# Patient Record
Sex: MALE | Race: BLACK OR AFRICAN AMERICAN | Employment: OTHER
[De-identification: names, ages, dates, MRNs, and addresses within clinical notes are randomized per-mention and may not be internally consistent; named-entity substitution may affect disease eponyms.]

---

## 2017-01-06 ENCOUNTER — TELEPHONE (OUTPATIENT)
Dept: INTERNAL MEDICINE | Facility: CLINIC | Age: 62
End: 2017-01-06

## 2017-01-19 ENCOUNTER — CARE COORDINATION (OUTPATIENT)
Dept: CARE COORDINATION | Facility: CLINIC | Age: 62
End: 2017-01-19

## 2017-01-20 ENCOUNTER — CARE COORDINATION (OUTPATIENT)
Dept: CARE COORDINATION | Facility: CLINIC | Age: 62
End: 2017-01-20

## 2017-02-02 ENCOUNTER — TELEPHONE (OUTPATIENT)
Dept: INTERNAL MEDICINE | Facility: CLINIC | Age: 62
End: 2017-02-02

## 2017-02-07 ENCOUNTER — CARE COORDINATION (OUTPATIENT)
Dept: CARE COORDINATION | Facility: CLINIC | Age: 62
End: 2017-02-07

## 2017-02-15 ENCOUNTER — CARE COORDINATION (OUTPATIENT)
Dept: CARE COORDINATION | Facility: CLINIC | Age: 62
End: 2017-02-15

## 2017-02-23 ENCOUNTER — TELEPHONE (OUTPATIENT)
Dept: INTERNAL MEDICINE | Facility: CLINIC | Age: 62
End: 2017-02-23

## 2017-02-24 ENCOUNTER — CARE COORDINATION (OUTPATIENT)
Dept: CARE COORDINATION | Facility: CLINIC | Age: 62
End: 2017-02-24

## 2017-02-27 DIAGNOSIS — E11.9 TYPE 2 DIABETES MELLITUS WITHOUT COMPLICATION, WITH LONG-TERM CURRENT USE OF INSULIN (HCC): ICD-10-CM

## 2017-02-27 DIAGNOSIS — Z79.4 TYPE 2 DIABETES MELLITUS WITHOUT COMPLICATION, WITH LONG-TERM CURRENT USE OF INSULIN (HCC): ICD-10-CM

## 2017-02-27 RX ORDER — INSULIN GLARGINE 100 [IU]/ML
55 INJECTION, SOLUTION SUBCUTANEOUS NIGHTLY
Qty: 1 VIAL | Refills: 0 | Status: SHIPPED | OUTPATIENT
Start: 2017-02-27 | End: 2017-03-17 | Stop reason: DRUGHIGH

## 2017-03-07 ENCOUNTER — APPOINTMENT (OUTPATIENT)
Dept: GENERAL RADIOLOGY | Age: 62
End: 2017-03-07
Payer: COMMERCIAL

## 2017-03-07 ENCOUNTER — HOSPITAL ENCOUNTER (EMERGENCY)
Age: 62
Discharge: LEFT BEFORE DISCHARGE | End: 2017-03-07
Attending: EMERGENCY MEDICINE
Payer: COMMERCIAL

## 2017-03-07 VITALS
DIASTOLIC BLOOD PRESSURE: 69 MMHG | RESPIRATION RATE: 18 BRPM | BODY MASS INDEX: 25.71 KG/M2 | HEART RATE: 62 BPM | SYSTOLIC BLOOD PRESSURE: 143 MMHG | TEMPERATURE: 98.1 F | WEIGHT: 160 LBS | HEIGHT: 66 IN | OXYGEN SATURATION: 100 %

## 2017-03-07 DIAGNOSIS — J44.1 COPD EXACERBATION (HCC): Primary | ICD-10-CM

## 2017-03-07 DIAGNOSIS — R06.02 SOB (SHORTNESS OF BREATH): ICD-10-CM

## 2017-03-07 LAB
ABSOLUTE EOS #: 0.2 K/UL (ref 0–0.4)
ABSOLUTE LYMPH #: 2.1 K/UL (ref 1–4.8)
ABSOLUTE MONO #: 0.6 K/UL (ref 0.1–1.2)
ANION GAP SERPL CALCULATED.3IONS-SCNC: 9 MMOL/L (ref 9–17)
BASOPHILS # BLD: 1 % (ref 0–2)
BASOPHILS ABSOLUTE: 0.1 K/UL (ref 0–0.2)
BUN BLDV-MCNC: 30 MG/DL (ref 8–23)
BUN/CREAT BLD: ABNORMAL (ref 9–20)
CALCIUM SERPL-MCNC: 8.8 MG/DL (ref 8.6–10.4)
CHLORIDE BLD-SCNC: 102 MMOL/L (ref 98–107)
CO2: 23 MMOL/L (ref 20–31)
CREAT SERPL-MCNC: 1.76 MG/DL (ref 0.7–1.2)
DIFFERENTIAL TYPE: NORMAL
EOSINOPHILS RELATIVE PERCENT: 2 % (ref 1–4)
GFR AFRICAN AMERICAN: 48 ML/MIN
GFR NON-AFRICAN AMERICAN: 40 ML/MIN
GFR SERPL CREATININE-BSD FRML MDRD: ABNORMAL ML/MIN/{1.73_M2}
GFR SERPL CREATININE-BSD FRML MDRD: ABNORMAL ML/MIN/{1.73_M2}
GLUCOSE BLD-MCNC: 179 MG/DL (ref 70–99)
HCT VFR BLD CALC: 44.4 % (ref 41–53)
HEMOGLOBIN: 15.3 G/DL (ref 13.5–17.5)
LYMPHOCYTES # BLD: 24 % (ref 24–44)
MAGNESIUM: 1.8 MG/DL (ref 1.6–2.6)
MCH RBC QN AUTO: 30.8 PG (ref 26–34)
MCHC RBC AUTO-ENTMCNC: 34.4 G/DL (ref 31–37)
MCV RBC AUTO: 89.6 FL (ref 80–100)
MONOCYTES # BLD: 7 % (ref 2–11)
PDW BLD-RTO: 13.1 % (ref 12.5–15.4)
PLATELET # BLD: 378 K/UL (ref 140–450)
PLATELET ESTIMATE: NORMAL
PMV BLD AUTO: 7.8 FL (ref 6–12)
POC TROPONIN I: 0.03 NG/ML (ref 0–0.1)
POC TROPONIN I: 0.03 NG/ML (ref 0–0.1)
POC TROPONIN I: 0.04 NG/ML (ref 0–0.1)
POC TROPONIN INTERP: NORMAL
POTASSIUM SERPL-SCNC: 4.1 MMOL/L (ref 3.7–5.3)
RBC # BLD: 4.96 M/UL (ref 4.5–5.9)
RBC # BLD: NORMAL 10*6/UL
SEG NEUTROPHILS: 66 % (ref 36–66)
SEGMENTED NEUTROPHILS ABSOLUTE COUNT: 6 K/UL (ref 1.8–7.7)
SODIUM BLD-SCNC: 134 MMOL/L (ref 135–144)
WBC # BLD: 9 K/UL (ref 3.5–11)
WBC # BLD: NORMAL 10*3/UL

## 2017-03-07 PROCEDURE — 6360000002 HC RX W HCPCS: Performed by: EMERGENCY MEDICINE

## 2017-03-07 PROCEDURE — 84484 ASSAY OF TROPONIN QUANT: CPT

## 2017-03-07 PROCEDURE — 96361 HYDRATE IV INFUSION ADD-ON: CPT

## 2017-03-07 PROCEDURE — 71010 XR CHEST PORTABLE: CPT

## 2017-03-07 PROCEDURE — 94645 CONT INHLJ TX EACH ADDL HOUR: CPT

## 2017-03-07 PROCEDURE — 93005 ELECTROCARDIOGRAM TRACING: CPT

## 2017-03-07 PROCEDURE — 96374 THER/PROPH/DIAG INJ IV PUSH: CPT

## 2017-03-07 PROCEDURE — 83735 ASSAY OF MAGNESIUM: CPT

## 2017-03-07 PROCEDURE — 6370000000 HC RX 637 (ALT 250 FOR IP): Performed by: EMERGENCY MEDICINE

## 2017-03-07 PROCEDURE — 99285 EMERGENCY DEPT VISIT HI MDM: CPT

## 2017-03-07 PROCEDURE — 85025 COMPLETE CBC W/AUTO DIFF WBC: CPT

## 2017-03-07 PROCEDURE — 94644 CONT INHLJ TX 1ST HOUR: CPT

## 2017-03-07 PROCEDURE — 80048 BASIC METABOLIC PNL TOTAL CA: CPT

## 2017-03-07 PROCEDURE — 2580000003 HC RX 258: Performed by: EMERGENCY MEDICINE

## 2017-03-07 PROCEDURE — 94640 AIRWAY INHALATION TREATMENT: CPT

## 2017-03-07 RX ORDER — ALBUTEROL SULFATE 90 UG/1
2 AEROSOL, METERED RESPIRATORY (INHALATION)
Status: DISCONTINUED | OUTPATIENT
Start: 2017-03-07 | End: 2017-03-07

## 2017-03-07 RX ORDER — ALBUTEROL SULFATE 2.5 MG/3ML
5 SOLUTION RESPIRATORY (INHALATION)
Status: DISCONTINUED | OUTPATIENT
Start: 2017-03-07 | End: 2017-03-07

## 2017-03-07 RX ORDER — ALBUTEROL SULFATE 90 UG/1
2 AEROSOL, METERED RESPIRATORY (INHALATION)
Status: DISCONTINUED | OUTPATIENT
Start: 2017-03-07 | End: 2017-03-07 | Stop reason: HOSPADM

## 2017-03-07 RX ORDER — AZITHROMYCIN 250 MG/1
TABLET, FILM COATED ORAL
Qty: 1 PACKET | Refills: 0 | Status: SHIPPED | OUTPATIENT
Start: 2017-03-07 | End: 2017-03-17 | Stop reason: ALTCHOICE

## 2017-03-07 RX ORDER — METHYLPREDNISOLONE SODIUM SUCCINATE 125 MG/2ML
125 INJECTION, POWDER, LYOPHILIZED, FOR SOLUTION INTRAMUSCULAR; INTRAVENOUS ONCE
Status: COMPLETED | OUTPATIENT
Start: 2017-03-07 | End: 2017-03-07

## 2017-03-07 RX ORDER — ASPIRIN 81 MG/1
324 TABLET, CHEWABLE ORAL ONCE
Status: COMPLETED | OUTPATIENT
Start: 2017-03-07 | End: 2017-03-07

## 2017-03-07 RX ORDER — 0.9 % SODIUM CHLORIDE 0.9 %
1000 INTRAVENOUS SOLUTION INTRAVENOUS ONCE
Status: COMPLETED | OUTPATIENT
Start: 2017-03-07 | End: 2017-03-07

## 2017-03-07 RX ORDER — ALBUTEROL SULFATE 2.5 MG/3ML
10 SOLUTION RESPIRATORY (INHALATION)
Status: DISCONTINUED | OUTPATIENT
Start: 2017-03-07 | End: 2017-03-07 | Stop reason: HOSPADM

## 2017-03-07 RX ORDER — IPRATROPIUM BROMIDE AND ALBUTEROL SULFATE 2.5; .5 MG/3ML; MG/3ML
1 SOLUTION RESPIRATORY (INHALATION)
Status: DISCONTINUED | OUTPATIENT
Start: 2017-03-07 | End: 2017-03-07

## 2017-03-07 RX ORDER — PREDNISONE 10 MG/1
TABLET ORAL
Qty: 25 TABLET | Refills: 0 | Status: SHIPPED | OUTPATIENT
Start: 2017-03-07 | End: 2017-03-17 | Stop reason: ALTCHOICE

## 2017-03-07 RX ADMIN — ALBUTEROL SULFATE 15 MG/HR: 2.5 SOLUTION RESPIRATORY (INHALATION) at 02:06

## 2017-03-07 RX ADMIN — METHYLPREDNISOLONE SODIUM SUCCINATE 125 MG: 125 INJECTION, POWDER, FOR SOLUTION INTRAMUSCULAR; INTRAVENOUS at 01:40

## 2017-03-07 RX ADMIN — SODIUM CHLORIDE 1000 ML: 9 INJECTION, SOLUTION INTRAVENOUS at 02:06

## 2017-03-07 RX ADMIN — ASPIRIN 81 MG 324 MG: 81 TABLET ORAL at 01:40

## 2017-03-07 RX ADMIN — ALBUTEROL SULFATE 10 MG: 5 SOLUTION RESPIRATORY (INHALATION) at 01:33

## 2017-03-07 RX ADMIN — IPRATROPIUM BROMIDE 0.5 MG: 0.5 SOLUTION RESPIRATORY (INHALATION) at 01:33

## 2017-03-07 ASSESSMENT — ENCOUNTER SYMPTOMS
SHORTNESS OF BREATH: 1
TROUBLE SWALLOWING: 0
SORE THROAT: 0
COLOR CHANGE: 0
ABDOMINAL PAIN: 0
DIARRHEA: 0
VOMITING: 0
COUGH: 1
NAUSEA: 0
CHEST TIGHTNESS: 0

## 2017-03-08 ENCOUNTER — CARE COORDINATION (OUTPATIENT)
Dept: CARE COORDINATION | Facility: CLINIC | Age: 62
End: 2017-03-08

## 2017-03-08 LAB
EKG ATRIAL RATE: 69 BPM
EKG P AXIS: 65 DEGREES
EKG P-R INTERVAL: 130 MS
EKG Q-T INTERVAL: 368 MS
EKG QRS DURATION: 96 MS
EKG QTC CALCULATION (BAZETT): 394 MS
EKG R AXIS: -128 DEGREES
EKG T AXIS: -150 DEGREES
EKG VENTRICULAR RATE: 69 BPM

## 2017-03-09 DIAGNOSIS — J41.1 MUCOPURULENT CHRONIC BRONCHITIS (HCC): ICD-10-CM

## 2017-03-09 RX ORDER — IPRATROPIUM BROMIDE AND ALBUTEROL SULFATE 2.5; .5 MG/3ML; MG/3ML
SOLUTION RESPIRATORY (INHALATION)
Qty: 360 ML | Refills: 0 | Status: SHIPPED | OUTPATIENT
Start: 2017-03-09 | End: 2017-03-17

## 2017-03-17 ENCOUNTER — CARE COORDINATOR VISIT (OUTPATIENT)
Dept: CARE COORDINATION | Age: 62
End: 2017-03-17

## 2017-03-17 ENCOUNTER — OFFICE VISIT (OUTPATIENT)
Dept: INTERNAL MEDICINE | Age: 62
End: 2017-03-17
Payer: COMMERCIAL

## 2017-03-17 ENCOUNTER — CARE COORDINATOR VISIT (OUTPATIENT)
Dept: INTERNAL MEDICINE | Age: 62
End: 2017-03-17

## 2017-03-17 VITALS
BODY MASS INDEX: 26.16 KG/M2 | HEART RATE: 57 BPM | DIASTOLIC BLOOD PRESSURE: 87 MMHG | SYSTOLIC BLOOD PRESSURE: 140 MMHG | HEIGHT: 66 IN | WEIGHT: 162.8 LBS

## 2017-03-17 DIAGNOSIS — E34.9 INCREASED PTH LEVEL: ICD-10-CM

## 2017-03-17 DIAGNOSIS — J41.1 MUCOPURULENT CHRONIC BRONCHITIS (HCC): ICD-10-CM

## 2017-03-17 DIAGNOSIS — I10 ESSENTIAL HYPERTENSION: ICD-10-CM

## 2017-03-17 DIAGNOSIS — E78.01 FAMILIAL HYPERCHOLESTEROLEMIA: ICD-10-CM

## 2017-03-17 DIAGNOSIS — E11.21 DIABETIC NEPHROPATHY ASSOCIATED WITH TYPE 2 DIABETES MELLITUS (HCC): ICD-10-CM

## 2017-03-17 DIAGNOSIS — F51.01 PRIMARY INSOMNIA: ICD-10-CM

## 2017-03-17 DIAGNOSIS — I65.22 CAROTID STENOSIS, LEFT: ICD-10-CM

## 2017-03-17 DIAGNOSIS — J41.8 MIXED SIMPLE AND MUCOPURULENT CHRONIC BRONCHITIS (HCC): ICD-10-CM

## 2017-03-17 DIAGNOSIS — E11.42 DIABETIC POLYNEUROPATHY ASSOCIATED WITH TYPE 2 DIABETES MELLITUS (HCC): ICD-10-CM

## 2017-03-17 DIAGNOSIS — C18.9 MALIGNANT NEOPLASM OF COLON, UNSPECIFIED PART OF COLON (HCC): ICD-10-CM

## 2017-03-17 DIAGNOSIS — B07.8 COMMON WART: ICD-10-CM

## 2017-03-17 DIAGNOSIS — E78.00 PURE HYPERCHOLESTEROLEMIA: ICD-10-CM

## 2017-03-17 LAB — HBA1C MFR BLD: 9.5 %

## 2017-03-17 PROCEDURE — 99214 OFFICE O/P EST MOD 30 MIN: CPT | Performed by: INTERNAL MEDICINE

## 2017-03-17 PROCEDURE — 83036 HEMOGLOBIN GLYCOSYLATED A1C: CPT | Performed by: INTERNAL MEDICINE

## 2017-03-17 RX ORDER — GABAPENTIN 100 MG/1
200 CAPSULE ORAL DAILY
Qty: 180 CAPSULE | Refills: 2 | Status: SHIPPED | OUTPATIENT
Start: 2017-03-17 | End: 2017-05-04 | Stop reason: SDUPTHER

## 2017-03-17 RX ORDER — IPRATROPIUM BROMIDE AND ALBUTEROL SULFATE 2.5; .5 MG/3ML; MG/3ML
1 SOLUTION RESPIRATORY (INHALATION) EVERY 4 HOURS
Qty: 360 ML | Refills: 2 | Status: SHIPPED | OUTPATIENT
Start: 2017-03-17 | End: 2017-03-21 | Stop reason: SDUPTHER

## 2017-03-17 RX ORDER — QUETIAPINE FUMARATE 50 MG/1
50 TABLET, FILM COATED ORAL NIGHTLY
Qty: 50 TABLET | Refills: 2 | Status: SHIPPED | OUTPATIENT
Start: 2017-03-17 | End: 2017-06-21 | Stop reason: SDUPTHER

## 2017-03-17 RX ORDER — CARVEDILOL 25 MG/1
25 TABLET ORAL 2 TIMES DAILY
Qty: 60 TABLET | Refills: 2 | Status: SHIPPED | OUTPATIENT
Start: 2017-03-17 | End: 2017-06-21 | Stop reason: SDUPTHER

## 2017-03-17 RX ORDER — INSULIN GLARGINE 100 [IU]/ML
40 INJECTION, SOLUTION SUBCUTANEOUS NIGHTLY
COMMUNITY
End: 2017-03-17

## 2017-03-17 RX ORDER — ATORVASTATIN CALCIUM 80 MG/1
80 TABLET, FILM COATED ORAL DAILY
Qty: 30 TABLET | Refills: 2 | Status: SHIPPED | OUTPATIENT
Start: 2017-03-17 | End: 2017-06-21 | Stop reason: SDUPTHER

## 2017-03-17 RX ORDER — INSULIN GLARGINE 100 [IU]/ML
40 INJECTION, SOLUTION SUBCUTANEOUS NIGHTLY
Qty: 5 VIAL | Refills: 2 | Status: SHIPPED | OUTPATIENT
Start: 2017-03-17 | End: 2017-06-21 | Stop reason: SDUPTHER

## 2017-03-17 RX ORDER — AMLODIPINE BESYLATE 10 MG/1
10 TABLET ORAL DAILY
Qty: 30 TABLET | Refills: 2 | Status: SHIPPED | OUTPATIENT
Start: 2017-03-17 | End: 2017-06-21 | Stop reason: SDUPTHER

## 2017-03-17 RX ORDER — LOSARTAN POTASSIUM 100 MG/1
100 TABLET ORAL DAILY
Qty: 30 TABLET | Refills: 2 | Status: SHIPPED | OUTPATIENT
Start: 2017-03-17 | End: 2017-06-21 | Stop reason: SDUPTHER

## 2017-03-17 RX ORDER — ASPIRIN 81 MG/1
81 TABLET ORAL DAILY
Qty: 30 TABLET | Refills: 2 | Status: SHIPPED | OUTPATIENT
Start: 2017-03-17 | End: 2017-06-21 | Stop reason: SDUPTHER

## 2017-03-17 ASSESSMENT — PATIENT HEALTH QUESTIONNAIRE - PHQ9
6. FEELING BAD ABOUT YOURSELF - OR THAT YOU ARE A FAILURE OR HAVE LET YOURSELF OR YOUR FAMILY DOWN: 0
4. FEELING TIRED OR HAVING LITTLE ENERGY: 1
1. LITTLE INTEREST OR PLEASURE IN DOING THINGS: 0
5. POOR APPETITE OR OVEREATING: 0
2. FEELING DOWN, DEPRESSED OR HOPELESS: 0
SUM OF ALL RESPONSES TO PHQ QUESTIONS 1-9: 4
10. IF YOU CHECKED OFF ANY PROBLEMS, HOW DIFFICULT HAVE THESE PROBLEMS MADE IT FOR YOU TO DO YOUR WORK, TAKE CARE OF THINGS AT HOME, OR GET ALONG WITH OTHER PEOPLE: 0
9. THOUGHTS THAT YOU WOULD BE BETTER OFF DEAD, OR OF HURTING YOURSELF: 0
SUM OF ALL RESPONSES TO PHQ9 QUESTIONS 1 & 2: 0
8. MOVING OR SPEAKING SO SLOWLY THAT OTHER PEOPLE COULD HAVE NOTICED. OR THE OPPOSITE, BEING SO FIGETY OR RESTLESS THAT YOU HAVE BEEN MOVING AROUND A LOT MORE THAN USUAL: 0
7. TROUBLE CONCENTRATING ON THINGS, SUCH AS READING THE NEWSPAPER OR WATCHING TELEVISION: 1
3. TROUBLE FALLING OR STAYING ASLEEP: 2

## 2017-03-21 DIAGNOSIS — J41.1 MUCOPURULENT CHRONIC BRONCHITIS (HCC): ICD-10-CM

## 2017-03-21 DIAGNOSIS — I65.22 CAROTID STENOSIS, LEFT: ICD-10-CM

## 2017-03-21 RX ORDER — IPRATROPIUM BROMIDE AND ALBUTEROL SULFATE 2.5; .5 MG/3ML; MG/3ML
1 SOLUTION RESPIRATORY (INHALATION) EVERY 4 HOURS
Qty: 360 ML | Refills: 2 | Status: SHIPPED | OUTPATIENT
Start: 2017-03-21 | End: 2017-06-21 | Stop reason: SDUPTHER

## 2017-03-21 RX ORDER — CLOPIDOGREL BISULFATE 75 MG/1
75 TABLET ORAL DAILY
Qty: 90 TABLET | Refills: 3 | Status: SHIPPED | OUTPATIENT
Start: 2017-03-21 | End: 2017-06-21 | Stop reason: SDUPTHER

## 2017-04-04 ENCOUNTER — CARE COORDINATION (OUTPATIENT)
Dept: CARE COORDINATION | Age: 62
End: 2017-04-04

## 2017-04-07 ENCOUNTER — TELEPHONE (OUTPATIENT)
Dept: INTERNAL MEDICINE | Age: 62
End: 2017-04-07

## 2017-04-07 RX ORDER — SYRINGE-NEEDLE,INSULIN,0.5 ML 27GX1/2"
1 SYRINGE, EMPTY DISPOSABLE MISCELLANEOUS 4 TIMES DAILY
Qty: 100 EACH | Refills: 11 | Status: SHIPPED | OUTPATIENT
Start: 2017-04-07 | End: 2019-07-10

## 2017-05-04 RX ORDER — GABAPENTIN 100 MG/1
200 CAPSULE ORAL 3 TIMES DAILY
Qty: 180 CAPSULE | Refills: 2 | Status: SHIPPED | OUTPATIENT
Start: 2017-05-04 | End: 2017-09-06

## 2017-05-04 RX ORDER — GABAPENTIN 100 MG/1
200 CAPSULE ORAL 3 TIMES DAILY
Qty: 90 CAPSULE | Refills: 3 | Status: CANCELLED | OUTPATIENT
Start: 2017-05-04

## 2017-05-22 ENCOUNTER — TELEPHONE (OUTPATIENT)
Dept: INTERNAL MEDICINE | Age: 62
End: 2017-05-22

## 2017-05-31 ENCOUNTER — HOSPITAL ENCOUNTER (OUTPATIENT)
Age: 62
Setting detail: OBSERVATION
Discharge: HOME | End: 2017-06-01
Attending: EMERGENCY MEDICINE | Admitting: EMERGENCY MEDICINE
Payer: COMMERCIAL

## 2017-05-31 ENCOUNTER — APPOINTMENT (OUTPATIENT)
Dept: MRI IMAGING | Age: 62
End: 2017-05-31
Payer: COMMERCIAL

## 2017-05-31 ENCOUNTER — APPOINTMENT (OUTPATIENT)
Dept: GENERAL RADIOLOGY | Age: 62
End: 2017-05-31
Payer: COMMERCIAL

## 2017-05-31 ENCOUNTER — APPOINTMENT (OUTPATIENT)
Dept: CT IMAGING | Age: 62
End: 2017-05-31
Payer: COMMERCIAL

## 2017-05-31 DIAGNOSIS — H81.10 BENIGN PAROXYSMAL POSITIONAL VERTIGO, UNSPECIFIED LATERALITY: Primary | ICD-10-CM

## 2017-05-31 DIAGNOSIS — R20.0 LEG NUMBNESS: ICD-10-CM

## 2017-05-31 DIAGNOSIS — R20.0 ARM NUMBNESS: ICD-10-CM

## 2017-05-31 LAB
ABSOLUTE EOS #: 0.2 K/UL (ref 0–0.4)
ABSOLUTE LYMPH #: 1.4 K/UL (ref 1–4.8)
ABSOLUTE MONO #: 0.5 K/UL (ref 0.1–1.2)
ANION GAP SERPL CALCULATED.3IONS-SCNC: 9 MMOL/L (ref 9–17)
BASOPHILS # BLD: 0 %
BASOPHILS ABSOLUTE: 0 K/UL (ref 0–0.2)
BNP INTERPRETATION: ABNORMAL
BUN BLDV-MCNC: 23 MG/DL (ref 8–23)
BUN/CREAT BLD: ABNORMAL (ref 9–20)
CALCIUM SERPL-MCNC: 8.8 MG/DL (ref 8.6–10.4)
CHLORIDE BLD-SCNC: 109 MMOL/L (ref 98–107)
CO2: 23 MMOL/L (ref 20–31)
CREAT SERPL-MCNC: 1.48 MG/DL (ref 0.7–1.2)
DIFFERENTIAL TYPE: NORMAL
EOSINOPHILS RELATIVE PERCENT: 2 %
GFR AFRICAN AMERICAN: 59 ML/MIN
GFR NON-AFRICAN AMERICAN: 48 ML/MIN
GFR SERPL CREATININE-BSD FRML MDRD: ABNORMAL ML/MIN/{1.73_M2}
GFR SERPL CREATININE-BSD FRML MDRD: ABNORMAL ML/MIN/{1.73_M2}
GLUCOSE BLD-MCNC: 366 MG/DL (ref 75–110)
GLUCOSE BLD-MCNC: 394 MG/DL (ref 75–110)
GLUCOSE BLD-MCNC: 92 MG/DL (ref 70–99)
HCT VFR BLD CALC: 43.9 % (ref 41–53)
HEMOGLOBIN: 14.9 G/DL (ref 13.5–17.5)
LYMPHOCYTES # BLD: 19 %
MCH RBC QN AUTO: 30.6 PG (ref 26–34)
MCHC RBC AUTO-ENTMCNC: 33.9 G/DL (ref 31–37)
MCV RBC AUTO: 90.1 FL (ref 80–100)
MONOCYTES # BLD: 7 %
PDW BLD-RTO: 13.9 % (ref 12.5–15.4)
PLATELET # BLD: 349 K/UL (ref 140–450)
PLATELET ESTIMATE: NORMAL
PMV BLD AUTO: 7.7 FL (ref 6–12)
POC CREATININE: 1.4 MG/DL (ref 0.6–1.4)
POC TROPONIN I: 0.02 NG/ML (ref 0–0.1)
POC TROPONIN I: 0.03 NG/ML (ref 0–0.1)
POC TROPONIN INTERP: NORMAL
POC TROPONIN INTERP: NORMAL
POTASSIUM SERPL-SCNC: 4 MMOL/L (ref 3.7–5.3)
PRO-BNP: 1618 PG/ML
RBC # BLD: 4.87 M/UL (ref 4.5–5.9)
RBC # BLD: NORMAL 10*6/UL
SEG NEUTROPHILS: 72 %
SEGMENTED NEUTROPHILS ABSOLUTE COUNT: 4.9 K/UL (ref 1.8–7.7)
SODIUM BLD-SCNC: 141 MMOL/L (ref 135–144)
WBC # BLD: 7 K/UL (ref 3.5–11)
WBC # BLD: NORMAL 10*3/UL

## 2017-05-31 PROCEDURE — 6370000000 HC RX 637 (ALT 250 FOR IP): Performed by: EMERGENCY MEDICINE

## 2017-05-31 PROCEDURE — G0378 HOSPITAL OBSERVATION PER HR: HCPCS

## 2017-05-31 PROCEDURE — 84484 ASSAY OF TROPONIN QUANT: CPT

## 2017-05-31 PROCEDURE — 80048 BASIC METABOLIC PNL TOTAL CA: CPT

## 2017-05-31 PROCEDURE — 82947 ASSAY GLUCOSE BLOOD QUANT: CPT

## 2017-05-31 PROCEDURE — 93005 ELECTROCARDIOGRAM TRACING: CPT

## 2017-05-31 PROCEDURE — 71020 XR CHEST STANDARD TWO VW: CPT

## 2017-05-31 PROCEDURE — 70450 CT HEAD/BRAIN W/O DYE: CPT

## 2017-05-31 PROCEDURE — 2580000003 HC RX 258: Performed by: EMERGENCY MEDICINE

## 2017-05-31 PROCEDURE — 85025 COMPLETE CBC W/AUTO DIFF WBC: CPT

## 2017-05-31 PROCEDURE — 82565 ASSAY OF CREATININE: CPT

## 2017-05-31 PROCEDURE — 96372 THER/PROPH/DIAG INJ SC/IM: CPT

## 2017-05-31 PROCEDURE — 83880 ASSAY OF NATRIURETIC PEPTIDE: CPT

## 2017-05-31 PROCEDURE — 6360000002 HC RX W HCPCS: Performed by: EMERGENCY MEDICINE

## 2017-05-31 PROCEDURE — 70551 MRI BRAIN STEM W/O DYE: CPT

## 2017-05-31 PROCEDURE — 99285 EMERGENCY DEPT VISIT HI MDM: CPT

## 2017-05-31 PROCEDURE — 94640 AIRWAY INHALATION TREATMENT: CPT

## 2017-05-31 RX ORDER — NICOTINE POLACRILEX 4 MG
15 LOZENGE BUCCAL PRN
Status: DISCONTINUED | OUTPATIENT
Start: 2017-05-31 | End: 2017-06-02 | Stop reason: HOSPADM

## 2017-05-31 RX ORDER — ATORVASTATIN CALCIUM 80 MG/1
80 TABLET, FILM COATED ORAL DAILY
Status: DISCONTINUED | OUTPATIENT
Start: 2017-05-31 | End: 2017-06-02 | Stop reason: HOSPADM

## 2017-05-31 RX ORDER — SODIUM CHLORIDE 0.9 % (FLUSH) 0.9 %
10 SYRINGE (ML) INJECTION PRN
Status: DISCONTINUED | OUTPATIENT
Start: 2017-05-31 | End: 2017-06-02 | Stop reason: HOSPADM

## 2017-05-31 RX ORDER — CARVEDILOL 25 MG/1
25 TABLET ORAL 2 TIMES DAILY
Status: DISCONTINUED | OUTPATIENT
Start: 2017-05-31 | End: 2017-06-02 | Stop reason: HOSPADM

## 2017-05-31 RX ORDER — MECLIZINE HCL 12.5 MG/1
12.5 TABLET ORAL ONCE
Status: COMPLETED | OUTPATIENT
Start: 2017-05-31 | End: 2017-05-31

## 2017-05-31 RX ORDER — IPRATROPIUM BROMIDE AND ALBUTEROL SULFATE 2.5; .5 MG/3ML; MG/3ML
1 SOLUTION RESPIRATORY (INHALATION) EVERY 4 HOURS
Status: DISCONTINUED | OUTPATIENT
Start: 2017-05-31 | End: 2017-06-02 | Stop reason: HOSPADM

## 2017-05-31 RX ORDER — DEXTROSE MONOHYDRATE 25 G/50ML
12.5 INJECTION, SOLUTION INTRAVENOUS PRN
Status: DISCONTINUED | OUTPATIENT
Start: 2017-05-31 | End: 2017-06-02 | Stop reason: HOSPADM

## 2017-05-31 RX ORDER — AMLODIPINE BESYLATE 10 MG/1
10 TABLET ORAL DAILY
Status: DISCONTINUED | OUTPATIENT
Start: 2017-05-31 | End: 2017-06-02 | Stop reason: HOSPADM

## 2017-05-31 RX ORDER — ACETAMINOPHEN 325 MG/1
650 TABLET ORAL EVERY 4 HOURS PRN
Status: DISCONTINUED | OUTPATIENT
Start: 2017-05-31 | End: 2017-06-02 | Stop reason: HOSPADM

## 2017-05-31 RX ORDER — ASPIRIN 81 MG/1
81 TABLET ORAL DAILY
Status: DISCONTINUED | OUTPATIENT
Start: 2017-05-31 | End: 2017-06-02 | Stop reason: HOSPADM

## 2017-05-31 RX ORDER — DEXTROSE MONOHYDRATE 50 MG/ML
100 INJECTION, SOLUTION INTRAVENOUS PRN
Status: DISCONTINUED | OUTPATIENT
Start: 2017-05-31 | End: 2017-06-02 | Stop reason: HOSPADM

## 2017-05-31 RX ORDER — GABAPENTIN 100 MG/1
200 CAPSULE ORAL 3 TIMES DAILY
Status: DISCONTINUED | OUTPATIENT
Start: 2017-05-31 | End: 2017-06-02 | Stop reason: HOSPADM

## 2017-05-31 RX ORDER — CLOPIDOGREL BISULFATE 75 MG/1
75 TABLET ORAL DAILY
Status: DISCONTINUED | OUTPATIENT
Start: 2017-05-31 | End: 2017-06-02 | Stop reason: HOSPADM

## 2017-05-31 RX ORDER — QUETIAPINE FUMARATE 25 MG/1
50 TABLET, FILM COATED ORAL NIGHTLY
Status: DISCONTINUED | OUTPATIENT
Start: 2017-05-31 | End: 2017-06-02 | Stop reason: HOSPADM

## 2017-05-31 RX ORDER — ONDANSETRON 2 MG/ML
4 INJECTION INTRAMUSCULAR; INTRAVENOUS EVERY 6 HOURS PRN
Status: DISCONTINUED | OUTPATIENT
Start: 2017-05-31 | End: 2017-06-02 | Stop reason: HOSPADM

## 2017-05-31 RX ORDER — INSULIN GLARGINE 100 [IU]/ML
40 INJECTION, SOLUTION SUBCUTANEOUS NIGHTLY
Status: DISCONTINUED | OUTPATIENT
Start: 2017-05-31 | End: 2017-06-02 | Stop reason: HOSPADM

## 2017-05-31 RX ORDER — ALBUTEROL SULFATE 90 UG/1
1 AEROSOL, METERED RESPIRATORY (INHALATION) EVERY 6 HOURS PRN
Status: DISCONTINUED | OUTPATIENT
Start: 2017-05-31 | End: 2017-06-02 | Stop reason: HOSPADM

## 2017-05-31 RX ORDER — LOSARTAN POTASSIUM 50 MG/1
100 TABLET ORAL DAILY
Status: DISCONTINUED | OUTPATIENT
Start: 2017-05-31 | End: 2017-06-02 | Stop reason: HOSPADM

## 2017-05-31 RX ORDER — SODIUM CHLORIDE 0.9 % (FLUSH) 0.9 %
10 SYRINGE (ML) INJECTION EVERY 12 HOURS SCHEDULED
Status: DISCONTINUED | OUTPATIENT
Start: 2017-05-31 | End: 2017-06-02 | Stop reason: HOSPADM

## 2017-05-31 RX ADMIN — Medication 10 ML: at 22:08

## 2017-05-31 RX ADMIN — IPRATROPIUM BROMIDE AND ALBUTEROL SULFATE 3 ML: .5; 3 SOLUTION RESPIRATORY (INHALATION) at 19:53

## 2017-05-31 RX ADMIN — ATORVASTATIN CALCIUM 80 MG: 80 TABLET, FILM COATED ORAL at 21:58

## 2017-05-31 RX ADMIN — INSULIN GLARGINE 40 UNITS: 100 INJECTION, SOLUTION SUBCUTANEOUS at 21:59

## 2017-05-31 RX ADMIN — INSULIN LISPRO 9 UNITS: 100 INJECTION, SOLUTION INTRAVENOUS; SUBCUTANEOUS at 17:33

## 2017-05-31 RX ADMIN — ENOXAPARIN SODIUM 40 MG: 40 INJECTION SUBCUTANEOUS at 16:43

## 2017-05-31 RX ADMIN — IPRATROPIUM BROMIDE AND ALBUTEROL SULFATE 3 ML: .5; 3 SOLUTION RESPIRATORY (INHALATION) at 16:52

## 2017-05-31 RX ADMIN — MECLIZINE HCL 12.5 MG: 12.5 TABLET ORAL at 15:24

## 2017-05-31 RX ADMIN — GABAPENTIN 200 MG: 100 CAPSULE ORAL at 16:43

## 2017-05-31 RX ADMIN — GABAPENTIN 200 MG: 100 CAPSULE ORAL at 21:58

## 2017-05-31 RX ADMIN — CARVEDILOL 25 MG: 25 TABLET, FILM COATED ORAL at 21:58

## 2017-05-31 RX ADMIN — INSULIN LISPRO 6 UNITS: 100 INJECTION, SOLUTION INTRAVENOUS; SUBCUTANEOUS at 21:59

## 2017-05-31 ASSESSMENT — ENCOUNTER SYMPTOMS
ALLERGIC/IMMUNOLOGIC NEGATIVE: 1
SHORTNESS OF BREATH: 0
COUGH: 0
BACK PAIN: 0
DIARRHEA: 0
VOMITING: 0
NAUSEA: 0
CONSTIPATION: 0
ABDOMINAL PAIN: 0

## 2017-05-31 ASSESSMENT — PAIN SCALES - GENERAL: PAINLEVEL_OUTOF10: 0

## 2017-06-01 ENCOUNTER — APPOINTMENT (OUTPATIENT)
Dept: CT IMAGING | Age: 62
End: 2017-06-01
Payer: COMMERCIAL

## 2017-06-01 VITALS
HEART RATE: 62 BPM | HEIGHT: 68 IN | OXYGEN SATURATION: 97 % | DIASTOLIC BLOOD PRESSURE: 82 MMHG | WEIGHT: 161 LBS | SYSTOLIC BLOOD PRESSURE: 156 MMHG | RESPIRATION RATE: 16 BRPM | BODY MASS INDEX: 24.4 KG/M2 | TEMPERATURE: 98.4 F

## 2017-06-01 LAB
EKG ATRIAL RATE: 55 BPM
EKG P AXIS: 76 DEGREES
EKG P-R INTERVAL: 142 MS
EKG Q-T INTERVAL: 406 MS
EKG QRS DURATION: 104 MS
EKG QTC CALCULATION (BAZETT): 388 MS
EKG R AXIS: 46 DEGREES
EKG T AXIS: -177 DEGREES
EKG VENTRICULAR RATE: 55 BPM
GLUCOSE BLD-MCNC: 132 MG/DL (ref 75–110)
GLUCOSE BLD-MCNC: 210 MG/DL (ref 75–110)
GLUCOSE BLD-MCNC: 251 MG/DL (ref 75–110)
GLUCOSE BLD-MCNC: 276 MG/DL (ref 75–110)

## 2017-06-01 PROCEDURE — 6360000004 HC RX CONTRAST MEDICATION: Performed by: EMERGENCY MEDICINE

## 2017-06-01 PROCEDURE — G0378 HOSPITAL OBSERVATION PER HR: HCPCS

## 2017-06-01 PROCEDURE — 94640 AIRWAY INHALATION TREATMENT: CPT

## 2017-06-01 PROCEDURE — 96372 THER/PROPH/DIAG INJ SC/IM: CPT

## 2017-06-01 PROCEDURE — 99219 PR INITIAL OBSERVATION CARE/DAY 50 MINUTES: CPT | Performed by: PSYCHIATRY & NEUROLOGY

## 2017-06-01 PROCEDURE — 6370000000 HC RX 637 (ALT 250 FOR IP): Performed by: EMERGENCY MEDICINE

## 2017-06-01 PROCEDURE — 6370000000 HC RX 637 (ALT 250 FOR IP): Performed by: PSYCHIATRY & NEUROLOGY

## 2017-06-01 PROCEDURE — 82947 ASSAY GLUCOSE BLOOD QUANT: CPT

## 2017-06-01 PROCEDURE — 70498 CT ANGIOGRAPHY NECK: CPT

## 2017-06-01 PROCEDURE — 2580000003 HC RX 258: Performed by: EMERGENCY MEDICINE

## 2017-06-01 PROCEDURE — 6360000002 HC RX W HCPCS: Performed by: EMERGENCY MEDICINE

## 2017-06-01 RX ORDER — 0.9 % SODIUM CHLORIDE 0.9 %
1000 INTRAVENOUS SOLUTION INTRAVENOUS ONCE
Status: COMPLETED | OUTPATIENT
Start: 2017-06-01 | End: 2017-06-01

## 2017-06-01 RX ORDER — MECLIZINE HCL 12.5 MG/1
12.5 TABLET ORAL 3 TIMES DAILY PRN
Status: DISCONTINUED | OUTPATIENT
Start: 2017-06-01 | End: 2017-06-01

## 2017-06-01 RX ORDER — MECLIZINE HCL 12.5 MG/1
25 TABLET ORAL 3 TIMES DAILY PRN
Status: DISCONTINUED | OUTPATIENT
Start: 2017-06-01 | End: 2017-06-02 | Stop reason: HOSPADM

## 2017-06-01 RX ORDER — 0.9 % SODIUM CHLORIDE 0.9 %
500 INTRAVENOUS SOLUTION INTRAVENOUS ONCE
Status: DISCONTINUED | OUTPATIENT
Start: 2017-06-01 | End: 2017-06-01

## 2017-06-01 RX ADMIN — GABAPENTIN 200 MG: 100 CAPSULE ORAL at 14:30

## 2017-06-01 RX ADMIN — GABAPENTIN 200 MG: 100 CAPSULE ORAL at 08:46

## 2017-06-01 RX ADMIN — CARVEDILOL 25 MG: 25 TABLET, FILM COATED ORAL at 08:46

## 2017-06-01 RX ADMIN — SODIUM CHLORIDE 1000 ML: 9 INJECTION, SOLUTION INTRAVENOUS at 18:23

## 2017-06-01 RX ADMIN — INSULIN LISPRO 5 UNITS: 100 INJECTION, SOLUTION INTRAVENOUS; SUBCUTANEOUS at 08:36

## 2017-06-01 RX ADMIN — INSULIN LISPRO 4 UNITS: 100 INJECTION, SOLUTION INTRAVENOUS; SUBCUTANEOUS at 18:16

## 2017-06-01 RX ADMIN — IPRATROPIUM BROMIDE AND ALBUTEROL SULFATE 3 ML: .5; 3 SOLUTION RESPIRATORY (INHALATION) at 15:44

## 2017-06-01 RX ADMIN — Medication 10 ML: at 08:47

## 2017-06-01 RX ADMIN — IOVERSOL 90 ML: 741 INJECTION INTRA-ARTERIAL; INTRAVENOUS at 18:02

## 2017-06-01 RX ADMIN — GABAPENTIN 200 MG: 100 CAPSULE ORAL at 22:02

## 2017-06-01 RX ADMIN — MECLIZINE HCL 25 MG: 12.5 TABLET ORAL at 22:02

## 2017-06-01 RX ADMIN — IPRATROPIUM BROMIDE AND ALBUTEROL SULFATE 3 ML: .5; 3 SOLUTION RESPIRATORY (INHALATION) at 11:45

## 2017-06-01 RX ADMIN — CARVEDILOL 25 MG: 25 TABLET, FILM COATED ORAL at 22:02

## 2017-06-01 RX ADMIN — ENOXAPARIN SODIUM 40 MG: 40 INJECTION SUBCUTANEOUS at 08:46

## 2017-06-01 RX ADMIN — IPRATROPIUM BROMIDE AND ALBUTEROL SULFATE 3 ML: .5; 3 SOLUTION RESPIRATORY (INHALATION) at 21:07

## 2017-06-01 RX ADMIN — IPRATROPIUM BROMIDE AND ALBUTEROL SULFATE 3 ML: .5; 3 SOLUTION RESPIRATORY (INHALATION) at 08:58

## 2017-06-01 RX ADMIN — CLOPIDOGREL 75 MG: 75 TABLET, FILM COATED ORAL at 08:46

## 2017-06-01 RX ADMIN — ASPIRIN 81 MG: 81 TABLET, COATED ORAL at 08:46

## 2017-06-01 RX ADMIN — INSULIN GLARGINE 40 UNITS: 100 INJECTION, SOLUTION SUBCUTANEOUS at 22:02

## 2017-06-01 RX ADMIN — LOSARTAN POTASSIUM 100 MG: 50 TABLET, FILM COATED ORAL at 08:46

## 2017-06-01 RX ADMIN — AMLODIPINE BESYLATE 10 MG: 10 TABLET ORAL at 08:46

## 2017-06-01 RX ADMIN — SODIUM CHLORIDE 1000 ML: 9 INJECTION, SOLUTION INTRAVENOUS at 14:23

## 2017-06-01 RX ADMIN — MECLIZINE HCL 12.5 MG: 12.5 TABLET ORAL at 11:31

## 2017-06-02 ENCOUNTER — CARE COORDINATION (OUTPATIENT)
Dept: CASE MANAGEMENT | Age: 62
End: 2017-06-02

## 2017-06-02 ENCOUNTER — TELEPHONE (OUTPATIENT)
Dept: PHARMACY | Age: 62
End: 2017-06-02

## 2017-06-03 ENCOUNTER — CARE COORDINATION (OUTPATIENT)
Dept: CASE MANAGEMENT | Age: 62
End: 2017-06-03

## 2017-06-07 ENCOUNTER — TELEPHONE (OUTPATIENT)
Dept: GASTROENTEROLOGY | Age: 62
End: 2017-06-07

## 2017-06-08 ENCOUNTER — TELEPHONE (OUTPATIENT)
Dept: GASTROENTEROLOGY | Age: 62
End: 2017-06-08

## 2017-06-12 DIAGNOSIS — Z12.11 SCREENING FOR COLON CANCER: Primary | ICD-10-CM

## 2017-06-21 DIAGNOSIS — F51.01 PRIMARY INSOMNIA: ICD-10-CM

## 2017-06-21 DIAGNOSIS — I65.22 CAROTID STENOSIS, LEFT: ICD-10-CM

## 2017-06-21 DIAGNOSIS — I10 ESSENTIAL HYPERTENSION: ICD-10-CM

## 2017-06-21 DIAGNOSIS — E78.01 FAMILIAL HYPERCHOLESTEROLEMIA: ICD-10-CM

## 2017-06-21 DIAGNOSIS — J41.1 MUCOPURULENT CHRONIC BRONCHITIS (HCC): ICD-10-CM

## 2017-06-21 RX ORDER — BUDESONIDE AND FORMOTEROL FUMARATE DIHYDRATE 160; 4.5 UG/1; UG/1
2 AEROSOL RESPIRATORY (INHALATION) 2 TIMES DAILY
Qty: 1 INHALER | Refills: 2 | Status: SHIPPED | OUTPATIENT
Start: 2017-06-21 | End: 2017-06-23

## 2017-06-21 RX ORDER — QUETIAPINE FUMARATE 50 MG/1
50 TABLET, FILM COATED ORAL NIGHTLY
Qty: 50 TABLET | Refills: 2 | Status: SHIPPED | OUTPATIENT
Start: 2017-06-21 | End: 2017-09-06

## 2017-06-21 RX ORDER — IPRATROPIUM BROMIDE AND ALBUTEROL SULFATE 2.5; .5 MG/3ML; MG/3ML
1 SOLUTION RESPIRATORY (INHALATION) EVERY 4 HOURS
Qty: 360 ML | Refills: 2 | Status: SHIPPED | OUTPATIENT
Start: 2017-06-21 | End: 2017-09-06

## 2017-06-21 RX ORDER — INSULIN GLARGINE 100 [IU]/ML
40 INJECTION, SOLUTION SUBCUTANEOUS NIGHTLY
Qty: 5 VIAL | Refills: 2 | Status: SHIPPED | OUTPATIENT
Start: 2017-06-21 | End: 2017-09-06

## 2017-06-21 RX ORDER — CARVEDILOL 25 MG/1
25 TABLET ORAL 2 TIMES DAILY
Qty: 60 TABLET | Refills: 2 | Status: SHIPPED | OUTPATIENT
Start: 2017-06-21 | End: 2017-09-06

## 2017-06-21 RX ORDER — ASPIRIN 81 MG/1
81 TABLET ORAL DAILY
Qty: 30 TABLET | Refills: 2 | Status: SHIPPED | OUTPATIENT
Start: 2017-06-21 | End: 2017-09-06

## 2017-06-21 RX ORDER — CLOPIDOGREL BISULFATE 75 MG/1
75 TABLET ORAL DAILY
Qty: 90 TABLET | Refills: 3 | Status: SHIPPED | OUTPATIENT
Start: 2017-06-21 | End: 2017-09-06

## 2017-06-21 RX ORDER — ATORVASTATIN CALCIUM 80 MG/1
80 TABLET, FILM COATED ORAL DAILY
Qty: 30 TABLET | Refills: 2 | Status: SHIPPED | OUTPATIENT
Start: 2017-06-21 | End: 2017-09-06

## 2017-06-21 RX ORDER — AMLODIPINE BESYLATE 10 MG/1
10 TABLET ORAL DAILY
Qty: 30 TABLET | Refills: 2 | Status: SHIPPED | OUTPATIENT
Start: 2017-06-21 | End: 2017-09-06

## 2017-06-21 RX ORDER — LOSARTAN POTASSIUM 100 MG/1
100 TABLET ORAL DAILY
Qty: 30 TABLET | Refills: 2 | Status: SHIPPED | OUTPATIENT
Start: 2017-06-21 | End: 2017-09-06

## 2017-06-23 ENCOUNTER — OFFICE VISIT (OUTPATIENT)
Dept: INTERNAL MEDICINE | Age: 62
End: 2017-06-23
Payer: COMMERCIAL

## 2017-06-23 ENCOUNTER — HOSPITAL ENCOUNTER (OUTPATIENT)
Age: 62
Setting detail: SPECIMEN
Discharge: HOME | End: 2017-06-23
Payer: COMMERCIAL

## 2017-06-23 VITALS
DIASTOLIC BLOOD PRESSURE: 86 MMHG | SYSTOLIC BLOOD PRESSURE: 125 MMHG | HEIGHT: 66 IN | HEART RATE: 65 BPM | BODY MASS INDEX: 25.49 KG/M2 | WEIGHT: 158.6 LBS

## 2017-06-23 DIAGNOSIS — I69.398 VERTIGO DUE TO PREVIOUS CEREBELLAR INFARCTION: Primary | ICD-10-CM

## 2017-06-23 DIAGNOSIS — R80.9 MICROALBUMINURIA: ICD-10-CM

## 2017-06-23 DIAGNOSIS — E78.2 MIXED HYPERLIPIDEMIA: ICD-10-CM

## 2017-06-23 DIAGNOSIS — N18.30 CKD (CHRONIC KIDNEY DISEASE) STAGE 3, GFR 30-59 ML/MIN (HCC): ICD-10-CM

## 2017-06-23 DIAGNOSIS — I77.9 BILATERAL CAROTID ARTERY DISEASE (HCC): ICD-10-CM

## 2017-06-23 DIAGNOSIS — R42 VERTIGO DUE TO PREVIOUS CEREBELLAR INFARCTION: Primary | ICD-10-CM

## 2017-06-23 DIAGNOSIS — J41.8 MIXED SIMPLE AND MUCOPURULENT CHRONIC BRONCHITIS (HCC): ICD-10-CM

## 2017-06-23 LAB
CREATININE URINE: 119.5 MG/DL (ref 39–259)
HBA1C MFR BLD: 10.6 %
MICROALBUMIN/CREAT 24H UR: 2945 MG/L
MICROALBUMIN/CREAT UR-RTO: 2464 MCG/MG CREAT

## 2017-06-23 PROCEDURE — 99214 OFFICE O/P EST MOD 30 MIN: CPT | Performed by: INTERNAL MEDICINE

## 2017-06-23 PROCEDURE — 83036 HEMOGLOBIN GLYCOSYLATED A1C: CPT | Performed by: INTERNAL MEDICINE

## 2017-06-23 RX ORDER — FLUTICASONE FUROATE AND VILANTEROL 100; 25 UG/1; UG/1
1 POWDER RESPIRATORY (INHALATION) DAILY
Qty: 1 EACH | Refills: 3 | Status: SHIPPED | OUTPATIENT
Start: 2017-06-23 | End: 2017-09-06

## 2017-06-23 ASSESSMENT — ENCOUNTER SYMPTOMS
ABDOMINAL PAIN: 0
CONSTIPATION: 0
BLURRED VISION: 0
SPUTUM PRODUCTION: 1
NAUSEA: 0
COUGH: 1
EYE REDNESS: 0

## 2017-06-25 ENCOUNTER — HOSPITAL ENCOUNTER (EMERGENCY)
Age: 62
Discharge: HOME | End: 2017-06-25
Attending: EMERGENCY MEDICINE
Payer: COMMERCIAL

## 2017-06-25 ENCOUNTER — APPOINTMENT (OUTPATIENT)
Dept: GENERAL RADIOLOGY | Age: 62
End: 2017-06-25
Payer: COMMERCIAL

## 2017-06-25 VITALS
SYSTOLIC BLOOD PRESSURE: 151 MMHG | BODY MASS INDEX: 25.82 KG/M2 | WEIGHT: 160 LBS | TEMPERATURE: 98.9 F | DIASTOLIC BLOOD PRESSURE: 67 MMHG | OXYGEN SATURATION: 95 % | HEART RATE: 67 BPM | RESPIRATION RATE: 19 BRPM

## 2017-06-25 DIAGNOSIS — J44.1 COPD EXACERBATION (HCC): Primary | ICD-10-CM

## 2017-06-25 LAB
ABSOLUTE EOS #: 0.1 K/UL (ref 0–0.4)
ABSOLUTE LYMPH #: 1.2 K/UL (ref 1–4.8)
ABSOLUTE MONO #: 1 K/UL (ref 0.1–1.2)
ANION GAP SERPL CALCULATED.3IONS-SCNC: 11 MMOL/L (ref 9–17)
BASOPHILS # BLD: 1 %
BASOPHILS ABSOLUTE: 0.1 K/UL (ref 0–0.2)
BUN BLDV-MCNC: 20 MG/DL (ref 8–23)
BUN/CREAT BLD: ABNORMAL (ref 9–20)
CALCIUM SERPL-MCNC: 8.9 MG/DL (ref 8.6–10.4)
CHLORIDE BLD-SCNC: 102 MMOL/L (ref 98–107)
CO2: 23 MMOL/L (ref 20–31)
CREAT SERPL-MCNC: 1.43 MG/DL (ref 0.7–1.2)
DIFFERENTIAL TYPE: ABNORMAL
EOSINOPHILS RELATIVE PERCENT: 1 %
GFR AFRICAN AMERICAN: >60 ML/MIN
GFR NON-AFRICAN AMERICAN: 50 ML/MIN
GFR SERPL CREATININE-BSD FRML MDRD: ABNORMAL ML/MIN/{1.73_M2}
GFR SERPL CREATININE-BSD FRML MDRD: ABNORMAL ML/MIN/{1.73_M2}
GLUCOSE BLD-MCNC: 201 MG/DL (ref 70–99)
HCT VFR BLD CALC: 41.8 % (ref 41–53)
HEMOGLOBIN: 14.3 G/DL (ref 13.5–17.5)
LYMPHOCYTES # BLD: 9 %
MCH RBC QN AUTO: 31.1 PG (ref 26–34)
MCHC RBC AUTO-ENTMCNC: 34.3 G/DL (ref 31–37)
MCV RBC AUTO: 90.8 FL (ref 80–100)
MONOCYTES # BLD: 7 %
PDW BLD-RTO: 13.7 % (ref 12.5–15.4)
PLATELET # BLD: 322 K/UL (ref 140–450)
PLATELET ESTIMATE: ABNORMAL
PMV BLD AUTO: 8.2 FL (ref 6–12)
POC TROPONIN I: 0.01 NG/ML (ref 0–0.1)
POC TROPONIN INTERP: NORMAL
POTASSIUM SERPL-SCNC: 4.9 MMOL/L (ref 3.7–5.3)
RBC # BLD: 4.6 M/UL (ref 4.5–5.9)
RBC # BLD: ABNORMAL 10*6/UL
SEG NEUTROPHILS: 82 %
SEGMENTED NEUTROPHILS ABSOLUTE COUNT: 11.8 K/UL (ref 1.8–7.7)
SODIUM BLD-SCNC: 136 MMOL/L (ref 135–144)
WBC # BLD: 14.1 K/UL (ref 3.5–11)
WBC # BLD: ABNORMAL 10*3/UL

## 2017-06-25 PROCEDURE — 93005 ELECTROCARDIOGRAM TRACING: CPT

## 2017-06-25 PROCEDURE — 6360000002 HC RX W HCPCS: Performed by: EMERGENCY MEDICINE

## 2017-06-25 PROCEDURE — 99285 EMERGENCY DEPT VISIT HI MDM: CPT

## 2017-06-25 PROCEDURE — 80048 BASIC METABOLIC PNL TOTAL CA: CPT

## 2017-06-25 PROCEDURE — 71020 XR CHEST STANDARD TWO VW: CPT

## 2017-06-25 PROCEDURE — 96374 THER/PROPH/DIAG INJ IV PUSH: CPT

## 2017-06-25 PROCEDURE — 84484 ASSAY OF TROPONIN QUANT: CPT

## 2017-06-25 PROCEDURE — 94664 DEMO&/EVAL PT USE INHALER: CPT

## 2017-06-25 PROCEDURE — 94640 AIRWAY INHALATION TREATMENT: CPT

## 2017-06-25 PROCEDURE — 6370000000 HC RX 637 (ALT 250 FOR IP): Performed by: EMERGENCY MEDICINE

## 2017-06-25 PROCEDURE — 85025 COMPLETE CBC W/AUTO DIFF WBC: CPT

## 2017-06-25 RX ORDER — IPRATROPIUM BROMIDE AND ALBUTEROL SULFATE 2.5; .5 MG/3ML; MG/3ML
1 SOLUTION RESPIRATORY (INHALATION)
Status: DISCONTINUED | OUTPATIENT
Start: 2017-06-25 | End: 2017-06-25 | Stop reason: HOSPADM

## 2017-06-25 RX ORDER — METHYLPREDNISOLONE SODIUM SUCCINATE 125 MG/2ML
125 INJECTION, POWDER, LYOPHILIZED, FOR SOLUTION INTRAMUSCULAR; INTRAVENOUS ONCE
Status: COMPLETED | OUTPATIENT
Start: 2017-06-25 | End: 2017-06-25

## 2017-06-25 RX ORDER — ASPIRIN 81 MG/1
162 TABLET, CHEWABLE ORAL ONCE
Status: COMPLETED | OUTPATIENT
Start: 2017-06-25 | End: 2017-06-25

## 2017-06-25 RX ORDER — ALBUTEROL SULFATE 90 UG/1
2 AEROSOL, METERED RESPIRATORY (INHALATION)
Status: DISCONTINUED | OUTPATIENT
Start: 2017-06-25 | End: 2017-06-25 | Stop reason: HOSPADM

## 2017-06-25 RX ORDER — PREDNISONE 10 MG/1
TABLET ORAL
Qty: 20 TABLET | Refills: 0 | Status: SHIPPED | OUTPATIENT
Start: 2017-06-25 | End: 2017-07-03

## 2017-06-25 RX ADMIN — METHYLPREDNISOLONE SODIUM SUCCINATE 125 MG: 125 INJECTION, POWDER, FOR SOLUTION INTRAMUSCULAR; INTRAVENOUS at 07:42

## 2017-06-25 RX ADMIN — ALBUTEROL SULFATE 5 MG: 5 SOLUTION RESPIRATORY (INHALATION) at 07:58

## 2017-06-25 RX ADMIN — ALBUTEROL SULFATE 5 MG: 5 SOLUTION RESPIRATORY (INHALATION) at 07:48

## 2017-06-25 RX ADMIN — ASPIRIN 81 MG 162 MG: 81 TABLET ORAL at 07:42

## 2017-06-25 RX ADMIN — IPRATROPIUM BROMIDE 0.5 MG: 0.5 SOLUTION RESPIRATORY (INHALATION) at 07:57

## 2017-06-25 ASSESSMENT — ENCOUNTER SYMPTOMS
RHINORRHEA: 0
ABDOMINAL PAIN: 0
NAUSEA: 0
SORE THROAT: 0
SINUS PRESSURE: 0
SHORTNESS OF BREATH: 1
PHOTOPHOBIA: 0
DIARRHEA: 0
BACK PAIN: 0
VOMITING: 0
WHEEZING: 0
TROUBLE SWALLOWING: 0
BLOOD IN STOOL: 0
COUGH: 1
CONSTIPATION: 0

## 2017-06-27 ENCOUNTER — HOSPITAL ENCOUNTER (OUTPATIENT)
Age: 62
Discharge: HOME | End: 2017-06-27
Payer: COMMERCIAL

## 2017-06-27 ENCOUNTER — HOSPITAL ENCOUNTER (OUTPATIENT)
Age: 62
Setting detail: OUTPATIENT SURGERY
Discharge: HOME | End: 2017-06-27
Attending: INTERNAL MEDICINE | Admitting: INTERNAL MEDICINE
Payer: COMMERCIAL

## 2017-06-27 ENCOUNTER — ANESTHESIA (OUTPATIENT)
Dept: ENDOSCOPY | Age: 62
End: 2017-06-27
Payer: COMMERCIAL

## 2017-06-27 ENCOUNTER — ANESTHESIA EVENT (OUTPATIENT)
Dept: ENDOSCOPY | Age: 62
End: 2017-06-27
Payer: COMMERCIAL

## 2017-06-27 VITALS
RESPIRATION RATE: 27 BRPM | WEIGHT: 160 LBS | DIASTOLIC BLOOD PRESSURE: 69 MMHG | HEART RATE: 63 BPM | SYSTOLIC BLOOD PRESSURE: 152 MMHG | HEIGHT: 66 IN | OXYGEN SATURATION: 95 % | TEMPERATURE: 96.8 F | BODY MASS INDEX: 25.71 KG/M2

## 2017-06-27 DIAGNOSIS — N18.30 CKD (CHRONIC KIDNEY DISEASE) STAGE 3, GFR 30-59 ML/MIN (HCC): ICD-10-CM

## 2017-06-27 DIAGNOSIS — I77.9 BILATERAL CAROTID ARTERY DISEASE (HCC): ICD-10-CM

## 2017-06-27 DIAGNOSIS — E78.2 MIXED HYPERLIPIDEMIA: ICD-10-CM

## 2017-06-27 LAB
ANION GAP SERPL CALCULATED.3IONS-SCNC: 15 MMOL/L (ref 9–17)
BUN BLDV-MCNC: 40 MG/DL (ref 8–23)
BUN/CREAT BLD: ABNORMAL (ref 9–20)
CALCIUM SERPL-MCNC: 8.7 MG/DL (ref 8.6–10.4)
CHLORIDE BLD-SCNC: 104 MMOL/L (ref 98–107)
CHOLESTEROL/HDL RATIO: 3.3
CHOLESTEROL: 203 MG/DL
CO2: 22 MMOL/L (ref 20–31)
CREAT SERPL-MCNC: 1.47 MG/DL (ref 0.7–1.2)
GFR AFRICAN AMERICAN: 59 ML/MIN
GFR NON-AFRICAN AMERICAN: 49 ML/MIN
GFR SERPL CREATININE-BSD FRML MDRD: ABNORMAL ML/MIN/{1.73_M2}
GFR SERPL CREATININE-BSD FRML MDRD: ABNORMAL ML/MIN/{1.73_M2}
GLUCOSE BLD-MCNC: 353 MG/DL (ref 75–110)
GLUCOSE BLD-MCNC: 391 MG/DL (ref 70–99)
HDLC SERPL-MCNC: 62 MG/DL
LDL CHOLESTEROL: 119 MG/DL (ref 0–130)
POTASSIUM SERPL-SCNC: 4.4 MMOL/L (ref 3.7–5.3)
SODIUM BLD-SCNC: 141 MMOL/L (ref 135–144)
TRIGL SERPL-MCNC: 110 MG/DL
VLDLC SERPL CALC-MCNC: ABNORMAL MG/DL (ref 1–30)

## 2017-06-27 PROCEDURE — 80061 LIPID PANEL: CPT

## 2017-06-27 PROCEDURE — 2580000003 HC RX 258: Performed by: INTERNAL MEDICINE

## 2017-06-27 PROCEDURE — 2500000003 HC RX 250 WO HCPCS: Performed by: NURSE ANESTHETIST, CERTIFIED REGISTERED

## 2017-06-27 PROCEDURE — 3700000000 HC ANESTHESIA ATTENDED CARE: Performed by: INTERNAL MEDICINE

## 2017-06-27 PROCEDURE — 7100000010 HC PHASE II RECOVERY - FIRST 15 MIN: Performed by: INTERNAL MEDICINE

## 2017-06-27 PROCEDURE — 82947 ASSAY GLUCOSE BLOOD QUANT: CPT

## 2017-06-27 PROCEDURE — 80048 BASIC METABOLIC PNL TOTAL CA: CPT

## 2017-06-27 PROCEDURE — 6360000002 HC RX W HCPCS: Performed by: NURSE ANESTHETIST, CERTIFIED REGISTERED

## 2017-06-27 PROCEDURE — 7100000011 HC PHASE II RECOVERY - ADDTL 15 MIN: Performed by: INTERNAL MEDICINE

## 2017-06-27 PROCEDURE — 3609010600 HC COLONOSCOPY POLYPECTOMY SNARE/COLD BIOPSY: Performed by: INTERNAL MEDICINE

## 2017-06-27 PROCEDURE — 36415 COLL VENOUS BLD VENIPUNCTURE: CPT

## 2017-06-27 PROCEDURE — 3700000001 HC ADD 15 MINUTES (ANESTHESIA): Performed by: INTERNAL MEDICINE

## 2017-06-27 PROCEDURE — 6370000000 HC RX 637 (ALT 250 FOR IP): Performed by: ANESTHESIOLOGY

## 2017-06-27 PROCEDURE — 88305 TISSUE EXAM BY PATHOLOGIST: CPT

## 2017-06-27 RX ORDER — LIDOCAINE HYDROCHLORIDE 10 MG/ML
INJECTION, SOLUTION INFILTRATION; PERINEURAL PRN
Status: DISCONTINUED | OUTPATIENT
Start: 2017-06-27 | End: 2017-06-27 | Stop reason: SDUPTHER

## 2017-06-27 RX ORDER — MIDAZOLAM HYDROCHLORIDE 1 MG/ML
INJECTION INTRAMUSCULAR; INTRAVENOUS PRN
Status: DISCONTINUED | OUTPATIENT
Start: 2017-06-27 | End: 2017-06-27 | Stop reason: SDUPTHER

## 2017-06-27 RX ORDER — SODIUM CHLORIDE 9 MG/ML
INJECTION, SOLUTION INTRAVENOUS CONTINUOUS
Status: DISCONTINUED | OUTPATIENT
Start: 2017-06-27 | End: 2017-06-27 | Stop reason: HOSPADM

## 2017-06-27 RX ORDER — PROPOFOL 10 MG/ML
INJECTION, EMULSION INTRAVENOUS PRN
Status: DISCONTINUED | OUTPATIENT
Start: 2017-06-27 | End: 2017-06-27 | Stop reason: SDUPTHER

## 2017-06-27 RX ADMIN — SODIUM CHLORIDE: 9 INJECTION, SOLUTION INTRAVENOUS at 10:35

## 2017-06-27 RX ADMIN — MIDAZOLAM HYDROCHLORIDE 1 MG: 1 INJECTION, SOLUTION INTRAMUSCULAR; INTRAVENOUS at 11:05

## 2017-06-27 RX ADMIN — PROPOFOL 60 MG: 10 INJECTION, EMULSION INTRAVENOUS at 10:30

## 2017-06-27 RX ADMIN — PROPOFOL 40 MG: 10 INJECTION, EMULSION INTRAVENOUS at 10:31

## 2017-06-27 RX ADMIN — INSULIN LISPRO 6 UNITS: 100 INJECTION, SOLUTION INTRAVENOUS; SUBCUTANEOUS at 10:06

## 2017-06-27 RX ADMIN — PROPOFOL 50 MG: 10 INJECTION, EMULSION INTRAVENOUS at 10:45

## 2017-06-27 RX ADMIN — LIDOCAINE HYDROCHLORIDE 30 MG: 10 INJECTION, SOLUTION INFILTRATION; PERINEURAL at 10:30

## 2017-06-27 RX ADMIN — PROPOFOL 50 MG: 10 INJECTION, EMULSION INTRAVENOUS at 10:35

## 2017-06-27 RX ADMIN — MIDAZOLAM HYDROCHLORIDE 1 MG: 1 INJECTION, SOLUTION INTRAMUSCULAR; INTRAVENOUS at 11:00

## 2017-06-27 RX ADMIN — PROPOFOL 50 MG: 10 INJECTION, EMULSION INTRAVENOUS at 10:40

## 2017-06-27 RX ADMIN — PROPOFOL 50 MG: 10 INJECTION, EMULSION INTRAVENOUS at 10:50

## 2017-06-27 RX ADMIN — SODIUM CHLORIDE: 9 INJECTION, SOLUTION INTRAVENOUS at 10:08

## 2017-06-27 ASSESSMENT — PAIN SCALES - GENERAL
PAINLEVEL_OUTOF10: 0
PAINLEVEL_OUTOF10: 0

## 2017-06-27 ASSESSMENT — ENCOUNTER SYMPTOMS: SHORTNESS OF BREATH: 1

## 2017-06-27 ASSESSMENT — PAIN - FUNCTIONAL ASSESSMENT: PAIN_FUNCTIONAL_ASSESSMENT: 0-10

## 2017-06-27 ASSESSMENT — COPD QUESTIONNAIRES: CAT_SEVERITY: SEVERE

## 2017-06-28 LAB — SURGICAL PATHOLOGY REPORT: NORMAL

## 2017-06-29 LAB
EKG ATRIAL RATE: 71 BPM
EKG P AXIS: 73 DEGREES
EKG P-R INTERVAL: 130 MS
EKG Q-T INTERVAL: 354 MS
EKG QRS DURATION: 94 MS
EKG QTC CALCULATION (BAZETT): 384 MS
EKG R AXIS: 88 DEGREES
EKG T AXIS: -154 DEGREES
EKG VENTRICULAR RATE: 71 BPM

## 2017-07-03 ENCOUNTER — HOSPITAL ENCOUNTER (EMERGENCY)
Age: 62
Discharge: HOME | End: 2017-07-03
Attending: EMERGENCY MEDICINE
Payer: COMMERCIAL

## 2017-07-03 ENCOUNTER — APPOINTMENT (OUTPATIENT)
Dept: GENERAL RADIOLOGY | Age: 62
End: 2017-07-03
Payer: COMMERCIAL

## 2017-07-03 VITALS
OXYGEN SATURATION: 100 % | SYSTOLIC BLOOD PRESSURE: 159 MMHG | HEART RATE: 62 BPM | DIASTOLIC BLOOD PRESSURE: 64 MMHG | BODY MASS INDEX: 25.71 KG/M2 | HEIGHT: 66 IN | RESPIRATION RATE: 17 BRPM | WEIGHT: 160 LBS

## 2017-07-03 DIAGNOSIS — J44.1 COPD EXACERBATION (HCC): Primary | ICD-10-CM

## 2017-07-03 LAB
ABSOLUTE EOS #: 0.3 K/UL (ref 0–0.4)
ABSOLUTE LYMPH #: 1.7 K/UL (ref 1–4.8)
ABSOLUTE MONO #: 0.9 K/UL (ref 0.1–1.2)
ANION GAP SERPL CALCULATED.3IONS-SCNC: 13 MMOL/L (ref 9–17)
BASOPHILS # BLD: 2 %
BASOPHILS ABSOLUTE: 0.2 K/UL (ref 0–0.2)
BNP INTERPRETATION: ABNORMAL
BUN BLDV-MCNC: 27 MG/DL (ref 8–23)
BUN/CREAT BLD: ABNORMAL (ref 9–20)
CALCIUM SERPL-MCNC: 8.6 MG/DL (ref 8.6–10.4)
CHLORIDE BLD-SCNC: 104 MMOL/L (ref 98–107)
CO2: 24 MMOL/L (ref 20–31)
CREAT SERPL-MCNC: 1.53 MG/DL (ref 0.7–1.2)
DIFFERENTIAL TYPE: ABNORMAL
EOSINOPHILS RELATIVE PERCENT: 2 %
GFR AFRICAN AMERICAN: 56 ML/MIN
GFR NON-AFRICAN AMERICAN: 47 ML/MIN
GFR SERPL CREATININE-BSD FRML MDRD: ABNORMAL ML/MIN/{1.73_M2}
GFR SERPL CREATININE-BSD FRML MDRD: ABNORMAL ML/MIN/{1.73_M2}
GLUCOSE BLD-MCNC: 208 MG/DL (ref 70–99)
HCT VFR BLD CALC: 39.8 % (ref 41–53)
HEMOGLOBIN: 13.5 G/DL (ref 13.5–17.5)
LYMPHOCYTES # BLD: 13 %
MCH RBC QN AUTO: 30.5 PG (ref 26–34)
MCHC RBC AUTO-ENTMCNC: 33.9 G/DL (ref 31–37)
MCV RBC AUTO: 90.1 FL (ref 80–100)
MONOCYTES # BLD: 7 %
PDW BLD-RTO: 13.8 % (ref 12.5–15.4)
PLATELET # BLD: 393 K/UL (ref 140–450)
PLATELET ESTIMATE: ABNORMAL
PMV BLD AUTO: 7.7 FL (ref 6–12)
POC TROPONIN I: 0 NG/ML (ref 0–0.1)
POC TROPONIN INTERP: NORMAL
POTASSIUM SERPL-SCNC: 4.9 MMOL/L (ref 3.7–5.3)
PRO-BNP: 1727 PG/ML
RBC # BLD: 4.42 M/UL (ref 4.5–5.9)
RBC # BLD: ABNORMAL 10*6/UL
SEG NEUTROPHILS: 76 %
SEGMENTED NEUTROPHILS ABSOLUTE COUNT: 9.8 K/UL (ref 1.8–7.7)
SODIUM BLD-SCNC: 141 MMOL/L (ref 135–144)
WBC # BLD: 12.9 K/UL (ref 3.5–11)
WBC # BLD: ABNORMAL 10*3/UL

## 2017-07-03 PROCEDURE — 6360000002 HC RX W HCPCS: Performed by: EMERGENCY MEDICINE

## 2017-07-03 PROCEDURE — 94664 DEMO&/EVAL PT USE INHALER: CPT

## 2017-07-03 PROCEDURE — 96374 THER/PROPH/DIAG INJ IV PUSH: CPT

## 2017-07-03 PROCEDURE — 71020 XR CHEST STANDARD TWO VW: CPT

## 2017-07-03 PROCEDURE — 94640 AIRWAY INHALATION TREATMENT: CPT

## 2017-07-03 PROCEDURE — 80048 BASIC METABOLIC PNL TOTAL CA: CPT

## 2017-07-03 PROCEDURE — 84484 ASSAY OF TROPONIN QUANT: CPT

## 2017-07-03 PROCEDURE — 85025 COMPLETE CBC W/AUTO DIFF WBC: CPT

## 2017-07-03 PROCEDURE — 83880 ASSAY OF NATRIURETIC PEPTIDE: CPT

## 2017-07-03 PROCEDURE — 93005 ELECTROCARDIOGRAM TRACING: CPT

## 2017-07-03 PROCEDURE — 99285 EMERGENCY DEPT VISIT HI MDM: CPT

## 2017-07-03 RX ORDER — LORATADINE 10 MG/1
10 TABLET ORAL DAILY
Qty: 30 TABLET | Refills: 0 | Status: SHIPPED | OUTPATIENT
Start: 2017-07-03 | End: 2017-09-06

## 2017-07-03 RX ORDER — PREDNISONE 10 MG/1
TABLET ORAL
Qty: 45 TABLET | Refills: 0 | Status: SHIPPED | OUTPATIENT
Start: 2017-07-03 | End: 2017-09-06

## 2017-07-03 RX ORDER — ALBUTEROL SULFATE 2.5 MG/3ML
5 SOLUTION RESPIRATORY (INHALATION)
Status: DISCONTINUED | OUTPATIENT
Start: 2017-07-03 | End: 2017-07-03 | Stop reason: HOSPADM

## 2017-07-03 RX ORDER — DEXAMETHASONE SODIUM PHOSPHATE 10 MG/ML
10 INJECTION INTRAMUSCULAR; INTRAVENOUS ONCE
Status: COMPLETED | OUTPATIENT
Start: 2017-07-03 | End: 2017-07-03

## 2017-07-03 RX ORDER — ALBUTEROL SULFATE 90 UG/1
2 AEROSOL, METERED RESPIRATORY (INHALATION)
Status: DISCONTINUED | OUTPATIENT
Start: 2017-07-03 | End: 2017-07-03 | Stop reason: HOSPADM

## 2017-07-03 RX ORDER — IPRATROPIUM BROMIDE AND ALBUTEROL SULFATE 2.5; .5 MG/3ML; MG/3ML
1 SOLUTION RESPIRATORY (INHALATION)
Status: DISCONTINUED | OUTPATIENT
Start: 2017-07-03 | End: 2017-07-03 | Stop reason: HOSPADM

## 2017-07-03 RX ORDER — ALBUTEROL SULFATE 90 UG/1
2 AEROSOL, METERED RESPIRATORY (INHALATION)
Status: DISCONTINUED | OUTPATIENT
Start: 2017-07-03 | End: 2017-07-03

## 2017-07-03 RX ADMIN — DEXAMETHASONE SODIUM PHOSPHATE 10 MG: 10 INJECTION INTRAMUSCULAR; INTRAVENOUS at 11:18

## 2017-07-03 RX ADMIN — ALBUTEROL SULFATE 5 MG: 5 SOLUTION RESPIRATORY (INHALATION) at 11:53

## 2017-07-03 RX ADMIN — IPRATROPIUM BROMIDE 0.5 MG: 0.5 SOLUTION RESPIRATORY (INHALATION) at 11:53

## 2017-07-03 ASSESSMENT — ENCOUNTER SYMPTOMS
SHORTNESS OF BREATH: 1
COUGH: 1
RHINORRHEA: 1
NAUSEA: 0
VOMITING: 0
ABDOMINAL PAIN: 0
WHEEZING: 1
CONSTIPATION: 0
DIARRHEA: 0

## 2017-07-06 ENCOUNTER — TELEPHONE (OUTPATIENT)
Dept: GASTROENTEROLOGY | Age: 62
End: 2017-07-06

## 2017-07-10 LAB
EKG ATRIAL RATE: 60 BPM
EKG P AXIS: 74 DEGREES
EKG P-R INTERVAL: 126 MS
EKG Q-T INTERVAL: 396 MS
EKG QRS DURATION: 112 MS
EKG QTC CALCULATION (BAZETT): 396 MS
EKG R AXIS: 60 DEGREES
EKG T AXIS: -135 DEGREES
EKG VENTRICULAR RATE: 60 BPM

## 2017-07-12 ENCOUNTER — TELEPHONE (OUTPATIENT)
Dept: GASTROENTEROLOGY | Age: 62
End: 2017-07-12

## 2017-08-30 DIAGNOSIS — J41.1 MUCOPURULENT CHRONIC BRONCHITIS (HCC): ICD-10-CM

## 2017-08-31 RX ORDER — IPRATROPIUM/ALBUTEROL SULFATE 20-100 MCG
MIST INHALER (GRAM) INHALATION
Qty: 4 INHALER | Refills: 0 | Status: SHIPPED | OUTPATIENT
Start: 2017-08-31 | End: 2017-09-06

## 2017-09-06 ENCOUNTER — OFFICE VISIT (OUTPATIENT)
Dept: INTERNAL MEDICINE | Age: 62
End: 2017-09-06
Payer: COMMERCIAL

## 2017-09-06 VITALS
BODY MASS INDEX: 25.23 KG/M2 | HEIGHT: 66 IN | SYSTOLIC BLOOD PRESSURE: 145 MMHG | DIASTOLIC BLOOD PRESSURE: 80 MMHG | WEIGHT: 157 LBS | HEART RATE: 67 BPM

## 2017-09-06 DIAGNOSIS — J41.8 MIXED SIMPLE AND MUCOPURULENT CHRONIC BRONCHITIS (HCC): ICD-10-CM

## 2017-09-06 DIAGNOSIS — I65.22 CAROTID STENOSIS, LEFT: ICD-10-CM

## 2017-09-06 DIAGNOSIS — I10 ESSENTIAL HYPERTENSION: ICD-10-CM

## 2017-09-06 DIAGNOSIS — F51.01 PRIMARY INSOMNIA: ICD-10-CM

## 2017-09-06 DIAGNOSIS — E78.01 FAMILIAL HYPERCHOLESTEROLEMIA: ICD-10-CM

## 2017-09-06 DIAGNOSIS — J41.1 MUCOPURULENT CHRONIC BRONCHITIS (HCC): ICD-10-CM

## 2017-09-06 LAB — HBA1C MFR BLD: 11.1 %

## 2017-09-06 PROCEDURE — 99214 OFFICE O/P EST MOD 30 MIN: CPT | Performed by: INTERNAL MEDICINE

## 2017-09-06 PROCEDURE — 83036 HEMOGLOBIN GLYCOSYLATED A1C: CPT | Performed by: INTERNAL MEDICINE

## 2017-09-06 RX ORDER — CLOPIDOGREL BISULFATE 75 MG/1
75 TABLET ORAL DAILY
Qty: 90 TABLET | Refills: 2 | Status: SHIPPED | OUTPATIENT
Start: 2017-09-06 | End: 2017-12-11 | Stop reason: SDUPTHER

## 2017-09-06 RX ORDER — AMLODIPINE BESYLATE 10 MG/1
10 TABLET ORAL DAILY
Qty: 30 TABLET | Refills: 2 | Status: SHIPPED | OUTPATIENT
Start: 2017-09-06 | End: 2017-12-11 | Stop reason: SDUPTHER

## 2017-09-06 RX ORDER — QUETIAPINE FUMARATE 50 MG/1
50 TABLET, FILM COATED ORAL NIGHTLY
Qty: 50 TABLET | Refills: 2 | Status: SHIPPED | OUTPATIENT
Start: 2017-09-06 | End: 2017-12-11 | Stop reason: SDUPTHER

## 2017-09-06 RX ORDER — CARVEDILOL 25 MG/1
25 TABLET ORAL 2 TIMES DAILY
Qty: 60 TABLET | Refills: 2 | Status: SHIPPED | OUTPATIENT
Start: 2017-09-06 | End: 2017-12-11 | Stop reason: SDUPTHER

## 2017-09-06 RX ORDER — ATORVASTATIN CALCIUM 80 MG/1
80 TABLET, FILM COATED ORAL DAILY
Qty: 30 TABLET | Refills: 2 | Status: SHIPPED | OUTPATIENT
Start: 2017-09-06 | End: 2017-12-11 | Stop reason: SDUPTHER

## 2017-09-06 RX ORDER — LOSARTAN POTASSIUM 100 MG/1
100 TABLET ORAL DAILY
Qty: 30 TABLET | Refills: 2 | Status: SHIPPED | OUTPATIENT
Start: 2017-09-06 | End: 2017-12-11 | Stop reason: SDUPTHER

## 2017-09-06 RX ORDER — FLUTICASONE FUROATE AND VILANTEROL 100; 25 UG/1; UG/1
1 POWDER RESPIRATORY (INHALATION) DAILY
Qty: 1 EACH | Refills: 2 | Status: SHIPPED | OUTPATIENT
Start: 2017-09-06 | End: 2017-12-11 | Stop reason: SDUPTHER

## 2017-09-06 RX ORDER — IPRATROPIUM BROMIDE AND ALBUTEROL SULFATE 2.5; .5 MG/3ML; MG/3ML
1 SOLUTION RESPIRATORY (INHALATION) EVERY 4 HOURS
Qty: 360 ML | Refills: 2 | Status: SHIPPED | OUTPATIENT
Start: 2017-09-06 | End: 2017-12-11 | Stop reason: SDUPTHER

## 2017-09-06 RX ORDER — INSULIN GLARGINE 100 [IU]/ML
40 INJECTION, SOLUTION SUBCUTANEOUS NIGHTLY
Qty: 5 VIAL | Refills: 2 | Status: SHIPPED | OUTPATIENT
Start: 2017-09-06 | End: 2017-11-26 | Stop reason: SDUPTHER

## 2017-09-06 RX ORDER — GABAPENTIN 100 MG/1
200 CAPSULE ORAL 3 TIMES DAILY
Qty: 180 CAPSULE | Refills: 2 | Status: SHIPPED | OUTPATIENT
Start: 2017-09-06 | End: 2017-12-04 | Stop reason: SDUPTHER

## 2017-09-06 RX ORDER — PRAZOSIN HYDROCHLORIDE 5 MG/1
5 CAPSULE ORAL NIGHTLY
Qty: 30 CAPSULE | Refills: 2 | Status: SHIPPED | OUTPATIENT
Start: 2017-09-06 | End: 2017-12-11 | Stop reason: SDUPTHER

## 2017-09-06 RX ORDER — ASPIRIN 81 MG/1
81 TABLET ORAL DAILY
Qty: 30 TABLET | Refills: 2 | Status: SHIPPED | OUTPATIENT
Start: 2017-09-06 | End: 2017-12-11 | Stop reason: SDUPTHER

## 2017-09-25 ENCOUNTER — APPOINTMENT (OUTPATIENT)
Dept: GENERAL RADIOLOGY | Age: 62
DRG: 287 | End: 2017-09-25
Payer: COMMERCIAL

## 2017-09-25 ENCOUNTER — HOSPITAL ENCOUNTER (INPATIENT)
Age: 62
LOS: 2 days | Discharge: HOME HEALTH | DRG: 287 | End: 2017-09-28
Attending: EMERGENCY MEDICINE | Admitting: EMERGENCY MEDICINE
Payer: COMMERCIAL

## 2017-09-25 DIAGNOSIS — R07.9 CHEST PAIN, UNSPECIFIED TYPE: Primary | ICD-10-CM

## 2017-09-25 DIAGNOSIS — N17.9 AKI (ACUTE KIDNEY INJURY) (HCC): ICD-10-CM

## 2017-09-25 LAB
ABSOLUTE EOS #: 0.3 K/UL (ref 0–0.4)
ABSOLUTE LYMPH #: 1.7 K/UL (ref 1–4.8)
ABSOLUTE MONO #: 0.5 K/UL (ref 0.1–1.2)
ANION GAP SERPL CALCULATED.3IONS-SCNC: 14 MMOL/L (ref 9–17)
BASOPHILS # BLD: 0 %
BASOPHILS ABSOLUTE: 0 K/UL (ref 0–0.2)
BNP INTERPRETATION: ABNORMAL
BUN BLDV-MCNC: 37 MG/DL (ref 8–23)
BUN/CREAT BLD: ABNORMAL (ref 9–20)
CALCIUM SERPL-MCNC: 9.1 MG/DL (ref 8.6–10.4)
CHLORIDE BLD-SCNC: 107 MMOL/L (ref 98–107)
CO2: 19 MMOL/L (ref 20–31)
CREAT SERPL-MCNC: 1.94 MG/DL (ref 0.7–1.2)
D-DIMER QUANTITATIVE: 0.34 MG/L FEU
DIFFERENTIAL TYPE: NORMAL
EOSINOPHILS RELATIVE PERCENT: 3 %
GFR AFRICAN AMERICAN: 43 ML/MIN
GFR NON-AFRICAN AMERICAN: 35 ML/MIN
GFR SERPL CREATININE-BSD FRML MDRD: ABNORMAL ML/MIN/{1.73_M2}
GFR SERPL CREATININE-BSD FRML MDRD: ABNORMAL ML/MIN/{1.73_M2}
GLUCOSE BLD-MCNC: 435 MG/DL (ref 75–110)
GLUCOSE BLD-MCNC: 77 MG/DL (ref 70–99)
HCT VFR BLD CALC: 44 % (ref 41–53)
HEMOGLOBIN: 14.8 G/DL (ref 13.5–17.5)
LYMPHOCYTES # BLD: 21 %
MCH RBC QN AUTO: 30.6 PG (ref 26–34)
MCHC RBC AUTO-ENTMCNC: 33.6 G/DL (ref 31–37)
MCV RBC AUTO: 91 FL (ref 80–100)
MONOCYTES # BLD: 7 %
PDW BLD-RTO: 13.9 % (ref 12.5–15.4)
PLATELET # BLD: 388 K/UL (ref 140–450)
PLATELET ESTIMATE: NORMAL
PMV BLD AUTO: 7.7 FL (ref 6–12)
POC TROPONIN I: 0.02 NG/ML (ref 0–0.1)
POC TROPONIN I: 0.02 NG/ML (ref 0–0.1)
POC TROPONIN INTERP: NORMAL
POC TROPONIN INTERP: NORMAL
POTASSIUM SERPL-SCNC: 4.8 MMOL/L (ref 3.7–5.3)
PRO-BNP: 820 PG/ML
RBC # BLD: 4.83 M/UL (ref 4.5–5.9)
RBC # BLD: NORMAL 10*6/UL
SEG NEUTROPHILS: 69 %
SEGMENTED NEUTROPHILS ABSOLUTE COUNT: 5.4 K/UL (ref 1.8–7.7)
SODIUM BLD-SCNC: 140 MMOL/L (ref 135–144)
TROPONIN INTERP: NORMAL
TROPONIN T: <0.03 NG/ML
WBC # BLD: 7.9 K/UL (ref 3.5–11)
WBC # BLD: NORMAL 10*3/UL

## 2017-09-25 PROCEDURE — 6360000002 HC RX W HCPCS: Performed by: EMERGENCY MEDICINE

## 2017-09-25 PROCEDURE — 84484 ASSAY OF TROPONIN QUANT: CPT

## 2017-09-25 PROCEDURE — 6370000000 HC RX 637 (ALT 250 FOR IP): Performed by: EMERGENCY MEDICINE

## 2017-09-25 PROCEDURE — 83880 ASSAY OF NATRIURETIC PEPTIDE: CPT

## 2017-09-25 PROCEDURE — G0378 HOSPITAL OBSERVATION PER HR: HCPCS

## 2017-09-25 PROCEDURE — 80048 BASIC METABOLIC PNL TOTAL CA: CPT

## 2017-09-25 PROCEDURE — 94664 DEMO&/EVAL PT USE INHALER: CPT

## 2017-09-25 PROCEDURE — 99285 EMERGENCY DEPT VISIT HI MDM: CPT

## 2017-09-25 PROCEDURE — 2580000003 HC RX 258: Performed by: EMERGENCY MEDICINE

## 2017-09-25 PROCEDURE — 71020 XR CHEST STANDARD TWO VW: CPT

## 2017-09-25 PROCEDURE — 93005 ELECTROCARDIOGRAM TRACING: CPT

## 2017-09-25 PROCEDURE — 85025 COMPLETE CBC W/AUTO DIFF WBC: CPT

## 2017-09-25 PROCEDURE — 94640 AIRWAY INHALATION TREATMENT: CPT

## 2017-09-25 PROCEDURE — 36415 COLL VENOUS BLD VENIPUNCTURE: CPT

## 2017-09-25 PROCEDURE — 82947 ASSAY GLUCOSE BLOOD QUANT: CPT

## 2017-09-25 PROCEDURE — 85379 FIBRIN DEGRADATION QUANT: CPT

## 2017-09-25 PROCEDURE — 83036 HEMOGLOBIN GLYCOSYLATED A1C: CPT

## 2017-09-25 RX ORDER — SODIUM CHLORIDE 0.9 % (FLUSH) 0.9 %
10 SYRINGE (ML) INJECTION PRN
Status: DISCONTINUED | OUTPATIENT
Start: 2017-09-25 | End: 2017-09-27

## 2017-09-25 RX ORDER — ASPIRIN 81 MG/1
81 TABLET ORAL DAILY
Status: DISCONTINUED | OUTPATIENT
Start: 2017-09-26 | End: 2017-09-28 | Stop reason: HOSPADM

## 2017-09-25 RX ORDER — SODIUM CHLORIDE 9 MG/ML
INJECTION, SOLUTION INTRAVENOUS CONTINUOUS
Status: DISCONTINUED | OUTPATIENT
Start: 2017-09-26 | End: 2017-09-27

## 2017-09-25 RX ORDER — FENTANYL CITRATE 50 UG/ML
50 INJECTION, SOLUTION INTRAMUSCULAR; INTRAVENOUS ONCE
Status: DISCONTINUED | OUTPATIENT
Start: 2017-09-25 | End: 2017-09-28 | Stop reason: HOSPADM

## 2017-09-25 RX ORDER — MORPHINE SULFATE 4 MG/ML
4 INJECTION, SOLUTION INTRAMUSCULAR; INTRAVENOUS EVERY 4 HOURS PRN
Status: DISCONTINUED | OUTPATIENT
Start: 2017-09-25 | End: 2017-09-28 | Stop reason: HOSPADM

## 2017-09-25 RX ORDER — ALBUTEROL SULFATE 90 UG/1
2 AEROSOL, METERED RESPIRATORY (INHALATION) EVERY 6 HOURS
Status: DISCONTINUED | OUTPATIENT
Start: 2017-09-25 | End: 2017-09-28 | Stop reason: HOSPADM

## 2017-09-25 RX ORDER — INSULIN GLARGINE 100 [IU]/ML
40 INJECTION, SOLUTION SUBCUTANEOUS NIGHTLY
Status: DISCONTINUED | OUTPATIENT
Start: 2017-09-25 | End: 2017-09-28 | Stop reason: HOSPADM

## 2017-09-25 RX ORDER — QUETIAPINE FUMARATE 25 MG/1
50 TABLET, FILM COATED ORAL NIGHTLY
Status: DISCONTINUED | OUTPATIENT
Start: 2017-09-25 | End: 2017-09-28 | Stop reason: HOSPADM

## 2017-09-25 RX ORDER — GABAPENTIN 100 MG/1
200 CAPSULE ORAL 3 TIMES DAILY
Status: DISCONTINUED | OUTPATIENT
Start: 2017-09-25 | End: 2017-09-28 | Stop reason: HOSPADM

## 2017-09-25 RX ORDER — SODIUM CHLORIDE 0.9 % (FLUSH) 0.9 %
10 SYRINGE (ML) INJECTION EVERY 12 HOURS SCHEDULED
Status: DISCONTINUED | OUTPATIENT
Start: 2017-09-25 | End: 2017-09-27

## 2017-09-25 RX ORDER — ONDANSETRON 2 MG/ML
4 INJECTION INTRAMUSCULAR; INTRAVENOUS EVERY 6 HOURS PRN
Status: DISCONTINUED | OUTPATIENT
Start: 2017-09-25 | End: 2017-09-27

## 2017-09-25 RX ORDER — 0.9 % SODIUM CHLORIDE 0.9 %
1000 INTRAVENOUS SOLUTION INTRAVENOUS ONCE
Status: COMPLETED | OUTPATIENT
Start: 2017-09-25 | End: 2017-09-26

## 2017-09-25 RX ORDER — CARVEDILOL 25 MG/1
25 TABLET ORAL 2 TIMES DAILY
Status: DISCONTINUED | OUTPATIENT
Start: 2017-09-25 | End: 2017-09-28 | Stop reason: HOSPADM

## 2017-09-25 RX ORDER — ATORVASTATIN CALCIUM 80 MG/1
80 TABLET, FILM COATED ORAL DAILY
Status: DISCONTINUED | OUTPATIENT
Start: 2017-09-25 | End: 2017-09-28 | Stop reason: HOSPADM

## 2017-09-25 RX ORDER — AMLODIPINE BESYLATE 10 MG/1
10 TABLET ORAL DAILY
Status: DISCONTINUED | OUTPATIENT
Start: 2017-09-26 | End: 2017-09-28 | Stop reason: HOSPADM

## 2017-09-25 RX ORDER — ATORVASTATIN CALCIUM 80 MG/1
80 TABLET, FILM COATED ORAL DAILY
Status: DISCONTINUED | OUTPATIENT
Start: 2017-09-26 | End: 2017-09-25

## 2017-09-25 RX ORDER — CLOPIDOGREL BISULFATE 75 MG/1
75 TABLET ORAL DAILY
Status: DISCONTINUED | OUTPATIENT
Start: 2017-09-26 | End: 2017-09-28 | Stop reason: HOSPADM

## 2017-09-25 RX ORDER — HYDROCODONE BITARTRATE AND ACETAMINOPHEN 5; 325 MG/1; MG/1
2 TABLET ORAL EVERY 6 HOURS PRN
Status: DISCONTINUED | OUTPATIENT
Start: 2017-09-25 | End: 2017-09-28 | Stop reason: HOSPADM

## 2017-09-25 RX ORDER — NITROGLYCERIN 0.4 MG/1
0.4 TABLET SUBLINGUAL EVERY 5 MIN PRN
Status: DISCONTINUED | OUTPATIENT
Start: 2017-09-25 | End: 2017-09-28 | Stop reason: HOSPADM

## 2017-09-25 RX ORDER — PRAZOSIN HYDROCHLORIDE 5 MG/1
5 CAPSULE ORAL NIGHTLY
Status: DISCONTINUED | OUTPATIENT
Start: 2017-09-25 | End: 2017-09-28 | Stop reason: HOSPADM

## 2017-09-25 RX ORDER — ASPIRIN 81 MG/1
324 TABLET, CHEWABLE ORAL ONCE
Status: COMPLETED | OUTPATIENT
Start: 2017-09-25 | End: 2017-09-25

## 2017-09-25 RX ORDER — LOSARTAN POTASSIUM 50 MG/1
100 TABLET ORAL DAILY
Status: DISCONTINUED | OUTPATIENT
Start: 2017-09-26 | End: 2017-09-26

## 2017-09-25 RX ADMIN — ATORVASTATIN CALCIUM 80 MG: 80 TABLET, FILM COATED ORAL at 23:14

## 2017-09-25 RX ADMIN — CARVEDILOL 25 MG: 25 TABLET, FILM COATED ORAL at 23:14

## 2017-09-25 RX ADMIN — ASPIRIN 81 MG 324 MG: 81 TABLET ORAL at 18:23

## 2017-09-25 RX ADMIN — QUETIAPINE FUMARATE 50 MG: 25 TABLET ORAL at 23:13

## 2017-09-25 RX ADMIN — GABAPENTIN 200 MG: 100 CAPSULE ORAL at 23:14

## 2017-09-25 RX ADMIN — PRAZOSIN HYDROCHLORIDE 5 MG: 5 CAPSULE ORAL at 23:14

## 2017-09-25 RX ADMIN — NITROGLYCERIN 0.4 MG: 0.4 TABLET SUBLINGUAL at 18:23

## 2017-09-25 RX ADMIN — MOMETASONE FUROATE AND FORMOTEROL FUMARATE DIHYDRATE 2 PUFF: 200; 5 AEROSOL RESPIRATORY (INHALATION) at 22:04

## 2017-09-25 RX ADMIN — SODIUM CHLORIDE 1000 ML: 9 INJECTION, SOLUTION INTRAVENOUS at 22:36

## 2017-09-25 RX ADMIN — INSULIN GLARGINE 40 UNITS: 100 INJECTION, SOLUTION SUBCUTANEOUS at 23:16

## 2017-09-25 ASSESSMENT — PAIN DESCRIPTION - LOCATION: LOCATION: CHEST

## 2017-09-25 ASSESSMENT — ENCOUNTER SYMPTOMS
WHEEZING: 0
VOMITING: 0
NAUSEA: 0
SHORTNESS OF BREATH: 1
COLOR CHANGE: 0

## 2017-09-25 ASSESSMENT — PAIN SCALES - GENERAL
PAINLEVEL_OUTOF10: 7
PAINLEVEL_OUTOF10: 9

## 2017-09-25 ASSESSMENT — PAIN DESCRIPTION - ORIENTATION: ORIENTATION: LEFT

## 2017-09-25 ASSESSMENT — PAIN DESCRIPTION - PAIN TYPE: TYPE: ACUTE PAIN

## 2017-09-25 ASSESSMENT — HEART SCORE: ECG: 1

## 2017-09-26 ENCOUNTER — APPOINTMENT (OUTPATIENT)
Dept: NUCLEAR MEDICINE | Age: 62
DRG: 287 | End: 2017-09-26
Payer: COMMERCIAL

## 2017-09-26 LAB
EKG ATRIAL RATE: 55 BPM
EKG ATRIAL RATE: 61 BPM
EKG ATRIAL RATE: 61 BPM
EKG P AXIS: 71 DEGREES
EKG P AXIS: 73 DEGREES
EKG P AXIS: 74 DEGREES
EKG P-R INTERVAL: 140 MS
EKG P-R INTERVAL: 146 MS
EKG P-R INTERVAL: 156 MS
EKG Q-T INTERVAL: 384 MS
EKG Q-T INTERVAL: 400 MS
EKG Q-T INTERVAL: 420 MS
EKG QRS DURATION: 100 MS
EKG QRS DURATION: 100 MS
EKG QRS DURATION: 106 MS
EKG QTC CALCULATION (BAZETT): 386 MS
EKG QTC CALCULATION (BAZETT): 401 MS
EKG QTC CALCULATION (BAZETT): 402 MS
EKG R AXIS: 2 DEGREES
EKG R AXIS: 67 DEGREES
EKG R AXIS: 77 DEGREES
EKG T AXIS: -152 DEGREES
EKG T AXIS: 158 DEGREES
EKG T AXIS: 163 DEGREES
EKG VENTRICULAR RATE: 55 BPM
EKG VENTRICULAR RATE: 61 BPM
EKG VENTRICULAR RATE: 61 BPM
ESTIMATED AVERAGE GLUCOSE: 263 MG/DL
GLUCOSE BLD-MCNC: 181 MG/DL (ref 75–110)
GLUCOSE BLD-MCNC: 285 MG/DL (ref 75–110)
GLUCOSE BLD-MCNC: 326 MG/DL (ref 75–110)
GLUCOSE BLD-MCNC: 96 MG/DL (ref 75–110)
HBA1C MFR BLD: 10.8 % (ref 4–6)
LV EF: 45 %
LVEF MODALITY: NORMAL
TROPONIN INTERP: NORMAL
TROPONIN T: <0.03 NG/ML

## 2017-09-26 PROCEDURE — 1200000000 HC SEMI PRIVATE

## 2017-09-26 PROCEDURE — 2580000003 HC RX 258: Performed by: EMERGENCY MEDICINE

## 2017-09-26 PROCEDURE — 84156 ASSAY OF PROTEIN URINE: CPT

## 2017-09-26 PROCEDURE — 6370000000 HC RX 637 (ALT 250 FOR IP): Performed by: EMERGENCY MEDICINE

## 2017-09-26 PROCEDURE — 94640 AIRWAY INHALATION TREATMENT: CPT

## 2017-09-26 PROCEDURE — 93005 ELECTROCARDIOGRAM TRACING: CPT

## 2017-09-26 PROCEDURE — 3430000000 HC RX DIAGNOSTIC RADIOPHARMACEUTICAL: Performed by: INTERNAL MEDICINE

## 2017-09-26 PROCEDURE — 2580000003 HC RX 258: Performed by: INTERNAL MEDICINE

## 2017-09-26 PROCEDURE — 36415 COLL VENOUS BLD VENIPUNCTURE: CPT

## 2017-09-26 PROCEDURE — 81001 URINALYSIS AUTO W/SCOPE: CPT

## 2017-09-26 PROCEDURE — 78452 HT MUSCLE IMAGE SPECT MULT: CPT

## 2017-09-26 PROCEDURE — 93017 CV STRESS TEST TRACING ONLY: CPT

## 2017-09-26 PROCEDURE — 84484 ASSAY OF TROPONIN QUANT: CPT

## 2017-09-26 PROCEDURE — G0108 DIAB MANAGE TRN  PER INDIV: HCPCS

## 2017-09-26 PROCEDURE — 82947 ASSAY GLUCOSE BLOOD QUANT: CPT

## 2017-09-26 PROCEDURE — 82570 ASSAY OF URINE CREATININE: CPT

## 2017-09-26 PROCEDURE — A9500 TC99M SESTAMIBI: HCPCS | Performed by: INTERNAL MEDICINE

## 2017-09-26 PROCEDURE — 6360000002 HC RX W HCPCS: Performed by: INTERNAL MEDICINE

## 2017-09-26 RX ORDER — SODIUM CHLORIDE 9 MG/ML
INJECTION, SOLUTION INTRAVENOUS ONCE
Status: COMPLETED | OUTPATIENT
Start: 2017-09-26 | End: 2017-09-26

## 2017-09-26 RX ORDER — DEXTROSE MONOHYDRATE 50 MG/ML
100 INJECTION, SOLUTION INTRAVENOUS PRN
Status: DISCONTINUED | OUTPATIENT
Start: 2017-09-26 | End: 2017-09-28 | Stop reason: HOSPADM

## 2017-09-26 RX ORDER — DEXTROSE MONOHYDRATE 25 G/50ML
12.5 INJECTION, SOLUTION INTRAVENOUS PRN
Status: DISCONTINUED | OUTPATIENT
Start: 2017-09-26 | End: 2017-09-28 | Stop reason: HOSPADM

## 2017-09-26 RX ORDER — SODIUM CHLORIDE 0.9 % (FLUSH) 0.9 %
10 SYRINGE (ML) INJECTION PRN
Status: DISCONTINUED | OUTPATIENT
Start: 2017-09-26 | End: 2017-09-26

## 2017-09-26 RX ORDER — NICOTINE POLACRILEX 4 MG
15 LOZENGE BUCCAL PRN
Status: DISCONTINUED | OUTPATIENT
Start: 2017-09-26 | End: 2017-09-28 | Stop reason: HOSPADM

## 2017-09-26 RX ORDER — SODIUM CHLORIDE 0.9 % (FLUSH) 0.9 %
10 SYRINGE (ML) INJECTION PRN
Status: DISCONTINUED | OUTPATIENT
Start: 2017-09-26 | End: 2017-09-28 | Stop reason: HOSPADM

## 2017-09-26 RX ORDER — AMINOPHYLLINE DIHYDRATE 25 MG/ML
100 INJECTION, SOLUTION INTRAVENOUS
Status: COMPLETED | OUTPATIENT
Start: 2017-09-26 | End: 2017-09-26

## 2017-09-26 RX ORDER — METOPROLOL TARTRATE 5 MG/5ML
2.5 INJECTION INTRAVENOUS PRN
Status: DISCONTINUED | OUTPATIENT
Start: 2017-09-26 | End: 2017-09-26

## 2017-09-26 RX ORDER — NITROGLYCERIN 0.4 MG/1
0.4 TABLET SUBLINGUAL EVERY 5 MIN PRN
Status: DISCONTINUED | OUTPATIENT
Start: 2017-09-26 | End: 2017-09-26

## 2017-09-26 RX ADMIN — GABAPENTIN 200 MG: 100 CAPSULE ORAL at 13:52

## 2017-09-26 RX ADMIN — AMINOPHYLLINE 100 MG: 25 INJECTION, SOLUTION INTRAVENOUS at 11:39

## 2017-09-26 RX ADMIN — MOMETASONE FUROATE AND FORMOTEROL FUMARATE DIHYDRATE 2 PUFF: 200; 5 AEROSOL RESPIRATORY (INHALATION) at 20:43

## 2017-09-26 RX ADMIN — TETRAKIS(2-METHOXYISOBUTYLISOCYANIDE)COPPER(I) TETRAFLUOROBORATE 15.7 MILLICURIE: 1 INJECTION, POWDER, LYOPHILIZED, FOR SOLUTION INTRAVENOUS at 09:35

## 2017-09-26 RX ADMIN — SODIUM CHLORIDE, PRESERVATIVE FREE 10 ML: 5 INJECTION INTRAVENOUS at 11:36

## 2017-09-26 RX ADMIN — SODIUM CHLORIDE: 9 INJECTION, SOLUTION INTRAVENOUS at 11:19

## 2017-09-26 RX ADMIN — CLOPIDOGREL 75 MG: 75 TABLET, FILM COATED ORAL at 13:48

## 2017-09-26 RX ADMIN — MOMETASONE FUROATE AND FORMOTEROL FUMARATE DIHYDRATE 2 PUFF: 200; 5 AEROSOL RESPIRATORY (INHALATION) at 09:06

## 2017-09-26 RX ADMIN — Medication 10 ML: at 22:01

## 2017-09-26 RX ADMIN — Medication 10 ML: at 11:36

## 2017-09-26 RX ADMIN — AMLODIPINE BESYLATE 10 MG: 10 TABLET ORAL at 13:48

## 2017-09-26 RX ADMIN — CARVEDILOL 25 MG: 25 TABLET, FILM COATED ORAL at 21:53

## 2017-09-26 RX ADMIN — REGADENOSON 0.4 MG: 0.08 INJECTION, SOLUTION INTRAVENOUS at 11:35

## 2017-09-26 RX ADMIN — ATORVASTATIN CALCIUM 80 MG: 80 TABLET, FILM COATED ORAL at 23:15

## 2017-09-26 RX ADMIN — SODIUM CHLORIDE, PRESERVATIVE FREE 10 ML: 5 INJECTION INTRAVENOUS at 09:35

## 2017-09-26 RX ADMIN — SODIUM CHLORIDE: 9 INJECTION, SOLUTION INTRAVENOUS at 00:39

## 2017-09-26 RX ADMIN — Medication 10 ML: at 11:19

## 2017-09-26 RX ADMIN — ASPIRIN 81 MG: 81 TABLET, COATED ORAL at 13:48

## 2017-09-26 RX ADMIN — TETRAKIS(2-METHOXYISOBUTYLISOCYANIDE)COPPER(I) TETRAFLUOROBORATE 39.8 MILLICURIE: 1 INJECTION, POWDER, LYOPHILIZED, FOR SOLUTION INTRAVENOUS at 11:36

## 2017-09-26 RX ADMIN — PRAZOSIN HYDROCHLORIDE 5 MG: 5 CAPSULE ORAL at 21:53

## 2017-09-26 RX ADMIN — QUETIAPINE FUMARATE 50 MG: 25 TABLET ORAL at 21:53

## 2017-09-26 RX ADMIN — CARVEDILOL 25 MG: 25 TABLET, FILM COATED ORAL at 13:48

## 2017-09-26 RX ADMIN — INSULIN GLARGINE 40 UNITS: 100 INJECTION, SOLUTION SUBCUTANEOUS at 23:15

## 2017-09-26 RX ADMIN — GABAPENTIN 200 MG: 100 CAPSULE ORAL at 21:53

## 2017-09-27 ENCOUNTER — APPOINTMENT (OUTPATIENT)
Dept: CARDIAC CATH/INVASIVE PROCEDURES | Age: 62
DRG: 287 | End: 2017-09-27
Payer: COMMERCIAL

## 2017-09-27 ENCOUNTER — APPOINTMENT (OUTPATIENT)
Dept: ULTRASOUND IMAGING | Age: 62
DRG: 287 | End: 2017-09-27
Payer: COMMERCIAL

## 2017-09-27 LAB
-: ABNORMAL
AMORPHOUS: ABNORMAL
ANION GAP SERPL CALCULATED.3IONS-SCNC: 13 MMOL/L (ref 9–17)
BACTERIA: ABNORMAL
BILIRUBIN URINE: NEGATIVE
BUN BLDV-MCNC: 31 MG/DL (ref 8–23)
BUN/CREAT BLD: ABNORMAL (ref 9–20)
CALCIUM SERPL-MCNC: 8.7 MG/DL (ref 8.6–10.4)
CASTS UA: ABNORMAL /LPF (ref 0–8)
CHLORIDE BLD-SCNC: 108 MMOL/L (ref 98–107)
CO2: 20 MMOL/L (ref 20–31)
COLOR: YELLOW
CREAT SERPL-MCNC: 1.58 MG/DL (ref 0.7–1.2)
CREATININE URINE: 62.4 MG/DL (ref 39–259)
CRYSTALS, UA: ABNORMAL /HPF
EPITHELIAL CELLS UA: ABNORMAL /HPF (ref 0–5)
FREE KAPPA/LAMBDA RATIO: 1.24 (ref 0.26–1.65)
GFR AFRICAN AMERICAN: 54 ML/MIN
GFR NON-AFRICAN AMERICAN: 45 ML/MIN
GFR SERPL CREATININE-BSD FRML MDRD: ABNORMAL ML/MIN/{1.73_M2}
GFR SERPL CREATININE-BSD FRML MDRD: ABNORMAL ML/MIN/{1.73_M2}
GLUCOSE BLD-MCNC: 102 MG/DL (ref 75–110)
GLUCOSE BLD-MCNC: 226 MG/DL (ref 75–110)
GLUCOSE BLD-MCNC: 41 MG/DL (ref 75–110)
GLUCOSE BLD-MCNC: 410 MG/DL (ref 70–99)
GLUCOSE URINE: ABNORMAL
KAPPA FREE LIGHT CHAINS QNT: 4.46 MG/DL (ref 0.37–1.94)
KETONES, URINE: NEGATIVE
LAMBDA FREE LIGHT CHAINS QNT: 3.59 MG/DL (ref 0.57–2.63)
LEUKOCYTE ESTERASE, URINE: NEGATIVE
MUCUS: ABNORMAL
NITRITE, URINE: NEGATIVE
OTHER OBSERVATIONS UA: ABNORMAL
PH UA: 5 (ref 5–8)
POTASSIUM SERPL-SCNC: 5 MMOL/L (ref 3.7–5.3)
PROTEIN UA: ABNORMAL
PTH INTACT: 65.94 PG/ML (ref 15–65)
RBC UA: ABNORMAL /HPF (ref 0–4)
RENAL EPITHELIAL, UA: ABNORMAL /HPF
SODIUM BLD-SCNC: 141 MMOL/L (ref 135–144)
SPECIFIC GRAVITY UA: 1.02 (ref 1–1.03)
TOTAL PROTEIN, URINE: 156 MG/DL
TRICHOMONAS: ABNORMAL
TURBIDITY: CLEAR
URINE HGB: NEGATIVE
UROBILINOGEN, URINE: NORMAL
WBC UA: ABNORMAL /HPF (ref 0–5)
YEAST: ABNORMAL

## 2017-09-27 PROCEDURE — 82947 ASSAY GLUCOSE BLOOD QUANT: CPT

## 2017-09-27 PROCEDURE — 2580000003 HC RX 258: Performed by: EMERGENCY MEDICINE

## 2017-09-27 PROCEDURE — 6360000002 HC RX W HCPCS

## 2017-09-27 PROCEDURE — 6360000002 HC RX W HCPCS: Performed by: INTERNAL MEDICINE

## 2017-09-27 PROCEDURE — 6370000000 HC RX 637 (ALT 250 FOR IP): Performed by: EMERGENCY MEDICINE

## 2017-09-27 PROCEDURE — 4A023N7 MEASUREMENT OF CARDIAC SAMPLING AND PRESSURE, LEFT HEART, PERCUTANEOUS APPROACH: ICD-10-PCS | Performed by: INTERNAL MEDICINE

## 2017-09-27 PROCEDURE — 83883 ASSAY NEPHELOMETRY NOT SPEC: CPT

## 2017-09-27 PROCEDURE — C1725 CATH, TRANSLUMIN NON-LASER: HCPCS

## 2017-09-27 PROCEDURE — 36415 COLL VENOUS BLD VENIPUNCTURE: CPT

## 2017-09-27 PROCEDURE — 94640 AIRWAY INHALATION TREATMENT: CPT

## 2017-09-27 PROCEDURE — 93454 CORONARY ARTERY ANGIO S&I: CPT | Performed by: INTERNAL MEDICINE

## 2017-09-27 PROCEDURE — 1200000000 HC SEMI PRIVATE

## 2017-09-27 PROCEDURE — 84155 ASSAY OF PROTEIN SERUM: CPT

## 2017-09-27 PROCEDURE — 84156 ASSAY OF PROTEIN URINE: CPT

## 2017-09-27 PROCEDURE — 83970 ASSAY OF PARATHORMONE: CPT

## 2017-09-27 PROCEDURE — 86334 IMMUNOFIX E-PHORESIS SERUM: CPT

## 2017-09-27 PROCEDURE — 76937 US GUIDE VASCULAR ACCESS: CPT

## 2017-09-27 PROCEDURE — C1769 GUIDE WIRE: HCPCS

## 2017-09-27 PROCEDURE — 80048 BASIC METABOLIC PNL TOTAL CA: CPT

## 2017-09-27 PROCEDURE — C1894 INTRO/SHEATH, NON-LASER: HCPCS

## 2017-09-27 PROCEDURE — 76770 US EXAM ABDO BACK WALL COMP: CPT

## 2017-09-27 PROCEDURE — B2111ZZ FLUOROSCOPY OF MULTIPLE CORONARY ARTERIES USING LOW OSMOLAR CONTRAST: ICD-10-PCS | Performed by: INTERNAL MEDICINE

## 2017-09-27 PROCEDURE — 84165 PROTEIN E-PHORESIS SERUM: CPT

## 2017-09-27 RX ORDER — ACETYLCYSTEINE 200 MG/ML
600 SOLUTION ORAL; RESPIRATORY (INHALATION) 2 TIMES DAILY
Status: DISCONTINUED | OUTPATIENT
Start: 2017-09-27 | End: 2017-09-28 | Stop reason: HOSPADM

## 2017-09-27 RX ORDER — SODIUM CHLORIDE 9 MG/ML
INJECTION, SOLUTION INTRAVENOUS CONTINUOUS
Status: DISCONTINUED | OUTPATIENT
Start: 2017-09-27 | End: 2017-09-28

## 2017-09-27 RX ORDER — SODIUM CHLORIDE 0.9 % (FLUSH) 0.9 %
10 SYRINGE (ML) INJECTION PRN
Status: DISCONTINUED | OUTPATIENT
Start: 2017-09-27 | End: 2017-09-28 | Stop reason: HOSPADM

## 2017-09-27 RX ORDER — ACETAMINOPHEN 325 MG/1
650 TABLET ORAL EVERY 4 HOURS PRN
Status: DISCONTINUED | OUTPATIENT
Start: 2017-09-27 | End: 2017-09-28 | Stop reason: HOSPADM

## 2017-09-27 RX ORDER — IPRATROPIUM BROMIDE AND ALBUTEROL SULFATE 2.5; .5 MG/3ML; MG/3ML
1 SOLUTION RESPIRATORY (INHALATION) EVERY 4 HOURS PRN
Status: DISCONTINUED | OUTPATIENT
Start: 2017-09-27 | End: 2017-09-28 | Stop reason: HOSPADM

## 2017-09-27 RX ORDER — ONDANSETRON 2 MG/ML
4 INJECTION INTRAMUSCULAR; INTRAVENOUS EVERY 6 HOURS PRN
Status: DISCONTINUED | OUTPATIENT
Start: 2017-09-27 | End: 2017-09-28 | Stop reason: HOSPADM

## 2017-09-27 RX ORDER — SODIUM CHLORIDE 0.9 % (FLUSH) 0.9 %
10 SYRINGE (ML) INJECTION EVERY 12 HOURS SCHEDULED
Status: DISCONTINUED | OUTPATIENT
Start: 2017-09-27 | End: 2017-09-28 | Stop reason: HOSPADM

## 2017-09-27 RX ADMIN — MOMETASONE FUROATE AND FORMOTEROL FUMARATE DIHYDRATE 2 PUFF: 200; 5 AEROSOL RESPIRATORY (INHALATION) at 10:59

## 2017-09-27 RX ADMIN — QUETIAPINE FUMARATE 50 MG: 25 TABLET ORAL at 21:21

## 2017-09-27 RX ADMIN — INSULIN GLARGINE 40 UNITS: 100 INJECTION, SOLUTION SUBCUTANEOUS at 21:21

## 2017-09-27 RX ADMIN — Medication 10 ML: at 08:18

## 2017-09-27 RX ADMIN — IPRATROPIUM BROMIDE AND ALBUTEROL SULFATE 1 AMPULE: .5; 3 SOLUTION RESPIRATORY (INHALATION) at 17:18

## 2017-09-27 RX ADMIN — CARVEDILOL 25 MG: 25 TABLET, FILM COATED ORAL at 19:54

## 2017-09-27 RX ADMIN — CARVEDILOL 25 MG: 25 TABLET, FILM COATED ORAL at 08:19

## 2017-09-27 RX ADMIN — GABAPENTIN 200 MG: 100 CAPSULE ORAL at 08:19

## 2017-09-27 RX ADMIN — MOMETASONE FUROATE AND FORMOTEROL FUMARATE DIHYDRATE 2 PUFF: 200; 5 AEROSOL RESPIRATORY (INHALATION) at 20:48

## 2017-09-27 RX ADMIN — GABAPENTIN 200 MG: 100 CAPSULE ORAL at 17:06

## 2017-09-27 RX ADMIN — IPRATROPIUM BROMIDE AND ALBUTEROL SULFATE 1 AMPULE: .5; 3 SOLUTION RESPIRATORY (INHALATION) at 20:48

## 2017-09-27 RX ADMIN — PRAZOSIN HYDROCHLORIDE 5 MG: 5 CAPSULE ORAL at 21:21

## 2017-09-27 RX ADMIN — Medication 600 MG: at 13:06

## 2017-09-27 RX ADMIN — AMLODIPINE BESYLATE 10 MG: 10 TABLET ORAL at 08:19

## 2017-09-27 RX ADMIN — DEXTROSE MONOHYDRATE 25 G: 25 INJECTION, SOLUTION INTRAVENOUS at 15:39

## 2017-09-27 RX ADMIN — Medication 600 MG: at 21:21

## 2017-09-27 RX ADMIN — ATORVASTATIN CALCIUM 80 MG: 80 TABLET, FILM COATED ORAL at 21:19

## 2017-09-27 RX ADMIN — GABAPENTIN 200 MG: 100 CAPSULE ORAL at 21:20

## 2017-09-27 RX ADMIN — CLOPIDOGREL 75 MG: 75 TABLET, FILM COATED ORAL at 12:31

## 2017-09-27 RX ADMIN — ASPIRIN 81 MG: 81 TABLET, COATED ORAL at 13:07

## 2017-09-28 VITALS
RESPIRATION RATE: 15 BRPM | WEIGHT: 161.8 LBS | HEIGHT: 66 IN | BODY MASS INDEX: 26 KG/M2 | DIASTOLIC BLOOD PRESSURE: 74 MMHG | OXYGEN SATURATION: 98 % | HEART RATE: 68 BPM | SYSTOLIC BLOOD PRESSURE: 186 MMHG | TEMPERATURE: 97.9 F

## 2017-09-28 LAB
ALBUMIN (CALCULATED): 3.3 G/DL (ref 3.2–5.2)
ALBUMIN PERCENT: 63 % (ref 45–65)
ALPHA 1 PERCENT: 3 % (ref 3–6)
ALPHA 2 PERCENT: 15 % (ref 6–13)
ALPHA-1-GLOBULIN: 0.1 G/DL (ref 0.1–0.4)
ALPHA-2-GLOBULIN: 0.8 G/DL (ref 0.5–0.9)
ANION GAP SERPL CALCULATED.3IONS-SCNC: 12 MMOL/L (ref 9–17)
BETA GLOBULIN: 0.6 G/DL (ref 0.5–1.1)
BETA PERCENT: 12 % (ref 11–19)
BUN BLDV-MCNC: 24 MG/DL (ref 8–23)
BUN/CREAT BLD: ABNORMAL (ref 9–20)
CALCIUM SERPL-MCNC: 9.2 MG/DL (ref 8.6–10.4)
CHLORIDE BLD-SCNC: 105 MMOL/L (ref 98–107)
CO2: 24 MMOL/L (ref 20–31)
CREAT SERPL-MCNC: 1.35 MG/DL (ref 0.7–1.2)
GAMMA GLOBULIN %: 8 % (ref 9–20)
GAMMA GLOBULIN: 0.4 G/DL (ref 0.5–1.5)
GFR AFRICAN AMERICAN: >60 ML/MIN
GFR NON-AFRICAN AMERICAN: 54 ML/MIN
GFR SERPL CREATININE-BSD FRML MDRD: ABNORMAL ML/MIN/{1.73_M2}
GFR SERPL CREATININE-BSD FRML MDRD: ABNORMAL ML/MIN/{1.73_M2}
GLUCOSE BLD-MCNC: 160 MG/DL (ref 70–99)
PATHOLOGIST: ABNORMAL
PATHOLOGIST: NORMAL
POTASSIUM SERPL-SCNC: 4.6 MMOL/L (ref 3.7–5.3)
PROTEIN ELECTROPHORESIS, SERUM: ABNORMAL
SERUM IFX INTERP: NORMAL
SODIUM BLD-SCNC: 141 MMOL/L (ref 135–144)
TOTAL PROT. SUM,%: 101 % (ref 98–102)
TOTAL PROT. SUM: 5.2 G/DL (ref 6.3–8.2)
TOTAL PROTEIN: 5.2 G/DL (ref 6.4–8.3)

## 2017-09-28 PROCEDURE — 36415 COLL VENOUS BLD VENIPUNCTURE: CPT

## 2017-09-28 PROCEDURE — 94640 AIRWAY INHALATION TREATMENT: CPT

## 2017-09-28 PROCEDURE — 6370000000 HC RX 637 (ALT 250 FOR IP): Performed by: EMERGENCY MEDICINE

## 2017-09-28 PROCEDURE — 6360000002 HC RX W HCPCS: Performed by: INTERNAL MEDICINE

## 2017-09-28 PROCEDURE — 80048 BASIC METABOLIC PNL TOTAL CA: CPT

## 2017-09-28 RX ORDER — NITROGLYCERIN 0.4 MG/1
TABLET SUBLINGUAL
Qty: 25 TABLET | Refills: 3 | Status: SHIPPED | OUTPATIENT
Start: 2017-09-28 | End: 2017-12-11 | Stop reason: SDUPTHER

## 2017-09-28 RX ADMIN — CLOPIDOGREL 75 MG: 75 TABLET, FILM COATED ORAL at 09:13

## 2017-09-28 RX ADMIN — ASPIRIN 81 MG: 81 TABLET, COATED ORAL at 09:13

## 2017-09-28 RX ADMIN — CARVEDILOL 25 MG: 25 TABLET, FILM COATED ORAL at 09:13

## 2017-09-28 RX ADMIN — GABAPENTIN 200 MG: 100 CAPSULE ORAL at 09:13

## 2017-09-28 RX ADMIN — AMLODIPINE BESYLATE 10 MG: 10 TABLET ORAL at 09:13

## 2017-09-28 RX ADMIN — IPRATROPIUM BROMIDE AND ALBUTEROL SULFATE 1 AMPULE: .5; 3 SOLUTION RESPIRATORY (INHALATION) at 08:10

## 2017-09-28 RX ADMIN — MOMETASONE FUROATE AND FORMOTEROL FUMARATE DIHYDRATE 2 PUFF: 200; 5 AEROSOL RESPIRATORY (INHALATION) at 08:10

## 2017-09-28 RX ADMIN — Medication 600 MG: at 09:16

## 2017-09-29 ENCOUNTER — CARE COORDINATION (OUTPATIENT)
Dept: CASE MANAGEMENT | Age: 62
End: 2017-09-29

## 2017-10-04 ENCOUNTER — CARE COORDINATION (OUTPATIENT)
Dept: CASE MANAGEMENT | Age: 62
End: 2017-10-04

## 2017-10-04 NOTE — CARE COORDINATION
[facility] Transitions Follow Up Call    10/4/2017    Patient: Pavel Correa  Patient : 1955   MRN: [id number]  Reason for Admission:Chest pain s/p Cardiac cath  Discharge Date: 17 RARS: Risk Score: 26.75       Attempted to reach patient for subsequent transitional call. VM left to return call to [phone] or [phone]. 1st attempt. Left message to return call and reminded of transitional appointment on Friday. Care Transitions Subsequent and Final Call    Subsequent and Final Calls   Care Transitions Interventions   Other Interventions:                               Follow Up  Future Appointments  Date Time Provider Mahesh Gunter   10/6/2017 10:15 AM Bear Oneill MD [facility]   2017 9:45 AM Zara Roy MD [facility]   2017 1:00 PM Amelia Mosqueda MD [facility] Crum RN

## 2017-10-25 NOTE — TELEPHONE ENCOUNTER
Refill on Prednisone. Last seen on 9/6/17, medication pending. Next Visit Date:  Future Appointments  Date Time Provider Mahesh Josei   10/30/2017 1:45 PM Zara Mei MD [address] IM [facility]   11/8/2017 3:00 PM Tate Stone MD [facility]   12/11/2017 9:45 AM Zara Mei MD [address] IM [facility]   12/13/2017 1:00 PM Cindi Duron MD Neuro End[facility] [address] Maintenance   Topic Date Due    Diabetic retinal exam  01/07/1966    Zostavax vaccine  01/07/2016    Flu vaccine (1) 09/01/2017    Diabetic foot exam  11/02/2017    Diabetic hemoglobin A1C test  12/25/2017    Diabetic microalbuminuria test  06/23/2018    Lipid screen  06/27/2018    Colon cancer screen colonoscopy  06/27/2018    DTaP/Tdap/Td vaccine (2 - Td) 12/14/2023    Pneumococcal med risk  Completed    Hepatitis C screen  Completed    HIV screen  Completed       Hemoglobin A1C (%)   Date Value   09/25/2017 10.8 (H)   09/06/2017 11.1   06/23/2017 10.6             ( goal A1C is < 7)   Microalb/Crt.  Ratio (mcg/mg creat)   Date Value   06/23/2017 2,464 (H)     LDL Cholesterol (mg/dL)   Date Value   06/27/2017 119       (goal LDL is <100)   AST (U/L)   Date Value   03/10/2014 30     ALT (U/L)   Date Value   03/10/2014 26     BUN (mg/dL)   Date Value   09/28/2017 24 (H)     BP Readings from Last 3 Encounters:   09/28/17 (!) 186/74   09/06/17 (!) 145/80   07/03/17 (!) 159/64          (goal 120/80)    All Future Testing planned in [facility]  Lab Frequency Next Occurrence               Patient Active Problem List:     Cigarette nicotine dependence without complication     Carpal tunnel syndrome on right     Colon polyps     Carotid stenosis, left s/p Endarterectomy     Mixed simple and mucopurulent chronic bronchitis (HCC)     Pure hypercholesterolemia     CAD in native artery, nonobstructive     PTSD (post-traumatic stress disorder)     Transient cerebral ischemia     Essential hypertension     DM type 2, uncontrolled, with neuropathy ([facility]

## 2017-10-26 RX ORDER — PREDNISONE 10 MG/1
TABLET ORAL
Qty: 45 TABLET | Refills: 0 | OUTPATIENT
Start: 2017-10-26

## 2017-10-30 ENCOUNTER — OFFICE VISIT (OUTPATIENT)
Dept: INTERNAL MEDICINE | Age: 62
End: 2017-10-30
Payer: COMMERCIAL

## 2017-10-30 ENCOUNTER — CARE COORDINATION (OUTPATIENT)
Dept: CARE COORDINATION | Age: 62
End: 2017-10-30

## 2017-10-30 VITALS
WEIGHT: 158.4 LBS | SYSTOLIC BLOOD PRESSURE: 139 MMHG | HEIGHT: 66 IN | DIASTOLIC BLOOD PRESSURE: 85 MMHG | BODY MASS INDEX: 25.46 KG/M2 | HEART RATE: 51 BPM

## 2017-10-30 DIAGNOSIS — Z23 NEED FOR INFLUENZA VACCINATION: ICD-10-CM

## 2017-10-30 DIAGNOSIS — J41.8 MIXED SIMPLE AND MUCOPURULENT CHRONIC BRONCHITIS (HCC): ICD-10-CM

## 2017-10-30 DIAGNOSIS — Z23 NEED FOR SHINGLES VACCINE: ICD-10-CM

## 2017-10-30 PROCEDURE — 90688 IIV4 VACCINE SPLT 0.5 ML IM: CPT | Performed by: INTERNAL MEDICINE

## 2017-10-30 PROCEDURE — G0008 ADMIN INFLUENZA VIRUS VAC: HCPCS | Performed by: INTERNAL MEDICINE

## 2017-10-30 PROCEDURE — 99214 OFFICE O/P EST MOD 30 MIN: CPT | Performed by: INTERNAL MEDICINE

## 2017-10-30 NOTE — PROGRESS NOTES
DTaP/Tdap/Td vaccine (2 - Td) 12/14/2023    Pneumococcal med risk  Completed    Hepatitis C screen  Completed    HIV screen  Completed

## 2017-10-30 NOTE — PATIENT INSTRUCTIONS
Printed script for Zostavax vaccine signed and given to pt. Return To Clinic 12/11/2017. After Visit Summary  given and reviewed. --MA    It is very important for your care that you keep your appointment. If for some reason you are unable to keep your appointment it is equally important that you call our office at [phone] to cancel your appointment and reschedule. Failure to do so may result in your termination from our practice.

## 2017-10-30 NOTE — CARE COORDINATION
CC introduced to patient by PCP. Had been followed by CC in the past and agreeable to program now. Patient stated that he has been a diabetic for about 19 years. He stated that he is very brittle- \"I can go from 50 to 500 in a short time. \"  He had followed with an endocrinologist in the past, but not recent. He stated that he was going to contact Dr. Easton Garibay for an appointment. He is currently on lantus 40 units nightly and novolog 5 units before meals. Today the PCP is adding trulicity 4.4PD weekly. CC informed that she would call next week to complete enrollment and check on his sugars after adding the trulicity.

## 2017-11-07 ASSESSMENT — ENCOUNTER SYMPTOMS
HEARTBURN: 0
COUGH: 0
DOUBLE VISION: 0
NAUSEA: 0
ABDOMINAL PAIN: 0
HEMOPTYSIS: 0
BLURRED VISION: 0

## 2017-11-07 NOTE — PROGRESS NOTES
[facility]  [facility] [address] [facility]  [address] [facility] [address] [facility]  Dept: [phone]  Dept Fax: [phone]    Office Progress/Follow Up Note  Date of patient's visit: 11/7/2017  Patient's Name:  Alejandrina Lovett YOB: 1955            Patient Care Team:  Zara Fu MD as PCP - General  Zara Fu MD as PCP - RUST Attributed Provider  Chao Sanchez MD as Referring Physician (Cardiology)  Rita Ortiz MD as Orthopedic Surgeon (Orthopedic Surgery)  Toshia Pruett MD as Surgeon (Vascular Surgery)  Justin Peralta RN as Care Coordinator  ================================================================    REASON FOR VISIT/CHIEF COMPLAINT:  Follow-Up from Hospital (chest pain, comes now occationaly when stressed ) and Diabetes (BS been all over the place )    HISTORY OF PRESENTING ILLNESS:  History was obtained from: patient. Alejandrina Lovett is a 65 y.o. is here for a follow-up visit. Patient has history of uncontrolled diabetes. A1c has remained above 9 for more than 3 years now. Patient is on insulin. He reported he has brought her diabetes and has difficulty controlling his blood sugar. His blood sugar ranges from  depending on what he is eating. I have discussed starting him on Trulicity which is not agreement. Blood pressures controlled. He also has history of asthma which is controlled. He is not wheezing. He is not coughing.   Problem list, medications and blood work reviewed      Patient Active Problem List   Diagnosis    Cigarette nicotine dependence without complication    Carpal tunnel syndrome on right    Colon polyps    Carotid stenosis, left s/p Endarterectomy    Mixed simple and mucopurulent chronic bronchitis ([facility]    Pure hypercholesterolemia    CAD in native artery, nonobstructive    PTSD (post-traumatic stress disorder)    Transient cerebral ischemia    Essential hypertension    DM type 2, uncontrolled, with neuropathy [facility])       Health Maintenance Due   Topic Date Due    Diabetic retinal exam  01/07/1966    Zostavax vaccine  01/07/2016       Allergies   Allergen Reactions    Lisinopril      cough    Ace Inhibitors      coughing    Pcn [Penicillins]          Current Outpatient Prescriptions   Medication Sig Dispense Refill    Dulaglutide (TRULICITY) 1.5 UF/3.4BU SOPN Inject 1.5 mg into the skin once a week 4 Pen 2    tiotropium (SPIRIVA HANDIHALER) 18 MCG inhalation capsule Inhale 1 capsule into the lungs daily 30 capsule 2    nitroGLYCERIN (NITROSTAT) 0.4 MG SL tablet up to max of 3 total doses.  If no relief after 1 dose, call 911. 25 tablet 3    atorvastatin (LIPITOR) 80 MG tablet Take 1 tablet by mouth daily 30 tablet 2    fluticasone-vilanterol (BREO ELLIPTA) 100-25 MCG/INH AEPB inhaler Inhale 1 puff into the lungs daily 1 each 2    carvedilol (COREG) 25 MG tablet Take 1 tablet by mouth 2 times daily 60 tablet 2    amLODIPine (NORVASC) 10 MG tablet Take 1 tablet by mouth daily 30 tablet 2    losartan (COZAAR) 100 MG tablet Take 1 tablet by mouth daily 30 tablet 2    QUEtiapine (SEROQUEL) 50 MG tablet Take 1 tablet by mouth nightly 50 tablet 2    insulin glargine (LANTUS) 100 UNIT/ML injection vial Inject 40 Units into the skin nightly 5 vial 2    aspirin EC 81 MG EC tablet Take 1 tablet by mouth daily 30 tablet 2    ipratropium-albuterol (DUONEB) 0.5-2.5 (3) MG/3ML SOLN nebulizer solution Inhale 3 mLs into the lungs every 4 hours 360 mL 2    insulin aspart (NOVOLOG) 100 UNIT/ML injection vial Inject 5 Units into the skin 3 times daily (before meals) Sliding scale 3 vial 2    clopidogrel (PLAVIX) 75 MG tablet Take 1 tablet by mouth daily 90 tablet 2    gabapentin (NEURONTIN) 100 MG capsule Take 2 capsules by mouth 3 times daily 180 capsule 2    albuterol-ipratropium (COMBIVENT RESPIMAT)  MCG/ACT AERS inhaler Inhale 1 puff into the lungs every 6 hours 1 Inhaler 2    distress. HENT:   Mouth/Throat: No oropharyngeal exudate. Neck: Normal range of motion. Neck supple. No thyromegaly present. Cardiovascular: Normal rate, regular rhythm, normal heart sounds and intact distal pulses. Pulmonary/Chest: Effort normal and breath sounds normal. No respiratory distress. He has no wheezes. Abdominal: Soft. Bowel sounds are normal. He exhibits no distension and no mass. There is no tenderness. Musculoskeletal: Normal range of motion. He exhibits no edema or tenderness. Neurological: He is alert and oriented to person, place, and time. No cranial nerve deficit. Skin: Skin is warm. No rash noted. He is not diaphoretic. No erythema. Psychiatric: Mood, memory, affect and judgment normal.         DIAGNOSTIC FINDINGS:  CBC:  Lab Results   Component Value Date    WBC 7.9 09/25/2017    HGB 14.8 09/25/2017     09/25/2017     04/16/2012       BMP:    Lab Results   Component Value Date     09/28/2017    K 4.6 09/28/2017     09/28/2017    CO2 24 09/28/2017    BUN 24 09/28/2017    CREATININE 1.35 09/28/2017    GLUCOSE 160 09/28/2017    GLUCOSE 172 04/16/2012       HEMOGLOBIN A1C:   Lab Results   Component Value Date    LABA1C 10.8 09/25/2017       FASTING LIPID PANEL:  Lab Results   Component Value Date    CHOL 203 (H) 06/27/2017    HDL 62 06/27/2017    TRIG 110 06/27/2017       ASSESSMENT AND PLAN:  Eugenio De La Rosa was seen today for follow-up from hospital and diabetes. Diagnoses and all orders for this visit:    DM type 2, uncontrolled, with neuropathy (HCC)  -     Dulaglutide (TRULICITY) 1.5 WX/1.2LV SOPN; Inject 1.5 mg into the skin once a week  -      DIABETES FOOT EXAM    Need for influenza vaccination  -     INFLUENZA, QUADV, 6 MO AND OLDER, IM, MDV, 0.5ML (FLULAVAL QUADV)  -     MA ADMIN INFLUENZA VIRUS VAC    Need for shingles vaccine  -     zoster vaccine live, PF, (ZOSTAVAX) 53928 UNT/0.65ML injection;  Inject 0.65 mLs into the skin once for 1 dose    Mixed simple and mucopurulent chronic bronchitis (HCC)  -     tiotropium (SPIRIVA HANDIHALER) 18 MCG inhalation capsule; Inhale 1 capsule into the lungs daily      FOLLOW UP AND INSTRUCTIONS:  · Return in about 6 weeks (around 12/11/2017) for DM-2. · Evelin King received counseling on the following healthy behaviors: nutrition and exercise    · Discussed use, benefit, and side effects of prescribed medications. Barriers to medication compliance addressed. All patient questions answered. Pt voiced understanding. · Patient given educational materials - see patient instructions    Zara Fu MD, JASVIR, [address] [place] [address] Internal Medicine Associate  11/7/2017, 1:56 PM    This note is created with the assistance of a speech-recognition program. While intending to generate a document that actually reflects the content of the visit, the document can still have some mistakes which may not have been identified and corrected by editing.

## 2017-11-09 ENCOUNTER — CARE COORDINATION (OUTPATIENT)
Dept: CARE COORDINATION | Age: 62
End: 2017-11-09

## 2017-11-09 NOTE — CARE COORDINATION
Ambulatory Care Coordination Note  11/9/2017  CM Risk Score: 8  Avril Mortality Risk Score:      ACC: Joselyn Hawkins, RN    Summary Note: Called and spoke with Aby Das today. He states that he hasn't started the Trulicity yet, that was given to him at [address] 10/30/17. He stated that he needed to get food in the house first. He didn't want to start it and have a problem with hypoglycemia and no food. He assures CC that he now has food in the house and will start it on Monday. He also informed CC that it was covered by his insurance. His sugar this morning was 200, and he stated \"that was good, that's where I want it to be\". He stated the other day it was 498 in the morning and the day before, 45. CC inquired about appt with Dr. Andrew Reed, he was a \"No Show\" for yesterday's appt. He stated that he has had too much going on and will have to reschedule. He said his girlfriend had a \"grand mal seizure in the middle of everything else\" and he wasn't able to make it to the appt. He denies any need today. He stated he was busy and CC will complete enrollment with next contact. Care Coordination Interventions    Program Enrollment:  Complex Care  Referral from Primary Care Provider:  Yes  Suggested Interventions and Community Resources  Diabetes Education:  Completed  Medication Assistance Program:  Completed  Zone Management Tools:  Not Started         Goals Addressed      Conditions and Symptoms                  I will schedule office visits, as directed by my provider. I will keep my appointment or reschedule if I have to cancel. I will notify my provider of any barriers to my plan of care. I will follow my Zone Management tool to seek urgent or emergent care. I will notify my provider of any symptoms that indicate a worsening of my condition.     Barriers: financial and overwhelmed by complexity of regimen  Plan for overcoming my barriers: work with CC  Confidence: 6/10  Anticipated Goal Completion Date:

## 2017-11-22 ENCOUNTER — CARE COORDINATION (OUTPATIENT)
Dept: CARE COORDINATION | Age: 62
End: 2017-11-22

## 2017-11-22 NOTE — CARE COORDINATION
Ambulatory Care Coordination Note  11/22/2017  CM Risk Score: 11  Avril Mortality Risk Score:      ACC: Fernie Jensen RN    Summary Note: Called and spoke with Eddy Carreon. He stated that his sugars have definitely improved with the trulicity but he has no appetite, some nausea, and has lost weight- he is guessing at least 10 pounds or more in the last two weeks. \"I had to wear a pair of long johns under my pants, and a belt, just to keep them up\". He stated that he started the trulicity 2 weeks ago, and two days later he had no appetite. He is having some nausea as well. He also said he feels \"weak; I'm just not feeling myself\". His sugars are mostly in the 140-150 range, which according to him is low because he can drop very quickly. His highest sugar this last week was 400. His morning sugars have been 60-75. He cut his lantus dose down to 35 units the second day after starting trulicity. His novolog, he is taking 1 unit for every 15 gm of carbs that he eats. He states that he is going to continue taking the trulicity because it is helping his sugars; he just wants to get his appetite back. He took the second dose yesterday- finishing off the samples that he had received from the office. He stated that he did  the prescription at the pharmacy. He is on the 1.5mg dose. 11/27/17  Called and spoke with Eddy Carreon. Informed him that Dr. Dolores Anna wants him to keep taking the Trulicity and that the nausea should improve as his body adjusts to the medication. He is still concerned about the weight loss and no appetite, but has an appt with Dr. Dolores Anna 12/11 and will discuss it then.      Care Coordination Interventions    Program Enrollment:  Complex Care  Referral from Primary Care Provider:  Yes  Suggested Interventions and Community Resources  Diabetes Education:  Completed  Medication Assistance Program:  Completed  Zone Management Tools:  Not Started         Goals Addressed     None          Prior to Admission MD   prazosin (MINIPRESS) 5 MG capsule Take 1 capsule by mouth nightly 9/6/17   Zara Diana MD   QUEtiapine (SEROQUEL) 50 MG tablet Take 1 tablet by mouth nightly 9/6/17   Zara Diana MD   glucose blood VI test strips (ASCENSIA AUTODISC VI;ONE TOUCH ULTRA TEST VI) strip Dx: DM-2, use 4 -5 times daily, ok to give a substitute 4/7/17 4/2/18  Zara Diana MD   INSULIN SYRINGE .5CC/28G (INS SYRINGE/NEEDLE .5CC/28G) 28G X 1/2\" 0.5 ML MISC 1 Syringe by Does not apply route 4 times daily 4/7/17   Zara Diana MD       Future Appointments  Date Time Provider Mahesh Gunter   12/11/2017 9:45 AM Zara Diana MD [facility]   12/13/2017 1:00 PM [facility]

## 2017-11-26 RX ORDER — INSULIN GLARGINE 100 [IU]/ML
35 INJECTION, SOLUTION SUBCUTANEOUS NIGHTLY
Qty: 5 VIAL | Refills: 2
Start: 2017-11-26 | End: 2018-02-23

## 2017-11-26 NOTE — TELEPHONE ENCOUNTER
Advice him to continue. His nausea will most likely improved as his body adjust to the medication.  I will update his lantus dose of 35 units

## 2017-12-04 RX ORDER — GABAPENTIN 100 MG/1
200 CAPSULE ORAL 3 TIMES DAILY
Qty: 180 CAPSULE | Refills: 2 | Status: SHIPPED | OUTPATIENT
Start: 2017-12-04 | End: 2018-03-07 | Stop reason: SDUPTHER

## 2017-12-08 ENCOUNTER — CARE COORDINATION (OUTPATIENT)
Dept: CARE COORDINATION | Age: 62
End: 2017-12-08

## 2017-12-11 ENCOUNTER — OFFICE VISIT (OUTPATIENT)
Dept: INTERNAL MEDICINE | Age: 62
End: 2017-12-11
Payer: COMMERCIAL

## 2017-12-11 VITALS
HEART RATE: 72 BPM | SYSTOLIC BLOOD PRESSURE: 165 MMHG | DIASTOLIC BLOOD PRESSURE: 88 MMHG | WEIGHT: 155 LBS | BODY MASS INDEX: 25.02 KG/M2

## 2017-12-11 DIAGNOSIS — E78.01 FAMILIAL HYPERCHOLESTEROLEMIA: ICD-10-CM

## 2017-12-11 DIAGNOSIS — F51.01 PRIMARY INSOMNIA: ICD-10-CM

## 2017-12-11 DIAGNOSIS — J41.8 MIXED SIMPLE AND MUCOPURULENT CHRONIC BRONCHITIS (HCC): ICD-10-CM

## 2017-12-11 DIAGNOSIS — Z23 NEED FOR PROPHYLACTIC VACCINATION AND INOCULATION AGAINST VARICELLA: ICD-10-CM

## 2017-12-11 DIAGNOSIS — I10 ESSENTIAL HYPERTENSION: ICD-10-CM

## 2017-12-11 DIAGNOSIS — J41.1 MUCOPURULENT CHRONIC BRONCHITIS (HCC): ICD-10-CM

## 2017-12-11 DIAGNOSIS — I65.22 CAROTID STENOSIS, LEFT: ICD-10-CM

## 2017-12-11 LAB — HBA1C MFR BLD: 10.9 %

## 2017-12-11 PROCEDURE — 83036 HEMOGLOBIN GLYCOSYLATED A1C: CPT | Performed by: INTERNAL MEDICINE

## 2017-12-11 PROCEDURE — 99214 OFFICE O/P EST MOD 30 MIN: CPT | Performed by: INTERNAL MEDICINE

## 2017-12-11 RX ORDER — CLOPIDOGREL BISULFATE 75 MG/1
75 TABLET ORAL DAILY
Qty: 90 TABLET | Refills: 2 | Status: SHIPPED | OUTPATIENT
Start: 2017-12-11 | End: 2018-03-21 | Stop reason: SDUPTHER

## 2017-12-11 RX ORDER — QUETIAPINE FUMARATE 50 MG/1
50 TABLET, FILM COATED ORAL NIGHTLY
Qty: 50 TABLET | Refills: 2 | Status: SHIPPED | OUTPATIENT
Start: 2017-12-11 | End: 2018-03-21 | Stop reason: SDUPTHER

## 2017-12-11 RX ORDER — AMLODIPINE BESYLATE 10 MG/1
10 TABLET ORAL DAILY
Qty: 30 TABLET | Refills: 2 | Status: SHIPPED | OUTPATIENT
Start: 2017-12-11 | End: 2018-03-21 | Stop reason: SDUPTHER

## 2017-12-11 RX ORDER — LOSARTAN POTASSIUM 100 MG/1
100 TABLET ORAL DAILY
Qty: 30 TABLET | Refills: 2 | Status: SHIPPED | OUTPATIENT
Start: 2017-12-11 | End: 2018-03-21 | Stop reason: SDUPTHER

## 2017-12-11 RX ORDER — ASPIRIN 81 MG/1
81 TABLET ORAL DAILY
Qty: 30 TABLET | Refills: 2 | Status: SHIPPED | OUTPATIENT
Start: 2017-12-11 | End: 2018-03-21 | Stop reason: SDUPTHER

## 2017-12-11 RX ORDER — NITROGLYCERIN 0.4 MG/1
TABLET SUBLINGUAL
Qty: 25 TABLET | Refills: 2 | Status: SHIPPED | OUTPATIENT
Start: 2017-12-11 | End: 2018-03-21 | Stop reason: SDUPTHER

## 2017-12-11 RX ORDER — PRAZOSIN HYDROCHLORIDE 5 MG/1
5 CAPSULE ORAL 2 TIMES DAILY
Qty: 60 CAPSULE | Refills: 2 | Status: SHIPPED | OUTPATIENT
Start: 2017-12-11 | End: 2018-03-21 | Stop reason: SDUPTHER

## 2017-12-11 RX ORDER — IPRATROPIUM BROMIDE AND ALBUTEROL SULFATE 2.5; .5 MG/3ML; MG/3ML
1 SOLUTION RESPIRATORY (INHALATION) EVERY 4 HOURS
Qty: 360 ML | Refills: 2 | Status: SHIPPED | OUTPATIENT
Start: 2017-12-11 | End: 2018-03-21 | Stop reason: SDUPTHER

## 2017-12-11 RX ORDER — QUETIAPINE FUMARATE 50 MG/1
50 TABLET, FILM COATED ORAL NIGHTLY
Qty: 50 TABLET | Refills: 2 | Status: ON HOLD | OUTPATIENT
Start: 2017-12-11 | End: 2018-01-30 | Stop reason: HOSPADM

## 2017-12-11 RX ORDER — PRAZOSIN HYDROCHLORIDE 5 MG/1
5 CAPSULE ORAL NIGHTLY
Qty: 30 CAPSULE | Refills: 2 | Status: SHIPPED | OUTPATIENT
Start: 2017-12-11 | End: 2017-12-11 | Stop reason: SDUPTHER

## 2017-12-11 RX ORDER — CARVEDILOL 25 MG/1
25 TABLET ORAL 2 TIMES DAILY
Qty: 60 TABLET | Refills: 2 | Status: SHIPPED | OUTPATIENT
Start: 2017-12-11 | End: 2018-03-21 | Stop reason: SDUPTHER

## 2017-12-11 RX ORDER — ATORVASTATIN CALCIUM 80 MG/1
80 TABLET, FILM COATED ORAL DAILY
Qty: 30 TABLET | Refills: 2 | Status: SHIPPED | OUTPATIENT
Start: 2017-12-11 | End: 2018-03-21 | Stop reason: SDUPTHER

## 2017-12-11 RX ORDER — FLUTICASONE FUROATE AND VILANTEROL 100; 25 UG/1; UG/1
1 POWDER RESPIRATORY (INHALATION) DAILY
Qty: 1 EACH | Refills: 2 | Status: SHIPPED | OUTPATIENT
Start: 2017-12-11 | End: 2018-02-07 | Stop reason: SDUPTHER

## 2017-12-11 ASSESSMENT — ENCOUNTER SYMPTOMS
ABDOMINAL PAIN: 0
BLURRED VISION: 0
COUGH: 0
HEMOPTYSIS: 0
NAUSEA: 0
HEARTBURN: 0
DOUBLE VISION: 0

## 2017-12-11 NOTE — CARE COORDINATION
Ambulatory Care Coordination Note  12/11/2017  CM Risk Score: 11  Avril Mortality Risk Score:      ACC: Devi Cano RN    Summary Note: Patient had office visit this morning. He had stopped the trulicity because of the loss of appetite and weight loss. He was on the 1.5 mg, but will be switched to the .75 mg. His A1C was 10.9, down from 11.1. He will continue with the lantus 35 units nightly and the pre-meal insulin 5 units. Will follow. Care Coordination Interventions    Program Enrollment:  Complex Care  Referral from Primary Care Provider:  Yes  Suggested Interventions and Community Resources  Diabetes Education:  Completed  Medication Assistance Program:  Completed  Zone Management Tools: In Process         Goals Addressed     None          Prior to Admission medications    Medication Sig Start Date End Date Taking?  Authorizing Provider   zoster vaccine live, PF, (ZOSTAVAX) 47522 UNT/0.65ML injection Inject 0.65 mLs into the skin once for 1 dose 12/11/17 12/11/17  Zara Grey MD   Dulaglutide (TRULICITY) 7.30 HW/4.0WP SOPN Inject 0.75 mg into the skin once a week DC 1.5 mg 12/11/17   Zara Rodas MD   losartan (COZAAR) 100 MG tablet Take 1 tablet by mouth daily 12/11/17   Zara Rodas MD   QUEtiapine (SEROQUEL) 50 MG tablet Take 1 tablet by mouth nightly 12/11/17   Zara Rodas MD   aspirin EC 81 MG EC tablet Take 1 tablet by mouth daily 12/11/17   Zara Rodas MD   ipratropium-albuterol (DUONEB) 0.5-2.5 (3) MG/3ML SOLN nebulizer solution Inhale 3 mLs into the lungs every 4 hours 12/11/17   Zara Rodas MD   insulin aspart (NOVOLOG) 100 UNIT/ML injection vial Inject 5 Units into the skin 3 times daily (before meals) Sliding scale 12/11/17   Zara Rodas MD   clopidogrel (PLAVIX) 75 MG tablet Take 1 tablet by mouth daily 12/11/17   Zara Rodas MD   albuterol-ipratropium (COMBIVENT RESPIMAT)  MCG/ACT AERS inhaler Inhale 1 puff into the lungs every 6

## 2017-12-11 NOTE — PROGRESS NOTES
Take 1 tablet by mouth 2 times daily 60 tablet 2    fluticasone-vilanterol (BREO ELLIPTA) 100-25 MCG/INH AEPB inhaler Inhale 1 puff into the lungs daily 1 each 2    atorvastatin (LIPITOR) 80 MG tablet Take 1 tablet by mouth daily 30 tablet 2    nitroGLYCERIN (NITROSTAT) 0.4 MG SL tablet up to max of 3 total doses. If no relief after 1 dose, call 911. 25 tablet 2    tiotropium (SPIRIVA HANDIHALER) 18 MCG inhalation capsule Inhale 1 capsule into the lungs daily 30 capsule 2    prazosin (MINIPRESS) 5 MG capsule Take 1 capsule by mouth 2 times daily DC previous script 60 capsule 2    gabapentin (NEURONTIN) 100 MG capsule TAKE 2 CAPSULES BY MOUTH 3 TIMES DAILY 180 capsule 2    insulin glargine (LANTUS) 100 UNIT/ML injection vial Inject 35 Units into the skin nightly 5 vial 2    glucose blood VI test strips (ASCENSIA AUTODISC VI;ONE TOUCH ULTRA TEST VI) strip Dx: DM-2, use 4 -5 times daily, ok to give a substitute 100 each 11    INSULIN SYRINGE .5CC/28G (INS SYRINGE/NEEDLE .5CC/28G) 28G X 1/2\" 0.5 ML MISC 1 Syringe by Does not apply route 4 times daily 100 each 11     No current facility-administered medications for this visit. Social History   Substance Use Topics    Smoking status: Former Smoker     Packs/day: 0.50     Types: Cigarettes    Smokeless tobacco: Never Used    Alcohol use No       Family History   Problem Relation Age of Onset    Cancer Mother     Heart Disease Father         REVIEW OF SYSTEMS:  Review of Systems   Constitutional: Negative for chills and fever. HENT: Negative for congestion, ear discharge and nosebleeds. Eyes: Negative for blurred vision and double vision. Respiratory: Negative for cough and hemoptysis. Cardiovascular: Negative for chest pain and palpitations. Gastrointestinal: Negative for abdominal pain, heartburn and nausea. Genitourinary: Negative for dysuria and urgency. Musculoskeletal: Negative for myalgias and neck pain.    Neurological: Negative for dizziness and headaches. Endo/Heme/Allergies: Does not bruise/bleed easily. Psychiatric/Behavioral: Negative for depression and suicidal ideas. PHYSICAL EXAM:  Vitals:    12/11/17 1101 12/11/17 1121   BP: (!) 205/95 (!) 165/88   Site: Right Arm    Position: Sitting    Cuff Size: Medium Adult    Pulse: 72    Weight: 155 lb (70.3 kg)      BP Readings from Last 3 Encounters:   12/11/17 (!) 165/88   10/30/17 139/85   09/28/17 (!) 186/74        Physical Exam   Constitutional: He is oriented to person, place, and time and well-developed, well-nourished, and in no distress. No distress. HENT:   Mouth/Throat: No oropharyngeal exudate. Neck: Normal range of motion. Neck supple. No thyromegaly present. Cardiovascular: Normal rate, regular rhythm, normal heart sounds and intact distal pulses. Pulmonary/Chest: Effort normal and breath sounds normal. No respiratory distress. He has no wheezes. Abdominal: Soft. Bowel sounds are normal. He exhibits no distension and no mass. There is no tenderness. Musculoskeletal: Normal range of motion. He exhibits no edema or tenderness. Neurological: He is alert and oriented to person, place, and time. No cranial nerve deficit. Skin: Skin is warm. No rash noted. He is not diaphoretic. No erythema.    Psychiatric: Mood, memory, affect and judgment normal.         DIAGNOSTIC FINDINGS:  CBC:  Lab Results   Component Value Date    WBC 7.9 09/25/2017    HGB 14.8 09/25/2017     09/25/2017     04/16/2012       BMP:    Lab Results   Component Value Date     09/28/2017    K 4.6 09/28/2017     09/28/2017    CO2 24 09/28/2017    BUN 24 09/28/2017    CREATININE 1.35 09/28/2017    GLUCOSE 160 09/28/2017    GLUCOSE 172 04/16/2012       HEMOGLOBIN A1C:   Lab Results   Component Value Date    LABA1C 10.9 12/11/2017       FASTING LIPID PANEL:  Lab Results   Component Value Date    CHOL 203 (H) 06/27/2017    HDL 62 06/27/2017    TRIG 110 06/27/2017 ASSESSMENT AND PLAN:  Lee Muniz was seen today for 3 month follow-up, hypertension, diabetes and weight loss. Diagnoses and all orders for this visit:    DM type 2, uncontrolled, with neuropathy ([address]  -     POCT glycosylated hemoglobin (Hb A1C)  -     Dulaglutide (TRULICITY) 6.27 AL/8.6KR SOPN; Inject 0.75 mg into the skin once a week DC 1.5 mg  -     insulin aspart (NOVOLOG) 100 UNIT/ML injection vial; Inject 5 Units into the skin 3 times daily (before meals) Sliding scale    Need for prophylactic vaccination and inoculation against varicella  -     zoster vaccine live, PF, (ZOSTAVAX) 79792 UNT/0.65ML injection; Inject 0.65 mLs into the skin once for 1 dose    Essential hypertension  -     losartan (COZAAR) 100 MG tablet; Take 1 tablet by mouth daily  -     Discontinue: prazosin (MINIPRESS) 5 MG capsule; Take 1 capsule by mouth nightly  -     amLODIPine (NORVASC) 10 MG tablet; Take 1 tablet by mouth daily  -     carvedilol (COREG) 25 MG tablet; Take 1 tablet by mouth 2 times daily  -     nitroGLYCERIN (NITROSTAT) 0.4 MG SL tablet; up to max of 3 total doses. If no relief after 1 dose, call 911.  -     prazosin (MINIPRESS) 5 MG capsule; Take 1 capsule by mouth 2 times daily DC previous script    Primary insomnia  -     QUEtiapine (SEROQUEL) 50 MG tablet; Take 1 tablet by mouth nightly  -     QUEtiapine (SEROQUEL) 50 MG tablet; Take 1 tablet by mouth nightly    Carotid stenosis, left s/p Endarterectomy  -     aspirin EC 81 MG EC tablet; Take 1 tablet by mouth daily  -     clopidogrel (PLAVIX) 75 MG tablet; Take 1 tablet by mouth daily    Mucopurulent chronic bronchitis (HCC)  -     ipratropium-albuterol (DUONEB) 0.5-2.5 (3) MG/3ML SOLN nebulizer solution; Inhale 3 mLs into the lungs every 4 hours    Mixed simple and mucopurulent chronic bronchitis (HCC)  -     albuterol-ipratropium (COMBIVENT RESPIMAT)  MCG/ACT AERS inhaler;  Inhale 1 puff into the lungs every 6 hours  -     fluticasone-vilanterol (BREO

## 2017-12-11 NOTE — PATIENT INSTRUCTIONS
----- Message from Jackie Muniz MA sent at 8/28/2017  3:36 PM CDT -----  Contact: self  Claritza Villeda  MRN: [id number]  Home Phone      [phone]  Work Phone      Not on file.  Mobile          [phone]    Patient Care Team:  Primary Doctor No as PCP - General  OB? No  What phone number can you be reached at? [phone]  Message:   Please link mammo orders to appt 08/31/2017.  Stated was advised in last annual in February she need to start having mammo done. Thanks!     Printed script for Zostavax vaccine signed and given to pt to take to pharmacy. Pt left without discharge summary. Return To Clinic 1/19/2018. After Visit Summary  mailed to patient. --MA    It is very important for your care that you keep your appointment. If for some reason you are unable to keep your appointment it is equally important that you call our office at [phone] to cancel your appointment and reschedule. Failure to do so may result in your termination from our practice.

## 2018-01-17 ENCOUNTER — TELEPHONE (OUTPATIENT)
Dept: INTERNAL MEDICINE | Age: 63
End: 2018-01-17

## 2018-01-25 ENCOUNTER — HOSPITAL ENCOUNTER (EMERGENCY)
Age: 63
Discharge: HOME | DRG: 194 | End: 2018-01-25
Attending: EMERGENCY MEDICINE
Payer: COMMERCIAL

## 2018-01-25 ENCOUNTER — APPOINTMENT (OUTPATIENT)
Dept: GENERAL RADIOLOGY | Age: 63
DRG: 194 | End: 2018-01-25
Payer: COMMERCIAL

## 2018-01-25 VITALS
OXYGEN SATURATION: 99 % | BODY MASS INDEX: 25.82 KG/M2 | TEMPERATURE: 100.1 F | DIASTOLIC BLOOD PRESSURE: 73 MMHG | RESPIRATION RATE: 23 BRPM | HEART RATE: 94 BPM | SYSTOLIC BLOOD PRESSURE: 161 MMHG | WEIGHT: 160 LBS

## 2018-01-25 DIAGNOSIS — J44.1 COPD EXACERBATION (HCC): Primary | ICD-10-CM

## 2018-01-25 LAB
ABSOLUTE EOS #: 0 K/UL (ref 0–0.4)
ABSOLUTE IMMATURE GRANULOCYTE: 0 K/UL (ref 0–0.3)
ABSOLUTE LYMPH #: 1.01 K/UL (ref 1–4.8)
ABSOLUTE MONO #: 0.4 K/UL (ref 0.1–0.8)
ANION GAP SERPL CALCULATED.3IONS-SCNC: 13 MMOL/L (ref 9–17)
BASOPHILS # BLD: 0 % (ref 0–2)
BASOPHILS ABSOLUTE: 0 K/UL (ref 0–0.2)
BNP INTERPRETATION: ABNORMAL
BUN BLDV-MCNC: 20 MG/DL (ref 8–23)
BUN/CREAT BLD: ABNORMAL (ref 9–20)
CALCIUM SERPL-MCNC: 8.8 MG/DL (ref 8.6–10.4)
CHLORIDE BLD-SCNC: 102 MMOL/L (ref 98–107)
CO2: 23 MMOL/L (ref 20–31)
CREAT SERPL-MCNC: 1.38 MG/DL (ref 0.7–1.2)
DIFFERENTIAL TYPE: ABNORMAL
DIRECT EXAM: NORMAL
EKG ATRIAL RATE: 96 BPM
EKG P AXIS: 71 DEGREES
EKG P-R INTERVAL: 146 MS
EKG Q-T INTERVAL: 318 MS
EKG QRS DURATION: 92 MS
EKG QTC CALCULATION (BAZETT): 401 MS
EKG R AXIS: 108 DEGREES
EKG T AXIS: 92 DEGREES
EKG VENTRICULAR RATE: 96 BPM
EOSINOPHILS RELATIVE PERCENT: 0 % (ref 1–4)
GFR AFRICAN AMERICAN: >60 ML/MIN
GFR NON-AFRICAN AMERICAN: 52 ML/MIN
GFR SERPL CREATININE-BSD FRML MDRD: ABNORMAL ML/MIN/{1.73_M2}
GFR SERPL CREATININE-BSD FRML MDRD: ABNORMAL ML/MIN/{1.73_M2}
GLUCOSE BLD-MCNC: 141 MG/DL (ref 70–99)
HCT VFR BLD CALC: 40.7 % (ref 40.7–50.3)
HEMOGLOBIN: 13.7 G/DL (ref 13–17)
IMMATURE GRANULOCYTES: 0 %
LYMPHOCYTES # BLD: 10 % (ref 24–44)
Lab: NORMAL
MCH RBC QN AUTO: 30.6 PG (ref 25.2–33.5)
MCHC RBC AUTO-ENTMCNC: 33.7 G/DL (ref 28.4–34.8)
MCV RBC AUTO: 90.8 FL (ref 82.6–102.9)
MONOCYTES # BLD: 4 % (ref 1–7)
MORPHOLOGY: NORMAL
NRBC AUTOMATED: 0 PER 100 WBC
PDW BLD-RTO: 12.2 % (ref 11.8–14.4)
PLATELET # BLD: 351 K/UL (ref 138–453)
PLATELET ESTIMATE: ABNORMAL
PMV BLD AUTO: 9.8 FL (ref 8.1–13.5)
POC TROPONIN I: 0.02 NG/ML (ref 0–0.1)
POC TROPONIN I: 0.02 NG/ML (ref 0–0.1)
POC TROPONIN INTERP: NORMAL
POC TROPONIN INTERP: NORMAL
POTASSIUM SERPL-SCNC: 4.7 MMOL/L (ref 3.7–5.3)
PRO-BNP: 1100 PG/ML
RBC # BLD: 4.48 M/UL (ref 4.21–5.77)
RBC # BLD: ABNORMAL 10*6/UL
SEG NEUTROPHILS: 86 % (ref 36–66)
SEGMENTED NEUTROPHILS ABSOLUTE COUNT: 8.69 K/UL (ref 1.8–7.7)
SODIUM BLD-SCNC: 138 MMOL/L (ref 135–144)
SPECIMEN DESCRIPTION: NORMAL
STATUS: NORMAL
WBC # BLD: 10.1 K/UL (ref 3.5–11.3)
WBC # BLD: ABNORMAL 10*3/UL

## 2018-01-25 PROCEDURE — 80048 BASIC METABOLIC PNL TOTAL CA: CPT

## 2018-01-25 PROCEDURE — 94640 AIRWAY INHALATION TREATMENT: CPT

## 2018-01-25 PROCEDURE — 87804 INFLUENZA ASSAY W/OPTIC: CPT

## 2018-01-25 PROCEDURE — 94664 DEMO&/EVAL PT USE INHALER: CPT

## 2018-01-25 PROCEDURE — 83880 ASSAY OF NATRIURETIC PEPTIDE: CPT

## 2018-01-25 PROCEDURE — 96374 THER/PROPH/DIAG INJ IV PUSH: CPT

## 2018-01-25 PROCEDURE — 6360000002 HC RX W HCPCS: Performed by: EMERGENCY MEDICINE

## 2018-01-25 PROCEDURE — 85025 COMPLETE CBC W/AUTO DIFF WBC: CPT

## 2018-01-25 PROCEDURE — 84484 ASSAY OF TROPONIN QUANT: CPT

## 2018-01-25 PROCEDURE — 93005 ELECTROCARDIOGRAM TRACING: CPT

## 2018-01-25 PROCEDURE — 99285 EMERGENCY DEPT VISIT HI MDM: CPT

## 2018-01-25 PROCEDURE — 6370000000 HC RX 637 (ALT 250 FOR IP): Performed by: EMERGENCY MEDICINE

## 2018-01-25 PROCEDURE — 71046 X-RAY EXAM CHEST 2 VIEWS: CPT

## 2018-01-25 RX ORDER — PREDNISONE 10 MG/1
30 TABLET ORAL DAILY
Qty: 6 TABLET | Refills: 0 | Status: ON HOLD | OUTPATIENT
Start: 2018-01-31 | End: 2018-01-30 | Stop reason: HOSPADM

## 2018-01-25 RX ORDER — PREDNISONE 10 MG/1
40 TABLET ORAL DAILY
Qty: 8 TABLET | Refills: 0 | Status: ON HOLD | OUTPATIENT
Start: 2018-01-28 | End: 2018-01-30 | Stop reason: HOSPADM

## 2018-01-25 RX ORDER — PREDNISONE 10 MG/1
20 TABLET ORAL DAILY
Qty: 4 TABLET | Refills: 0 | Status: ON HOLD | OUTPATIENT
Start: 2018-02-02 | End: 2018-01-30 | Stop reason: HOSPADM

## 2018-01-25 RX ORDER — ALBUTEROL SULFATE 90 UG/1
2 AEROSOL, METERED RESPIRATORY (INHALATION)
Status: DISCONTINUED | OUTPATIENT
Start: 2018-01-25 | End: 2018-01-25 | Stop reason: HOSPADM

## 2018-01-25 RX ORDER — METHYLPREDNISOLONE SODIUM SUCCINATE 125 MG/2ML
125 INJECTION, POWDER, LYOPHILIZED, FOR SOLUTION INTRAMUSCULAR; INTRAVENOUS ONCE
Status: COMPLETED | OUTPATIENT
Start: 2018-01-25 | End: 2018-01-25

## 2018-01-25 RX ORDER — IPRATROPIUM BROMIDE AND ALBUTEROL SULFATE 2.5; .5 MG/3ML; MG/3ML
1 SOLUTION RESPIRATORY (INHALATION)
Status: DISCONTINUED | OUTPATIENT
Start: 2018-01-25 | End: 2018-01-25 | Stop reason: HOSPADM

## 2018-01-25 RX ORDER — PREDNISONE 10 MG/1
50 TABLET ORAL DAILY
Qty: 10 TABLET | Refills: 0 | Status: ON HOLD | OUTPATIENT
Start: 2018-01-25 | End: 2018-01-30 | Stop reason: HOSPADM

## 2018-01-25 RX ORDER — METHYLPREDNISOLONE SODIUM SUCCINATE 125 MG/2ML
125 INJECTION, POWDER, LYOPHILIZED, FOR SOLUTION INTRAMUSCULAR; INTRAVENOUS ONCE
Status: DISCONTINUED | OUTPATIENT
Start: 2018-01-25 | End: 2018-01-25 | Stop reason: HOSPADM

## 2018-01-25 RX ORDER — IPRATROPIUM BROMIDE AND ALBUTEROL SULFATE 2.5; .5 MG/3ML; MG/3ML
1 SOLUTION RESPIRATORY (INHALATION) ONCE
Status: COMPLETED | OUTPATIENT
Start: 2018-01-25 | End: 2018-01-25

## 2018-01-25 RX ORDER — ALBUTEROL SULFATE 2.5 MG/3ML
5 SOLUTION RESPIRATORY (INHALATION)
Status: DISCONTINUED | OUTPATIENT
Start: 2018-01-25 | End: 2018-01-25 | Stop reason: HOSPADM

## 2018-01-25 RX ORDER — PREDNISONE 10 MG/1
10 TABLET ORAL DAILY
Qty: 2 TABLET | Refills: 0 | Status: ON HOLD | OUTPATIENT
Start: 2018-02-06 | End: 2018-01-30 | Stop reason: HOSPADM

## 2018-01-25 RX ADMIN — ALBUTEROL SULFATE 5 MG: 5 SOLUTION RESPIRATORY (INHALATION) at 19:35

## 2018-01-25 RX ADMIN — IPRATROPIUM BROMIDE 0.5 MG: 0.5 SOLUTION RESPIRATORY (INHALATION) at 19:35

## 2018-01-25 RX ADMIN — METHYLPREDNISOLONE SODIUM SUCCINATE 125 MG: 125 INJECTION, POWDER, FOR SOLUTION INTRAMUSCULAR; INTRAVENOUS at 18:54

## 2018-01-25 RX ADMIN — IPRATROPIUM BROMIDE AND ALBUTEROL SULFATE 1 AMPULE: .5; 3 SOLUTION RESPIRATORY (INHALATION) at 18:12

## 2018-01-25 ASSESSMENT — ENCOUNTER SYMPTOMS
CONSTIPATION: 0
SHORTNESS OF BREATH: 1
PHOTOPHOBIA: 0
BLOOD IN STOOL: 0
COUGH: 1
BACK PAIN: 0
NAUSEA: 0
ABDOMINAL PAIN: 0
SORE THROAT: 0
SINUS PRESSURE: 0
RHINORRHEA: 0
VOMITING: 0
DIARRHEA: 0

## 2018-01-25 ASSESSMENT — PAIN DESCRIPTION - DESCRIPTORS: DESCRIPTORS: ACHING

## 2018-01-25 ASSESSMENT — PAIN SCALES - GENERAL: PAINLEVEL_OUTOF10: 8

## 2018-01-25 ASSESSMENT — PAIN DESCRIPTION - PAIN TYPE: TYPE: ACUTE PAIN

## 2018-01-25 ASSESSMENT — PAIN DESCRIPTION - LOCATION: LOCATION: HEAD

## 2018-01-25 NOTE — ED PROVIDER NOTES
[facility]     Emergency Department     Faculty Attestation    I performed a history and physical examination of the patient and discussed management with the resident. I have reviewed and agree with the residents findings including all diagnostic interpretations, and treatment plans as written. Any areas of disagreement are noted on the chart. I was personally present for the key portions of any procedures. I have documented in the chart those procedures where I was not present during the key portions. I have reviewed the emergency nurses triage note. I agree with the chief complaint, past medical history, past surgical history, allergies, medications, social and family history as documented unless otherwise noted below. Documentation of the HPI, Physical Exam and Medical Decision Making performed by [email or web address] is based on my personal performance of the HPI, PE and MDM. For Physician Assistant/ Nurse Practitioner cases/documentation I have personally evaluated this patient and have completed at least one if not all key elements of the E/M (history, physical exam, and MDM). Additional findings are as noted. Primary Care Physician: Zara Schwartz MD    History: This is a 59 y.o. male who presents to the Emergency Department with complaint of Shortness of breath. The patient consents emergency department with a three-day history shortness breath that is getting progressively worse. He has a history of COPD. He does complain of a cough with occasional clear sputum. The patient denies any fever, chills or sweats. Physical:   weight is 160 lb (72.6 kg). His oral temperature is 100.1 °F (37.8 °C). His blood pressure is 161/73 (abnormal) and his pulse is 94. His respiration is 23 and oxygen saturation is 99%.   Lungs show diffuse wheezing throughout, heart regular rate and rhythm, abdomen is soft nontender    Impression: COPD exacerbation    Plan: Albuterol aerosol, steroids, chest x-ray, EKG, CBC, BMP, troponin        EKG Interpretation    Interpreted by me  Normal sinus rhythm with a ventricular rate of 96, normal VT interval, normal QRS duration, right axis deviation, nonspecific T-wave abnormality, normal QT corrected  Impression: Normal sinus rhythm, right axis deviation, nonspecific T-wave abnormality  Compared to EKG of September 26, 2017 lateral ST and T-wave abnormality has improved, ventricular rate has increased by [address].  Angie Araujo MD, [address] [facility]  Attending Emergency Medicine Physician        Dayron Mak MD  01/25/18 6299

## 2018-01-25 NOTE — ED PROVIDER NOTES
repair; Tonsillectomy; Colonoscopy; Carotid endarterectomy (Left, 7-2013); vascular surgery (Left, 2015); and colsc flx w/rmvl of tumor polyp lesion snare tq (N/A, 6/27/2017). Social History     Social History    Marital status:      Spouse name: N/A    Number of children: N/A    Years of education: N/A     Occupational History     N/A     Social History Main Topics    Smoking status: Former Smoker     Packs/day: 0.50     Types: Cigarettes    Smokeless tobacco: Never Used    Alcohol use No    Drug use: No    Sexual activity: Not on file     Other Topics Concern    Not on file     Social History Narrative    No narrative on file       Family History   Problem Relation Age of Onset    Cancer Mother     Heart Disease Father        Allergies:  Lisinopril; Ace inhibitors; and Pcn [penicillins]    Home Medications:  Prior to Admission medications    Medication Sig Start Date End Date Taking?  Authorizing Provider   predniSONE (DELTASONE) 10 MG tablet Take 5 tablets by mouth daily for 2 days 1/25/18 1/27/18 Yes Markus C Steenhoff, DO   predniSONE (DELTASONE) 10 MG tablet Take 4 tablets by mouth daily for 2 days 1/28/18 1/30/18 Yes Markus C Steenhoff, DO   predniSONE (DELTASONE) 10 MG tablet Take 3 tablets by mouth daily for 2 days 1/31/18 2/2/18 Yes Markus C Steenhoff, DO   predniSONE (DELTASONE) 10 MG tablet Take 2 tablets by mouth daily for 2 days 2/2/18 2/4/18 Yes Markus C Steenhoff, DO   predniSONE (DELTASONE) 10 MG tablet Take 1 tablet by mouth daily for 2 days 2/6/18 2/8/18 Yes Markus C Steenhoff, DO   Umeclidinium Bromide 62.5 MCG/INH AEPB Inhale 1 Inhaler into the lungs 2 times daily 1/18/18  Yes Mbonu Josephine Claude, MD   Dulaglutide (TRULICITY) 7.10 UD/0.3GV SOPN Inject 0.75 mg into the skin once a week DC 1.5 mg 12/11/17  Yes Mbonu Josephine Claude, MD   losartan (COZAAR) 100 MG tablet Take 1 tablet by mouth daily 12/11/17  Yes Mbonu Josephine Claude, MD   QUEtiapine (SEROQUEL) 50 MG tablet Take 1 rebound and no guarding. Musculoskeletal: Normal range of motion. He exhibits no edema. Lymphadenopathy:     He has no cervical adenopathy. Neurological: He is alert and oriented to person, place, and time. GCS eye subscore is 4. GCS verbal subscore is 5. GCS motor subscore is 6. Skin: Skin is warm and dry. No rash noted. He is not diaphoretic. Psychiatric: He expresses no homicidal and no suicidal ideation. Nursing note and vitals reviewed.       DIFFERENTIAL  DIAGNOSIS     PLAN (LABS / IMAGING / EKG):  Orders Placed This Encounter   Procedures    RAPID INFLUENZA A/B ANTIGENS    XR CHEST STANDARD (2 VW)    CBC auto differential    Basic Metabolic Panel w/ Reflex to MG    Brain natriuretic peptide    Telemetry monitoring    HHN Treatment    Respiratory Care Evaluation and Treat    Initiate ED Aerosol Protocol    Respiratory care evaluation only    HHN Treatment    HHN Treatment    HHN Treatment    MDI Treatment    MDI Treatment    HHN Treatment    POCT troponin    POCT troponin    POCT troponin    EKG 12 lead    Insert peripheral IV       MEDICATIONS ORDERED:  Orders Placed This Encounter   Medications    ipratropium-albuterol (DUONEB) nebulizer solution 1 ampule    methylPREDNISolone sodium (SOLU-MEDROL) injection 125 mg    albuterol (PROVENTIL) nebulizer solution 5 mg    ipratropium-albuterol (DUONEB) nebulizer solution 1 ampule    albuterol (PROVENTIL) nebulizer solution 5 mg    albuterol sulfate  (90 Base) MCG/ACT inhaler 2 puff    AND Linked Order Group     albuterol sulfate  (90 Base) MCG/ACT inhaler 2 puff     ipratropium (ATROVENT HFA) 17 MCG/ACT inhaler 2 puff    ipratropium (ATROVENT) 0.02 % nebulizer solution 0.5 mg    methylPREDNISolone sodium (SOLU-MEDROL) injection 125 mg    predniSONE (DELTASONE) 10 MG tablet     Sig: Take 5 tablets by mouth daily for 2 days     Dispense:  10 tablet     Refill:  0    predniSONE (DELTASONE) 10 MG tablet Segs Absolute 8.69 (H) 1.8 - 7.7 k/uL    Absolute Lymph # 1.01 1.0 - 4.8 k/uL    Absolute Mono # 0.40 0.1 - 0.8 k/uL    Absolute Eos # 0.00 0.0 - 0.4 k/uL    Basophils # 0.00 0.0 - 0.2 k/uL    Morphology Normal    Basic Metabolic Panel w/ Reflex to MG   Result Value Ref Range    Glucose 141 (H) 70 - 99 mg/dL    BUN 20 8 - 23 mg/dL    CREATININE 1.38 (H) 0.70 - 1.20 mg/dL    Bun/Cre Ratio NOT REPORTED 9 - 20    Calcium 8.8 8.6 - 10.4 mg/dL    Sodium 138 135 - 144 mmol/L    Potassium 4.7 3.7 - 5.3 mmol/L    Chloride 102 98 - 107 mmol/L    CO2 23 20 - 31 mmol/L    Anion Gap 13 9 - 17 mmol/L    GFR Non-African American 52 (L) >60 mL/min    GFR African American >60 >60 mL/min    GFR Comment          GFR Staging NOT REPORTED    Brain natriuretic peptide   Result Value Ref Range    Pro-BNP 1,100 (H) <300 pg/mL    BNP Interpretation         POCT troponin   Result Value Ref Range    POC Troponin I 0.02 0.00 - 0.10 ng/mL    POC Troponin Interp       The Troponin-I (POC) results cannot be compared to the Troponin-T results. POCT troponin   Result Value Ref Range    POC Troponin I 0.02 0.00 - 0.10 ng/mL    POC Troponin Interp       The Troponin-I (POC) results cannot be compared to the Troponin-T results. IMPRESSION: The patient is a 30-year-old male who is complaining of shortness of breath. Patient has bilateral wheezing on exam. Patient is also complaining of dyspnea on exertion. We'll do a cardiac workup, BMP, and give the patient a nebulizer treatment. Patient also received Solu-Medrol. RADIOLOGY:  Xr Chest Standard (2 Vw)    Result Date: 1/25/2018  EXAMINATION: TWO VIEWS OF THE CHEST 1/25/2018 6:54 pm COMPARISON: September 25, 2017 HISTORY: Reason for exam:->sob FINDINGS: The lungs are without acute focal process. There is no effusion or pneumothorax. The cardiomediastinal silhouette is normal. The osseous structures are intact without acute process.      Stable negative chest.     EKG  EKG Interpretation    Interpreted by me    Rhythm: normal sinus   Rate: normal  Axis: normal  Ectopy: none  Conduction: normal  ST Segments: no acute change  T Waves: no acute change  Q Waves: none    Clinical Impression: no acute changes and normal EKG    All EKG's are interpreted by the Emergency Department Physician who either signs or Co-signs this chart in the absence of a cardiologist.    EMERGENCY DEPARTMENT COURSE:  Workup was not abnormal for the patient. Patient was reassessed after 2 nebulizer treatments and stated that his breathing had improved. Patient still had wheezing on exam, but stated that he did not want another nebulizer treatment. Patient be discharged with prescription for prednisone and instructed to follow-up with his PCP. He is also instructed return to the emergency department is any increase in symptoms or concerns. Patient stated he understood and agreed the plan. PROCEDURES:  None    CONSULTS:  None    CRITICAL CARE:  None    FINAL IMPRESSION      1. COPD exacerbation ([address]          DISPOSITION / PLAN     DISPOSITION Decision To Discharge 01/25/2018 08:30:31 PM      PATIENT REFERRED TO:  Zara Earl MD  [address] [place] [address]  [phone]    Call   As needed      DISCHARGE MEDICATIONS:  Discharge Medication List as of 1/25/2018  8:36 PM      START taking these medications    Details   ! ! predniSONE (DELTASONE) 10 MG tablet Take 5 tablets by mouth daily for 2 days, Disp-10 tablet, R-0Print      !! predniSONE (DELTASONE) 10 MG tablet Take 4 tablets by mouth daily for 2 days, Disp-8 tablet, R-0Print      !! predniSONE (DELTASONE) 10 MG tablet Take 3 tablets by mouth daily for 2 days, Disp-6 tablet, R-0Print      !! predniSONE (DELTASONE) 10 MG tablet Take 2 tablets by mouth daily for 2 days, Disp-4 tablet, R-0Print      !! predniSONE (DELTASONE) 10 MG tablet Take 1 tablet by mouth daily for 2 days, Disp-2 tablet, R-0Print       !! - Potential duplicate medications found. Please discuss with provider.           Natasha Mishra DO  Emergency Medicine Resident    (Please note that portions of this note were completed with a voice recognition program.  Efforts were made to edit the dictations but occasionally words are mis-transcribed.)     Natasha Mishra DO  Resident  01/25/18 8360

## 2018-01-26 ENCOUNTER — APPOINTMENT (OUTPATIENT)
Dept: CT IMAGING | Age: 63
DRG: 194 | End: 2018-01-26
Payer: COMMERCIAL

## 2018-01-26 ENCOUNTER — HOSPITAL ENCOUNTER (INPATIENT)
Age: 63
LOS: 4 days | Discharge: HOME | DRG: 194 | End: 2018-01-30
Attending: EMERGENCY MEDICINE | Admitting: INTERNAL MEDICINE
Payer: COMMERCIAL

## 2018-01-26 ENCOUNTER — APPOINTMENT (OUTPATIENT)
Dept: GENERAL RADIOLOGY | Age: 63
DRG: 194 | End: 2018-01-26
Payer: COMMERCIAL

## 2018-01-26 ENCOUNTER — CARE COORDINATION (OUTPATIENT)
Dept: CARE COORDINATION | Age: 63
End: 2018-01-26

## 2018-01-26 ENCOUNTER — APPOINTMENT (OUTPATIENT)
Dept: NUCLEAR MEDICINE | Age: 63
DRG: 194 | End: 2018-01-26
Payer: COMMERCIAL

## 2018-01-26 DIAGNOSIS — D72.829 LEUKOCYTOSIS, UNSPECIFIED TYPE: ICD-10-CM

## 2018-01-26 DIAGNOSIS — J44.1 COPD EXACERBATION (HCC): Primary | ICD-10-CM

## 2018-01-26 DIAGNOSIS — N17.9 AKI (ACUTE KIDNEY INJURY) (HCC): ICD-10-CM

## 2018-01-26 LAB
ABSOLUTE EOS #: 0 K/UL (ref 0–0.4)
ABSOLUTE IMMATURE GRANULOCYTE: 0 K/UL (ref 0–0.3)
ABSOLUTE LYMPH #: 0.8 K/UL (ref 1–4.8)
ABSOLUTE MONO #: 0.4 K/UL (ref 0.1–0.8)
ALBUMIN SERPL-MCNC: 3.3 G/DL (ref 3.5–5.2)
ALBUMIN/GLOBULIN RATIO: 1.1 (ref 1–2.5)
ALP BLD-CCNC: 94 U/L (ref 40–129)
ALT SERPL-CCNC: 25 U/L (ref 5–41)
ANION GAP SERPL CALCULATED.3IONS-SCNC: 14 MMOL/L (ref 9–17)
AST SERPL-CCNC: 29 U/L
BASOPHILS # BLD: 0 % (ref 0–2)
BASOPHILS ABSOLUTE: 0 K/UL (ref 0–0.2)
BILIRUB SERPL-MCNC: 0.4 MG/DL (ref 0.3–1.2)
BNP INTERPRETATION: ABNORMAL
BUN BLDV-MCNC: 36 MG/DL (ref 8–23)
BUN/CREAT BLD: ABNORMAL (ref 9–20)
CALCIUM SERPL-MCNC: 8.7 MG/DL (ref 8.6–10.4)
CHLORIDE BLD-SCNC: 98 MMOL/L (ref 98–107)
CO2: 24 MMOL/L (ref 20–31)
CREAT SERPL-MCNC: 1.85 MG/DL (ref 0.7–1.2)
DIFFERENTIAL TYPE: ABNORMAL
EKG ATRIAL RATE: 105 BPM
EKG P AXIS: 82 DEGREES
EKG P-R INTERVAL: 132 MS
EKG Q-T INTERVAL: 326 MS
EKG QRS DURATION: 90 MS
EKG QTC CALCULATION (BAZETT): 430 MS
EKG R AXIS: -126 DEGREES
EKG T AXIS: 73 DEGREES
EKG VENTRICULAR RATE: 105 BPM
EOSINOPHILS RELATIVE PERCENT: 0 % (ref 1–4)
GFR AFRICAN AMERICAN: 45 ML/MIN
GFR NON-AFRICAN AMERICAN: 37 ML/MIN
GFR SERPL CREATININE-BSD FRML MDRD: ABNORMAL ML/MIN/{1.73_M2}
GFR SERPL CREATININE-BSD FRML MDRD: ABNORMAL ML/MIN/{1.73_M2}
GLUCOSE BLD-MCNC: 141 MG/DL (ref 70–99)
HCT VFR BLD CALC: 39.7 % (ref 40.7–50.3)
HEMOGLOBIN: 13.5 G/DL (ref 13–17)
IMMATURE GRANULOCYTES: 0 %
INR BLD: 1
LACTIC ACID, WHOLE BLOOD: 1.5 MMOL/L (ref 0.7–2.1)
LYMPHOCYTES # BLD: 6 % (ref 24–44)
MAGNESIUM: 1.9 MG/DL (ref 1.6–2.6)
MCH RBC QN AUTO: 30.7 PG (ref 25.2–33.5)
MCHC RBC AUTO-ENTMCNC: 34 G/DL (ref 28.4–34.8)
MCV RBC AUTO: 90.2 FL (ref 82.6–102.9)
MONOCYTES # BLD: 3 % (ref 1–7)
MORPHOLOGY: NORMAL
NRBC AUTOMATED: 0 PER 100 WBC
PARTIAL THROMBOPLASTIN TIME: 23.8 SEC (ref 21.3–31.3)
PDW BLD-RTO: 12.3 % (ref 11.8–14.4)
PLATELET # BLD: 328 K/UL (ref 138–453)
PLATELET ESTIMATE: ABNORMAL
PMV BLD AUTO: 9.6 FL (ref 8.1–13.5)
POC TROPONIN I: 0 NG/ML (ref 0–0.1)
POC TROPONIN I: 0.02 NG/ML (ref 0–0.1)
POC TROPONIN INTERP: NORMAL
POC TROPONIN INTERP: NORMAL
POTASSIUM SERPL-SCNC: 4.3 MMOL/L (ref 3.7–5.3)
PRO-BNP: 951 PG/ML
PROTHROMBIN TIME: 10.6 SEC (ref 9.4–12.6)
RBC # BLD: 4.4 M/UL (ref 4.21–5.77)
RBC # BLD: ABNORMAL 10*6/UL
SEG NEUTROPHILS: 91 % (ref 36–66)
SEGMENTED NEUTROPHILS ABSOLUTE COUNT: 12.2 K/UL (ref 1.8–7.7)
SODIUM BLD-SCNC: 136 MMOL/L (ref 135–144)
TOTAL PROTEIN: 6.4 G/DL (ref 6.4–8.3)
WBC # BLD: 13.4 K/UL (ref 3.5–11.3)
WBC # BLD: ABNORMAL 10*3/UL

## 2018-01-26 PROCEDURE — A9540 TC99M MAA: HCPCS | Performed by: EMERGENCY MEDICINE

## 2018-01-26 PROCEDURE — 94664 DEMO&/EVAL PT USE INHALER: CPT

## 2018-01-26 PROCEDURE — 85025 COMPLETE CBC W/AUTO DIFF WBC: CPT

## 2018-01-26 PROCEDURE — 87040 BLOOD CULTURE FOR BACTERIA: CPT

## 2018-01-26 PROCEDURE — 96365 THER/PROPH/DIAG IV INF INIT: CPT

## 2018-01-26 PROCEDURE — 78582 LUNG VENTILAT&PERFUS IMAGING: CPT

## 2018-01-26 PROCEDURE — 71046 X-RAY EXAM CHEST 2 VIEWS: CPT

## 2018-01-26 PROCEDURE — 80053 COMPREHEN METABOLIC PANEL: CPT

## 2018-01-26 PROCEDURE — 85610 PROTHROMBIN TIME: CPT

## 2018-01-26 PROCEDURE — 94640 AIRWAY INHALATION TREATMENT: CPT

## 2018-01-26 PROCEDURE — 6360000002 HC RX W HCPCS: Performed by: EMERGENCY MEDICINE

## 2018-01-26 PROCEDURE — 3430000000 HC RX DIAGNOSTIC RADIOPHARMACEUTICAL: Performed by: EMERGENCY MEDICINE

## 2018-01-26 PROCEDURE — 93005 ELECTROCARDIOGRAM TRACING: CPT

## 2018-01-26 PROCEDURE — 99285 EMERGENCY DEPT VISIT HI MDM: CPT

## 2018-01-26 PROCEDURE — A9538 TC99M PYROPHOSPHATE: HCPCS | Performed by: EMERGENCY MEDICINE

## 2018-01-26 PROCEDURE — 84484 ASSAY OF TROPONIN QUANT: CPT

## 2018-01-26 PROCEDURE — 1200000000 HC SEMI PRIVATE

## 2018-01-26 PROCEDURE — 2580000003 HC RX 258: Performed by: EMERGENCY MEDICINE

## 2018-01-26 PROCEDURE — 83735 ASSAY OF MAGNESIUM: CPT

## 2018-01-26 PROCEDURE — 85730 THROMBOPLASTIN TIME PARTIAL: CPT

## 2018-01-26 PROCEDURE — 83880 ASSAY OF NATRIURETIC PEPTIDE: CPT

## 2018-01-26 PROCEDURE — 83605 ASSAY OF LACTIC ACID: CPT

## 2018-01-26 RX ORDER — SODIUM CHLORIDE 9 MG/ML
INJECTION, SOLUTION INTRAVENOUS CONTINUOUS
Status: DISCONTINUED | OUTPATIENT
Start: 2018-01-27 | End: 2018-01-30 | Stop reason: HOSPADM

## 2018-01-26 RX ORDER — SODIUM CHLORIDE 0.9 % (FLUSH) 0.9 %
10 SYRINGE (ML) INJECTION EVERY 12 HOURS SCHEDULED
Status: DISCONTINUED | OUTPATIENT
Start: 2018-01-27 | End: 2018-01-30 | Stop reason: HOSPADM

## 2018-01-26 RX ORDER — SODIUM CHLORIDE 0.9 % (FLUSH) 0.9 %
10 SYRINGE (ML) INJECTION PRN
Status: DISCONTINUED | OUTPATIENT
Start: 2018-01-26 | End: 2018-01-30 | Stop reason: HOSPADM

## 2018-01-26 RX ORDER — ALBUTEROL SULFATE 90 UG/1
2 AEROSOL, METERED RESPIRATORY (INHALATION)
Status: DISCONTINUED | OUTPATIENT
Start: 2018-01-26 | End: 2018-01-26

## 2018-01-26 RX ORDER — IPRATROPIUM BROMIDE AND ALBUTEROL SULFATE 2.5; .5 MG/3ML; MG/3ML
1 SOLUTION RESPIRATORY (INHALATION)
Status: DISCONTINUED | OUTPATIENT
Start: 2018-01-26 | End: 2018-01-26

## 2018-01-26 RX ORDER — LEVOFLOXACIN 5 MG/ML
750 INJECTION, SOLUTION INTRAVENOUS ONCE
Status: COMPLETED | OUTPATIENT
Start: 2018-01-26 | End: 2018-01-26

## 2018-01-26 RX ORDER — CLOPIDOGREL BISULFATE 75 MG/1
75 TABLET ORAL DAILY
Status: DISCONTINUED | OUTPATIENT
Start: 2018-01-27 | End: 2018-01-30 | Stop reason: HOSPADM

## 2018-01-26 RX ORDER — ACETAMINOPHEN 325 MG/1
650 TABLET ORAL EVERY 4 HOURS PRN
Status: DISCONTINUED | OUTPATIENT
Start: 2018-01-26 | End: 2018-01-30 | Stop reason: HOSPADM

## 2018-01-26 RX ORDER — ACETAMINOPHEN 325 MG/1
650 TABLET ORAL EVERY 4 HOURS PRN
Status: DISCONTINUED | OUTPATIENT
Start: 2018-01-26 | End: 2018-01-26 | Stop reason: SDUPTHER

## 2018-01-26 RX ORDER — IPRATROPIUM BROMIDE AND ALBUTEROL SULFATE 2.5; .5 MG/3ML; MG/3ML
1 SOLUTION RESPIRATORY (INHALATION)
Status: DISCONTINUED | OUTPATIENT
Start: 2018-01-27 | End: 2018-01-27

## 2018-01-26 RX ORDER — AMLODIPINE BESYLATE 10 MG/1
10 TABLET ORAL DAILY
Status: DISCONTINUED | OUTPATIENT
Start: 2018-01-27 | End: 2018-01-30 | Stop reason: HOSPADM

## 2018-01-26 RX ORDER — LEVOFLOXACIN 5 MG/ML
250 INJECTION, SOLUTION INTRAVENOUS EVERY 24 HOURS
Status: DISCONTINUED | OUTPATIENT
Start: 2018-01-27 | End: 2018-01-27

## 2018-01-26 RX ORDER — ALBUTEROL SULFATE 2.5 MG/3ML
5 SOLUTION RESPIRATORY (INHALATION)
Status: DISCONTINUED | OUTPATIENT
Start: 2018-01-26 | End: 2018-01-27

## 2018-01-26 RX ORDER — METHYLPREDNISOLONE SODIUM SUCCINATE 40 MG/ML
40 INJECTION, POWDER, LYOPHILIZED, FOR SOLUTION INTRAMUSCULAR; INTRAVENOUS EVERY 8 HOURS
Status: DISCONTINUED | OUTPATIENT
Start: 2018-01-27 | End: 2018-01-27

## 2018-01-26 RX ORDER — INSULIN GLARGINE 100 [IU]/ML
35 INJECTION, SOLUTION SUBCUTANEOUS NIGHTLY
Status: DISCONTINUED | OUTPATIENT
Start: 2018-01-27 | End: 2018-01-28

## 2018-01-26 RX ORDER — ATORVASTATIN CALCIUM 80 MG/1
80 TABLET, FILM COATED ORAL DAILY
Status: DISCONTINUED | OUTPATIENT
Start: 2018-01-27 | End: 2018-01-30 | Stop reason: HOSPADM

## 2018-01-26 RX ORDER — CARVEDILOL 25 MG/1
25 TABLET ORAL 2 TIMES DAILY
Status: DISCONTINUED | OUTPATIENT
Start: 2018-01-27 | End: 2018-01-30 | Stop reason: HOSPADM

## 2018-01-26 RX ORDER — MAGNESIUM SULFATE 1 G/100ML
1 INJECTION INTRAVENOUS
Status: DISPENSED | OUTPATIENT
Start: 2018-01-26 | End: 2018-01-26

## 2018-01-26 RX ORDER — ONDANSETRON 2 MG/ML
4 INJECTION INTRAMUSCULAR; INTRAVENOUS EVERY 6 HOURS PRN
Status: DISCONTINUED | OUTPATIENT
Start: 2018-01-26 | End: 2018-01-30 | Stop reason: HOSPADM

## 2018-01-26 RX ORDER — ACETAMINOPHEN 325 MG/1
650 TABLET ORAL EVERY 4 HOURS PRN
Status: DISCONTINUED | OUTPATIENT
Start: 2018-01-26 | End: 2018-01-27 | Stop reason: SDUPTHER

## 2018-01-26 RX ORDER — ASPIRIN 81 MG/1
81 TABLET ORAL DAILY
Status: DISCONTINUED | OUTPATIENT
Start: 2018-01-27 | End: 2018-01-30 | Stop reason: HOSPADM

## 2018-01-26 RX ADMIN — LEVOFLOXACIN 750 MG: 5 INJECTION, SOLUTION INTRAVENOUS at 21:43

## 2018-01-26 RX ADMIN — SODIUM CHLORIDE, PRESERVATIVE FREE 10 ML: 5 INJECTION INTRAVENOUS at 22:32

## 2018-01-26 RX ADMIN — ALBUTEROL SULFATE 5 MG: 5 SOLUTION RESPIRATORY (INHALATION) at 20:46

## 2018-01-26 RX ADMIN — MAGNESIUM SULFATE HEPTAHYDRATE 1 G: 1 INJECTION, SOLUTION INTRAVENOUS at 20:29

## 2018-01-26 RX ADMIN — Medication 4 MILLICURIE: at 22:32

## 2018-01-26 RX ADMIN — IPRATROPIUM BROMIDE 0.5 MG: 0.5 SOLUTION RESPIRATORY (INHALATION) at 20:46

## 2018-01-26 RX ADMIN — Medication 40 MILLICURIE: at 22:07

## 2018-01-26 ASSESSMENT — ENCOUNTER SYMPTOMS
COUGH: 1
SHORTNESS OF BREATH: 1
NAUSEA: 0
RHINORRHEA: 0
ABDOMINAL PAIN: 0
VOMITING: 0
WHEEZING: 1

## 2018-01-27 PROBLEM — J10.1 INFLUENZA A: Status: ACTIVE | Noted: 2018-01-27

## 2018-01-27 PROBLEM — Z86.79 HX OF CAROTID STENOSIS: Status: ACTIVE | Noted: 2018-01-27

## 2018-01-27 PROBLEM — R07.89 ATYPICAL CHEST PAIN: Status: ACTIVE | Noted: 2018-01-27

## 2018-01-27 LAB
ANION GAP SERPL CALCULATED.3IONS-SCNC: 14 MMOL/L (ref 9–17)
BUN BLDV-MCNC: 40 MG/DL (ref 8–23)
BUN/CREAT BLD: ABNORMAL (ref 9–20)
CALCIUM SERPL-MCNC: 8.4 MG/DL (ref 8.6–10.4)
CHLORIDE BLD-SCNC: 102 MMOL/L (ref 98–107)
CO2: 21 MMOL/L (ref 20–31)
CREAT SERPL-MCNC: 1.74 MG/DL (ref 0.7–1.2)
DIRECT EXAM: ABNORMAL
GFR AFRICAN AMERICAN: 48 ML/MIN
GFR NON-AFRICAN AMERICAN: 40 ML/MIN
GFR SERPL CREATININE-BSD FRML MDRD: ABNORMAL ML/MIN/{1.73_M2}
GFR SERPL CREATININE-BSD FRML MDRD: ABNORMAL ML/MIN/{1.73_M2}
GLUCOSE BLD-MCNC: 117 MG/DL (ref 75–110)
GLUCOSE BLD-MCNC: 164 MG/DL (ref 75–110)
GLUCOSE BLD-MCNC: 175 MG/DL (ref 75–110)
GLUCOSE BLD-MCNC: 192 MG/DL (ref 70–99)
GLUCOSE BLD-MCNC: 358 MG/DL (ref 75–110)
GLUCOSE BLD-MCNC: 364 MG/DL (ref 75–110)
HCT VFR BLD CALC: 37.8 % (ref 40.7–50.3)
HEMOGLOBIN: 12.3 G/DL (ref 13–17)
Lab: ABNORMAL
MCH RBC QN AUTO: 30.7 PG (ref 25.2–33.5)
MCHC RBC AUTO-ENTMCNC: 32.5 G/DL (ref 28.4–34.8)
MCV RBC AUTO: 94.3 FL (ref 82.6–102.9)
NRBC AUTOMATED: 0 PER 100 WBC
PDW BLD-RTO: 12.4 % (ref 11.8–14.4)
PLATELET # BLD: 333 K/UL (ref 138–453)
PMV BLD AUTO: 9.8 FL (ref 8.1–13.5)
POTASSIUM SERPL-SCNC: 4.7 MMOL/L (ref 3.7–5.3)
RBC # BLD: 4.01 M/UL (ref 4.21–5.77)
SODIUM BLD-SCNC: 137 MMOL/L (ref 135–144)
SPECIMEN DESCRIPTION: ABNORMAL
STATUS: ABNORMAL
WBC # BLD: 8.7 K/UL (ref 3.5–11.3)

## 2018-01-27 PROCEDURE — 97165 OT EVAL LOW COMPLEX 30 MIN: CPT

## 2018-01-27 PROCEDURE — 36415 COLL VENOUS BLD VENIPUNCTURE: CPT

## 2018-01-27 PROCEDURE — 6370000000 HC RX 637 (ALT 250 FOR IP): Performed by: INTERNAL MEDICINE

## 2018-01-27 PROCEDURE — G8978 MOBILITY CURRENT STATUS: HCPCS

## 2018-01-27 PROCEDURE — 6370000000 HC RX 637 (ALT 250 FOR IP): Performed by: STUDENT IN AN ORGANIZED HEALTH CARE EDUCATION/TRAINING PROGRAM

## 2018-01-27 PROCEDURE — 97530 THERAPEUTIC ACTIVITIES: CPT

## 2018-01-27 PROCEDURE — 80048 BASIC METABOLIC PNL TOTAL CA: CPT

## 2018-01-27 PROCEDURE — 1200000000 HC SEMI PRIVATE

## 2018-01-27 PROCEDURE — 2580000003 HC RX 258: Performed by: STUDENT IN AN ORGANIZED HEALTH CARE EDUCATION/TRAINING PROGRAM

## 2018-01-27 PROCEDURE — G8979 MOBILITY GOAL STATUS: HCPCS

## 2018-01-27 PROCEDURE — 82947 ASSAY GLUCOSE BLOOD QUANT: CPT

## 2018-01-27 PROCEDURE — 94640 AIRWAY INHALATION TREATMENT: CPT

## 2018-01-27 PROCEDURE — 94762 N-INVAS EAR/PLS OXIMTRY CONT: CPT

## 2018-01-27 PROCEDURE — 85027 COMPLETE CBC AUTOMATED: CPT

## 2018-01-27 PROCEDURE — 99222 1ST HOSP IP/OBS MODERATE 55: CPT | Performed by: INTERNAL MEDICINE

## 2018-01-27 PROCEDURE — 6360000002 HC RX W HCPCS: Performed by: STUDENT IN AN ORGANIZED HEALTH CARE EDUCATION/TRAINING PROGRAM

## 2018-01-27 PROCEDURE — G8987 SELF CARE CURRENT STATUS: HCPCS

## 2018-01-27 PROCEDURE — 87804 INFLUENZA ASSAY W/OPTIC: CPT

## 2018-01-27 PROCEDURE — G8988 SELF CARE GOAL STATUS: HCPCS

## 2018-01-27 PROCEDURE — 97162 PT EVAL MOD COMPLEX 30 MIN: CPT

## 2018-01-27 PROCEDURE — 97535 SELF CARE MNGMENT TRAINING: CPT

## 2018-01-27 RX ORDER — IPRATROPIUM BROMIDE AND ALBUTEROL SULFATE 2.5; .5 MG/3ML; MG/3ML
1 SOLUTION RESPIRATORY (INHALATION) 4 TIMES DAILY
Status: DISCONTINUED | OUTPATIENT
Start: 2018-01-27 | End: 2018-01-30 | Stop reason: HOSPADM

## 2018-01-27 RX ORDER — GUAIFENESIN 600 MG/1
600 TABLET, EXTENDED RELEASE ORAL 2 TIMES DAILY
Status: DISCONTINUED | OUTPATIENT
Start: 2018-01-28 | End: 2018-01-30 | Stop reason: HOSPADM

## 2018-01-27 RX ORDER — DEXTROSE MONOHYDRATE 50 MG/ML
100 INJECTION, SOLUTION INTRAVENOUS PRN
Status: CANCELLED | OUTPATIENT
Start: 2018-01-27

## 2018-01-27 RX ORDER — PREDNISONE 20 MG/1
40 TABLET ORAL DAILY
Status: DISCONTINUED | OUTPATIENT
Start: 2018-01-27 | End: 2018-01-30 | Stop reason: HOSPADM

## 2018-01-27 RX ORDER — NICOTINE POLACRILEX 4 MG
15 LOZENGE BUCCAL PRN
Status: CANCELLED | OUTPATIENT
Start: 2018-01-27

## 2018-01-27 RX ORDER — IPRATROPIUM BROMIDE AND ALBUTEROL SULFATE 2.5; .5 MG/3ML; MG/3ML
1 SOLUTION RESPIRATORY (INHALATION) EVERY 4 HOURS PRN
Status: DISCONTINUED | OUTPATIENT
Start: 2018-01-27 | End: 2018-01-30 | Stop reason: HOSPADM

## 2018-01-27 RX ORDER — LEVOFLOXACIN 250 MG/1
250 TABLET ORAL DAILY
Status: DISCONTINUED | OUTPATIENT
Start: 2018-01-27 | End: 2018-01-30 | Stop reason: HOSPADM

## 2018-01-27 RX ORDER — LEVOFLOXACIN 500 MG/1
500 TABLET, FILM COATED ORAL DAILY
Status: DISCONTINUED | OUTPATIENT
Start: 2018-01-27 | End: 2018-01-27

## 2018-01-27 RX ORDER — QUETIAPINE FUMARATE 25 MG/1
50 TABLET, FILM COATED ORAL NIGHTLY
Status: DISCONTINUED | OUTPATIENT
Start: 2018-01-27 | End: 2018-01-30 | Stop reason: HOSPADM

## 2018-01-27 RX ORDER — PRAZOSIN HYDROCHLORIDE 5 MG/1
5 CAPSULE ORAL 2 TIMES DAILY
Status: DISCONTINUED | OUTPATIENT
Start: 2018-01-27 | End: 2018-01-30 | Stop reason: HOSPADM

## 2018-01-27 RX ORDER — DEXTROSE MONOHYDRATE 25 G/50ML
12.5 INJECTION, SOLUTION INTRAVENOUS PRN
Status: CANCELLED | OUTPATIENT
Start: 2018-01-27

## 2018-01-27 RX ORDER — GABAPENTIN 100 MG/1
200 CAPSULE ORAL 3 TIMES DAILY
Status: DISCONTINUED | OUTPATIENT
Start: 2018-01-27 | End: 2018-01-30 | Stop reason: HOSPADM

## 2018-01-27 RX ORDER — LOSARTAN POTASSIUM 50 MG/1
100 TABLET ORAL DAILY
Status: DISCONTINUED | OUTPATIENT
Start: 2018-01-27 | End: 2018-01-30 | Stop reason: HOSPADM

## 2018-01-27 RX ORDER — OSELTAMIVIR PHOSPHATE 30 MG/1
30 CAPSULE ORAL 2 TIMES DAILY
Status: DISCONTINUED | OUTPATIENT
Start: 2018-01-27 | End: 2018-01-30 | Stop reason: HOSPADM

## 2018-01-27 RX ADMIN — PREDNISONE 40 MG: 20 TABLET ORAL at 16:51

## 2018-01-27 RX ADMIN — INSULIN LISPRO 7 UNITS: 100 INJECTION, SOLUTION INTRAVENOUS; SUBCUTANEOUS at 00:49

## 2018-01-27 RX ADMIN — LOSARTAN POTASSIUM 100 MG: 50 TABLET, FILM COATED ORAL at 09:14

## 2018-01-27 RX ADMIN — SODIUM CHLORIDE: 9 INJECTION, SOLUTION INTRAVENOUS at 23:45

## 2018-01-27 RX ADMIN — PRAZOSIN HYDROCHLORIDE 5 MG: 5 CAPSULE ORAL at 09:14

## 2018-01-27 RX ADMIN — CARVEDILOL 25 MG: 25 TABLET, FILM COATED ORAL at 09:15

## 2018-01-27 RX ADMIN — METHYLPREDNISOLONE SODIUM SUCCINATE 40 MG: 40 INJECTION, POWDER, FOR SOLUTION INTRAMUSCULAR; INTRAVENOUS at 09:15

## 2018-01-27 RX ADMIN — GABAPENTIN 200 MG: 100 CAPSULE ORAL at 04:49

## 2018-01-27 RX ADMIN — GABAPENTIN 200 MG: 100 CAPSULE ORAL at 22:05

## 2018-01-27 RX ADMIN — CARVEDILOL 25 MG: 25 TABLET, FILM COATED ORAL at 04:49

## 2018-01-27 RX ADMIN — OSELTAMIVIR PHOSPHATE 30 MG: 30 CAPSULE ORAL at 22:05

## 2018-01-27 RX ADMIN — OSELTAMIVIR PHOSPHATE 30 MG: 30 CAPSULE ORAL at 09:15

## 2018-01-27 RX ADMIN — INSULIN LISPRO 4 UNITS: 100 INJECTION, SOLUTION INTRAVENOUS; SUBCUTANEOUS at 09:01

## 2018-01-27 RX ADMIN — CLOPIDOGREL 75 MG: 75 TABLET, FILM COATED ORAL at 09:14

## 2018-01-27 RX ADMIN — IPRATROPIUM BROMIDE AND ALBUTEROL SULFATE 1 AMPULE: .5; 3 SOLUTION RESPIRATORY (INHALATION) at 15:16

## 2018-01-27 RX ADMIN — SODIUM CHLORIDE: 9 INJECTION, SOLUTION INTRAVENOUS at 04:50

## 2018-01-27 RX ADMIN — MOMETASONE FUROATE AND FORMOTEROL FUMARATE DIHYDRATE 2 PUFF: 100; 5 AEROSOL RESPIRATORY (INHALATION) at 19:10

## 2018-01-27 RX ADMIN — ENOXAPARIN SODIUM 40 MG: 40 INJECTION SUBCUTANEOUS at 09:20

## 2018-01-27 RX ADMIN — ATORVASTATIN CALCIUM 80 MG: 80 TABLET, FILM COATED ORAL at 09:14

## 2018-01-27 RX ADMIN — METHYLPREDNISOLONE SODIUM SUCCINATE 40 MG: 40 INJECTION, POWDER, FOR SOLUTION INTRAMUSCULAR; INTRAVENOUS at 04:49

## 2018-01-27 RX ADMIN — GABAPENTIN 200 MG: 100 CAPSULE ORAL at 09:14

## 2018-01-27 RX ADMIN — QUETIAPINE FUMARATE 25 MG: 25 TABLET ORAL at 22:06

## 2018-01-27 RX ADMIN — LEVOFLOXACIN 250 MG: 250 TABLET, FILM COATED ORAL at 16:51

## 2018-01-27 RX ADMIN — IPRATROPIUM BROMIDE AND ALBUTEROL SULFATE 1 AMPULE: .5; 3 SOLUTION RESPIRATORY (INHALATION) at 00:24

## 2018-01-27 RX ADMIN — INSULIN GLARGINE 35 UNITS: 100 INJECTION, SOLUTION SUBCUTANEOUS at 22:05

## 2018-01-27 RX ADMIN — MOMETASONE FUROATE AND FORMOTEROL FUMARATE DIHYDRATE 2 PUFF: 100; 5 AEROSOL RESPIRATORY (INHALATION) at 07:59

## 2018-01-27 RX ADMIN — ASPIRIN 81 MG: 81 TABLET, COATED ORAL at 09:14

## 2018-01-27 RX ADMIN — INSULIN LISPRO 7 UNITS: 100 INJECTION, SOLUTION INTRAVENOUS; SUBCUTANEOUS at 22:25

## 2018-01-27 RX ADMIN — GABAPENTIN 200 MG: 100 CAPSULE ORAL at 16:51

## 2018-01-27 RX ADMIN — IPRATROPIUM BROMIDE AND ALBUTEROL SULFATE 1 AMPULE: .5; 3 SOLUTION RESPIRATORY (INHALATION) at 05:22

## 2018-01-27 RX ADMIN — PRAZOSIN HYDROCHLORIDE 5 MG: 5 CAPSULE ORAL at 04:49

## 2018-01-27 RX ADMIN — INSULIN GLARGINE 35 UNITS: 100 INJECTION, SOLUTION SUBCUTANEOUS at 00:51

## 2018-01-27 RX ADMIN — INSULIN LISPRO 3 UNITS: 100 INJECTION, SOLUTION INTRAVENOUS; SUBCUTANEOUS at 13:26

## 2018-01-27 RX ADMIN — AMLODIPINE BESYLATE 10 MG: 10 TABLET ORAL at 09:15

## 2018-01-27 RX ADMIN — IPRATROPIUM BROMIDE AND ALBUTEROL SULFATE 1 AMPULE: .5; 3 SOLUTION RESPIRATORY (INHALATION) at 19:10

## 2018-01-27 RX ADMIN — PRAZOSIN HYDROCHLORIDE 5 MG: 5 CAPSULE ORAL at 22:07

## 2018-01-27 RX ADMIN — IPRATROPIUM BROMIDE AND ALBUTEROL SULFATE 1 AMPULE: .5; 3 SOLUTION RESPIRATORY (INHALATION) at 07:59

## 2018-01-27 RX ADMIN — CARVEDILOL 25 MG: 25 TABLET, FILM COATED ORAL at 22:07

## 2018-01-27 NOTE — PROGRESS NOTES
hours.   Cardiac enzymes:  Recent Labs      01/25/18 2011 01/26/18 2005 01/26/18   2155   TROPONINI  0.02  0.00  0.02           ASSESSMENT:      Principal Problem:    COPD exacerbation (HCC)  Active Problems:    Pure hypercholesterolemia    Essential hypertension    DM type 2, uncontrolled, with neuropathy ([facility]    BINU (acute kidney injury) ([facility]    Hx of carotid stenosis    Influenza A    Atypical chest pain          PLAN:    Chest pain likely musculoskeletal    COPD exacerbation  -Pulse ox  -Nasal cannula  -switch to oral Levaquin (renal dosing) and Prednisone   -Dulera, duoneb       Influenza A  -Tamiflu 5 days 30 mg BID, renal dosing    HTN  -Norvasc 10 mg  -Coreg 25 mg  -Prazosin 5 mg BID  -Losartan 100 mg     DM2  -Lantus 35 U nightly  -monitor blood glucose  -insulin sliding scale  -Neurontin for neuropathy     Carotid Stenosis s/p endarterectomy and stent  -ASA/plavix     BINU  -IV fluids  -Monitor BMP      DVT PPX: Lovenox    Reanna Tavera MD, PGY-1  Internal Medicine  [address]  1/27/18  7:45 AM

## 2018-01-27 NOTE — PROGRESS NOTES
on and taking off regular lower body clothing? 1      2      3       4   2. Bathing (including washing, rinsing, drying)? 1      2      3      4   3. Toileting, which includes using toilet, bedpan, or urinal?      1      2        3      4   4. Putting on and taking off regular upper body clothing? 1      2      3       4   5. Taking care of personal grooming such as brushing teeth? 1      2      3      4   6. Eating meals? 1      2       3       4     1. Unable = Total/Dependent Assist  2. A Lot = Maximum/Moderate Assist  3. A Little = Minimum/Contact Guard Assist/Supervision  4.  None= Modified [place]/Independent    Raw Score Scale Score Scale Score Standard Error Approximate Degree of Functional Impairment     6 17.07 3.74 100%   7 20.13 3.68 92%   8 22.86 3.43 86%   9 25.33 3.17 80%   10 27.31 2.96 75%   11 29.04 2.79 70%   12 30.60 2.68 67%   13 32.03 2.62 63%   14 33.39 2.61 60%   15 34.69 2.65 56%   16 35.96 2.71 53%   17 37.26 2.82 50%   18 38.66 2.97 47%   19 40.22 3.20 43%   20 42.03 3.55 38%   21 44.27 4.08 33%   22 47.10 4.81 26%   23 51.12 5.88 16%   24 57.54 7.36 0%       Goals  Short term goals  Time Frame for Short term goals: pt will by discharge  Short term goal 1: demo U/LB ADLs with Sukumar  Short term goal 2: demo increased activity tolerance to 7+ minutes in sitting and standing  Short term goal 3: demo increased standing balance with Sukumar  Short term goal 4: demo good understanding of energy conservation strategies with demo and verbalize of 3 strategies  Patient Goals   Patient goals : be able to play with grandkids without SOB       Therapy Time   Individual Concurrent Group Co-treatment   Time In 1343         Time Out 1356         Minutes 13               Ludy Hernandez OTR/L

## 2018-01-27 NOTE — ED PROVIDER NOTES
[facility]  Emergency Department  Faculty Sign-Out Addendum     Care of Cleopatra Smiley was assumed from previous attending and is being seen for Shortness of Breath (pt had several albuterol treatments at home and could not get any relief ) and Chest Pain (pt stated he has some chest pressure across chest. took a nitro )  . The patient's initial evaluation and plan have been discussed with the prior provider who initially evaluated the patient.       EMERGENCY DEPARTMENT COURSE / MEDICAL DECISION MAKING:       MEDICATIONS GIVEN:  Orders Placed This Encounter   Medications    albuterol (PROVENTIL) nebulizer solution 5 mg    DISCONTD: ipratropium-albuterol (DUONEB) nebulizer solution 1 ampule    albuterol (PROVENTIL) nebulizer solution 5 mg    DISCONTD: albuterol sulfate  (90 Base) MCG/ACT inhaler 2 puff    DISCONTD: albuterol sulfate  (90 Base) MCG/ACT inhaler 2 puff    DISCONTD: ipratropium (ATROVENT HFA) 17 MCG/ACT inhaler 2 puff    ipratropium (ATROVENT) 0.02 % nebulizer solution 0.5 mg    magnesium sulfate 1 g in dextrose 5% 100 mL IVPB    levofloxacin (LEVAQUIN) 750 MG/150ML infusion 750 mg       LABS / RADIOLOGY:     Labs Reviewed   CBC WITH AUTO DIFFERENTIAL - Abnormal; Notable for the following:        Result Value    WBC 13.4 (*)     Hematocrit 39.7 (*)     Seg Neutrophils 91 (*)     Lymphocytes 6 (*)     Eosinophils % 0 (*)     Segs Absolute 12.20 (*)     Absolute Lymph # 0.80 (*)     All other components within normal limits   COMPREHENSIVE METABOLIC PANEL - Abnormal; Notable for the following:     Glucose 141 (*)     BUN 36 (*)     CREATININE 1.85 (*)     Alb 3.3 (*)     GFR Non- 37 (*)     GFR  45 (*)     All other components within normal limits   BRAIN NATRIURETIC PEPTIDE - Abnormal; Notable for the following:     Pro- (*)     All other components within normal limits   CULTURE BLOOD #1   CULTURE BLOOD #1   LACTIC

## 2018-01-27 NOTE — ED NOTES
Bed: 19  Expected date:   Expected time:   Means of arrival:   Comments:  Iván Herrera 892, RN  01/26/18 1944

## 2018-01-27 NOTE — PROGRESS NOTES
stable  Patient Education: PT POC, safe mobility  Barriers to Learning: None  REQUIRES PT FOLLOW UP: Yes  Activity Tolerance  Activity Tolerance:  (SOB with sitting)  PT Equipment Recommendations  Equipment Needed: No     Discharge Recommendations:         Plan   Plan  Times per week: Rechech 2-3x/week  Specific instructions for Next Treatment: Check with patient and ability to participate with PT.  Very SOB with sitting and standing. Current Treatment Recommendations: Functional Mobility Training, Gait Training, Stair training, Transfer Training  Safety Devices  Type of devices: Call light within reach, Left in bed    G-Code  PT G-Codes  Functional Assessment Tool Used: [place] U FOM  Score: 18 = 36%  Functional Limitation: Mobility: Walking and moving around  Mobility: Walking and Moving Around Current Status (): At least 20 percent but less than 40 percent impaired, limited or restricted  Mobility: Walking and Moving Around Goal Status (): At least 1 percent but less than 20 percent impaired, limited or restricted          Goals  Short term goals  Time Frame for Short term goals: 10 visits  Short term goal 1: Bed mob independently  Short term goal 2: Transfer independently  Short term goal 3: Ambulate 150 feet independently  Short term goal 4: Tolerates therex and functional training for at least 30 min  Patient Goals   Patient goals : Once my breathing is under control, I'm fine.        Therapy Time   Individual Concurrent Group Co-treatment   Time In 8522         Time Out 1210         Minutes [address], PT

## 2018-01-27 NOTE — ED NOTES
Report given to Frankie Sandoval on 3B. Advised her that when pt gets done with VQ scan he will be coming up to the floor.       Amos Stevens RN  01/26/18 1421

## 2018-01-27 NOTE — PROGRESS NOTES
Pt states he needs the duoneb as PRN because albuterol alone doesn't help him, this is also a part of his home meds.

## 2018-01-27 NOTE — PLAN OF CARE
Problem: Musculor/Skeletal Functional Status  Goal: Highest potential functional level  Outcome: Ongoing

## 2018-01-27 NOTE — PROGRESS NOTES
Estefania Painting, MARGARETTEPPatient Assessment complete. COPD exacerbation ([facility] [address] [J44.1] . Vitals:    01/26/18 2321   BP: (!) 169/72   Pulse: 108   Resp: 20   Temp: 98.6 °F (37 °C)   SpO2: 98%   . Patients home meds are   Prior to Admission medications    Medication Sig Start Date End Date Taking?  Authorizing Provider   predniSONE (DELTASONE) 10 MG tablet Take 5 tablets by mouth daily for 2 days 1/25/18 1/27/18  Deatra Laly, DO   predniSONE (DELTASONE) 10 MG tablet Take 4 tablets by mouth daily for 2 days 1/28/18 1/30/18  Deatra Laly, DO   predniSONE (DELTASONE) 10 MG tablet Take 3 tablets by mouth daily for 2 days 1/31/18 2/2/18  Deatra Laly, DO   predniSONE (DELTASONE) 10 MG tablet Take 2 tablets by mouth daily for 2 days 2/2/18 2/4/18  Deatra Laly, DO   predniSONE (DELTASONE) 10 MG tablet Take 1 tablet by mouth daily for 2 days 2/6/18 2/8/18  Deatra Laly, DO   Umeclidinium Bromide 62.5 MCG/INH AEPB Inhale 1 Inhaler into the lungs 2 times daily 1/18/18   Zara Lo MD   Dulaglutide (TRULICITY) 1.50 EG/1.7CU SOPN Inject 0.75 mg into the skin once a week DC 1.5 mg 12/11/17   Zara Lo MD   losartan (COZAAR) 100 MG tablet Take 1 tablet by mouth daily 12/11/17   Zara Lo MD   QUEtiapine (SEROQUEL) 50 MG tablet Take 1 tablet by mouth nightly 12/11/17   Zara Lo MD   aspirin EC 81 MG EC tablet Take 1 tablet by mouth daily 12/11/17   Zara Lo MD   ipratropium-albuterol (DUONEB) 0.5-2.5 (3) MG/3ML SOLN nebulizer solution Inhale 3 mLs into the lungs every 4 hours 12/11/17   Zara Lo MD   insulin aspart (NOVOLOG) 100 UNIT/ML injection vial Inject 5 Units into the skin 3 times daily (before meals) Sliding scale 12/11/17   Zara Lo MD   clopidogrel (PLAVIX) 75 MG tablet Take 1 tablet by mouth daily 12/11/17   Zara Lo MD   albuterol-ipratropium (COMBIVENT RESPIMAT)  MCG/ACT AERS inhaler Inhale 1 puff into the lungs Values                        FEV1 Predicted Normal Values          Age                                     Height in Feet and Inches       Age                                     Height in Feet and Inches       4' 11\" 5' 1\" 5' 3\" 5' 5\" 5' 7\" 5' 9\" 5' 11\" 6' 1\"  4' 11\" 5' 1\" 5' 3\" 5' 5\" 5' 7\" 5' 9\" 5' 11\" 6' 1\"   42 - 45 2.49 2.66 2.84 3.03 3.22 3.42 3.62 3.83 42 - 45 2.82 3.03 3.26 3.49 3.72 3.96 4.22 4.47   46 - 49 2.40 2.57 2.76 2.94 3.14 3.33 3.54 3.75 46 - 49 2.70 2.92 3.14 3.37 3.61 3.85 4.10 4.36   50 - 53 2.31 2.48 2.66 2.85 3.04 3.24 3.45 3.66 50 - 53 2.58 2.80 3.02 3.25 3.49 3.73 3.98 4.24   54 - 57 2.21 2.38 2.57 2.75 2.95 3.14 3.35 3.56 54 - 57 2.46 2.67 2.89 3.12 3.36 3.60 3.85 4.11   58 - 61 2.10 2.28 2.46 2.65 2.84 3.04 3.24 3.45 58 - 61 2.32 2.54 2.76 2.99 3.23 3.47 3.72 3.98   62 - 65 1.99 2.17 2.35 2.54 2.73 2.93 3.13 3.34 62 - 65 2.19 2.40 2.62 2.85 3.09 3.33 3.58 3.84   66 - 69 1.88 2.05 2.23 2.42 2.61 2.81 3.02 3.23 66 - 69 2.04 2.26 2.48 2.71 2.95 3.19 3.44 3.70   70+ 1.82 1.99 2.17 2.36 2.55 2.75 2.95 3.16 70+ 1.97 2.19 2.41 2.64 2.87 3.12 3.37 3.62             Predicted Peak Expiratory Flow Rate                                       Height (in)  Female       Height (in) Male           Age 64 61 17 28 09 61 78 74 Age            35 183 068 549 469 304 262 258 247  29 65 82 01 20 87 52 96 76   25 337 352 366 381 396 411 426 441 25 447 476 505 533 [phone] 677   30 329 344 359 374 389 404 419 434 30 437 466 494 523 552 580 609 638 667   35 322 337 351 366 381 396 411 426 35 426 455 484 512 541 570 598 627 657   40 314 329 344 359 374 389 404 419 40 416 445 473 502 531 559 588 617 647   45 307 322 336 351 366 381 396 411 45 405 434 463 491 520 549 577 606 636   50 299 314 329 344 359 374 389 404 50 395 424 452 481 510 538 567 596 625   55 292 307 321 336 351 366 381 396 55 384 413 442 470 499 528 556 585 615   60 284 299 314 329 344 359 374 389 60 374 403 431 460 489 517 546 575 605   65

## 2018-01-27 NOTE — ED PROVIDER NOTES
and no guarding. Musculoskeletal: Normal range of motion. He exhibits no edema or tenderness. No peripheral edema, erythema, asymmetry. Neurological: He is alert and oriented to person, place, and time. Skin: Skin is warm and dry. No rash noted. He is not diaphoretic. No erythema. Psychiatric: He has a normal mood and affect. His behavior is normal.   Nursing note and vitals reviewed. DIFFERENTIAL  DIAGNOSIS     PLAN (LABS / IMAGING / EKG):  Orders Placed This Encounter   Procedures    Culture Blood #1    Culture Blood #1    XR CHEST STANDARD (2 VW)    NM LUNG VENT/PERFUSION (VQ)    CBC Auto Differential    Comprehensive Metabolic Panel    Lactic Acid, Whole Blood    Magnesium    Protime-INR    APTT    Brain Natriuretic Peptide    Inpatient consult to Internal Medicine    Initiate ED Aerosol Protocol    Respiratory care evaluation only    HHN Treatment    POCT troponin    POCT troponin    POCT troponin    EKG 12 Lead    Insert peripheral IV    PATIENT STATUS (FROM ED OR OR/PROCEDURAL) Inpatient       MEDICATIONS ORDERED:  Orders Placed This Encounter   Medications    albuterol (PROVENTIL) nebulizer solution 5 mg    ipratropium-albuterol (DUONEB) nebulizer solution 1 ampule    albuterol (PROVENTIL) nebulizer solution 5 mg    albuterol sulfate  (90 Base) MCG/ACT inhaler 2 puff    AND Linked Order Group     albuterol sulfate  (90 Base) MCG/ACT inhaler 2 puff     ipratropium (ATROVENT HFA) 17 MCG/ACT inhaler 2 puff    ipratropium (ATROVENT) 0.02 % nebulizer solution 0.5 mg    magnesium sulfate 1 g in dextrose 5% 100 mL IVPB       DDX: COPD exacerbation, pneumonia, pulmonary embolism, CHF    DIAGNOSTIC RESULTS / EMERGENCY DEPARTMENT COURSE / MDM     LABS:  Results for orders placed or performed during the hospital encounter of 01/26/18   Culture Blood #1   Result Value Ref Range    Specimen Description . BLOOD     Special Requests LT FA 8ML     Culture NO GROWTH [id number] [address] [phone]    Status Pending    Culture Blood #1   Result Value Ref Range    Specimen Description . BLOOD     Special Requests RT FA 7ML     Culture NO GROWTH 2 HOURS     Culture       [facility] [address] [address] [phone]    Status Pending    CBC Auto Differential   Result Value Ref Range    WBC 13.4 (H) 3.5 - 11.3 k/uL    RBC 4.40 4.21 - 5.77 m/uL    Hemoglobin 13.5 13.0 - 17.0 g/dL    Hematocrit 39.7 (L) 40.7 - 50.3 %    MCV 90.2 82.6 - 102.9 fL    MCH 30.7 25.2 - 33.5 pg    MCHC 34.0 28.4 - 34.8 g/dL    RDW 12.3 11.8 - 14.4 %    Platelets 362 665 - 389 k/uL    MPV 9.6 8.1 - 13.5 fL    NRBC Automated 0.0 0.0 per 100 WBC    Differential Type NOT REPORTED     WBC Morphology NOT REPORTED     RBC Morphology NOT REPORTED     Platelet Estimate NOT REPORTED     Immature Granulocytes 0 0 %    Seg Neutrophils 91 (H) 36 - 66 %    Lymphocytes 6 (L) 24 - 44 %    Monocytes 3 1 - 7 %    Eosinophils % 0 (L) 1 - 4 %    Basophils 0 0 - 2 %    Absolute Immature Granulocyte 0.00 0.00 - 0.30 k/uL    Segs Absolute 12.20 (H) 1.8 - 7.7 k/uL    Absolute Lymph # 0.80 (L) 1.0 - 4.8 k/uL    Absolute Mono # 0.40 0.1 - 0.8 k/uL    Absolute Eos # 0.00 0.0 - 0.4 k/uL    Basophils # 0.00 0.0 - 0.2 k/uL    Morphology Normal    Comprehensive Metabolic Panel   Result Value Ref Range    Glucose 141 (H) 70 - 99 mg/dL    BUN 36 (H) 8 - 23 mg/dL    CREATININE 1.85 (H) 0.70 - 1.20 mg/dL    Bun/Cre Ratio NOT REPORTED 9 - 20    Calcium 8.7 8.6 - 10.4 mg/dL    Sodium 136 135 - 144 mmol/L    Potassium 4.3 3.7 - 5.3 mmol/L    Chloride 98 98 - 107 mmol/L    CO2 24 20 - 31 mmol/L    Anion Gap 14 9 - 17 mmol/L    Alkaline Phosphatase 94 40 - 129 U/L    ALT 25 5 - 41 U/L    AST 29 <40 U/L    Total Bilirubin 0.40 0.3 - 1.2 mg/dL    Total Protein 6.4 6.4 - 8.3 g/dL    Alb 3.3 (L) 3.5 - 5.2 g/dL    Albumin/Globulin Ratio 1.1 1.0 - 2.5    GFR Non-

## 2018-01-27 NOTE — H&P
Hyperlipidemia     Lung, cysts, congenital     Neuropathy (HCC)     PTSD (post-traumatic stress disorder)     TIA (transient ischemic attack)     Type II or unspecified type diabetes mellitus without mention of complication, not stated as uncontrolled        Past Surgical History:        Procedure Laterality Date    CAROTID ENDARTERECTOMY Left 7-2013    COLONOSCOPY      HERNIA REPAIR      WI COLSC FLX W/RMVL OF TUMOR POLYP LESION SNARE TQ N/A 6/27/2017    COLONOSCOPY POLYPECTOMY SNARE/ hot performed by Shine Galicia DO at [facility] VASCULAR SURGERY Left 2015    carotid stent, dr Garza [facility]: Allergies   Allergen Reactions    Lisinopril      cough    Ace Inhibitors      coughing    Pcn [Penicillins]          Home Meds:   Prior to Admission medications    Medication Sig Start Date End Date Taking?  Authorizing Provider   predniSONE (DELTASONE) 10 MG tablet Take 5 tablets by mouth daily for 2 days 1/25/18 1/27/18  Veronica Rochemarito, DO   predniSONE (DELTASONE) 10 MG tablet Take 4 tablets by mouth daily for 2 days 1/28/18 1/30/18  Veronica Rocher, DO   predniSONE (DELTASONE) 10 MG tablet Take 3 tablets by mouth daily for 2 days 1/31/18 2/2/18  Veronica Rocher, DO   predniSONE (DELTASONE) 10 MG tablet Take 2 tablets by mouth daily for 2 days 2/2/18 2/4/18  Veronica Rocher, DO   predniSONE (DELTASONE) 10 MG tablet Take 1 tablet by mouth daily for 2 days 2/6/18 2/8/18  Veronica Rocher, DO   Umeclidinium Bromide 62.5 MCG/INH AEPB Inhale 1 Inhaler into the lungs 2 times daily 1/18/18   Zara Norris MD   Dulaglutide (TRULICITY) 2.04 KS/3.9TI SOPN Inject 0.75 mg into the skin once a week DC 1.5 mg 12/11/17   Zara Norris MD   losartan (COZAAR) 100 MG tablet Take 1 tablet by mouth daily 12/11/17   Zara Norris MD   QUEtiapine (SEROQUEL) 50 MG tablet Take 1 tablet by mouth nightly 12/11/17   Zara Norris MD   aspirin EC 81 MG EC tablet Take 1 tablet by mouth daily 12/11/17   Zara Lo MD   ipratropium-albuterol (DUONEB) 0.5-2.5 (3) MG/3ML SOLN nebulizer solution Inhale 3 mLs into the lungs every 4 hours 12/11/17   Zara Lo MD   insulin aspart (NOVOLOG) 100 UNIT/ML injection vial Inject 5 Units into the skin 3 times daily (before meals) Sliding scale 12/11/17   Zara Lo MD   clopidogrel (PLAVIX) 75 MG tablet Take 1 tablet by mouth daily 12/11/17   Zara Lo MD   albuterol-ipratropium (COMBIVENT RESPIMAT)  MCG/ACT AERS inhaler Inhale 1 puff into the lungs every 6 hours 12/11/17   Zara Lo MD   QUEtiapine (SEROQUEL) 50 MG tablet Take 1 tablet by mouth nightly 12/11/17   Zara Lo MD   amLODIPine (NORVASC) 10 MG tablet Take 1 tablet by mouth daily 12/11/17   Zara Lo MD   carvedilol (COREG) 25 MG tablet Take 1 tablet by mouth 2 times daily 12/11/17   Zara Lo MD   fluticasone-vilanterol (BREO ELLIPTA) 100-25 MCG/INH AEPB inhaler Inhale 1 puff into the lungs daily 12/11/17   Zara Lo MD   atorvastatin (LIPITOR) 80 MG tablet Take 1 tablet by mouth daily 12/11/17   Zara Lo MD   nitroGLYCERIN (NITROSTAT) 0.4 MG SL tablet up to max of 3 total doses.  If no relief after 1 dose, call 911. 12/11/17   Zara Lo MD   prazosin (MINIPRESS) 5 MG capsule Take 1 capsule by mouth 2 times daily DC previous script 12/11/17   Zara Lo MD   gabapentin (NEURONTIN) 100 MG capsule TAKE 2 CAPSULES BY MOUTH 3 TIMES DAILY 12/4/17   Zara Lo MD   insulin glargine (LANTUS) 100 UNIT/ML injection vial Inject 35 Units into the skin nightly 11/26/17   Zara Lo MD   glucose blood VI test strips (ASCENSIA AUTODISC VI;ONE TOUCH ULTRA TEST VI) strip Dx: DM-2, use 4 -5 times daily, ok to give a substitute 4/7/17 4/2/18  Zara Best MD   INSULIN SYRINGE .5CC/28G (INS SYRINGE/NEEDLE .5CC/28G) 28G X 1/2\" 0.5 ML MISC 1 Syringe by Does not apply route 4 times mass index is 25.71 kg/m². PHYSICAL EXAMINATION:  Vitals:    01/26/18 2131 01/26/18 2321 01/26/18 2356 01/27/18 0513   BP: (!) 156/108 (!) 169/72  (!) 151/63   Pulse: 120 108  87   Resp: 16 20 22   Temp:  98.6 °F (37 °C)  98.3 °F (36.8 °C)   TempSrc:  Oral  Oral   SpO2: 92% 98%  94%   Weight:  160 lb (72.6 kg)     Height:  5' 6\" (1.676 m) 5' 6.14\" (1.68 m)      Constitutional: alert, oriented, cooperative and in no apparent distress. Head:normocephalic and atraumatic. EENT:  AUBREY. No conjunctival injections. Neck: Supple without thyromegaly. No elevated JVP. Trachea was midline. Respiratory: Inspiratory and expiratory wheezes bilaterally    Cardiovascular: Regular without murmur, clicks, gallops or rubs. Abdomen: Slightly rounded and soft without organomegaly. No rebound, rigidity or guarding was appreciated. Lymphatic: No lymphadenopathy. Musculoskeletal: No gross muscle weakness. Extremities:  No lower extremity edema, ulcerations, tenderness, varicosities or erythema. Skin:  Warm and dry. Good color, turgor and pigmentation. No lesions or scars. No cyanosis or clubbing  Neurological/Psychiatric: The patient's general behavior, level of consciousness, thought content and emotional status is normal.            Laboratory findings:-    CBC:   Recent Labs      01/27/18   0647   WBC  8.7   HGB  12.3*   PLT  333     BMP:    Recent Labs      01/25/18   1840  01/26/18 2007 01/27/18   0647   NA  138  136  137   K  4.7  4.3  4.7   CL  102  98  102   CO2  23  24  21   BUN  20  36*  40*   CREATININE  1.38*  1.85*  1.74*   GLUCOSE  141*  141*  192*     S. Calcium:  Recent Labs      01/27/18   0647   CALCIUM  8.4*     S. Ionized Calcium:No results for input(s): IONCA in the last 72 hours. S. Magnesium:  Recent Labs      01/26/18 2007   MG  1.9     S. Phosphorus:No results for input(s): PHOS in the last 72 hours.   S. Glucose:  Recent Labs      01/27/18   0021  01/27/18   0438   POCGLU  364* 175*     Glycosylated hemoglobin A1C: No results for input(s): LABA1C in the last 72 hours. INR:   Recent Labs      01/26/18 2007   INR  1.0     Hepatic functions:   Recent Labs      01/26/18 2007   ALKPHOS  94   ALT  25   AST  29   PROT  6.4   BILITOT  0.40   LABALBU  3.3*     Pancreatic functions:No results for input(s): LACTA, AMYLASE in the last 72 hours. S. Lactic Acid: No results for input(s): LACTA in the last 72 hours. Cardiac enzymes:  Recent Labs      01/25/18 2011 01/26/18 2005 01/26/18   2155   TROPONINI  0.02  0.00  0.02     BNP:No results for input(s): BNP in the last 72 hours. Lipid profile: No results for input(s): CHOL, TRIG, HDL, LDLCALC in the last 72 hours.     Invalid input(s): LDL  Blood Gases: No results found for: PH, PCO2, PO2, HCO3, O2SAT  Thyroid functions:   Lab Results   Component Value Date    TSH 2.10 06/10/2013        Radiological findings:-         Assessment and Plan       Assessment:    Principal Problem:    COPD exacerbation ([facility] [address]  Active Problems:    Pure hypercholesterolemia    Essential hypertension    DM type 2, uncontrolled, with neuropathy ([facility] [address]    BINU (acute kidney injury) ([facility] [address]    Hx of carotid stenosis    Influenza A    Atypical chest pain      Plan:    Chest pain likely musculoskeletal    COPD exacerbation  -Pulse ox  -Nasal cannula  -IV prednisone 40 mg Q8H  -DUlera, duoneb  -IV levafloxacin, renal dosing     HTN  -Norvasc 10 mg  -Coreg 25 mg  -Prazosin 5 mg BID  -Losartan 100 mg    DM2  -Lantus 35 U nightly  -monitor blood glucose  -insulin sliding scale  -Neurontin for neuropathy    Carotid Stenosis s/p endarterectomy and stent  -ASA/plavix    BINU  -IV fluids  -Monitor BMP      DVT PPX: Sylviax    Bobbi Zapien MD, PGY-1  Internal Medicine  [address]  1/27/18  7:45 AM

## 2018-01-27 NOTE — PLAN OF CARE
Problem: Gas Exchange - Impaired:  Goal: Levels of oxygenation will improve  Levels of oxygenation will improve   Outcome: Ongoing

## 2018-01-28 ENCOUNTER — APPOINTMENT (OUTPATIENT)
Dept: CT IMAGING | Age: 63
DRG: 194 | End: 2018-01-28
Payer: COMMERCIAL

## 2018-01-28 LAB
ANION GAP SERPL CALCULATED.3IONS-SCNC: 7 MMOL/L (ref 9–17)
BUN BLDV-MCNC: 32 MG/DL (ref 8–23)
BUN/CREAT BLD: ABNORMAL (ref 9–20)
CALCIUM SERPL-MCNC: 8.3 MG/DL (ref 8.6–10.4)
CHLORIDE BLD-SCNC: 106 MMOL/L (ref 98–107)
CO2: 23 MMOL/L (ref 20–31)
CREAT SERPL-MCNC: 1.42 MG/DL (ref 0.7–1.2)
GFR AFRICAN AMERICAN: >60 ML/MIN
GFR NON-AFRICAN AMERICAN: 51 ML/MIN
GFR SERPL CREATININE-BSD FRML MDRD: ABNORMAL ML/MIN/{1.73_M2}
GFR SERPL CREATININE-BSD FRML MDRD: ABNORMAL ML/MIN/{1.73_M2}
GLUCOSE BLD-MCNC: 109 MG/DL (ref 75–110)
GLUCOSE BLD-MCNC: 186 MG/DL (ref 75–110)
GLUCOSE BLD-MCNC: 189 MG/DL (ref 75–110)
GLUCOSE BLD-MCNC: 260 MG/DL (ref 75–110)
GLUCOSE BLD-MCNC: 276 MG/DL (ref 70–99)
GLUCOSE BLD-MCNC: 290 MG/DL (ref 75–110)
POTASSIUM SERPL-SCNC: 4.9 MMOL/L (ref 3.7–5.3)
SODIUM BLD-SCNC: 136 MMOL/L (ref 135–144)

## 2018-01-28 PROCEDURE — 6370000000 HC RX 637 (ALT 250 FOR IP): Performed by: STUDENT IN AN ORGANIZED HEALTH CARE EDUCATION/TRAINING PROGRAM

## 2018-01-28 PROCEDURE — 1200000000 HC SEMI PRIVATE

## 2018-01-28 PROCEDURE — 71260 CT THORAX DX C+: CPT

## 2018-01-28 PROCEDURE — 82947 ASSAY GLUCOSE BLOOD QUANT: CPT

## 2018-01-28 PROCEDURE — 94640 AIRWAY INHALATION TREATMENT: CPT

## 2018-01-28 PROCEDURE — 80048 BASIC METABOLIC PNL TOTAL CA: CPT

## 2018-01-28 PROCEDURE — 36415 COLL VENOUS BLD VENIPUNCTURE: CPT

## 2018-01-28 PROCEDURE — 6360000002 HC RX W HCPCS: Performed by: STUDENT IN AN ORGANIZED HEALTH CARE EDUCATION/TRAINING PROGRAM

## 2018-01-28 PROCEDURE — 6360000004 HC RX CONTRAST MEDICATION: Performed by: HOSPITALIST

## 2018-01-28 PROCEDURE — 6370000000 HC RX 637 (ALT 250 FOR IP): Performed by: HOSPITALIST

## 2018-01-28 PROCEDURE — 6370000000 HC RX 637 (ALT 250 FOR IP): Performed by: INTERNAL MEDICINE

## 2018-01-28 PROCEDURE — 94762 N-INVAS EAR/PLS OXIMTRY CONT: CPT

## 2018-01-28 PROCEDURE — 99233 SBSQ HOSP IP/OBS HIGH 50: CPT | Performed by: INTERNAL MEDICINE

## 2018-01-28 RX ORDER — DEXTROSE MONOHYDRATE 25 G/50ML
12.5 INJECTION, SOLUTION INTRAVENOUS PRN
Status: DISCONTINUED | OUTPATIENT
Start: 2018-01-28 | End: 2018-01-30 | Stop reason: HOSPADM

## 2018-01-28 RX ORDER — INSULIN GLARGINE 100 [IU]/ML
50 INJECTION, SOLUTION SUBCUTANEOUS NIGHTLY
Status: DISCONTINUED | OUTPATIENT
Start: 2018-01-28 | End: 2018-01-29

## 2018-01-28 RX ORDER — NICOTINE POLACRILEX 4 MG
15 LOZENGE BUCCAL PRN
Status: DISCONTINUED | OUTPATIENT
Start: 2018-01-28 | End: 2018-01-30 | Stop reason: HOSPADM

## 2018-01-28 RX ORDER — DEXTROSE MONOHYDRATE 50 MG/ML
100 INJECTION, SOLUTION INTRAVENOUS PRN
Status: DISCONTINUED | OUTPATIENT
Start: 2018-01-28 | End: 2018-01-30 | Stop reason: HOSPADM

## 2018-01-28 RX ADMIN — INSULIN GLARGINE 40 UNITS: 100 INJECTION, SOLUTION SUBCUTANEOUS at 22:03

## 2018-01-28 RX ADMIN — CARVEDILOL 25 MG: 25 TABLET, FILM COATED ORAL at 08:51

## 2018-01-28 RX ADMIN — GABAPENTIN 200 MG: 100 CAPSULE ORAL at 22:01

## 2018-01-28 RX ADMIN — QUETIAPINE FUMARATE 25 MG: 25 TABLET ORAL at 22:01

## 2018-01-28 RX ADMIN — INSULIN LISPRO 8 UNITS: 100 INJECTION, SOLUTION INTRAVENOUS; SUBCUTANEOUS at 07:30

## 2018-01-28 RX ADMIN — INSULIN LISPRO 3 UNITS: 100 INJECTION, SOLUTION INTRAVENOUS; SUBCUTANEOUS at 12:07

## 2018-01-28 RX ADMIN — IPRATROPIUM BROMIDE AND ALBUTEROL SULFATE 1 AMPULE: .5; 3 SOLUTION RESPIRATORY (INHALATION) at 07:00

## 2018-01-28 RX ADMIN — LEVOFLOXACIN 250 MG: 250 TABLET, FILM COATED ORAL at 08:51

## 2018-01-28 RX ADMIN — MOMETASONE FUROATE AND FORMOTEROL FUMARATE DIHYDRATE 2 PUFF: 100; 5 AEROSOL RESPIRATORY (INHALATION) at 20:12

## 2018-01-28 RX ADMIN — PRAZOSIN HYDROCHLORIDE 5 MG: 5 CAPSULE ORAL at 22:02

## 2018-01-28 RX ADMIN — OSELTAMIVIR PHOSPHATE 30 MG: 30 CAPSULE ORAL at 22:02

## 2018-01-28 RX ADMIN — IPRATROPIUM BROMIDE AND ALBUTEROL SULFATE 1 AMPULE: .5; 3 SOLUTION RESPIRATORY (INHALATION) at 20:12

## 2018-01-28 RX ADMIN — INSULIN LISPRO 6 UNITS: 100 INJECTION, SOLUTION INTRAVENOUS; SUBCUTANEOUS at 22:03

## 2018-01-28 RX ADMIN — MOMETASONE FUROATE AND FORMOTEROL FUMARATE DIHYDRATE 2 PUFF: 100; 5 AEROSOL RESPIRATORY (INHALATION) at 07:56

## 2018-01-28 RX ADMIN — CARVEDILOL 25 MG: 25 TABLET, FILM COATED ORAL at 22:02

## 2018-01-28 RX ADMIN — GUAIFENESIN 600 MG: 600 TABLET, EXTENDED RELEASE ORAL at 08:51

## 2018-01-28 RX ADMIN — ASPIRIN 81 MG: 81 TABLET, COATED ORAL at 08:51

## 2018-01-28 RX ADMIN — IOPAMIDOL 75 ML: 755 INJECTION, SOLUTION INTRAVENOUS at 11:02

## 2018-01-28 RX ADMIN — LOSARTAN POTASSIUM 100 MG: 50 TABLET, FILM COATED ORAL at 08:51

## 2018-01-28 RX ADMIN — ATORVASTATIN CALCIUM 80 MG: 80 TABLET, FILM COATED ORAL at 08:51

## 2018-01-28 RX ADMIN — OSELTAMIVIR PHOSPHATE 30 MG: 30 CAPSULE ORAL at 08:51

## 2018-01-28 RX ADMIN — IPRATROPIUM BROMIDE AND ALBUTEROL SULFATE 1 AMPULE: .5; 3 SOLUTION RESPIRATORY (INHALATION) at 15:31

## 2018-01-28 RX ADMIN — PRAZOSIN HYDROCHLORIDE 5 MG: 5 CAPSULE ORAL at 08:51

## 2018-01-28 RX ADMIN — GABAPENTIN 200 MG: 100 CAPSULE ORAL at 14:11

## 2018-01-28 RX ADMIN — INSULIN LISPRO 4 UNITS: 100 INJECTION, SOLUTION INTRAVENOUS; SUBCUTANEOUS at 17:54

## 2018-01-28 RX ADMIN — GUAIFENESIN 600 MG: 600 TABLET, EXTENDED RELEASE ORAL at 02:40

## 2018-01-28 RX ADMIN — ENOXAPARIN SODIUM 40 MG: 40 INJECTION SUBCUTANEOUS at 08:51

## 2018-01-28 RX ADMIN — IPRATROPIUM BROMIDE AND ALBUTEROL SULFATE 1 AMPULE: .5; 3 SOLUTION RESPIRATORY (INHALATION) at 11:38

## 2018-01-28 RX ADMIN — GABAPENTIN 200 MG: 100 CAPSULE ORAL at 08:51

## 2018-01-28 RX ADMIN — PREDNISONE 40 MG: 20 TABLET ORAL at 08:51

## 2018-01-28 RX ADMIN — CLOPIDOGREL 75 MG: 75 TABLET, FILM COATED ORAL at 08:51

## 2018-01-28 RX ADMIN — AMLODIPINE BESYLATE 10 MG: 10 TABLET ORAL at 08:51

## 2018-01-28 RX ADMIN — GUAIFENESIN 600 MG: 600 TABLET, EXTENDED RELEASE ORAL at 22:01

## 2018-01-28 NOTE — PROGRESS NOTES
[facility] exacerb[facility] ([facility] [address] [J44.1] . Vitals:    01/28/18 0811   BP: (!) 172/81   Pulse: 60   Resp: 20   Temp: 98 °F (36.7 °C)   SpO2: 97%   . Patients home meds are   Prior to Admission medications    Medication Sig Start Date End Date Taking?  Authorizing Provider   predniSONE (DELTASONE) 10 MG tablet Take 4 tablets by mouth daily for 2 days 1/28/18 1/30/18  Reji Albe, DO   predniSONE (DELTASONE) 10 MG tablet Take 3 tablets by mouth daily for 2 days 1/31/18 2/2/18  Reji Albe, DO   predniSONE (DELTASONE) 10 MG tablet Take 2 tablets by mouth daily for 2 days 2/2/18 2/4/18  Reji Albe, DO   predniSONE (DELTASONE) 10 MG tablet Take 1 tablet by mouth daily for 2 days 2/6/18 2/8/18  Reji Albe, DO   Umeclidinium Bromide 62.5 MCG/INH AEPB Inhale 1 Inhaler into the lungs 2 times daily 1/18/18   Zara Thakur MD   Dulaglutide (TRULICITY) 5.73 YH/9.5OL SOPN Inject 0.75 mg into the skin once a week DC 1.5 mg 12/11/17   Zara Thakur MD   losartan (COZAAR) 100 MG tablet Take 1 tablet by mouth daily 12/11/17   Zara Thakur MD   QUEtiapine (SEROQUEL) 50 MG tablet Take 1 tablet by mouth nightly 12/11/17   Zara Thakur MD   aspirin EC 81 MG EC tablet Take 1 tablet by mouth daily 12/11/17   Zara Thakur MD   ipratropium-albuterol (DUONEB) 0.5-2.5 (3) MG/3ML SOLN nebulizer solution Inhale 3 mLs into the lungs every 4 hours 12/11/17   Zara Thakur MD   insulin aspart (NOVOLOG) 100 UNIT/ML injection vial Inject 5 Units into the skin 3 times daily (before meals) Sliding scale 12/11/17   Zara Thakur MD   clopidogrel (PLAVIX) 75 MG tablet Take 1 tablet by mouth daily 12/11/17   Zara Thakur MD   albuterol-ipratropium (COMBIVENT RESPIMAT)  MCG/ACT AERS inhaler Inhale 1 puff into the lungs every 6 hours 12/11/17   Zara Thakur MD   QUEtiapine (SEROQUEL) 50 MG tablet Take 1 tablet by mouth nightly 12/11/17 Zara Henry MD   amLODIPine (NORVASC) 10 MG tablet Take 1 tablet by mouth daily 12/11/17   Zara Henry MD   carvedilol (COREG) 25 MG tablet Take 1 tablet by mouth 2 times daily 12/11/17   Zara Henry MD   fluticasone-vilanterol (BREO ELLIPTA) 100-25 MCG/INH AEPB inhaler Inhale 1 puff into the lungs daily 12/11/17   Zara Henry MD   atorvastatin (LIPITOR) 80 MG tablet Take 1 tablet by mouth daily 12/11/17   Zara Henry MD   nitroGLYCERIN (NITROSTAT) 0.4 MG SL tablet up to max of 3 total doses.  If no relief after 1 dose, call 911. 12/11/17   Zara Henry MD   prazosin (MINIPRESS) 5 MG capsule Take 1 capsule by mouth 2 times daily DC previous script 12/11/17   Zara Henry MD   gabapentin (NEURONTIN) 100 MG capsule TAKE 2 CAPSULES BY MOUTH 3 TIMES DAILY 12/4/17   Zara Henry MD   insulin glargine (LANTUS) 100 UNIT/ML injection vial Inject 35 Units into the skin nightly 11/26/17   Zara Henry MD   glucose blood VI test strips (ASCENSIA AUTODISC VI;ONE TOUCH ULTRA TEST VI) strip Dx: DM-2, use 4 -5 times daily, ok to give a substitute 4/7/17 4/2/18  Zara Henry MD   INSULIN SYRINGE .5CC/28G (INS SYRINGE/NEEDLE .5CC/28G) 28G X 1/2\" 0.5 ML MISC 1 Syringe by Does not apply route 4 times daily 4/7/17   Zara Henry MD   .  Recent Surgical History: None = 0     Assessment     Peak Flow (asthma only)    Predicted: 0  Personal Best: 0  PEF 0  % Predicted 0  Peak Flow : not applicable = 0    BUF6/WOR    FEV1 Predicted 0      FEV1 0    FEV1 % Predicted 0  FVC 0  IS volume 0  IBW 0    RR 20  Breath Sounds: diminished      · Bronchodilator assessment at level  3  · Hyperinflation assessment at level   · Secretion Management assessment at level    ·   · [x]    Bronchodilator Assessment  BRONCHODILATOR ASSESSMENT SCORE  Score 0 1 2 3 4 5   Breath Sounds   []  Patient Baseline []  No Wheeze good aeration []  Faint, scattered wheezing, good aeration [x]  Expiratory Wheezing and or moderately diminished []  Insp/Exp wheeze and/or very diminished []  Insp/Exp and/ or marked distress   Respiratory Rate   []  Patient Baseline []  Less than 20 []  Less than 20 [x]  20-25 []  Greater than 25 []  Greater than 25   Peak flow % of Pred or PB [x]  NA   []  Greater than 90%  []  81-90% []  71-80% []  Less than or equal to 70%  or unable to perform []  Unable due to Respiratory Distress   Dyspnea re [x]  Patient Baseline []  No SOB []  No SOB []  SOB on exertion []  SOB min activity []  At rest/acute   e FEV% Predicted       []  NA []  Above 69%  []  Unable []  Above 60-69%  []  Unable []  Above 50-59%  []  Unable []  Above 35-49%  []  Unable []  Less than 35%  []  Unable                 []  Hyperinflation Assessment  Score 1 2 3   CXR and Breath Sounds   []  Clear []  No atelectasis  Basilar aeration []  Atelectasis or absent basilar breath sounds   Incentive Spirometry Volume  (Per IBW)   []  Greater than or equal to 15ml/Kg []  less than 15ml/Kg []  less than 15ml/Kg   Surgery within last 2 weeks []  None or general   []  Abdominal or thoracic surgery  []  Abdominal or thoracic   Chronic Pulmonary Historyre []  No []  Yes []  Yes     []  Secretion Management Assessment  Score 1 2 3   Bilateral Breath Sounds   []  Occasional Rhonchi []  Scattered Rhonchi []  Course Rhonchi and/or poor aeration   Sputum    []  Small amount of thin secretions []  Moderate amount of viscous secretions []  Copius, Viscious Yellow/ Secretions   CXR as reported by physician []  clear  []  Unavailable []  Infiltrates and/or consolidation  []  Unavailable []  Mucus Plugging and or lobar consolidation  []  Unavailable   Cough []  Strong, productive cough []  Weak productive cough []  No cough or weak non-productive cough   SYL VILLAVICENCIO  11:44 AM                            FEMALE                                  MALE                            FEV1 Predicted Normal Values                        FEV1 Predicted Normal

## 2018-01-28 NOTE — PROGRESS NOTES
negative  Cardiovascular ROS:  Completed and except as mentioned above were negative  Gastrointestinal ROS: Completed and except as mentioned above were negative  Genito-Urinary ROS:  Completed and except as mentioned above were negative  Musculoskeletal ROS:  Completed and except as mentioned above were negative  Neurological ROS:  Completed and except as mentioned above were negative  Dermatological ROS:  Completed and except as mentioned above were negative      OBJECTIVE:    Vitals: BP (!) 159/69   Pulse 72   Temp 97.8 °F (36.6 °C) (Oral)   Resp 20   Ht 5' 6.14\" (1.68 m)   Wt 160 lb (72.6 kg)   SpO2 98%   BMI 25.71 kg/m²     Last Body weight:   Wt Readings from Last 3 Encounters:   01/26/18 160 lb (72.6 kg)   01/25/18 160 lb (72.6 kg)   12/11/17 155 lb (70.3 kg)       Intake and Output summary: No intake or output data in the 24 hours ending 01/28/18 0753      PHYSICAL EXAMINATION:  Vitals:    01/27/18 1132 01/27/18 1543 01/27/18 2200 01/28/18 0230   BP: 133/76 (!) 157/82 (!) 164/81 (!) 159/69   Pulse: 75 80 83 72   Resp: 20 18 20 20   Temp: 98.6 °F (37 °C) 98.4 °F (36.9 °C) 98.1 °F (36.7 °C) 97.8 °F (36.6 °C)   TempSrc: Oral Oral Oral Oral   SpO2: 97% 99% 98% 98%   Weight:       Height:         Constitutional: alert, oriented, cooperative and in no apparent distress. Head:normocephalic and atraumatic. EENT:  AUBREY. No conjunctival injections. Neck: Supple without thyromegaly. No elevated JVP. Trachea was midline. Respiratory: Inspiratory and expiratory wheezes bilaterally    Cardiovascular: Regular without murmur, clicks, gallops or rubs. Abdomen: Slightly rounded and soft without organomegaly. No rebound, rigidity or guarding was appreciated. Lymphatic: No lymphadenopathy. Musculoskeletal: No gross muscle weakness. Extremities:  No lower extremity edema, ulcerations, tenderness, varicosities or erythema. Skin:  Warm and dry. Good color, turgor and pigmentation. No lesions or scars. No cyanosis or clubbing  Neurological/Psychiatric: The patient's general behavior, level of consciousness, thought content and emotional status is normal.            Medications:    Scheduled Meds:   ipratropium-albuterol  1 ampule Inhalation 4x daily    prazosin  5 mg Oral BID    QUEtiapine  50 mg Oral Nightly    gabapentin  200 mg Oral TID    losartan  100 mg Oral Daily    oseltamivir  30 mg Oral BID    predniSONE  40 mg Oral Daily    levofloxacin  250 mg Oral Daily    guaiFENesin  600 mg Oral BID    sodium chloride flush  10 mL Intravenous 2 times per day    aspirin EC  81 mg Oral Daily    atorvastatin  80 mg Oral Daily    clopidogrel  75 mg Oral Daily    sodium chloride flush  10 mL Intravenous 2 times per day    enoxaparin  40 mg Subcutaneous Daily    mometasone-formoterol  2 puff Inhalation BID    insulin lispro  0-18 Units Subcutaneous TID WC    insulin lispro  0-9 Units Subcutaneous Nightly    amLODIPine  10 mg Oral Daily    carvedilol  25 mg Oral BID    insulin glargine  35 Units Subcutaneous Nightly     Continuous Infusions:   sodium chloride 125 mL/hr at 01/27/18 2345     PRN Medications: ipratropium-albuterol, sodium chloride flush, sodium chloride flush, magnesium hydroxide, ondansetron, acetaminophen, sodium chloride flush    Diet: DIET CARB CONTROL;    Laboratory findings:    CBC:   Recent Labs      01/25/18   1840  01/26/18 2007 01/27/18   0647   WBC  10.1  13.4*  8.7   HGB  13.7  13.5  12.3*   HCT  40.7  39.7*  37.8*   PLT  351  328  333       BMP:   Recent Labs      01/25/18   1840  01/26/18 2007 01/27/18   0647   NA  138  136  137   K  4.7  4.3  4.7   CL  102  98  102   CO2  23  24  21   BUN  20  36*  40*   CREATININE  1.38*  1.85*  1.74*   GLUCOSE  141*  141*  192*     Hepatic functions:   Recent Labs      01/26/18 2007   AST  29   ALT  25   BILITOT  0.40   ALKPHOS  94     INR:   Recent Labs      01/26/18 2007   INR  1.0     S.  Lactic Acid: No results for input(s): LACTA in the last 72 hours. Cardiac enzymes:  Recent Labs      01/25/18 2011 01/26/18 2005 01/26/18   2155   TROPONINI  0.02  0.00  0.02           ASSESSMENT:      Principal Problem:    COPD exacerbation (HCC)  Active Problems:    Pure hypercholesterolemia    Essential hypertension    DM type 2, uncontrolled, with neuropathy ([facility] [address]    BINU (acute kidney injury) ([facility] [address]    Hx of carotid stenosis    Influenza A    Atypical chest pain          PLAN:    Chest pain likely musculoskeletal    COPD exacerbation  -Pulse ox  -Nasal cannula  -continue DuoNeb, dulera and oral steroid  - H/o bronchogenic cyst. Will hydrate and do CY scan in AM        Influenza A  -Tamiflu 5 days 30 mg BID, renal dosing    HTN  -Norvasc 10 mg  -Coreg 25 mg  -Losartan 100 mg     DM2  -Lantus 35 U nightly  -monitor blood glucose  -insulin sliding scale  -Neurontin for neuropathy     Carotid Stenosis s/p endarterectomy and stent  -ASA/plavix     BINU  -IV fluids  -Monitor BMP      DVT PPX: Lovenox    ANticipate discharge tomorrow     Yina Walter, PGY-2, Internal Medicine Residency Resident.   [address] [facility]

## 2018-01-28 NOTE — PROGRESS NOTES
[phone]   [phone]   75 [phone] [phone] 75 [phone] [phone]   80 [phone] 296 312 [phone]

## 2018-01-28 NOTE — PROGRESS NOTES
Pt. Checked his own BS with his own glucometer. His [id number]. He was covered accordingly. Pt. Insists on covering his BS according to what he thinks works best for him.

## 2018-01-29 LAB
ANION GAP SERPL CALCULATED.3IONS-SCNC: 10 MMOL/L (ref 9–17)
BUN BLDV-MCNC: 26 MG/DL (ref 8–23)
BUN/CREAT BLD: ABNORMAL (ref 9–20)
CALCIUM SERPL-MCNC: 8.1 MG/DL (ref 8.6–10.4)
CHLORIDE BLD-SCNC: 110 MMOL/L (ref 98–107)
CO2: 22 MMOL/L (ref 20–31)
CREAT SERPL-MCNC: 1.32 MG/DL (ref 0.7–1.2)
GFR AFRICAN AMERICAN: >60 ML/MIN
GFR NON-AFRICAN AMERICAN: 55 ML/MIN
GFR SERPL CREATININE-BSD FRML MDRD: ABNORMAL ML/MIN/{1.73_M2}
GFR SERPL CREATININE-BSD FRML MDRD: ABNORMAL ML/MIN/{1.73_M2}
GLUCOSE BLD-MCNC: 119 MG/DL (ref 75–110)
GLUCOSE BLD-MCNC: 124 MG/DL (ref 70–99)
GLUCOSE BLD-MCNC: 125 MG/DL (ref 75–110)
GLUCOSE BLD-MCNC: 160 MG/DL (ref 75–110)
GLUCOSE BLD-MCNC: 301 MG/DL (ref 75–110)
GLUCOSE BLD-MCNC: 66 MG/DL (ref 75–110)
POTASSIUM SERPL-SCNC: 4.3 MMOL/L (ref 3.7–5.3)
SODIUM BLD-SCNC: 142 MMOL/L (ref 135–144)

## 2018-01-29 PROCEDURE — 6360000002 HC RX W HCPCS: Performed by: STUDENT IN AN ORGANIZED HEALTH CARE EDUCATION/TRAINING PROGRAM

## 2018-01-29 PROCEDURE — 97116 GAIT TRAINING THERAPY: CPT

## 2018-01-29 PROCEDURE — 94762 N-INVAS EAR/PLS OXIMTRY CONT: CPT

## 2018-01-29 PROCEDURE — 82947 ASSAY GLUCOSE BLOOD QUANT: CPT

## 2018-01-29 PROCEDURE — 36415 COLL VENOUS BLD VENIPUNCTURE: CPT

## 2018-01-29 PROCEDURE — 6370000000 HC RX 637 (ALT 250 FOR IP): Performed by: STUDENT IN AN ORGANIZED HEALTH CARE EDUCATION/TRAINING PROGRAM

## 2018-01-29 PROCEDURE — 80048 BASIC METABOLIC PNL TOTAL CA: CPT

## 2018-01-29 PROCEDURE — 6370000000 HC RX 637 (ALT 250 FOR IP): Performed by: INTERNAL MEDICINE

## 2018-01-29 PROCEDURE — 97110 THERAPEUTIC EXERCISES: CPT

## 2018-01-29 PROCEDURE — 99233 SBSQ HOSP IP/OBS HIGH 50: CPT | Performed by: INTERNAL MEDICINE

## 2018-01-29 PROCEDURE — 76937 US GUIDE VASCULAR ACCESS: CPT

## 2018-01-29 PROCEDURE — 94640 AIRWAY INHALATION TREATMENT: CPT

## 2018-01-29 PROCEDURE — 1200000000 HC SEMI PRIVATE

## 2018-01-29 RX ORDER — INSULIN GLARGINE 100 [IU]/ML
40 INJECTION, SOLUTION SUBCUTANEOUS NIGHTLY
Status: DISCONTINUED | OUTPATIENT
Start: 2018-01-29 | End: 2018-01-30 | Stop reason: HOSPADM

## 2018-01-29 RX ORDER — HYDRALAZINE HYDROCHLORIDE 20 MG/ML
10 INJECTION INTRAMUSCULAR; INTRAVENOUS EVERY 6 HOURS PRN
Status: DISCONTINUED | OUTPATIENT
Start: 2018-01-29 | End: 2018-01-30 | Stop reason: HOSPADM

## 2018-01-29 RX ADMIN — ASPIRIN 81 MG: 81 TABLET, COATED ORAL at 09:05

## 2018-01-29 RX ADMIN — ENOXAPARIN SODIUM 40 MG: 40 INJECTION SUBCUTANEOUS at 09:05

## 2018-01-29 RX ADMIN — GABAPENTIN 200 MG: 100 CAPSULE ORAL at 09:05

## 2018-01-29 RX ADMIN — IPRATROPIUM BROMIDE AND ALBUTEROL SULFATE 1 AMPULE: .5; 3 SOLUTION RESPIRATORY (INHALATION) at 20:20

## 2018-01-29 RX ADMIN — INSULIN GLARGINE 40 UNITS: 100 INJECTION, SOLUTION SUBCUTANEOUS at 21:38

## 2018-01-29 RX ADMIN — GUAIFENESIN 600 MG: 600 TABLET, EXTENDED RELEASE ORAL at 19:58

## 2018-01-29 RX ADMIN — GUAIFENESIN 600 MG: 600 TABLET, EXTENDED RELEASE ORAL at 09:05

## 2018-01-29 RX ADMIN — IPRATROPIUM BROMIDE AND ALBUTEROL SULFATE 1 AMPULE: .5; 3 SOLUTION RESPIRATORY (INHALATION) at 16:32

## 2018-01-29 RX ADMIN — IPRATROPIUM BROMIDE AND ALBUTEROL SULFATE 1 AMPULE: .5; 3 SOLUTION RESPIRATORY (INHALATION) at 12:00

## 2018-01-29 RX ADMIN — MOMETASONE FUROATE AND FORMOTEROL FUMARATE DIHYDRATE 2 PUFF: 100; 5 AEROSOL RESPIRATORY (INHALATION) at 08:02

## 2018-01-29 RX ADMIN — QUETIAPINE FUMARATE 25 MG: 25 TABLET ORAL at 21:43

## 2018-01-29 RX ADMIN — PRAZOSIN HYDROCHLORIDE 5 MG: 5 CAPSULE ORAL at 19:58

## 2018-01-29 RX ADMIN — INSULIN LISPRO 6 UNITS: 100 INJECTION, SOLUTION INTRAVENOUS; SUBCUTANEOUS at 21:42

## 2018-01-29 RX ADMIN — GABAPENTIN 200 MG: 100 CAPSULE ORAL at 21:43

## 2018-01-29 RX ADMIN — PRAZOSIN HYDROCHLORIDE 5 MG: 5 CAPSULE ORAL at 09:05

## 2018-01-29 RX ADMIN — HYDRALAZINE HYDROCHLORIDE 10 MG: 20 INJECTION INTRAMUSCULAR; INTRAVENOUS at 02:08

## 2018-01-29 RX ADMIN — OSELTAMIVIR PHOSPHATE 30 MG: 30 CAPSULE ORAL at 09:05

## 2018-01-29 RX ADMIN — LEVOFLOXACIN 250 MG: 250 TABLET, FILM COATED ORAL at 09:05

## 2018-01-29 RX ADMIN — OSELTAMIVIR PHOSPHATE 30 MG: 30 CAPSULE ORAL at 19:59

## 2018-01-29 RX ADMIN — MOMETASONE FUROATE AND FORMOTEROL FUMARATE DIHYDRATE 2 PUFF: 100; 5 AEROSOL RESPIRATORY (INHALATION) at 20:20

## 2018-01-29 RX ADMIN — PREDNISONE 40 MG: 20 TABLET ORAL at 09:05

## 2018-01-29 RX ADMIN — CLOPIDOGREL 75 MG: 75 TABLET, FILM COATED ORAL at 09:05

## 2018-01-29 RX ADMIN — CARVEDILOL 25 MG: 25 TABLET, FILM COATED ORAL at 09:05

## 2018-01-29 RX ADMIN — AMLODIPINE BESYLATE 10 MG: 10 TABLET ORAL at 09:05

## 2018-01-29 RX ADMIN — CARVEDILOL 25 MG: 25 TABLET, FILM COATED ORAL at 19:59

## 2018-01-29 RX ADMIN — GABAPENTIN 200 MG: 100 CAPSULE ORAL at 15:14

## 2018-01-29 RX ADMIN — LOSARTAN POTASSIUM 100 MG: 50 TABLET, FILM COATED ORAL at 09:05

## 2018-01-29 RX ADMIN — ATORVASTATIN CALCIUM 80 MG: 80 TABLET, FILM COATED ORAL at 09:05

## 2018-01-29 RX ADMIN — IPRATROPIUM BROMIDE AND ALBUTEROL SULFATE 1 AMPULE: .5; 3 SOLUTION RESPIRATORY (INHALATION) at 06:52

## 2018-01-29 ASSESSMENT — PAIN SCALES - WONG BAKER: WONGBAKER_NUMERICALRESPONSE: 2

## 2018-01-29 ASSESSMENT — PAIN DESCRIPTION - FREQUENCY: FREQUENCY: INTERMITTENT

## 2018-01-29 ASSESSMENT — PAIN DESCRIPTION - LOCATION: LOCATION: RIB CAGE

## 2018-01-29 ASSESSMENT — PAIN DESCRIPTION - PAIN TYPE: TYPE: ACUTE PAIN

## 2018-01-29 ASSESSMENT — PAIN DESCRIPTION - ONSET: ONSET: ON-GOING

## 2018-01-29 ASSESSMENT — PAIN DESCRIPTION - PROGRESSION: CLINICAL_PROGRESSION: NOT CHANGED

## 2018-01-29 ASSESSMENT — PAIN DESCRIPTION - DESCRIPTORS: DESCRIPTORS: DISCOMFORT

## 2018-01-29 NOTE — PROGRESS NOTES
Internal Medicine - Daily Progress Note      Date and time: 1/29/2018 9:09 AM  Patient's name:  Hollie Hawkins  Medical Record Number: [id number]  Patient's account/billing number: [de-identified]  Patient's YOB: 1955  Age: 59 y.o. Date of Admission: 1/26/2018  7:44 PM      Primary Care Physician: Zara Thakur MD  Attending Physician:    Code Status: Full Code    Chief complaint: SOB, productive cough, chest tightness    Problem List:   Patient Active Problem List   Diagnosis    Cigarette nicotine dependence without complication    Carpal tunnel syndrome on right    Colon polyps    Carotid stenosis, left s/p Endarterectomy    Mixed simple and mucopurulent chronic bronchitis ([address]    Pure hypercholesterolemia    CAD in native artery, nonobstructive    PTSD (post-traumatic stress disorder)    Transient cerebral ischemia    Essential hypertension    DM type 2, uncontrolled, with neuropathy ([address]    COPD exacerbation ([address]    BINU (acute kidney injury) [facility] [facility]    Hx of carotid stenosis    Influenza A    Atypical chest pain       Allergies: Allergies   Allergen Reactions    Lisinopril      cough    Ace Inhibitors      coughing    Pcn [Penicillins]           SUBJECTIVE:     Interval History : Patient seen and examined at bedside. No acute events overnight. Patient still having cough and SOB and claims he cannot get from his bed to bathroom without getting SOB. Still has expiratory wheezing and increased respiratory effort. Blood sugars better controlled this morning, patient got 40 U Lantus last night. Patient's lungs sound better on exam, wheezing has improved since admission but patient would be unable to take care of himself at home since he cooks and cleans for himself. Will discharge arturo.        Review of Systems -   General ROS: Completed and except as mentioned above were negative   Psychological ROS:  Completed and except as mentioned above were negative  Allergy and Immunology ROS:  Completed and except as mentioned above were negative  Hematological and Lymphatic ROS:  Completed and except as mentioned above were negative  Respiratory ROS:  Completed and except as mentioned above were negative  Cardiovascular ROS:  Completed and except as mentioned above were negative  Gastrointestinal ROS: Completed and except as mentioned above were negative  Genito-Urinary ROS:  Completed and except as mentioned above were negative  Musculoskeletal ROS:  Completed and except as mentioned above were negative  Neurological ROS:  Completed and except as mentioned above were negative  Dermatological ROS:  Completed and except as mentioned above were negative      OBJECTIVE:    Vitals: BP (!) 168/75   Pulse 65   Temp 98.3 °F (36.8 °C) (Oral)   Resp 18   Ht 5' 6.14\" (1.68 m)   Wt 160 lb (72.6 kg)   SpO2 98%   BMI 25.71 kg/m²     Last Body weight:   Wt Readings from Last 3 Encounters:   01/26/18 160 lb (72.6 kg)   01/25/18 160 lb (72.6 kg)   12/11/17 155 lb (70.3 kg)       Intake and Output summary:     Intake/Output Summary (Last 24 hours) at 01/29/18 0909  Last data filed at 01/29/18 0600   Gross per 24 hour   Intake             2650 ml   Output                0 ml   Net             2650 ml         PHYSICAL EXAMINATION:  Vitals:    01/28/18 2000 01/29/18 0045 01/29/18 0047 01/29/18 0050   BP: (!) 196/87 (!) 185/80 (!) 182/75 (!) 168/75   Pulse: 77 67 68 65   Resp: 22 18     Temp: 98 °F (36.7 °C) 98.3 °F (36.8 °C)     TempSrc: Oral Oral     SpO2: 96% 98%     Weight:       Height:         Constitutional: alert, oriented, cooperative and in no apparent distress. Head:normocephalic and atraumatic. EENT:  AUBREY. No conjunctival injections. Neck: Supple without thyromegaly. No elevated JVP. Trachea was midline. Respiratory: Inspiratory and expiratory wheezes bilaterally    Cardiovascular: Regular without murmur, clicks, gallops or rubs. Abdomen: Slightly rounded and soft without organomegaly.  No 4.9  4.3   CL  102  106  110*   CO2  21  23  22   BUN  40*  32*  26*   CREATININE  1.74*  1.42*  1.32*   GLUCOSE  192*  276*  124*     Hepatic functions:   Recent Labs      01/26/18 2007   AST  29   ALT  25   BILITOT  0.40   ALKPHOS  94     INR:   Recent Labs      01/26/18 2007   INR  1.0     S. Lactic Acid: No results for input(s): LACTA in the last 72 hours. Cardiac enzymes:  Recent Labs      01/26/18 2005 01/26/18   2155   TROPONINI  0.00  0.02           ASSESSMENT:      Principal Problem:    COPD exacerbation ([facility] [address]  Active Problems:    Pure hypercholesterolemia    Essential hypertension    DM type 2, uncontrolled, with neuropathy ([facility] [address]    BINU (acute kidney injury) ([facility] [address]    Hx of carotid stenosis    Influenza A    Atypical chest pain          PLAN:    Chest pain likely musculoskeletal    COPD exacerbation  -Pulse ox  -Nasal cannula  -continue DuoNeb, dulera and oral steroid  - H/o bronchogenic cyst, CT shows no increase in size since 2011  -Levaquin day 3  -PO Prednisone 40 mg day 3       Influenza A  -Tamiflu 5 days 30 mg BID, renal dosing, day 3    HTN  -Norvasc 10 mg  -Coreg 25 mg  -Losartan 100 mg     DM2  -Lantus 40 U nightly  -monitor blood glucose  -insulin sliding scale  -Neurontin for neuropathy     Carotid Stenosis s/p endarterectomy and stent  -ASA/plavix     BINU  -IV fluids  -Cr improving      DVT PPX: Lovenox    D/C tomorrow.     Cathy Ann MD, PGY-1  Internal Medicine  [facility]  1/29/18  9:12 AM

## 2018-01-30 ENCOUNTER — CARE COORDINATION (OUTPATIENT)
Dept: CARE COORDINATION | Age: 63
End: 2018-01-30

## 2018-01-30 VITALS
WEIGHT: 160 LBS | DIASTOLIC BLOOD PRESSURE: 85 MMHG | RESPIRATION RATE: 20 BRPM | TEMPERATURE: 98.1 F | OXYGEN SATURATION: 96 % | BODY MASS INDEX: 25.71 KG/M2 | SYSTOLIC BLOOD PRESSURE: 183 MMHG | HEIGHT: 66 IN | HEART RATE: 77 BPM

## 2018-01-30 LAB
BUN BLDV-MCNC: 25 MG/DL (ref 8–23)
CREAT SERPL-MCNC: 1.27 MG/DL (ref 0.7–1.2)
GFR AFRICAN AMERICAN: >60 ML/MIN
GFR NON-AFRICAN AMERICAN: 57 ML/MIN
GFR SERPL CREATININE-BSD FRML MDRD: ABNORMAL ML/MIN/{1.73_M2}
GFR SERPL CREATININE-BSD FRML MDRD: ABNORMAL ML/MIN/{1.73_M2}
GLUCOSE BLD-MCNC: 151 MG/DL (ref 75–110)
GLUCOSE BLD-MCNC: 214 MG/DL (ref 75–110)
GLUCOSE BLD-MCNC: 58 MG/DL (ref 75–110)

## 2018-01-30 PROCEDURE — 6360000002 HC RX W HCPCS: Performed by: STUDENT IN AN ORGANIZED HEALTH CARE EDUCATION/TRAINING PROGRAM

## 2018-01-30 PROCEDURE — 36415 COLL VENOUS BLD VENIPUNCTURE: CPT

## 2018-01-30 PROCEDURE — 6370000000 HC RX 637 (ALT 250 FOR IP): Performed by: STUDENT IN AN ORGANIZED HEALTH CARE EDUCATION/TRAINING PROGRAM

## 2018-01-30 PROCEDURE — 6370000000 HC RX 637 (ALT 250 FOR IP): Performed by: INTERNAL MEDICINE

## 2018-01-30 PROCEDURE — 82947 ASSAY GLUCOSE BLOOD QUANT: CPT

## 2018-01-30 PROCEDURE — 82565 ASSAY OF CREATININE: CPT

## 2018-01-30 PROCEDURE — 84520 ASSAY OF UREA NITROGEN: CPT

## 2018-01-30 PROCEDURE — 99232 SBSQ HOSP IP/OBS MODERATE 35: CPT | Performed by: INTERNAL MEDICINE

## 2018-01-30 PROCEDURE — 94640 AIRWAY INHALATION TREATMENT: CPT

## 2018-01-30 RX ORDER — OSELTAMIVIR PHOSPHATE 30 MG/1
30 CAPSULE ORAL 2 TIMES DAILY
Qty: 2 CAPSULE | Refills: 0 | Status: SHIPPED | OUTPATIENT
Start: 2018-01-30 | End: 2018-01-31

## 2018-01-30 RX ORDER — LEVOFLOXACIN 250 MG/1
500 TABLET ORAL DAILY
Qty: 2 TABLET | Refills: 0 | Status: SHIPPED | OUTPATIENT
Start: 2018-01-31 | End: 2018-02-01

## 2018-01-30 RX ORDER — PREDNISONE 20 MG/1
40 TABLET ORAL DAILY
Qty: 2 TABLET | Refills: 0 | Status: SHIPPED | OUTPATIENT
Start: 2018-01-31 | End: 2018-02-01

## 2018-01-30 RX ADMIN — ENOXAPARIN SODIUM 40 MG: 40 INJECTION SUBCUTANEOUS at 09:16

## 2018-01-30 RX ADMIN — IPRATROPIUM BROMIDE AND ALBUTEROL SULFATE 1 AMPULE: .5; 3 SOLUTION RESPIRATORY (INHALATION) at 12:06

## 2018-01-30 RX ADMIN — PREDNISONE 40 MG: 20 TABLET ORAL at 09:14

## 2018-01-30 RX ADMIN — AMLODIPINE BESYLATE 10 MG: 10 TABLET ORAL at 09:16

## 2018-01-30 RX ADMIN — MOMETASONE FUROATE AND FORMOTEROL FUMARATE DIHYDRATE 2 PUFF: 100; 5 AEROSOL RESPIRATORY (INHALATION) at 07:41

## 2018-01-30 RX ADMIN — CARVEDILOL 25 MG: 25 TABLET, FILM COATED ORAL at 09:17

## 2018-01-30 RX ADMIN — LOSARTAN POTASSIUM 100 MG: 50 TABLET, FILM COATED ORAL at 09:14

## 2018-01-30 RX ADMIN — IPRATROPIUM BROMIDE AND ALBUTEROL SULFATE 1 AMPULE: .5; 3 SOLUTION RESPIRATORY (INHALATION) at 05:31

## 2018-01-30 RX ADMIN — PRAZOSIN HYDROCHLORIDE 5 MG: 5 CAPSULE ORAL at 09:16

## 2018-01-30 RX ADMIN — CLOPIDOGREL 75 MG: 75 TABLET, FILM COATED ORAL at 09:14

## 2018-01-30 RX ADMIN — GUAIFENESIN 600 MG: 600 TABLET, EXTENDED RELEASE ORAL at 09:14

## 2018-01-30 RX ADMIN — ATORVASTATIN CALCIUM 80 MG: 80 TABLET, FILM COATED ORAL at 09:14

## 2018-01-30 RX ADMIN — GABAPENTIN 200 MG: 100 CAPSULE ORAL at 09:14

## 2018-01-30 RX ADMIN — ASPIRIN 81 MG: 81 TABLET, COATED ORAL at 09:14

## 2018-01-30 RX ADMIN — LEVOFLOXACIN 250 MG: 250 TABLET, FILM COATED ORAL at 09:14

## 2018-01-30 RX ADMIN — OSELTAMIVIR PHOSPHATE 30 MG: 30 CAPSULE ORAL at 09:14

## 2018-01-30 NOTE — CARE COORDINATION
Discharge [id number] [facility]  Clinical Case Management Department  Written by:  Cruz RN    Patient Name: Tanner Burnett  Attending Provider: Nick Garcia DO  Admit Date: 2018  7:44 PM  MRN: [id number]  Account: [de-identified]                     : 1955  Discharge Date: 18      Disposition: home independently    Cruz RN

## 2018-01-30 NOTE — PLAN OF CARE
Problem: Falls - Risk of:  Goal: Will remain free from falls  Will remain free from falls   Outcome: Met This Shift      Problem: Pain:  Goal: Pain level will decrease  Pain level will decrease   Outcome: Met This Shift

## 2018-01-31 ENCOUNTER — CARE COORDINATION (OUTPATIENT)
Dept: CASE MANAGEMENT | Age: 63
End: 2018-01-31

## 2018-01-31 DIAGNOSIS — J44.1 COPD EXACERBATION (HCC): Primary | ICD-10-CM

## 2018-02-01 LAB
CULTURE: NORMAL
Lab: NORMAL
Lab: NORMAL
SPECIMEN DESCRIPTION: NORMAL
SPECIMEN DESCRIPTION: NORMAL
STATUS: NORMAL
STATUS: NORMAL

## 2018-02-02 ENCOUNTER — CARE COORDINATION (OUTPATIENT)
Dept: CASE MANAGEMENT | Age: 63
End: 2018-02-02

## 2018-02-07 ENCOUNTER — OFFICE VISIT (OUTPATIENT)
Dept: INTERNAL MEDICINE | Age: 63
End: 2018-02-07
Payer: COMMERCIAL

## 2018-02-07 VITALS
BODY MASS INDEX: 27.32 KG/M2 | DIASTOLIC BLOOD PRESSURE: 60 MMHG | SYSTOLIC BLOOD PRESSURE: 145 MMHG | TEMPERATURE: 98.1 F | WEIGHT: 170 LBS | HEART RATE: 74 BPM | OXYGEN SATURATION: 97 % | HEIGHT: 66 IN

## 2018-02-07 DIAGNOSIS — J41.8 MIXED SIMPLE AND MUCOPURULENT CHRONIC BRONCHITIS (HCC): ICD-10-CM

## 2018-02-07 DIAGNOSIS — E11.42 DIABETIC POLYNEUROPATHY ASSOCIATED WITH TYPE 2 DIABETES MELLITUS (HCC): ICD-10-CM

## 2018-02-07 DIAGNOSIS — M79.89 RIGHT LEG SWELLING: Primary | ICD-10-CM

## 2018-02-07 DIAGNOSIS — J44.9 COPD WITH ASTHMA (HCC): ICD-10-CM

## 2018-02-07 PROBLEM — N17.9 AKI (ACUTE KIDNEY INJURY) (HCC): Status: RESOLVED | Noted: 2018-01-26 | Resolved: 2018-02-07

## 2018-02-07 PROBLEM — R07.89 ATYPICAL CHEST PAIN: Status: RESOLVED | Noted: 2018-01-27 | Resolved: 2018-02-07

## 2018-02-07 PROBLEM — J44.1 COPD EXACERBATION (HCC): Status: RESOLVED | Noted: 2018-01-26 | Resolved: 2018-02-07

## 2018-02-07 PROBLEM — J10.1 INFLUENZA A: Status: RESOLVED | Noted: 2018-01-27 | Resolved: 2018-02-07

## 2018-02-07 PROCEDURE — 1111F DSCHRG MED/CURRENT MED MERGE: CPT | Performed by: INTERNAL MEDICINE

## 2018-02-07 PROCEDURE — 99495 TRANSJ CARE MGMT MOD F2F 14D: CPT | Performed by: INTERNAL MEDICINE

## 2018-02-07 RX ORDER — LANCETS 30 GAUGE
EACH MISCELLANEOUS
Qty: 100 EACH | Refills: 11 | Status: SHIPPED | OUTPATIENT
Start: 2018-02-07 | End: 2019-03-25 | Stop reason: SDUPTHER

## 2018-02-07 RX ORDER — SYRINGE AND NEEDLE,INSULIN,1ML
SYRINGE, EMPTY DISPOSABLE MISCELLANEOUS
COMMUNITY
End: 2018-02-07 | Stop reason: SDUPTHER

## 2018-02-07 RX ORDER — SYRINGE AND NEEDLE,INSULIN,1ML
SYRINGE, EMPTY DISPOSABLE MISCELLANEOUS
Qty: 100 EACH | Refills: 11 | Status: SHIPPED | OUTPATIENT
Start: 2018-02-07 | End: 2019-07-10

## 2018-02-07 RX ORDER — FLUTICASONE FUROATE AND VILANTEROL 100; 25 UG/1; UG/1
1 POWDER RESPIRATORY (INHALATION) DAILY
Qty: 1 EACH | Refills: 2 | Status: SHIPPED | OUTPATIENT
Start: 2018-02-07 | End: 2018-07-09 | Stop reason: SDUPTHER

## 2018-02-07 NOTE — PROGRESS NOTES
Post-Discharge Transitional Care Management Services      Lizzette Tim   YOB: 1955    Date of Office Visit:  2/7/2018  Date of Hospital Admission: 1/26/18  Date of Hospital Discharge: 1/30/18  [facility] Risk Score [risk of hospital readmission >=10  medium risk (chance of readmission ~ 12%) >14  high risk (chance of readmission ~18%)]:Risk Score: 26.75    Care management risk score Rising risk (score 2-5) and Complex Care (Scores >=6): 15       Patient Active Problem List   Diagnosis    Cigarette nicotine dependence without complication    Carpal tunnel syndrome on right    Colon polyps    Carotid stenosis, left s/p Endarterectomy    Mixed simple and mucopurulent chronic bronchitis ([facility]    Pure hypercholesterolemia    CAD in native artery, nonobstructive    PTSD (post-traumatic stress disorder)    Transient cerebral ischemia    Essential hypertension    DM type 2, uncontrolled, with neuropathy ([facility] [address]    Hx of carotid stenosis       Allergies   Allergen Reactions    Lisinopril      cough    Ace Inhibitors      coughing    Pcn [Penicillins]        Medications listed as ordered at the time of discharge from hospital   [facility]   Home Medication Instructions CVV:[id number]    Printed on:02/07/18 1400   Medication Information                      albuterol-ipratropium (COMBIVENT RESPIMAT)  MCG/ACT AERS inhaler  Inhale 1 puff into the lungs every 6 hours             amLODIPine (NORVASC) 10 MG tablet  Take 1 tablet by mouth daily             aspirin EC 81 MG EC tablet  Take 1 tablet by mouth daily             atorvastatin (LIPITOR) 80 MG tablet  Take 1 tablet by mouth daily             carvedilol (COREG) 25 MG tablet  Take 1 tablet by mouth 2 times daily             clopidogrel (PLAVIX) 75 MG tablet  Take 1 tablet by mouth daily             Dulaglutide (TRULICITY) 5.85 IZ/1.3WQ SOPN  Inject 0.75 mg into the skin once a week DC 1.5 mg             fluticasone-vilanterol (Breann Diego) 18 MCG inhalation capsule Inhale 1 capsule into the lungs daily 30 capsule 2    Dulaglutide (TRULICITY) 2.18 DJ/9.8YI SOPN Inject 0.75 mg into the skin once a week DC 1.5 mg 4 pen 2    losartan (COZAAR) 100 MG tablet Take 1 tablet by mouth daily 30 tablet 2    QUEtiapine (SEROQUEL) 50 MG tablet Take 1 tablet by mouth nightly 50 tablet 2    aspirin EC 81 MG EC tablet Take 1 tablet by mouth daily 30 tablet 2    ipratropium-albuterol (DUONEB) 0.5-2.5 (3) MG/3ML SOLN nebulizer solution Inhale 3 mLs into the lungs every 4 hours 360 mL 2    insulin aspart (NOVOLOG) 100 UNIT/ML injection vial Inject 5 Units into the skin 3 times daily (before meals) Sliding scale 3 vial 2    clopidogrel (PLAVIX) 75 MG tablet Take 1 tablet by mouth daily 90 tablet 2    albuterol-ipratropium (COMBIVENT RESPIMAT)  MCG/ACT AERS inhaler Inhale 1 puff into the lungs every 6 hours 1 Inhaler 2    amLODIPine (NORVASC) 10 MG tablet Take 1 tablet by mouth daily 30 tablet 2    carvedilol (COREG) 25 MG tablet Take 1 tablet by mouth 2 times daily 60 tablet 2    atorvastatin (LIPITOR) 80 MG tablet Take 1 tablet by mouth daily 30 tablet 2    nitroGLYCERIN (NITROSTAT) 0.4 MG SL tablet up to max of 3 total doses.  If no relief after 1 dose, call 911. 25 tablet 2    prazosin (MINIPRESS) 5 MG capsule Take 1 capsule by mouth 2 times daily DC previous script 60 capsule 2    gabapentin (NEURONTIN) 100 MG capsule TAKE 2 CAPSULES BY MOUTH 3 TIMES DAILY 180 capsule 2    insulin glargine (LANTUS) 100 UNIT/ML injection vial Inject 35 Units into the skin nightly (Patient taking differently: Inject 40 Units into the skin nightly ) 5 vial 2    glucose blood VI test strips (ASCENSIA AUTODISC VI;ONE TOUCH ULTRA TEST VI) strip Dx: DM-2, use 4 -5 times daily, ok to give a substitute 100 each 11        Medications patient taking as of now reconciled against medications ordered at time of hospital discharge: Yes    Vitals:    02/07/18

## 2018-02-08 ENCOUNTER — TELEPHONE (OUTPATIENT)
Dept: INTERNAL MEDICINE | Age: 63
End: 2018-02-08

## 2018-02-09 ENCOUNTER — HOSPITAL ENCOUNTER (OUTPATIENT)
Dept: VASCULAR LAB | Age: 63
Discharge: HOME | End: 2018-02-09
Payer: COMMERCIAL

## 2018-02-09 DIAGNOSIS — M79.89 RIGHT LEG SWELLING: ICD-10-CM

## 2018-02-09 PROCEDURE — 93971 EXTREMITY STUDY: CPT

## 2018-02-12 NOTE — CARE COORDINATION
3rd attempt to reach patient for care transitions. [facility] requesting return call. Contact information provided. Episode resolved at this time. Noted patient is active with care coordination, encounter routed to [facility] ESTHELA listed on patient's chart.

## 2018-02-15 ENCOUNTER — OFFICE VISIT (OUTPATIENT)
Dept: PULMONOLOGY | Age: 63
End: 2018-02-15
Payer: COMMERCIAL

## 2018-02-15 VITALS
DIASTOLIC BLOOD PRESSURE: 80 MMHG | HEIGHT: 66 IN | TEMPERATURE: 97.2 F | OXYGEN SATURATION: 95 % | SYSTOLIC BLOOD PRESSURE: 138 MMHG | BODY MASS INDEX: 26.74 KG/M2 | RESPIRATION RATE: 16 BRPM | HEART RATE: 79 BPM | WEIGHT: 166.4 LBS

## 2018-02-15 DIAGNOSIS — J98.4 BRONCHOGENIC CYST: ICD-10-CM

## 2018-02-15 DIAGNOSIS — R05.8 ACE-INHIBITOR COUGH: ICD-10-CM

## 2018-02-15 DIAGNOSIS — Z87.891 SMOKING HISTORY: ICD-10-CM

## 2018-02-15 DIAGNOSIS — J44.9 CHRONIC OBSTRUCTIVE PULMONARY DISEASE, UNSPECIFIED COPD TYPE (HCC): Primary | ICD-10-CM

## 2018-02-15 DIAGNOSIS — J10.1 INFLUENZA A: ICD-10-CM

## 2018-02-15 DIAGNOSIS — T46.4X5A ACE-INHIBITOR COUGH: ICD-10-CM

## 2018-02-15 PROCEDURE — 99215 OFFICE O/P EST HI 40 MIN: CPT | Performed by: INTERNAL MEDICINE

## 2018-02-15 RX ORDER — AZITHROMYCIN 500 MG/1
500 TABLET, FILM COATED ORAL DAILY
Qty: 1 PACKET | Refills: 2 | Status: SHIPPED | OUTPATIENT
Start: 2018-02-15 | End: 2018-02-18

## 2018-02-15 RX ORDER — PREDNISONE 10 MG/1
TABLET ORAL
Qty: 30 TABLET | Refills: 3 | Status: ON HOLD | OUTPATIENT
Start: 2018-02-15 | End: 2018-09-05 | Stop reason: HOSPADM

## 2018-02-16 NOTE — PROGRESS NOTES
PULMONARY OP  PROGRESS NOTE      Patient:  Jaquelin Casanova  YOB: 1955    MRN: [id number]     Acct:        Pt seen and Chart reviewed. Mr. Jaquelin Casanova is here in followup for   1. Chronic obstructive pulmonary disease, unspecified COPD type ([facility] [address]    2. ACE-inhibitor cough    3. Smoking history    4. Influenza A    5. Bronchogenic cyst        Patient has been doing well since last visit. Pt has not been hospitalized or been to er since last visit. Has been using meds as recommended. Does not smoke anymore. Has improvement in his shortness of breath and wheezing. No cough or sputum production. No hemoptysis. No orthopnea or PND or increasing pedal edema. No chest pain or pressure    Subjective:  ROS    Review of Systems -   General ROS: Completed and except as mentioned above were negative   Psychological ROS:  Completed and except as mentioned above were negative  Ophthalmic ROS:  Completed and except as mentioned above were negative  ENT ROS:  Completed and except as mentioned above were negative  Allergy and Immunology ROS:  Completed and except as mentioned above were negative  Hematological and Lymphatic ROS:  Completed and except as mentioned above were negative  Endocrine ROS: Completed and except as mentioned above were negative  Respiratory ROS:  Completed and except as mentioned above were negative  Cardiovascular ROS:  Completed and except as mentioned above were negative  Gastrointestinal ROS: Completed and except as mentioned above were negative  Genito-Urinary ROS:  Completed and except as mentioned above were negative  Musculoskeletal ROS:  Completed and except as mentioned above were negative  Neurological ROS:  Completed and except as mentioned above were negative  Dermatological ROS:  Completed and except as mentioned above were negative            Allergies:   Allergies   Allergen Reactions   

## 2018-02-23 ENCOUNTER — TELEPHONE (OUTPATIENT)
Dept: INTERNAL MEDICINE | Age: 63
End: 2018-02-23

## 2018-03-07 ENCOUNTER — CARE COORDINATION (OUTPATIENT)
Dept: CARE COORDINATION | Age: 63
End: 2018-03-07

## 2018-03-08 RX ORDER — GABAPENTIN 100 MG/1
200 CAPSULE ORAL 3 TIMES DAILY
Qty: 180 CAPSULE | Refills: 2 | Status: SHIPPED | OUTPATIENT
Start: 2018-03-08 | End: 2018-06-03 | Stop reason: SDUPTHER

## 2018-03-08 NOTE — TELEPHONE ENCOUNTER
simple and mucopurulent chronic bronchitis (HCC)     Pure hypercholesterolemia     CAD in native artery, nonobstructive     PTSD (post-traumatic stress disorder)     Transient cerebral ischemia     Essential hypertension     DM type 2, uncontrolled, with neuropathy (HCC)     Hx of carotid stenosis

## 2018-03-21 ENCOUNTER — CARE COORDINATION (OUTPATIENT)
Dept: CARE COORDINATION | Age: 63
End: 2018-03-21

## 2018-03-21 ENCOUNTER — OFFICE VISIT (OUTPATIENT)
Dept: INTERNAL MEDICINE | Age: 63
End: 2018-03-21
Payer: COMMERCIAL

## 2018-03-21 VITALS
WEIGHT: 164 LBS | DIASTOLIC BLOOD PRESSURE: 76 MMHG | SYSTOLIC BLOOD PRESSURE: 138 MMHG | BODY MASS INDEX: 26.36 KG/M2 | HEART RATE: 82 BPM | HEIGHT: 66 IN

## 2018-03-21 DIAGNOSIS — F51.01 PRIMARY INSOMNIA: ICD-10-CM

## 2018-03-21 DIAGNOSIS — I10 ESSENTIAL HYPERTENSION: ICD-10-CM

## 2018-03-21 DIAGNOSIS — J41.1 MUCOPURULENT CHRONIC BRONCHITIS (HCC): ICD-10-CM

## 2018-03-21 DIAGNOSIS — I65.22 CAROTID STENOSIS, LEFT: ICD-10-CM

## 2018-03-21 DIAGNOSIS — E78.01 FAMILIAL HYPERCHOLESTEROLEMIA: ICD-10-CM

## 2018-03-21 DIAGNOSIS — J41.8 MIXED SIMPLE AND MUCOPURULENT CHRONIC BRONCHITIS (HCC): ICD-10-CM

## 2018-03-21 LAB — HBA1C MFR BLD: 9.2 %

## 2018-03-21 PROCEDURE — 99212 OFFICE O/P EST SF 10 MIN: CPT

## 2018-03-21 PROCEDURE — 99214 OFFICE O/P EST MOD 30 MIN: CPT | Performed by: INTERNAL MEDICINE

## 2018-03-21 PROCEDURE — 83036 HEMOGLOBIN GLYCOSYLATED A1C: CPT | Performed by: INTERNAL MEDICINE

## 2018-03-21 PROCEDURE — G8510 SCR DEP NEG, NO PLAN REQD: HCPCS | Performed by: INTERNAL MEDICINE

## 2018-03-21 RX ORDER — NITROGLYCERIN 0.4 MG/1
TABLET SUBLINGUAL
Qty: 25 TABLET | Refills: 2 | Status: SHIPPED | OUTPATIENT
Start: 2018-03-21 | End: 2018-07-09 | Stop reason: SDUPTHER

## 2018-03-21 RX ORDER — ASPIRIN 81 MG/1
81 TABLET ORAL DAILY
Qty: 30 TABLET | Refills: 2 | Status: SHIPPED | OUTPATIENT
Start: 2018-03-21 | End: 2018-06-26 | Stop reason: SDUPTHER

## 2018-03-21 RX ORDER — LOSARTAN POTASSIUM 100 MG/1
100 TABLET ORAL DAILY
Qty: 30 TABLET | Refills: 2 | Status: SHIPPED | OUTPATIENT
Start: 2018-03-21 | End: 2018-06-28 | Stop reason: SDUPTHER

## 2018-03-21 RX ORDER — CARVEDILOL 25 MG/1
25 TABLET ORAL 2 TIMES DAILY
Qty: 60 TABLET | Refills: 2 | Status: SHIPPED | OUTPATIENT
Start: 2018-03-21 | End: 2018-06-25 | Stop reason: SDUPTHER

## 2018-03-21 RX ORDER — IPRATROPIUM BROMIDE AND ALBUTEROL SULFATE 2.5; .5 MG/3ML; MG/3ML
1 SOLUTION RESPIRATORY (INHALATION) EVERY 4 HOURS
Qty: 360 ML | Refills: 2 | Status: SHIPPED | OUTPATIENT
Start: 2018-03-21 | End: 2018-07-09 | Stop reason: SDUPTHER

## 2018-03-21 RX ORDER — QUETIAPINE FUMARATE 50 MG/1
50 TABLET, FILM COATED ORAL NIGHTLY
Qty: 50 TABLET | Refills: 2 | Status: SHIPPED | OUTPATIENT
Start: 2018-03-21 | End: 2018-07-09 | Stop reason: SDUPTHER

## 2018-03-21 RX ORDER — ATORVASTATIN CALCIUM 80 MG/1
80 TABLET, FILM COATED ORAL DAILY
Qty: 30 TABLET | Refills: 2 | Status: SHIPPED | OUTPATIENT
Start: 2018-03-21 | End: 2018-06-25 | Stop reason: SDUPTHER

## 2018-03-21 RX ORDER — AMLODIPINE BESYLATE 10 MG/1
10 TABLET ORAL DAILY
Qty: 30 TABLET | Refills: 2 | Status: SHIPPED | OUTPATIENT
Start: 2018-03-21 | End: 2018-06-25 | Stop reason: SDUPTHER

## 2018-03-21 RX ORDER — CLOPIDOGREL BISULFATE 75 MG/1
75 TABLET ORAL DAILY
Qty: 90 TABLET | Refills: 2 | Status: SHIPPED | OUTPATIENT
Start: 2018-03-21 | End: 2018-07-09 | Stop reason: SDUPTHER

## 2018-03-21 RX ORDER — PRAZOSIN HYDROCHLORIDE 5 MG/1
5 CAPSULE ORAL 2 TIMES DAILY
Qty: 60 CAPSULE | Refills: 2 | Status: SHIPPED | OUTPATIENT
Start: 2018-03-21 | End: 2018-06-25 | Stop reason: SDUPTHER

## 2018-03-21 ASSESSMENT — ENCOUNTER SYMPTOMS
ABDOMINAL PAIN: 0
DOUBLE VISION: 0
COUGH: 0
NAUSEA: 0
HEMOPTYSIS: 0
HEARTBURN: 0
BLURRED VISION: 0

## 2018-03-21 ASSESSMENT — PATIENT HEALTH QUESTIONNAIRE - PHQ9
2. FEELING DOWN, DEPRESSED OR HOPELESS: 0
SUM OF ALL RESPONSES TO PHQ QUESTIONS 1-9: 0
SUM OF ALL RESPONSES TO PHQ9 QUESTIONS 1 & 2: 0
1. LITTLE INTEREST OR PLEASURE IN DOING THINGS: 0

## 2018-03-21 NOTE — CARE COORDINATION
Ambulatory Care Coordination Note  3/21/2018  CM Risk Score: 15  Avril Mortality Risk Score:      ACC: Ricci Garcia RN    Summary Note: Met with Jesse Davison before his office visit today. He states that he is doing well. He had missed a couple of appts due to transportation issues. He said he walked here today.  reminded him that with his [facility], he is eligible for medical cab. He said [employer] they use, I have had nothing but problems with them\"; he is aware that the services are available and has contact information. He did not bring his blood glucose record. He says his sugars are doing better. He still has some occasional hypoglycemic readings. Yesterday morning it was 45 and he was asymptomatic. This morning it was in the low 100s. He stated that he thinks his A1C will be down, because \"my sugars have been doing better\". His A1C today is 9.2, down from 10.9 in December. He no longer follows with the endocrinologist. He likes to keep his sugars around 150-200, because they can drop rapidly. He keeps glucose/snacks available. He said he is scheduled for a MRI/MRA next week because he has been having some dizziness. They determined it wasn't his ears or vision. He follows with neurology- who ordered the studies. PLAN: will follow up to make sure MRI/MRA was able to be done; follow up on blood sugars       Care Coordination Interventions    Program Enrollment:  Complex Care  Referral from Primary Care Provider:  Yes  Suggested Interventions and Community Resources  Diabetes Education:  Completed  Medication Assistance Program:  Completed  Zone Management Tools: In Process         Goals Addressed             Most Recent     Conditions and Symptoms   On track (3/21/2018)             I will schedule office visits, as directed by my provider. I will keep my appointment or reschedule if I have to cancel. I will notify my provider of any barriers to my plan of care.   I will follow my Zone

## 2018-03-21 NOTE — PROGRESS NOTES
[facility] [address] [facility]  [address] [facility] [address] [facility]  Dept: [phone]  Dept Fax: [phone]    Office Progress/Follow Up Note  Date of patient's visit: 3/21/2018  Patient's Name:  Meghan Ruffin YOB: 1955            Patient Care Team:  Zara James MD as PCP - General  Zara James MD as PCP - S Attributed Provider  Diego Duarte MD as Referring Physician (Cardiology)  Fernando Montgomery MD as Orthopedic Surgeon (Orthopedic Surgery)  Ronan Kelsey MD as Surgeon (Vascular Surgery)  Estephania Barriga RN as Care Coordinator  Raeann Anderson MD as Consulting Physician (Internal Medicine)  Mary Palumbo MD as Resident (Internal Medicine)  ================================================================    REASON FOR VISIT/CHIEF COMPLAINT:  1 Month Follow-Up; Diabetes (a1c done ); and Hypertension    HISTORY OF PRESENTING ILLNESS:  History was obtained from: patient. Meghan Ruffin is a 59 y.o. is here for a follow-up visit. Patient came in today for evaluation of uncontrolled diabetes. A1c done today has improved and 9.2. It was 10.8 3 months ago. Patient reported that his sugar has improved. Occasionally he will notice low sugars in the 60s without any symptoms. She periodically had just had a long acting and short acting insulin on his own. He has been a chronic problem for him. He has hypertension which is controlled. He has history of COPD with acute exacerbation. Today he is doing well. He is able to speak in full sentences without any difficulty. He denies any cough or shortness of breath or wheezes. He is due for medication refills today.   Problem list, medications and blood work reviewed      Patient Active Problem List   Diagnosis    Cigarette nicotine dependence without complication    Carpal tunnel syndrome on right    Colon polyps    Carotid stenosis, left s/p Endarterectomy    Mixed simple and mucopurulent chronic bronchitis (HCC)    Pure hypercholesterolemia    CAD in native artery, nonobstructive    PTSD (post-traumatic stress disorder)    Transient cerebral ischemia    Essential hypertension    DM type 2, uncontrolled, with neuropathy ([facility] [address]    Hx of carotid stenosis       Health Maintenance Due   Topic Date Due    Shingles Vaccine (1 of 2 - 2 Dose Series) 01/07/2006    A1C test (Diabetic or Prediabetic)  03/11/2018       Allergies   Allergen Reactions    Lisinopril      cough    Ace Inhibitors      coughing    Pcn [Penicillins]          Current Outpatient Prescriptions   Medication Sig Dispense Refill    Dulaglutide (TRULICITY) 2.33 NL/0.6LA SOPN Inject 0.75 mg into the skin once a week DC 1.5 mg 4 pen 2    losartan (COZAAR) 100 MG tablet Take 1 tablet by mouth daily 30 tablet 2    QUEtiapine (SEROQUEL) 50 MG tablet Take 1 tablet by mouth nightly 50 tablet 2    aspirin EC 81 MG EC tablet Take 1 tablet by mouth daily 30 tablet 2    ipratropium-albuterol (DUONEB) 0.5-2.5 (3) MG/3ML SOLN nebulizer solution Inhale 3 mLs into the lungs every 4 hours 360 mL 2    insulin aspart (NOVOLOG) 100 UNIT/ML injection vial Inject 5 Units into the skin 3 times daily (before meals) Sliding scale 3 vial 2    clopidogrel (PLAVIX) 75 MG tablet Take 1 tablet by mouth daily 90 tablet 2    albuterol-ipratropium (COMBIVENT RESPIMAT)  MCG/ACT AERS inhaler Inhale 1 puff into the lungs every 6 hours 1 Inhaler 2    amLODIPine (NORVASC) 10 MG tablet Take 1 tablet by mouth daily 30 tablet 2    carvedilol (COREG) 25 MG tablet Take 1 tablet by mouth 2 times daily 60 tablet 2    atorvastatin (LIPITOR) 80 MG tablet Take 1 tablet by mouth daily 30 tablet 2    nitroGLYCERIN (NITROSTAT) 0.4 MG SL tablet up to max of 3 total doses.  If no relief after 1 dose, call 911. 25 tablet 2    prazosin (MINIPRESS) 5 MG capsule Take 1 capsule by mouth 2 times daily DC previous script 60 capsule 2    gabapentin with neuropathy ([facility] [address]  -     POCT glycosylated hemoglobin (Hb A1C)  -     Dulaglutide (TRULICITY) 4.45 AM/8.1ED SOPN; Inject 0.75 mg into the skin once a week DC 1.5 mg  -     insulin aspart (NOVOLOG) 100 UNIT/ML injection vial; Inject 5 Units into the skin 3 times daily (before meals) Sliding scale    Essential hypertension  -     losartan (COZAAR) 100 MG tablet; Take 1 tablet by mouth daily  -     amLODIPine (NORVASC) 10 MG tablet; Take 1 tablet by mouth daily  -     carvedilol (COREG) 25 MG tablet; Take 1 tablet by mouth 2 times daily  -     nitroGLYCERIN (NITROSTAT) 0.4 MG SL tablet; up to max of 3 total doses. If no relief after 1 dose, call 911.  -     prazosin (MINIPRESS) 5 MG capsule; Take 1 capsule by mouth 2 times daily DC previous script    Primary insomnia  -     QUEtiapine (SEROQUEL) 50 MG tablet; Take 1 tablet by mouth nightly    Carotid stenosis, left s/p Endarterectomy  -     aspirin EC 81 MG EC tablet; Take 1 tablet by mouth daily  -     clopidogrel (PLAVIX) 75 MG tablet; Take 1 tablet by mouth daily    Mucopurulent chronic bronchitis (HCC)  -     ipratropium-albuterol (DUONEB) 0.5-2.5 (3) MG/3ML SOLN nebulizer solution; Inhale 3 mLs into the lungs every 4 hours    Mixed simple and mucopurulent chronic bronchitis (HCC)  -     albuterol-ipratropium (COMBIVENT RESPIMAT)  MCG/ACT AERS inhaler; Inhale 1 puff into the lungs every 6 hours    Familial hypercholesterolemia  -     atorvastatin (LIPITOR) 80 MG tablet; Take 1 tablet by mouth daily      FOLLOW UP AND INSTRUCTIONS:  · Return in about 3 months (around 6/21/2018) for HTN, DM-2, Asthma. · Dex Dacosta received counseling on the following healthy behaviors: nutrition and exercise    · Discussed use, benefit, and side effects of prescribed medications. Barriers to medication compliance addressed. All patient questions answered. Pt voiced understanding.      · Patient given educational materials - see patient instructions    Zara Henry

## 2018-03-23 ENCOUNTER — TELEPHONE (OUTPATIENT)
Dept: INTERNAL MEDICINE | Age: 63
End: 2018-03-23

## 2018-03-27 ENCOUNTER — HOSPITAL ENCOUNTER (OUTPATIENT)
Dept: MRI IMAGING | Age: 63
Discharge: HOME | End: 2018-03-29
Payer: COMMERCIAL

## 2018-03-27 ENCOUNTER — APPOINTMENT (OUTPATIENT)
Dept: MRI IMAGING | Age: 63
End: 2018-03-27
Payer: COMMERCIAL

## 2018-03-27 DIAGNOSIS — I63.9 CEREBROVASCULAR ACCIDENT (CVA), UNSPECIFIED MECHANISM (HCC): ICD-10-CM

## 2018-03-27 DIAGNOSIS — R42 DIZZINESS: ICD-10-CM

## 2018-03-27 DIAGNOSIS — R42 VERTIGO: ICD-10-CM

## 2018-03-27 PROCEDURE — 70551 MRI BRAIN STEM W/O DYE: CPT

## 2018-03-27 PROCEDURE — 70547 MR ANGIOGRAPHY NECK W/O DYE: CPT

## 2018-04-23 ENCOUNTER — HOSPITAL ENCOUNTER (OUTPATIENT)
Dept: CT IMAGING | Age: 63
Discharge: HOME | End: 2018-04-25
Payer: COMMERCIAL

## 2018-04-23 ENCOUNTER — HOSPITAL ENCOUNTER (OUTPATIENT)
Dept: ONCOLOGY | Age: 63
Discharge: HOME | End: 2018-04-23
Attending: EMERGENCY MEDICINE | Admitting: INTERNAL MEDICINE
Payer: COMMERCIAL

## 2018-04-23 VITALS
OXYGEN SATURATION: 100 % | TEMPERATURE: 97.7 F | RESPIRATION RATE: 16 BRPM | SYSTOLIC BLOOD PRESSURE: 146 MMHG | HEART RATE: 55 BPM | DIASTOLIC BLOOD PRESSURE: 77 MMHG

## 2018-04-23 DIAGNOSIS — I65.23 BILATERAL CAROTID ARTERY STENOSIS: ICD-10-CM

## 2018-04-23 DIAGNOSIS — I67.9 CEREBRAL VASCULAR DISEASE: ICD-10-CM

## 2018-04-23 DIAGNOSIS — R42 DIZZINESS: ICD-10-CM

## 2018-04-23 LAB
GFR NON-AFRICAN AMERICAN: 34 ML/MIN
GFR SERPL CREATININE-BSD FRML MDRD: 41 ML/MIN
GFR SERPL CREATININE-BSD FRML MDRD: ABNORMAL ML/MIN/{1.73_M2}
POC CREATININE: 2.01 MG/DL (ref 0.51–1.19)

## 2018-04-23 PROCEDURE — 2580000003 HC RX 258: Performed by: INTERNAL MEDICINE

## 2018-04-23 PROCEDURE — 82565 ASSAY OF CREATININE: CPT

## 2018-04-23 PROCEDURE — 96360 HYDRATION IV INFUSION INIT: CPT

## 2018-04-23 PROCEDURE — 70498 CT ANGIOGRAPHY NECK: CPT

## 2018-04-23 PROCEDURE — 96361 HYDRATE IV INFUSION ADD-ON: CPT

## 2018-04-23 PROCEDURE — 6360000004 HC RX CONTRAST MEDICATION: Performed by: PSYCHIATRY & NEUROLOGY

## 2018-04-23 RX ORDER — 0.9 % SODIUM CHLORIDE 0.9 %
125 INTRAVENOUS SOLUTION INTRAVENOUS ONCE
Status: COMPLETED | OUTPATIENT
Start: 2018-04-23 | End: 2018-04-23

## 2018-04-23 RX ADMIN — SODIUM CHLORIDE 125 ML: 9 INJECTION, SOLUTION INTRAVENOUS at 10:12

## 2018-04-23 RX ADMIN — IOPAMIDOL 90 ML: 755 INJECTION, SOLUTION INTRAVENOUS at 13:08

## 2018-05-17 ENCOUNTER — CARE COORDINATION (OUTPATIENT)
Dept: CARE COORDINATION | Age: 63
End: 2018-05-17

## 2018-06-05 RX ORDER — GABAPENTIN 100 MG/1
200 CAPSULE ORAL 3 TIMES DAILY
Qty: 180 CAPSULE | Refills: 0 | Status: SHIPPED | OUTPATIENT
Start: 2018-06-05 | End: 2018-06-28 | Stop reason: SDUPTHER

## 2018-06-06 RX ORDER — CALCIUM CITRATE/VITAMIN D3 200MG-6.25
TABLET ORAL
Qty: 100 STRIP | Refills: 7 | Status: SHIPPED | OUTPATIENT
Start: 2018-06-06 | End: 2019-01-28 | Stop reason: SDUPTHER

## 2018-06-21 DIAGNOSIS — Z12.11 ENCOUNTER FOR SCREENING COLONOSCOPY: Primary | ICD-10-CM

## 2018-06-22 DIAGNOSIS — Z12.11 ENCOUNTER FOR SCREENING COLONOSCOPY: Primary | ICD-10-CM

## 2018-06-25 DIAGNOSIS — I10 ESSENTIAL HYPERTENSION: ICD-10-CM

## 2018-06-25 DIAGNOSIS — E78.01 FAMILIAL HYPERCHOLESTEROLEMIA: ICD-10-CM

## 2018-06-26 DIAGNOSIS — I65.22 CAROTID STENOSIS, LEFT: ICD-10-CM

## 2018-06-26 RX ORDER — MECLIZINE HCL 12.5 MG/1
TABLET ORAL
Qty: 90 TABLET | Refills: 1 | Status: SHIPPED | OUTPATIENT
Start: 2018-06-26 | End: 2019-02-19

## 2018-06-26 RX ORDER — ATORVASTATIN CALCIUM 80 MG/1
80 TABLET, FILM COATED ORAL DAILY
Qty: 30 TABLET | Refills: 2 | Status: SHIPPED | OUTPATIENT
Start: 2018-06-26 | End: 2018-09-12 | Stop reason: SDUPTHER

## 2018-06-26 RX ORDER — PRAZOSIN HYDROCHLORIDE 5 MG/1
5 CAPSULE ORAL 2 TIMES DAILY
Qty: 60 CAPSULE | Refills: 2 | Status: SHIPPED | OUTPATIENT
Start: 2018-06-26 | End: 2018-09-12 | Stop reason: SDUPTHER

## 2018-06-26 RX ORDER — ASPIRIN 81 MG/1
81 TABLET ORAL DAILY
Qty: 30 TABLET | Refills: 2 | Status: SHIPPED | OUTPATIENT
Start: 2018-06-26 | End: 2018-09-12 | Stop reason: SDUPTHER

## 2018-06-26 RX ORDER — AMLODIPINE BESYLATE 10 MG/1
10 TABLET ORAL DAILY
Qty: 30 TABLET | Refills: 2 | Status: SHIPPED | OUTPATIENT
Start: 2018-06-26 | End: 2018-09-12 | Stop reason: SDUPTHER

## 2018-06-26 RX ORDER — CARVEDILOL 25 MG/1
25 TABLET ORAL 2 TIMES DAILY
Qty: 60 TABLET | Refills: 2 | Status: ON HOLD | OUTPATIENT
Start: 2018-06-26 | End: 2018-09-05 | Stop reason: HOSPADM

## 2018-06-28 DIAGNOSIS — I10 ESSENTIAL HYPERTENSION: ICD-10-CM

## 2018-06-28 RX ORDER — GABAPENTIN 100 MG/1
200 CAPSULE ORAL 3 TIMES DAILY
Qty: 180 CAPSULE | Refills: 0 | Status: SHIPPED | OUTPATIENT
Start: 2018-06-28 | End: 2018-07-09 | Stop reason: SDUPTHER

## 2018-06-28 RX ORDER — LOSARTAN POTASSIUM 100 MG/1
100 TABLET ORAL DAILY
Qty: 30 TABLET | Refills: 2 | Status: ON HOLD | OUTPATIENT
Start: 2018-06-28 | End: 2018-09-05 | Stop reason: HOSPADM

## 2018-07-09 ENCOUNTER — OFFICE VISIT (OUTPATIENT)
Dept: INTERNAL MEDICINE | Age: 63
End: 2018-07-09
Payer: COMMERCIAL

## 2018-07-09 VITALS
BODY MASS INDEX: 25.36 KG/M2 | DIASTOLIC BLOOD PRESSURE: 58 MMHG | SYSTOLIC BLOOD PRESSURE: 106 MMHG | HEIGHT: 66 IN | WEIGHT: 157.8 LBS | HEART RATE: 67 BPM

## 2018-07-09 DIAGNOSIS — F51.01 PRIMARY INSOMNIA: ICD-10-CM

## 2018-07-09 DIAGNOSIS — J41.1 MUCOPURULENT CHRONIC BRONCHITIS (HCC): ICD-10-CM

## 2018-07-09 DIAGNOSIS — I65.22 CAROTID STENOSIS, LEFT: ICD-10-CM

## 2018-07-09 DIAGNOSIS — J41.8 MIXED SIMPLE AND MUCOPURULENT CHRONIC BRONCHITIS (HCC): ICD-10-CM

## 2018-07-09 DIAGNOSIS — I10 ESSENTIAL HYPERTENSION: ICD-10-CM

## 2018-07-09 DIAGNOSIS — H61.23 BILATERAL IMPACTED CERUMEN: ICD-10-CM

## 2018-07-09 DIAGNOSIS — R22.31 NODULE OF FINGER OF RIGHT HAND: ICD-10-CM

## 2018-07-09 LAB — HBA1C MFR BLD: 9.7 %

## 2018-07-09 PROCEDURE — 99214 OFFICE O/P EST MOD 30 MIN: CPT | Performed by: INTERNAL MEDICINE

## 2018-07-09 PROCEDURE — 83036 HEMOGLOBIN GLYCOSYLATED A1C: CPT | Performed by: INTERNAL MEDICINE

## 2018-07-09 PROCEDURE — 69209 REMOVE IMPACTED EAR WAX UNI: CPT | Performed by: INTERNAL MEDICINE

## 2018-07-09 RX ORDER — NITROGLYCERIN 0.4 MG/1
TABLET SUBLINGUAL
Qty: 25 TABLET | Refills: 2 | Status: SHIPPED | OUTPATIENT
Start: 2018-07-09 | End: 2018-09-12 | Stop reason: SDUPTHER

## 2018-07-09 RX ORDER — IPRATROPIUM BROMIDE AND ALBUTEROL SULFATE 2.5; .5 MG/3ML; MG/3ML
1 SOLUTION RESPIRATORY (INHALATION) EVERY 4 HOURS
Qty: 360 ML | Refills: 2 | Status: SHIPPED | OUTPATIENT
Start: 2018-07-09 | End: 2018-09-12 | Stop reason: SDUPTHER

## 2018-07-09 RX ORDER — QUETIAPINE FUMARATE 50 MG/1
50 TABLET, FILM COATED ORAL NIGHTLY
Qty: 50 TABLET | Refills: 2 | Status: SHIPPED | OUTPATIENT
Start: 2018-07-09 | End: 2019-02-09 | Stop reason: SDUPTHER

## 2018-07-09 RX ORDER — FLUTICASONE FUROATE AND VILANTEROL 100; 25 UG/1; UG/1
1 POWDER RESPIRATORY (INHALATION) DAILY
Qty: 1 EACH | Refills: 2 | Status: SHIPPED | OUTPATIENT
Start: 2018-07-09 | End: 2018-09-12 | Stop reason: SDUPTHER

## 2018-07-09 RX ORDER — CLOPIDOGREL BISULFATE 75 MG/1
75 TABLET ORAL DAILY
Qty: 90 TABLET | Refills: 2 | Status: SHIPPED | OUTPATIENT
Start: 2018-07-09 | End: 2018-09-12 | Stop reason: SDUPTHER

## 2018-07-09 RX ORDER — GABAPENTIN 300 MG/1
300 CAPSULE ORAL 3 TIMES DAILY
Qty: 90 CAPSULE | Refills: 2 | Status: ON HOLD | OUTPATIENT
Start: 2018-07-09 | End: 2018-09-05 | Stop reason: HOSPADM

## 2018-07-10 ASSESSMENT — ENCOUNTER SYMPTOMS
NAUSEA: 0
DOUBLE VISION: 0
BLURRED VISION: 0
HEMOPTYSIS: 0
ABDOMINAL PAIN: 0
COUGH: 0
HEARTBURN: 0

## 2018-07-10 NOTE — PROGRESS NOTES
reviewed with patient. He continued to smoke. I have discussed the risk of smoking including stroke and heart attacks with patient in the past.  He is due for medication refills today.   Problem list, medications and blood work reviewed      Patient Active Problem List   Diagnosis    Cigarette nicotine dependence without complication    Carpal tunnel syndrome on right    Colon polyps    Carotid stenosis, left s/p Endarterectomy    Mixed simple and mucopurulent chronic bronchitis ([address]    Pure hypercholesterolemia    CAD in native artery, nonobstructive    PTSD (post-traumatic stress disorder)    Transient cerebral ischemia    Essential hypertension    DM type 2, uncontrolled, with neuropathy ([address]    Hx of carotid stenosis    Cerebral vascular disease    Cerebrovascular disease       Health Maintenance Due   Topic Date Due    Shingles Vaccine (1 of 2 - 2 Dose Series) 01/07/2006    Diabetic retinal exam  06/01/2018    Diabetic microalbuminuria test  06/23/2018    Lipid screen  06/27/2018    Colon cancer screen colonoscopy  06/27/2018       Allergies   Allergen Reactions    Lisinopril      cough    Ace Inhibitors      coughing    Pcn [Penicillins]          Current Outpatient Prescriptions   Medication Sig Dispense Refill    Dulaglutide (TRULICITY) 4.33 XD/5.4FR SOPN Inject 0.75 mg into the skin once a week DC 1.5 mg 4 pen 2    QUEtiapine (SEROQUEL) 50 MG tablet Take 1 tablet by mouth nightly 50 tablet 2    ipratropium-albuterol (DUONEB) 0.5-2.5 (3) MG/3ML SOLN nebulizer solution Inhale 3 mLs into the lungs every 4 hours 360 mL 2    insulin aspart (NOVOLOG) 100 UNIT/ML injection vial Inject 5 Units into the skin 3 times daily (before meals) Sliding scale 3 vial 2    clopidogrel (PLAVIX) 75 MG tablet Take 1 tablet by mouth daily 90 tablet 2    albuterol-ipratropium (COMBIVENT RESPIMAT)  MCG/ACT AERS inhaler Inhale 1 puff into the lungs every 6 hours 1 Inhaler 2    nitroGLYCERIN (NITROSTAT) 0.4 MG SL tablet up to max of 3 total doses. If no relief after 1 dose, call 911. 25 tablet 2    insulin glargine (BASAGLAR KWIKPEN) 100 UNIT/ML injection pen Inject 35 Units into the skin nightly Dispense with pen needle 5 pen 2    carbamide peroxide (DEBROX) 6.5 % otic solution Place 5 drops into the right ear 2 times daily 1 Bottle 0    gabapentin (NEURONTIN) 300 MG capsule Take 1 capsule by mouth 3 times daily for 30 days.  mg dose. 90 capsule 2    fluticasone-vilanterol (BREO ELLIPTA) 100-25 MCG/INH AEPB inhaler Inhale 1 puff into the lungs daily 1 each 2    losartan (COZAAR) 100 MG tablet TAKE 1 TABLET BY MOUTH DAILY 30 tablet 2    amLODIPine (NORVASC) 10 MG tablet TAKE 1 TABLET BY MOUTH DAILY 30 tablet 2    carvedilol (COREG) 25 MG tablet TAKE 1 TABLET BY MOUTH 2 TIMES DAILY 60 tablet 2    atorvastatin (LIPITOR) 80 MG tablet TAKE 1 TABLET BY MOUTH DAILY 30 tablet 2    prazosin (MINIPRESS) 5 MG capsule TAKE 1 CAPSULE BY MOUTH 2 TIMES DAILY DC PREVIOUS SCRIPT 60 capsule 2    meclizine (ANTIVERT) 12.5 MG tablet TAKE 1 TABLET BY MOUTH 3 TIMES DAILY AS NEEDED 90 tablet 1    ASPIR-LOW 81 MG EC tablet TAKE 1 TABLET BY MOUTH DAILY 30 tablet 2    TRUE METRIX BLOOD GLUCOSE TEST strip TEST BLOOD SUGAR 4 TO 5 TIMES DAILY 100 strip 7    predniSONE (DELTASONE) 10 MG tablet Tapered dose 40mg x 3 days. .. 30mg x 3 days. .. 20mg x 3 days . Destiny Veronica .10mg x3days . ..then off 30 tablet 3    Lancets MISC Dx: DM-2 use 3-4 times daily 100 each 11    INS SYRINGE/NEEDLE 1CC/28G 28G X 1/2\" 1 ML MISC Dx: DM-2 use 3-4 times daily 100 each 11    INSULIN SYRINGE .5CC/28G (INS SYRINGE/NEEDLE .5CC/28G) 28G X 1/2\" 0.5 ML MISC 1 Syringe by Does not apply route 4 times daily 100 each 11    magnesium citrate solution Please dispense one 10oz bottle of Magnesium Citrate. Use as directed for bowel prep 296 mL 0     No current facility-administered medications for this visit.         Social History   Substance Use Topics    Smoking status: Former Smoker     Packs/day: 0.50     Years: 35.00     Types: Cigarettes     Quit date: 7/9/2014    Smokeless tobacco: Never Used    Alcohol use No       Family History   Problem Relation Age of Onset    Cancer Mother     Heart Disease Father         REVIEW OF SYSTEMS:  Review of Systems   Constitutional: Negative for chills and fever. HENT: Positive for tinnitus. Negative for congestion, ear discharge and nosebleeds. Eyes: Negative for blurred vision and double vision. Respiratory: Negative for cough and hemoptysis. Cardiovascular: Negative for chest pain and palpitations. Gastrointestinal: Negative for abdominal pain, heartburn and nausea. Genitourinary: Negative for dysuria and urgency. Musculoskeletal: Negative for myalgias and neck pain. Neurological: Negative for dizziness and headaches. Endo/Heme/Allergies: Does not bruise/bleed easily. Psychiatric/Behavioral: Negative for depression and suicidal ideas. PHYSICAL EXAM:  Vitals:    07/09/18 1601   BP: (!) 106/58   Site: Left Arm   Position: Sitting   Cuff Size: Medium Adult   Pulse: 67   Weight: 157 lb 12.8 oz (71.6 kg)   Height: 5' 6\" (1.676 m)     BP Readings from Last 3 Encounters:   07/09/18 (!) 106/58   04/23/18 (!) 146/77   03/21/18 138/76        Physical Exam   Constitutional: He is oriented to person, place, and time and well-developed, well-nourished, and in no distress. No distress. HENT:   Mouth/Throat: No oropharyngeal exudate. Neck: Normal range of motion. Neck supple. No thyromegaly present. Cardiovascular: Normal rate, regular rhythm, normal heart sounds and intact distal pulses. Pulmonary/Chest: Effort normal and breath sounds normal. No respiratory distress. He has no wheezes. Abdominal: Soft. Bowel sounds are normal. He exhibits no distension and no mass. There is no tenderness. Musculoskeletal: Normal range of motion. He exhibits no edema or tenderness.    Neurological: He is alert daily    Mixed simple and mucopurulent chronic bronchitis (HCC)  -     albuterol-ipratropium (COMBIVENT RESPIMAT)  MCG/ACT AERS inhaler; Inhale 1 puff into the lungs every 6 hours  -     fluticasone-vilanterol (BREO ELLIPTA) 100-25 MCG/INH AEPB inhaler; Inhale 1 puff into the lungs daily    Essential hypertension  -     nitroGLYCERIN (NITROSTAT) 0.4 MG SL tablet; up to max of 3 total doses. If no relief after 1 dose, call 911. Bilateral impacted cerumen  -     MI REMOVAL IMPACTED CERUMEN IRRIGATION/LVG UNILAT  -     carbamide peroxide (DEBROX) 6.5 % otic solution; Place 5 drops into the right ear 2 times daily    Nodule of finger of right hand  -     [facility]      FOLLOW UP AND INSTRUCTIONS:  · Return in about 3 months (around 10/9/2018) for HTN, DM-2. · Gumaro Bagley received counseling on the following healthy behaviors: nutrition and exercise    · Discussed use, benefit, and side effects of prescribed medications. Barriers to medication compliance addressed. All patient questions answered. Pt voiced understanding. · Patient given educational materials - see patient instructions    Zara Lloyd MD, JASVIR, [address] [place] [address] Internal Medicine Associate  7/10/2018, 8:15 AM    This note is created with the assistance of a speech-recognition program. While intending to generate a document that actually reflects the content of the visit, the document can still have some mistakes which may not have been identified and corrected by editing.

## 2018-07-16 ENCOUNTER — CARE COORDINATION (OUTPATIENT)
Dept: CARE COORDINATION | Age: 63
End: 2018-07-16

## 2018-07-24 NOTE — TELEPHONE ENCOUNTER
E-scribe request for GABAPENTIN 100 MG 100MG CAPS. Please review and e-scribe if applicable. Last Visit Date:  7/9/18  Next Visit Date:  10/10/2018    Hemoglobin A1C (%)   Date Value   07/09/2018 9.7   03/21/2018 9.2   12/11/2017 10.9             ( goal A1C is < 7)   Microalb/Crt.  Ratio (mcg/mg creat)   Date Value   06/23/2017 2,464 (H)     LDL Cholesterol (mg/dL)   Date Value   06/27/2017 119       (goal LDL is <100)   AST (U/L)   Date Value   01/26/2018 29     ALT (U/L)   Date Value   01/26/2018 25     BUN (mg/dL)   Date Value   01/30/2018 25 (H)     BP Readings from Last 3 Encounters:   07/09/18 (!) 106/58   04/23/18 (!) 146/77   03/21/18 138/76          (goal 120/80)        Patient Active Problem List:     Cigarette nicotine dependence without complication     Carpal tunnel syndrome on right     Colon polyps     Carotid stenosis, left s/p Endarterectomy     Mixed simple and mucopurulent chronic bronchitis (HCC)     Pure hypercholesterolemia     CAD in native artery, nonobstructive     PTSD (post-traumatic stress disorder)     Transient cerebral ischemia     Essential hypertension     DM type 2, uncontrolled, with neuropathy (HCC)     Hx of carotid stenosis     Cerebral vascular disease     Cerebrovascular disease      ----JF

## 2018-07-25 ENCOUNTER — TELEPHONE (OUTPATIENT)
Dept: INTERNAL MEDICINE | Age: 63
End: 2018-07-25

## 2018-07-25 RX ORDER — GABAPENTIN 100 MG/1
200 CAPSULE ORAL 3 TIMES DAILY
Qty: 180 CAPSULE | Refills: 0 | Status: SHIPPED | OUTPATIENT
Start: 2018-07-25 | End: 2019-02-19

## 2018-07-26 ENCOUNTER — CARE COORDINATION (OUTPATIENT)
Dept: CARE COORDINATION | Age: 63
End: 2018-07-26

## 2018-07-26 NOTE — CARE COORDINATION
Ambulatory Care Coordination Note  7/26/2018  CM Risk Score: 11  Avril Mortality Risk Score:      ACC: Brando Marie, RN    Summary Note: Called and left message on Grayson's voicemail to return call to [phone] regarding his colonoscopy. Discussed with his PCP. Patti Must had a colonoscopy done last year, with 8-10 polyps removed. The recommendation was to repeat in a year. He agrees that Patti Must needs to have colonoscopy. He would like to see what his sugars have been over the last week, before he gives any recommendations for his insulin dosages during his bowel prep. Attempted to reach Schuyler Kern second time. The female, who answered the phone, stated that he wasn't awake yet and would give him the message to call. Care Coordination Interventions    Program Enrollment:  Complex Care  Referral from Primary Care Provider:  Yes  Suggested Interventions and Community Resources  Diabetes Education:  Completed  Medication Assistance Program:  Completed  Zone Management Tools:  Completed         Goals Addressed     None          Prior to Admission medications    Medication Sig Start Date End Date Taking? Authorizing Provider   gabapentin (NEURONTIN) 100 MG capsule Take 2 capsules by mouth 3 times daily for 30 days. . 7/25/18 8/24/18  Zara Lozano MD   Dulaglutide (TRULICITY) 5.54 EM/9.3VH SOPN Inject 0.75 mg into the skin once a week DC 1.5 mg 7/9/18   Zara Lozano MD   QUEtiapine (SEROQUEL) 50 MG tablet Take 1 tablet by mouth nightly 7/9/18   Zara Lozano MD   ipratropium-albuterol (DUONEB) 0.5-2.5 (3) MG/3ML SOLN nebulizer solution Inhale 3 mLs into the lungs every 4 hours 7/9/18   Zara Lozano MD   insulin aspart (NOVOLOG) 100 UNIT/ML injection vial Inject 5 Units into the skin 3 times daily (before meals) Sliding scale 7/9/18   Zara Lozano MD   clopidogrel (PLAVIX) 75 MG tablet Take 1 tablet by mouth daily 7/9/18   Zara Lozano MD   albuterol-ipratropium (COMBIVENT RESPIMAT)  MCG/ACT AERS inhaler Inhale 1 puff into the lungs every 6 hours 7/9/18   Zara Turner MD   nitroGLYCERIN (NITROSTAT) 0.4 MG SL tablet up to max of 3 total doses. If no relief after 1 dose, call 911. 7/9/18   Zara Turner MD   insulin glargine (BASAGLAR KWIKPEN) 100 UNIT/ML injection pen Inject 35 Units into the skin nightly Dispense with pen needle 7/9/18   Zara Turner MD   carbamide peroxide (DEBROX) 6.5 % otic solution Place 5 drops into the right ear 2 times daily 7/9/18 8/8/18  Zara Turner MD   gabapentin (NEURONTIN) 300 MG capsule Take 1 capsule by mouth 3 times daily for 30 days.  mg dose. 7/9/18 8/8/18  Zara Turner MD   fluticasone-vilanterol (BREO ELLIPTA) 100-25 MCG/INH AEPB inhaler Inhale 1 puff into the lungs daily 7/9/18   Zara Turner MD   losartan (COZAAR) 100 MG tablet TAKE 1 TABLET BY MOUTH DAILY 6/28/18   Zara Turner MD   amLODIPine (NORVASC) 10 MG tablet TAKE 1 TABLET BY MOUTH DAILY 6/26/18   Zara Turner MD   carvedilol (COREG) 25 MG tablet TAKE 1 TABLET BY MOUTH 2 TIMES DAILY 6/26/18   Zara Turner MD   atorvastatin (LIPITOR) 80 MG tablet TAKE 1 TABLET BY MOUTH DAILY 6/26/18   Zara Turner MD   prazosin (MINIPRESS) 5 MG capsule TAKE 1 CAPSULE BY MOUTH 2 TIMES DAILY DC PREVIOUS SCRIPT 6/26/18   Zara Turner MD   meclizine (ANTIVERT) 12.5 MG tablet TAKE 1 TABLET BY MOUTH 3 TIMES DAILY AS NEEDED 6/26/18   Zara Turner MD   ASPIR-LOW 81 MG EC tablet TAKE 1 TABLET BY MOUTH DAILY 6/26/18   Zara Turner MD   magnesium citrate solution Please dispense one 10oz bottle of Magnesium Citrate. Use as directed for bowel prep 6/21/18   Durga Mcdonough MD   TRUE METRIX BLOOD GLUCOSE TEST strip TEST BLOOD SUGAR 4 TO 5 TIMES DAILY 6/6/18   Shree Moreira MD   predniSONE (DELTASONE) 10 MG tablet Tapered dose 40mg x 3 days. .. 30mg x 3 days. .. 20mg x 3 days . [facility] .10mg x3days . ..then off 2/15/18   Cassie oRss MD   Lancets MISC Dx: DM-2

## 2018-07-27 ENCOUNTER — CARE COORDINATION (OUTPATIENT)
Dept: CARE COORDINATION | Age: 63
End: 2018-07-27

## 2018-07-27 NOTE — CARE COORDINATION
Ambulatory Care Coordination Note  7/27/2018  CM Risk Score: 11  Avril Mortality Risk Score:      ACC: Ly Luna RN    Summary Note: attempted to reach patient and left message on voicemail to return call to [phone]. If patient calls back, inform him of conversation with his PCP regarding the colonoscopy. Because he had colonoscopy last year, with 8-10 polyps, he needs another one this year. Before Dr. Quinn Kenney can give recommendations for insulin dosing he needs to have Rubén Asif keep a log of blood sugar readings and how much insulin he took for a week. Then he will have a better idea of how much insulin he should take during his bowel prep and day of procedure. Care Coordination Interventions    Program Enrollment:  Complex Care  Referral from Primary Care Provider:  Yes  Suggested Interventions and Community Resources  Diabetes Education:  Completed  Medication Assistance Program:  Completed  Zone Management Tools:  Completed         Goals Addressed     None          Prior to Admission medications    Medication Sig Start Date End Date Taking? Authorizing Provider   gabapentin (NEURONTIN) 100 MG capsule Take 2 capsules by mouth 3 times daily for 30 days. . 7/25/18 8/24/18  Zara Aviles MD   Dulaglutide (TRULICITY) 3.99 WN/1.5XF SOPN Inject 0.75 mg into the skin once a week DC 1.5 mg 7/9/18   Zara Aviles MD   QUEtiapine (SEROQUEL) 50 MG tablet Take 1 tablet by mouth nightly 7/9/18   Zara Aviles MD   ipratropium-albuterol (DUONEB) 0.5-2.5 (3) MG/3ML SOLN nebulizer solution Inhale 3 mLs into the lungs every 4 hours 7/9/18   Zara Aviles MD   insulin aspart (NOVOLOG) 100 UNIT/ML injection vial Inject 5 Units into the skin 3 times daily (before meals) Sliding scale 7/9/18   Zara Aviles MD   clopidogrel (PLAVIX) 75 MG tablet Take 1 tablet by mouth daily 7/9/18   Zara Aviles MD   albuterol-ipratropium (COMBIVENT RESPIMAT)  MCG/ACT AERS inhaler Inhale 1 puff into the Chula Soria MD   INS SYRINGE/NEEDLE 1CC/28G 28G X 1/2\" 1 ML MISC Dx: DM-2 use 3-4 times daily 2/7/18   Zara Rocha MD   INSULIN SYRINGE .5CC/28G (INS SYRINGE/NEEDLE .5CC/28G) 28G X 1/2\" 0.5 ML MISC 1 Syringe by Does not apply route 4 times daily 4/7/17   Zara Rocha MD       Future Appointments  Date Time Provider Mahesh Gunter   8/16/2018 3:15 PM Melo Molina MD Resp [facility] [place]   8/16/2018 3:30 PM Melo Molina MD Resp [facility] [place] [facility]   10/10/2018 2:15 PM Zara Rocha MD [facility] IM [place]

## 2018-07-27 NOTE — TELEPHONE ENCOUNTER
PC from pt, pt was told that we can give him an IFIT and cancel the colonoscopy. IFIT mailed to pt with directions highlighted.

## 2018-08-22 ENCOUNTER — TELEPHONE (OUTPATIENT)
Dept: INTERNAL MEDICINE | Age: 63
End: 2018-08-22

## 2018-08-22 ENCOUNTER — HOSPITAL ENCOUNTER (OUTPATIENT)
Age: 63
Discharge: HOME | End: 2018-08-22
Payer: COMMERCIAL

## 2018-08-22 DIAGNOSIS — Z79.4 TYPE 2 DIABETES MELLITUS WITHOUT COMPLICATION, WITH LONG-TERM CURRENT USE OF INSULIN (HCC): Primary | ICD-10-CM

## 2018-08-22 DIAGNOSIS — E11.9 TYPE 2 DIABETES MELLITUS WITHOUT COMPLICATION, WITH LONG-TERM CURRENT USE OF INSULIN (HCC): Primary | ICD-10-CM

## 2018-08-22 PROCEDURE — 83036 HEMOGLOBIN GLYCOSYLATED A1C: CPT | Performed by: INTERNAL MEDICINE

## 2018-08-22 NOTE — TELEPHONE ENCOUNTER
Regina called to check on Surgery Clearance---Patient is scheduled for cataract surgery tomorrow. Advise surgery clearance is not done and Dr. Tye Romero is not here until tomorrow. She will reschedule surgery.

## 2018-08-22 NOTE — TELEPHONE ENCOUNTER
I cannot approve patient surgery.   But he needs to know that because of his uncontrolled diabetes, he has increased risk of infection and difficult wound healing

## 2018-08-24 LAB — HBA1C MFR BLD: 8.9 %

## 2018-08-24 NOTE — TELEPHONE ENCOUNTER
Patient walked in and is upset because he can not have cataract surgery. He said he is running into walls and going blind. He said his bs have been good but doesn't have log, advised to keep a log of BS readings and bring to his next appt.   His HgbA1C is 8.9% today  He would like you to call him

## 2018-09-02 ENCOUNTER — HOSPITAL ENCOUNTER (INPATIENT)
Age: 63
LOS: 3 days | Discharge: HOME | DRG: 918 | End: 2018-09-05
Attending: EMERGENCY MEDICINE | Admitting: INTERNAL MEDICINE
Payer: COMMERCIAL

## 2018-09-02 ENCOUNTER — APPOINTMENT (OUTPATIENT)
Dept: GENERAL RADIOLOGY | Age: 63
DRG: 918 | End: 2018-09-02
Payer: COMMERCIAL

## 2018-09-02 DIAGNOSIS — J44.9 CHRONIC OBSTRUCTIVE PULMONARY DISEASE, UNSPECIFIED COPD TYPE (HCC): ICD-10-CM

## 2018-09-02 DIAGNOSIS — R07.9 CHEST PAIN, UNSPECIFIED TYPE: Primary | ICD-10-CM

## 2018-09-02 DIAGNOSIS — N18.30 CKD (CHRONIC KIDNEY DISEASE) STAGE 3, GFR 30-59 ML/MIN (HCC): ICD-10-CM

## 2018-09-02 DIAGNOSIS — R94.31 ACUTE ELECTROCARDIOGRAM CHANGES: ICD-10-CM

## 2018-09-02 DIAGNOSIS — N17.9 AKI (ACUTE KIDNEY INJURY) (HCC): ICD-10-CM

## 2018-09-02 PROBLEM — I21.4 NSTEMI (NON-ST ELEVATED MYOCARDIAL INFARCTION) (HCC): Status: ACTIVE | Noted: 2018-09-02

## 2018-09-02 LAB
-: NORMAL
ABSOLUTE EOS #: 0.21 K/UL (ref 0–0.44)
ABSOLUTE IMMATURE GRANULOCYTE: 0.03 K/UL (ref 0–0.3)
ABSOLUTE LYMPH #: 1.28 K/UL (ref 1.1–3.7)
ABSOLUTE MONO #: 0.44 K/UL (ref 0.1–1.2)
AMORPHOUS: NORMAL
AMPHETAMINE SCREEN URINE: NEGATIVE
ANION GAP SERPL CALCULATED.3IONS-SCNC: 9 MMOL/L (ref 9–17)
BACTERIA: NORMAL
BARBITURATE SCREEN URINE: NEGATIVE
BASOPHILS # BLD: 0 % (ref 0–2)
BASOPHILS ABSOLUTE: <0.03 K/UL (ref 0–0.2)
BENZODIAZEPINE SCREEN, URINE: NEGATIVE
BILIRUBIN URINE: NEGATIVE
BNP INTERPRETATION: ABNORMAL
BUN BLDV-MCNC: 24 MG/DL (ref 8–23)
BUN/CREAT BLD: ABNORMAL (ref 9–20)
BUPRENORPHINE URINE: ABNORMAL
CALCIUM SERPL-MCNC: 8.7 MG/DL (ref 8.6–10.4)
CANNABINOID SCREEN URINE: NEGATIVE
CASTS UA: NORMAL /LPF (ref 0–8)
CHLORIDE BLD-SCNC: 104 MMOL/L (ref 98–107)
CO2: 23 MMOL/L (ref 20–31)
COCAINE METABOLITE, URINE: POSITIVE
COLOR: YELLOW
COMMENT UA: ABNORMAL
CREAT SERPL-MCNC: 2.05 MG/DL (ref 0.7–1.2)
CREATININE URINE: 143.8 MG/DL (ref 39–259)
CRYSTALS, UA: NORMAL /HPF
DIFFERENTIAL TYPE: ABNORMAL
EOSINOPHILS RELATIVE PERCENT: 4 % (ref 1–4)
EPITHELIAL CELLS UA: NORMAL /HPF (ref 0–5)
GFR AFRICAN AMERICAN: 40 ML/MIN
GFR NON-AFRICAN AMERICAN: 33 ML/MIN
GFR SERPL CREATININE-BSD FRML MDRD: ABNORMAL ML/MIN/{1.73_M2}
GFR SERPL CREATININE-BSD FRML MDRD: ABNORMAL ML/MIN/{1.73_M2}
GLUCOSE BLD-MCNC: 154 MG/DL (ref 75–110)
GLUCOSE BLD-MCNC: 388 MG/DL (ref 70–99)
GLUCOSE URINE: ABNORMAL
HCT VFR BLD CALC: 41.2 % (ref 40.7–50.3)
HEMOGLOBIN: 14.1 G/DL (ref 13–17)
IMMATURE GRANULOCYTES: 1 %
KETONES, URINE: NEGATIVE
LEUKOCYTE ESTERASE, URINE: NEGATIVE
LYMPHOCYTES # BLD: 24 % (ref 24–43)
MCH RBC QN AUTO: 30.6 PG (ref 25.2–33.5)
MCHC RBC AUTO-ENTMCNC: 34.2 G/DL (ref 28.4–34.8)
MCV RBC AUTO: 89.4 FL (ref 82.6–102.9)
MDMA URINE: ABNORMAL
METHADONE SCREEN, URINE: NEGATIVE
METHAMPHETAMINE, URINE: ABNORMAL
MONOCYTES # BLD: 8 % (ref 3–12)
MUCUS: NORMAL
NITRITE, URINE: NEGATIVE
NRBC AUTOMATED: 0 PER 100 WBC
OPIATES, URINE: NEGATIVE
OTHER OBSERVATIONS UA: NORMAL
OXYCODONE SCREEN URINE: NEGATIVE
PARTIAL THROMBOPLASTIN TIME: 22.9 SEC (ref 20.5–30.5)
PARTIAL THROMBOPLASTIN TIME: 42.8 SEC (ref 20.5–30.5)
PDW BLD-RTO: 11.9 % (ref 11.8–14.4)
PH UA: 5.5 (ref 5–8)
PHENCYCLIDINE, URINE: NEGATIVE
PLATELET # BLD: 353 K/UL (ref 138–453)
PLATELET ESTIMATE: ABNORMAL
PMV BLD AUTO: 9.7 FL (ref 8.1–13.5)
POC TROPONIN I: 0.03 NG/ML (ref 0–0.1)
POC TROPONIN INTERP: NORMAL
POTASSIUM SERPL-SCNC: 4.7 MMOL/L (ref 3.7–5.3)
PRO-BNP: 766 PG/ML
PROPOXYPHENE, URINE: ABNORMAL
PROTEIN UA: ABNORMAL
RBC # BLD: 4.61 M/UL (ref 4.21–5.77)
RBC # BLD: ABNORMAL 10*6/UL
RBC UA: NORMAL /HPF (ref 0–4)
RENAL EPITHELIAL, UA: NORMAL /HPF
SEG NEUTROPHILS: 63 % (ref 36–65)
SEGMENTED NEUTROPHILS ABSOLUTE COUNT: 3.42 K/UL (ref 1.5–8.1)
SODIUM BLD-SCNC: 136 MMOL/L (ref 135–144)
SODIUM,UR: 78 MMOL/L
SPECIFIC GRAVITY UA: 1.02 (ref 1–1.03)
TEST INFORMATION: ABNORMAL
TRICHOMONAS: NORMAL
TRICYCLIC ANTIDEPRESSANTS, UR: ABNORMAL
TROPONIN INTERP: NORMAL
TROPONIN T: <0.03 NG/ML
TURBIDITY: CLEAR
URINE HGB: ABNORMAL
UROBILINOGEN, URINE: NORMAL
WBC # BLD: 5.4 K/UL (ref 3.5–11.3)
WBC # BLD: ABNORMAL 10*3/UL
WBC UA: NORMAL /HPF (ref 0–5)
YEAST: NORMAL

## 2018-09-02 PROCEDURE — 6360000002 HC RX W HCPCS: Performed by: EMERGENCY MEDICINE

## 2018-09-02 PROCEDURE — 82570 ASSAY OF URINE CREATININE: CPT

## 2018-09-02 PROCEDURE — 6370000000 HC RX 637 (ALT 250 FOR IP): Performed by: INTERNAL MEDICINE

## 2018-09-02 PROCEDURE — 6370000000 HC RX 637 (ALT 250 FOR IP): Performed by: EMERGENCY MEDICINE

## 2018-09-02 PROCEDURE — 2500000003 HC RX 250 WO HCPCS: Performed by: INTERNAL MEDICINE

## 2018-09-02 PROCEDURE — G0378 HOSPITAL OBSERVATION PER HR: HCPCS

## 2018-09-02 PROCEDURE — 2060000000 HC ICU INTERMEDIATE R&B

## 2018-09-02 PROCEDURE — 96374 THER/PROPH/DIAG INJ IV PUSH: CPT

## 2018-09-02 PROCEDURE — 71046 X-RAY EXAM CHEST 2 VIEWS: CPT

## 2018-09-02 PROCEDURE — 80307 DRUG TEST PRSMV CHEM ANLYZR: CPT

## 2018-09-02 PROCEDURE — 80048 BASIC METABOLIC PNL TOTAL CA: CPT

## 2018-09-02 PROCEDURE — 84484 ASSAY OF TROPONIN QUANT: CPT

## 2018-09-02 PROCEDURE — 83880 ASSAY OF NATRIURETIC PEPTIDE: CPT

## 2018-09-02 PROCEDURE — 84300 ASSAY OF URINE SODIUM: CPT

## 2018-09-02 PROCEDURE — 96376 TX/PRO/DX INJ SAME DRUG ADON: CPT

## 2018-09-02 PROCEDURE — 94762 N-INVAS EAR/PLS OXIMTRY CONT: CPT

## 2018-09-02 PROCEDURE — 36415 COLL VENOUS BLD VENIPUNCTURE: CPT

## 2018-09-02 PROCEDURE — 99285 EMERGENCY DEPT VISIT HI MDM: CPT

## 2018-09-02 PROCEDURE — 93005 ELECTROCARDIOGRAM TRACING: CPT

## 2018-09-02 PROCEDURE — 84443 ASSAY THYROID STIM HORMONE: CPT

## 2018-09-02 PROCEDURE — 94640 AIRWAY INHALATION TREATMENT: CPT

## 2018-09-02 PROCEDURE — 82947 ASSAY GLUCOSE BLOOD QUANT: CPT

## 2018-09-02 PROCEDURE — 2580000003 HC RX 258: Performed by: INTERNAL MEDICINE

## 2018-09-02 PROCEDURE — 96375 TX/PRO/DX INJ NEW DRUG ADDON: CPT

## 2018-09-02 PROCEDURE — 81001 URINALYSIS AUTO W/SCOPE: CPT

## 2018-09-02 PROCEDURE — 2580000003 HC RX 258: Performed by: EMERGENCY MEDICINE

## 2018-09-02 PROCEDURE — 85730 THROMBOPLASTIN TIME PARTIAL: CPT

## 2018-09-02 PROCEDURE — 2500000003 HC RX 250 WO HCPCS: Performed by: EMERGENCY MEDICINE

## 2018-09-02 PROCEDURE — 87086 URINE CULTURE/COLONY COUNT: CPT

## 2018-09-02 PROCEDURE — G0480 DRUG TEST DEF 1-7 CLASSES: HCPCS

## 2018-09-02 PROCEDURE — 83605 ASSAY OF LACTIC ACID: CPT

## 2018-09-02 PROCEDURE — 96365 THER/PROPH/DIAG IV INF INIT: CPT

## 2018-09-02 PROCEDURE — 85025 COMPLETE CBC W/AUTO DIFF WBC: CPT

## 2018-09-02 RX ORDER — ONDANSETRON 4 MG/1
4 TABLET, FILM COATED ORAL ONCE
Status: COMPLETED | OUTPATIENT
Start: 2018-09-02 | End: 2018-09-02

## 2018-09-02 RX ORDER — DEXTROSE MONOHYDRATE 50 MG/ML
100 INJECTION, SOLUTION INTRAVENOUS PRN
Status: DISCONTINUED | OUTPATIENT
Start: 2018-09-02 | End: 2018-09-05 | Stop reason: HOSPADM

## 2018-09-02 RX ORDER — INSULIN GLARGINE 100 [IU]/ML
35 INJECTION, SOLUTION SUBCUTANEOUS NIGHTLY
Status: DISCONTINUED | OUTPATIENT
Start: 2018-09-02 | End: 2018-09-05 | Stop reason: HOSPADM

## 2018-09-02 RX ORDER — NITROGLYCERIN 20 MG/100ML
5 INJECTION INTRAVENOUS CONTINUOUS
Status: DISCONTINUED | OUTPATIENT
Start: 2018-09-02 | End: 2018-09-03

## 2018-09-02 RX ORDER — SODIUM CHLORIDE 0.9 % (FLUSH) 0.9 %
10 SYRINGE (ML) INJECTION EVERY 12 HOURS SCHEDULED
Status: CANCELLED | OUTPATIENT
Start: 2018-09-02

## 2018-09-02 RX ORDER — HEPARIN SODIUM 1000 [USP'U]/ML
2000 INJECTION, SOLUTION INTRAVENOUS; SUBCUTANEOUS PRN
Status: DISCONTINUED | OUTPATIENT
Start: 2018-09-02 | End: 2018-09-04

## 2018-09-02 RX ORDER — CLOPIDOGREL 300 MG/1
300 TABLET, FILM COATED ORAL ONCE
Status: COMPLETED | OUTPATIENT
Start: 2018-09-02 | End: 2018-09-02

## 2018-09-02 RX ORDER — HEPARIN SODIUM 1000 [USP'U]/ML
4000 INJECTION, SOLUTION INTRAVENOUS; SUBCUTANEOUS PRN
Status: DISCONTINUED | OUTPATIENT
Start: 2018-09-02 | End: 2018-09-04

## 2018-09-02 RX ORDER — CARVEDILOL 12.5 MG/1
25 TABLET ORAL 2 TIMES DAILY
Status: CANCELLED | OUTPATIENT
Start: 2018-09-02

## 2018-09-02 RX ORDER — ALBUTEROL SULFATE 2.5 MG/3ML
5 SOLUTION RESPIRATORY (INHALATION)
Status: DISCONTINUED | OUTPATIENT
Start: 2018-09-02 | End: 2018-09-02

## 2018-09-02 RX ORDER — ASPIRIN 81 MG/1
324 TABLET, CHEWABLE ORAL ONCE
Status: COMPLETED | OUTPATIENT
Start: 2018-09-02 | End: 2018-09-02

## 2018-09-02 RX ORDER — ALBUTEROL SULFATE 90 UG/1
2 AEROSOL, METERED RESPIRATORY (INHALATION)
Status: DISCONTINUED | OUTPATIENT
Start: 2018-09-02 | End: 2018-09-02

## 2018-09-02 RX ORDER — AMLODIPINE BESYLATE 10 MG/1
10 TABLET ORAL DAILY
Status: DISCONTINUED | OUTPATIENT
Start: 2018-09-03 | End: 2018-09-05 | Stop reason: HOSPADM

## 2018-09-02 RX ORDER — IPRATROPIUM BROMIDE AND ALBUTEROL SULFATE 2.5; .5 MG/3ML; MG/3ML
1 SOLUTION RESPIRATORY (INHALATION) EVERY 4 HOURS
Status: DISCONTINUED | OUTPATIENT
Start: 2018-09-02 | End: 2018-09-02

## 2018-09-02 RX ORDER — HEPARIN SODIUM 1000 [USP'U]/ML
4000 INJECTION, SOLUTION INTRAVENOUS; SUBCUTANEOUS ONCE
Status: COMPLETED | OUTPATIENT
Start: 2018-09-02 | End: 2018-09-02

## 2018-09-02 RX ORDER — NICOTINE POLACRILEX 4 MG
15 LOZENGE BUCCAL PRN
Status: DISCONTINUED | OUTPATIENT
Start: 2018-09-02 | End: 2018-09-05 | Stop reason: HOSPADM

## 2018-09-02 RX ORDER — NITROGLYCERIN 20 MG/100ML
10 INJECTION INTRAVENOUS CONTINUOUS
Status: DISCONTINUED | OUTPATIENT
Start: 2018-09-02 | End: 2018-09-02

## 2018-09-02 RX ORDER — IPRATROPIUM BROMIDE AND ALBUTEROL SULFATE 2.5; .5 MG/3ML; MG/3ML
1 SOLUTION RESPIRATORY (INHALATION) 4 TIMES DAILY
Status: DISCONTINUED | OUTPATIENT
Start: 2018-09-03 | End: 2018-09-05 | Stop reason: HOSPADM

## 2018-09-02 RX ORDER — SODIUM CHLORIDE 0.9 % (FLUSH) 0.9 %
10 SYRINGE (ML) INJECTION PRN
Status: CANCELLED | OUTPATIENT
Start: 2018-09-02

## 2018-09-02 RX ORDER — HEPARIN SODIUM 10000 [USP'U]/100ML
12 INJECTION, SOLUTION INTRAVENOUS CONTINUOUS
Status: DISCONTINUED | OUTPATIENT
Start: 2018-09-02 | End: 2018-09-03

## 2018-09-02 RX ORDER — SODIUM CHLORIDE 0.9 % (FLUSH) 0.9 %
10 SYRINGE (ML) INJECTION PRN
Status: DISCONTINUED | OUTPATIENT
Start: 2018-09-02 | End: 2018-09-05 | Stop reason: HOSPADM

## 2018-09-02 RX ORDER — ALBUTEROL SULFATE 2.5 MG/3ML
2.5 SOLUTION RESPIRATORY (INHALATION)
Status: DISCONTINUED | OUTPATIENT
Start: 2018-09-02 | End: 2018-09-05 | Stop reason: HOSPADM

## 2018-09-02 RX ORDER — ASPIRIN 81 MG/1
81 TABLET, CHEWABLE ORAL DAILY
Status: DISCONTINUED | OUTPATIENT
Start: 2018-09-03 | End: 2018-09-02

## 2018-09-02 RX ORDER — 0.9 % SODIUM CHLORIDE 0.9 %
1000 INTRAVENOUS SOLUTION INTRAVENOUS ONCE
Status: COMPLETED | OUTPATIENT
Start: 2018-09-02 | End: 2018-09-02

## 2018-09-02 RX ORDER — DEXTROSE MONOHYDRATE 25 G/50ML
12.5 INJECTION, SOLUTION INTRAVENOUS PRN
Status: DISCONTINUED | OUTPATIENT
Start: 2018-09-02 | End: 2018-09-05 | Stop reason: HOSPADM

## 2018-09-02 RX ORDER — ACETAMINOPHEN 325 MG/1
650 TABLET ORAL EVERY 4 HOURS PRN
Status: DISCONTINUED | OUTPATIENT
Start: 2018-09-02 | End: 2018-09-05 | Stop reason: HOSPADM

## 2018-09-02 RX ORDER — ATORVASTATIN CALCIUM 80 MG/1
80 TABLET, FILM COATED ORAL NIGHTLY
Status: DISCONTINUED | OUTPATIENT
Start: 2018-09-02 | End: 2018-09-05 | Stop reason: HOSPADM

## 2018-09-02 RX ORDER — QUETIAPINE FUMARATE 25 MG/1
50 TABLET, FILM COATED ORAL NIGHTLY
Status: DISCONTINUED | OUTPATIENT
Start: 2018-09-02 | End: 2018-09-05 | Stop reason: HOSPADM

## 2018-09-02 RX ORDER — CLOPIDOGREL BISULFATE 75 MG/1
75 TABLET ORAL DAILY
Status: DISCONTINUED | OUTPATIENT
Start: 2018-09-03 | End: 2018-09-03

## 2018-09-02 RX ORDER — HYDRALAZINE HYDROCHLORIDE 20 MG/ML
10 INJECTION INTRAMUSCULAR; INTRAVENOUS EVERY 6 HOURS PRN
Status: DISCONTINUED | OUTPATIENT
Start: 2018-09-02 | End: 2018-09-05 | Stop reason: HOSPADM

## 2018-09-02 RX ORDER — SODIUM CHLORIDE 0.9 % (FLUSH) 0.9 %
10 SYRINGE (ML) INJECTION EVERY 12 HOURS SCHEDULED
Status: DISCONTINUED | OUTPATIENT
Start: 2018-09-02 | End: 2018-09-05 | Stop reason: HOSPADM

## 2018-09-02 RX ORDER — ACETAMINOPHEN 325 MG/1
650 TABLET ORAL EVERY 4 HOURS PRN
Status: CANCELLED | OUTPATIENT
Start: 2018-09-02

## 2018-09-02 RX ORDER — IPRATROPIUM BROMIDE AND ALBUTEROL SULFATE 2.5; .5 MG/3ML; MG/3ML
1 SOLUTION RESPIRATORY (INHALATION)
Status: DISCONTINUED | OUTPATIENT
Start: 2018-09-02 | End: 2018-09-02

## 2018-09-02 RX ORDER — SODIUM CHLORIDE 9 MG/ML
INJECTION, SOLUTION INTRAVENOUS CONTINUOUS
Status: DISCONTINUED | OUTPATIENT
Start: 2018-09-02 | End: 2018-09-04

## 2018-09-02 RX ORDER — ASPIRIN 81 MG/1
81 TABLET, CHEWABLE ORAL DAILY
Status: DISCONTINUED | OUTPATIENT
Start: 2018-09-03 | End: 2018-09-05 | Stop reason: HOSPADM

## 2018-09-02 RX ORDER — ONDANSETRON 2 MG/ML
4 INJECTION INTRAMUSCULAR; INTRAVENOUS EVERY 6 HOURS PRN
Status: DISCONTINUED | OUTPATIENT
Start: 2018-09-02 | End: 2018-09-05 | Stop reason: HOSPADM

## 2018-09-02 RX ADMIN — INSULIN GLARGINE 35 UNITS: 100 INJECTION, SOLUTION SUBCUTANEOUS at 21:29

## 2018-09-02 RX ADMIN — CLOPIDOGREL 300 MG: 300 TABLET, FILM COATED ORAL at 19:06

## 2018-09-02 RX ADMIN — ONDANSETRON HYDROCHLORIDE 4 MG: 4 TABLET, FILM COATED ORAL at 16:49

## 2018-09-02 RX ADMIN — IPRATROPIUM BROMIDE 0.5 MG: 0.5 SOLUTION RESPIRATORY (INHALATION) at 16:46

## 2018-09-02 RX ADMIN — NITROGLYCERIN 10 MCG/MIN: 20 INJECTION INTRAVENOUS at 18:33

## 2018-09-02 RX ADMIN — Medication 10 ML: at 21:28

## 2018-09-02 RX ADMIN — ATORVASTATIN CALCIUM 80 MG: 80 TABLET, FILM COATED ORAL at 21:24

## 2018-09-02 RX ADMIN — Medication 12 UNITS/KG/HR: at 16:53

## 2018-09-02 RX ADMIN — NITROGLYCERIN 20 MCG/MIN: 20 INJECTION INTRAVENOUS at 20:57

## 2018-09-02 RX ADMIN — IPRATROPIUM BROMIDE AND ALBUTEROL SULFATE 3 ML: .5; 3 SOLUTION RESPIRATORY (INHALATION) at 20:47

## 2018-09-02 RX ADMIN — ALBUTEROL SULFATE 5 MG: 5 SOLUTION RESPIRATORY (INHALATION) at 16:46

## 2018-09-02 RX ADMIN — SODIUM CHLORIDE: 9 INJECTION, SOLUTION INTRAVENOUS at 22:04

## 2018-09-02 RX ADMIN — ASPIRIN 324 MG: 81 TABLET ORAL at 16:49

## 2018-09-02 RX ADMIN — HEPARIN SODIUM 4000 UNITS: 1000 INJECTION, SOLUTION INTRAVENOUS; SUBCUTANEOUS at 16:53

## 2018-09-02 RX ADMIN — MOMETASONE FUROATE AND FORMOTEROL FUMARATE DIHYDRATE 2 PUFF: 200; 5 AEROSOL RESPIRATORY (INHALATION) at 20:47

## 2018-09-02 RX ADMIN — SODIUM CHLORIDE 1000 ML: 9 INJECTION, SOLUTION INTRAVENOUS at 17:02

## 2018-09-02 ASSESSMENT — ENCOUNTER SYMPTOMS
COUGH: 0
ABDOMINAL PAIN: 0
VOMITING: 0
CONSTIPATION: 0
SHORTNESS OF BREATH: 1
NAUSEA: 0
DIARRHEA: 0
EYE PAIN: 0
RHINORRHEA: 0

## 2018-09-02 ASSESSMENT — PAIN SCALES - GENERAL: PAINLEVEL_OUTOF10: 5

## 2018-09-02 ASSESSMENT — HEART SCORE: ECG: 1

## 2018-09-02 ASSESSMENT — PAIN DESCRIPTION - LOCATION: LOCATION: CHEST

## 2018-09-02 NOTE — ED NOTES
Pt resting on stretcher, resp even and non labored. Pt updated on plan of care. Pt stated that he has no further needs at this time. Will continue to monitor pt.      Yasmine Elizabeth RN  09/02/18 6008

## 2018-09-02 NOTE — ED PROVIDER NOTES
I performed a history and physical examination of the patient and discussed management with the resident. I reviewed the residents note and agree with the documented findings and plan of care. Any areas of disagreement are noted on the chart. I was personally present for the key portions of any procedures. I have documented in the chart those procedures where I was not present during the key portions. I have reviewed the emergency nurses triage note. I agree with the chief complaint, past medical history, past surgical history, allergies, medications, social and family history as documented unless otherwise noted below. Documentation of the HPI, Physical Exam and Medical Decision Making performed by medical students or scribes is based on my personal performance of the HPI, PE and MDM. For Phys Assistant/ Nurse Practitioner cases/documentation I have personally evaluated this patient and have completed at least one if not all key elements of the E/M (history, physical exam, and MDM). I find the patient's history and physical exam are consistent with the NP/PA documentation. I agree with the care provided, treatment rendered, disposition and followup plan. Additional findings are as noted. Harman Rosales. Cuba Brewer MD  Attending Emergency  Physician      EKG Interpretation    Interpreted by me    Rhythm: normal sinus   Rate: normal  Axis: normal  Ectopy: none  Conduction: normal  ST Segments: no acute change  T Waves: T-wave inversions are noted in leads 1, 2, aVL, with biphasic T-wave in aVF and T wave inversion also noted in leads V5 and V6. Q Waves: none    Clinical Impression: When compared to prior tracing dated 25 January 2018, the T wave abnormalities appear to be new. C/O ONSET OF LEFT UPPER EXTR PAIN EARLIER WHICH RESOLVED, BUT NOW PRESENTING WITH MIDSTERNAL CHEST PRESSURE WITH ASSOC'ED NAUSEA, SOB. NO DIAPHORESIS, EMESIS, COUGH, HEMOPTYSIS. PRESSURE NONRADIATING. NO ABD PAIN, FEVER, CHILLS.  HX OF CAROTID STENOSIS, S/P ENDARTERECTOMY AND STENTING. HX OF CAD, NO PCI/CABG. SX QUIESCENT AT TIME OF MY EXAM.    AWAKE, ALERT, COOP, RESP. LUNGS CLEAR NHAN. NO RALES, RHONCHI, WHEEZES, STRIDOR, RETRACTIONS. CARDIAC-S1S2, RRR, NO MRG. ABD SOFT, NONDISTENDED, NONTENDER. NORMAL BOWEL SOUNDS. CATH 9/27/17 WITHOUT PCI, BUT 10-40%STENOSES IN VARIOUS VESSELS. IMP-CHEST PAIN, UNSTABLE ANGINA. PLAN-HEPARIN, LABWORK, CXR, ADMISSION. Yaa Gross MD  09/02/18 1322      EKG Interpretation    Interpreted by me    Rhythm: Sinus bradycardia  Rate: normal  Axis: normal  Ectopy: none  Conduction: normal  ST Segments: ST segments appear to be appeared to be slightly elevated in leads V1 and V2, when compared with the previous tracing from today. T Waves: T-wave inversions again noted in leads 1, 2, aVL, V5 and V6. Q Waves: none    Clinical Impression:Findings as noted above.   We will communicate this to the cardiology consultant and admitting service             Yaa Gross MD  09/02/18 8340

## 2018-09-02 NOTE — ED NOTES
Writer attempted to call report and was asked to call back.       Jaiden Champagne, RN  09/02/18 8982

## 2018-09-02 NOTE — CARE COORDINATION
Case Management Initial Discharge Plan  Janet Tejeda,         Readmission Risk              Risk of Unplanned Readmission:        0               Met with:patient to discuss discharge plans. Information verified: address, contacts, phone number, , insurance Yes  PCP: Zara Rocha MD  Date of last visit: month     Insurance Provider: Rosalie March     Discharge Planning    Living Arrangements:  Family Members   Support Systems:  Family Members    Home has 1 stories  2 stairs to climb to get into front door, nonestairs to climb to reach second floor  Location of bedroom/bathroom in home main     Patient able to perform ADL's:Independent    Current Services (outpatient & in home) none  DME equipment: none  DME provider: none    [facility]    Potential Assistance Purchasing Medications:  No  Does patient want to participate in local refill/ meds to beds program?  N/A    Potential Assistance Needed:  N/A    Patient agreeable to home care: No  [place] of choice provided:  no    Prior SNF/Rehab Placement and Facility: none  Agreeable to SNF/Rehab: No  [place] of choice provided: n/a   Evaluation: n/a    Expected Discharge date:     Patient expects to be discharged to:  home  Follow Up Appointment: Best Day/ Time:      Transportation provider: family or medical cab   Transportation arrangements needed for discharge: No    Discharge Plan: Plan to discharge to home independently; has established follow up care.          Electronically signed by Rosa Gross RN on 18 at 6:14 PM

## 2018-09-02 NOTE — ED PROVIDER NOTES
polyp lesion snare tq (N/A, 6/27/2017). Social History     Social History    Marital status:      Spouse name: N/A    Number of children: N/A    Years of education: N/A     Occupational History     N/A     Social History Main Topics    Smoking status: Former Smoker     Packs/day: 0.50     Years: 35.00     Types: Cigarettes     Quit date: 7/9/2014    Smokeless tobacco: Never Used    Alcohol use No    Drug use: No    Sexual activity: Not on file     Other Topics Concern    Not on file     Social History Narrative    No narrative on file       Family History   Problem Relation Age of Onset    Cancer Mother     Heart Disease Father        Allergies:  Lisinopril; Ace inhibitors; and Pcn [penicillins]    Home Medications:  Prior to Admission medications    Medication Sig Start Date End Date Taking? Authorizing Provider   gabapentin (NEURONTIN) 100 MG capsule Take 2 capsules by mouth 3 times daily for 30 days. . 7/25/18 8/24/18  Mbonu Corrinne Shiver, MD   Dulaglutide (TRULICITY) 5.97 KD/7.5JL SOPN Inject 0.75 mg into the skin once a week DC 1.5 mg 7/9/18   Mbonu Corrinne Shiver, MD   QUEtiapine (SEROQUEL) 50 MG tablet Take 1 tablet by mouth nightly 7/9/18   Mbonu Corrinne Shiver, MD   ipratropium-albuterol (DUONEB) 0.5-2.5 (3) MG/3ML SOLN nebulizer solution Inhale 3 mLs into the lungs every 4 hours 7/9/18   Mbonu Corrinne Shiver, MD   insulin aspart (NOVOLOG) 100 UNIT/ML injection vial Inject 5 Units into the skin 3 times daily (before meals) Sliding scale 7/9/18   Mbonu Corrinne Shiver, MD   clopidogrel (PLAVIX) 75 MG tablet Take 1 tablet by mouth daily 7/9/18   Mbonu Corrinne Shiver, MD   albuterol-ipratropium (COMBIVENT RESPIMAT)  MCG/ACT AERS inhaler Inhale 1 puff into the lungs every 6 hours 7/9/18   Mbonu Corrinne Shiver, MD   nitroGLYCERIN (NITROSTAT) 0.4 MG SL tablet up to max of 3 total doses.  If no relief after 1 dose, call 911. 7/9/18   Mbonu Corrinne Shiver, MD   insulin glargine (BASAGLAR KWIKPEN) 100 UNIT/ML injection pen Inject 35 Units into the skin nightly Dispense with pen needle 7/9/18   Zara Gonzalez MD   gabapentin (NEURONTIN) 300 MG capsule Take 1 capsule by mouth 3 times daily for 30 days.  mg dose. 7/9/18 8/8/18  Zara Gonzalez MD   fluticasone-vilanterol (BREO ELLIPTA) 100-25 MCG/INH AEPB inhaler Inhale 1 puff into the lungs daily 7/9/18   Zara Gonzalez MD   losartan (COZAAR) 100 MG tablet TAKE 1 TABLET BY MOUTH DAILY 6/28/18   Zara Gonzalez MD   amLODIPine (NORVASC) 10 MG tablet TAKE 1 TABLET BY MOUTH DAILY 6/26/18   Zara Gonzalez MD   carvedilol (COREG) 25 MG tablet TAKE 1 TABLET BY MOUTH 2 TIMES DAILY 6/26/18   Zara Gonzalez MD   atorvastatin (LIPITOR) 80 MG tablet TAKE 1 TABLET BY MOUTH DAILY 6/26/18   Zara Gonzalez MD   prazosin (MINIPRESS) 5 MG capsule TAKE 1 CAPSULE BY MOUTH 2 TIMES DAILY DC PREVIOUS SCRIPT 6/26/18   Zara Gonzalez MD   meclizine (ANTIVERT) 12.5 MG tablet TAKE 1 TABLET BY MOUTH 3 TIMES DAILY AS NEEDED 6/26/18   Zara Gonzalez MD   ASPIR-LOW 81 MG EC tablet TAKE 1 TABLET BY MOUTH DAILY 6/26/18   Zara Gonzalez MD   magnesium citrate solution Please dispense one 10oz bottle of Magnesium Citrate. Use as directed for bowel prep 6/21/18   Og Hagen MD   TRUE METRIX BLOOD GLUCOSE TEST strip TEST BLOOD SUGAR 4 TO 5 TIMES DAILY 6/6/18   Victoria Dougherty MD   predniSONE (DELTASONE) 10 MG tablet Tapered dose 40mg x 3 days. .. 30mg x 3 days. .. 20mg x 3 days . Myrna Duenas .10mg x3days . ..then off 2/15/18   MD Dallas Adorno MISC Dx: DM-2 use 3-4 times daily 2/7/18   Zara Gonzalez MD   INS SYRINGE/NEEDLE 1CC/28G 28G X 1/2\" 1 ML MISC Dx: DM-2 use 3-4 times daily 2/7/18   Zara Gonzalez MD   INSULIN SYRINGE .5CC/28G (INS SYRINGE/NEEDLE .5CC/28G) 28G X 1/2\" 0.5 ML MISC 1 Syringe by Does not apply route 4 times daily 4/7/17   Zara Gonzalez MD       REVIEW OF SYSTEMS    (2-9 systems for level 4, 10 or more for level 5)      Review of Systems cardiac workup, aspirin, treat patient's nausea, and anticipate admission. She does have new flipped T waves and history of CAD. We'll start low-dose heparin. [facility] consult cardiology. Cardiac Cath 9/27/2017    Cardiac Arteries and Lesion Findings     LMCA: Mild irregularities 10-20%. LAD: 30-40% proximal stenosis    LCx: Mild irregularities 10-20%. RCA: 40% mid stenosis    RADIOLOGY:  Xr Chest Standard (2 Vw)    Result Date: 9/2/2018  EXAMINATION: TWO VIEWS OF THE CHEST 9/2/2018 4:40 pm COMPARISON: Chest January 26, 2018. HISTORY: ORDERING SYSTEM PROVIDED HISTORY: chest pain TECHNOLOGIST PROVIDED HISTORY: chest pain FINDINGS: Heart appears normal in size. Lungs are hyperinflated without focal lung consolidation, pneumothorax, pleural effusion or free air. Osseous structures appear grossly intact. COPD changes without acute process.        EKG  EKG Interpretation    Interpreted by emergency department physician    Rhythm: normal sinus   Rate: normal  Axis: normal  Ectopy: none  Conduction: normal  ST Segments: normal  T Waves: inversion in  v5, v6, I, II, aVl and biphasic t wave in aCF  Q Waves: none    Clinical Impression: new T wave inversions when compared to previous EKG    Nic Palm      All EKG's are interpreted by the Emergency Department Physician who either signs or Co-signs this chart in the absence of a cardiologist.    Heart Score    Heart Score for chest pain patients  History: Highly Suspicious  ECG: Non-Specifc repolarization disturbance/LBTB/PM  Patient Age: > 39 and < 65 years  *Risk factors for Atherosclerotic disease: Diabetes Mellitus, Hypercholesterolemia, Hypertension, Coronary Artery Disease  Risk Factors: > 3 Risk factors or history of atherosclerotic disease*  Troponin: < 1X normal limit  Heart Score Total: 6    Score 0 - 3 = 2.5%  MACE over next 6 wks = Discharge home  Score 4 - 6 = 20.3%  MACE over next 6 wks = Obs admit  Score 7 - 10 = 72.7%  MACE over next 6

## 2018-09-03 PROBLEM — I20.0 UNSTABLE ANGINA (HCC): Status: ACTIVE | Noted: 2018-09-02

## 2018-09-03 PROBLEM — F14.90 COCAINE USE: Status: ACTIVE | Noted: 2018-09-03

## 2018-09-03 LAB
ACETAMINOPHEN LEVEL: <5 UG/ML (ref 10–30)
ALBUMIN SERPL-MCNC: 2.9 G/DL (ref 3.5–5.2)
ALBUMIN/GLOBULIN RATIO: 1.2 (ref 1–2.5)
ALP BLD-CCNC: 103 U/L (ref 40–129)
ALT SERPL-CCNC: 25 U/L (ref 5–41)
ANION GAP SERPL CALCULATED.3IONS-SCNC: 10 MMOL/L (ref 9–17)
AST SERPL-CCNC: 21 U/L
BILIRUB SERPL-MCNC: 0.32 MG/DL (ref 0.3–1.2)
BILIRUBIN DIRECT: <0.08 MG/DL
BILIRUBIN, INDIRECT: ABNORMAL MG/DL (ref 0–1)
BUN BLDV-MCNC: 18 MG/DL (ref 8–23)
BUN/CREAT BLD: ABNORMAL (ref 9–20)
CALCIUM SERPL-MCNC: 8.1 MG/DL (ref 8.6–10.4)
CHLORIDE BLD-SCNC: 107 MMOL/L (ref 98–107)
CHOLESTEROL/HDL RATIO: 3.2
CHOLESTEROL: 128 MG/DL
CO2: 22 MMOL/L (ref 20–31)
CREAT SERPL-MCNC: 1.72 MG/DL (ref 0.7–1.2)
CULTURE: NORMAL
ETHANOL PERCENT: <0.01 %
ETHANOL: <10 MG/DL
GFR AFRICAN AMERICAN: 49 ML/MIN
GFR NON-AFRICAN AMERICAN: 40 ML/MIN
GFR SERPL CREATININE-BSD FRML MDRD: ABNORMAL ML/MIN/{1.73_M2}
GFR SERPL CREATININE-BSD FRML MDRD: ABNORMAL ML/MIN/{1.73_M2}
GLOBULIN: ABNORMAL G/DL (ref 1.5–3.8)
GLUCOSE BLD-MCNC: 187 MG/DL (ref 70–99)
GLUCOSE BLD-MCNC: 334 MG/DL (ref 75–110)
GLUCOSE BLD-MCNC: 348 MG/DL (ref 75–110)
GLUCOSE BLD-MCNC: 399 MG/DL (ref 75–110)
HCT VFR BLD CALC: 36.9 % (ref 40.7–50.3)
HDLC SERPL-MCNC: 40 MG/DL
HEMOGLOBIN: 12.5 G/DL (ref 13–17)
LACTIC ACID, WHOLE BLOOD: 1.3 MMOL/L (ref 0.7–2.1)
LACTIC ACID: NORMAL MMOL/L
LDL CHOLESTEROL: 76 MG/DL (ref 0–130)
Lab: NORMAL
MCH RBC QN AUTO: 30.7 PG (ref 25.2–33.5)
MCHC RBC AUTO-ENTMCNC: 33.9 G/DL (ref 28.4–34.8)
MCV RBC AUTO: 90.7 FL (ref 82.6–102.9)
NRBC AUTOMATED: 0 PER 100 WBC
PARTIAL THROMBOPLASTIN TIME: 71.1 SEC (ref 20.5–30.5)
PDW BLD-RTO: 11.9 % (ref 11.8–14.4)
PLATELET # BLD: 304 K/UL (ref 138–453)
PMV BLD AUTO: 9.9 FL (ref 8.1–13.5)
POTASSIUM SERPL-SCNC: 4 MMOL/L (ref 3.7–5.3)
RBC # BLD: 4.07 M/UL (ref 4.21–5.77)
SALICYLATE LEVEL: 1 MG/DL (ref 3–10)
SODIUM BLD-SCNC: 139 MMOL/L (ref 135–144)
SPECIMEN DESCRIPTION: NORMAL
STATUS: NORMAL
TOTAL PROTEIN: 5.3 G/DL (ref 6.4–8.3)
TOXIC TRICYCLIC SC,BLOOD: NEGATIVE
TRIGL SERPL-MCNC: 62 MG/DL
TROPONIN INTERP: NORMAL
TROPONIN INTERP: NORMAL
TROPONIN T: <0.03 NG/ML
TROPONIN T: <0.03 NG/ML
TSH SERPL DL<=0.05 MIU/L-ACNC: 0.76 MIU/L (ref 0.3–5)
VLDLC SERPL CALC-MCNC: ABNORMAL MG/DL (ref 1–30)
WBC # BLD: 6.5 K/UL (ref 3.5–11.3)

## 2018-09-03 PROCEDURE — 6370000000 HC RX 637 (ALT 250 FOR IP): Performed by: INTERNAL MEDICINE

## 2018-09-03 PROCEDURE — 94640 AIRWAY INHALATION TREATMENT: CPT

## 2018-09-03 PROCEDURE — 85027 COMPLETE CBC AUTOMATED: CPT

## 2018-09-03 PROCEDURE — 6370000000 HC RX 637 (ALT 250 FOR IP): Performed by: STUDENT IN AN ORGANIZED HEALTH CARE EDUCATION/TRAINING PROGRAM

## 2018-09-03 PROCEDURE — 96376 TX/PRO/DX INJ SAME DRUG ADON: CPT

## 2018-09-03 PROCEDURE — G0378 HOSPITAL OBSERVATION PER HR: HCPCS

## 2018-09-03 PROCEDURE — 85730 THROMBOPLASTIN TIME PARTIAL: CPT

## 2018-09-03 PROCEDURE — 80061 LIPID PANEL: CPT

## 2018-09-03 PROCEDURE — 2060000000 HC ICU INTERMEDIATE R&B

## 2018-09-03 PROCEDURE — 6360000002 HC RX W HCPCS: Performed by: EMERGENCY MEDICINE

## 2018-09-03 PROCEDURE — 36415 COLL VENOUS BLD VENIPUNCTURE: CPT

## 2018-09-03 PROCEDURE — 84484 ASSAY OF TROPONIN QUANT: CPT

## 2018-09-03 PROCEDURE — 2580000003 HC RX 258: Performed by: INTERNAL MEDICINE

## 2018-09-03 PROCEDURE — 82947 ASSAY GLUCOSE BLOOD QUANT: CPT

## 2018-09-03 PROCEDURE — 94762 N-INVAS EAR/PLS OXIMTRY CONT: CPT

## 2018-09-03 PROCEDURE — 80076 HEPATIC FUNCTION PANEL: CPT

## 2018-09-03 PROCEDURE — 93005 ELECTROCARDIOGRAM TRACING: CPT

## 2018-09-03 PROCEDURE — 80048 BASIC METABOLIC PNL TOTAL CA: CPT

## 2018-09-03 PROCEDURE — 99223 1ST HOSP IP/OBS HIGH 75: CPT | Performed by: INTERNAL MEDICINE

## 2018-09-03 RX ORDER — HYDRALAZINE HYDROCHLORIDE 50 MG/1
50 TABLET, FILM COATED ORAL EVERY 8 HOURS SCHEDULED
Status: DISCONTINUED | OUTPATIENT
Start: 2018-09-03 | End: 2018-09-04

## 2018-09-03 RX ORDER — GABAPENTIN 300 MG/1
300 CAPSULE ORAL 3 TIMES DAILY
Status: DISCONTINUED | OUTPATIENT
Start: 2018-09-03 | End: 2018-09-03

## 2018-09-03 RX ORDER — PRAZOSIN HYDROCHLORIDE 5 MG/1
5 CAPSULE ORAL 2 TIMES DAILY
Status: DISCONTINUED | OUTPATIENT
Start: 2018-09-03 | End: 2018-09-05 | Stop reason: HOSPADM

## 2018-09-03 RX ORDER — ISOSORBIDE MONONITRATE 60 MG/1
60 TABLET, EXTENDED RELEASE ORAL DAILY
Status: DISCONTINUED | OUTPATIENT
Start: 2018-09-03 | End: 2018-09-05 | Stop reason: HOSPADM

## 2018-09-03 RX ORDER — GABAPENTIN 300 MG/1
300 CAPSULE ORAL 2 TIMES DAILY
Status: DISCONTINUED | OUTPATIENT
Start: 2018-09-04 | End: 2018-09-05 | Stop reason: HOSPADM

## 2018-09-03 RX ADMIN — IPRATROPIUM BROMIDE AND ALBUTEROL SULFATE 3 ML: .5; 3 SOLUTION RESPIRATORY (INHALATION) at 22:11

## 2018-09-03 RX ADMIN — IPRATROPIUM BROMIDE AND ALBUTEROL SULFATE 3 ML: .5; 3 SOLUTION RESPIRATORY (INHALATION) at 16:09

## 2018-09-03 RX ADMIN — HEPARIN SODIUM 2000 UNITS: 1000 INJECTION, SOLUTION INTRAVENOUS; SUBCUTANEOUS at 00:22

## 2018-09-03 RX ADMIN — INSULIN GLARGINE 35 UNITS: 100 INJECTION, SOLUTION SUBCUTANEOUS at 20:56

## 2018-09-03 RX ADMIN — ISOSORBIDE MONONITRATE 60 MG: 60 TABLET ORAL at 11:04

## 2018-09-03 RX ADMIN — CLOPIDOGREL 75 MG: 75 TABLET, FILM COATED ORAL at 08:32

## 2018-09-03 RX ADMIN — HYDRALAZINE HYDROCHLORIDE 50 MG: 50 TABLET, FILM COATED ORAL at 20:55

## 2018-09-03 RX ADMIN — AMLODIPINE BESYLATE 10 MG: 10 TABLET ORAL at 08:32

## 2018-09-03 RX ADMIN — ATORVASTATIN CALCIUM 80 MG: 80 TABLET, FILM COATED ORAL at 20:55

## 2018-09-03 RX ADMIN — INSULIN LISPRO 5 UNITS: 100 INJECTION, SOLUTION INTRAVENOUS; SUBCUTANEOUS at 17:21

## 2018-09-03 RX ADMIN — IPRATROPIUM BROMIDE AND ALBUTEROL SULFATE 3 ML: .5; 3 SOLUTION RESPIRATORY (INHALATION) at 08:40

## 2018-09-03 RX ADMIN — MOMETASONE FUROATE AND FORMOTEROL FUMARATE DIHYDRATE 2 PUFF: 200; 5 AEROSOL RESPIRATORY (INHALATION) at 22:11

## 2018-09-03 RX ADMIN — INSULIN LISPRO 4 UNITS: 100 INJECTION, SOLUTION INTRAVENOUS; SUBCUTANEOUS at 12:29

## 2018-09-03 RX ADMIN — MOMETASONE FUROATE AND FORMOTEROL FUMARATE DIHYDRATE 2 PUFF: 200; 5 AEROSOL RESPIRATORY (INHALATION) at 08:40

## 2018-09-03 RX ADMIN — INSULIN LISPRO 2 UNITS: 100 INJECTION, SOLUTION INTRAVENOUS; SUBCUTANEOUS at 20:56

## 2018-09-03 RX ADMIN — IPRATROPIUM BROMIDE AND ALBUTEROL SULFATE 3 ML: .5; 3 SOLUTION RESPIRATORY (INHALATION) at 12:08

## 2018-09-03 RX ADMIN — Medication 10 ML: at 20:55

## 2018-09-03 RX ADMIN — HYDRALAZINE HYDROCHLORIDE 50 MG: 50 TABLET, FILM COATED ORAL at 13:58

## 2018-09-03 RX ADMIN — PRAZOSIN HYDROCHLORIDE 5 MG: 5 CAPSULE ORAL at 17:11

## 2018-09-03 RX ADMIN — SODIUM CHLORIDE: 9 INJECTION, SOLUTION INTRAVENOUS at 23:31

## 2018-09-03 RX ADMIN — ASPIRIN 81 MG: 81 TABLET, CHEWABLE ORAL at 08:32

## 2018-09-03 RX ADMIN — Medication 14 UNITS/KG/HR: at 00:22

## 2018-09-03 RX ADMIN — GABAPENTIN 300 MG: 300 CAPSULE ORAL at 16:06

## 2018-09-03 NOTE — CONSULTS
Zara Lloyd MD   nitroGLYCERIN (NITROSTAT) 0.4 MG SL tablet up to max of 3 total doses. If no relief after 1 dose, call 911. 7/9/18   Zara Lloyd MD   insulin glargine (BASAGLAR KWIKPEN) 100 UNIT/ML injection pen Inject 35 Units into the skin nightly Dispense with pen needle 7/9/18   Zara Lloyd MD   gabapentin (NEURONTIN) 300 MG capsule Take 1 capsule by mouth 3 times daily for 30 days.  mg dose. 7/9/18 8/8/18  Zara Lloyd MD   fluticasone-vilanterol (BREO ELLIPTA) 100-25 MCG/INH AEPB inhaler Inhale 1 puff into the lungs daily 7/9/18   Zara Lloyd MD   losartan (COZAAR) 100 MG tablet TAKE 1 TABLET BY MOUTH DAILY 6/28/18   Zara Lloyd MD   amLODIPine (NORVASC) 10 MG tablet TAKE 1 TABLET BY MOUTH DAILY 6/26/18   Zara Lloyd MD   carvedilol (COREG) 25 MG tablet TAKE 1 TABLET BY MOUTH 2 TIMES DAILY 6/26/18   Zara Lloyd MD   atorvastatin (LIPITOR) 80 MG tablet TAKE 1 TABLET BY MOUTH DAILY 6/26/18   Zara Lloyd MD   prazosin (MINIPRESS) 5 MG capsule TAKE 1 CAPSULE BY MOUTH 2 TIMES DAILY DC PREVIOUS SCRIPT 6/26/18   Zara Lloyd MD   meclizine (ANTIVERT) 12.5 MG tablet TAKE 1 TABLET BY MOUTH 3 TIMES DAILY AS NEEDED 6/26/18   Zara Lloyd MD   ASPIR-LOW 81 MG EC tablet TAKE 1 TABLET BY MOUTH DAILY 6/26/18   Zara Lloyd MD   magnesium citrate solution Please dispense one 10oz bottle of Magnesium Citrate. Use as directed for bowel prep 6/21/18   Kyara Carvalho MD   TRUE METRIX BLOOD GLUCOSE TEST strip TEST BLOOD SUGAR 4 TO 5 TIMES DAILY 6/6/18   Jaja Butt MD   predniSONE (DELTASONE) 10 MG tablet Tapered dose 40mg x 3 days. .. 30mg x 3 days. .. 20mg x 3 days . Clerance Riis .10mg x3days . ..then off 2/15/18   Josefina Oneil MD   Lancets MISC Dx: DM-2 use 3-4 times daily 2/7/18   Zara Lloyd MD   INS SYRINGE/NEEDLE 1CC/28G 28G X 1/2\" 1 ML MISC Dx: DM-2 use 3-4 times daily 2/7/18   Zara Lloyd MD   INSULIN SYRINGE .5CC/28G (INS SYRINGE/NEEDLE nebulizer solution 3 mL, 1 vial, Inhalation, 4x daily    Allergies:  Lisinopril; Ace inhibitors; and Pcn [penicillins]    Social History:   reports that he quit smoking about 4 years ago. His smoking use included Cigarettes. He has a 17.50 pack-year smoking history. He has never used smokeless tobacco. He reports that he does not drink alcohol or use drugs. Family History: family history includes Cancer in his mother; Heart Disease in his father. No h/o sudden cardiac death. No for premature CAD    REVIEW OF SYSTEMS:    · Constitutional: there has been no unanticipated weight loss. There's been No change in energy level, No change in activity level. · Eyes: No visual changes or diplopia. No scleral icterus. · ENT: No Headaches  · Cardiovascular: No cardiac history  · Respiratory: No previous pulmonary problems, No cough  · Gastrointestinal: No abdominal pain. No change in bowel or bladder habits. · Genitourinary: No dysuria, trouble voiding, or hematuria. · Musculoskeletal:  No gait disturbance, No weakness or joint complaints. · Integumentary: No rash or pruritis. · Neurological: No headache, diplopia, change in muscle strength, numbness or tingling. No change in gait, balance, coordination, mood, affect, memory, mentation, behavior. · Psychiatric: No anxiety, or depression. · Endocrine: No temperature intolerance. No excessive thirst, fluid intake, or urination. No tremor. · Hematologic/Lymphatic: No abnormal bruising or bleeding, blood clots or swollen lymph nodes. · Allergic/Immunologic: No nasal congestion or hives. PHYSICAL EXAM:      BP (!) 168/73   Pulse 51   Temp 97.7 °F (36.5 °C) (Oral)   Resp 16   Ht 5' 6\" (1.676 m)   Wt 158 lb 9.6 oz (71.9 kg)   SpO2 97%   BMI 25.60 kg/m²    Constitutional and General Appearance: alert, cooperative, no distress and appears stated age  HEENT: PERRL, no cervical lymphadenopathy. No masses palpable.  Normal oral mucosa  Respiratory:  · Normal

## 2018-09-03 NOTE — PROGRESS NOTES
[address] [facility].    PROGRESS NOTE             Date:   9/3/2018  Patient name:  Misty Benites  Date of admission:  9/2/2018  4:05 PM  MRN:   [id number]  YOB: 1955    CHIEF COMPLAINT     Chief Complaint   Patient presents with    Shortness of Breath    Chest Pain     HISTORY OF PRESENT ILLNESS      9/2/18  The patient is a 59 y.o.  male who is admitted to the hospital for complaints of chest pain that started this morning, described as left-sided, raditing to his left arm and jaw, lasting about 10 minutes and improved with nitro. Initially he took a SL nitro and fell back asleep then woke back up with recurring chest pain, shortness of breath and nausea which also improved after a second nitro. In the ED, EKG showed new t wave inversions and cardiology was consulted and patient was started on a heparin gtt.     Patient had a cardiac cath in 9/2017 which showed nonobstructive CAD for which medical therapy was optimized. He also has history of carotid endarterectomy and stenting in the past and is on ASA, statin, plavix and coreg 25 BID at home.     He has diabetes for which he claims his A1C has improved from 13 to 9 alanna matter of months and is on trulicity, novolog and basaglar at home. He claims he does not follow a carb control diet.     He also takes norvasc and losartan for HTN. He has COPD for which has been compliant with inhalers and received breathing treatments in the ER. He is a former smoker and claims he quit long ago. He denies alcohol or recreational drug use. 9/3/2018  Patient seen and examined. Denies chest pain at this time or overnight. Continues to complain of some nausea but tolerated PO last night. Denies fever, chills, cough, emesis, numbness or tingling. Maintaining saturations on room air, intermittent cough, denies SOB or increased sputum production.     PAST MEDICAL HISTORY       has a past Lisinopril; Ace inhibitors; and Pcn [penicillins]    SOCIAL HISTORY       reports that he quit smoking about 4 years ago. His smoking use included Cigarettes. He has a 17.50 pack-year smoking history. He has never used smokeless tobacco. He reports that he does not drink alcohol or use drugs. FAMILY HISTORY      family history includes Cancer in his mother; Heart Disease in his father. REVIEW OF SYSTEMS      · Constitutional: Negative for Fever, chills  · Cardiovascular: Negative for lightheadedness, no chest pain or SOB at this time, no reported palpitations   · Respiratory: Denies SOB at this time, intermittent cough with no sputum production, no wheezing. · Gastrointestinal: Positive for nausea, no vomiting, change in bowel habits, abdominal pain  · Genitourinary: Negative for change in bladder habits  · Musculoskeletal: Negative for complaints of joint pain   · Neurological: Negative for headache, change in muscle strength numbness/tingling    PHYSICAL EXAM      BP (!) 137/50   Pulse 51   Temp 97.5 °F (36.4 °C) (Oral)   Resp 16   Ht 5' 6\" (1.676 m)   Wt 158 lb 9.6 oz (71.9 kg)   SpO2 97%   BMI 25.60 kg/m²      · General appearance: well nourished  · HEENT: Head: Normocephalic, no lesions, without obvious abnormality. · Lungs: clear to auscultation bilaterally  · Heart: regular rate and rhythm, S1, S2 normal, no murmur  · Abdomen: soft, non-tender; non-distended; no masses,  no organomegaly  · Extremities: extremities normal, atraumatic, no cyanosis or edema  · Neurological:  Reflexes normal and symmetric.  Sensation grossly normal  · Eye no icterus no redness  · Psych-normal affect      DIAGNOSTICS      Laboratory Testing:  CBC:   Recent Labs      09/03/18   0639   WBC  6.5   HGB  12.5*   PLT  304     BMP:    Recent Labs      09/02/18   1628  09/03/18   0639   NA  136  139   K  4.7  4.0   CL  104  107   CO2  23  22   BUN  24*  18   CREATININE  2.05*  1.72*   GLUCOSE  388*  187*     S. Calcium:  Recent Labs      09/03/18   0639   CALCIUM  8.1*     S. Ionized Calcium:No results for input(s): IONCA in the last 72 hours. S. Magnesium:No results for input(s): MG in the last 72 hours. S. Phosphorus:No results for input(s): PHOS in the last 72 hours. S. Glucose:  Recent Labs      09/02/18   2031   POCGLU  154*     Glycosylated hemoglobin A1C: No results for input(s): LABA1C in the last 72 hours. INR: No results for input(s): INR in the last 72 hours. Hepatic functions:   Recent Labs      09/03/18   0639   ALKPHOS  103   ALT  25   AST  21   PROT  5.3*   BILITOT  0.32   BILIDIR  <0.08   LABALBU  2.9*     Pancreatic functions:  Recent Labs      09/02/18   2255   LACTA  NOT REPORTED     S. Lactic Acid:   Recent Labs      09/02/18   2255   LACTA  NOT REPORTED     Cardiac enzymes:  Recent Labs      09/02/18   1616   TROPONINI  0.03     BNP:No results for input(s): BNP in the last 72 hours. Lipid profile:   Recent Labs      09/03/18   0639   CHOL  128   TRIG  62   HDL  40*     Blood Gases: No results found for: PH, PCO2, PO2, HCO3, O2SAT  Thyroid functions:   Lab Results   Component Value Date    TSH 0.76 09/02/2018        Imaging/Diagonstics:    ASSESSMENT     Principal Problem:    Unstable angina (HCC)  Active Problems:    Carotid stenosis, left s/p Endarterectomy    CAD in native artery, nonobstructive    Essential hypertension    DM type 2, uncontrolled, with neuropathy (HCC)    COPD (chronic obstructive pulmonary disease) ([facility] [address]    BINU (acute kidney injury) ([facility] [address]    Chest pain    Cocaine use  Resolved Problems:    CKD (chronic kidney disease) stage 3, GFR 30-59 ml/min        PLAN      1. Rule out ACS untable angina, however chest pain likely secondary to cocaine use--trend troponins, wean nitro gtt as chest pain free, holding BB due to bradycardia and cocaine use, continue ASA, plavix, statin. Await further cardiology recommendations.  TSH wnl.  2. DM2 with last A1C 8.9--continue lantus 35N with low

## 2018-09-03 NOTE — H&P
[address] [facility]    HISTORY AND PHYSICAL EXAMINATION            Date:   9/3/2018  Patient name:  Abby Nazario  Date of admission:  9/2/2018  4:05 PM  MRN:   [id number]  YOB: 1955    CHIEF COMPLAINT     Chief Complaint   Patient presents with    Shortness of Breath    Chest Pain       HISTORY OF PRESENT ILLNESS      The patient is a 59 y.o.  male who is admitted to the hospital for complaints of chest pain that started this morning, described as left-sided, raditing to his left arm and jaw, lasting about 10 minutes and improved with nitro. Initially he took a SL nitro and fell back asleep then woke back up with recurring chest pain, shortness of breath and nausea which also improved after a second nitro. In the ED, EKG showed new t wave inversions and cardiology was consulted and patient was started on a heparin gtt. Patient had a cardiac cath in 9/2017 which showed nonobstructive CAD for which medical therapy was optimized. He also has history of carotid endarterectomy and stenting in the past and is on ASA, statin, plavix and coreg 25 BID at home. He has diabetes for which he claims his A1C has improved from 13 to 9 alanna matter of months and is on trulicity, novolog and basaglar at home. He claims he does not follow a carb control diet. He also takes norvasc and losartan for HTN. He has COPD for which has been compliant with inhalers and received breathing treatments in the ER. He is a former smoker and claims he quit long ago. He denies alcohol or recreational drug use. PAST MEDICAL HISTORY       has a past medical history of Asthma; CAD in native artery, nonobstructive; Carotid stenosis, left; Carpal tunnel syndrome; Cerebrovascular disease; Chronic kidney disease; COPD (chronic obstructive pulmonary disease) ([facility] [address] Diabetes mellitus ([facility] [address] History of colon polyps;  History of weakness of extremity; HTN FAMILY HISTORY      family history includes Cancer in his mother; Heart Disease in his father. REVIEW OF SYSTEMS      · Constitutional: Negative for Fever, chills  · Cardiovascular: Negative for lightheadedness ,+ chest pain and dyspnea, no reported palpitations   · Respiratory: Positive for SOB associated with chest pain, Negative for cough or wheezing. · Gastrointestinal: Positive for nausea, no vomiting, change in bowel habits, abdominal pain  · Genitourinary:Negative for change in bladder habits, dysuria, hematuria. · Musculoskeletal: Negative for complaints of joint pain   · Neurological: Negative for headache, dizziness, reported change in muscle strength numbness/tingling    PHYSICAL EXAM      BP (!) 137/50   Pulse 51   Temp 97.5 °F (36.4 °C) (Oral)   Resp 16   Ht 5' 6\" (1.676 m)   Wt 158 lb 9.6 oz (71.9 kg)   SpO2 97%   BMI 25.60 kg/m²      · General appearance: awake, alert, cooperative, comfortable   · HEENT: Head: Normocephalic, no lesions, without obvious abnormality. · Lungs: clear to auscultation bilaterally, appears comfortable on room air, no respiratory distress or increased work of breathing. No wheezing or crackles noted. · Heart: regular rate and rhythm, S1, S2 normal  · Abdomen: soft, non-tender; bowel sounds normal  · Extremities: extremities normal, atraumatic, no cyanosis or edema  · Neurological:  Awake, alert. Cranial nerves II-XII are grossly intact. Sensation grossly normal  · Eye no icterus no redness noted  · Psych-normal affect         DIAGNOSTICS      Laboratory Testing:  CBC:   Recent Labs      09/02/18   1628   WBC  5.4   HGB  14.1   PLT  353     BMP:    Recent Labs      09/02/18   1628   NA  136   K  4.7   CL  104   CO2  23   BUN  24*   CREATININE  2.05*   GLUCOSE  388*     S. Calcium:  Recent Labs      09/02/18   1628   CALCIUM  8.7     S. Ionized Calcium:No results for input(s): IONCA in the last 72 hours.   S. Magnesium:No results for input(s): MG in the last 72 for HTN. 5. BINU on CKD 3, likely prerenal--gentle IVF hydration, trend Cr. FeNa. Strict intake/output. 6. COPD, stable. COntinue bronchodilators, albuterol PRN. Supplemental O2 prn.  7. Former smoker  8. Carotid stenosis s/p endarterectomy and stenting--continue ASA, plavix, statin. 9. DVT prophylaxis--on heparin  10.  DC planning--PT/OT/SW      Luis Angel Jovel MD  PGY-3, Internal medicine resident  [facility], [place], [place]

## 2018-09-03 NOTE — PLAN OF CARE
Problem: Falls - Risk of:  Goal: Will remain free from falls  Will remain free from falls   Outcome: Not Met This Shift    Goal: Absence of physical injury  Absence of physical injury   Outcome: Ongoing      Problem: Cardiac Output - Decreased:  Goal: Hemodynamic stability will improve  Hemodynamic stability will improve   Outcome: Ongoing

## 2018-09-03 NOTE — FLOWSHEET NOTE
Patient said he was having some chest pain and he took his own sl ntg  - He said it worked so I perfect served the MD and requested an order for this as well as his coreg, minipres, and neurontin per pts request

## 2018-09-04 ENCOUNTER — APPOINTMENT (OUTPATIENT)
Dept: NUCLEAR MEDICINE | Age: 63
DRG: 918 | End: 2018-09-04
Payer: COMMERCIAL

## 2018-09-04 LAB
ANION GAP SERPL CALCULATED.3IONS-SCNC: 11 MMOL/L (ref 9–17)
BUN BLDV-MCNC: 14 MG/DL (ref 8–23)
BUN/CREAT BLD: ABNORMAL (ref 9–20)
CALCIUM SERPL-MCNC: 8.4 MG/DL (ref 8.6–10.4)
CHLORIDE BLD-SCNC: 111 MMOL/L (ref 98–107)
CO2: 21 MMOL/L (ref 20–31)
CREAT SERPL-MCNC: 1.57 MG/DL (ref 0.7–1.2)
GFR AFRICAN AMERICAN: 55 ML/MIN
GFR NON-AFRICAN AMERICAN: 45 ML/MIN
GFR SERPL CREATININE-BSD FRML MDRD: ABNORMAL ML/MIN/{1.73_M2}
GFR SERPL CREATININE-BSD FRML MDRD: ABNORMAL ML/MIN/{1.73_M2}
GLUCOSE BLD-MCNC: 146 MG/DL (ref 75–110)
GLUCOSE BLD-MCNC: 174 MG/DL (ref 75–110)
GLUCOSE BLD-MCNC: 195 MG/DL (ref 75–110)
GLUCOSE BLD-MCNC: 206 MG/DL (ref 70–99)
GLUCOSE BLD-MCNC: 279 MG/DL (ref 75–110)
GLUCOSE BLD-MCNC: 283 MG/DL (ref 75–110)
LV EF: 53 %
LV EF: 55 %
LVEF MODALITY: NORMAL
LVEF MODALITY: NORMAL
POTASSIUM SERPL-SCNC: 4.1 MMOL/L (ref 3.7–5.3)
SODIUM BLD-SCNC: 143 MMOL/L (ref 135–144)

## 2018-09-04 PROCEDURE — 93306 TTE W/DOPPLER COMPLETE: CPT

## 2018-09-04 PROCEDURE — 6370000000 HC RX 637 (ALT 250 FOR IP): Performed by: HOSPITALIST

## 2018-09-04 PROCEDURE — 36415 COLL VENOUS BLD VENIPUNCTURE: CPT

## 2018-09-04 PROCEDURE — 82947 ASSAY GLUCOSE BLOOD QUANT: CPT

## 2018-09-04 PROCEDURE — 6370000000 HC RX 637 (ALT 250 FOR IP): Performed by: STUDENT IN AN ORGANIZED HEALTH CARE EDUCATION/TRAINING PROGRAM

## 2018-09-04 PROCEDURE — 3430000000 HC RX DIAGNOSTIC RADIOPHARMACEUTICAL: Performed by: INTERNAL MEDICINE

## 2018-09-04 PROCEDURE — 6370000000 HC RX 637 (ALT 250 FOR IP): Performed by: INTERNAL MEDICINE

## 2018-09-04 PROCEDURE — A9500 TC99M SESTAMIBI: HCPCS | Performed by: INTERNAL MEDICINE

## 2018-09-04 PROCEDURE — 6360000002 HC RX W HCPCS: Performed by: INTERNAL MEDICINE

## 2018-09-04 PROCEDURE — 94640 AIRWAY INHALATION TREATMENT: CPT

## 2018-09-04 PROCEDURE — 2060000000 HC ICU INTERMEDIATE R&B

## 2018-09-04 PROCEDURE — 80048 BASIC METABOLIC PNL TOTAL CA: CPT

## 2018-09-04 PROCEDURE — G0378 HOSPITAL OBSERVATION PER HR: HCPCS

## 2018-09-04 PROCEDURE — 93017 CV STRESS TEST TRACING ONLY: CPT

## 2018-09-04 PROCEDURE — 78452 HT MUSCLE IMAGE SPECT MULT: CPT

## 2018-09-04 PROCEDURE — 2580000003 HC RX 258: Performed by: INTERNAL MEDICINE

## 2018-09-04 PROCEDURE — 99233 SBSQ HOSP IP/OBS HIGH 50: CPT | Performed by: INTERNAL MEDICINE

## 2018-09-04 RX ORDER — SODIUM CHLORIDE 9 MG/ML
INJECTION, SOLUTION INTRAVENOUS CONTINUOUS
Status: DISCONTINUED | OUTPATIENT
Start: 2018-09-04 | End: 2018-09-05 | Stop reason: HOSPADM

## 2018-09-04 RX ORDER — NITROGLYCERIN 0.4 MG/1
0.4 TABLET SUBLINGUAL EVERY 5 MIN PRN
Status: DISCONTINUED | OUTPATIENT
Start: 2018-09-04 | End: 2018-09-04 | Stop reason: ALTCHOICE

## 2018-09-04 RX ORDER — SODIUM CHLORIDE 0.9 % (FLUSH) 0.9 %
10 SYRINGE (ML) INJECTION PRN
Status: DISCONTINUED | OUTPATIENT
Start: 2018-09-04 | End: 2018-09-04 | Stop reason: ALTCHOICE

## 2018-09-04 RX ORDER — METOPROLOL TARTRATE 5 MG/5ML
2.5 INJECTION INTRAVENOUS PRN
Status: DISCONTINUED | OUTPATIENT
Start: 2018-09-04 | End: 2018-09-04 | Stop reason: ALTCHOICE

## 2018-09-04 RX ORDER — SODIUM CHLORIDE 0.9 % (FLUSH) 0.9 %
10 SYRINGE (ML) INJECTION PRN
Status: DISCONTINUED | OUTPATIENT
Start: 2018-09-04 | End: 2018-09-05 | Stop reason: HOSPADM

## 2018-09-04 RX ORDER — SODIUM CHLORIDE 9 MG/ML
INJECTION, SOLUTION INTRAVENOUS ONCE
Status: DISCONTINUED | OUTPATIENT
Start: 2018-09-04 | End: 2018-09-05 | Stop reason: HOSPADM

## 2018-09-04 RX ORDER — AMINOPHYLLINE DIHYDRATE 25 MG/ML
100 INJECTION, SOLUTION INTRAVENOUS
Status: DISCONTINUED | OUTPATIENT
Start: 2018-09-04 | End: 2018-09-04 | Stop reason: RX

## 2018-09-04 RX ADMIN — IPRATROPIUM BROMIDE AND ALBUTEROL SULFATE 3 ML: .5; 3 SOLUTION RESPIRATORY (INHALATION) at 21:42

## 2018-09-04 RX ADMIN — IPRATROPIUM BROMIDE AND ALBUTEROL SULFATE 3 ML: .5; 3 SOLUTION RESPIRATORY (INHALATION) at 07:27

## 2018-09-04 RX ADMIN — MOMETASONE FUROATE AND FORMOTEROL FUMARATE DIHYDRATE 2 PUFF: 200; 5 AEROSOL RESPIRATORY (INHALATION) at 21:42

## 2018-09-04 RX ADMIN — INSULIN LISPRO 2 UNITS: 100 INJECTION, SOLUTION INTRAVENOUS; SUBCUTANEOUS at 19:08

## 2018-09-04 RX ADMIN — Medication 10 ML: at 09:42

## 2018-09-04 RX ADMIN — PRAZOSIN HYDROCHLORIDE 5 MG: 5 CAPSULE ORAL at 12:09

## 2018-09-04 RX ADMIN — ISOSORBIDE MONONITRATE 60 MG: 60 TABLET ORAL at 12:09

## 2018-09-04 RX ADMIN — MOMETASONE FUROATE AND FORMOTEROL FUMARATE DIHYDRATE 2 PUFF: 200; 5 AEROSOL RESPIRATORY (INHALATION) at 07:27

## 2018-09-04 RX ADMIN — TETRAKIS(2-METHOXYISOBUTYLISOCYANIDE)COPPER(I) TETRAFLUOROBORATE 39 MILLICURIE: 1 INJECTION, POWDER, LYOPHILIZED, FOR SOLUTION INTRAVENOUS at 09:48

## 2018-09-04 RX ADMIN — GABAPENTIN 300 MG: 300 CAPSULE ORAL at 22:06

## 2018-09-04 RX ADMIN — HYDRALAZINE HYDROCHLORIDE 75 MG: 25 TABLET ORAL at 14:10

## 2018-09-04 RX ADMIN — INSULIN GLARGINE 35 UNITS: 100 INJECTION, SOLUTION SUBCUTANEOUS at 22:07

## 2018-09-04 RX ADMIN — SODIUM CHLORIDE, PRESERVATIVE FREE 10 ML: 5 INJECTION INTRAVENOUS at 09:48

## 2018-09-04 RX ADMIN — HYDRALAZINE HYDROCHLORIDE 75 MG: 25 TABLET ORAL at 22:08

## 2018-09-04 RX ADMIN — GABAPENTIN 300 MG: 300 CAPSULE ORAL at 12:09

## 2018-09-04 RX ADMIN — AMLODIPINE BESYLATE 10 MG: 10 TABLET ORAL at 12:10

## 2018-09-04 RX ADMIN — TETRAKIS(2-METHOXYISOBUTYLISOCYANIDE)COPPER(I) TETRAFLUOROBORATE 15.1 MILLICURIE: 1 INJECTION, POWDER, LYOPHILIZED, FOR SOLUTION INTRAVENOUS at 07:40

## 2018-09-04 RX ADMIN — REGADENOSON 0.4 MG: 0.08 INJECTION, SOLUTION INTRAVENOUS at 09:48

## 2018-09-04 RX ADMIN — HYDRALAZINE HYDROCHLORIDE 50 MG: 50 TABLET, FILM COATED ORAL at 05:47

## 2018-09-04 RX ADMIN — INSULIN LISPRO 6 UNITS: 100 INJECTION, SOLUTION INTRAVENOUS; SUBCUTANEOUS at 13:27

## 2018-09-04 RX ADMIN — ASPIRIN 81 MG: 81 TABLET, CHEWABLE ORAL at 12:10

## 2018-09-04 RX ADMIN — PRAZOSIN HYDROCHLORIDE 5 MG: 5 CAPSULE ORAL at 19:27

## 2018-09-04 RX ADMIN — IPRATROPIUM BROMIDE AND ALBUTEROL SULFATE 3 ML: .5; 3 SOLUTION RESPIRATORY (INHALATION) at 16:41

## 2018-09-04 RX ADMIN — ATORVASTATIN CALCIUM 80 MG: 80 TABLET, FILM COATED ORAL at 22:06

## 2018-09-04 RX ADMIN — SODIUM CHLORIDE, PRESERVATIVE FREE 10 ML: 5 INJECTION INTRAVENOUS at 07:40

## 2018-09-04 NOTE — PROCEDURES
[address] [address]                                CARDIAC STRESS TEST    PATIENT NAME: Wesley Nevarez                    :        1955  MED REC NO:   [id number]                             ROOM:       3027  ACCOUNT NO:   [de-identified]                           ADMIT DATE: 2018  PROVIDER:     [facility] STRESS STUDY    DATE OF STUDY:  2018  ORDERING PROVIDER: CARRIE Smith  PRIMARY CARE PROVIDER:  Jemma Wayne  INDICATION: Chest pain  CONSENT:  The test was explained and consent was signed. PROTOCOL: Lexiscan, 0.4 mg infused. PREINFUSION EKG: Abnormal-sinus bradycardia, inferolateral ST and T wave  changes noted. PREINFUSION HR:  56 bpm, infusion HR, 77 bpm.  HR response to Lexiscan was  Normal.  PREINFUSION BP: 179/63 mmHg, infusion BP, 192/65 mmHg. BP response to  Lexiscan was appropriate. CHEST PAIN:  No chest discomfort with Lexiscan nor in recovery. LEXISCAN EKG:  No changes were noted. ISCHEMIC EKG CHANGES:  Uninterpretable    IMPRESSION:  Electrocardiographically Uninterpretable  Lexiscan stress study.   **Cardiolite report issued from the department of Nuclear Medicine**      Migue Cai    D: 2018 15:04:52       T: 2018 15:06:37     PAYTON/KORINA  Job#: [id number]     Doc#: Unknown

## 2018-09-04 NOTE — PROGRESS NOTES
syndrome; Cerebrovascular disease; Chronic kidney disease; COPD (chronic obstructive pulmonary disease) ([facility] [address] Diabetes mellitus ([facility] [address] History of colon polyps; History of weakness of extremity; HTN (hypertension); Hyperlipidemia; Lung, cysts, congenital; Neuropathy; PTSD (post-traumatic stress disorder); TIA (transient ischemic attack); and Type II or unspecified type diabetes mellitus without mention of complication, not stated as uncontrolled. PAST SURGICAL HISTORY       has a past surgical history that includes hernia repair; Tonsillectomy; Colonoscopy; Carotid endarterectomy (Left, 7-2013); vascular surgery (Left, 2015); and pr colsc flx w/rmvl of tumor polyp lesion snare tq (N/A, 6/27/2017). HOME MEDICATIONS        Prior to Admission medications    Medication Sig Start Date End Date Taking? Authorizing Provider   gabapentin (NEURONTIN) 100 MG capsule Take 2 capsules by mouth 3 times daily for 30 days. . 7/25/18 8/24/18  Zara Turner MD   Dulaglutide (TRULICITY) 3.50 RH/8.4NJ SOPN Inject 0.75 mg into the skin once a week DC 1.5 mg 7/9/18   Zara Turner MD   QUEtiapine (SEROQUEL) 50 MG tablet Take 1 tablet by mouth nightly 7/9/18   Zara Turner MD   ipratropium-albuterol (DUONEB) 0.5-2.5 (3) MG/3ML SOLN nebulizer solution Inhale 3 mLs into the lungs every 4 hours 7/9/18   Zara Turner MD   insulin aspart (NOVOLOG) 100 UNIT/ML injection vial Inject 5 Units into the skin 3 times daily (before meals) Sliding scale 7/9/18   Zara Turner MD   clopidogrel (PLAVIX) 75 MG tablet Take 1 tablet by mouth daily 7/9/18   Zara Turner MD   albuterol-ipratropium (COMBIVENT RESPIMAT)  MCG/ACT AERS inhaler Inhale 1 puff into the lungs every 6 hours 7/9/18   Zara Turner MD   nitroGLYCERIN (NITROSTAT) 0.4 MG SL tablet up to max of 3 total doses.  If no relief after 1 dose, call 911. 7/9/18   Zara Turner MD   insulin glargine (BASAGLAR KWIKPEN) 100 UNIT/ML injection pen Inject 35 about 4 years ago. His smoking use included Cigarettes. He has a 17.50 pack-year smoking history. He has never used smokeless tobacco. He reports that he does not drink alcohol or use drugs. FAMILY HISTORY      family history includes Cancer in his mother; Heart Disease in his father. REVIEW OF SYSTEMS      · Constitutional: Negative for Fever, chills  · Cardiovascular: Negative for lightheadedness, no chest pain or SOB at this time, no reported palpitations   · Respiratory: Denies SOB at this time, intermittent cough with no sputum production, no wheezing. · Gastrointestinal: Positive for nausea, no vomiting, change in bowel habits, abdominal pain  · Genitourinary: Negative for change in bladder habits  · Musculoskeletal: Negative for complaints of joint pain   · Neurological: Negative for headache, change in muscle strength numbness/tingling    PHYSICAL EXAM      BP (!) 182/86 Comment: RN notified  Pulse 63   Temp 97.6 °F (36.4 °C) (Axillary)   Resp 18   Ht 5' 6\" (1.676 m)   Wt 160 lb (72.6 kg)   SpO2 99%   BMI 25.82 kg/m²      · General appearance: well nourished  · HEENT: Head: Normocephalic, no lesions, without obvious abnormality. · Lungs: clear to auscultation bilaterally  · Heart: regular rate and rhythm, S1, S2 normal, no murmur  · Abdomen: soft, non-tender; non-distended; no masses,  no organomegaly  · Extremities: extremities normal, atraumatic, no cyanosis or edema  · Neurological:  Reflexes normal and symmetric. Sensation grossly normal  · Eye no icterus no redness  · Psych-normal affect      DIAGNOSTICS      Laboratory Testing:  CBC:   Recent Labs      09/03/18   0639   WBC  6.5   HGB  12.5*   PLT  304     BMP:    Recent Labs      09/02/18   1628  09/03/18   0639   NA  136  139   K  4.7  4.0   CL  104  107   CO2  23  22   BUN  24*  18   CREATININE  2.05*  1.72*   GLUCOSE  388*  187*     S. Calcium:  Recent Labs      09/03/18   0639   CALCIUM  8.1*     S.  Ionized Calcium:No results for

## 2018-09-04 NOTE — PLAN OF CARE
Problem: Falls - Risk of:  Goal: Will remain free from falls  Will remain free from falls   Outcome: Ongoing      Problem: Cardiac Output - Decreased:  Goal: Hemodynamic stability will improve  Hemodynamic stability will improve   Outcome: Ongoing

## 2018-09-05 ENCOUNTER — TELEPHONE (OUTPATIENT)
Dept: INTERNAL MEDICINE | Age: 63
End: 2018-09-05

## 2018-09-05 VITALS
SYSTOLIC BLOOD PRESSURE: 142 MMHG | TEMPERATURE: 98.4 F | BODY MASS INDEX: 27.48 KG/M2 | HEIGHT: 66 IN | RESPIRATION RATE: 16 BRPM | WEIGHT: 171 LBS | OXYGEN SATURATION: 97 % | DIASTOLIC BLOOD PRESSURE: 51 MMHG | HEART RATE: 73 BPM

## 2018-09-05 PROBLEM — I20.0 UNSTABLE ANGINA (HCC): Status: RESOLVED | Noted: 2018-09-02 | Resolved: 2018-09-05

## 2018-09-05 PROBLEM — R07.9 CHEST PAIN: Status: RESOLVED | Noted: 2018-09-02 | Resolved: 2018-09-05

## 2018-09-05 LAB
ANION GAP SERPL CALCULATED.3IONS-SCNC: 12 MMOL/L (ref 9–17)
BUN BLDV-MCNC: 15 MG/DL (ref 8–23)
BUN/CREAT BLD: ABNORMAL (ref 9–20)
CALCIUM SERPL-MCNC: 7.9 MG/DL (ref 8.6–10.4)
CHLORIDE BLD-SCNC: 110 MMOL/L (ref 98–107)
CO2: 19 MMOL/L (ref 20–31)
CREAT SERPL-MCNC: 1.63 MG/DL (ref 0.7–1.2)
EKG ATRIAL RATE: 48 BPM
EKG ATRIAL RATE: 48 BPM
EKG ATRIAL RATE: 65 BPM
EKG ATRIAL RATE: 66 BPM
EKG P AXIS: 72 DEGREES
EKG P AXIS: 74 DEGREES
EKG P AXIS: 77 DEGREES
EKG P AXIS: 77 DEGREES
EKG P-R INTERVAL: 136 MS
EKG P-R INTERVAL: 138 MS
EKG P-R INTERVAL: 140 MS
EKG P-R INTERVAL: 142 MS
EKG Q-T INTERVAL: 366 MS
EKG Q-T INTERVAL: 376 MS
EKG Q-T INTERVAL: 436 MS
EKG Q-T INTERVAL: 450 MS
EKG QRS DURATION: 108 MS
EKG QRS DURATION: 90 MS
EKG QRS DURATION: 92 MS
EKG QRS DURATION: 94 MS
EKG QTC CALCULATION (BAZETT): 383 MS
EKG QTC CALCULATION (BAZETT): 389 MS
EKG QTC CALCULATION (BAZETT): 391 MS
EKG QTC CALCULATION (BAZETT): 402 MS
EKG R AXIS: -85 DEGREES
EKG R AXIS: -87 DEGREES
EKG R AXIS: 21 DEGREES
EKG R AXIS: 51 DEGREES
EKG T AXIS: -156 DEGREES
EKG T AXIS: -159 DEGREES
EKG T AXIS: -162 DEGREES
EKG T AXIS: 179 DEGREES
EKG VENTRICULAR RATE: 48 BPM
EKG VENTRICULAR RATE: 48 BPM
EKG VENTRICULAR RATE: 65 BPM
EKG VENTRICULAR RATE: 66 BPM
GFR AFRICAN AMERICAN: 52 ML/MIN
GFR NON-AFRICAN AMERICAN: 43 ML/MIN
GFR SERPL CREATININE-BSD FRML MDRD: ABNORMAL ML/MIN/{1.73_M2}
GFR SERPL CREATININE-BSD FRML MDRD: ABNORMAL ML/MIN/{1.73_M2}
GLUCOSE BLD-MCNC: 232 MG/DL (ref 75–110)
GLUCOSE BLD-MCNC: 307 MG/DL (ref 70–99)
GLUCOSE BLD-MCNC: 80 MG/DL (ref 75–110)
POTASSIUM SERPL-SCNC: 4.3 MMOL/L (ref 3.7–5.3)
SODIUM BLD-SCNC: 141 MMOL/L (ref 135–144)

## 2018-09-05 PROCEDURE — 94640 AIRWAY INHALATION TREATMENT: CPT

## 2018-09-05 PROCEDURE — G0378 HOSPITAL OBSERVATION PER HR: HCPCS

## 2018-09-05 PROCEDURE — 76937 US GUIDE VASCULAR ACCESS: CPT

## 2018-09-05 PROCEDURE — 6370000000 HC RX 637 (ALT 250 FOR IP): Performed by: INTERNAL MEDICINE

## 2018-09-05 PROCEDURE — 82947 ASSAY GLUCOSE BLOOD QUANT: CPT

## 2018-09-05 PROCEDURE — 80048 BASIC METABOLIC PNL TOTAL CA: CPT

## 2018-09-05 PROCEDURE — 99239 HOSP IP/OBS DSCHRG MGMT >30: CPT | Performed by: INTERNAL MEDICINE

## 2018-09-05 PROCEDURE — 36415 COLL VENOUS BLD VENIPUNCTURE: CPT

## 2018-09-05 PROCEDURE — 6370000000 HC RX 637 (ALT 250 FOR IP): Performed by: STUDENT IN AN ORGANIZED HEALTH CARE EDUCATION/TRAINING PROGRAM

## 2018-09-05 PROCEDURE — 6370000000 HC RX 637 (ALT 250 FOR IP): Performed by: HOSPITALIST

## 2018-09-05 RX ORDER — ISOSORBIDE MONONITRATE 60 MG/1
60 TABLET, EXTENDED RELEASE ORAL DAILY
Qty: 30 TABLET | Refills: 0 | Status: SHIPPED | OUTPATIENT
Start: 2018-09-06 | End: 2018-09-12 | Stop reason: SDUPTHER

## 2018-09-05 RX ORDER — HYDRALAZINE HYDROCHLORIDE 50 MG/1
75 TABLET, FILM COATED ORAL EVERY 8 HOURS SCHEDULED
Qty: 135 TABLET | Refills: 0 | Status: SHIPPED | OUTPATIENT
Start: 2018-09-05 | End: 2018-09-12 | Stop reason: SDUPTHER

## 2018-09-05 RX ADMIN — GABAPENTIN 300 MG: 300 CAPSULE ORAL at 08:58

## 2018-09-05 RX ADMIN — HYDRALAZINE HYDROCHLORIDE 75 MG: 25 TABLET ORAL at 15:00

## 2018-09-05 RX ADMIN — PRAZOSIN HYDROCHLORIDE 5 MG: 5 CAPSULE ORAL at 08:59

## 2018-09-05 RX ADMIN — ASPIRIN 81 MG: 81 TABLET, CHEWABLE ORAL at 08:59

## 2018-09-05 RX ADMIN — AMLODIPINE BESYLATE 10 MG: 10 TABLET ORAL at 08:58

## 2018-09-05 RX ADMIN — HYDRALAZINE HYDROCHLORIDE 75 MG: 25 TABLET ORAL at 06:10

## 2018-09-05 RX ADMIN — ISOSORBIDE MONONITRATE 60 MG: 60 TABLET ORAL at 08:58

## 2018-09-05 RX ADMIN — MOMETASONE FUROATE AND FORMOTEROL FUMARATE DIHYDRATE 2 PUFF: 200; 5 AEROSOL RESPIRATORY (INHALATION) at 08:23

## 2018-09-05 RX ADMIN — IPRATROPIUM BROMIDE AND ALBUTEROL SULFATE 3 ML: .5; 3 SOLUTION RESPIRATORY (INHALATION) at 08:23

## 2018-09-05 RX ADMIN — INSULIN LISPRO 4 UNITS: 100 INJECTION, SOLUTION INTRAVENOUS; SUBCUTANEOUS at 09:00

## 2018-09-05 NOTE — PROGRESS NOTES
obstructive pulmonary disease) (HCC)     BINU (acute kidney injury) ([facility] [address]     Hx of carotid stenosis     Cerebral vascular disease     Cerebrovascular disease     Chest pain     Unstable angina ([facility] [address]     Cocaine use      Plan of Treatment:   1. Negative stress test.  ECHO reviewed. No signs of fluid overload. No further cardiac workup at this time. 2. Discussed not using cocaine   3. Continue CCb, ASA, Statin, imdur and hydralazine. No BB due to Cocaine use. 4. Will sign off.       Electronically signed by TERRANCE Pierson CNP on 9/5/2018 at 10:42 AM  [address]  [phone]

## 2018-09-05 NOTE — PROGRESS NOTES
RESPIMAT)  MCG/ACT AERS inhaler Inhale 1 puff into the lungs every 6 hours 7/9/18   Zara Flores MD   nitroGLYCERIN (NITROSTAT) 0.4 MG SL tablet up to max of 3 total doses. If no relief after 1 dose, call 911. 7/9/18   Zara Flores MD   insulin glargine (BASAGLAR KWIKPEN) 100 UNIT/ML injection pen Inject 35 Units into the skin nightly Dispense with pen needle 7/9/18   Zara Flores MD   fluticasone-vilanterol (BREO ELLIPTA) 100-25 MCG/INH AEPB inhaler Inhale 1 puff into the lungs daily 7/9/18   Zara Flores MD   amLODIPine (NORVASC) 10 MG tablet TAKE 1 TABLET BY MOUTH DAILY 6/26/18   Zara Flores MD   atorvastatin (LIPITOR) 80 MG tablet TAKE 1 TABLET BY MOUTH DAILY 6/26/18   Zara Flores MD   prazosin (MINIPRESS) 5 MG capsule TAKE 1 CAPSULE BY MOUTH 2 TIMES DAILY DC PREVIOUS SCRIPT 6/26/18   Zara Flores MD   meclizine (ANTIVERT) 12.5 MG tablet TAKE 1 TABLET BY MOUTH 3 TIMES DAILY AS NEEDED 6/26/18   Zara Flores MD   ASPIR-LOW 81 MG EC tablet TAKE 1 TABLET BY MOUTH DAILY 6/26/18   Zara Flores MD   magnesium citrate solution Please dispense one 10oz bottle of Magnesium Citrate. Use as directed for bowel prep 6/21/18   Esme Chan MD   TRUE METRIX BLOOD GLUCOSE TEST strip TEST BLOOD SUGAR 4 TO 5 TIMES DAILY 6/6/18   Ezekiel Cheung MD   Lancets MISC Dx: DM-2 use 3-4 times daily 2/7/18   Zara Flores MD   INS SYRINGE/NEEDLE 1CC/28G 28G X 1/2\" 1 ML MISC Dx: DM-2 use 3-4 times daily 2/7/18   Zara Flores MD   INSULIN SYRINGE .5CC/28G (INS SYRINGE/NEEDLE .5CC/28G) 28G X 1/2\" 0.5 ML MISC 1 Syringe by Does not apply route 4 times daily 4/7/17   Zara Flores MD       ALLERGIES      Lisinopril; Ace inhibitors; and Pcn [penicillins]    SOCIAL HISTORY       reports that he quit smoking about 4 years ago. His smoking use included Cigarettes. He has a 17.50 pack-year smoking history.  He has never used smokeless tobacco. He reports that he does not drink

## 2018-09-05 NOTE — DISCHARGE INSTR - DIET

## 2018-09-05 NOTE — CARE COORDINATION
Discharge [address] [facility]  Clinical Case Management Department  Written by: Sheyla Edge RN    Patient Name: Chavo Chong  Attending Provider: No att. providers found  Admit Date: 2018  4:05 PM  MRN: [id number]  Account: [de-identified]                     : 1955  Discharge Date: 2018      Disposition: home    Sheyla Edge RN

## 2018-09-05 NOTE — PROGRESS NOTES
BRONCHOSPASM/BRONCHOCONSTRICTION     [x]         IMPROVE AERATION/BREATH SOUNDS  [x]   ADMINISTER BRONCHODILATOR THERAPY AS APPROPRIATE  [x]   ASSESS BREATH SOUNDS  [x]   IMPLEMENT AEROSOL/MDI PROTOCOL  [x]   PATIENT EDUCATION AS NEEDED    PATIENT REFUSES TO WEAR BIPAP     [x] Risks and benefits explained to patient   [x] Patient refuses to wear Bipap stating im good without it  [x] Patient verbalizes understanding of information presented.

## 2018-09-05 NOTE — PLAN OF CARE
Problem: Pain:  Goal: Patient's pain/discomfort is manageable  Patient's pain/discomfort is manageable  Outcome: Ongoing      Problem: Infection:  Goal: Will remain free from infection  Will remain free from infection  Outcome: Ongoing      Problem: Safety:  Goal: Free from accidental physical injury  Free from accidental physical injury  Outcome: Ongoing      Problem: Daily Care:  Goal: Daily care needs are met  Daily care needs are met  Outcome: Ongoing

## 2018-09-06 ENCOUNTER — CARE COORDINATION (OUTPATIENT)
Dept: CARE COORDINATION | Age: 63
End: 2018-09-06

## 2018-09-06 ENCOUNTER — CARE COORDINATION (OUTPATIENT)
Dept: CASE MANAGEMENT | Age: 63
End: 2018-09-06

## 2018-09-06 NOTE — CARE COORDINATION
Ambulatory Care Coordination Note  9/6/2018  CM Risk Score: 11  Avril Mortality Risk Score:      ACC: Denia Buchanan RN    Summary Note: Reviewed chart for CC follow up. Patient was discharged from hospital yesterday and is currently being followed by Care Transitions. Has hospital follow up next Wed 9/12. Will see in the office next week. Care Coordination Interventions    Program Enrollment:  Complex Care  Referral from Primary Care Provider:  Yes  Suggested Interventions and Community Resources  Diabetes Education:  Completed  Medication Assistance Program:  Completed  Zone Management Tools:  Completed         Goals Addressed     None          Prior to Admission medications    Medication Sig Start Date End Date Taking? Authorizing Provider   hydrALAZINE (APRESOLINE) 50 MG tablet Take 1.5 tablets by mouth every 8 hours 9/5/18 10/5/18  Sridhar Newton MD   isosorbide mononitrate (IMDUR) 60 MG extended release tablet Take 1 tablet by mouth daily 9/6/18   Sridhar Newton MD   gabapentin (NEURONTIN) 100 MG capsule Take 2 capsules by mouth 3 times daily for 30 days. . 7/25/18 8/24/18  Zara Kunz MD   Dulaglutide (TRULICITY) 3.21 VH/6.7QM SOPN Inject 0.75 mg into the skin once a week DC 1.5 mg 7/9/18   Zara Kunz MD   QUEtiapine (SEROQUEL) 50 MG tablet Take 1 tablet by mouth nightly 7/9/18   Zara Kunz MD   ipratropium-albuterol (DUONEB) 0.5-2.5 (3) MG/3ML SOLN nebulizer solution Inhale 3 mLs into the lungs every 4 hours 7/9/18   Zara Kunz MD   insulin aspart (NOVOLOG) 100 UNIT/ML injection vial Inject 5 Units into the skin 3 times daily (before meals) Sliding scale 7/9/18   Zara Kunz MD   clopidogrel (PLAVIX) 75 MG tablet Take 1 tablet by mouth daily 7/9/18   Zara Kunz MD   albuterol-ipratropium (COMBIVENT RESPIMAT)  MCG/ACT AERS inhaler Inhale 1 puff into the lungs every 6 hours 7/9/18   Zara Kunz MD   nitroGLYCERIN (NITROSTAT) 0.4 MG SL

## 2018-09-06 NOTE — CARE COORDINATION
[facility] Initial Follow Up Call    Call within 2 business days of discharge: Yes    Patient: Darby Tang Patient : 1955   MRN: <[id number]>  Reason for Admission: There are no discharge diagnoses documented for the most recent discharge. Discharge Date: 18 RARS: Readmission Risk Score: 28      Facility:  [facility] Transitions 24 Hour Call    Do you have all of your prescriptions and are they filled?:  Yes  Patient Home Equipment:  Nebulizer  Do you have support at home?:  Partner/Spouse/SO  Are you an active caregiver in your home?:  No  Care Transitions Interventions         Follow Up : One time transitional call completed as per Medicaid Protocol. Home number is invalid, left a voice message on the mobile number with my contact information for return call. Will sign off for care transitions.    Future Appointments  Date Time Provider Mahesh Gunter   2018 1:30 PM Jose Cruz Rocha MD [id number]   10/10/2018 2:15 PM Zara Lawson MD [facility] Alex Leon RN

## 2018-09-07 ENCOUNTER — CARE COORDINATION (OUTPATIENT)
Dept: CARE COORDINATION | Age: 63
End: 2018-09-07

## 2018-09-07 NOTE — DISCHARGE SUMMARY
[address] [place] [address]     Department of Internal Medicine - Staff Internal Medicine Service    INPATIENT DISCHARGE SUMMARY        Patient Identification:  Srikanth Rea is a 59 y.o. male. :  1955  MRN: [id number]     Acct: [de-identified]   Admit Date:  2018  Discharge date and time: 2018  4:43 PM   Attending Provider: Dasha att. providers found                                     Admission Diagnoses:   Chest pain [R07.9]    Discharge Diagnoses:   Principal Problem (Resolved):    Unstable angina ([facility] [address]  Active Problems:    Carotid stenosis, left s/p Endarterectomy    CAD in native artery, nonobstructive    Essential hypertension    DM type 2, uncontrolled, with neuropathy ([facility] [address]    COPD (chronic obstructive pulmonary disease) ([facility] [address]    BINU (acute kidney injury) ([facility] [address]    Cocaine use  Resolved Problems:    CKD (chronic kidney disease) stage 3, GFR 30-59 ml/min    Chest pain     Consults:   cardiology    Brief Inpatient course:    Patient admitted to the hospital with complaints of chest pain earlier that morning improved with nitroglycerin. EKG in the ER showed new T-wave inversions and cardiology was consulted and started the patient on a heparin drip. Patient was found to be cocaine positive. Stress test was completed which was negative. Chest pain attributed to cocaine-induced vasospasm, Coreg was discontinued due to bradycardia admission along with cocaine positive drug screen. Patient was therefore started on Imdur and hydralazine for blood pressure control along with his Norvasc. Abstinence from cocaine use was advised. She discharged with recommendations to follow-up with PCP in 1 week and cardiology as outpatient.      Procedures:  Stress test    Any Hospital Acquired Infections: none    Discharge Functional Status:  stable    Disposition: home    Patient Instructions:   Discharge Medication List as of 2018  4:28 PM      START taking these medications    Details   hydrALAZINE (APRESOLINE) 50 MG tablet Take 1.5 tablets by mouth every 8 hours, Disp-135 tablet, R-0Normal      isosorbide mononitrate (IMDUR) 60 MG extended release tablet Take 1 tablet by mouth daily, Disp-30 tablet, R-0Normal         CONTINUE these medications which have NOT CHANGED    Details   gabapentin (NEURONTIN) 100 MG capsule Take 2 capsules by mouth 3 times daily for 30 days. ., Disp-180 capsule, R-0Normal      Dulaglutide (TRULICITY) 1.06 XE/1.6DY SOPN Inject 0.75 mg into the skin once a week DC 1.5 mg, Disp-4 pen, R-2Normal      QUEtiapine (SEROQUEL) 50 MG tablet Take 1 tablet by mouth nightly, Disp-50 tablet, R-2Normal      ipratropium-albuterol (DUONEB) 0.5-2.5 (3) MG/3ML SOLN nebulizer solution Inhale 3 mLs into the lungs every 4 hours, Disp-360 mL, R-2Normal      insulin aspart (NOVOLOG) 100 UNIT/ML injection vial Inject 5 Units into the skin 3 times daily (before meals) Sliding scale, Disp-3 vial, R-2Normal      clopidogrel (PLAVIX) 75 MG tablet Take 1 tablet by mouth daily, Disp-90 tablet, R-2Normal      albuterol-ipratropium (COMBIVENT RESPIMAT)  MCG/ACT AERS inhaler Inhale 1 puff into the lungs every 6 hours, Disp-1 Inhaler, R-2Normal      nitroGLYCERIN (NITROSTAT) 0.4 MG SL tablet up to max of 3 total doses.  If no relief after 1 dose, call 911., Disp-25 tablet, R-2Normal      insulin glargine (BASAGLAR KWIKPEN) 100 UNIT/ML injection pen Inject 35 Units into the skin nightly Dispense with pen needle, Disp-5 pen, R-2Normal      fluticasone-vilanterol (BREO ELLIPTA) 100-25 MCG/INH AEPB inhaler Inhale 1 puff into the lungs daily, Disp-1 each, R-2Normal      amLODIPine (NORVASC) 10 MG tablet TAKE 1 TABLET BY MOUTH DAILY, Disp-30 tablet, R-2Normal      atorvastatin (LIPITOR) 80 MG tablet TAKE 1 TABLET BY MOUTH DAILY, Disp-30 tablet, R-2Normal      prazosin (MINIPRESS) 5 MG capsule TAKE 1 CAPSULE BY MOUTH 2 TIMES DAILY DC PREVIOUS SCRIPT, Disp-60 capsule, R-2Normal      meclizine (ANTIVERT) 12.5 MG tablet

## 2018-09-12 ENCOUNTER — OFFICE VISIT (OUTPATIENT)
Dept: INTERNAL MEDICINE | Age: 63
End: 2018-09-12
Payer: COMMERCIAL

## 2018-09-12 ENCOUNTER — CARE COORDINATION (OUTPATIENT)
Dept: CARE COORDINATION | Age: 63
End: 2018-09-12

## 2018-09-12 VITALS
BODY MASS INDEX: 26.63 KG/M2 | HEART RATE: 88 BPM | DIASTOLIC BLOOD PRESSURE: 72 MMHG | WEIGHT: 165 LBS | SYSTOLIC BLOOD PRESSURE: 150 MMHG

## 2018-09-12 DIAGNOSIS — F51.01 PRIMARY INSOMNIA: ICD-10-CM

## 2018-09-12 DIAGNOSIS — I10 ESSENTIAL HYPERTENSION: ICD-10-CM

## 2018-09-12 DIAGNOSIS — R22.31 NODULE OF FINGER OF RIGHT HAND: ICD-10-CM

## 2018-09-12 DIAGNOSIS — R11.0 NAUSEA: ICD-10-CM

## 2018-09-12 DIAGNOSIS — J41.1 MUCOPURULENT CHRONIC BRONCHITIS (HCC): ICD-10-CM

## 2018-09-12 DIAGNOSIS — F14.90 COCAINE USE: ICD-10-CM

## 2018-09-12 DIAGNOSIS — E78.01 FAMILIAL HYPERCHOLESTEROLEMIA: ICD-10-CM

## 2018-09-12 DIAGNOSIS — Z86.79 HX OF CAROTID STENOSIS: ICD-10-CM

## 2018-09-12 DIAGNOSIS — I25.10 CAD IN NATIVE ARTERY: Primary | ICD-10-CM

## 2018-09-12 DIAGNOSIS — J41.8 MIXED SIMPLE AND MUCOPURULENT CHRONIC BRONCHITIS (HCC): ICD-10-CM

## 2018-09-12 DIAGNOSIS — N17.9 AKI (ACUTE KIDNEY INJURY) (HCC): ICD-10-CM

## 2018-09-12 DIAGNOSIS — I65.22 CAROTID STENOSIS, LEFT: ICD-10-CM

## 2018-09-12 PROCEDURE — 99211 OFF/OP EST MAY X REQ PHY/QHP: CPT | Performed by: INTERNAL MEDICINE

## 2018-09-12 PROCEDURE — 1111F DSCHRG MED/CURRENT MED MERGE: CPT | Performed by: STUDENT IN AN ORGANIZED HEALTH CARE EDUCATION/TRAINING PROGRAM

## 2018-09-12 PROCEDURE — 99496 TRANSJ CARE MGMT HIGH F2F 7D: CPT | Performed by: STUDENT IN AN ORGANIZED HEALTH CARE EDUCATION/TRAINING PROGRAM

## 2018-09-12 RX ORDER — HYDRALAZINE HYDROCHLORIDE 50 MG/1
75 TABLET, FILM COATED ORAL EVERY 8 HOURS SCHEDULED
Qty: 135 TABLET | Refills: 0 | Status: CANCELLED | OUTPATIENT
Start: 2018-09-12 | End: 2018-10-12

## 2018-09-12 RX ORDER — FLUTICASONE FUROATE AND VILANTEROL 100; 25 UG/1; UG/1
1 POWDER RESPIRATORY (INHALATION) DAILY
Qty: 1 EACH | Refills: 2 | Status: SHIPPED | OUTPATIENT
Start: 2018-09-12 | End: 2019-02-19 | Stop reason: SDUPTHER

## 2018-09-12 RX ORDER — PRAZOSIN HYDROCHLORIDE 5 MG/1
5 CAPSULE ORAL 2 TIMES DAILY
Qty: 60 CAPSULE | Refills: 2 | Status: SHIPPED | OUTPATIENT
Start: 2018-09-12 | End: 2018-12-29 | Stop reason: SDUPTHER

## 2018-09-12 RX ORDER — QUETIAPINE FUMARATE 50 MG/1
50 TABLET, FILM COATED ORAL NIGHTLY
Qty: 50 TABLET | Refills: 2 | Status: CANCELLED | OUTPATIENT
Start: 2018-09-12

## 2018-09-12 RX ORDER — ISOSORBIDE MONONITRATE 120 MG/1
120 TABLET, EXTENDED RELEASE ORAL DAILY
Qty: 30 TABLET | Refills: 2 | Status: SHIPPED | OUTPATIENT
Start: 2018-09-12 | End: 2018-12-11 | Stop reason: SDUPTHER

## 2018-09-12 RX ORDER — HYDRALAZINE HYDROCHLORIDE 100 MG/1
100 TABLET, FILM COATED ORAL 3 TIMES DAILY
Qty: 90 TABLET | Refills: 2 | Status: SHIPPED | OUTPATIENT
Start: 2018-09-12 | End: 2019-02-19

## 2018-09-12 RX ORDER — ASPIRIN 81 MG/1
81 TABLET ORAL DAILY
Qty: 30 TABLET | Refills: 2 | Status: SHIPPED | OUTPATIENT
Start: 2018-09-12 | End: 2018-12-29 | Stop reason: SDUPTHER

## 2018-09-12 RX ORDER — ONDANSETRON 4 MG/1
4 TABLET, FILM COATED ORAL DAILY PRN
Qty: 30 TABLET | Refills: 0 | Status: SHIPPED | OUTPATIENT
Start: 2018-09-12 | End: 2019-02-19

## 2018-09-12 RX ORDER — NITROGLYCERIN 0.4 MG/1
TABLET SUBLINGUAL
Qty: 25 TABLET | Refills: 2 | Status: SHIPPED | OUTPATIENT
Start: 2018-09-12 | End: 2019-02-19 | Stop reason: SDUPTHER

## 2018-09-12 RX ORDER — CLOPIDOGREL BISULFATE 75 MG/1
75 TABLET ORAL DAILY
Qty: 90 TABLET | Refills: 2 | Status: SHIPPED | OUTPATIENT
Start: 2018-09-12 | End: 2019-02-19 | Stop reason: SDUPTHER

## 2018-09-12 RX ORDER — IPRATROPIUM BROMIDE AND ALBUTEROL SULFATE 2.5; .5 MG/3ML; MG/3ML
1 SOLUTION RESPIRATORY (INHALATION) EVERY 4 HOURS
Qty: 360 ML | Refills: 2 | Status: SHIPPED | OUTPATIENT
Start: 2018-09-12 | End: 2019-02-19 | Stop reason: SDUPTHER

## 2018-09-12 RX ORDER — AMLODIPINE BESYLATE 10 MG/1
10 TABLET ORAL DAILY
Qty: 30 TABLET | Refills: 2 | Status: SHIPPED | OUTPATIENT
Start: 2018-09-12 | End: 2018-12-29 | Stop reason: SDUPTHER

## 2018-09-12 RX ORDER — ATORVASTATIN CALCIUM 80 MG/1
80 TABLET, FILM COATED ORAL DAILY
Qty: 30 TABLET | Refills: 2 | Status: SHIPPED | OUTPATIENT
Start: 2018-09-12 | End: 2018-12-29 | Stop reason: SDUPTHER

## 2018-09-12 ASSESSMENT — ENCOUNTER SYMPTOMS
CHEST TIGHTNESS: 0
COUGH: 0
SHORTNESS OF BREATH: 0
ABDOMINAL DISTENTION: 0
ABDOMINAL PAIN: 0
EYES NEGATIVE: 1
ALLERGIC/IMMUNOLOGIC NEGATIVE: 1
WHEEZING: 0

## 2018-09-12 NOTE — PROGRESS NOTES
ATTENDING PHYSICIAN STATEMENT     I have discussed the care of Bri Bazzi, including pertinent history and exam findings with the resident. I have reviewed the key elements of all parts of the encounter with the resident. I have seen and examined the patient with the resident and the key elements of all parts of the encounter have been performed by me. I agree with the assessment, and status of the problem list as documented. Additional Comments:  Age and is here for post hospital visit. He was admitted for chest pain. He had a stress test done which was negative. There is a history of cocaine use also. His creatinine was elevated therefore Ace and diuretics were stopped. His blood pressure is elevated today. We'll increase Imdur to 120 mg daily and hydralazine 200 mg daily. We will continue Norvasc 10 mg daily  We will continue to hold losartan at this time. We will repeat BMP to monitor creatinine. His A1c is 8.9. He is on Basaglar 35 units daily. He is reluctant to increase his insulin. We'll increase his Trulicity to 1.5 mg weekly. Continue other medications as before. The plan and orders should include   Diagnosis Orders   1. CAD in native artery, nonobstructive  isosorbide mononitrate (IMDUR) 60 MG extended release tablet    nitroGLYCERIN (NITROSTAT) 0.4 MG SL tablet    atorvastatin (LIPITOR) 80 MG tablet    IA DISCHARGE MEDS RECONCILED W/ CURRENT OUTPATIENT MED LIST   2. Nausea  ondansetron (ZOFRAN) 4 MG tablet   3. DM type 2, uncontrolled, with neuropathy (HCC)  Basic Metabolic Panel    Dulaglutide (TRULICITY) 5.38 XE/6.3QP SOPN    insulin aspart (NOVOLOG) 100 UNIT/ML injection vial    insulin glargine (BASAGLAR KWIKPEN) 100 UNIT/ML injection pen    Microalbumin, Ur    IA DISCHARGE MEDS RECONCILED W/ CURRENT OUTPATIENT MED LIST   4. Carotid stenosis, left s/p Endarterectomy  clopidogrel (PLAVIX) 75 MG tablet    aspirin (ASPIR-LOW) 81 MG EC tablet   5.  Essential hypertension

## 2018-09-12 NOTE — PROGRESS NOTES
cancer screen colonoscopy  06/27/2018    Flu vaccine (1) 09/01/2018    Diabetic foot exam  10/30/2018    A1C test (Diabetic or Prediabetic)  08/24/2019    Lipid screen  09/03/2019    Potassium monitoring  09/05/2019    Creatinine monitoring  09/05/2019    DTaP/Tdap/Td vaccine (2 - Td) 12/14/2023    Pneumococcal med risk  Completed    Hepatitis C screen  Completed    HIV screen  Completed

## 2018-09-12 NOTE — PROGRESS NOTES
Post-Discharge Transitional Care Management Services or Hospital Follow Up      Gwendolyn Eckert   YOB: 1955    Date of Office Visit:  9/12/2018  Date of Hospital Admission: 9/2/18  Date of Hospital Discharge: 9/5/18  Readmission Risk Score(high >=14%.  Medium >=10%):Readmission Risk Score: 28    Care management risk score Rising risk (score 2-5) and Complex Care (Scores >=6): 11     Non face to face  following discharge, date last encounter closed (first attempt may have been earlier): 9/7/2018  3:11 PM 9/7/2018  3:11 PM    Call initiated 2 business days of discharge: Yes     Patient Active Problem List   Diagnosis    Cigarette nicotine dependence without complication    Carpal tunnel syndrome on right    Colon polyps    Carotid stenosis, left s/p Endarterectomy    Mixed simple and mucopurulent chronic bronchitis ([address]    Pure hypercholesterolemia    CAD in native artery, nonobstructive    PTSD (post-traumatic stress disorder)    Transient cerebral ischemia    Essential hypertension    DM type 2, uncontrolled, with neuropathy ([address]    COPD (chronic obstructive pulmonary disease) ([address]    BINU (acute kidney injury) ([address]    Hx of carotid stenosis    Cerebral vascular disease    Cerebrovascular disease    Cocaine use       Allergies   Allergen Reactions    Lisinopril      cough    Ace Inhibitors      coughing    Pcn [Penicillins]        Medications listed as ordered at the time of discharge from hospital   Jakub, Radha [facility] Medication Instructions [id number]    Printed on:09/12/18 2196   Medication Information                      albuterol-ipratropium (COMBIVENT RESPIMAT)  MCG/ACT AERS inhaler  Inhale 1 puff into the lungs every 6 hours             amLODIPine (NORVASC) 10 MG tablet  TAKE 1 TABLET BY MOUTH DAILY             ASPIR-LOW 81 MG EC tablet  TAKE 1 TABLET BY MOUTH DAILY             atorvastatin (LIPITOR) 80 MG tablet  TAKE 1 TABLET BY MOUTH DAILY clopidogrel (PLAVIX) 75 MG tablet  Take 1 tablet by mouth daily             Dulaglutide (TRULICITY) 3.53 SV/9.0MI SOPN  Inject 0.75 mg into the skin once a week DC 1.5 mg             fluticasone-vilanterol (BREO ELLIPTA) 100-25 MCG/INH AEPB inhaler  Inhale 1 puff into the lungs daily             gabapentin (NEURONTIN) 100 MG capsule  Take 2 capsules by mouth 3 times daily for 30 days. .             hydrALAZINE (APRESOLINE) 50 MG tablet  Take 1.5 tablets by mouth every 8 hours             INS SYRINGE/NEEDLE 1CC/28G 28G X 1/2\" 1 ML MISC  Dx: DM-2 use 3-4 times daily             insulin aspart (NOVOLOG) 100 UNIT/ML injection vial  Inject 5 Units into the skin 3 times daily (before meals) Sliding scale             insulin glargine (BASAGLAR KWIKPEN) 100 UNIT/ML injection pen  Inject 35 Units into the skin nightly Dispense with pen needle             INSULIN SYRINGE .5CC/28G (INS SYRINGE/NEEDLE .5CC/28G) 28G X 1/2\" 0.5 ML MISC  1 Syringe by Does not apply route 4 times daily             ipratropium-albuterol (DUONEB) 0.5-2.5 (3) MG/3ML SOLN nebulizer solution  Inhale 3 mLs into the lungs every 4 hours             isosorbide mononitrate (IMDUR) 60 MG extended release tablet  Take 1 tablet by mouth daily             Lancets MISC  Dx: DM-2 use 3-4 times daily             magnesium citrate solution  Please dispense one 10oz bottle of Magnesium Citrate. Use as directed for bowel prep             meclizine (ANTIVERT) 12.5 MG tablet  TAKE 1 TABLET BY MOUTH 3 TIMES DAILY AS NEEDED             nitroGLYCERIN (NITROSTAT) 0.4 MG SL tablet  up to max of 3 total doses. If no relief after 1 dose, call 911.              prazosin (MINIPRESS) 5 MG capsule  TAKE 1 CAPSULE BY MOUTH 2 TIMES DAILY DC PREVIOUS SCRIPT             QUEtiapine (SEROQUEL) 50 MG tablet  Take 1 tablet by mouth nightly             TRUE METRIX BLOOD GLUCOSE TEST strip  TEST BLOOD SUGAR 4 TO 5 TIMES DAILY                   Medications marked \"taking\" at this [place] was given to him but patient states he forgot to get it today. Colonoscopy done last year showed multiple adenomatous and hyperplastic polyps. Review of Systems   Constitutional: Negative. HENT: Negative. Eyes: Negative. Respiratory: Negative for cough, chest tightness, shortness of breath and wheezing. Cardiovascular: Negative for chest pain, palpitations and leg swelling. Gastrointestinal: Negative for abdominal distention and abdominal pain. Endocrine: Negative. Genitourinary: Negative. Allergic/Immunologic: Negative. Neurological: Negative. Hematological: Negative. Psychiatric/Behavioral: Negative. Vitals:    09/12/18 1310 09/12/18 1356   BP: (!) 169/79 (!) 150/72   Site: Right Upper Arm Right Upper Arm   Position: Sitting Sitting   Cuff Size: Medium Adult Medium Adult   Pulse: 97 88   Weight: 165 lb (74.8 kg)      Body mass index is 26.63 kg/m². Wt Readings from Last 3 Encounters:   09/12/18 165 lb (74.8 kg)   09/05/18 171 lb (77.6 kg)   07/09/18 157 lb 12.8 oz (71.6 kg)     BP Readings from Last 3 Encounters:   09/12/18 (!) 150/72   09/05/18 (!) 142/51   07/09/18 (!) 106/58       Physical Exam   Constitutional: He is oriented to person, place, and time. He appears well-developed and well-nourished. HENT:   Head: Normocephalic and atraumatic. Eyes: Pupils are equal, round, and reactive to light. Conjunctivae are normal.   Neck: Normal range of motion. Neck supple. Cardiovascular: Normal rate, regular rhythm and normal heart sounds. Pulmonary/Chest: Effort normal and breath sounds normal.   Abdominal: Soft. Bowel sounds are normal.   Musculoskeletal: Normal range of motion. Neurological: He is alert and oriented to person, place, and time. He has normal reflexes. Assessment/Plan:  1. Nausea    - ondansetron (ZOFRAN) 4 MG tablet; Take 1 tablet by mouth daily as needed for Nausea or Vomiting  Dispense: 30 tablet; Refill: 0    2.  DM type 2, uncontrolled, with neuropathy ([facility]    - Basic Metabolic Panel; Future  - insulin aspart (NOVOLOG) 100 UNIT/ML injection vial; Inject 5 Units into the skin 3 times daily (before meals) Sliding scale  Dispense: 3 vial; Refill: 2  - insulin glargine (BASAGLAR KWIKPEN) 100 UNIT/ML injection pen; Inject 35 Units into the skin nightly Dispense with pen needle  Dispense: 5 pen; Refill: 2  - Microalbumin, Ur  - NM DISCHARGE MEDS RECONCILED W/ CURRENT OUTPATIENT MED LIST  - Dulaglutide (TRULICITY) 1.5 EQ/6.8HQ SOPN; Inject 1.5 mg into the skin once a week  Dispense: 4 pen; Refill: 2    3. Carotid stenosis, left s/p Endarterectomy    - clopidogrel (PLAVIX) 75 MG tablet; Take 1 tablet by mouth daily  Dispense: 90 tablet; Refill: 2  - aspirin (ASPIR-LOW) 81 MG EC tablet; Take 1 tablet by mouth daily  Dispense: 30 tablet; Refill: 2    4. Essential hypertension    - isosorbide mononitrate (IMDUR) 120 MG extended release tablet; Take 1 tablet by mouth daily  Dispense: 30 tablet; Refill: 2  - nitroGLYCERIN (NITROSTAT) 0.4 MG SL tablet; up to max of 3 total doses. If no relief after 1 dose, call 911. Dispense: 25 tablet; Refill: 2  - amLODIPine (NORVASC) 10 MG tablet; Take 1 tablet by mouth daily  Dispense: 30 tablet; Refill: 2  - prazosin (MINIPRESS) 5 MG capsule; Take 1 capsule by mouth 2 times daily DC previous script  Dispense: 60 capsule; Refill: 2  - hydrALAZINE (APRESOLINE) 100 MG tablet; Take 1 tablet by mouth 3 times daily  Dispense: 90 tablet; Refill: 2  - NM DISCHARGE MEDS RECONCILED W/ CURRENT OUTPATIENT MED LIST    5. Primary insomnia      6. Mucopurulent chronic bronchitis (HCC)    - ipratropium-albuterol (DUONEB) 0.5-2.5 (3) MG/3ML SOLN nebulizer solution; Inhale 3 mLs into the lungs every 4 hours  Dispense: 360 mL; Refill: 2    7. Mixed simple and mucopurulent chronic bronchitis (HCC)    - albuterol-ipratropium (COMBIVENT RESPIMAT)  MCG/ACT AERS inhaler;  Inhale 1 puff into the lungs every 6 hours  Dispense: 1

## 2018-09-13 NOTE — CARE COORDINATION
follow my Zone Management tool to seek urgent or emergent care. I will notify my provider of any symptoms that indicate a worsening of my condition. Barriers: financial and overwhelmed by complexity of regimen  Plan for overcoming my barriers: work with CC  Confidence: 6/10  Anticipated Goal Completion Date: 3/2018              Prior to Admission medications    Medication Sig Start Date End Date Taking? Authorizing Provider   ondansetron (ZOFRAN) 4 MG tablet Take 1 tablet by mouth daily as needed for Nausea or Vomiting 9/12/18   Nadia Cuevas MD   isosorbide mononitrate (IMDUR) 120 MG extended release tablet Take 1 tablet by mouth daily 9/12/18   Nadia Cuevas MD   ipratropium-albuterol (DUONEB) 0.5-2.5 (3) MG/3ML SOLN nebulizer solution Inhale 3 mLs into the lungs every 4 hours 9/12/18   Carrillo Cid MD   insulin aspart (NOVOLOG) 100 UNIT/ML injection vial Inject 5 Units into the skin 3 times daily (before meals) Sliding scale 9/12/18   Carrillo Cid MD   clopidogrel (PLAVIX) 75 MG tablet Take 1 tablet by mouth daily 9/12/18   Nadia Cuevas MD   albuterol-ipratropium (COMBIVENT RESPIMAT)  MCG/ACT AERS inhaler Inhale 1 puff into the lungs every 6 hours 9/12/18   Nadia Cuevas MD   nitroGLYCERIN (NITROSTAT) 0.4 MG SL tablet up to max of 3 total doses.  If no relief after 1 dose, call 911. 9/12/18   Nadia Cuevas MD   insulin glargine (BASAGLAR KWIKPEN) 100 UNIT/ML injection pen Inject 35 Units into the skin nightly Dispense with pen needle 9/12/18   Nadia Cuevas MD   fluticasone-vilanterol (BREO ELLIPTA) 100-25 MCG/INH AEPB inhaler Inhale 1 puff into the lungs daily 9/12/18   Carrillo Cid MD   amLODIPine (NORVASC) 10 MG tablet Take 1 tablet by mouth daily 9/12/18   Nadia Cuevas MD   atorvastatin (LIPITOR) 80 MG tablet Take 1 tablet by mouth daily 9/12/18   Nadia Cuevas MD   prazosin (MINIPRESS) 5 MG capsule Take 1 capsule by mouth 2 times daily DC previous script 9/12/18   Sonido Cat MD   aspirin (ASPIR-LOW) 81 MG EC tablet Take 1 tablet by mouth daily 9/12/18   Sonido Cat MD   hydrALAZINE (APRESOLINE) 100 MG tablet Take 1 tablet by mouth 3 times daily 9/12/18 10/12/18  Carrillo Abraham MD   Dulaglutide (TRULICITY) 1.5 JR/7.5ZM SOPN Inject 1.5 mg into the skin once a week 9/12/18   Sonido Cat MD   gabapentin (NEURONTIN) 100 MG capsule Take 2 capsules by mouth 3 times daily for 30 days. . 7/25/18 8/24/18  Zara oJ MD   QUEtiapine (SEROQUEL) 50 MG tablet Take 1 tablet by mouth nightly 7/9/18   aZra Jo MD   meclizine (ANTIVERT) 12.5 MG tablet TAKE 1 TABLET BY MOUTH 3 TIMES DAILY AS NEEDED 6/26/18   Zara Jo MD   magnesium citrate solution Please dispense one 10oz bottle of Magnesium Citrate.  Use as directed for bowel prep 6/21/18   Soha Lunsford MD   TRUE METRIX BLOOD GLUCOSE TEST strip TEST BLOOD SUGAR 4 TO 5 TIMES DAILY 6/6/18   Charlotte Hernandez MD   Lancets MISC Dx: DM-2 use 3-4 times daily 2/7/18   Zara Jo MD   INS SYRINGE/NEEDLE 1CC/28G 28G X 1/2\" 1 ML MISC Dx: DM-2 use 3-4 times daily 2/7/18   Zara Jo MD   INSULIN SYRINGE .5CC/28G (INS SYRINGE/NEEDLE .5CC/28G) 28G X 1/2\" 0.5 ML MISC 1 Syringe by Does not apply route 4 times daily 4/7/17   Zara Jo MD       Future Appointments  Date Time Provider Mahesh Gunter   10/10/2018 2:15 PM Zara Jo MD [facility] IM [id number]      and   General Assessment    Do you have any symptoms that are causing concern?:  Yes  Progression since Onset:  Gradually Worsening  Reported Symptoms:  Other (Comment: cataracts and getting harder to see)

## 2018-09-26 RX ORDER — GABAPENTIN 300 MG/1
300 CAPSULE ORAL 3 TIMES DAILY
Qty: 90 CAPSULE | Refills: 2 | Status: SHIPPED | OUTPATIENT
Start: 2018-09-26 | End: 2018-12-29 | Stop reason: SDUPTHER

## 2018-09-26 NOTE — TELEPHONE ENCOUNTER
and mucopurulent chronic bronchitis (HCC)     Pure hypercholesterolemia     CAD in native artery, nonobstructive     PTSD (post-traumatic stress disorder)     Transient cerebral ischemia     Essential hypertension     DM type 2, uncontrolled, with neuropathy (HCC)     COPD (chronic obstructive pulmonary disease) ([facility]     BINU (acute kidney injury) ([facility]     Hx of carotid stenosis     Cerebral vascular disease     Cerebrovascular disease     Cocaine use

## 2018-10-15 NOTE — TELEPHONE ENCOUNTER
Pt given OC-Light Hemoccult test kit 8/24/18; it has not been returned within  4 weeks. PC to pt; LM; letter sent to pt.

## 2018-10-30 ENCOUNTER — TELEPHONE (OUTPATIENT)
Dept: INTERNAL MEDICINE | Age: 63
End: 2018-10-30

## 2018-10-30 DIAGNOSIS — J44.1 COPD EXACERBATION (HCC): Primary | ICD-10-CM

## 2018-10-30 RX ORDER — PREDNISONE 10 MG/1
10 TABLET ORAL DAILY
Qty: 10 TABLET | Refills: 0 | Status: SHIPPED | OUTPATIENT
Start: 2018-10-30 | End: 2018-11-09 | Stop reason: SDUPTHER

## 2018-10-31 ENCOUNTER — OFFICE VISIT (OUTPATIENT)
Dept: INTERNAL MEDICINE | Age: 63
End: 2018-10-31
Payer: COMMERCIAL

## 2018-10-31 VITALS
SYSTOLIC BLOOD PRESSURE: 138 MMHG | HEART RATE: 70 BPM | WEIGHT: 156 LBS | TEMPERATURE: 98.2 F | BODY MASS INDEX: 25.07 KG/M2 | HEIGHT: 66 IN | DIASTOLIC BLOOD PRESSURE: 67 MMHG

## 2018-10-31 DIAGNOSIS — I10 ESSENTIAL HYPERTENSION: ICD-10-CM

## 2018-10-31 DIAGNOSIS — J44.1 COPD WITH ACUTE EXACERBATION (HCC): Primary | ICD-10-CM

## 2018-10-31 PROCEDURE — 99214 OFFICE O/P EST MOD 30 MIN: CPT | Performed by: STUDENT IN AN ORGANIZED HEALTH CARE EDUCATION/TRAINING PROGRAM

## 2018-10-31 PROCEDURE — 99211 OFF/OP EST MAY X REQ PHY/QHP: CPT | Performed by: INTERNAL MEDICINE

## 2018-10-31 RX ORDER — AZITHROMYCIN 250 MG/1
TABLET, FILM COATED ORAL
Qty: 1 PACKET | Refills: 0 | Status: SHIPPED | OUTPATIENT
Start: 2018-10-31 | End: 2018-11-04

## 2018-10-31 NOTE — PROGRESS NOTES
joint tenderness, deformity or swelling  Extremities - no pedal edema noted, intact peripheral pulses  Skin - normal coloration and turgor, no rashes, no suspicious skin lesions noted    ________________________________________________________________________  Diagnostic findings:  CBC:  Lab Results   Component Value Date    WBC 6.5 09/03/2018    HGB 12.5 09/03/2018     09/03/2018     04/16/2012       BMP:    Lab Results   Component Value Date     09/05/2018    K 4.3 09/05/2018     09/05/2018    CO2 19 09/05/2018    BUN 15 09/05/2018    CREATININE 1.63 09/05/2018    GLUCOSE 307 09/05/2018    GLUCOSE 172 04/16/2012       HEMOGLOBIN A1C:   Lab Results   Component Value Date    LABA1C 8.9 08/24/2018       FASTING LIPID PANEL:  Lab Results   Component Value Date    CHOL 128 09/03/2018    HDL 40 (L) 09/03/2018    TRIG 62 09/03/2018     ________________________________________________________________________  Assessment and Plan:  Mata Ya was seen today for cough, congestion and blood sugar problem. Diagnoses and all orders for this visit:    [facility] exacerbation [facility]  Comments:  Z pack prednisone prescription previously written  Orders:  -     azithromycin (ZITHROMAX Z-VIJYA) 250 MG tablet; Take 2 tablets (500 mg) on Day 1, and then take 1 tablet (250 mg) on days 2 through 5.      ________________________________________________________________________  Follow up and instructions:  No Follow-up on file. · Mata Ya received counseling on the following healthy behaviors: nutrition, exercise and medication adherence    · Discussed use, benefit, and side effects of prescribed medications. Barriers to medication compliance addressed. All patient questions answered. Pt voiced understanding.      · Patient given educational materials - see patient instructions    Lima Peñaloza MD      Department of Internal Medicine  [facility]         10/31/2018, 1:17 PM      This note is created with the assistance of a speech-recognition program. While intending to generate a document that actually reflects the content of the visit, the document can still have some mistakes which may not have been identified and corrected by editing.

## 2018-10-31 NOTE — PROGRESS NOTES
HYPERTENSION visit     BP Readings from Last 3 Encounters:   09/12/18 (!) 150/72   09/05/18 (!) 142/51   07/09/18 (!) 106/58       LDL Cholesterol (mg/dL)   Date Value   09/03/2018 76     HDL (mg/dL)   Date Value   09/03/2018 40 (L)     BUN (mg/dL)   Date Value   09/05/2018 15     CREATININE (mg/dL)   Date Value   09/05/2018 1.63 (H)     Glucose (mg/dL)   Date Value   09/05/2018 307 (H)   04/16/2012 172 (H)              Have you changed or started any medications since your last visit including any over-the-counter medicines, vitamins, or herbal medicines? no   Have you stopped taking any of your medications? Is so, why? -  no  Are you having any side effects from any of your medications? - no  How often do you miss doses of your medication? no      Have you seen any other physician or provider since your last visit?  no   Have you had any other diagnostic tests since your last visit?  no   Have you been seen in the emergency room and/or had an admission in a hospital since we last saw you?  no   Have you had your routine dental cleaning in the past 6 months?  no     Do you have an active MyChart account? If no, what is the barrier?   No:     Patient Care Team:  Zara Cervantes MD as PCP - General  Dariusu Constance Cervantes MD as PCP - S Attributed Provider  Margy Allen MD as Referring Physician (Cardiology)  Mary Hodgson MD as Orthopedic Surgeon (Orthopedic Surgery)  Mena Dotson MD as Surgeon (Vascular Surgery)  Riaz Holly RN as Care Coordinator  Geena Fernandez MD as Consulting Physician (Internal Medicine)  Dania Spivey MD as Resident (Internal Medicine)    Medical History Review  Past Medical, Family, and Social History reviewed and does not contribute to the patient presenting condition    Health Maintenance   Topic Date Due    Shingles Vaccine (1 of 2 - 2 Dose Series) 01/07/2006    Diabetic retinal exam  06/01/2018    Diabetic microalbuminuria test  06/23/2018    Colon

## 2018-11-09 ENCOUNTER — OFFICE VISIT (OUTPATIENT)
Dept: INTERNAL MEDICINE | Age: 63
End: 2018-11-09
Payer: COMMERCIAL

## 2018-11-09 VITALS
SYSTOLIC BLOOD PRESSURE: 166 MMHG | BODY MASS INDEX: 25.07 KG/M2 | DIASTOLIC BLOOD PRESSURE: 86 MMHG | HEART RATE: 81 BPM | OXYGEN SATURATION: 97 % | WEIGHT: 156 LBS | HEIGHT: 66 IN

## 2018-11-09 DIAGNOSIS — J44.1 COPD EXACERBATION (HCC): ICD-10-CM

## 2018-11-09 PROCEDURE — 99213 OFFICE O/P EST LOW 20 MIN: CPT | Performed by: INTERNAL MEDICINE

## 2018-11-09 PROCEDURE — 99211 OFF/OP EST MAY X REQ PHY/QHP: CPT | Performed by: INTERNAL MEDICINE

## 2018-11-09 RX ORDER — PREDNISONE 10 MG/1
TABLET ORAL
Qty: 32 TABLET | Refills: 0 | Status: SHIPPED | OUTPATIENT
Start: 2018-11-09 | End: 2019-02-19

## 2018-11-09 NOTE — PROGRESS NOTES
Visit Information    Have you changed or started any medications since your last visit including any over-the-counter medicines, vitamins, or herbal medicines? no   Are you having any side effects from any of your medications? -  no  Have you stopped taking any of your medications? Is so, why? -  no    Have you seen any other physician or provider since your last visit? No  Have you had any other diagnostic tests since your last visit? No  Have you been seen in the emergency room and/or had an admission to a hospital since we last saw you? No  Have you had your routine dental cleaning in the past 6 months? no    Have you activated your [employer] account? If not, what are your barriers?  No: declined     Patient Care Team:  Zara Lay MD as PCP - General  Zara Lay MD as PCP - S Attributed Provider  Reema Benitez MD as Referring Physician (Cardiology)  Liam Jewell MD as Orthopedic Surgeon (Orthopedic Surgery)  Anai Schwartz MD as Surgeon (Vascular Surgery)  Jeniffer Wright RN as Care Coordinator  Catarina Chappell MD as Consulting Physician (Internal Medicine)  Frida Junior MD as Resident (Internal Medicine)    Medical History Review  Past Medical, Family, and Social History reviewed and does not contribute to the patient presenting condition    Health Maintenance   Topic Date Due    Shingles Vaccine (1 of 2 - 2 Dose Series) 01/07/2006    Diabetic retinal exam  06/01/2018    Diabetic microalbuminuria test  06/23/2018    Colon cancer screen colonoscopy  06/27/2018    Flu vaccine (1) 09/01/2018    Diabetic foot exam  10/30/2018    A1C test (Diabetic or Prediabetic)  08/24/2019    Lipid screen  09/03/2019    Potassium monitoring  09/05/2019    Creatinine monitoring  09/05/2019    DTaP/Tdap/Td vaccine (2 - Td) 12/14/2023    Pneumococcal med risk  Completed    Hepatitis C screen  Completed    HIV screen  Completed
 amLODIPine (NORVASC) 10 MG tablet Take 1 tablet by mouth daily 30 tablet 2    atorvastatin (LIPITOR) 80 MG tablet Take 1 tablet by mouth daily 30 tablet 2    prazosin (MINIPRESS) 5 MG capsule Take 1 capsule by mouth 2 times daily DC previous script 60 capsule 2    aspirin (ASPIR-LOW) 81 MG EC tablet Take 1 tablet by mouth daily 30 tablet 2    Dulaglutide (TRULICITY) 1.5 FY/3.4JK SOPN Inject 1.5 mg into the skin once a week 4 pen 2    QUEtiapine (SEROQUEL) 50 MG tablet Take 1 tablet by mouth nightly 50 tablet 2    meclizine (ANTIVERT) 12.5 MG tablet TAKE 1 TABLET BY MOUTH 3 TIMES DAILY AS NEEDED 90 tablet 1    magnesium citrate solution Please dispense one 10oz bottle of Magnesium Citrate. Use as directed for bowel prep 296 mL 0    TRUE METRIX BLOOD GLUCOSE TEST strip TEST BLOOD SUGAR 4 TO 5 TIMES DAILY 100 strip 7    Lancets MISC Dx: DM-2 use 3-4 times daily 100 each 11    INS SYRINGE/NEEDLE 1CC/28G 28G X 1/2\" 1 ML MISC Dx: DM-2 use 3-4 times daily 100 each 11    INSULIN SYRINGE .5CC/28G (INS SYRINGE/NEEDLE .5CC/28G) 28G X 1/2\" 0.5 ML MISC 1 Syringe by Does not apply route 4 times daily 100 each 11    gabapentin (NEURONTIN) 300 MG capsule Take 1 capsule by mouth 3 times daily for 30 days.  mg dose. 90 capsule 2    hydrALAZINE (APRESOLINE) 100 MG tablet Take 1 tablet by mouth 3 times daily 90 tablet 2    gabapentin (NEURONTIN) 100 MG capsule Take 2 capsules by mouth 3 times daily for 30 days. . 180 capsule 0     No current facility-administered medications for this visit. Allergies:  Lisinopril; Ace inhibitors; and Pcn [penicillins]    Social History:   reports that he quit smoking about 4 years ago. His smoking use included Cigarettes. He has a 17.50 pack-year smoking history. He has never used smokeless tobacco. He reports that he does not drink alcohol or use drugs. Family History: family history includes Cancer in his mother; Heart Disease in his father.     REVIEW OF SYSTEMS:

## 2018-11-12 ENCOUNTER — CARE COORDINATION (OUTPATIENT)
Dept: CARE COORDINATION | Age: 63
End: 2018-11-12

## 2018-11-12 NOTE — CARE COORDINATION
Ambulatory Care Coordination Note  11/12/2018  CM Risk Score: 11  Avril Mortality Risk Score:      ACC: Audrey Atwood RN    Summary Note: Called and talked with Delmi Peterson. He stated that he was doing better. He had an URI a couple of weeks ago and was prescribed an antibiotic and steroids. He said as soon as the steroid was done, it got worse. He came back in and got additional prescription for steroids and he says he feels much better now. Asked if he was able to get his cataract surgery done and he stated yes. He said he couldn't talk any longer because he was at work. He denies any needs today. Will follow up in a month. If stable, will recommend for surveillance. Care Coordination Interventions    Program Enrollment:  Complex Care  Referral from Primary Care Provider:  Yes  Suggested Interventions and Community Resources  Diabetes Education:  Completed  Medication Assistance Program:  Completed  Zone Management Tools:  Completed         Goals Addressed             Most Recent     COMPLETED: Conditions and Symptoms   On track (9/12/2018)             I will schedule office visits, as directed by my provider. I will keep my appointment or reschedule if I have to cancel. I will notify my provider of any barriers to my plan of care. I will follow my Zone Management tool to seek urgent or emergent care. I will notify my provider of any symptoms that indicate a worsening of my condition. Barriers: financial and overwhelmed by complexity of regimen  Plan for overcoming my barriers: work with CC  Confidence: 6/10  Anticipated Goal Completion Date: 3/2018         Wellness Goal                Patient Self-Management Goal for Health Maintenance  Goal: I will follow a healthy diet as discussed by my provider. I will schedule screening colonoscopies as directed by my provider. I will schedule routine eye examinations with an eye specialist as directed by my provider.   I will consider obtaining vaccines recommended by my provider. I will schedule follow up appointments with my providers and keep appointments. I will take my medications as prescribed. Barriers: none  Plan for overcoming my barriers: N/A  Confidence: 8/10  Anticipated Goal Completion Date: 3/2019            Prior to Admission medications    Medication Sig Start Date End Date Taking? Authorizing Provider   predniSONE (DELTASONE) 10 MG tablet 4 tabs oral daily x 3 days, 3 tabs oral daily x 3days, 2 tabs oral daily x 3 days, 1 tabs oral daily x3 days, 0.5 tabs oral daily x 4 days 11/9/18   Franklin Quiñones MD   gabapentin (NEURONTIN) 300 MG capsule Take 1 capsule by mouth 3 times daily for 30 days.  mg dose. 9/26/18 10/26/18  Zara Cardona MD   ondansetron (ZOFRAN) 4 MG tablet Take 1 tablet by mouth daily as needed for Nausea or Vomiting 9/12/18   Franklin Quiñones MD   isosorbide mononitrate (IMDUR) 120 MG extended release tablet Take 1 tablet by mouth daily 9/12/18   Franklin Quiñones MD   ipratropium-albuterol (DUONEB) 0.5-2.5 (3) MG/3ML SOLN nebulizer solution Inhale 3 mLs into the lungs every 4 hours 9/12/18   Franklin Quiñones MD   insulin aspart (NOVOLOG) 100 UNIT/ML injection vial Inject 5 Units into the skin 3 times daily (before meals) Sliding scale 9/12/18   Carrillo Marshall MD   clopidogrel (PLAVIX) 75 MG tablet Take 1 tablet by mouth daily 9/12/18   Franklin Quiñones MD   albuterol-ipratropium (COMBIVENT RESPIMAT)  MCG/ACT AERS inhaler Inhale 1 puff into the lungs every 6 hours 9/12/18   Franklin Quiñones MD   nitroGLYCERIN (NITROSTAT) 0.4 MG SL tablet up to max of 3 total doses.  If no relief after 1 dose, call 911. 9/12/18   Franklin Quiñones MD   insulin glargine (BASAGLAR KWIKPEN) 100 UNIT/ML injection pen Inject 35 Units into the skin nightly Dispense with pen needle 9/12/18   Franklin Quiñones MD   fluticasone-vilanterol (BREO ELLIPTA) 100-25 MCG/INH AEPB inhaler Inhale 1 puff into the lungs daily 9/12/18   Carrillo Marshall MD   amLODIPine (NORVASC) 10 MG tablet Take 1 tablet by mouth daily 9/12/18   Franklin Quiñones MD   atorvastatin (LIPITOR) 80 MG tablet Take 1 tablet by mouth daily 9/12/18   Carrillo Marshall MD   prazosin (MINIPRESS) 5 MG capsule Take 1 capsule by mouth 2 times daily DC previous script 9/12/18   Franklin Quiñones MD   aspirin (ASPIR-LOW) 81 MG EC tablet Take 1 tablet by mouth daily 9/12/18   Carrillo Marshall MD   hydrALAZINE (APRESOLINE) 100 MG tablet Take 1 tablet by mouth 3 times daily 9/12/18 10/12/18  Franklin Quiñones MD   Dulaglutide (TRULICITY) 1.5 [place]/2.0VE SOPN Inject 1.5 mg into the skin once a week 9/12/18   Franklin Quiñones MD   gabapentin (NEURONTIN) 100 MG capsule Take 2 capsules by mouth 3 times daily for 30 days. . 7/25/18 8/24/18  Zara Cardona MD   QUEtiapine (SEROQUEL) 50 MG tablet Take 1 tablet by mouth nightly 7/9/18   Zara Cardona MD   meclizine (ANTIVERT) 12.5 MG tablet TAKE 1 TABLET BY MOUTH 3 TIMES DAILY AS NEEDED 6/26/18   Zara Cardona MD   magnesium citrate solution Please dispense one 10oz bottle of Magnesium Citrate.  Use as directed for bowel prep 6/21/18   Deborah Ruiz MD   TRUE METRIX BLOOD GLUCOSE TEST strip TEST BLOOD SUGAR 4 TO 5 TIMES DAILY 6/6/18   Lesly Abrams MD   Lancets MISC Dx: DM-2 use 3-4 times daily 2/7/18   Zara Cardona MD   INS SYRINGE/NEEDLE 1CC/28G 28G X 1/2\" 1 ML MISC Dx: DM-2 use 3-4 times daily 2/7/18   Zara Cardona MD   INSULIN SYRINGE .5CC/28G (INS SYRINGE/NEEDLE .5CC/28G) 28G X 1/2\" 0.5 ML MISC 1 Syringe by Does not apply route 4 times daily 4/7/17   Zara Cardona MD       Future Appointments  Date Time Provider Mahesh Josei   11/28/2018 2:45 PM Zara Cardona MD [facility] IM [address] [facility]

## 2018-12-10 RX ORDER — DULAGLUTIDE 1.5 MG/.5ML
1.5 INJECTION, SOLUTION SUBCUTANEOUS WEEKLY
Qty: 2 PEN | Refills: 2 | Status: SHIPPED | OUTPATIENT
Start: 2018-12-10 | End: 2019-02-19

## 2018-12-11 DIAGNOSIS — I25.10 CAD IN NATIVE ARTERY: ICD-10-CM

## 2018-12-11 DIAGNOSIS — I10 ESSENTIAL HYPERTENSION: ICD-10-CM

## 2018-12-11 RX ORDER — ISOSORBIDE MONONITRATE 120 MG/1
120 TABLET, EXTENDED RELEASE ORAL DAILY
Qty: 30 TABLET | Refills: 2 | Status: SHIPPED | OUTPATIENT
Start: 2018-12-11 | End: 2019-02-19 | Stop reason: SDUPTHER

## 2018-12-11 NOTE — TELEPHONE ENCOUNTER
escrib request for ISOSORBIDE  MG TAB SA 120MG patient does not have scheduled apt contacted pt left message for pt to contact to schedule. Health Maintenance   Topic Date Due    Shingles Vaccine (1 of 2 - 2 Dose Series) 01/07/2006    Diabetic retinal exam  06/01/2018    Diabetic microalbuminuria test  06/23/2018    Colon cancer screen colonoscopy  06/27/2018    Flu vaccine (1) 09/01/2018    Diabetic foot exam  10/30/2018    A1C test (Diabetic or Prediabetic)  08/24/2019    Lipid screen  09/03/2019    Potassium monitoring  09/05/2019    Creatinine monitoring  09/05/2019    DTaP/Tdap/Td vaccine (2 - Td) 12/14/2023    Pneumococcal med risk  Completed    Hepatitis C screen  Completed    HIV screen  Completed             (applicable per patient's age: Cancer Screenings, Depression Screening, Fall Risk Screening, Immunizations)    Hemoglobin A1C (%)   Date Value   08/24/2018 8.9   07/09/2018 9.7   03/21/2018 9.2     Microalb/Crt. Ratio (mcg/mg creat)   Date Value   06/23/2017 2,464 (H)     LDL Cholesterol (mg/dL)   Date Value   09/03/2018 76     AST (U/L)   Date Value   09/03/2018 21     ALT (U/L)   Date Value   09/03/2018 25     BUN (mg/dL)   Date Value   09/05/2018 15      (goal A1C is < 7)   (goal LDL is <100) need 30-50% reduction from baseline     BP Readings from Last 3 Encounters:   11/09/18 (!) 166/86   10/31/18 138/67   09/12/18 (!) 150/72    (goal /80)      All Future Testing planned in [facility]:  Lab Frequency Next Occurrence   FULL PFT STUDY WITH PRE AND POST Once 97/10/7884   Basic Metabolic Panel Once 43/56/1776   Basic Metabolic Panel Once 44/05/0516       Next Visit Date:  No future appointments.          Patient Active Problem List:     Cigarette nicotine dependence without complication     Carpal tunnel syndrome on right     Colon polyps     Carotid stenosis, left s/p Endarterectomy     Mixed simple and mucopurulent chronic bronchitis (HCC)     Pure hypercholesterolemia

## 2018-12-29 DIAGNOSIS — I25.10 CAD IN NATIVE ARTERY: ICD-10-CM

## 2018-12-29 DIAGNOSIS — I65.22 CAROTID STENOSIS, LEFT: ICD-10-CM

## 2018-12-29 DIAGNOSIS — E78.01 FAMILIAL HYPERCHOLESTEROLEMIA: ICD-10-CM

## 2018-12-29 DIAGNOSIS — I10 ESSENTIAL HYPERTENSION: ICD-10-CM

## 2018-12-31 DIAGNOSIS — J41.8 MIXED SIMPLE AND MUCOPURULENT CHRONIC BRONCHITIS (HCC): ICD-10-CM

## 2019-01-01 RX ORDER — IPRATROPIUM/ALBUTEROL SULFATE 20-100 MCG
1 MIST INHALER (GRAM) INHALATION EVERY 6 HOURS
Qty: 4 INHALER | Refills: 2 | Status: SHIPPED | OUTPATIENT
Start: 2019-01-01 | End: 2019-02-19 | Stop reason: SDUPTHER

## 2019-01-01 RX ORDER — INSULIN ASPART 100 [IU]/ML
5 INJECTION, SOLUTION INTRAVENOUS; SUBCUTANEOUS
Qty: 3 PEN | Refills: 2 | Status: SHIPPED | OUTPATIENT
Start: 2019-01-01 | End: 2019-02-19 | Stop reason: SDUPTHER

## 2019-01-01 RX ORDER — AMLODIPINE BESYLATE 10 MG/1
10 TABLET ORAL DAILY
Qty: 30 TABLET | Refills: 2 | Status: SHIPPED | OUTPATIENT
Start: 2019-01-01 | End: 2019-02-19 | Stop reason: SDUPTHER

## 2019-01-01 RX ORDER — GABAPENTIN 300 MG/1
300 CAPSULE ORAL 3 TIMES DAILY
Qty: 90 CAPSULE | Refills: 2 | Status: SHIPPED | OUTPATIENT
Start: 2019-01-01 | End: 2019-02-19 | Stop reason: SDUPTHER

## 2019-01-01 RX ORDER — PRAZOSIN HYDROCHLORIDE 5 MG/1
5 CAPSULE ORAL 2 TIMES DAILY
Qty: 60 CAPSULE | Refills: 2 | Status: SHIPPED | OUTPATIENT
Start: 2019-01-01 | End: 2019-02-19 | Stop reason: SDUPTHER

## 2019-01-01 RX ORDER — ASPIRIN 81 MG/1
81 TABLET ORAL DAILY
Qty: 30 TABLET | Refills: 2 | Status: SHIPPED | OUTPATIENT
Start: 2019-01-01 | End: 2019-02-19 | Stop reason: SDUPTHER

## 2019-01-01 RX ORDER — ATORVASTATIN CALCIUM 80 MG/1
80 TABLET, FILM COATED ORAL DAILY
Qty: 30 TABLET | Refills: 2 | Status: SHIPPED | OUTPATIENT
Start: 2019-01-01 | End: 2019-02-19 | Stop reason: SDUPTHER

## 2019-01-11 ENCOUNTER — CARE COORDINATION (OUTPATIENT)
Dept: CARE COORDINATION | Age: 64
End: 2019-01-11

## 2019-01-28 RX ORDER — CALCIUM CITRATE/VITAMIN D3 200MG-6.25
TABLET ORAL
Qty: 100 STRIP | Refills: 7 | Status: SHIPPED | OUTPATIENT
Start: 2019-01-28 | End: 2019-12-12 | Stop reason: SDUPTHER

## 2019-01-28 RX ORDER — FLUTICASONE FUROATE, UMECLIDINIUM BROMIDE AND VILANTEROL TRIFENATATE 100; 62.5; 25 UG/1; UG/1; UG/1
POWDER RESPIRATORY (INHALATION)
Qty: 1 EACH | Refills: 11 | Status: SHIPPED | OUTPATIENT
Start: 2019-01-28 | End: 2019-10-21 | Stop reason: SDUPTHER

## 2019-02-09 DIAGNOSIS — F51.01 PRIMARY INSOMNIA: ICD-10-CM

## 2019-02-11 RX ORDER — QUETIAPINE FUMARATE 50 MG/1
50 TABLET, FILM COATED ORAL NIGHTLY
Qty: 50 TABLET | Refills: 2 | Status: SHIPPED | OUTPATIENT
Start: 2019-02-11 | End: 2019-02-19

## 2019-02-19 ENCOUNTER — TELEPHONE (OUTPATIENT)
Dept: INTERNAL MEDICINE | Age: 64
End: 2019-02-19

## 2019-02-19 ENCOUNTER — CARE COORDINATION (OUTPATIENT)
Dept: CARE COORDINATION | Age: 64
End: 2019-02-19

## 2019-02-19 ENCOUNTER — OFFICE VISIT (OUTPATIENT)
Dept: INTERNAL MEDICINE | Age: 64
End: 2019-02-19
Payer: COMMERCIAL

## 2019-02-19 VITALS
SYSTOLIC BLOOD PRESSURE: 138 MMHG | DIASTOLIC BLOOD PRESSURE: 82 MMHG | HEIGHT: 66 IN | HEART RATE: 68 BPM | BODY MASS INDEX: 24.49 KG/M2 | WEIGHT: 152.4 LBS

## 2019-02-19 DIAGNOSIS — J41.8 MIXED SIMPLE AND MUCOPURULENT CHRONIC BRONCHITIS (HCC): ICD-10-CM

## 2019-02-19 DIAGNOSIS — Z12.11 SCREENING FOR COLON CANCER: ICD-10-CM

## 2019-02-19 DIAGNOSIS — F51.01 PRIMARY INSOMNIA: ICD-10-CM

## 2019-02-19 DIAGNOSIS — E78.01 FAMILIAL HYPERCHOLESTEROLEMIA: ICD-10-CM

## 2019-02-19 DIAGNOSIS — I10 ESSENTIAL HYPERTENSION: ICD-10-CM

## 2019-02-19 DIAGNOSIS — I25.10 CAD IN NATIVE ARTERY: ICD-10-CM

## 2019-02-19 DIAGNOSIS — I65.22 CAROTID STENOSIS, LEFT: ICD-10-CM

## 2019-02-19 DIAGNOSIS — J41.1 MUCOPURULENT CHRONIC BRONCHITIS (HCC): ICD-10-CM

## 2019-02-19 PROBLEM — F14.90 COCAINE USE: Status: RESOLVED | Noted: 2018-09-03 | Resolved: 2019-02-19

## 2019-02-19 PROBLEM — I67.9 CEREBROVASCULAR DISEASE: Status: RESOLVED | Noted: 2018-04-23 | Resolved: 2019-02-19

## 2019-02-19 PROBLEM — N17.9 AKI (ACUTE KIDNEY INJURY) (HCC): Status: RESOLVED | Noted: 2018-01-26 | Resolved: 2019-02-19

## 2019-02-19 PROBLEM — Z86.79 HX OF CAROTID STENOSIS: Status: RESOLVED | Noted: 2018-01-27 | Resolved: 2019-02-19

## 2019-02-19 LAB — HBA1C MFR BLD: 9.4 %

## 2019-02-19 PROCEDURE — 83036 HEMOGLOBIN GLYCOSYLATED A1C: CPT | Performed by: INTERNAL MEDICINE

## 2019-02-19 PROCEDURE — 99211 OFF/OP EST MAY X REQ PHY/QHP: CPT | Performed by: INTERNAL MEDICINE

## 2019-02-19 PROCEDURE — 99214 OFFICE O/P EST MOD 30 MIN: CPT | Performed by: INTERNAL MEDICINE

## 2019-02-19 RX ORDER — IPRATROPIUM BROMIDE AND ALBUTEROL SULFATE 2.5; .5 MG/3ML; MG/3ML
1 SOLUTION RESPIRATORY (INHALATION) EVERY 4 HOURS
Qty: 360 ML | Refills: 2 | Status: ON HOLD | OUTPATIENT
Start: 2019-02-19 | End: 2019-08-12

## 2019-02-19 RX ORDER — PRAZOSIN HYDROCHLORIDE 5 MG/1
5 CAPSULE ORAL 2 TIMES DAILY
Qty: 60 CAPSULE | Refills: 2 | Status: SHIPPED | OUTPATIENT
Start: 2019-02-19 | End: 2019-06-08 | Stop reason: SDUPTHER

## 2019-02-19 RX ORDER — GABAPENTIN 300 MG/1
300 CAPSULE ORAL 3 TIMES DAILY
Qty: 90 CAPSULE | Refills: 2 | Status: SHIPPED | OUTPATIENT
Start: 2019-02-19 | End: 2019-06-10 | Stop reason: SDUPTHER

## 2019-02-19 RX ORDER — CLOPIDOGREL BISULFATE 75 MG/1
75 TABLET ORAL DAILY
Qty: 90 TABLET | Refills: 2 | Status: SHIPPED | OUTPATIENT
Start: 2019-02-19 | End: 2019-10-21 | Stop reason: SDUPTHER

## 2019-02-19 RX ORDER — FLUTICASONE FUROATE AND VILANTEROL 100; 25 UG/1; UG/1
1 POWDER RESPIRATORY (INHALATION) DAILY
Qty: 1 EACH | Refills: 2 | Status: SHIPPED | OUTPATIENT
Start: 2019-02-19 | End: 2019-05-12 | Stop reason: SDUPTHER

## 2019-02-19 RX ORDER — QUETIAPINE FUMARATE 100 MG/1
100 TABLET, FILM COATED ORAL NIGHTLY
Qty: 30 TABLET | Refills: 2 | Status: SHIPPED | OUTPATIENT
Start: 2019-02-19 | End: 2019-05-09 | Stop reason: SDUPTHER

## 2019-02-19 RX ORDER — ISOSORBIDE MONONITRATE 120 MG/1
120 TABLET, EXTENDED RELEASE ORAL DAILY
Qty: 30 TABLET | Refills: 2 | Status: SHIPPED | OUTPATIENT
Start: 2019-02-19 | End: 2019-06-09 | Stop reason: SDUPTHER

## 2019-02-19 RX ORDER — AMLODIPINE BESYLATE 10 MG/1
10 TABLET ORAL DAILY
Qty: 30 TABLET | Refills: 2 | Status: SHIPPED | OUTPATIENT
Start: 2019-02-19 | End: 2019-06-08 | Stop reason: SDUPTHER

## 2019-02-19 RX ORDER — NITROGLYCERIN 0.4 MG/1
TABLET SUBLINGUAL
Qty: 25 TABLET | Refills: 2 | Status: SHIPPED | OUTPATIENT
Start: 2019-02-19 | End: 2019-10-21 | Stop reason: SDUPTHER

## 2019-02-19 RX ORDER — ASPIRIN 81 MG/1
81 TABLET ORAL DAILY
Qty: 30 TABLET | Refills: 2 | Status: SHIPPED | OUTPATIENT
Start: 2019-02-19 | End: 2019-06-08 | Stop reason: SDUPTHER

## 2019-02-19 RX ORDER — ATORVASTATIN CALCIUM 80 MG/1
80 TABLET, FILM COATED ORAL DAILY
Qty: 30 TABLET | Refills: 2 | Status: SHIPPED | OUTPATIENT
Start: 2019-02-19 | End: 2019-06-08 | Stop reason: SDUPTHER

## 2019-02-19 ASSESSMENT — ENCOUNTER SYMPTOMS
VOMITING: 0
COLOR CHANGE: 0
CONSTIPATION: 0
EYE PAIN: 0
COUGH: 0
EYE DISCHARGE: 0
BLOOD IN STOOL: 0
SHORTNESS OF BREATH: 0
NAUSEA: 0
SORE THROAT: 0
DIARRHEA: 0
ABDOMINAL PAIN: 0
PHOTOPHOBIA: 0
WHEEZING: 0

## 2019-02-19 ASSESSMENT — PATIENT HEALTH QUESTIONNAIRE - PHQ9
SUM OF ALL RESPONSES TO PHQ QUESTIONS 1-9: 0
1. LITTLE INTEREST OR PLEASURE IN DOING THINGS: 0
SUM OF ALL RESPONSES TO PHQ9 QUESTIONS 1 & 2: 0
SUM OF ALL RESPONSES TO PHQ QUESTIONS 1-9: 0
2. FEELING DOWN, DEPRESSED OR HOPELESS: 0

## 2019-02-21 ASSESSMENT — ENCOUNTER SYMPTOMS: DYSPNEA ASSOCIATED WITH: EXERTION

## 2019-02-25 RX ORDER — LANCING DEVICE
EACH MISCELLANEOUS
Qty: 100 EACH | Refills: 5 | Status: SHIPPED | OUTPATIENT
Start: 2019-02-25 | End: 2020-04-14

## 2019-03-03 RX ORDER — PREDNISONE 10 MG/1
TABLET ORAL
Qty: 30 TABLET | Refills: 3 | Status: SHIPPED | OUTPATIENT
Start: 2019-03-03 | End: 2019-05-04 | Stop reason: SDUPTHER

## 2019-03-06 ENCOUNTER — TELEPHONE (OUTPATIENT)
Dept: INTERNAL MEDICINE | Age: 64
End: 2019-03-06

## 2019-03-25 RX ORDER — BLOOD-GLUCOSE METER
EACH MISCELLANEOUS
Qty: 100 EACH | Refills: 11 | Status: SHIPPED | OUTPATIENT
Start: 2019-03-25 | End: 2021-04-06

## 2019-04-19 ENCOUNTER — CARE COORDINATION (OUTPATIENT)
Dept: CARE COORDINATION | Age: 64
End: 2019-04-19

## 2019-04-19 ASSESSMENT — ENCOUNTER SYMPTOMS: DYSPNEA ASSOCIATED WITH: EXERTION

## 2019-04-19 NOTE — CARE COORDINATION
Completion Date: 3/2019            Prior to Admission medications    Medication Sig Start Date End Date Taking? Authorizing Provider   EASY COMFORT LANCETS MISC DX: DM-2 USE 3-4 TIMES DAILY 3/25/19   Zara Horvath MD   predniSONE (DELTASONE) 10 MG tablet Tapered dose 40mg x 3 days. .. 30mg x 3 days. .. 20mg x 3 days . Robb Kris .10mg x3days . ..then off 3/3/19   Zara Horvath MD   COMFORT EZ PEN NEEDLES 31G X 8 MM MISC USE AS DIRECTED 2/25/19   Zara Horvath MD   atorvastatin (LIPITOR) 80 MG tablet Take 1 tablet by mouth daily 2/19/19   Zara Horvath MD   prazosin (MINIPRESS) 5 MG capsule Take 1 capsule by mouth 2 times daily DC previous script 2/19/19   Zara Horvath MD   amLODIPine (NORVASC) 10 MG tablet Take 1 tablet by mouth daily 2/19/19   Zara Horvath MD   aspirin (ASPIRIN ADULT LOW STRENGTH) 81 MG EC tablet Take 1 tablet by mouth daily 2/19/19   Zara Horvath MD   albuterol-ipratropium (COMBIVENT RESPIMAT)  MCG/ACT AERS inhaler Inhale 1 puff into the lungs every 6 hours 2/19/19   Zara Horvath MD   insulin aspart (NOVOLOG FLEXPEN) 100 UNIT/ML injection pen Inject 5 Units into the skin 3 times daily (before meals) Sliding scale 2/19/19   Zara Horvath MD   isosorbide mononitrate (IMDUR) 120 MG extended release tablet Take 1 tablet by mouth daily 2/19/19   Zara Horvath MD   ipratropium-albuterol (DUONEB) 0.5-2.5 (3) MG/3ML SOLN nebulizer solution Inhale 3 mLs into the lungs every 4 hours 2/19/19   Zara Horvath MD   clopidogrel (PLAVIX) 75 MG tablet Take 1 tablet by mouth daily 2/19/19   Zara Horvath MD   nitroGLYCERIN (NITROSTAT) 0.4 MG SL tablet up to max of 3 total doses.  If no relief after 1 dose, call 911. 2/19/19   Zara Horvath MD   insulin glargine (BASAGLAR KWIKPEN) 100 UNIT/ML injection pen Inject 35 Units into the skin nightly Dispense with pen needle 2/19/19   Zara Horvath MD   fluticasone-vilanterol (BREO ELLIPTA) 100-25 MCG/INH AEPB inhaler Inhale COPD associated anorexia: Pos, COPD associated weight loss: Pos      Symptom course:  improving  Breathlessness:  exertion  Increase use of rapid acting/rescue inhaled medications?:  No  Change in chronic cough?:  No/At Baseline  Change in sputum?:  No/At Baseline  Self Monitoring - SaO2:  No  Have you had a recent diagnosis of pneumonia either by PCP or at a hospital?:  No

## 2019-05-04 ENCOUNTER — HOSPITAL ENCOUNTER (EMERGENCY)
Age: 64
Discharge: HOME | End: 2019-05-04
Attending: EMERGENCY MEDICINE
Payer: COMMERCIAL

## 2019-05-04 VITALS
SYSTOLIC BLOOD PRESSURE: 168 MMHG | DIASTOLIC BLOOD PRESSURE: 79 MMHG | TEMPERATURE: 98.6 F | RESPIRATION RATE: 21 BRPM | HEART RATE: 51 BPM | OXYGEN SATURATION: 100 %

## 2019-05-04 DIAGNOSIS — J44.1 COPD EXACERBATION (HCC): Primary | ICD-10-CM

## 2019-05-04 PROCEDURE — 6370000000 HC RX 637 (ALT 250 FOR IP): Performed by: NURSE PRACTITIONER

## 2019-05-04 PROCEDURE — 6360000002 HC RX W HCPCS: Performed by: NURSE PRACTITIONER

## 2019-05-04 PROCEDURE — 94761 N-INVAS EAR/PLS OXIMETRY MLT: CPT

## 2019-05-04 PROCEDURE — 93005 ELECTROCARDIOGRAM TRACING: CPT

## 2019-05-04 PROCEDURE — 99284 EMERGENCY DEPT VISIT MOD MDM: CPT

## 2019-05-04 PROCEDURE — 94640 AIRWAY INHALATION TREATMENT: CPT

## 2019-05-04 RX ORDER — ALBUTEROL SULFATE 90 UG/1
2 AEROSOL, METERED RESPIRATORY (INHALATION)
Status: DISCONTINUED | OUTPATIENT
Start: 2019-05-04 | End: 2019-05-04

## 2019-05-04 RX ORDER — PREDNISONE 20 MG/1
60 TABLET ORAL ONCE
Status: COMPLETED | OUTPATIENT
Start: 2019-05-04 | End: 2019-05-04

## 2019-05-04 RX ORDER — ALBUTEROL SULFATE 2.5 MG/3ML
5 SOLUTION RESPIRATORY (INHALATION)
Status: DISCONTINUED | OUTPATIENT
Start: 2019-05-04 | End: 2019-05-04 | Stop reason: HOSPADM

## 2019-05-04 RX ORDER — PREDNISONE 10 MG/1
TABLET ORAL
Qty: 15 TABLET | Refills: 0 | Status: SHIPPED | OUTPATIENT
Start: 2019-05-04 | End: 2019-05-14

## 2019-05-04 RX ORDER — IPRATROPIUM BROMIDE AND ALBUTEROL SULFATE 2.5; .5 MG/3ML; MG/3ML
1 SOLUTION RESPIRATORY (INHALATION)
Status: DISCONTINUED | OUTPATIENT
Start: 2019-05-04 | End: 2019-05-04

## 2019-05-04 RX ADMIN — IPRATROPIUM BROMIDE 0.5 MG: 0.5 SOLUTION RESPIRATORY (INHALATION) at 14:40

## 2019-05-04 RX ADMIN — PREDNISONE 60 MG: 20 TABLET ORAL at 14:22

## 2019-05-04 RX ADMIN — ALBUTEROL SULFATE 5 MG: 5 SOLUTION RESPIRATORY (INHALATION) at 14:40

## 2019-05-04 RX ADMIN — ALBUTEROL SULFATE 5 MG: 5 SOLUTION RESPIRATORY (INHALATION) at 14:50

## 2019-05-04 ASSESSMENT — ENCOUNTER SYMPTOMS
COUGH: 1
SHORTNESS OF BREATH: 1

## 2019-05-04 NOTE — ED PROVIDER NOTES
[facility]  ED  Emergency Department Encounter  Mid Level Provider     Pt Name: Donald Kamara  MRN: [id number]  [place] 1955  Date of evaluation: 5/4/19  PCP:  Zara Richey MD    CHIEF COMPLAINT       Chief Complaint   Patient presents with    COPD       HISTORY OF PRESENT ILLNESS  (Location/Symptom, Timing/Onset,Context/Setting, Quality, Duration, Modifying Factors, Severity.)      Donald Kamara is a 62 y.o. male who presents with concern for exacerbation of his COPD. Patient reports increase in baseline to fully breathing over the last 3 days with productive cough. Patient quit smoking 5 years ago. Patient did have one albuterol treatment prior to arrival by EMS. Patient arrives without acute respiratory distress speaking freely. Denies any fever or chills. Denies chest pain. PAST MEDICAL /SURGICAL / SOCIAL / FAMILY HISTORY      has a past medical history of Asthma, CAD in native artery, nonobstructive, Carotid stenosis, left, Carpal tunnel syndrome, Cerebrovascular disease, Chronic kidney disease, COPD (chronic obstructive pulmonary disease) ([facility] [address] Diabetes mellitus ([facility] [address] History of colon polyps, History of weakness of extremity, HTN (hypertension), Hyperlipidemia, Lung, cysts, congenital, Neuropathy, PTSD (post-traumatic stress disorder), PTSD (post-traumatic stress disorder), TIA (transient ischemic attack), and Type II or unspecified type diabetes mellitus without mention of complication, not stated as uncontrolled. has a past surgical history that includes hernia repair; Tonsillectomy; Colonoscopy; Carotid endarterectomy (Left, 7-2013); vascular surgery (Left, 2015); and pr colsc flx w/rmvl of tumor polyp lesion snare tq (N/A, 6/27/2017).     Social History     Socioeconomic History    Marital status:      Spouse name: Not on file    Number of children: Not on file    Years of education: Not on file    Highest education level: Not on file Occupational History     Employer: N/A   Social Needs    Financial resource strain: Not on file    Food insecurity:     Worry: Not on file     Inability: Not on file    Transportation needs:     Medical: Not on file     Non-medical: Not on file   Tobacco Use    Smoking status: Former Smoker     Packs/day: 0.50     Years: 35.00     Pack years: 17.50     Types: Cigarettes     Last attempt to quit: 2014     Years since quittin.8    Smokeless tobacco: Never Used   Substance and Sexual Activity    Alcohol use: No     Alcohol/week: 0.0 oz    Drug use: No    Sexual activity: Not on file   Lifestyle    Physical activity:     Days per week: Not on file     Minutes per session: Not on file    Stress: Not on file   Relationships    Social connections:     Talks on phone: Not on file     Gets together: Not on file     Attends [employer]ism service: Not on file     Active member of club or organization: Not on file     Attends meetings of clubs or organizations: Not on file     Relationship status: Not on file    Intimate partner violence:     Fear of current or ex partner: Not on file     Emotionally abused: Not on file     Physically abused: Not on file     Forced sexual activity: Not on file   Other Topics Concern    Not on file   Social History Narrative    Not on file       Family History   Problem Relation Age of Onset    Cancer Mother     Heart Disease Father        Allergies:  Lisinopril; Ace inhibitors; and Pcn [penicillins]    Home Medications:  Prior to Admission medications    Medication Sig Start Date End Date Taking?  Authorizing Provider   predniSONE (DELTASONE) 10 MG tablet Take 5 tablets by mouth once on   Take 4 tablets by mouth once on   Take 3 tablets by mouth once on   Take 2 tablets by mouth once on   Take 1 tablet by mouth once on 19 Yes TERRANCE Courtney CNP   EASY COMFORT LANCETS MISC DX: DM-2 USE 3-4 TIMES DAILY 3/25/19   Zara Wooten MD COMFORT EZ PEN NEEDLES 31G X 8 MM MISC USE AS DIRECTED 2/25/19   Zara Grossman MD   atorvastatin (LIPITOR) 80 MG tablet Take 1 tablet by mouth daily 2/19/19   Zara Grossman MD   prazosin (MINIPRESS) 5 MG capsule Take 1 capsule by mouth 2 times daily DC previous script 2/19/19   Zara Grossman MD   amLODIPine (NORVASC) 10 MG tablet Take 1 tablet by mouth daily 2/19/19   Zara Grossman MD   aspirin (ASPIRIN ADULT LOW STRENGTH) 81 MG EC tablet Take 1 tablet by mouth daily 2/19/19   Zara Grossman MD   albuterol-ipratropium (COMBIVENT RESPIMAT)  MCG/ACT AERS inhaler Inhale 1 puff into the lungs every 6 hours 2/19/19   Zara Grossman MD   insulin aspart (NOVOLOG FLEXPEN) 100 UNIT/ML injection pen Inject 5 Units into the skin 3 times daily (before meals) Sliding scale 2/19/19   Zara Grossman MD   isosorbide mononitrate (IMDUR) 120 MG extended release tablet Take 1 tablet by mouth daily 2/19/19   Zara Grossman MD   ipratropium-albuterol (DUONEB) 0.5-2.5 (3) MG/3ML SOLN nebulizer solution Inhale 3 mLs into the lungs every 4 hours 2/19/19   Zara Grossman MD   clopidogrel (PLAVIX) 75 MG tablet Take 1 tablet by mouth daily 2/19/19   Zara Grossman MD   nitroGLYCERIN (NITROSTAT) 0.4 MG SL tablet up to max of 3 total doses. If no relief after 1 dose, call 911. 2/19/19   Zara Grossman MD   insulin glargine (BASAGLAR KWIKPEN) 100 UNIT/ML injection pen Inject 35 Units into the skin nightly Dispense with pen needle 2/19/19   Zara Grossman MD   fluticasone-vilanterol (BREO ELLIPTA) 100-25 MCG/INH AEPB inhaler Inhale 1 puff into the lungs daily 2/19/19   Zara Grossman MD   gabapentin (NEURONTIN) 300 MG capsule Take 1 capsule by mouth 3 times daily for 30 days.  mg dose.  2/19/19 3/21/19  Zara Grossman MD   QUEtiapine (SEROQUEL) 100 MG tablet Take 1 tablet by mouth nightly 2/19/19   Zara Grossman MD   Dulaglutide (TRULICITY) 4.23 CW/6.9AN SOPN Inject 0.75 mg into the skin once a week 2/19/19   Zara Santos MD   TRUE METRIX BLOOD GLUCOSE TEST strip TEST BLOOD SUGAR 4 TO 5 TIMES DAILY 1/28/19   Zara Santos MD   TRELEGY ELLIPTA 100-62.5-25 MCG/INH AEPB INHALE 1 PUFF INTO THE LINGS DAILY 1/28/19   Zara Santos MD   INS SYRINGE/NEEDLE 1CC/28G 28G X 1/2\" 1 ML MISC Dx: DM-2 use 3-4 times daily 2/7/18   Zara Santos MD   INSULIN SYRINGE .5CC/28G (INS SYRINGE/NEEDLE .5CC/28G) 28G X 1/2\" 0.5 ML MISC 1 Syringe by Does not apply route 4 times daily 4/7/17   Zara Santos MD       REVIEW OF SYSTEMS    (2-9 systems for level 4, 10 or more for level 5)      Review of Systems   Constitutional: Negative for chills and fever. Respiratory: Positive for cough and shortness of breath. Cardiovascular: Negative for chest pain. Musculoskeletal: Negative for myalgias. Allergic/Immunologic: Negative for immunocompromised state. PHYSICALEXAM   (upto 7 for level 4, 8 or more for level 5)      INITIAL VITALS:  oral temperature is 98.6 °F (37 °C). His blood pressure is 168/79 (abnormal) and his pulse is 51. His respiration is 21 and oxygen saturation is 100%. Physical Exam   Constitutional: He is oriented to person, place, and time. He appears well-developed and well-nourished. No distress. HENT:   Head: Normocephalic. Eyes: Pupils are equal, round, and reactive to light. Neck: Normal range of motion. Neck supple. Cardiovascular: Normal rate. Pulmonary/Chest: Effort normal. No respiratory distress. He has wheezes. Diffuse rhonchi   Musculoskeletal: Normal range of motion. Neurological: He is alert and oriented to person, place, and time. Skin: Skin is warm and dry. Capillary refill takes less than 2 seconds. Psychiatric: He has a normal mood and affect. His behavior is normal. Judgment and thought content normal.   Nursing note and vitals reviewed.       DIFFERENTIAL  DIAGNOSIS   Exacerbation of COPD    PLAN (LABS / IMAGING / EKG):  Orders Placed This Encounter   Procedures    Initiate ED Aerosol Protocol    Respiratory care evaluation only    HHN Treatment    EKG 12 Lead       MEDICATIONS ORDERED:  Orders Placed This Encounter   Medications    albuterol (PROVENTIL) nebulizer solution 5 mg    DISCONTD: ipratropium-albuterol (DUONEB) nebulizer solution 1 ampule    albuterol (PROVENTIL) nebulizer solution 5 mg    DISCONTD: albuterol sulfate  (90 Base) MCG/ACT inhaler 2 puff    DISCONTD: albuterol sulfate  (90 Base) MCG/ACT inhaler 2 puff    DISCONTD: ipratropium (ATROVENT HFA) 17 MCG/ACT inhaler 2 puff    ipratropium (ATROVENT) 0.02 % nebulizer solution 0.5 mg    predniSONE (DELTASONE) tablet 60 mg    predniSONE (DELTASONE) 10 MG tablet     Sig: Take 5 tablets by mouth once on 5/5  Take 4 tablets by mouth once on 5/6  Take 3 tablets by mouth once on 5/7  Take 2 tablets by mouth once on 5/820  Take 1 tablet by mouth once on 5/9     Dispense:  15 tablet     Refill:  0       Controlled Substances Monitoring:      DIAGNOSTIC RESULTS / EMERGENCY DEPARTMENT COURSE / [facility]     RADIOLOGY:   I directly visualized(with the attending physician) the following  images and reviewed the radiologist interpretations:  No results found. No orders to display       LABS:  No results found for this visit on 05/04/19. EMERGENCY DEPARTMENT COURSE:  Patient did check his blood sugar as requesting box meal.  One was provided by nurse. Attending physician recommends EKG  Reassessment patient states he is breathing much easier. He feels comfortable home. He is requesting a tapering dose of steroids as he has had a difficult time with his blood sugar when on high-dose for a week.     CONSULTS:  None    PROCEDURES:  None    FINAL IMPRESSION      1. COPD exacerbation ([facility] [address]          DISPOSITION / PLAN     DISPOSITION Decision To Discharge    PATIENT REFERRED TO:  Zara Richey MD  [address] [place] [id number] [phone]            DISCHARGE MEDICATIONS:  Discharge Medication List as of 5/4/2019  3:25 PM      START taking these medications    Details   predniSONE (DELTASONE) 10 MG tablet Take 5 tablets by mouth once on 5/5  Take 4 tablets by mouth once on 5/6  Take 3 tablets by mouth once on 5/7  Take 2 tablets by mouth once on 5/820  Take 1 tablet by mouth once on 5/9, Disp-15 tablet, R-0Print             TERRANCE Galan CNP   Emergency Medicine Nurse Practitioner    (Please note that portions of this note were completed with a voice recognitionprogram.  Efforts were made to edit the dictations but occasionally words are mis-transcribed.)       TERRANCE Galan CNP  05/04/19 [id number]

## 2019-05-04 NOTE — ED NOTES
Pt to room 35 via EMS with c/o COPD exacerbation. Pt reports that he had been having trouble breathing. Pt received 1 combivent treatment PTA and reports that breathing has gotten easier. Pt alert and oriented x4, talking in complete sentences, respirations even and unlabored. Pt acting age appropriate. White board updated, will continue to monitor.        Tequila Sweeney RN  05/04/19 [id number]

## 2019-05-04 NOTE — ED PROVIDER NOTES
[facility]  Emergency Department  Faculty Attestation     I performed a history and physical examination of the patient and discussed management with the resident. I reviewed the residents note and agree with the documented findings and plan of care. Any areas of disagreement are noted on the chart. I was personally present for the key portions of any procedures. I have documented in the chart those procedures where I was not present during the key portions. I have reviewed the emergency nurses triage note. I agree with the chief complaint, past medical history, past surgical history, allergies, medications, social and family history as documented unless otherwise noted below. For Physician Assistant/ Nurse Practitioner cases/documentation I have personally evaluated this patient and have completed at least one if not all key elements of the E/M (history, physical exam, and MDM). Additional findings are as noted. Primary Care Physician:  Zara Lewis MD    Screenings:  [unfilled]    CHIEF COMPLAINT       Chief Complaint   Patient presents with    COPD       RECENT VITALS:   Temp: 98.6 °F (37 °C),  Pulse: 51, Resp: 21, BP: (!) 168/79    LABS:  Labs Reviewed - No data to display    Radiology  No orders to display       EKG:  EKG Interpretation    Interpreted by me    Rhythm: normal sinus   Rate: Bradycardia  Axis: normal  Ectopy: none  Conduction: normal  ST Segments: Elevation the 2-4  T Waves: Inversion inferolaterally  Q Waves: none    Clinical Impression: Nonspecific ST and T-wave changes, concerning for ischemia, however unchanged from prior EKG. Attending Physician Additional  Notes    Patient shortness of breath coughing and wheezing. He has a history of COPD. He has nebulizer for albuterol and Atrovent. He is not presently on prednisone. No fevers hemoptysis. He did have left chest heaviness during episode last night.   No known heart disease and has had a stress test recently which was negative. On exam he is mild restrictive distress on a continuous aerosol. Breath sounds are diminished with moderate expiratory wheezing. No retractions or accessory muscle use. He is able to speak. This. Saturations 100%. No edema cords Homans Tenderness. Impression is COPD exacerbation. Plan is steroids aerosols reassess. Mariusz Reynaga.  Sherri Diego MD, [facility]  Attending Emergency  Physician                Ari Fritz MD  05/04/19 [id number]

## 2019-05-04 NOTE — ED NOTES
Bed: 35  Expected date:   Expected time:   Means of arrival:   Comments:  Vaibhav Mascorro 37, RN  05/04/19 [id number]

## 2019-05-04 NOTE — ED NOTES
Pt states feels completely better on d/c. Pt departs aox4 w/ rr even and unlabored.      Suha Klein RN  05/04/19 [id number]

## 2019-05-06 LAB
EKG ATRIAL RATE: 56 BPM
EKG P AXIS: 77 DEGREES
EKG P-R INTERVAL: 128 MS
EKG Q-T INTERVAL: 418 MS
EKG QRS DURATION: 102 MS
EKG QTC CALCULATION (BAZETT): 403 MS
EKG R AXIS: 21 DEGREES
EKG T AXIS: 173 DEGREES
EKG VENTRICULAR RATE: 56 BPM

## 2019-05-09 DIAGNOSIS — F51.01 PRIMARY INSOMNIA: ICD-10-CM

## 2019-05-09 NOTE — TELEPHONE ENCOUNTER
Request for Seroquel. Next Visit Date:  Future Appointments   Date Time Provider Mahesh Josei   6/4/2019  8:45 AM Zara Borrego MD [facility] IM [address] Maintenance   Topic Date Due    Shingles Vaccine (1 of 2) 01/07/2006    Diabetic retinal exam  06/01/2018    Diabetic microalbuminuria test  06/23/2018    Colon cancer screen colonoscopy  06/27/2018    A1C test (Diabetic or Prediabetic)  05/19/2019    Flu vaccine (Season Ended) 09/01/2019    Lipid screen  09/03/2019    Potassium monitoring  09/05/2019    Creatinine monitoring  09/05/2019    Diabetic foot exam  02/19/2020    DTaP/Tdap/Td vaccine (2 - Td) 12/14/2023    Pneumococcal 0-64 years Vaccine  Completed    Hepatitis C screen  Completed    HIV screen  Completed       Hemoglobin A1C (%)   Date Value   02/19/2019 9.4   08/24/2018 8.9   07/09/2018 9.7             ( goal A1C is < 7)   Microalb/Crt.  Ratio (mcg/mg creat)   Date Value   06/23/2017 2,464 (H)     LDL Cholesterol (mg/dL)   Date Value   09/03/2018 76       (goal LDL is <100)   AST (U/L)   Date Value   09/03/2018 21     ALT (U/L)   Date Value   09/03/2018 25     BUN (mg/dL)   Date Value   09/05/2018 15     BP Readings from Last 3 Encounters:   05/04/19 (!) 168/79   02/19/19 138/82   11/09/18 (!) 166/86          (goal 120/80)    All Future Testing planned in [facility]  Lab Frequency Next Occurrence   Basic Metabolic Panel Once 57/77/4363   Basic Metabolic Panel Once 49/63/0163   POCT FECAL IMMUNOCHEMICAL TEST (FIT) Once 02/19/2020   Microalbumin, Ur Once 05/29/2019         Patient Active Problem List:     Cigarette nicotine dependence without complication     Carpal tunnel syndrome on right     Colon polyps     Carotid stenosis, left s/p Endarterectomy     Mixed simple and mucopurulent chronic bronchitis (HCC)     Pure hypercholesterolemia     PTSD (post-traumatic stress disorder)     Transient cerebral ischemia     Essential hypertension     DM type 2, uncontrolled, with neuropathy ([facility] [address]     COPD (chronic obstructive pulmonary disease) (HCC)     Cerebral vascular disease     Primary insomnia

## 2019-05-12 DIAGNOSIS — J41.8 MIXED SIMPLE AND MUCOPURULENT CHRONIC BRONCHITIS (HCC): ICD-10-CM

## 2019-05-13 RX ORDER — FLUTICASONE FUROATE AND VILANTEROL TRIFENATATE 100; 25 UG/1; UG/1
POWDER RESPIRATORY (INHALATION)
Qty: 1 EACH | Refills: 2 | Status: SHIPPED | OUTPATIENT
Start: 2019-05-13 | End: 2019-07-31

## 2019-05-13 RX ORDER — QUETIAPINE FUMARATE 100 MG/1
100 TABLET, FILM COATED ORAL NIGHTLY
Qty: 30 TABLET | Refills: 2 | Status: ON HOLD | OUTPATIENT
Start: 2019-05-13 | End: 2019-08-12

## 2019-05-13 NOTE — TELEPHONE ENCOUNTER
hypercholesterolemia     PTSD (post-traumatic stress disorder)     Transient cerebral ischemia     Essential hypertension     DM type 2, uncontrolled, with neuropathy (HCC)     COPD (chronic obstructive pulmonary disease) (HCC)     Cerebral vascular disease     Primary insomnia

## 2019-06-06 ENCOUNTER — TELEPHONE (OUTPATIENT)
Dept: INTERNAL MEDICINE | Age: 64
End: 2019-06-06

## 2019-06-08 DIAGNOSIS — I65.22 CAROTID STENOSIS, LEFT: ICD-10-CM

## 2019-06-08 DIAGNOSIS — I25.10 CAD IN NATIVE ARTERY: ICD-10-CM

## 2019-06-08 DIAGNOSIS — I10 ESSENTIAL HYPERTENSION: ICD-10-CM

## 2019-06-08 DIAGNOSIS — E78.01 FAMILIAL HYPERCHOLESTEROLEMIA: ICD-10-CM

## 2019-06-09 DIAGNOSIS — I25.10 CAD IN NATIVE ARTERY: ICD-10-CM

## 2019-06-09 DIAGNOSIS — I10 ESSENTIAL HYPERTENSION: ICD-10-CM

## 2019-06-10 RX ORDER — PRAZOSIN HYDROCHLORIDE 5 MG/1
5 CAPSULE ORAL 2 TIMES DAILY
Qty: 60 CAPSULE | Refills: 2 | Status: ON HOLD | OUTPATIENT
Start: 2019-06-10 | End: 2019-08-28 | Stop reason: HOSPADM

## 2019-06-10 RX ORDER — AMLODIPINE BESYLATE 10 MG/1
10 TABLET ORAL DAILY
Qty: 30 TABLET | Refills: 2 | Status: ON HOLD | OUTPATIENT
Start: 2019-06-10 | End: 2019-08-12

## 2019-06-10 RX ORDER — ISOSORBIDE MONONITRATE 120 MG/1
120 TABLET, EXTENDED RELEASE ORAL DAILY
Qty: 30 TABLET | Refills: 2 | Status: ON HOLD | OUTPATIENT
Start: 2019-06-10 | End: 2019-08-12

## 2019-06-10 RX ORDER — GABAPENTIN 300 MG/1
300 CAPSULE ORAL 3 TIMES DAILY
Qty: 90 CAPSULE | Refills: 2 | Status: ON HOLD | OUTPATIENT
Start: 2019-06-10 | End: 2019-08-12

## 2019-06-10 RX ORDER — ASPIRIN 81 MG/1
TABLET ORAL
Qty: 30 TABLET | Refills: 2 | Status: SHIPPED | OUTPATIENT
Start: 2019-06-10 | End: 2019-10-21 | Stop reason: SDUPTHER

## 2019-06-10 RX ORDER — ATORVASTATIN CALCIUM 80 MG/1
80 TABLET, FILM COATED ORAL DAILY
Qty: 30 TABLET | Refills: 2 | Status: SHIPPED | OUTPATIENT
Start: 2019-06-10 | End: 2019-10-21 | Stop reason: SDUPTHER

## 2019-06-10 NOTE — TELEPHONE ENCOUNTER
Escribe request for atorvastatin, prazosin, amlodipine, aspirin    Next Visit Date:  No future appointments. pt is on a wait list    Health Maintenance   Topic Date Due    Shingles Vaccine (1 of 2) 01/07/2006    Diabetic retinal exam  06/01/2018    Diabetic microalbuminuria test  06/23/2018    Colon cancer screen colonoscopy  06/27/2018    A1C test (Diabetic or Prediabetic)  05/19/2019    Flu vaccine (Season Ended) 09/01/2019    Lipid screen  09/03/2019    Potassium monitoring  09/05/2019    Creatinine monitoring  09/05/2019    Diabetic foot exam  02/19/2020    DTaP/Tdap/Td vaccine (2 - Td) 12/14/2023    Pneumococcal 0-64 years Vaccine  Completed    Hepatitis C screen  Completed    HIV screen  Completed             (applicable per patient's age: Cancer Screenings, Depression Screening, Fall Risk Screening, Immunizations)    Hemoglobin A1C (%)   Date Value   02/19/2019 9.4   08/24/2018 8.9   07/09/2018 9.7     Microalb/Crt.  Ratio (mcg/mg creat)   Date Value   06/23/2017 2,464 (H)     LDL Cholesterol (mg/dL)   Date Value   09/03/2018 76     AST (U/L)   Date Value   09/03/2018 21     ALT (U/L)   Date Value   09/03/2018 25     BUN (mg/dL)   Date Value   09/05/2018 15      (goal A1C is < 7)   (goal LDL is <100) need 30-50% reduction from baseline     BP Readings from Last 3 Encounters:   05/04/19 (!) 168/79   02/19/19 138/82   11/09/18 (!) 166/86    (goal /80)      All Future Testing planned in [facility]:  Lab Frequency Next Occurrence   Basic Metabolic Panel Once 67/75/9870   Basic Metabolic Panel Once 61/33/0388   POCT FECAL IMMUNOCHEMICAL TEST (FIT) Once 02/19/2020   Microalbumin, Ur Once 09/06/2019            Patient Active Problem List:     Cigarette nicotine dependence without complication     Carpal tunnel syndrome on right     Colon polyps     Carotid stenosis, left s/p Endarterectomy     Mixed simple and mucopurulent chronic bronchitis (HCC)     Pure hypercholesterolemia     PTSD (post-traumatic stress disorder)     Transient cerebral ischemia     Essential hypertension     DM type 2, uncontrolled, with neuropathy (HCC)     COPD (chronic obstructive pulmonary disease) (HCC)     Cerebral vascular disease     Primary insomnia

## 2019-06-10 NOTE — TELEPHONE ENCOUNTER
Elayne request for gabapentin    Next Visit Date:  No future appointments. pt is on a wait list    Health Maintenance   Topic Date Due    Shingles Vaccine (1 of 2) 01/07/2006    Diabetic retinal exam  06/01/2018    Diabetic microalbuminuria test  06/23/2018    Colon cancer screen colonoscopy  06/27/2018    A1C test (Diabetic or Prediabetic)  05/19/2019    Flu vaccine (Season Ended) 09/01/2019    Lipid screen  09/03/2019    Potassium monitoring  09/05/2019    Creatinine monitoring  09/05/2019    Diabetic foot exam  02/19/2020    DTaP/Tdap/Td vaccine (2 - Td) 12/14/2023    Pneumococcal 0-64 years Vaccine  Completed    Hepatitis C screen  Completed    HIV screen  Completed             (applicable per patient's age: Cancer Screenings, Depression Screening, Fall Risk Screening, Immunizations)    Hemoglobin A1C (%)   Date Value   02/19/2019 9.4   08/24/2018 8.9   07/09/2018 9.7     Microalb/Crt.  Ratio (mcg/mg creat)   Date Value   06/23/2017 2,464 (H)     LDL Cholesterol (mg/dL)   Date Value   09/03/2018 76     AST (U/L)   Date Value   09/03/2018 21     ALT (U/L)   Date Value   09/03/2018 25     BUN (mg/dL)   Date Value   09/05/2018 15      (goal A1C is < 7)   (goal LDL is <100) need 30-50% reduction from baseline     BP Readings from Last 3 Encounters:   05/04/19 (!) 168/79   02/19/19 138/82   11/09/18 (!) 166/86    (goal /80)      All Future Testing planned in [facility]:  Lab Frequency Next Occurrence   Basic Metabolic Panel Once 53/69/1408   Basic Metabolic Panel Once 75/69/5944   POCT FECAL IMMUNOCHEMICAL TEST (FIT) Once 02/19/2020   Microalbumin, Ur Once 09/06/2019            Patient Active Problem List:     Cigarette nicotine dependence without complication     Carpal tunnel syndrome on right     Colon polyps     Carotid stenosis, left s/p Endarterectomy     Mixed simple and mucopurulent chronic bronchitis (HCC)     Pure hypercholesterolemia     PTSD (post-traumatic stress disorder)     Transient cerebral ischemia     Essential hypertension     DM type 2, uncontrolled, with neuropathy (HCC)     COPD (chronic obstructive pulmonary disease) (HCC)     Cerebral vascular disease     Primary insomnia

## 2019-06-10 NOTE — TELEPHONE ENCOUNTER
Escribe request for Imdur . Please escribe if appropriate      Next Visit Date:  None, missed last appt, message placed on script to call for appt     Health Maintenance   Topic Date Due    Shingles Vaccine (1 of 2) 01/07/2006    Diabetic retinal exam  06/01/2018    Diabetic microalbuminuria test  06/23/2018    Colon cancer screen colonoscopy  06/27/2018    A1C test (Diabetic or Prediabetic)  05/19/2019    Flu vaccine (Season Ended) 09/01/2019    Lipid screen  09/03/2019    Potassium monitoring  09/05/2019    Creatinine monitoring  09/05/2019    Diabetic foot exam  02/19/2020    DTaP/Tdap/Td vaccine (2 - Td) 12/14/2023    Pneumococcal 0-64 years Vaccine  Completed    Hepatitis C screen  Completed    HIV screen  Completed       Hemoglobin A1C (%)   Date Value   02/19/2019 9.4   08/24/2018 8.9   07/09/2018 9.7             ( goal A1C is < 7)   Microalb/Crt.  Ratio (mcg/mg creat)   Date Value   06/23/2017 2,464 (H)     LDL Cholesterol (mg/dL)   Date Value   09/03/2018 76       (goal LDL is <100)   AST (U/L)   Date Value   09/03/2018 21     ALT (U/L)   Date Value   09/03/2018 25     BUN (mg/dL)   Date Value   09/05/2018 15     BP Readings from Last 3 Encounters:   05/04/19 (!) 168/79   02/19/19 138/82   11/09/18 (!) 166/86          (goal 120/80)          Patient Active Problem List:     Cigarette nicotine dependence without complication     Carpal tunnel syndrome on right     Colon polyps     Carotid stenosis, left s/p Endarterectomy     Mixed simple and mucopurulent chronic bronchitis (HCC)     Pure hypercholesterolemia     PTSD (post-traumatic stress disorder)     Transient cerebral ischemia     Essential hypertension     DM type 2, uncontrolled, with neuropathy (HCC)     COPD (chronic obstructive pulmonary disease) (HCC)     Cerebral vascular disease     Primary insomnia

## 2019-06-18 ENCOUNTER — CARE COORDINATION (OUTPATIENT)
Dept: CARE COORDINATION | Age: 64
End: 2019-06-18

## 2019-06-18 NOTE — CARE COORDINATION
Ambulatory Care Coordination Note  6/18/2019  CM Risk Score: 7  Avril Mortality Risk Score:      ACC: Carol Ann Gandara RN    Summary Note: attempted to reach patient for follow up and phone is not taking calls at this time. Will try to reach patient next week. Care Coordination Interventions    Program Enrollment:  Complex Care  Referral from Primary Care Provider:  Yes  Suggested Interventions and Community Resources  Diabetes Education:  Completed  Medication Assistance Program:  Completed  Zone Management Tools:  Completed         Goals Addressed     None          Prior to Admission medications    Medication Sig Start Date End Date Taking? Authorizing Provider   atorvastatin (LIPITOR) 80 MG tablet TAKE 1 TABLET BY MOUTH DAILY 6/10/19   Zara Mcdaniel MD   prazosin (MINIPRESS) 5 MG capsule TAKE 1 CAPSULE BY MOUTH 2 TIMES DAILY DC PREVIOUS SCRIPT 6/10/19   Zara Mcdaniel MD   amLODIPine (NORVASC) 10 MG tablet TAKE 1 TABLET BY MOUTH DAILY 6/10/19   Zara Mcdaniel MD   ASPIRIN ADULT LOW STRENGTH 81 MG EC tablet TAKE 1 TABLET BY MOUTH DAILY 6/10/19   Zara Mcdaniel MD   isosorbide mononitrate (IMDUR) 120 MG extended release tablet TAKE 1 TABLET BY MOUTH DAILY 6/10/19   Zara Mcdaniel MD   gabapentin (NEURONTIN) 300 MG capsule Take 1 capsule by mouth 3 times daily for 30 days.   mg dose 6/10/19 7/10/19  Zara Cedillo MD   QUEtiapine (SEROQUEL) 100 MG tablet TAKE 1 TABLET BY MOUTH NIGHTLY 5/13/19   Zara Nam MD   BREO ELLIPTA 100-25 MCG/INH AEPB inhaler INHALE 1 PUFF INTO THE LUNGS DAILY 5/13/19   Zara Mcdaniel MD   EASY COMFORT LANCETS MISC DX: DM-2 USE 3-4 TIMES DAILY 3/25/19   Zara Mcdaniel MD   COMFORT EZ PEN NEEDLES 31G X 8 MM MISC USE AS DIRECTED 2/25/19   Zara Mcdaniel MD   albuterol-ipratropium (COMBIVENT RESPIMAT)  MCG/ACT AERS inhaler Inhale 1 puff into the lungs every 6 hours 2/19/19   Zara Mcdaniel MD   insulin aspart (Rolene Pong) 100 UNIT/ML injection pen Inject 5 Units into the skin 3 times daily (before meals) Sliding scale 2/19/19   Zara Cloud MD   ipratropium-albuterol (DUONEB) 0.5-2.5 (3) MG/3ML SOLN nebulizer solution Inhale 3 mLs into the lungs every 4 hours 2/19/19   Zara Cloud MD   clopidogrel (PLAVIX) 75 MG tablet Take 1 tablet by mouth daily 2/19/19   Zara Cloud MD   nitroGLYCERIN (NITROSTAT) 0.4 MG SL tablet up to max of 3 total doses. If no relief after 1 dose, call 911. 2/19/19   Zara Cloud MD   insulin glargine (BASAGLAR KWIKPEN) 100 UNIT/ML injection pen Inject 35 Units into the skin nightly Dispense with pen needle 2/19/19   Zara Cloud MD   Dulaglutide (TRULICITY) 9.05 QX/3.1WH SOPN Inject 0.75 mg into the skin once a week 2/19/19   Zara Cloud MD   TRUE METRIX BLOOD GLUCOSE TEST strip TEST BLOOD SUGAR 4 TO 5 TIMES DAILY 1/28/19   Zara Clodu MD   TRELEGY ELLIPTA 100-62.5-25 MCG/INH AEPB INHALE 1 PUFF INTO THE LINGS DAILY 1/28/19   Zara Cloud MD   INS SYRINGE/NEEDLE 1CC/28G 28G X 1/2\" 1 ML MISC Dx: DM-2 use 3-4 times daily 2/7/18   Zara lCoud MD   INSULIN SYRINGE .5CC/28G (INS SYRINGE/NEEDLE .5CC/28G) 28G X 1/2\" 0.5 ML MISC 1 Syringe by Does not apply route 4 times daily 4/7/17   Zara Cloud MD       No future appointments.

## 2019-07-09 DIAGNOSIS — J41.8 MIXED SIMPLE AND MUCOPURULENT CHRONIC BRONCHITIS (HCC): ICD-10-CM

## 2019-07-09 RX ORDER — IPRATROPIUM/ALBUTEROL SULFATE 20-100 MCG
1 MIST INHALER (GRAM) INHALATION EVERY 6 HOURS
Qty: 4 G | Refills: 2 | Status: SHIPPED | OUTPATIENT
Start: 2019-07-09 | End: 2019-10-21 | Stop reason: SDUPTHER

## 2019-07-10 ENCOUNTER — CARE COORDINATION (OUTPATIENT)
Dept: CARE COORDINATION | Age: 64
End: 2019-07-10

## 2019-07-18 ENCOUNTER — TELEPHONE (OUTPATIENT)
Dept: INTERNAL MEDICINE | Age: 64
End: 2019-07-18

## 2019-07-18 NOTE — TELEPHONE ENCOUNTER
LEANNE From [place] with [facility]: Patient has been having increased SOB and when she went to visit Yennifer Dover his Blood pressure and Heart rate were elevated. She didn't hear any fluid in the patient's lungs. The patient borrowed an oxygen tank to help with his breathing. The lowest his oxygen got was 93%. aCssy also stated the patient has some edema on his legs. Cassy was wondering if there were any other test the doctor would like to have the patient do before his next visit on 7/26/19?

## 2019-07-19 NOTE — TELEPHONE ENCOUNTER
Tried to contact [place] about what Dr. Jason Wei stated but the phone went straight to voicemail.

## 2019-07-22 ENCOUNTER — CARE COORDINATION (OUTPATIENT)
Dept: CARE COORDINATION | Age: 64
End: 2019-07-22

## 2019-07-23 ASSESSMENT — ENCOUNTER SYMPTOMS: DYSPNEA ASSOCIATED WITH: MINIMAL EXERTION

## 2019-07-23 NOTE — CARE COORDINATION
This Visit's Progress     Conditions and Symptoms        I will schedule office visits, as directed by my provider. I will keep my appointment or reschedule if I have to cancel. I will notify my provider of any barriers to my plan of care. I will follow my Zone Management tool to seek urgent or emergent care. I will notify my provider of any symptoms that indicate a worsening of my condition. Barriers: impairment:  physical: extreme weakness, overwhelmed by complexity of regimen and lack of education  Plan for overcoming my barriers: Care Coordination; home care  Confidence: 7/10  Anticipated Goal Completion Date: 9/30/2019         COMPLETED: Wellness Goal        Patient Self-Management Goal for Health Maintenance  Goal: I will follow a healthy diet as discussed by my provider. I will schedule screening colonoscopies as directed by my provider. I will schedule routine eye examinations with an eye specialist as directed by my provider. I will consider obtaining vaccines recommended by my provider. I will schedule follow up appointments with my providers and keep appointments. I will take my medications as prescribed. Barriers: none  Plan for overcoming my barriers: N/A  Confidence: 8/10  Anticipated Goal Completion Date: 3/2019            Prior to Admission medications    Medication Sig Start Date End Date Taking?  Authorizing Provider   COMBIVENT RESPIMAT  MCG/ACT AERS inhaler INHALE 1 PUFF INTO THE LUNGS EVERY 6 HOURS 7/9/19   Zara Crespo MD   atorvastatin (LIPITOR) 80 MG tablet TAKE 1 TABLET BY MOUTH DAILY 6/10/19   Zara Crespo MD   prazosin (MINIPRESS) 5 MG capsule TAKE 1 CAPSULE BY MOUTH 2 TIMES DAILY DC PREVIOUS SCRIPT 6/10/19   Zara Crespo MD   amLODIPine (NORVASC) 10 MG tablet TAKE 1 TABLET BY MOUTH DAILY 6/10/19   Zara Crespo MD   ASPIRIN ADULT LOW STRENGTH 81 MG EC tablet TAKE 1 TABLET BY MOUTH DAILY 6/10/19   Zara Crespo MD   isosorbide mononitrate

## 2019-07-26 ENCOUNTER — CARE COORDINATION (OUTPATIENT)
Dept: CARE COORDINATION | Age: 64
End: 2019-07-26

## 2019-07-29 NOTE — CARE COORDINATION
Ambulatory Care Coordination Note  7/29/2019  CM Risk Score: 6  Charlson 10 Year Mortality Risk Score: 100%     ACC: Jossue Lizarraga, RN    Summary Note: attempted to reach patient for follow up and phone is busy; alternate number is not available. Called his girlfriend, Felecia Weinstein, and asked about his missing his appt. She said she had to cancel it because he was too weak to leave the house. She said he has a visiting physician who came to see him, Dr. Heena Arellano. He is referring him to cardiology. She asked which pulmonologist Ammon Galindo is following with. Gave her name and contact number of Dr. Dave Moore. She is also scheduling him to see his kidney doctor. [facility] and [address] [facility]. Received verification that patient had established care and was seen by Hay Delgadillo CNP on 7/26/19. Will remove from Care Coordination at this time. Care Coordination Interventions    Program Enrollment:  Complex Care  Referral from Primary Care Provider:  Yes  Suggested Interventions and [facility] [address] [place]: In Process  Diabetes Education:  Completed  Home Health Services:  Completed (Comment: [place])  Medication Assistance Program:  Completed  Zone Management Tools:  Completed         Goals Addressed    None         Prior to Admission medications    Medication Sig Start Date End Date Taking?  Authorizing Provider   COMBIVENT RESPIMAT  MCG/ACT AERS inhaler INHALE 1 PUFF INTO THE LUNGS EVERY 6 HOURS 7/9/19   Zara Ansari MD   atorvastatin (LIPITOR) 80 MG tablet TAKE 1 TABLET BY MOUTH DAILY 6/10/19   Zara Ansari MD   prazosin (MINIPRESS) 5 MG capsule TAKE 1 CAPSULE BY MOUTH 2 TIMES DAILY DC PREVIOUS SCRIPT 6/10/19   Zara Ansari MD   amLODIPine (NORVASC) 10 MG tablet TAKE 1 TABLET BY MOUTH DAILY 6/10/19   Zara Ansari MD   ASPIRIN ADULT LOW STRENGTH 81 MG EC tablet TAKE 1 TABLET BY MOUTH DAILY 6/10/19   Zara Ansari MD   isosorbide mononitrate (IMDUR)

## 2019-07-31 ENCOUNTER — OFFICE VISIT (OUTPATIENT)
Dept: PULMONOLOGY | Age: 64
End: 2019-07-31
Payer: COMMERCIAL

## 2019-07-31 VITALS
HEART RATE: 56 BPM | DIASTOLIC BLOOD PRESSURE: 60 MMHG | OXYGEN SATURATION: 99 % | BODY MASS INDEX: 23.24 KG/M2 | WEIGHT: 144.6 LBS | SYSTOLIC BLOOD PRESSURE: 117 MMHG | RESPIRATION RATE: 12 BRPM | HEIGHT: 66 IN

## 2019-07-31 DIAGNOSIS — J98.4 BRONCHOGENIC CYST: ICD-10-CM

## 2019-07-31 DIAGNOSIS — Z87.891 SMOKING HISTORY: ICD-10-CM

## 2019-07-31 DIAGNOSIS — J44.9 CHRONIC OBSTRUCTIVE PULMONARY DISEASE, UNSPECIFIED COPD TYPE (HCC): Primary | ICD-10-CM

## 2019-07-31 PROCEDURE — 99214 OFFICE O/P EST MOD 30 MIN: CPT | Performed by: INTERNAL MEDICINE

## 2019-07-31 NOTE — PROGRESS NOTES
needle, Disp: 5 pen, Rfl: 2    Dulaglutide (TRULICITY) 6.27 AG/0.3UK SOPN, Inject 0.75 mg into the skin once a week, Disp: 4 pen, Rfl: 2    TRUE METRIX BLOOD GLUCOSE TEST strip, TEST BLOOD SUGAR 4 TO 5 TIMES DAILY, Disp: 100 strip, Rfl: 7    TRELEGY ELLIPTA 100-62.5-25 MCG/INH AEPB, INHALE 1 PUFF INTO THE LINGS DAILY, Disp: 1 each, Rfl: 11    gabapentin (NEURONTIN) 300 MG capsule, Take 1 capsule by mouth 3 times daily for 30 days.  mg dose, Disp: 90 capsule, Rfl: 2      Objective:    Physical Exam:  Vitals: /60 (Site: Left Upper Arm, Position: Sitting, Cuff Size: Small Adult)   Pulse 56   Resp 12   Ht 5' 6\" (1.676 m)   Wt 144 lb 9.6 oz (65.6 kg)   SpO2 99% Comment: Room air at rst  BMI 23.34 kg/m²   Last 3 weights: Wt Readings from Last 3 Encounters:   07/31/19 144 lb 9.6 oz (65.6 kg)   02/19/19 152 lb 6.4 oz (69.1 kg)   11/09/18 156 lb (70.8 kg)     Body mass index is 23.34 kg/m². Physical Examination:   PHYSICAL EXAMINATION:  Vitals:    07/31/19 1030   BP: 117/60   Site: Left Upper Arm   Position: Sitting   Cuff Size: Small Adult   Pulse: 56   Resp: 12   SpO2: 99%   Weight: 144 lb 9.6 oz (65.6 kg)   Height: 5' 6\" (1.676 m)       Physical Exam    Constitutional: This is a well developed, well nourished,  62y.o. year old male who is alert, oriented, cooperative and in no apparent distress. Head:normocephalic and atraumatic. EENT:   AUBREY. No conjunctival injections. Septum was midline, mucosa was without erythema, exudates or cobblestoning. No thrush was noted. Mallampati II (soft palate, uvula, fauces visible)  Neck: Supple without thyromegaly. No elevated JVP. Trachea was midline. Respiratory: Chest was symmetrical without dullness to percussion. Breath sounds bilaterally were clear to auscultation. There were no wheezes, rhonchi or rales. There is no intercostal retraction or use of accessory muscles. No egophony noted.    Cardiovascular: Regular without murmur, clicks, gallops or rubs. Abdomen: Slightly rounded and soft without organomegaly. No rebound tenderness, rigidity or guarding was appreciated. Lymphatic: No lymphadenopathy. Musculoskeletal: Normal curvature of the spine. No gross muscle weakness. Extremities:  No lower extremity edema, ulcerations, tenderness, varicosities or erythema. Muscle size, tone and strength are normal.  No involuntary movements are noted. Skin:  Warm and dry. Good color, turgor and pigmentation. No lesions or scars. Neurological/Psychiatric: The patient's general behavior, level of consciousness, thought content and emotional status is normal.       Labs:    Patient has not had pulmonary function test done yet  Report of the chest x-ray done recently did not show any pneumonia  CT Scans-the bronchogenic cyst has been stable        Assessment:    1. Chronic obstructive pulmonary disease, unspecified COPD type ([facility] [address]    2. Smoking history    3. Bronchogenic cyst          PLAN:    Refills were provided -none  Educated and clarified the medication use. Recommend flu vaccination in the fall annually. Had flu vaccination for the season  Recommendations given regarding pneumococcal vaccinations. Patient is up-to-date with vaccinations from pulmonary perspective. Maintain an active lifestyle. Questions  Patient had were answered to his satisfaction. Home O2 evaluation to be done. Supplemental oxygen was not needed  Smoking cessation was to be continued. Pulmonary function tests were ordered. Chest x-ray was reviewed. Patient not a candidate for CT scan to screen for lung cancer  We'll see the patient back in 3 months  Thank you for having us involved in the care of your patient. Please call us if you have any questions or concerns.       Radha Rivera MD             7/31/2019, 7:50 PM

## 2019-08-06 ENCOUNTER — HOSPITAL ENCOUNTER (OUTPATIENT)
Age: 64
Setting detail: SPECIMEN
Discharge: HOME | End: 2019-08-06
Payer: COMMERCIAL

## 2019-08-06 LAB
-: NORMAL
AMORPHOUS: NORMAL
BACTERIA: NORMAL
BILIRUBIN URINE: NEGATIVE
CASTS UA: NORMAL /LPF (ref 0–8)
COLOR: YELLOW
COMMENT UA: ABNORMAL
CREATININE URINE: 91.2 MG/DL (ref 39–259)
CRYSTALS, UA: NORMAL /HPF
EPITHELIAL CELLS UA: NORMAL /HPF (ref 0–5)
GLUCOSE URINE: ABNORMAL
KETONES, URINE: NEGATIVE
LEUKOCYTE ESTERASE, URINE: NEGATIVE
MUCUS: NORMAL
NITRITE, URINE: NEGATIVE
OTHER OBSERVATIONS UA: NORMAL
PH UA: 6 (ref 5–8)
PROTEIN UA: ABNORMAL
RBC UA: NORMAL /HPF (ref 0–4)
RENAL EPITHELIAL, UA: NORMAL /HPF
SPECIFIC GRAVITY UA: 1.02 (ref 1–1.03)
TOTAL PROTEIN, URINE: 327 MG/DL
TRICHOMONAS: NORMAL
TURBIDITY: CLEAR
URINE HGB: NEGATIVE
URINE TOTAL PROTEIN CREATININE RATIO: 3.59 (ref 0–0.2)
UROBILINOGEN, URINE: NORMAL
WBC UA: NORMAL /HPF (ref 0–5)
YEAST: NORMAL

## 2019-08-08 ENCOUNTER — HOSPITAL ENCOUNTER (OUTPATIENT)
Age: 64
Setting detail: SPECIMEN
Discharge: HOME | End: 2019-08-08
Payer: COMMERCIAL

## 2019-08-08 LAB
ANION GAP SERPL CALCULATED.3IONS-SCNC: 15 MMOL/L (ref 9–17)
BUN BLDV-MCNC: 27 MG/DL (ref 8–23)
BUN/CREAT BLD: ABNORMAL (ref 9–20)
CALCIUM SERPL-MCNC: 9.6 MG/DL (ref 8.6–10.4)
CHLORIDE BLD-SCNC: 93 MMOL/L (ref 98–107)
CO2: 23 MMOL/L (ref 20–31)
CREAT SERPL-MCNC: 1.85 MG/DL (ref 0.7–1.2)
GFR AFRICAN AMERICAN: 45 ML/MIN
GFR NON-AFRICAN AMERICAN: 37 ML/MIN
GFR SERPL CREATININE-BSD FRML MDRD: ABNORMAL ML/MIN/{1.73_M2}
GFR SERPL CREATININE-BSD FRML MDRD: ABNORMAL ML/MIN/{1.73_M2}
GLUCOSE BLD-MCNC: 257 MG/DL (ref 70–99)
HCT VFR BLD CALC: 38 % (ref 40.7–50.3)
HEMOGLOBIN: 12.5 G/DL (ref 13–17)
MAGNESIUM: 1.5 MG/DL (ref 1.6–2.6)
MCH RBC QN AUTO: 29.7 PG (ref 25.2–33.5)
MCHC RBC AUTO-ENTMCNC: 32.9 G/DL (ref 28.4–34.8)
MCV RBC AUTO: 90.3 FL (ref 82.6–102.9)
NRBC AUTOMATED: 0 PER 100 WBC
PDW BLD-RTO: 11.9 % (ref 11.8–14.4)
PHOSPHORUS: 4.6 MG/DL (ref 2.5–4.5)
PLATELET # BLD: 442 K/UL (ref 138–453)
PMV BLD AUTO: 9.6 FL (ref 8.1–13.5)
POTASSIUM SERPL-SCNC: 4.3 MMOL/L (ref 3.7–5.3)
PTH INTACT: 98.78 PG/ML (ref 15–65)
RBC # BLD: 4.21 M/UL (ref 4.21–5.77)
SODIUM BLD-SCNC: 131 MMOL/L (ref 135–144)
VITAMIN D 25-HYDROXY: 10.8 NG/ML (ref 30–100)
WBC # BLD: 5.6 K/UL (ref 3.5–11.3)

## 2019-08-11 ENCOUNTER — APPOINTMENT (OUTPATIENT)
Dept: GENERAL RADIOLOGY | Age: 64
DRG: 304 | End: 2019-08-11
Payer: COMMERCIAL

## 2019-08-11 ENCOUNTER — APPOINTMENT (OUTPATIENT)
Dept: CT IMAGING | Age: 64
DRG: 304 | End: 2019-08-11
Payer: COMMERCIAL

## 2019-08-11 ENCOUNTER — HOSPITAL ENCOUNTER (INPATIENT)
Age: 64
LOS: 16 days | Discharge: HOME HEALTH | DRG: 304 | End: 2019-08-28
Attending: EMERGENCY MEDICINE | Admitting: INTERNAL MEDICINE
Payer: COMMERCIAL

## 2019-08-11 DIAGNOSIS — G47.33 OSA (OBSTRUCTIVE SLEEP APNEA): ICD-10-CM

## 2019-08-11 DIAGNOSIS — R07.9 CHEST PAIN, UNSPECIFIED TYPE: ICD-10-CM

## 2019-08-11 DIAGNOSIS — G43.009 MIGRAINE WITHOUT AURA AND WITHOUT STATUS MIGRAINOSUS, NOT INTRACTABLE: ICD-10-CM

## 2019-08-11 DIAGNOSIS — N18.6 ESRD (END STAGE RENAL DISEASE) (HCC): ICD-10-CM

## 2019-08-11 DIAGNOSIS — I16.0 HYPERTENSIVE URGENCY: Primary | ICD-10-CM

## 2019-08-11 LAB
ABSOLUTE EOS #: 0.24 K/UL (ref 0–0.44)
ABSOLUTE IMMATURE GRANULOCYTE: 0.03 K/UL (ref 0–0.3)
ABSOLUTE LYMPH #: 1.21 K/UL (ref 1.1–3.7)
ABSOLUTE MONO #: 0.51 K/UL (ref 0.1–1.2)
ANION GAP SERPL CALCULATED.3IONS-SCNC: 13 MMOL/L (ref 9–17)
BASOPHILS # BLD: 1 % (ref 0–2)
BASOPHILS ABSOLUTE: 0.03 K/UL (ref 0–0.2)
BUN BLDV-MCNC: 30 MG/DL (ref 8–23)
BUN/CREAT BLD: ABNORMAL (ref 9–20)
CALCIUM SERPL-MCNC: 8.8 MG/DL (ref 8.6–10.4)
CHLORIDE BLD-SCNC: 102 MMOL/L (ref 98–107)
CO2: 21 MMOL/L (ref 20–31)
CREAT SERPL-MCNC: 1.57 MG/DL (ref 0.7–1.2)
DIFFERENTIAL TYPE: ABNORMAL
EOSINOPHILS RELATIVE PERCENT: 4 % (ref 1–4)
GFR AFRICAN AMERICAN: 54 ML/MIN
GFR NON-AFRICAN AMERICAN: 45 ML/MIN
GFR SERPL CREATININE-BSD FRML MDRD: ABNORMAL ML/MIN/{1.73_M2}
GFR SERPL CREATININE-BSD FRML MDRD: ABNORMAL ML/MIN/{1.73_M2}
GLUCOSE BLD-MCNC: 296 MG/DL (ref 70–99)
HCT VFR BLD CALC: 34.1 % (ref 40.7–50.3)
HEMOGLOBIN: 10.9 G/DL (ref 13–17)
IMMATURE GRANULOCYTES: 1 %
LYMPHOCYTES # BLD: 19 % (ref 24–43)
MCH RBC QN AUTO: 30 PG (ref 25.2–33.5)
MCHC RBC AUTO-ENTMCNC: 32 G/DL (ref 28.4–34.8)
MCV RBC AUTO: 93.9 FL (ref 82.6–102.9)
MONOCYTES # BLD: 8 % (ref 3–12)
NRBC AUTOMATED: 0 PER 100 WBC
PDW BLD-RTO: 12 % (ref 11.8–14.4)
PLATELET # BLD: 347 K/UL (ref 138–453)
PLATELET ESTIMATE: ABNORMAL
PMV BLD AUTO: 9.7 FL (ref 8.1–13.5)
POTASSIUM SERPL-SCNC: 3.9 MMOL/L (ref 3.7–5.3)
RBC # BLD: 3.63 M/UL (ref 4.21–5.77)
RBC # BLD: ABNORMAL 10*6/UL
SEG NEUTROPHILS: 67 % (ref 36–65)
SEGMENTED NEUTROPHILS ABSOLUTE COUNT: 4.21 K/UL (ref 1.5–8.1)
SODIUM BLD-SCNC: 136 MMOL/L (ref 135–144)
TROPONIN INTERP: ABNORMAL
TROPONIN INTERP: ABNORMAL
TROPONIN T: ABNORMAL NG/ML
TROPONIN T: ABNORMAL NG/ML
TROPONIN, HIGH SENSITIVITY: 34 NG/L (ref 0–22)
TROPONIN, HIGH SENSITIVITY: 34 NG/L (ref 0–22)
WBC # BLD: 6.2 K/UL (ref 3.5–11.3)
WBC # BLD: ABNORMAL 10*3/UL

## 2019-08-11 PROCEDURE — 6360000002 HC RX W HCPCS: Performed by: EMERGENCY MEDICINE

## 2019-08-11 PROCEDURE — 99285 EMERGENCY DEPT VISIT HI MDM: CPT

## 2019-08-11 PROCEDURE — 96366 THER/PROPH/DIAG IV INF ADDON: CPT

## 2019-08-11 PROCEDURE — 85025 COMPLETE CBC W/AUTO DIFF WBC: CPT

## 2019-08-11 PROCEDURE — 6360000004 HC RX CONTRAST MEDICATION: Performed by: EMERGENCY MEDICINE

## 2019-08-11 PROCEDURE — 84484 ASSAY OF TROPONIN QUANT: CPT

## 2019-08-11 PROCEDURE — 6370000000 HC RX 637 (ALT 250 FOR IP): Performed by: EMERGENCY MEDICINE

## 2019-08-11 PROCEDURE — 96365 THER/PROPH/DIAG IV INF INIT: CPT

## 2019-08-11 PROCEDURE — 93005 ELECTROCARDIOGRAM TRACING: CPT | Performed by: EMERGENCY MEDICINE

## 2019-08-11 PROCEDURE — 83036 HEMOGLOBIN GLYCOSYLATED A1C: CPT

## 2019-08-11 PROCEDURE — 2500000003 HC RX 250 WO HCPCS: Performed by: EMERGENCY MEDICINE

## 2019-08-11 PROCEDURE — 71275 CT ANGIOGRAPHY CHEST: CPT

## 2019-08-11 PROCEDURE — 96375 TX/PRO/DX INJ NEW DRUG ADDON: CPT

## 2019-08-11 PROCEDURE — 80048 BASIC METABOLIC PNL TOTAL CA: CPT

## 2019-08-11 PROCEDURE — 71046 X-RAY EXAM CHEST 2 VIEWS: CPT

## 2019-08-11 PROCEDURE — 70450 CT HEAD/BRAIN W/O DYE: CPT

## 2019-08-11 RX ORDER — NITROGLYCERIN 20 MG/100ML
40 INJECTION INTRAVENOUS CONTINUOUS
Status: DISCONTINUED | OUTPATIENT
Start: 2019-08-11 | End: 2019-08-16

## 2019-08-11 RX ORDER — NIFEDIPINE 60 MG/1
60 TABLET, EXTENDED RELEASE ORAL 2 TIMES DAILY
Status: ON HOLD | COMMUNITY
End: 2019-08-28 | Stop reason: HOSPADM

## 2019-08-11 RX ORDER — PROCHLORPERAZINE EDISYLATE 5 MG/ML
10 INJECTION INTRAMUSCULAR; INTRAVENOUS ONCE
Status: COMPLETED | OUTPATIENT
Start: 2019-08-11 | End: 2019-08-11

## 2019-08-11 RX ORDER — CARVEDILOL 25 MG/1
25 TABLET ORAL 2 TIMES DAILY WITH MEALS
COMMUNITY
End: 2019-10-21 | Stop reason: SDUPTHER

## 2019-08-11 RX ORDER — NIFEDIPINE 60 MG/1
60 TABLET, FILM COATED, EXTENDED RELEASE ORAL ONCE
Status: COMPLETED | OUTPATIENT
Start: 2019-08-11 | End: 2019-08-11

## 2019-08-11 RX ORDER — DIPHENHYDRAMINE HYDROCHLORIDE 50 MG/ML
25 INJECTION INTRAMUSCULAR; INTRAVENOUS ONCE
Status: COMPLETED | OUTPATIENT
Start: 2019-08-11 | End: 2019-08-11

## 2019-08-11 RX ORDER — HYDRALAZINE HYDROCHLORIDE 50 MG/1
50 TABLET, FILM COATED ORAL 2 TIMES DAILY
Status: ON HOLD | COMMUNITY
End: 2019-08-28 | Stop reason: HOSPADM

## 2019-08-11 RX ORDER — DEXAMETHASONE SODIUM PHOSPHATE 10 MG/ML
10 INJECTION INTRAMUSCULAR; INTRAVENOUS ONCE
Status: COMPLETED | OUTPATIENT
Start: 2019-08-11 | End: 2019-08-11

## 2019-08-11 RX ADMIN — NIFEDIPINE 60 MG: 60 TABLET, EXTENDED RELEASE ORAL at 21:18

## 2019-08-11 RX ADMIN — DIPHENHYDRAMINE HYDROCHLORIDE 25 MG: 50 INJECTION, SOLUTION INTRAMUSCULAR; INTRAVENOUS at 20:49

## 2019-08-11 RX ADMIN — PROCHLORPERAZINE EDISYLATE 10 MG: 5 INJECTION INTRAMUSCULAR; INTRAVENOUS at 20:49

## 2019-08-11 RX ADMIN — NITROGLYCERIN 10 MCG/MIN: 20 INJECTION INTRAVENOUS at 22:45

## 2019-08-11 RX ADMIN — IOHEXOL 75 ML: 350 INJECTION, SOLUTION INTRAVENOUS at 21:56

## 2019-08-11 RX ADMIN — DEXAMETHASONE SODIUM PHOSPHATE 10 MG: 10 INJECTION INTRAMUSCULAR; INTRAVENOUS at 20:49

## 2019-08-12 ENCOUNTER — APPOINTMENT (OUTPATIENT)
Dept: ULTRASOUND IMAGING | Age: 64
DRG: 304 | End: 2019-08-12
Payer: COMMERCIAL

## 2019-08-12 PROBLEM — I16.0 HYPERTENSIVE URGENCY: Status: ACTIVE | Noted: 2019-08-12

## 2019-08-12 PROBLEM — E21.3 HYPERPARATHYROIDISM (HCC): Status: ACTIVE | Noted: 2019-08-12

## 2019-08-12 PROBLEM — I25.118 CORONARY ARTERY DISEASE OF NATIVE ARTERY OF NATIVE HEART WITH STABLE ANGINA PECTORIS (HCC): Status: ACTIVE | Noted: 2019-08-12

## 2019-08-12 PROBLEM — N17.9 ACUTE KIDNEY INJURY SUPERIMPOSED ON CHRONIC KIDNEY DISEASE (HCC): Status: ACTIVE | Noted: 2019-08-12

## 2019-08-12 PROBLEM — I25.118 CORONARY ARTERY DISEASE WITH EXERTIONAL ANGINA (HCC): Status: ACTIVE | Noted: 2019-08-12

## 2019-08-12 PROBLEM — N18.9 ACUTE KIDNEY INJURY SUPERIMPOSED ON CHRONIC KIDNEY DISEASE (HCC): Status: ACTIVE | Noted: 2019-08-12

## 2019-08-12 PROBLEM — E55.9 VITAMIN D DEFICIENCY: Status: ACTIVE | Noted: 2019-08-12

## 2019-08-12 LAB
CALCIUM IONIZED: 0.96 MMOL/L (ref 1.13–1.33)
EKG ATRIAL RATE: 62 BPM
EKG P AXIS: 71 DEGREES
EKG P-R INTERVAL: 136 MS
EKG Q-T INTERVAL: 378 MS
EKG QRS DURATION: 96 MS
EKG QTC CALCULATION (BAZETT): 383 MS
EKG R AXIS: 68 DEGREES
EKG T AXIS: -120 DEGREES
EKG VENTRICULAR RATE: 62 BPM
ESTIMATED AVERAGE GLUCOSE: 235 MG/DL
ESTIMATED AVERAGE GLUCOSE: 235 MG/DL
GLUCOSE BLD-MCNC: 274 MG/DL (ref 75–110)
GLUCOSE BLD-MCNC: 318 MG/DL (ref 75–110)
GLUCOSE BLD-MCNC: 441 MG/DL (ref 75–110)
GLUCOSE BLD-MCNC: 451 MG/DL (ref 75–110)
GLUCOSE BLD-MCNC: 580 MG/DL (ref 75–110)
GLUCOSE BLD-MCNC: 587 MG/DL (ref 75–110)
GLUCOSE BLD-MCNC: >600 MG/DL (ref 75–110)
HBA1C MFR BLD: 9.8 % (ref 4–6)
HBA1C MFR BLD: 9.8 % (ref 4–6)
MYOGLOBIN: 56 NG/ML (ref 28–72)
MYOGLOBIN: 61 NG/ML (ref 28–72)
PHOSPHORUS: 2.4 MG/DL (ref 2.5–4.5)
TROPONIN INTERP: ABNORMAL
TROPONIN T: ABNORMAL NG/ML
TROPONIN, HIGH SENSITIVITY: 31 NG/L (ref 0–22)
TROPONIN, HIGH SENSITIVITY: 35 NG/L (ref 0–22)
TROPONIN, HIGH SENSITIVITY: 50 NG/L (ref 0–22)
TROPONIN, HIGH SENSITIVITY: 66 NG/L (ref 0–22)

## 2019-08-12 PROCEDURE — 6370000000 HC RX 637 (ALT 250 FOR IP): Performed by: NURSE PRACTITIONER

## 2019-08-12 PROCEDURE — 82330 ASSAY OF CALCIUM: CPT

## 2019-08-12 PROCEDURE — 2580000003 HC RX 258: Performed by: NURSE PRACTITIONER

## 2019-08-12 PROCEDURE — 36415 COLL VENOUS BLD VENIPUNCTURE: CPT

## 2019-08-12 PROCEDURE — 84100 ASSAY OF PHOSPHORUS: CPT

## 2019-08-12 PROCEDURE — 6360000002 HC RX W HCPCS: Performed by: NURSE PRACTITIONER

## 2019-08-12 PROCEDURE — APPSS60 APP SPLIT SHARED TIME 46-60 MINUTES: Performed by: NURSE PRACTITIONER

## 2019-08-12 PROCEDURE — 84484 ASSAY OF TROPONIN QUANT: CPT

## 2019-08-12 PROCEDURE — 2060000000 HC ICU INTERMEDIATE R&B

## 2019-08-12 PROCEDURE — 93010 ELECTROCARDIOGRAM REPORT: CPT | Performed by: INTERNAL MEDICINE

## 2019-08-12 PROCEDURE — 94761 N-INVAS EAR/PLS OXIMETRY MLT: CPT

## 2019-08-12 PROCEDURE — 94640 AIRWAY INHALATION TREATMENT: CPT

## 2019-08-12 PROCEDURE — 83036 HEMOGLOBIN GLYCOSYLATED A1C: CPT

## 2019-08-12 PROCEDURE — 82947 ASSAY GLUCOSE BLOOD QUANT: CPT

## 2019-08-12 PROCEDURE — 83874 ASSAY OF MYOGLOBIN: CPT

## 2019-08-12 PROCEDURE — 99223 1ST HOSP IP/OBS HIGH 75: CPT | Performed by: INTERNAL MEDICINE

## 2019-08-12 PROCEDURE — 76770 US EXAM ABDO BACK WALL COMP: CPT

## 2019-08-12 RX ORDER — DEXTROSE MONOHYDRATE 50 MG/ML
100 INJECTION, SOLUTION INTRAVENOUS PRN
Status: DISCONTINUED | OUTPATIENT
Start: 2019-08-12 | End: 2019-08-12 | Stop reason: SDUPTHER

## 2019-08-12 RX ORDER — PRAZOSIN HYDROCHLORIDE 5 MG/1
5 CAPSULE ORAL 2 TIMES DAILY
Status: DISCONTINUED | OUTPATIENT
Start: 2019-08-12 | End: 2019-08-20

## 2019-08-12 RX ORDER — NITROGLYCERIN 0.4 MG/1
0.4 TABLET SUBLINGUAL EVERY 5 MIN PRN
Status: DISCONTINUED | OUTPATIENT
Start: 2019-08-12 | End: 2019-08-28 | Stop reason: HOSPADM

## 2019-08-12 RX ORDER — MORPHINE SULFATE 2 MG/ML
2 INJECTION, SOLUTION INTRAMUSCULAR; INTRAVENOUS
Status: DISCONTINUED | OUTPATIENT
Start: 2019-08-12 | End: 2019-08-28 | Stop reason: HOSPADM

## 2019-08-12 RX ORDER — ALBUTEROL SULFATE 2.5 MG/3ML
2.5 SOLUTION RESPIRATORY (INHALATION)
Status: DISCONTINUED | OUTPATIENT
Start: 2019-08-12 | End: 2019-08-12

## 2019-08-12 RX ORDER — ASPIRIN 81 MG/1
81 TABLET ORAL DAILY
Status: DISCONTINUED | OUTPATIENT
Start: 2019-08-12 | End: 2019-08-12

## 2019-08-12 RX ORDER — MAGNESIUM SULFATE 1 G/100ML
1 INJECTION INTRAVENOUS PRN
Status: DISCONTINUED | OUTPATIENT
Start: 2019-08-12 | End: 2019-08-15

## 2019-08-12 RX ORDER — DIPHENHYDRAMINE HYDROCHLORIDE 50 MG/ML
25 INJECTION INTRAMUSCULAR; INTRAVENOUS EVERY 6 HOURS PRN
Status: DISCONTINUED | OUTPATIENT
Start: 2019-08-12 | End: 2019-08-28 | Stop reason: HOSPADM

## 2019-08-12 RX ORDER — NICOTINE POLACRILEX 4 MG
15 LOZENGE BUCCAL PRN
Status: DISCONTINUED | OUTPATIENT
Start: 2019-08-12 | End: 2019-08-12 | Stop reason: SDUPTHER

## 2019-08-12 RX ORDER — CLOPIDOGREL BISULFATE 75 MG/1
75 TABLET ORAL DAILY
Status: DISCONTINUED | OUTPATIENT
Start: 2019-08-12 | End: 2019-08-28 | Stop reason: HOSPADM

## 2019-08-12 RX ORDER — DEXTROSE MONOHYDRATE 25 G/50ML
12.5 INJECTION, SOLUTION INTRAVENOUS PRN
Status: DISCONTINUED | OUTPATIENT
Start: 2019-08-12 | End: 2019-08-12 | Stop reason: SDUPTHER

## 2019-08-12 RX ORDER — NITROGLYCERIN 0.4 MG/1
0.4 TABLET SUBLINGUAL EVERY 5 MIN PRN
Status: DISCONTINUED | OUTPATIENT
Start: 2019-08-12 | End: 2019-08-12

## 2019-08-12 RX ORDER — ATORVASTATIN CALCIUM 80 MG/1
80 TABLET, FILM COATED ORAL DAILY
Status: DISCONTINUED | OUTPATIENT
Start: 2019-08-12 | End: 2019-08-28 | Stop reason: HOSPADM

## 2019-08-12 RX ORDER — CLONIDINE 0.3 MG/24H
1 PATCH, EXTENDED RELEASE TRANSDERMAL WEEKLY
Status: ON HOLD | COMMUNITY
End: 2019-08-28 | Stop reason: HOSPADM

## 2019-08-12 RX ORDER — ATROPINE SULFATE 0.1 MG/ML
0.5 INJECTION INTRAVENOUS EVERY 5 MIN PRN
Status: DISCONTINUED | OUTPATIENT
Start: 2019-08-12 | End: 2019-08-12

## 2019-08-12 RX ORDER — METOPROLOL TARTRATE 5 MG/5ML
5 INJECTION INTRAVENOUS EVERY 5 MIN PRN
Status: DISCONTINUED | OUTPATIENT
Start: 2019-08-12 | End: 2019-08-12

## 2019-08-12 RX ORDER — HYDRALAZINE HYDROCHLORIDE 50 MG/1
50 TABLET, FILM COATED ORAL 3 TIMES DAILY
Status: DISCONTINUED | OUTPATIENT
Start: 2019-08-12 | End: 2019-08-14

## 2019-08-12 RX ORDER — INSULIN GLARGINE 100 [IU]/ML
35 INJECTION, SOLUTION SUBCUTANEOUS NIGHTLY
Status: DISCONTINUED | OUTPATIENT
Start: 2019-08-12 | End: 2019-08-12

## 2019-08-12 RX ORDER — CARVEDILOL 25 MG/1
25 TABLET ORAL 2 TIMES DAILY WITH MEALS
Status: DISCONTINUED | OUTPATIENT
Start: 2019-08-12 | End: 2019-08-15

## 2019-08-12 RX ORDER — DEXTROSE MONOHYDRATE 50 MG/ML
100 INJECTION, SOLUTION INTRAVENOUS PRN
Status: DISCONTINUED | OUTPATIENT
Start: 2019-08-12 | End: 2019-08-28 | Stop reason: HOSPADM

## 2019-08-12 RX ORDER — GABAPENTIN 100 MG/1
200 CAPSULE ORAL 3 TIMES DAILY
Status: DISCONTINUED | OUTPATIENT
Start: 2019-08-12 | End: 2019-08-13

## 2019-08-12 RX ORDER — POTASSIUM CHLORIDE 20 MEQ/1
40 TABLET, EXTENDED RELEASE ORAL PRN
Status: DISCONTINUED | OUTPATIENT
Start: 2019-08-12 | End: 2019-08-15

## 2019-08-12 RX ORDER — ALBUTEROL SULFATE 2.5 MG/3ML
2.5 SOLUTION RESPIRATORY (INHALATION) EVERY 6 HOURS PRN
Status: DISCONTINUED | OUTPATIENT
Start: 2019-08-12 | End: 2019-08-13

## 2019-08-12 RX ORDER — ALBUTEROL SULFATE 90 UG/1
2 AEROSOL, METERED RESPIRATORY (INHALATION) PRN
Status: DISCONTINUED | OUTPATIENT
Start: 2019-08-12 | End: 2019-08-28 | Stop reason: HOSPADM

## 2019-08-12 RX ORDER — INSULIN GLARGINE 100 [IU]/ML
35 INJECTION, SOLUTION SUBCUTANEOUS NIGHTLY
Status: DISCONTINUED | OUTPATIENT
Start: 2019-08-12 | End: 2019-08-20

## 2019-08-12 RX ORDER — SODIUM CHLORIDE 0.9 % (FLUSH) 0.9 %
10 SYRINGE (ML) INJECTION EVERY 12 HOURS SCHEDULED
Status: DISCONTINUED | OUTPATIENT
Start: 2019-08-12 | End: 2019-08-28 | Stop reason: HOSPADM

## 2019-08-12 RX ORDER — NIFEDIPINE 60 MG/1
60 TABLET, FILM COATED, EXTENDED RELEASE ORAL 2 TIMES DAILY
Status: DISCONTINUED | OUTPATIENT
Start: 2019-08-12 | End: 2019-08-13

## 2019-08-12 RX ORDER — SODIUM CHLORIDE 0.9 % (FLUSH) 0.9 %
10 SYRINGE (ML) INJECTION PRN
Status: DISCONTINUED | OUTPATIENT
Start: 2019-08-12 | End: 2019-08-28 | Stop reason: HOSPADM

## 2019-08-12 RX ORDER — QUETIAPINE FUMARATE 100 MG/1
100 TABLET, FILM COATED ORAL NIGHTLY
Status: DISCONTINUED | OUTPATIENT
Start: 2019-08-12 | End: 2019-08-12

## 2019-08-12 RX ORDER — SODIUM CHLORIDE 0.9 % (FLUSH) 0.9 %
10 SYRINGE (ML) INJECTION PRN
Status: DISCONTINUED | OUTPATIENT
Start: 2019-08-12 | End: 2019-08-12

## 2019-08-12 RX ORDER — MORPHINE SULFATE 4 MG/ML
4 INJECTION, SOLUTION INTRAMUSCULAR; INTRAVENOUS
Status: DISCONTINUED | OUTPATIENT
Start: 2019-08-12 | End: 2019-08-28 | Stop reason: HOSPADM

## 2019-08-12 RX ORDER — POTASSIUM CHLORIDE 7.45 MG/ML
10 INJECTION INTRAVENOUS PRN
Status: DISCONTINUED | OUTPATIENT
Start: 2019-08-12 | End: 2019-08-15

## 2019-08-12 RX ORDER — ISOSORBIDE MONONITRATE 60 MG/1
120 TABLET, EXTENDED RELEASE ORAL DAILY
Status: DISCONTINUED | OUTPATIENT
Start: 2019-08-12 | End: 2019-08-12

## 2019-08-12 RX ORDER — CLONIDINE HYDROCHLORIDE 0.1 MG/1
0.1 TABLET ORAL 2 TIMES DAILY
Status: DISCONTINUED | OUTPATIENT
Start: 2019-08-12 | End: 2019-08-15

## 2019-08-12 RX ORDER — ONDANSETRON 2 MG/ML
4 INJECTION INTRAMUSCULAR; INTRAVENOUS EVERY 6 HOURS PRN
Status: DISCONTINUED | OUTPATIENT
Start: 2019-08-12 | End: 2019-08-28 | Stop reason: HOSPADM

## 2019-08-12 RX ORDER — NICOTINE POLACRILEX 4 MG
15 LOZENGE BUCCAL PRN
Status: DISCONTINUED | OUTPATIENT
Start: 2019-08-12 | End: 2019-08-28 | Stop reason: HOSPADM

## 2019-08-12 RX ORDER — DEXTROSE MONOHYDRATE 25 G/50ML
12.5 INJECTION, SOLUTION INTRAVENOUS PRN
Status: DISCONTINUED | OUTPATIENT
Start: 2019-08-12 | End: 2019-08-28 | Stop reason: HOSPADM

## 2019-08-12 RX ORDER — ERGOCALCIFEROL 1.25 MG/1
50000 CAPSULE ORAL WEEKLY
Status: DISCONTINUED | OUTPATIENT
Start: 2019-08-12 | End: 2019-08-28 | Stop reason: HOSPADM

## 2019-08-12 RX ORDER — SODIUM CHLORIDE 9 MG/ML
500 INJECTION, SOLUTION INTRAVENOUS CONTINUOUS PRN
Status: DISCONTINUED | OUTPATIENT
Start: 2019-08-12 | End: 2019-08-12

## 2019-08-12 RX ADMIN — HYDRALAZINE HYDROCHLORIDE 50 MG: 50 TABLET, FILM COATED ORAL at 09:44

## 2019-08-12 RX ADMIN — ERGOCALCIFEROL 50000 UNITS: 1.25 CAPSULE ORAL at 16:33

## 2019-08-12 RX ADMIN — CLOPIDOGREL 75 MG: 75 TABLET, FILM COATED ORAL at 09:44

## 2019-08-12 RX ADMIN — GABAPENTIN 200 MG: 100 CAPSULE ORAL at 09:44

## 2019-08-12 RX ADMIN — PRAZOSIN HYDROCHLORIDE 5 MG: 5 CAPSULE ORAL at 17:46

## 2019-08-12 RX ADMIN — INSULIN LISPRO 4 UNITS: 100 INJECTION, SOLUTION INTRAVENOUS; SUBCUTANEOUS at 21:10

## 2019-08-12 RX ADMIN — NIFEDIPINE 60 MG: 60 TABLET, EXTENDED RELEASE ORAL at 09:44

## 2019-08-12 RX ADMIN — INSULIN GLARGINE 35 UNITS: 100 INJECTION, SOLUTION SUBCUTANEOUS at 04:28

## 2019-08-12 RX ADMIN — HYDRALAZINE HYDROCHLORIDE 50 MG: 50 TABLET, FILM COATED ORAL at 21:05

## 2019-08-12 RX ADMIN — SODIUM CHLORIDE, PRESERVATIVE FREE 10 ML: 5 INJECTION INTRAVENOUS at 09:45

## 2019-08-12 RX ADMIN — GABAPENTIN 200 MG: 100 CAPSULE ORAL at 15:06

## 2019-08-12 RX ADMIN — CARVEDILOL 25 MG: 25 TABLET, FILM COATED ORAL at 16:33

## 2019-08-12 RX ADMIN — MOMETASONE FUROATE AND FORMOTEROL FUMARATE DIHYDRATE 2 PUFF: 200; 5 AEROSOL RESPIRATORY (INHALATION) at 19:39

## 2019-08-12 RX ADMIN — PRAZOSIN HYDROCHLORIDE 5 MG: 5 CAPSULE ORAL at 09:45

## 2019-08-12 RX ADMIN — INSULIN LISPRO 6 UNITS: 100 INJECTION, SOLUTION INTRAVENOUS; SUBCUTANEOUS at 11:42

## 2019-08-12 RX ADMIN — NIFEDIPINE 60 MG: 60 TABLET, EXTENDED RELEASE ORAL at 21:05

## 2019-08-12 RX ADMIN — ENOXAPARIN SODIUM 40 MG: 40 INJECTION SUBCUTANEOUS at 09:45

## 2019-08-12 RX ADMIN — HYDRALAZINE HYDROCHLORIDE 50 MG: 50 TABLET, FILM COATED ORAL at 15:07

## 2019-08-12 RX ADMIN — CLONIDINE HYDROCHLORIDE 0.1 MG: 0.1 TABLET ORAL at 12:14

## 2019-08-12 RX ADMIN — GABAPENTIN 200 MG: 100 CAPSULE ORAL at 21:05

## 2019-08-12 RX ADMIN — ATORVASTATIN CALCIUM 80 MG: 80 TABLET, FILM COATED ORAL at 21:05

## 2019-08-12 RX ADMIN — SODIUM CHLORIDE, PRESERVATIVE FREE 10 ML: 5 INJECTION INTRAVENOUS at 21:20

## 2019-08-12 RX ADMIN — INSULIN LISPRO 4 UNITS: 100 INJECTION, SOLUTION INTRAVENOUS; SUBCUTANEOUS at 11:42

## 2019-08-12 RX ADMIN — INSULIN LISPRO 5 UNITS: 100 INJECTION, SOLUTION INTRAVENOUS; SUBCUTANEOUS at 11:38

## 2019-08-12 RX ADMIN — CARVEDILOL 25 MG: 25 TABLET, FILM COATED ORAL at 09:44

## 2019-08-12 RX ADMIN — INSULIN LISPRO 12 UNITS: 100 INJECTION, SOLUTION INTRAVENOUS; SUBCUTANEOUS at 07:17

## 2019-08-12 RX ADMIN — CLONIDINE HYDROCHLORIDE 0.1 MG: 0.1 TABLET ORAL at 21:05

## 2019-08-12 RX ADMIN — INSULIN LISPRO 5 UNITS: 100 INJECTION, SOLUTION INTRAVENOUS; SUBCUTANEOUS at 16:30

## 2019-08-12 RX ADMIN — INSULIN LISPRO 6 UNITS: 100 INJECTION, SOLUTION INTRAVENOUS; SUBCUTANEOUS at 16:28

## 2019-08-12 RX ADMIN — INSULIN LISPRO 2 UNITS: 100 INJECTION, SOLUTION INTRAVENOUS; SUBCUTANEOUS at 21:06

## 2019-08-12 RX ADMIN — INSULIN GLARGINE 35 UNITS: 100 INJECTION, SOLUTION SUBCUTANEOUS at 22:10

## 2019-08-12 NOTE — PROGRESS NOTES
[facility] Hypertensive urgency [I16.0] . Vitals:    08/12/19 0705   BP: (!) 145/50   Pulse: 60   Resp: 21   Temp: 98.4 °F (36.9 °C)   SpO2: 99%   . Patients home meds are   Prior to Admission medications    Medication Sig Start Date End Date Taking? Authorizing Provider   cloNIDine (CATAPRES) 0.3 MG/24HR PTWK Place 1 patch onto the skin once a week   Yes Historical Provider, MD   carvedilol (COREG) 25 MG tablet Take 25 mg by mouth 2 times daily (with meals)   Yes Historical Provider, MD   hydrALAZINE (APRESOLINE) 50 MG tablet Take 50 mg by mouth 2 times daily    Yes Historical Provider, MD   NIFEdipine (PROCARDIA XL) 60 MG extended release tablet Take 60 mg by mouth 2 times daily   Yes Historical Provider, MD   COMBIVENT RESPIMAT  MCG/ACT AERS inhaler INHALE 1 PUFF INTO THE LUNGS EVERY 6 HOURS 7/9/19   Zara Marin MD   atorvastatin (LIPITOR) 80 MG tablet TAKE 1 TABLET BY MOUTH DAILY 6/10/19   Zara Marin MD   prazosin (MINIPRESS) 5 MG capsule TAKE 1 CAPSULE BY MOUTH 2 TIMES DAILY DC PREVIOUS SCRIPT 6/10/19   Zara Marin MD   ASPIRIN ADULT LOW STRENGTH 81 MG EC tablet TAKE 1 TABLET BY MOUTH DAILY 6/10/19   Zara Marin MD   EASY COMFORT LANCETS MISC DX: DM-2 USE 3-4 TIMES DAILY 3/25/19   Zara Marin MD   COMFORT EZ PEN NEEDLES 31G X 8 MM MISC USE AS DIRECTED 2/25/19   Zara Marin MD   insulin aspart (NOVOLOG FLEXPEN) 100 UNIT/ML injection pen Inject 5 Units into the skin 3 times daily (before meals) Sliding scale 2/19/19   Zara Marin MD   clopidogrel (PLAVIX) 75 MG tablet Take 1 tablet by mouth daily 2/19/19   Zara Marin MD   nitroGLYCERIN (NITROSTAT) 0.4 MG SL tablet up to max of 3 total doses.  If no relief after 1 dose, call 911. 2/19/19   Zara Marin MD   insulin glargine (BASAGLAR KWIKPEN) 100 UNIT/ML injection pen Inject 35 Units into the skin nightly Dispense with pen needle 2/19/19   Zara Marin MD

## 2019-08-12 NOTE — ED PROVIDER NOTES
ACS or subarachnoid hemorrhage also concern includes dissection less concerning for hypertensive urgency will need aggressive blood pressure control and likely admit. Pearl DO, RDMS.   Attending Emergency Physician          Jacky Vance DO  08/11/19 [id number]

## 2019-08-12 NOTE — ED PROVIDER NOTES
mg/dL and patient  ALERT and TOLERATING PO    HYPOGLYCEMIA TREATMENT: blood glucose less than 70 mg/dL and patient ALERT and TOLERATING PO    HYPOGLYCEMIA TREATMENT: blood glucose less than 70 mg/dL and patient NOT ALERT or NPO    Place intermittent pneumatic compression device    Notify Provider    Notify physician    Notify physician     Beta Blocker Management Prior to Cardiac Stress Test    HYPOGLYCEMIA TREATMENT: blood glucose less than 50 mg/dL and patient  ALERT and TOLERATING PO    HYPOGLYCEMIA TREATMENT: blood glucose less than 70 mg/dL and patient ALERT and TOLERATING PO    HYPOGLYCEMIA TREATMENT: blood glucose less than 70 mg/dL and patient NOT ALERT or NPO    HYPOGLYCEMIA TREATMENT: blood glucose less than 50 mg/dL and patient  ALERT and TOLERATING PO    HYPOGLYCEMIA TREATMENT: blood glucose less than 70 mg/dL and patient ALERT and TOLERATING PO    HYPOGLYCEMIA TREATMENT: blood glucose less than 70 mg/dL and patient NOT ALERT or NPO    Obtain medical records    Bladder scan    Daily weights    Full Code    Inpatient consult to Hospitalist    Inpatient consult to Nephrology    Inpatient consult to Cardiology    Pharmacy consult for renal dosing    Dietary Nutrition Supplements: Diabetic Oral Supplement    Initiate Oxygen Therapy Protocol    Pulse Oximetry Spot Check    Initiate RT Protocol    Respiratory care evaluation only    HHN Treatment    Nasal Cannula Oxygen    HHN Treatment    MDI Treatment    POCT glucose    POCT Glucose    POC Glucose Fingerstick    POC Glucose Fingerstick    POC Glucose Fingerstick    POCT Glucose    POCT Glucose    POC Glucose Fingerstick    POC Glucose Fingerstick    POC Glucose Fingerstick    POC Glucose Fingerstick    POC Glucose Fingerstick    POC Glucose Fingerstick    POC Glucose Fingerstick    POC Glucose Fingerstick    EKG 12 Lead    EKG 12 lead    EKG 12 lead    RHYTHM STRIP REPORT    EKG REPORT    EKG REPORT    EKG nebulizer solution     Randa Riedel: cabinet override       DDX: Emergent: ACS/NSTEMI/STEMI/angina, arrhythmia, trauma, aortic dissection,  PE, PNA, pneumothroax, esophageal rupture, tamponade, Cocaine use  Nonemergent: pneumonia, pericarditis, GERD, MSK, Endocarditis, anxiety   SAH, subdural hematoma, epidural, intracerebral, meningitis, encephalitis, migraine, tension, cluster, sinusitis, dental, stroke, encephalitis, abscess, CNS mass, increased ICP, venous thrombosis, carbon monoxide poisoning, acute angle closure glaucoma, temporal arteritis, idiopathic intracranial hypertension    DIAGNOSTIC RESULTS / EMERGENCY DEPARTMENT COURSE / MDM     LABS:  Results for orders placed or performed during the hospital encounter of 08/11/19   CBC WITH AUTO DIFFERENTIAL   Result Value Ref Range    WBC 6.2 3.5 - 11.3 k/uL    RBC 3.63 (L) 4.21 - 5.77 m/uL    Hemoglobin 10.9 (L) 13.0 - 17.0 g/dL    Hematocrit 34.1 (L) 40.7 - 50.3 %    MCV 93.9 82.6 - 102.9 fL    MCH 30.0 25.2 - 33.5 pg    MCHC 32.0 28.4 - 34.8 g/dL    RDW 12.0 11.8 - 14.4 %    Platelets 094 035 - 935 k/uL    MPV 9.7 8.1 - 13.5 fL    NRBC Automated 0.0 0.0 per 100 WBC    Differential Type NOT REPORTED     Seg Neutrophils 67 (H) 36 - 65 %    Lymphocytes 19 (L) 24 - 43 %    Monocytes 8 3 - 12 %    Eosinophils % 4 1 - 4 %    Basophils 1 0 - 2 %    Immature Granulocytes 1 (H) 0 %    Segs Absolute 4.21 1.50 - 8.10 k/uL    Absolute Lymph # 1.21 1.10 - 3.70 k/uL    Absolute Mono # 0.51 0.10 - 1.20 k/uL    Absolute Eos # 0.24 0.00 - 0.44 k/uL    Basophils # 0.03 0.00 - 0.20 k/uL    Absolute Immature Granulocyte 0.03 0.00 - 0.30 k/uL    WBC Morphology NOT REPORTED     RBC Morphology NOT REPORTED     Platelet Estimate NOT REPORTED    BASIC METABOLIC PANEL   Result Value Ref Range    Glucose 296 (H) 70 - 99 mg/dL    BUN 30 (H) 8 - 23 mg/dL    CREATININE 1.57 (H) 0.70 - 1.20 mg/dL    Bun/Cre Ratio NOT REPORTED 9 - 20    Calcium 8.8 8.6 - 10.4 mg/dL    Sodium 136 135 - 144 mmol/L    Potassium 3.9 3.7 - 5.3 mmol/L    Chloride 102 98 - 107 mmol/L    CO2 21 20 - 31 mmol/L    Anion Gap 13 9 - 17 mmol/L    GFR Non-African American 45 (L) >60 mL/min    GFR  54 (L) >60 mL/min    GFR Comment          GFR Staging NOT REPORTED    Troponin   Result Value Ref Range    Troponin, High Sensitivity 34 (H) 0 - 22 ng/L    Troponin T NOT REPORTED <0.03 ng/mL    Troponin Interp NOT REPORTED    Troponin   Result Value Ref Range    Troponin, High Sensitivity 34 (H) 0 - 22 ng/L    Troponin T NOT REPORTED <0.03 ng/mL    Troponin Interp NOT REPORTED    Troponin   Result Value Ref Range    Troponin, High Sensitivity 31 (H) 0 - 22 ng/L    Troponin T NOT REPORTED <0.03 ng/mL    Troponin Interp NOT REPORTED    Troponin   Result Value Ref Range    Troponin, High Sensitivity 35 (H) 0 - 22 ng/L    Troponin T NOT REPORTED <0.03 ng/mL    Troponin Interp NOT REPORTED    Hemoglobin A1C   Result Value Ref Range    Hemoglobin A1C 9.8 (H) 4.0 - 6.0 %    Estimated Avg Glucose 235 mg/dL   Hemoglobin A1c   Result Value Ref Range    Hemoglobin A1C 9.8 (H) 4.0 - 6.0 %    Estimated Avg Glucose 235 mg/dL   CALCIUM, RANDOM URINE   Result Value Ref Range    Calcium, Ur <0.6 mg/dL   Calcium, Ionized   Result Value Ref Range    Calcium, Ion 0.96 (L) 1.13 - 1.33 mmol/L   Phosphorus   Result Value Ref Range    Phosphorus 2.4 (L) 2.5 - 4.5 mg/dL   TROP/MYOGLOBIN   Result Value Ref Range    Troponin, High Sensitivity 50 (H) 0 - 22 ng/L    Troponin T NOT REPORTED <0.03 ng/mL    Troponin Interp NOT REPORTED     Myoglobin 61 28 - 72 ng/mL   TROP/MYOGLOBIN   Result Value Ref Range    Troponin, High Sensitivity 66 (HH) 0 - 22 ng/L    Troponin T NOT REPORTED <0.03 ng/mL    Troponin Interp NOT REPORTED     Myoglobin 56 28 - 72 ng/mL   Comprehensive Metabolic Panel w/ Reflex to MG   Result Value Ref Range    Glucose 225 (H) 70 - 99 mg/dL    BUN 61 (H) 8 - 23 mg/dL    CREATININE 4.13 (H) 0.70 - 1.20 mg/dL    Bun/Cre Ratio NOT REPORTED 9 - 20    Calcium 8.2 (L) 8.6 - 10.4 mg/dL    Sodium 135 135 - 144 mmol/L    Potassium 4.5 3.7 - 5.3 mmol/L    Chloride 100 98 - 107 mmol/L    CO2 20 20 - 31 mmol/L    Anion Gap 15 9 - 17 mmol/L    Alkaline Phosphatase 94 40 - 129 U/L    ALT 18 5 - 41 U/L    AST 16 <40 U/L    Total Bilirubin <0.10 (L) 0.3 - 1.2 mg/dL    Total Protein 5.5 (L) 6.4 - 8.3 g/dL    Alb 2.9 (L) 3.5 - 5.2 g/dL    Albumin/Globulin Ratio 1.1 1.0 - 2.5    GFR Non-African American 15 (L) >60 mL/min    GFR  18 (L) >60 mL/min    GFR Comment          GFR Staging NOT REPORTED    Magnesium   Result Value Ref Range    Magnesium 1.5 (L) 1.6 - 2.6 mg/dL   Lipid panel - fasting   Result Value Ref Range    Cholesterol 132 <200 mg/dL    HDL 50 >40 mg/dL    LDL Cholesterol 65 0 - 130 mg/dL    Chol/HDL Ratio 2.6 <5    Triglycerides 85 <150 mg/dL    VLDL NOT REPORTED 1 - 30 mg/dL   CBC   Result Value Ref Range    WBC 7.9 3.5 - 11.3 k/uL    RBC 3.03 (L) 4.21 - 5.77 m/uL    Hemoglobin 9.4 (L) 13.0 - 17.0 g/dL    Hematocrit 28.3 (L) 40.7 - 50.3 %    MCV 93.4 82.6 - 102.9 fL    MCH 31.0 25.2 - 33.5 pg    MCHC 33.2 28.4 - 34.8 g/dL    RDW 11.9 11.8 - 14.4 %    Platelets 681 786 - 756 k/uL    MPV 9.9 8.1 - 13.5 fL    NRBC Automated 0.0 0.0 per 100 WBC   Calcium, Ionized   Result Value Ref Range    Calcium, Ion 1.18 1.13 - 1.33 mmol/L   PHOSPHORUS   Result Value Ref Range    Phosphorus 2.1 (L) 2.5 - 4.5 mg/dL   TROP/MYOGLOBIN   Result Value Ref Range    Troponin, High Sensitivity 74 (HH) 0 - 22 ng/L    Troponin T NOT REPORTED <0.03 ng/mL    Troponin Interp NOT REPORTED     Myoglobin 56 28 - 72 ng/mL   BASIC METABOLIC PANEL   Result Value Ref Range    Glucose 182 (H) 70 - 99 mg/dL    BUN 62 (H) 8 - 23 mg/dL    CREATININE 4.23 (H) 0.70 - 1.20 mg/dL    Bun/Cre Ratio NOT REPORTED 9 - 20    Calcium 8.5 (L) 8.6 - 10.4 mg/dL    Sodium 132 (L) 135 - 144 mmol/L    Potassium 4.4 3.7 - 5.3 mmol/L    Chloride 99 98 - 107 mmol/L    CO2 18 (L) 20 - 31 mmol/L    Anion Gap 15 9 - 17 mmol/L    GFR Non-African American 14 (L) >60 mL/min    GFR  17 (L) >60 mL/min    GFR Comment          GFR Staging NOT REPORTED    Eosinophils, Urine   Result Value Ref Range    Eosinophil, Ur NONE SEEN NONE SEEN   Creatinine, Random Urine   Result Value Ref Range    Creatinine, Ur 82.4 39.0 - 259.0 mg/dL   Potassium, urine, random   Result Value Ref Range    Potassium, Ur 36.1 mmol/L   Sodium, urine, random   Result Value Ref Range    Sodium,Ur 20 mmol/L   Protein, urine, random   Result Value Ref Range    Total Protein, Urine 459 mg/dL   Troponin   Result Value Ref Range    Troponin, High Sensitivity 81 (HH) 0 - 22 ng/L    Troponin T NOT REPORTED <0.03 ng/mL    Troponin Interp NOT REPORTED    Magnesium   Result Value Ref Range    Magnesium 2.0 1.6 - 2.6 mg/dL   Potassium   Result Value Ref Range    Potassium 4.7 3.7 - 5.3 mmol/L   POC Glucose Fingerstick   Result Value Ref Range    POC Glucose 274 (H) 75 - 110 mg/dL   POC Glucose Fingerstick   Result Value Ref Range    POC Glucose 587 (HH) 75 - 110 mg/dL   POC Glucose Fingerstick   Result Value Ref Range    POC Glucose >600 (HH) 75 - 110 mg/dL   POC Glucose Fingerstick   Result Value Ref Range    POC Glucose 451 (HH) 75 - 110 mg/dL   POC Glucose Fingerstick   Result Value Ref Range    POC Glucose 580 (HH) 75 - 110 mg/dL   POC Glucose Fingerstick   Result Value Ref Range    POC Glucose 441 (HH) 75 - 110 mg/dL   POC Glucose Fingerstick   Result Value Ref Range    POC Glucose 318 (H) 75 - 110 mg/dL   POC Glucose Fingerstick   Result Value Ref Range    POC Glucose 189 (H) 75 - 110 mg/dL   POC Glucose Fingerstick   Result Value Ref Range    POC Glucose 139 (H) 75 - 110 mg/dL   POC Glucose Fingerstick   Result Value Ref Range    POC Glucose 178 (H) 75 - 110 mg/dL   POC Glucose Fingerstick   Result Value Ref Range    POC Glucose 118 (H) 75 - 110 mg/dL   EKG 12 Lead   Result Value Ref Range    Ventricular Rate 62 BPM    Atrial

## 2019-08-12 NOTE — ED NOTES
Dina Knapp, EMT-P, at bedside to obtain additional IV access for CT scan      Armando Campbell RN  08/11/19 2475

## 2019-08-12 NOTE — CARE COORDINATION
Case Management Initial Discharge Plan  Shon Smallwood,             Met with:patient to discuss discharge plans. Information verified: address, contacts, phone number, , insurance Yes  PCP: Jagdeep Rosa DO  Date of last visit: home visits     Insurance Provider: Laura Hubbard     Discharge Planning    Living Arrangements:  Spouse/Significant Other   Support Systems:  Family Members, Spouse/Significant Other, Children    Home has one stories  No  stairs to climb to get into front door, no stairs to climb to reach second floor  Location of bedroom/bathroom in home ,main    Patient able to perform ADL's:Independent    Current Services (outpatient & in home) home care   DME equipment: walker   DME provider:     Pharmacy: [facility] Medications:  No  Does patient want to participate in local refill/ meds to beds program?  Yes    Potential Assistance Needed:  N/A    Patient agreeable to home care: Yes  Freedom of choice provided:  yes    Prior SNF/Rehab Placement and Facility: na  Agreeable to SNF/Rehab: No  [place] of choice provided: n/a   Evaluation: no    Expected Discharge date:  19  Patient expects to be discharged to:  19  Follow Up Appointment: Best Day/ Time: Monday AM    Transportation provider: insurance cab   Transportation arrangements needed for discharge: Yes    Readmission Risk              Risk of Unplanned Readmission:        22             Does patient have a readmission risk score greater than 14?: Yes  If yes, follow-up appointment must be made within 7 days of discharge.      Discharge Plan: plan to return home, current with [address] home care,wants to continue           Electronically signed by Yvette Swanson RN on 19 at 12:41 PM

## 2019-08-12 NOTE — ED NOTES
Pt resting on stretcher with Nitro drip infusing. Pt on telemetry monitoring, pt left undisturbed with call light within reach.   Will continue to monitor      Jesse Jacinto RN  08/12/19 [id number]

## 2019-08-12 NOTE — H&P
intolerance  MUSCULOSKELETAL:  negative joint pains, muscle aches, swelling of joints  NEUROLOGICAL:  negative for headaches, dizziness, lightheadedness, Bilateral lower extremities with Tingle/ numbness- not new , pain, + tingling extremities  BEHAVIOR/PSYCH:  negative for depression, anxiety    Physical Exam:   BP (!) 145/50   Pulse 60   Temp 98.4 °F (36.9 °C) (Oral)   Resp 21   Ht 5' 6\" (1.676 m)   Wt 141 lb 11.2 oz (64.3 kg)   SpO2 99%   BMI 22.87 kg/m²   Temp (24hrs), Av.4 °F (36.9 °C), Min:98.1 °F (36.7 °C), Max:98.6 °F (37 °C)    Recent Labs     19  0144 19  0721   POCGLU 274* 587*       Intake/Output Summary (Last 24 hours) at 2019 8926  Last data filed at 2019 0513  Gross per 24 hour   Intake 700 ml   Output 650 ml   Net 50 ml       General Appearance:  alert, well appearing, and in no acute distress  Mental status: oriented to person, place, and time with normal affect  Head:  normocephalic, atraumatic. Eye: no icterus, redness, pupils equal and reactive, extraocular eye movements intact, conjunctiva clear  Ear: normal external ear, no discharge, hearing intact  Nose:  no drainage noted  Mouth: mucous membranes moist  Neck: supple, no carotid bruits, thyroid not palpable  Lungs: Bilateral equal air entry, clear to ausculation, no wheezing, rales or rhonchi, normal effort  Cardiovascular: normal rate, regular rhythm, no murmur, gallop, rub.   Abdomen: Soft, nontender, nondistended, normal bowel sounds, no hepatomegaly or splenomegaly  Neurologic: There are no new focal motor or sensory deficits, normal muscle tone and bulk, no abnormal sensation, normal speech, cranial nerves II through XII grossly intact  Skin: No gross lesions, rashes, bruising or bleeding on exposed skin area  Extremities:  peripheral pulses palpable, no pedal edema or calf pain with palpation  Psych: normal affect    Investigations:      Laboratory Testing:  Recent Results (from the past 24 hour(s)) (H) 0 - 22 ng/L    Troponin T NOT REPORTED <0.03 ng/mL    Troponin Interp NOT REPORTED    Troponin    Collection Time: 08/11/19  9:44 PM   Result Value Ref Range    Troponin, High Sensitivity 34 (H) 0 - 22 ng/L    Troponin T NOT REPORTED <0.03 ng/mL    Troponin Interp NOT REPORTED    POC Glucose Fingerstick    Collection Time: 08/12/19  1:44 AM   Result Value Ref Range    POC Glucose 274 (H) 75 - 110 mg/dL   Troponin    Collection Time: 08/12/19  3:11 AM   Result Value Ref Range    Troponin, High Sensitivity 31 (H) 0 - 22 ng/L    Troponin T NOT REPORTED <0.03 ng/mL    Troponin Interp NOT REPORTED    Troponin    Collection Time: 08/12/19  6:11 AM   Result Value Ref Range    Troponin, High Sensitivity 35 (H) 0 - 22 ng/L    Troponin T NOT REPORTED <0.03 ng/mL    Troponin Interp NOT REPORTED    POC Glucose Fingerstick    Collection Time: 08/12/19  7:21 AM   Result Value Ref Range    POC Glucose 587 (HH) 75 - 110 mg/dL       Imaging/Diagnostics:  Xr Chest Standard (2 Vw)    Result Date: 8/12/2019  1. No acute cardiopulmonary abnormality. 2. Hyperinflation of the lungs bilaterally, which can be seen with COPD. Ct Head Wo Contrast    Result Date: 8/12/2019  1. No acute intracranial abnormality. 2. Chronic lacunar infarcts within the cerebellar hemispheres bilaterally. 3. Bilateral mastoid effusions. Cta Chest W Wo Contrast    Result Date: 8/12/2019  No evidence for aortic injury or abnormality. Small bilateral pleural effusions with adjacent atelectasis. Centrilobular emphysema.        Assessment :      Hospital Problems           Last Modified POA    * (Principal) Hypertensive urgency 8/12/2019 Yes    Pure hypercholesterolemia 8/12/2019 Yes    Exertional dyspnea 8/12/2019 Yes    PTSD (post-traumatic stress disorder) 8/12/2019 Yes    Essential hypertension 8/12/2019 Yes    DM type 2, uncontrolled, with neuropathy ([address] 8/12/2019 Yes    COPD (chronic obstructive pulmonary disease) ([address] 8/12/2019 Yes    Cerebral

## 2019-08-12 NOTE — ED NOTES
Pt to ED via life squad 1. Pt called out for complaint of chest pain and headache. Pt reports left sided chest pain, states that pain has been intermittent since he woke this morning. Pt also c/o headache- states that headache has also been intermittent since he woke today- pt reports this being the worst headache of his life. Upon first responders arrival, pt with initial BP of 230/80. Pt given aspirin and Nitro by EMS PTA. After administration of medication in the field, EMS report improved BP to 190/77 and pt reports being pain free.  Pt reports being seen at [place] [id number] about a month ago, states that he was placed in a medically induced coma after pt found down with a BS of 13 , and states that all of his medications were changed after that incident   On exam, pt hypertensive, pt awake and oriented x 4, pt ambulatory with steady gait, pt denying pain at this time  IV patent, EKG obtained, pt placed on full telemetry monitoring      Salma Kauffman RN  08/11/19 2004       Salma Kauffman RN  08/11/19 2006

## 2019-08-12 NOTE — ED NOTES
Bed: 20  Expected date:   Expected time:   Means of arrival:   Comments:  [address], RN  08/11/19 2001

## 2019-08-12 NOTE — ED NOTES
Pt resting on stretcher on telemetry monitoring with alarms set. Nitro drip infusing. Pt with eyes closed, chest rise and fall noted, pt in NAD.   Pt left undisturbed, will continue to monitor      Mario Dyson RN  08/12/19 0033

## 2019-08-13 ENCOUNTER — APPOINTMENT (OUTPATIENT)
Dept: GENERAL RADIOLOGY | Age: 64
DRG: 304 | End: 2019-08-13
Payer: COMMERCIAL

## 2019-08-13 LAB
ALBUMIN SERPL-MCNC: 2.9 G/DL (ref 3.5–5.2)
ALBUMIN/GLOBULIN RATIO: 1.1 (ref 1–2.5)
ALP BLD-CCNC: 94 U/L (ref 40–129)
ALT SERPL-CCNC: 18 U/L (ref 5–41)
ANION GAP SERPL CALCULATED.3IONS-SCNC: 15 MMOL/L (ref 9–17)
ANION GAP SERPL CALCULATED.3IONS-SCNC: 15 MMOL/L (ref 9–17)
AST SERPL-CCNC: 16 U/L
BILIRUB SERPL-MCNC: <0.1 MG/DL (ref 0.3–1.2)
BUN BLDV-MCNC: 61 MG/DL (ref 8–23)
BUN BLDV-MCNC: 62 MG/DL (ref 8–23)
BUN/CREAT BLD: ABNORMAL (ref 9–20)
BUN/CREAT BLD: ABNORMAL (ref 9–20)
CALCIUM IONIZED: 1.18 MMOL/L (ref 1.13–1.33)
CALCIUM SERPL-MCNC: 8.2 MG/DL (ref 8.6–10.4)
CALCIUM SERPL-MCNC: 8.5 MG/DL (ref 8.6–10.4)
CALCIUM URINE: <0.6 MG/DL
CHLORIDE BLD-SCNC: 100 MMOL/L (ref 98–107)
CHLORIDE BLD-SCNC: 99 MMOL/L (ref 98–107)
CHOLESTEROL/HDL RATIO: 2.6
CHOLESTEROL: 132 MG/DL
CO2: 18 MMOL/L (ref 20–31)
CO2: 20 MMOL/L (ref 20–31)
CREAT SERPL-MCNC: 4.13 MG/DL (ref 0.7–1.2)
CREAT SERPL-MCNC: 4.23 MG/DL (ref 0.7–1.2)
CREATININE URINE: 82.4 MG/DL (ref 39–259)
EKG ATRIAL RATE: 53 BPM
EKG P AXIS: 67 DEGREES
EKG P-R INTERVAL: 140 MS
EKG Q-T INTERVAL: 428 MS
EKG QRS DURATION: 98 MS
EKG QTC CALCULATION (BAZETT): 401 MS
EKG R AXIS: 47 DEGREES
EKG T AXIS: 165 DEGREES
EKG VENTRICULAR RATE: 53 BPM
EOSINOPHIL,URINE: NORMAL
GFR AFRICAN AMERICAN: 17 ML/MIN
GFR AFRICAN AMERICAN: 18 ML/MIN
GFR NON-AFRICAN AMERICAN: 14 ML/MIN
GFR NON-AFRICAN AMERICAN: 15 ML/MIN
GFR SERPL CREATININE-BSD FRML MDRD: ABNORMAL ML/MIN/{1.73_M2}
GLUCOSE BLD-MCNC: 118 MG/DL (ref 75–110)
GLUCOSE BLD-MCNC: 139 MG/DL (ref 75–110)
GLUCOSE BLD-MCNC: 178 MG/DL (ref 75–110)
GLUCOSE BLD-MCNC: 182 MG/DL (ref 70–99)
GLUCOSE BLD-MCNC: 189 MG/DL (ref 75–110)
GLUCOSE BLD-MCNC: 225 MG/DL (ref 70–99)
HCT VFR BLD CALC: 28.3 % (ref 40.7–50.3)
HDLC SERPL-MCNC: 50 MG/DL
HEMOGLOBIN: 9.4 G/DL (ref 13–17)
LDL CHOLESTEROL: 65 MG/DL (ref 0–130)
MAGNESIUM: 1.5 MG/DL (ref 1.6–2.6)
MAGNESIUM: 2 MG/DL (ref 1.6–2.6)
MCH RBC QN AUTO: 31 PG (ref 25.2–33.5)
MCHC RBC AUTO-ENTMCNC: 33.2 G/DL (ref 28.4–34.8)
MCV RBC AUTO: 93.4 FL (ref 82.6–102.9)
MYOGLOBIN: 56 NG/ML (ref 28–72)
NRBC AUTOMATED: 0 PER 100 WBC
PDW BLD-RTO: 11.9 % (ref 11.8–14.4)
PHOSPHORUS: 2.1 MG/DL (ref 2.5–4.5)
PLATELET # BLD: 285 K/UL (ref 138–453)
PMV BLD AUTO: 9.9 FL (ref 8.1–13.5)
POTASSIUM SERPL-SCNC: 4.4 MMOL/L (ref 3.7–5.3)
POTASSIUM SERPL-SCNC: 4.5 MMOL/L (ref 3.7–5.3)
POTASSIUM SERPL-SCNC: 4.7 MMOL/L (ref 3.7–5.3)
POTASSIUM, UR: 36.1 MMOL/L
RBC # BLD: 3.03 M/UL (ref 4.21–5.77)
SODIUM BLD-SCNC: 132 MMOL/L (ref 135–144)
SODIUM BLD-SCNC: 135 MMOL/L (ref 135–144)
SODIUM,UR: 20 MMOL/L
TOTAL PROTEIN, URINE: 459 MG/DL
TOTAL PROTEIN: 5.5 G/DL (ref 6.4–8.3)
TRIGL SERPL-MCNC: 85 MG/DL
TROPONIN INTERP: ABNORMAL
TROPONIN INTERP: ABNORMAL
TROPONIN T: ABNORMAL NG/ML
TROPONIN T: ABNORMAL NG/ML
TROPONIN, HIGH SENSITIVITY: 74 NG/L (ref 0–22)
TROPONIN, HIGH SENSITIVITY: 81 NG/L (ref 0–22)
VLDLC SERPL CALC-MCNC: NORMAL MG/DL (ref 1–30)
WBC # BLD: 7.9 K/UL (ref 3.5–11.3)

## 2019-08-13 PROCEDURE — 6370000000 HC RX 637 (ALT 250 FOR IP): Performed by: NURSE PRACTITIONER

## 2019-08-13 PROCEDURE — 80053 COMPREHEN METABOLIC PANEL: CPT

## 2019-08-13 PROCEDURE — 51798 US URINE CAPACITY MEASURE: CPT

## 2019-08-13 PROCEDURE — 93005 ELECTROCARDIOGRAM TRACING: CPT | Performed by: NURSE PRACTITIONER

## 2019-08-13 PROCEDURE — 99232 SBSQ HOSP IP/OBS MODERATE 35: CPT | Performed by: NURSE PRACTITIONER

## 2019-08-13 PROCEDURE — 84132 ASSAY OF SERUM POTASSIUM: CPT

## 2019-08-13 PROCEDURE — 82947 ASSAY GLUCOSE BLOOD QUANT: CPT

## 2019-08-13 PROCEDURE — 6360000002 HC RX W HCPCS: Performed by: NURSE PRACTITIONER

## 2019-08-13 PROCEDURE — 80061 LIPID PANEL: CPT

## 2019-08-13 PROCEDURE — 83735 ASSAY OF MAGNESIUM: CPT

## 2019-08-13 PROCEDURE — 2500000003 HC RX 250 WO HCPCS: Performed by: NURSE PRACTITIONER

## 2019-08-13 PROCEDURE — 2060000000 HC ICU INTERMEDIATE R&B

## 2019-08-13 PROCEDURE — 2580000003 HC RX 258: Performed by: NURSE PRACTITIONER

## 2019-08-13 PROCEDURE — 2580000003 HC RX 258: Performed by: STUDENT IN AN ORGANIZED HEALTH CARE EDUCATION/TRAINING PROGRAM

## 2019-08-13 PROCEDURE — 84300 ASSAY OF URINE SODIUM: CPT

## 2019-08-13 PROCEDURE — 87205 SMEAR GRAM STAIN: CPT

## 2019-08-13 PROCEDURE — 85027 COMPLETE CBC AUTOMATED: CPT

## 2019-08-13 PROCEDURE — 84133 ASSAY OF URINE POTASSIUM: CPT

## 2019-08-13 PROCEDURE — 84156 ASSAY OF PROTEIN URINE: CPT

## 2019-08-13 PROCEDURE — 82340 ASSAY OF CALCIUM IN URINE: CPT

## 2019-08-13 PROCEDURE — 82570 ASSAY OF URINE CREATININE: CPT

## 2019-08-13 PROCEDURE — 82330 ASSAY OF CALCIUM: CPT

## 2019-08-13 PROCEDURE — 71045 X-RAY EXAM CHEST 1 VIEW: CPT

## 2019-08-13 PROCEDURE — 6370000000 HC RX 637 (ALT 250 FOR IP)

## 2019-08-13 PROCEDURE — 6360000002 HC RX W HCPCS: Performed by: STUDENT IN AN ORGANIZED HEALTH CARE EDUCATION/TRAINING PROGRAM

## 2019-08-13 PROCEDURE — 80048 BASIC METABOLIC PNL TOTAL CA: CPT

## 2019-08-13 PROCEDURE — 94640 AIRWAY INHALATION TREATMENT: CPT

## 2019-08-13 PROCEDURE — 84484 ASSAY OF TROPONIN QUANT: CPT

## 2019-08-13 PROCEDURE — 83874 ASSAY OF MYOGLOBIN: CPT

## 2019-08-13 PROCEDURE — 84100 ASSAY OF PHOSPHORUS: CPT

## 2019-08-13 PROCEDURE — 36415 COLL VENOUS BLD VENIPUNCTURE: CPT

## 2019-08-13 RX ORDER — IPRATROPIUM BROMIDE AND ALBUTEROL SULFATE 2.5; .5 MG/3ML; MG/3ML
1 SOLUTION RESPIRATORY (INHALATION)
Status: DISCONTINUED | OUTPATIENT
Start: 2019-08-14 | End: 2019-08-20

## 2019-08-13 RX ORDER — NIFEDIPINE 60 MG/1
60 TABLET, FILM COATED, EXTENDED RELEASE ORAL DAILY
Status: DISCONTINUED | OUTPATIENT
Start: 2019-08-14 | End: 2019-08-22

## 2019-08-13 RX ORDER — FAMOTIDINE 20 MG/1
10 TABLET, FILM COATED ORAL DAILY
Status: DISCONTINUED | OUTPATIENT
Start: 2019-08-13 | End: 2019-08-28 | Stop reason: HOSPADM

## 2019-08-13 RX ORDER — SODIUM CHLORIDE 9 MG/ML
INJECTION, SOLUTION INTRAVENOUS CONTINUOUS
Status: DISCONTINUED | OUTPATIENT
Start: 2019-08-13 | End: 2019-08-16

## 2019-08-13 RX ORDER — GABAPENTIN 100 MG/1
100 CAPSULE ORAL 3 TIMES DAILY
Status: DISCONTINUED | OUTPATIENT
Start: 2019-08-13 | End: 2019-08-22

## 2019-08-13 RX ORDER — IPRATROPIUM BROMIDE AND ALBUTEROL SULFATE 2.5; .5 MG/3ML; MG/3ML
SOLUTION RESPIRATORY (INHALATION)
Status: COMPLETED
Start: 2019-08-13 | End: 2019-08-13

## 2019-08-13 RX ADMIN — FAMOTIDINE 10 MG: 20 TABLET, FILM COATED ORAL at 16:38

## 2019-08-13 RX ADMIN — GABAPENTIN 200 MG: 100 CAPSULE ORAL at 13:59

## 2019-08-13 RX ADMIN — HYDRALAZINE HYDROCHLORIDE 50 MG: 50 TABLET, FILM COATED ORAL at 09:19

## 2019-08-13 RX ADMIN — ATORVASTATIN CALCIUM 80 MG: 80 TABLET, FILM COATED ORAL at 20:46

## 2019-08-13 RX ADMIN — GABAPENTIN 200 MG: 100 CAPSULE ORAL at 09:19

## 2019-08-13 RX ADMIN — MAGNESIUM SULFATE HEPTAHYDRATE 2 G: 500 INJECTION, SOLUTION INTRAMUSCULAR; INTRAVENOUS at 14:00

## 2019-08-13 RX ADMIN — IPRATROPIUM BROMIDE AND ALBUTEROL SULFATE 1 AMPULE: .5; 3 SOLUTION RESPIRATORY (INHALATION) at 22:52

## 2019-08-13 RX ADMIN — INSULIN LISPRO 5 UNITS: 100 INJECTION, SOLUTION INTRAVENOUS; SUBCUTANEOUS at 12:28

## 2019-08-13 RX ADMIN — CARVEDILOL 25 MG: 25 TABLET, FILM COATED ORAL at 16:37

## 2019-08-13 RX ADMIN — INSULIN LISPRO 1 UNITS: 100 INJECTION, SOLUTION INTRAVENOUS; SUBCUTANEOUS at 16:36

## 2019-08-13 RX ADMIN — SODIUM CHLORIDE, PRESERVATIVE FREE 10 ML: 5 INJECTION INTRAVENOUS at 09:20

## 2019-08-13 RX ADMIN — ENOXAPARIN SODIUM 40 MG: 40 INJECTION SUBCUTANEOUS at 09:19

## 2019-08-13 RX ADMIN — IPRATROPIUM BROMIDE AND ALBUTEROL SULFATE 3 ML: .5; 3 SOLUTION RESPIRATORY (INHALATION) at 22:51

## 2019-08-13 RX ADMIN — CLONIDINE HYDROCHLORIDE 0.1 MG: 0.1 TABLET ORAL at 09:19

## 2019-08-13 RX ADMIN — SODIUM PHOSPHATE, MONOBASIC, MONOHYDRATE 10 MMOL: 276; 142 INJECTION, SOLUTION INTRAVENOUS at 10:35

## 2019-08-13 RX ADMIN — NIFEDIPINE 60 MG: 60 TABLET, EXTENDED RELEASE ORAL at 09:19

## 2019-08-13 RX ADMIN — PRAZOSIN HYDROCHLORIDE 5 MG: 5 CAPSULE ORAL at 16:37

## 2019-08-13 RX ADMIN — HYDRALAZINE HYDROCHLORIDE 50 MG: 50 TABLET, FILM COATED ORAL at 13:59

## 2019-08-13 RX ADMIN — ALBUTEROL SULFATE 2 PUFF: 90 AEROSOL, METERED RESPIRATORY (INHALATION) at 20:10

## 2019-08-13 RX ADMIN — SODIUM CHLORIDE: 9 INJECTION, SOLUTION INTRAVENOUS at 09:18

## 2019-08-13 RX ADMIN — PRAZOSIN HYDROCHLORIDE 5 MG: 5 CAPSULE ORAL at 09:19

## 2019-08-13 RX ADMIN — MOMETASONE FUROATE AND FORMOTEROL FUMARATE DIHYDRATE 2 PUFF: 200; 5 AEROSOL RESPIRATORY (INHALATION) at 08:58

## 2019-08-13 RX ADMIN — GABAPENTIN 100 MG: 100 CAPSULE ORAL at 20:47

## 2019-08-13 RX ADMIN — INSULIN GLARGINE 35 UNITS: 100 INJECTION, SOLUTION SUBCUTANEOUS at 21:27

## 2019-08-13 RX ADMIN — HYDRALAZINE HYDROCHLORIDE 50 MG: 50 TABLET, FILM COATED ORAL at 20:48

## 2019-08-13 RX ADMIN — SODIUM CHLORIDE, PRESERVATIVE FREE 10 ML: 5 INJECTION INTRAVENOUS at 20:43

## 2019-08-13 RX ADMIN — IPRATROPIUM BROMIDE 2 PUFF: 17 AEROSOL, METERED RESPIRATORY (INHALATION) at 20:10

## 2019-08-13 RX ADMIN — CARVEDILOL 25 MG: 25 TABLET, FILM COATED ORAL at 09:19

## 2019-08-13 RX ADMIN — INSULIN LISPRO 5 UNITS: 100 INJECTION, SOLUTION INTRAVENOUS; SUBCUTANEOUS at 16:37

## 2019-08-13 RX ADMIN — MOMETASONE FUROATE AND FORMOTEROL FUMARATE DIHYDRATE 2 PUFF: 200; 5 AEROSOL RESPIRATORY (INHALATION) at 20:10

## 2019-08-13 RX ADMIN — CLOPIDOGREL 75 MG: 75 TABLET, FILM COATED ORAL at 09:19

## 2019-08-13 RX ADMIN — ASPIRIN 325 MG: 325 TABLET, COATED ORAL at 09:19

## 2019-08-13 RX ADMIN — CLONIDINE HYDROCHLORIDE 0.1 MG: 0.1 TABLET ORAL at 20:47

## 2019-08-13 ASSESSMENT — PAIN SCALES - GENERAL: PAINLEVEL_OUTOF10: 0

## 2019-08-13 NOTE — PROGRESS NOTES
Date: 8/12/2019  No evidence for aortic injury or abnormality. Small bilateral pleural effusions with adjacent atelectasis. Centrilobular emphysema. Us Renal Complete    Result Date: 8/12/2019  Kidneys appear echogenic likely related to the patient's acute renal failure. No cortical thinning or hydronephrosis. Physical Examination:        General appearance:  alert, cooperative and no distress  Mental Status:  oriented to person, place and time and normal affect  Lungs:  clear to auscultation bilaterally, normal effort  Heart:  regular rate and rhythm, + systolic murmur  Abdomen:  soft, nontender, nondistended, normal bowel sounds, no masses, hepatomegaly, splenomegaly  Extremities:  no edema, redness, tenderness in the calves  Skin:  no gross lesions, rashes, induration    Assessment:        Hospital Problems           Last Modified POA    * (Principal) Hypertensive urgency 8/12/2019 Yes    Pure hypercholesterolemia 8/12/2019 Yes    Exertional dyspnea 8/12/2019 Yes    PTSD (post-traumatic stress disorder) 8/12/2019 Yes    Essential hypertension 8/12/2019 Yes    DM type 2, uncontrolled, with neuropathy ([address] 8/12/2019 Yes    COPD (chronic obstructive pulmonary disease) ([address] 8/12/2019 Yes    Cerebral vascular disease 8/12/2019 Yes    Non-obstructive Coronary artery disease of native artery of native heart with stable angina pectoris ([address] 8/12/2019 Yes    Overview Signed 8/12/2019 11:46 AM by TERRANCE Spears - CNP     9/2017     LMCA: Mild irregularities 10-20%. LAD: 30-40% proximal stenosis    LCx: Mild irregularities 10-20%. RCA: 40% mid stenosis         Hyperparathyroidism ([address] 8/12/2019 Yes    Vitamin D deficiency 8/12/2019 Yes    Acute kidney injury superimposed on chronic kidney disease ([address] 8/12/2019 Yes    Coronary artery disease with exertional angina ([address] 8/12/2019 Yes          Plan:        1. Hypertensive Urgency-  Blood pressure with better readings in 120-130's.  On coreg 25 bid, minipress 5 mg bid, Procardia 60 bid,  hydralazine 50 tid,  (Home clonidine patch, which is not formulary here and interchanged to catapress 0.1 mg bid)  2. Acute on Chronic kidney disease- Acute rise in creat over the night. Despite IVF/hydration. Reviewed for nephrotoxins. Confirm creat, altered IVF and consult to nephrology. I ordered UA with tox screen and it was not done. Ask to pharmacy to review meds for renal clearence  3. Secondary Hyperparathyroidism with normal calcium- consider secondary sources, there is hypo-vitamin d, however will monitor. Corrected ca is 9.4   4. Vitamin D deficiency- replace with caution,   5. Hypophosphatemia- replace with sodium phos 10 mmol, recheck levels this afternoon  6. Diabetes type 2 with hyperglycemia- Insulin sliding scale with carb correction, blood sugars are better controlled at this time   7. Non-obstructive CAD with exertional angina- currently chest pain free,hold off on stress at this time, will proceed with recommendation of cardiology service. 8. COPD without exacerbation- Last exacerbation 1.5 months ago- current treatment   9. Exertional dyspnea- added BREO/ dulera- he is followed by pulmonary,  Last note July/19. Given the murmur would like echo to see if this is the cause of the dyspnea- however will defer to cardiology at this point as they are consulted.     10. DVT / GI proph   lovenox and pepcid ( renal adjustment on pepcid)         TERRANCE Vaz - CNP  8/13/2019  8:13 AM

## 2019-08-13 NOTE — PROGRESS NOTES
Cardiac Testing:     Cath 9/27/17  Left main: mild disease  LAD: 30-40% prox stenosis  LCX: mild disease  RCA: 40% mid stenosis  The LV gram was not performed. (MT)      Echo 9/4/18:  EF 55%. Mod DD. Trace to mild MR. Trivial TR. RVSP 36. Trivial PI    Stress 9/4/18: Negative.  EF 53%

## 2019-08-13 NOTE — CONSULTS
compliant with medications and has been taking them regularly. Has history of CKD with baseline creatinine 1.5 however underwent CTA chest and now creatinine has worsened. Currently patient still feeling short of breath. Easily winded with minimal exertion states that this has been occurring since leaving [employer] but progressively getting worse. Former smoker qquit in 2005, however has 25 pack year history. Patient states BP normally ranges between 802-126 systolic with HR ranging from 50-60. No symptoms of diziness, palpitations or diaphoresis. Past Medical History:   has a past medical history of Asthma, CAD in native artery, nonobstructive, Carotid stenosis, left, Carpal tunnel syndrome, Cerebrovascular disease, Chronic kidney disease, COPD (chronic obstructive pulmonary disease) ([facility] [address] Diabetes mellitus ([facility] [address] History of colon polyps, History of weakness of extremity, HTN (hypertension), Hyperlipidemia, Lung, cysts, congenital, PTSD (post-traumatic stress disorder), PTSD (post-traumatic stress disorder), TIA (transient ischemic attack), and Type II or unspecified type diabetes mellitus without mention of complication, not stated as uncontrolled. Past Surgical History:   has a past surgical history that includes hernia repair; Tonsillectomy; Colonoscopy; Carotid endarterectomy (Left, 7-2013); vascular surgery (Left, 2015); and pr colsc flx w/rmvl of tumor polyp lesion snare tq (N/A, 6/27/2017). Home Medications:    Prior to Admission medications    Medication Sig Start Date End Date Taking?  Authorizing Provider   cloNIDine (CATAPRES) 0.3 MG/24HR PTWK Place 1 patch onto the skin once a week   Yes Historical Provider, MD   carvedilol (COREG) 25 MG tablet Take 25 mg by mouth 2 times daily (with meals)   Yes Historical Provider, MD   hydrALAZINE (APRESOLINE) 50 MG tablet Take 50 mg by mouth 2 times daily    Yes Historical Provider, MD   NIFEdipine (PROCARDIA XL) 60 MG extended release tablet Take 60 HS  mometasone-formoterol (DULERA) 200-5 MCG/ACT inhaler 2 puff, 2 puff, Inhalation, BID  vitamin D (ERGOCALCIFEROL) capsule 50,000 Units, 50,000 Units, Oral, Weekly  insulin glargine (LANTUS) injection vial 35 Units, 35 Units, Subcutaneous, Nightly  nitroGLYCERIN 50 mg in dextrose 5% 250 mL infusion, 40 mcg/min, Intravenous, Continuous    Allergies:  Lisinopril; Ace inhibitors; and Pcn [penicillins]    Social History:   reports that he quit smoking about 5 years ago. His smoking use included cigarettes. He has a 17.50 pack-year smoking history. He has never used smokeless tobacco. He reports that he does not drink alcohol or use drugs. Family History: family history includes Cancer in his mother; Heart Disease in his father. No h/o sudden cardiac death. No for premature CAD    REVIEW OF SYSTEMS:    · Constitutional: there has been no unanticipated weight loss. There's been No change in energy level, No change in activity level. · Eyes: No visual changes or diplopia. No scleral icterus. · ENT: No Headaches  · Cardiovascular: No cardiac history  · Respiratory: No previous pulmonary problems, No cough  · Gastrointestinal: No abdominal pain. No change in bowel or bladder habits. · Genitourinary: No dysuria, trouble voiding, or hematuria. · Musculoskeletal:  No gait disturbance, No weakness or joint complaints. · Integumentary: No rash or pruritis. · Neurological: No headache, diplopia, change in muscle strength, numbness or tingling. No change in gait, balance, coordination, mood, affect, memory, mentation, behavior. · Psychiatric: No anxiety, or depression. · Endocrine: No temperature intolerance. No excessive thirst, fluid intake, or urination. No tremor. · Hematologic/Lymphatic: No abnormal bruising or bleeding, blood clots or swollen lymph nodes. · Allergic/Immunologic: No nasal congestion or hives.       PHYSICAL EXAM:      BP (!) 132/49   Pulse 51   Temp 98.4 °F (36.9 °C) (Oral)   Resp 15   Ht

## 2019-08-13 NOTE — CONSULTS
NOT REPORTED 08/06/2019    CLARITYU Clear 09/12/2014    SPECGRAV 1.019 08/06/2019    LEUKOCYTESUR NEGATIVE 08/06/2019    UROBILINOGEN Normal 08/06/2019    BILIRUBINUR NEGATIVE 08/06/2019    BILIRUBINUR NEGATIVE 11/12/2011    BLOODU Negative 09/12/2014    GLUCOSEU 3+ 08/06/2019    GLUCOSEU 3+ 11/12/2011    KETUA NEGATIVE 08/06/2019    AMORPHOUS NOT REPORTED 08/06/2019     Urine Sodium:     Lab Results   Component Value Date    IGNACIO 78 09/02/2018     Urine Protein:     Lab Results   Component Value Date     11/12/2011     Urine Creatinine:     Lab Results   Component Value Date    LABCREA 91.2 08/06/2019       Radiology:     CXR 8/11/2019: No acute cardiopulmonary changes. Centrilobular emphysema coinciding COPD. CT chest with contrast on 8/12/2019: No evidence for aortic injury or abnormality. Small bilateral pleural effusions with adjacent atelectasis. Centrilobular emphysema. CT head WO contrast 8/11/19: No acute intracranial abnormality. Chronic lacunar infarcts within the cerebellar hemispheres bilaterally. Bilateral mastoid effusions. Renal ultrasound 8/12/2019: Right kidney 10.5 cm, left kidney 9.5 cm. Kidneys appear echogenic, concerning for ARF. No cortical thinning. No hydronephrosis. EKG 8/12/19: Normal sinus rhythm  ST & T wave abnormality, consider inferior ischemia  Abnormal ECG  When compared with ECG of 04-MAY-2019 15:08,  T wave inversion less evident in Lateral leads     9/2018 Stress test:  1. No discrete perfusion abnormality to suggest myocardial  ischemia/infarction.  Apical thinning. 2. No wall motion abnormality.  Calculated ejection fraction of 53%. 3. Risk stratification: Low     9/2017 Cardiac Cath:  LMCA: Mild irregularities 10-20%. LAD: 30-40% proximal stenosis. LCx: Mild irregularities 10-20%. RCA: 40% mid stenosis  Assessment:       1. BINU on CKD nonoliguric likely secondary to contrast injury could could have a prerenal factor also.  UPC 5.5  Patient has negative serology as per 9/20/2017 labs. 2. HTN emergency: Under control on Carvedilol-25, Clonidine, Nifedipine 60 mg.   3. Stable angina: EKG NSR, some evidence of lateral ischemia with inverted T waves, mild elevation of Trops at 70s- Could be from renal injury. First time Cardiology appointment on 8/15. Consider stress test and Echo. 4. T2 DM with carotid artery stent: On ASA, clopidogrel  5. ACD with Hb 9.4  6. Secondary hyperthyroidism: Corrected Ca 9.4, Po4 2.1, PTH 98, Vit D 10.   7. CKD 3 likely due to diabetic and hypertensive nephrosclerosis with baseline creatinine around 1.2 to 1.9 mg/DL. Follows up with Dr. Royal Hall. 8.  Vitamin D deficiency. Plan:   1. Continue IVF. Monitor BMP every 6 hrs. 2. Avoid any NSAIDs. 3. Strict I/Os and weight to be documented. 4. Keep on renal diet. 5. Comprehensive urine testing including Urinalysis, Urine sodium, potassium, chloride, Urine protein and creatinine to quantify the proteinuria if any at all. 6. BMP in am.   7. Will follow. Nutrition   Please ensure that patient is on a renal diet/TF. Avoid nephrotoxic drugs/contrast exposure. Thank you for the consultation. Please do not hesitate to contact us for any further questions/concerns. We will continue to follow along with you.        Akbar Peña MD, PGY-1  Internal Medicine Residency Program  [facility]  8/13/2019 9:32 AM

## 2019-08-14 ENCOUNTER — APPOINTMENT (OUTPATIENT)
Dept: GENERAL RADIOLOGY | Age: 64
DRG: 304 | End: 2019-08-14
Payer: COMMERCIAL

## 2019-08-14 LAB
ANION GAP SERPL CALCULATED.3IONS-SCNC: 17 MMOL/L (ref 9–17)
BUN BLDV-MCNC: 74 MG/DL (ref 8–23)
BUN/CREAT BLD: ABNORMAL (ref 9–20)
CALCIUM IONIZED: 1.18 MMOL/L (ref 1.13–1.33)
CALCIUM SERPL-MCNC: 8.2 MG/DL (ref 8.6–10.4)
CHLORIDE BLD-SCNC: 98 MMOL/L (ref 98–107)
CO2: 16 MMOL/L (ref 20–31)
CREAT SERPL-MCNC: 4.78 MG/DL (ref 0.7–1.2)
EKG ATRIAL RATE: 54 BPM
EKG P AXIS: 63 DEGREES
EKG P-R INTERVAL: 136 MS
EKG Q-T INTERVAL: 406 MS
EKG QRS DURATION: 94 MS
EKG QTC CALCULATION (BAZETT): 385 MS
EKG R AXIS: 51 DEGREES
EKG T AXIS: 150 DEGREES
EKG VENTRICULAR RATE: 54 BPM
GFR AFRICAN AMERICAN: 15 ML/MIN
GFR NON-AFRICAN AMERICAN: 12 ML/MIN
GFR SERPL CREATININE-BSD FRML MDRD: ABNORMAL ML/MIN/{1.73_M2}
GFR SERPL CREATININE-BSD FRML MDRD: ABNORMAL ML/MIN/{1.73_M2}
GLUCOSE BLD-MCNC: 107 MG/DL (ref 70–99)
GLUCOSE BLD-MCNC: 124 MG/DL (ref 75–110)
GLUCOSE BLD-MCNC: 125 MG/DL (ref 75–110)
GLUCOSE BLD-MCNC: 162 MG/DL (ref 75–110)
GLUCOSE BLD-MCNC: 86 MG/DL (ref 75–110)
HCT VFR BLD CALC: 31.4 % (ref 40.7–50.3)
HEMOGLOBIN: 9.8 G/DL (ref 13–17)
LV EF: 60 %
LVEF MODALITY: NORMAL
MCH RBC QN AUTO: 30.6 PG (ref 25.2–33.5)
MCHC RBC AUTO-ENTMCNC: 31.2 G/DL (ref 28.4–34.8)
MCV RBC AUTO: 98.1 FL (ref 82.6–102.9)
NRBC AUTOMATED: 0 PER 100 WBC
PDW BLD-RTO: 12.1 % (ref 11.8–14.4)
PHOSPHORUS: 3.5 MG/DL (ref 2.5–4.5)
PHOSPHORUS: 3.5 MG/DL (ref 2.5–4.5)
PLATELET # BLD: 272 K/UL (ref 138–453)
PMV BLD AUTO: 10 FL (ref 8.1–13.5)
POTASSIUM SERPL-SCNC: 4.7 MMOL/L (ref 3.7–5.3)
RBC # BLD: 3.2 M/UL (ref 4.21–5.77)
SODIUM BLD-SCNC: 131 MMOL/L (ref 135–144)
TROPONIN INTERP: ABNORMAL
TROPONIN T: ABNORMAL NG/ML
TROPONIN, HIGH SENSITIVITY: 74 NG/L (ref 0–22)
TROPONIN, HIGH SENSITIVITY: 85 NG/L (ref 0–22)
TROPONIN, HIGH SENSITIVITY: 91 NG/L (ref 0–22)
WBC # BLD: 7.1 K/UL (ref 3.5–11.3)

## 2019-08-14 PROCEDURE — APPSS30 APP SPLIT SHARED TIME 16-30 MINUTES: Performed by: NURSE PRACTITIONER

## 2019-08-14 PROCEDURE — 6370000000 HC RX 637 (ALT 250 FOR IP): Performed by: NURSE PRACTITIONER

## 2019-08-14 PROCEDURE — 80048 BASIC METABOLIC PNL TOTAL CA: CPT

## 2019-08-14 PROCEDURE — 84100 ASSAY OF PHOSPHORUS: CPT

## 2019-08-14 PROCEDURE — 36415 COLL VENOUS BLD VENIPUNCTURE: CPT

## 2019-08-14 PROCEDURE — 94760 N-INVAS EAR/PLS OXIMETRY 1: CPT

## 2019-08-14 PROCEDURE — 82330 ASSAY OF CALCIUM: CPT

## 2019-08-14 PROCEDURE — 2060000000 HC ICU INTERMEDIATE R&B

## 2019-08-14 PROCEDURE — 6360000002 HC RX W HCPCS: Performed by: NURSE PRACTITIONER

## 2019-08-14 PROCEDURE — 6370000000 HC RX 637 (ALT 250 FOR IP): Performed by: STUDENT IN AN ORGANIZED HEALTH CARE EDUCATION/TRAINING PROGRAM

## 2019-08-14 PROCEDURE — 84484 ASSAY OF TROPONIN QUANT: CPT

## 2019-08-14 PROCEDURE — 6370000000 HC RX 637 (ALT 250 FOR IP): Performed by: INTERNAL MEDICINE

## 2019-08-14 PROCEDURE — 85027 COMPLETE CBC AUTOMATED: CPT

## 2019-08-14 PROCEDURE — 93306 TTE W/DOPPLER COMPLETE: CPT

## 2019-08-14 PROCEDURE — 71045 X-RAY EXAM CHEST 1 VIEW: CPT

## 2019-08-14 PROCEDURE — 93010 ELECTROCARDIOGRAM REPORT: CPT | Performed by: INTERNAL MEDICINE

## 2019-08-14 PROCEDURE — 82947 ASSAY GLUCOSE BLOOD QUANT: CPT

## 2019-08-14 PROCEDURE — 2700000000 HC OXYGEN THERAPY PER DAY

## 2019-08-14 PROCEDURE — 94640 AIRWAY INHALATION TREATMENT: CPT

## 2019-08-14 PROCEDURE — 99232 SBSQ HOSP IP/OBS MODERATE 35: CPT | Performed by: INTERNAL MEDICINE

## 2019-08-14 PROCEDURE — 2580000003 HC RX 258: Performed by: INTERNAL MEDICINE

## 2019-08-14 RX ORDER — ALBUTEROL SULFATE 2.5 MG/3ML
2.5 SOLUTION RESPIRATORY (INHALATION)
Status: DISCONTINUED | OUTPATIENT
Start: 2019-08-14 | End: 2019-08-14

## 2019-08-14 RX ORDER — METHYLPREDNISOLONE SODIUM SUCCINATE 125 MG/2ML
125 INJECTION, POWDER, LYOPHILIZED, FOR SOLUTION INTRAMUSCULAR; INTRAVENOUS ONCE
Status: COMPLETED | OUTPATIENT
Start: 2019-08-14 | End: 2019-08-14

## 2019-08-14 RX ORDER — SODIUM BICARBONATE 650 MG/1
650 TABLET ORAL 2 TIMES DAILY
Status: DISCONTINUED | OUTPATIENT
Start: 2019-08-14 | End: 2019-08-20

## 2019-08-14 RX ORDER — ALBUTEROL SULFATE 2.5 MG/3ML
2.5 SOLUTION RESPIRATORY (INHALATION) EVERY 4 HOURS PRN
Status: DISCONTINUED | OUTPATIENT
Start: 2019-08-14 | End: 2019-08-14

## 2019-08-14 RX ADMIN — CLONIDINE HYDROCHLORIDE 0.1 MG: 0.1 TABLET ORAL at 08:18

## 2019-08-14 RX ADMIN — ASPIRIN 325 MG: 325 TABLET, COATED ORAL at 08:18

## 2019-08-14 RX ADMIN — FAMOTIDINE 10 MG: 20 TABLET, FILM COATED ORAL at 08:18

## 2019-08-14 RX ADMIN — ATORVASTATIN CALCIUM 80 MG: 80 TABLET, FILM COATED ORAL at 20:38

## 2019-08-14 RX ADMIN — IPRATROPIUM BROMIDE AND ALBUTEROL SULFATE 1 AMPULE: .5; 3 SOLUTION RESPIRATORY (INHALATION) at 15:26

## 2019-08-14 RX ADMIN — NIFEDIPINE 60 MG: 60 TABLET, EXTENDED RELEASE ORAL at 08:18

## 2019-08-14 RX ADMIN — PRAZOSIN HYDROCHLORIDE 5 MG: 5 CAPSULE ORAL at 18:29

## 2019-08-14 RX ADMIN — GABAPENTIN 100 MG: 100 CAPSULE ORAL at 08:18

## 2019-08-14 RX ADMIN — GABAPENTIN 100 MG: 100 CAPSULE ORAL at 20:38

## 2019-08-14 RX ADMIN — SODIUM BICARBONATE 650 MG: 650 TABLET ORAL at 20:39

## 2019-08-14 RX ADMIN — NITROGLYCERIN 0.4 MG: 0.4 TABLET SUBLINGUAL at 18:26

## 2019-08-14 RX ADMIN — CLONIDINE HYDROCHLORIDE 0.1 MG: 0.1 TABLET ORAL at 20:38

## 2019-08-14 RX ADMIN — GABAPENTIN 100 MG: 100 CAPSULE ORAL at 15:20

## 2019-08-14 RX ADMIN — SODIUM CHLORIDE: 9 INJECTION, SOLUTION INTRAVENOUS at 03:23

## 2019-08-14 RX ADMIN — HYDRALAZINE HYDROCHLORIDE 75 MG: 50 TABLET, FILM COATED ORAL at 15:20

## 2019-08-14 RX ADMIN — HYDRALAZINE HYDROCHLORIDE 50 MG: 50 TABLET, FILM COATED ORAL at 08:18

## 2019-08-14 RX ADMIN — INSULIN GLARGINE 35 UNITS: 100 INJECTION, SOLUTION SUBCUTANEOUS at 20:38

## 2019-08-14 RX ADMIN — METHYLPREDNISOLONE SODIUM SUCCINATE 125 MG: 125 INJECTION, POWDER, FOR SOLUTION INTRAMUSCULAR; INTRAVENOUS at 22:57

## 2019-08-14 RX ADMIN — MOMETASONE FUROATE AND FORMOTEROL FUMARATE DIHYDRATE 2 PUFF: 200; 5 AEROSOL RESPIRATORY (INHALATION) at 08:10

## 2019-08-14 RX ADMIN — PRAZOSIN HYDROCHLORIDE 5 MG: 5 CAPSULE ORAL at 08:18

## 2019-08-14 RX ADMIN — HYDRALAZINE HYDROCHLORIDE 75 MG: 50 TABLET, FILM COATED ORAL at 20:38

## 2019-08-14 RX ADMIN — MOMETASONE FUROATE AND FORMOTEROL FUMARATE DIHYDRATE 2 PUFF: 200; 5 AEROSOL RESPIRATORY (INHALATION) at 19:30

## 2019-08-14 RX ADMIN — ENOXAPARIN SODIUM 30 MG: 30 INJECTION SUBCUTANEOUS at 08:19

## 2019-08-14 RX ADMIN — INSULIN LISPRO 4 UNITS: 100 INJECTION, SOLUTION INTRAVENOUS; SUBCUTANEOUS at 11:50

## 2019-08-14 RX ADMIN — SODIUM BICARBONATE 650 MG: 650 TABLET ORAL at 15:20

## 2019-08-14 RX ADMIN — IPRATROPIUM BROMIDE AND ALBUTEROL SULFATE 1 AMPULE: .5; 3 SOLUTION RESPIRATORY (INHALATION) at 19:30

## 2019-08-14 RX ADMIN — IPRATROPIUM BROMIDE AND ALBUTEROL SULFATE 1 AMPULE: .5; 3 SOLUTION RESPIRATORY (INHALATION) at 08:08

## 2019-08-14 RX ADMIN — CLOPIDOGREL 75 MG: 75 TABLET, FILM COATED ORAL at 08:18

## 2019-08-14 RX ADMIN — CARVEDILOL 25 MG: 25 TABLET, FILM COATED ORAL at 08:18

## 2019-08-14 RX ADMIN — IPRATROPIUM BROMIDE AND ALBUTEROL SULFATE 1 AMPULE: .5; 3 SOLUTION RESPIRATORY (INHALATION) at 11:18

## 2019-08-14 ASSESSMENT — PAIN SCALES - GENERAL: PAINLEVEL_OUTOF10: 0

## 2019-08-14 ASSESSMENT — ENCOUNTER SYMPTOMS
WHEEZING: 0
SORE THROAT: 0
BACK PAIN: 0
VOMITING: 0
DIARRHEA: 0
COUGH: 0
ABDOMINAL PAIN: 0
NAUSEA: 0
EYE PAIN: 0
CHEST TIGHTNESS: 1
SHORTNESS OF BREATH: 0

## 2019-08-14 NOTE — PROGRESS NOTES
Denies dizziness, orthopnea and PND. Denies urinary symptoms or constipation.     In the ER blood pressure 230/80 mmHg.  Patient given nitroglycerin and aspirin with improvement of pain and decreased blood pressure to 190/70 7 mmHg  Over 2 hours. Then admitted for HTN emergency. CT head without contrast shown no acute changes. CTA chest with contrast was done without abnormality outside his chronic COPD with appreciated small bilateral pleural effusions and adjacent atelectasis.       He was also noted to have hyperparathyroidism and low vitamin d. Replacement was initiated 8/12/2019  His phos was mildly low and monitored  8/13/2019 Patient creat increased to 4.13  And nephrology consulted. Review of Systems:     Constitutional:  negative for chills, fevers, sweats  Respiratory:  negative for cough, + dyspnea on exertion, - hemoptysis,  +shortness of breath at rest or wheezing  Cardiovascular:  negative for chest pain, +chest pressure/discomfort per hpi, lmild pedal/ ower extremity edema,  No palpitations   Gastrointestinal:  negative for abdominal pain, constipation, diarrhea, nausea, vomiting  Neurological:  negative for dizziness, headache    Medications: Allergies:     Allergies   Allergen Reactions    Lisinopril      cough    Ace Inhibitors      coughing    Pcn [Penicillins]        Current Meds:   Scheduled Meds:    NIFEdipine  60 mg Oral Daily    famotidine  10 mg Oral Daily    enoxaparin  30 mg Subcutaneous Daily    gabapentin  100 mg Oral TID    ipratropium-albuterol  1 ampule Inhalation Q4H WA    atorvastatin  80 mg Oral Daily    carvedilol  25 mg Oral BID     clopidogrel  75 mg Oral Daily    hydrALAZINE  50 mg Oral TID    prazosin  5 mg Oral BID    sodium chloride flush  10 mL Intravenous 2 times per day    aspirin  325 mg Oral Daily    cloNIDine  0.1 mg Oral BID    insulin lispro  0-6 Units Subcutaneous TID     insulin lispro  0-3 Units Subcutaneous Nightly    insulin --   --   --   --    ALKPHOS  --   --   --  94  --   --   --   --   --    BILITOT  --   --   --  <0.10*  --   --   --   --   --    CHOL  --   --   --  132  --   --   --   --   --    HDL  --   --   --  50  --   --   --   --   --    LDLCHOLESTEROL  --   --   --  65  --   --   --   --   --    CHOLHDLRATIO  --   --   --  2.6  --   --   --   --   --    TRIG  --   --   --  85  --   --   --   --   --    VLDL  --   --   --  NOT REPORTED  --   --   --   --   --    POCGLU  --    < > 318*  --  189* 139* 178* 118* 86    < > = values in this interval not displayed. ABG:  Lab Results   Component Value Date    POCPH 7.39 06/17/2013    POCPCO2 46 06/17/2013    POCPO2 288 06/17/2013    POCHCO3 27.8 06/17/2013    NBEA NOT REPORTED 06/17/2013    PBEA 3 06/17/2013    WKX8AUN 29 06/17/2013    LHQE0CZU 100 06/17/2013    FIO2 NOT REPORTED 08/02/2013     Lab Results   Component Value Date/Time    SPECIAL NOT REPORTED 09/02/2018 08:51 PM     Lab Results   Component Value Date/Time    CULTURE NO SIGNIFICANT GROWTH 09/02/2018 08:51 PM       Radiology:  Xr Chest Standard (2 Vw)    Result Date: 8/12/2019  1. No acute cardiopulmonary abnormality. 2. Hyperinflation of the lungs bilaterally, which can be seen with COPD. Ct Head Wo Contrast    Result Date: 8/12/2019  1. No acute intracranial abnormality. 2. Chronic lacunar infarcts within the cerebellar hemispheres bilaterally. 3. Bilateral mastoid effusions. Cta Chest W Wo Contrast    Result Date: 8/12/2019  No evidence for aortic injury or abnormality. Small bilateral pleural effusions with adjacent atelectasis. Centrilobular emphysema. Us Renal Complete    Result Date: 8/12/2019  Kidneys appear echogenic likely related to the patient's acute renal failure. No cortical thinning or hydronephrosis. Xr Chest Portable    Result Date: 8/13/2019  No acute cardiopulmonary process.        Physical Examination:        General appearance:  alert, cooperative moderate  Respiratory

## 2019-08-14 NOTE — PROGRESS NOTES
Troponin:   Recent Labs     08/13/19  0559 08/13/19 2029 08/14/19  0206   TROPHS 74* 81* 74*     BNP: No results for input(s): BNP in the last 72 hours. Lipids:   Recent Labs     08/13/19  0559   CHOL 132   HDL 50     INR: No results for input(s): INR in the last 72 hours. EKG: normal EKG, normal sinus rhythm, unchanged from previous tracings. Cath 9/27/17  Left main: mild disease  LAD: 30-40% prox stenosis  LCX: mild disease  RCA: 40% mid stenosis  The LV gram was not performed. (MT)      Echo 9/4/18:  EF 55%. Mod DD. Trace to mild MR. Trivial TR. RVSP 36. Trivial PI     Stress 9/4/18: Negative. EF 53%  Objective:   Vitals: BP (!) 153/54   Pulse 60   Temp 98.2 °F (36.8 °C) (Oral)   Resp 18   Ht 5' 6\" (1.676 m)   Wt 153 lb (69.4 kg)   SpO2 100%   BMI 24.69 kg/m²   General appearance: alert and cooperative with exam  HEENT: Head: Normocephalic, no lesions, without obvious abnormality.   Neck: no JVD, trachea midline, no adenopathy  Lungs: Clear to auscultation, on 02 per NC   Heart: Regular rate and rhythm, s1/s2 auscultated, no murmurs  Abdomen: soft, non-tender, bowel sounds active  Extremities: no edema  Neurologic: not done        Assessment / Acute Cardiac Problems:   HTN  Acute renal failure  History of intubation for 10 days  Troponin elevation  COPD  Cocaine abuse history  Low albumin  DM2- brittle  Anemia with drop of Hb 1.5 in 1 day      Patient Active Problem List:     Cigarette nicotine dependence without complication     Carpal tunnel syndrome on right     Colon polyps     Carotid stenosis, left s/p Endarterectomy     Mixed simple and mucopurulent chronic bronchitis (HCC)     Pure hypercholesterolemia     Exertional dyspnea     PTSD (post-traumatic stress disorder)     Transient cerebral ischemia     Essential hypertension     DM type 2, uncontrolled, with neuropathy (HCC)     COPD (chronic obstructive pulmonary disease) (HCC)     Cerebral vascular disease     Primary insomnia Hypertensive urgency     Non-obstructive Coronary artery disease of native artery of native heart with stable angina pectoris (HCC)     Hyperparathyroidism ([facility] [address]     Vitamin D deficiency     Acute kidney injury superimposed on chronic kidney disease ([facility] [address]     Coronary artery disease with exertional angina ([facility]      Plan of Treatment:   1. HTN. Discussed compliance. Continue coreg, catapres, hydralazine and nifedipine. Will increase hydralazine to 75mg TID   2. Awaiting ECHO results. Will hold off on stress test until after ECHO. Negative stress test less then one year ago.     3. Will need sleep study as outpt    Electronically signed by TERRANCE Maria CNP on 8/14/2019 at [facility]. [phone]

## 2019-08-14 NOTE — PROGRESS NOTES
[]  Secretion Management Assessment  Score 1 2 3   Bilateral Breath Sounds   []  Occasional Rhonchi []  Scattered Rhonchi []  Course Rhonchi and/or poor aeration   Sputum    []  Small amount of thin secretions []  Moderate amount of viscous secretions []  Copius, Viscious Yellow/ Secretions   CXR as reported by physician []  clear  []  Unavailable []  Infiltrates and/or consolidation  []  Unavailable []  Mucus Plugging and or lobar consolidation  []  Unavailable   Cough []  Strong, productive cough []  Weak productive cough []  No cough or weak non-productive cough   Estefania Painting  7:42 PM                            FEMALE                                  MALE                            FEV1 Predicted Normal Values                        FEV1 Predicted Normal Values          Age                                     Height in Feet and Inches       Age                                     Height in Feet and Inches       4' 11\" 5' 1\" 5' 3\" 5' 5\" 5' 7\" 5' 9\" 5' 11\" 6' 1\"  4' 11\" 5' 1\" 5' 3\" 5' 5\" 5' 7\" 5' 9\" 5' 11\" 6' 1\"   42 - 45 2.49 2.66 2.84 3.03 3.22 3.42 3.62 3.83 42 - 45 2.82 3.03 3.26 3.49 3.72 3.96 4.22 4.47   46 - 49 2.40 2.57 2.76 2.94 3.14 3.33 3.54 3.75 46 - 49 2.70 2.92 3.14 3.37 3.61 3.85 4.10 4.36   50 - 53 2.31 2.48 2.66 2.85 3.04 3.24 3.45 3.66 50 - 53 2.58 2.80 3.02 3.25 3.49 3.73 3.98 4.24   54 - 57 2.21 2.38 2.57 2.75 2.95 3.14 3.35 3.56 54 - 57 2.46 2.67 2.89 3.12 3.36 3.60 3.85 4.11   58 - 61 2.10 2.28 2.46 2.65 2.84 3.04 3.24 3.45 58 - 61 2.32 2.54 2.76 2.99 3.23 3.47 3.72 3.98   62 - 65 1.99 2.17 2.35 2.54 2.73 2.93 3.13 3.34 62 - 65 2.19 2.40 2.62 2.85 3.09 3.33 3.58 3.84   66 - 69 1.88 2.05 2.23 2.42 2.61 2.81 3.02 3.23 66 - 69 2.04 2.26 2.48 2.71 2.95 3.19 3.44 3.70   70+ 1.82 1.99 2.17 2.36 2.55 2.75 2.95 3.16 70+ 1.97 2.19 2.41 2.64 2.87 3.12 3.37 3.62             Predicted Peak Expiratory Flow Rate                                       Height (in)  Female       Height (in) Male

## 2019-08-14 NOTE — PROGRESS NOTES
Pt having new onset of chest pressure. Cardiology notified. Stat EKG and troponins ordered. Ekg completed and sent to Dr. Russ Myers. NP Ghassan Chauhan also notified. Mg, K, and CXR ordered. Pt's VS stable. Will continue to monitor.
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
## 2019-08-15 ENCOUNTER — APPOINTMENT (OUTPATIENT)
Dept: INTERVENTIONAL RADIOLOGY/VASCULAR | Age: 64
DRG: 304 | End: 2019-08-15
Payer: COMMERCIAL

## 2019-08-15 ENCOUNTER — APPOINTMENT (OUTPATIENT)
Dept: DIALYSIS | Age: 64
DRG: 304 | End: 2019-08-15
Payer: COMMERCIAL

## 2019-08-15 LAB
ANION GAP SERPL CALCULATED.3IONS-SCNC: 17 MMOL/L (ref 9–17)
ANION GAP SERPL CALCULATED.3IONS-SCNC: 18 MMOL/L (ref 9–17)
BUN BLDV-MCNC: 85 MG/DL (ref 8–23)
BUN BLDV-MCNC: 90 MG/DL (ref 8–23)
BUN/CREAT BLD: ABNORMAL (ref 9–20)
BUN/CREAT BLD: ABNORMAL (ref 9–20)
CALCIUM IONIZED: 1.17 MMOL/L (ref 1.13–1.33)
CALCIUM SERPL-MCNC: 8.5 MG/DL (ref 8.6–10.4)
CALCIUM SERPL-MCNC: 8.8 MG/DL (ref 8.6–10.4)
CHLORIDE BLD-SCNC: 102 MMOL/L (ref 98–107)
CHLORIDE BLD-SCNC: 102 MMOL/L (ref 98–107)
CO2: 14 MMOL/L (ref 20–31)
CO2: 15 MMOL/L (ref 20–31)
CREAT SERPL-MCNC: 4.54 MG/DL (ref 0.7–1.2)
CREAT SERPL-MCNC: 5.06 MG/DL (ref 0.7–1.2)
GFR AFRICAN AMERICAN: 14 ML/MIN
GFR AFRICAN AMERICAN: 16 ML/MIN
GFR NON-AFRICAN AMERICAN: 12 ML/MIN
GFR NON-AFRICAN AMERICAN: 13 ML/MIN
GFR SERPL CREATININE-BSD FRML MDRD: ABNORMAL ML/MIN/{1.73_M2}
GLUCOSE BLD-MCNC: 152 MG/DL (ref 75–110)
GLUCOSE BLD-MCNC: 190 MG/DL (ref 70–99)
GLUCOSE BLD-MCNC: 273 MG/DL (ref 75–110)
GLUCOSE BLD-MCNC: 303 MG/DL (ref 75–110)
GLUCOSE BLD-MCNC: 343 MG/DL (ref 75–110)
GLUCOSE BLD-MCNC: 365 MG/DL (ref 70–99)
HBV SURFACE AB TITR SER: 143.2 MIU/ML
HCT VFR BLD CALC: 31.1 % (ref 40.7–50.3)
HEMOGLOBIN: 9.8 G/DL (ref 13–17)
HEPATITIS B CORE TOTAL ANTIBODY: REACTIVE
HEPATITIS B SURFACE ANTIGEN: NONREACTIVE
MCH RBC QN AUTO: 31.1 PG (ref 25.2–33.5)
MCHC RBC AUTO-ENTMCNC: 31.5 G/DL (ref 28.4–34.8)
MCV RBC AUTO: 98.7 FL (ref 82.6–102.9)
NRBC AUTOMATED: 0 PER 100 WBC
PARTIAL THROMBOPLASTIN TIME: 25.3 SEC (ref 20.5–30.5)
PDW BLD-RTO: 12.5 % (ref 11.8–14.4)
PHOSPHORUS: 3.4 MG/DL (ref 2.5–4.5)
PLATELET # BLD: 266 K/UL (ref 138–453)
PMV BLD AUTO: 10.3 FL (ref 8.1–13.5)
POTASSIUM SERPL-SCNC: 5.7 MMOL/L (ref 3.7–5.3)
POTASSIUM SERPL-SCNC: 5.9 MMOL/L (ref 3.7–5.3)
RBC # BLD: 3.15 M/UL (ref 4.21–5.77)
SODIUM BLD-SCNC: 134 MMOL/L (ref 135–144)
SODIUM BLD-SCNC: 134 MMOL/L (ref 135–144)
TROPONIN INTERP: ABNORMAL
TROPONIN T: ABNORMAL NG/ML
TROPONIN, HIGH SENSITIVITY: 66 NG/L (ref 0–22)
TROPONIN, HIGH SENSITIVITY: 68 NG/L (ref 0–22)
TROPONIN, HIGH SENSITIVITY: 80 NG/L (ref 0–22)
WBC # BLD: 5.7 K/UL (ref 3.5–11.3)

## 2019-08-15 PROCEDURE — 85027 COMPLETE CBC AUTOMATED: CPT

## 2019-08-15 PROCEDURE — C1769 GUIDE WIRE: HCPCS

## 2019-08-15 PROCEDURE — 77001 FLUOROGUIDE FOR VEIN DEVICE: CPT | Performed by: RADIOLOGY

## 2019-08-15 PROCEDURE — 6370000000 HC RX 637 (ALT 250 FOR IP): Performed by: NURSE PRACTITIONER

## 2019-08-15 PROCEDURE — 2700000000 HC OXYGEN THERAPY PER DAY

## 2019-08-15 PROCEDURE — 82947 ASSAY GLUCOSE BLOOD QUANT: CPT

## 2019-08-15 PROCEDURE — 6370000000 HC RX 637 (ALT 250 FOR IP): Performed by: STUDENT IN AN ORGANIZED HEALTH CARE EDUCATION/TRAINING PROGRAM

## 2019-08-15 PROCEDURE — 36415 COLL VENOUS BLD VENIPUNCTURE: CPT

## 2019-08-15 PROCEDURE — 6370000000 HC RX 637 (ALT 250 FOR IP): Performed by: INTERNAL MEDICINE

## 2019-08-15 PROCEDURE — 94660 CPAP INITIATION&MGMT: CPT

## 2019-08-15 PROCEDURE — 94762 N-INVAS EAR/PLS OXIMTRY CONT: CPT

## 2019-08-15 PROCEDURE — 84100 ASSAY OF PHOSPHORUS: CPT

## 2019-08-15 PROCEDURE — 80048 BASIC METABOLIC PNL TOTAL CA: CPT

## 2019-08-15 PROCEDURE — 36556 INSERT NON-TUNNEL CV CATH: CPT | Performed by: RADIOLOGY

## 2019-08-15 PROCEDURE — 99232 SBSQ HOSP IP/OBS MODERATE 35: CPT | Performed by: INTERNAL MEDICINE

## 2019-08-15 PROCEDURE — 2580000003 HC RX 258: Performed by: NURSE PRACTITIONER

## 2019-08-15 PROCEDURE — 6360000002 HC RX W HCPCS: Performed by: RADIOLOGY

## 2019-08-15 PROCEDURE — 90935 HEMODIALYSIS ONE EVALUATION: CPT

## 2019-08-15 PROCEDURE — 6360000002 HC RX W HCPCS: Performed by: NURSE PRACTITIONER

## 2019-08-15 PROCEDURE — 2580000003 HC RX 258: Performed by: INTERNAL MEDICINE

## 2019-08-15 PROCEDURE — 87340 HEPATITIS B SURFACE AG IA: CPT

## 2019-08-15 PROCEDURE — 02H633Z INSERTION OF INFUSION DEVICE INTO RIGHT ATRIUM, PERCUTANEOUS APPROACH: ICD-10-PCS | Performed by: RADIOLOGY

## 2019-08-15 PROCEDURE — 90937 HEMODIALYSIS REPEATED EVAL: CPT

## 2019-08-15 PROCEDURE — 6360000002 HC RX W HCPCS: Performed by: INTERNAL MEDICINE

## 2019-08-15 PROCEDURE — 2709999900 HC NON-CHARGEABLE SUPPLY

## 2019-08-15 PROCEDURE — 84484 ASSAY OF TROPONIN QUANT: CPT

## 2019-08-15 PROCEDURE — 86317 IMMUNOASSAY INFECTIOUS AGENT: CPT

## 2019-08-15 PROCEDURE — 2060000000 HC ICU INTERMEDIATE R&B

## 2019-08-15 PROCEDURE — 82330 ASSAY OF CALCIUM: CPT

## 2019-08-15 PROCEDURE — C1894 INTRO/SHEATH, NON-LASER: HCPCS

## 2019-08-15 PROCEDURE — 85730 THROMBOPLASTIN TIME PARTIAL: CPT

## 2019-08-15 PROCEDURE — C1752 CATH,HEMODIALYSIS,SHORT-TERM: HCPCS

## 2019-08-15 PROCEDURE — 86704 HEP B CORE ANTIBODY TOTAL: CPT

## 2019-08-15 PROCEDURE — G0257 UNSCHED DIALYSIS ESRD PT HOS: HCPCS

## 2019-08-15 PROCEDURE — 5A1D70Z PERFORMANCE OF URINARY FILTRATION, INTERMITTENT, LESS THAN 6 HOURS PER DAY: ICD-10-PCS | Performed by: INTERNAL MEDICINE

## 2019-08-15 PROCEDURE — 94640 AIRWAY INHALATION TREATMENT: CPT

## 2019-08-15 PROCEDURE — 76937 US GUIDE VASCULAR ACCESS: CPT | Performed by: RADIOLOGY

## 2019-08-15 RX ORDER — CLONIDINE HYDROCHLORIDE 0.1 MG/1
0.1 TABLET ORAL 3 TIMES DAILY
Status: DISCONTINUED | OUTPATIENT
Start: 2019-08-15 | End: 2019-08-18

## 2019-08-15 RX ORDER — ALBUMIN (HUMAN) 12.5 G/50ML
25 SOLUTION INTRAVENOUS ONCE
Status: DISCONTINUED | OUTPATIENT
Start: 2019-08-15 | End: 2019-08-28 | Stop reason: HOSPADM

## 2019-08-15 RX ORDER — HEPARIN SODIUM 1000 [USP'U]/ML
1400 INJECTION, SOLUTION INTRAVENOUS; SUBCUTANEOUS PRN
Status: DISCONTINUED | OUTPATIENT
Start: 2019-08-15 | End: 2019-08-28 | Stop reason: HOSPADM

## 2019-08-15 RX ORDER — CARVEDILOL 12.5 MG/1
12.5 TABLET ORAL 2 TIMES DAILY WITH MEALS
Status: DISCONTINUED | OUTPATIENT
Start: 2019-08-15 | End: 2019-08-16

## 2019-08-15 RX ORDER — PREDNISONE 20 MG/1
40 TABLET ORAL DAILY
Status: DISPENSED | OUTPATIENT
Start: 2019-08-16 | End: 2019-08-18

## 2019-08-15 RX ORDER — HEPARIN SODIUM 5000 [USP'U]/ML
5000 INJECTION, SOLUTION INTRAVENOUS; SUBCUTANEOUS EVERY 8 HOURS SCHEDULED
Status: DISCONTINUED | OUTPATIENT
Start: 2019-08-15 | End: 2019-08-28 | Stop reason: HOSPADM

## 2019-08-15 RX ORDER — HEPARIN SODIUM 5000 [USP'U]/ML
INJECTION, SOLUTION INTRAVENOUS; SUBCUTANEOUS
Status: COMPLETED | OUTPATIENT
Start: 2019-08-15 | End: 2019-08-15

## 2019-08-15 RX ORDER — PREDNISONE 20 MG/1
20 TABLET ORAL DAILY
Status: COMPLETED | OUTPATIENT
Start: 2019-08-21 | End: 2019-08-23

## 2019-08-15 RX ORDER — SODIUM POLYSTYRENE SULFONATE 15 G/60ML
15 SUSPENSION ORAL; RECTAL ONCE
Status: COMPLETED | OUTPATIENT
Start: 2019-08-15 | End: 2019-08-15

## 2019-08-15 RX ORDER — HYDRALAZINE HYDROCHLORIDE 20 MG/ML
20 INJECTION INTRAMUSCULAR; INTRAVENOUS EVERY 6 HOURS PRN
Status: DISCONTINUED | OUTPATIENT
Start: 2019-08-15 | End: 2019-08-28 | Stop reason: HOSPADM

## 2019-08-15 RX ORDER — METHYLPREDNISOLONE SODIUM SUCCINATE 40 MG/ML
40 INJECTION, POWDER, LYOPHILIZED, FOR SOLUTION INTRAMUSCULAR; INTRAVENOUS EVERY 12 HOURS
Status: COMPLETED | OUTPATIENT
Start: 2019-08-15 | End: 2019-08-16

## 2019-08-15 RX ORDER — PREDNISONE 10 MG/1
10 TABLET ORAL DAILY
Status: COMPLETED | OUTPATIENT
Start: 2019-08-24 | End: 2019-08-26

## 2019-08-15 RX ORDER — HEPARIN SODIUM 1000 [USP'U]/ML
1100 INJECTION, SOLUTION INTRAVENOUS; SUBCUTANEOUS PRN
Status: DISCONTINUED | OUTPATIENT
Start: 2019-08-15 | End: 2019-08-28 | Stop reason: HOSPADM

## 2019-08-15 RX ORDER — HYDRALAZINE HYDROCHLORIDE 50 MG/1
100 TABLET, FILM COATED ORAL 3 TIMES DAILY
Status: DISCONTINUED | OUTPATIENT
Start: 2019-08-15 | End: 2019-08-23

## 2019-08-15 RX ADMIN — INSULIN LISPRO 3 UNITS: 100 INJECTION, SOLUTION INTRAVENOUS; SUBCUTANEOUS at 09:09

## 2019-08-15 RX ADMIN — CARVEDILOL 12.5 MG: 25 TABLET, FILM COATED ORAL at 18:39

## 2019-08-15 RX ADMIN — HYDRALAZINE HYDROCHLORIDE 100 MG: 50 TABLET, FILM COATED ORAL at 22:05

## 2019-08-15 RX ADMIN — CLONIDINE HYDROCHLORIDE 0.1 MG: 0.1 TABLET ORAL at 22:06

## 2019-08-15 RX ADMIN — ATORVASTATIN CALCIUM 80 MG: 80 TABLET, FILM COATED ORAL at 22:06

## 2019-08-15 RX ADMIN — HEPARIN SODIUM 5000 UNITS: 5000 INJECTION INTRAVENOUS; SUBCUTANEOUS at 22:06

## 2019-08-15 RX ADMIN — INSULIN LISPRO 7 UNITS: 100 INJECTION, SOLUTION INTRAVENOUS; SUBCUTANEOUS at 12:28

## 2019-08-15 RX ADMIN — SODIUM BICARBONATE 650 MG: 650 TABLET ORAL at 12:33

## 2019-08-15 RX ADMIN — ASPIRIN 325 MG: 325 TABLET, COATED ORAL at 09:19

## 2019-08-15 RX ADMIN — GABAPENTIN 100 MG: 100 CAPSULE ORAL at 22:05

## 2019-08-15 RX ADMIN — GABAPENTIN 100 MG: 100 CAPSULE ORAL at 10:45

## 2019-08-15 RX ADMIN — IPRATROPIUM BROMIDE AND ALBUTEROL SULFATE 1 AMPULE: .5; 3 SOLUTION RESPIRATORY (INHALATION) at 21:06

## 2019-08-15 RX ADMIN — PRAZOSIN HYDROCHLORIDE 5 MG: 5 CAPSULE ORAL at 18:40

## 2019-08-15 RX ADMIN — INSULIN LISPRO 1 UNITS: 100 INJECTION, SOLUTION INTRAVENOUS; SUBCUTANEOUS at 09:12

## 2019-08-15 RX ADMIN — METHYLPREDNISOLONE SODIUM SUCCINATE 40 MG: 40 INJECTION, POWDER, FOR SOLUTION INTRAMUSCULAR; INTRAVENOUS at 18:40

## 2019-08-15 RX ADMIN — GABAPENTIN 100 MG: 100 CAPSULE ORAL at 18:39

## 2019-08-15 RX ADMIN — HEPARIN SODIUM 1400 UNITS: 1000 INJECTION, SOLUTION INTRAVENOUS; SUBCUTANEOUS at 17:13

## 2019-08-15 RX ADMIN — CLONIDINE HYDROCHLORIDE 0.1 MG: 0.1 TABLET ORAL at 18:39

## 2019-08-15 RX ADMIN — FAMOTIDINE 10 MG: 20 TABLET, FILM COATED ORAL at 09:19

## 2019-08-15 RX ADMIN — HYDRALAZINE HYDROCHLORIDE 75 MG: 50 TABLET, FILM COATED ORAL at 05:34

## 2019-08-15 RX ADMIN — HEPARIN SODIUM 1.1 ML: 5000 INJECTION INTRAVENOUS; SUBCUTANEOUS at 14:47

## 2019-08-15 RX ADMIN — INSULIN LISPRO 3 UNITS: 100 INJECTION, SOLUTION INTRAVENOUS; SUBCUTANEOUS at 22:07

## 2019-08-15 RX ADMIN — HYDRALAZINE HYDROCHLORIDE 100 MG: 50 TABLET, FILM COATED ORAL at 18:38

## 2019-08-15 RX ADMIN — HEPARIN SODIUM 1.4 ML: 5000 INJECTION INTRAVENOUS; SUBCUTANEOUS at 14:46

## 2019-08-15 RX ADMIN — HEPARIN SODIUM 1100 UNITS: 1000 INJECTION, SOLUTION INTRAVENOUS; SUBCUTANEOUS at 17:13

## 2019-08-15 RX ADMIN — HYDRALAZINE HYDROCHLORIDE 20 MG: 20 INJECTION INTRAMUSCULAR; INTRAVENOUS at 07:31

## 2019-08-15 RX ADMIN — INSULIN GLARGINE 35 UNITS: 100 INJECTION, SOLUTION SUBCUTANEOUS at 22:07

## 2019-08-15 RX ADMIN — SODIUM CHLORIDE: 9 INJECTION, SOLUTION INTRAVENOUS at 22:08

## 2019-08-15 RX ADMIN — CLONIDINE HYDROCHLORIDE 0.1 MG: 0.1 TABLET ORAL at 09:19

## 2019-08-15 RX ADMIN — SODIUM POLYSTYRENE SULFONATE 15 G: 15 SUSPENSION ORAL; RECTAL at 10:46

## 2019-08-15 RX ADMIN — INSULIN LISPRO 10 UNITS: 100 INJECTION, SOLUTION INTRAVENOUS; SUBCUTANEOUS at 18:28

## 2019-08-15 RX ADMIN — SODIUM CHLORIDE, PRESERVATIVE FREE 10 ML: 5 INJECTION INTRAVENOUS at 22:04

## 2019-08-15 RX ADMIN — CLOPIDOGREL 75 MG: 75 TABLET, FILM COATED ORAL at 09:19

## 2019-08-15 RX ADMIN — PRAZOSIN HYDROCHLORIDE 5 MG: 5 CAPSULE ORAL at 09:19

## 2019-08-15 RX ADMIN — ENOXAPARIN SODIUM 30 MG: 30 INJECTION SUBCUTANEOUS at 09:19

## 2019-08-15 RX ADMIN — MOMETASONE FUROATE AND FORMOTEROL FUMARATE DIHYDRATE 2 PUFF: 200; 5 AEROSOL RESPIRATORY (INHALATION) at 21:07

## 2019-08-15 RX ADMIN — CARVEDILOL 12.5 MG: 25 TABLET, FILM COATED ORAL at 12:22

## 2019-08-15 RX ADMIN — NIFEDIPINE 60 MG: 60 TABLET, EXTENDED RELEASE ORAL at 09:19

## 2019-08-15 RX ADMIN — IPRATROPIUM BROMIDE AND ALBUTEROL SULFATE 1 AMPULE: .5; 3 SOLUTION RESPIRATORY (INHALATION) at 12:58

## 2019-08-15 ASSESSMENT — PAIN SCALES - GENERAL
PAINLEVEL_OUTOF10: 0

## 2019-08-15 NOTE — BRIEF OP NOTE
Brief Postoperative Note    Cecilia Hoskins  YOB: 1955  [id number]    Pre-operative Diagnosis: ARF    Post-operative Diagnosis: Same    Procedure: Temporary dialysis catheter placement    Anesthesia: Local    Surgeons/Assistants: Jarred    Estimated Blood Loss: less than 50     Complications: None    Specimens: Was Not Obtained    Electronically signed by Tonya Velarde MD on 8/15/2019 at 3:01 PM

## 2019-08-15 NOTE — PROGRESS NOTES
°C), Min:98 °F (36.7 °C), Max:98.5 °F (36.9 °C)    Respirations:  Resp: 19  Pulse:   Pulse: 69  BP:    BP: (!) 193/68  BP Range: Systolic (90EHJ), UEV:950 , Min:153 , DQS:526       Diastolic (52TBY), XGZ:84, Min:54, Max:68      Physical Examination:     General:  AAO x 3, speaking in full sentences, no accessory muscle use. Eyes:   Pupils equal, round and reactive to light, EOMI. Neck:   No JVD, no thyromegaly, no lymphadenopathy. Chest:   Decreased air entry at the left base. Cardiac:  S1 S2 RR, no murmurs, gallops or rubs, JVP not raised. Abdomen: Soft, non-tender, no masses or organomegaly, BS audible. :   No suprapubic or flank tenderness. Neuro:  AAO x 3, No FND. SKIN:  No rashes, good skin turgor. Extremities:  +pedal edema, palpable peripheral pulses, no calf tenderness.     Labs:       Recent Labs     08/13/19  0559 08/14/19  0816 08/15/19  0519   WBC 7.9 7.1 5.7   RBC 3.03* 3.20* 3.15*   HGB 9.4* 9.8* 9.8*   HCT 28.3* 31.4* 31.1*   MCV 93.4 98.1 98.7   MCH 31.0 30.6 31.1   MCHC 33.2 31.2 31.5   RDW 11.9 12.1 12.5    272 266   MPV 9.9 10.0 10.3      BMP:   Recent Labs     08/13/19  0827 08/13/19 2029 08/14/19  0816 08/15/19  0519   *  --  131* 134*   K 4.4 4.7 4.7 5.7*   CL 99  --  98 102   CO2 18*  --  16* 15*   BUN 62*  --  74* 90*   CREATININE 4.23*  --  4.78* 5.06*   GLUCOSE 182*  --  107* 190*   CALCIUM 8.5*  --  8.2* 8.5*      Phosphorus:     Recent Labs     08/14/19  0206 08/14/19  0816 08/15/19  0519   PHOS 3.5 3.5 3.4     Magnesium:    Recent Labs     08/13/19  0559 08/13/19 2029   MG 1.5* 2.0     Albumin:    Recent Labs     08/13/19 0559   LABALBU 2.9*     BNP:      Lab Results   Component Value Date    BNP 84 11/27/2013     RIZWAN:    No results found for: RIZWAN  SPEP:  Lab Results   Component Value Date    PROT 5.5 08/13/2019    PROT 6.2 11/12/2011    ALBCAL 3.3 09/27/2017    ALBPCT 63 09/27/2017    LABALPH 0.1 09/27/2017    LABALPH 0.8 09/27/2017    A1PCT 3 09/27/2017 A2PCT 15 09/27/2017    LABBETA 0.6 09/27/2017    BETAPCT 12 09/27/2017    GAMGLOB 0.4 09/27/2017    GGPCT 8 09/27/2017    PATH ELECTRONICALLY SIGNED. Jeancarlos Bustos M.D. 09/27/2017    PATH ELECTRONICALLY SIGNED. Jeancarlos Bustos M.D. 09/27/2017       Urinalysis/Chemistries:      Lab Results   Component Value Date    NITRU NEGATIVE 08/06/2019    COLORU YELLOW 08/06/2019    PHUR 6.0 08/06/2019    WBCUA 0 TO 2 08/06/2019    RBCUA None 08/06/2019    MUCUS NOT REPORTED 08/06/2019    TRICHOMONAS NOT REPORTED 08/06/2019    YEAST NOT REPORTED 08/06/2019    BACTERIA NOT REPORTED 08/06/2019    CLARITYU Clear 09/12/2014    SPECGRAV 1.019 08/06/2019    LEUKOCYTESUR NEGATIVE 08/06/2019    UROBILINOGEN Normal 08/06/2019    BILIRUBINUR NEGATIVE 08/06/2019    BILIRUBINUR NEGATIVE 11/12/2011    BLOODU Negative 09/12/2014    GLUCOSEU 3+ 08/06/2019    GLUCOSEU 3+ 11/12/2011    KETUA NEGATIVE 08/06/2019    AMORPHOUS NOT REPORTED 08/06/2019     Urine Sodium:     Lab Results   Component Value Date    IGNACIO 20 08/13/2019     Urine Potassium:    Lab Results   Component Value Date    KUR 36.1 08/13/2019     Urine Creatinine:     Lab Results   Component Value Date    LABCREA 82.4 08/13/2019     Urine Eosinophils:  No components found for: UEOS    Radiology:     Echo 8/14/19: EF 60%, LVH, mild MR, TR, PI. Assessment:     1. BINU on CKD nonoliguric likely secondary to contrast injury. UPC 5.5. Patient has negative serology as per 9/20/2017 labs. 2. HTN emergency: Today 190/70 mmHg before medications given. 3. Stable angina: EKG NSR, some evidence of lateral ischemia with inverted T waves, mild elevation of Trops at 70s- Could be from renal injury. 4. COPD: On Duoneb. SOB improved. Unlikely cardiac cause. 4. T2 DM with carotid artery stent: On ASA, clopidogrel  5.  ACD with Hb 9.8  6. Secondary hyperthyroidism  7. CKD 3 likely due to diabetic and hypertensive nephrosclerosis with baseline creatinine around 1.2 to 1.9 mg/DL.   Follows

## 2019-08-15 NOTE — PROGRESS NOTES
Over 2 hours. Then admitted for HTN emergency. CT head without contrast shown no acute changes. CTA chest with contrast was done without abnormality outside his chronic COPD with appreciated small bilateral pleural effusions and adjacent atelectasis.       He was also noted to have hyperparathyroidism and low vitamin d.  Replacement was initiated 8/12/2019  His phos was mildly low and monitored  8/13/2019 Patient creat increased to 4.13  And nephrology consulted  8/14- creat continued to elevate  8/15 now hyperkalemic- HD started    Review of Systems:     Constitutional:  negative for chills, fevers, sweats  Respiratory:  negative for cough, + dyspnea on exertion which is improved , - hemoptysis,  +shortness of breath at rest or wheezing- improved from steriods  Cardiovascular:  negative for chest pain, +chest pressure/discomfort per hpi, lmild pedal/ ower extremity edema,  No palpitations   Gastrointestinal:  negative for abdominal pain, constipation, diarrhea, nausea, vomiting  Neurological:  negative for dizziness, headache    Medications: Allergies:     Allergies   Allergen Reactions    Lisinopril      cough    Ace Inhibitors      coughing    Pcn [Penicillins]        Current Meds:   Scheduled Meds:    hydrALAZINE  75 mg Oral TID    sodium bicarbonate  650 mg Oral BID    NIFEdipine  60 mg Oral Daily    famotidine  10 mg Oral Daily    enoxaparin  30 mg Subcutaneous Daily    gabapentin  100 mg Oral TID    ipratropium-albuterol  1 ampule Inhalation Q4H WA    atorvastatin  80 mg Oral Daily    carvedilol  25 mg Oral BID WC    clopidogrel  75 mg Oral Daily    prazosin  5 mg Oral BID    sodium chloride flush  10 mL Intravenous 2 times per day    aspirin  325 mg Oral Daily    cloNIDine  0.1 mg Oral BID    insulin lispro  0-6 Units Subcutaneous TID     insulin lispro  0-3 Units Subcutaneous Nightly    insulin lispro  1-6 Units Subcutaneous 4x Daily AC & HS    mometasone-formoterol  2 puff (24Hr): Intake/Output Summary (Last 24 hours) at 8/15/2019 0727  Last data filed at 8/15/2019 0650  Gross per 24 hour   Intake --   Output 875 ml   Net -875 ml       Labs:  Hematology:  Recent Labs     08/13/19  0559 08/14/19  0816 08/15/19  0519   WBC 7.9 7.1 5.7   RBC 3.03* 3.20* 3.15*   HGB 9.4* 9.8* 9.8*   HCT 28.3* 31.4* 31.1*   MCV 93.4 98.1 98.7   MCH 31.0 30.6 31.1   MCHC 33.2 31.2 31.5   RDW 11.9 12.1 12.5    272 266   MPV 9.9 10.0 10.3     Chemistry:  Recent Labs     08/12/19  1815 08/12/19  2245  08/13/19  0559 08/13/19  0827 08/13/19 2029 08/14/19  0206 08/14/19 0816  08/14/19  1741 08/14/19  2349 08/15/19  0519   NA  --   --    < > 135 132*  --   --  131*  --   --   --  134*   K  --   --    < > 4.5 4.4 4.7  --  4.7  --   --   --  5.7*   CL  --   --    < > 100 99  --   --  98  --   --   --  102   CO2  --   --    < > 20 18*  --   --  16*  --   --   --  15*   GLUCOSE  --   --    < > 225* 182*  --   --  107*  --   --   --  190*   BUN  --   --    < > 61* 62*  --   --  74*  --   --   --  90*   CREATININE  --   --    < > 4.13* 4.23*  --   --  4.78*  --   --   --  5.06*   MG  --   --   --  1.5*  --  2.0  --   --   --   --   --   --    ANIONGAP  --   --    < > 15 15  --   --  17  --   --   --  17   LABGLOM  --   --    < > 15* 14*  --   --  12*  --   --   --  12*   GFRAA  --   --    < > 18* 17*  --   --  15*  --   --   --  14*   CALCIUM  --   --    < > 8.2* 8.5*  --   --  8.2*  --   --   --  8.5*   CAION  --   --   --  1.18  --   --  1.18  --   --   --   --  1.17   PHOS  --   --    < > 2.1*  --   --  3.5 3.5  --   --   --  3.4   TROPHS 50* 66*  --  74*  --  81* 74*  --    < > 85* 80* 66*   MYOGLOBIN 61 56  --  56  --   --   --   --   --   --   --   --     < > = values in this interval not displayed.      Recent Labs     08/12/19  1147  08/13/19  0559  08/13/19  1958 08/14/19  0708 08/14/19  1146 08/14/19  1613 08/14/19  1932 08/15/19  0633   PROT  --   --  5.5*  --   --   --   --   --   --   --

## 2019-08-16 LAB
ANION GAP SERPL CALCULATED.3IONS-SCNC: 16 MMOL/L (ref 9–17)
BUN BLDV-MCNC: 73 MG/DL (ref 8–23)
BUN/CREAT BLD: ABNORMAL (ref 9–20)
CALCIUM IONIZED: 1.18 MMOL/L (ref 1.13–1.33)
CALCIUM SERPL-MCNC: 8.5 MG/DL (ref 8.6–10.4)
CHLORIDE BLD-SCNC: 99 MMOL/L (ref 98–107)
CO2: 19 MMOL/L (ref 20–31)
CREAT SERPL-MCNC: 3.18 MG/DL (ref 0.7–1.2)
GFR AFRICAN AMERICAN: 24 ML/MIN
GFR NON-AFRICAN AMERICAN: 20 ML/MIN
GFR SERPL CREATININE-BSD FRML MDRD: ABNORMAL ML/MIN/{1.73_M2}
GFR SERPL CREATININE-BSD FRML MDRD: ABNORMAL ML/MIN/{1.73_M2}
GLUCOSE BLD-MCNC: 204 MG/DL (ref 75–110)
GLUCOSE BLD-MCNC: 301 MG/DL (ref 75–110)
GLUCOSE BLD-MCNC: 353 MG/DL (ref 75–110)
GLUCOSE BLD-MCNC: 430 MG/DL (ref 75–110)
GLUCOSE BLD-MCNC: 453 MG/DL (ref 70–99)
HCT VFR BLD CALC: 27.2 % (ref 40.7–50.3)
HEMOGLOBIN: 9 G/DL (ref 13–17)
HEPATITIS C ANTIBODY: NONREACTIVE
MCH RBC QN AUTO: 30.6 PG (ref 25.2–33.5)
MCHC RBC AUTO-ENTMCNC: 33.1 G/DL (ref 28.4–34.8)
MCV RBC AUTO: 92.5 FL (ref 82.6–102.9)
NRBC AUTOMATED: 0 PER 100 WBC
PDW BLD-RTO: 12.8 % (ref 11.8–14.4)
PHOSPHORUS: 3.6 MG/DL (ref 2.5–4.5)
PLATELET # BLD: 256 K/UL (ref 138–453)
PMV BLD AUTO: 10.6 FL (ref 8.1–13.5)
POTASSIUM SERPL-SCNC: 5.5 MMOL/L (ref 3.7–5.3)
RBC # BLD: 2.94 M/UL (ref 4.21–5.77)
SODIUM BLD-SCNC: 134 MMOL/L (ref 135–144)
TROPONIN INTERP: ABNORMAL
TROPONIN T: ABNORMAL NG/ML
TROPONIN, HIGH SENSITIVITY: 71 NG/L (ref 0–22)
TROPONIN, HIGH SENSITIVITY: 74 NG/L (ref 0–22)
TROPONIN, HIGH SENSITIVITY: 75 NG/L (ref 0–22)
WBC # BLD: 7.6 K/UL (ref 3.5–11.3)

## 2019-08-16 PROCEDURE — 84100 ASSAY OF PHOSPHORUS: CPT

## 2019-08-16 PROCEDURE — 6360000002 HC RX W HCPCS: Performed by: NURSE PRACTITIONER

## 2019-08-16 PROCEDURE — 6370000000 HC RX 637 (ALT 250 FOR IP): Performed by: INTERNAL MEDICINE

## 2019-08-16 PROCEDURE — 94640 AIRWAY INHALATION TREATMENT: CPT

## 2019-08-16 PROCEDURE — 94760 N-INVAS EAR/PLS OXIMETRY 1: CPT

## 2019-08-16 PROCEDURE — 6370000000 HC RX 637 (ALT 250 FOR IP): Performed by: NURSE PRACTITIONER

## 2019-08-16 PROCEDURE — 2580000003 HC RX 258: Performed by: NURSE PRACTITIONER

## 2019-08-16 PROCEDURE — 90935 HEMODIALYSIS ONE EVALUATION: CPT

## 2019-08-16 PROCEDURE — 84484 ASSAY OF TROPONIN QUANT: CPT

## 2019-08-16 PROCEDURE — 2700000000 HC OXYGEN THERAPY PER DAY

## 2019-08-16 PROCEDURE — 86803 HEPATITIS C AB TEST: CPT

## 2019-08-16 PROCEDURE — 6360000002 HC RX W HCPCS: Performed by: INTERNAL MEDICINE

## 2019-08-16 PROCEDURE — 85027 COMPLETE CBC AUTOMATED: CPT

## 2019-08-16 PROCEDURE — 94660 CPAP INITIATION&MGMT: CPT

## 2019-08-16 PROCEDURE — 94762 N-INVAS EAR/PLS OXIMTRY CONT: CPT

## 2019-08-16 PROCEDURE — 80048 BASIC METABOLIC PNL TOTAL CA: CPT

## 2019-08-16 PROCEDURE — 6370000000 HC RX 637 (ALT 250 FOR IP): Performed by: STUDENT IN AN ORGANIZED HEALTH CARE EDUCATION/TRAINING PROGRAM

## 2019-08-16 PROCEDURE — G0257 UNSCHED DIALYSIS ESRD PT HOS: HCPCS

## 2019-08-16 PROCEDURE — 99232 SBSQ HOSP IP/OBS MODERATE 35: CPT | Performed by: INTERNAL MEDICINE

## 2019-08-16 PROCEDURE — 36415 COLL VENOUS BLD VENIPUNCTURE: CPT

## 2019-08-16 PROCEDURE — 82947 ASSAY GLUCOSE BLOOD QUANT: CPT

## 2019-08-16 PROCEDURE — APPSS30 APP SPLIT SHARED TIME 16-30 MINUTES: Performed by: NURSE PRACTITIONER

## 2019-08-16 PROCEDURE — 82330 ASSAY OF CALCIUM: CPT

## 2019-08-16 PROCEDURE — 2060000000 HC ICU INTERMEDIATE R&B

## 2019-08-16 RX ORDER — CARVEDILOL 25 MG/1
25 TABLET ORAL 2 TIMES DAILY WITH MEALS
Status: DISCONTINUED | OUTPATIENT
Start: 2019-08-16 | End: 2019-08-20

## 2019-08-16 RX ORDER — CLONIDINE HYDROCHLORIDE 0.1 MG/1
0.1 TABLET ORAL ONCE
Status: DISCONTINUED | OUTPATIENT
Start: 2019-08-16 | End: 2019-08-18

## 2019-08-16 RX ORDER — ACETAMINOPHEN 325 MG/1
650 TABLET ORAL EVERY 4 HOURS PRN
Status: DISCONTINUED | OUTPATIENT
Start: 2019-08-16 | End: 2019-08-28 | Stop reason: HOSPADM

## 2019-08-16 RX ADMIN — PREDNISONE 40 MG: 20 TABLET ORAL at 13:58

## 2019-08-16 RX ADMIN — INSULIN LISPRO 11 UNITS: 100 INJECTION, SOLUTION INTRAVENOUS; SUBCUTANEOUS at 07:29

## 2019-08-16 RX ADMIN — ACETAMINOPHEN 650 MG: 325 TABLET ORAL at 22:28

## 2019-08-16 RX ADMIN — CARVEDILOL 12.5 MG: 25 TABLET, FILM COATED ORAL at 09:22

## 2019-08-16 RX ADMIN — INSULIN LISPRO 3 UNITS: 100 INJECTION, SOLUTION INTRAVENOUS; SUBCUTANEOUS at 16:39

## 2019-08-16 RX ADMIN — SODIUM BICARBONATE 650 MG: 650 TABLET ORAL at 01:17

## 2019-08-16 RX ADMIN — ONDANSETRON 4 MG: 2 INJECTION INTRAMUSCULAR; INTRAVENOUS at 18:21

## 2019-08-16 RX ADMIN — CARVEDILOL 25 MG: 25 TABLET, FILM COATED ORAL at 16:46

## 2019-08-16 RX ADMIN — CLONIDINE HYDROCHLORIDE 0.1 MG: 0.1 TABLET ORAL at 15:39

## 2019-08-16 RX ADMIN — HEPARIN SODIUM 1400 UNITS: 1000 INJECTION, SOLUTION INTRAVENOUS; SUBCUTANEOUS at 11:02

## 2019-08-16 RX ADMIN — PRAZOSIN HYDROCHLORIDE 5 MG: 5 CAPSULE ORAL at 10:25

## 2019-08-16 RX ADMIN — INSULIN LISPRO 4 UNITS: 100 INJECTION, SOLUTION INTRAVENOUS; SUBCUTANEOUS at 14:31

## 2019-08-16 RX ADMIN — HEPARIN SODIUM 5000 UNITS: 5000 INJECTION INTRAVENOUS; SUBCUTANEOUS at 22:18

## 2019-08-16 RX ADMIN — IPRATROPIUM BROMIDE AND ALBUTEROL SULFATE 1 AMPULE: .5; 3 SOLUTION RESPIRATORY (INHALATION) at 08:33

## 2019-08-16 RX ADMIN — GABAPENTIN 100 MG: 100 CAPSULE ORAL at 20:27

## 2019-08-16 RX ADMIN — FAMOTIDINE 10 MG: 20 TABLET, FILM COATED ORAL at 09:25

## 2019-08-16 RX ADMIN — SODIUM BICARBONATE 650 MG: 650 TABLET ORAL at 09:28

## 2019-08-16 RX ADMIN — SODIUM BICARBONATE 650 MG: 650 TABLET ORAL at 20:27

## 2019-08-16 RX ADMIN — INSULIN LISPRO 4 UNITS: 100 INJECTION, SOLUTION INTRAVENOUS; SUBCUTANEOUS at 22:18

## 2019-08-16 RX ADMIN — SODIUM CHLORIDE, PRESERVATIVE FREE 10 ML: 5 INJECTION INTRAVENOUS at 20:28

## 2019-08-16 RX ADMIN — HYDRALAZINE HYDROCHLORIDE 20 MG: 20 INJECTION INTRAMUSCULAR; INTRAVENOUS at 05:34

## 2019-08-16 RX ADMIN — ATORVASTATIN CALCIUM 80 MG: 80 TABLET, FILM COATED ORAL at 20:27

## 2019-08-16 RX ADMIN — HEPARIN SODIUM 5000 UNITS: 5000 INJECTION INTRAVENOUS; SUBCUTANEOUS at 06:46

## 2019-08-16 RX ADMIN — HYDRALAZINE HYDROCHLORIDE 100 MG: 50 TABLET, FILM COATED ORAL at 20:27

## 2019-08-16 RX ADMIN — CLOPIDOGREL 75 MG: 75 TABLET, FILM COATED ORAL at 09:23

## 2019-08-16 RX ADMIN — HYDRALAZINE HYDROCHLORIDE 20 MG: 20 INJECTION INTRAMUSCULAR; INTRAVENOUS at 22:17

## 2019-08-16 RX ADMIN — HEPARIN SODIUM 1100 UNITS: 1000 INJECTION, SOLUTION INTRAVENOUS; SUBCUTANEOUS at 11:02

## 2019-08-16 RX ADMIN — HYDRALAZINE HYDROCHLORIDE 100 MG: 50 TABLET, FILM COATED ORAL at 09:17

## 2019-08-16 RX ADMIN — MOMETASONE FUROATE AND FORMOTEROL FUMARATE DIHYDRATE 2 PUFF: 200; 5 AEROSOL RESPIRATORY (INHALATION) at 19:52

## 2019-08-16 RX ADMIN — INSULIN LISPRO 7 UNITS: 100 INJECTION, SOLUTION INTRAVENOUS; SUBCUTANEOUS at 14:32

## 2019-08-16 RX ADMIN — CLONIDINE HYDROCHLORIDE 0.1 MG: 0.1 TABLET ORAL at 20:27

## 2019-08-16 RX ADMIN — ASPIRIN 325 MG: 325 TABLET, COATED ORAL at 13:58

## 2019-08-16 RX ADMIN — INSULIN GLARGINE 35 UNITS: 100 INJECTION, SOLUTION SUBCUTANEOUS at 22:18

## 2019-08-16 RX ADMIN — IPRATROPIUM BROMIDE AND ALBUTEROL SULFATE 1 AMPULE: .5; 3 SOLUTION RESPIRATORY (INHALATION) at 19:52

## 2019-08-16 RX ADMIN — PRAZOSIN HYDROCHLORIDE 5 MG: 5 CAPSULE ORAL at 23:36

## 2019-08-16 RX ADMIN — METHYLPREDNISOLONE SODIUM SUCCINATE 40 MG: 40 INJECTION, POWDER, FOR SOLUTION INTRAMUSCULAR; INTRAVENOUS at 03:38

## 2019-08-16 RX ADMIN — GABAPENTIN 100 MG: 100 CAPSULE ORAL at 13:58

## 2019-08-16 RX ADMIN — NIFEDIPINE 60 MG: 60 TABLET, EXTENDED RELEASE ORAL at 09:18

## 2019-08-16 RX ADMIN — HYDRALAZINE HYDROCHLORIDE 100 MG: 50 TABLET, FILM COATED ORAL at 15:39

## 2019-08-16 RX ADMIN — MOMETASONE FUROATE AND FORMOTEROL FUMARATE DIHYDRATE 2 PUFF: 200; 5 AEROSOL RESPIRATORY (INHALATION) at 08:45

## 2019-08-16 RX ADMIN — HEPARIN SODIUM 5000 UNITS: 5000 INJECTION INTRAVENOUS; SUBCUTANEOUS at 13:58

## 2019-08-16 RX ADMIN — CLONIDINE HYDROCHLORIDE 0.1 MG: 0.1 TABLET ORAL at 08:58

## 2019-08-16 ASSESSMENT — PAIN DESCRIPTION - PAIN TYPE: TYPE: ACUTE PAIN

## 2019-08-16 ASSESSMENT — PAIN DESCRIPTION - FREQUENCY: FREQUENCY: CONTINUOUS

## 2019-08-16 ASSESSMENT — PAIN SCALES - GENERAL
PAINLEVEL_OUTOF10: 0
PAINLEVEL_OUTOF10: 0
PAINLEVEL_OUTOF10: 5

## 2019-08-16 ASSESSMENT — PAIN DESCRIPTION - LOCATION: LOCATION: HEAD

## 2019-08-16 ASSESSMENT — PAIN DESCRIPTION - DESCRIPTORS: DESCRIPTORS: ACHING

## 2019-08-16 ASSESSMENT — PAIN DESCRIPTION - ONSET: ONSET: ON-GOING

## 2019-08-16 ASSESSMENT — PAIN DESCRIPTION - PROGRESSION: CLINICAL_PROGRESSION: NOT CHANGED

## 2019-08-16 NOTE — PROGRESS NOTES
5.06* 4.54* 3.18*   GLUCOSE 190* 365* 453*     Hepatic:   No results for input(s): AST, ALT, ALB, BILITOT, ALKPHOS in the last 72 hours. Troponin:   Recent Labs     08/15/19  1134 08/16/19  0006 08/16/19  0538   TROPHS 68* 75* 71*     BNP: No results for input(s): BNP in the last 72 hours. Lipids:   No results for input(s): CHOL, HDL in the last 72 hours. Invalid input(s): LDLCALCU  INR: No results for input(s): INR in the last 72 hours. EKG: normal EKG, normal sinus rhythm, unchanged from previous tracings. Cath 9/27/17  Left main: mild disease  LAD: 30-40% prox stenosis  LCX: mild disease  RCA: 40% mid stenosis  The LV gram was not performed. (MT)      Echo 9/4/18:  EF 55%. Mod DD. Trace to mild MR. Trivial TR. RVSP 36. Trivial PI     Stress 9/4/18: Negative. EF 53%    ECHO 8/14/19  Summary  Left ventricle is normal in size. Global left ventricular systolic function  is normal. Estimated ejection fraction is 60 % . Calculated ejection fraction via 3D Heart Model is 55%  Moderate concentric left ventricular hypertrophy. Mild mitral regurgitation. Mild tricuspid regurgitation. Mild pulmonic insufficiency. Objective:   Vitals: BP (!) 211/79   Pulse 71   Temp 97.7 °F (36.5 °C)   Resp 16   Ht 5' 6\" (1.676 m)   Wt 161 lb 2.5 oz (73.1 kg)   SpO2 98%   BMI 26.01 kg/m²   General appearance: alert and cooperative with exam  HEENT: Head: Normocephalic, no lesions, without obvious abnormality.   Neck: no JVD, trachea midline, no adenopathy  Lungs: Clear to auscultation, on 02 per NC   Heart: Regular rate and rhythm, s1/s2 auscultated, no murmurs  Abdomen: soft, non-tender, bowel sounds active  Extremities: no edema  Neurologic: not done        Assessment / Acute Cardiac Problems:   HTN  Acute renal failure  History of intubation for 10 days  Troponin elevation  COPD  Cocaine abuse history  Low albumin  DM2- brittle  Anemia with drop of Hb 1.5 in 1 day      Patient Active Problem List:     Cigarette

## 2019-08-16 NOTE — CARE COORDINATION
Transition planning: spoke with Elida Reed with [address] [place] [address] , she is checking on his transportation also.   She is faxing form to be completed to get more transportation trips if needed for dialysis, please fax AVS to her at [phone]  Plan remains to return home with [facility]

## 2019-08-16 NOTE — PROGRESS NOTES
BRONCHOSPASM/BRONCHOCONSTRICTION     [x]         IMPROVE AERATION/BREATH SOUNDS  [x]   ADMINISTER BRONCHODILATOR THERAPY AS APPROPRIATE  [x]   ASSESS BREATH SOUNDS  [x]   IMPLEMENT AEROSOL/MDI PROTOCOL  [x]   PATIENT EDUCATION AS NEEDED        NON INVASIVE VENTILATION  PROVIDE OPTIMAL VENTILATION/ACCEPTABLE SP02  IMPLEMENT NON INVASIVE VENTILATION PROTOCOL  ASSESSMENT SKIN INTEGRITY  PATIENT EDUCATION AS NEEDED  BIPAP AS NEEDED

## 2019-08-16 NOTE — PROGRESS NOTES
PT SENT TO UNIT WITH ELEVATED bp 235/89 UPON ADMISSSION; FLOOR NURSE SENT MEDS WITH PATIENT; md Beckford notified; BP meds given as rx; pt denies sx/s will monitor

## 2019-08-16 NOTE — PROGRESS NOTES
[facility]    Progress Note    8/16/2019    10:07 AM    Name:   Stepan Abreu  MRN:     [id number]     [place]:      [de-identified]   Room:   [address] [place] [address] Day:  4  Admit Date:  8/11/2019  8:01 PM    PCP:   Darci Kelley, DO  Code Status:  Full Code    Subjective:     C/C:   Chief Complaint   Patient presents with    Chest Pain     Pt to ED c/o chest pain and headache. Per first responders, pt with initial BP of 230/80. Pt given Nitro and aspirin with improvement of pain and improved BP to 190/77    Hypertension    Headache     Interval History Status: Improved  Patient seen while down in hemodialysis  This is his second dialysis treatment  Patient hypertensive over the night, states they did not give him his Coreg last night patient is unsure why. Patient is hyperglycemic secondary to the steroids in addition to poor glycemic control and patient wanting to control his own blood sugars with his carb counting. When he carb counts as it correctly however his A1c not show good glycemic control. . I have discussed with him the need of the higher sliding scale coverage in addition to carb counting given the fact he is on steroids. Patient was unable to tell me if he is eating better because he was so upset and agitated over the blood pressure and blood sugars over the night, but overall he does look less dyspneic. Brief History:     [age] y. o. male with PMH of CKD around 8 yrs, COPD, T2 DM for 28 yrs with carotid artery disease s/p stent, hypertension for 30 yrs,  Came with chief complaint of exertional chest pain, exertional SOB, headache and home blood pressure recording 240/90 mmHg. Exertional chest pain and SOB relieves with rest, NG and uses non- prescribed home O2 of 2.5 L, whenever he gets symptoms. Associated palpitations, sweating present. Denies dizziness, orthopnea and PND.  Denies urinary symptoms or normal bowel sounds, no masses, hepatomegaly, splenomegaly  Extremities:  Mild pedal/ lower extremity edema, no redness, tenderness in the calves  Skin:  no gross lesions, rashes, induration    Assessment:        Hospital Problems           Last Modified POA    * (Principal) Hypertensive urgency 8/12/2019 Yes    Pure hypercholesterolemia 8/12/2019 Yes    Exertional dyspnea 8/12/2019 Yes    PTSD (post-traumatic stress disorder) 8/12/2019 Yes    Essential hypertension 8/12/2019 Yes    DM type 2, uncontrolled, with neuropathy ([address] 8/12/2019 Yes    COPD (chronic obstructive pulmonary disease) ([address] 8/12/2019 Yes    Cerebral vascular disease 8/12/2019 Yes    Non-obstructive Coronary artery disease of native artery of native heart with stable angina pectoris ([address] 8/12/2019 Yes    Overview Signed 8/12/2019 11:46 AM by Tanner Smith, APRN - CNP     9/2017     LMCA: Mild irregularities 10-20%. LAD: 30-40% proximal stenosis    LCx: Mild irregularities 10-20%. RCA: 40% mid stenosis         Hyperparathyroidism ([address] 8/12/2019 Yes    Vitamin D deficiency 8/12/2019 Yes    Acute kidney injury superimposed on chronic kidney disease ([address] 8/12/2019 Yes    Coronary artery disease with exertional angina ([address] 8/12/2019 Yes          Plan:        · COPD with exacerbation- Last exacerbation 1.5 months ago- current treatment,, now on oral prednisone taper, rest of pulmonary hygiene. · Hypertensive Emergency-   poorly controlled . on coreg 25 bid, minipress 5 mg bid, Procardia 60 bid,  hydralazine 100 mg tid     (Home clonidine patch, which is not formulary here and interchanged to catapress 0.1 mg bid). Unclear if there is encephalopathy involved as he was recently hospitalized and he is having memory changes from that admission. NO acute mentation or encephalopathy noted     · Acute on Chronic kidney disease Stage 3- most likely secondary contrast dye related with end organ damage from #2 .   Started hemodialysis 4/15/19

## 2019-08-16 NOTE — PROGRESS NOTES
DIRECTED  insulin aspart (NOVOLOG FLEXPEN) 100 UNIT/ML injection pen, Inject 5 Units into the skin 3 times daily (before meals) Sliding scale  clopidogrel (PLAVIX) 75 MG tablet, Take 1 tablet by mouth daily  nitroGLYCERIN (NITROSTAT) 0.4 MG SL tablet, up to max of 3 total doses. If no relief after 1 dose, call 911.   insulin glargine (BASAGLAR KWIKPEN) 100 UNIT/ML injection pen, Inject 35 Units into the skin nightly Dispense with pen needle  Dulaglutide (TRULICITY) 6.53 VV/6.7JU SOPN, Inject 0.75 mg into the skin once a week  [DISCONTINUED] ipratropium-albuterol (DUONEB) 0.5-2.5 (3) MG/3ML SOLN nebulizer solution, Inhale 3 mLs into the lungs every 4 hours  TRUE METRIX BLOOD GLUCOSE TEST strip, TEST BLOOD SUGAR 4 TO 5 TIMES DAILY  TRELEGY ELLIPTA 100-62.5-25 MCG/INH AEPB, INHALE 1 PUFF INTO THE LINGS DAILY    Current Medications:     Scheduled Meds:    insulin lispro  0-18 Units Subcutaneous TID WC    insulin lispro  0-9 Units Subcutaneous Nightly    cloNIDine  0.1 mg Oral Once    carvedilol  12.5 mg Oral BID WC    cloNIDine  0.1 mg Oral TID    hydrALAZINE  100 mg Oral TID    heparin (porcine)  5,000 Units Subcutaneous 3 times per day    predniSONE  40 mg Oral Daily    [START ON 8/18/2019] predniSONE  30 mg Oral Daily    [START ON 8/21/2019] predniSONE  20 mg Oral Daily    [START ON 8/24/2019] predniSONE  10 mg Oral Daily    albumin human  25 g Intravenous Once    sodium bicarbonate  650 mg Oral BID    NIFEdipine  60 mg Oral Daily    famotidine  10 mg Oral Daily    gabapentin  100 mg Oral TID    ipratropium-albuterol  1 ampule Inhalation Q4H WA    atorvastatin  80 mg Oral Daily    clopidogrel  75 mg Oral Daily    prazosin  5 mg Oral BID    sodium chloride flush  10 mL Intravenous 2 times per day    aspirin  325 mg Oral Daily    mometasone-formoterol  2 puff Inhalation BID    vitamin D  50,000 Units Oral Weekly    insulin glargine  35 Units Subcutaneous Nightly     Input/Output:       I/O last 3 completed shifts: In: 0   Out: 2675 [Urine:675]. Patient Vitals for the past 96 hrs (Last 3 readings):   Weight   19 0545 156 lb 11.2 oz (71.1 kg)   08/15/19 1738 159 lb 6.3 oz (72.3 kg)   08/15/19 1520 163 lb 2.3 oz (74 kg)       Vital Signs:   Temperature:  Temp: 98 °F (36.7 °C)  TMax:   Temp (24hrs), Av.1 °F (36.7 °C), Min:97.6 °F (36.4 °C), Max:98.6 °F (37 °C)    Respirations:  Resp: 24  Pulse:   Pulse: 73  BP:    BP: (!) 229/75  BP Range: Systolic (33OGU), HLS:997 , Min:162 , KE       Diastolic (41JWA), LRU:53, Min:52, Max:75      Physical Examination:     General:  AAO x 3, No acute distress. HEENT: Atraumatic, normocephalic. Eyes:   Pupils equal, round and reactive to light, EOMI. Neck:   supple  Chest:   Bilateral vesicular breath sounds, no rales or wheezes. Cardiac:  S1 S2  Audible, JVP not raised. Abdomen: Soft, non-tender, no masses or organomegaly, BS audible. :   No suprapubic or flank tenderness. Neuro:  AAO x 3, No FND. SKIN:  No rashes, good skin turgor. Extremities:  + pitting edema up to mid leg, palpable peripheral pulses, no calf tenderness. Labs:       Recent Labs     19  0816 08/15/19  0519 19  0538   WBC 7.1 5.7 7.6   RBC 3.20* 3.15* 2.94*   HGB 9.8* 9.8* 9.0*   HCT 31.4* 31.1* 27.2*   MCV 98.1 98.7 92.5   MCH 30.6 31.1 30.6   MCHC 31.2 31.5 33.1   RDW 12.1 12.5 12.8    266 256   MPV 10.0 10.3 10.6      BMP:   Recent Labs     08/15/19  0519 08/15/19  1324 19  0538   * 134* 134*   K 5.7* 5.9* 5.5*    102 99   CO2 15* 14* 19*   BUN 90* 85* 73*   CREATININE 5.06* 4.54* 3.18*   GLUCOSE 190* 365* 453*   CALCIUM 8.5* 8.8 8.5*      Phosphorus:     Recent Labs     19  0816 08/15/19  0519 19  0538   PHOS 3.5 3.4 3.6     Magnesium:    Recent Labs     19   MG 2.0       Radiology:     Echo 19: EF 60%, LVH, mild MR, TR, PI.     Assessment:     1. Contrast induced acute renal failure with ATN on top of

## 2019-08-17 ENCOUNTER — APPOINTMENT (OUTPATIENT)
Dept: DIALYSIS | Age: 64
DRG: 304 | End: 2019-08-17
Payer: COMMERCIAL

## 2019-08-17 LAB
ANION GAP SERPL CALCULATED.3IONS-SCNC: 16 MMOL/L (ref 9–17)
BUN BLDV-MCNC: 54 MG/DL (ref 8–23)
BUN/CREAT BLD: ABNORMAL (ref 9–20)
CALCIUM IONIZED: 1.21 MMOL/L (ref 1.13–1.33)
CALCIUM SERPL-MCNC: 8.7 MG/DL (ref 8.6–10.4)
CHLORIDE BLD-SCNC: 98 MMOL/L (ref 98–107)
CO2: 23 MMOL/L (ref 20–31)
CREAT SERPL-MCNC: 2.45 MG/DL (ref 0.7–1.2)
GFR AFRICAN AMERICAN: 33 ML/MIN
GFR NON-AFRICAN AMERICAN: 27 ML/MIN
GFR SERPL CREATININE-BSD FRML MDRD: ABNORMAL ML/MIN/{1.73_M2}
GFR SERPL CREATININE-BSD FRML MDRD: ABNORMAL ML/MIN/{1.73_M2}
GLUCOSE BLD-MCNC: 142 MG/DL (ref 75–110)
GLUCOSE BLD-MCNC: 161 MG/DL (ref 75–110)
GLUCOSE BLD-MCNC: 208 MG/DL (ref 75–110)
GLUCOSE BLD-MCNC: 209 MG/DL (ref 75–110)
GLUCOSE BLD-MCNC: 229 MG/DL (ref 75–110)
GLUCOSE BLD-MCNC: 361 MG/DL (ref 75–110)
GLUCOSE BLD-MCNC: 419 MG/DL (ref 70–99)
GLUCOSE BLD-MCNC: 432 MG/DL (ref 75–110)
HCT VFR BLD CALC: 27.9 % (ref 40.7–50.3)
HEMOGLOBIN: 8.9 G/DL (ref 13–17)
MCH RBC QN AUTO: 30.4 PG (ref 25.2–33.5)
MCHC RBC AUTO-ENTMCNC: 31.9 G/DL (ref 28.4–34.8)
MCV RBC AUTO: 95.2 FL (ref 82.6–102.9)
NRBC AUTOMATED: 0 PER 100 WBC
PDW BLD-RTO: 12.9 % (ref 11.8–14.4)
PHOSPHORUS: 3.6 MG/DL (ref 2.5–4.5)
PLATELET # BLD: 274 K/UL (ref 138–453)
PMV BLD AUTO: 11 FL (ref 8.1–13.5)
POTASSIUM SERPL-SCNC: 4.5 MMOL/L (ref 3.7–5.3)
RBC # BLD: 2.93 M/UL (ref 4.21–5.77)
SODIUM BLD-SCNC: 137 MMOL/L (ref 135–144)
TROPONIN INTERP: ABNORMAL
TROPONIN INTERP: ABNORMAL
TROPONIN T: ABNORMAL NG/ML
TROPONIN T: ABNORMAL NG/ML
TROPONIN, HIGH SENSITIVITY: 53 NG/L (ref 0–22)
TROPONIN, HIGH SENSITIVITY: 55 NG/L (ref 0–22)
WBC # BLD: 8.3 K/UL (ref 3.5–11.3)

## 2019-08-17 PROCEDURE — 6370000000 HC RX 637 (ALT 250 FOR IP): Performed by: INTERNAL MEDICINE

## 2019-08-17 PROCEDURE — 6370000000 HC RX 637 (ALT 250 FOR IP): Performed by: NURSE PRACTITIONER

## 2019-08-17 PROCEDURE — G0257 UNSCHED DIALYSIS ESRD PT HOS: HCPCS

## 2019-08-17 PROCEDURE — APPSS30 APP SPLIT SHARED TIME 16-30 MINUTES: Performed by: NURSE PRACTITIONER

## 2019-08-17 PROCEDURE — 80048 BASIC METABOLIC PNL TOTAL CA: CPT

## 2019-08-17 PROCEDURE — 84100 ASSAY OF PHOSPHORUS: CPT

## 2019-08-17 PROCEDURE — 2580000003 HC RX 258: Performed by: NURSE PRACTITIONER

## 2019-08-17 PROCEDURE — 6360000002 HC RX W HCPCS: Performed by: NURSE PRACTITIONER

## 2019-08-17 PROCEDURE — 99232 SBSQ HOSP IP/OBS MODERATE 35: CPT | Performed by: INTERNAL MEDICINE

## 2019-08-17 PROCEDURE — 94760 N-INVAS EAR/PLS OXIMETRY 1: CPT

## 2019-08-17 PROCEDURE — 6360000002 HC RX W HCPCS: Performed by: INTERNAL MEDICINE

## 2019-08-17 PROCEDURE — 94660 CPAP INITIATION&MGMT: CPT

## 2019-08-17 PROCEDURE — 82330 ASSAY OF CALCIUM: CPT

## 2019-08-17 PROCEDURE — 36415 COLL VENOUS BLD VENIPUNCTURE: CPT

## 2019-08-17 PROCEDURE — 94761 N-INVAS EAR/PLS OXIMETRY MLT: CPT

## 2019-08-17 PROCEDURE — 90935 HEMODIALYSIS ONE EVALUATION: CPT

## 2019-08-17 PROCEDURE — 2060000000 HC ICU INTERMEDIATE R&B

## 2019-08-17 PROCEDURE — 82947 ASSAY GLUCOSE BLOOD QUANT: CPT

## 2019-08-17 PROCEDURE — 85027 COMPLETE CBC AUTOMATED: CPT

## 2019-08-17 PROCEDURE — 93975 VASCULAR STUDY: CPT

## 2019-08-17 PROCEDURE — 84484 ASSAY OF TROPONIN QUANT: CPT

## 2019-08-17 PROCEDURE — 2500000003 HC RX 250 WO HCPCS: Performed by: NURSE PRACTITIONER

## 2019-08-17 PROCEDURE — 94640 AIRWAY INHALATION TREATMENT: CPT

## 2019-08-17 PROCEDURE — 6370000000 HC RX 637 (ALT 250 FOR IP): Performed by: STUDENT IN AN ORGANIZED HEALTH CARE EDUCATION/TRAINING PROGRAM

## 2019-08-17 RX ORDER — PREDNISONE 20 MG/1
40 TABLET ORAL ONCE
Status: COMPLETED | OUTPATIENT
Start: 2019-08-18 | End: 2019-08-18

## 2019-08-17 RX ORDER — AMLODIPINE BESYLATE 5 MG/1
5 TABLET ORAL DAILY
Status: DISCONTINUED | OUTPATIENT
Start: 2019-08-17 | End: 2019-08-17

## 2019-08-17 RX ADMIN — HYDRALAZINE HYDROCHLORIDE 100 MG: 50 TABLET, FILM COATED ORAL at 16:24

## 2019-08-17 RX ADMIN — ATORVASTATIN CALCIUM 80 MG: 80 TABLET, FILM COATED ORAL at 22:11

## 2019-08-17 RX ADMIN — SODIUM CHLORIDE 7.5 MG/HR: 0.9 INJECTION, SOLUTION INTRAVENOUS at 08:14

## 2019-08-17 RX ADMIN — INSULIN LISPRO 6 UNITS: 100 INJECTION, SOLUTION INTRAVENOUS; SUBCUTANEOUS at 09:44

## 2019-08-17 RX ADMIN — INSULIN LISPRO 5 UNITS: 100 INJECTION, SOLUTION INTRAVENOUS; SUBCUTANEOUS at 16:24

## 2019-08-17 RX ADMIN — HYDRALAZINE HYDROCHLORIDE 100 MG: 50 TABLET, FILM COATED ORAL at 22:10

## 2019-08-17 RX ADMIN — CARVEDILOL 25 MG: 25 TABLET, FILM COATED ORAL at 22:11

## 2019-08-17 RX ADMIN — CLONIDINE HYDROCHLORIDE 0.1 MG: 0.1 TABLET ORAL at 16:25

## 2019-08-17 RX ADMIN — GABAPENTIN 100 MG: 100 CAPSULE ORAL at 22:11

## 2019-08-17 RX ADMIN — HEPARIN SODIUM 5000 UNITS: 5000 INJECTION INTRAVENOUS; SUBCUTANEOUS at 16:24

## 2019-08-17 RX ADMIN — ONDANSETRON 4 MG: 2 INJECTION INTRAMUSCULAR; INTRAVENOUS at 18:35

## 2019-08-17 RX ADMIN — ASPIRIN 325 MG: 325 TABLET, COATED ORAL at 09:52

## 2019-08-17 RX ADMIN — HYDRALAZINE HYDROCHLORIDE 100 MG: 50 TABLET, FILM COATED ORAL at 09:51

## 2019-08-17 RX ADMIN — SODIUM CHLORIDE 5 MG/HR: 0.9 INJECTION, SOLUTION INTRAVENOUS at 03:18

## 2019-08-17 RX ADMIN — GABAPENTIN 100 MG: 100 CAPSULE ORAL at 16:25

## 2019-08-17 RX ADMIN — PRAZOSIN HYDROCHLORIDE 5 MG: 5 CAPSULE ORAL at 10:00

## 2019-08-17 RX ADMIN — CLONIDINE HYDROCHLORIDE 0.1 MG: 0.1 TABLET ORAL at 22:11

## 2019-08-17 RX ADMIN — GABAPENTIN 100 MG: 100 CAPSULE ORAL at 09:52

## 2019-08-17 RX ADMIN — IPRATROPIUM BROMIDE AND ALBUTEROL SULFATE 1 AMPULE: .5; 3 SOLUTION RESPIRATORY (INHALATION) at 19:20

## 2019-08-17 RX ADMIN — IPRATROPIUM BROMIDE AND ALBUTEROL SULFATE 1 AMPULE: .5; 3 SOLUTION RESPIRATORY (INHALATION) at 08:05

## 2019-08-17 RX ADMIN — PRAZOSIN HYDROCHLORIDE 5 MG: 5 CAPSULE ORAL at 22:11

## 2019-08-17 RX ADMIN — INSULIN LISPRO 5 UNITS: 100 INJECTION, SOLUTION INTRAVENOUS; SUBCUTANEOUS at 11:56

## 2019-08-17 RX ADMIN — SODIUM CHLORIDE, PRESERVATIVE FREE 10 ML: 5 INJECTION INTRAVENOUS at 22:10

## 2019-08-17 RX ADMIN — HEPARIN SODIUM 5000 UNITS: 5000 INJECTION INTRAVENOUS; SUBCUTANEOUS at 05:23

## 2019-08-17 RX ADMIN — NIFEDIPINE 60 MG: 60 TABLET, EXTENDED RELEASE ORAL at 09:50

## 2019-08-17 RX ADMIN — INSULIN GLARGINE 35 UNITS: 100 INJECTION, SOLUTION SUBCUTANEOUS at 22:10

## 2019-08-17 RX ADMIN — HEPARIN SODIUM 1400 UNITS: 1000 INJECTION, SOLUTION INTRAVENOUS; SUBCUTANEOUS at 15:43

## 2019-08-17 RX ADMIN — IPRATROPIUM BROMIDE AND ALBUTEROL SULFATE 1 AMPULE: .5; 3 SOLUTION RESPIRATORY (INHALATION) at 23:09

## 2019-08-17 RX ADMIN — INSULIN LISPRO 5 UNITS: 100 INJECTION, SOLUTION INTRAVENOUS; SUBCUTANEOUS at 05:22

## 2019-08-17 RX ADMIN — FAMOTIDINE 10 MG: 20 TABLET, FILM COATED ORAL at 09:51

## 2019-08-17 RX ADMIN — SODIUM BICARBONATE 650 MG: 650 TABLET ORAL at 22:10

## 2019-08-17 RX ADMIN — SODIUM CHLORIDE 10 MG/HR: 0.9 INJECTION, SOLUTION INTRAVENOUS at 10:23

## 2019-08-17 RX ADMIN — HEPARIN SODIUM 1100 UNITS: 1000 INJECTION, SOLUTION INTRAVENOUS; SUBCUTANEOUS at 15:43

## 2019-08-17 RX ADMIN — SODIUM CHLORIDE 10 MG/HR: 0.9 INJECTION, SOLUTION INTRAVENOUS at 10:01

## 2019-08-17 RX ADMIN — CARVEDILOL 25 MG: 25 TABLET, FILM COATED ORAL at 09:51

## 2019-08-17 RX ADMIN — CLOPIDOGREL 75 MG: 75 TABLET, FILM COATED ORAL at 09:51

## 2019-08-17 RX ADMIN — IPRATROPIUM BROMIDE AND ALBUTEROL SULFATE 1 AMPULE: .5; 3 SOLUTION RESPIRATORY (INHALATION) at 16:07

## 2019-08-17 RX ADMIN — SODIUM BICARBONATE 650 MG: 650 TABLET ORAL at 09:50

## 2019-08-17 RX ADMIN — CLONIDINE HYDROCHLORIDE 0.1 MG: 0.1 TABLET ORAL at 09:52

## 2019-08-17 NOTE — PROGRESS NOTES
BID    sodium chloride flush  10 mL Intravenous 2 times per day    aspirin  325 mg Oral Daily    mometasone-formoterol  2 puff Inhalation BID    vitamin D  50,000 Units Oral Weekly    insulin glargine  35 Units Subcutaneous Nightly     Continuous Infusions:    niCARdipine 7.5 mg/hr (19 1037)    dextrose       PRN Meds: acetaminophen, hydrALAZINE, heparin (porcine), heparin (porcine), ipratropium, sodium chloride flush, magnesium hydroxide, ondansetron, glucose, dextrose, glucagon (rDNA), dextrose, nitroGLYCERIN, morphine **OR** morphine, diphenhydrAMINE, albuterol sulfate HFA    Data:     Past Medical History:   has a past medical history of Asthma, CAD in native artery, nonobstructive, Carotid stenosis, left, Carpal tunnel syndrome, Cerebrovascular disease, Chronic kidney disease, COPD (chronic obstructive pulmonary disease) ([facility] Diabetes mellitus ([facility] [facility] History of colon polyps, History of weakness of extremity, HTN (hypertension), Hyperlipidemia, Lung, cysts, congenital, PTSD (post-traumatic stress disorder), PTSD (post-traumatic stress disorder), TIA (transient ischemic attack), and Type II or unspecified type diabetes mellitus without mention of complication, not stated as uncontrolled. Social History:   reports that he quit smoking about 5 years ago. His smoking use included cigarettes. He has a 17.50 pack-year smoking history. He has never used smokeless tobacco. He reports that he does not drink alcohol or use drugs.      Family History:   Family History   Problem Relation Age of Onset    Cancer Mother     Heart Disease Father        Vitals:  BP (!) 185/65   Pulse 70   Temp 97.9 °F (36.6 °C) (Oral)   Resp 14   Ht 5' 6\" (1.676 m)   Wt 160 lb 8 oz (72.8 kg)   SpO2 100%   BMI 25.91 kg/m²   Temp (24hrs), Av.2 °F (36.8 °C), Min:97.6 °F (36.4 °C), Max:98.6 °F (37 °C)    Recent Labs     19  0314 19  0700 19  0943   POCGLU 353* 432* 361* 209*       I/O

## 2019-08-17 NOTE — PROGRESS NOTES
Nutrition Education    Type and Reason for Visit: Consult, Patient Education(Pt request to speak w/ RD - renal diet ed )    Nutrition Assessment:  Pt requested to speak w/ RD on how to follow a renal diet. Pt was interested in which foods he was allowed to eat and which foods were low K and low Phos. Pt was taking a shower when writer returned to room. Informed RN that writer left low K, low Phos and CKD w/ HD diet educations near bedside. Phone number provided if pt has any questions regarding handouts. · Verbally reviewed information with patient  · Written educational materials provided. · Contact name and number provided. · Refer to Patient Education activity for more details.     Electronically signed by Sarah Castro RD, LD on 8/17/19 at 12:49 PM    Contact Number: [phone]

## 2019-08-18 LAB
ANION GAP SERPL CALCULATED.3IONS-SCNC: 15 MMOL/L (ref 9–17)
BUN BLDV-MCNC: 44 MG/DL (ref 8–23)
BUN/CREAT BLD: ABNORMAL (ref 9–20)
CALCIUM IONIZED: 1.18 MMOL/L (ref 1.13–1.33)
CALCIUM SERPL-MCNC: 8.6 MG/DL (ref 8.6–10.4)
CHLORIDE BLD-SCNC: 100 MMOL/L (ref 98–107)
CO2: 21 MMOL/L (ref 20–31)
CREAT SERPL-MCNC: 1.93 MG/DL (ref 0.7–1.2)
GFR AFRICAN AMERICAN: 43 ML/MIN
GFR NON-AFRICAN AMERICAN: 35 ML/MIN
GFR SERPL CREATININE-BSD FRML MDRD: ABNORMAL ML/MIN/{1.73_M2}
GFR SERPL CREATININE-BSD FRML MDRD: ABNORMAL ML/MIN/{1.73_M2}
GLUCOSE BLD-MCNC: 240 MG/DL (ref 75–110)
GLUCOSE BLD-MCNC: 251 MG/DL (ref 75–110)
GLUCOSE BLD-MCNC: 262 MG/DL (ref 75–110)
GLUCOSE BLD-MCNC: 348 MG/DL (ref 75–110)
GLUCOSE BLD-MCNC: 353 MG/DL (ref 70–99)
HCT VFR BLD CALC: 30.8 % (ref 40.7–50.3)
HEMOGLOBIN: 10 G/DL (ref 13–17)
MCH RBC QN AUTO: 30.3 PG (ref 25.2–33.5)
MCHC RBC AUTO-ENTMCNC: 32.5 G/DL (ref 28.4–34.8)
MCV RBC AUTO: 93.3 FL (ref 82.6–102.9)
NRBC AUTOMATED: 0 PER 100 WBC
PDW BLD-RTO: 12.6 % (ref 11.8–14.4)
PHOSPHORUS: 2.6 MG/DL (ref 2.5–4.5)
PLATELET # BLD: 279 K/UL (ref 138–453)
PMV BLD AUTO: 10.5 FL (ref 8.1–13.5)
POTASSIUM SERPL-SCNC: 5.2 MMOL/L (ref 3.7–5.3)
RBC # BLD: 3.3 M/UL (ref 4.21–5.77)
SODIUM BLD-SCNC: 136 MMOL/L (ref 135–144)
WBC # BLD: 7.9 K/UL (ref 3.5–11.3)

## 2019-08-18 PROCEDURE — 6370000000 HC RX 637 (ALT 250 FOR IP): Performed by: NURSE PRACTITIONER

## 2019-08-18 PROCEDURE — 2060000000 HC ICU INTERMEDIATE R&B

## 2019-08-18 PROCEDURE — 36415 COLL VENOUS BLD VENIPUNCTURE: CPT

## 2019-08-18 PROCEDURE — 6370000000 HC RX 637 (ALT 250 FOR IP): Performed by: INTERNAL MEDICINE

## 2019-08-18 PROCEDURE — APPSS30 APP SPLIT SHARED TIME 16-30 MINUTES: Performed by: NURSE PRACTITIONER

## 2019-08-18 PROCEDURE — 82947 ASSAY GLUCOSE BLOOD QUANT: CPT

## 2019-08-18 PROCEDURE — 2500000003 HC RX 250 WO HCPCS: Performed by: INTERNAL MEDICINE

## 2019-08-18 PROCEDURE — 99232 SBSQ HOSP IP/OBS MODERATE 35: CPT | Performed by: INTERNAL MEDICINE

## 2019-08-18 PROCEDURE — 94640 AIRWAY INHALATION TREATMENT: CPT

## 2019-08-18 PROCEDURE — 2700000000 HC OXYGEN THERAPY PER DAY

## 2019-08-18 PROCEDURE — 94760 N-INVAS EAR/PLS OXIMETRY 1: CPT

## 2019-08-18 PROCEDURE — 82330 ASSAY OF CALCIUM: CPT

## 2019-08-18 PROCEDURE — 2580000003 HC RX 258: Performed by: NURSE PRACTITIONER

## 2019-08-18 PROCEDURE — 6360000002 HC RX W HCPCS: Performed by: NURSE PRACTITIONER

## 2019-08-18 PROCEDURE — 80048 BASIC METABOLIC PNL TOTAL CA: CPT

## 2019-08-18 PROCEDURE — 85027 COMPLETE CBC AUTOMATED: CPT

## 2019-08-18 PROCEDURE — 6370000000 HC RX 637 (ALT 250 FOR IP): Performed by: STUDENT IN AN ORGANIZED HEALTH CARE EDUCATION/TRAINING PROGRAM

## 2019-08-18 PROCEDURE — 84100 ASSAY OF PHOSPHORUS: CPT

## 2019-08-18 RX ORDER — CLONIDINE HYDROCHLORIDE 0.2 MG/1
0.2 TABLET ORAL 3 TIMES DAILY
Status: DISCONTINUED | OUTPATIENT
Start: 2019-08-18 | End: 2019-08-22

## 2019-08-18 RX ORDER — ONDANSETRON 4 MG/1
4 TABLET, FILM COATED ORAL EVERY 6 HOURS PRN
Status: DISCONTINUED | OUTPATIENT
Start: 2019-08-18 | End: 2019-08-28 | Stop reason: HOSPADM

## 2019-08-18 RX ORDER — CLONIDINE HYDROCHLORIDE 0.2 MG/1
0.2 TABLET ORAL 3 TIMES DAILY
Status: DISCONTINUED | OUTPATIENT
Start: 2019-08-18 | End: 2019-08-18

## 2019-08-18 RX ORDER — BUMETANIDE 0.25 MG/ML
3 INJECTION, SOLUTION INTRAMUSCULAR; INTRAVENOUS ONCE
Status: COMPLETED | OUTPATIENT
Start: 2019-08-18 | End: 2019-08-18

## 2019-08-18 RX ADMIN — ALBUTEROL SULFATE 2 PUFF: 90 AEROSOL, METERED RESPIRATORY (INHALATION) at 04:48

## 2019-08-18 RX ADMIN — GABAPENTIN 100 MG: 100 CAPSULE ORAL at 14:29

## 2019-08-18 RX ADMIN — CARVEDILOL 25 MG: 25 TABLET, FILM COATED ORAL at 21:27

## 2019-08-18 RX ADMIN — PREDNISONE 40 MG: 20 TABLET ORAL at 00:15

## 2019-08-18 RX ADMIN — CLONIDINE HYDROCHLORIDE 0.2 MG: 0.2 TABLET ORAL at 21:27

## 2019-08-18 RX ADMIN — FAMOTIDINE 10 MG: 20 TABLET, FILM COATED ORAL at 08:08

## 2019-08-18 RX ADMIN — GABAPENTIN 100 MG: 100 CAPSULE ORAL at 08:08

## 2019-08-18 RX ADMIN — SODIUM CHLORIDE, PRESERVATIVE FREE 10 ML: 5 INJECTION INTRAVENOUS at 21:29

## 2019-08-18 RX ADMIN — HEPARIN SODIUM 5000 UNITS: 5000 INJECTION INTRAVENOUS; SUBCUTANEOUS at 07:01

## 2019-08-18 RX ADMIN — MOMETASONE FUROATE AND FORMOTEROL FUMARATE DIHYDRATE 2 PUFF: 200; 5 AEROSOL RESPIRATORY (INHALATION) at 19:43

## 2019-08-18 RX ADMIN — IPRATROPIUM BROMIDE AND ALBUTEROL SULFATE 1 AMPULE: .5; 3 SOLUTION RESPIRATORY (INHALATION) at 15:24

## 2019-08-18 RX ADMIN — ONDANSETRON HYDROCHLORIDE 4 MG: 4 TABLET, FILM COATED ORAL at 15:52

## 2019-08-18 RX ADMIN — HEPARIN SODIUM 5000 UNITS: 5000 INJECTION INTRAVENOUS; SUBCUTANEOUS at 21:31

## 2019-08-18 RX ADMIN — INSULIN GLARGINE 35 UNITS: 100 INJECTION, SOLUTION SUBCUTANEOUS at 21:32

## 2019-08-18 RX ADMIN — BUMETANIDE 3 MG: 0.25 INJECTION INTRAMUSCULAR; INTRAVENOUS at 17:23

## 2019-08-18 RX ADMIN — CLOPIDOGREL 75 MG: 75 TABLET, FILM COATED ORAL at 08:09

## 2019-08-18 RX ADMIN — CLONIDINE HYDROCHLORIDE 0.1 MG: 0.1 TABLET ORAL at 08:09

## 2019-08-18 RX ADMIN — ATORVASTATIN CALCIUM 80 MG: 80 TABLET, FILM COATED ORAL at 21:26

## 2019-08-18 RX ADMIN — CARVEDILOL 25 MG: 25 TABLET, FILM COATED ORAL at 08:09

## 2019-08-18 RX ADMIN — INSULIN LISPRO 8 UNITS: 100 INJECTION, SOLUTION INTRAVENOUS; SUBCUTANEOUS at 07:39

## 2019-08-18 RX ADMIN — INSULIN LISPRO 10 UNITS: 100 INJECTION, SOLUTION INTRAVENOUS; SUBCUTANEOUS at 11:56

## 2019-08-18 RX ADMIN — CLONIDINE HYDROCHLORIDE 0.2 MG: 0.2 TABLET ORAL at 14:28

## 2019-08-18 RX ADMIN — PRAZOSIN HYDROCHLORIDE 5 MG: 5 CAPSULE ORAL at 08:07

## 2019-08-18 RX ADMIN — SODIUM CHLORIDE, PRESERVATIVE FREE 10 ML: 5 INJECTION INTRAVENOUS at 08:11

## 2019-08-18 RX ADMIN — PREDNISONE 30 MG: 20 TABLET ORAL at 08:07

## 2019-08-18 RX ADMIN — NIFEDIPINE 60 MG: 60 TABLET, EXTENDED RELEASE ORAL at 08:07

## 2019-08-18 RX ADMIN — IPRATROPIUM BROMIDE AND ALBUTEROL SULFATE 1 AMPULE: .5; 3 SOLUTION RESPIRATORY (INHALATION) at 19:43

## 2019-08-18 RX ADMIN — HEPARIN SODIUM 5000 UNITS: 5000 INJECTION INTRAVENOUS; SUBCUTANEOUS at 14:28

## 2019-08-18 RX ADMIN — INSULIN LISPRO 8 UNITS: 100 INJECTION, SOLUTION INTRAVENOUS; SUBCUTANEOUS at 16:43

## 2019-08-18 RX ADMIN — GABAPENTIN 100 MG: 100 CAPSULE ORAL at 21:26

## 2019-08-18 RX ADMIN — IPRATROPIUM BROMIDE AND ALBUTEROL SULFATE 1 AMPULE: .5; 3 SOLUTION RESPIRATORY (INHALATION) at 08:10

## 2019-08-18 RX ADMIN — INSULIN LISPRO 2 UNITS: 100 INJECTION, SOLUTION INTRAVENOUS; SUBCUTANEOUS at 21:30

## 2019-08-18 RX ADMIN — HYDRALAZINE HYDROCHLORIDE 100 MG: 50 TABLET, FILM COATED ORAL at 14:28

## 2019-08-18 RX ADMIN — HYDRALAZINE HYDROCHLORIDE 100 MG: 50 TABLET, FILM COATED ORAL at 21:27

## 2019-08-18 RX ADMIN — PRAZOSIN HYDROCHLORIDE 5 MG: 5 CAPSULE ORAL at 21:27

## 2019-08-18 RX ADMIN — ASPIRIN 325 MG: 325 TABLET, COATED ORAL at 08:09

## 2019-08-18 RX ADMIN — IPRATROPIUM BROMIDE 2 PUFF: 17 AEROSOL, METERED RESPIRATORY (INHALATION) at 04:49

## 2019-08-18 RX ADMIN — IPRATROPIUM BROMIDE AND ALBUTEROL SULFATE 1 AMPULE: .5; 3 SOLUTION RESPIRATORY (INHALATION) at 04:48

## 2019-08-18 RX ADMIN — HYDRALAZINE HYDROCHLORIDE 100 MG: 50 TABLET, FILM COATED ORAL at 08:07

## 2019-08-18 RX ADMIN — SODIUM BICARBONATE 650 MG: 650 TABLET ORAL at 21:26

## 2019-08-18 RX ADMIN — SODIUM BICARBONATE 650 MG: 650 TABLET ORAL at 08:07

## 2019-08-18 NOTE — PROGRESS NOTES
[facility]   Progress Note                   Date:   8/18/2019  Patient name: Violette Dalton  Date of admission:  8/11/2019  8:01 PM  MRN:   [id number]  YOB: 1955  PCP: Jerome Santos DO    Reason for Admission:  HYPERTENSIVE MEERGY    Subjective:       Clinical Changes / Abnormalities:     BLOOD PRESSURE REMAIN ELEVATED  NO CHEST PAIN     Medications:   Scheduled Meds:   insulin lispro  0-18 Units Subcutaneous TID WC    insulin lispro  0-9 Units Subcutaneous Nightly    cloNIDine  0.1 mg Oral Once    carvedilol  25 mg Oral BID WC    insulin lispro  1-6 Units Subcutaneous 4x Daily AC & HS    cloNIDine  0.1 mg Oral TID    hydrALAZINE  100 mg Oral TID    heparin (porcine)  5,000 Units Subcutaneous 3 times per day    predniSONE  30 mg Oral Daily    [START ON 8/21/2019] predniSONE  20 mg Oral Daily    [START ON 8/24/2019] predniSONE  10 mg Oral Daily    albumin human  25 g Intravenous Once    sodium bicarbonate  650 mg Oral BID    NIFEdipine  60 mg Oral Daily    famotidine  10 mg Oral Daily    gabapentin  100 mg Oral TID    ipratropium-albuterol  1 ampule Inhalation Q4H WA    atorvastatin  80 mg Oral Daily    clopidogrel  75 mg Oral Daily    prazosin  5 mg Oral BID    sodium chloride flush  10 mL Intravenous 2 times per day    aspirin  325 mg Oral Daily    mometasone-formoterol  2 puff Inhalation BID    vitamin D  50,000 Units Oral Weekly    insulin glargine  35 Units Subcutaneous Nightly     Continuous Infusions:   niCARdipine Stopped (08/17/19 1154)    dextrose       CBC:   Recent Labs     08/16/19  0538 08/17/19  0543 08/18/19  1044   WBC 7.6 8.3 7.9   HGB 9.0* 8.9* 10.0*    274 279     BMP:    Recent Labs     08/15/19  1324 08/16/19  0538 08/17/19  0543   * 134* 137   K 5.9* 5.5* 4.5    99 98   CO2 14* 19* 23   BUN 85* 73* 54*   CREATININE 4.54* 3.18* 2.45*   GLUCOSE 365* 453* 419*     Hepatic: No results for input(s): AST, ALT, ALB, BILITOT, ALKPHOS in the last 72 hours. Troponin: No results for input(s): TROPONINI in the last 72 hours. BNP: No results for input(s): BNP in the last 72 hours. Lipids: No results for input(s): CHOL, HDL in the last 72 hours. Invalid input(s): LDLCALCU  INR: No results for input(s): INR in the last 72 hours. Objective:   Vitals: BP (!) 188/83   Pulse 64   Temp 97.9 °F (36.6 °C) (Oral)   Resp 16   Ht 5' 6\" (1.676 m)   Wt 158 lb 4.6 oz (71.8 kg)   SpO2 100%   BMI 25.55 kg/m²   I/O last 3 completed shifts:   In: 1260 [P.O.:1260]  Out: 1385 [Urine:1385]  I/O this shift:  In: -   Out: 650 [Urine:650]  Scheduled Meds:   insulin lispro  0-18 Units Subcutaneous TID WC    insulin lispro  0-9 Units Subcutaneous Nightly    cloNIDine  0.1 mg Oral Once    carvedilol  25 mg Oral BID WC    insulin lispro  1-6 Units Subcutaneous 4x Daily AC & HS    cloNIDine  0.1 mg Oral TID    hydrALAZINE  100 mg Oral TID    heparin (porcine)  5,000 Units Subcutaneous 3 times per day    predniSONE  30 mg Oral Daily    [START ON 8/21/2019] predniSONE  20 mg Oral Daily    [START ON 8/24/2019] predniSONE  10 mg Oral Daily    albumin human  25 g Intravenous Once    sodium bicarbonate  650 mg Oral BID    NIFEdipine  60 mg Oral Daily    famotidine  10 mg Oral Daily    gabapentin  100 mg Oral TID    ipratropium-albuterol  1 ampule Inhalation Q4H WA    atorvastatin  80 mg Oral Daily    clopidogrel  75 mg Oral Daily    prazosin  5 mg Oral BID    sodium chloride flush  10 mL Intravenous 2 times per day    aspirin  325 mg Oral Daily    mometasone-formoterol  2 puff Inhalation BID    vitamin D  50,000 Units Oral Weekly    insulin glargine  35 Units Subcutaneous Nightly     Continuous Infusions:   niCARdipine Stopped (08/17/19 1154)    dextrose       PRN Meds:.acetaminophen, hydrALAZINE, heparin (porcine), heparin (porcine), ipratropium, sodium chloride flush, magnesium hydroxide, ondansetron, glucose, dextrose, glucagon (rDNA), dextrose, nitroGLYCERIN, morphine **OR** morphine, diphenhydrAMINE, albuterol sulfate HFA    CONSTITUTIONAL: AOx4, no apparent distress, appears stated age   HEAD: normocephalic, atraumatic   EYES: PERRLA, EOMI   ENT: moist mucous membranes, uvula midline   NECK: symmetric, no midline tenderness to palpation   LUNGS: clear to auscultation bilaterally   CARDIOVASCULAR: regular rate and rhythm, no murmurs, rubs or gallops   ABDOMEN: Soft, non-tender, non-distended with normal active bowel sounds   SKIN: no rash     EKG: normal EKG, normal sinus rhythm, unchanged from previous tracings. Cath 9/27/17  Left main: mild disease  LAD: 30-40% prox stenosis  LCX: mild disease  RCA: 40% mid stenosis  The LV gram was not performed. (MT)      Echo 9/4/18:  EF 55%. Mod DD. Trace to mild MR. Trivial TR. RVSP 36. Trivial PI     Stress 9/4/18: Negative. EF 53%     ECHO 8/14/19  Summary  Left ventricle is normal in size. Global left ventricular systolic function  is normal. Estimated ejection fraction is 60 % . Calculated ejection fraction via 3D Heart Model is 55%  Moderate concentric left ventricular hypertrophy. Mild mitral regurgitation. Mild tricuspid regurgitation. Mild pulmonic insufficiency.             Assessment / Acute Cardiac Problems:       Patient Active Problem List:     Cigarette nicotine dependence without complication     Carpal tunnel syndrome on right     Colon polyps     Carotid stenosis, left s/p Endarterectomy     Mixed simple and mucopurulent chronic bronchitis (HCC)     Pure hypercholesterolemia     Exertional dyspnea     PTSD (post-traumatic stress disorder)     Transient cerebral ischemia     Essential hypertension     DM type 2, uncontrolled, with neuropathy (HCC)     COPD (chronic obstructive pulmonary disease) ([facility]     Cerebral vascular disease     Primary insomnia     Hypertensive urgency     Non-obstructive Coronary artery disease of native artery of native heart with stable

## 2019-08-18 NOTE — PROGRESS NOTES
325 mg Oral Daily    mometasone-formoterol  2 puff Inhalation BID    vitamin D  50,000 Units Oral Weekly    insulin glargine  35 Units Subcutaneous Nightly     Continuous Infusions:    niCARdipine Stopped (19 1154)    dextrose       PRN Meds: acetaminophen, hydrALAZINE, heparin (porcine), heparin (porcine), ipratropium, sodium chloride flush, magnesium hydroxide, ondansetron, glucose, dextrose, glucagon (rDNA), dextrose, nitroGLYCERIN, morphine **OR** morphine, diphenhydrAMINE, albuterol sulfate HFA    Data:     Past Medical History:   has a past medical history of Asthma, CAD in native artery, nonobstructive, Carotid stenosis, left, Carpal tunnel syndrome, Cerebrovascular disease, Chronic kidney disease, COPD (chronic obstructive pulmonary disease) ([facility] Diabetes mellitus ([facility] History of colon polyps, History of weakness of extremity, HTN (hypertension), Hyperlipidemia, Lung, cysts, congenital, PTSD (post-traumatic stress disorder), PTSD (post-traumatic stress disorder), TIA (transient ischemic attack), and Type II or unspecified type diabetes mellitus without mention of complication, not stated as uncontrolled. Social History:   reports that he quit smoking about 5 years ago. His smoking use included cigarettes. He has a 17.50 pack-year smoking history. He has never used smokeless tobacco. He reports that he does not drink alcohol or use drugs. Family History:   Family History   Problem Relation Age of Onset    Cancer Mother     Heart Disease Father        Vitals:  BP (!) 188/83   Pulse 64   Temp 97.9 °F (36.6 °C) (Oral)   Resp 16   Ht 5' 6\" (1.676 m)   Wt 158 lb 4.6 oz (71.8 kg)   SpO2 100%   BMI 25.55 kg/m²   Temp (24hrs), Av °F (36.7 °C), Min:97.7 °F (36.5 °C), Max:98.4 °F (36.9 °C)    Recent Labs     19  2203 19  0501 19  0637   POCGLU 142* 208* 240* 262*       I/O (24Hr):     Intake/Output Summary (Last 24 hours) at 2019 0837  Last data bilaterally, which can be seen with COPD. Ct Head Wo Contrast    Result Date: 8/12/2019  1. No acute intracranial abnormality. 2. Chronic lacunar infarcts within the cerebellar hemispheres bilaterally. 3. Bilateral mastoid effusions. Cta Chest W Wo Contrast    Result Date: 8/12/2019  No evidence for aortic injury or abnormality. Small bilateral pleural effusions with adjacent atelectasis. Centrilobular emphysema. Us Renal Complete    Result Date: 8/12/2019  Kidneys appear echogenic likely related to the patient's acute renal failure. No cortical thinning or hydronephrosis. Xr Chest Portable    Result Date: 8/14/2019  No acute cardiopulmonary process. Ir Nontunneled Vascular Catheter > 5 Years    Result Date: 8/15/2019  Successful ultrasound and fluoroscopy guided non-tunneled right internal jugular vein 14 Citizen of the [place] by 20 cm temporary dialysis catheter placement. Ready for use.        Physical Examination:        General appearance:  alert, cooperative moderate  Respiratory distress as he is mobile in room,   Mental Status:  oriented to person, place and time and normal affect  Lungs:  clear to auscultation anterior , posterior diminished at bibaslilar.  nop acute distress, appears much more comfortable  Heart:  regular rate and rhythm, + systolic murmur  Abdomen:  soft, nontender, nondistended, normal bowel sounds, no masses, hepatomegaly, splenomegaly  Extremities:  Mild pedal/ lower extremity edema, no redness, tenderness in the calves  Skin:  no gross lesions, rashes, induration    Assessment:        Hospital Problems           Last Modified POA    * (Principal) Hypertensive urgency 8/12/2019 Yes    Pure hypercholesterolemia 8/12/2019 Yes    Exertional dyspnea 8/12/2019 Yes    PTSD (post-traumatic stress disorder) 8/12/2019 Yes    Essential hypertension 8/12/2019 Yes    DM type 2, uncontrolled, with neuropathy ([address] 8/12/2019 Yes    COPD (chronic obstructive pulmonary disease) ([address] 8/12/2019 Yes    Cerebral vascular disease 8/12/2019 Yes    Non-obstructive Coronary artery disease of native artery of native heart with stable angina pectoris ([facility] 8/12/2019 Yes    Overview Signed 8/12/2019 11:46 AM by TERRANCE Pro - CNP     9/2017     LMCA: Mild irregularities 10-20%. LAD: 30-40% proximal stenosis    LCx: Mild irregularities 10-20%. RCA: 40% mid stenosis         Hyperparathyroidism ([facility] 8/12/2019 Yes    Vitamin D deficiency 8/12/2019 Yes    Acute kidney injury superimposed on chronic kidney disease ([facility] 8/12/2019 Yes    Coronary artery disease with exertional angina ([facility] 8/12/2019 Yes          Plan:        · COPD with exacerbation- Last exacerbation 1.5 months ago- current treatment,, now on oral prednisone taper,  pulmonary hygiene. Patient noncompliant with dosing of steroids.       · Hypertensive Emergency-  NStil awaiting results of renal duplex. poorly controlled . on coreg 25 bid, minipress 5 mg bid, Procardia 60 bid,  hydralazine 100 mg tid     (Home clonidine patch, which is not formulary here and interchanged to catapress 0.1 mg bid).  NO acute mentation or encephalopathy noted.       · Acute on Chronic kidney disease Stage 3- most likely secondary contrast dye related with end organ damage from #2 . Started hemodialysis 4/15/19 via uncuffed  temporary dialysis catheter. Had 3 treatments on 3 consecutive days so far,  plans for HD pending at this time and to be decided when labs resulted- treatment number 4 if done today . Creat 2.45. BUN 54. Renal artery duplex completed, pending.        · Diabetes type 2 with hyperglycemia-Patient compliance hinders adequate control. He picks and chooses insulin dose, a1c is not to goal in outpatient setting and I do not feel this is a watkins we will win inpatient either. Continue with insulin sliding scale at high dose until the steroids are completed and carb counting.   Patient also wants to carb count/ dose insulin based on steroid

## 2019-08-19 LAB
ANION GAP SERPL CALCULATED.3IONS-SCNC: 12 MMOL/L (ref 9–17)
BUN BLDV-MCNC: 55 MG/DL (ref 8–23)
BUN/CREAT BLD: ABNORMAL (ref 9–20)
CALCIUM IONIZED: 1.2 MMOL/L (ref 1.13–1.33)
CALCIUM SERPL-MCNC: 8.6 MG/DL (ref 8.6–10.4)
CHLORIDE BLD-SCNC: 100 MMOL/L (ref 98–107)
CO2: 25 MMOL/L (ref 20–31)
CREAT SERPL-MCNC: 2.12 MG/DL (ref 0.7–1.2)
GFR AFRICAN AMERICAN: 38 ML/MIN
GFR NON-AFRICAN AMERICAN: 32 ML/MIN
GFR SERPL CREATININE-BSD FRML MDRD: ABNORMAL ML/MIN/{1.73_M2}
GFR SERPL CREATININE-BSD FRML MDRD: ABNORMAL ML/MIN/{1.73_M2}
GLUCOSE BLD-MCNC: 120 MG/DL (ref 75–110)
GLUCOSE BLD-MCNC: 123 MG/DL (ref 75–110)
GLUCOSE BLD-MCNC: 137 MG/DL (ref 70–99)
GLUCOSE BLD-MCNC: 142 MG/DL (ref 75–110)
GLUCOSE BLD-MCNC: 215 MG/DL (ref 75–110)
GLUCOSE BLD-MCNC: 242 MG/DL (ref 75–110)
GLUCOSE BLD-MCNC: 267 MG/DL (ref 75–110)
GLUCOSE BLD-MCNC: 87 MG/DL (ref 75–110)
HCT VFR BLD CALC: 28.1 % (ref 40.7–50.3)
HEMOGLOBIN: 8.6 G/DL (ref 13–17)
MCH RBC QN AUTO: 29.7 PG (ref 25.2–33.5)
MCHC RBC AUTO-ENTMCNC: 30.6 G/DL (ref 28.4–34.8)
MCV RBC AUTO: 96.9 FL (ref 82.6–102.9)
NRBC AUTOMATED: 0 PER 100 WBC
PDW BLD-RTO: 12.7 % (ref 11.8–14.4)
PHOSPHORUS: 3.6 MG/DL (ref 2.5–4.5)
PLATELET # BLD: 267 K/UL (ref 138–453)
PMV BLD AUTO: 10.3 FL (ref 8.1–13.5)
POTASSIUM SERPL-SCNC: 4.3 MMOL/L (ref 3.7–5.3)
RBC # BLD: 2.9 M/UL (ref 4.21–5.77)
SODIUM BLD-SCNC: 137 MMOL/L (ref 135–144)
WBC # BLD: 11.5 K/UL (ref 3.5–11.3)

## 2019-08-19 PROCEDURE — 6370000000 HC RX 637 (ALT 250 FOR IP): Performed by: NURSE PRACTITIONER

## 2019-08-19 PROCEDURE — 80048 BASIC METABOLIC PNL TOTAL CA: CPT

## 2019-08-19 PROCEDURE — 6370000000 HC RX 637 (ALT 250 FOR IP): Performed by: STUDENT IN AN ORGANIZED HEALTH CARE EDUCATION/TRAINING PROGRAM

## 2019-08-19 PROCEDURE — 99232 SBSQ HOSP IP/OBS MODERATE 35: CPT | Performed by: INTERNAL MEDICINE

## 2019-08-19 PROCEDURE — 6360000002 HC RX W HCPCS: Performed by: NURSE PRACTITIONER

## 2019-08-19 PROCEDURE — 2700000000 HC OXYGEN THERAPY PER DAY

## 2019-08-19 PROCEDURE — 2580000003 HC RX 258: Performed by: NURSE PRACTITIONER

## 2019-08-19 PROCEDURE — 6370000000 HC RX 637 (ALT 250 FOR IP): Performed by: INTERNAL MEDICINE

## 2019-08-19 PROCEDURE — 85027 COMPLETE CBC AUTOMATED: CPT

## 2019-08-19 PROCEDURE — 94640 AIRWAY INHALATION TREATMENT: CPT

## 2019-08-19 PROCEDURE — 84100 ASSAY OF PHOSPHORUS: CPT

## 2019-08-19 PROCEDURE — 2060000000 HC ICU INTERMEDIATE R&B

## 2019-08-19 PROCEDURE — 36415 COLL VENOUS BLD VENIPUNCTURE: CPT

## 2019-08-19 PROCEDURE — 76937 US GUIDE VASCULAR ACCESS: CPT

## 2019-08-19 PROCEDURE — 82947 ASSAY GLUCOSE BLOOD QUANT: CPT

## 2019-08-19 PROCEDURE — 94761 N-INVAS EAR/PLS OXIMETRY MLT: CPT

## 2019-08-19 PROCEDURE — 82330 ASSAY OF CALCIUM: CPT

## 2019-08-19 RX ORDER — QUETIAPINE FUMARATE 100 MG/1
100 TABLET, FILM COATED ORAL NIGHTLY
Status: DISCONTINUED | OUTPATIENT
Start: 2019-08-19 | End: 2019-08-28 | Stop reason: HOSPADM

## 2019-08-19 RX ORDER — ALBUTEROL SULFATE 2.5 MG/3ML
2.5 SOLUTION RESPIRATORY (INHALATION) EVERY 6 HOURS PRN
Status: DISCONTINUED | OUTPATIENT
Start: 2019-08-19 | End: 2019-08-28 | Stop reason: HOSPADM

## 2019-08-19 RX ORDER — UREA 10 %
3 LOTION (ML) TOPICAL ONCE
Status: DISCONTINUED | OUTPATIENT
Start: 2019-08-19 | End: 2019-08-28 | Stop reason: HOSPADM

## 2019-08-19 RX ORDER — BUMETANIDE 1 MG/1
2 TABLET ORAL DAILY
Status: DISCONTINUED | OUTPATIENT
Start: 2019-08-19 | End: 2019-08-26

## 2019-08-19 RX ADMIN — HYDRALAZINE HYDROCHLORIDE 100 MG: 50 TABLET, FILM COATED ORAL at 07:55

## 2019-08-19 RX ADMIN — SODIUM CHLORIDE, PRESERVATIVE FREE 10 ML: 5 INJECTION INTRAVENOUS at 20:40

## 2019-08-19 RX ADMIN — CLONIDINE HYDROCHLORIDE 0.2 MG: 0.2 TABLET ORAL at 15:25

## 2019-08-19 RX ADMIN — CLONIDINE HYDROCHLORIDE 0.2 MG: 0.2 TABLET ORAL at 20:39

## 2019-08-19 RX ADMIN — INSULIN LISPRO 3 UNITS: 100 INJECTION, SOLUTION INTRAVENOUS; SUBCUTANEOUS at 22:40

## 2019-08-19 RX ADMIN — CLOPIDOGREL 75 MG: 75 TABLET, FILM COATED ORAL at 07:54

## 2019-08-19 RX ADMIN — ERGOCALCIFEROL 50000 UNITS: 1.25 CAPSULE ORAL at 07:53

## 2019-08-19 RX ADMIN — HYDRALAZINE HYDROCHLORIDE 100 MG: 50 TABLET, FILM COATED ORAL at 20:39

## 2019-08-19 RX ADMIN — BUMETANIDE 2 MG: 1 TABLET ORAL at 19:04

## 2019-08-19 RX ADMIN — FAMOTIDINE 10 MG: 20 TABLET, FILM COATED ORAL at 07:55

## 2019-08-19 RX ADMIN — QUETIAPINE FUMARATE 100 MG: 100 TABLET ORAL at 23:23

## 2019-08-19 RX ADMIN — INSULIN GLARGINE 35 UNITS: 100 INJECTION, SOLUTION SUBCUTANEOUS at 22:40

## 2019-08-19 RX ADMIN — INSULIN LISPRO 5 UNITS: 100 INJECTION, SOLUTION INTRAVENOUS; SUBCUTANEOUS at 07:55

## 2019-08-19 RX ADMIN — CLONIDINE HYDROCHLORIDE 0.2 MG: 0.2 TABLET ORAL at 07:53

## 2019-08-19 RX ADMIN — CARVEDILOL 25 MG: 25 TABLET, FILM COATED ORAL at 07:53

## 2019-08-19 RX ADMIN — ASPIRIN 325 MG: 325 TABLET, COATED ORAL at 07:55

## 2019-08-19 RX ADMIN — IPRATROPIUM BROMIDE AND ALBUTEROL SULFATE 1 AMPULE: .5; 3 SOLUTION RESPIRATORY (INHALATION) at 07:30

## 2019-08-19 RX ADMIN — CARVEDILOL 25 MG: 25 TABLET, FILM COATED ORAL at 20:39

## 2019-08-19 RX ADMIN — IPRATROPIUM BROMIDE AND ALBUTEROL SULFATE 1 AMPULE: .5; 3 SOLUTION RESPIRATORY (INHALATION) at 15:13

## 2019-08-19 RX ADMIN — SODIUM CHLORIDE, PRESERVATIVE FREE 10 ML: 5 INJECTION INTRAVENOUS at 07:56

## 2019-08-19 RX ADMIN — GABAPENTIN 100 MG: 100 CAPSULE ORAL at 07:55

## 2019-08-19 RX ADMIN — NIFEDIPINE 60 MG: 60 TABLET, EXTENDED RELEASE ORAL at 07:54

## 2019-08-19 RX ADMIN — IPRATROPIUM BROMIDE AND ALBUTEROL SULFATE 1 AMPULE: .5; 3 SOLUTION RESPIRATORY (INHALATION) at 11:01

## 2019-08-19 RX ADMIN — HEPARIN SODIUM 5000 UNITS: 5000 INJECTION INTRAVENOUS; SUBCUTANEOUS at 06:20

## 2019-08-19 RX ADMIN — GABAPENTIN 100 MG: 100 CAPSULE ORAL at 20:39

## 2019-08-19 RX ADMIN — HEPARIN SODIUM 5000 UNITS: 5000 INJECTION INTRAVENOUS; SUBCUTANEOUS at 22:39

## 2019-08-19 RX ADMIN — SODIUM BICARBONATE 650 MG: 650 TABLET ORAL at 20:39

## 2019-08-19 RX ADMIN — INSULIN LISPRO 2 UNITS: 100 INJECTION, SOLUTION INTRAVENOUS; SUBCUTANEOUS at 06:20

## 2019-08-19 RX ADMIN — PRAZOSIN HYDROCHLORIDE 5 MG: 5 CAPSULE ORAL at 20:39

## 2019-08-19 RX ADMIN — INSULIN LISPRO 5 UNITS: 100 INJECTION, SOLUTION INTRAVENOUS; SUBCUTANEOUS at 11:54

## 2019-08-19 RX ADMIN — PRAZOSIN HYDROCHLORIDE 5 MG: 5 CAPSULE ORAL at 07:53

## 2019-08-19 RX ADMIN — ATORVASTATIN CALCIUM 80 MG: 80 TABLET, FILM COATED ORAL at 20:38

## 2019-08-19 RX ADMIN — GABAPENTIN 100 MG: 100 CAPSULE ORAL at 15:25

## 2019-08-19 RX ADMIN — HYDRALAZINE HYDROCHLORIDE 20 MG: 20 INJECTION INTRAMUSCULAR; INTRAVENOUS at 00:29

## 2019-08-19 RX ADMIN — IPRATROPIUM BROMIDE AND ALBUTEROL SULFATE 1 AMPULE: .5; 3 SOLUTION RESPIRATORY (INHALATION) at 23:30

## 2019-08-19 RX ADMIN — HEPARIN SODIUM 5000 UNITS: 5000 INJECTION INTRAVENOUS; SUBCUTANEOUS at 15:26

## 2019-08-19 RX ADMIN — IPRATROPIUM BROMIDE AND ALBUTEROL SULFATE 1 AMPULE: .5; 3 SOLUTION RESPIRATORY (INHALATION) at 19:55

## 2019-08-19 RX ADMIN — HYDRALAZINE HYDROCHLORIDE 100 MG: 50 TABLET, FILM COATED ORAL at 15:25

## 2019-08-19 RX ADMIN — SODIUM BICARBONATE 650 MG: 650 TABLET ORAL at 07:54

## 2019-08-19 RX ADMIN — PREDNISONE 30 MG: 20 TABLET ORAL at 07:54

## 2019-08-19 RX ADMIN — INSULIN LISPRO 4 UNITS: 100 INJECTION, SOLUTION INTRAVENOUS; SUBCUTANEOUS at 17:06

## 2019-08-19 NOTE — PROGRESS NOTES
[id number] 29 06/17/2013    [id number] 100 06/17/2013    FIO2 NOT REPORTED 08/02/2013     Lab Results   Component Value Date/Time    SPECIAL NOT REPORTED 09/02/2018 08:51 PM     Lab Results   Component Value Date/Time    CULTURE NO SIGNIFICANT GROWTH 09/02/2018 08:51 PM       Radiology:  Xr Chest (single View Frontal)    Result Date: 8/14/2019  Interval development of mild bibasilar opacities which may be related atelectasis or airspace disease. Small left effusion is present. Xr Chest Standard (2 Vw)    Result Date: 8/12/2019  1. No acute cardiopulmonary abnormality. 2. Hyperinflation of the lungs bilaterally, which can be seen with COPD. Ct Head Wo Contrast    Result Date: 8/12/2019  1. No acute intracranial abnormality. 2. Chronic lacunar infarcts within the cerebellar hemispheres bilaterally. 3. Bilateral mastoid effusions. Cta Chest W Wo Contrast    Result Date: 8/12/2019  No evidence for aortic injury or abnormality. Small bilateral pleural effusions with adjacent atelectasis. Centrilobular emphysema. Us Renal Complete    Result Date: 8/12/2019  Kidneys appear echogenic likely related to the patient's acute renal failure. No cortical thinning or hydronephrosis. Xr Chest Portable    Result Date: 8/14/2019  No acute cardiopulmonary process. Ir Nontunneled Vascular Catheter > 5 Years    Result Date: 8/15/2019  Successful ultrasound and fluoroscopy guided non-tunneled right internal jugular vein 14 [place] by 20 cm temporary dialysis catheter placement. Ready for use.        Physical Examination:        General appearance:  alert, cooperative moderate  Respiratory distress as he is mobile in room,   Mental Status:  oriented to person, place and time and normal affect  Lungs:  clear to auscultation anterior , posterior diminished at bibaslilar.  nop acute distress, appears much more comfortable  Heart:  regular rate and rhythm, + systolic murmur  Abdomen:  soft, nontender, nondistended, normal bowel sounds, no masses, hepatomegaly, splenomegaly  Extremities:  Mild pedal/ lower extremity edema, no redness, tenderness in the calves  Skin:  no gross lesions, rashes, induration    Assessment:        Hospital Problems           Last Modified POA    * (Principal) Hypertensive urgency 8/12/2019 Yes    Pure hypercholesterolemia 8/12/2019 Yes    Exertional dyspnea 8/12/2019 Yes    PTSD (post-traumatic stress disorder) 8/12/2019 Yes    Essential hypertension 8/12/2019 Yes    DM type 2, uncontrolled, with neuropathy ([address] 8/12/2019 Yes    COPD (chronic obstructive pulmonary disease) ([address] 8/12/2019 Yes    Cerebral vascular disease 8/12/2019 Yes    Non-obstructive Coronary artery disease of native artery of native heart with stable angina pectoris ([address] 8/12/2019 Yes    Overview Signed 8/12/2019 11:46 AM by Tito Camilo APRN - CNP     9/2017     LMCA: Mild irregularities 10-20%. LAD: 30-40% proximal stenosis    LCx: Mild irregularities 10-20%. RCA: 40% mid stenosis         Hyperparathyroidism ([address] 8/12/2019 Yes    Vitamin D deficiency 8/12/2019 Yes    Acute kidney injury superimposed on chronic kidney disease ([address] 8/12/2019 Yes    Coronary artery disease with exertional angina ([address] 8/12/2019 Yes          Plan:        · COPD with exacerbation- Last exacerbation 1.5 months ago- current treatment,, now on oral prednisone taper,  pulmonary hygiene. Patient noncompliant with dosing of steroids. Patient insisting on changing schedule of nebulizer therapy to every 6 hours as he takes it at home. He would like for it to be administered at 6 AM, noon, 6 PM, and then midnight.     · Hypertensive Urgency, improving-  Poorly controlled, but improving on current regimen. Continue on coreg 25 bid, minipress 5 mg bid, Procardia 60 bid,  hydralazine 100 mg tid     (Home clonidine patch, which is not formulary here and interchanged to catapress 0.1 mg bid).  NO acute mentation or encephalopathy noted.   Renal artery

## 2019-08-19 NOTE — PROGRESS NOTES
PGY-1  Internal Medicine [facility]  8/19/2019 2:35 PM     Attending Physician Statement  I have discussed the care of this patient, including pertinent history and exam findings, with the Resident/CNP. I have reviewed the key elements of all parts of the encounter with the Resident/CNP. I agree with the assessment, plan and orders as documented by the Resident/CNP. Alexis You MD   Nephrology [address] [place] [address] Drive    This note is created with the assistance of a speech-recognition program. While intending to generate a document that actually reflects the content of the visit, no guarantees can be provided that every mistake has been identified and corrected by editing.

## 2019-08-19 NOTE — PROGRESS NOTES
complication     Carpal tunnel syndrome on right     Colon polyps     Carotid stenosis, left s/p Endarterectomy     Mixed simple and mucopurulent chronic bronchitis (HCC)     Pure hypercholesterolemia     Exertional dyspnea     PTSD (post-traumatic stress disorder)     Transient cerebral ischemia     Essential hypertension     DM type 2, uncontrolled, with neuropathy ([facility])     COPD (chronic obstructive pulmonary disease) ([facility]     Cerebral vascular disease     Primary insomnia     Hypertensive urgency     Non-obstructive Coronary artery disease of native artery of native heart with stable angina pectoris ([facility])     Hyperparathyroidism ([facility]     Vitamin D deficiency     Acute kidney injury superimposed on chronic kidney disease ([facility]     Coronary artery disease with exertional angina ([facility]      Plan of Treatment:   1. HTN. Discussed compliance. Continue BB, catapres, Nifedipine, hydralazine. 2. ECHO reviewed  No plan for further cardiac testing. Normal stress <1 year ago  3. Swelling. Will order compression stockings. 4. Will need sleep study as outpt  5.  HD  Per nephrology     Electronically signed by TERRANCE Mi CNP on 8/19/2019 at 12:51 PM  [address]  [phone]

## 2019-08-19 NOTE — PROGRESS NOTES
Nutrition Education    Type and Reason for Visit: Patient Education(Medical Team Consult )    Nutrition Assessment:  Pt requested to speak w/ RD on how to meal plan for a renal diet. Writer went out multiple meal plans for a renal diet and educated pt on the importance of consuming Low K foods and Low Phos foods. Informed pt to avoid orange juice, bananas, excessive amount of dairy and dark sodas. Pt stated understanding, all questions answered at this time. · Verbally reviewed information with Patient  · Written educational materials provided. · Contact name and number provided. · Refer to Patient Education activity for more details.     Electronically signed by Elsye Iniguez RD, LD on 8/19/19 at 12:19 PM    Contact Number: [phone]

## 2019-08-20 LAB
ANION GAP SERPL CALCULATED.3IONS-SCNC: 10 MMOL/L (ref 9–17)
BUN BLDV-MCNC: 60 MG/DL (ref 8–23)
BUN/CREAT BLD: ABNORMAL (ref 9–20)
CALCIUM IONIZED: 1.19 MMOL/L (ref 1.13–1.33)
CALCIUM SERPL-MCNC: 8.8 MG/DL (ref 8.6–10.4)
CHLORIDE BLD-SCNC: 98 MMOL/L (ref 98–107)
CO2: 28 MMOL/L (ref 20–31)
CREAT SERPL-MCNC: 2.08 MG/DL (ref 0.7–1.2)
GFR AFRICAN AMERICAN: 39 ML/MIN
GFR NON-AFRICAN AMERICAN: 32 ML/MIN
GFR SERPL CREATININE-BSD FRML MDRD: ABNORMAL ML/MIN/{1.73_M2}
GFR SERPL CREATININE-BSD FRML MDRD: ABNORMAL ML/MIN/{1.73_M2}
GLUCOSE BLD-MCNC: 168 MG/DL (ref 75–110)
GLUCOSE BLD-MCNC: 177 MG/DL (ref 75–110)
GLUCOSE BLD-MCNC: 197 MG/DL (ref 75–110)
GLUCOSE BLD-MCNC: 214 MG/DL (ref 70–99)
GLUCOSE BLD-MCNC: 249 MG/DL (ref 75–110)
GLUCOSE BLD-MCNC: 331 MG/DL (ref 75–110)
HCT VFR BLD CALC: 29.8 % (ref 40.7–50.3)
HEMOGLOBIN: 9.5 G/DL (ref 13–17)
MCH RBC QN AUTO: 30.6 PG (ref 25.2–33.5)
MCHC RBC AUTO-ENTMCNC: 31.9 G/DL (ref 28.4–34.8)
MCV RBC AUTO: 96.1 FL (ref 82.6–102.9)
NRBC AUTOMATED: 0.2 PER 100 WBC
PDW BLD-RTO: 12.8 % (ref 11.8–14.4)
PHOSPHORUS: 3.7 MG/DL (ref 2.5–4.5)
PLATELET # BLD: 318 K/UL (ref 138–453)
PMV BLD AUTO: 10.6 FL (ref 8.1–13.5)
POTASSIUM SERPL-SCNC: 4.3 MMOL/L (ref 3.7–5.3)
RBC # BLD: 3.1 M/UL (ref 4.21–5.77)
SODIUM BLD-SCNC: 136 MMOL/L (ref 135–144)
WBC # BLD: 9.9 K/UL (ref 3.5–11.3)

## 2019-08-20 PROCEDURE — 6370000000 HC RX 637 (ALT 250 FOR IP): Performed by: STUDENT IN AN ORGANIZED HEALTH CARE EDUCATION/TRAINING PROGRAM

## 2019-08-20 PROCEDURE — 99232 SBSQ HOSP IP/OBS MODERATE 35: CPT | Performed by: FAMILY MEDICINE

## 2019-08-20 PROCEDURE — 6370000000 HC RX 637 (ALT 250 FOR IP): Performed by: NURSE PRACTITIONER

## 2019-08-20 PROCEDURE — 80048 BASIC METABOLIC PNL TOTAL CA: CPT

## 2019-08-20 PROCEDURE — 82947 ASSAY GLUCOSE BLOOD QUANT: CPT

## 2019-08-20 PROCEDURE — 97530 THERAPEUTIC ACTIVITIES: CPT

## 2019-08-20 PROCEDURE — 6370000000 HC RX 637 (ALT 250 FOR IP): Performed by: INTERNAL MEDICINE

## 2019-08-20 PROCEDURE — 94761 N-INVAS EAR/PLS OXIMETRY MLT: CPT

## 2019-08-20 PROCEDURE — 84100 ASSAY OF PHOSPHORUS: CPT

## 2019-08-20 PROCEDURE — 2580000003 HC RX 258: Performed by: NURSE PRACTITIONER

## 2019-08-20 PROCEDURE — 85027 COMPLETE CBC AUTOMATED: CPT

## 2019-08-20 PROCEDURE — 97161 PT EVAL LOW COMPLEX 20 MIN: CPT

## 2019-08-20 PROCEDURE — 6360000002 HC RX W HCPCS: Performed by: NURSE PRACTITIONER

## 2019-08-20 PROCEDURE — 94640 AIRWAY INHALATION TREATMENT: CPT

## 2019-08-20 PROCEDURE — 6370000000 HC RX 637 (ALT 250 FOR IP): Performed by: FAMILY MEDICINE

## 2019-08-20 PROCEDURE — 94660 CPAP INITIATION&MGMT: CPT

## 2019-08-20 PROCEDURE — 2060000000 HC ICU INTERMEDIATE R&B

## 2019-08-20 PROCEDURE — 94760 N-INVAS EAR/PLS OXIMETRY 1: CPT

## 2019-08-20 PROCEDURE — 82330 ASSAY OF CALCIUM: CPT

## 2019-08-20 PROCEDURE — 2700000000 HC OXYGEN THERAPY PER DAY

## 2019-08-20 PROCEDURE — 36415 COLL VENOUS BLD VENIPUNCTURE: CPT

## 2019-08-20 RX ORDER — PRAZOSIN HYDROCHLORIDE 1 MG/1
2 CAPSULE ORAL 2 TIMES DAILY
Status: DISCONTINUED | OUTPATIENT
Start: 2019-08-20 | End: 2019-08-24

## 2019-08-20 RX ORDER — CARVEDILOL 25 MG/1
25 TABLET ORAL 2 TIMES DAILY WITH MEALS
Status: DISCONTINUED | OUTPATIENT
Start: 2019-08-20 | End: 2019-08-28 | Stop reason: HOSPADM

## 2019-08-20 RX ORDER — CARVEDILOL 25 MG/1
25 TABLET ORAL EVERY EVENING
Status: DISCONTINUED | OUTPATIENT
Start: 2019-08-20 | End: 2019-08-20

## 2019-08-20 RX ORDER — ASPIRIN 81 MG/1
81 TABLET ORAL DAILY
Status: DISCONTINUED | OUTPATIENT
Start: 2019-08-21 | End: 2019-08-28 | Stop reason: HOSPADM

## 2019-08-20 RX ORDER — IPRATROPIUM BROMIDE AND ALBUTEROL SULFATE 2.5; .5 MG/3ML; MG/3ML
1 SOLUTION RESPIRATORY (INHALATION) 4 TIMES DAILY
Status: DISCONTINUED | OUTPATIENT
Start: 2019-08-20 | End: 2019-08-20

## 2019-08-20 RX ORDER — INSULIN GLARGINE 100 [IU]/ML
35 INJECTION, SOLUTION SUBCUTANEOUS ONCE
Status: COMPLETED | OUTPATIENT
Start: 2019-08-20 | End: 2019-08-20

## 2019-08-20 RX ORDER — CARVEDILOL 12.5 MG/1
12.5 TABLET ORAL DAILY
Status: DISCONTINUED | OUTPATIENT
Start: 2019-08-21 | End: 2019-08-20

## 2019-08-20 RX ORDER — INSULIN GLARGINE 100 [IU]/ML
20 INJECTION, SOLUTION SUBCUTANEOUS NIGHTLY
Status: DISCONTINUED | OUTPATIENT
Start: 2019-08-20 | End: 2019-08-21

## 2019-08-20 RX ORDER — AZITHROMYCIN 250 MG/1
500 TABLET, FILM COATED ORAL ONCE
Status: COMPLETED | OUTPATIENT
Start: 2019-08-20 | End: 2019-08-20

## 2019-08-20 RX ORDER — AZITHROMYCIN 250 MG/1
250 TABLET, FILM COATED ORAL DAILY
Status: COMPLETED | OUTPATIENT
Start: 2019-08-21 | End: 2019-08-24

## 2019-08-20 RX ORDER — IPRATROPIUM BROMIDE AND ALBUTEROL SULFATE 2.5; .5 MG/3ML; MG/3ML
1 SOLUTION RESPIRATORY (INHALATION) EVERY 6 HOURS
Status: DISCONTINUED | OUTPATIENT
Start: 2019-08-20 | End: 2019-08-28 | Stop reason: HOSPADM

## 2019-08-20 RX ADMIN — CARVEDILOL 25 MG: 25 TABLET, FILM COATED ORAL at 07:59

## 2019-08-20 RX ADMIN — PRAZOSIN HYDROCHLORIDE 5 MG: 5 CAPSULE ORAL at 07:59

## 2019-08-20 RX ADMIN — INSULIN LISPRO 6 UNITS: 100 INJECTION, SOLUTION INTRAVENOUS; SUBCUTANEOUS at 23:04

## 2019-08-20 RX ADMIN — PRAZOSIN HYDROCHLORIDE 2 MG: 1 CAPSULE ORAL at 20:30

## 2019-08-20 RX ADMIN — HYDRALAZINE HYDROCHLORIDE 100 MG: 50 TABLET, FILM COATED ORAL at 20:28

## 2019-08-20 RX ADMIN — FAMOTIDINE 10 MG: 20 TABLET, FILM COATED ORAL at 08:00

## 2019-08-20 RX ADMIN — IPRATROPIUM BROMIDE AND ALBUTEROL SULFATE 1 AMPULE: .5; 3 SOLUTION RESPIRATORY (INHALATION) at 06:45

## 2019-08-20 RX ADMIN — ATORVASTATIN CALCIUM 80 MG: 80 TABLET, FILM COATED ORAL at 20:28

## 2019-08-20 RX ADMIN — IPRATROPIUM BROMIDE AND ALBUTEROL SULFATE 1 AMPULE: .5; 3 SOLUTION RESPIRATORY (INHALATION) at 18:22

## 2019-08-20 RX ADMIN — QUETIAPINE FUMARATE 100 MG: 100 TABLET ORAL at 23:03

## 2019-08-20 RX ADMIN — GABAPENTIN 100 MG: 100 CAPSULE ORAL at 20:32

## 2019-08-20 RX ADMIN — CARVEDILOL 25 MG: 25 TABLET, FILM COATED ORAL at 22:59

## 2019-08-20 RX ADMIN — HYDRALAZINE HYDROCHLORIDE 20 MG: 20 INJECTION INTRAMUSCULAR; INTRAVENOUS at 03:14

## 2019-08-20 RX ADMIN — HYDRALAZINE HYDROCHLORIDE 100 MG: 50 TABLET, FILM COATED ORAL at 07:59

## 2019-08-20 RX ADMIN — IPRATROPIUM BROMIDE AND ALBUTEROL SULFATE 1 AMPULE: .5; 3 SOLUTION RESPIRATORY (INHALATION) at 23:29

## 2019-08-20 RX ADMIN — SODIUM CHLORIDE, PRESERVATIVE FREE 10 ML: 5 INJECTION INTRAVENOUS at 07:59

## 2019-08-20 RX ADMIN — HYDRALAZINE HYDROCHLORIDE 100 MG: 50 TABLET, FILM COATED ORAL at 16:50

## 2019-08-20 RX ADMIN — INSULIN LISPRO 5 UNITS: 100 INJECTION, SOLUTION INTRAVENOUS; SUBCUTANEOUS at 08:00

## 2019-08-20 RX ADMIN — NIFEDIPINE 60 MG: 60 TABLET, EXTENDED RELEASE ORAL at 07:59

## 2019-08-20 RX ADMIN — GABAPENTIN 100 MG: 100 CAPSULE ORAL at 08:00

## 2019-08-20 RX ADMIN — INSULIN LISPRO 1 UNITS: 100 INJECTION, SOLUTION INTRAVENOUS; SUBCUTANEOUS at 23:09

## 2019-08-20 RX ADMIN — HEPARIN SODIUM 5000 UNITS: 5000 INJECTION INTRAVENOUS; SUBCUTANEOUS at 23:01

## 2019-08-20 RX ADMIN — CLONIDINE HYDROCHLORIDE 0.2 MG: 0.2 TABLET ORAL at 16:50

## 2019-08-20 RX ADMIN — INSULIN GLARGINE 35 UNITS: 100 INJECTION, SOLUTION SUBCUTANEOUS at 23:37

## 2019-08-20 RX ADMIN — INSULIN LISPRO 5 UNITS: 100 INJECTION, SOLUTION INTRAVENOUS; SUBCUTANEOUS at 13:29

## 2019-08-20 RX ADMIN — CLONIDINE HYDROCHLORIDE 0.2 MG: 0.2 TABLET ORAL at 07:59

## 2019-08-20 RX ADMIN — BUMETANIDE 2 MG: 1 TABLET ORAL at 07:59

## 2019-08-20 RX ADMIN — HEPARIN SODIUM 5000 UNITS: 5000 INJECTION INTRAVENOUS; SUBCUTANEOUS at 06:31

## 2019-08-20 RX ADMIN — PREDNISONE 30 MG: 20 TABLET ORAL at 08:00

## 2019-08-20 RX ADMIN — GABAPENTIN 100 MG: 100 CAPSULE ORAL at 16:50

## 2019-08-20 RX ADMIN — AZITHROMYCIN 500 MG: 250 TABLET, FILM COATED ORAL at 22:59

## 2019-08-20 RX ADMIN — SODIUM BICARBONATE 650 MG: 650 TABLET ORAL at 08:00

## 2019-08-20 RX ADMIN — INSULIN LISPRO 5 UNITS: 100 INJECTION, SOLUTION INTRAVENOUS; SUBCUTANEOUS at 19:07

## 2019-08-20 RX ADMIN — SODIUM CHLORIDE, PRESERVATIVE FREE 10 ML: 5 INJECTION INTRAVENOUS at 20:31

## 2019-08-20 RX ADMIN — ASPIRIN 325 MG: 325 TABLET, COATED ORAL at 08:00

## 2019-08-20 RX ADMIN — CLOPIDOGREL 75 MG: 75 TABLET, FILM COATED ORAL at 08:00

## 2019-08-20 RX ADMIN — HEPARIN SODIUM 5000 UNITS: 5000 INJECTION INTRAVENOUS; SUBCUTANEOUS at 16:50

## 2019-08-20 RX ADMIN — CLONIDINE HYDROCHLORIDE 0.2 MG: 0.2 TABLET ORAL at 20:28

## 2019-08-20 NOTE — PROGRESS NOTES
Active Problem List:     Cigarette nicotine dependence without complication     Carpal tunnel syndrome on right     Colon polyps     Carotid stenosis, left s/p Endarterectomy     Mixed simple and mucopurulent chronic bronchitis (HCC)     Pure hypercholesterolemia     Exertional dyspnea     PTSD (post-traumatic stress disorder)     Transient cerebral ischemia     Essential hypertension     DM type 2, uncontrolled, with neuropathy (HCC)     COPD (chronic obstructive pulmonary disease) ([facility]     Cerebral vascular disease     Primary insomnia     Hypertensive urgency     Non-obstructive Coronary artery disease of native artery of native heart with stable angina pectoris ([facility])     Hyperparathyroidism ([facility]     Vitamin D deficiency     Acute kidney injury superimposed on chronic kidney disease ([facility]     Coronary artery disease with exertional angina ([facility]      Plan of Treatment:   1. HTN. On lower side this am.  Continue BB, catapres, Nifedipine, hydralazine. Will decrease AM dose of coreg. Pt started on bumex daily per nephrology   2. ECHO reviewed  No plan for further cardiac testing. Normal stress <1 year ago  3. No further CV work-up at this time. Continue medications for HTN as above and any nephrology recommendations. Discussed again OP sleep study with patient  4. Will sign off. F/U in office in 2-3 weeks.      Electronically signed by TERRANCE Mi CNP on 8/20/2019 at 11:34 [address] [facility].  [phone]

## 2019-08-20 NOTE — PROGRESS NOTES
Dulaglutide (TRULICITY) 0.53 DE/9.9ZX SOPN Inject 0.75 mg into the skin once a week 2/19/19   Zara Uribe MD   TRUE METRIX BLOOD GLUCOSE TEST strip TEST BLOOD SUGAR 4 TO 5 TIMES DAILY 1/28/19   Zara Uribe MD   Acey Bunting 100-62.5-25 MCG/INH AEPB INHALE 1 PUFF INTO THE LINGS DAILY 1/28/19   Zara Uribe MD   .  Recent Surgical History: None = 0     Assessment COPD, MARILIA, HTN    RR 17  Breath Sounds: clear      · Bronchodilator assessment at level  3 Pt requests schedule of 0600, 1200, 1800 and 0000 as at home.   · Hyperinflation assessment at level   · Secretion Management assessment at level    ·   · []    Bronchodilator Assessment  BRONCHODILATOR ASSESSMENT SCORE  Score 0 1 2 3 4 5   Breath Sounds   []  Patient Baseline []  No Wheeze good aeration [x]  Faint, scattered wheezing, good aeration []  Expiratory Wheezing and or moderately diminished []  Insp/Exp wheeze and/or very diminished []  Insp/Exp and/ or marked distress   Respiratory Rate   []  Patient Baseline []  Less than 20 [x]  Less than 20 []  20-25 []  Greater than 25 []  Greater than 25   Peak flow % of Pred or PB [x]  NA   []  Greater than 90%  []  81-90% []  71-80% []  Less than or equal to 70%  or unable to perform []  Unable due to Respiratory Distress   Dyspnea re []  Patient Baseline []  No SOB []  No SOB [x]  SOB on exertion []  SOB min activity []  At rest/acute   e FEV% Predicted       [x]  NA []  Above 69%  []  Unable []  Above 60-69%  []  Unable []  Above 50-59%  []  Unable []  Above 35-49%  []  Unable []  Less than 35%  []  Unable                 []  Hyperinflation Assessment  Score 1 2 3   CXR and Breath Sounds   []  Clear []  No atelectasis  Basilar aeration []  Atelectasis or absent basilar breath sounds   Incentive Spirometry Volume  (Per IBW)   []  Greater than or equal to 15ml/Kg []  less than 15ml/Kg []  less than 15ml/Kg   Surgery within last 2 weeks []  None or general   []  Abdominal or thoracic surgery []  Abdominal or thoracic   Chronic Pulmonary Historyre []  No []  Yes []  Yes     []  Secretion Management Assessment  Score 1 2 3   Bilateral Breath Sounds   []  Occasional Rhonchi []  Scattered Rhonchi []  Course Rhonchi and/or poor aeration   Sputum    []  Small amount of thin secretions []  Moderate amount of viscous secretions []  Copius, Viscious Yellow/ Secretions   CXR as reported by physician []  clear  []  Unavailable []  Infiltrates and/or consolidation  []  Unavailable []  Mucus Plugging and or lobar consolidation  []  Unavailable   Cough []  Strong, productive cough []  Weak productive cough []  No cough or weak non-productive cough   EMMETT MCNULTY  12:38 PM                            FEMALE                                  MALE                            FEV1 Predicted Normal Values                        FEV1 Predicted Normal Values          Age                                     Height in Feet and Inches       Age                                     Height in Feet and Inches       4' 11\" 5' 1\" 5' 3\" 5' 5\" 5' 7\" 5' 9\" 5' 11\" 6' 1\"  4' 11\" 5' 1\" 5' 3\" 5' 5\" 5' 7\" 5' 9\" 5' 11\" 6' 1\"   42 - 45 2.49 2.66 2.84 3.03 3.22 3.42 3.62 3.83 42 - 45 2.82 3.03 3.26 3.49 3.72 3.96 4.22 4.47   46 - 49 2.40 2.57 2.76 2.94 3.14 3.33 3.54 3.75 46 - 49 2.70 2.92 3.14 3.37 3.61 3.85 4.10 4.36   50 - 53 2.31 2.48 2.66 2.85 3.04 3.24 3.45 3.66 50 - 53 2.58 2.80 3.02 3.25 3.49 3.73 3.98 4.24   54 - 57 2.21 2.38 2.57 2.75 2.95 3.14 3.35 3.56 54 - 57 2.46 2.67 2.89 3.12 3.36 3.60 3.85 4.11   58 - 61 2.10 2.28 2.46 2.65 2.84 3.04 3.24 3.45 58 - 61 2.32 2.54 2.76 2.99 3.23 3.47 3.72 3.98   62 - 65 1.99 2.17 2.35 2.54 2.73 2.93 3.13 3.34 62 - 65 2.19 2.40 2.62 2.85 3.09 3.33 3.58 3.84   66 - 69 1.88 2.05 2.23 2.42 2.61 2.81 3.02 3.23 66 - 69 2.04 2.26 2.48 2.71 2.95 3.19 3.44 3.70   70+ 1.82 1.99 2.17 2.36 2.55 2.75 2.95 3.16 70+ 1.97 2.19 2.41 2.64 2.87 3.12 3.37 3.62             Predicted Peak Expiratory Flow Rate

## 2019-08-20 NOTE — PROGRESS NOTES
Claudia Montoya 19    Progress Note    2019    8:40 AM    Name:   Violette Dalton  MRN:     [id number]     [place]:      [de-identified]   Room:   [facility] Day:  8  Admit Date:  2019  8:01 PM    PCP:   Jerome Santos DO  Code Status:  Full Code    Subjective:     C/C:   Chief Complaint   Patient presents with    Chest Pain     Pt to ED c/o chest pain and headache. Per first responders, pt with initial BP of 230/80. Pt given Nitro and aspirin with improvement of pain and improved BP to 190/77    Hypertension    Headache     Interval History Status: improved  Patient complains of cough productive sputum he follows with Dr. Dave Moore and has end-stage COPD with continued use of oxygen but patient states that he he is not prescribed oxygen but it is from his family who  and has a oxygen concentrator. During the visit  from [facility] Brown was in the room. And complained of left inguinal pain especially worse with coughing. .  Patient complains of labile sugars and has history of using cocaine and he was at [address] for rehabilitation. Brief History:     Per my partner  Lynette Jules y. o. male with PMH of CKD around 8 yrs, COPD, T2 DM for 28 yrs with carotid artery disease s/p stent, hypertension for 30 yrs,  Came with chief complaint of exertional chest pain, exertional SOB, headache and home blood pressure recording 240/90 mmHg. Exertional chest pain and SOB relieves with rest, NG and uses non- prescribed home O2 of 2.5 L, whenever he gets symptoms. Associated palpitations, sweating present. Denies dizziness, orthopnea and PND. Denies urinary symptoms or constipation.     In the ER blood pressure 230/80 mmHg.  Patient given nitroglycerin and aspirin with improvement of pain and decreased blood pressure to 190/70 7 mmHg  Over 2 hours. Then admitted for HTN emergency. CT head without contrast shown no acute changes.  CTA chest with contrast was done without abnormality outside his chronic COPD with appreciated small bilateral pleural effusions and adjacent atelectasis.       He was also noted to have hyperparathyroidism and low vitamin d.  Replacement was initiated 8/12/2019  His phos was mildly low and monitored  8/13/2019 Patient creat increased to 4.13  And nephrology consulted  8/14- creat continued to elevate  8/15 now hyperkalemic- HD started      Review of Systems:     Constitutional:  negative for chills, fevers, sweats  Respiratory:  negative for cough, + dyspnea on exertion which is improved, but overnight had acute distress and finally took steriod and now 'better\" , - hemoptysis,  +shortness of breath at rest or wheezing- improved from steroids. Cardiovascular:  negative for chest pain, no chest pressure/discomfort ,  No palpitations   Gastrointestinal:  negative for abdominal pain, constipation, diarrhea, nausea, vomiting  Neurological:  negative for dizziness, headache    Medications: Allergies:     Allergies   Allergen Reactions    Lisinopril      cough    Ace Inhibitors      coughing    Pcn [Penicillins]        Current Meds:   Scheduled Meds:    ipratropium-albuterol  1 ampule Inhalation 4x daily    melatonin  3 mg Oral Once    bumetanide  2 mg Oral Daily    QUEtiapine  100 mg Oral Nightly    cloNIDine  0.2 mg Oral TID    insulin lispro  0-18 Units Subcutaneous TID WC    insulin lispro  0-9 Units Subcutaneous Nightly    carvedilol  25 mg Oral BID WC    insulin lispro  1-6 Units Subcutaneous 4x Daily AC & HS    hydrALAZINE  100 mg Oral TID    heparin (porcine)  5,000 Units Subcutaneous 3 times per day    [START ON 8/21/2019] predniSONE  20 mg Oral Daily    [START ON 8/24/2019] predniSONE  10 mg Oral Daily    albumin human  25 g Intravenous Once    sodium bicarbonate  650 mg Oral BID    NIFEdipine  60 mg Oral Daily    famotidine  10 mg Oral Daily    gabapentin  100 mg Oral TID    atorvastatin

## 2019-08-20 NOTE — PROGRESS NOTES
Dulaglutide (TRULICITY) 2.42 CR/0.8WV SOPN Inject 0.75 mg into the skin once a week 2/19/19   Zara Michel MD   TRUE METRIX BLOOD GLUCOSE TEST strip TEST BLOOD SUGAR 4 TO 5 TIMES DAILY 1/28/19   Zara Michel MD   TRELEGY ELLIPTA 100-62.5-25 MCG/INH AEPB INHALE 1 PUFF INTO THE LINGS DAILY 1/28/19   Zara Michel MD   .  Recent Surgical History: None = 0     Assessment   Received pt on RA, awake, c/o slight shortness of breath with exertion.  Pt has COPD      RR 20  Breath Sounds: diminished t/o      · Bronchodilator assessment at level  3  ·   ·   · [x]    Bronchodilator Assessment  BRONCHODILATOR ASSESSMENT SCORE  Score 0 1 2 3 4 5   Breath Sounds   []  Patient Baseline []  No Wheeze good aeration []  Faint, scattered wheezing, good aeration [x]  Expiratory Wheezing and or moderately diminished []  Insp/Exp wheeze and/or very diminished []  Insp/Exp and/ or marked distress   Respiratory Rate   []  Patient Baseline []  Less than 20 []  Less than 20 [x]  20-25 []  Greater than 25 []  Greater than 25   Peak flow % of Pred or PB [x]  NA   []  Greater than 90%  []  81-90% []  71-80% []  Less than or equal to 70%  or unable to perform []  Unable due to Respiratory Distress   Dyspnea re []  Patient Baseline []  No SOB []  No SOB [x]  SOB on exertion []  SOB min activity []  At rest/acute   e FEV% Predicted       [x]  NA []  Above 69%  []  Unable []  Above 60-69%  []  Unable []  Above 50-59%  []  Unable []  Above 35-49%  []  Unable []  Less than 35%  []  Unable

## 2019-08-21 PROBLEM — E44.0 MODERATE MALNUTRITION (HCC): Status: ACTIVE | Noted: 2019-08-21

## 2019-08-21 LAB
ANION GAP SERPL CALCULATED.3IONS-SCNC: 15 MMOL/L (ref 9–17)
BUN BLDV-MCNC: 64 MG/DL (ref 8–23)
BUN/CREAT BLD: ABNORMAL (ref 9–20)
CALCIUM IONIZED: 1.17 MMOL/L (ref 1.13–1.33)
CALCIUM SERPL-MCNC: 8.6 MG/DL (ref 8.6–10.4)
CHLORIDE BLD-SCNC: 101 MMOL/L (ref 98–107)
CO2: 23 MMOL/L (ref 20–31)
CREAT SERPL-MCNC: 2.16 MG/DL (ref 0.7–1.2)
GFR AFRICAN AMERICAN: 38 ML/MIN
GFR NON-AFRICAN AMERICAN: 31 ML/MIN
GFR SERPL CREATININE-BSD FRML MDRD: ABNORMAL ML/MIN/{1.73_M2}
GFR SERPL CREATININE-BSD FRML MDRD: ABNORMAL ML/MIN/{1.73_M2}
GLUCOSE BLD-MCNC: 119 MG/DL (ref 75–110)
GLUCOSE BLD-MCNC: 180 MG/DL (ref 70–99)
GLUCOSE BLD-MCNC: 77 MG/DL (ref 75–110)
HCT VFR BLD CALC: 30.4 % (ref 40.7–50.3)
HEMOGLOBIN: 9.2 G/DL (ref 13–17)
MAGNESIUM: 1.6 MG/DL (ref 1.6–2.6)
MCH RBC QN AUTO: 30 PG (ref 25.2–33.5)
MCHC RBC AUTO-ENTMCNC: 30.3 G/DL (ref 28.4–34.8)
MCV RBC AUTO: 99 FL (ref 82.6–102.9)
NRBC AUTOMATED: 0.3 PER 100 WBC
PDW BLD-RTO: 13.2 % (ref 11.8–14.4)
PHOSPHORUS: 3.9 MG/DL (ref 2.5–4.5)
PLATELET # BLD: 320 K/UL (ref 138–453)
PMV BLD AUTO: 10.4 FL (ref 8.1–13.5)
POTASSIUM SERPL-SCNC: 4.2 MMOL/L (ref 3.7–5.3)
RBC # BLD: 3.07 M/UL (ref 4.21–5.77)
SODIUM BLD-SCNC: 139 MMOL/L (ref 135–144)
WBC # BLD: 9.7 K/UL (ref 3.5–11.3)

## 2019-08-21 PROCEDURE — 6370000000 HC RX 637 (ALT 250 FOR IP): Performed by: INTERNAL MEDICINE

## 2019-08-21 PROCEDURE — 6360000002 HC RX W HCPCS: Performed by: NURSE PRACTITIONER

## 2019-08-21 PROCEDURE — 6370000000 HC RX 637 (ALT 250 FOR IP): Performed by: FAMILY MEDICINE

## 2019-08-21 PROCEDURE — 2580000003 HC RX 258: Performed by: NURSE PRACTITIONER

## 2019-08-21 PROCEDURE — 94640 AIRWAY INHALATION TREATMENT: CPT

## 2019-08-21 PROCEDURE — 80048 BASIC METABOLIC PNL TOTAL CA: CPT

## 2019-08-21 PROCEDURE — 85027 COMPLETE CBC AUTOMATED: CPT

## 2019-08-21 PROCEDURE — 6370000000 HC RX 637 (ALT 250 FOR IP): Performed by: NURSE PRACTITIONER

## 2019-08-21 PROCEDURE — 82947 ASSAY GLUCOSE BLOOD QUANT: CPT

## 2019-08-21 PROCEDURE — 82330 ASSAY OF CALCIUM: CPT

## 2019-08-21 PROCEDURE — 97535 SELF CARE MNGMENT TRAINING: CPT

## 2019-08-21 PROCEDURE — 6370000000 HC RX 637 (ALT 250 FOR IP): Performed by: STUDENT IN AN ORGANIZED HEALTH CARE EDUCATION/TRAINING PROGRAM

## 2019-08-21 PROCEDURE — 36415 COLL VENOUS BLD VENIPUNCTURE: CPT

## 2019-08-21 PROCEDURE — 97166 OT EVAL MOD COMPLEX 45 MIN: CPT

## 2019-08-21 PROCEDURE — 99232 SBSQ HOSP IP/OBS MODERATE 35: CPT | Performed by: FAMILY MEDICINE

## 2019-08-21 PROCEDURE — 97110 THERAPEUTIC EXERCISES: CPT

## 2019-08-21 PROCEDURE — 83735 ASSAY OF MAGNESIUM: CPT

## 2019-08-21 PROCEDURE — 84100 ASSAY OF PHOSPHORUS: CPT

## 2019-08-21 PROCEDURE — 97116 GAIT TRAINING THERAPY: CPT

## 2019-08-21 PROCEDURE — 2060000000 HC ICU INTERMEDIATE R&B

## 2019-08-21 RX ORDER — INSULIN GLARGINE 100 [IU]/ML
35 INJECTION, SOLUTION SUBCUTANEOUS NIGHTLY
Status: DISCONTINUED | OUTPATIENT
Start: 2019-08-21 | End: 2019-08-28 | Stop reason: HOSPADM

## 2019-08-21 RX ADMIN — CLONIDINE HYDROCHLORIDE 0.2 MG: 0.2 TABLET ORAL at 15:05

## 2019-08-21 RX ADMIN — IPRATROPIUM BROMIDE AND ALBUTEROL SULFATE 1 AMPULE: .5; 3 SOLUTION RESPIRATORY (INHALATION) at 12:16

## 2019-08-21 RX ADMIN — BUMETANIDE 2 MG: 1 TABLET ORAL at 08:32

## 2019-08-21 RX ADMIN — FAMOTIDINE 10 MG: 20 TABLET, FILM COATED ORAL at 08:31

## 2019-08-21 RX ADMIN — GABAPENTIN 100 MG: 100 CAPSULE ORAL at 08:32

## 2019-08-21 RX ADMIN — GABAPENTIN 100 MG: 100 CAPSULE ORAL at 14:57

## 2019-08-21 RX ADMIN — SODIUM CHLORIDE, PRESERVATIVE FREE 10 ML: 5 INJECTION INTRAVENOUS at 08:34

## 2019-08-21 RX ADMIN — HYDRALAZINE HYDROCHLORIDE 100 MG: 50 TABLET, FILM COATED ORAL at 16:46

## 2019-08-21 RX ADMIN — CLONIDINE HYDROCHLORIDE 0.2 MG: 0.2 TABLET ORAL at 21:30

## 2019-08-21 RX ADMIN — SODIUM CHLORIDE, PRESERVATIVE FREE 10 ML: 5 INJECTION INTRAVENOUS at 21:29

## 2019-08-21 RX ADMIN — GABAPENTIN 100 MG: 100 CAPSULE ORAL at 21:30

## 2019-08-21 RX ADMIN — ONDANSETRON HYDROCHLORIDE 4 MG: 4 TABLET, FILM COATED ORAL at 21:29

## 2019-08-21 RX ADMIN — NIFEDIPINE 60 MG: 60 TABLET, EXTENDED RELEASE ORAL at 08:32

## 2019-08-21 RX ADMIN — CLOPIDOGREL 75 MG: 75 TABLET, FILM COATED ORAL at 08:33

## 2019-08-21 RX ADMIN — PREDNISONE 20 MG: 20 TABLET ORAL at 08:32

## 2019-08-21 RX ADMIN — HEPARIN SODIUM 5000 UNITS: 5000 INJECTION INTRAVENOUS; SUBCUTANEOUS at 14:57

## 2019-08-21 RX ADMIN — HEPARIN SODIUM 5000 UNITS: 5000 INJECTION INTRAVENOUS; SUBCUTANEOUS at 05:57

## 2019-08-21 RX ADMIN — Medication 81 MG: at 08:32

## 2019-08-21 RX ADMIN — HYDRALAZINE HYDROCHLORIDE 100 MG: 50 TABLET, FILM COATED ORAL at 08:31

## 2019-08-21 RX ADMIN — AZITHROMYCIN 250 MG: 250 TABLET, FILM COATED ORAL at 08:33

## 2019-08-21 RX ADMIN — HYDRALAZINE HYDROCHLORIDE 20 MG: 20 INJECTION INTRAMUSCULAR; INTRAVENOUS at 05:57

## 2019-08-21 RX ADMIN — PRAZOSIN HYDROCHLORIDE 2 MG: 1 CAPSULE ORAL at 08:34

## 2019-08-21 RX ADMIN — IPRATROPIUM BROMIDE AND ALBUTEROL SULFATE 1 AMPULE: .5; 3 SOLUTION RESPIRATORY (INHALATION) at 06:08

## 2019-08-21 RX ADMIN — CARVEDILOL 25 MG: 25 TABLET, FILM COATED ORAL at 08:32

## 2019-08-21 RX ADMIN — IPRATROPIUM BROMIDE AND ALBUTEROL SULFATE 1 AMPULE: .5; 3 SOLUTION RESPIRATORY (INHALATION) at 18:22

## 2019-08-21 RX ADMIN — ATORVASTATIN CALCIUM 80 MG: 80 TABLET, FILM COATED ORAL at 21:30

## 2019-08-21 RX ADMIN — HEPARIN SODIUM 5000 UNITS: 5000 INJECTION INTRAVENOUS; SUBCUTANEOUS at 21:30

## 2019-08-21 RX ADMIN — ONDANSETRON HYDROCHLORIDE 4 MG: 4 TABLET, FILM COATED ORAL at 13:50

## 2019-08-21 RX ADMIN — INSULIN LISPRO 5 UNITS: 100 INJECTION, SOLUTION INTRAVENOUS; SUBCUTANEOUS at 08:35

## 2019-08-21 RX ADMIN — INSULIN GLARGINE 35 UNITS: 100 INJECTION, SOLUTION SUBCUTANEOUS at 22:15

## 2019-08-21 RX ADMIN — PRAZOSIN HYDROCHLORIDE 2 MG: 1 CAPSULE ORAL at 21:29

## 2019-08-21 RX ADMIN — CLONIDINE HYDROCHLORIDE 0.2 MG: 0.2 TABLET ORAL at 08:32

## 2019-08-21 RX ADMIN — INSULIN LISPRO 4 UNITS: 100 INJECTION, SOLUTION INTRAVENOUS; SUBCUTANEOUS at 21:30

## 2019-08-21 RX ADMIN — INSULIN LISPRO 5 UNITS: 100 INJECTION, SOLUTION INTRAVENOUS; SUBCUTANEOUS at 16:41

## 2019-08-21 ASSESSMENT — PAIN SCALES - GENERAL
PAINLEVEL_OUTOF10: 0
PAINLEVEL_OUTOF10: 0

## 2019-08-21 NOTE — PROGRESS NOTES
TIMES DAILY  COMFORT EZ PEN NEEDLES 31G X 8 MM MISC, USE AS DIRECTED  insulin aspart (NOVOLOG FLEXPEN) 100 UNIT/ML injection pen, Inject 5 Units into the skin 3 times daily (before meals) Sliding scale  clopidogrel (PLAVIX) 75 MG tablet, Take 1 tablet by mouth daily  nitroGLYCERIN (NITROSTAT) 0.4 MG SL tablet, up to max of 3 total doses. If no relief after 1 dose, call 911.   insulin glargine (BASAGLAR KWIKPEN) 100 UNIT/ML injection pen, Inject 35 Units into the skin nightly Dispense with pen needle  Dulaglutide (TRULICITY) 6.26 FW/7.2RG SOPN, Inject 0.75 mg into the skin once a week  [DISCONTINUED] ipratropium-albuterol (DUONEB) 0.5-2.5 (3) MG/3ML SOLN nebulizer solution, Inhale 3 mLs into the lungs every 4 hours  TRUE METRIX BLOOD GLUCOSE TEST strip, TEST BLOOD SUGAR 4 TO 5 TIMES DAILY  TRELEGY ELLIPTA 100-62.5-25 MCG/INH AEPB, INHALE 1 PUFF INTO THE LINGS DAILY    Current Medications:     Scheduled Meds:    ipratropium-albuterol  1 ampule Inhalation Q6H    prazosin  2 mg Oral BID    azithromycin  250 mg Oral Daily    carvedilol  25 mg Oral BID WC    aspirin  81 mg Oral Daily    insulin glargine  20 Units Subcutaneous Nightly    melatonin  3 mg Oral Once    bumetanide  2 mg Oral Daily    QUEtiapine  100 mg Oral Nightly    cloNIDine  0.2 mg Oral TID    insulin lispro  0-18 Units Subcutaneous TID WC    insulin lispro  0-9 Units Subcutaneous Nightly    insulin lispro  1-6 Units Subcutaneous 4x Daily AC & HS    hydrALAZINE  100 mg Oral TID    heparin (porcine)  5,000 Units Subcutaneous 3 times per day    predniSONE  20 mg Oral Daily    [START ON 8/24/2019] predniSONE  10 mg Oral Daily    albumin human  25 g Intravenous Once    NIFEdipine  60 mg Oral Daily    famotidine  10 mg Oral Daily    gabapentin  100 mg Oral TID    atorvastatin  80 mg Oral Daily    clopidogrel  75 mg Oral Daily    sodium chloride flush  10 mL Intravenous 2 times per day    mometasone-formoterol  2 puff Inhalation BID    documented by the Resident/CNP. Alexis Garner MD   Nephrology [address] [place] [address] Drive    This note is created with the assistance of a speech-recognition program. While intending to generate a document that actually reflects the content of the visit, no guarantees can be provided that every mistake has been identified and corrected by editing.

## 2019-08-21 NOTE — PROGRESS NOTES
left, Carpal tunnel syndrome, Cerebrovascular disease, Chronic kidney disease, COPD (chronic obstructive pulmonary disease) ([facility] [address] Diabetes mellitus ([facility] [address] History of colon polyps, History of weakness of extremity, HTN (hypertension), Hyperlipidemia, Lung, cysts, congenital, PTSD (post-traumatic stress disorder), PTSD (post-traumatic stress disorder), TIA (transient ischemic attack), and Type II or unspecified type diabetes mellitus without mention of complication, not stated as uncontrolled. has a past surgical history that includes hernia repair; Tonsillectomy; Colonoscopy; Carotid endarterectomy (Left, 7-2013); vascular surgery (Left, 2015); and pr colsc flx w/rmvl of tumor polyp lesion snare tq (N/A, 6/27/2017).     Restrictions  Restrictions/Precautions  Restrictions/Precautions: Up as Tolerated  Required Braces or Orthoses?: No    Subjective   General  Chart Reviewed: Orders, Progress Notes, History and Physical, Imaging, Labs  Patient assessed for rehabilitation services?: Yes  Family / Caregiver Present: No  Patient Currently in Pain: Denies  Pain Assessment  Pain Level: 0  Vital Signs  BP: (!) 120/49  Level of Consciousness: Alert  Patient Currently in Pain: Denies     Social/Functional History  Social/Functional History  Lives With: Significant other(Not home 24/7 with pt)  Type of Home: Apartment  Home Layout: One level  Home Access: Stairs to enter with rails  Entrance Stairs - Number of Steps: 5  Entrance Stairs - Rails: Both  Bathroom Shower/Tub: Tub/Shower unit, Curtain  Bathroom Toilet: Standard  Bathroom Equipment: Shower chair  Home Equipment: Rolling walker(Uses outside of home)  Receives Help From: Family  ADL Assistance: Independent  Homemaking Assistance: Independent  Homemaking Responsibilities: Yes  Meal Prep Responsibility: Primary  Laundry Responsibility: Primary  Cleaning Responsibility: Primary  Shopping Responsibility: Primary  Ambulation Assistance: Independent  Transfer Assistance: Independent  Active : No  Mode of Transportation: [employer]  Occupation: On disability  Leisure & Hobbies: weight training     Objective   Vision: Impaired  Vision Exceptions: Wears glasses at all times  Hearing: Within functional limits    Orientation  Overall Orientation Status: Within Functional Limits  Observation/Palpation  Posture: Good  Balance  Sitting Balance: Stand by assistance(sitting EOB ~15 min. Pt notes truck weakness)  Standing Balance: Contact guard assistance     ADL  Feeding: Independent;Setup; Increased time to complete  Grooming: Modified independent ;Setup; Increased time to complete  UE Bathing: Increased time to complete;Setup;Contact guard assistance  LE Bathing: Increased time to complete;Setup;Contact guard assistance  UE Dressing: Increased time to complete;Setup;Contact guard assistance  LE Dressing: Increased time to complete;Setup;Contact guard assistance(Able to javid shoes sitting EOB.  )  Toileting: Contact guard assistance; Increased time to complete  Additional Comments: Pt was supine on arrival.  Pt completed bed mobility supine>sit EOB. Pt completed functional mobility/transfer to bathroom without RW. Pt got to sink, took a break, felt dizzy and SOB after ~2 min returned to sitting EOB. Pt completed dressing activity sitting EOB. Pt completed functional mobility needed for household tasks. Pt was very SOB throughout the session. Tasks took many breaks and increased time to complete. Pt reports weakness and fatigue throughout trunk. Pt returned to sitting EOB, call light within reach, RN notified on exit. Tone RUE  RUE Tone: Normotonic  Tone LUE  LUE Tone: Normotonic  Coordination  Movements Are Fluid And Coordinated: Yes    Bed mobility  Supine to Sit: Supervision  Sit to Supine: Supervision  Scooting: Supervision  Comment: increase time and efforts needed. Pt SOB upon completion of task.     Transfers  Sit to stand: Stand by assistance  Stand to sit: Stand by assistance    Cognition  Overall Cognitive Status: WFL  Perception  Overall Perceptual Status: WFL  Sensation  Overall Sensation Status: WFL    LUE Strength  Gross LUE Strength: WFL  L Hand General: 5/5  RUE Strength  Gross RUE Strength: WFL  R Hand General: 5/5     Plan   Plan  Times per week: 3-4 x/wk  Current Treatment Recommendations: Safety Education & Training, Patient/Caregiver Education & Training, Self-Care / ADL, Functional Mobility Training, Endurance Training    AM-PAC Score  AM-PAC Inpatient Daily Activity Raw Score: 19 (08/21/19 1618)  AM-PAC Inpatient ADL T-Scale Score : 40.22 (08/21/19 1618)  ADL Inpatient CMS 0-100% Score: 42.8 (08/21/19 1618)  ADL Inpatient CMS G-Code Modifier : CK (08/21/19 1618)    Goals  Short term goals  Time Frame for Short term goals: By discharge, pt will  Short term goal 1: Mod I/ I complete UE ADL activities  Short term goal 2: Mod I/ I complete LE ADL activities  Short term goal 3: IND complete functional mobility/ tansfers in order to complete ADL/functional activities  Short term goal 4: demo increased activity tolerance of ~30 min in order to complete ADL/functional activities  Short term goal 5: demo understanding and IND use of EC/WS and pursed lipped breathing tech during ADL/functional activities    Therapy Time   Individual Concurrent Group Co-treatment   Time In 56         Time Out 1604         Minutes 30         See above for LOF: RN reports patient is medically stable for therapy treatment this date. Chart reviewed prior to treatment and patient is agreeable for therapy. All lines intact and patient positioned comfortably at end of treatment. All patient needs addressed prior to ending therapy session.       Darcus Nails, OTS

## 2019-08-21 NOTE — PROGRESS NOTES
Physical Therapy  Facility/Department: [facility] 3  Daily Treatment Note  NAME: Karla Pratt  : 1955  MRN: [id number]    Date of Service: 2019    Discharge Recommendations:  Patient would benefit from continued therapy after discharge   PT Equipment Recommendations  Equipment Needed: No    Assessment   Body structures, Functions, Activity limitations: Decreased endurance;Decreased strength;Decreased balance  Assessment: Pt is overall supervision with funtional mobility , amb 20ft with NO AD SBA, Very SOB requiring three rest breaks. Limited by decrease endurance and fatigue. Pt would continue to require assist with functional mobility. Prognosis: Good  Decision Making: Low Complexity  PT Education: Home Exercise Program;General Safety; Energy Conservation;Transfer Training;Gait Training  Patient Education: Pacing . REQUIRES PT FOLLOW UP: Yes  Activity Tolerance  Activity Tolerance: Patient limited by fatigue;Patient limited by endurance  Activity Tolerance: Pt fatigues easily. Patient Diagnosis(es): The primary encounter diagnosis was Hypertensive urgency. Diagnoses of Migraine without aura and without status migrainosus, not intractable, Chest pain, unspecified type, and ESRD (end stage renal disease) ([facility] [address] were also pertinent to this visit. has a past medical history of Asthma, CAD in native artery, nonobstructive, Carotid stenosis, left, Carpal tunnel syndrome, Cerebrovascular disease, Chronic kidney disease, COPD (chronic obstructive pulmonary disease) ([address] Diabetes mellitus ([address] History of colon polyps, History of weakness of extremity, HTN (hypertension), Hyperlipidemia, Lung, cysts, congenital, PTSD (post-traumatic stress disorder), PTSD (post-traumatic stress disorder), TIA (transient ischemic attack), and Type II or unspecified type diabetes mellitus without mention of complication, not stated as uncontrolled. has a past surgical history that includes hernia repair;  Tonsillectomy;

## 2019-08-21 NOTE — PROGRESS NOTES
The patinet has a complaint of feeling dizzy when up walking around. Earlier this am he was walking in the vick without complaint. VS as recorded.     Requested that the patient call for assistance when wanting up for safety

## 2019-08-22 LAB
ANION GAP SERPL CALCULATED.3IONS-SCNC: 14 MMOL/L (ref 9–17)
BUN BLDV-MCNC: 69 MG/DL (ref 8–23)
BUN/CREAT BLD: ABNORMAL (ref 9–20)
CALCIUM IONIZED: 1.18 MMOL/L (ref 1.13–1.33)
CALCIUM SERPL-MCNC: 8.7 MG/DL (ref 8.6–10.4)
CHLORIDE BLD-SCNC: 100 MMOL/L (ref 98–107)
CO2: 27 MMOL/L (ref 20–31)
CREAT SERPL-MCNC: 2.18 MG/DL (ref 0.7–1.2)
GFR AFRICAN AMERICAN: 37 ML/MIN
GFR NON-AFRICAN AMERICAN: 31 ML/MIN
GFR SERPL CREATININE-BSD FRML MDRD: ABNORMAL ML/MIN/{1.73_M2}
GFR SERPL CREATININE-BSD FRML MDRD: ABNORMAL ML/MIN/{1.73_M2}
GLUCOSE BLD-MCNC: 154 MG/DL (ref 75–110)
GLUCOSE BLD-MCNC: 174 MG/DL (ref 75–110)
GLUCOSE BLD-MCNC: 183 MG/DL (ref 75–110)
GLUCOSE BLD-MCNC: 199 MG/DL (ref 70–99)
GLUCOSE BLD-MCNC: 261 MG/DL (ref 75–110)
GLUCOSE BLD-MCNC: 270 MG/DL (ref 75–110)
GLUCOSE BLD-MCNC: 280 MG/DL (ref 75–110)
GLUCOSE BLD-MCNC: 65 MG/DL (ref 75–110)
GLUCOSE BLD-MCNC: 67 MG/DL (ref 75–110)
HCT VFR BLD CALC: 30.5 % (ref 40.7–50.3)
HEMOGLOBIN: 9.3 G/DL (ref 13–17)
MAGNESIUM: 1.6 MG/DL (ref 1.6–2.6)
MCH RBC QN AUTO: 29.6 PG (ref 25.2–33.5)
MCHC RBC AUTO-ENTMCNC: 30.5 G/DL (ref 28.4–34.8)
MCV RBC AUTO: 97.1 FL (ref 82.6–102.9)
NRBC AUTOMATED: 0 PER 100 WBC
PDW BLD-RTO: 13.2 % (ref 11.8–14.4)
PHOSPHORUS: 4.2 MG/DL (ref 2.5–4.5)
PLATELET # BLD: 362 K/UL (ref 138–453)
PMV BLD AUTO: 10.5 FL (ref 8.1–13.5)
POTASSIUM SERPL-SCNC: 4.3 MMOL/L (ref 3.7–5.3)
RBC # BLD: 3.14 M/UL (ref 4.21–5.77)
SODIUM BLD-SCNC: 141 MMOL/L (ref 135–144)
WBC # BLD: 11.5 K/UL (ref 3.5–11.3)

## 2019-08-22 PROCEDURE — 6370000000 HC RX 637 (ALT 250 FOR IP): Performed by: NURSE PRACTITIONER

## 2019-08-22 PROCEDURE — 85027 COMPLETE CBC AUTOMATED: CPT

## 2019-08-22 PROCEDURE — 80048 BASIC METABOLIC PNL TOTAL CA: CPT

## 2019-08-22 PROCEDURE — 83735 ASSAY OF MAGNESIUM: CPT

## 2019-08-22 PROCEDURE — 6360000002 HC RX W HCPCS: Performed by: NURSE PRACTITIONER

## 2019-08-22 PROCEDURE — 6370000000 HC RX 637 (ALT 250 FOR IP): Performed by: INTERNAL MEDICINE

## 2019-08-22 PROCEDURE — 94761 N-INVAS EAR/PLS OXIMETRY MLT: CPT

## 2019-08-22 PROCEDURE — 6370000000 HC RX 637 (ALT 250 FOR IP): Performed by: FAMILY MEDICINE

## 2019-08-22 PROCEDURE — 2580000003 HC RX 258: Performed by: NURSE PRACTITIONER

## 2019-08-22 PROCEDURE — 84100 ASSAY OF PHOSPHORUS: CPT

## 2019-08-22 PROCEDURE — 82947 ASSAY GLUCOSE BLOOD QUANT: CPT

## 2019-08-22 PROCEDURE — 2060000000 HC ICU INTERMEDIATE R&B

## 2019-08-22 PROCEDURE — 82330 ASSAY OF CALCIUM: CPT

## 2019-08-22 PROCEDURE — 36415 COLL VENOUS BLD VENIPUNCTURE: CPT

## 2019-08-22 PROCEDURE — 6370000000 HC RX 637 (ALT 250 FOR IP): Performed by: STUDENT IN AN ORGANIZED HEALTH CARE EDUCATION/TRAINING PROGRAM

## 2019-08-22 PROCEDURE — 99232 SBSQ HOSP IP/OBS MODERATE 35: CPT | Performed by: FAMILY MEDICINE

## 2019-08-22 PROCEDURE — 94640 AIRWAY INHALATION TREATMENT: CPT

## 2019-08-22 PROCEDURE — 94760 N-INVAS EAR/PLS OXIMETRY 1: CPT

## 2019-08-22 RX ORDER — CLONIDINE HYDROCHLORIDE 0.2 MG/1
0.2 TABLET ORAL 2 TIMES DAILY
Status: DISCONTINUED | OUTPATIENT
Start: 2019-08-22 | End: 2019-08-24

## 2019-08-22 RX ORDER — GABAPENTIN 100 MG/1
200 CAPSULE ORAL 3 TIMES DAILY
Status: DISCONTINUED | OUTPATIENT
Start: 2019-08-22 | End: 2019-08-28 | Stop reason: HOSPADM

## 2019-08-22 RX ADMIN — CARVEDILOL 25 MG: 25 TABLET, FILM COATED ORAL at 16:28

## 2019-08-22 RX ADMIN — ATORVASTATIN CALCIUM 80 MG: 80 TABLET, FILM COATED ORAL at 21:18

## 2019-08-22 RX ADMIN — SODIUM CHLORIDE, PRESERVATIVE FREE 10 ML: 5 INJECTION INTRAVENOUS at 08:50

## 2019-08-22 RX ADMIN — CLONIDINE HYDROCHLORIDE 0.2 MG: 0.2 TABLET ORAL at 21:19

## 2019-08-22 RX ADMIN — FAMOTIDINE 10 MG: 20 TABLET, FILM COATED ORAL at 08:47

## 2019-08-22 RX ADMIN — INSULIN LISPRO 7 UNITS: 100 INJECTION, SOLUTION INTRAVENOUS; SUBCUTANEOUS at 16:21

## 2019-08-22 RX ADMIN — PRAZOSIN HYDROCHLORIDE 2 MG: 1 CAPSULE ORAL at 08:48

## 2019-08-22 RX ADMIN — HEPARIN SODIUM 5000 UNITS: 5000 INJECTION INTRAVENOUS; SUBCUTANEOUS at 14:47

## 2019-08-22 RX ADMIN — CARVEDILOL 25 MG: 25 TABLET, FILM COATED ORAL at 08:46

## 2019-08-22 RX ADMIN — SODIUM CHLORIDE, PRESERVATIVE FREE 10 ML: 5 INJECTION INTRAVENOUS at 21:23

## 2019-08-22 RX ADMIN — GABAPENTIN 100 MG: 100 CAPSULE ORAL at 14:47

## 2019-08-22 RX ADMIN — CLOPIDOGREL 75 MG: 75 TABLET, FILM COATED ORAL at 08:47

## 2019-08-22 RX ADMIN — HEPARIN SODIUM 5000 UNITS: 5000 INJECTION INTRAVENOUS; SUBCUTANEOUS at 06:06

## 2019-08-22 RX ADMIN — AZITHROMYCIN 250 MG: 250 TABLET, FILM COATED ORAL at 08:46

## 2019-08-22 RX ADMIN — GABAPENTIN 100 MG: 100 CAPSULE ORAL at 08:55

## 2019-08-22 RX ADMIN — QUETIAPINE FUMARATE 100 MG: 100 TABLET ORAL at 00:06

## 2019-08-22 RX ADMIN — INSULIN GLARGINE 35 UNITS: 100 INJECTION, SOLUTION SUBCUTANEOUS at 21:21

## 2019-08-22 RX ADMIN — IPRATROPIUM BROMIDE AND ALBUTEROL SULFATE 1 AMPULE: .5; 3 SOLUTION RESPIRATORY (INHALATION) at 11:55

## 2019-08-22 RX ADMIN — INSULIN LISPRO 5 UNITS: 100 INJECTION, SOLUTION INTRAVENOUS; SUBCUTANEOUS at 08:50

## 2019-08-22 RX ADMIN — HYDRALAZINE HYDROCHLORIDE 100 MG: 50 TABLET, FILM COATED ORAL at 21:19

## 2019-08-22 RX ADMIN — CLONIDINE HYDROCHLORIDE 0.2 MG: 0.2 TABLET ORAL at 08:48

## 2019-08-22 RX ADMIN — GABAPENTIN 200 MG: 100 CAPSULE ORAL at 21:18

## 2019-08-22 RX ADMIN — HYDRALAZINE HYDROCHLORIDE 100 MG: 50 TABLET, FILM COATED ORAL at 08:46

## 2019-08-22 RX ADMIN — PRAZOSIN HYDROCHLORIDE 2 MG: 1 CAPSULE ORAL at 21:19

## 2019-08-22 RX ADMIN — Medication 81 MG: at 08:48

## 2019-08-22 RX ADMIN — INSULIN LISPRO 3 UNITS: 100 INJECTION, SOLUTION INTRAVENOUS; SUBCUTANEOUS at 21:20

## 2019-08-22 RX ADMIN — IPRATROPIUM BROMIDE AND ALBUTEROL SULFATE 1 AMPULE: .5; 3 SOLUTION RESPIRATORY (INHALATION) at 18:09

## 2019-08-22 RX ADMIN — NIFEDIPINE 60 MG: 60 TABLET, EXTENDED RELEASE ORAL at 08:47

## 2019-08-22 RX ADMIN — BUMETANIDE 2 MG: 1 TABLET ORAL at 08:48

## 2019-08-22 RX ADMIN — HYDRALAZINE HYDROCHLORIDE 100 MG: 50 TABLET, FILM COATED ORAL at 00:06

## 2019-08-22 RX ADMIN — ALBUTEROL SULFATE 2.5 MG: 2.5 SOLUTION RESPIRATORY (INHALATION) at 00:22

## 2019-08-22 RX ADMIN — IPRATROPIUM BROMIDE AND ALBUTEROL SULFATE 1 AMPULE: .5; 3 SOLUTION RESPIRATORY (INHALATION) at 05:55

## 2019-08-22 RX ADMIN — IPRATROPIUM BROMIDE AND ALBUTEROL SULFATE 1 AMPULE: .5; 3 SOLUTION RESPIRATORY (INHALATION) at 23:50

## 2019-08-22 RX ADMIN — PREDNISONE 20 MG: 20 TABLET ORAL at 08:47

## 2019-08-22 NOTE — PROGRESS NOTES
Labs:  Hematology:  Recent Labs     08/19/19  0618 08/20/19  0533 08/21/19  0555   WBC 11.5* 9.9 9.7   RBC 2.90* 3.10* 3.07*   HGB 8.6* 9.5* 9.2*   HCT 28.1* 29.8* 30.4*   MCV 96.9 96.1 99.0   MCH 29.7 30.6 30.0   MCHC 30.6 31.9 30.3   RDW 12.7 12.8 13.2    318 320   MPV 10.3 10.6 10.4     Chemistry:  Recent Labs     08/19/19  0618 08/20/19  0533 08/21/19  0555    136 139   K 4.3 4.3 4.2    98 101   CO2 25 28 23   GLUCOSE 137* 214* 180*   BUN 55* 60* 64*   CREATININE 2.12* 2.08* 2.16*   MG  --   --  1.6   ANIONGAP 12 10 15   LABGLOM 32* 32* 31*   GFRAA 38* 39* 38*   CALCIUM 8.6 8.8 8.6   CAION 1.20 1.19 1.17   PHOS 3.6 3.7 3.9     Recent Labs     08/20/19  1142 08/20/19  1744 08/20/19  1907 08/20/19  2237 08/21/19  0705 08/21/19  1152   POCGLU 197* 249* 168* 331* 119* 77     ABG:  Lab Results   Component Value Date    POCPH 7.39 06/17/2013    POCPCO2 46 06/17/2013    POCPO2 288 06/17/2013    POCHCO3 27.8 06/17/2013    NBEA NOT REPORTED 06/17/2013    PBEA 3 06/17/2013    YKO1RVF 29 06/17/2013    ESLM0FUP 100 06/17/2013    FIO2 NOT REPORTED 08/02/2013     Lab Results   Component Value Date/Time    SPECIAL NOT REPORTED 09/02/2018 08:51 PM     Lab Results   Component Value Date/Time    CULTURE NO SIGNIFICANT GROWTH 09/02/2018 08:51 PM       Radiology:  Xr Chest (single View Frontal)    Result Date: 8/14/2019  Interval development of mild bibasilar opacities which may be related atelectasis or airspace disease. Small left effusion is present. Xr Chest Standard (2 Vw)    Result Date: 8/12/2019  1. No acute cardiopulmonary abnormality. 2. Hyperinflation of the lungs bilaterally, which can be seen with COPD. Ct Head Wo Contrast    Result Date: 8/12/2019  1. No acute intracranial abnormality. 2. Chronic lacunar infarcts within the cerebellar hemispheres bilaterally. 3. Bilateral mastoid effusions.      Cta Chest W Wo Contrast    Result Date: 8/12/2019  No evidence for aortic injury or

## 2019-08-22 NOTE — PROGRESS NOTES
TIMES DAILY  COMFORT EZ PEN NEEDLES 31G X 8 MM MISC, USE AS DIRECTED  insulin aspart (NOVOLOG FLEXPEN) 100 UNIT/ML injection pen, Inject 5 Units into the skin 3 times daily (before meals) Sliding scale  clopidogrel (PLAVIX) 75 MG tablet, Take 1 tablet by mouth daily  nitroGLYCERIN (NITROSTAT) 0.4 MG SL tablet, up to max of 3 total doses. If no relief after 1 dose, call 911.   insulin glargine (BASAGLAR KWIKPEN) 100 UNIT/ML injection pen, Inject 35 Units into the skin nightly Dispense with pen needle  Dulaglutide (TRULICITY) 7.20 OT/4.8CA SOPN, Inject 0.75 mg into the skin once a week  [DISCONTINUED] ipratropium-albuterol (DUONEB) 0.5-2.5 (3) MG/3ML SOLN nebulizer solution, Inhale 3 mLs into the lungs every 4 hours  TRUE METRIX BLOOD GLUCOSE TEST strip, TEST BLOOD SUGAR 4 TO 5 TIMES DAILY  TRELEGY ELLIPTA 100-62.5-25 MCG/INH AEPB, INHALE 1 PUFF INTO THE LINGS DAILY    Current Medications:     Scheduled Meds:    insulin glargine  35 Units Subcutaneous Nightly    ipratropium-albuterol  1 ampule Inhalation Q6H    prazosin  2 mg Oral BID    azithromycin  250 mg Oral Daily    carvedilol  25 mg Oral BID WC    aspirin  81 mg Oral Daily    melatonin  3 mg Oral Once    bumetanide  2 mg Oral Daily    QUEtiapine  100 mg Oral Nightly    cloNIDine  0.2 mg Oral TID    insulin lispro  0-18 Units Subcutaneous TID WC    insulin lispro  0-9 Units Subcutaneous Nightly    insulin lispro  1-6 Units Subcutaneous 4x Daily AC & HS    hydrALAZINE  100 mg Oral TID    heparin (porcine)  5,000 Units Subcutaneous 3 times per day    predniSONE  20 mg Oral Daily    [START ON 8/24/2019] predniSONE  10 mg Oral Daily    albumin human  25 g Intravenous Once    NIFEdipine  60 mg Oral Daily    famotidine  10 mg Oral Daily    gabapentin  100 mg Oral TID    atorvastatin  80 mg Oral Daily    clopidogrel  75 mg Oral Daily    sodium chloride flush  10 mL Intravenous 2 times per day    mometasone-formoterol  2 puff Inhalation BID    136 139 141   K 4.3 4.2 4.3   CL 98 101 100   CO2 28 23 27   BUN 60* 64* 69*   CREATININE 2.08* 2.16* 2.18*   GLUCOSE 214* 180* 199*   CALCIUM 8.8 8.6 8.7      Phosphorus:     Recent Labs     08/20/19  0533 08/21/19  0555 08/22/19  0625   PHOS 3.7 3.9 4.2     Magnesium:    Recent Labs     08/21/19  0555 08/22/19  0625   MG 1.6 1.6     BNP:      Lab Results   Component Value Date    BNP 84 11/27/2013     SPEP:  Lab Results   Component Value Date    PROT 5.5 08/13/2019    PROT 6.2 11/12/2011    ALBCAL 3.3 09/27/2017    ALBPCT 63 09/27/2017    LABALPH 0.1 09/27/2017    LABALPH 0.8 09/27/2017    A1PCT 3 09/27/2017    A2PCT 15 09/27/2017    LABBETA 0.6 09/27/2017    BETAPCT 12 09/27/2017    GAMGLOB 0.4 09/27/2017    GGPCT 8 09/27/2017    PATH ELECTRONICALLY SIGNED. Lizeth Amaral M.D. 09/27/2017    PATH ELECTRONICALLY SIGNED. Lizeth Amaral M.D. 09/27/2017     UPEP:     Lab Results   Component Value Date    LABPE  11/12/2011     ELEVATED PROTEIN CONCENTRATION. MOST SERUM PROTEINS ARE DETECTED IN THIS URINE.      Hep BsAg:         Lab Results   Component Value Date    HEPBSAG NONREACTIVE 08/15/2019     Hep C AB:          Lab Results   Component Value Date    HEPCAB NONREACTIVE 08/16/2019       Urinalysis/Chemistries:      Lab Results   Component Value Date    NITRU NEGATIVE 08/06/2019    COLORU YELLOW 08/06/2019    PHUR 6.0 08/06/2019    WBCUA 0 TO 2 08/06/2019    RBCUA None 08/06/2019    MUCUS NOT REPORTED 08/06/2019    TRICHOMONAS NOT REPORTED 08/06/2019    YEAST NOT REPORTED 08/06/2019    BACTERIA NOT REPORTED 08/06/2019    CLARITYU Clear 09/12/2014    SPECGRAV 1.019 08/06/2019    LEUKOCYTESUR NEGATIVE 08/06/2019    UROBILINOGEN Normal 08/06/2019    BILIRUBINUR NEGATIVE 08/06/2019    BILIRUBINUR NEGATIVE 11/12/2011    BLOODU Negative 09/12/2014    GLUCOSEU 3+ 08/06/2019    GLUCOSEU 3+ 11/12/2011    KETUA NEGATIVE 08/06/2019    AMORPHOUS NOT REPORTED 08/06/2019     Urine Sodium:     Lab Results   Component Value Date    IGNACIO 20 08/13/2019     Urine Potassium:    Lab Results   Component Value Date    KUR 36.1 08/13/2019      Urine Creatinine:     Lab Results   Component Value Date    LABCREA 82.4 08/13/2019       Radiology:     Reviewed as available    Assessment:     1. Acute renal failure with ATN on CKD- nonoliguric. Secondary to contrast injury and underlying chronic uncontrolled hypertension. Patient has had three dialysis sessions 8/15, 8/16, 8/17. Tolerated well. Assessing daily for HD need. UO good with 3.9 L @160 ml/hr. Cr at 2.18 mg/dl    2. CKD 3 likely due to diabetic and hypertensive nephrosclerosis with baseline creatinine around 1.2 to 1.9 mg/DL.  Follows up with Dr. Mary Rubi  3. HTN emergency: Isolated SBP elevation. Possible from fluid overload and non compliance. Currently BP under good control. 4. Elevated Tropnonins: Cardiology work up complete and unremarkable. 5. COPD: On Duoneb. SOB improved. Unlikely cardiac cause. 6. T2 DM with carotid artery stent: On ASA, clopidogrel  7. Anemia of Chronic Disease  8. Secondary hyperthyroidism  9.  Vitamin D deficiency. On Vit D 50,000 U PO weekly. Plan:   1. Improving renal function with good urine output. Cr at plateau level of 2.1 and can be expected to improve gradually. No need of HD for today. Will continue to monitor for the need of dialysis daily. 2.  Cont Bumex 2 mg PO daily. 3. Follow BMP in the am.  4. Strict Input and Output, Daily weigh and document in the chart. 5. Low Potassium, Low phosphorus and low salt diet. Fluids to be restricted to 1800ml/day. 6.  Neurontin dose adjusted. 7.  Will follow. Nutrition   Keep patient on renal diet/TF. Avoid nephrotoxic drugs/contrast exposure. We will continue to follow along with you.        Akbar Peña MD, PGY-1  Internal Medicine Residency Program  [facility]  8/22/2019 10:47 AM    Attending Physician Statement  I have discussed the care of this patient,

## 2019-08-22 NOTE — CARE COORDINATION
Transition planning:  Call to [facility] centralized intake, they are denying admission due to long standing drug use. Referral to next choice, [facility] rehab   19 Jose Diaz from [facility] called, they can accept pt, starting precert.

## 2019-08-23 LAB
ANION GAP SERPL CALCULATED.3IONS-SCNC: 11 MMOL/L (ref 9–17)
BUN BLDV-MCNC: 74 MG/DL (ref 8–23)
BUN/CREAT BLD: ABNORMAL (ref 9–20)
CALCIUM IONIZED: 1.13 MMOL/L (ref 1.13–1.33)
CALCIUM SERPL-MCNC: 8.5 MG/DL (ref 8.6–10.4)
CHLORIDE BLD-SCNC: 94 MMOL/L (ref 98–107)
CO2: 27 MMOL/L (ref 20–31)
CREAT SERPL-MCNC: 2.11 MG/DL (ref 0.7–1.2)
GFR AFRICAN AMERICAN: 39 ML/MIN
GFR NON-AFRICAN AMERICAN: 32 ML/MIN
GFR SERPL CREATININE-BSD FRML MDRD: ABNORMAL ML/MIN/{1.73_M2}
GFR SERPL CREATININE-BSD FRML MDRD: ABNORMAL ML/MIN/{1.73_M2}
GLUCOSE BLD-MCNC: 133 MG/DL (ref 75–110)
GLUCOSE BLD-MCNC: 250 MG/DL (ref 75–110)
GLUCOSE BLD-MCNC: 264 MG/DL (ref 75–110)
GLUCOSE BLD-MCNC: 337 MG/DL (ref 75–110)
GLUCOSE BLD-MCNC: 374 MG/DL (ref 70–99)
HCT VFR BLD CALC: 29.2 % (ref 40.7–50.3)
HEMOGLOBIN: 9.5 G/DL (ref 13–17)
MCH RBC QN AUTO: 30.5 PG (ref 25.2–33.5)
MCHC RBC AUTO-ENTMCNC: 32.5 G/DL (ref 28.4–34.8)
MCV RBC AUTO: 93.9 FL (ref 82.6–102.9)
NRBC AUTOMATED: 0.2 PER 100 WBC
PDW BLD-RTO: 13.2 % (ref 11.8–14.4)
PHOSPHORUS: 3.6 MG/DL (ref 2.5–4.5)
PLATELET # BLD: 346 K/UL (ref 138–453)
PMV BLD AUTO: 10 FL (ref 8.1–13.5)
POTASSIUM SERPL-SCNC: 4.2 MMOL/L (ref 3.7–5.3)
RBC # BLD: 3.11 M/UL (ref 4.21–5.77)
SODIUM BLD-SCNC: 132 MMOL/L (ref 135–144)
WBC # BLD: 11.5 K/UL (ref 3.5–11.3)

## 2019-08-23 PROCEDURE — 6370000000 HC RX 637 (ALT 250 FOR IP): Performed by: FAMILY MEDICINE

## 2019-08-23 PROCEDURE — 94761 N-INVAS EAR/PLS OXIMETRY MLT: CPT

## 2019-08-23 PROCEDURE — 6370000000 HC RX 637 (ALT 250 FOR IP): Performed by: NURSE PRACTITIONER

## 2019-08-23 PROCEDURE — 6370000000 HC RX 637 (ALT 250 FOR IP): Performed by: INTERNAL MEDICINE

## 2019-08-23 PROCEDURE — 80048 BASIC METABOLIC PNL TOTAL CA: CPT

## 2019-08-23 PROCEDURE — 6360000002 HC RX W HCPCS: Performed by: NURSE PRACTITIONER

## 2019-08-23 PROCEDURE — 84100 ASSAY OF PHOSPHORUS: CPT

## 2019-08-23 PROCEDURE — 97116 GAIT TRAINING THERAPY: CPT

## 2019-08-23 PROCEDURE — 2700000000 HC OXYGEN THERAPY PER DAY

## 2019-08-23 PROCEDURE — 94640 AIRWAY INHALATION TREATMENT: CPT

## 2019-08-23 PROCEDURE — 99232 SBSQ HOSP IP/OBS MODERATE 35: CPT | Performed by: FAMILY MEDICINE

## 2019-08-23 PROCEDURE — 82330 ASSAY OF CALCIUM: CPT

## 2019-08-23 PROCEDURE — 2580000003 HC RX 258: Performed by: NURSE PRACTITIONER

## 2019-08-23 PROCEDURE — 2060000000 HC ICU INTERMEDIATE R&B

## 2019-08-23 PROCEDURE — 85027 COMPLETE CBC AUTOMATED: CPT

## 2019-08-23 PROCEDURE — 82947 ASSAY GLUCOSE BLOOD QUANT: CPT

## 2019-08-23 PROCEDURE — 36415 COLL VENOUS BLD VENIPUNCTURE: CPT

## 2019-08-23 RX ORDER — CLONIDINE HYDROCHLORIDE 0.1 MG/1
0.2 TABLET ORAL ONCE
Status: COMPLETED | OUTPATIENT
Start: 2019-08-23 | End: 2019-08-23

## 2019-08-23 RX ORDER — HYDRALAZINE HYDROCHLORIDE 50 MG/1
100 TABLET, FILM COATED ORAL 2 TIMES DAILY
Status: DISCONTINUED | OUTPATIENT
Start: 2019-08-23 | End: 2019-08-24

## 2019-08-23 RX ADMIN — CLONIDINE HYDROCHLORIDE 0.2 MG: 0.2 TABLET ORAL at 05:21

## 2019-08-23 RX ADMIN — PRAZOSIN HYDROCHLORIDE 2 MG: 1 CAPSULE ORAL at 21:29

## 2019-08-23 RX ADMIN — PRAZOSIN HYDROCHLORIDE 2 MG: 1 CAPSULE ORAL at 07:48

## 2019-08-23 RX ADMIN — CLOPIDOGREL 75 MG: 75 TABLET, FILM COATED ORAL at 07:48

## 2019-08-23 RX ADMIN — QUETIAPINE FUMARATE 100 MG: 100 TABLET ORAL at 23:46

## 2019-08-23 RX ADMIN — Medication 81 MG: at 07:48

## 2019-08-23 RX ADMIN — HEPARIN SODIUM 5000 UNITS: 5000 INJECTION INTRAVENOUS; SUBCUTANEOUS at 05:23

## 2019-08-23 RX ADMIN — GABAPENTIN 200 MG: 100 CAPSULE ORAL at 15:00

## 2019-08-23 RX ADMIN — IPRATROPIUM BROMIDE AND ALBUTEROL SULFATE 1 AMPULE: .5; 3 SOLUTION RESPIRATORY (INHALATION) at 18:17

## 2019-08-23 RX ADMIN — INSULIN LISPRO 8 UNITS: 100 INJECTION, SOLUTION INTRAVENOUS; SUBCUTANEOUS at 16:35

## 2019-08-23 RX ADMIN — PREDNISONE 20 MG: 20 TABLET ORAL at 07:47

## 2019-08-23 RX ADMIN — HYDRALAZINE HYDROCHLORIDE 100 MG: 50 TABLET, FILM COATED ORAL at 21:29

## 2019-08-23 RX ADMIN — CLONIDINE HYDROCHLORIDE 0.2 MG: 0.2 TABLET ORAL at 21:29

## 2019-08-23 RX ADMIN — GABAPENTIN 200 MG: 100 CAPSULE ORAL at 07:47

## 2019-08-23 RX ADMIN — ALBUTEROL SULFATE 2.5 MG: 2.5 SOLUTION RESPIRATORY (INHALATION) at 05:08

## 2019-08-23 RX ADMIN — FAMOTIDINE 10 MG: 20 TABLET, FILM COATED ORAL at 07:48

## 2019-08-23 RX ADMIN — AZITHROMYCIN 250 MG: 250 TABLET, FILM COATED ORAL at 07:48

## 2019-08-23 RX ADMIN — IPRATROPIUM BROMIDE AND ALBUTEROL SULFATE 1 AMPULE: .5; 3 SOLUTION RESPIRATORY (INHALATION) at 11:13

## 2019-08-23 RX ADMIN — GABAPENTIN 200 MG: 100 CAPSULE ORAL at 21:29

## 2019-08-23 RX ADMIN — INSULIN LISPRO 9 UNITS: 100 INJECTION, SOLUTION INTRAVENOUS; SUBCUTANEOUS at 07:39

## 2019-08-23 RX ADMIN — HYDRALAZINE HYDROCHLORIDE 100 MG: 50 TABLET, FILM COATED ORAL at 07:48

## 2019-08-23 RX ADMIN — CLONIDINE HYDROCHLORIDE 0.2 MG: 0.2 TABLET ORAL at 07:48

## 2019-08-23 RX ADMIN — SODIUM CHLORIDE, PRESERVATIVE FREE 10 ML: 5 INJECTION INTRAVENOUS at 21:31

## 2019-08-23 RX ADMIN — HYDRALAZINE HYDROCHLORIDE 20 MG: 20 INJECTION INTRAMUSCULAR; INTRAVENOUS at 00:26

## 2019-08-23 RX ADMIN — INSULIN GLARGINE 35 UNITS: 100 INJECTION, SOLUTION SUBCUTANEOUS at 21:29

## 2019-08-23 RX ADMIN — ATORVASTATIN CALCIUM 80 MG: 80 TABLET, FILM COATED ORAL at 21:28

## 2019-08-23 RX ADMIN — CARVEDILOL 25 MG: 25 TABLET, FILM COATED ORAL at 07:48

## 2019-08-23 RX ADMIN — INSULIN LISPRO 3 UNITS: 100 INJECTION, SOLUTION INTRAVENOUS; SUBCUTANEOUS at 21:30

## 2019-08-23 RX ADMIN — BUMETANIDE 2 MG: 1 TABLET ORAL at 07:48

## 2019-08-23 RX ADMIN — IPRATROPIUM BROMIDE AND ALBUTEROL SULFATE 1 AMPULE: .5; 3 SOLUTION RESPIRATORY (INHALATION) at 23:36

## 2019-08-23 RX ADMIN — INSULIN LISPRO 5 UNITS: 100 INJECTION, SOLUTION INTRAVENOUS; SUBCUTANEOUS at 11:45

## 2019-08-23 RX ADMIN — SODIUM CHLORIDE, PRESERVATIVE FREE 10 ML: 5 INJECTION INTRAVENOUS at 07:48

## 2019-08-23 NOTE — PROGRESS NOTES
BRONCHOSPASM/BRONCHOCONSTRICTION     [x]         IMPROVE AERATION/BREATH SOUNDS  [x]   ADMINISTER BRONCHODILATOR THERAPY AS APPROPRIATE  [x]   ASSESS BREATH SOUNDS  [x]   IMPLEMENT AEROSOL/MDI PROTOCOL  Oli Overton Assessment complete. Hypertensive urgency [I16.0] . Vitals:    08/23/19 0635   BP: (!) 198/63   Pulse: 58   Resp: 16   Temp: 98.4 °F (36.9 °C)   SpO2: 100%   . Patients home meds are   Prior to Admission medications    Medication Sig Start Date End Date Taking? Authorizing Provider   cloNIDine (CATAPRES) 0.3 MG/24HR PTWK Place 1 patch onto the skin once a week   Yes Historical Provider, MD   carvedilol (COREG) 25 MG tablet Take 25 mg by mouth 2 times daily (with meals)   Yes Historical Provider, MD   hydrALAZINE (APRESOLINE) 50 MG tablet Take 50 mg by mouth 2 times daily    Yes Historical Provider, MD   NIFEdipine (PROCARDIA XL) 60 MG extended release tablet Take 60 mg by mouth 2 times daily   Yes Historical Provider, MD   COMBIVENT RESPIMAT  MCG/ACT AERS inhaler INHALE 1 PUFF INTO THE LUNGS EVERY 6 HOURS 7/9/19   Mbonu Loyola Apley, MD   atorvastatin (LIPITOR) 80 MG tablet TAKE 1 TABLET BY MOUTH DAILY 6/10/19   Mbonu Loyola Apley, MD   prazosin (MINIPRESS) 5 MG capsule TAKE 1 CAPSULE BY MOUTH 2 TIMES DAILY DC PREVIOUS SCRIPT 6/10/19   Mbonu Loyola Apley, MD   ASPIRIN ADULT LOW STRENGTH 81 MG EC tablet TAKE 1 TABLET BY MOUTH DAILY 6/10/19   Mbonu Loyola Apley, MD   EASY COMFORT LANCETS MISC DX: DM-2 USE 3-4 TIMES DAILY 3/25/19   Mbonu Loyola Apley, MD   COMFORT EZ PEN NEEDLES 31G X 8 MM MISC USE AS DIRECTED 2/25/19   Mbonu Loyola Apley, MD   insulin aspart (NOVOLOG FLEXPEN) 100 UNIT/ML injection pen Inject 5 Units into the skin 3 times daily (before meals) Sliding scale 2/19/19   Mbonu Loyola Apley, MD   clopidogrel (PLAVIX) 75 MG tablet Take 1 tablet by mouth daily 2/19/19   Mbonu Loyola Apley, MD   nitroGLYCERIN (NITROSTAT) 0.4 MG SL tablet up to max of 3 total doses.  If no relief after 1 dose,

## 2019-08-23 NOTE — PROGRESS NOTES
8/12/2019  His phos was mildly low and monitored  8/13/2019 Patient creat increased to 4.13  And nephrology consulted  8/14- creat continued to elevate  8/15 now hyperkalemic- HD started and patient had 3 days of dialysis. 8/18  Creatinine stable. No dialysis  8/19-8/21. Adjusted insulin table sugars blood pressure is improving    Review of Systems:     Constitutional:  negative for chills, fevers, sweats  Respiratory:  negative for cough, + dyspnea on exertion which is improved, , - hemoptysis,  +shortness of breath at rest or ambulating  cardiovascular:  negative for chest pain, no chest pressure/discomfort ,  No palpitations   Gastrointestinal:  negative for abdominal pain, constipation, diarrhea, nausea, vomiting  Neurological: Positive for dizziness, negative headache    Medications: Allergies:     Allergies   Allergen Reactions    Lisinopril      cough    Ace Inhibitors      coughing    Pcn [Penicillins]        Current Meds:   Scheduled Meds:    cloNIDine  0.2 mg Oral BID    gabapentin  200 mg Oral TID    insulin glargine  35 Units Subcutaneous Nightly    ipratropium-albuterol  1 ampule Inhalation Q6H    prazosin  2 mg Oral BID    azithromycin  250 mg Oral Daily    carvedilol  25 mg Oral BID WC    aspirin  81 mg Oral Daily    melatonin  3 mg Oral Once    bumetanide  2 mg Oral Daily    QUEtiapine  100 mg Oral Nightly    insulin lispro  0-18 Units Subcutaneous TID WC    insulin lispro  0-9 Units Subcutaneous Nightly    insulin lispro  1-6 Units Subcutaneous 4x Daily AC & HS    hydrALAZINE  100 mg Oral TID    heparin (porcine)  5,000 Units Subcutaneous 3 times per day    predniSONE  20 mg Oral Daily    [START ON 8/24/2019] predniSONE  10 mg Oral Daily    albumin human  25 g Intravenous Once    famotidine  10 mg Oral Daily    atorvastatin  80 mg Oral Daily    clopidogrel  75 mg Oral Daily    sodium chloride flush  10 mL Intravenous 2 times per day    mometasone-formoterol  2 puff

## 2019-08-23 NOTE — PROGRESS NOTES
normal bowel sounds, no masses, hepatomegaly, splenomegaly  Extremities:  no edema, redness, tenderness in the calves  Skin:  no gross lesions, rashes, induration    Assessment:        Hospital Problems           Last Modified POA    * (Principal) Hypertensive urgency 8/12/2019 Yes    Pure hypercholesterolemia 8/12/2019 Yes    Exertional dyspnea 8/12/2019 Yes    PTSD (post-traumatic stress disorder) 8/12/2019 Yes    Essential hypertension 8/12/2019 Yes    DM type 2, uncontrolled, with neuropathy ([address] 8/12/2019 Yes    COPD (chronic obstructive pulmonary disease) ([address] 8/12/2019 Yes    Cerebral vascular disease 8/12/2019 Yes    Non-obstructive Coronary artery disease of native artery of native heart with stable angina pectoris ([address] 8/12/2019 Yes    Overview Signed 8/12/2019 11:46 AM by Errol Lazaro, APRN - CNP     9/2017     LMCA: Mild irregularities 10-20%. LAD: 30-40% proximal stenosis    LCx: Mild irregularities 10-20%. RCA: 40% mid stenosis         Hyperparathyroidism ([address] 8/12/2019 Yes    Vitamin D deficiency 8/12/2019 Yes    Acute kidney injury superimposed on chronic kidney disease ([address] 8/12/2019 Yes    Coronary artery disease with exertional angina ([address] 8/12/2019 Yes    Moderate malnutrition ([address] 8/21/2019 Yes          Plan:        Hypertensive urgency: Resolved with blood pressure still poorly controlled he is currently on hydralazine, Minipress, clonidine as a replacement for his home patch and Coreg, today agreed to adjustment, to be done per nephrology.     DM2 with hyperglycemia: Patient compliance under adequate control as he is refusing the sliding scale or any other adjustment in his home insulin dose    COPD exacerbation: continue breathing treatment, continue steroid taper dose, mucolytics and home aerosols    Nonobstructive coronary artery disease: Normal stress test within last year, cardiac cath in the past was unremarkable, currently chest pain free and no further intervention

## 2019-08-23 NOTE — PROGRESS NOTES
up to max of 3 total doses. If no relief after 1 dose, call 911.   insulin glargine (BASAGLAR KWIKPEN) 100 UNIT/ML injection pen, Inject 35 Units into the skin nightly Dispense with pen needle  Dulaglutide (TRULICITY) 4.12 WD/8.1CE SOPN, Inject 0.75 mg into the skin once a week  [DISCONTINUED] ipratropium-albuterol (DUONEB) 0.5-2.5 (3) MG/3ML SOLN nebulizer solution, Inhale 3 mLs into the lungs every 4 hours  TRUE METRIX BLOOD GLUCOSE TEST strip, TEST BLOOD SUGAR 4 TO 5 TIMES DAILY  TRELEGY ELLIPTA 100-62.5-25 MCG/INH AEPB, INHALE 1 PUFF INTO THE LINGS DAILY    Current Medications:     Scheduled Meds:    hydrALAZINE  100 mg Oral BID    cloNIDine  0.2 mg Oral BID    gabapentin  200 mg Oral TID    insulin glargine  35 Units Subcutaneous Nightly    ipratropium-albuterol  1 ampule Inhalation Q6H    prazosin  2 mg Oral BID    azithromycin  250 mg Oral Daily    carvedilol  25 mg Oral BID WC    aspirin  81 mg Oral Daily    melatonin  3 mg Oral Once    bumetanide  2 mg Oral Daily    QUEtiapine  100 mg Oral Nightly    insulin lispro  0-18 Units Subcutaneous TID WC    insulin lispro  0-9 Units Subcutaneous Nightly    insulin lispro  1-6 Units Subcutaneous 4x Daily AC & HS    heparin (porcine)  5,000 Units Subcutaneous 3 times per day    [START ON 8/24/2019] predniSONE  10 mg Oral Daily    albumin human  25 g Intravenous Once    famotidine  10 mg Oral Daily    atorvastatin  80 mg Oral Daily    clopidogrel  75 mg Oral Daily    sodium chloride flush  10 mL Intravenous 2 times per day    mometasone-formoterol  2 puff Inhalation BID    vitamin D  50,000 Units Oral Weekly     Continuous Infusions:    niCARdipine Stopped (08/17/19 1154)    dextrose       PRN Meds:  albuterol, ondansetron, acetaminophen, hydrALAZINE, heparin (porcine), heparin (porcine), ipratropium, sodium chloride flush, magnesium hydroxide, ondansetron, glucose, dextrose, glucagon (rDNA), dextrose, nitroGLYCERIN, morphine **OR** morphine, had three dialysis sessions 8/15, 8/16, 8/17. Tolerated well. Assessing daily for HD need. Today UO good with 1.9 L @160 ml/hr. Cr at 2.1 mg/dl     2. CKD 3 likely due to diabetic and hypertensive nephrosclerosis with baseline creatinine around 1.2 to 1.9 mg/DL.  Follows up with Dr. Danielle Lovell  3. HTN emergency: Isolated SBP elevation. Possible from fluid overload and non compliance. Currently BP under good control. 4. Elevated Tropnonins: Cardiology work up complete and unremarkable. 5. COPD: On Duoneb. SOB improved. Unlikely cardiac cause. 6. T2 DM with carotid artery stent: On ASA, clopidogrel  7. Anemia of Chronic Disease  8. Secondary hyperthyroidism  9.  Vitamin D deficiency. On Vit D 50,000 U PO weekly. Plan:   1.  Improving renal function with good urine output. Cr at plateau level of 2.1 and can be expected to improve gradually. No need of HD for today. Brendon catheter can be removed today. 2.  Cont Bumex 2 mg PO daily. 3. Follow BMP in the am.  4. Strict Input and Output, Daily weigh and document in the chart. 5. Low Potassium, Low phosphorus and low salt diet. Fluids to be restricted to 1800ml/day. 6.  Will follow. Nutrition   Keep patient on renal diet/TF. Avoid nephrotoxic drugs/contrast exposure. We will continue to follow along with you. Jamal Donovan MD, PGY-1  Internal Medicine Residency Program  [facility]  8/23/2019 10:36 AM    Attending Physician Statement  I have discussed the care of this patient, including pertinent history and exam findings, with the Resident/CNP. I have reviewed the key elements of all parts of the encounter with the Resident/CNP. I agree with the assessment, plan and orders as documented by the Resident/CNP. Alexis Roque MD   Nephrology [address] [place] [address] Drive    This note is created with the assistance of a speech-recognition program. While intending to generate a document that actually reflects the content

## 2019-08-24 LAB
ANION GAP SERPL CALCULATED.3IONS-SCNC: 14 MMOL/L (ref 9–17)
BUN BLDV-MCNC: 70 MG/DL (ref 8–23)
BUN/CREAT BLD: ABNORMAL (ref 9–20)
CALCIUM IONIZED: 1.18 MMOL/L (ref 1.13–1.33)
CALCIUM SERPL-MCNC: 8.7 MG/DL (ref 8.6–10.4)
CHLORIDE BLD-SCNC: 101 MMOL/L (ref 98–107)
CO2: 27 MMOL/L (ref 20–31)
CREAT SERPL-MCNC: 2.11 MG/DL (ref 0.7–1.2)
GFR AFRICAN AMERICAN: 39 ML/MIN
GFR NON-AFRICAN AMERICAN: 32 ML/MIN
GFR SERPL CREATININE-BSD FRML MDRD: ABNORMAL ML/MIN/{1.73_M2}
GFR SERPL CREATININE-BSD FRML MDRD: ABNORMAL ML/MIN/{1.73_M2}
GLUCOSE BLD-MCNC: 143 MG/DL (ref 75–110)
GLUCOSE BLD-MCNC: 170 MG/DL (ref 75–110)
GLUCOSE BLD-MCNC: 227 MG/DL (ref 75–110)
GLUCOSE BLD-MCNC: 236 MG/DL (ref 75–110)
GLUCOSE BLD-MCNC: 367 MG/DL (ref 75–110)
GLUCOSE BLD-MCNC: 68 MG/DL (ref 70–99)
GLUCOSE BLD-MCNC: 76 MG/DL (ref 75–110)
HCT VFR BLD CALC: 29.9 % (ref 40.7–50.3)
HEMOGLOBIN: 9.5 G/DL (ref 13–17)
MCH RBC QN AUTO: 29.9 PG (ref 25.2–33.5)
MCHC RBC AUTO-ENTMCNC: 31.8 G/DL (ref 28.4–34.8)
MCV RBC AUTO: 94 FL (ref 82.6–102.9)
NRBC AUTOMATED: 0.3 PER 100 WBC
PDW BLD-RTO: 13.1 % (ref 11.8–14.4)
PHOSPHORUS: 3.6 MG/DL (ref 2.5–4.5)
PLATELET # BLD: 360 K/UL (ref 138–453)
PMV BLD AUTO: 9.9 FL (ref 8.1–13.5)
POTASSIUM SERPL-SCNC: 4 MMOL/L (ref 3.7–5.3)
RBC # BLD: 3.18 M/UL (ref 4.21–5.77)
SODIUM BLD-SCNC: 142 MMOL/L (ref 135–144)
WBC # BLD: 11.6 K/UL (ref 3.5–11.3)

## 2019-08-24 PROCEDURE — 6370000000 HC RX 637 (ALT 250 FOR IP): Performed by: NURSE PRACTITIONER

## 2019-08-24 PROCEDURE — 6370000000 HC RX 637 (ALT 250 FOR IP): Performed by: INTERNAL MEDICINE

## 2019-08-24 PROCEDURE — 2580000003 HC RX 258: Performed by: NURSE PRACTITIONER

## 2019-08-24 PROCEDURE — 85027 COMPLETE CBC AUTOMATED: CPT

## 2019-08-24 PROCEDURE — 94640 AIRWAY INHALATION TREATMENT: CPT

## 2019-08-24 PROCEDURE — 94761 N-INVAS EAR/PLS OXIMETRY MLT: CPT

## 2019-08-24 PROCEDURE — 6370000000 HC RX 637 (ALT 250 FOR IP): Performed by: FAMILY MEDICINE

## 2019-08-24 PROCEDURE — 80048 BASIC METABOLIC PNL TOTAL CA: CPT

## 2019-08-24 PROCEDURE — 2060000000 HC ICU INTERMEDIATE R&B

## 2019-08-24 PROCEDURE — 2700000000 HC OXYGEN THERAPY PER DAY

## 2019-08-24 PROCEDURE — 94660 CPAP INITIATION&MGMT: CPT

## 2019-08-24 PROCEDURE — 82330 ASSAY OF CALCIUM: CPT

## 2019-08-24 PROCEDURE — 84100 ASSAY OF PHOSPHORUS: CPT

## 2019-08-24 PROCEDURE — 36415 COLL VENOUS BLD VENIPUNCTURE: CPT

## 2019-08-24 PROCEDURE — 82947 ASSAY GLUCOSE BLOOD QUANT: CPT

## 2019-08-24 PROCEDURE — 99232 SBSQ HOSP IP/OBS MODERATE 35: CPT | Performed by: FAMILY MEDICINE

## 2019-08-24 RX ORDER — CLONIDINE HYDROCHLORIDE 0.1 MG/1
0.1 TABLET ORAL 2 TIMES DAILY
Status: DISCONTINUED | OUTPATIENT
Start: 2019-08-24 | End: 2019-08-27

## 2019-08-24 RX ORDER — AMLODIPINE BESYLATE 5 MG/1
5 TABLET ORAL 2 TIMES DAILY
Status: DISCONTINUED | OUTPATIENT
Start: 2019-08-24 | End: 2019-08-28 | Stop reason: HOSPADM

## 2019-08-24 RX ORDER — PRAZOSIN HYDROCHLORIDE 5 MG/1
5 CAPSULE ORAL 2 TIMES DAILY
Status: DISCONTINUED | OUTPATIENT
Start: 2019-08-24 | End: 2019-08-27

## 2019-08-24 RX ORDER — LOSARTAN POTASSIUM 50 MG/1
50 TABLET ORAL NIGHTLY
Status: DISCONTINUED | OUTPATIENT
Start: 2019-08-24 | End: 2019-08-25

## 2019-08-24 RX ADMIN — INSULIN GLARGINE 35 UNITS: 100 INJECTION, SOLUTION SUBCUTANEOUS at 22:09

## 2019-08-24 RX ADMIN — INSULIN LISPRO 3 UNITS: 100 INJECTION, SOLUTION INTRAVENOUS; SUBCUTANEOUS at 22:10

## 2019-08-24 RX ADMIN — ONDANSETRON HYDROCHLORIDE 4 MG: 4 TABLET, FILM COATED ORAL at 14:25

## 2019-08-24 RX ADMIN — QUETIAPINE FUMARATE 100 MG: 100 TABLET ORAL at 23:26

## 2019-08-24 RX ADMIN — BUMETANIDE 2 MG: 1 TABLET ORAL at 09:21

## 2019-08-24 RX ADMIN — CLONIDINE HYDROCHLORIDE 0.1 MG: 0.2 TABLET ORAL at 22:16

## 2019-08-24 RX ADMIN — GABAPENTIN 200 MG: 100 CAPSULE ORAL at 09:20

## 2019-08-24 RX ADMIN — IPRATROPIUM BROMIDE AND ALBUTEROL SULFATE 1 AMPULE: .5; 3 SOLUTION RESPIRATORY (INHALATION) at 11:24

## 2019-08-24 RX ADMIN — Medication 81 MG: at 09:21

## 2019-08-24 RX ADMIN — INSULIN LISPRO 4 UNITS: 100 INJECTION, SOLUTION INTRAVENOUS; SUBCUTANEOUS at 04:14

## 2019-08-24 RX ADMIN — SODIUM CHLORIDE, PRESERVATIVE FREE 10 ML: 5 INJECTION INTRAVENOUS at 22:37

## 2019-08-24 RX ADMIN — IPRATROPIUM BROMIDE AND ALBUTEROL SULFATE 1 AMPULE: .5; 3 SOLUTION RESPIRATORY (INHALATION) at 17:57

## 2019-08-24 RX ADMIN — PRAZOSIN HYDROCHLORIDE 5 MG: 5 CAPSULE ORAL at 22:17

## 2019-08-24 RX ADMIN — AZITHROMYCIN 250 MG: 250 TABLET, FILM COATED ORAL at 09:21

## 2019-08-24 RX ADMIN — ATORVASTATIN CALCIUM 80 MG: 80 TABLET, FILM COATED ORAL at 22:15

## 2019-08-24 RX ADMIN — CARVEDILOL 25 MG: 25 TABLET, FILM COATED ORAL at 22:19

## 2019-08-24 RX ADMIN — AMLODIPINE BESYLATE 5 MG: 5 TABLET ORAL at 22:18

## 2019-08-24 RX ADMIN — SODIUM CHLORIDE, PRESERVATIVE FREE 10 ML: 5 INJECTION INTRAVENOUS at 09:22

## 2019-08-24 RX ADMIN — CLONIDINE HYDROCHLORIDE 0.2 MG: 0.2 TABLET ORAL at 09:20

## 2019-08-24 RX ADMIN — INSULIN LISPRO 5 UNITS: 100 INJECTION, SOLUTION INTRAVENOUS; SUBCUTANEOUS at 16:37

## 2019-08-24 RX ADMIN — GABAPENTIN 200 MG: 100 CAPSULE ORAL at 22:17

## 2019-08-24 RX ADMIN — IPRATROPIUM BROMIDE AND ALBUTEROL SULFATE 1 AMPULE: .5; 3 SOLUTION RESPIRATORY (INHALATION) at 23:15

## 2019-08-24 RX ADMIN — FAMOTIDINE 10 MG: 20 TABLET, FILM COATED ORAL at 11:16

## 2019-08-24 RX ADMIN — PRAZOSIN HYDROCHLORIDE 2 MG: 1 CAPSULE ORAL at 09:20

## 2019-08-24 RX ADMIN — INSULIN LISPRO 5 UNITS: 100 INJECTION, SOLUTION INTRAVENOUS; SUBCUTANEOUS at 12:02

## 2019-08-24 RX ADMIN — HYDRALAZINE HYDROCHLORIDE 100 MG: 50 TABLET, FILM COATED ORAL at 09:20

## 2019-08-24 RX ADMIN — PREDNISONE 10 MG: 10 TABLET ORAL at 09:20

## 2019-08-24 RX ADMIN — IPRATROPIUM BROMIDE AND ALBUTEROL SULFATE 1 AMPULE: .5; 3 SOLUTION RESPIRATORY (INHALATION) at 06:10

## 2019-08-24 RX ADMIN — CLOPIDOGREL 75 MG: 75 TABLET, FILM COATED ORAL at 09:20

## 2019-08-24 RX ADMIN — GABAPENTIN 200 MG: 100 CAPSULE ORAL at 15:22

## 2019-08-24 RX ADMIN — CARVEDILOL 25 MG: 25 TABLET, FILM COATED ORAL at 09:20

## 2019-08-24 RX ADMIN — LOSARTAN POTASSIUM 50 MG: 50 TABLET, FILM COATED ORAL at 22:18

## 2019-08-24 ASSESSMENT — PAIN SCALES - GENERAL: PAINLEVEL_OUTOF10: 0

## 2019-08-24 NOTE — PROGRESS NOTES
MG/0.5ML SOPN Inject 0.75 mg into the skin once a week 2/19/19   Zara Uribe MD   TRUE METRIX BLOOD GLUCOSE TEST strip TEST BLOOD SUGAR 4 TO 5 TIMES DAILY 1/28/19   Zara Uribe MD   Acey Bunting 100-62.5-25 MCG/INH AEPB INHALE 1 PUFF INTO THE LINGS DAILY 1/28/19   Zara Uribe MD   .  Assessment     Peak Flow (asthma only)    Predicted: na  Personal Best: na  PEF na  % Predicted na  Peak Flow : not applicable = 0    VTQ6/QOF    FEV1 Predicted na      FEV1 na    FEV1 % Predicted na  FVC na  IS volume na  IBW na    RR 20  Breath Sounds: Clear/Diminished     Pt does tx at home Q6      · Bronchodilator assessment at level 3  · Hyperinflation assessment at level   · Secretion Management assessment at level    ·   · [x]    Bronchodilator Assessment  BRONCHODILATOR ASSESSMENT SCORE  Score 0 1 2 3 4 5   Breath Sounds   []  Patient Baseline []  No Wheeze good aeration []  Faint, scattered wheezing, good aeration [x]  Expiratory Wheezing and or moderately diminished []  Insp/Exp wheeze and/or very diminished []  Insp/Exp and/ or marked distress   Respiratory Rate   []  Patient Baseline []  Less than 20 []  Less than 20 [x]  20-25 []  Greater than 25 []  Greater than 25   Peak flow % of Pred or PB [x]  NA   []  Greater than 90%  []  81-90% []  71-80% []  Less than or equal to 70%  or unable to perform []  Unable due to Respiratory Distress   Dyspnea re []  Patient Baseline []  No SOB []  No SOB [x]  SOB on exertion []  SOB min activity []  At rest/acute   e FEV% Predicted       [x]  NA []  Above 69%  []  Unable []  Above 60-69%  []  Unable []  Above 50-59%  []  Unable []  Above 35-49%  []  Unable []  Less than 35%  []  Unable                 []  Hyperinflation Assessment  Score 1 2 3   CXR and Breath Sounds   []  Clear []  No atelectasis  Basilar aeration []  Atelectasis or absent basilar breath sounds   Incentive Spirometry Volume  (Per IBW)   []  Greater than or equal to 15ml/Kg []  less than 15ml/Kg

## 2019-08-24 NOTE — PROGRESS NOTES
190/70 7 mmHg  Over 2 hours. Then admitted for HTN emergency. CT head without contrast shown no acute changes. CTA chest with contrast was done without abnormality outside his chronic COPD with appreciated small bilateral pleural effusions and adjacent atelectasis.       He was also noted to have hyperparathyroidism and low vitamin d.  Replacement was initiated 8/12/2019  His phos was mildly low and monitored  8/13/2019 Patient creat increased to 4.13  And nephrology consulted  8/14- creat continued to elevate  8/15 now hyperkalemic- HD started and patient had 3 days of dialysis. 8/18  Creatinine stable. No dialysis  8/19-8/21. Adjusted insulin table sugars blood pressure is improving       Review of Systems:     Constitutional:  negative for chills, fevers, sweats. +ve Fatigue and weakness   Respiratory:  negative for cough, +ve for dyspnea on exertion and shortness of breath, no wheezing  Cardiovascular:  negative for chest pain, chest pressure/discomfort, lower extremity edema, palpitations  Gastrointestinal:  negative for abdominal pain, constipation, diarrhea, nausea, vomiting  Neurological:  negative for dizziness, headache    Medications: Allergies:     Allergies   Allergen Reactions    Lisinopril      cough    Ace Inhibitors      coughing    Pcn [Penicillins]        Current Meds:   Scheduled Meds:    hydrALAZINE  100 mg Oral BID    cloNIDine  0.2 mg Oral BID    gabapentin  200 mg Oral TID    insulin glargine  35 Units Subcutaneous Nightly    ipratropium-albuterol  1 ampule Inhalation Q6H    prazosin  2 mg Oral BID    azithromycin  250 mg Oral Daily    carvedilol  25 mg Oral BID WC    aspirin  81 mg Oral Daily    melatonin  3 mg Oral Once    bumetanide  2 mg Oral Daily    QUEtiapine  100 mg Oral Nightly    insulin lispro  0-18 Units Subcutaneous TID WC    insulin lispro  0-9 Units Subcutaneous Nightly    insulin lispro  1-6 Units Subcutaneous 4x Daily AC & HS    heparin (porcine) °F (36.7 °C), Max:98.8 °F (37.1 °C)    Recent Labs     08/23/19  1625 08/23/19  2104 08/24/19  0401 08/24/19  0639   POCGLU 264* 250* 367* 76       I/O (24Hr): Intake/Output Summary (Last 24 hours) at 8/24/2019 0841  Last data filed at 8/23/2019 2225  Gross per 24 hour   Intake 200 ml   Output 2150 ml   Net -1950 ml       Labs:  Hematology:  Recent Labs     08/22/19  0625 08/23/19  0447 08/24/19  0656   WBC 11.5* 11.5* 11.6*   RBC 3.14* 3.11* 3.18*   HGB 9.3* 9.5* 9.5*   HCT 30.5* 29.2* 29.9*   MCV 97.1 93.9 94.0   MCH 29.6 30.5 29.9   MCHC 30.5 32.5 31.8   RDW 13.2 13.2 13.1    346 360   MPV 10.5 10.0 9.9     Chemistry:  Recent Labs     08/22/19  0625 08/23/19  0447 08/24/19  0656    132* 142   K 4.3 4.2 4.0    94* 101   CO2 27 27 27   GLUCOSE 199* 374* 68*   BUN 69* 74* 70*   CREATININE 2.18* 2.11* 2.11*   MG 1.6  --   --    ANIONGAP 14 11 14   LABGLOM 31* 32* 32*   GFRAA 37* 39* 39*   CALCIUM 8.7 8.5* 8.7   CAION 1.18 1.13 1.18   PHOS 4.2 3.6 3.6     Recent Labs     08/23/19  0642 08/23/19  1135 08/23/19  1625 08/23/19 2104 08/24/19  0401 08/24/19  0639   POCGLU 337* 133* 264* 250* 367* 76     ABG:  Lab Results   Component Value Date    POCPH 7.39 06/17/2013    POCPCO2 46 06/17/2013    POCPO2 288 06/17/2013    POCHCO3 27.8 06/17/2013    NBEA NOT REPORTED 06/17/2013    PBEA 3 06/17/2013    KSF1JXD 29 06/17/2013    IIEK9RTO 100 06/17/2013    FIO2 NOT REPORTED 08/02/2013     Lab Results   Component Value Date/Time    SPECIAL NOT REPORTED 09/02/2018 08:51 PM     Lab Results   Component Value Date/Time    CULTURE NO SIGNIFICANT GROWTH 09/02/2018 08:51 PM       Radiology:  No results found.     Physical Examination:        General appearance:  alert, cooperative and no distress  Mental Status:  oriented to person, place and time and normal affect  Lungs: Managed to auscultation bilaterally, no wheezing, normal effort  Heart:  regular rate and rhythm, +ve murmur  Right IJ Brendon noted  Abdomen:

## 2019-08-25 LAB
ANION GAP SERPL CALCULATED.3IONS-SCNC: 11 MMOL/L (ref 9–17)
BUN BLDV-MCNC: 79 MG/DL (ref 8–23)
BUN/CREAT BLD: ABNORMAL (ref 9–20)
CALCIUM IONIZED: 1.22 MMOL/L (ref 1.13–1.33)
CALCIUM SERPL-MCNC: 8.6 MG/DL (ref 8.6–10.4)
CHLORIDE BLD-SCNC: 99 MMOL/L (ref 98–107)
CO2: 27 MMOL/L (ref 20–31)
CREAT SERPL-MCNC: 2.14 MG/DL (ref 0.7–1.2)
GFR AFRICAN AMERICAN: 38 ML/MIN
GFR NON-AFRICAN AMERICAN: 31 ML/MIN
GFR SERPL CREATININE-BSD FRML MDRD: ABNORMAL ML/MIN/{1.73_M2}
GFR SERPL CREATININE-BSD FRML MDRD: ABNORMAL ML/MIN/{1.73_M2}
GLUCOSE BLD-MCNC: 123 MG/DL (ref 75–110)
GLUCOSE BLD-MCNC: 141 MG/DL (ref 75–110)
GLUCOSE BLD-MCNC: 219 MG/DL (ref 75–110)
GLUCOSE BLD-MCNC: 260 MG/DL (ref 70–99)
GLUCOSE BLD-MCNC: 399 MG/DL (ref 75–110)
GLUCOSE BLD-MCNC: 54 MG/DL (ref 75–110)
HCT VFR BLD CALC: 30.1 % (ref 40.7–50.3)
HEMOGLOBIN: 9.4 G/DL (ref 13–17)
MCH RBC QN AUTO: 30.7 PG (ref 25.2–33.5)
MCHC RBC AUTO-ENTMCNC: 31.2 G/DL (ref 28.4–34.8)
MCV RBC AUTO: 98.4 FL (ref 82.6–102.9)
NRBC AUTOMATED: 0.2 PER 100 WBC
PDW BLD-RTO: 13.2 % (ref 11.8–14.4)
PHOSPHORUS: 3.9 MG/DL (ref 2.5–4.5)
PLATELET # BLD: 359 K/UL (ref 138–453)
PMV BLD AUTO: 9.9 FL (ref 8.1–13.5)
POTASSIUM SERPL-SCNC: 4.6 MMOL/L (ref 3.7–5.3)
RBC # BLD: 3.06 M/UL (ref 4.21–5.77)
SODIUM BLD-SCNC: 137 MMOL/L (ref 135–144)
WBC # BLD: 11 K/UL (ref 3.5–11.3)

## 2019-08-25 PROCEDURE — 80048 BASIC METABOLIC PNL TOTAL CA: CPT

## 2019-08-25 PROCEDURE — 6370000000 HC RX 637 (ALT 250 FOR IP): Performed by: INTERNAL MEDICINE

## 2019-08-25 PROCEDURE — 6370000000 HC RX 637 (ALT 250 FOR IP): Performed by: NURSE PRACTITIONER

## 2019-08-25 PROCEDURE — 85027 COMPLETE CBC AUTOMATED: CPT

## 2019-08-25 PROCEDURE — 6360000002 HC RX W HCPCS: Performed by: NURSE PRACTITIONER

## 2019-08-25 PROCEDURE — 84100 ASSAY OF PHOSPHORUS: CPT

## 2019-08-25 PROCEDURE — 6370000000 HC RX 637 (ALT 250 FOR IP): Performed by: FAMILY MEDICINE

## 2019-08-25 PROCEDURE — 82330 ASSAY OF CALCIUM: CPT

## 2019-08-25 PROCEDURE — 2060000000 HC ICU INTERMEDIATE R&B

## 2019-08-25 PROCEDURE — 82947 ASSAY GLUCOSE BLOOD QUANT: CPT

## 2019-08-25 PROCEDURE — 36415 COLL VENOUS BLD VENIPUNCTURE: CPT

## 2019-08-25 PROCEDURE — 2580000003 HC RX 258: Performed by: NURSE PRACTITIONER

## 2019-08-25 PROCEDURE — 99231 SBSQ HOSP IP/OBS SF/LOW 25: CPT | Performed by: FAMILY MEDICINE

## 2019-08-25 PROCEDURE — 94640 AIRWAY INHALATION TREATMENT: CPT

## 2019-08-25 RX ORDER — LOSARTAN POTASSIUM 50 MG/1
100 TABLET ORAL NIGHTLY
Status: DISCONTINUED | OUTPATIENT
Start: 2019-08-25 | End: 2019-08-26

## 2019-08-25 RX ADMIN — SODIUM CHLORIDE, PRESERVATIVE FREE 10 ML: 5 INJECTION INTRAVENOUS at 20:24

## 2019-08-25 RX ADMIN — Medication 81 MG: at 07:51

## 2019-08-25 RX ADMIN — CLONIDINE HYDROCHLORIDE 0.1 MG: 0.2 TABLET ORAL at 20:36

## 2019-08-25 RX ADMIN — BUMETANIDE 2 MG: 1 TABLET ORAL at 07:52

## 2019-08-25 RX ADMIN — GABAPENTIN 200 MG: 100 CAPSULE ORAL at 21:47

## 2019-08-25 RX ADMIN — ATORVASTATIN CALCIUM 80 MG: 80 TABLET, FILM COATED ORAL at 20:36

## 2019-08-25 RX ADMIN — GABAPENTIN 200 MG: 100 CAPSULE ORAL at 13:38

## 2019-08-25 RX ADMIN — IPRATROPIUM BROMIDE AND ALBUTEROL SULFATE 1 AMPULE: .5; 3 SOLUTION RESPIRATORY (INHALATION) at 18:01

## 2019-08-25 RX ADMIN — AMLODIPINE BESYLATE 5 MG: 5 TABLET ORAL at 20:36

## 2019-08-25 RX ADMIN — CARVEDILOL 25 MG: 25 TABLET, FILM COATED ORAL at 07:53

## 2019-08-25 RX ADMIN — LOSARTAN POTASSIUM 100 MG: 50 TABLET, FILM COATED ORAL at 20:35

## 2019-08-25 RX ADMIN — HYDRALAZINE HYDROCHLORIDE 20 MG: 20 INJECTION INTRAMUSCULAR; INTRAVENOUS at 18:52

## 2019-08-25 RX ADMIN — INSULIN LISPRO 3 UNITS: 100 INJECTION, SOLUTION INTRAVENOUS; SUBCUTANEOUS at 21:50

## 2019-08-25 RX ADMIN — INSULIN LISPRO 8 UNITS: 100 INJECTION, SOLUTION INTRAVENOUS; SUBCUTANEOUS at 16:36

## 2019-08-25 RX ADMIN — SODIUM CHLORIDE, PRESERVATIVE FREE 10 ML: 5 INJECTION INTRAVENOUS at 10:56

## 2019-08-25 RX ADMIN — QUETIAPINE FUMARATE 100 MG: 100 TABLET ORAL at 21:47

## 2019-08-25 RX ADMIN — FAMOTIDINE 10 MG: 20 TABLET, FILM COATED ORAL at 07:53

## 2019-08-25 RX ADMIN — CARVEDILOL 25 MG: 25 TABLET, FILM COATED ORAL at 16:36

## 2019-08-25 RX ADMIN — IPRATROPIUM BROMIDE AND ALBUTEROL SULFATE 1 AMPULE: .5; 3 SOLUTION RESPIRATORY (INHALATION) at 23:02

## 2019-08-25 RX ADMIN — IPRATROPIUM BROMIDE AND ALBUTEROL SULFATE 1 AMPULE: .5; 3 SOLUTION RESPIRATORY (INHALATION) at 05:42

## 2019-08-25 RX ADMIN — GABAPENTIN 200 MG: 100 CAPSULE ORAL at 07:51

## 2019-08-25 RX ADMIN — CLONIDINE HYDROCHLORIDE 0.1 MG: 0.2 TABLET ORAL at 07:52

## 2019-08-25 RX ADMIN — INSULIN LISPRO 6 UNITS: 100 INJECTION, SOLUTION INTRAVENOUS; SUBCUTANEOUS at 08:03

## 2019-08-25 RX ADMIN — HEPARIN SODIUM 5000 UNITS: 5000 INJECTION INTRAVENOUS; SUBCUTANEOUS at 13:38

## 2019-08-25 RX ADMIN — AMLODIPINE BESYLATE 5 MG: 5 TABLET ORAL at 07:49

## 2019-08-25 RX ADMIN — PRAZOSIN HYDROCHLORIDE 5 MG: 5 CAPSULE ORAL at 07:54

## 2019-08-25 RX ADMIN — IPRATROPIUM BROMIDE AND ALBUTEROL SULFATE 1 AMPULE: .5; 3 SOLUTION RESPIRATORY (INHALATION) at 12:31

## 2019-08-25 RX ADMIN — PRAZOSIN HYDROCHLORIDE 5 MG: 5 CAPSULE ORAL at 20:35

## 2019-08-25 RX ADMIN — INSULIN GLARGINE 35 UNITS: 100 INJECTION, SOLUTION SUBCUTANEOUS at 21:47

## 2019-08-25 RX ADMIN — CLOPIDOGREL 75 MG: 75 TABLET, FILM COATED ORAL at 07:53

## 2019-08-25 RX ADMIN — PREDNISONE 10 MG: 10 TABLET ORAL at 07:55

## 2019-08-25 ASSESSMENT — PAIN SCALES - GENERAL
PAINLEVEL_OUTOF10: 0

## 2019-08-25 NOTE — PROGRESS NOTES
within last year, cardiac cath in the past was unremarkable, currently chest pain free and no further intervention recommended per cardiology.   To new chronic medications    Acute on chronic kidney disease: Needed dialysis 3 times during this admission, currently creatinine plateaued and known further dialysis needed, still with Brendon and plan to take it out today      Moderate malnutrition: Continue supplemental boost and follow-up dietitian recommendation    Generalized weakness and deconditioning: Continue PT/OT, placement is pending    Diabetic neuropathy: Continue gabapentin    DVT prophylaxis     DC planning await negro, continue PT/OT    Joanna Ryan MD  8/25/2019  8:53 AM

## 2019-08-25 NOTE — PROGRESS NOTES
Patient is OOB ambulating in hallways. Awake and alert. No c/o pain no s/s of distress. Nsg assessment completed, see flow. Safety measures in place.

## 2019-08-25 NOTE — PROGRESS NOTES
Pt refused sliding scale recommended insulin dose-Pt verbalized 8 units of insulin is all that he would take-*units sq per left arm for blood [id number]

## 2019-08-25 NOTE — PROGRESS NOTES
Renal Progress Note    Patient :  Srinivasan Richardson; 62 y.o. MRN# [id number]  Location:  3003/3003-01  Attending:  Lady Anant MD  Admit Date:  8/11/2019   Hospital Day: 13      Subjective:     Pt was seen and examined. Labs reviewed. No acute overnight issues. BP meds adjusted yesterday remains labile. Required readjustment of his blood pressure meds again today. Currently asymptomatic. Able to ambulate in the hallways  No HA    Outpatient Medications:     Medications Prior to Admission: cloNIDine (CATAPRES) 0.3 MG/24HR PTWK, Place 1 patch onto the skin once a week  carvedilol (COREG) 25 MG tablet, Take 25 mg by mouth 2 times daily (with meals)  hydrALAZINE (APRESOLINE) 50 MG tablet, Take 50 mg by mouth 2 times daily   NIFEdipine (PROCARDIA XL) 60 MG extended release tablet, Take 60 mg by mouth 2 times daily  COMBIVENT RESPIMAT  MCG/ACT AERS inhaler, INHALE 1 PUFF INTO THE LUNGS EVERY 6 HOURS  atorvastatin (LIPITOR) 80 MG tablet, TAKE 1 TABLET BY MOUTH DAILY  prazosin (MINIPRESS) 5 MG capsule, TAKE 1 CAPSULE BY MOUTH 2 TIMES DAILY DC PREVIOUS SCRIPT  ASPIRIN ADULT LOW STRENGTH 81 MG EC tablet, TAKE 1 TABLET BY MOUTH DAILY  [DISCONTINUED] amLODIPine (NORVASC) 10 MG tablet, TAKE 1 TABLET BY MOUTH DAILY  [DISCONTINUED] isosorbide mononitrate (IMDUR) 120 MG extended release tablet, TAKE 1 TABLET BY MOUTH DAILY  [DISCONTINUED] gabapentin (NEURONTIN) 300 MG capsule, Take 1 capsule by mouth 3 times daily for 30 days.  mg dose (Patient taking differently: Take 200 mg by mouth 3 times daily.   mg dose)  [DISCONTINUED] QUEtiapine (SEROQUEL) 100 MG tablet, TAKE 1 TABLET BY MOUTH NIGHTLY  EASY COMFORT LANCETS MISC, DX: DM-2 USE 3-4 TIMES DAILY  COMFORT EZ PEN NEEDLES 31G X 8 MM MISC, USE AS DIRECTED  insulin aspart (NOVOLOG FLEXPEN) 100 UNIT/ML injection pen, Inject 5 Units into the skin 3 times daily (before meals) Sliding scale  clopidogrel (PLAVIX) 75 MG tablet, Take 1 tablet by mouth daily  nitroGLYCERIN (NITROSTAT) 0.4 MG SL tablet, up to max of 3 total doses. If no relief after 1 dose, call 911.   insulin glargine (BASAGLAR KWIKPEN) 100 UNIT/ML injection pen, Inject 35 Units into the skin nightly Dispense with pen needle  Dulaglutide (TRULICITY) 3.37 XZ/5.7ZW SOPN, Inject 0.75 mg into the skin once a week  [DISCONTINUED] ipratropium-albuterol (DUONEB) 0.5-2.5 (3) MG/3ML SOLN nebulizer solution, Inhale 3 mLs into the lungs every 4 hours  TRUE METRIX BLOOD GLUCOSE TEST strip, TEST BLOOD SUGAR 4 TO 5 TIMES DAILY  TRELEGY ELLIPTA 100-62.5-25 MCG/INH AEPB, INHALE 1 PUFF INTO THE LINGS DAILY    Current Medications:     Scheduled Meds:    amLODIPine  5 mg Oral BID    losartan  50 mg Oral Nightly    prazosin  5 mg Oral BID    cloNIDine  0.1 mg Oral BID    gabapentin  200 mg Oral TID    insulin glargine  35 Units Subcutaneous Nightly    ipratropium-albuterol  1 ampule Inhalation Q6H    carvedilol  25 mg Oral BID WC    aspirin  81 mg Oral Daily    melatonin  3 mg Oral Once    bumetanide  2 mg Oral Daily    QUEtiapine  100 mg Oral Nightly    insulin lispro  0-18 Units Subcutaneous TID WC    insulin lispro  0-9 Units Subcutaneous Nightly    insulin lispro  1-6 Units Subcutaneous 4x Daily AC & HS    heparin (porcine)  5,000 Units Subcutaneous 3 times per day    predniSONE  10 mg Oral Daily    albumin human  25 g Intravenous Once    famotidine  10 mg Oral Daily    atorvastatin  80 mg Oral Daily    clopidogrel  75 mg Oral Daily    sodium chloride flush  10 mL Intravenous 2 times per day    mometasone-formoterol  2 puff Inhalation BID    vitamin D  50,000 Units Oral Weekly     Continuous Infusions:    niCARdipine Stopped (08/17/19 1154)    dextrose       PRN Meds:  albuterol, ondansetron, acetaminophen, hydrALAZINE, heparin (porcine), heparin (porcine), ipratropium, sodium chloride flush, magnesium hydroxide, ondansetron, glucose, dextrose, glucagon (rDNA), dextrose, nitroGLYCERIN, well.  Continue catheter has been removed. Patient currently does not require dialysis anymore. Today UO good with 3.2 L. Cr at 2.14 mg/dl     2. CKD 3 likely due to diabetic and hypertensive nephrosclerosis with baseline creatinine around 1.2 to 1.9 mg/DL.  Follows up with Dr. Benny Rock  3. HTN emergency: Isolated SBP elevation. Possible from fluid overload and non compliance.   4. Elevated Tropnonins: Cardiology work up complete and unremarkable. 5. COPD: On Duoneb. SOB improved. Unlikely cardiac cause. 6. T2 DM with carotid artery stent: On ASA, clopidogrel  7. Anemia of Chronic Disease  8. Secondary hyperthyroidism  9.  Vitamin D deficiency. On Vit D 50,000 U PO weekly. Plan:   1.  Improving renal function with good urine output. Cr at plateau level of 8.7 SHB can be expected to improve gradually. No need of HD at this time. 2. Cont Bumex 2 mg PO daily. 3. Follow BMP in the am.  4. Strict Input and Output, Daily weigh and document in the chart. 5. Low Potassium, Low phosphorus and low salt diet. Fluids to be restricted to 1800ml/day. 6. Continue present antihypertensive regimen as adjusted yesterday along with increasing losartan 200 mg p.o. daily today. Weaning off clonidine. 7.  Will follow. Nutrition   Keep patient on renal diet/TF. Avoid nephrotoxic drugs/contrast exposure. We will continue to follow along with you. TERRANCE Mercado  [facility]    Attending Physician Statement  I have discussed the care of this patient, including pertinent history and exam findings, with the Resident/CNP. I have reviewed and edited the key elements of all parts of the encounter with the Resident/CNP. I agree with the assessment, plan and orders as documented by the Resident/CNP. Alexis Polo MD   Nephrology [address] [place] [address] Drive    This note is created with the assistance of a speech-recognition program. While intending to generate a document that actually reflects

## 2019-08-26 LAB
ANION GAP SERPL CALCULATED.3IONS-SCNC: 12 MMOL/L (ref 9–17)
BUN BLDV-MCNC: 83 MG/DL (ref 8–23)
BUN/CREAT BLD: ABNORMAL (ref 9–20)
CALCIUM IONIZED: 1.14 MMOL/L (ref 1.13–1.33)
CALCIUM SERPL-MCNC: 8.8 MG/DL (ref 8.6–10.4)
CHLORIDE BLD-SCNC: 99 MMOL/L (ref 98–107)
CO2: 25 MMOL/L (ref 20–31)
CREAT SERPL-MCNC: 2.5 MG/DL (ref 0.7–1.2)
GFR AFRICAN AMERICAN: 32 ML/MIN
GFR NON-AFRICAN AMERICAN: 26 ML/MIN
GFR SERPL CREATININE-BSD FRML MDRD: ABNORMAL ML/MIN/{1.73_M2}
GFR SERPL CREATININE-BSD FRML MDRD: ABNORMAL ML/MIN/{1.73_M2}
GLUCOSE BLD-MCNC: 185 MG/DL (ref 70–99)
GLUCOSE BLD-MCNC: 185 MG/DL (ref 75–110)
GLUCOSE BLD-MCNC: 220 MG/DL (ref 75–110)
GLUCOSE BLD-MCNC: 242 MG/DL (ref 75–110)
GLUCOSE BLD-MCNC: 243 MG/DL (ref 75–110)
GLUCOSE BLD-MCNC: 373 MG/DL (ref 75–110)
GLUCOSE BLD-MCNC: 69 MG/DL (ref 75–110)
HCT VFR BLD CALC: 32.6 % (ref 40.7–50.3)
HEMOGLOBIN: 10.3 G/DL (ref 13–17)
MCH RBC QN AUTO: 29.9 PG (ref 25.2–33.5)
MCHC RBC AUTO-ENTMCNC: 31.6 G/DL (ref 28.4–34.8)
MCV RBC AUTO: 94.5 FL (ref 82.6–102.9)
NRBC AUTOMATED: 0 PER 100 WBC
PDW BLD-RTO: 13.3 % (ref 11.8–14.4)
PHOSPHORUS: 3.9 MG/DL (ref 2.5–4.5)
PLATELET # BLD: 388 K/UL (ref 138–453)
PMV BLD AUTO: 9.5 FL (ref 8.1–13.5)
POTASSIUM SERPL-SCNC: 4.9 MMOL/L (ref 3.7–5.3)
RBC # BLD: 3.45 M/UL (ref 4.21–5.77)
SODIUM BLD-SCNC: 136 MMOL/L (ref 135–144)
WBC # BLD: 12.1 K/UL (ref 3.5–11.3)

## 2019-08-26 PROCEDURE — 97116 GAIT TRAINING THERAPY: CPT

## 2019-08-26 PROCEDURE — 36415 COLL VENOUS BLD VENIPUNCTURE: CPT

## 2019-08-26 PROCEDURE — 85027 COMPLETE CBC AUTOMATED: CPT

## 2019-08-26 PROCEDURE — 6370000000 HC RX 637 (ALT 250 FOR IP): Performed by: INTERNAL MEDICINE

## 2019-08-26 PROCEDURE — 97110 THERAPEUTIC EXERCISES: CPT

## 2019-08-26 PROCEDURE — 84100 ASSAY OF PHOSPHORUS: CPT

## 2019-08-26 PROCEDURE — 2580000003 HC RX 258: Performed by: NURSE PRACTITIONER

## 2019-08-26 PROCEDURE — 6370000000 HC RX 637 (ALT 250 FOR IP): Performed by: FAMILY MEDICINE

## 2019-08-26 PROCEDURE — 82947 ASSAY GLUCOSE BLOOD QUANT: CPT

## 2019-08-26 PROCEDURE — 82330 ASSAY OF CALCIUM: CPT

## 2019-08-26 PROCEDURE — 99232 SBSQ HOSP IP/OBS MODERATE 35: CPT | Performed by: INTERNAL MEDICINE

## 2019-08-26 PROCEDURE — 94640 AIRWAY INHALATION TREATMENT: CPT

## 2019-08-26 PROCEDURE — 2060000000 HC ICU INTERMEDIATE R&B

## 2019-08-26 PROCEDURE — 80048 BASIC METABOLIC PNL TOTAL CA: CPT

## 2019-08-26 PROCEDURE — 6370000000 HC RX 637 (ALT 250 FOR IP): Performed by: NURSE PRACTITIONER

## 2019-08-26 RX ORDER — PRAZOSIN HYDROCHLORIDE 1 MG/1
2 CAPSULE ORAL 2 TIMES DAILY
Status: DISCONTINUED | OUTPATIENT
Start: 2019-08-26 | End: 2019-08-26

## 2019-08-26 RX ORDER — LOSARTAN POTASSIUM 50 MG/1
50 TABLET ORAL NIGHTLY
Status: DISCONTINUED | OUTPATIENT
Start: 2019-08-26 | End: 2019-08-28 | Stop reason: HOSPADM

## 2019-08-26 RX ADMIN — ONDANSETRON HYDROCHLORIDE 4 MG: 4 TABLET, FILM COATED ORAL at 09:00

## 2019-08-26 RX ADMIN — SODIUM CHLORIDE, PRESERVATIVE FREE 10 ML: 5 INJECTION INTRAVENOUS at 08:45

## 2019-08-26 RX ADMIN — CLONIDINE HYDROCHLORIDE 0.1 MG: 0.2 TABLET ORAL at 08:45

## 2019-08-26 RX ADMIN — CARVEDILOL 25 MG: 25 TABLET, FILM COATED ORAL at 08:48

## 2019-08-26 RX ADMIN — GABAPENTIN 200 MG: 100 CAPSULE ORAL at 08:44

## 2019-08-26 RX ADMIN — ACETAMINOPHEN 650 MG: 325 TABLET ORAL at 06:42

## 2019-08-26 RX ADMIN — PRAZOSIN HYDROCHLORIDE 5 MG: 5 CAPSULE ORAL at 08:48

## 2019-08-26 RX ADMIN — CLONIDINE HYDROCHLORIDE 0.1 MG: 0.2 TABLET ORAL at 20:24

## 2019-08-26 RX ADMIN — IPRATROPIUM BROMIDE AND ALBUTEROL SULFATE 1 AMPULE: .5; 3 SOLUTION RESPIRATORY (INHALATION) at 18:06

## 2019-08-26 RX ADMIN — Medication 81 MG: at 08:47

## 2019-08-26 RX ADMIN — AMLODIPINE BESYLATE 5 MG: 5 TABLET ORAL at 20:24

## 2019-08-26 RX ADMIN — INSULIN LISPRO 2 UNITS: 100 INJECTION, SOLUTION INTRAVENOUS; SUBCUTANEOUS at 08:57

## 2019-08-26 RX ADMIN — INSULIN LISPRO 3 UNITS: 100 INJECTION, SOLUTION INTRAVENOUS; SUBCUTANEOUS at 08:50

## 2019-08-26 RX ADMIN — GABAPENTIN 200 MG: 100 CAPSULE ORAL at 14:29

## 2019-08-26 RX ADMIN — AMLODIPINE BESYLATE 5 MG: 5 TABLET ORAL at 08:44

## 2019-08-26 RX ADMIN — GABAPENTIN 200 MG: 100 CAPSULE ORAL at 20:24

## 2019-08-26 RX ADMIN — FAMOTIDINE 10 MG: 20 TABLET, FILM COATED ORAL at 14:28

## 2019-08-26 RX ADMIN — BUMETANIDE 2 MG: 1 TABLET ORAL at 08:44

## 2019-08-26 RX ADMIN — PRAZOSIN HYDROCHLORIDE 5 MG: 5 CAPSULE ORAL at 20:24

## 2019-08-26 RX ADMIN — CLOPIDOGREL 75 MG: 75 TABLET, FILM COATED ORAL at 08:45

## 2019-08-26 RX ADMIN — ERGOCALCIFEROL 50000 UNITS: 1.25 CAPSULE ORAL at 08:45

## 2019-08-26 RX ADMIN — IPRATROPIUM BROMIDE AND ALBUTEROL SULFATE 1 AMPULE: .5; 3 SOLUTION RESPIRATORY (INHALATION) at 06:18

## 2019-08-26 RX ADMIN — ATORVASTATIN CALCIUM 80 MG: 80 TABLET, FILM COATED ORAL at 20:23

## 2019-08-26 RX ADMIN — SODIUM CHLORIDE, PRESERVATIVE FREE 10 ML: 5 INJECTION INTRAVENOUS at 20:24

## 2019-08-26 RX ADMIN — INSULIN LISPRO 9 UNITS: 100 INJECTION, SOLUTION INTRAVENOUS; SUBCUTANEOUS at 17:08

## 2019-08-26 RX ADMIN — IPRATROPIUM BROMIDE AND ALBUTEROL SULFATE 1 AMPULE: .5; 3 SOLUTION RESPIRATORY (INHALATION) at 13:13

## 2019-08-26 RX ADMIN — PREDNISONE 10 MG: 10 TABLET ORAL at 08:44

## 2019-08-26 RX ADMIN — INSULIN LISPRO 3 UNITS: 100 INJECTION, SOLUTION INTRAVENOUS; SUBCUTANEOUS at 21:07

## 2019-08-26 RX ADMIN — LOSARTAN POTASSIUM 50 MG: 50 TABLET, FILM COATED ORAL at 20:24

## 2019-08-26 RX ADMIN — INSULIN GLARGINE 35 UNITS: 100 INJECTION, SOLUTION SUBCUTANEOUS at 21:08

## 2019-08-26 ASSESSMENT — PAIN SCALES - GENERAL
PAINLEVEL_OUTOF10: 0
PAINLEVEL_OUTOF10: 4
PAINLEVEL_OUTOF10: 0

## 2019-08-26 NOTE — PROGRESS NOTES
was done without abnormality outside his chronic COPD with appreciated small bilateral pleural effusions and adjacent atelectasis.       He was also noted to have hyperparathyroidism and low vitamin d.  Replacement was initiated 8/12/2019  His phos was mildly low and monitored  8/13/2019 Patient creat increased to 4.13  And nephrology consulted  8/14- creat continued to elevate  8/15 now hyperkalemic- HD started and patient had 3 days of dialysis. 8/18  Creatinine stable. No dialysis  8/19-8/21. Adjusted insulin table sugars blood pressure is improving. \"       Review of Systems:     Constitutional:  negative for chills, fevers, sweats. His fatigue and weakness improving  Respiratory:  negative for cough,  dyspnea on exertion , shortness of breath, no wheezing  Cardiovascular:  negative for chest pain, chest pressure/discomfort, lower extremity edema, palpitations  Gastrointestinal:  negative for abdominal pain, constipation, diarrhea, nausea, vomiting  Neurological:  negative for dizziness, headache    Medications: Allergies:     Allergies   Allergen Reactions    Lisinopril      cough    Ace Inhibitors      coughing    Pcn [Penicillins]        Current Meds:   Scheduled Meds:    losartan  100 mg Oral Nightly    amLODIPine  5 mg Oral BID    prazosin  5 mg Oral BID    cloNIDine  0.1 mg Oral BID    gabapentin  200 mg Oral TID    insulin glargine  35 Units Subcutaneous Nightly    ipratropium-albuterol  1 ampule Inhalation Q6H    carvedilol  25 mg Oral BID WC    aspirin  81 mg Oral Daily    melatonin  3 mg Oral Once    bumetanide  2 mg Oral Daily    QUEtiapine  100 mg Oral Nightly    insulin lispro  0-18 Units Subcutaneous TID WC    insulin lispro  0-9 Units Subcutaneous Nightly    insulin lispro  1-6 Units Subcutaneous 4x Daily AC & HS    heparin (porcine)  5,000 Units Subcutaneous 3 times per day    predniSONE  10 mg Oral Daily    albumin human  25 g Intravenous Once    famotidine  10 mg Oral Daily    atorvastatin  80 mg Oral Daily    clopidogrel  75 mg Oral Daily    sodium chloride flush  10 mL Intravenous 2 times per day    mometasone-formoterol  2 puff Inhalation BID    vitamin D  50,000 Units Oral Weekly     Continuous Infusions:    niCARdipine Stopped (19 1154)    dextrose       PRN Meds: albuterol, ondansetron, acetaminophen, hydrALAZINE, heparin (porcine), heparin (porcine), ipratropium, sodium chloride flush, magnesium hydroxide, ondansetron, glucose, dextrose, glucagon (rDNA), dextrose, nitroGLYCERIN, morphine **OR** morphine, diphenhydrAMINE, albuterol sulfate HFA    Data:     Past Medical History:   has a past medical history of Asthma, CAD in native artery, nonobstructive, Carotid stenosis, left, Carpal tunnel syndrome, Cerebrovascular disease, Chronic kidney disease, COPD (chronic obstructive pulmonary disease) ([facility] [address] Diabetes mellitus ([facility] [address] History of colon polyps, History of weakness of extremity, HTN (hypertension), Hyperlipidemia, Lung, cysts, congenital, PTSD (post-traumatic stress disorder), PTSD (post-traumatic stress disorder), TIA (transient ischemic attack), and Type II or unspecified type diabetes mellitus without mention of complication, not stated as uncontrolled. Social History:   reports that he quit smoking about 5 years ago. His smoking use included cigarettes. He has a 17.50 pack-year smoking history. He has never used smokeless tobacco. He reports that he does not drink alcohol or use drugs.      Family History:   Family History   Problem Relation Age of Onset    Cancer Mother     Heart Disease Father        Vitals:  BP (!) 172/58   Pulse 66   Temp 97.7 °F (36.5 °C) (Oral)   Resp 16   Ht 5' 6\" (1.676 m)   Wt 152 lb 4.8 oz (69.1 kg)   SpO2 98%   BMI 24.58 kg/m²   Temp (24hrs), Av.9 °F (36.6 °C), Min:97.3 °F (36.3 °C), Max:98.6 °F (37 °C)    Recent Labs     19  1103 19  1626 191 19  0621   POCGLU 54* 399* 141*

## 2019-08-26 NOTE — CARE COORDINATION
Transition planning:  Await precert for [facility], spoke with Juan Salguero in CM to escalate, spoke with [employer] to let me know when they hear something. 12:30 spoke with Anam withaddis with [facility], she will check on status of precert. 1600 notified [facility] new PT notes are in.

## 2019-08-26 NOTE — PROGRESS NOTES
need to switch prazosin to direct vasodilators if blood pressure continues to be high  4. Continue same dose of Coreg and Norvasc  5. Stable for discharge from nephrology standpoint    Nutrition   Please ensure that patient is on a renal diet/TF. Avoid nephrotoxic drugs/contrast exposure. We will continue to follow along with you.

## 2019-08-26 NOTE — PROGRESS NOTES
Sandra Trammell, PPatient Assessment complete. Hypertensive urgency [I16.0] . Vitals:    08/26/19 1105   BP: (!) 123/49   Pulse:    Resp: 16   Temp: 98.1 °F (36.7 °C)   SpO2: 98%   . Patients home meds are   Prior to Admission medications    Medication Sig Start Date End Date Taking? Authorizing Provider   cloNIDine (CATAPRES) 0.3 MG/24HR PTWK Place 1 patch onto the skin once a week   Yes Historical Provider, MD   carvedilol (COREG) 25 MG tablet Take 25 mg by mouth 2 times daily (with meals)   Yes Historical Provider, MD   hydrALAZINE (APRESOLINE) 50 MG tablet Take 50 mg by mouth 2 times daily    Yes Historical Provider, MD   NIFEdipine (PROCARDIA XL) 60 MG extended release tablet Take 60 mg by mouth 2 times daily   Yes Historical Provider, MD   COMBIVENT RESPIMAT  MCG/ACT AERS inhaler INHALE 1 PUFF INTO THE LUNGS EVERY 6 HOURS 7/9/19   Zara Johnson MD   atorvastatin (LIPITOR) 80 MG tablet TAKE 1 TABLET BY MOUTH DAILY 6/10/19   Zara Johnson MD   prazosin (MINIPRESS) 5 MG capsule TAKE 1 CAPSULE BY MOUTH 2 TIMES DAILY DC PREVIOUS SCRIPT 6/10/19   Zara Johnson MD   ASPIRIN ADULT LOW STRENGTH 81 MG EC tablet TAKE 1 TABLET BY MOUTH DAILY 6/10/19   Zara Johnson MD   EASY COMFORT LANCETS MISC DX: DM-2 USE 3-4 TIMES DAILY 3/25/19   Zara Johnson MD   COMFORT EZ PEN NEEDLES 31G X 8 MM MISC USE AS DIRECTED 2/25/19   Zara Johnson MD   insulin aspart (NOVOLOG FLEXPEN) 100 UNIT/ML injection pen Inject 5 Units into the skin 3 times daily (before meals) Sliding scale 2/19/19   Zara Johnson MD   clopidogrel (PLAVIX) 75 MG tablet Take 1 tablet by mouth daily 2/19/19   Zara Johnson MD   nitroGLYCERIN (NITROSTAT) 0.4 MG SL tablet up to max of 3 total doses.  If no relief after 1 dose, call 911. 2/19/19   Zara Johnson MD   insulin glargine (BASAGLAR KWIKPEN) 100 UNIT/ML injection pen Inject 35 Units into the skin nightly Dispense with pen needle 2/19/19   Zara Johnson MD []  No []  Yes []  Yes     []  Secretion Management Assessment  Score 1 2 3   Bilateral Breath Sounds   []  Occasional Rhonchi []  Scattered Rhonchi []  Course Rhonchi and/or poor aeration   Sputum    []  Small amount of thin secretions []  Moderate amount of viscous secretions []  Copius, Viscious Yellow/ Secretions   CXR as reported by physician []  clear  []  Unavailable []  Infiltrates and/or consolidation  []  Unavailable []  Mucus Plugging and or lobar consolidation  []  Unavailable   Cough []  Strong, productive cough []  Weak productive cough []  No cough or weak non-productive cough   STAN CARDOZA  1:17 PM                            FEMALE                                  MALE                            FEV1 Predicted Normal Values                        FEV1 Predicted Normal Values          Age                                     Height in Feet and Inches       Age                                     Height in Feet and Inches       4' 11\" 5' 1\" 5' 3\" 5' 5\" 5' 7\" 5' 9\" 5' 11\" 6' 1\"  4' 11\" 5' 1\" 5' 3\" 5' 5\" 5' 7\" 5' 9\" 5' 11\" 6' 1\"   42 - 45 2.49 2.66 2.84 3.03 3.22 3.42 3.62 3.83 42 - 45 2.82 3.03 3.26 3.49 3.72 3.96 4.22 4.47   46 - 49 2.40 2.57 2.76 2.94 3.14 3.33 3.54 3.75 46 - 49 2.70 2.92 3.14 3.37 3.61 3.85 4.10 4.36   50 - 53 2.31 2.48 2.66 2.85 3.04 3.24 3.45 3.66 50 - 53 2.58 2.80 3.02 3.25 3.49 3.73 3.98 4.24   54 - 57 2.21 2.38 2.57 2.75 2.95 3.14 3.35 3.56 54 - 57 2.46 2.67 2.89 3.12 3.36 3.60 3.85 4.11   58 - 61 2.10 2.28 2.46 2.65 2.84 3.04 3.24 3.45 58 - 61 2.32 2.54 2.76 2.99 3.23 3.47 3.72 3.98   62 - 65 1.99 2.17 2.35 2.54 2.73 2.93 3.13 3.34 62 - 65 2.19 2.40 2.62 2.85 3.09 3.33 3.58 3.84   66 - 69 1.88 2.05 2.23 2.42 2.61 2.81 3.02 3.23 66 - 69 2.04 2.26 2.48 2.71 2.95 3.19 3.44 3.70   70+ 1.82 1.99 2.17 2.36 2.55 2.75 2.95 3.16 70+ 1.97 2.19 2.41 2.64 2.87 3.12 3.37 3.62             Predicted Peak Expiratory Flow Rate                                       Height (in)  Female

## 2019-08-26 NOTE — PROGRESS NOTES
Physical Therapy  Facility/Department: [facility] CAR 3  Daily Treatment Note  NAME: Rhina Veronica  : 1955  MRN: [id number]    Date of Service: 2019    Discharge Recommendations:  Patient would benefit from continued therapy after discharge        Assessment   Body structures, Functions, Activity limitations: Decreased endurance;Decreased strength;Decreased balance  Assessment: Pt is overall supervision with funtional mobility , amb 300ft without AD SBA, Very SOB requiring 3 rest breaks. Pt performed The. Ex. for all four extremities, to improve on strength and endurance. increase difficulties with completeing reps for BUE d/t SOB. Multiple rest breaks required. Pt reports getting out of breath with minimum activity on BUEs. Pt would continue to require assist with functional mobility. Prognosis: Good  Decision Making: Low Complexity  PT Education: Home Exercise Program;General Safety; Energy Conservation;Transfer Training;Gait Training;Plan of Care  REQUIRES PT FOLLOW UP: Yes  Activity Tolerance  Activity Tolerance: Patient limited by fatigue;Patient limited by endurance  Activity Tolerance: Pt fatigues easily. Patient Diagnosis(es): The primary encounter diagnosis was Hypertensive urgency. Diagnoses of Migraine without aura and without status migrainosus, not intractable, Chest pain, unspecified type, and ESRD (end stage renal disease) ([facility] [address] were also pertinent to this visit.      has a past medical history of Asthma, CAD in native artery, nonobstructive, Carotid stenosis, left, Carpal tunnel syndrome, Cerebrovascular disease, Chronic kidney disease, COPD (chronic obstructive pulmonary disease) ([address] Diabetes mellitus ([facility] [address] History of colon polyps, History of weakness of extremity, HTN (hypertension), Hyperlipidemia, Lung, cysts, congenital, PTSD (post-traumatic stress disorder), PTSD (post-traumatic stress disorder), TIA (transient ischemic attack), and Type II or unspecified type diabetes mellitus

## 2019-08-27 ENCOUNTER — TELEPHONE (OUTPATIENT)
Dept: PULMONOLOGY | Age: 64
End: 2019-08-27

## 2019-08-27 LAB
ANION GAP SERPL CALCULATED.3IONS-SCNC: 13 MMOL/L (ref 9–17)
BUN BLDV-MCNC: 97 MG/DL (ref 8–23)
BUN/CREAT BLD: ABNORMAL (ref 9–20)
CALCIUM IONIZED: 1.1 MMOL/L (ref 1.13–1.33)
CALCIUM SERPL-MCNC: 8.7 MG/DL (ref 8.6–10.4)
CHLORIDE BLD-SCNC: 95 MMOL/L (ref 98–107)
CO2: 24 MMOL/L (ref 20–31)
CREAT SERPL-MCNC: 2.41 MG/DL (ref 0.7–1.2)
GFR AFRICAN AMERICAN: 33 ML/MIN
GFR NON-AFRICAN AMERICAN: 27 ML/MIN
GFR SERPL CREATININE-BSD FRML MDRD: ABNORMAL ML/MIN/{1.73_M2}
GFR SERPL CREATININE-BSD FRML MDRD: ABNORMAL ML/MIN/{1.73_M2}
GLUCOSE BLD-MCNC: 115 MG/DL (ref 75–110)
GLUCOSE BLD-MCNC: 140 MG/DL (ref 75–110)
GLUCOSE BLD-MCNC: 216 MG/DL (ref 75–110)
GLUCOSE BLD-MCNC: 332 MG/DL (ref 75–110)
GLUCOSE BLD-MCNC: 391 MG/DL (ref 75–110)
GLUCOSE BLD-MCNC: 45 MG/DL (ref 75–110)
GLUCOSE BLD-MCNC: 534 MG/DL (ref 75–110)
GLUCOSE BLD-MCNC: 541 MG/DL (ref 70–99)
GLUCOSE BLD-MCNC: 97 MG/DL (ref 75–110)
HCT VFR BLD CALC: 31.2 % (ref 40.7–50.3)
HEMOGLOBIN: 9.8 G/DL (ref 13–17)
MCH RBC QN AUTO: 31.2 PG (ref 25.2–33.5)
MCHC RBC AUTO-ENTMCNC: 31.4 G/DL (ref 28.4–34.8)
MCV RBC AUTO: 99.4 FL (ref 82.6–102.9)
NRBC AUTOMATED: 0 PER 100 WBC
PDW BLD-RTO: 13.2 % (ref 11.8–14.4)
PHOSPHORUS: 4.2 MG/DL (ref 2.5–4.5)
PLATELET # BLD: 398 K/UL (ref 138–453)
PMV BLD AUTO: 10.1 FL (ref 8.1–13.5)
POTASSIUM SERPL-SCNC: 5.4 MMOL/L (ref 3.7–5.3)
RBC # BLD: 3.14 M/UL (ref 4.21–5.77)
SODIUM BLD-SCNC: 132 MMOL/L (ref 135–144)
WBC # BLD: 10.4 K/UL (ref 3.5–11.3)

## 2019-08-27 PROCEDURE — 6370000000 HC RX 637 (ALT 250 FOR IP): Performed by: FAMILY MEDICINE

## 2019-08-27 PROCEDURE — 82947 ASSAY GLUCOSE BLOOD QUANT: CPT

## 2019-08-27 PROCEDURE — 94640 AIRWAY INHALATION TREATMENT: CPT

## 2019-08-27 PROCEDURE — 6370000000 HC RX 637 (ALT 250 FOR IP): Performed by: INTERNAL MEDICINE

## 2019-08-27 PROCEDURE — 99232 SBSQ HOSP IP/OBS MODERATE 35: CPT | Performed by: INTERNAL MEDICINE

## 2019-08-27 PROCEDURE — 2580000003 HC RX 258: Performed by: NURSE PRACTITIONER

## 2019-08-27 PROCEDURE — 97110 THERAPEUTIC EXERCISES: CPT

## 2019-08-27 PROCEDURE — 6370000000 HC RX 637 (ALT 250 FOR IP): Performed by: NURSE PRACTITIONER

## 2019-08-27 PROCEDURE — 82330 ASSAY OF CALCIUM: CPT

## 2019-08-27 PROCEDURE — 85027 COMPLETE CBC AUTOMATED: CPT

## 2019-08-27 PROCEDURE — 2580000003 HC RX 258: Performed by: INTERNAL MEDICINE

## 2019-08-27 PROCEDURE — 94618 PULMONARY STRESS TESTING: CPT

## 2019-08-27 PROCEDURE — 84100 ASSAY OF PHOSPHORUS: CPT

## 2019-08-27 PROCEDURE — 80048 BASIC METABOLIC PNL TOTAL CA: CPT

## 2019-08-27 PROCEDURE — 2060000000 HC ICU INTERMEDIATE R&B

## 2019-08-27 PROCEDURE — 76937 US GUIDE VASCULAR ACCESS: CPT

## 2019-08-27 PROCEDURE — 36415 COLL VENOUS BLD VENIPUNCTURE: CPT

## 2019-08-27 PROCEDURE — 94760 N-INVAS EAR/PLS OXIMETRY 1: CPT

## 2019-08-27 PROCEDURE — 94761 N-INVAS EAR/PLS OXIMETRY MLT: CPT

## 2019-08-27 RX ORDER — PRAZOSIN HYDROCHLORIDE 5 MG/1
5 CAPSULE ORAL 3 TIMES DAILY
Status: DISCONTINUED | OUTPATIENT
Start: 2019-08-27 | End: 2019-08-28 | Stop reason: HOSPADM

## 2019-08-27 RX ORDER — CLONIDINE HYDROCHLORIDE 0.1 MG/1
0.1 TABLET ORAL DAILY
Status: DISCONTINUED | OUTPATIENT
Start: 2019-08-28 | End: 2019-08-28 | Stop reason: HOSPADM

## 2019-08-27 RX ADMIN — FAMOTIDINE 10 MG: 20 TABLET, FILM COATED ORAL at 08:29

## 2019-08-27 RX ADMIN — GABAPENTIN 200 MG: 100 CAPSULE ORAL at 08:30

## 2019-08-27 RX ADMIN — LOSARTAN POTASSIUM 50 MG: 50 TABLET, FILM COATED ORAL at 22:33

## 2019-08-27 RX ADMIN — INSULIN LISPRO 4 UNITS: 100 INJECTION, SOLUTION INTRAVENOUS; SUBCUTANEOUS at 11:59

## 2019-08-27 RX ADMIN — IPRATROPIUM BROMIDE AND ALBUTEROL SULFATE 1 AMPULE: .5; 3 SOLUTION RESPIRATORY (INHALATION) at 17:16

## 2019-08-27 RX ADMIN — INSULIN LISPRO 3 UNITS: 100 INJECTION, SOLUTION INTRAVENOUS; SUBCUTANEOUS at 22:36

## 2019-08-27 RX ADMIN — CLOPIDOGREL 75 MG: 75 TABLET, FILM COATED ORAL at 08:30

## 2019-08-27 RX ADMIN — CARVEDILOL 25 MG: 25 TABLET, FILM COATED ORAL at 16:45

## 2019-08-27 RX ADMIN — PRAZOSIN HYDROCHLORIDE 5 MG: 5 CAPSULE ORAL at 08:29

## 2019-08-27 RX ADMIN — INSULIN LISPRO 8 UNITS: 100 INJECTION, SOLUTION INTRAVENOUS; SUBCUTANEOUS at 06:31

## 2019-08-27 RX ADMIN — IPRATROPIUM BROMIDE AND ALBUTEROL SULFATE 1 AMPULE: .5; 3 SOLUTION RESPIRATORY (INHALATION) at 06:05

## 2019-08-27 RX ADMIN — PRAZOSIN HYDROCHLORIDE 5 MG: 5 CAPSULE ORAL at 22:33

## 2019-08-27 RX ADMIN — SODIUM CHLORIDE: 9 INJECTION, SOLUTION INTRAVENOUS at 14:31

## 2019-08-27 RX ADMIN — GABAPENTIN 200 MG: 100 CAPSULE ORAL at 22:33

## 2019-08-27 RX ADMIN — AMLODIPINE BESYLATE 5 MG: 5 TABLET ORAL at 08:29

## 2019-08-27 RX ADMIN — CARVEDILOL 25 MG: 25 TABLET, FILM COATED ORAL at 08:28

## 2019-08-27 RX ADMIN — INSULIN GLARGINE 35 UNITS: 100 INJECTION, SOLUTION SUBCUTANEOUS at 22:35

## 2019-08-27 RX ADMIN — IPRATROPIUM BROMIDE AND ALBUTEROL SULFATE 1 AMPULE: .5; 3 SOLUTION RESPIRATORY (INHALATION) at 23:40

## 2019-08-27 RX ADMIN — AMLODIPINE BESYLATE 5 MG: 5 TABLET ORAL at 22:33

## 2019-08-27 RX ADMIN — GABAPENTIN 200 MG: 100 CAPSULE ORAL at 14:21

## 2019-08-27 RX ADMIN — Medication 81 MG: at 08:29

## 2019-08-27 RX ADMIN — CLONIDINE HYDROCHLORIDE 0.1 MG: 0.2 TABLET ORAL at 08:29

## 2019-08-27 RX ADMIN — SODIUM CHLORIDE: 9 INJECTION, SOLUTION INTRAVENOUS at 23:36

## 2019-08-27 RX ADMIN — SODIUM CHLORIDE, PRESERVATIVE FREE 10 ML: 5 INJECTION INTRAVENOUS at 08:30

## 2019-08-27 RX ADMIN — QUETIAPINE FUMARATE 100 MG: 100 TABLET ORAL at 01:49

## 2019-08-27 RX ADMIN — PRAZOSIN HYDROCHLORIDE 5 MG: 5 CAPSULE ORAL at 14:21

## 2019-08-27 RX ADMIN — IPRATROPIUM BROMIDE AND ALBUTEROL SULFATE 1 AMPULE: .5; 3 SOLUTION RESPIRATORY (INHALATION) at 00:07

## 2019-08-27 RX ADMIN — QUETIAPINE FUMARATE 100 MG: 100 TABLET ORAL at 22:34

## 2019-08-27 RX ADMIN — INSULIN LISPRO 4 UNITS: 100 INJECTION, SOLUTION INTRAVENOUS; SUBCUTANEOUS at 08:42

## 2019-08-27 RX ADMIN — IPRATROPIUM BROMIDE AND ALBUTEROL SULFATE 1 AMPULE: .5; 3 SOLUTION RESPIRATORY (INHALATION) at 12:17

## 2019-08-27 RX ADMIN — ATORVASTATIN CALCIUM 80 MG: 80 TABLET, FILM COATED ORAL at 22:33

## 2019-08-27 ASSESSMENT — PAIN SCALES - GENERAL
PAINLEVEL_OUTOF10: 0
PAINLEVEL_OUTOF10: 0

## 2019-08-27 NOTE — PROGRESS NOTES
99% (adm/ideal)  · BMI Classification: BMI 18.5 - 24.9 Normal Weight    Nutrition Interventions:   Continue current diet, Continue current ONS  Continued Inpatient Monitoring, Education Completed    Nutrition Evaluation:   · Evaluation: Goal achieved   · Goals: Pt to consume >75% of meals/supplements     · Monitoring: Meal Intake, Supplement Intake, Pertinent Labs      Electronically signed by Darlin Diaz RD, LD on 8/27/19 at 2:44 PM    Contact Number: [phone]

## 2019-08-27 NOTE — PROGRESS NOTES
Home Oxygen Evaluation    Home Oxygen Evaluation completed at 11:49a 8/27/19. Patient does not qualify for home oxygen. Please see note.     Jaclyn Gardner  3:26 PM

## 2019-08-27 NOTE — PROGRESS NOTES
Renal Progress Note    Patient :  Nisha Mccormack; 62 y.o. MRN# [id number]  Location:  3003/3003-01  Attending:  Latasha Friend MD  Admit Date:  8/11/2019   Hospital Day: 15      Subjective:     Patient reports that he is feeling well today. States that he is a little upset that his blood pressure medicine and his insulin aren't being administered as he normally takes them at home. Patient is eager for discharge. Patient reported increased urination stating that he is peeing \"a gallon\". Patient reported increased activity stating that he is walking more and feeling better, although still has \"poor core strength\". Patient denies headaches, nausea, vomiting, and diarrhea. POC blood Sugar was 541 last night, at 115 this morning. Patient has a known history of chronic kidney disease, Baseline creatinine 1.6-1.8. Acute kidney injury likely combination of fluctuating blood pressure and contrast exposure. Required 3 dialysis treatments. Last treatment was 8/17/2019. Creatinine is improving at 2.41 today, it did increase up to 2.5 on (8/26) likely due to ACE inhibitor use. Patient's Output was 3840ml in 24 hours, input = 1080ml. Patient is awaiting placement. Seems to be making slow steady progress. Urine output good. In negative balance of at least 20 L. Blood pressure fluctuates. Patient seems to be quite knowledgeable about his disease and presenting symptoms. He does have a known history of chronic kidney disease and admits that his urine has been frothing for many years now. Baseline creatinine 1.6-1.8. Also has labile hypertension. Wishes to go back on his home medications. Acute kidney injury likely combination of fluctuating blood pressure and contrast exposure. Required 3 dialysis treatments. Last treatment was 8/17/2019. Creatinine was improving however in the last 24 hours has been in 2.3- 2.5 likely due to ARB inhibitor use and over diuresis from osmotic diuresis.   He is wishes to come increased likely due to ARB effect and over diuresis, down trending today. Azotemia persists likely prerenal from osmotic diuresis  2. Uncontrolled diabetes, blood sugar as high as 541 leading to osmotic diuresis as evidenced by negative fluid balance  2. Chronic kidney disease stage IIIb-4 secondary to diabetic nephrosclerosis proteinuria about 8 g baseline 1.6-1.8 at times has gone up to as high as 2.2  3. Essential and labile hypertension with some features of resistance. 4.  Tendency for hyperkalemia  5. Brittle diabetes  6. Substance abuse     Plan:   1. Continue Cozaar 50 daily  2. Wean off clonidine  3. Increase Minipress to 5 3 times a day  4. Normal saline 100 mL an hour for 24 hours  3. Continue same dose of Coreg and Norvasc  4. Stable for discharge from nephrology standpoint  5. Outpatient follow-up with nephrology in 3 to 4 weeks    Nutrition   Please ensure that patient is on a renal diet/TF. Avoid nephrotoxic drugs/contrast exposure. We will continue to follow along with you. Arash Thornton 87 IV  Nephrology Medical Student   [facility]), [facility]  8/27/2019 8:58 AM    Attending Physician Statement  I have discussed the care of Marlo Gilliland, including pertinent history and exam findings with the resident/fellow. I have reviewed the key elements of all parts of the encounter with the resident/fellow. I have seen and examined the patient with the resident/fellow. I agree with the assessment and plan and status of the problem list as documented.       .  Electronically signed by Arti Ness MD on 8/27/2019 at 12:00 PM

## 2019-08-27 NOTE — CARE COORDINATION
Transitional planning. Gorge Sandifer from [facility] rehab calls and states pt is denied for placement. 1500 Spoke with pt and he is OK to go home with home care but he would like an evaluation for home O2. Dr Onur Henriquez here and discussed with.  Dr Onur Henriquez agrees for home care order along with a home O2 eval

## 2019-08-28 VITALS
SYSTOLIC BLOOD PRESSURE: 160 MMHG | BODY MASS INDEX: 24.91 KG/M2 | HEART RATE: 71 BPM | OXYGEN SATURATION: 99 % | RESPIRATION RATE: 16 BRPM | WEIGHT: 155 LBS | HEIGHT: 66 IN | TEMPERATURE: 97.7 F | DIASTOLIC BLOOD PRESSURE: 56 MMHG

## 2019-08-28 LAB
ANION GAP SERPL CALCULATED.3IONS-SCNC: 13 MMOL/L (ref 9–17)
BUN BLDV-MCNC: 94 MG/DL (ref 8–23)
BUN/CREAT BLD: ABNORMAL (ref 9–20)
CALCIUM IONIZED: 1.22 MMOL/L (ref 1.13–1.33)
CALCIUM SERPL-MCNC: 8.6 MG/DL (ref 8.6–10.4)
CHLORIDE BLD-SCNC: 108 MMOL/L (ref 98–107)
CO2: 21 MMOL/L (ref 20–31)
CREAT SERPL-MCNC: 2.13 MG/DL (ref 0.7–1.2)
GFR AFRICAN AMERICAN: 38 ML/MIN
GFR NON-AFRICAN AMERICAN: 32 ML/MIN
GFR SERPL CREATININE-BSD FRML MDRD: ABNORMAL ML/MIN/{1.73_M2}
GFR SERPL CREATININE-BSD FRML MDRD: ABNORMAL ML/MIN/{1.73_M2}
GLUCOSE BLD-MCNC: 142 MG/DL (ref 75–110)
GLUCOSE BLD-MCNC: 148 MG/DL (ref 75–110)
GLUCOSE BLD-MCNC: 156 MG/DL (ref 70–99)
GLUCOSE BLD-MCNC: 165 MG/DL (ref 75–110)
GLUCOSE BLD-MCNC: 72 MG/DL (ref 75–110)
GLUCOSE BLD-MCNC: 73 MG/DL (ref 75–110)
HCT VFR BLD CALC: 32.1 % (ref 40.7–50.3)
HEMOGLOBIN: 9.5 G/DL (ref 13–17)
MCH RBC QN AUTO: 30 PG (ref 25.2–33.5)
MCHC RBC AUTO-ENTMCNC: 29.6 G/DL (ref 28.4–34.8)
MCV RBC AUTO: 101.3 FL (ref 82.6–102.9)
NRBC AUTOMATED: 0 PER 100 WBC
PDW BLD-RTO: 13.3 % (ref 11.8–14.4)
PHOSPHORUS: 3.8 MG/DL (ref 2.5–4.5)
PLATELET # BLD: 376 K/UL (ref 138–453)
PMV BLD AUTO: 9.6 FL (ref 8.1–13.5)
POTASSIUM SERPL-SCNC: 5 MMOL/L (ref 3.7–5.3)
RBC # BLD: 3.17 M/UL (ref 4.21–5.77)
SODIUM BLD-SCNC: 142 MMOL/L (ref 135–144)
WBC # BLD: 9.7 K/UL (ref 3.5–11.3)

## 2019-08-28 PROCEDURE — 6370000000 HC RX 637 (ALT 250 FOR IP): Performed by: INTERNAL MEDICINE

## 2019-08-28 PROCEDURE — 94760 N-INVAS EAR/PLS OXIMETRY 1: CPT

## 2019-08-28 PROCEDURE — 97535 SELF CARE MNGMENT TRAINING: CPT

## 2019-08-28 PROCEDURE — 82330 ASSAY OF CALCIUM: CPT

## 2019-08-28 PROCEDURE — 6370000000 HC RX 637 (ALT 250 FOR IP): Performed by: FAMILY MEDICINE

## 2019-08-28 PROCEDURE — 80048 BASIC METABOLIC PNL TOTAL CA: CPT

## 2019-08-28 PROCEDURE — 6370000000 HC RX 637 (ALT 250 FOR IP): Performed by: NURSE PRACTITIONER

## 2019-08-28 PROCEDURE — 94640 AIRWAY INHALATION TREATMENT: CPT

## 2019-08-28 PROCEDURE — 36415 COLL VENOUS BLD VENIPUNCTURE: CPT

## 2019-08-28 PROCEDURE — 82947 ASSAY GLUCOSE BLOOD QUANT: CPT

## 2019-08-28 PROCEDURE — 84100 ASSAY OF PHOSPHORUS: CPT

## 2019-08-28 PROCEDURE — 85027 COMPLETE CBC AUTOMATED: CPT

## 2019-08-28 PROCEDURE — 99239 HOSP IP/OBS DSCHRG MGMT >30: CPT | Performed by: INTERNAL MEDICINE

## 2019-08-28 RX ORDER — PRAZOSIN HYDROCHLORIDE 5 MG/1
5 CAPSULE ORAL 3 TIMES DAILY
Qty: 30 CAPSULE | Refills: 3 | Status: SHIPPED | OUTPATIENT
Start: 2019-08-28 | End: 2019-10-21 | Stop reason: SDUPTHER

## 2019-08-28 RX ORDER — CLONIDINE HYDROCHLORIDE 0.1 MG/1
0.1 TABLET ORAL DAILY
Qty: 7 TABLET | Refills: 0 | Status: SHIPPED | OUTPATIENT
Start: 2019-08-29 | End: 2019-09-05

## 2019-08-28 RX ORDER — ERGOCALCIFEROL (VITAMIN D2) 1250 MCG
50000 CAPSULE ORAL WEEKLY
Qty: 5 CAPSULE | Refills: 0 | Status: SHIPPED | OUTPATIENT
Start: 2019-09-02 | End: 2019-10-21 | Stop reason: SDUPTHER

## 2019-08-28 RX ORDER — ALBUTEROL SULFATE 90 UG/1
2 AEROSOL, METERED RESPIRATORY (INHALATION) PRN
Qty: 1 INHALER | Refills: 3 | Status: SHIPPED | OUTPATIENT
Start: 2019-08-28 | End: 2019-10-21 | Stop reason: ALTCHOICE

## 2019-08-28 RX ORDER — CLONIDINE HYDROCHLORIDE 0.1 MG/1
0.1 TABLET ORAL DAILY
Qty: 30 TABLET | Refills: 0 | Status: SHIPPED | OUTPATIENT
Start: 2019-08-29 | End: 2019-08-28

## 2019-08-28 RX ORDER — ALBUTEROL SULFATE 2.5 MG/3ML
2.5 SOLUTION RESPIRATORY (INHALATION) EVERY 6 HOURS PRN
Qty: 120 EACH | Refills: 3 | Status: SHIPPED | OUTPATIENT
Start: 2019-08-28 | End: 2019-08-29

## 2019-08-28 RX ORDER — GABAPENTIN 300 MG/1
300 CAPSULE ORAL 3 TIMES DAILY
Qty: 90 CAPSULE | Refills: 2 | Status: SHIPPED | OUTPATIENT
Start: 2019-08-28 | End: 2019-08-28 | Stop reason: HOSPADM

## 2019-08-28 RX ORDER — AMLODIPINE BESYLATE 5 MG/1
5 TABLET ORAL 2 TIMES DAILY
Qty: 30 TABLET | Refills: 3 | Status: SHIPPED | OUTPATIENT
Start: 2019-08-28 | End: 2019-10-21 | Stop reason: SDUPTHER

## 2019-08-28 RX ORDER — GABAPENTIN 100 MG/1
200 CAPSULE ORAL 3 TIMES DAILY
Qty: 180 CAPSULE | Refills: 0 | Status: SHIPPED | OUTPATIENT
Start: 2019-08-28 | End: 2019-09-27

## 2019-08-28 RX ORDER — FAMOTIDINE 10 MG
10 TABLET ORAL DAILY
Qty: 60 TABLET | Refills: 3 | Status: SHIPPED | OUTPATIENT
Start: 2019-08-29 | End: 2019-10-30

## 2019-08-28 RX ORDER — QUETIAPINE FUMARATE 100 MG/1
TABLET, FILM COATED ORAL
Qty: 30 TABLET | Refills: 2 | Status: SHIPPED | OUTPATIENT
Start: 2019-08-28 | End: 2020-06-02 | Stop reason: SDUPTHER

## 2019-08-28 RX ORDER — LOSARTAN POTASSIUM 50 MG/1
50 TABLET ORAL NIGHTLY
Qty: 30 TABLET | Refills: 3 | Status: SHIPPED | OUTPATIENT
Start: 2019-08-28 | End: 2019-10-21 | Stop reason: SDUPTHER

## 2019-08-28 RX ADMIN — GABAPENTIN 200 MG: 100 CAPSULE ORAL at 08:47

## 2019-08-28 RX ADMIN — IPRATROPIUM BROMIDE AND ALBUTEROL SULFATE 1 AMPULE: .5; 3 SOLUTION RESPIRATORY (INHALATION) at 11:16

## 2019-08-28 RX ADMIN — CARVEDILOL 25 MG: 25 TABLET, FILM COATED ORAL at 08:47

## 2019-08-28 RX ADMIN — CLOPIDOGREL 75 MG: 75 TABLET, FILM COATED ORAL at 08:47

## 2019-08-28 RX ADMIN — IPRATROPIUM BROMIDE AND ALBUTEROL SULFATE 1 AMPULE: .5; 3 SOLUTION RESPIRATORY (INHALATION) at 05:39

## 2019-08-28 RX ADMIN — PRAZOSIN HYDROCHLORIDE 5 MG: 5 CAPSULE ORAL at 17:01

## 2019-08-28 RX ADMIN — PRAZOSIN HYDROCHLORIDE 5 MG: 5 CAPSULE ORAL at 08:48

## 2019-08-28 RX ADMIN — FAMOTIDINE 10 MG: 20 TABLET, FILM COATED ORAL at 08:47

## 2019-08-28 RX ADMIN — CLONIDINE HYDROCHLORIDE 0.1 MG: 0.1 TABLET ORAL at 08:48

## 2019-08-28 RX ADMIN — INSULIN LISPRO 3 UNITS: 100 INJECTION, SOLUTION INTRAVENOUS; SUBCUTANEOUS at 12:03

## 2019-08-28 RX ADMIN — AMLODIPINE BESYLATE 5 MG: 5 TABLET ORAL at 08:47

## 2019-08-28 RX ADMIN — GABAPENTIN 200 MG: 100 CAPSULE ORAL at 17:00

## 2019-08-28 RX ADMIN — INSULIN LISPRO 3 UNITS: 100 INJECTION, SOLUTION INTRAVENOUS; SUBCUTANEOUS at 08:50

## 2019-08-28 RX ADMIN — Medication 81 MG: at 08:48

## 2019-08-28 NOTE — DISCHARGE SUMMARY
aerosols     Nonobstructive coronary artery disease: Normal stress test within last year, cardiac cath in the past was unremarkable, currently chest pain free and no further intervention recommended per cardiology. To new chronic medications     Acute on chronic kidney disease: Needed dialysis 3 times during this admission, currently creatinine improving. Per nephrology okay for discharge. To follow-up with nephrology as an outpatient.     Moderate malnutrition: Continue supplemental boost and follow-up dietitian recommendation     Generalized weakness and deconditioning: Continue PT/OT, placement is pending     Diabetic neuropathy: Continue gabapentin     DVT prophylaxis     DC planning. Discharge home with home care today. Significant Diagnostic Studies:   Labs / Micro:  CBC:   Lab Results   Component Value Date    WBC 9.7 08/28/2019    RBC 3.17 08/28/2019    RBC 3.96 04/16/2012    HGB 9.5 08/28/2019    HCT 32.1 08/28/2019    .3 08/28/2019    MCH 30.0 08/28/2019    MCHC 29.6 08/28/2019    RDW 13.3 08/28/2019     08/28/2019     04/16/2012     BMP:    Lab Results   Component Value Date    GLUCOSE 156 08/28/2019    GLUCOSE 172 04/16/2012     08/28/2019    K 5.0 08/28/2019     08/28/2019    CO2 21 08/28/2019    ANIONGAP 13 08/28/2019    BUN 94 08/28/2019    CREATININE 2.13 08/28/2019    BUNCRER NOT REPORTED 08/28/2019    CALCIUM 8.6 08/28/2019    LABGLOM 32 08/28/2019    GFRAA 38 08/28/2019    GFR      08/28/2019    GFR NOT REPORTED 08/28/2019        Radiology:  No results found.     Consultations:    Consults:     Final Specialist Recommendations/Findings:   IP CONSULT TO HOSPITALIST  IP CONSULT TO NEPHROLOGY  IP CONSULT TO CARDIOLOGY  PHARMACY CONSULT FOR RENAL DOSING  IP CONSULT TO SOCIAL WORK  IP CONSULT TO DIETITIAN  IP CONSULT TO DIETITIAN  IP CONSULT TO IV TEAM  IP CONSULT TO IV TEAM  IP CONSULT TO HOME CARE NEEDS  IP CONSULT TO HOME CARE NEEDS      The patient was seen

## 2019-08-28 NOTE — PROGRESS NOTES
Claudia [facility]    Progress Note    8/28/2019    7:40 AM    Name:   Violette Dalton  MRN:     [id number]     [place]:      [de-identified]   Room:   [facility] Day:  12  Admit Date:  8/11/2019  8:01 PM    PCP:   Jerome Santos DO  Code Status:  Full Code    Subjective:     C/C:   Chief Complaint   Patient presents with    Chest Pain     Pt to ED c/o chest pain and headache. Per first responders, pt with initial BP of 230/80. Pt given Nitro and aspirin with improvement of pain and improved BP to 190/77    Hypertension    Headache     Interval History Status: Some mild improvement    Patient seen and examined at bedside, BP improving overnight with the new regimen  Blood sugar improving. .  Reports moving better this morning  Patient denies any chest pain,  chills, fevers, nausea or vomiting. Proved on IV fluid. Plan for discharge today. Patient vitals, labs and all providers notes were reviewed,from overnight shift and morning updates were noted and discussed with the nurse    Brief History:     Per my partner  Herberth Caceres y. o. male with PMH of CKD around 8 yrs, COPD, T2 DM for 28 yrs with carotid artery disease s/p stent, hypertension for 30 yrs,  Came with chief complaint of exertional chest pain, exertional SOB, headache and home blood pressure recording 240/90 mmHg. Exertional chest pain and SOB relieves with rest, NG and uses non- prescribed home O2 of 2.5 L, whenever he gets symptoms. Associated palpitations, sweating present. Denies dizziness, orthopnea and PND. Denies urinary symptoms or constipation.     In the ER blood pressure 230/80 mmHg.  Patient given nitroglycerin and aspirin with improvement of pain and decreased blood pressure to 190/70 7 mmHg  Over 2 hours. Then admitted for HTN emergency. CT head without contrast shown no acute changes.  CTA chest with contrast was done without abnormality outside his chronic COPD

## 2019-08-29 ENCOUNTER — HOSPITAL ENCOUNTER (EMERGENCY)
Age: 64
Discharge: HOME | End: 2019-08-29
Attending: EMERGENCY MEDICINE
Payer: COMMERCIAL

## 2019-08-29 VITALS
HEIGHT: 66 IN | WEIGHT: 155 LBS | DIASTOLIC BLOOD PRESSURE: 64 MMHG | SYSTOLIC BLOOD PRESSURE: 190 MMHG | OXYGEN SATURATION: 100 % | HEART RATE: 73 BPM | TEMPERATURE: 98.1 F | BODY MASS INDEX: 24.91 KG/M2 | RESPIRATION RATE: 18 BRPM

## 2019-08-29 DIAGNOSIS — I16.0 HYPERTENSIVE URGENCY: Primary | ICD-10-CM

## 2019-08-29 LAB
-: NORMAL
ABSOLUTE EOS #: 0 K/UL (ref 0–0.44)
ABSOLUTE IMMATURE GRANULOCYTE: 0.24 K/UL (ref 0–0.3)
ABSOLUTE LYMPH #: 0.47 K/UL (ref 1.1–3.7)
ABSOLUTE MONO #: 0.59 K/UL (ref 0.1–1.2)
ALBUMIN SERPL-MCNC: 3.6 G/DL (ref 3.5–5.2)
ALBUMIN/GLOBULIN RATIO: 1.3 (ref 1–2.5)
ALP BLD-CCNC: 98 U/L (ref 40–129)
ALT SERPL-CCNC: 38 U/L (ref 5–41)
AMORPHOUS: NORMAL
ANION GAP SERPL CALCULATED.3IONS-SCNC: 14 MMOL/L (ref 9–17)
AST SERPL-CCNC: 25 U/L
BACTERIA: NORMAL
BASOPHILS # BLD: 0 % (ref 0–2)
BASOPHILS ABSOLUTE: 0 K/UL (ref 0–0.2)
BILIRUB SERPL-MCNC: 0.24 MG/DL (ref 0.3–1.2)
BILIRUBIN URINE: NEGATIVE
BUN BLDV-MCNC: 76 MG/DL (ref 8–23)
BUN/CREAT BLD: ABNORMAL (ref 9–20)
CALCIUM SERPL-MCNC: 9.5 MG/DL (ref 8.6–10.4)
CASTS UA: NORMAL /LPF (ref 0–8)
CHLORIDE BLD-SCNC: 107 MMOL/L (ref 98–107)
CO2: 19 MMOL/L (ref 20–31)
COLOR: YELLOW
COMMENT UA: ABNORMAL
CREAT SERPL-MCNC: 1.69 MG/DL (ref 0.7–1.2)
CRYSTALS, UA: NORMAL /HPF
DIFFERENTIAL TYPE: ABNORMAL
EOSINOPHILS RELATIVE PERCENT: 0 % (ref 1–4)
EPITHELIAL CELLS UA: NORMAL /HPF (ref 0–5)
GFR AFRICAN AMERICAN: 50 ML/MIN
GFR NON-AFRICAN AMERICAN: 41 ML/MIN
GFR SERPL CREATININE-BSD FRML MDRD: ABNORMAL ML/MIN/{1.73_M2}
GFR SERPL CREATININE-BSD FRML MDRD: ABNORMAL ML/MIN/{1.73_M2}
GLUCOSE BLD-MCNC: 278 MG/DL (ref 75–110)
GLUCOSE BLD-MCNC: 353 MG/DL (ref 75–110)
GLUCOSE BLD-MCNC: 392 MG/DL (ref 70–99)
GLUCOSE URINE: ABNORMAL
HCT VFR BLD CALC: 34.4 % (ref 40.7–50.3)
HEMOGLOBIN: 10.7 G/DL (ref 13–17)
IMMATURE GRANULOCYTES: 2 %
KETONES, URINE: NEGATIVE
LEUKOCYTE ESTERASE, URINE: NEGATIVE
LYMPHOCYTES # BLD: 4 % (ref 24–43)
MCH RBC QN AUTO: 30.1 PG (ref 25.2–33.5)
MCHC RBC AUTO-ENTMCNC: 31.1 G/DL (ref 28.4–34.8)
MCV RBC AUTO: 96.6 FL (ref 82.6–102.9)
MONOCYTES # BLD: 5 % (ref 3–12)
MORPHOLOGY: NORMAL
MUCUS: NORMAL
NITRITE, URINE: NEGATIVE
NRBC AUTOMATED: 0 PER 100 WBC
OTHER OBSERVATIONS UA: NORMAL
PDW BLD-RTO: 13.3 % (ref 11.8–14.4)
PH UA: 5.5 (ref 5–8)
PLATELET # BLD: 360 K/UL (ref 138–453)
PLATELET ESTIMATE: ABNORMAL
PMV BLD AUTO: 9.8 FL (ref 8.1–13.5)
POTASSIUM SERPL-SCNC: 5.2 MMOL/L (ref 3.7–5.3)
POTASSIUM SERPL-SCNC: 5.7 MMOL/L (ref 3.7–5.3)
PROTEIN UA: ABNORMAL
RBC # BLD: 3.56 M/UL (ref 4.21–5.77)
RBC # BLD: ABNORMAL 10*6/UL
RBC UA: NORMAL /HPF (ref 0–4)
RENAL EPITHELIAL, UA: NORMAL /HPF
SEG NEUTROPHILS: 89 % (ref 36–65)
SEGMENTED NEUTROPHILS ABSOLUTE COUNT: 10.5 K/UL (ref 1.5–8.1)
SODIUM BLD-SCNC: 140 MMOL/L (ref 135–144)
SPECIFIC GRAVITY UA: 1.01 (ref 1–1.03)
TOTAL PROTEIN: 6.4 G/DL (ref 6.4–8.3)
TRICHOMONAS: NORMAL
TURBIDITY: CLEAR
URINE HGB: ABNORMAL
UROBILINOGEN, URINE: NORMAL
WBC # BLD: 11.8 K/UL (ref 3.5–11.3)
WBC # BLD: ABNORMAL 10*3/UL
WBC UA: NORMAL /HPF (ref 0–5)
YEAST: NORMAL

## 2019-08-29 PROCEDURE — 84132 ASSAY OF SERUM POTASSIUM: CPT

## 2019-08-29 PROCEDURE — 87086 URINE CULTURE/COLONY COUNT: CPT

## 2019-08-29 PROCEDURE — 85025 COMPLETE CBC W/AUTO DIFF WBC: CPT

## 2019-08-29 PROCEDURE — 6370000000 HC RX 637 (ALT 250 FOR IP): Performed by: STUDENT IN AN ORGANIZED HEALTH CARE EDUCATION/TRAINING PROGRAM

## 2019-08-29 PROCEDURE — 81001 URINALYSIS AUTO W/SCOPE: CPT

## 2019-08-29 PROCEDURE — 99285 EMERGENCY DEPT VISIT HI MDM: CPT

## 2019-08-29 PROCEDURE — 80053 COMPREHEN METABOLIC PANEL: CPT

## 2019-08-29 PROCEDURE — 96372 THER/PROPH/DIAG INJ SC/IM: CPT

## 2019-08-29 PROCEDURE — 6360000002 HC RX W HCPCS: Performed by: STUDENT IN AN ORGANIZED HEALTH CARE EDUCATION/TRAINING PROGRAM

## 2019-08-29 PROCEDURE — 93005 ELECTROCARDIOGRAM TRACING: CPT | Performed by: STUDENT IN AN ORGANIZED HEALTH CARE EDUCATION/TRAINING PROGRAM

## 2019-08-29 PROCEDURE — 82947 ASSAY GLUCOSE BLOOD QUANT: CPT

## 2019-08-29 RX ORDER — ALBUTEROL SULFATE 90 UG/1
2 AEROSOL, METERED RESPIRATORY (INHALATION)
Status: DISCONTINUED | OUTPATIENT
Start: 2019-08-29 | End: 2019-08-29

## 2019-08-29 RX ORDER — ALBUTEROL SULFATE 2.5 MG/3ML
5 SOLUTION RESPIRATORY (INHALATION)
Status: DISCONTINUED | OUTPATIENT
Start: 2019-08-29 | End: 2019-08-29 | Stop reason: HOSPADM

## 2019-08-29 RX ORDER — PRAZOSIN HYDROCHLORIDE 5 MG/1
5 CAPSULE ORAL ONCE
Status: COMPLETED | OUTPATIENT
Start: 2019-08-29 | End: 2019-08-29

## 2019-08-29 RX ORDER — CARVEDILOL 12.5 MG/1
25 TABLET ORAL ONCE
Status: COMPLETED | OUTPATIENT
Start: 2019-08-29 | End: 2019-08-29

## 2019-08-29 RX ORDER — AMLODIPINE BESYLATE 10 MG/1
5 TABLET ORAL DAILY
Status: DISCONTINUED | OUTPATIENT
Start: 2019-08-29 | End: 2019-08-29 | Stop reason: HOSPADM

## 2019-08-29 RX ORDER — ALBUTEROL SULFATE 90 UG/1
2 AEROSOL, METERED RESPIRATORY (INHALATION)
Status: DISCONTINUED | OUTPATIENT
Start: 2019-08-29 | End: 2019-08-29 | Stop reason: HOSPADM

## 2019-08-29 RX ORDER — IPRATROPIUM BROMIDE AND ALBUTEROL SULFATE 2.5; .5 MG/3ML; MG/3ML
1 SOLUTION RESPIRATORY (INHALATION)
Status: DISCONTINUED | OUTPATIENT
Start: 2019-08-29 | End: 2019-08-29

## 2019-08-29 RX ORDER — IPRATROPIUM BROMIDE AND ALBUTEROL SULFATE 2.5; .5 MG/3ML; MG/3ML
1 SOLUTION RESPIRATORY (INHALATION) EVERY 6 HOURS
COMMUNITY
End: 2019-10-21 | Stop reason: ALTCHOICE

## 2019-08-29 RX ADMIN — AMLODIPINE BESYLATE 5 MG: 10 TABLET ORAL at 15:06

## 2019-08-29 RX ADMIN — CARVEDILOL 25 MG: 12.5 TABLET, FILM COATED ORAL at 15:06

## 2019-08-29 RX ADMIN — INSULIN LISPRO 9 UNITS: 100 INJECTION, SOLUTION INTRAVENOUS; SUBCUTANEOUS at 16:03

## 2019-08-29 RX ADMIN — ALBUTEROL SULFATE 5 MG: 5 SOLUTION RESPIRATORY (INHALATION) at 14:47

## 2019-08-29 RX ADMIN — PRAZOSIN HYDROCHLORIDE 5 MG: 5 CAPSULE ORAL at 15:54

## 2019-08-29 RX ADMIN — IPRATROPIUM BROMIDE 0.5 MG: 0.5 SOLUTION RESPIRATORY (INHALATION) at 14:46

## 2019-08-29 ASSESSMENT — ENCOUNTER SYMPTOMS
SHORTNESS OF BREATH: 0
ABDOMINAL PAIN: 0
SINUS PAIN: 0
NAUSEA: 1
CONSTIPATION: 0
CHEST TIGHTNESS: 0
FACIAL SWELLING: 0
SINUS PRESSURE: 0
DIARRHEA: 0
EYE DISCHARGE: 0
VOMITING: 1

## 2019-08-29 NOTE — ED PROVIDER NOTES
[facility] ED  Emergency Department Encounter  EmergencyMedicine Resident     Pt Rodriguez Arita  MRN: [id number]  [place] 1955  Date of evaluation: 8/29/19  PCP:  Pierce Fitzgerald DO    CHIEF COMPLAINT       Chief Complaint   Patient presents with    Nausea    Emesis    Hypertension       HISTORY OF PRESENT ILLNESS  (Location/Symptom, Timing/Onset, Context/Setting, Quality, Duration, Modifying Factors, Severity.)      Viridiana Ramos is a 62 y.o. male who presents with hypertensive urgency and hypoglycemia. Patient was found unresponsive this morning found to be hypertensive systolic in the 003Y with point-of-care blood glucose less than 60. Patient subsequently aroused and given reportedly 8 packets of oral glucose. This made the patient nauseous with subsequent vomiting. Patient believes he vomited all of his antihypertensive medications that he took this morning. No current headache, vision changes, fever, chills, nausea, or current vomiting. Patient currently asymptomatic. Patient initially evaluated by EMS and refused transport, EMS subsequently recalled after home health aide found patient hypertensive and requested him be evaluated. Patient with recent 16-day hospitalization for hypertensive urgency requiring hemodialysis, subsequent improvement and discharged yesterday. Patient notes he has a very specific medication regimen that he follows and alterations to this regimen leads to problems.     PAST MEDICAL / SURGICAL / SOCIAL / FAMILY HISTORY      has a past medical history of Asthma, CAD in native artery, nonobstructive, Carotid stenosis, left, Carpal tunnel syndrome, Cerebrovascular disease, Chronic kidney disease, COPD (chronic obstructive pulmonary disease) ([facility] [address] Diabetes mellitus ([facility] [address] History of colon polyps, History of weakness of extremity, HTN (hypertension), Hyperlipidemia, Lung, cysts, congenital, PTSD (post-traumatic stress disorder), PTSD Fingerstick    EKG 12 Lead    Insert peripheral IV    Saline lock IV       MEDICATIONS ORDERED:  Orders Placed This Encounter   Medications    DISCONTD: albuterol (PROVENTIL) nebulizer solution 5 mg    DISCONTD: ipratropium-albuterol (DUONEB) nebulizer solution 1 ampule    albuterol (PROVENTIL) nebulizer solution 5 mg    albuterol sulfate  (90 Base) MCG/ACT inhaler 2 puff    DISCONTD: albuterol sulfate  (90 Base) MCG/ACT inhaler 2 puff    DISCONTD: ipratropium (ATROVENT HFA) 17 MCG/ACT inhaler 2 puff    ipratropium (ATROVENT) 0.02 % nebulizer solution 0.5 mg    amLODIPine (NORVASC) tablet 5 mg    carvedilol (COREG) tablet 25 mg    prazosin (MINIPRESS) capsule 5 mg    DISCONTD: insulin regular (HUMULIN R;NOVOLIN R) injection 9 Units    insulin lispro (HUMALOG) injection vial 9 Units       DDX: Hypertensive urgency / emergency, thyroid storm / hyperthyroidism, congestive heart failure, renal failure, cushing syndrome, excessive steroid use, cerebral herniation, stimulant drug use / abuse    Evaluate for: (acute EOD -- brain, renal, thoracic aorta, lung, kidney, eyes), renal dx, vascular lesion, drugs (MAO inhibitor + tyramine, steroids, cocaine, amphetamines)      DIAGNOSTIC RESULTS / EMERGENCY DEPARTMENT COURSE / MDM     LABS:  Results for orders placed or performed during the hospital encounter of 08/29/19   CBC Auto Differential   Result Value Ref Range    WBC 11.8 (H) 3.5 - 11.3 k/uL    RBC 3.56 (L) 4.21 - 5.77 m/uL    Hemoglobin 10.7 (L) 13.0 - 17.0 g/dL    Hematocrit 34.4 (L) 40.7 - 50.3 %    MCV 96.6 82.6 - 102.9 fL    MCH 30.1 25.2 - 33.5 pg    MCHC 31.1 28.4 - 34.8 g/dL    RDW 13.3 11.8 - 14.4 %    Platelets 990 962 - 951 k/uL    MPV 9.8 8.1 - 13.5 fL    NRBC Automated 0.0 0.0 per 100 WBC    Differential Type NOT REPORTED     WBC Morphology NOT REPORTED     RBC Morphology NOT REPORTED     Platelet Estimate NOT REPORTED     Immature Granulocytes 2 (H) 0 %    Seg Neutrophils 89 (H)

## 2019-08-29 NOTE — ED PROVIDER NOTES
All other components within normal limits   URINALYSIS - Abnormal; Notable for the following components:    Glucose, Ur 3+ (*)     Urine Hgb SMALL (*)     Protein, UA 3+ (*)     All other components within normal limits   URINE CULTURE   MICROSCOPIC URINALYSIS       Radiology  No orders to display       EKG:   EKG Interpretation    Interpreted by me    Rhythm: normal sinus   Rate: normal  Axis: normal  Ectopy: none  Conduction: normal  ST Segments: no acute change  T Waves: Inversion laterally  Q Waves: V1, V2    Clinical Impression: Nonspecific, possible old septal MI, atrial abnormality    Attending Physician Additional  Notes    Was just discharged from the hospital with hypertension altered mental status and hypoglycemia. He did not yet take his morning medications. He was awakened by EMS who was called for unresponsiveness. His blood sugar was 60. He was given oral glucose. He did awake but then was nauseated and persistent vomiting. He denies headache stroke symptoms chest pain shortness of breath. He did take his evening medications yesterday. No alcohol or drug use or overdose. On exam is hypertensive afebrile nontoxic. GCS is 15. Neck is supple. Normal speech mentation memory pupils cranial nerves motor strength. Skin is warm and dry. Impression is hypoglycemia, hypertension which is asymptomatic, nausea which is resolved. Plan is basic labs, antiemetics as needed, oral challenge, repeat blood sugar, consider discharge. Anthony Martin.  Leanne Jaramillo MD, [facility]  Attending Emergency  Physician                Maral Elizalde MD  08/29/19 7301

## 2019-08-30 ENCOUNTER — OFFICE VISIT (OUTPATIENT)
Dept: PULMONOLOGY | Age: 64
End: 2019-08-30
Payer: COMMERCIAL

## 2019-08-30 VITALS
RESPIRATION RATE: 18 BRPM | DIASTOLIC BLOOD PRESSURE: 76 MMHG | OXYGEN SATURATION: 98 % | BODY MASS INDEX: 24.27 KG/M2 | WEIGHT: 151 LBS | HEART RATE: 84 BPM | HEIGHT: 66 IN | SYSTOLIC BLOOD PRESSURE: 170 MMHG

## 2019-08-30 DIAGNOSIS — Z87.891 SMOKING HISTORY: ICD-10-CM

## 2019-08-30 DIAGNOSIS — J98.4 BRONCHOGENIC CYST: ICD-10-CM

## 2019-08-30 DIAGNOSIS — J44.1 CHRONIC OBSTRUCTIVE PULMONARY DISEASE WITH ACUTE EXACERBATION (HCC): Primary | ICD-10-CM

## 2019-08-30 LAB
CULTURE: NORMAL
EKG ATRIAL RATE: 92 BPM
EKG P AXIS: 70 DEGREES
EKG P-R INTERVAL: 140 MS
EKG Q-T INTERVAL: 340 MS
EKG QRS DURATION: 88 MS
EKG QTC CALCULATION (BAZETT): 420 MS
EKG R AXIS: 76 DEGREES
EKG T AXIS: 36 DEGREES
EKG VENTRICULAR RATE: 92 BPM
Lab: NORMAL
SPECIMEN DESCRIPTION: NORMAL

## 2019-08-30 PROCEDURE — 99213 OFFICE O/P EST LOW 20 MIN: CPT | Performed by: NURSE PRACTITIONER

## 2019-08-30 PROCEDURE — 93010 ELECTROCARDIOGRAM REPORT: CPT | Performed by: INTERNAL MEDICINE

## 2019-08-30 RX ORDER — AZITHROMYCIN 250 MG/1
250 TABLET, FILM COATED ORAL SEE ADMIN INSTRUCTIONS
Qty: 6 TABLET | Refills: 0 | Status: SHIPPED | OUTPATIENT
Start: 2019-08-30 | End: 2019-09-04

## 2019-08-30 RX ORDER — PREDNISONE 10 MG/1
10 TABLET ORAL DAILY
Qty: 30 TABLET | Refills: 0 | Status: SHIPPED | OUTPATIENT
Start: 2019-08-30 | End: 2019-09-09

## 2019-08-30 NOTE — PROGRESS NOTES
PULMONARY OP  PROGRESS NOTE      Patient:  Enrique Casey  YOB: 1955    MRN: [id number]     Acct:        Pt seen and Chart reviewed. Mrs. Enrique Casey is here in followup for    Diagnosis Orders   1. Chronic obstructive pulmonary disease with acute exacerbation (HCC)  azithromycin (ZITHROMAX) 250 MG tablet    predniSONE (DELTASONE) 10 MG tablet   2. Smoking history     3. Bronchogenic cyst         Pt presents today with c/o increased SOB, wheezing, chest tightness, and dry unproductive cough. He was recently d/c from the hospital for hypertensive urgency and states these symptoms started about 3 days ago after d/c. He is currently using duonebs QID and combivent prn. At one time pt was d/c from hospital with O2; however it is unclear as to when and for how long he has been using it. O2 evaluation performed on recent admission 8/27/19 shows the pt does not qualify for the home oxygen. Repeat study was performed today; on RA pt's oxygen saturation remained above 90% with activity; again not qualifying the patient for oxygen. He states he becomes SOB with moderate exertion and uses the O2 when doing activities out of the house such as grocery shopping. Subjective:   Review of Systems -   Constitutional ROS: negative  ENT ROS: negative  Allergy and Immunology ROS: negative  Respiratory ROS: SOB/MILLER, wheezing, chest tightness, dry unproductive cough  Cardiovascular ROS: negative  Gastrointestinal ROS: negative  Musculoskeletal ROS: negative    Allergies:   Allergies   Allergen Reactions    Lisinopril      cough    Ace Inhibitors      coughing    Pcn [Penicillins]        Medications:    Current Outpatient Medications:     azithromycin (ZITHROMAX) 250 MG tablet, Take 1 tablet by mouth See Admin Instructions for 5 days 500mg on day 1 followed by 250mg on days 2 - 5, Disp: 6 tablet, Rfl: 0    predniSONE (DELTASONE) 10 MG tablet, Take 1 tablet by mouth daily for 10 days Take 40 mg x3 days then 30 mg

## 2019-10-21 ENCOUNTER — HOSPITAL ENCOUNTER (OUTPATIENT)
Age: 64
Setting detail: SPECIMEN
Discharge: HOME | End: 2019-10-21
Payer: COMMERCIAL

## 2019-10-21 ENCOUNTER — OFFICE VISIT (OUTPATIENT)
Dept: INTERNAL MEDICINE | Age: 64
End: 2019-10-21
Payer: COMMERCIAL

## 2019-10-21 VITALS
HEIGHT: 66 IN | DIASTOLIC BLOOD PRESSURE: 81 MMHG | SYSTOLIC BLOOD PRESSURE: 161 MMHG | WEIGHT: 147 LBS | HEART RATE: 67 BPM | BODY MASS INDEX: 23.63 KG/M2

## 2019-10-21 DIAGNOSIS — L98.9 SKIN LESION: ICD-10-CM

## 2019-10-21 DIAGNOSIS — J41.8 MIXED SIMPLE AND MUCOPURULENT CHRONIC BRONCHITIS (HCC): ICD-10-CM

## 2019-10-21 DIAGNOSIS — I65.22 CAROTID STENOSIS, LEFT: ICD-10-CM

## 2019-10-21 DIAGNOSIS — E55.9 VITAMIN D DEFICIENCY: ICD-10-CM

## 2019-10-21 DIAGNOSIS — E78.01 FAMILIAL HYPERCHOLESTEROLEMIA: ICD-10-CM

## 2019-10-21 DIAGNOSIS — Z12.11 SCREENING FOR COLON CANCER: ICD-10-CM

## 2019-10-21 DIAGNOSIS — I25.10 CAD IN NATIVE ARTERY: ICD-10-CM

## 2019-10-21 DIAGNOSIS — E44.0 MODERATE MALNUTRITION (HCC): ICD-10-CM

## 2019-10-21 DIAGNOSIS — I25.118 CORONARY ARTERY DISEASE OF NATIVE ARTERY OF NATIVE HEART WITH STABLE ANGINA PECTORIS (HCC): ICD-10-CM

## 2019-10-21 DIAGNOSIS — I10 ESSENTIAL HYPERTENSION: ICD-10-CM

## 2019-10-21 DIAGNOSIS — I25.118 CORONARY ARTERY DISEASE WITH EXERTIONAL ANGINA (HCC): ICD-10-CM

## 2019-10-21 DIAGNOSIS — Z23 NEED FOR SHINGLES VACCINE: ICD-10-CM

## 2019-10-21 DIAGNOSIS — E21.3 HYPERPARATHYROIDISM (HCC): ICD-10-CM

## 2019-10-21 DIAGNOSIS — Z23 NEEDS FLU SHOT: ICD-10-CM

## 2019-10-21 DIAGNOSIS — N18.30 CHRONIC KIDNEY DISEASE, STAGE 3 (MODERATE): ICD-10-CM

## 2019-10-21 DIAGNOSIS — K40.21 BILATERAL RECURRENT INGUINAL HERNIA WITHOUT OBSTRUCTION OR GANGRENE: ICD-10-CM

## 2019-10-21 LAB
-: NORMAL
AMORPHOUS: NORMAL
ANION GAP SERPL CALCULATED.3IONS-SCNC: 12 MMOL/L (ref 9–17)
BACTERIA: NORMAL
BILIRUBIN URINE: NEGATIVE
BUN BLDV-MCNC: 31 MG/DL (ref 8–23)
BUN/CREAT BLD: ABNORMAL (ref 9–20)
CALCIUM SERPL-MCNC: 8.5 MG/DL (ref 8.6–10.4)
CASTS UA: NORMAL /LPF (ref 0–8)
CHLORIDE BLD-SCNC: 108 MMOL/L (ref 98–107)
CO2: 21 MMOL/L (ref 20–31)
COLOR: YELLOW
COMMENT UA: ABNORMAL
CREAT SERPL-MCNC: 1.51 MG/DL (ref 0.7–1.2)
CREATININE URINE: 101.9 MG/DL (ref 39–259)
CRYSTALS, UA: NORMAL /HPF
EPITHELIAL CELLS UA: NORMAL /HPF (ref 0–5)
GFR AFRICAN AMERICAN: 57 ML/MIN
GFR NON-AFRICAN AMERICAN: 47 ML/MIN
GFR SERPL CREATININE-BSD FRML MDRD: ABNORMAL ML/MIN/{1.73_M2}
GFR SERPL CREATININE-BSD FRML MDRD: ABNORMAL ML/MIN/{1.73_M2}
GLUCOSE BLD-MCNC: 133 MG/DL (ref 70–99)
GLUCOSE URINE: ABNORMAL
HCT VFR BLD CALC: 44.2 % (ref 40.7–50.3)
HEMOGLOBIN: 13.5 G/DL (ref 13–17)
KETONES, URINE: NEGATIVE
LEUKOCYTE ESTERASE, URINE: NEGATIVE
MAGNESIUM: 2 MG/DL (ref 1.6–2.6)
MCH RBC QN AUTO: 29.9 PG (ref 25.2–33.5)
MCHC RBC AUTO-ENTMCNC: 30.5 G/DL (ref 28.4–34.8)
MCV RBC AUTO: 98 FL (ref 82.6–102.9)
MUCUS: NORMAL
NITRITE, URINE: NEGATIVE
NRBC AUTOMATED: 0 PER 100 WBC
OTHER OBSERVATIONS UA: NORMAL
PDW BLD-RTO: 13.2 % (ref 11.8–14.4)
PH UA: 5.5 (ref 5–8)
PHOSPHORUS: 3.4 MG/DL (ref 2.5–4.5)
PLATELET # BLD: 303 K/UL (ref 138–453)
PMV BLD AUTO: 10.3 FL (ref 8.1–13.5)
POTASSIUM SERPL-SCNC: 4.7 MMOL/L (ref 3.7–5.3)
PROTEIN UA: ABNORMAL
PTH INTACT: 165.4 PG/ML (ref 15–65)
RBC # BLD: 4.51 M/UL (ref 4.21–5.77)
RBC UA: NORMAL /HPF (ref 0–4)
RENAL EPITHELIAL, UA: NORMAL /HPF
SODIUM BLD-SCNC: 141 MMOL/L (ref 135–144)
SPECIFIC GRAVITY UA: 1.02 (ref 1–1.03)
TOTAL PROTEIN, URINE: 484 MG/DL
TRICHOMONAS: NORMAL
TURBIDITY: CLEAR
URINE HGB: NEGATIVE
URINE TOTAL PROTEIN CREATININE RATIO: 4.75 (ref 0–0.2)
UROBILINOGEN, URINE: NORMAL
VITAMIN D 25-HYDROXY: 9.6 NG/ML (ref 30–100)
WBC # BLD: 6.3 K/UL (ref 3.5–11.3)
WBC UA: NORMAL /HPF (ref 0–5)
YEAST: NORMAL

## 2019-10-21 PROCEDURE — 99214 OFFICE O/P EST MOD 30 MIN: CPT | Performed by: NURSE PRACTITIONER

## 2019-10-21 PROCEDURE — 81001 URINALYSIS AUTO W/SCOPE: CPT

## 2019-10-21 PROCEDURE — 99211 OFF/OP EST MAY X REQ PHY/QHP: CPT | Performed by: NURSE PRACTITIONER

## 2019-10-21 PROCEDURE — 84156 ASSAY OF PROTEIN URINE: CPT

## 2019-10-21 PROCEDURE — 80048 BASIC METABOLIC PNL TOTAL CA: CPT

## 2019-10-21 PROCEDURE — 90688 IIV4 VACCINE SPLT 0.5 ML IM: CPT | Performed by: NURSE PRACTITIONER

## 2019-10-21 PROCEDURE — 84100 ASSAY OF PHOSPHORUS: CPT

## 2019-10-21 PROCEDURE — 82306 VITAMIN D 25 HYDROXY: CPT

## 2019-10-21 PROCEDURE — 83970 ASSAY OF PARATHORMONE: CPT

## 2019-10-21 PROCEDURE — 83735 ASSAY OF MAGNESIUM: CPT

## 2019-10-21 PROCEDURE — 82570 ASSAY OF URINE CREATININE: CPT

## 2019-10-21 PROCEDURE — 36415 COLL VENOUS BLD VENIPUNCTURE: CPT

## 2019-10-21 PROCEDURE — 85027 COMPLETE CBC AUTOMATED: CPT

## 2019-10-21 RX ORDER — AMLODIPINE BESYLATE 5 MG/1
5 TABLET ORAL 2 TIMES DAILY
Qty: 30 TABLET | Refills: 3 | Status: ON HOLD
Start: 2019-10-21 | End: 2020-05-18 | Stop reason: HOSPADM

## 2019-10-21 RX ORDER — HYDRALAZINE HYDROCHLORIDE 25 MG/1
25 TABLET, FILM COATED ORAL 2 TIMES DAILY
Qty: 60 TABLET | Refills: 3 | Status: ON HOLD
Start: 2019-10-21 | End: 2020-05-18 | Stop reason: HOSPADM

## 2019-10-21 RX ORDER — ERGOCALCIFEROL (VITAMIN D2) 1250 MCG
50000 CAPSULE ORAL WEEKLY
Qty: 4 CAPSULE | Refills: 3 | Status: SHIPPED | OUTPATIENT
Start: 2019-10-21 | End: 2020-06-02

## 2019-10-21 RX ORDER — CLOPIDOGREL BISULFATE 75 MG/1
75 TABLET ORAL DAILY
Qty: 90 TABLET | Refills: 3 | Status: SHIPPED | OUTPATIENT
Start: 2019-10-21 | End: 2020-06-16 | Stop reason: SDUPTHER

## 2019-10-21 RX ORDER — PRAZOSIN HYDROCHLORIDE 5 MG/1
5 CAPSULE ORAL 3 TIMES DAILY
Qty: 30 CAPSULE | Refills: 3 | Status: SHIPPED | OUTPATIENT
Start: 2019-10-21 | End: 2020-02-26 | Stop reason: SDUPTHER

## 2019-10-21 RX ORDER — ASPIRIN 81 MG/1
TABLET ORAL
Qty: 30 TABLET | Refills: 3 | Status: SHIPPED | OUTPATIENT
Start: 2019-10-21 | End: 2020-02-07

## 2019-10-21 RX ORDER — LOSARTAN POTASSIUM 50 MG/1
50 TABLET ORAL NIGHTLY
Qty: 30 TABLET | Refills: 3 | Status: ON HOLD
Start: 2019-10-21 | End: 2020-05-18 | Stop reason: HOSPADM

## 2019-10-21 RX ORDER — NITROGLYCERIN 0.4 MG/1
TABLET SUBLINGUAL
Qty: 25 TABLET | Refills: 2 | Status: SHIPPED | OUTPATIENT
Start: 2019-10-21 | End: 2020-06-16 | Stop reason: SDUPTHER

## 2019-10-21 RX ORDER — ATORVASTATIN CALCIUM 80 MG/1
80 TABLET, FILM COATED ORAL DAILY
Qty: 30 TABLET | Refills: 3 | Status: SHIPPED | OUTPATIENT
Start: 2019-10-21 | End: 2020-06-16 | Stop reason: SDUPTHER

## 2019-10-21 RX ORDER — CARVEDILOL 25 MG/1
25 TABLET ORAL 2 TIMES DAILY WITH MEALS
Qty: 60 TABLET | Refills: 3 | Status: SHIPPED | OUTPATIENT
Start: 2019-10-21 | End: 2020-06-16 | Stop reason: SDUPTHER

## 2019-10-21 ASSESSMENT — ENCOUNTER SYMPTOMS
BLURRED VISION: 0
ORTHOPNEA: 0

## 2019-10-21 ASSESSMENT — COPD QUESTIONNAIRES: COPD: 1

## 2019-10-30 ENCOUNTER — OFFICE VISIT (OUTPATIENT)
Dept: PULMONOLOGY | Age: 64
End: 2019-10-30
Payer: COMMERCIAL

## 2019-10-30 VITALS
BODY MASS INDEX: 22.31 KG/M2 | HEIGHT: 66 IN | HEART RATE: 88 BPM | DIASTOLIC BLOOD PRESSURE: 88 MMHG | OXYGEN SATURATION: 99 % | WEIGHT: 138.8 LBS | TEMPERATURE: 97.1 F | SYSTOLIC BLOOD PRESSURE: 166 MMHG

## 2019-10-30 DIAGNOSIS — J44.1 CHRONIC OBSTRUCTIVE PULMONARY DISEASE WITH ACUTE EXACERBATION (HCC): Primary | ICD-10-CM

## 2019-10-30 DIAGNOSIS — Z87.891 SMOKING HISTORY: ICD-10-CM

## 2019-10-30 DIAGNOSIS — J98.4 BRONCHOGENIC CYST: ICD-10-CM

## 2019-10-30 PROCEDURE — 94726 PLETHYSMOGRAPHY LUNG VOLUMES: CPT | Performed by: INTERNAL MEDICINE

## 2019-10-30 PROCEDURE — 99214 OFFICE O/P EST MOD 30 MIN: CPT | Performed by: INTERNAL MEDICINE

## 2019-10-30 PROCEDURE — 94375 RESPIRATORY FLOW VOLUME LOOP: CPT | Performed by: INTERNAL MEDICINE

## 2019-10-30 PROCEDURE — 94729 DIFFUSING CAPACITY: CPT | Performed by: INTERNAL MEDICINE

## 2019-11-13 ASSESSMENT — ENCOUNTER SYMPTOMS
COUGH: 1
CHEST TIGHTNESS: 1
VISUAL CHANGE: 0
SPUTUM PRODUCTION: 1

## 2019-12-12 ENCOUNTER — TELEPHONE (OUTPATIENT)
Dept: INTERNAL MEDICINE | Age: 64
End: 2019-12-12

## 2019-12-12 RX ORDER — CALCIUM CITRATE/VITAMIN D3 200MG-6.25
TABLET ORAL
Qty: 100 STRIP | Refills: 7 | Status: ON HOLD | OUTPATIENT
Start: 2019-12-12 | End: 2021-05-17 | Stop reason: HOSPADM

## 2019-12-18 ENCOUNTER — HOSPITAL ENCOUNTER (OUTPATIENT)
Age: 64
Setting detail: SPECIMEN
Discharge: HOME | End: 2019-12-18
Payer: COMMERCIAL

## 2019-12-18 LAB
-: NORMAL
AMORPHOUS: NORMAL
ANION GAP SERPL CALCULATED.3IONS-SCNC: 11 MMOL/L (ref 9–17)
BACTERIA: NORMAL
BILIRUBIN URINE: NEGATIVE
BUN BLDV-MCNC: 35 MG/DL (ref 8–23)
BUN/CREAT BLD: ABNORMAL (ref 9–20)
CALCIUM IONIZED: 1.23 MMOL/L (ref 1.13–1.33)
CALCIUM SERPL-MCNC: 8.4 MG/DL (ref 8.6–10.4)
CASTS UA: NORMAL /LPF (ref 0–8)
CHLORIDE BLD-SCNC: 104 MMOL/L (ref 98–107)
CO2: 21 MMOL/L (ref 20–31)
COLOR: YELLOW
COMMENT UA: ABNORMAL
CREAT SERPL-MCNC: 1.65 MG/DL (ref 0.7–1.2)
CREATININE URINE: 59.5 MG/DL (ref 39–259)
CRYSTALS, UA: NORMAL /HPF
EPITHELIAL CELLS UA: NORMAL /HPF (ref 0–5)
GFR AFRICAN AMERICAN: 51 ML/MIN
GFR NON-AFRICAN AMERICAN: 42 ML/MIN
GFR SERPL CREATININE-BSD FRML MDRD: ABNORMAL ML/MIN/{1.73_M2}
GFR SERPL CREATININE-BSD FRML MDRD: ABNORMAL ML/MIN/{1.73_M2}
GLUCOSE BLD-MCNC: 487 MG/DL (ref 70–99)
GLUCOSE URINE: ABNORMAL
HCT VFR BLD CALC: 37.3 % (ref 40.7–50.3)
HEMOGLOBIN: 11.7 G/DL (ref 13–17)
KETONES, URINE: NEGATIVE
LEUKOCYTE ESTERASE, URINE: NEGATIVE
MAGNESIUM: 1.8 MG/DL (ref 1.6–2.6)
MCH RBC QN AUTO: 30.2 PG (ref 25.2–33.5)
MCHC RBC AUTO-ENTMCNC: 31.4 G/DL (ref 28.4–34.8)
MCV RBC AUTO: 96.4 FL (ref 82.6–102.9)
MUCUS: NORMAL
NITRITE, URINE: NEGATIVE
NRBC AUTOMATED: 0 PER 100 WBC
OTHER OBSERVATIONS UA: NORMAL
PDW BLD-RTO: 11.9 % (ref 11.8–14.4)
PH UA: 5.5 (ref 5–8)
PHOSPHORUS: 3.9 MG/DL (ref 2.5–4.5)
PLATELET # BLD: 323 K/UL (ref 138–453)
PMV BLD AUTO: 10.3 FL (ref 8.1–13.5)
POTASSIUM SERPL-SCNC: 5.2 MMOL/L (ref 3.7–5.3)
PROTEIN UA: ABNORMAL
PTH INTACT: 149.3 PG/ML (ref 15–65)
RBC # BLD: 3.87 M/UL (ref 4.21–5.77)
RBC UA: NORMAL /HPF (ref 0–4)
RENAL EPITHELIAL, UA: NORMAL /HPF
SODIUM BLD-SCNC: 136 MMOL/L (ref 135–144)
SPECIFIC GRAVITY UA: 1.02 (ref 1–1.03)
TOTAL PROTEIN, URINE: 343 MG/DL
TRICHOMONAS: NORMAL
TURBIDITY: CLEAR
URINE HGB: NEGATIVE
URINE TOTAL PROTEIN CREATININE RATIO: 5.76 (ref 0–0.2)
UROBILINOGEN, URINE: NORMAL
VITAMIN D 25-HYDROXY: 14.4 NG/ML (ref 30–100)
WBC # BLD: 5.2 K/UL (ref 3.5–11.3)
WBC UA: NORMAL /HPF (ref 0–5)
YEAST: NORMAL

## 2019-12-18 PROCEDURE — 84100 ASSAY OF PHOSPHORUS: CPT

## 2019-12-18 PROCEDURE — 85027 COMPLETE CBC AUTOMATED: CPT

## 2019-12-18 PROCEDURE — 83970 ASSAY OF PARATHORMONE: CPT

## 2019-12-18 PROCEDURE — 81001 URINALYSIS AUTO W/SCOPE: CPT

## 2019-12-18 PROCEDURE — 82330 ASSAY OF CALCIUM: CPT

## 2019-12-18 PROCEDURE — 80048 BASIC METABOLIC PNL TOTAL CA: CPT

## 2019-12-18 PROCEDURE — 36415 COLL VENOUS BLD VENIPUNCTURE: CPT

## 2019-12-18 PROCEDURE — 83735 ASSAY OF MAGNESIUM: CPT

## 2019-12-18 PROCEDURE — 82306 VITAMIN D 25 HYDROXY: CPT

## 2019-12-18 PROCEDURE — 82570 ASSAY OF URINE CREATININE: CPT

## 2019-12-18 PROCEDURE — 84156 ASSAY OF PROTEIN URINE: CPT

## 2019-12-27 ENCOUNTER — TELEPHONE (OUTPATIENT)
Dept: INTERNAL MEDICINE | Age: 64
End: 2019-12-27

## 2020-01-06 ENCOUNTER — HOSPITAL ENCOUNTER (OUTPATIENT)
Age: 65
Setting detail: OBSERVATION
Discharge: HOME | End: 2020-01-07
Attending: EMERGENCY MEDICINE | Admitting: EMERGENCY MEDICINE
Payer: COMMERCIAL

## 2020-01-06 ENCOUNTER — APPOINTMENT (OUTPATIENT)
Dept: GENERAL RADIOLOGY | Age: 65
End: 2020-01-06
Payer: COMMERCIAL

## 2020-01-06 PROBLEM — J44.1 COPD EXACERBATION (HCC): Status: ACTIVE | Noted: 2020-01-06

## 2020-01-06 LAB
ABSOLUTE EOS #: 0.31 K/UL (ref 0–0.44)
ABSOLUTE IMMATURE GRANULOCYTE: 0.03 K/UL (ref 0–0.3)
ABSOLUTE LYMPH #: 1.44 K/UL (ref 1.1–3.7)
ABSOLUTE MONO #: 0.63 K/UL (ref 0.1–1.2)
AMPHETAMINE SCREEN URINE: ABNORMAL
AMPHETAMINE SCREEN URINE: ABNORMAL
AMPHETAMINE SCREEN URINE: NEGATIVE
ANION GAP SERPL CALCULATED.3IONS-SCNC: 12 MMOL/L (ref 9–17)
BARBITURATE SCREEN URINE: ABNORMAL
BARBITURATE SCREEN URINE: ABNORMAL
BARBITURATE SCREEN URINE: NEGATIVE
BASOPHILS # BLD: 1 % (ref 0–2)
BASOPHILS ABSOLUTE: 0.03 K/UL (ref 0–0.2)
BENZODIAZEPINE SCREEN, URINE: ABNORMAL
BENZODIAZEPINE SCREEN, URINE: ABNORMAL
BENZODIAZEPINE SCREEN, URINE: NEGATIVE
BUN BLDV-MCNC: 39 MG/DL (ref 8–23)
BUN/CREAT BLD: ABNORMAL (ref 9–20)
BUPRENORPHINE URINE: ABNORMAL
CALCIUM SERPL-MCNC: 8.6 MG/DL (ref 8.6–10.4)
CANNABINOID SCREEN URINE: ABNORMAL
CANNABINOID SCREEN URINE: ABNORMAL
CANNABINOID SCREEN URINE: NEGATIVE
CHLORIDE BLD-SCNC: 103 MMOL/L (ref 98–107)
CO2: 20 MMOL/L (ref 20–31)
COCAINE METABOLITE, URINE: ABNORMAL
COCAINE METABOLITE, URINE: ABNORMAL
COCAINE METABOLITE, URINE: POSITIVE
CREAT SERPL-MCNC: 2.47 MG/DL (ref 0.7–1.2)
DIFFERENTIAL TYPE: ABNORMAL
EOSINOPHILS RELATIVE PERCENT: 5 % (ref 1–4)
GFR AFRICAN AMERICAN: 32 ML/MIN
GFR NON-AFRICAN AMERICAN: 27 ML/MIN
GFR SERPL CREATININE-BSD FRML MDRD: ABNORMAL ML/MIN/{1.73_M2}
GFR SERPL CREATININE-BSD FRML MDRD: ABNORMAL ML/MIN/{1.73_M2}
GLUCOSE BLD-MCNC: 296 MG/DL (ref 70–99)
GLUCOSE BLD-MCNC: 350 MG/DL (ref 75–110)
GLUCOSE BLD-MCNC: 402 MG/DL (ref 75–110)
GLUCOSE BLD-MCNC: 479 MG/DL (ref 75–110)
GLUCOSE BLD-MCNC: 491 MG/DL (ref 75–110)
HCT VFR BLD CALC: 36 % (ref 40.7–50.3)
HEMOGLOBIN: 11.8 G/DL (ref 13–17)
IMMATURE GRANULOCYTES: 1 %
LYMPHOCYTES # BLD: 23 % (ref 24–43)
MCH RBC QN AUTO: 30.5 PG (ref 25.2–33.5)
MCHC RBC AUTO-ENTMCNC: 32.8 G/DL (ref 28.4–34.8)
MCV RBC AUTO: 93 FL (ref 82.6–102.9)
MDMA URINE: ABNORMAL
METHADONE SCREEN, URINE: ABNORMAL
METHADONE SCREEN, URINE: ABNORMAL
METHADONE SCREEN, URINE: NEGATIVE
METHAMPHETAMINE, URINE: ABNORMAL
MONOCYTES # BLD: 10 % (ref 3–12)
NRBC AUTOMATED: 0 PER 100 WBC
OPIATES, URINE: ABNORMAL
OPIATES, URINE: ABNORMAL
OPIATES, URINE: NEGATIVE
OXYCODONE SCREEN URINE: ABNORMAL
OXYCODONE SCREEN URINE: ABNORMAL
OXYCODONE SCREEN URINE: NEGATIVE
PDW BLD-RTO: 12.1 % (ref 11.8–14.4)
PHENCYCLIDINE, URINE: ABNORMAL
PHENCYCLIDINE, URINE: ABNORMAL
PHENCYCLIDINE, URINE: NEGATIVE
PLATELET # BLD: 349 K/UL (ref 138–453)
PLATELET ESTIMATE: ABNORMAL
PMV BLD AUTO: 10 FL (ref 8.1–13.5)
POTASSIUM SERPL-SCNC: 3.7 MMOL/L (ref 3.7–5.3)
PROPOXYPHENE, URINE: ABNORMAL
RBC # BLD: 3.87 M/UL (ref 4.21–5.77)
RBC # BLD: ABNORMAL 10*6/UL
SEG NEUTROPHILS: 60 % (ref 36–65)
SEGMENTED NEUTROPHILS ABSOLUTE COUNT: 3.91 K/UL (ref 1.5–8.1)
SODIUM BLD-SCNC: 135 MMOL/L (ref 135–144)
TEST INFORMATION: ABNORMAL
TRICYCLIC ANTIDEPRESSANTS, UR: ABNORMAL
TROPONIN INTERP: ABNORMAL
TROPONIN T: ABNORMAL NG/ML
TROPONIN, HIGH SENSITIVITY: 53 NG/L (ref 0–22)
TROPONIN, HIGH SENSITIVITY: 73 NG/L (ref 0–22)
TROPONIN, HIGH SENSITIVITY: 79 NG/L (ref 0–22)
TROPONIN, HIGH SENSITIVITY: 83 NG/L (ref 0–22)
WBC # BLD: 6.4 K/UL (ref 3.5–11.3)
WBC # BLD: ABNORMAL 10*3/UL

## 2020-01-06 PROCEDURE — 80048 BASIC METABOLIC PNL TOTAL CA: CPT

## 2020-01-06 PROCEDURE — 6370000000 HC RX 637 (ALT 250 FOR IP): Performed by: INTERNAL MEDICINE

## 2020-01-06 PROCEDURE — 71046 X-RAY EXAM CHEST 2 VIEWS: CPT

## 2020-01-06 PROCEDURE — 96374 THER/PROPH/DIAG INJ IV PUSH: CPT

## 2020-01-06 PROCEDURE — 82947 ASSAY GLUCOSE BLOOD QUANT: CPT

## 2020-01-06 PROCEDURE — 80307 DRUG TEST PRSMV CHEM ANLYZR: CPT

## 2020-01-06 PROCEDURE — G0378 HOSPITAL OBSERVATION PER HR: HCPCS

## 2020-01-06 PROCEDURE — 2580000003 HC RX 258: Performed by: STUDENT IN AN ORGANIZED HEALTH CARE EDUCATION/TRAINING PROGRAM

## 2020-01-06 PROCEDURE — 6360000002 HC RX W HCPCS: Performed by: STUDENT IN AN ORGANIZED HEALTH CARE EDUCATION/TRAINING PROGRAM

## 2020-01-06 PROCEDURE — 6370000000 HC RX 637 (ALT 250 FOR IP): Performed by: STUDENT IN AN ORGANIZED HEALTH CARE EDUCATION/TRAINING PROGRAM

## 2020-01-06 PROCEDURE — 36415 COLL VENOUS BLD VENIPUNCTURE: CPT

## 2020-01-06 PROCEDURE — 93005 ELECTROCARDIOGRAM TRACING: CPT | Performed by: STUDENT IN AN ORGANIZED HEALTH CARE EDUCATION/TRAINING PROGRAM

## 2020-01-06 PROCEDURE — 93005 ELECTROCARDIOGRAM TRACING: CPT | Performed by: EMERGENCY MEDICINE

## 2020-01-06 PROCEDURE — 85025 COMPLETE CBC W/AUTO DIFF WBC: CPT

## 2020-01-06 PROCEDURE — 6360000002 HC RX W HCPCS: Performed by: EMERGENCY MEDICINE

## 2020-01-06 PROCEDURE — 94640 AIRWAY INHALATION TREATMENT: CPT

## 2020-01-06 PROCEDURE — 99285 EMERGENCY DEPT VISIT HI MDM: CPT

## 2020-01-06 PROCEDURE — 84484 ASSAY OF TROPONIN QUANT: CPT

## 2020-01-06 RX ORDER — SODIUM CHLORIDE 0.9 % (FLUSH) 0.9 %
10 SYRINGE (ML) INJECTION PRN
Status: DISCONTINUED | OUTPATIENT
Start: 2020-01-06 | End: 2020-01-07 | Stop reason: HOSPADM

## 2020-01-06 RX ORDER — METHYLPREDNISOLONE SODIUM SUCCINATE 125 MG/2ML
125 INJECTION, POWDER, LYOPHILIZED, FOR SOLUTION INTRAMUSCULAR; INTRAVENOUS ONCE
Status: COMPLETED | OUTPATIENT
Start: 2020-01-06 | End: 2020-01-06

## 2020-01-06 RX ORDER — LOSARTAN POTASSIUM 50 MG/1
50 TABLET ORAL NIGHTLY
Status: DISCONTINUED | OUTPATIENT
Start: 2020-01-06 | End: 2020-01-07 | Stop reason: HOSPADM

## 2020-01-06 RX ORDER — ALBUTEROL SULFATE 90 UG/1
2 AEROSOL, METERED RESPIRATORY (INHALATION)
Status: DISCONTINUED | OUTPATIENT
Start: 2020-01-06 | End: 2020-01-07

## 2020-01-06 RX ORDER — ATORVASTATIN CALCIUM 80 MG/1
80 TABLET, FILM COATED ORAL NIGHTLY
Status: DISCONTINUED | OUTPATIENT
Start: 2020-01-06 | End: 2020-01-07 | Stop reason: HOSPADM

## 2020-01-06 RX ORDER — AMLODIPINE BESYLATE 10 MG/1
10 TABLET ORAL DAILY
Status: DISCONTINUED | OUTPATIENT
Start: 2020-01-06 | End: 2020-01-07 | Stop reason: HOSPADM

## 2020-01-06 RX ORDER — INSULIN GLARGINE 100 [IU]/ML
35 INJECTION, SOLUTION SUBCUTANEOUS NIGHTLY
Status: DISCONTINUED | OUTPATIENT
Start: 2020-01-06 | End: 2020-01-07 | Stop reason: HOSPADM

## 2020-01-06 RX ORDER — SODIUM CHLORIDE 0.9 % (FLUSH) 0.9 %
10 SYRINGE (ML) INJECTION EVERY 12 HOURS SCHEDULED
Status: DISCONTINUED | OUTPATIENT
Start: 2020-01-06 | End: 2020-01-07 | Stop reason: HOSPADM

## 2020-01-06 RX ORDER — AZITHROMYCIN 250 MG/1
250 TABLET, FILM COATED ORAL DAILY
Status: DISCONTINUED | OUTPATIENT
Start: 2020-01-07 | End: 2020-01-07 | Stop reason: HOSPADM

## 2020-01-06 RX ORDER — NICOTINE POLACRILEX 4 MG
15 LOZENGE BUCCAL PRN
Status: DISCONTINUED | OUTPATIENT
Start: 2020-01-06 | End: 2020-01-07 | Stop reason: HOSPADM

## 2020-01-06 RX ORDER — NIFEDIPINE 60 MG/1
60 TABLET, FILM COATED, EXTENDED RELEASE ORAL DAILY
Status: DISCONTINUED | OUTPATIENT
Start: 2020-01-06 | End: 2020-01-07 | Stop reason: HOSPADM

## 2020-01-06 RX ORDER — ACETAMINOPHEN 325 MG/1
650 TABLET ORAL EVERY 4 HOURS PRN
Status: DISCONTINUED | OUTPATIENT
Start: 2020-01-06 | End: 2020-01-07 | Stop reason: HOSPADM

## 2020-01-06 RX ORDER — PRAZOSIN HYDROCHLORIDE 5 MG/1
5 CAPSULE ORAL 3 TIMES DAILY
Status: DISCONTINUED | OUTPATIENT
Start: 2020-01-06 | End: 2020-01-07 | Stop reason: HOSPADM

## 2020-01-06 RX ORDER — ASPIRIN 81 MG/1
81 TABLET ORAL DAILY
Status: DISCONTINUED | OUTPATIENT
Start: 2020-01-06 | End: 2020-01-07 | Stop reason: HOSPADM

## 2020-01-06 RX ORDER — HYDRALAZINE HYDROCHLORIDE 50 MG/1
50 TABLET, FILM COATED ORAL EVERY 8 HOURS SCHEDULED
Status: DISCONTINUED | OUTPATIENT
Start: 2020-01-06 | End: 2020-01-07 | Stop reason: HOSPADM

## 2020-01-06 RX ORDER — IPRATROPIUM BROMIDE AND ALBUTEROL SULFATE 2.5; .5 MG/3ML; MG/3ML
1 SOLUTION RESPIRATORY (INHALATION)
Status: DISCONTINUED | OUTPATIENT
Start: 2020-01-06 | End: 2020-01-06

## 2020-01-06 RX ORDER — HYDRALAZINE HYDROCHLORIDE 50 MG/1
50 TABLET, FILM COATED ORAL EVERY 8 HOURS SCHEDULED
Status: DISCONTINUED | OUTPATIENT
Start: 2020-01-07 | End: 2020-01-06

## 2020-01-06 RX ORDER — DEXTROSE MONOHYDRATE 50 MG/ML
100 INJECTION, SOLUTION INTRAVENOUS PRN
Status: DISCONTINUED | OUTPATIENT
Start: 2020-01-06 | End: 2020-01-07 | Stop reason: HOSPADM

## 2020-01-06 RX ORDER — ALBUTEROL SULFATE 2.5 MG/3ML
5 SOLUTION RESPIRATORY (INHALATION)
Status: DISCONTINUED | OUTPATIENT
Start: 2020-01-06 | End: 2020-01-07

## 2020-01-06 RX ORDER — ALBUTEROL SULFATE 90 UG/1
1 AEROSOL, METERED RESPIRATORY (INHALATION) EVERY 6 HOURS
Status: DISCONTINUED | OUTPATIENT
Start: 2020-01-06 | End: 2020-01-07

## 2020-01-06 RX ORDER — CARVEDILOL 25 MG/1
25 TABLET ORAL 2 TIMES DAILY WITH MEALS
Status: DISCONTINUED | OUTPATIENT
Start: 2020-01-06 | End: 2020-01-07 | Stop reason: HOSPADM

## 2020-01-06 RX ORDER — QUETIAPINE FUMARATE 100 MG/1
100 TABLET, FILM COATED ORAL NIGHTLY
Status: DISCONTINUED | OUTPATIENT
Start: 2020-01-06 | End: 2020-01-07 | Stop reason: HOSPADM

## 2020-01-06 RX ORDER — DEXTROSE MONOHYDRATE 25 G/50ML
12.5 INJECTION, SOLUTION INTRAVENOUS PRN
Status: DISCONTINUED | OUTPATIENT
Start: 2020-01-06 | End: 2020-01-07 | Stop reason: HOSPADM

## 2020-01-06 RX ORDER — NITROGLYCERIN 0.4 MG/1
0.4 TABLET SUBLINGUAL EVERY 5 MIN PRN
Status: DISCONTINUED | OUTPATIENT
Start: 2020-01-06 | End: 2020-01-07 | Stop reason: HOSPADM

## 2020-01-06 RX ORDER — ALBUTEROL SULFATE 90 UG/1
2 AEROSOL, METERED RESPIRATORY (INHALATION)
Status: DISCONTINUED | OUTPATIENT
Start: 2020-01-06 | End: 2020-01-06

## 2020-01-06 RX ORDER — NIFEDIPINE 60 MG/1
60 TABLET, EXTENDED RELEASE ORAL DAILY
Status: ON HOLD | COMMUNITY
Start: 2019-12-20 | End: 2020-05-18 | Stop reason: HOSPADM

## 2020-01-06 RX ORDER — HYDRALAZINE HYDROCHLORIDE 25 MG/1
25 TABLET, FILM COATED ORAL 2 TIMES DAILY
Status: DISCONTINUED | OUTPATIENT
Start: 2020-01-06 | End: 2020-01-06

## 2020-01-06 RX ORDER — CLOPIDOGREL BISULFATE 75 MG/1
75 TABLET ORAL DAILY
Status: DISCONTINUED | OUTPATIENT
Start: 2020-01-06 | End: 2020-01-07 | Stop reason: HOSPADM

## 2020-01-06 RX ORDER — ISOSORBIDE MONONITRATE 30 MG/1
30 TABLET, EXTENDED RELEASE ORAL DAILY
Status: DISCONTINUED | OUTPATIENT
Start: 2020-01-06 | End: 2020-01-07 | Stop reason: HOSPADM

## 2020-01-06 RX ADMIN — CARVEDILOL 25 MG: 25 TABLET, FILM COATED ORAL at 13:03

## 2020-01-06 RX ADMIN — NIFEDIPINE 60 MG: 60 TABLET, EXTENDED RELEASE ORAL at 13:04

## 2020-01-06 RX ADMIN — METHYLPREDNISOLONE SODIUM SUCCINATE 125 MG: 125 INJECTION, POWDER, FOR SOLUTION INTRAMUSCULAR; INTRAVENOUS at 09:00

## 2020-01-06 RX ADMIN — PRAZOSIN HYDROCHLORIDE 5 MG: 5 CAPSULE ORAL at 14:05

## 2020-01-06 RX ADMIN — IPRATROPIUM BROMIDE 0.5 MG: 0.5 SOLUTION RESPIRATORY (INHALATION) at 08:01

## 2020-01-06 RX ADMIN — Medication 81 MG: at 13:03

## 2020-01-06 RX ADMIN — INSULIN LISPRO 7 UNITS: 100 INJECTION, SOLUTION INTRAVENOUS; SUBCUTANEOUS at 21:33

## 2020-01-06 RX ADMIN — CLOPIDOGREL 75 MG: 75 TABLET, FILM COATED ORAL at 13:04

## 2020-01-06 RX ADMIN — LOSARTAN POTASSIUM 50 MG: 50 TABLET, FILM COATED ORAL at 21:33

## 2020-01-06 RX ADMIN — PRAZOSIN HYDROCHLORIDE 5 MG: 5 CAPSULE ORAL at 21:33

## 2020-01-06 RX ADMIN — INSULIN LISPRO 13 UNITS: 100 INJECTION, SOLUTION INTRAVENOUS; SUBCUTANEOUS at 17:14

## 2020-01-06 RX ADMIN — ATORVASTATIN CALCIUM 80 MG: 80 TABLET, FILM COATED ORAL at 21:33

## 2020-01-06 RX ADMIN — ALBUTEROL SULFATE 10 MG: 5 SOLUTION RESPIRATORY (INHALATION) at 08:02

## 2020-01-06 RX ADMIN — SODIUM CHLORIDE, PRESERVATIVE FREE 10 ML: 5 INJECTION INTRAVENOUS at 13:04

## 2020-01-06 RX ADMIN — ISOSORBIDE MONONITRATE 30 MG: 30 TABLET ORAL at 14:05

## 2020-01-06 RX ADMIN — HYDRALAZINE HYDROCHLORIDE 50 MG: 50 TABLET, FILM COATED ORAL at 23:44

## 2020-01-06 RX ADMIN — SODIUM CHLORIDE, PRESERVATIVE FREE 10 ML: 5 INJECTION INTRAVENOUS at 21:41

## 2020-01-06 RX ADMIN — CARVEDILOL 25 MG: 25 TABLET, FILM COATED ORAL at 21:33

## 2020-01-06 RX ADMIN — HYDRALAZINE HYDROCHLORIDE 25 MG: 25 TABLET, FILM COATED ORAL at 13:04

## 2020-01-06 RX ADMIN — AMLODIPINE BESYLATE 10 MG: 10 TABLET ORAL at 13:03

## 2020-01-06 RX ADMIN — INSULIN LISPRO 9 UNITS: 100 INJECTION, SOLUTION INTRAVENOUS; SUBCUTANEOUS at 13:05

## 2020-01-06 RX ADMIN — INSULIN GLARGINE 35 UNITS: 100 INJECTION, SOLUTION SUBCUTANEOUS at 23:45

## 2020-01-06 ASSESSMENT — ENCOUNTER SYMPTOMS
PHOTOPHOBIA: 0
VOMITING: 0
SHORTNESS OF BREATH: 0
COUGH: 1
BACK PAIN: 0
ABDOMINAL PAIN: 0
RHINORRHEA: 0
NAUSEA: 0
WHEEZING: 0

## 2020-01-06 ASSESSMENT — PAIN SCALES - GENERAL
PAINLEVEL_OUTOF10: 0
PAINLEVEL_OUTOF10: 0

## 2020-01-06 NOTE — ED PROVIDER NOTES
[address]  ED  Emergency Department Encounter  Emergency Medicine Resident     Pt Name: Arnaldo Schwartz  MRN: [id number]  Mahsagffan 1955  Date of evaluation: 1/6/20  PCP:  TERRANCE Gibbons CNP    CHIEF COMPLAINT       Chief Complaint   Patient presents with    Chest Pain    Shortness of Breath       HISTORY OFPRESENT ILLNESS  (Location/Symptom, Timing/Onset, Context/Setting, Quality, Duration, Modifying Factors,Severity.)      Arnaldo Schwartz is a 62 y. o.yo male who presents with sharp left-sided chest pain which then turned into left-sided chest pressure. This started yesterday while he was walking. It is now gone. However, it was getting worse every time he exerted himself. Patient also states that for the past 2 days he has been feeling more short of breath. He has a baseline cough, history of COPD. Slight increase of production of his sputum. He feels mildly short of breath resting in bed. No fevers, chills, nasal congestion. He denies any diaphoresis, nausea, emesis. But he does describe that the left-sided chest pain was radiating into his left upper arm. States he was told that he needs a stress test several months ago however he was unable to get this done because he is a diabetic and believes that he was unable to go so long fasting.     PAST MEDICAL / SURGICAL / SOCIAL / FAMILY HISTORY      has a past medical history of Asthma, CAD in native artery, nonobstructive, Carotid stenosis, left, Carpal tunnel syndrome, Cerebrovascular disease, Chronic kidney disease, COPD (chronic obstructive pulmonary disease) ([facility] [place] [address] Diabetes mellitus ([facility] [place] [address] History of colon polyps, History of weakness of extremity, HTN (hypertension), Hyperlipidemia, Lung, cysts, congenital, PTSD (post-traumatic stress disorder), PTSD (post-traumatic stress disorder), TIA (transient ischemic attack), and Type II or unspecified type diabetes mellitus without mention of complication, not stated as uncontrolled. has a past surgical history that includes hernia repair; Tonsillectomy; Colonoscopy; Carotid endarterectomy (Left, -); vascular surgery (Left, ); and pr colsc flx w/rmvl of tumor polyp lesion snare tq (N/A, 2017). Social History     Socioeconomic History    Marital status:      Spouse name: Not on file    Number of children: Not on file    Years of education: Not on file    Highest education level: Not on file   Occupational History     Employer: N/A   Social Needs    Financial resource strain: Not on file    Food insecurity:     Worry: Not on file     Inability: Not on file    Transportation needs:     Medical: Not on file     Non-medical: Not on file   Tobacco Use    Smoking status: Former Smoker     Packs/day: 0.50     Years: 35.00     Pack years: 17.50     Types: Cigarettes     Last attempt to quit: 2014     Years since quittin.4    Smokeless tobacco: Never Used   Substance and Sexual Activity    Alcohol use: No     Alcohol/week: 0.0 standard drinks    Drug use: No    Sexual activity: Not on file   Lifestyle    Physical activity:     Days per week: Not on file     Minutes per session: Not on file    Stress: Not on file   Relationships    Social connections:     Talks on phone: Not on file     Gets together: Not on file     Attends Muslim service: Not on file     Active member of club or organization: Not on file     Attends meetings of clubs or organizations: Not on file     Relationship status: Not on file    Intimate partner violence:     Fear of current or ex partner: Not on file     Emotionally abused: Not on file     Physically abused: Not on file     Forced sexual activity: Not on file   Other Topics Concern    Not on file   Social History Narrative    Not on file       Family History   Problem Relation Age of Onset    Cancer Mother     Heart Disease Father         Allergies:  Lisinopril;  Ace inhibitors; and Pcn atraumatic. Eyes:      Extraocular Movements: Extraocular movements intact. Conjunctiva/sclera: Conjunctivae normal.   Neck:      Musculoskeletal: Normal range of motion and neck supple. Cardiovascular:      Rate and Rhythm: Normal rate. Heart sounds: Normal heart sounds. Pulmonary:      Effort: Pulmonary effort is normal.      Comments: Diffuse bilateral wheezing. Overall, diminished breath sounds  Abdominal:      General: There is no distension. Palpations: Abdomen is soft. Tenderness: There is no tenderness. Musculoskeletal: Normal range of motion. General: No deformity. Right lower leg: No edema. Left lower leg: No edema. Skin:     General: Skin is warm. Capillary Refill: Capillary refill takes less than 2 seconds. Neurological:      Mental Status: He is alert and oriented to person, place, and time. Mental status is at baseline.          DIFFERENTIAL  DIAGNOSIS     PLAN (LABS / IMAGING / EKG):  Orders Placed This Encounter   Procedures    XR CHEST STANDARD (2 VW)    Basic Metabolic Panel    CBC Auto Differential    Troponin    Troponin    Continuous Pulse Oximetry    Telemetry monitoring    Initiate ED Aerosol Protocol    MDI Treatment    HHN Treatment    EKG 12 Lead       MEDICATIONS ORDERED:  Orders Placed This Encounter   Medications    DISCONTD: albuterol (PROVENTIL) nebulizer solution 5 mg    DISCONTD: ipratropium-albuterol (DUONEB) nebulizer solution 1 ampule    albuterol (PROVENTIL) nebulizer solution 5 mg    albuterol sulfate  (90 Base) MCG/ACT inhaler 2 puff    DISCONTD: albuterol sulfate  (90 Base) MCG/ACT inhaler 2 puff    DISCONTD: ipratropium (ATROVENT HFA) 17 MCG/ACT inhaler 2 puff    ipratropium (ATROVENT) 0.02 % nebulizer solution 0.5 mg    albuterol (PROVENTIL) nebulizer solution 10 mg    methylPREDNISolone sodium (SOLU-MEDROL) injection 125 mg       Initial MDM/Plan: 62 y.o. male who presents with 81-year-old male with chest pain x2 days. This is intermittent but worsened with exertion. This also radiates into his left arm. No other associated typical features. Concern for ACS, patient has multiple cardiac risk factors, will initiate ACS work-up. Patient also experiencing shortness of breath with baseline cough without increased beat and production or change to the quality of his sputum. Upon auscultation, he has wheezing and is diminished. Also consider COPD exacerbation especially with slight increased sputum production. Will administer aerosols and reassess. We will likely also administer a steroid and Azithromycin however will rule out ACS first.  Not feel that pulmonary embolism is likely given lack of risk factors, pain is not pleuritic, he is not tachycardic, tachypneic, and is saturating well on room air. Patient has had a cardiac catheterization in the past with some coronary artery disease. No PCI at that time. Patient was recommended to have a stress test in the interim however he was unable to get this done as an outpatient secondary to diabetes and concerned that he was not able to fast for several hours. Therefore, will if everything returns negative, will calculate heart score, consider observation unit admission and potential stress test.  Patient will be in agreement with plan. Follow-up labs and imaging. Will listen to patient again after treatments. Determine disposition.     DIAGNOSTIC RESULTS / EMERGENCYDEPARTMENT COURSE / MDM     LABS:  Labs Reviewed   BASIC METABOLIC PANEL - Abnormal; Notable for the following components:       Result Value    Glucose 296 (*)     BUN 39 (*)     CREATININE 2.47 (*)     GFR Non- 27 (*)     GFR  32 (*)     All other components within normal limits   CBC WITH AUTO DIFFERENTIAL - Abnormal; Notable for the following components:    RBC 3.87 (*)     Hemoglobin 11.8 (*)     Hematocrit 36.0 (*)     Lymphocytes 23 (*)

## 2020-01-06 NOTE — ED NOTES
Pt to room 15 via EMS with c/o SOB and chest pain. Pt reports that he started having the pain last night, but he took 2 nitro and the pain subsided. Pt reports that when he starts to experiences the shortness of breath, that he starts to have chest pain associated with the SOB. Pt reports that he has mid sternal chest pain that radiates to his left arm and left jaw. Per EMS, pt was given 1 nitro and 1 combivent treatment PTA. Pt has history of COPD. Pt placed on continuous cardiac monitor, bp and pulse ox. EKG completed, IV established, blood work drawn. Pt alert and oriented x4, talking in short sentences, respirations even and slightly labored. Pt acting age appropriate. White board updated, will continue to monitor.        Geri Sahni RN  01/06/20 [id number]

## 2020-01-06 NOTE — ED NOTES
Labeled blood specimen collected and sent to lab via tube system.        Hunter Chauhan RN  01/06/20 5423

## 2020-01-06 NOTE — CONSULTS
obstructive pulmonary disease) ([facility] [address] Diabetes mellitus ([facility] [address] History of colon polyps, History of weakness of extremity, HTN (hypertension), Hyperlipidemia, Lung, cysts, congenital, PTSD (post-traumatic stress disorder), PTSD (post-traumatic stress disorder), TIA (transient ischemic attack), and Type II or unspecified type diabetes mellitus without mention of complication, not stated as uncontrolled. Past Surgical History:   has a past surgical history that includes hernia repair; Tonsillectomy; Colonoscopy; Carotid endarterectomy (Left, 7-2013); vascular surgery (Left, 2015); and pr colsc flx w/rmvl of tumor polyp lesion snare tq (N/A, 6/27/2017). Home Medications:    Prior to Admission medications    Medication Sig Start Date End Date Taking?  Authorizing Provider   NIFEdipine (PROCARDIA XL) 60 MG extended release tablet Take 60 mg by mouth daily 12/20/19   Historical Provider, MD   TRUE METRIX BLOOD GLUCOSE TEST strip TEST BLOOD SUGAR 4 TO 5 TIMES DAILY 12/12/19   TERRANCE Pena - CNP   losartan (COZAAR) 50 MG tablet Take 1 tablet by mouth nightly 10/21/19   TERRANCE Pena CNP   prazosin (MINIPRESS) 5 MG capsule Take 1 capsule by mouth 3 times daily 10/21/19   TERRANCE Pena - CNP   amLODIPine (NORVASC) 5 MG tablet Take 1 tablet by mouth 2 times daily 10/21/19   TERRANCE Pena CNP   ergocalciferol (ERGOCALCIFEROL) 92479 units capsule Take 1 capsule by mouth once a week 10/21/19   TERRANCE Pena CNP   carvedilol (COREG) 25 MG tablet Take 1 tablet by mouth 2 times daily (with meals) 10/21/19   TERRANCE Pena CNP   albuterol-ipratropium (COMBIVENT RESPIMAT)  MCG/ACT AERS inhaler Inhale 1 puff into the lungs every 6 hours 10/21/19   TERRANCE Pena CNP   atorvastatin (LIPITOR) 80 MG tablet Take 1 tablet by mouth daily 10/21/19   TERRANCE Pena CNP   aspirin (ASPIRIN ADULT LOW STRENGTH) 81 MG EC tablet TAKE 1 TABLET BY MOUTH PRN  amLODIPine (NORVASC) tablet 10 mg, 10 mg, Oral, Daily  aspirin EC tablet 81 mg, 81 mg, Oral, Daily  atorvastatin (LIPITOR) tablet 80 mg, 80 mg, Oral, Nightly  carvedilol (COREG) tablet 25 mg, 25 mg, Oral, BID WC  clopidogrel (PLAVIX) tablet 75 mg, 75 mg, Oral, Daily  fluticasone-umeclidin-vilant (TRELEGY ELLIPTA) 100-62.5-25 MCG/INH inhaler 1 puff, 1 puff, Inhalation, Daily  hydrALAZINE (APRESOLINE) tablet 25 mg, 25 mg, Oral, BID  losartan (COZAAR) tablet 50 mg, 50 mg, Oral, Nightly  NIFEdipine (ADALAT CC) extended release tablet 60 mg, 60 mg, Oral, Daily  nitroGLYCERIN (NITROSTAT) SL tablet 0.4 mg, 0.4 mg, Sublingual, Q5 Min PRN  prazosin (MINIPRESS) capsule 5 mg, 5 mg, Oral, TID  QUEtiapine (SEROQUEL) tablet 100 mg, 100 mg, Oral, Nightly  sodium chloride flush 0.9 % injection 10 mL, 10 mL, Intravenous, 2 times per day  sodium chloride flush 0.9 % injection 10 mL, 10 mL, Intravenous, PRN  acetaminophen (TYLENOL) tablet 650 mg, 650 mg, Oral, Q4H PRN  enoxaparin (LOVENOX) injection 30 mg, 30 mg, Subcutaneous, Daily  insulin lispro (HUMALOG) injection vial 0-12 Units, 0-12 Units, Subcutaneous, TID WC  insulin lispro (HUMALOG) injection vial 0-6 Units, 0-6 Units, Subcutaneous, Nightly  [START ON 1/7/2020] azithromycin (ZITHROMAX) tablet 250 mg, 250 mg, Oral, Daily  albuterol sulfate  (90 Base) MCG/ACT inhaler 1 puff, 1 puff, Inhalation, Q6H  ipratropium (ATROVENT HFA) 17 MCG/ACT inhaler 1 puff, 1 puff, Inhalation, Q6H  glucose (GLUTOSE) 40 % oral gel 15 g, 15 g, Oral, PRN  dextrose 50 % IV solution, 12.5 g, Intravenous, PRN  glucagon (rDNA) injection 1 mg, 1 mg, Intramuscular, PRN  dextrose 5 % solution, 100 mL/hr, Intravenous, PRN      Allergies:  Lisinopril; Ace inhibitors; and Pcn [penicillins]      Social History:   reports that he quit smoking about 5 years ago. His smoking use included cigarettes. He has a 17.50 pack-year smoking history.  He has never used smokeless tobacco. He reports that he does Exertional dyspnea    PTSD (post-traumatic stress disorder)    Transient cerebral ischemia    Essential hypertension    DM type 2, uncontrolled, with neuropathy (HCC)    COPD (chronic obstructive pulmonary disease) (HCC)    Cerebral vascular disease    Primary insomnia    Hypertensive urgency    Non-obstructive Coronary artery disease of native artery of native heart with stable angina pectoris ([facility])    Hyperparathyroidism ([facility] [address]    Vitamin D deficiency    Acute kidney injury superimposed on chronic kidney disease ([facility] [address]    Coronary artery disease with exertional angina ([facility])    Moderate malnutrition ([facility] [address]    COPD exacerbation ([facility])       RECOMMENDATIONS:  Trend troponins  Check urine tox screen  Recommend Nephrology consultation due to worsening renal failure  Start Imdur 30 mg once daily  Restart Amlodipine 10 mg once daily that patient was not taking  Plan for lexiscan stress test      Discussed with patient and nurse.       Electronically signed on 01/06/20 at 11:49 AM by:    Hamilton Cosby MD   Fellow, [address]

## 2020-01-06 NOTE — PROGRESS NOTES
BLOOD SUGAR 4 TO 5 TIMES DAILY 12/12/19   TERRANCE Richmond CNP   losartan (COZAAR) 50 MG tablet Take 1 tablet by mouth nightly 10/21/19   [facility]   prazosin (MINIPRESS) 5 MG capsule Take 1 capsule by mouth 3 times daily 10/21/19   [facility] TERRANCE Madison CNP   amLODIPine (NORVASC) 5 MG tablet Take 1 tablet by mouth 2 times daily 10/21/19   TERRANCE Richmond CNP   ergocalciferol (ERGOCALCIFEROL) 36520 units capsule Take 1 capsule by mouth once a week 10/21/19   [facility] TERRANCE Madison CNP   carvedilol (COREG) 25 MG tablet Take 1 tablet by mouth 2 times daily (with meals) 10/21/19   [facility]   albuterol-ipratropium (COMBIVENT RESPIMAT)  MCG/ACT AERS inhaler Inhale 1 puff into the lungs every 6 hours 10/21/19   TERRANCE Richmond CNP   atorvastatin (LIPITOR) 80 MG tablet Take 1 tablet by mouth daily 10/21/19   [facility] TERRANCE Madison CNP   aspirin (ASPIRIN ADULT LOW STRENGTH) 81 MG EC tablet TAKE 1 TABLET BY MOUTH DAILY 10/21/19   [facility] Gricelda, APRN - CNP   clopidogrel (PLAVIX) 75 MG tablet Take 1 tablet by mouth daily 10/21/19   [facility] TERRANCE Madison CNP   Dulaglutide (TRULICITY) 0.34 LD/1.8LO SOPN Inject 0.75 mg into the skin once a week 10/21/19   [facility] TERRANCE Madison CNP   insulin glargine (BASAGLAR KWIKPEN) 100 UNIT/ML injection pen Inject 35 Units into the skin nightly Dispense with pen needle 10/21/19   [facility] TERRANCE Madison CNP   insulin aspart (NOVOLOG FLEXPEN) 100 UNIT/ML injection pen Inject 5 Units into the skin 3 times daily (before meals) Sliding scale 10/21/19   [facility] Gricelda, TERRANCE Paul CNP   nitroGLYCERIN (NITROSTAT) 0.4 MG SL tablet up to max of 3 total doses.  If no relief after 1 dose, call 911. 10/21/19   TERRANCE Richmond CNP   fluticasone-umeclidin-vilant (Robin Thomas) 100-62.5-25 MCG/INH AEPB INHALE 1 PUFF INTO THE LINGS DAILY 10/21/19   TERRANCE Richmond - CNP   hydrALAZINE (APRESOLINE) 25 MG tablet Take 1 tablet by mouth 2 times daily 10/21/19   TERRANCE Britton - CNP   zoster recombinant adjuvanted vaccine [facility]) 50 MCG/0.5ML SUSR injection Inject 0.5 mLs into the muscle See Admin Instructions 1 dose now and repeat in 2-6 months 10/21/19 4/18/20  Justin Rivero APRN - CNP   QUEtiapine (SEROQUEL) 100 MG tablet TAKE 1 TABLET BY MOUTH NIGHTLY 8/28/19   Zara Yañez MD   EASY COMFORT LANCETS MISC DX: DM-2 USE 3-4 TIMES DAILY 3/25/19   Zara Yañez MD   COMFORT EZ PEN NEEDLES 31G X 8 MM MISC USE AS DIRECTED 2/25/19   Zara Yañez MD      Current Facility-Administered Medications: albuterol (PROVENTIL) nebulizer solution 5 mg, 5 mg, Nebulization, As Directed RT PRN  albuterol sulfate  (90 Base) MCG/ACT inhaler 2 puff, 2 puff, Inhalation, As Directed RT PRN  ipratropium (ATROVENT) 0.02 % nebulizer solution 0.5 mg, 0.5 mg, Nebulization, As Directed RT PRN  albuterol (PROVENTIL) nebulizer solution 10 mg, 10 mg, Nebulization, As Directed RT PRN  amLODIPine (NORVASC) tablet 10 mg, 10 mg, Oral, Daily  aspirin EC tablet 81 mg, 81 mg, Oral, Daily  atorvastatin (LIPITOR) tablet 80 mg, 80 mg, Oral, Nightly  carvedilol (COREG) tablet 25 mg, 25 mg, Oral, BID WC  clopidogrel (PLAVIX) tablet 75 mg, 75 mg, Oral, Daily  fluticasone-umeclidin-vilant (TRELEGY ELLIPTA) 100-62.5-25 MCG/INH inhaler 1 puff, 1 puff, Inhalation, Daily  hydrALAZINE (APRESOLINE) tablet 25 mg, 25 mg, Oral, BID  losartan (COZAAR) tablet 50 mg, 50 mg, Oral, Nightly  NIFEdipine (ADALAT CC) extended release tablet 60 mg, 60 mg, Oral, Daily  nitroGLYCERIN (NITROSTAT) SL tablet 0.4 mg, 0.4 mg, Sublingual, Q5 Min PRN  prazosin (MINIPRESS) capsule 5 mg, 5 mg, Oral, TID  QUEtiapine (SEROQUEL) tablet 100 mg, 100 mg, Oral, Nightly  sodium chloride flush 0.9 % injection 10 mL, 10 mL, Intravenous, 2 times per day  sodium chloride flush 0.9 % injection 10 mL, 10 mL, Intravenous, PRN  acetaminophen (TYLENOL) tablet 650 mg, 650 mg, Oral, Q4H PRN  enoxaparin (LOVENOX) injection 30 mg, 30 mg, Subcutaneous, Daily  insulin lispro (HUMALOG) injection vial 0-12 Units, 0-12 Units, Subcutaneous, TID WC  insulin lispro (HUMALOG) injection vial 0-6 Units, 0-6 Units, Subcutaneous, Nightly  [START ON 1/7/2020] azithromycin (ZITHROMAX) tablet 250 mg, 250 mg, Oral, Daily    Labs:     CBC:   Recent Labs     01/06/20  0732   WBC 6.4   HGB 11.8*   HCT 36.0*        BMP:   Recent Labs     01/06/20  0732      K 3.7   CO2 20   BUN 39*   CREATININE 2.47*   LABGLOM 27*   GLUCOSE 296*     CARDIAC ENZYMES:  Recent Labs     01/06/20  0732 01/06/20  0901   TROPHS 80* 78*     FASTING LIPID PANEL:  Lab Results   Component Value Date    HDL 50 08/13/2019    TRIG 85 08/13/2019     Glenn Oconnell [facility]   Pager: [phone]

## 2020-01-07 ENCOUNTER — APPOINTMENT (OUTPATIENT)
Dept: NUCLEAR MEDICINE | Age: 65
End: 2020-01-07
Payer: COMMERCIAL

## 2020-01-07 VITALS
DIASTOLIC BLOOD PRESSURE: 73 MMHG | BODY MASS INDEX: 22.31 KG/M2 | WEIGHT: 138.8 LBS | HEIGHT: 66 IN | TEMPERATURE: 97 F | HEART RATE: 59 BPM | SYSTOLIC BLOOD PRESSURE: 175 MMHG | OXYGEN SATURATION: 96 % | RESPIRATION RATE: 18 BRPM

## 2020-01-07 LAB
ANION GAP SERPL CALCULATED.3IONS-SCNC: 12 MMOL/L (ref 9–17)
BUN BLDV-MCNC: 46 MG/DL (ref 8–23)
BUN/CREAT BLD: ABNORMAL (ref 9–20)
CALCIUM SERPL-MCNC: 8.1 MG/DL (ref 8.6–10.4)
CHLORIDE BLD-SCNC: 100 MMOL/L (ref 98–107)
CO2: 22 MMOL/L (ref 20–31)
CREAT SERPL-MCNC: 2.51 MG/DL (ref 0.7–1.2)
EKG ATRIAL RATE: 52 BPM
EKG ATRIAL RATE: 57 BPM
EKG ATRIAL RATE: 65 BPM
EKG P AXIS: 72 DEGREES
EKG P AXIS: 72 DEGREES
EKG P AXIS: 80 DEGREES
EKG P-R INTERVAL: 132 MS
EKG P-R INTERVAL: 136 MS
EKG P-R INTERVAL: 138 MS
EKG Q-T INTERVAL: 418 MS
EKG Q-T INTERVAL: 426 MS
EKG Q-T INTERVAL: 440 MS
EKG QRS DURATION: 102 MS
EKG QRS DURATION: 104 MS
EKG QRS DURATION: 106 MS
EKG QTC CALCULATION (BAZETT): 406 MS
EKG QTC CALCULATION (BAZETT): 409 MS
EKG QTC CALCULATION (BAZETT): 443 MS
EKG R AXIS: 25 DEGREES
EKG R AXIS: 50 DEGREES
EKG R AXIS: 91 DEGREES
EKG T AXIS: -142 DEGREES
EKG T AXIS: 109 DEGREES
EKG T AXIS: 84 DEGREES
EKG VENTRICULAR RATE: 52 BPM
EKG VENTRICULAR RATE: 57 BPM
EKG VENTRICULAR RATE: 65 BPM
GFR AFRICAN AMERICAN: 32 ML/MIN
GFR NON-AFRICAN AMERICAN: 26 ML/MIN
GFR SERPL CREATININE-BSD FRML MDRD: ABNORMAL ML/MIN/{1.73_M2}
GFR SERPL CREATININE-BSD FRML MDRD: ABNORMAL ML/MIN/{1.73_M2}
GLUCOSE BLD-MCNC: 125 MG/DL (ref 75–110)
GLUCOSE BLD-MCNC: 232 MG/DL (ref 75–110)
GLUCOSE BLD-MCNC: 375 MG/DL (ref 75–110)
GLUCOSE BLD-MCNC: 400 MG/DL (ref 75–110)
GLUCOSE BLD-MCNC: 454 MG/DL (ref 70–99)
LV EF: 49 %
LVEF MODALITY: NORMAL
POTASSIUM SERPL-SCNC: 4.6 MMOL/L (ref 3.7–5.3)
SODIUM BLD-SCNC: 134 MMOL/L (ref 135–144)
TROPONIN INTERP: ABNORMAL
TROPONIN T: ABNORMAL NG/ML
TROPONIN, HIGH SENSITIVITY: 47 NG/L (ref 0–22)

## 2020-01-07 PROCEDURE — 6360000002 HC RX W HCPCS: Performed by: STUDENT IN AN ORGANIZED HEALTH CARE EDUCATION/TRAINING PROGRAM

## 2020-01-07 PROCEDURE — 6370000000 HC RX 637 (ALT 250 FOR IP): Performed by: STUDENT IN AN ORGANIZED HEALTH CARE EDUCATION/TRAINING PROGRAM

## 2020-01-07 PROCEDURE — 6370000000 HC RX 637 (ALT 250 FOR IP): Performed by: INTERNAL MEDICINE

## 2020-01-07 PROCEDURE — G0378 HOSPITAL OBSERVATION PER HR: HCPCS

## 2020-01-07 PROCEDURE — G0108 DIAB MANAGE TRN  PER INDIV: HCPCS

## 2020-01-07 PROCEDURE — 84484 ASSAY OF TROPONIN QUANT: CPT

## 2020-01-07 PROCEDURE — 93017 CV STRESS TEST TRACING ONLY: CPT

## 2020-01-07 PROCEDURE — 80048 BASIC METABOLIC PNL TOTAL CA: CPT

## 2020-01-07 PROCEDURE — 94664 DEMO&/EVAL PT USE INHALER: CPT

## 2020-01-07 PROCEDURE — 94761 N-INVAS EAR/PLS OXIMETRY MLT: CPT

## 2020-01-07 PROCEDURE — 82947 ASSAY GLUCOSE BLOOD QUANT: CPT

## 2020-01-07 PROCEDURE — 3430000000 HC RX DIAGNOSTIC RADIOPHARMACEUTICAL: Performed by: INTERNAL MEDICINE

## 2020-01-07 PROCEDURE — 2580000003 HC RX 258: Performed by: INTERNAL MEDICINE

## 2020-01-07 PROCEDURE — 94640 AIRWAY INHALATION TREATMENT: CPT

## 2020-01-07 PROCEDURE — 2580000003 HC RX 258: Performed by: STUDENT IN AN ORGANIZED HEALTH CARE EDUCATION/TRAINING PROGRAM

## 2020-01-07 PROCEDURE — 78452 HT MUSCLE IMAGE SPECT MULT: CPT

## 2020-01-07 PROCEDURE — 36415 COLL VENOUS BLD VENIPUNCTURE: CPT

## 2020-01-07 PROCEDURE — 93005 ELECTROCARDIOGRAM TRACING: CPT | Performed by: STUDENT IN AN ORGANIZED HEALTH CARE EDUCATION/TRAINING PROGRAM

## 2020-01-07 PROCEDURE — A9500 TC99M SESTAMIBI: HCPCS | Performed by: INTERNAL MEDICINE

## 2020-01-07 PROCEDURE — 96376 TX/PRO/DX INJ SAME DRUG ADON: CPT

## 2020-01-07 PROCEDURE — 6360000002 HC RX W HCPCS: Performed by: INTERNAL MEDICINE

## 2020-01-07 RX ORDER — ALBUTEROL SULFATE 2.5 MG/3ML
2.5 SOLUTION RESPIRATORY (INHALATION)
Status: DISCONTINUED | OUTPATIENT
Start: 2020-01-07 | End: 2020-01-07 | Stop reason: HOSPADM

## 2020-01-07 RX ORDER — SODIUM CHLORIDE 0.9 % (FLUSH) 0.9 %
10 SYRINGE (ML) INJECTION PRN
Status: DISCONTINUED | OUTPATIENT
Start: 2020-01-07 | End: 2020-01-07 | Stop reason: HOSPADM

## 2020-01-07 RX ORDER — ASPIRIN 81 MG/1
324 TABLET, CHEWABLE ORAL ONCE
Status: COMPLETED | OUTPATIENT
Start: 2020-01-07 | End: 2020-01-07

## 2020-01-07 RX ORDER — IPRATROPIUM BROMIDE AND ALBUTEROL SULFATE 2.5; .5 MG/3ML; MG/3ML
1 SOLUTION RESPIRATORY (INHALATION) EVERY 4 HOURS PRN
Status: DISCONTINUED | OUTPATIENT
Start: 2020-01-07 | End: 2020-01-07

## 2020-01-07 RX ORDER — ALBUTEROL SULFATE 90 UG/1
6 AEROSOL, METERED RESPIRATORY (INHALATION)
Status: DISCONTINUED | OUTPATIENT
Start: 2020-01-07 | End: 2020-01-07 | Stop reason: HOSPADM

## 2020-01-07 RX ORDER — SODIUM CHLORIDE 9 MG/ML
500 INJECTION, SOLUTION INTRAVENOUS CONTINUOUS PRN
Status: DISCONTINUED | OUTPATIENT
Start: 2020-01-07 | End: 2020-01-07 | Stop reason: ALTCHOICE

## 2020-01-07 RX ORDER — AMINOPHYLLINE DIHYDRATE 25 MG/ML
50 INJECTION, SOLUTION INTRAVENOUS PRN
Status: DISCONTINUED | OUTPATIENT
Start: 2020-01-07 | End: 2020-01-07 | Stop reason: ALTCHOICE

## 2020-01-07 RX ORDER — ALBUTEROL SULFATE 90 UG/1
2 AEROSOL, METERED RESPIRATORY (INHALATION) PRN
Status: DISCONTINUED | OUTPATIENT
Start: 2020-01-07 | End: 2020-01-07 | Stop reason: ALTCHOICE

## 2020-01-07 RX ORDER — PREDNISONE 10 MG/1
TABLET ORAL
Qty: 20 TABLET | Refills: 0 | Status: SHIPPED | OUTPATIENT
Start: 2020-01-07 | End: 2020-01-17

## 2020-01-07 RX ORDER — ISOSORBIDE MONONITRATE 30 MG/1
30 TABLET, EXTENDED RELEASE ORAL DAILY
Qty: 30 TABLET | Refills: 3 | Status: SHIPPED | OUTPATIENT
Start: 2020-01-08 | End: 2020-06-03

## 2020-01-07 RX ORDER — ATROPINE SULFATE 0.1 MG/ML
0.5 INJECTION INTRAVENOUS EVERY 5 MIN PRN
Status: DISCONTINUED | OUTPATIENT
Start: 2020-01-07 | End: 2020-01-07 | Stop reason: ALTCHOICE

## 2020-01-07 RX ORDER — ALBUTEROL SULFATE 2.5 MG/3ML
2.5 SOLUTION RESPIRATORY (INHALATION)
Status: DISCONTINUED | OUTPATIENT
Start: 2020-01-07 | End: 2020-01-07

## 2020-01-07 RX ORDER — NITROGLYCERIN 0.4 MG/1
0.4 TABLET SUBLINGUAL EVERY 5 MIN PRN
Status: DISCONTINUED | OUTPATIENT
Start: 2020-01-07 | End: 2020-01-07 | Stop reason: ALTCHOICE

## 2020-01-07 RX ORDER — METHYLPREDNISOLONE SODIUM SUCCINATE 125 MG/2ML
125 INJECTION, POWDER, LYOPHILIZED, FOR SOLUTION INTRAMUSCULAR; INTRAVENOUS ONCE
Status: COMPLETED | OUTPATIENT
Start: 2020-01-07 | End: 2020-01-07

## 2020-01-07 RX ORDER — SODIUM CHLORIDE 0.9 % (FLUSH) 0.9 %
10 SYRINGE (ML) INJECTION PRN
Status: DISCONTINUED | OUTPATIENT
Start: 2020-01-07 | End: 2020-01-07 | Stop reason: ALTCHOICE

## 2020-01-07 RX ORDER — IPRATROPIUM BROMIDE AND ALBUTEROL SULFATE 2.5; .5 MG/3ML; MG/3ML
1 SOLUTION RESPIRATORY (INHALATION) 4 TIMES DAILY
Status: DISCONTINUED | OUTPATIENT
Start: 2020-01-07 | End: 2020-01-07 | Stop reason: HOSPADM

## 2020-01-07 RX ORDER — IPRATROPIUM BROMIDE AND ALBUTEROL SULFATE 2.5; .5 MG/3ML; MG/3ML
1 SOLUTION RESPIRATORY (INHALATION) 3 TIMES DAILY
Status: DISCONTINUED | OUTPATIENT
Start: 2020-01-07 | End: 2020-01-07

## 2020-01-07 RX ORDER — AZITHROMYCIN 250 MG/1
250 TABLET, FILM COATED ORAL DAILY
Qty: 5 TABLET | Refills: 0 | Status: SHIPPED | OUTPATIENT
Start: 2020-01-08 | End: 2020-04-07 | Stop reason: SDUPTHER

## 2020-01-07 RX ORDER — SODIUM CHLORIDE 9 MG/ML
INJECTION, SOLUTION INTRAVENOUS CONTINUOUS
Status: DISCONTINUED | OUTPATIENT
Start: 2020-01-07 | End: 2020-01-07 | Stop reason: HOSPADM

## 2020-01-07 RX ORDER — METOPROLOL TARTRATE 5 MG/5ML
5 INJECTION INTRAVENOUS EVERY 5 MIN PRN
Status: DISCONTINUED | OUTPATIENT
Start: 2020-01-07 | End: 2020-01-07 | Stop reason: ALTCHOICE

## 2020-01-07 RX ADMIN — INSULIN LISPRO 4 UNITS: 100 INJECTION, SOLUTION INTRAVENOUS; SUBCUTANEOUS at 11:52

## 2020-01-07 RX ADMIN — SODIUM CHLORIDE, PRESERVATIVE FREE 10 ML: 5 INJECTION INTRAVENOUS at 10:20

## 2020-01-07 RX ADMIN — TETRAKIS(2-METHOXYISOBUTYLISOCYANIDE)COPPER(I) TETRAFLUOROBORATE 15.8 MILLICURIE: 1 INJECTION, POWDER, LYOPHILIZED, FOR SOLUTION INTRAVENOUS at 07:58

## 2020-01-07 RX ADMIN — REGADENOSON 0.4 MG: 0.08 INJECTION, SOLUTION INTRAVENOUS at 10:19

## 2020-01-07 RX ADMIN — NIFEDIPINE 60 MG: 60 TABLET, EXTENDED RELEASE ORAL at 12:01

## 2020-01-07 RX ADMIN — AZITHROMYCIN 250 MG: 250 TABLET, FILM COATED ORAL at 11:59

## 2020-01-07 RX ADMIN — CLOPIDOGREL 75 MG: 75 TABLET, FILM COATED ORAL at 12:00

## 2020-01-07 RX ADMIN — INSULIN LISPRO 5 UNITS: 100 INJECTION, SOLUTION INTRAVENOUS; SUBCUTANEOUS at 18:26

## 2020-01-07 RX ADMIN — CARVEDILOL 25 MG: 25 TABLET, FILM COATED ORAL at 11:59

## 2020-01-07 RX ADMIN — SODIUM CHLORIDE: 9 INJECTION, SOLUTION INTRAVENOUS at 06:35

## 2020-01-07 RX ADMIN — HYDRALAZINE HYDROCHLORIDE 50 MG: 50 TABLET, FILM COATED ORAL at 13:27

## 2020-01-07 RX ADMIN — IPRATROPIUM BROMIDE AND ALBUTEROL SULFATE 1 AMPULE: .5; 3 SOLUTION RESPIRATORY (INHALATION) at 12:16

## 2020-01-07 RX ADMIN — CARVEDILOL 25 MG: 25 TABLET, FILM COATED ORAL at 18:11

## 2020-01-07 RX ADMIN — METHYLPREDNISOLONE SODIUM SUCCINATE 125 MG: 125 INJECTION, POWDER, FOR SOLUTION INTRAMUSCULAR; INTRAVENOUS at 16:15

## 2020-01-07 RX ADMIN — INSULIN LISPRO 5 UNITS: 100 INJECTION, SOLUTION INTRAVENOUS; SUBCUTANEOUS at 08:43

## 2020-01-07 RX ADMIN — TETRAKIS(2-METHOXYISOBUTYLISOCYANIDE)COPPER(I) TETRAFLUOROBORATE 42.5 MILLICURIE: 1 INJECTION, POWDER, LYOPHILIZED, FOR SOLUTION INTRAVENOUS at 10:20

## 2020-01-07 RX ADMIN — ISOSORBIDE MONONITRATE 30 MG: 30 TABLET ORAL at 12:00

## 2020-01-07 RX ADMIN — AMLODIPINE BESYLATE 10 MG: 10 TABLET ORAL at 11:58

## 2020-01-07 RX ADMIN — SODIUM CHLORIDE, PRESERVATIVE FREE 10 ML: 5 INJECTION INTRAVENOUS at 07:58

## 2020-01-07 RX ADMIN — PRAZOSIN HYDROCHLORIDE 5 MG: 5 CAPSULE ORAL at 13:27

## 2020-01-07 RX ADMIN — ASPIRIN 81 MG 324 MG: 81 TABLET ORAL at 11:57

## 2020-01-07 ASSESSMENT — PAIN SCALES - GENERAL
PAINLEVEL_OUTOF10: 0
PAINLEVEL_OUTOF10: 0

## 2020-01-07 NOTE — PROGRESS NOTES
01/07/20 1609   Treatment   Treatment Type Treatment missed  (patient refused nebulizer treatment at this time)

## 2020-01-07 NOTE — PROGRESS NOTES
Pt gone from room for nuclear medicine testing. If d/c'ed before educator return, please place referral for OP DM Ed.

## 2020-01-07 NOTE — PROGRESS NOTES
[address]  CDU / OBSERVATION ENCOUNTER  ATTENDING NOTE       I performed a history and physical examination of the patient and discussed management with the resident. I reviewed the residents note and agree with the documented findings and plan of care. Any areas of disagreement are noted on the chart. I was personally present for the key portions of any procedures. I have documented in the chart those procedures where I was not present during the key portions. I have reviewed the nurses notes. I agree with the chief complaint, past medical history, past surgical history, allergies, medications, social and family history as documented unless otherwise noted below. The Family history, social history, and ROS are effectively unchanged since admission unless noted elsewhere in the chart. The patient is a 60year old male who presents for evaluation of a COPD exacerbation, productive cough and chest pain. Troponins 70 and 83 which is at his baseline. He has new T wave changes on EKG. Cardiology evaluated the patient in the ED. Plan to obtain stress test.     The patient is diabetic. I have reviewed the patient's chart and found the most recent A1c is 9.8. This indicates poor control. Will alter diabetic regimen and help arrange follow-up with PCP. Diabetic education ordered.             Giovanny Fisher DO  Attending Emergency Physician

## 2020-01-07 NOTE — PROGRESS NOTES
[facility]   Progress Note                   Date:   1/7/2020  Patient name: Andrez Merlos  Date of admission:  1/6/2020  7:00 AM  MRN:   [id number]  YOB: 1955  PCP: Bethann Lefort, APRN - CNP    [facility]: [facility] [J44.1]    Subjective:       Clinical Changes / Abnormalities: Pt seen and examined in the stress lab. Pt denies any CP or SOB at this time. Medications:   Scheduled Meds:   aspirin  324 mg Oral Once    amLODIPine  10 mg Oral Daily    aspirin  81 mg Oral Daily    atorvastatin  80 mg Oral Nightly    carvedilol  25 mg Oral BID WC    clopidogrel  75 mg Oral Daily    fluticasone-umeclidin-vilant  1 puff Inhalation Daily    losartan  50 mg Oral Nightly    NIFEdipine  60 mg Oral Daily    prazosin  5 mg Oral TID    QUEtiapine  100 mg Oral Nightly    sodium chloride flush  10 mL Intravenous 2 times per day    enoxaparin  30 mg Subcutaneous Daily    insulin lispro  0-12 Units Subcutaneous TID WC    insulin lispro  0-6 Units Subcutaneous Nightly    azithromycin  250 mg Oral Daily    albuterol sulfate HFA  1 puff Inhalation Q6H    ipratropium  1 puff Inhalation Q6H    isosorbide mononitrate  30 mg Oral Daily    insulin glargine  35 Units Subcutaneous Nightly    hydrALAZINE  50 mg Oral 3 times per day     Continuous Infusions:   sodium chloride      sodium chloride 100 mL/hr at 01/07/20 0635    dextrose       CBC:   Recent Labs     01/06/20  0732   WBC 6.4   HGB 11.8*        BMP:    Recent Labs     01/06/20  0732 01/07/20  0630    134*   K 3.7 4.6    100   CO2 20 22   BUN 39* 46*   CREATININE 2.47* 2.51*   GLUCOSE 296* 454*     Hepatic: No results for input(s): AST, ALT, ALB, BILITOT, ALKPHOS in the last 72 hours. Troponin:   Recent Labs     01/06/20  0901 01/06/20  1152 01/06/20  1653   TROPHS 79* 73* 53*     BNP: No results for input(s): BNP in the last 72 hours.   Lipids: No results for input(s): CHOL, outpt     Electronically signed by TERRANCE Tyson CNP on 1/7/2020 at 10:18 AM  [address]  [phone]

## 2020-01-07 NOTE — DISCHARGE INSTR - COC
Continuity of Care Form    Patient Name: Kang Segura   :  1955  MRN:  [id number]    [facility] date:  2020  Discharge date:  ***    Code Status Order: Full Code   Advance Directives:   Advance Care Flowsheet Documentation     Date/Time Healthcare Directive Type of Healthcare Directive Copy in [address] Agent's Name Healthcare Agent's Phone Number    [phone]  No, patient does not have an advance directive for healthcare treatment -- -- -- -- --          Admitting Physician:  Davi Cool MD  PCP: Juan Brooks APRN - CNP    Discharging Nurse: [facility] Unit/Room#: 5031/5699-10  Discharging Unit Phone Number: ***    Emergency Contact:   Extended Emergency Contact Information  Primary Emergency Contact: Kim Dunne  Address: [place] [address] Phone: [phone]  Work Phone: [phone]  Mobile Phone: [phone]  Relation: Other   needed?  No    Past Surgical History:  Past Surgical History:   Procedure Laterality Date    CAROTID ENDARTERECTOMY Left     COLONOSCOPY      HERNIA REPAIR      [place] COLSC FLX W/RMVL OF TUMOR POLYP LESION SNARE TQ N/A 2017    COLONOSCOPY POLYPECTOMY SNARE/ hot performed by Jodi Moore DO at [facility] VASCULAR SURGERY Left     carotid stent, dr Meaghan Munoz       Immunization History:   Immunization History   Administered Date(s) Administered    Influenza 2012    Influenza Vaccine, unspecified formulation 2012    Influenza Virus Vaccine 10/15/2013, 10/14/2014, 10/02/2015, 2016, 10/30/2017, 10/21/2019    Influenza, Quadv, 6 mo and older, IM (Fluzone, Flulaval) 10/30/2017    Influenza, Quadv, IM, (6 mo and older Fluzone, Flulaval, Fluarix and 3 yrs and older Afluria) 2016, 10/21/2019    Pneumococcal Polysaccharide ([id number]) 10/02/2015    Tdap (Boostrix, Adacel) 2013    Zoster Live (Zostavax) 2017 Schedule:  · Phone:  · Fax:    / signature: {Esignature:[id number]}    PHYSICIAN SECTION    Prognosis: Good    Condition at Discharge: Stable    Rehab Potential (if transferring to Rehab): Good    Recommended Labs or Other Treatments After Discharge: Home care    Physician Certification: I certify the above information and transfer of Laura Schwartz  is necessary for the continuing treatment of the diagnosis listed and that he requires Home Care for less 30 days.      Update Admission H&P: No change in H&P    PHYSICIAN SIGNATURE:  Electronically signed by Anjali Pedroza DO on 1/7/20 at 12:11 PM

## 2020-01-08 NOTE — DISCHARGE SUMMARY
CDU Discharge Summary        Patient:  Bon Kay  YOB: 1955    MRN: [id number]   Acct: [de-identified]    Primary Care Physician: TERRANCE Britton CNP    Admit date:  1/6/2020  7:00 AM  Discharge date: 1/7/2020  7:29 PM     Discharge Diagnoses:        1.  acute onset left sided chest pain, initial encounter, unclear etiology,  Patient underwent a multifaceted evaluation was involved in serial troponin evaluation as well as serial twelve-lead ECG. Patient was seen and evaluated by the cardiology service in the emergency department at which time no acute intervention was planned. Patient was admitted to the observation unit and a stress test was ordered. Patient was provided oral analgesics for pain relief and underwent continuous telemetry, continuous pulse oximetry during their stay. Patient was again evaluated by the cardiology service after their stress test results and in collaboration with this therapist it was deemed that the patient was stable for discharge without any further intervention necessary at this time.     2. Acute on chronic hyperglycemia, initial encounter, secondary to poorly controlled diabetes    Patient was noted to have 7 dilated as well as progressing in the emergency department this was likely exacerbated by the use of steroids however given the patient's elevated hemoglobin A1c at 9.8 this seems to be a rather chronic issue for the patient he was continued on his home dose of long-acting insulin as well as short acting insulins with mealtime patient underwent inpatient diabetic education prior to discharge.     3.   Acute kidney injury, initial encounter, unclear etiology  Patient's creatinine was initially mildly elevated from his baseline in the emergency department patient was started on normal saline infusion and this improved    Follow-up:  Call today/tomorrow for a follow up appointment with TERRANCE Britton CNP , or return to the Emergency Room - 20    Calcium 8.6 8.6 - 10.4 mg/dL    Sodium 135 135 - 144 mmol/L    Potassium 3.7 3.7 - 5.3 mmol/L    Chloride 103 98 - 107 mmol/L    CO2 20 20 - 31 mmol/L    Anion Gap 12 9 - 17 mmol/L    GFR Non-African American 27 (L) >60 mL/min    GFR  32 (L) >60 mL/min    GFR Comment          GFR Staging NOT REPORTED    CBC Auto Differential   Result Value Ref Range    WBC 6.4 3.5 - 11.3 k/uL    RBC 3.87 (L) 4.21 - 5.77 m/uL    Hemoglobin 11.8 (L) 13.0 - 17.0 g/dL    Hematocrit 36.0 (L) 40.7 - 50.3 %    MCV 93.0 82.6 - 102.9 fL    MCH 30.5 25.2 - 33.5 pg    MCHC 32.8 28.4 - 34.8 g/dL    RDW 12.1 11.8 - 14.4 %    Platelets 399 683 - 588 k/uL    MPV 10.0 8.1 - 13.5 fL    NRBC Automated 0.0 0.0 per 100 WBC    Differential Type NOT REPORTED     Seg Neutrophils 60 36 - 65 %    Lymphocytes 23 (L) 24 - 43 %    Monocytes 10 3 - 12 %    Eosinophils % 5 (H) 1 - 4 %    Basophils 1 0 - 2 %    Immature Granulocytes 1 (H) 0 %    Segs Absolute 3.91 1.50 - 8.10 k/uL    Absolute Lymph # 1.44 1.10 - 3.70 k/uL    Absolute Mono # 0.63 0.10 - 1.20 k/uL    Absolute Eos # 0.31 0.00 - 0.44 k/uL    Basophils Absolute 0.03 0.00 - 0.20 k/uL    Absolute Immature Granulocyte 0.03 0.00 - 0.30 k/uL    WBC Morphology NOT REPORTED     RBC Morphology NOT REPORTED     Platelet Estimate NOT REPORTED    Troponin   Result Value Ref Range    Troponin, High Sensitivity 79 (HH) 0 - 22 ng/L    Troponin T NOT REPORTED <0.03 ng/mL    Troponin Interp NOT REPORTED    Troponin   Result Value Ref Range    Troponin, High Sensitivity 83 (HH) 0 - 22 ng/L    Troponin T NOT REPORTED <0.03 ng/mL    Troponin Interp NOT REPORTED    Troponin   Result Value Ref Range    Troponin, High Sensitivity 73 (HH) 0 - 22 ng/L    Troponin T NOT REPORTED <0.03 ng/mL    Troponin Interp NOT REPORTED    DRUG SCREEN MULTI URINE   Result Value Ref Range    Amphetamine Screen, Ur (A) NEGATIVE     DISREGARD RESULTS. SPECIMEN SUBMITTED WAS INCORRECTLY IDENTIFIED. TEST CREDITED. Barbiturate Screen, Ur (A) NEGATIVE     DISREGARD RESULTS. SPECIMEN SUBMITTED WAS INCORRECTLY IDENTIFIED. TEST CREDITED. Benzodiazepine Screen, Urine (A) NEGATIVE     DISREGARD RESULTS. SPECIMEN SUBMITTED WAS INCORRECTLY IDENTIFIED. TEST CREDITED. Cocaine Metabolite, Urine (A) NEGATIVE     DISREGARD RESULTS. SPECIMEN SUBMITTED WAS INCORRECTLY IDENTIFIED. TEST CREDITED. Methadone Screen, Urine (A) NEGATIVE     DISREGARD RESULTS. SPECIMEN SUBMITTED WAS INCORRECTLY IDENTIFIED. TEST CREDITED. Opiates, Urine (A) NEGATIVE     DISREGARD RESULTS. SPECIMEN SUBMITTED WAS INCORRECTLY IDENTIFIED. TEST CREDITED. Phencyclidine, Urine (A) NEGATIVE     DISREGARD RESULTS. SPECIMEN SUBMITTED WAS INCORRECTLY IDENTIFIED. TEST CREDITED. Propoxyphene, Urine NOT REPORTED NEGATIVE    Cannabinoid Scrn, Ur (A) NEGATIVE     DISREGARD RESULTS. SPECIMEN SUBMITTED WAS INCORRECTLY IDENTIFIED. TEST CREDITED. Oxycodone Screen, Ur (A) NEGATIVE     DISREGARD RESULTS. SPECIMEN SUBMITTED WAS INCORRECTLY IDENTIFIED. TEST CREDITED. Methamphetamine, Urine NOT REPORTED NEGATIVE    Tricyclic Antidepressants, Urine NOT REPORTED NEGATIVE    MDMA, Urine NOT REPORTED NEGATIVE    Buprenorphine Urine NOT REPORTED NEGATIVE    Test Information       Assay provides medical screening only. The absence of expected drug(s) and/or metabolite(s) may indicate diluted or adulterated urine, limitations of testing or timing of collection. Troponin   Result Value Ref Range    Troponin, High Sensitivity 53 (HH) 0 - 22 ng/L    Troponin T NOT REPORTED <0.03 ng/mL    Troponin Interp NOT REPORTED    Urine Drug Screen   Result Value Ref Range    Amphetamine Screen, Ur (A) NEGATIVE     DISREGARD RESULTS. SPECIMEN SUBMITTED WAS INCORRECTLY IDENTIFIED. TEST CREDITED. Barbiturate Screen, Ur (A) NEGATIVE     DISREGARD RESULTS. SPECIMEN SUBMITTED WAS INCORRECTLY IDENTIFIED. TEST CREDITED. Benzodiazepine Screen, Urine (A) NEGATIVE     DISREGARD RESULTS. SPECIMEN SUBMITTED WAS INCORRECTLY IDENTIFIED. TEST CREDITED. Cocaine Metabolite, Urine (A) NEGATIVE     DISREGARD RESULTS. SPECIMEN SUBMITTED WAS INCORRECTLY IDENTIFIED. TEST CREDITED. Methadone Screen, Urine (A) NEGATIVE     DISREGARD RESULTS. SPECIMEN SUBMITTED WAS INCORRECTLY IDENTIFIED. TEST CREDITED. Opiates, Urine (A) NEGATIVE     DISREGARD RESULTS. SPECIMEN SUBMITTED WAS INCORRECTLY IDENTIFIED. TEST CREDITED. Phencyclidine, Urine (A) NEGATIVE     DISREGARD RESULTS. SPECIMEN SUBMITTED WAS INCORRECTLY IDENTIFIED. TEST CREDITED. Propoxyphene, Urine NOT REPORTED NEGATIVE    Cannabinoid Scrn, Ur (A) NEGATIVE     DISREGARD RESULTS. SPECIMEN SUBMITTED WAS INCORRECTLY IDENTIFIED. TEST CREDITED. Oxycodone Screen, Ur (A) NEGATIVE     DISREGARD RESULTS. SPECIMEN SUBMITTED WAS INCORRECTLY IDENTIFIED. TEST CREDITED. Methamphetamine, Urine NOT REPORTED NEGATIVE    Tricyclic Antidepressants, Urine NOT REPORTED NEGATIVE    MDMA, Urine NOT REPORTED NEGATIVE    Buprenorphine Urine NOT REPORTED NEGATIVE    Test Information       Assay provides medical screening only. The absence of expected drug(s) and/or metabolite(s) may indicate diluted or adulterated urine, limitations of testing or timing of collection.    Urine Drug Screen   Result Value Ref Range    Amphetamine Screen, Ur NEGATIVE NEGATIVE    Barbiturate Screen, Ur NEGATIVE NEGATIVE    Benzodiazepine Screen, Urine NEGATIVE NEGATIVE    Cocaine Metabolite, Urine POSITIVE (A) NEGATIVE    Methadone Screen, Urine NEGATIVE NEGATIVE    Opiates, Urine NEGATIVE NEGATIVE    Phencyclidine, Urine NEGATIVE NEGATIVE    Propoxyphene, Urine NOT REPORTED NEGATIVE    Cannabinoid Scrn, Ur NEGATIVE NEGATIVE    Oxycodone Screen, Ur NEGATIVE NEGATIVE    Methamphetamine, Urine NOT REPORTED NEGATIVE    Tricyclic Antidepressants, Urine NOT REPORTED NEGATIVE    MDMA, Urine NOT REPORTED NEGATIVE    Buprenorphine Urine NOT REPORTED NEGATIVE    Test Information       Assay provides medical screening only. The absence of expected drug(s) and/or metabolite(s) may indicate diluted or adulterated urine, limitations of testing or timing of collection.    BASIC METABOLIC PANEL   Result Value Ref Range    Glucose 454 (HH) 70 - 99 mg/dL    BUN 46 (H) 8 - 23 mg/dL    CREATININE 2.51 (H) 0.70 - 1.20 mg/dL    Bun/Cre Ratio NOT REPORTED 9 - 20    Calcium 8.1 (L) 8.6 - 10.4 mg/dL    Sodium 134 (L) 135 - 144 mmol/L    Potassium 4.6 3.7 - 5.3 mmol/L    Chloride 100 98 - 107 mmol/L    CO2 22 20 - 31 mmol/L    Anion Gap 12 9 - 17 mmol/L    GFR Non-African American 26 (L) >60 mL/min    GFR  32 (L) >60 mL/min    GFR Comment          GFR Staging NOT REPORTED    Troponin   Result Value Ref Range    Troponin, High Sensitivity 47 (H) 0 - 22 ng/L    Troponin T NOT REPORTED <0.03 ng/mL    Troponin Interp NOT REPORTED    POC Glucose Fingerstick   Result Value Ref Range    POC Glucose 350 (H) 75 - 110 mg/dL   POC Glucose Fingerstick   Result Value Ref Range    POC Glucose 491 (HH) 75 - 110 mg/dL   POC Glucose Fingerstick   Result Value Ref Range    POC Glucose 479 (HH) 75 - 110 mg/dL   POC Glucose Fingerstick   Result Value Ref Range    POC Glucose 402 (HH) 75 - 110 mg/dL   POC Glucose Fingerstick   Result Value Ref Range    POC Glucose 375 (H) 75 - 110 mg/dL   POC Glucose Fingerstick   Result Value Ref Range    POC Glucose 125 (H) 75 - 110 mg/dL   POC Glucose Fingerstick   Result Value Ref Range    POC Glucose 232 (H) 75 - 110 mg/dL   POC Glucose Fingerstick   Result Value Ref Range    POC Glucose 400 (H) 75 - 110 mg/dL   EKG 12 Lead   Result Value Ref Range    Ventricular Rate 57 BPM    Atrial Rate 57 BPM    P-R Interval 132 ms    QRS Duration 102 ms    Q-T Interval 418 ms    QTc Calculation (Bazett) 406 ms    P Axis 72 degrees    R Axis 25 degrees    T Axis 109 degrees   EKG 12 Lead   Result Value Ref Range    Ventricular Rate 52 BPM    Atrial Rate 52 BPM    P-R Interval 136 ms    QRS Duration 106 ms    Q-T Interval 440 ms    QTc Calculation (Bazett) 409 ms    P Axis 80 degrees    R Axis 50 degrees    T Axis -142 degrees   EKG 12 Lead   Result Value Ref Range    Ventricular Rate 65 BPM    Atrial Rate 65 BPM    P-R Interval 138 ms    QRS Duration 104 ms    Q-T Interval 426 ms    QTc Calculation (Bazett) 443 ms    P Axis 72 degrees    R Axis 91 degrees    T Axis 84 degrees     Xr Chest Standard (2 Vw)    Result Date: 1/6/2020  EXAMINATION: TWO XRAY VIEWS OF THE CHEST 1/6/2020 7:51 am COMPARISON: 08/14/2019 HISTORY: ORDERING SYSTEM PROVIDED HISTORY: cp, sob TECHNOLOGIST PROVIDED HISTORY: cp, sob Reason for Exam: Shortness of breath off and on since [place]/  AP erect and Lateral Acuity: Unknown Type of Exam: Unknown FINDINGS: Frontal and lateral views of the chest are submitted for review. The cardiac silhouette is normal in size. Lung parenchyma is clear without focal airspace consolidation, sizeable pleural effusion, or pneumothorax. Trachea is midline. Osseous structures and soft tissues are grossly intact. No acute cardiopulmonary pathology. Nm Cardiac Stress Test Nuclear Imaging    Result Date: 1/7/2020  EXAMINATION: MYOCARDIAL PERFUSION IMAGING 1/7/2020 8:59 am TECHNIQUE: Rest dose:  15.8 mCi Tc-99m sestamibi intravenously Stress dose:  42.5 mCi Tc-99m sestamibi intravenously Under cardiology supervision, 0.4 mg Lexiscan was infused intravenously prior to injection of the stress dose. SPECT imaging was acquired following injection of the sestamibi. ECG gating was obtained following the stress acquisition.  COMPARISON: 09/04/2018 HISTORY: ORDERING SYSTEM PROVIDED HISTORY: Chest pain TECHNOLOGIST PROVIDED HISTORY: Reason for Exam: Chest pain Procedure Type->Rx cp Reason for Exam: chest pain radiating to left arm and jaw, cocaine abuse, Asthma, CAD in native artery, HTN, weakness of extremity, COPD, 67-year-old male with chest pain FINDINGS: The patient achieved a maximum heart rate of vascular territories with abnormalities 4. Stress-induced LV dilatation (TID ratio greater than 1.19 for exercise and greater than 1.39 for regadenoson) Intermediate risk (1% to 3% annual death or MI) 1. Mild/moderate resting LV dysfunction (LVEF 35% to 49%) not readily explained by non coronary causes. 2.  Resting perfusion abnormalities in 5%-9.9% of the myocardium in patients without a history or prior evidence of MI 3. Stress-induced perfusion abnormality encumbering 5%-9.9% of the myocardium or stress segmental scores indicating 1 vascular territory with abnormalities but without LV dilation 4. Small wall motion abnormality involving 1-2 segments and only 1 coronary bed. Low Risk (Less than 1% annual death or MI) 1. Normal or small myocardial perfusion defect at rest or with stress encumbering less than 5% of the myocardium. Physical Exam:    General appearance - NAD, AOx 3   Lungs -CTAB, no R/R/R  Heart - RRR, no M/R/G  Abdomen - Soft, NT/ND  Neurological:  MAEx4, No focal motor deficit, sensory loss  Extremities - Cap refil <2 sec in all ext., no edema  Skin -warm, dry      Hospital Course:  Clinical course has improved, labs and imaging reviewed. Alona Kumar originally presented to the hospital on 1/6/2020  7:00 AM with  acute onset left sided chest pain, initial encounter, unclear etiology, and Acute on chronic hyperglycemia, initial encounter, secondary to poorly controlled diabetes and Acute kidney injury, initial encounter, unclear etiology . At that time it was determined that He required further observation and testing and consultation. Labs and imaging were followed daily. Imaging results as above. He is medically stable to be discharged.        Disposition: Home    Patient stated that they will not drive themselves home from the hospital if they have gotten pain killers/ narcotics earlier that day and that they will arrange for transportation on their own or work with the case manager for a ride. Patient counseled NOT to drive while under the influence of narcotics/ pain killers. Condition: Good    Patient stable and ready for discharge home. I have discussed plan of care with patient and they are in understanding. They were instructed to read discharge paperwork. All of their questions and concerns were addressed. Time Spent: 0 day      --  Greta Avalos DO  Emergency Medicine Resident Physician    This dictation was generated by voice recognition computer software. Although all attempts are made to edit the dictation for accuracy, there may be errors in the transcription that are not intended.

## 2020-01-15 ENCOUNTER — APPOINTMENT (OUTPATIENT)
Dept: ULTRASOUND IMAGING | Age: 65
End: 2020-01-15
Payer: COMMERCIAL

## 2020-01-15 ENCOUNTER — HOSPITAL ENCOUNTER (EMERGENCY)
Age: 65
Discharge: HOME | End: 2020-01-15
Attending: EMERGENCY MEDICINE
Payer: COMMERCIAL

## 2020-01-15 VITALS
OXYGEN SATURATION: 97 % | HEART RATE: 61 BPM | RESPIRATION RATE: 15 BRPM | WEIGHT: 139 LBS | TEMPERATURE: 97.2 F | BODY MASS INDEX: 22.44 KG/M2 | SYSTOLIC BLOOD PRESSURE: 137 MMHG | DIASTOLIC BLOOD PRESSURE: 53 MMHG

## 2020-01-15 LAB
-: ABNORMAL
ABSOLUTE EOS #: 0.03 K/UL (ref 0–0.44)
ABSOLUTE IMMATURE GRANULOCYTE: 0.17 K/UL (ref 0–0.3)
ABSOLUTE LYMPH #: 0.88 K/UL (ref 1.1–3.7)
ABSOLUTE MONO #: 0.31 K/UL (ref 0.1–1.2)
ALBUMIN SERPL-MCNC: 2.8 G/DL (ref 3.5–5.2)
ALBUMIN/GLOBULIN RATIO: 1.3 (ref 1–2.5)
ALP BLD-CCNC: 65 U/L (ref 40–129)
ALT SERPL-CCNC: 19 U/L (ref 5–41)
AMORPHOUS: ABNORMAL
ANION GAP SERPL CALCULATED.3IONS-SCNC: 11 MMOL/L (ref 9–17)
AST SERPL-CCNC: 14 U/L
BACTERIA: ABNORMAL
BASOPHILS # BLD: 0 % (ref 0–2)
BASOPHILS ABSOLUTE: <0.03 K/UL (ref 0–0.2)
BILIRUB SERPL-MCNC: 0.21 MG/DL (ref 0.3–1.2)
BILIRUBIN URINE: NEGATIVE
BUN BLDV-MCNC: 45 MG/DL (ref 8–23)
BUN/CREAT BLD: ABNORMAL (ref 9–20)
CALCIUM SERPL-MCNC: 8.7 MG/DL (ref 8.6–10.4)
CASTS UA: ABNORMAL /LPF (ref 0–8)
CHLORIDE BLD-SCNC: 107 MMOL/L (ref 98–107)
CO2: 21 MMOL/L (ref 20–31)
COLOR: YELLOW
CREAT SERPL-MCNC: 1.89 MG/DL (ref 0.7–1.2)
CRYSTALS, UA: ABNORMAL /HPF
DIFFERENTIAL TYPE: ABNORMAL
EOSINOPHILS RELATIVE PERCENT: 0 % (ref 1–4)
EPITHELIAL CELLS UA: ABNORMAL /HPF (ref 0–5)
GFR AFRICAN AMERICAN: 44 ML/MIN
GFR NON-AFRICAN AMERICAN: 36 ML/MIN
GFR SERPL CREATININE-BSD FRML MDRD: ABNORMAL ML/MIN/{1.73_M2}
GFR SERPL CREATININE-BSD FRML MDRD: ABNORMAL ML/MIN/{1.73_M2}
GLUCOSE BLD-MCNC: 150 MG/DL (ref 70–99)
GLUCOSE URINE: ABNORMAL
HCT VFR BLD CALC: 33.8 % (ref 40.7–50.3)
HEMOGLOBIN: 11.5 G/DL (ref 13–17)
IMMATURE GRANULOCYTES: 2 %
KETONES, URINE: NEGATIVE
LACTIC ACID, SEPSIS WHOLE BLOOD: 1.7 MMOL/L (ref 0.5–1.9)
LACTIC ACID, SEPSIS: NORMAL MMOL/L (ref 0.5–1.9)
LEUKOCYTE ESTERASE, URINE: NEGATIVE
LYMPHOCYTES # BLD: 10 % (ref 24–43)
MCH RBC QN AUTO: 30.6 PG (ref 25.2–33.5)
MCHC RBC AUTO-ENTMCNC: 34 G/DL (ref 28.4–34.8)
MCV RBC AUTO: 89.9 FL (ref 82.6–102.9)
MONOCYTES # BLD: 4 % (ref 3–12)
MUCUS: ABNORMAL
NITRITE, URINE: NEGATIVE
NRBC AUTOMATED: 0 PER 100 WBC
OTHER OBSERVATIONS UA: ABNORMAL
PDW BLD-RTO: 12.5 % (ref 11.8–14.4)
PH UA: 5 (ref 5–8)
PLATELET # BLD: ABNORMAL K/UL (ref 138–453)
PLATELET ESTIMATE: ABNORMAL
PLATELET, FLUORESCENCE: NORMAL K/UL (ref 138–453)
PMV BLD AUTO: ABNORMAL FL (ref 8.1–13.5)
POTASSIUM SERPL-SCNC: 4.5 MMOL/L (ref 3.7–5.3)
PROTEIN UA: ABNORMAL
RBC # BLD: 3.76 M/UL (ref 4.21–5.77)
RBC # BLD: ABNORMAL 10*6/UL
RBC UA: ABNORMAL /HPF (ref 0–4)
RENAL EPITHELIAL, UA: ABNORMAL /HPF
SEG NEUTROPHILS: 83 % (ref 36–65)
SEGMENTED NEUTROPHILS ABSOLUTE COUNT: 7.09 K/UL (ref 1.5–8.1)
SODIUM BLD-SCNC: 139 MMOL/L (ref 135–144)
SPECIFIC GRAVITY UA: 1.02 (ref 1–1.03)
TOTAL PROTEIN: 4.9 G/DL (ref 6.4–8.3)
TRICHOMONAS: ABNORMAL
TURBIDITY: CLEAR
URINE HGB: NEGATIVE
UROBILINOGEN, URINE: NORMAL
WBC # BLD: 8.5 K/UL (ref 3.5–11.3)
WBC # BLD: ABNORMAL 10*3/UL
WBC UA: ABNORMAL /HPF (ref 0–5)
YEAST: ABNORMAL

## 2020-01-15 PROCEDURE — 76870 US EXAM SCROTUM: CPT

## 2020-01-15 PROCEDURE — 85055 RETICULATED PLATELET ASSAY: CPT

## 2020-01-15 PROCEDURE — 85025 COMPLETE CBC W/AUTO DIFF WBC: CPT

## 2020-01-15 PROCEDURE — 80053 COMPREHEN METABOLIC PANEL: CPT

## 2020-01-15 PROCEDURE — 81001 URINALYSIS AUTO W/SCOPE: CPT

## 2020-01-15 PROCEDURE — 83605 ASSAY OF LACTIC ACID: CPT

## 2020-01-15 PROCEDURE — 99284 EMERGENCY DEPT VISIT MOD MDM: CPT

## 2020-01-15 RX ORDER — NAPROXEN 250 MG/1
250 TABLET ORAL 2 TIMES DAILY WITH MEALS
Qty: 20 TABLET | Refills: 0 | Status: ON HOLD | OUTPATIENT
Start: 2020-01-15 | End: 2020-05-18 | Stop reason: HOSPADM

## 2020-01-15 ASSESSMENT — ENCOUNTER SYMPTOMS
ABDOMINAL PAIN: 0
WHEEZING: 0
NAUSEA: 0
FACIAL SWELLING: 0
VOMITING: 0
VOICE CHANGE: 0
SHORTNESS OF BREATH: 0
DIARRHEA: 0
COUGH: 0

## 2020-01-15 NOTE — ED PROVIDER NOTES
[facility] ED  eMERGENCY dEPARTMENT eNCOUnter      Pt Name: Samantha Chappell  MRN: [id number]  [place] 1955  Date of evaluation: 1/15/2020  Provider: Idalia Tay MD    CHIEF COMPLAINT     Chief Complaint   Patient presents with    Testicle Swelling     left scrotum swelling, h/o inguinal hernia. Denies any problems with urination         HISTORY OF PRESENT ILLNESS   (Location/Symptom, Timing/Onset, Context/Setting,Quality, Duration, Modifying Factors, Severity)  Note limiting factors. Samantha Chappell is a60 y.o. male who presents to the emergency department      HPI    72-year-old male with multiple chronic medical problems and prior right-sided inguinal hernia repair presents with left-sided scrotal swelling and pain    States has a known hernia on the left side, has been in and out depending on position however has not had it repaired. Has been swollen and painful for several days, worse today. He states he is moving his bowels normally, no fevers or vomiting, no pain with urination. Denies trauma    Nursing Notes werereviewed. REVIEW OF SYSTEMS    (2-9 systems for level 4, 10 or more for level 5)     Review of Systems   Constitutional: Negative for appetite change, chills, fatigue and fever. HENT: Negative for drooling, facial swelling, mouth sores and voice change. Eyes: Negative for visual disturbance. Respiratory: Negative for cough, shortness of breath and wheezing. Cardiovascular: Negative for chest pain. Gastrointestinal: Negative for abdominal pain, diarrhea, nausea and vomiting. Genitourinary: Positive for scrotal swelling. Negative for difficulty urinating and dysuria. Musculoskeletal: Negative for neck stiffness. Skin: Negative for rash. Neurological: Negative for weakness and numbness. Psychiatric/Behavioral: Negative for agitation. All other systems reviewed and are negative.       Except as noted above the remainder of the review of systems 3 times daily, Disp-30 capsule, R-3Normal      amLODIPine (NORVASC) 5 MG tablet Take 1 tablet by mouth 2 times daily, Disp-30 tablet, R-3Normal      ergocalciferol (ERGOCALCIFEROL) 54726 units capsule Take 1 capsule by mouth once a week, Disp-4 capsule, R-3Normal      carvedilol (COREG) 25 MG tablet Take 1 tablet by mouth 2 times daily (with meals), Disp-60 tablet, R-3Normal      albuterol-ipratropium (COMBIVENT RESPIMAT)  MCG/ACT AERS inhaler Inhale 1 puff into the lungs every 6 hours, Disp-4 g, R-3Normal      atorvastatin (LIPITOR) 80 MG tablet Take 1 tablet by mouth daily, Disp-30 tablet, R-3Normal      aspirin (ASPIRIN ADULT LOW STRENGTH) 81 MG EC tablet TAKE 1 TABLET BY MOUTH DAILY, Disp-30 tablet, R-3Normal      clopidogrel (PLAVIX) 75 MG tablet Take 1 tablet by mouth daily, Disp-90 tablet, R-3Normal      Dulaglutide (TRULICITY) 1.68 OV/5.5DO SOPN Inject 0.75 mg into the skin once a week, Disp-4 pen, R-2Normal      insulin glargine (BASAGLAR KWIKPEN) 100 UNIT/ML injection pen Inject 35 Units into the skin nightly Dispense with pen needle, Disp-5 pen, R-2Normal      insulin aspart (NOVOLOG FLEXPEN) 100 UNIT/ML injection pen Inject 5 Units into the skin 3 times daily (before meals) Sliding scale, Disp-5 pen, R-3Normal      nitroGLYCERIN (NITROSTAT) 0.4 MG SL tablet up to max of 3 total doses.  If no relief after 1 dose, call 911., Disp-25 tablet, R-2Normal      fluticasone-umeclidin-vilant (TRELEGY ELLIPTA) 100-62.5-25 MCG/INH AEPB INHALE 1 PUFF INTO THE LINGS DAILY, Disp-1 each, R-3Normal      hydrALAZINE (APRESOLINE) 25 MG tablet Take 1 tablet by mouth 2 times daily, Disp-60 tablet, R-3Normal      zoster recombinant adjuvanted vaccine (SHINGRIX) 50 MCG/0.5ML SUSR injection Inject 0.5 mLs into the muscle See Admin Instructions 1 dose now and repeat in 2-6 months, Disp-0.5 mL, R-0Print      QUEtiapine (SEROQUEL) 100 MG tablet TAKE 1 TABLET BY MOUTH NIGHTLY, Disp-30 tablet, R-2Please have patient contact SCREENINGS      [phone]      PHYSICAL EXAM    (up to 7 for level 4, 8 or more for level 5)     ED Triage Vitals [01/15/20 0958]   BP Temp Temp Source Pulse Resp SpO2 Height Weight   (!) 137/53 97.2 °F (36.2 °C) Oral 61 15 97 % -- 139 lb (63 kg)       Physical Exam  Constitutional:       General: He is not in acute distress. Appearance: He is well-developed. HENT:      Head: Normocephalic and atraumatic. Eyes:      Conjunctiva/sclera: Conjunctivae normal.   Neck:      Musculoskeletal: Normal range of motion and neck supple. Vascular: No JVD. Cardiovascular:      Rate and Rhythm: Normal rate and regular rhythm. Pulmonary:      Effort: Pulmonary effort is normal. No respiratory distress. Breath sounds: No stridor. Abdominal:      General: There is no distension. Palpations: Abdomen is soft. There is no mass. Tenderness: There is no tenderness. There is no guarding. Genitourinary:     Comments: Genital exam with Carolina Crenshaw RN chaperone, penis normal, testicles equal in size, shape, lie. Does have fluid-filled mass in left scrotum, unclear if this connects into the canal, no direct hernia noted. Mass is not reducible and is tender. No peristalsis noted. Musculoskeletal: Normal range of motion. Skin:     General: Skin is warm and dry. Neurological:      Mental Status: He is alert and oriented to person, place, and time.          DIAGNOSTIC RESULTS     EKG: All EKG's are interpreted by the Emergency Department Physician who either signs orCo-signs this chart in the absence of a cardiologist.      RADIOLOGY:   Non-plainfilm images such as CT, Ultrasound and MRI are read by the radiologist. Plain radiographic images are visualized and preliminarily interpreted by the emergency physician with the below findings:      Interpretationper the Radiologist below, if available at the time of this note:    [address]   Final Result   Moderate size left hydrocele No evidence of testicular torsion or mass      No evidence of significant hernia               ED BEDSIDE ULTRASOUND:   Performed by ED Physician - none    LABS:  Labs Reviewed   CBC WITH AUTO DIFFERENTIAL - Abnormal; Notable for the following components:       Result Value    RBC 3.76 (*)     Hemoglobin 11.5 (*)     Hematocrit 33.8 (*)     Seg Neutrophils 83 (*)     Lymphocytes 10 (*)     Eosinophils % 0 (*)     Immature Granulocytes 2 (*)     Absolute Lymph # 0.88 (*)     All other components within normal limits   COMPREHENSIVE METABOLIC PANEL - Abnormal; Notable for the following components:    Glucose 150 (*)     BUN 45 (*)     CREATININE 1.89 (*)     Total Bilirubin 0.21 (*)     Total Protein 4.9 (*)     Alb 2.8 (*)     GFR Non- 36 (*)     GFR  44 (*)     All other components within normal limits   URINALYSIS WITH MICROSCOPIC - Abnormal; Notable for the following components:    Glucose, Ur TRACE (*)     Protein, UA 3+ (*)     All other components within normal limits   LACTATE, SEPSIS   IMMATURE PLATELET FRACTION       All other labs were within normal range or not returned as of this dictation. EMERGENCY DEPARTMENT COURSE and DIFFERENTIAL DIAGNOSIS/MDM:   Vitals:    Vitals:    01/15/20 0958   BP: (!) 137/53   Pulse: 61   Resp: 15   Temp: 97.2 °F (36.2 °C)   TempSrc: Oral   SpO2: 97%   Weight: 139 lb (63 kg)         MDM  Number of Diagnoses or Management Options  Diagnosis management comments: 64-year-old with known left inguinal hernia, now with persistent left scrotal pain and swelling. Unclear if this is a mass versus a varicocele or hydrocele. Not reducible at bedside with firm pressure. Given patient stability and normal vital signs, will get testicular ultrasound to confirm structure and attempt reduction if is indeed a hernia at bedside with gentle sedation.     10:18 PM  Ultrasound shows hydrocele, discharged with prescription for scrotal sling, telemetry Motrin,

## 2020-01-24 ENCOUNTER — TELEPHONE (OUTPATIENT)
Dept: INTERNAL MEDICINE | Age: 65
End: 2020-01-24

## 2020-01-24 NOTE — TELEPHONE ENCOUNTER
Letter faxed to Isela Carroll at [facility] . Fax number [phone]. Confirmation will be scanned into media.

## 2020-01-24 NOTE — TELEPHONE ENCOUNTER
Tay Mumtaz calling again regarding this matter - she states she can pull the order from [facility] herself once it is changed. She can also take something in writing. Whatever is easiest for you.

## 2020-01-29 ENCOUNTER — TELEPHONE (OUTPATIENT)
Dept: INTERNAL MEDICINE | Age: 65
End: 2020-01-29

## 2020-01-29 NOTE — TELEPHONE ENCOUNTER
02/15/2020       Next Visit Date:  Future Appointments   Date Time Provider Mahesh Gunter   2/26/2020  9:30 AM Sheyla Harley MD Resp Spec [facility]            Patient Active Problem List:     Cigarette nicotine dependence without complication     Carpal tunnel syndrome on right     Colon polyps     Carotid stenosis, left s/p Endarterectomy     Mixed simple and mucopurulent chronic bronchitis (HCC)     Pure hypercholesterolemia     Exertional dyspnea     PTSD (post-traumatic stress disorder)     Transient cerebral ischemia     Essential hypertension     DM type 2, uncontrolled, with neuropathy (HCC)     COPD (chronic obstructive pulmonary disease) ([address]     Cerebral vascular disease     Primary insomnia     Hypertensive urgency     Non-obstructive Coronary artery disease of native artery of native heart with stable angina pectoris ([address]     Hyperparathyroidism ([address]     Vitamin D deficiency     Acute kidney injury superimposed on chronic kidney disease ([address]     Coronary artery disease with exertional angina (HCC)     Moderate malnutrition ([address]     COPD exacerbation ([address]

## 2020-01-31 NOTE — TELEPHONE ENCOUNTER
Fax received that pt has not returned cologuard test. PC to pt to see if pt received test and if pt had any questions or concerns about the test. PC to pt; Unable to LM, Letter Sent to Pt to contact office.

## 2020-02-06 NOTE — TELEPHONE ENCOUNTER
Request for aspirin - medication pended. Patient on wait list. Please fill if appropriate. Next Visit Date:  Future Appointments   Date Time Provider Mahesh Josei   2/26/2020  9:30 AM Don Meigs, MD Resp Spec [address] Maintenance   Topic Date Due    Hepatitis B vaccine (1 of 3 - Risk 3-dose series) 01/07/1975    Shingles Vaccine (2 of 3) 12/27/2017    Diabetic retinal exam  06/01/2018    Colon cancer screen colonoscopy  06/27/2018    Annual Wellness Visit (AWV)  05/29/2019    A1C test (Diabetic or Prediabetic)  11/12/2019    Diabetic foot exam  02/19/2020    Lipid screen  08/13/2020    Potassium monitoring  01/15/2021    Creatinine monitoring  01/15/2021    DTaP/Tdap/Td vaccine (2 - Td) 12/14/2023    Flu vaccine  Completed    Pneumococcal 0-64 years Vaccine  Completed    Hepatitis C screen  Completed    HIV screen  Completed    Hepatitis A vaccine  Aged Out    Hib vaccine  Aged Out    Meningococcal (ACWY) vaccine  Aged Out       Hemoglobin A1C (%)   Date Value   08/12/2019 9.8 (H)   08/11/2019 9.8 (H)   02/19/2019 9.4             ( goal A1C is < 7)   Microalb/Crt.  Ratio (mcg/mg creat)   Date Value   06/23/2017 2,464 (H)     LDL Cholesterol (mg/dL)   Date Value   08/13/2019 65       (goal LDL is <100)   AST (U/L)   Date Value   01/15/2020 14     ALT (U/L)   Date Value   01/15/2020 19     BUN (mg/dL)   Date Value   01/15/2020 45 (H)     BP Readings from Last 3 Encounters:   01/15/20 (!) 137/53   01/07/20 (!) 175/73   10/30/19 (!) 166/88          (goal 120/80)    All Future Testing planned in [facility]  Lab Frequency Next Occurrence   POCT FECAL IMMUNOCHEMICAL TEST (FIT) Once 02/19/2020   Baseline Diagnostic Sleep Study Once 09/04/2019   Cologuard (For External Results Only) Once 10/21/2020   Alpha-1-Antitrypsin w Phenotype Once 02/15/2020         Patient Active Problem List:     Cigarette nicotine dependence without complication     Carpal tunnel syndrome on right Colon polyps     Carotid stenosis, left s/p Endarterectomy     Mixed simple and mucopurulent chronic bronchitis (HCC)     Pure hypercholesterolemia     Exertional dyspnea     PTSD (post-traumatic stress disorder)     Transient cerebral ischemia     Essential hypertension     DM type 2, uncontrolled, with neuropathy (HCC)     COPD (chronic obstructive pulmonary disease) ([address]     Cerebral vascular disease     Primary insomnia     Hypertensive urgency     Non-obstructive Coronary artery disease of native artery of native heart with stable angina pectoris (HCC)     Hyperparathyroidism ([address]     Vitamin D deficiency     Acute kidney injury superimposed on chronic kidney disease ([address]     Coronary artery disease with exertional angina (HCC)     Moderate malnutrition ([address]     COPD exacerbation ([address]

## 2020-02-07 RX ORDER — ASPIRIN 81 MG/1
TABLET ORAL
Qty: 30 TABLET | Refills: 3 | Status: SHIPPED | OUTPATIENT
Start: 2020-02-07 | End: 2020-09-05

## 2020-02-24 ENCOUNTER — TELEPHONE (OUTPATIENT)
Dept: INTERNAL MEDICINE | Age: 65
End: 2020-02-24

## 2020-02-26 RX ORDER — PRAZOSIN HYDROCHLORIDE 5 MG/1
5 CAPSULE ORAL 3 TIMES DAILY
Qty: 90 CAPSULE | Refills: 3 | Status: ON HOLD
Start: 2020-02-26 | End: 2020-06-20 | Stop reason: HOSPADM

## 2020-02-26 NOTE — TELEPHONE ENCOUNTER
Request for minipress - medication pended. Please fill if appropriate. Patient is requesting 90 day supply as he is to take 3x a day. Next Visit Date:  No future appointments. Health Maintenance   Topic Date Due    Hepatitis B vaccine (1 of 3 - Risk 3-dose series) 01/07/1975    Shingles Vaccine (2 of 3) 12/27/2017    Diabetic retinal exam  06/01/2018    Colon cancer screen colonoscopy  06/27/2018    Annual Wellness Visit (AWV)  05/29/2019    A1C test (Diabetic or Prediabetic)  11/12/2019    Diabetic foot exam  02/19/2020    Lipid screen  08/13/2020    Potassium monitoring  01/15/2021    Creatinine monitoring  01/15/2021    DTaP/Tdap/Td vaccine (2 - Td) 12/14/2023    Flu vaccine  Completed    Pneumococcal 0-64 years Vaccine  Completed    Hepatitis C screen  Completed    HIV screen  Completed    Hepatitis A vaccine  Aged Out    Hib vaccine  Aged Out    Meningococcal (ACWY) vaccine  Aged Out       Hemoglobin A1C (%)   Date Value   08/12/2019 9.8 (H)   08/11/2019 9.8 (H)   02/19/2019 9.4             ( goal A1C is < 7)   Microalb/Crt.  Ratio (mcg/mg creat)   Date Value   06/23/2017 2,464 (H)     LDL Cholesterol (mg/dL)   Date Value   08/13/2019 65       (goal LDL is <100)   AST (U/L)   Date Value   01/15/2020 14     ALT (U/L)   Date Value   01/15/2020 19     BUN (mg/dL)   Date Value   01/15/2020 45 (H)     BP Readings from Last 3 Encounters:   01/15/20 (!) 137/53   01/07/20 (!) 175/73   10/30/19 (!) 166/88          (goal 120/80)    All Future Testing planned in [facility]  Lab Frequency Next Occurrence   Baseline Diagnostic Sleep Study Once 09/04/2019   Cologuard (For External Results Only) Once 10/21/2020   Alpha-1-Antitrypsin w Phenotype Once 02/15/2020         Patient Active Problem List:     Cigarette nicotine dependence without complication     Carpal tunnel syndrome on right     Colon polyps     Carotid stenosis, left s/p Endarterectomy     Mixed simple and mucopurulent chronic bronchitis

## 2020-02-26 NOTE — TELEPHONE ENCOUNTER
Absolutely did tell him that. I am calling [facility] now to inform of appt change. He scheduled NTP appt with Dr. Surjit Pandey before he left. Thank you, Michael Blankenship.
Have him hold off on rehab until he is evaluated at his appt tomorrow.
Patient here at  - he was never admitted, he states he was confused when he told them that at [facility]. Patient states what happened was his blood sugars dropped, he was unresponsive, & his fiance called EMS - he states they \"brought him back & left\". Phone call to Dr. Ange Vale - he is unable to see him at this point with the appt being an hour ago. It is rescheduled for tomorrow afternoon. I will call [facility] as soon as I arrange his cab home.
Patient stated [facility]. The last admittance was 2/5. CM sees nothing after that encounter either. Phone call to patient - no answer. Patient is scheduled with Dr. Belgica Mane (Resp Spec) this morning. I gave them a call to let them know if he does show to please try & get more information as to where he was admitted because his CM states they can provide the clearance. I'll keep you updated if need be.
09/04/2019   Cologuamaximilian (For External Results Only) Once 10/21/2020   Alpha-1-Antitrypsin w Phenotype Once 02/15/2020       Next Visit Date:  Future Appointments   Date Time Provider Mahesh Josei   2/26/2020  9:30 AM Paxton Brizuela MD Resp Spec [address] [facility]            Patient Active Problem List:     Cigarette nicotine dependence without complication     Carpal tunnel syndrome on right     Colon polyps     Carotid stenosis, left s/p Endarterectomy     Mixed simple and mucopurulent chronic bronchitis ([address]     Pure hypercholesterolemia     Exertional dyspnea     PTSD (post-traumatic stress disorder)     Transient cerebral ischemia     Essential hypertension     DM type 2, uncontrolled, with neuropathy (HCC)     COPD (chronic obstructive pulmonary disease) ([address]     Cerebral vascular disease     Primary insomnia     Hypertensive urgency     Non-obstructive Coronary artery disease of native artery of native heart with stable angina pectoris ([address]     Hyperparathyroidism ([address]     Vitamin D deficiency     Acute kidney injury superimposed on chronic kidney disease ([address]     Coronary artery disease with exertional angina (HCC)     Moderate malnutrition ([address]     COPD exacerbation ([address]

## 2020-03-03 ENCOUNTER — TELEPHONE (OUTPATIENT)
Dept: INTERNAL MEDICINE | Age: 65
End: 2020-03-03

## 2020-03-03 NOTE — TELEPHONE ENCOUNTER
PC to pt to inform of cancellation-- phone is not in service unable to [facility] [place]

## 2020-04-01 NOTE — TELEPHONE ENCOUNTER
Refill request for Prednisone. If appropriate please send medication(s) to patients pharmacy. Next appt: None currently. Note to pharmacy to have patient contact the office to schedule appointment. Last appt: 10/21/2019    Health Maintenance   Topic Date Due    Shingles Vaccine (2 of 3) 12/27/2017    Diabetic retinal exam  06/01/2018    Colon cancer screen colonoscopy  06/27/2018    Annual Wellness Visit (AWV)  05/29/2019    A1C test (Diabetic or Prediabetic)  11/12/2019    Diabetic foot exam  02/19/2020    Lipid screen  08/13/2020    Potassium monitoring  01/15/2021    Creatinine monitoring  01/15/2021    DTaP/Tdap/Td vaccine (2 - Td) 12/14/2023    Flu vaccine  Completed    Pneumococcal 0-64 years Vaccine  Completed    Hepatitis C screen  Completed    HIV screen  Completed    Hepatitis A vaccine  Aged Out    Hib vaccine  Aged Out    Meningococcal (ACWY) vaccine  Aged Out       Hemoglobin A1C (%)   Date Value   08/12/2019 9.8 (H)   08/11/2019 9.8 (H)   02/19/2019 9.4             ( goal A1C is < 7)   Microalb/Crt.  Ratio (mcg/mg creat)   Date Value   06/23/2017 2,464 (H)     LDL Cholesterol (mg/dL)   Date Value   08/13/2019 65       (goal LDL is <100)   AST (U/L)   Date Value   01/15/2020 14     ALT (U/L)   Date Value   01/15/2020 19     BUN (mg/dL)   Date Value   01/15/2020 45 (H)     BP Readings from Last 3 Encounters:   01/15/20 (!) 137/53   01/07/20 (!) 175/73   10/30/19 (!) 166/88          (goal 120/80)          Patient Active Problem List:     Cigarette nicotine dependence without complication     Carpal tunnel syndrome on right     Colon polyps     Carotid stenosis, left s/p Endarterectomy     Mixed simple and mucopurulent chronic bronchitis (HCC)     Pure hypercholesterolemia     Exertional dyspnea     PTSD (post-traumatic stress disorder)     Transient cerebral ischemia     Essential hypertension     DM type 2, uncontrolled, with neuropathy (HCC)     COPD (chronic obstructive pulmonary

## 2020-04-04 RX ORDER — PREDNISONE 10 MG/1
TABLET ORAL
Qty: 30 TABLET | Refills: 0 | OUTPATIENT
Start: 2020-04-04

## 2020-04-07 RX ORDER — AZITHROMYCIN 250 MG/1
250 TABLET, FILM COATED ORAL DAILY
Qty: 5 TABLET | Refills: 0 | Status: SHIPPED | OUTPATIENT
Start: 2020-04-07 | End: 2020-04-12

## 2020-04-07 RX ORDER — PREDNISONE 10 MG/1
TABLET ORAL
Qty: 30 TABLET | Refills: 0 | Status: SHIPPED | OUTPATIENT
Start: 2020-04-07 | End: 2020-04-17 | Stop reason: SDUPTHER

## 2020-04-07 RX ORDER — PREDNISONE 10 MG/1
TABLET ORAL
Qty: 30 TABLET | Refills: 0 | Status: CANCELLED | OUTPATIENT
Start: 2020-04-07 | End: 2020-04-17

## 2020-04-08 RX ORDER — IPRATROPIUM/ALBUTEROL SULFATE 20-100 MCG
MIST INHALER (GRAM) INHALATION
Qty: 4 G | OUTPATIENT
Start: 2020-04-08

## 2020-04-08 NOTE — TELEPHONE ENCOUNTER
Refill request for Combivent Respimat. If appropriate please send medication(s) to patients pharmacy. Next appt: None currently. Note to pharmacy to have patient contact the office to schedule appointment. Last appt: 10/21/2019    Health Maintenance   Topic Date Due    Shingles Vaccine (2 of 3) 12/27/2017    Diabetic retinal exam  06/01/2018    Colon cancer screen colonoscopy  06/27/2018    Annual Wellness Visit (AWV)  05/29/2019    A1C test (Diabetic or Prediabetic)  11/12/2019    Diabetic foot exam  02/19/2020    Lipid screen  08/13/2020    Potassium monitoring  01/15/2021    Creatinine monitoring  01/15/2021    DTaP/Tdap/Td vaccine (2 - Td) 12/14/2023    Flu vaccine  Completed    Pneumococcal 0-64 years Vaccine  Completed    Hepatitis C screen  Completed    HIV screen  Completed    Hepatitis A vaccine  Aged Out    Hib vaccine  Aged Out    Meningococcal (ACWY) vaccine  Aged Out       Hemoglobin A1C (%)   Date Value   08/12/2019 9.8 (H)   08/11/2019 9.8 (H)   02/19/2019 9.4             ( goal A1C is < 7)   Microalb/Crt.  Ratio (mcg/mg creat)   Date Value   06/23/2017 2,464 (H)     LDL Cholesterol (mg/dL)   Date Value   08/13/2019 65       (goal LDL is <100)   AST (U/L)   Date Value   01/15/2020 14     ALT (U/L)   Date Value   01/15/2020 19     BUN (mg/dL)   Date Value   01/15/2020 45 (H)     BP Readings from Last 3 Encounters:   01/15/20 (!) 137/53   01/07/20 (!) 175/73   10/30/19 (!) 166/88          (goal 120/80)          Patient Active Problem List:     Cigarette nicotine dependence without complication     Carpal tunnel syndrome on right     Colon polyps     Carotid stenosis, left s/p Endarterectomy     Mixed simple and mucopurulent chronic bronchitis (HCC)     Pure hypercholesterolemia     Exertional dyspnea     PTSD (post-traumatic stress disorder)     Transient cerebral ischemia     Essential hypertension     DM type 2, uncontrolled, with neuropathy (HCC)     COPD (chronic obstructive

## 2020-04-13 NOTE — TELEPHONE ENCOUNTER
Transient cerebral ischemia     Essential hypertension     DM type 2, uncontrolled, with neuropathy (HCC)     COPD (chronic obstructive pulmonary disease) (HCC)     Cerebral vascular disease     Primary insomnia     Hypertensive urgency     Non-obstructive Coronary artery disease of native artery of native heart with stable angina pectoris ([facility])     Hyperparathyroidism ([address]     Vitamin D deficiency     Acute kidney injury superimposed on chronic kidney disease ([address]     Coronary artery disease with exertional angina (HCC)     Moderate malnutrition ([address]     COPD exacerbation ([facility]

## 2020-04-14 RX ORDER — LANCING DEVICE
EACH MISCELLANEOUS
Qty: 100 EACH | Refills: 5 | Status: SHIPPED | OUTPATIENT
Start: 2020-04-14

## 2020-04-17 ENCOUNTER — VIRTUAL VISIT (OUTPATIENT)
Dept: INTERNAL MEDICINE | Age: 65
End: 2020-04-17
Payer: COMMERCIAL

## 2020-04-17 VITALS — WEIGHT: 145 LBS | HEIGHT: 66 IN | BODY MASS INDEX: 23.3 KG/M2

## 2020-04-17 PROCEDURE — 99442 PR PHYS/QHP TELEPHONE EVALUATION 11-20 MIN: CPT | Performed by: NURSE PRACTITIONER

## 2020-04-17 RX ORDER — FLUTICASONE PROPIONATE 50 MCG
SPRAY, SUSPENSION (ML) NASAL
Qty: 1 BOTTLE | Refills: 11 | Status: SHIPPED | OUTPATIENT
Start: 2020-04-17 | End: 2020-06-02

## 2020-04-17 RX ORDER — PREDNISONE 10 MG/1
TABLET ORAL
Qty: 30 TABLET | Refills: 0 | Status: SHIPPED | OUTPATIENT
Start: 2020-04-17 | End: 2020-06-02

## 2020-04-17 RX ORDER — CEFDINIR 300 MG/1
300 CAPSULE ORAL 2 TIMES DAILY
Qty: 20 CAPSULE | Refills: 0 | Status: SHIPPED | OUTPATIENT
Start: 2020-04-17 | End: 2020-06-02 | Stop reason: SDUPTHER

## 2020-04-17 ASSESSMENT — PATIENT HEALTH QUESTIONNAIRE - PHQ9
SUM OF ALL RESPONSES TO PHQ QUESTIONS 1-9: 0
2. FEELING DOWN, DEPRESSED OR HOPELESS: 0
SUM OF ALL RESPONSES TO PHQ9 QUESTIONS 1 & 2: 0
1. LITTLE INTEREST OR PLEASURE IN DOING THINGS: 0
SUM OF ALL RESPONSES TO PHQ QUESTIONS 1-9: 0

## 2020-05-15 ENCOUNTER — APPOINTMENT (OUTPATIENT)
Dept: GENERAL RADIOLOGY | Age: 65
End: 2020-05-15
Payer: COMMERCIAL

## 2020-05-15 ENCOUNTER — HOSPITAL ENCOUNTER (OUTPATIENT)
Age: 65
Setting detail: OBSERVATION
Discharge: HOME | End: 2020-05-18
Attending: EMERGENCY MEDICINE | Admitting: INTERNAL MEDICINE
Payer: COMMERCIAL

## 2020-05-15 PROBLEM — M79.89 LEG SWELLING: Status: ACTIVE | Noted: 2020-05-15

## 2020-05-15 LAB
ABSOLUTE EOS #: 0.3 K/UL (ref 0–0.44)
ABSOLUTE IMMATURE GRANULOCYTE: 0.04 K/UL (ref 0–0.3)
ABSOLUTE LYMPH #: 1.71 K/UL (ref 1.1–3.7)
ABSOLUTE MONO #: 0.48 K/UL (ref 0.1–1.2)
ANION GAP SERPL CALCULATED.3IONS-SCNC: 12 MMOL/L (ref 9–17)
BASOPHILS # BLD: 1 % (ref 0–2)
BASOPHILS ABSOLUTE: 0.04 K/UL (ref 0–0.2)
BNP INTERPRETATION: ABNORMAL
BUN BLDV-MCNC: 30 MG/DL (ref 8–23)
BUN/CREAT BLD: ABNORMAL (ref 9–20)
CALCIUM SERPL-MCNC: 8.6 MG/DL (ref 8.6–10.4)
CHLORIDE BLD-SCNC: 107 MMOL/L (ref 98–107)
CO2: 19 MMOL/L (ref 20–31)
CREAT SERPL-MCNC: 2.2 MG/DL (ref 0.7–1.2)
DIFFERENTIAL TYPE: ABNORMAL
EOSINOPHILS RELATIVE PERCENT: 4 % (ref 1–4)
GFR AFRICAN AMERICAN: 37 ML/MIN
GFR NON-AFRICAN AMERICAN: 30 ML/MIN
GFR SERPL CREATININE-BSD FRML MDRD: ABNORMAL ML/MIN/{1.73_M2}
GFR SERPL CREATININE-BSD FRML MDRD: ABNORMAL ML/MIN/{1.73_M2}
GLUCOSE BLD-MCNC: 138 MG/DL (ref 70–99)
GLUCOSE BLD-MCNC: 372 MG/DL (ref 75–110)
GLUCOSE BLD-MCNC: 448 MG/DL (ref 75–110)
GLUCOSE BLD-MCNC: 501 MG/DL (ref 75–110)
HCT VFR BLD CALC: 37.3 % (ref 40.7–50.3)
HEMOGLOBIN: 12.1 G/DL (ref 13–17)
IMMATURE GRANULOCYTES: 1 %
LYMPHOCYTES # BLD: 23 % (ref 24–43)
MAGNESIUM: 1.6 MG/DL (ref 1.6–2.6)
MCH RBC QN AUTO: 30.3 PG (ref 25.2–33.5)
MCHC RBC AUTO-ENTMCNC: 32.4 G/DL (ref 28.4–34.8)
MCV RBC AUTO: 93.3 FL (ref 82.6–102.9)
MONOCYTES # BLD: 6 % (ref 3–12)
NRBC AUTOMATED: 0 PER 100 WBC
PDW BLD-RTO: 12.8 % (ref 11.8–14.4)
PLATELET # BLD: 495 K/UL (ref 138–453)
PLATELET ESTIMATE: ABNORMAL
PMV BLD AUTO: 9.2 FL (ref 8.1–13.5)
POTASSIUM SERPL-SCNC: 4.2 MMOL/L (ref 3.7–5.3)
PRO-BNP: 2789 PG/ML
RBC # BLD: 4 M/UL (ref 4.21–5.77)
RBC # BLD: ABNORMAL 10*6/UL
SEG NEUTROPHILS: 65 % (ref 36–65)
SEGMENTED NEUTROPHILS ABSOLUTE COUNT: 5.03 K/UL (ref 1.5–8.1)
SODIUM BLD-SCNC: 138 MMOL/L (ref 135–144)
TROPONIN INTERP: ABNORMAL
TROPONIN T: ABNORMAL NG/ML
TROPONIN, HIGH SENSITIVITY: 102 NG/L (ref 0–22)
TROPONIN, HIGH SENSITIVITY: 118 NG/L (ref 0–22)
TROPONIN, HIGH SENSITIVITY: 64 NG/L (ref 0–22)
TROPONIN, HIGH SENSITIVITY: 72 NG/L (ref 0–22)
TROPONIN, HIGH SENSITIVITY: 83 NG/L (ref 0–22)
TSH SERPL DL<=0.05 MIU/L-ACNC: 2.91 MIU/L (ref 0.3–5)
WBC # BLD: 7.6 K/UL (ref 3.5–11.3)
WBC # BLD: ABNORMAL 10*3/UL

## 2020-05-15 PROCEDURE — G0378 HOSPITAL OBSERVATION PER HR: HCPCS

## 2020-05-15 PROCEDURE — 84443 ASSAY THYROID STIM HORMONE: CPT

## 2020-05-15 PROCEDURE — 6370000000 HC RX 637 (ALT 250 FOR IP): Performed by: STUDENT IN AN ORGANIZED HEALTH CARE EDUCATION/TRAINING PROGRAM

## 2020-05-15 PROCEDURE — 96365 THER/PROPH/DIAG IV INF INIT: CPT

## 2020-05-15 PROCEDURE — 93005 ELECTROCARDIOGRAM TRACING: CPT | Performed by: STUDENT IN AN ORGANIZED HEALTH CARE EDUCATION/TRAINING PROGRAM

## 2020-05-15 PROCEDURE — 99285 EMERGENCY DEPT VISIT HI MDM: CPT

## 2020-05-15 PROCEDURE — 6360000002 HC RX W HCPCS: Performed by: STUDENT IN AN ORGANIZED HEALTH CARE EDUCATION/TRAINING PROGRAM

## 2020-05-15 PROCEDURE — 80307 DRUG TEST PRSMV CHEM ANLYZR: CPT

## 2020-05-15 PROCEDURE — 84484 ASSAY OF TROPONIN QUANT: CPT

## 2020-05-15 PROCEDURE — 83735 ASSAY OF MAGNESIUM: CPT

## 2020-05-15 PROCEDURE — 96375 TX/PRO/DX INJ NEW DRUG ADDON: CPT

## 2020-05-15 PROCEDURE — 96372 THER/PROPH/DIAG INJ SC/IM: CPT

## 2020-05-15 PROCEDURE — 82947 ASSAY GLUCOSE BLOOD QUANT: CPT

## 2020-05-15 PROCEDURE — 71045 X-RAY EXAM CHEST 1 VIEW: CPT

## 2020-05-15 PROCEDURE — 2580000003 HC RX 258: Performed by: STUDENT IN AN ORGANIZED HEALTH CARE EDUCATION/TRAINING PROGRAM

## 2020-05-15 PROCEDURE — 36415 COLL VENOUS BLD VENIPUNCTURE: CPT

## 2020-05-15 PROCEDURE — 80048 BASIC METABOLIC PNL TOTAL CA: CPT

## 2020-05-15 PROCEDURE — 85025 COMPLETE CBC W/AUTO DIFF WBC: CPT

## 2020-05-15 PROCEDURE — 2060000000 HC ICU INTERMEDIATE R&B

## 2020-05-15 PROCEDURE — 83880 ASSAY OF NATRIURETIC PEPTIDE: CPT

## 2020-05-15 PROCEDURE — 99223 1ST HOSP IP/OBS HIGH 75: CPT | Performed by: INTERNAL MEDICINE

## 2020-05-15 RX ORDER — QUETIAPINE FUMARATE 100 MG/1
100 TABLET, FILM COATED ORAL NIGHTLY
Status: DISCONTINUED | OUTPATIENT
Start: 2020-05-15 | End: 2020-05-18 | Stop reason: HOSPADM

## 2020-05-15 RX ORDER — SODIUM CHLORIDE 0.9 % (FLUSH) 0.9 %
10 SYRINGE (ML) INJECTION EVERY 12 HOURS SCHEDULED
Status: DISCONTINUED | OUTPATIENT
Start: 2020-05-15 | End: 2020-05-18 | Stop reason: HOSPADM

## 2020-05-15 RX ORDER — HEPARIN SODIUM 5000 [USP'U]/ML
5000 INJECTION, SOLUTION INTRAVENOUS; SUBCUTANEOUS EVERY 8 HOURS SCHEDULED
Status: DISCONTINUED | OUTPATIENT
Start: 2020-05-15 | End: 2020-05-18 | Stop reason: HOSPADM

## 2020-05-15 RX ORDER — GABAPENTIN 100 MG/1
CAPSULE ORAL
Qty: 180 CAPSULE | Refills: 0 | Status: SHIPPED | OUTPATIENT
Start: 2020-05-15 | End: 2020-05-18 | Stop reason: SDUPTHER

## 2020-05-15 RX ORDER — MAGNESIUM SULFATE 1 G/100ML
1 INJECTION INTRAVENOUS ONCE
Status: COMPLETED | OUTPATIENT
Start: 2020-05-15 | End: 2020-05-15

## 2020-05-15 RX ORDER — DEXAMETHASONE SODIUM PHOSPHATE 10 MG/ML
10 INJECTION INTRAMUSCULAR; INTRAVENOUS ONCE
Status: COMPLETED | OUTPATIENT
Start: 2020-05-15 | End: 2020-05-15

## 2020-05-15 RX ORDER — ACETAMINOPHEN 325 MG/1
650 TABLET ORAL EVERY 4 HOURS PRN
Status: DISCONTINUED | OUTPATIENT
Start: 2020-05-15 | End: 2020-05-18 | Stop reason: HOSPADM

## 2020-05-15 RX ORDER — ASPIRIN 81 MG/1
81 TABLET ORAL DAILY
Status: DISCONTINUED | OUTPATIENT
Start: 2020-05-15 | End: 2020-05-18 | Stop reason: HOSPADM

## 2020-05-15 RX ORDER — ISOSORBIDE MONONITRATE 30 MG/1
30 TABLET, EXTENDED RELEASE ORAL DAILY
Status: DISCONTINUED | OUTPATIENT
Start: 2020-05-15 | End: 2020-05-18 | Stop reason: HOSPADM

## 2020-05-15 RX ORDER — INSULIN GLARGINE 100 [IU]/ML
35 INJECTION, SOLUTION SUBCUTANEOUS NIGHTLY
Status: DISCONTINUED | OUTPATIENT
Start: 2020-05-15 | End: 2020-05-16

## 2020-05-15 RX ORDER — CARVEDILOL 25 MG/1
25 TABLET ORAL 2 TIMES DAILY WITH MEALS
Status: DISCONTINUED | OUTPATIENT
Start: 2020-05-15 | End: 2020-05-18 | Stop reason: HOSPADM

## 2020-05-15 RX ORDER — ALBUTEROL SULFATE 90 UG/1
4 AEROSOL, METERED RESPIRATORY (INHALATION) EVERY 6 HOURS PRN
Status: DISCONTINUED | OUTPATIENT
Start: 2020-05-15 | End: 2020-05-18 | Stop reason: HOSPADM

## 2020-05-15 RX ORDER — DEXTROSE MONOHYDRATE 50 MG/ML
100 INJECTION, SOLUTION INTRAVENOUS PRN
Status: DISCONTINUED | OUTPATIENT
Start: 2020-05-15 | End: 2020-05-18 | Stop reason: HOSPADM

## 2020-05-15 RX ORDER — ALBUTEROL SULFATE 2.5 MG/3ML
2.5 SOLUTION RESPIRATORY (INHALATION)
Status: DISCONTINUED | OUTPATIENT
Start: 2020-05-15 | End: 2020-05-18 | Stop reason: HOSPADM

## 2020-05-15 RX ORDER — HYDRALAZINE HYDROCHLORIDE 25 MG/1
25 TABLET, FILM COATED ORAL 2 TIMES DAILY
Status: DISCONTINUED | OUTPATIENT
Start: 2020-05-15 | End: 2020-05-15 | Stop reason: DRUGHIGH

## 2020-05-15 RX ORDER — CLOPIDOGREL BISULFATE 75 MG/1
75 TABLET ORAL DAILY
Status: DISCONTINUED | OUTPATIENT
Start: 2020-05-15 | End: 2020-05-18 | Stop reason: HOSPADM

## 2020-05-15 RX ORDER — FUROSEMIDE 10 MG/ML
40 INJECTION INTRAMUSCULAR; INTRAVENOUS ONCE
Status: COMPLETED | OUTPATIENT
Start: 2020-05-15 | End: 2020-05-15

## 2020-05-15 RX ORDER — HYDRALAZINE HYDROCHLORIDE 20 MG/ML
5 INJECTION INTRAMUSCULAR; INTRAVENOUS EVERY 6 HOURS PRN
Status: DISCONTINUED | OUTPATIENT
Start: 2020-05-15 | End: 2020-05-16

## 2020-05-15 RX ORDER — SODIUM CHLORIDE 0.9 % (FLUSH) 0.9 %
10 SYRINGE (ML) INJECTION PRN
Status: DISCONTINUED | OUTPATIENT
Start: 2020-05-15 | End: 2020-05-18 | Stop reason: HOSPADM

## 2020-05-15 RX ORDER — NICOTINE POLACRILEX 4 MG
15 LOZENGE BUCCAL PRN
Status: DISCONTINUED | OUTPATIENT
Start: 2020-05-15 | End: 2020-05-18 | Stop reason: HOSPADM

## 2020-05-15 RX ORDER — HYDRALAZINE HYDROCHLORIDE 50 MG/1
50 TABLET, FILM COATED ORAL EVERY 8 HOURS SCHEDULED
Status: DISCONTINUED | OUTPATIENT
Start: 2020-05-15 | End: 2020-05-16

## 2020-05-15 RX ORDER — NITROGLYCERIN 0.4 MG/1
0.4 TABLET SUBLINGUAL EVERY 5 MIN PRN
Status: DISCONTINUED | OUTPATIENT
Start: 2020-05-15 | End: 2020-05-18 | Stop reason: HOSPADM

## 2020-05-15 RX ORDER — GABAPENTIN 100 MG/1
200 CAPSULE ORAL 3 TIMES DAILY
Status: DISCONTINUED | OUTPATIENT
Start: 2020-05-15 | End: 2020-05-18 | Stop reason: HOSPADM

## 2020-05-15 RX ORDER — PRAZOSIN HYDROCHLORIDE 5 MG/1
5 CAPSULE ORAL 3 TIMES DAILY
Status: DISCONTINUED | OUTPATIENT
Start: 2020-05-15 | End: 2020-05-18 | Stop reason: HOSPADM

## 2020-05-15 RX ORDER — ATORVASTATIN CALCIUM 80 MG/1
80 TABLET, FILM COATED ORAL DAILY
Status: DISCONTINUED | OUTPATIENT
Start: 2020-05-15 | End: 2020-05-18 | Stop reason: HOSPADM

## 2020-05-15 RX ORDER — NIFEDIPINE 90 MG/1
90 TABLET, FILM COATED, EXTENDED RELEASE ORAL DAILY
Status: DISCONTINUED | OUTPATIENT
Start: 2020-05-15 | End: 2020-05-16

## 2020-05-15 RX ORDER — METHYLPREDNISOLONE SODIUM SUCCINATE 40 MG/ML
40 INJECTION, POWDER, LYOPHILIZED, FOR SOLUTION INTRAMUSCULAR; INTRAVENOUS DAILY
Status: DISCONTINUED | OUTPATIENT
Start: 2020-05-15 | End: 2020-05-16

## 2020-05-15 RX ORDER — DEXTROSE MONOHYDRATE 25 G/50ML
12.5 INJECTION, SOLUTION INTRAVENOUS PRN
Status: DISCONTINUED | OUTPATIENT
Start: 2020-05-15 | End: 2020-05-18 | Stop reason: HOSPADM

## 2020-05-15 RX ORDER — NIFEDIPINE 60 MG/1
60 TABLET, FILM COATED, EXTENDED RELEASE ORAL DAILY
Status: DISCONTINUED | OUTPATIENT
Start: 2020-05-15 | End: 2020-05-15

## 2020-05-15 RX ADMIN — MAGNESIUM SULFATE HEPTAHYDRATE 1 G: 1 INJECTION, SOLUTION INTRAVENOUS at 10:35

## 2020-05-15 RX ADMIN — DEXAMETHASONE SODIUM PHOSPHATE 10 MG: 10 INJECTION INTRAMUSCULAR; INTRAVENOUS at 10:34

## 2020-05-15 RX ADMIN — METHYLPREDNISOLONE SODIUM SUCCINATE 40 MG: 40 INJECTION, POWDER, FOR SOLUTION INTRAMUSCULAR; INTRAVENOUS at 23:26

## 2020-05-15 RX ADMIN — GABAPENTIN 200 MG: 100 CAPSULE ORAL at 22:13

## 2020-05-15 RX ADMIN — CARVEDILOL 25 MG: 25 TABLET, FILM COATED ORAL at 16:10

## 2020-05-15 RX ADMIN — PRAZOSIN HYDROCHLORIDE 5 MG: 5 CAPSULE ORAL at 20:32

## 2020-05-15 RX ADMIN — HYDRALAZINE HYDROCHLORIDE 5 MG: 20 INJECTION INTRAMUSCULAR; INTRAVENOUS at 23:28

## 2020-05-15 RX ADMIN — FUROSEMIDE 40 MG: 10 INJECTION, SOLUTION INTRAMUSCULAR; INTRAVENOUS at 17:43

## 2020-05-15 RX ADMIN — HYDRALAZINE HYDROCHLORIDE 50 MG: 50 TABLET, FILM COATED ORAL at 20:32

## 2020-05-15 RX ADMIN — INSULIN LISPRO 9 UNITS: 100 INJECTION, SOLUTION INTRAVENOUS; SUBCUTANEOUS at 21:53

## 2020-05-15 RX ADMIN — SODIUM CHLORIDE, PRESERVATIVE FREE 10 ML: 5 INJECTION INTRAVENOUS at 20:32

## 2020-05-15 RX ADMIN — INSULIN LISPRO 7 UNITS: 100 INJECTION, SOLUTION INTRAVENOUS; SUBCUTANEOUS at 17:44

## 2020-05-15 RX ADMIN — HEPARIN SODIUM 5000 UNITS: 5000 INJECTION INTRAVENOUS; SUBCUTANEOUS at 20:32

## 2020-05-15 RX ADMIN — INSULIN GLARGINE 35 UNITS: 100 INJECTION, SOLUTION SUBCUTANEOUS at 23:27

## 2020-05-15 ASSESSMENT — PAIN SCALES - GENERAL
PAINLEVEL_OUTOF10: 0
PAINLEVEL_OUTOF10: 5
PAINLEVEL_OUTOF10: 0

## 2020-05-15 ASSESSMENT — ENCOUNTER SYMPTOMS
SHORTNESS OF BREATH: 1
COUGH: 1
EYE REDNESS: 0
EYE DISCHARGE: 0
CHEST TIGHTNESS: 1
NAUSEA: 0
VOMITING: 0
ABDOMINAL PAIN: 0

## 2020-05-15 ASSESSMENT — PAIN DESCRIPTION - LOCATION
LOCATION: CHEST
LOCATION: CHEST

## 2020-05-15 ASSESSMENT — PAIN DESCRIPTION - FREQUENCY
FREQUENCY: INTERMITTENT
FREQUENCY: INTERMITTENT

## 2020-05-15 ASSESSMENT — PAIN DESCRIPTION - DESCRIPTORS
DESCRIPTORS: TIGHTNESS
DESCRIPTORS: TIGHTNESS

## 2020-05-15 NOTE — ED NOTES
Pt placed on cardiac monitor, BP cuff, and pulse ox. Alarms set.       Celena Primrose, RN  05/15/20 0468

## 2020-05-15 NOTE — H&P
mouth daily 10/21/19   Jose Manuel Essex, APRN - CNP   clopidogrel (PLAVIX) 75 MG tablet Take 1 tablet by mouth daily 10/21/19   Jose Manuel Essex, APRN - CNP   Dulaglutide (TRULICITY) 3.71 BL/3.3UO SOPN Inject 0.75 mg into the skin once a week  Patient not taking: Reported on 2020 10/21/19   Jose Manuel Essex, APRN - CNP   insulin glargine [facility]) 100 UNIT/ML injection pen Inject 35 Units into the skin nightly Dispense with pen needle 10/21/19   Jose Manuel Essex, APRN - CNP   insulin aspart (NOVOLOG FLEXPEN) 100 UNIT/ML injection pen Inject 5 Units into the skin 3 times daily (before meals) Sliding scale 10/21/19   Jose Manuel Essex, APRN - CNP   nitroGLYCERIN (NITROSTAT) 0.4 MG SL tablet up to max of 3 total doses.  If no relief after 1 dose, call 911. 10/21/19   Jose Manuel Essex, APRN - CNP   fluticasone-umeclidin-vilant ([place]) 100-62.5-25 MCG/INH AEPB INHALE 1 PUFF INTO THE LINGS DAILY 10/21/19   Jose Manuel Essex, APRN - CNP   hydrALAZINE (APRESOLINE) 25 MG tablet Take 1 tablet by mouth 2 times daily  Patient taking differently: Take 50 mg by mouth 3 times daily  10/21/19   Jose Manuel Essex, APRN - CNP   QUEtiapine (SEROQUEL) 100 MG tablet TAKE 1 TABLET BY MOUTH NIGHTLY 19   Zara Joseph MD   EASY COMFORT LANCETS MISC DX: DM-2 USE 3-4 TIMES DAILY 3/25/19   Zara Joseph MD       SOCIAL HISTORY     Tobacco: Remote  Alcohol: Denies  Illicits: Denies  Occupation: Did not ask    FAMILY HISTORY     Family History   Problem Relation Age of Onset    Cancer Mother     Heart Disease Father        PHYSICAL EXAM     Vitals: BP (!) 169/67   Pulse 75   Temp 98.3 °F (36.8 °C) (Oral)   Resp 22   Ht 5' 6\" (1.676 m)   Wt 140 lb (63.5 kg)   SpO2 97%   BMI 22.60 kg/m²   Tmax: Temp (24hrs), Av.3 °F (36.8 °C), Min:98.3 °F (36.8 °C), Max:98.3 °F (36.8 °C)    Last Body weight:   Wt Readings from Last 3 Encounters:   05/15/20 140 lb (63.5 kg)   20 145 lb (65.8 kg)   01/15/20 139 lb

## 2020-05-15 NOTE — ED NOTES
Pt updated on plan for admission. Family updated. Pt denies any needs at this time, will continue to monitor.       Carlyn Apgar, RN  05/15/20 4537

## 2020-05-15 NOTE — ED PROVIDER NOTES
Pertanant Comments: This patient was accidentally clicked on. I did not see or participate in the care of this patient.       Jackson Garcia MD [facility]  Attending Emergency Medicine Physician        Severiano Varela MD  05/15/20 1824
...10mg x3days . ..then off 4/17/20 Junie Du, APRN - CNP   fluticasone (FLONASE) 50 MCG/ACT nasal spray 2 sprays to each nostril once daily.  4/17/20 Junie Du, TERRANCE - CNP   COMFORT EZ PEN NEEDLES 31G X 8 MM MISC USE AS DIRECTED 4/14/20 Junie Du, APRN - JEN   albuterol-ipratropium (COMBIVENT RESPIMAT)  MCG/ACT AERS inhaler Inhale 1 puff into the lungs every 6 hours 4/14/20 Junie Du, APRN - CNP   prazosin (MINIPRESS) 5 MG capsule Take 1 capsule by mouth 3 times daily 2/26/20 Junie Du, APRN - CNP   aspirin (ASPIRIN ADULT LOW STRENGTH) 81 MG EC tablet TAKE 1 TABLET BY MOUTH DAILY 2/7/20 Junie Du, APRN - CNP   naproxen (NAPROSYN) 250 MG tablet Take 1 tablet by mouth 2 times daily (with meals) for 10 days  Patient not taking: Reported on 4/17/2020 1/15/20 1/25/20  Libia Nogueira MD   isosorbide mononitrate (IMDUR) 30 MG extended release tablet Take 1 tablet by mouth daily 1/8/20   Jeevan Hahn DO   NIFEdipine (PROCARDIA XL) 60 MG extended release tablet Take 60 mg by mouth daily 12/20/19   Historical Provider, MD   TRUE METRIX BLOOD GLUCOSE TEST strip TEST BLOOD SUGAR 4 TO 5 TIMES DAILY 12/12/19 Junie Du, APRN - CNP   losartan (COZAAR) 50 MG tablet Take 1 tablet by mouth nightly 10/21/19   Esme Du, APRN - CNP   amLODIPine (NORVASC) 5 MG tablet Take 1 tablet by mouth 2 times daily 10/21/19   Esme Du, APRN - CNP   ergocalciferol (ERGOCALCIFEROL) 22654 units capsule Take 1 capsule by mouth once a week 10/21/19   Esme Du, APRN - CNP   carvedilol (COREG) 25 MG tablet Take 1 tablet by mouth 2 times daily (with meals) 10/21/19   Esme Du, APRN - CNP   atorvastatin (LIPITOR) 80 MG tablet Take 1 tablet by mouth daily 10/21/19   Esme Du, APRN - CNP   clopidogrel (PLAVIX) 75 MG tablet Take 1 tablet by mouth daily 10/21/19   Esme Subramanian APRN - CNP   Dulaglutide (TRULICITY) 8.00 TZ/6.6IH SOPN Inject 0.75

## 2020-05-15 NOTE — ED NOTES
Pt to ed with c/o chronic sob with increase swelling in legs and left arm. Pt states he was going to pulmonary rehab and has not been since february due to covid. Pt states the chest tightness has been intermittent since last night, relieved with nitro. Pt is alert, oriented, speaking in full, complete sentences, no distress noted. Pt states he feels as if his left arm is swollen due to fluid and is painful at the elbow. Pt states he has been taking medications as prescribed and checking his blood sugars regularly.  Pt rates pain [address], RN  05/15/20 8012

## 2020-05-15 NOTE — PROGRESS NOTES
[facility]. Leg swelling [M79.89] . Vitals:    05/15/20 1406   BP:    Pulse: 67   Resp:    Temp:    SpO2:    . Patients home meds are   Prior to Admission medications    Medication Sig Start Date End Date Taking? Authorizing Provider   predniSONE (DELTASONE) 10 MG tablet Tapered dose 40mg x 3 days. .. 30mg x 3 days. .. 20mg x 3 days . Reynaldo Maffucci .10mg x3days . ..then off 4/17/20   TERRANCE Leach CNP   fluticasone (FLONASE) 50 MCG/ACT nasal spray 2 sprays to each nostril once daily.  4/17/20   TERRANCE Leach CNP   COMFORT EZ PEN NEEDLES 31G X 8 MM MISC USE AS DIRECTED 4/14/20   TERRANCE Leach CNP   albuterol-ipratropium (COMBIVENT RESPIMAT)  MCG/ACT AERS inhaler Inhale 1 puff into the lungs every 6 hours 4/14/20   TERRANCE Leach CNP   prazosin (MINIPRESS) 5 MG capsule Take 1 capsule by mouth 3 times daily 2/26/20   TERRANCE Leach CNP   aspirin (ASPIRIN ADULT LOW STRENGTH) 81 MG EC tablet TAKE 1 TABLET BY MOUTH DAILY 2/7/20   TERRANCE Leach CNP   naproxen (NAPROSYN) 250 MG tablet Take 1 tablet by mouth 2 times daily (with meals) for 10 days  Patient not taking: Reported on 4/17/2020 1/15/20 1/25/20  Rachell Merlos MD   isosorbide mononitrate (IMDUR) 30 MG extended release tablet Take 1 tablet by mouth daily 1/8/20   Lourena Dose, DO   NIFEdipine (PROCARDIA XL) 60 MG extended release tablet Take 60 mg by mouth daily 12/20/19   Historical Provider, MD   TRUE METRIX BLOOD GLUCOSE TEST strip TEST BLOOD SUGAR 4 TO 5 TIMES DAILY 12/12/19   TERRANCE Leach CNP   losartan (COZAAR) 50 MG tablet Take 1 tablet by mouth nightly 10/21/19   TERRANCE Leach CNP   amLODIPine (NORVASC) 5 MG tablet Take 1 tablet by mouth 2 times daily 10/21/19   TERRANCE Leach CNP   ergocalciferol (ERGOCALCIFEROL) 46477 units capsule Take 1 capsule by mouth once a week 10/21/19   TERRANCE Leach CNP   carvedilol (COREG) 25 MG

## 2020-05-15 NOTE — ED NOTES
Pt states he checked his BS at he was 140. Pt states If his BS falls below 200 he does not do well. Pt given box lunch to eat. Pt ambulated to restroom and back with steady gait.       Jesus Kenney RN  05/15/20 5928

## 2020-05-16 LAB
ANION GAP SERPL CALCULATED.3IONS-SCNC: 16 MMOL/L (ref 9–17)
BUN BLDV-MCNC: 42 MG/DL (ref 8–23)
BUN/CREAT BLD: ABNORMAL (ref 9–20)
CALCIUM SERPL-MCNC: 8 MG/DL (ref 8.6–10.4)
CHLORIDE BLD-SCNC: 98 MMOL/L (ref 98–107)
CO2: 17 MMOL/L (ref 20–31)
CREAT SERPL-MCNC: 2.34 MG/DL (ref 0.7–1.2)
EKG ATRIAL RATE: 66 BPM
EKG P AXIS: 55 DEGREES
EKG P-R INTERVAL: 138 MS
EKG Q-T INTERVAL: 390 MS
EKG QRS DURATION: 92 MS
EKG QTC CALCULATION (BAZETT): 408 MS
EKG R AXIS: 64 DEGREES
EKG T AXIS: -153 DEGREES
EKG VENTRICULAR RATE: 66 BPM
GFR AFRICAN AMERICAN: 34 ML/MIN
GFR NON-AFRICAN AMERICAN: 28 ML/MIN
GFR SERPL CREATININE-BSD FRML MDRD: ABNORMAL ML/MIN/{1.73_M2}
GFR SERPL CREATININE-BSD FRML MDRD: ABNORMAL ML/MIN/{1.73_M2}
GLUCOSE BLD-MCNC: 315 MG/DL (ref 75–110)
GLUCOSE BLD-MCNC: 321 MG/DL (ref 75–110)
GLUCOSE BLD-MCNC: 350 MG/DL (ref 75–110)
GLUCOSE BLD-MCNC: 401 MG/DL (ref 75–110)
GLUCOSE BLD-MCNC: 414 MG/DL (ref 75–110)
GLUCOSE BLD-MCNC: 425 MG/DL (ref 70–99)
LV EF: 73 %
LVEF MODALITY: NORMAL
POTASSIUM SERPL-SCNC: 4.5 MMOL/L (ref 3.7–5.3)
SODIUM BLD-SCNC: 131 MMOL/L (ref 135–144)

## 2020-05-16 PROCEDURE — 6370000000 HC RX 637 (ALT 250 FOR IP): Performed by: STUDENT IN AN ORGANIZED HEALTH CARE EDUCATION/TRAINING PROGRAM

## 2020-05-16 PROCEDURE — 6360000002 HC RX W HCPCS: Performed by: STUDENT IN AN ORGANIZED HEALTH CARE EDUCATION/TRAINING PROGRAM

## 2020-05-16 PROCEDURE — 96366 THER/PROPH/DIAG IV INF ADDON: CPT

## 2020-05-16 PROCEDURE — 80048 BASIC METABOLIC PNL TOTAL CA: CPT

## 2020-05-16 PROCEDURE — C9248 INJ, CLEVIDIPINE BUTYRATE: HCPCS | Performed by: STUDENT IN AN ORGANIZED HEALTH CARE EDUCATION/TRAINING PROGRAM

## 2020-05-16 PROCEDURE — 93010 ELECTROCARDIOGRAM REPORT: CPT | Performed by: INTERNAL MEDICINE

## 2020-05-16 PROCEDURE — 93306 TTE W/DOPPLER COMPLETE: CPT

## 2020-05-16 PROCEDURE — G0378 HOSPITAL OBSERVATION PER HR: HCPCS

## 2020-05-16 PROCEDURE — 96376 TX/PRO/DX INJ SAME DRUG ADON: CPT

## 2020-05-16 PROCEDURE — 99233 SBSQ HOSP IP/OBS HIGH 50: CPT | Performed by: INTERNAL MEDICINE

## 2020-05-16 PROCEDURE — 96367 TX/PROPH/DG ADDL SEQ IV INF: CPT

## 2020-05-16 PROCEDURE — 96372 THER/PROPH/DIAG INJ SC/IM: CPT

## 2020-05-16 PROCEDURE — 2060000000 HC ICU INTERMEDIATE R&B

## 2020-05-16 PROCEDURE — 2580000003 HC RX 258: Performed by: STUDENT IN AN ORGANIZED HEALTH CARE EDUCATION/TRAINING PROGRAM

## 2020-05-16 PROCEDURE — 36415 COLL VENOUS BLD VENIPUNCTURE: CPT

## 2020-05-16 PROCEDURE — 82947 ASSAY GLUCOSE BLOOD QUANT: CPT

## 2020-05-16 RX ORDER — HYDRALAZINE HYDROCHLORIDE 50 MG/1
50 TABLET, FILM COATED ORAL ONCE
Status: COMPLETED | OUTPATIENT
Start: 2020-05-16 | End: 2020-05-16

## 2020-05-16 RX ORDER — TRAMADOL HYDROCHLORIDE 50 MG/1
50 TABLET ORAL EVERY 6 HOURS PRN
Status: DISCONTINUED | OUTPATIENT
Start: 2020-05-16 | End: 2020-05-18 | Stop reason: HOSPADM

## 2020-05-16 RX ORDER — NICOTINE POLACRILEX 4 MG
15 LOZENGE BUCCAL PRN
Status: DISCONTINUED | OUTPATIENT
Start: 2020-05-16 | End: 2020-05-18 | Stop reason: HOSPADM

## 2020-05-16 RX ORDER — INSULIN GLARGINE 100 [IU]/ML
45 INJECTION, SOLUTION SUBCUTANEOUS NIGHTLY
Status: DISCONTINUED | OUTPATIENT
Start: 2020-05-16 | End: 2020-05-16

## 2020-05-16 RX ORDER — INSULIN GLARGINE 100 [IU]/ML
35 INJECTION, SOLUTION SUBCUTANEOUS NIGHTLY
Status: DISCONTINUED | OUTPATIENT
Start: 2020-05-16 | End: 2020-05-17

## 2020-05-16 RX ORDER — HYDRALAZINE HYDROCHLORIDE 20 MG/ML
10 INJECTION INTRAMUSCULAR; INTRAVENOUS EVERY 6 HOURS PRN
Status: DISCONTINUED | OUTPATIENT
Start: 2020-05-16 | End: 2020-05-18 | Stop reason: HOSPADM

## 2020-05-16 RX ORDER — HYDRALAZINE HYDROCHLORIDE 50 MG/1
100 TABLET, FILM COATED ORAL EVERY 8 HOURS SCHEDULED
Status: DISCONTINUED | OUTPATIENT
Start: 2020-05-16 | End: 2020-05-18 | Stop reason: HOSPADM

## 2020-05-16 RX ORDER — FUROSEMIDE 10 MG/ML
40 INJECTION INTRAMUSCULAR; INTRAVENOUS 2 TIMES DAILY
Status: DISCONTINUED | OUTPATIENT
Start: 2020-05-16 | End: 2020-05-17

## 2020-05-16 RX ORDER — DEXTROSE MONOHYDRATE 50 MG/ML
100 INJECTION, SOLUTION INTRAVENOUS PRN
Status: DISCONTINUED | OUTPATIENT
Start: 2020-05-16 | End: 2020-05-18 | Stop reason: HOSPADM

## 2020-05-16 RX ORDER — FUROSEMIDE 10 MG/ML
40 INJECTION INTRAMUSCULAR; INTRAVENOUS DAILY
Status: DISCONTINUED | OUTPATIENT
Start: 2020-05-16 | End: 2020-05-16

## 2020-05-16 RX ORDER — PREDNISONE 20 MG/1
20 TABLET ORAL DAILY
Status: DISCONTINUED | OUTPATIENT
Start: 2020-05-16 | End: 2020-05-16

## 2020-05-16 RX ORDER — DEXTROSE MONOHYDRATE 25 G/50ML
12.5 INJECTION, SOLUTION INTRAVENOUS PRN
Status: DISCONTINUED | OUTPATIENT
Start: 2020-05-16 | End: 2020-05-18 | Stop reason: HOSPADM

## 2020-05-16 RX ORDER — PREDNISONE 10 MG/1
10 TABLET ORAL DAILY
Status: DISCONTINUED | OUTPATIENT
Start: 2020-05-17 | End: 2020-05-17

## 2020-05-16 RX ADMIN — HEPARIN SODIUM 5000 UNITS: 5000 INJECTION INTRAVENOUS; SUBCUTANEOUS at 13:40

## 2020-05-16 RX ADMIN — CARVEDILOL 25 MG: 25 TABLET, FILM COATED ORAL at 07:48

## 2020-05-16 RX ADMIN — GABAPENTIN 200 MG: 100 CAPSULE ORAL at 07:50

## 2020-05-16 RX ADMIN — GABAPENTIN 200 MG: 100 CAPSULE ORAL at 21:42

## 2020-05-16 RX ADMIN — HEPARIN SODIUM 5000 UNITS: 5000 INJECTION INTRAVENOUS; SUBCUTANEOUS at 05:51

## 2020-05-16 RX ADMIN — INSULIN LISPRO 12 UNITS: 100 INJECTION, SOLUTION INTRAVENOUS; SUBCUTANEOUS at 08:14

## 2020-05-16 RX ADMIN — FUROSEMIDE 40 MG: 10 INJECTION, SOLUTION INTRAMUSCULAR; INTRAVENOUS at 18:05

## 2020-05-16 RX ADMIN — SODIUM CHLORIDE, PRESERVATIVE FREE 10 ML: 5 INJECTION INTRAVENOUS at 20:35

## 2020-05-16 RX ADMIN — FUROSEMIDE 40 MG: 10 INJECTION, SOLUTION INTRAMUSCULAR; INTRAVENOUS at 09:21

## 2020-05-16 RX ADMIN — INSULIN GLARGINE 35 UNITS: 100 INJECTION, SOLUTION SUBCUTANEOUS at 21:43

## 2020-05-16 RX ADMIN — GABAPENTIN 200 MG: 100 CAPSULE ORAL at 13:40

## 2020-05-16 RX ADMIN — INSULIN LISPRO 5 UNITS: 100 INJECTION, SOLUTION INTRAVENOUS; SUBCUTANEOUS at 11:57

## 2020-05-16 RX ADMIN — PRAZOSIN HYDROCHLORIDE 5 MG: 5 CAPSULE ORAL at 07:51

## 2020-05-16 RX ADMIN — CARVEDILOL 25 MG: 25 TABLET, FILM COATED ORAL at 16:58

## 2020-05-16 RX ADMIN — INSULIN LISPRO 5 UNITS: 100 INJECTION, SOLUTION INTRAVENOUS; SUBCUTANEOUS at 17:10

## 2020-05-16 RX ADMIN — ISOSORBIDE MONONITRATE 30 MG: 30 TABLET ORAL at 07:48

## 2020-05-16 RX ADMIN — HYDRALAZINE HYDROCHLORIDE 100 MG: 50 TABLET, FILM COATED ORAL at 21:43

## 2020-05-16 RX ADMIN — CLEVIPIDINE 5 MG/HR: 0.5 EMULSION INTRAVENOUS at 11:58

## 2020-05-16 RX ADMIN — NIFEDIPINE 90 MG: 90 TABLET, FILM COATED, EXTENDED RELEASE ORAL at 09:22

## 2020-05-16 RX ADMIN — INSULIN LISPRO 2 UNITS: 100 INJECTION, SOLUTION INTRAVENOUS; SUBCUTANEOUS at 21:43

## 2020-05-16 RX ADMIN — Medication 81 MG: at 07:48

## 2020-05-16 RX ADMIN — PRAZOSIN HYDROCHLORIDE 5 MG: 5 CAPSULE ORAL at 13:41

## 2020-05-16 RX ADMIN — INSULIN LISPRO 4 UNITS: 100 INJECTION, SOLUTION INTRAVENOUS; SUBCUTANEOUS at 16:58

## 2020-05-16 RX ADMIN — HYDRALAZINE HYDROCHLORIDE 50 MG: 50 TABLET, FILM COATED ORAL at 05:51

## 2020-05-16 RX ADMIN — HEPARIN SODIUM 5000 UNITS: 5000 INJECTION INTRAVENOUS; SUBCUTANEOUS at 21:43

## 2020-05-16 RX ADMIN — PRAZOSIN HYDROCHLORIDE 5 MG: 5 CAPSULE ORAL at 21:43

## 2020-05-16 RX ADMIN — CLEVIPIDINE 2 MG/HR: 0.5 EMULSION INTRAVENOUS at 02:49

## 2020-05-16 RX ADMIN — HYDRALAZINE HYDROCHLORIDE 10 MG: 20 INJECTION INTRAMUSCULAR; INTRAVENOUS at 01:05

## 2020-05-16 RX ADMIN — ATORVASTATIN CALCIUM 80 MG: 80 TABLET, FILM COATED ORAL at 21:42

## 2020-05-16 RX ADMIN — HYDRALAZINE HYDROCHLORIDE 50 MG: 50 TABLET, FILM COATED ORAL at 13:40

## 2020-05-16 RX ADMIN — HYDRALAZINE HYDROCHLORIDE 50 MG: 50 TABLET, FILM COATED ORAL at 16:58

## 2020-05-16 RX ADMIN — CLOPIDOGREL 75 MG: 75 TABLET, FILM COATED ORAL at 07:48

## 2020-05-16 RX ADMIN — INSULIN LISPRO 5 UNITS: 100 INJECTION, SOLUTION INTRAVENOUS; SUBCUTANEOUS at 11:50

## 2020-05-16 RX ADMIN — PREDNISONE 20 MG: 20 TABLET ORAL at 09:21

## 2020-05-16 ASSESSMENT — PAIN SCALES - GENERAL
PAINLEVEL_OUTOF10: 0
PAINLEVEL_OUTOF10: 0

## 2020-05-16 NOTE — PLAN OF CARE
Problem: Falls - Risk of:  Goal: Will remain free from falls  Description: Will remain free from falls  Outcome: Ongoing   Pt calls out for assistance, steady on feet  Problem: Cardiac:  Goal: Ability to maintain vital signs within normal range will improve  Description: Ability to maintain vital signs within normal range will improve  Outcome: Ongoing  High blood pressure this shift, nurse notified provider who started cardiac drip.      Electronically signed by Oumar Galarza RN on 5/16/2020 at 4:28 AM

## 2020-05-16 NOTE — PROGRESS NOTES
Medications:    predniSONE  20 mg Oral Daily    insulin lispro  5 Units Subcutaneous TID AC    insulin glargine  45 Units Subcutaneous Nightly    furosemide  40 mg Intravenous Daily    sodium chloride flush  10 mL Intravenous 2 times per day    heparin (porcine)  5,000 Units Subcutaneous 3 times per day    aspirin  81 mg Oral Daily    atorvastatin  80 mg Oral Daily    carvedilol  25 mg Oral BID WC    clopidogrel  75 mg Oral Daily    isosorbide mononitrate  30 mg Oral Daily    QUEtiapine  100 mg Oral Nightly    prazosin  5 mg Oral TID    insulin lispro  0-18 Units Subcutaneous TID WC    insulin lispro  0-9 Units Subcutaneous Nightly    hydrALAZINE  50 mg Oral 3 times per day    NIFEdipine  90 mg Oral Daily    gabapentin  200 mg Oral TID     Continuous Infusions:    clevidipine Stopped (05/16/20 0740)    dextrose       PRN MedicationshydrALAZINE, 10 mg, Q6H PRN  albuterol sulfate HFA, 4 puff, Q6H PRN  sodium chloride flush, 10 mL, PRN  acetaminophen, 650 mg, Q4H PRN  nitroGLYCERIN, 0.4 mg, Q5 Min PRN  albuterol, 2.5 mg, As Directed RT PRN  glucose, 15 g, PRN  dextrose, 12.5 g, PRN  glucagon (rDNA), 1 mg, PRN  dextrose, 100 mL/hr, PRN  albuterol-ipratropium, 1 puff, Q4H PRN        Diagnostic Labs:  CBC:   Recent Labs     05/15/20  0934   WBC 7.6   RBC 4.00*   HGB 12.1*   HCT 37.3*   MCV 93.3   RDW 12.8   *     BMP:   Recent Labs     05/15/20  0934      K 4.2      CO2 19*   BUN 30*   CREATININE 2.20*     BNP: No results for input(s): BNP in the last 72 hours. PT/INR: No results for input(s): PROTIME, INR in the last 72 hours. APTT: No results for input(s): APTT in the last 72 hours. CARDIAC ENZYMES: No results for input(s): CKMB, CKMBINDEX, TROPONINI in the last 72 hours.     Invalid input(s): CKTOTAL;3  FASTING LIPID PANEL:  Lab Results   Component Value Date    CHOL 132 08/13/2019    HDL 50 08/13/2019    TRIG 85 08/13/2019     LIVER PROFILE: No results for input(s): AST, ALT,

## 2020-05-17 PROBLEM — J44.1 COPD EXACERBATION (HCC): Status: RESOLVED | Noted: 2020-01-06 | Resolved: 2020-05-17

## 2020-05-17 PROBLEM — I16.9 HYPERTENSIVE CRISIS: Status: ACTIVE | Noted: 2019-08-12

## 2020-05-17 PROBLEM — N18.9 ACUTE KIDNEY INJURY SUPERIMPOSED ON CHRONIC KIDNEY DISEASE (HCC): Status: RESOLVED | Noted: 2019-08-12 | Resolved: 2020-05-17

## 2020-05-17 PROBLEM — N17.9 ACUTE KIDNEY INJURY SUPERIMPOSED ON CHRONIC KIDNEY DISEASE (HCC): Status: RESOLVED | Noted: 2019-08-12 | Resolved: 2020-05-17

## 2020-05-17 PROBLEM — D64.9 ANEMIA: Status: ACTIVE | Noted: 2020-05-17

## 2020-05-17 PROBLEM — E87.1 HYPONATREMIA: Status: ACTIVE | Noted: 2020-05-17

## 2020-05-17 LAB
-: ABNORMAL
AMORPHOUS: ABNORMAL
BACTERIA: ABNORMAL
BILIRUBIN URINE: NEGATIVE
CASTS UA: ABNORMAL /LPF (ref 0–8)
COLOR: YELLOW
CREATININE URINE: 27.5 MG/DL (ref 39–259)
CRYSTALS, UA: ABNORMAL /HPF
EOSINOPHIL,URINE: NORMAL
EPITHELIAL CELLS UA: ABNORMAL /HPF (ref 0–5)
GLUCOSE BLD-MCNC: 110 MG/DL (ref 75–110)
GLUCOSE BLD-MCNC: 152 MG/DL (ref 75–110)
GLUCOSE BLD-MCNC: 169 MG/DL (ref 70–99)
GLUCOSE BLD-MCNC: 235 MG/DL (ref 75–110)
GLUCOSE BLD-MCNC: 306 MG/DL (ref 75–110)
GLUCOSE URINE: ABNORMAL
KETONES, URINE: NEGATIVE
LEUKOCYTE ESTERASE, URINE: NEGATIVE
MUCUS: ABNORMAL
NITRITE, URINE: NEGATIVE
OTHER OBSERVATIONS UA: ABNORMAL
PH UA: 5.5 (ref 5–8)
PROTEIN UA: ABNORMAL
RBC UA: ABNORMAL /HPF (ref 0–4)
RENAL EPITHELIAL, UA: ABNORMAL /HPF
SPECIFIC GRAVITY UA: 1.01 (ref 1–1.03)
TOTAL PROTEIN, URINE: 165 MG/DL
TRICHOMONAS: ABNORMAL
TURBIDITY: CLEAR
URINE HGB: NEGATIVE
URINE TOTAL PROTEIN CREATININE RATIO: 6 (ref 0–0.2)
UROBILINOGEN, URINE: NORMAL
WBC UA: ABNORMAL /HPF (ref 0–5)
YEAST: ABNORMAL

## 2020-05-17 PROCEDURE — 6370000000 HC RX 637 (ALT 250 FOR IP): Performed by: STUDENT IN AN ORGANIZED HEALTH CARE EDUCATION/TRAINING PROGRAM

## 2020-05-17 PROCEDURE — G0378 HOSPITAL OBSERVATION PER HR: HCPCS

## 2020-05-17 PROCEDURE — 6370000000 HC RX 637 (ALT 250 FOR IP): Performed by: NURSE PRACTITIONER

## 2020-05-17 PROCEDURE — 6360000002 HC RX W HCPCS: Performed by: STUDENT IN AN ORGANIZED HEALTH CARE EDUCATION/TRAINING PROGRAM

## 2020-05-17 PROCEDURE — 6370000000 HC RX 637 (ALT 250 FOR IP): Performed by: INTERNAL MEDICINE

## 2020-05-17 PROCEDURE — 82947 ASSAY GLUCOSE BLOOD QUANT: CPT

## 2020-05-17 PROCEDURE — 96372 THER/PROPH/DIAG INJ SC/IM: CPT

## 2020-05-17 PROCEDURE — 99233 SBSQ HOSP IP/OBS HIGH 50: CPT | Performed by: INTERNAL MEDICINE

## 2020-05-17 PROCEDURE — 80307 DRUG TEST PRSMV CHEM ANLYZR: CPT

## 2020-05-17 PROCEDURE — 96375 TX/PRO/DX INJ NEW DRUG ADDON: CPT

## 2020-05-17 PROCEDURE — 81001 URINALYSIS AUTO W/SCOPE: CPT

## 2020-05-17 PROCEDURE — 2500000003 HC RX 250 WO HCPCS: Performed by: NURSE PRACTITIONER

## 2020-05-17 PROCEDURE — 36415 COLL VENOUS BLD VENIPUNCTURE: CPT

## 2020-05-17 PROCEDURE — 84156 ASSAY OF PROTEIN URINE: CPT

## 2020-05-17 PROCEDURE — 87205 SMEAR GRAM STAIN: CPT

## 2020-05-17 PROCEDURE — 82570 ASSAY OF URINE CREATININE: CPT

## 2020-05-17 PROCEDURE — 96376 TX/PRO/DX INJ SAME DRUG ADON: CPT

## 2020-05-17 PROCEDURE — 2580000003 HC RX 258: Performed by: STUDENT IN AN ORGANIZED HEALTH CARE EDUCATION/TRAINING PROGRAM

## 2020-05-17 RX ORDER — INSULIN GLARGINE 100 [IU]/ML
45 INJECTION, SOLUTION SUBCUTANEOUS NIGHTLY
Status: DISCONTINUED | OUTPATIENT
Start: 2020-05-17 | End: 2020-05-18 | Stop reason: HOSPADM

## 2020-05-17 RX ORDER — LOSARTAN POTASSIUM 50 MG/1
50 TABLET ORAL NIGHTLY
Status: DISCONTINUED | OUTPATIENT
Start: 2020-05-17 | End: 2020-05-17

## 2020-05-17 RX ORDER — LOSARTAN POTASSIUM 50 MG/1
50 TABLET ORAL 2 TIMES DAILY
Status: DISCONTINUED | OUTPATIENT
Start: 2020-05-17 | End: 2020-05-18 | Stop reason: HOSPADM

## 2020-05-17 RX ORDER — BUMETANIDE 0.25 MG/ML
4 INJECTION, SOLUTION INTRAMUSCULAR; INTRAVENOUS 2 TIMES DAILY
Status: DISCONTINUED | OUTPATIENT
Start: 2020-05-17 | End: 2020-05-17

## 2020-05-17 RX ORDER — BUMETANIDE 0.25 MG/ML
4 INJECTION, SOLUTION INTRAMUSCULAR; INTRAVENOUS DAILY
Status: DISCONTINUED | OUTPATIENT
Start: 2020-05-17 | End: 2020-05-18

## 2020-05-17 RX ADMIN — QUETIAPINE FUMARATE 100 MG: 100 TABLET ORAL at 00:21

## 2020-05-17 RX ADMIN — GABAPENTIN 200 MG: 100 CAPSULE ORAL at 15:38

## 2020-05-17 RX ADMIN — FUROSEMIDE 40 MG: 10 INJECTION, SOLUTION INTRAMUSCULAR; INTRAVENOUS at 08:01

## 2020-05-17 RX ADMIN — BUMETANIDE 4 MG: 0.25 INJECTION, SOLUTION INTRAMUSCULAR; INTRAVENOUS at 18:09

## 2020-05-17 RX ADMIN — INSULIN LISPRO 2 UNITS: 100 INJECTION, SOLUTION INTRAVENOUS; SUBCUTANEOUS at 17:26

## 2020-05-17 RX ADMIN — GABAPENTIN 200 MG: 100 CAPSULE ORAL at 08:01

## 2020-05-17 RX ADMIN — PRAZOSIN HYDROCHLORIDE 5 MG: 5 CAPSULE ORAL at 08:01

## 2020-05-17 RX ADMIN — PRAZOSIN HYDROCHLORIDE 5 MG: 5 CAPSULE ORAL at 15:38

## 2020-05-17 RX ADMIN — HEPARIN SODIUM 5000 UNITS: 5000 INJECTION INTRAVENOUS; SUBCUTANEOUS at 15:36

## 2020-05-17 RX ADMIN — HEPARIN SODIUM 5000 UNITS: 5000 INJECTION INTRAVENOUS; SUBCUTANEOUS at 21:54

## 2020-05-17 RX ADMIN — CLOPIDOGREL 75 MG: 75 TABLET, FILM COATED ORAL at 08:01

## 2020-05-17 RX ADMIN — HYDRALAZINE HYDROCHLORIDE 100 MG: 50 TABLET, FILM COATED ORAL at 15:38

## 2020-05-17 RX ADMIN — INSULIN LISPRO 4 UNITS: 100 INJECTION, SOLUTION INTRAVENOUS; SUBCUTANEOUS at 07:56

## 2020-05-17 RX ADMIN — ISOSORBIDE MONONITRATE 30 MG: 30 TABLET ORAL at 08:01

## 2020-05-17 RX ADMIN — INSULIN GLARGINE 35 UNITS: 100 INJECTION, SOLUTION SUBCUTANEOUS at 21:54

## 2020-05-17 RX ADMIN — INSULIN LISPRO 5 UNITS: 100 INJECTION, SOLUTION INTRAVENOUS; SUBCUTANEOUS at 07:57

## 2020-05-17 RX ADMIN — PRAZOSIN HYDROCHLORIDE 5 MG: 5 CAPSULE ORAL at 21:55

## 2020-05-17 RX ADMIN — Medication 81 MG: at 08:01

## 2020-05-17 RX ADMIN — INSULIN LISPRO 5 UNITS: 100 INJECTION, SOLUTION INTRAVENOUS; SUBCUTANEOUS at 17:26

## 2020-05-17 RX ADMIN — HYDRALAZINE HYDROCHLORIDE 100 MG: 50 TABLET, FILM COATED ORAL at 06:17

## 2020-05-17 RX ADMIN — ATORVASTATIN CALCIUM 80 MG: 80 TABLET, FILM COATED ORAL at 21:54

## 2020-05-17 RX ADMIN — LOSARTAN POTASSIUM 50 MG: 50 TABLET, FILM COATED ORAL at 21:55

## 2020-05-17 RX ADMIN — CARVEDILOL 25 MG: 25 TABLET, FILM COATED ORAL at 17:26

## 2020-05-17 RX ADMIN — HEPARIN SODIUM 5000 UNITS: 5000 INJECTION INTRAVENOUS; SUBCUTANEOUS at 06:17

## 2020-05-17 RX ADMIN — CARVEDILOL 25 MG: 25 TABLET, FILM COATED ORAL at 08:01

## 2020-05-17 RX ADMIN — HYDRALAZINE HYDROCHLORIDE 100 MG: 50 TABLET, FILM COATED ORAL at 21:55

## 2020-05-17 RX ADMIN — PREDNISONE 10 MG: 10 TABLET ORAL at 08:00

## 2020-05-17 RX ADMIN — SODIUM CHLORIDE, PRESERVATIVE FREE 10 ML: 5 INJECTION INTRAVENOUS at 08:03

## 2020-05-17 ASSESSMENT — PAIN SCALES - GENERAL
PAINLEVEL_OUTOF10: 0

## 2020-05-17 NOTE — PROGRESS NOTES
chest x-ray was negative for acute cardiopulmonary disease, proBNP was elevated around 2789, creatinine 2.2 baseline around 1.8-2.2, glucose 138, WBC 7.6, hemoglobin 12.1, potassium 4.2. Systolic blood pressure 899/71.        Previous cardiac testing  ECHO 19: EF 60%, moderate LVH, mild MR/TR/PI.      STRESS 18: No ischemia/infarct. EF 53%.      CATH 17:   Left main: mild disease  LAD: 30-40% prox stenosis  LCX: mild disease  RCA: 40% mid stenosis       OBJECTIVE     Vital Signs:  BP (!) 148/53   Pulse 69   Temp 98.7 °F (37.1 °C) (Temporal)   Resp 20   Ht 5' 6\" (1.676 m)   Wt 140 lb (63.5 kg)   SpO2 95%   BMI 22.60 kg/m²     Temp (24hrs), Av.6 °F (37 °C), Min:98.1 °F (36.7 °C), Max:99.1 °F (37.3 °C)    In: 2219   Out: 600 [Urine:600]    Physical Exam:  Constitutional: This is a well developed, well nourished, 18.5-24.9 - Normal 60y.o. year old male who is alert, oriented, cooperative and in no apparent distress. Head:normocephalic and atraumatic. EENT:  PERRLA. No conjunctival injections. Septum was midline, mucosa was without erythema, exudates or cobblestoning. No thrush. Neck: Supple without thyromegaly. No elevated JVP. Trachea was midline. Respiratory: Chest was symmetrical without dullness to percussion. Breath sounds bilaterally were clear to auscultation. There were no wheezes, rhonchi or rales. No tenderness  Cardiovascular: Regular without murmur, clicks, gallops or rubs. Abdomen: Slightly rounded and soft without organomegaly. No rebound, rigidity or guarding was appreciated. No tenderness  Lymphatic: No lymphadenopathy. Musculoskeletal: Normal curvature of the spine. No gross muscle weakness. Extremities:  +3 B/L pitting edema.   Improving  Medications:  Scheduled Medications:    furosemide  40 mg Intravenous BID    predniSONE  10 mg Oral Daily    insulin glargine  35 Units Subcutaneous Nightly    insulin lispro  5 Units Subcutaneous TID AC    insulin MICROBIOLOGY:   Lab Results   Component Value Date/Time    CULTURE NO SIGNIFICANT GROWTH 08/29/2019 01:20 PM       Imaging:    Xr Chest Portable    Result Date: 5/15/2020  No evidence of acute cardiopulmonary process. ASSESSMENT & PLAN   Principal Problem:    Leg swelling  Active Problems:    Carotid stenosis, left s/p Endarterectomy    CKD (chronic kidney disease) stage 3, GFR 30-59 ml/min ([facility])    Dyspnea and respiratory abnormalities    Essential hypertension    COPD (chronic obstructive pulmonary disease) ([facility])    Non-obstructive Coronary artery disease of native artery of native heart with stable angina pectoris ([facility] [address]  Resolved Problems:    * No resolved hospital problems. *        ASSESSMENT / PLAN:       1. Bilateral pitting edema multifactorial CHF/COPD/CKD/amlodipine use. Will hold amlodipine for now. Echo pending.     2. COPD not in exacerbation. given one dose of IV solumedrol, continue prednisone 20 mg for 2 days,   At home on bronchodilators. Continue RT aerosol protocol. Maintain oxygen saturation 88-92. Will wean off prednisone.     3. CKD stage IIIa. Creatinine 2.2. Creatinine at baseline around 1.8-2.2. Monitor I's and O's, daily weights, measure urine output. Lasix 40 mg IV twice daily. 4. Nonobstructive coronary artery disease. Stable follow-up on echo.     5. Left carotid stenosis status post endarterectomy and stent placement. Continue aspirin, Plavix, Lipitor 80 mg. Stable.     6. Insulin-dependent type 2 diabetes mellitus. Uncontrolled. At home on Lantus 35 units along with 5 units pre-meal.  dose increased to 45 units, On high-dose sliding scale along with hypoglycemia protocol. Will adjust insulin according to glucose.     7. Troponinemia. Likely type II STEMI secondary to CKD. STRESS 9/4/18: No ischemia/infarct. EF 53%. Follow-up on echocardiogram, trending down. Cardiology on board. Appreciate their recs.     8.  Essential hypertension. uncontrolled.   started Chula Mendiola APRN - CNP   atorvastatin (LIPITOR) 80 MG tablet Take 1 tablet by mouth daily 10/21/19  Yes Chula Mendiola APRN - CNP   clopidogrel (PLAVIX) 75 MG tablet Take 1 tablet by mouth daily 10/21/19  Yes Chula Mendiola, APRN - CNP   insulin glargine (BASAGLAR KWIKPEN) 100 UNIT/ML injection pen Inject 35 Units into the skin nightly Dispense with pen needle 10/21/19  Yes Chula Mendiola, APRN - CNP   insulin aspart (NOVOLOG FLEXPEN) 100 UNIT/ML injection pen Inject 5 Units into the skin 3 times daily (before meals) Sliding scale 10/21/19  Yes Chula Mendiola APRN - CNP   nitroGLYCERIN (NITROSTAT) 0.4 MG SL tablet up to max of 3 total doses. If no relief after 1 dose, call 911. 10/21/19  Yes Chula Mendiola APRN - JEN   fluticasone-umeclidin-vilant (TRELEGY ELLIPTA) 100-62.5-25 MCG/INH AEPB INHALE 1 PUFF INTO THE LINGS DAILY 10/21/19  Yes Chula Mendiola APRN - CNP   hydrALAZINE (APRESOLINE) 25 MG tablet Take 1 tablet by mouth 2 times daily  Patient taking differently: Take 50 mg by mouth 3 times daily  10/21/19  Yes Chula Mendiola APRMEHNAZ - CNP   QUEtiapine (SEROQUEL) 100 MG tablet TAKE 1 TABLET BY MOUTH NIGHTLY 8/28/19  Yes Zara Pinto MD   EASY COMFORT LANCETS MISC DX: DM-2 USE 3-4 TIMES DAILY 3/25/19  Yes Zara Pinto MD   predniSONE (DELTASONE) 10 MG tablet Tapered dose 40mg x 3 days. .. 30mg x 3 days. .. 20mg x 3 days . Jamilah Remigio .10mg x3days . ..then off 4/17/20   Chula Mendiola APRN - CNP   fluticasone (FLONASE) 50 MCG/ACT nasal spray 2 sprays to each nostril once daily. 4/17/20   Chula Mendiola APRN - CNP   naproxen (NAPROSYN) 250 MG tablet Take 1 tablet by mouth 2 times daily (with meals) for 10 days  Patient not taking: Reported on 4/17/2020 1/15/20 1/25/20  Adalid Zapata MD   Dulaglutide (TRULICITY) 7.05 FN/9.9AH SOPN Inject 0.75 mg into the skin once a week  Patient not taking: Reported on 4/17/2020 10/21/19   TERRANCE Manning - CNP            Medications:      Allergies:

## 2020-05-17 NOTE — CONSULTS
Renal Consult Note    Patient :  Cruz Bazan; 60 y.o. MRN# [id number]  Location:  [facility]/0522-93  Attending:  Dotty Yen MD  Admit Date:  5/15/2020   Hospital Day: 2    Reason for Consult:     Asked by Dr Dotty Yen MD to see for BINU/Elevated Creatinine. Proteinuria    History Obtained From:     patient, electronic medical record    History of Present Illness:     Cruz Bazan; 60 y.o. male with past medical history of HTN, DM, carotid artery disease, COPD, non-obstructive CAD, and CKD III with baseline 1.8-2.2, managed by Dr Gretel Moreno. He presented to the ER with c/o worsening edema, and dyspnea over last 2 weeks. He blames it on his medication Norvasc, or the fact that he has been sitting around since stay at home orders. He has not been on a diuretic at home. In ED chest x-ray was negative for acute cardiopulmonary disease, proBNP was elevated around 2789, creatinine 2.2 baseline reported as 1.8-2.2, glucose 138, WBC 7.6, hemoglobin 12.1, potassium 4.2. Systolic blood pressure 758/09. Previous ECHO 2019 with preserved LV function, moderate LVH. His Norvasc has been put on hold due to the edema.   He has been receiving Lasix 40mg IV BID, but he does not feel he is improving and reports no increase in urine output. Per records he is negative 1 liter since admission. Urine output thus far today has been 1240. Nephrology is consulted for CKD and assistance with diuretics. His creatinine has been in baseline range, except today a slight increased to 2.34, from 2.2     He was last seen by his primary nephrologist several months ago. He states he previously was on Procardia but this was changed to norvasc at last hospitalization. This was increased in February. -160 here since admission.     Last kidney imaging in 8/2019 showed   Echogenic kidneys    No history of recent contrast exposure  No h/o prolonged NSAIDs use in the past,   No h/o nephrolithiasis, No recent skin rashes or Alcohol/week: 0.0 standard drinks    Drug use: No    Sexual activity: Yes     Partners: Female   Lifestyle    Physical activity     Days per week: Not on file     Minutes per session: Not on file    Stress: Not on file   Relationships    Social connections     Talks on phone: Not on file     Gets together: Not on file     Attends L[employer] service: Not on file     Active member of club or organization: Not on file     Attends meetings of clubs or organizations: Not on file     Relationship status: Not on file    Intimate partner violence     Fear of current or ex partner: Not on file     Emotionally abused: Not on file     Physically abused: Not on file     Forced sexual activity: Not on file   Other Topics Concern    Not on file   Social History Narrative    Not on file       Family History:        Family History   Problem Relation Age of Onset    Cancer Mother     Heart Disease Father        Outpatient Medications:     Medications Prior to Admission: COMFORT EZ PEN NEEDLES 31G X 8 MM MISC, USE AS DIRECTED  albuterol-ipratropium (COMBIVENT RESPIMAT)  MCG/ACT AERS inhaler, Inhale 1 puff into the lungs every 6 hours  prazosin (MINIPRESS) 5 MG capsule, Take 1 capsule by mouth 3 times daily  aspirin (ASPIRIN ADULT LOW STRENGTH) 81 MG EC tablet, TAKE 1 TABLET BY MOUTH DAILY  isosorbide mononitrate (IMDUR) 30 MG extended release tablet, Take 1 tablet by mouth daily  NIFEdipine (PROCARDIA XL) 60 MG extended release tablet, Take 60 mg by mouth daily  TRUE METRIX BLOOD GLUCOSE TEST strip, TEST BLOOD SUGAR 4 TO 5 TIMES DAILY  losartan (COZAAR) 50 MG tablet, Take 1 tablet by mouth nightly  amLODIPine (NORVASC) 5 MG tablet, Take 1 tablet by mouth 2 times daily  ergocalciferol (ERGOCALCIFEROL) 33566 units capsule, Take 1 capsule by mouth once a week  carvedilol (COREG) 25 MG tablet, Take 1 tablet by mouth 2 times daily (with meals)  atorvastatin (LIPITOR) 80 MG tablet, Take 1 tablet by mouth daily  clopidogrel FND.  SKIN:  No rashes, good skin turgor. Extremities:  2+edema, No clubbing, No cyanosis  Edema up to thigh    Labs:       Recent Labs     05/15/20  0934   WBC 7.6   RBC 4.00*   HGB 12.1*   HCT 37.3*   MCV 93.3   MCH 30.3   MCHC 32.4   RDW 12.8   *   MPV 9.2      BMP:   Recent Labs     05/15/20  0934 05/16/20  0924 05/17/20  1253    131*  --    K 4.2 4.5  --     98  --    CO2 19* 17*  --    BUN 30* 42*  --    CREATININE 2.20* 2.34*  --    GLUCOSE 138* 425* 169*   CALCIUM 8.6 8.0*  --       Magnesium:    Recent Labs     05/15/20  0934   MG 1.6     BNP:      Lab Results   Component Value Date    BNP 84 11/27/2013     SPEP:  Lab Results   Component Value Date    PROT 4.9 01/15/2020    PROT 6.2 11/12/2011    ALBCAL 3.3 09/27/2017    ALBPCT 63 09/27/2017    LABALPH 0.1 09/27/2017    LABALPH 0.8 09/27/2017    A1PCT 3 09/27/2017    A2PCT 15 09/27/2017    LABBETA 0.6 09/27/2017    BETAPCT 12 09/27/2017    GAMGLOB 0.4 09/27/2017    GGPCT 8 09/27/2017    PATH ELECTRONICALLY SIGNED. Kathya Hinojosa M.D. 09/27/2017    PATH ELECTRONICALLY SIGNED. Kathya Hinojosa M.D. 09/27/2017     UPEP:     Lab Results   Component Value Date    LABPE  11/12/2011     ELEVATED PROTEIN CONCENTRATION. MOST SERUM PROTEINS ARE DETECTED IN THIS URINE.      Hep BsAg:         Lab Results   Component Value Date    HEPBSAG NONREACTIVE 08/15/2019     Hep C AB:          Lab Results   Component Value Date    HEPCAB NONREACTIVE 08/16/2019       Urinalysis/Chemistries:      Lab Results   Component Value Date    NITRU NEGATIVE 01/15/2020    COLORU YELLOW 01/15/2020    PHUR 5.0 01/15/2020    WBCUA None 01/15/2020    RBCUA 0 TO 2 01/15/2020    MUCUS NOT REPORTED 01/15/2020    TRICHOMONAS NOT REPORTED 01/15/2020    YEAST NOT REPORTED 01/15/2020    BACTERIA NOT REPORTED 01/15/2020    CLARITYU Clear 09/12/2014    SPECGRAV 1.016 01/15/2020    LEUKOCYTESUR NEGATIVE 01/15/2020    UROBILINOGEN Normal 01/15/2020    BILIRUBINUR NEGATIVE

## 2020-05-18 VITALS
WEIGHT: 159.4 LBS | SYSTOLIC BLOOD PRESSURE: 142 MMHG | OXYGEN SATURATION: 99 % | HEART RATE: 73 BPM | TEMPERATURE: 98.8 F | RESPIRATION RATE: 18 BRPM | DIASTOLIC BLOOD PRESSURE: 78 MMHG | HEIGHT: 66 IN | BODY MASS INDEX: 25.62 KG/M2

## 2020-05-18 LAB
ALBUMIN SERPL-MCNC: 2.3 G/DL (ref 3.5–5.2)
AMPHETAMINE SCREEN URINE: NEGATIVE
ANION GAP SERPL CALCULATED.3IONS-SCNC: 11 MMOL/L (ref 9–17)
BARBITURATE SCREEN URINE: NEGATIVE
BENZODIAZEPINE SCREEN, URINE: NEGATIVE
BUN BLDV-MCNC: 64 MG/DL (ref 8–23)
BUN/CREAT BLD: ABNORMAL (ref 9–20)
BUPRENORPHINE URINE: NORMAL
CALCIUM SERPL-MCNC: 8.6 MG/DL (ref 8.6–10.4)
CANNABINOID SCREEN URINE: NEGATIVE
CHLORIDE BLD-SCNC: 106 MMOL/L (ref 98–107)
CO2: 24 MMOL/L (ref 20–31)
COCAINE METABOLITE, URINE: NEGATIVE
CREAT SERPL-MCNC: 2.76 MG/DL (ref 0.7–1.2)
GFR AFRICAN AMERICAN: 28 ML/MIN
GFR NON-AFRICAN AMERICAN: 23 ML/MIN
GFR SERPL CREATININE-BSD FRML MDRD: ABNORMAL ML/MIN/{1.73_M2}
GFR SERPL CREATININE-BSD FRML MDRD: ABNORMAL ML/MIN/{1.73_M2}
GLUCOSE BLD-MCNC: 112 MG/DL (ref 75–110)
GLUCOSE BLD-MCNC: 136 MG/DL (ref 75–110)
GLUCOSE BLD-MCNC: 52 MG/DL (ref 75–110)
GLUCOSE BLD-MCNC: 59 MG/DL (ref 75–110)
GLUCOSE BLD-MCNC: 97 MG/DL (ref 70–99)
MDMA URINE: NORMAL
METHADONE SCREEN, URINE: NEGATIVE
METHAMPHETAMINE, URINE: NORMAL
OPIATES, URINE: NEGATIVE
OXYCODONE SCREEN URINE: NEGATIVE
PHENCYCLIDINE, URINE: NEGATIVE
POTASSIUM SERPL-SCNC: 4.3 MMOL/L (ref 3.7–5.3)
PROPOXYPHENE, URINE: NORMAL
SODIUM BLD-SCNC: 141 MMOL/L (ref 135–144)
TEST INFORMATION: NORMAL
TRICYCLIC ANTIDEPRESSANTS, UR: NORMAL

## 2020-05-18 PROCEDURE — 82040 ASSAY OF SERUM ALBUMIN: CPT

## 2020-05-18 PROCEDURE — 96376 TX/PRO/DX INJ SAME DRUG ADON: CPT

## 2020-05-18 PROCEDURE — G0378 HOSPITAL OBSERVATION PER HR: HCPCS

## 2020-05-18 PROCEDURE — 2580000003 HC RX 258: Performed by: STUDENT IN AN ORGANIZED HEALTH CARE EDUCATION/TRAINING PROGRAM

## 2020-05-18 PROCEDURE — 6360000002 HC RX W HCPCS: Performed by: STUDENT IN AN ORGANIZED HEALTH CARE EDUCATION/TRAINING PROGRAM

## 2020-05-18 PROCEDURE — 82947 ASSAY GLUCOSE BLOOD QUANT: CPT

## 2020-05-18 PROCEDURE — 99232 SBSQ HOSP IP/OBS MODERATE 35: CPT | Performed by: INTERNAL MEDICINE

## 2020-05-18 PROCEDURE — 2500000003 HC RX 250 WO HCPCS: Performed by: NURSE PRACTITIONER

## 2020-05-18 PROCEDURE — 6370000000 HC RX 637 (ALT 250 FOR IP): Performed by: STUDENT IN AN ORGANIZED HEALTH CARE EDUCATION/TRAINING PROGRAM

## 2020-05-18 PROCEDURE — 80048 BASIC METABOLIC PNL TOTAL CA: CPT

## 2020-05-18 PROCEDURE — 6370000000 HC RX 637 (ALT 250 FOR IP): Performed by: NURSE PRACTITIONER

## 2020-05-18 PROCEDURE — 96372 THER/PROPH/DIAG INJ SC/IM: CPT

## 2020-05-18 PROCEDURE — 36415 COLL VENOUS BLD VENIPUNCTURE: CPT

## 2020-05-18 RX ORDER — BUMETANIDE 2 MG/1
2 TABLET ORAL 2 TIMES DAILY
Qty: 60 TABLET | Refills: 2 | Status: SHIPPED | OUTPATIENT
Start: 2020-05-18 | End: 2020-05-18 | Stop reason: SDUPTHER

## 2020-05-18 RX ORDER — BUMETANIDE 0.25 MG/ML
2 INJECTION, SOLUTION INTRAMUSCULAR; INTRAVENOUS DAILY
Status: DISCONTINUED | OUTPATIENT
Start: 2020-05-19 | End: 2020-05-18 | Stop reason: HOSPADM

## 2020-05-18 RX ORDER — HYDRALAZINE HYDROCHLORIDE 100 MG/1
100 TABLET, FILM COATED ORAL EVERY 8 HOURS SCHEDULED
Qty: 90 TABLET | Refills: 3 | Status: SHIPPED | OUTPATIENT
Start: 2020-05-18 | End: 2020-05-18

## 2020-05-18 RX ORDER — BUMETANIDE 1 MG/1
1 TABLET ORAL 2 TIMES DAILY
Qty: 30 TABLET | Refills: 3 | Status: SHIPPED | OUTPATIENT
Start: 2020-05-18 | End: 2020-05-18 | Stop reason: SDUPTHER

## 2020-05-18 RX ORDER — LOSARTAN POTASSIUM 50 MG/1
50 TABLET ORAL 2 TIMES DAILY
Qty: 30 TABLET | Refills: 3 | Status: SHIPPED | OUTPATIENT
Start: 2020-05-18 | End: 2020-06-02 | Stop reason: SDUPTHER

## 2020-05-18 RX ORDER — GABAPENTIN 100 MG/1
200 CAPSULE ORAL 2 TIMES DAILY
Qty: 56 CAPSULE | Refills: 0 | Status: SHIPPED | OUTPATIENT
Start: 2020-05-18 | End: 2020-05-18 | Stop reason: SDUPTHER

## 2020-05-18 RX ORDER — BUMETANIDE 2 MG/1
2 TABLET ORAL 2 TIMES DAILY
Qty: 60 TABLET | Refills: 2 | Status: SHIPPED | OUTPATIENT
Start: 2020-05-18 | End: 2020-06-16 | Stop reason: ALTCHOICE

## 2020-05-18 RX ORDER — HYDRALAZINE HYDROCHLORIDE 100 MG/1
100 TABLET, FILM COATED ORAL EVERY 8 HOURS SCHEDULED
Qty: 90 TABLET | Refills: 3 | Status: SHIPPED | OUTPATIENT
Start: 2020-05-18 | End: 2020-11-05

## 2020-05-18 RX ORDER — BUMETANIDE 0.25 MG/ML
2 INJECTION, SOLUTION INTRAMUSCULAR; INTRAVENOUS EVERY 12 HOURS
Status: DISCONTINUED | OUTPATIENT
Start: 2020-05-18 | End: 2020-05-18

## 2020-05-18 RX ORDER — GABAPENTIN 100 MG/1
200 CAPSULE ORAL 2 TIMES DAILY
Qty: 56 CAPSULE | Refills: 0 | Status: SHIPPED | OUTPATIENT
Start: 2020-05-18 | End: 2020-06-02 | Stop reason: SDUPTHER

## 2020-05-18 RX ADMIN — Medication 81 MG: at 08:32

## 2020-05-18 RX ADMIN — HEPARIN SODIUM 5000 UNITS: 5000 INJECTION INTRAVENOUS; SUBCUTANEOUS at 06:23

## 2020-05-18 RX ADMIN — SODIUM CHLORIDE, PRESERVATIVE FREE 10 ML: 5 INJECTION INTRAVENOUS at 08:40

## 2020-05-18 RX ADMIN — PRAZOSIN HYDROCHLORIDE 5 MG: 5 CAPSULE ORAL at 13:56

## 2020-05-18 RX ADMIN — CARVEDILOL 25 MG: 25 TABLET, FILM COATED ORAL at 08:32

## 2020-05-18 RX ADMIN — CARVEDILOL 25 MG: 25 TABLET, FILM COATED ORAL at 18:01

## 2020-05-18 RX ADMIN — GABAPENTIN 200 MG: 100 CAPSULE ORAL at 00:08

## 2020-05-18 RX ADMIN — ISOSORBIDE MONONITRATE 30 MG: 30 TABLET ORAL at 08:32

## 2020-05-18 RX ADMIN — GABAPENTIN 200 MG: 100 CAPSULE ORAL at 08:31

## 2020-05-18 RX ADMIN — LOSARTAN POTASSIUM 50 MG: 50 TABLET, FILM COATED ORAL at 08:31

## 2020-05-18 RX ADMIN — PRAZOSIN HYDROCHLORIDE 5 MG: 5 CAPSULE ORAL at 08:31

## 2020-05-18 RX ADMIN — SODIUM CHLORIDE, PRESERVATIVE FREE 10 ML: 5 INJECTION INTRAVENOUS at 00:08

## 2020-05-18 RX ADMIN — QUETIAPINE FUMARATE 100 MG: 100 TABLET ORAL at 00:08

## 2020-05-18 RX ADMIN — HEPARIN SODIUM 5000 UNITS: 5000 INJECTION INTRAVENOUS; SUBCUTANEOUS at 13:57

## 2020-05-18 RX ADMIN — BUMETANIDE 4 MG: 0.25 INJECTION, SOLUTION INTRAMUSCULAR; INTRAVENOUS at 08:32

## 2020-05-18 RX ADMIN — CLOPIDOGREL 75 MG: 75 TABLET, FILM COATED ORAL at 08:32

## 2020-05-18 RX ADMIN — HYDRALAZINE HYDROCHLORIDE 100 MG: 50 TABLET, FILM COATED ORAL at 08:32

## 2020-05-18 RX ADMIN — INSULIN LISPRO 3 UNITS: 100 INJECTION, SOLUTION INTRAVENOUS; SUBCUTANEOUS at 12:13

## 2020-05-18 RX ADMIN — HYDRALAZINE HYDROCHLORIDE 100 MG: 50 TABLET, FILM COATED ORAL at 13:56

## 2020-05-18 RX ADMIN — GABAPENTIN 200 MG: 100 CAPSULE ORAL at 13:56

## 2020-05-18 ASSESSMENT — PAIN SCALES - GENERAL
PAINLEVEL_OUTOF10: 0

## 2020-05-18 NOTE — DISCHARGE SUMMARY
[address]     Department of Internal Medicine - Staff Internal Medicine Teaching Service    INPATIENT DISCHARGE SUMMARY      Patient Identification:  Arden Corona is a 60 y.o. male. :  1955  MRN: [id number]     Acct: [de-identified]   PCP: Anibal Essex, APRN - CNP  Admit Date:  5/15/2020  Discharge date and time: 2020   Attending Provider: Laurie Yu MD                                     [address]  Problem Lists:  Principal Problem:    Leg swelling  Active Problems:    Carotid stenosis, left s/p Endarterectomy    CKD (chronic kidney disease) stage 3, GFR 30-59 ml/min ([facility])    Dyspnea and respiratory abnormalities    Essential hypertension    COPD (chronic obstructive pulmonary disease) ([facility] [address]    Hypertensive crisis    Non-obstructive Coronary artery disease of native artery of native heart with stable angina pectoris ([facility])    Hyponatremia    Anemia  Resolved Problems:    * No resolved hospital problems. *      HOSPITAL STAY     Brief Inpatient course:   60 y. o. male presents with a chief complaint of bilateral lower extremity and upper extremity leg swelling.  Patient states for the last 2 weeks he has been seeing more edema.  States he has baseline shortness of breath and chest tightness.  Which worsens with exertional activity.  Patient is states he was started back on amlodipine and dose was increased to 10 mg in February.  But he is not sure if he can relate that with edema.  Denies orthopnea, leg pain, dizziness, headache, medical noncompliance, diet noncompliance, recent travel, sick contacts. Amlodipine and procardia, ECHO was done which showed hyperdynamic LV function, EF 74. Moderate to severe LVH. RVSP 37. Patient was diuresed with Bumex 4 mg IV as per nephrology. BP controlled and sugars were managed. Negative fluid balance -3L. UO 4.8 L. Patient felt much better. Nephrology started bumex 2 mg BID.       Procedures/ Significant

## 2020-05-18 NOTE — PROGRESS NOTES
Patients am glucose 52. Patient states he is very brittle and would like juice and octavia crackers. Patient calorie counts and is very particular regarding his insulin. Will continue to monitor.

## 2020-05-18 NOTE — PROGRESS NOTES
Nephrology Progress Note      SUBJECTIVE       Pt was seen and examined. No acute issues overnite. Stable hemodynamics . He is awaiting possible discharge. He did respond nicely to the intravenous Bumex. His edema is improving. He does appear to be having progressive worsening renal function in the setting of nephrotic syndrome over time in the setting of type 2 diabetes mellitus. He stated he recently had a deconditioning episode at [place] [address] after being hospitalized for multiple weeks which resulted in a significant loss of muscle mass and conditioning, per the patient. OBJECTIVE      CURRENT TEMPERATURE:  Temp: 98.8 °F (37.1 °C)  MAXIMUM TEMPERATURE OVER 24HRS:  Temp (24hrs), Av.6 °F (37 °C), Min:98.3 °F (36.8 °C), Max:98.8 °F (37.1 °C)    CURRENT RESPIRATORY RATE:  Resp: 18  CURRENT PULSE:  Pulse: 105  CURRENT BLOOD PRESSURE:  BP: 138/61  24HR BLOOD PRESSURE RANGE:  Systolic (62HWM), NI , Min:138 , BQF:260   ; Diastolic (88FPG), UAK:39, Min:38, Max:77    24HR INTAKE/OUTPUT:      Intake/Output Summary (Last 24 hours) at 2020 1732  Last data filed at 2020 1209  Gross per 24 hour   Intake 2213 ml   Output 5425 ml   Net -3212 ml     WEIGHT :  Patient Vitals for the past 96 hrs (Last 3 readings):   Weight   20 1631 159 lb 6.4 oz (72.3 kg)   05/15/20 0933 140 lb (63.5 kg)     PHYSICAL EXAM      GENERAL APPEARANCE:Awake, alert, in no acute distress  SKIN: warm and dry, no rash or erythema  EYES: conjunctivae normal and sclera anicteric  ENT: no thrush, moist mucous membranes  NECK:   No JVD appreciated, does she use some accessory muscles when breathing  PULMONARY: good bilateral air entry with some diminished breath sounds in the bases otherwise clear without wheezes or rhonchi.    CADDIOVASCULAR: regular rate and rhythm with positive S1 and S2 and no rubs   ABDOMEN: soft nontender, bowel sounds present, no ascites  EXTREMITIES: 1+ lower extremity pitting edema     CURRENT MEDICATIONS      bumetanide (BUMEX) injection 2 mg, Q12H  insulin glargine (LANTUS) injection vial 45 Units, Nightly  losartan (COZAAR) tablet 50 mg, BID  hydrALAZINE (APRESOLINE) injection 10 mg, Q6H PRN  insulin lispro (HUMALOG) injection vial 5 Units, TID AC  glucose (GLUTOSE) 40 % oral gel 15 g, PRN  dextrose 50 % IV solution, PRN  glucagon (rDNA) injection 1 mg, PRN  dextrose 5 % solution, PRN  insulin lispro (HUMALOG) injection vial 0-6 Units, TID WC  insulin lispro (HUMALOG) injection vial 0-3 Units, Nightly  hydrALAZINE (APRESOLINE) tablet 100 mg, 3 times per day  traMADol (ULTRAM) tablet 50 mg, Q6H PRN  albuterol sulfate  (90 Base) MCG/ACT inhaler 4 puff, Q6H PRN  sodium chloride flush 0.9 % injection 10 mL, 2 times per day  sodium chloride flush 0.9 % injection 10 mL, PRN  acetaminophen (TYLENOL) tablet 650 mg, Q4H PRN  heparin (porcine) injection 5,000 Units, 3 times per day  aspirin EC tablet 81 mg, Daily  atorvastatin (LIPITOR) tablet 80 mg, Daily  carvedilol (COREG) tablet 25 mg, BID WC  clopidogrel (PLAVIX) tablet 75 mg, Daily  isosorbide mononitrate (IMDUR) extended release tablet 30 mg, Daily  QUEtiapine (SEROQUEL) tablet 100 mg, Nightly  prazosin (MINIPRESS) capsule 5 mg, TID  nitroGLYCERIN (NITROSTAT) SL tablet 0.4 mg, Q5 Min PRN  albuterol (PROVENTIL) nebulizer solution 2.5 mg, As Directed RT PRN  glucose (GLUTOSE) 40 % oral gel 15 g, PRN  dextrose 50 % IV solution, PRN  glucagon (rDNA) injection 1 mg, PRN  dextrose 5 % solution, PRN  albuterol-ipratropium (COMBIVENT RESPIMAT)  MCG/ACT inhaler 1 puff, Q4H PRN  gabapentin (NEURONTIN) capsule 200 mg, TID          LABS      CBC: No results for input(s): WBC, RBC, HGB, HCT, MCV, MCH, MCHC, RDW, PLT, MPV in the last 72 hours.    BMP:   Recent Labs     05/16/20  0924 05/17/20  1253 05/18/20  0955   *  --  141   K 4.5  --  4.3   CL 98  --  106   CO2 17*  --  24   BUN 42*  --  64*   CREATININE 2.34*  --  2.76*   GLUCOSE 425* 169* 97 CALCIUM 8.0*  --  8.6      BNP:  Lab Results   Component Value Date    BNP 84 11/27/2013     PHOSPHORUS:  No results for input(s): PHOS in the last 72 hours. MAGNESIUM: No results for input(s): MG in the last 72 hours. ALBUMIN:   Recent Labs     05/18/20  0659   LABALBU 2.3*     IRON:  No results found for: IRON  IRON SATURATION:  No results found for: LABIRON  TIBC:  No results found for: TIBC  FERRITIN:  No results found for: FERRITIN  RIZWAN: No results found for: RIZWAN    SPEP:   Lab Results   Component Value Date    PROT 4.9 01/15/2020    PROT 6.2 11/12/2011    ALBCAL 3.3 09/27/2017    ALBPCT 63 09/27/2017    LABALPH 0.1 09/27/2017    LABALPH 0.8 09/27/2017    A1PCT 3 09/27/2017    A2PCT 15 09/27/2017    LABBETA 0.6 09/27/2017    BETAPCT 12 09/27/2017    GAMGLOB 0.4 09/27/2017    GGPCT 8 09/27/2017    PATH ELECTRONICALLY SIGNED. Britany Al M.D. 09/27/2017    PATH ELECTRONICALLY SIGNED. Britany Al M.D. 09/27/2017     UPEP:   Lab Results   Component Value Date     11/12/2011      HEPBSAG:  Lab Results   Component Value Date    HEPBSAG NONREACTIVE 08/15/2019     HEPCAB:  Lab Results   Component Value Date    HEPCAB NONREACTIVE 08/16/2019     C3: No results found for: C3  C4: No results found for: C4  MPO ANCA: No results found for: MPO .   PR3 ANCA:  No results found for: PR3  URINE SODIUM:    Lab Results   Component Value Date    IGNACIO 20 08/13/2019      URINE CREATININE:    Lab Results   Component Value Date    LABCREA 27.5 05/17/2020     URINE EOSINOPHILS:   Lab Results   Component Value Date    UREO NONE SEEN 05/17/2020     URINE PROTEIN:    Lab Results   Component Value Date     11/12/2011     URINALYSIS:  U/A:   Lab Results   Component Value Date    NITRU NEGATIVE 05/17/2020    COLORU YELLOW 05/17/2020    PHUR 5.5 05/17/2020    WBCUA None 05/17/2020    RBCUA None 05/17/2020    MUCUS NOT REPORTED 05/17/2020    TRICHOMONAS NOT REPORTED 05/17/2020    YEAST NOT REPORTED 05/17/2020

## 2020-05-18 NOTE — PROGRESS NOTES
[facility]  Internal Medicine Teaching Residency Program  Inpatient Daily Progress Note  ______________________________________________________________________________    Patient: Tsering Hudson  YOB: 1955   Kerry Son: [de-identified]     Room: 98/9305-90  Admit date: 5/15/2020  Today's date: 05/18/20  Number of days in the hospital: 2    SUBJECTIVE   Admitting Diagnosis: Leg swelling  CC:  Leg swelling    Patient seen and examined. No acute issues overnight. Hemodynamically stable, elevated BP. Afebrile. Labs reviewed. Denies any nausea, vomiting, diarrhea, abdominal pain, worsening of previous symptoms. Leg swelling much improved. Blood sugars brittle. Negative fluid balance -3L. UO 4.8 L. Received a dose of bu,ex yesterday as per nephrology. ECHO done 5/16Left ventricle is normal in size with hyperdynamic systolic function. Moderate to severe concentric left ventricular hypertrophy. Calculated ejection fraction is 73 %. Left atrium is mildly dilated. Mild mitral regurgitation. Mild tricuspid regurgitation. Estimated right ventricular systolic pressure  is 37 mmHg.         ROS:  Constitutional:  negative for chills, fevers, sweats  Respiratory:  negative for cough, dyspnea on exertion, hemoptysis, shortness of breath, wheezing  Cardiovascular:  negative for chest pain, chest pressure/discomfort, lower extremity edema, palpitations  Gastrointestinal:  negative for abdominal pain, constipation, diarrhea, nausea, vomiting  Neurological:  negative for dizziness, headache  BRIEF HISTORY     The patient is a pleasant 60 y.o. male presents with a chief complaint of bilateral lower extremity and upper extremity leg swelling. Patient states for the last 2 weeks he has been seeing more edema. States he has baseline shortness of breath and chest tightness. Which worsens with exertional activity.   Patient is states he was started back on amlodipine and dose was increased to 10 mg in February. But he is not sure if he can relate that with edema. Denies orthopnea, leg pain, dizziness, headache, medical noncompliance, diet noncompliance, recent travel, sick contacts.     Past medical history significant for insulin-dependent type 2 diabetes mellitus, CKD, hyperlipidemia, COPD, nonobstructive coronary artery disease.     In ED chest x-ray was negative for acute cardiopulmonary disease, proBNP was elevated around 2789, creatinine 2.2 baseline around 1.8-2.2, glucose 138, WBC 7.6, hemoglobin 12.1, potassium 4.2. Systolic blood pressure 569/52.        Previous cardiac testing  ECHO 19: EF 60%, moderate LVH, mild MR/TR/PI.      STRESS 18: No ischemia/infarct. EF 53%.      CATH 17:   Left main: mild disease  LAD: 30-40% prox stenosis  LCX: mild disease  RCA: 40% mid stenosis       OBJECTIVE     Vital Signs:  BP (!) 188/77   Pulse 105   Temp 98.8 °F (37.1 °C) (Oral)   Resp 18   Ht 5' 6\" (1.676 m)   Wt 159 lb 6.4 oz (72.3 kg)   SpO2 99%   BMI 25.73 kg/m²     Temp (24hrs), Av.3 °F (36.8 °C), Min:97.4 °F (36.3 °C), Max:98.8 °F (37.1 °C)    In:    Out: 4100 [Urine:4100]    Physical Exam:  Constitutional: This is a well developed, well nourished, 18.5-24.9 - Normal 60y.o. year old male who is alert, oriented, cooperative and in no apparent distress. Head:normocephalic and atraumatic. EENT:  PERRLA. No conjunctival injections. Septum was midline, mucosa was without erythema, exudates or cobblestoning. No thrush. Neck: Supple without thyromegaly. No elevated JVP. Trachea was midline. Respiratory: Chest was symmetrical without dullness to percussion. Breath sounds bilaterally were clear to auscultation. There were no wheezes, rhonchi or rales. No tenderness  Cardiovascular: Regular without murmur, clicks, gallops or rubs. Abdomen: Slightly rounded and soft without organomegaly.  No rebound, rigidity or guarding was home on Lantus 35 units along with 5 units pre-meal.  dose increased to 45 units, On high-dose sliding scale along with hypoglycemia protocol. Will adjust insulin according to glucose.     7. Troponinemia. Likely type II STEMI secondary to CKD. STRESS 9/4/18: No ischemia/infarct. EF 53%. ECHO normal.      8.  Essential hypertension. uncontrolled. Off of cleviprex drip. Resume home dose of medication. Increased Losartan to 50 mg and hydralazine to 100 mg. Continue to monitor.           DVT ppx: Heparin subcu 5K 3 times daily  GI ppx: Pepcid 20 mg oral     PT/OT/SW working  Discharge Planning: CM consulted       Javier Sherman MD  Internal Medicine Resident, PGY-1  [facility]; [facility]  5/18/2020, 9:26 AM    Attestation and add on       I have discussed the care of Ollie Villarreal , including pertinent history and exam findings,    5/18/20   with the resident. I have seen and examined the patient and the key elements of all parts of the encounter have been performed by me . I agree with the assessment, plan and orders as documented by the resident. Principal Problem:    Leg swelling  Active Problems:    Carotid stenosis, left s/p Endarterectomy    CKD (chronic kidney disease) stage 3, GFR 30-59 ml/min ([facility])    Dyspnea and respiratory abnormalities    Essential hypertension    COPD (chronic obstructive pulmonary disease) ([facility])    Hypertensive crisis    Non-obstructive Coronary artery disease of native artery of native heart with stable angina pectoris ([facility])    Hyponatremia    Anemia  Resolved Problems:    * No resolved hospital problems.  *        --   CLINICAL COURSE ---          clinical course has improved,    -         Condition    [] ill ,     [] high risk , [] critical ,        [] improved but still labile                                        [] delirium ,      [] -----,                 [] I----     Unit  [] ICU           [] PICU       [] MED_SRG             [] Other  Prognosis -              Medications: Allergies: Allergies   Allergen Reactions    Lisinopril      cough    Ace Inhibitors      coughing    Pcn [Penicillins]        Current Meds:   Scheduled Meds:    insulin glargine  45 Units Subcutaneous Nightly    losartan  50 mg Oral BID    bumetanide  4 mg Intravenous Daily    insulin lispro  5 Units Subcutaneous TID AC    insulin lispro  0-6 Units Subcutaneous TID WC    insulin lispro  0-3 Units Subcutaneous Nightly    hydrALAZINE  100 mg Oral 3 times per day    sodium chloride flush  10 mL Intravenous 2 times per day    heparin (porcine)  5,000 Units Subcutaneous 3 times per day    aspirin  81 mg Oral Daily    atorvastatin  80 mg Oral Daily    carvedilol  25 mg Oral BID WC    clopidogrel  75 mg Oral Daily    isosorbide mononitrate  30 mg Oral Daily    QUEtiapine  100 mg Oral Nightly    prazosin  5 mg Oral TID    gabapentin  200 mg Oral TID     Continuous Infusions:    dextrose      dextrose       PRN Meds: hydrALAZINE, glucose, dextrose, glucagon (rDNA), dextrose, traMADol, albuterol sulfate HFA, sodium chloride flush, acetaminophen, nitroGLYCERIN, albuterol, glucose, dextrose, glucagon (rDNA), dextrose, albuterol-ipratropium      ---- ;     [address] [place] [address], [address] [place] [address].    Phone [phone]   Fax: [phone]  Answering Service: [phone]

## 2020-05-19 ENCOUNTER — CARE COORDINATION (OUTPATIENT)
Dept: CASE MANAGEMENT | Age: 65
End: 2020-05-19

## 2020-05-19 PROCEDURE — 1111F DSCHRG MED/CURRENT MED MERGE: CPT | Performed by: NURSE PRACTITIONER

## 2020-05-22 ENCOUNTER — CARE COORDINATION (OUTPATIENT)
Dept: CASE MANAGEMENT | Age: 65
End: 2020-05-22

## 2020-05-28 ENCOUNTER — CARE COORDINATION (OUTPATIENT)
Dept: CASE MANAGEMENT | Age: 65
End: 2020-05-28

## 2020-05-28 NOTE — CARE COORDINATION
[facility] Transitions Follow Up Call + COVID 19 MONITORING (OBS)  2020    Patient: Rupali Quiñones  Patient : 1955   MRN: [id number]    Reason for Admission: extremity swelling  /COVID Monitoring  Discharge Date: 20   RARS: Readmission Risk Score: 28      Spoke with: patient & fiance -   Informed of blood sugars up & down - as high as 320, low of 29 - when low BS - fiance called paramedics who administered dose of glucose to raise BS & resolve symptoms. Patient is questioning his insulin dose & changes that were made - also is questioning the accuracy of his glucometer. Now using a family members & BS running steadier at low 200's. Currently denies symptoms. He has [facility]V appt that was set up with PCP for , but wants to see a doctor. I contacted [facility] walk-in clinic which is temporarily located at [facility] & scheduled patient for today. I called patient back & he is unable to go today, but I was able to get it scheduled for tomorrow,  & he will go to appt. I called him back & he was in shower, so fiance will inform him. Care Transitions Subsequent and Final Call    Schedule Follow Up Appointment with PCP:  Completed  Subsequent and Final Calls  Do you have any questions related to your medications?:  Yes (Comment: insulin dose adjustments)  Do you currently have any active services?:  No  Do you have any needs or concerns that I can assist you with?:  Yes (Comment: assistance with appt)  Patient-reported Needs or Concerns:  assist with appt  Care Transitions Interventions  Other Interventions:          Patient contacted regarding COVID-19 risk and screening. Discussed COVID-19 related testing which was not done at this time. Test results were not done    Care Transition Nurse/ contacted the patient by telephone to perform follow-up assessment. Verified name and  with patient as identifiers. Patient has following risk factors of: COPD and chronic kidney disease.       Symptoms

## 2020-05-29 ENCOUNTER — CARE COORDINATION (OUTPATIENT)
Dept: CASE MANAGEMENT | Age: 65
End: 2020-05-29

## 2020-05-29 ENCOUNTER — TELEPHONE (OUTPATIENT)
Dept: INTERNAL MEDICINE | Age: 65
End: 2020-05-29

## 2020-06-01 ENCOUNTER — CARE COORDINATION (OUTPATIENT)
Dept: CASE MANAGEMENT | Age: 65
End: 2020-06-01

## 2020-06-01 NOTE — CARE COORDINATION
[facility] Transitions Follow Up Call - [facility] OBS +  COVID 19 Monitoring    2020    Patient: David Miranda  Patient : 1955   MRN: [id number]    Reason for Admission:  extremity swelling  / [place] Monitoring  Discharge Date: 20         RARS: Readmission Risk Score: 28     Spoke with: patient    Care Transitions Subsequent and Final Call    Schedule Follow Up Appointment with PCP:  Completed  Subsequent and Final Calls  Do you have any ongoing symptoms?:  No  Do you have any questions related to your medications?:  Yes  Patient Reports:  insulin question that he will address on virtual visit tomorrow  Do you currently have any active services?:  No  Do you have any needs or concerns that I can assist you with?:  Yes  Patient-reported Needs or Concerns:  insulin question that he will address on virtual visit tomorrow  Care Transitions Interventions  Other Interventions:            Patient contacted regarding COVID-19 risk and screening. Discussed COVID-19 related testing which was not done at this time. Test results were not done.       Patient has following risk factors of: heart failure and COPD.     Symptoms reviewed with patient who verbalized the following symptoms: no new symptoms. Due to no new or worsening symptoms encounter was not routed to provider for escalation. Education provided regarding infection prevention, and signs and symptoms of COVID-19 and when to seek medical attention with patient who verbalized understanding. Discussed exposure protocols and quarantine from [address] you at higher risk for severe illness  and given an opportunity for questions and concerns. The patient agrees to contact the COVID-19 hotline [phone] or PCP office for questions related to their healthcare. CTN/ACM provided contact information for future reference.     From CDC: Are you at higher risk for severe illness?                Wash your hands

## 2020-06-02 ENCOUNTER — VIRTUAL VISIT (OUTPATIENT)
Dept: INTERNAL MEDICINE | Age: 65
End: 2020-06-02
Payer: COMMERCIAL

## 2020-06-02 VITALS — HEIGHT: 66 IN | BODY MASS INDEX: 25.73 KG/M2

## 2020-06-02 PROCEDURE — 99443 PR PHYS/QHP TELEPHONE EVALUATION 21-30 MIN: CPT | Performed by: NURSE PRACTITIONER

## 2020-06-02 RX ORDER — CHOLECALCIFEROL (VITAMIN D3) 50 MCG
2000 TABLET ORAL DAILY
Status: ON HOLD | COMMUNITY
End: 2020-06-20 | Stop reason: SDUPTHER

## 2020-06-02 RX ORDER — GABAPENTIN 100 MG/1
200 CAPSULE ORAL 3 TIMES DAILY
Qty: 90 CAPSULE | Refills: 3 | Status: SHIPPED | OUTPATIENT
Start: 2020-06-02 | End: 2020-06-03 | Stop reason: SDUPTHER

## 2020-06-02 RX ORDER — LOSARTAN POTASSIUM 50 MG/1
50 TABLET ORAL 2 TIMES DAILY
Qty: 60 TABLET | Refills: 3 | Status: SHIPPED | OUTPATIENT
Start: 2020-06-02 | End: 2020-09-25

## 2020-06-02 RX ORDER — QUETIAPINE FUMARATE 100 MG/1
100 TABLET, FILM COATED ORAL NIGHTLY
Qty: 30 TABLET | Refills: 3 | Status: SHIPPED | OUTPATIENT
Start: 2020-06-02 | End: 2020-09-25

## 2020-06-02 RX ORDER — FLUTICASONE FUROATE, UMECLIDINIUM BROMIDE AND VILANTEROL TRIFENATATE 100; 62.5; 25 UG/1; UG/1; UG/1
POWDER RESPIRATORY (INHALATION)
Qty: 1 EACH | Refills: 3 | Status: SHIPPED | OUTPATIENT
Start: 2020-06-02 | End: 2020-09-25

## 2020-06-02 RX ORDER — CEFDINIR 300 MG/1
300 CAPSULE ORAL 2 TIMES DAILY
Qty: 20 CAPSULE | Refills: 0 | Status: SHIPPED | OUTPATIENT
Start: 2020-06-02 | End: 2020-06-12

## 2020-06-02 RX ORDER — PREDNISONE 10 MG/1
TABLET ORAL
Qty: 30 TABLET | Refills: 0 | Status: SHIPPED | OUTPATIENT
Start: 2020-06-02 | End: 2020-06-16 | Stop reason: ALTCHOICE

## 2020-06-02 ASSESSMENT — ENCOUNTER SYMPTOMS
ABDOMINAL PAIN: 0
BOWEL INCONTINENCE: 0
BACK PAIN: 1

## 2020-06-02 NOTE — PROGRESS NOTES
Coco Pink is a 60 y.o. male evaluated via telephone on 6/2/2020. Consent:  He and/or health care decision maker is aware that that he may receive a bill for this telephone service, depending on his insurance coverage, and has provided verbal consent to proceed: Yes      Documentation:  I communicated with the patient and/or health care decision maker about:  Diabetes   He presents for his follow-up diabetic visit. He has type 1 diabetes mellitus. His disease course has been fluctuating. There are no hypoglycemic associated symptoms. Pertinent negatives for hypoglycemia include no headaches. Associated symptoms include foot paresthesias. Pertinent negatives for diabetes include no chest pain, no weakness and no weight loss. There are no hypoglycemic complications. Symptoms are stable. Diabetic complications include peripheral neuropathy. Risk factors for coronary artery disease include diabetes mellitus, dyslipidemia, hypertension, male sex and sedentary lifestyle. Current diabetic treatment includes insulin injections and intensive insulin program. He is compliant with treatment all of the time. His weight is stable. He is following a generally healthy diet. When asked about meal planning, he reported none. He has not had a previous visit with a dietitian. He participates in exercise intermittently. His home blood glucose trend is fluctuating minimally. An ACE inhibitor/angiotensin II receptor blocker is being taken. He sees a podiatrist.Eye exam is current. Back Pain   This is a chronic problem. The current episode started 1 to 4 weeks ago. The problem occurs constantly. The problem has been gradually worsening since onset. The pain is present in the lumbar spine and sacro-iliac. The quality of the pain is described as aching and shooting. The pain radiates to the left thigh and right thigh. The pain is at a severity of 9/10. The pain is severe. The pain is the same all the time.  The symptoms are PELVIS (2 VIEWS); Future    4. Essential hypertension  - losartan (COZAAR) 50 MG tablet; Take 1 tablet by mouth 2 times daily  Dispense: 60 tablet; Refill: 3    5. Primary insomnia  - QUEtiapine (SEROQUEL) 100 MG tablet; Take 1 tablet by mouth nightly  Dispense: 30 tablet; Refill: 3    6. IDDM (insulin dependent diabetes mellitus) ([facility] [address]    7.  CKD (chronic kidney disease) stage 3, GFR 30-59 ml/min ([facility])    RV visit in office in 2-3 weeks      Note: not billable if this call serves to triage the patient into an appointment for the relevant concern      Jacquelyn Spaulding

## 2020-06-03 ENCOUNTER — TELEPHONE (OUTPATIENT)
Dept: INTERNAL MEDICINE | Age: 65
End: 2020-06-03

## 2020-06-03 RX ORDER — GABAPENTIN 100 MG/1
200 CAPSULE ORAL 3 TIMES DAILY
Qty: 180 CAPSULE | Refills: 3 | Status: SHIPPED | OUTPATIENT
Start: 2020-06-03 | End: 2020-09-25

## 2020-06-03 RX ORDER — ISOSORBIDE MONONITRATE 30 MG/1
TABLET, EXTENDED RELEASE ORAL
Qty: 30 TABLET | Refills: 3 | Status: ON HOLD
Start: 2020-06-03 | End: 2020-08-13 | Stop reason: HOSPADM

## 2020-06-03 NOTE — TELEPHONE ENCOUNTER
I fixed the gabapentin script. I thought I corrected it yesterday. Please apologize to him for me. I am not comfortable with him taking Toradol due to his kidney function. Please encourage his to get the films done so I can help him get his back pain controlled.

## 2020-06-03 NOTE — TELEPHONE ENCOUNTER
[facility])     Cerebral vascular disease     Primary insomnia     Hypertensive crisis     Non-obstructive Coronary artery disease of native artery of native heart with stable angina pectoris ([facility] [address]     Hyperparathyroidism ([facility] [address]     Vitamin D deficiency     Coronary artery disease with exertional angina (HCC)     Moderate malnutrition (HCC)     Leg swelling     Hyponatremia     Anemia

## 2020-06-04 ENCOUNTER — CARE COORDINATION (OUTPATIENT)
Dept: CASE MANAGEMENT | Age: 65
End: 2020-06-04

## 2020-06-05 ENCOUNTER — CARE COORDINATION (OUTPATIENT)
Dept: CASE MANAGEMENT | Age: 65
End: 2020-06-05

## 2020-06-16 ENCOUNTER — HOSPITAL ENCOUNTER (OUTPATIENT)
Age: 65
Discharge: HOME | End: 2020-06-18
Payer: COMMERCIAL

## 2020-06-16 ENCOUNTER — OFFICE VISIT (OUTPATIENT)
Dept: INTERNAL MEDICINE | Age: 65
DRG: 682 | End: 2020-06-16
Payer: COMMERCIAL

## 2020-06-16 ENCOUNTER — HOSPITAL ENCOUNTER (OUTPATIENT)
Dept: GENERAL RADIOLOGY | Age: 65
Discharge: HOME | DRG: 682 | End: 2020-06-18
Payer: COMMERCIAL

## 2020-06-16 ENCOUNTER — HOSPITAL ENCOUNTER (OUTPATIENT)
Age: 65
Discharge: HOME | DRG: 682 | End: 2020-06-16
Payer: COMMERCIAL

## 2020-06-16 VITALS
HEART RATE: 55 BPM | BODY MASS INDEX: 23.05 KG/M2 | DIASTOLIC BLOOD PRESSURE: 66 MMHG | HEIGHT: 66 IN | WEIGHT: 143.4 LBS | TEMPERATURE: 96.4 F | SYSTOLIC BLOOD PRESSURE: 168 MMHG

## 2020-06-16 LAB
ALBUMIN SERPL-MCNC: 2.8 G/DL (ref 3.5–5.2)
ALBUMIN/GLOBULIN RATIO: 1.2 (ref 1–2.5)
ALP BLD-CCNC: 96 U/L (ref 40–129)
ALT SERPL-CCNC: 20 U/L (ref 5–41)
ANION GAP SERPL CALCULATED.3IONS-SCNC: 18 MMOL/L (ref 9–17)
AST SERPL-CCNC: 29 U/L
BILIRUB SERPL-MCNC: 0.44 MG/DL (ref 0.3–1.2)
BUN BLDV-MCNC: 64 MG/DL (ref 8–23)
BUN/CREAT BLD: ABNORMAL (ref 9–20)
CALCIUM IONIZED: 1.21 MMOL/L (ref 1.13–1.33)
CALCIUM SERPL-MCNC: 8.4 MG/DL (ref 8.6–10.4)
CHLORIDE BLD-SCNC: 100 MMOL/L (ref 98–107)
CO2: 19 MMOL/L (ref 20–31)
CREAT SERPL-MCNC: 3.7 MG/DL (ref 0.7–1.2)
CREATININE URINE: 91 MG/DL (ref 39–259)
GFR AFRICAN AMERICAN: 20 ML/MIN
GFR NON-AFRICAN AMERICAN: 17 ML/MIN
GFR SERPL CREATININE-BSD FRML MDRD: ABNORMAL ML/MIN/{1.73_M2}
GFR SERPL CREATININE-BSD FRML MDRD: ABNORMAL ML/MIN/{1.73_M2}
GLUCOSE BLD-MCNC: 460 MG/DL (ref 70–99)
HBA1C MFR BLD: 9.5 %
HCT VFR BLD CALC: 36.8 % (ref 40.7–50.3)
HEMOGLOBIN: 12.4 G/DL (ref 13–17)
MAGNESIUM: 2.3 MG/DL (ref 1.6–2.6)
MCH RBC QN AUTO: 31.6 PG (ref 25.2–33.5)
MCHC RBC AUTO-ENTMCNC: 33.7 G/DL (ref 28.4–34.8)
MCV RBC AUTO: 93.6 FL (ref 82.6–102.9)
MICROALBUMIN/CREAT 24H UR: 2423 MG/L
MICROALBUMIN/CREAT UR-RTO: 2663 MCG/MG CREAT
NRBC AUTOMATED: 0 PER 100 WBC
PDW BLD-RTO: 13.5 % (ref 11.8–14.4)
PHOSPHORUS: 3.6 MG/DL (ref 2.5–4.5)
PLATELET # BLD: 382 K/UL (ref 138–453)
PMV BLD AUTO: 9.9 FL (ref 8.1–13.5)
POTASSIUM SERPL-SCNC: 4.8 MMOL/L (ref 3.7–5.3)
PTH INTACT: 124 PG/ML (ref 15–65)
RBC # BLD: 3.93 M/UL (ref 4.21–5.77)
SODIUM BLD-SCNC: 137 MMOL/L (ref 135–144)
TOTAL PROTEIN: 5.2 G/DL (ref 6.4–8.3)
VITAMIN D 25-HYDROXY: 18 NG/ML (ref 30–100)
WBC # BLD: 10.8 K/UL (ref 3.5–11.3)

## 2020-06-16 PROCEDURE — 82306 VITAMIN D 25 HYDROXY: CPT

## 2020-06-16 PROCEDURE — 85027 COMPLETE CBC AUTOMATED: CPT

## 2020-06-16 PROCEDURE — 99214 OFFICE O/P EST MOD 30 MIN: CPT | Performed by: NURSE PRACTITIONER

## 2020-06-16 PROCEDURE — 82330 ASSAY OF CALCIUM: CPT

## 2020-06-16 PROCEDURE — 84100 ASSAY OF PHOSPHORUS: CPT

## 2020-06-16 PROCEDURE — 83735 ASSAY OF MAGNESIUM: CPT

## 2020-06-16 PROCEDURE — 99211 OFF/OP EST MAY X REQ PHY/QHP: CPT | Performed by: NURSE PRACTITIONER

## 2020-06-16 PROCEDURE — 82043 UR ALBUMIN QUANTITATIVE: CPT

## 2020-06-16 PROCEDURE — 83970 ASSAY OF PARATHORMONE: CPT

## 2020-06-16 PROCEDURE — 80053 COMPREHEN METABOLIC PANEL: CPT

## 2020-06-16 PROCEDURE — 73523 X-RAY EXAM HIPS BI 5/> VIEWS: CPT

## 2020-06-16 PROCEDURE — 82570 ASSAY OF URINE CREATININE: CPT

## 2020-06-16 PROCEDURE — 83036 HEMOGLOBIN GLYCOSYLATED A1C: CPT | Performed by: NURSE PRACTITIONER

## 2020-06-16 PROCEDURE — 36415 COLL VENOUS BLD VENIPUNCTURE: CPT

## 2020-06-16 RX ORDER — CARVEDILOL 25 MG/1
25 TABLET ORAL 2 TIMES DAILY WITH MEALS
Qty: 60 TABLET | Refills: 3 | Status: SHIPPED | OUTPATIENT
Start: 2020-06-16 | End: 2021-01-25 | Stop reason: SDUPTHER

## 2020-06-16 RX ORDER — ATORVASTATIN CALCIUM 80 MG/1
80 TABLET, FILM COATED ORAL DAILY
Qty: 30 TABLET | Refills: 3 | Status: SHIPPED | OUTPATIENT
Start: 2020-06-16 | End: 2021-01-25 | Stop reason: SDUPTHER

## 2020-06-16 RX ORDER — CLOPIDOGREL BISULFATE 75 MG/1
75 TABLET ORAL DAILY
Qty: 90 TABLET | Refills: 3 | Status: SHIPPED | OUTPATIENT
Start: 2020-06-16 | End: 2021-01-25 | Stop reason: SDUPTHER

## 2020-06-16 RX ORDER — INSULIN ASPART 100 [IU]/ML
5 INJECTION, SOLUTION INTRAVENOUS; SUBCUTANEOUS
Qty: 5 PEN | Refills: 3 | Status: SHIPPED | OUTPATIENT
Start: 2020-06-16 | End: 2021-03-16 | Stop reason: SDUPTHER

## 2020-06-16 RX ORDER — INSULIN GLARGINE 100 [IU]/ML
35 INJECTION, SOLUTION SUBCUTANEOUS NIGHTLY
Qty: 5 PEN | Refills: 2 | Status: SHIPPED | OUTPATIENT
Start: 2020-06-16 | End: 2021-03-05

## 2020-06-16 RX ORDER — NUT.TX.IMPAIRED DIGESTIVE FXN 0.035-1/ML
1 LIQUID (ML) ORAL 3 TIMES DAILY
Qty: 237 ML | Refills: 5 | Status: SHIPPED | OUTPATIENT
Start: 2020-06-16 | End: 2022-10-11 | Stop reason: ALTCHOICE

## 2020-06-16 RX ORDER — NITROGLYCERIN 0.4 MG/1
TABLET SUBLINGUAL
Qty: 25 TABLET | Refills: 2 | Status: SHIPPED | OUTPATIENT
Start: 2020-06-16 | End: 2021-03-05

## 2020-06-16 ASSESSMENT — ENCOUNTER SYMPTOMS
SHORTNESS OF BREATH: 1
CHEST TIGHTNESS: 0

## 2020-06-16 NOTE — PROGRESS NOTES
colonoscopy  06/27/2018    Annual Wellness Visit (AWV)  05/29/2019    A1C test (Diabetic or Prediabetic)  11/12/2019    Diabetic foot exam  02/19/2020    Lipid screen  08/13/2020    Potassium monitoring  05/18/2021    Creatinine monitoring  05/18/2021    DTaP/Tdap/Td vaccine (2 - Td) 12/14/2023    Flu vaccine  Completed    Pneumococcal 0-64 years Vaccine  Completed    Hepatitis C screen  Completed    HIV screen  Completed    Hepatitis A vaccine  Aged Out    Hib vaccine  Aged Out    Meningococcal (ACWY) vaccine  Aged Out

## 2020-06-16 NOTE — PROGRESS NOTES
Position: Sitting   Cuff Size: Medium Adult   Pulse: 55   Temp: 96.4 °F (35.8 °C)   TempSrc: Temporal   Weight: 143 lb 6.4 oz (65 kg)   Height: 5' 6\" (1.676 m)     Estimated body mass index is 23.15 kg/m² as calculated from the following:    Height as of this encounter: 5' 6\" (1.676 m). Weight as of this encounter: 143 lb 6.4 oz (65 kg). Physical Exam  Vitals signs and nursing note reviewed. Constitutional:       Appearance: Normal appearance. He is underweight. HENT:      Head: Normocephalic and atraumatic. Right Ear: External ear normal.      Left Ear: External ear normal.      Nose: Nose normal.      Mouth/Throat:      Mouth: Mucous membranes are moist.      Pharynx: Oropharynx is clear. Eyes:      Conjunctiva/sclera: Conjunctivae normal.   Neck:      Musculoskeletal: Normal range of motion and neck supple. Cardiovascular:      Rate and Rhythm: Bradycardia present. Rhythm irregular. Pulses: Normal pulses. Heart sounds: Normal heart sounds. Pulmonary:      Effort: Accessory muscle usage and prolonged expiration present. Breath sounds: Decreased breath sounds present. Abdominal:      General: Bowel sounds are normal.      Palpations: Abdomen is soft. Musculoskeletal:      Left hip: He exhibits decreased range of motion and tenderness. Skin:     General: Skin is warm and dry. Neurological:      General: No focal deficit present. Mental Status: He is alert and oriented to person, place, and time. Psychiatric:         Mood and Affect: Mood normal.         Behavior: Behavior normal.         Thought Content: Thought content normal.         Judgment: Judgment normal.         ASSESSMENT/PLAN:  1. DM type 2, uncontrolled, with neuropathy (HCC)  - POCT glycosylated hemoglobin (Hb A1C)  - insulin glargine (BASAGLAR KWIKPEN) 100 UNIT/ML injection pen;  Inject 35 Units into the skin nightly Dispense with pen needle  Dispense: 5 pen; Refill: 2  - insulin aspart (Fany Klein FLEXPEN) 100 UNIT/ML injection pen; Inject 5 Units into the skin 3 times daily (before meals) Sliding scale  Dispense: 5 pen; Refill: 3    2. Essential hypertension  - carvedilol (COREG) 25 MG tablet; Take 1 tablet by mouth 2 times daily (with meals)  Dispense: 60 tablet; Refill: 3    3. Familial hypercholesterolemia  - atorvastatin (LIPITOR) 80 MG tablet; Take 1 tablet by mouth daily  Dispense: 30 tablet; Refill: 3    4. CAD in native artery, nonobstructive  - nitroGLYCERIN (NITROSTAT) 0.4 MG SL tablet; up to max of 3 total doses. If no relief after 1 dose, call 911. Dispense: 25 tablet; Refill: 2    5. Chronic obstructive pulmonary disease, unspecified COPD type [facility])  - [facility] [address] [place] [address]    6. Need for prophylactic vaccination and inoculation against varicella  - zoster recombinant adjuvanted vaccine [facility]) 50 MCG/0.5ML SUSR injection; Inject 0.5 mLs into the muscle once for 1 dose 50 MCG IM then repeat 2-6 months. Dispense: 1 each; Refill: 1    7. Chronic left hip pain  - [facility], Doug Aceves DO, Orthopedic Surgery, [facility]    8. Moderate malnutrition ([address]  - Nutritional Supplements (GLUCERNA CARBSTEADY) LIQD; Take 1 Can by mouth 3 times daily  Dispense: 237 mL; Refill: 5    9. Hyperparathyroidism ([address]  - stable    10. ESRD (end stage renal disease) ([address]  - stable    11. Bilateral carotid artery stenosis  - clopidogrel (PLAVIX) 75 MG tablet; Take 1 tablet by mouth daily  Dispense: 90 tablet; Refill: 3      Return in about 3 months (around 9/16/2020). An electronic signature was used to authenticate this note.     --TERRANCE Nava - CNP on 6/16/2020 at 4:38 PM

## 2020-06-18 ENCOUNTER — HOSPITAL ENCOUNTER (INPATIENT)
Age: 65
LOS: 2 days | Discharge: HOME | DRG: 682 | End: 2020-06-20
Attending: EMERGENCY MEDICINE | Admitting: INTERNAL MEDICINE
Payer: COMMERCIAL

## 2020-06-18 ENCOUNTER — APPOINTMENT (OUTPATIENT)
Dept: GENERAL RADIOLOGY | Age: 65
DRG: 682 | End: 2020-06-18
Payer: COMMERCIAL

## 2020-06-18 PROBLEM — E88.09 HYPOALBUMINEMIA: Status: ACTIVE | Noted: 2020-06-18

## 2020-06-18 PROBLEM — I21.4 NSTEMI (NON-ST ELEVATED MYOCARDIAL INFARCTION) (HCC): Status: ACTIVE | Noted: 2020-06-18

## 2020-06-18 LAB
-: NORMAL
-: NORMAL
ABSOLUTE EOS #: 0.2 K/UL (ref 0–0.44)
ABSOLUTE IMMATURE GRANULOCYTE: 0.05 K/UL (ref 0–0.3)
ABSOLUTE LYMPH #: 0.97 K/UL (ref 1.1–3.7)
ABSOLUTE MONO #: 0.5 K/UL (ref 0.1–1.2)
ALBUMIN SERPL-MCNC: 2.9 G/DL (ref 3.5–5.2)
ALBUMIN/GLOBULIN RATIO: 1.4 (ref 1–2.5)
ALP BLD-CCNC: 94 U/L (ref 40–129)
ALT SERPL-CCNC: 26 U/L (ref 5–41)
AMORPHOUS: NORMAL
ANION GAP SERPL CALCULATED.3IONS-SCNC: 14 MMOL/L (ref 9–17)
AST SERPL-CCNC: 34 U/L
BACTERIA: NORMAL
BASOPHILS # BLD: 0 % (ref 0–2)
BASOPHILS ABSOLUTE: <0.03 K/UL (ref 0–0.2)
BILIRUB SERPL-MCNC: 0.19 MG/DL (ref 0.3–1.2)
BILIRUBIN URINE: NEGATIVE
BNP INTERPRETATION: ABNORMAL
BUN BLDV-MCNC: 37 MG/DL (ref 8–23)
BUN/CREAT BLD: ABNORMAL (ref 9–20)
CALCIUM SERPL-MCNC: 8.5 MG/DL (ref 8.6–10.4)
CASTS UA: NORMAL /LPF (ref 0–2)
CASTS UA: NORMAL /LPF (ref 0–2)
CHLORIDE BLD-SCNC: 107 MMOL/L (ref 98–107)
CO2: 21 MMOL/L (ref 20–31)
COLOR: YELLOW
COMMENT UA: ABNORMAL
CREAT SERPL-MCNC: 2.54 MG/DL (ref 0.7–1.2)
CREATININE URINE: 84 MG/DL (ref 39–259)
CRYSTALS, UA: NORMAL /HPF
DIFFERENTIAL TYPE: ABNORMAL
EOSINOPHILS RELATIVE PERCENT: 3 % (ref 1–4)
EPITHELIAL CELLS UA: NORMAL /HPF (ref 0–5)
GFR AFRICAN AMERICAN: 31 ML/MIN
GFR NON-AFRICAN AMERICAN: 26 ML/MIN
GFR SERPL CREATININE-BSD FRML MDRD: ABNORMAL ML/MIN/{1.73_M2}
GFR SERPL CREATININE-BSD FRML MDRD: ABNORMAL ML/MIN/{1.73_M2}
GLUCOSE BLD-MCNC: 113 MG/DL (ref 70–99)
GLUCOSE BLD-MCNC: 156 MG/DL (ref 75–110)
GLUCOSE URINE: ABNORMAL
HCT VFR BLD CALC: 34.2 % (ref 40.7–50.3)
HEMOGLOBIN: 10.7 G/DL (ref 13–17)
IMMATURE GRANULOCYTES: 1 %
KETONES, URINE: NEGATIVE
LEUKOCYTE ESTERASE, URINE: NEGATIVE
LYMPHOCYTES # BLD: 15 % (ref 24–43)
MCH RBC QN AUTO: 30.1 PG (ref 25.2–33.5)
MCHC RBC AUTO-ENTMCNC: 31.3 G/DL (ref 28.4–34.8)
MCV RBC AUTO: 96.3 FL (ref 82.6–102.9)
MONOCYTES # BLD: 8 % (ref 3–12)
MUCUS: NORMAL
NITRITE, URINE: NEGATIVE
NRBC AUTOMATED: 0 PER 100 WBC
OSMOLALITY URINE: 466 MOSM/KG (ref 80–1300)
OTHER OBSERVATIONS UA: NORMAL
PARTIAL THROMBOPLASTIN TIME: 22.2 SEC (ref 20.5–30.5)
PARTIAL THROMBOPLASTIN TIME: 39.5 SEC (ref 20.5–30.5)
PDW BLD-RTO: 14 % (ref 11.8–14.4)
PH UA: 6 (ref 5–8)
PLATELET # BLD: 311 K/UL (ref 138–453)
PLATELET ESTIMATE: ABNORMAL
PMV BLD AUTO: 9.7 FL (ref 8.1–13.5)
POTASSIUM SERPL-SCNC: 5 MMOL/L (ref 3.7–5.3)
PRO-BNP: 7438 PG/ML
PROTEIN UA: ABNORMAL
RBC # BLD: 3.55 M/UL (ref 4.21–5.77)
RBC # BLD: ABNORMAL 10*6/UL
RBC UA: NORMAL /HPF (ref 0–2)
REASON FOR REJECTION: NORMAL
RENAL EPITHELIAL, UA: NORMAL /HPF
SARS-COV-2, PCR: NORMAL
SARS-COV-2, RAPID: NOT DETECTED
SARS-COV-2: NORMAL
SEG NEUTROPHILS: 74 % (ref 36–65)
SEGMENTED NEUTROPHILS ABSOLUTE COUNT: 4.9 K/UL (ref 1.5–8.1)
SERUM OSMOLALITY: 313 MOSM/KG (ref 275–295)
SODIUM BLD-SCNC: 142 MMOL/L (ref 135–144)
SODIUM,UR: 86 MMOL/L
SOURCE: NORMAL
SPECIFIC GRAVITY UA: 1.02 (ref 1–1.03)
TOTAL PROTEIN: 5 G/DL (ref 6.4–8.3)
TRICHOMONAS: NORMAL
TROPONIN INTERP: ABNORMAL
TROPONIN INTERP: ABNORMAL
TROPONIN T: ABNORMAL NG/ML
TROPONIN T: ABNORMAL NG/ML
TROPONIN, HIGH SENSITIVITY: 367 NG/L (ref 0–22)
TROPONIN, HIGH SENSITIVITY: 398 NG/L (ref 0–22)
TURBIDITY: CLEAR
URINE HGB: NEGATIVE
UROBILINOGEN, URINE: NORMAL
WBC # BLD: 6.6 K/UL (ref 3.5–11.3)
WBC # BLD: ABNORMAL 10*3/UL
WBC UA: NORMAL /HPF (ref 0–5)
YEAST: NORMAL
ZZ NTE CLEAN UP: ORDERED TEST: NORMAL
ZZ NTE WITH NAME CLEAN UP: SPECIMEN SOURCE: NORMAL

## 2020-06-18 PROCEDURE — 82570 ASSAY OF URINE CREATININE: CPT

## 2020-06-18 PROCEDURE — 85025 COMPLETE CBC W/AUTO DIFF WBC: CPT

## 2020-06-18 PROCEDURE — 99285 EMERGENCY DEPT VISIT HI MDM: CPT

## 2020-06-18 PROCEDURE — 2060000000 HC ICU INTERMEDIATE R&B

## 2020-06-18 PROCEDURE — 71045 X-RAY EXAM CHEST 1 VIEW: CPT

## 2020-06-18 PROCEDURE — 93005 ELECTROCARDIOGRAM TRACING: CPT | Performed by: STUDENT IN AN ORGANIZED HEALTH CARE EDUCATION/TRAINING PROGRAM

## 2020-06-18 PROCEDURE — 99223 1ST HOSP IP/OBS HIGH 75: CPT | Performed by: INTERNAL MEDICINE

## 2020-06-18 PROCEDURE — 84300 ASSAY OF URINE SODIUM: CPT

## 2020-06-18 PROCEDURE — 6370000000 HC RX 637 (ALT 250 FOR IP): Performed by: STUDENT IN AN ORGANIZED HEALTH CARE EDUCATION/TRAINING PROGRAM

## 2020-06-18 PROCEDURE — 6370000000 HC RX 637 (ALT 250 FOR IP): Performed by: INTERNAL MEDICINE

## 2020-06-18 PROCEDURE — 83935 ASSAY OF URINE OSMOLALITY: CPT

## 2020-06-18 PROCEDURE — 84484 ASSAY OF TROPONIN QUANT: CPT

## 2020-06-18 PROCEDURE — 83930 ASSAY OF BLOOD OSMOLALITY: CPT

## 2020-06-18 PROCEDURE — U0002 COVID-19 LAB TEST NON-CDC: HCPCS

## 2020-06-18 PROCEDURE — 85730 THROMBOPLASTIN TIME PARTIAL: CPT

## 2020-06-18 PROCEDURE — 80053 COMPREHEN METABOLIC PANEL: CPT

## 2020-06-18 PROCEDURE — 2580000003 HC RX 258: Performed by: STUDENT IN AN ORGANIZED HEALTH CARE EDUCATION/TRAINING PROGRAM

## 2020-06-18 PROCEDURE — 81001 URINALYSIS AUTO W/SCOPE: CPT

## 2020-06-18 PROCEDURE — 82947 ASSAY GLUCOSE BLOOD QUANT: CPT

## 2020-06-18 PROCEDURE — 2500000003 HC RX 250 WO HCPCS: Performed by: STUDENT IN AN ORGANIZED HEALTH CARE EDUCATION/TRAINING PROGRAM

## 2020-06-18 PROCEDURE — 6360000002 HC RX W HCPCS: Performed by: STUDENT IN AN ORGANIZED HEALTH CARE EDUCATION/TRAINING PROGRAM

## 2020-06-18 PROCEDURE — 83880 ASSAY OF NATRIURETIC PEPTIDE: CPT

## 2020-06-18 RX ORDER — ACETAMINOPHEN 650 MG/1
650 SUPPOSITORY RECTAL EVERY 6 HOURS PRN
Status: DISCONTINUED | OUTPATIENT
Start: 2020-06-18 | End: 2020-06-20 | Stop reason: HOSPADM

## 2020-06-18 RX ORDER — POLYETHYLENE GLYCOL 3350 17 G/17G
17 POWDER, FOR SOLUTION ORAL DAILY PRN
Status: DISCONTINUED | OUTPATIENT
Start: 2020-06-18 | End: 2020-06-20 | Stop reason: HOSPADM

## 2020-06-18 RX ORDER — SODIUM CHLORIDE 0.9 % (FLUSH) 0.9 %
10 SYRINGE (ML) INJECTION EVERY 12 HOURS SCHEDULED
Status: DISCONTINUED | OUTPATIENT
Start: 2020-06-18 | End: 2020-06-20 | Stop reason: HOSPADM

## 2020-06-18 RX ORDER — ATORVASTATIN CALCIUM 80 MG/1
80 TABLET, FILM COATED ORAL DAILY
Status: DISCONTINUED | OUTPATIENT
Start: 2020-06-18 | End: 2020-06-20 | Stop reason: HOSPADM

## 2020-06-18 RX ORDER — NICOTINE POLACRILEX 4 MG
15 LOZENGE BUCCAL PRN
Status: DISCONTINUED | OUTPATIENT
Start: 2020-06-18 | End: 2020-06-20 | Stop reason: HOSPADM

## 2020-06-18 RX ORDER — HEPARIN SODIUM 10000 [USP'U]/100ML
12 INJECTION, SOLUTION INTRAVENOUS CONTINUOUS
Status: DISCONTINUED | OUTPATIENT
Start: 2020-06-18 | End: 2020-06-20

## 2020-06-18 RX ORDER — DEXTROSE MONOHYDRATE 25 G/50ML
12.5 INJECTION, SOLUTION INTRAVENOUS PRN
Status: DISCONTINUED | OUTPATIENT
Start: 2020-06-18 | End: 2020-06-20 | Stop reason: HOSPADM

## 2020-06-18 RX ORDER — HEPARIN SODIUM 1000 [USP'U]/ML
60 INJECTION, SOLUTION INTRAVENOUS; SUBCUTANEOUS PRN
Status: DISCONTINUED | OUTPATIENT
Start: 2020-06-18 | End: 2020-06-20

## 2020-06-18 RX ORDER — SODIUM CHLORIDE 9 MG/ML
INJECTION, SOLUTION INTRAVENOUS CONTINUOUS
Status: DISCONTINUED | OUTPATIENT
Start: 2020-06-18 | End: 2020-06-20 | Stop reason: HOSPADM

## 2020-06-18 RX ORDER — INSULIN GLARGINE 100 [IU]/ML
35 INJECTION, SOLUTION SUBCUTANEOUS NIGHTLY
Status: DISCONTINUED | OUTPATIENT
Start: 2020-06-19 | End: 2020-06-20 | Stop reason: HOSPADM

## 2020-06-18 RX ORDER — INSULIN GLARGINE 100 [IU]/ML
35 INJECTION, SOLUTION SUBCUTANEOUS NIGHTLY
Status: DISCONTINUED | OUTPATIENT
Start: 2020-06-18 | End: 2020-06-18

## 2020-06-18 RX ORDER — HEPARIN SODIUM 1000 [USP'U]/ML
30 INJECTION, SOLUTION INTRAVENOUS; SUBCUTANEOUS PRN
Status: DISCONTINUED | OUTPATIENT
Start: 2020-06-18 | End: 2020-06-20

## 2020-06-18 RX ORDER — INSULIN GLARGINE 100 [IU]/ML
10 INJECTION, SOLUTION SUBCUTANEOUS NIGHTLY
Status: DISCONTINUED | OUTPATIENT
Start: 2020-06-19 | End: 2020-06-18

## 2020-06-18 RX ORDER — ERGOCALCIFEROL 1.25 MG/1
50000 CAPSULE ORAL WEEKLY
Status: DISCONTINUED | OUTPATIENT
Start: 2020-06-18 | End: 2020-06-20 | Stop reason: HOSPADM

## 2020-06-18 RX ORDER — NITROGLYCERIN 20 MG/100ML
5 INJECTION INTRAVENOUS CONTINUOUS
Status: DISCONTINUED | OUTPATIENT
Start: 2020-06-18 | End: 2020-06-20

## 2020-06-18 RX ORDER — ONDANSETRON 2 MG/ML
4 INJECTION INTRAMUSCULAR; INTRAVENOUS EVERY 6 HOURS PRN
Status: DISCONTINUED | OUTPATIENT
Start: 2020-06-18 | End: 2020-06-20 | Stop reason: HOSPADM

## 2020-06-18 RX ORDER — GABAPENTIN 100 MG/1
200 CAPSULE ORAL 3 TIMES DAILY
Status: DISCONTINUED | OUTPATIENT
Start: 2020-06-18 | End: 2020-06-20 | Stop reason: HOSPADM

## 2020-06-18 RX ORDER — CARVEDILOL 12.5 MG/1
25 TABLET ORAL 2 TIMES DAILY WITH MEALS
Status: DISCONTINUED | OUTPATIENT
Start: 2020-06-18 | End: 2020-06-20 | Stop reason: HOSPADM

## 2020-06-18 RX ORDER — HEPARIN SODIUM 1000 [USP'U]/ML
60 INJECTION, SOLUTION INTRAVENOUS; SUBCUTANEOUS ONCE
Status: COMPLETED | OUTPATIENT
Start: 2020-06-18 | End: 2020-06-18

## 2020-06-18 RX ORDER — PROMETHAZINE HYDROCHLORIDE 25 MG/1
12.5 TABLET ORAL EVERY 6 HOURS PRN
Status: DISCONTINUED | OUTPATIENT
Start: 2020-06-18 | End: 2020-06-20 | Stop reason: HOSPADM

## 2020-06-18 RX ORDER — ASPIRIN 81 MG/1
324 TABLET, CHEWABLE ORAL ONCE
Status: COMPLETED | OUTPATIENT
Start: 2020-06-18 | End: 2020-06-18

## 2020-06-18 RX ORDER — QUETIAPINE FUMARATE 100 MG/1
100 TABLET, FILM COATED ORAL NIGHTLY
Status: DISCONTINUED | OUTPATIENT
Start: 2020-06-18 | End: 2020-06-20 | Stop reason: HOSPADM

## 2020-06-18 RX ORDER — CLOPIDOGREL BISULFATE 75 MG/1
75 TABLET ORAL DAILY
Status: DISCONTINUED | OUTPATIENT
Start: 2020-06-18 | End: 2020-06-20 | Stop reason: HOSPADM

## 2020-06-18 RX ORDER — ACETAMINOPHEN 325 MG/1
650 TABLET ORAL EVERY 6 HOURS PRN
Status: DISCONTINUED | OUTPATIENT
Start: 2020-06-18 | End: 2020-06-20 | Stop reason: HOSPADM

## 2020-06-18 RX ORDER — HYDRALAZINE HYDROCHLORIDE 50 MG/1
100 TABLET, FILM COATED ORAL EVERY 8 HOURS SCHEDULED
Status: DISCONTINUED | OUTPATIENT
Start: 2020-06-18 | End: 2020-06-20 | Stop reason: HOSPADM

## 2020-06-18 RX ORDER — SODIUM CHLORIDE 0.9 % (FLUSH) 0.9 %
10 SYRINGE (ML) INJECTION PRN
Status: DISCONTINUED | OUTPATIENT
Start: 2020-06-18 | End: 2020-06-20 | Stop reason: HOSPADM

## 2020-06-18 RX ORDER — DEXTROSE MONOHYDRATE 50 MG/ML
100 INJECTION, SOLUTION INTRAVENOUS PRN
Status: DISCONTINUED | OUTPATIENT
Start: 2020-06-18 | End: 2020-06-20 | Stop reason: HOSPADM

## 2020-06-18 RX ORDER — ASPIRIN 81 MG/1
81 TABLET ORAL DAILY
Status: DISCONTINUED | OUTPATIENT
Start: 2020-06-18 | End: 2020-06-20 | Stop reason: HOSPADM

## 2020-06-18 RX ADMIN — HYDRALAZINE HYDROCHLORIDE 100 MG: 50 TABLET, FILM COATED ORAL at 21:39

## 2020-06-18 RX ADMIN — HYDRALAZINE HYDROCHLORIDE 100 MG: 50 TABLET, FILM COATED ORAL at 15:46

## 2020-06-18 RX ADMIN — NITROGLYCERIN 5 MCG/MIN: 20 INJECTION INTRAVENOUS at 22:37

## 2020-06-18 RX ADMIN — GABAPENTIN 200 MG: 100 CAPSULE ORAL at 21:39

## 2020-06-18 RX ADMIN — ERGOCALCIFEROL 50000 UNITS: 1.25 CAPSULE ORAL at 21:59

## 2020-06-18 RX ADMIN — SODIUM CHLORIDE, PRESERVATIVE FREE 10 ML: 5 INJECTION INTRAVENOUS at 22:03

## 2020-06-18 RX ADMIN — ASPIRIN 81 MG 324 MG: 81 TABLET ORAL at 13:01

## 2020-06-18 RX ADMIN — HEPARIN SODIUM 4000 UNITS: 1000 INJECTION, SOLUTION INTRAVENOUS; SUBCUTANEOUS at 13:30

## 2020-06-18 RX ADMIN — CARVEDILOL 25 MG: 12.5 TABLET, FILM COATED ORAL at 22:07

## 2020-06-18 RX ADMIN — HEPARIN SODIUM AND DEXTROSE 12 UNITS/KG/HR: 10000; 5 INJECTION INTRAVENOUS at 13:30

## 2020-06-18 RX ADMIN — ATORVASTATIN CALCIUM 80 MG: 80 TABLET, FILM COATED ORAL at 21:39

## 2020-06-18 RX ADMIN — INSULIN GLARGINE 35 UNITS: 100 INJECTION, SOLUTION SUBCUTANEOUS at 21:59

## 2020-06-18 RX ADMIN — HEPARIN SODIUM 2000 UNITS: 1000 INJECTION INTRAVENOUS; SUBCUTANEOUS at 20:25

## 2020-06-18 RX ADMIN — SODIUM CHLORIDE: 9 INJECTION, SOLUTION INTRAVENOUS at 21:18

## 2020-06-18 ASSESSMENT — ENCOUNTER SYMPTOMS
VOMITING: 0
WHEEZING: 0
SHORTNESS OF BREATH: 1
ABDOMINAL PAIN: 0
CONSTIPATION: 0
SORE THROAT: 0
NAUSEA: 1
DIARRHEA: 0
COUGH: 1
BACK PAIN: 0

## 2020-06-18 NOTE — ED PROVIDER NOTES
[address]  ED  Emergency Department Encounter  Emergency Medicine Resident     Pt Name: Kimber Campos  MRN: [id number]  [place] 1955  Date of evaluation: 6/18/20  PCP:  TERRANCE Alvarez [id number]       Chief Complaint   Patient presents with    Abnormal Lab     sent by his Nephrologist for elevated creatinine. H/o stage 3 renal disease. Pt reports SOB and nausea       HISTORY OFPRESENT ILLNESS  (Location/Symptom, Timing/Onset, Context/Setting, Quality, Duration, Modifying Factors,Severity.)      Kimber Campos is a 60 y.o. male who presents after being called by his nephrologist for elevated creatinine. Patient with past medical history of CAD, COPD, hypertension, diabetes, hyperlipidemia, CKD. Patient was recently admitted on 5/15/2020 with shortness of breath. Echo at that time showed hyperdynamic left ventricular function with an EF of 74%, moderate to severe LVH. Patient was started on Bumex and was diuresed. Patient felt better and was discharged home. Patient went to get labs on 6/16 after follow-up from the hospital admission and was called today with abnormal findings. Patient states that he does have shortness of breath as well as a nonproductive cough. He states that the cough is normal for him given his history of COPD. He states that he was in pulmonary rehab but this was suspended due to COVID and that is why he is short of breath. He denies chest pain. He does admit to some nausea but denies any vomiting or abdominal pain. He denies any dysuria, hematuria, lightheadedness, dizziness, fevers, numbness, weakness, tingling. He denies any known exposures to COVID positive patients. He denies history of DVT/PE, recent surgery, hemoptysis, active malignancy. Of note the patient states that he has had an approximate 30 pound weight loss in the past month which was unintended.     PAST MEDICAL / SURGICAL / SOCIAL / FAMILY HISTORY      has a past club or organization: Not on file     Attends meetings of clubs or organizations: Not on file     Relationship status: Not on file    Intimate partner violence     Fear of current or ex partner: Not on file     Emotionally abused: Not on file     Physically abused: Not on file     Forced sexual activity: Not on file   Other Topics Concern    Not on file   Social History Narrative    Not on file       Family History   Problem Relation Age of Onset    Cancer Mother     Heart Disease Father         Allergies:  Lisinopril; Ace inhibitors; and Pcn [penicillins]    Home Medications:  Prior to Admission medications    Medication Sig Start Date End Date Taking? Authorizing Provider   carvedilol (COREG) 25 MG tablet Take 1 tablet by mouth 2 times daily (with meals) 6/16/20  Yes TERRANCE Javier CNP   atorvastatin (LIPITOR) 80 MG tablet Take 1 tablet by mouth daily 6/16/20  Yes TERRANCE Javier CNP   clopidogrel (PLAVIX) 75 MG tablet Take 1 tablet by mouth daily 6/16/20  Yes TERRANCE Javier CNP   nitroGLYCERIN (NITROSTAT) 0.4 MG SL tablet up to max of 3 total doses. If no relief after 1 dose, call 911. 6/16/20  Yes TERRANCE Javier CNP   isosorbide mononitrate (IMDUR) 30 MG extended release tablet TAKE 1 TABLET BY MOUTH ONCE DAILY 6/3/20  Yes TERRANCE Javier CNP   gabapentin (NEURONTIN) 100 MG capsule Take 2 capsules by mouth 3 times daily for 90 doses.  6/3/20 7/3/20 Yes TERRANCE Javier CNP   Cholecalciferol (VITAMIN D) 50 MCG (2000 UT) TABS tablet Take 2,000 Units by mouth daily   Yes Historical Provider, MD   fluticasone-umeclidin-vilant (TRELEGY ELLIPTA) 100-62.5-25 MCG/INH AEPB INHALE 1 PUFF INTO THE LINGS DAILY 6/2/20  Yes TERRANCE Javier CNP   albuterol-ipratropium (COMBIVENT RESPIMAT)  MCG/ACT AERS inhaler Inhale 1 puff into the lungs every 6 hours 6/2/20  Yes TERRANCE Javier CNP   hydrALAZINE (APRESOLINE) 100 MG tablet Take 1 tablet by wound.   Neurological: Negative for dizziness, syncope, weakness, light-headedness, numbness and headaches. PHYSICAL EXAM   (up to 7 for level 4, 8 or more forlevel 5)      INITIAL VITALS:   ED Triage Vitals   BP Temp Temp Source Pulse Resp SpO2 Height Weight   06/18/20 1151 06/18/20 1142 06/18/20 1142 06/18/20 1151 06/18/20 1151 06/18/20 1151 -- 06/18/20 1152   (!) 185/85 98.1 °F (36.7 °C) Oral 74 19 100 %  147 lb (66.7 kg)       Physical Exam  Vitals signs and nursing note reviewed. Constitutional:       General: He is not in acute distress. Appearance: Normal appearance. He is well-developed. He is not diaphoretic. HENT:      Head: Normocephalic and atraumatic. Nose: Nose normal.   Neck:      Musculoskeletal: Normal range of motion and neck supple. Cardiovascular:      Rate and Rhythm: Normal rate and regular rhythm. Pulses: Normal pulses. Heart sounds: Normal heart sounds. Pulmonary:      Effort: Pulmonary effort is normal. No respiratory distress. Breath sounds: Normal breath sounds. No wheezing or rales. Abdominal:      General: There is no distension. Palpations: Abdomen is soft. Tenderness: There is no abdominal tenderness. There is no guarding. Musculoskeletal: Normal range of motion. General: No swelling or tenderness. Skin:     General: Skin is warm and dry. Neurological:      Mental Status: He is alert. Mental status is at baseline. Psychiatric:         Behavior: Behavior normal.         Thought Content:  Thought content normal.         DIFFERENTIAL  DIAGNOSIS     PLAN (LABS / IMAGING / EKG):  Orders Placed This Encounter   Procedures    XR CHEST PORTABLE    Comprehensive Metabolic Panel    Troponin    Urinalysis Reflex to Culture    SODIUM, URINE, RANDOM    CREATININE, RANDOM URINE    OSMOLALITY, URINE    Osmolality    SPECIMEN REJECTION    CBC Auto Differential    PREVIOUS SPECIMEN    Brain Natriuretic Peptide    APTT   

## 2020-06-18 NOTE — ED PROVIDER NOTES
[address] [facility]     Emergency Department     Faculty Attestation    I performed a history and physical examination of the patient and discussed management with the resident. I reviewed the residents note and agree with the documented findings and plan of care. Any areas of disagreement are noted on the chart. I was personally present for the key portions of any procedures. I have documented in the chart those procedures where I was not present during the key portions. I have reviewed the emergency nurses triage note. I agree with the chief complaint, past medical history, past surgical history, allergies, medications, social and family history as documented unless otherwise noted below. For Physician Assistant/ Nurse Practitioner cases/documentation I have personally evaluated this patient and have completed at least one if not all key elements of the E/M (history, physical exam, and MDM). Additional findings are as noted. Patient sent here by his nephrologist for elevated creatinine. He had blood work done 2 days ago. Patient says he was called by the kidney doctor yesterday and was told that if he has any nausea or metallic taste in his mouth he should come in. He says he did not have those symptoms yesterday but he now has them. He says he does feel little short of breath but does not know if it is any worse than his typical shortness of breath due to his COPD. He denies fever, chills, cough, abdominal pain. He denies chest pain. On exam, patient is resting comfortably in the bed. Lungs clear to auscultation bilaterally and heart sounds are normal.  Will get EKG and chest x-ray and labs and plan to admit patient.     EKG Interpretation    Interpreted by emergency department physician    Rhythm: normal sinus   Rate: normal  Axis: normal  Ectopy: none  Conduction: normal  ST Segments: normal  T Waves: non specific changes  Q Waves: none    Clinical Impression: non-specific EKG    Albina Escort Brett Hermosillo MD  Attending Emergency  Physician              Lita Rosales MD  06/18/20 2725

## 2020-06-18 NOTE — ED NOTES
Pt updated with lab/EKG results and the decision for admission and Cardiology consult.  Pt agreeable to plan     Olvin Hargrove RN  06/18/20 5445

## 2020-06-18 NOTE — ED PROVIDER NOTES
with questionable subchondral sclerosis. Questionable mild avascular necrosis involving the left femoral head. Suggestion of subtle cortical thickening along the left femoral neck could be seen with stress reaction-stress fracture. No discrete fracture line. Mild right hip osteoarthritis. RECENT VITALS:     Temp: 98.1 °F (36.7 °C),  Pulse: 61, Resp: 19, BP: (!) 150/65, SpO2: 98 %      This patient is a 60 y.o. Male with *acute on chronic renal failure, tachypneic, bumped trops. COVID-. Started on heparin by cards, admitted to IM. ED Course as of Jun 18 1647   Thu Jun 18, 2020   1256 Significantly elevated from patient's baseline. Given the EKG changes compared to prior will consult cardiology, give aspirin. Troponin, High Sensitivity(!!): 398 [LW]   1257 Improved from outpatient labs. Will still plan to consult nephrology as they sent the patient in for evaluation. Creatinine(!): 2.54 [LW]   1258 Chest x-ray findings consistent with COPD, no wheezing on exam however.    [LW]   1 Spoke with cardiology, they recommend low-dose heparin, serial EKGs and troponins and will hope to evaluate the patient today in the hospital.    [LW]   1312 We will plan for repeat EKG at this time. [LW]   5033 Patient reevaluated. Again denies chest pain at this time. Appears less tachypneic, will plan for repeat EKG. Patient updated on findings and plan and agreeable for admission at this time. [LW]   [phone] Although I have low suspicion for COVID patient is SERD positive with cough, shortness of breath and lymphopenia. We will plan for rapid Cobin swab. Did initially page internal medicine for admission however they think this is a family medicine patient. If COVID negative will plan to consult family medicine if positive patient will need admitted under [facility]. [LW]   1357 Improved from prior. Troponin, High Sensitivity(!!): 367 [LW]   1510 Will page family medicine for admission.

## 2020-06-18 NOTE — CONSULTS
2/7/20   Kitty Pouch, APRN - CNP   TRUE METRIX BLOOD GLUCOSE TEST strip TEST BLOOD SUGAR 4 TO 5 TIMES DAILY 12/12/19   Kitty Pouch, APRN - CNP   EASY COMFORT LANCETS MISC DX: DM-2 USE 3-4 TIMES DAILY 3/25/19   Zara Anglin MD       Current Medications: Scheduled Meds:   heparin (porcine)  60 Units/kg Intravenous Once     Continuous Infusions:   heparin (porcine)       PRN Meds:.heparin (porcine), heparin (porcine)     Allergies:  Lisinopril; Ace inhibitors; and Pcn [penicillins]    Social History:   reports that he quit smoking about 5 years ago. His smoking use included cigarettes. He has a 17.50 pack-year smoking history. He has never used smokeless tobacco. He reports that he does not drink alcohol or use drugs. Family History: family history includes Cancer in his mother; Heart Disease in his father. Review of Systems   CONSTITUTIONAL:  negative for fevers, chills, fatigue and malaise    EYES:  negative for discharge    HEENT:  negative for epistaxis and sore throat    RESPIRATORY:  As above for shortness of breath, wheezing    CARDIOVASCULAR:  negative for chest pain, palpitations, syncope, edema    GASTROINTESTINAL:  negative for nausea, vomiting, diarrhea, constipation, abdominal pain    GENITOURINARY:  negative for incontinence    MUSCULOSKELETAL:  negative for neck or back pain    NEUROLOGICAL:  negative for headaches, seizures and double vision   PSYCHIATRIC:  negative          PHYSICAL EXAM:    Blood pressure (!) 174/75, pulse 69, temperature 98.1 °F (36.7 °C), temperature source Oral, resp. rate 19, weight 147 lb (66.7 kg), SpO2 99 %.     CONSTITUTIONAL: AOx4, no apparent distress, appears stated age   HEAD: normocephalic, atraumatic   EYES: PERRLA, EOMI   ENT: moist mucous membranes, uvula midline   NECK:  symmetric, no midline tenderness to palpation   LUNGS: clear to auscultation bilaterally   CARDIOVASCULAR: regular rate and rhythm, no murmurs, rubs or gallops   ABDOMEN: Soft, SECONDARY TO UNCONTROLLED HYPERTENSION, DIASTOLIC DYSFUNCTION  ELEVATED TROPONIN, LIKELY TYPE II MI R/T CKD  HEPARIN PROTOCOL FOR 24 HOURS   WILL TREND TROPS   HIGH BLOOD PRESSURE, WILL START IV NITRO, RESUME COREG, HOLD IMDUR FOR NOW  GIVEN THE BINU AS PER NEPHROLOGY, WILL HOLD LOSARTAN     Discussed with patient and nursing.     Iván Perez [id number]        [phone]

## 2020-06-18 NOTE — H&P
inpatient status for further evaluation with cardiology and nephrology consultation. 1. BINU on CKD. - Creatinine 2.54, baseline appears to be 1.7 - 2.2.   - Gentle hydration due to HF aspect. - Urine Sodium  - Urine Creatinine  - Follow for FENA. - Nephrology consultation.    - Hold Losartan. 2. Elevated Troponin. - Cardiology Consultation.   - Likely Type II MI secondary to CKD. - Heparin gtt. - Nitro gtt for BP  - Coreg 25 mg BID. - Trops q 6 hours. 3. Hypertension  - Nitro gtt. - Coreg 25 mg BID. 4. Hyperlipidemia  - Atorvastatin 80 mg nightly. 5. Type II DM  - Hemoglobin A1c 9.9 6/16/2020.   - Lantus 35 units nightly. - Low dose sliding scale. 6. Bilateral Carotid Stenosis  - Status post stenting.   - Aspirin 81 mg daily.   - Plavix 75 mg daily. 7. Moderate Malnutrition  - Supplementation. 8. Hyperparathyroidism. DVT ppx: Heparin gtt. GI ppx: Not indicated. PT/OT/SW  Discharge Planning: Case management to assist with discharge planning. Darío Mariee DO  Internal Medicine Resident, PGY-1  [facility]; [place], [place]  6/18/2020, 1:17 PM   Attending Physician Statement  I have discussed the care of Scott Santamaria, including pertinent history and exam findings,  with the resident. I have seen and examined the patient and the key elements of all parts of the encounter have been performed by me. I agree with the assessment, plan and orders as documented by the resident with additions . Stable will discuss with cardiology. Anxiety may be secondary to chronic renal failure. Await cardiology opinion. He will benefit from a sleep study  Treatment plan Discussed with nursing staff in detail , all questions answered . Electronically signed by Melody Fernandez MD on   6/19/20 at 10:23 AM EDT    Please note that this chart was generated using voice recognition Dragon dictation software.   Although every effort was made to ensure the

## 2020-06-18 NOTE — ED PROVIDER NOTES
[address] ED  Emergency Department  Faculty Sign-Out Addendum     Care of Arden Corona was assumed from previous attending and is being seen for Abnormal Lab (sent by his Nephrologist for elevated creatinine. H/o stage 3 renal disease. Pt reports SOB and nausea)  . The patient's initial evaluation and plan have been discussed with the prior provider who initially evaluated the patient. Attestation    I was available and discussed any additional care issues that arose and coordinated the management plans with the resident(s) caring for the patient during my duty period. Any areas of disagreement with residents documentation of care or procedures are noted on the chart. I was personally present for the key portions of any/all procedures during my duty period. I have documented in the chart those procedures where I was not present during the key portions.     EMERGENCY DEPARTMENT COURSE / MEDICAL DECISION MAKING:       MEDICATIONS GIVEN:  Orders Placed This Encounter   Medications    aspirin chewable tablet 324 mg    heparin (porcine) injection 4,000 Units    heparin (porcine) injection 4,000 Units    heparin (porcine) injection 2,000 Units    heparin 25,000 units in dextrose 5% 250 mL infusion    nitroGLYCERIN 50 mg in dextrose 5% 250 mL infusion    carvedilol (COREG) tablet 25 mg    hydrALAZINE (APRESOLINE) tablet 100 mg       LABS / RADIOLOGY:     Labs Reviewed   COMPREHENSIVE METABOLIC PANEL - Abnormal; Notable for the following components:       Result Value    Glucose 113 (*)     BUN 37 (*)     CREATININE 2.54 (*)     Calcium 8.5 (*)     Total Bilirubin 0.19 (*)     Total Protein 5.0 (*)     Alb 2.9 (*)     GFR Non- 26 (*)     GFR  31 (*)     All other components within normal limits   TROPONIN - Abnormal; Notable for the following components:    Troponin, High Sensitivity 398 (*)     All other components within normal limits   URINE RT

## 2020-06-19 PROBLEM — E44.0 MODERATE MALNUTRITION (HCC): Status: RESOLVED | Noted: 2019-08-21 | Resolved: 2020-06-19

## 2020-06-19 LAB
ABSOLUTE EOS #: 0.32 K/UL (ref 0–0.44)
ABSOLUTE IMMATURE GRANULOCYTE: 0.06 K/UL (ref 0–0.3)
ABSOLUTE LYMPH #: 2.01 K/UL (ref 1.1–3.7)
ABSOLUTE MONO #: 0.69 K/UL (ref 0.1–1.2)
ANION GAP SERPL CALCULATED.3IONS-SCNC: 12 MMOL/L (ref 9–17)
BASOPHILS # BLD: 0 % (ref 0–2)
BASOPHILS ABSOLUTE: 0.03 K/UL (ref 0–0.2)
BUN BLDV-MCNC: 36 MG/DL (ref 8–23)
BUN/CREAT BLD: ABNORMAL (ref 9–20)
CALCIUM SERPL-MCNC: 7.5 MG/DL (ref 8.6–10.4)
CHLORIDE BLD-SCNC: 109 MMOL/L (ref 98–107)
CO2: 20 MMOL/L (ref 20–31)
CREAT SERPL-MCNC: 2.48 MG/DL (ref 0.7–1.2)
DIFFERENTIAL TYPE: ABNORMAL
EKG ATRIAL RATE: 66 BPM
EKG ATRIAL RATE: 66 BPM
EKG P AXIS: 64 DEGREES
EKG P AXIS: 65 DEGREES
EKG P-R INTERVAL: 134 MS
EKG P-R INTERVAL: 134 MS
EKG Q-T INTERVAL: 384 MS
EKG Q-T INTERVAL: 398 MS
EKG QRS DURATION: 94 MS
EKG QRS DURATION: 98 MS
EKG QTC CALCULATION (BAZETT): 402 MS
EKG QTC CALCULATION (BAZETT): 417 MS
EKG R AXIS: 56 DEGREES
EKG R AXIS: 80 DEGREES
EKG T AXIS: -146 DEGREES
EKG T AXIS: -160 DEGREES
EKG VENTRICULAR RATE: 66 BPM
EKG VENTRICULAR RATE: 66 BPM
EOSINOPHILS RELATIVE PERCENT: 4 % (ref 1–4)
GFR AFRICAN AMERICAN: 32 ML/MIN
GFR NON-AFRICAN AMERICAN: 26 ML/MIN
GFR SERPL CREATININE-BSD FRML MDRD: ABNORMAL ML/MIN/{1.73_M2}
GFR SERPL CREATININE-BSD FRML MDRD: ABNORMAL ML/MIN/{1.73_M2}
GLUCOSE BLD-MCNC: 118 MG/DL (ref 75–110)
GLUCOSE BLD-MCNC: 151 MG/DL (ref 75–110)
GLUCOSE BLD-MCNC: 173 MG/DL (ref 75–110)
GLUCOSE BLD-MCNC: 294 MG/DL (ref 75–110)
GLUCOSE BLD-MCNC: 95 MG/DL (ref 70–99)
HCT VFR BLD CALC: 34.2 % (ref 40.7–50.3)
HEMOGLOBIN: 10.3 G/DL (ref 13–17)
IMMATURE GRANULOCYTES: 1 %
LYMPHOCYTES # BLD: 27 % (ref 24–43)
MCH RBC QN AUTO: 30.7 PG (ref 25.2–33.5)
MCHC RBC AUTO-ENTMCNC: 30.1 G/DL (ref 28.4–34.8)
MCV RBC AUTO: 101.8 FL (ref 82.6–102.9)
MONOCYTES # BLD: 9 % (ref 3–12)
NRBC AUTOMATED: 0 PER 100 WBC
PARTIAL THROMBOPLASTIN TIME: 39 SEC (ref 20.5–30.5)
PARTIAL THROMBOPLASTIN TIME: 44.4 SEC (ref 20.5–30.5)
PARTIAL THROMBOPLASTIN TIME: 52.6 SEC (ref 20.5–30.5)
PARTIAL THROMBOPLASTIN TIME: 63.5 SEC (ref 20.5–30.5)
PDW BLD-RTO: 14 % (ref 11.8–14.4)
PLATELET # BLD: 277 K/UL (ref 138–453)
PLATELET ESTIMATE: ABNORMAL
PMV BLD AUTO: 10 FL (ref 8.1–13.5)
POTASSIUM SERPL-SCNC: 4.3 MMOL/L (ref 3.7–5.3)
RBC # BLD: 3.36 M/UL (ref 4.21–5.77)
RBC # BLD: ABNORMAL 10*6/UL
SEG NEUTROPHILS: 58 % (ref 36–65)
SEGMENTED NEUTROPHILS ABSOLUTE COUNT: 4.34 K/UL (ref 1.5–8.1)
SODIUM BLD-SCNC: 141 MMOL/L (ref 135–144)
SODIUM,UR: 80 MMOL/L
TROPONIN INTERP: ABNORMAL
TROPONIN INTERP: ABNORMAL
TROPONIN T: ABNORMAL NG/ML
TROPONIN T: ABNORMAL NG/ML
TROPONIN, HIGH SENSITIVITY: 393 NG/L (ref 0–22)
TROPONIN, HIGH SENSITIVITY: 411 NG/L (ref 0–22)
WBC # BLD: 7.5 K/UL (ref 3.5–11.3)
WBC # BLD: ABNORMAL 10*3/UL

## 2020-06-19 PROCEDURE — 85025 COMPLETE CBC W/AUTO DIFF WBC: CPT

## 2020-06-19 PROCEDURE — 99222 1ST HOSP IP/OBS MODERATE 55: CPT | Performed by: INTERNAL MEDICINE

## 2020-06-19 PROCEDURE — 84484 ASSAY OF TROPONIN QUANT: CPT

## 2020-06-19 PROCEDURE — 36415 COLL VENOUS BLD VENIPUNCTURE: CPT

## 2020-06-19 PROCEDURE — 6370000000 HC RX 637 (ALT 250 FOR IP): Performed by: INTERNAL MEDICINE

## 2020-06-19 PROCEDURE — 2060000000 HC ICU INTERMEDIATE R&B

## 2020-06-19 PROCEDURE — 99232 SBSQ HOSP IP/OBS MODERATE 35: CPT | Performed by: INTERNAL MEDICINE

## 2020-06-19 PROCEDURE — 80048 BASIC METABOLIC PNL TOTAL CA: CPT

## 2020-06-19 PROCEDURE — 93010 ELECTROCARDIOGRAM REPORT: CPT | Performed by: INTERNAL MEDICINE

## 2020-06-19 PROCEDURE — 6370000000 HC RX 637 (ALT 250 FOR IP): Performed by: STUDENT IN AN ORGANIZED HEALTH CARE EDUCATION/TRAINING PROGRAM

## 2020-06-19 PROCEDURE — 82947 ASSAY GLUCOSE BLOOD QUANT: CPT

## 2020-06-19 PROCEDURE — 93005 ELECTROCARDIOGRAM TRACING: CPT | Performed by: STUDENT IN AN ORGANIZED HEALTH CARE EDUCATION/TRAINING PROGRAM

## 2020-06-19 PROCEDURE — 2580000003 HC RX 258: Performed by: STUDENT IN AN ORGANIZED HEALTH CARE EDUCATION/TRAINING PROGRAM

## 2020-06-19 PROCEDURE — 6360000002 HC RX W HCPCS: Performed by: STUDENT IN AN ORGANIZED HEALTH CARE EDUCATION/TRAINING PROGRAM

## 2020-06-19 PROCEDURE — 84300 ASSAY OF URINE SODIUM: CPT

## 2020-06-19 PROCEDURE — 85730 THROMBOPLASTIN TIME PARTIAL: CPT

## 2020-06-19 PROCEDURE — 6370000000 HC RX 637 (ALT 250 FOR IP): Performed by: NURSE PRACTITIONER

## 2020-06-19 RX ORDER — HYDRALAZINE HYDROCHLORIDE 20 MG/ML
10 INJECTION INTRAMUSCULAR; INTRAVENOUS EVERY 6 HOURS PRN
Status: DISCONTINUED | OUTPATIENT
Start: 2020-06-19 | End: 2020-06-20 | Stop reason: HOSPADM

## 2020-06-19 RX ORDER — AMLODIPINE BESYLATE 5 MG/1
5 TABLET ORAL DAILY
Status: DISCONTINUED | OUTPATIENT
Start: 2020-06-19 | End: 2020-06-19

## 2020-06-19 RX ORDER — PRAZOSIN HYDROCHLORIDE 1 MG/1
4 CAPSULE ORAL 2 TIMES DAILY
Status: DISCONTINUED | OUTPATIENT
Start: 2020-06-20 | End: 2020-06-20 | Stop reason: HOSPADM

## 2020-06-19 RX ORDER — BUMETANIDE 1 MG/1
1 TABLET ORAL DAILY
Status: DISCONTINUED | OUTPATIENT
Start: 2020-06-19 | End: 2020-06-20 | Stop reason: HOSPADM

## 2020-06-19 RX ORDER — AMLODIPINE BESYLATE 10 MG/1
10 TABLET ORAL DAILY
Status: DISCONTINUED | OUTPATIENT
Start: 2020-06-20 | End: 2020-06-20

## 2020-06-19 RX ORDER — PRAZOSIN HYDROCHLORIDE 1 MG/1
2 CAPSULE ORAL 2 TIMES DAILY
Status: DISCONTINUED | OUTPATIENT
Start: 2020-06-19 | End: 2020-06-19

## 2020-06-19 RX ADMIN — HYDRALAZINE HYDROCHLORIDE 100 MG: 50 TABLET, FILM COATED ORAL at 21:02

## 2020-06-19 RX ADMIN — HYDRALAZINE HYDROCHLORIDE 100 MG: 50 TABLET, FILM COATED ORAL at 14:22

## 2020-06-19 RX ADMIN — CLOPIDOGREL 75 MG: 75 TABLET, FILM COATED ORAL at 08:03

## 2020-06-19 RX ADMIN — HYDRALAZINE HYDROCHLORIDE 100 MG: 50 TABLET, FILM COATED ORAL at 05:45

## 2020-06-19 RX ADMIN — PRAZOSIN HYDROCHLORIDE 2 MG: 1 CAPSULE ORAL at 16:05

## 2020-06-19 RX ADMIN — AMLODIPINE BESYLATE 5 MG: 5 TABLET ORAL at 14:22

## 2020-06-19 RX ADMIN — ATORVASTATIN CALCIUM 80 MG: 80 TABLET, FILM COATED ORAL at 21:02

## 2020-06-19 RX ADMIN — PRAZOSIN HYDROCHLORIDE 2 MG: 1 CAPSULE ORAL at 21:02

## 2020-06-19 RX ADMIN — HEPARIN SODIUM AND DEXTROSE 15.99 UNITS/KG/HR: 10000; 5 INJECTION INTRAVENOUS at 16:31

## 2020-06-19 RX ADMIN — CARVEDILOL 25 MG: 12.5 TABLET, FILM COATED ORAL at 08:03

## 2020-06-19 RX ADMIN — GABAPENTIN 200 MG: 100 CAPSULE ORAL at 21:02

## 2020-06-19 RX ADMIN — GABAPENTIN 200 MG: 100 CAPSULE ORAL at 08:03

## 2020-06-19 RX ADMIN — GABAPENTIN 200 MG: 100 CAPSULE ORAL at 14:22

## 2020-06-19 RX ADMIN — SODIUM CHLORIDE, PRESERVATIVE FREE 10 ML: 5 INJECTION INTRAVENOUS at 21:07

## 2020-06-19 RX ADMIN — HYDRALAZINE HYDROCHLORIDE 10 MG: 20 INJECTION INTRAMUSCULAR; INTRAVENOUS at 18:12

## 2020-06-19 RX ADMIN — HEPARIN SODIUM 2000 UNITS: 1000 INJECTION INTRAVENOUS; SUBCUTANEOUS at 03:18

## 2020-06-19 RX ADMIN — HEPARIN SODIUM 2000 UNITS: 1000 INJECTION INTRAVENOUS; SUBCUTANEOUS at 17:03

## 2020-06-19 RX ADMIN — CARVEDILOL 25 MG: 12.5 TABLET, FILM COATED ORAL at 16:08

## 2020-06-19 RX ADMIN — INSULIN GLARGINE 35 UNITS: 100 INJECTION, SOLUTION SUBCUTANEOUS at 21:03

## 2020-06-19 RX ADMIN — BUMETANIDE 1 MG: 1 TABLET ORAL at 16:05

## 2020-06-19 RX ADMIN — Medication 81 MG: at 08:03

## 2020-06-19 NOTE — PROGRESS NOTES
[facility]  Internal Medicine Teaching Residency Program  Inpatient Daily Progress Note  ______________________________________________________________________________    Patient: Bonnie Zacarias  YOB: 1955   TFY:[id number]    Acct: [de-identified]     Room: [facility]-  Admit date: 6/18/2020  Today's date: 06/19/20  Number of days in the hospital: 1    SUBJECTIVE   Admitting Diagnosis: NSTEMI (non-ST elevated myocardial infarction) ([facility] [address]  CC: Abnormal Lab  Vitals reviewed, mildly hypertensive but stable. Labs reviewed, creatinine returning to baseline with hydration. Troponin 411 cardiology aware. Overnight patient hypertensive requiring Nitro gtt. On examination patient is resting comfortably in bed. Requesting 5 carb diet so he knows how much insulin to take. No complaints at this time. ROS:  Constitutional:  negative for chills, fevers, sweats  Respiratory:  negative for cough, dyspnea on exertion, hemoptysis, shortness of breath, wheezing  Cardiovascular:  negative for chest pain, chest pressure/discomfort, lower extremity edema, palpitations  Gastrointestinal:  negative for abdominal pain, constipation, diarrhea, nausea, vomiting  Neurological:  negative for dizziness, headache  BRIEF HISTORY     The patient is a pleasant 60 y.o. male with a past medical history of CKD stage III, DM and HTN presents with a chief complaint of abnormal lab work after having a BMP done and sent to his Nephrologist. Creatinine on 6/16/2020 was 3.70. His Nephrologist contacted him and stated he need to come to the ED if he is experiencing nausea or metallic taste in his mouth. Patient states late last night around midnight he experienced some mild nausea and metallic taste on awakening today. Denies fevers, chills, chest pain, shortness of breath, abdominal pain, vomiting or diarrhea.      In the emergency department he was afebrile but hypertensive.  Labs demonstrated Ector Roper  Internal Medicine Resident, PGY-1  [facility]; [place], [place]  6/19/2020, 7:24 AM   Attending Physician Statement  I have discussed the care of David Miranda, including pertinent history and exam findings,  with the resident. I have seen and examined the patient and the key elements of all parts of the encounter have been performed by me. I agree with the assessment, plan and orders as documented by the resident with additions . Treatment plan Discussed with nursing staff in detail , all questions answered . Electronically signed by Tona Heredia MD on   6/19/20 at 12:54 PM EDT    Please note that this chart was generated using voice recognition Dragon dictation software. Although every effort was made to ensure the accuracy of this automated transcription, some errors in transcription may have occurred.

## 2020-06-19 NOTE — CONSULTS
to check his blood pressure at home however recently his blood pressure cuff is not functioning well. Patient states that his blood pressure was better controlled when he was in processing  Patient states that she tried to cut down his salt intake  Denies any voiding difficulties.     Past Medical History:        Diagnosis Date    Asthma     mod persistent    CAD in native artery, nonobstructive     Carotid stenosis, left 6/10/2013    Carpal tunnel syndrome     RIGHT    Cerebrovascular disease 4/23/2018    Chronic kidney disease 6/10/2013    COPD (chronic obstructive pulmonary disease) (HCC)     Diabetes mellitus (HCC)     insulin dependent    History of colon polyps     History of weakness of extremity     right sided    HTN (hypertension)     Hyperlipidemia     Lung, cysts, congenital     PTSD (post-traumatic stress disorder)     PTSD (post-traumatic stress disorder)     TIA (transient ischemic attack)     Type II or unspecified type diabetes mellitus without mention of complication, not stated as uncontrolled        Past Surgical History:        Procedure Laterality Date    CAROTID ENDARTERECTOMY Left 7-2013    COLONOSCOPY      HERNIA REPAIR      MT COLSC FLX W/RMVL OF TUMOR POLYP LESION SNARE TQ N/A 6/27/2017    COLONOSCOPY POLYPECTOMY SNARE/ hot performed by Maya Das DO at [address] Left 2015    carotid stent, dr Shannan Salgado       Current Medications:    amLODIPine (NORVASC) tablet 5 mg, Daily  heparin (porcine) injection 4,000 Units, PRN  heparin (porcine) injection 2,000 Units, PRN  heparin 25,000 units in dextrose 5% 250 mL infusion, Continuous  nitroGLYCERIN 50 mg in dextrose 5% 250 mL infusion, Continuous  carvedilol (COREG) tablet 25 mg, BID WC  hydrALAZINE (APRESOLINE) tablet 100 mg, 3 times per day  aspirin EC tablet 81 mg, Daily  atorvastatin (LIPITOR) tablet 80 mg, Daily  clopidogrel (PLAVIX) tablet 75 mg, Days per week: Not on file     Minutes per session: Not on file    Stress: Not on file   Relationships    Social connections     Talks on phone: Not on file     Gets together: Not on file     Attends Hindu service: Not on file     Active member of club or organization: Not on file     Attends meetings of clubs or organizations: Not on file     Relationship status: Not on file    Intimate partner violence     Fear of current or ex partner: Not on file     Emotionally abused: Not on file     Physically abused: Not on file     Forced sexual activity: Not on file   Other Topics Concern    Not on file   Social History Narrative    Not on file       Family History:   Family History   Problem Relation Age of Onset    Cancer Mother     Heart Disease Father        Review of Systems:    Constitutional: No fever or chills. HEENT:  Headache or blurring of vision  Cardiac:  No chest pain or PND. Chest:   No cough or wheezing. Abdomen:  No abdominal pain, nausea or vomiting. Neuro:  No focal weakness, abnormal movements orseizure like activity. Skin:   No rashes, no itching. :   No hematuria, no pyuria, no dysuria, no flank pain.   Extremities:  No increase in leg swelling      Objective:  CURRENT TEMPERATURE:  Temp: 97.8 °F (36.6 °C)  MAXIMUM TEMPERATURE OVER 24HRS:  Temp (24hrs), Av.2 °F (36.8 °C), Min:97.8 °F (36.6 °C), Max:98.6 °F (37 °C)    CURRENT RESPIRATORY RATE:  Resp: 26  CURRENT PULSE:  Pulse: 76  CURRENT BLOOD PRESSURE:  BP: (!) 165/65  24HR BLOOD PRESSURE RANGE:  Systolic (60OZG), VLO:204 , Min:147 , TMS:583   ; Diastolic (58JJS), BRYSON:57, Min:58, Max:104    24HR INTAKE/OUTPUT:      Intake/Output Summary (Last 24 hours) at 2020 1330  Last data filed at 2020 0548  Gross per 24 hour   Intake --   Output 1100 ml   Net -1100 ml     Patient Vitals for the past 96 hrs (Last 3 readings):   Weight   20 0622 141 lb (64 kg)   20 1152 147 lb (66.7 kg)     Physical Exam:  General appearance:Awake, alert, in no acute distress  Skin: warm and dry, no rash or erythema  Eyes: conjunctivae normal and sclera anicteric  ENT: :no thrush no pharyngeal congestion    Neck: no carotid bruit ,no  JVD,no carotid  Lymphadenopathy and no  Thyromegaly Pulmonary: no wheezing or rhonchi. No rales heard. Cardiovascular: Normal S1 & S2,  No S3 or  S4, no Pericardial Rub no Murmur   Abdomen: soft nontender, bowel sounds present, no organomegaly,  no ascites  Extremities:no cyanosis, clubbing or edema    Labs:   CBC:  Recent Labs     06/18/20  1235 06/19/20  0235   WBC 6.6 7.5   RBC 3.55* 3.36*   HGB 10.7* 10.3*   HCT 34.2* 34.2*   MCV 96.3 101.8   MCH 30.1 30.7   MCHC 31.3 30.1   RDW 14.0 14.0    277   MPV 9.7 10.0      BMP:   Recent Labs     06/18/20  1155 06/19/20  0235    141   K 5.0 4.3    109*   CO2 21 20   BUN 37* 36*   CREATININE 2.54* 2.48*   GLUCOSE 113* 95   CALCIUM 8.5* 7.5*        Phosphorus:  No results for input(s): PHOS in the last 72 hours. Magnesium: No results for input(s): MG in the last 72 hours. Albumin:   Recent Labs     06/18/20  1155   LABALBU 2.9*       IRON:  No results found for: IRON  Iron Saturation:  No components found for: PERCENTFE  TIBC:  No results found for: TIBC  FERRITIN:  No results found for: FERRITIN  SPEP:   Lab Results   Component Value Date    PROT 5.0 06/18/2020    PROT 6.2 11/12/2011    ALBCAL 3.3 09/27/2017    ALBPCT 63 09/27/2017    LABALPH 0.1 09/27/2017    LABALPH 0.8 09/27/2017    A1PCT 3 09/27/2017    A2PCT 15 09/27/2017    LABBETA 0.6 09/27/2017    BETAPCT 12 09/27/2017    GAMGLOB 0.4 09/27/2017    GGPCT 8 09/27/2017    PATH ELECTRONICALLY SIGNED. Melissa Smart M.D. 09/27/2017    PATH ELECTRONICALLY SIGNED. Melissa Smart M.D. 09/27/2017     UPEP:   Lab Results   Component Value Date     11/12/2011        C3: No results found for: C3  C4: No results found for: C4  MPO ANCA:  No results found for: MPO .   PR3 ANCA:  No

## 2020-06-19 NOTE — PROGRESS NOTES
[facility]   Progress Note                   Date:   6/19/2020  Patient name: Ollie Villarreal  Date of admission:  6/18/2020 11:42 AM  MRN:   [id number]  YOB: 1955  PCP: TERRANCE Manning CNP    Reason for Admission: NSTEMI (non-ST elevated myocardial infarction) ([facility] [address] [I21.4]    Subjective:       Clinical Changes / Abnormalities: Pt seen and examined in the room. Pt denies any CP and states that he has never had any CP. He denies any sob. NSR currently       Medications:   Scheduled Meds:   carvedilol  25 mg Oral BID WC    hydrALAZINE  100 mg Oral 3 times per day    aspirin  81 mg Oral Daily    atorvastatin  80 mg Oral Daily    clopidogrel  75 mg Oral Daily    fluticasone-umeclidin-vilant  1 puff Inhalation Daily    gabapentin  200 mg Oral TID    QUEtiapine  100 mg Oral Nightly    sodium chloride flush  10 mL Intravenous 2 times per day    insulin lispro  0-6 Units Subcutaneous TID WC    insulin lispro  0-3 Units Subcutaneous Nightly    vitamin D  50,000 Units Oral Weekly    insulin glargine  35 Units Subcutaneous Nightly     Continuous Infusions:   heparin (porcine) 15.99 Units/kg/hr (06/19/20 0318)    nitroGLYCERIN 115 mcg/min (06/19/20 1156)    sodium chloride 50 mL/hr at 06/18/20 2118    dextrose       CBC:   Recent Labs     06/16/20  1248 06/18/20  1235 06/19/20  0235   WBC 10.8 6.6 7.5   HGB 12.4* 10.7* 10.3*    311 277     BMP:    Recent Labs     06/16/20  1248 06/18/20  1155 06/19/20  0235    142 141   K 4.8 5.0 4.3    107 109*   CO2 19* 21 20   BUN 64* 37* 36*   CREATININE 3.70* 2.54* 2.48*   GLUCOSE 460* 113* 95     Hepatic:   Recent Labs     06/16/20  1248 06/18/20  1155   AST 29 34   ALT 20 26   BILITOT 0.44 0.19*   ALKPHOS 96 94     Troponin:   Recent Labs     06/18/20  1155 06/18/20  1319 06/19/20  0235   TROPHS 398* 367* 411*     BNP: No results for input(s): BNP in the last 72 hours.   Lipids: No results for input(s): CHOL, vascular disease     Primary insomnia     Hypertensive crisis     Non-obstructive Coronary artery disease of native artery of native heart with stable angina pectoris ([facility] [address]     Hyperparathyroidism ([facility] [address]     Hypovitaminosis D     Coronary artery disease with exertional angina (HCC)     Moderate malnutrition (HCC)     Leg swelling     Hyponatremia     Anemia     [facility]non[facility]ST elevated [facility]     Hypoalbuminemia      Plan of Treatment:       1. Elevated trop, per Dr. Hester Levels  Likely type II MI secondary to CKD   Recent negative stress test and ECHO. Continue to trend trops, watching for peak   2. Continue ASA, plavix, statin, BB. On heparin gtt. 3. HTN. Remains elevated. Continue BB/hydralazine. Will  tart on CCb. .  ARB on hold due to CKD. On nitro gtt.   Advised to wean off     Electronically signed by TERRANCE Crane CNP on 6/19/2020 at 12:04 PM  [address]  [phone]

## 2020-06-19 NOTE — PLAN OF CARE
Problem: Nutrition  Goal: Optimal nutrition therapy  Outcome: Ongoing  Note: Nutrition Problem: Unintended weight loss  Intervention: Food and/or Nutrient Delivery: Continue current diet, Start ONS  Nutritional Goals: Pt to consume >75% of est'd needs via PO

## 2020-06-19 NOTE — PROGRESS NOTES
Nutrition Assessment    Type and Reason for Visit: Initial, Positive Nutrition Screen(Wt loss, poor appetite )    Nutrition Recommendations:   -Continue 5 CHO diabetic diet   -Recommend glucerna supplements QID   -Will monitor po intake and weights     Nutrition Assessment:  Pt admitted d/t NSTEMI. Pt stated that his appetite is currently good and is consuming most of his meals. Pt stated UBW of 165 lbs and reports his wt loss was d/t decreased appetite and when he was in a coma. Pt reported that he needs to eat 3500 kcals/d to not lose weight. Per EMR, pt w/ hx of wt flux from 138-159 lbs over 11 mo per EMR. Will add supplements per pt request and monitor. Malnutrition Assessment:  · Malnutrition Status: At risk for malnutrition  · Context: Chronic illness  · Findings of the 6 clinical characteristics of malnutrition (Minimum of 2 out of 6 clinical characteristics is required to make the diagnosis of moderate or severe Protein Calorie Malnutrition based on AND/ASPEN Guidelines):  1. Energy Intake-Less than or equal to 50% of estimated energy requirement, Greater than or equal to 3 months    2. Weight Loss-10% loss or greater, in 1 year  3. Fat Loss-No significant subcutaneous fat loss,    4. Muscle Loss-No significant muscle mass loss,    5. Fluid Accumulation-No significant fluid accumulation,    6.  Strength-Not measured    Nutrition Risk Level:  Moderate    Nutrient Needs:  · Estimated Daily Total Kcal: 28-30 ~> 6065-3114 kcals/d   · Estimated Daily Protein (g): 1.2-1.4 gm/kg ~> 77-90 gms/d     Nutrition Diagnosis:   · Problem: Unintended weight loss  · Etiology: related to Insufficient energy/nutrient consumption(decreased appetite, medical condition )     Signs and symptoms:  as evidenced by Diet history of poor intake, Patient report of, Weight loss(Need for ONS )    Objective Information:  · Nutrition-Focused Physical Findings: labs/meds reviewed   · Wound Type: None  · Current Nutrition

## 2020-06-20 VITALS
HEIGHT: 66 IN | HEART RATE: 68 BPM | SYSTOLIC BLOOD PRESSURE: 141 MMHG | DIASTOLIC BLOOD PRESSURE: 64 MMHG | BODY MASS INDEX: 22.66 KG/M2 | RESPIRATION RATE: 20 BRPM | WEIGHT: 141 LBS | TEMPERATURE: 97.7 F | OXYGEN SATURATION: 98 %

## 2020-06-20 LAB
ABSOLUTE EOS #: 0.34 K/UL (ref 0–0.44)
ABSOLUTE IMMATURE GRANULOCYTE: 0.13 K/UL (ref 0–0.3)
ABSOLUTE LYMPH #: 1.94 K/UL (ref 1.1–3.7)
ABSOLUTE MONO #: 0.54 K/UL (ref 0.1–1.2)
ANION GAP SERPL CALCULATED.3IONS-SCNC: 11 MMOL/L (ref 9–17)
BASOPHILS # BLD: 1 % (ref 0–2)
BASOPHILS ABSOLUTE: 0.04 K/UL (ref 0–0.2)
BUN BLDV-MCNC: 37 MG/DL (ref 8–23)
BUN/CREAT BLD: ABNORMAL (ref 9–20)
CALCIUM SERPL-MCNC: 7.7 MG/DL (ref 8.6–10.4)
CHLORIDE BLD-SCNC: 106 MMOL/L (ref 98–107)
CO2: 21 MMOL/L (ref 20–31)
CREAT SERPL-MCNC: 2.24 MG/DL (ref 0.7–1.2)
DIFFERENTIAL TYPE: ABNORMAL
EKG ATRIAL RATE: 68 BPM
EKG P AXIS: 64 DEGREES
EKG P-R INTERVAL: 132 MS
EKG Q-T INTERVAL: 396 MS
EKG QRS DURATION: 96 MS
EKG QTC CALCULATION (BAZETT): 421 MS
EKG R AXIS: 60 DEGREES
EKG T AXIS: 166 DEGREES
EKG VENTRICULAR RATE: 68 BPM
EOSINOPHILS RELATIVE PERCENT: 5 % (ref 1–4)
GFR AFRICAN AMERICAN: 36 ML/MIN
GFR NON-AFRICAN AMERICAN: 30 ML/MIN
GFR SERPL CREATININE-BSD FRML MDRD: ABNORMAL ML/MIN/{1.73_M2}
GFR SERPL CREATININE-BSD FRML MDRD: ABNORMAL ML/MIN/{1.73_M2}
GLUCOSE BLD-MCNC: 160 MG/DL (ref 75–110)
GLUCOSE BLD-MCNC: 162 MG/DL (ref 70–99)
GLUCOSE BLD-MCNC: 275 MG/DL (ref 75–110)
HCT VFR BLD CALC: 38.6 % (ref 40.7–50.3)
HEMOGLOBIN: 12.2 G/DL (ref 13–17)
IMMATURE GRANULOCYTES: 2 %
LYMPHOCYTES # BLD: 27 % (ref 24–43)
MCH RBC QN AUTO: 30.3 PG (ref 25.2–33.5)
MCHC RBC AUTO-ENTMCNC: 31.6 G/DL (ref 28.4–34.8)
MCV RBC AUTO: 96 FL (ref 82.6–102.9)
MONOCYTES # BLD: 7 % (ref 3–12)
NRBC AUTOMATED: 0 PER 100 WBC
PARTIAL THROMBOPLASTIN TIME: 51.3 SEC (ref 20.5–30.5)
PDW BLD-RTO: 13.7 % (ref 11.8–14.4)
PLATELET # BLD: 287 K/UL (ref 138–453)
PLATELET ESTIMATE: ABNORMAL
PMV BLD AUTO: 9.9 FL (ref 8.1–13.5)
POTASSIUM SERPL-SCNC: 4.4 MMOL/L (ref 3.7–5.3)
RBC # BLD: 4.02 M/UL (ref 4.21–5.77)
RBC # BLD: ABNORMAL 10*6/UL
SEG NEUTROPHILS: 59 % (ref 36–65)
SEGMENTED NEUTROPHILS ABSOLUTE COUNT: 4.33 K/UL (ref 1.5–8.1)
SODIUM BLD-SCNC: 138 MMOL/L (ref 135–144)
WBC # BLD: 7.3 K/UL (ref 3.5–11.3)
WBC # BLD: ABNORMAL 10*3/UL

## 2020-06-20 PROCEDURE — 97530 THERAPEUTIC ACTIVITIES: CPT

## 2020-06-20 PROCEDURE — 2580000003 HC RX 258: Performed by: STUDENT IN AN ORGANIZED HEALTH CARE EDUCATION/TRAINING PROGRAM

## 2020-06-20 PROCEDURE — 80048 BASIC METABOLIC PNL TOTAL CA: CPT

## 2020-06-20 PROCEDURE — 93010 ELECTROCARDIOGRAM REPORT: CPT | Performed by: INTERNAL MEDICINE

## 2020-06-20 PROCEDURE — 85025 COMPLETE CBC W/AUTO DIFF WBC: CPT

## 2020-06-20 PROCEDURE — 85730 THROMBOPLASTIN TIME PARTIAL: CPT

## 2020-06-20 PROCEDURE — 36415 COLL VENOUS BLD VENIPUNCTURE: CPT

## 2020-06-20 PROCEDURE — 6370000000 HC RX 637 (ALT 250 FOR IP): Performed by: STUDENT IN AN ORGANIZED HEALTH CARE EDUCATION/TRAINING PROGRAM

## 2020-06-20 PROCEDURE — 97161 PT EVAL LOW COMPLEX 20 MIN: CPT

## 2020-06-20 PROCEDURE — 6370000000 HC RX 637 (ALT 250 FOR IP): Performed by: INTERNAL MEDICINE

## 2020-06-20 PROCEDURE — 6360000002 HC RX W HCPCS: Performed by: STUDENT IN AN ORGANIZED HEALTH CARE EDUCATION/TRAINING PROGRAM

## 2020-06-20 PROCEDURE — 99239 HOSP IP/OBS DSCHRG MGMT >30: CPT | Performed by: INTERNAL MEDICINE

## 2020-06-20 PROCEDURE — 82947 ASSAY GLUCOSE BLOOD QUANT: CPT

## 2020-06-20 PROCEDURE — 97535 SELF CARE MNGMENT TRAINING: CPT

## 2020-06-20 PROCEDURE — 97165 OT EVAL LOW COMPLEX 30 MIN: CPT

## 2020-06-20 RX ORDER — PRAZOSIN HYDROCHLORIDE 2 MG/1
4 CAPSULE ORAL 2 TIMES DAILY
Qty: 30 CAPSULE | Refills: 3 | Status: SHIPPED | OUTPATIENT
Start: 2020-06-20 | End: 2020-11-25 | Stop reason: DRUGHIGH

## 2020-06-20 RX ORDER — NIFEDIPINE 30 MG/1
30 TABLET, EXTENDED RELEASE ORAL DAILY
Status: DISCONTINUED | OUTPATIENT
Start: 2020-06-20 | End: 2020-06-20 | Stop reason: HOSPADM

## 2020-06-20 RX ORDER — LOSARTAN POTASSIUM 50 MG/1
50 TABLET ORAL 2 TIMES DAILY
Status: DISCONTINUED | OUTPATIENT
Start: 2020-06-20 | End: 2020-06-20 | Stop reason: HOSPADM

## 2020-06-20 RX ORDER — BUMETANIDE 1 MG/1
1 TABLET ORAL DAILY
Qty: 30 TABLET | Refills: 3 | Status: ON HOLD | OUTPATIENT
Start: 2020-06-21 | End: 2020-08-13 | Stop reason: SDUPTHER

## 2020-06-20 RX ORDER — BUMETANIDE 1 MG/1
1 TABLET ORAL DAILY
Qty: 30 TABLET | Refills: 3 | Status: SHIPPED | OUTPATIENT
Start: 2020-06-21 | End: 2020-06-20

## 2020-06-20 RX ORDER — PRAZOSIN HYDROCHLORIDE 2 MG/1
4 CAPSULE ORAL 2 TIMES DAILY
Qty: 30 CAPSULE | Refills: 3 | Status: SHIPPED | OUTPATIENT
Start: 2020-06-20 | End: 2020-06-20

## 2020-06-20 RX ORDER — ERGOCALCIFEROL 1.25 MG/1
50000 CAPSULE ORAL WEEKLY
Qty: 5 CAPSULE | Refills: 0 | Status: SHIPPED | OUTPATIENT
Start: 2020-06-25 | End: 2020-06-20

## 2020-06-20 RX ORDER — CHOLECALCIFEROL (VITAMIN D3) 50 MCG
2000 TABLET ORAL DAILY
Qty: 31 TABLET | Refills: 0 | Status: SHIPPED | OUTPATIENT
Start: 2020-07-25 | End: 2020-06-20 | Stop reason: SDUPTHER

## 2020-06-20 RX ORDER — ERGOCALCIFEROL 1.25 MG/1
50000 CAPSULE ORAL WEEKLY
Qty: 5 CAPSULE | Refills: 0 | Status: SHIPPED | OUTPATIENT
Start: 2020-06-25 | End: 2020-08-05

## 2020-06-20 RX ORDER — CHOLECALCIFEROL (VITAMIN D3) 50 MCG
2000 TABLET ORAL DAILY
Qty: 31 TABLET | Refills: 0 | Status: SHIPPED | OUTPATIENT
Start: 2020-07-25 | End: 2020-09-05

## 2020-06-20 RX ORDER — NIFEDIPINE 30 MG/1
30 TABLET, FILM COATED, EXTENDED RELEASE ORAL DAILY
Qty: 30 TABLET | Refills: 3 | Status: CANCELLED | OUTPATIENT
Start: 2020-06-21

## 2020-06-20 RX ADMIN — NIFEDIPINE 30 MG: 30 TABLET, FILM COATED, EXTENDED RELEASE ORAL at 10:12

## 2020-06-20 RX ADMIN — SODIUM CHLORIDE, PRESERVATIVE FREE 10 ML: 5 INJECTION INTRAVENOUS at 10:16

## 2020-06-20 RX ADMIN — BUMETANIDE 1 MG: 1 TABLET ORAL at 10:03

## 2020-06-20 RX ADMIN — GABAPENTIN 200 MG: 100 CAPSULE ORAL at 10:05

## 2020-06-20 RX ADMIN — HYDRALAZINE HYDROCHLORIDE 10 MG: 20 INJECTION INTRAMUSCULAR; INTRAVENOUS at 04:39

## 2020-06-20 RX ADMIN — CARVEDILOL 25 MG: 12.5 TABLET, FILM COATED ORAL at 10:22

## 2020-06-20 RX ADMIN — PRAZOSIN HYDROCHLORIDE 4 MG: 1 CAPSULE ORAL at 10:14

## 2020-06-20 RX ADMIN — CLOPIDOGREL 75 MG: 75 TABLET, FILM COATED ORAL at 10:06

## 2020-06-20 RX ADMIN — LOSARTAN POTASSIUM 50 MG: 50 TABLET, FILM COATED ORAL at 10:23

## 2020-06-20 RX ADMIN — Medication 81 MG: at 10:04

## 2020-06-20 RX ADMIN — HYDRALAZINE HYDROCHLORIDE 100 MG: 50 TABLET, FILM COATED ORAL at 05:37

## 2020-06-20 ASSESSMENT — PAIN DESCRIPTION - PAIN TYPE
TYPE: ACUTE PAIN;CHRONIC PAIN
TYPE: CHRONIC PAIN
TYPE: ACUTE PAIN;CHRONIC PAIN

## 2020-06-20 ASSESSMENT — PAIN DESCRIPTION - ORIENTATION
ORIENTATION: RIGHT;LEFT

## 2020-06-20 ASSESSMENT — PAIN SCALES - GENERAL
PAINLEVEL_OUTOF10: 5

## 2020-06-20 ASSESSMENT — PAIN DESCRIPTION - DESCRIPTORS: DESCRIPTORS: ACHING;DISCOMFORT

## 2020-06-20 ASSESSMENT — PAIN DESCRIPTION - LOCATION
LOCATION: LEG

## 2020-06-20 ASSESSMENT — PAIN DESCRIPTION - FREQUENCY: FREQUENCY: CONTINUOUS

## 2020-06-20 NOTE — PROGRESS NOTES
No results found for: FERRITIN  RIZWAN: No results found for: RIZWAN    SPEP:   Lab Results   Component Value Date    PROT 5.0 06/18/2020    PROT 6.2 11/12/2011    ALBCAL 3.3 09/27/2017    ALBPCT 63 09/27/2017    LABALPH 0.1 09/27/2017    LABALPH 0.8 09/27/2017    A1PCT 3 09/27/2017    A2PCT 15 09/27/2017    LABBETA 0.6 09/27/2017    BETAPCT 12 09/27/2017    GAMGLOB 0.4 09/27/2017    GGPCT 8 09/27/2017    PATH ELECTRONICALLY SIGNED. Sridevi Fields M.D. 09/27/2017    PATH ELECTRONICALLY SIGNED. Sridevi Fields M.D. 09/27/2017     UPEP:   Lab Results   Component Value Date     11/12/2011      HEPBSAG:  Lab Results   Component Value Date    HEPBSAG NONREACTIVE 08/15/2019     HEPCAB:  Lab Results   Component Value Date    HEPCAB NONREACTIVE 08/16/2019     C3: No results found for: C3  C4: No results found for: C4  MPO ANCA: No results found for: MPO .   PR3 ANCA:  No results found for: PR3  URINE SODIUM:    Lab Results   Component Value Date    IGNACIO 80 06/19/2020      URINE CREATININE:    Lab Results   Component Value Date    LABCREA 84.0 06/18/2020     URINE EOSINOPHILS:   Lab Results   Component Value Date    UREO NONE SEEN 05/17/2020     URINE PROTEIN:    Lab Results   Component Value Date     11/12/2011     URINALYSIS:  U/A:   Lab Results   Component Value Date    NITRU NEGATIVE 06/18/2020    COLORU YELLOW 06/18/2020    PHUR 6.0 06/18/2020    WBCUA None 06/18/2020    RBCUA None 06/18/2020    MUCUS NOT REPORTED 06/18/2020    TRICHOMONAS NOT REPORTED 06/18/2020    YEAST NOT REPORTED 06/18/2020    BACTERIA NOT REPORTED 06/18/2020    CLARITYU Clear 09/12/2014    SPECGRAV 1.017 06/18/2020    LEUKOCYTESUR NEGATIVE 06/18/2020    UROBILINOGEN Normal 06/18/2020    BILIRUBINUR NEGATIVE 06/18/2020    BILIRUBINUR NEGATIVE 11/12/2011    BLOODU Negative 09/12/2014    GLUCOSEU TRACE 06/18/2020    GLUCOSEU 3+ 11/12/2011    KETUA NEGATIVE 06/18/2020    AMORPHOUS NOT REPORTED 06/18/2020     ANTIGBM:No results found for: GBMABIGG      RADIOLOGY      Reviewed as available. ASSESSMENT      1. Acute kidney injury most likely due to prerenal azotemia and renal function improved  2. Chronic kidney disease stage IV with a baseline creatinine of 2.4 mg/dL most likely due to diabetic/hypertensive nephrosclerosis  3. Elevated troponin cardiology is on board  4. Difficult to control hypertension that is improving with resuming home medications  5. Anemia of chronic disease    PLAN      1. Continue bumex, cozaar, hydralazine and prazosin  2. Fluids at [facility]  3. Strict intake and output  4. Creatinine close to baseline  5. Cardiology work up as outpatient per cardiology notes  6. Recommend renal diet, may need tighter glycemic control  7. Will continue to follow      Please do not hesitate to call with questions. Electronically signed by Jo Bazan MD on 6/20/2020 at 1:18 PM      Patient was discharged before my round.     Dhruv Herron

## 2020-06-20 NOTE — CARE COORDINATION
Discharge [address] [facility] Case Management Department  Written by: Ginger Quesada RN    Patient Name: Arden Corona  Attending Provider: Todd Polanco MD  Admit Date: 2020 11:42 AM  MRN: [id number]  Account: [de-identified]                     : 1955  Discharge Date:  20       Disposition: home    Ginger Quesada RN

## 2020-06-20 NOTE — PROGRESS NOTES
Physical Therapy    Facility/Department: [facility] 4B STEPDOWN  Initial Assessment    NAME: Lisandra Sanchez  : 1955  MRN: [id number]    Date of Service: 2020    Discharge Recommendations: No further therapy required at discharge. PT Equipment Recommendations  Equipment Needed: No    Assessment   Assessment: The pt ambulated 300ft and completed 2 steps independently, pt reports baseline for all mobility, no acute PT needs Educated pt on requesting PT if needs change this admission and the pt verbalized agreement and understanding. Prognosis: Good  Decision Making: Medium Complexity  PT Education: PT Role;Plan of Care;General Safety  No Skilled PT: Independent with functional mobility   REQUIRES PT FOLLOW UP: No  Activity Tolerance  Activity Tolerance: Patient Tolerated treatment well       Patient Diagnosis(es): The primary encounter diagnosis was NSTEMI (non-ST elevated myocardial infarction) ([facility] [address] A diagnosis of Shortness of breath was also pertinent to this visit. has a past medical history of Asthma, CAD in native artery, nonobstructive, Carotid stenosis, left, Carpal tunnel syndrome, Cerebrovascular disease, Chronic kidney disease, COPD (chronic obstructive pulmonary disease) ([facility] [address] Diabetes mellitus ([facility] [address] History of colon polyps, History of weakness of extremity, HTN (hypertension), Hyperlipidemia, Lung, cysts, congenital, PTSD (post-traumatic stress disorder), PTSD (post-traumatic stress disorder), TIA (transient ischemic attack), and Type II or unspecified type diabetes mellitus without mention of complication, not stated as uncontrolled. has a past surgical history that includes hernia repair; Tonsillectomy; Colonoscopy; Carotid endarterectomy (Left, -); vascular surgery (Left, ); and pr colsc flx w/rmvl of tumor polyp lesion snare tq (N/A, 2017).     Restrictions  Restrictions/Precautions  Restrictions/Precautions: Up as Tolerated  Required Braces or Orthoses?: No  Position Activity Restriction  Other position/activity restrictions: up as tolerated   Vision/Hearing  Vision: Impaired  Vision Exceptions: Wears glasses at all times  Hearing: Within functional limits     Subjective  General  Patient assessed for rehabilitation services?: Yes  Response To Previous Treatment: Not applicable  Family / Caregiver Present: No  Follows Commands: Within Functional Limits  Subjective  Subjective: RN and pt agreeable to PT. Pt sitting on bedside upon arrival, very pleasant and cooperative throughout.   Pain Screening  Patient Currently in Pain: Yes  Pain Assessment  Pain Assessment: 0-10  Pain Level: 5  Pain Type: Chronic pain  Pain Location: Leg  Pain Orientation: Right;Left  Pain Descriptors: Aching;Discomfort  Pain Frequency: Continuous  Non-Pharmaceutical Pain Intervention(s): Ambulation/Increased Activity;Repositioned  Response to Pain Intervention: Patient Satisfied  Vital Signs  Patient Currently in Pain: Yes       Orientation  Orientation  Overall Orientation Status: Within Functional Limits  Social/Functional History  Social/Functional History  Lives With: Significant other(daughter lives upstairs (20 steps))  Type of Home: House(duplex)  Home Layout: One level, Laundry in basement(lives on bottom level of duplex)  Home Access: Stairs to enter with rails  Entrance Stairs - Number of Steps: 4  Entrance Stairs - Rails: Both  Bathroom Shower/Tub: Tub/Shower unit  Bathroom Toilet: Standard  Bathroom Equipment: Grab bars in shower, Shower chair(pt reported doesn't use shower chair )  Home Equipment: Rolling walker(pt reported no use of DME At baseline )  Receives Help From: Family(daughter assists as needed)  ADL Assistance: Independent  Homemaking Assistance: Independent  Homemaking Responsibilities: Yes(pt reported splitting with fiance)  Meal Prep Responsibility: Secondary  Laundry Responsibility: Secondary  Cleaning Responsibility: Secondary  Ambulation Assistance: Independent  Transfer Assistance: Independent  Active : Yes  Mode of Transportation: Family, Cab  Occupation: On disability  Leisure & Hobbies: go to gym  Additional Comments: pt reported used to have home PT/OT butbad experience. pt reported increased fatigue during IADLs and showering. pt reported fiance home 24hr, but pt has to take care of fiance due to seizures  Cognition   Cognition  Overall Cognitive Status: [facility]    Objective          Joint Mobility  Spine: WFL  ROM RLE: WFL  ROM LLE: WFL  ROM RUE: WFL  ROM LUE: WFL  Strength RLE  Strength RLE: WFL  Strength LLE  Strength LLE: WFL  Strength RUE  Strength RUE: WFL  Strength LUE  Strength LUE: WFL  Tone RLE  RLE Tone: Normotonic  Tone LLE  LLE Tone: Normotonic  Motor Control  Gross Motor?: WFL  Sensation  Overall Sensation Status: [facility]  Bed mobility  Comment: Pt sitting EOB upon arrival, returned to sitting following ambulation  Transfers  Sit to Stand: Independent  Stand to sit:  Independent  Ambulation  Ambulation?: Yes  Ambulation 1  Surface: level tile  Device: No Device  Assistance: Stand by assistance(for safety)  Quality of Gait: appropriate rivera, one LOB when turning- pt was able to self-correct, otherwise steady  Distance: 300ft  Comments: Pt reports gait as baseline  Stairs/Curb  Stairs?: Yes  Stairs  # Steps : 2  Stairs Height: 6\"  Rails: Right ascending  Device: No Device  Assistance: Independent  Comment: reciprocal, steady, no LOB     Balance  Posture: Good  Sitting - Static: Good  Sitting - Dynamic: Good  Standing - Static: Good  Standing - Dynamic: Good;-  Comments: standing balance assessed without AD- pt reports balance as baseline        Plan   Plan  Times per week: d/c PT  Safety Devices  Type of devices: Left in bed, Call light within reach, Nurse notified, Gait belt  Restraints  Initially in place: No      AM-PAC Score  AM-PAC Inpatient Mobility Raw Score : 24 (06/20/20 1356)  AM-PAC Inpatient T-Scale Score : 61.14 (06/20/20 1356)  Mobility Inpatient CMS

## 2020-06-20 NOTE — PROGRESS NOTES
normal in size with hyperdynamic systolic function. Moderate to severe concentric left ventricular hypertrophy. Calculated ejection fraction is 73 %. Left atrium is mildly dilated. Mild mitral regurgitation. Mild tricuspid regurgitation.  Estimated right ventricular systolic pressure  is 37 mmHg.       Stress Test      CATH 9/27/17:   Left main: mild disease  LAD: 30-40% prox stenosis  LCX: mild disease  RCA: 40% mid stenosis        Assessment / Acute Cardiac Problems:       Patient Active Problem List:     Cigarette nicotine dependence without complication     Carpal tunnel syndrome on right     Colon polyps     Carotid stenosis, left s/p Endarterectomy     Mixed simple and mucopurulent chronic bronchitis ([facility])     Pure hypercholesterolemia     CKD (chronic kidney disease) stage 3, GFR 30-59 ml/min ([facility])     Dyspnea and respiratory abnormalities     PTSD (post-traumatic stress disorder)     Transient cerebral ischemia     Essential hypertension     DM type 2, uncontrolled, with neuropathy ([facility])     COPD (chronic obstructive pulmonary disease) ([facility])     Cerebral vascular disease     Primary insomnia     Hypertensive crisis     Non-obstructive Coronary artery disease of native artery of native heart with stable angina pectoris ([address]     Hyperparathyroidism ([address]     Hypovitaminosis D     Coronary artery disease with exertional angina ([facility])     Leg swelling     Hyponatremia     Anemia     NSTEMI (non-ST elevated myocardial infarction) ([address]     Hypoalbuminemia      Plan of Treatment:   Elevated tropes possible type II last echocardiogram within normal limit normal LVEF  Heart cath in 2017 minimal disease at this time we do not need to do further cardiac work-up  Hypertension as was added medication by nephrology continue  CKD followed by nephrology  [place] to VALERIA from cardiology    [place] [facility] Cardiology  [phone]

## 2020-06-21 NOTE — DISCHARGE SUMMARY
Elevated pro-BNP (7438), Elevated troponin (367). EKG demonstrated non specific ST changes in V1 V2 and V3 and T wave inversion in V4 V5 V6. Cardiology was consulted. He was started on Heparin gtt, Coreg 25 mg BID, Hydralazine 100 mg q 8 hours and Nitro gtt.     On the floor he was hydrated and weaned off the Nitro gtt. His creatinine returned to baseline. Nephrology made adjustments to his BP medication changing Prazosin to 4 mg BID and he was started on Bumex 1 mg daily. Continued on Losartan and Hydralazine.      Procedures/ Significant Interventions:    None    Consults:     Consults:     Final Specialist Recommendations/Findings:   IP CONSULT TO CARDIOLOGY  IP CONSULT TO INTERNAL MEDICINE  IP CONSULT TO NEPHROLOGY  IP CONSULT TO CASE MANAGEMENT      Any Hospital Acquired Infections: none    Discharge Functional Status:  stable    DISCHARGE PLAN     Disposition: home    Patient Instructions:   Discharge Medication List as of 6/20/2020  1:31 PM      CONTINUE these medications which have CHANGED    Details   prazosin (MINIPRESS) 2 MG capsule Take 2 capsules by mouth 2 times daily, Disp-30 capsule, R-3Normal      bumetanide (BUMEX) 1 MG tablet Take 1 tablet by mouth daily, Disp-30 tablet, R-3Normal      Cholecalciferol (VITAMIN D) 50 MCG (2000 UT) TABS tablet Take 1 tablet by mouth daily, Disp-31 tablet, R-0Normal      vitamin D (ERGOCALCIFEROL) 1.25 MG (62989 UT) CAPS capsule Take 1 capsule by mouth once a week, Disp-5 capsule, R-0Normal         CONTINUE these medications which have NOT CHANGED    Details   carvedilol (COREG) 25 MG tablet Take 1 tablet by mouth 2 times daily (with meals), Disp-60 tablet, R-3Normal      atorvastatin (LIPITOR) 80 MG tablet Take 1 tablet by mouth daily, Disp-30 tablet, R-3Normal      clopidogrel (PLAVIX) 75 MG tablet Take 1 tablet by mouth daily, Disp-90 tablet, R-3Normal      insulin glargine (BASAGLAR KWIKPEN) 100 UNIT/ML injection pen Inject 35 Units into the skin nightly Dispense [address]  [facility] [address]  [phone]          Patient Instructions: Follow up with PCP  Follow up with Nephrology  Continue Losartan, Hydralazine  Take Prazosin 4 mg BID  Start Bumex 1 mg daily  Continue Vitamin D 50,000 units once weekly. After finishing restart daily vitamin 2000 units. Return to the emergency department immediately for new or worsening concerns. Follow up labs:   None    Follow up imaging:   None    Note that over 30 minutes was spent in preparing discharge papers, discussing discharge with patient, medication review, etc.    Juan Herron DO  Internal Medicine Resident, PGY-1  [place], [place]  6/21/2020, 11:40 AM

## 2020-06-22 ENCOUNTER — HOSPITAL ENCOUNTER (OUTPATIENT)
Age: 65
Discharge: HOME | End: 2020-06-22
Payer: COMMERCIAL

## 2020-06-22 ENCOUNTER — OFFICE VISIT (OUTPATIENT)
Dept: ORTHOPEDIC SURGERY | Age: 65
End: 2020-06-22
Payer: COMMERCIAL

## 2020-06-22 ENCOUNTER — CARE COORDINATION (OUTPATIENT)
Dept: CASE MANAGEMENT | Age: 65
End: 2020-06-22

## 2020-06-22 VITALS — BODY MASS INDEX: 23.95 KG/M2 | HEIGHT: 66 IN | WEIGHT: 149 LBS

## 2020-06-22 LAB
ANION GAP SERPL CALCULATED.3IONS-SCNC: 12 MMOL/L (ref 9–17)
BUN BLDV-MCNC: 39 MG/DL (ref 8–23)
BUN/CREAT BLD: ABNORMAL (ref 9–20)
CALCIUM SERPL-MCNC: 8.4 MG/DL (ref 8.6–10.4)
CHLORIDE BLD-SCNC: 106 MMOL/L (ref 98–107)
CO2: 20 MMOL/L (ref 20–31)
CREAT SERPL-MCNC: 2.36 MG/DL (ref 0.7–1.2)
GFR AFRICAN AMERICAN: 34 ML/MIN
GFR NON-AFRICAN AMERICAN: 28 ML/MIN
GFR SERPL CREATININE-BSD FRML MDRD: ABNORMAL ML/MIN/{1.73_M2}
GFR SERPL CREATININE-BSD FRML MDRD: ABNORMAL ML/MIN/{1.73_M2}
GLUCOSE BLD-MCNC: 366 MG/DL (ref 70–99)
MAGNESIUM: 1.8 MG/DL (ref 1.6–2.6)
POTASSIUM SERPL-SCNC: 4.7 MMOL/L (ref 3.7–5.3)
SODIUM BLD-SCNC: 138 MMOL/L (ref 135–144)

## 2020-06-22 PROCEDURE — 99203 OFFICE O/P NEW LOW 30 MIN: CPT | Performed by: ORTHOPAEDIC SURGERY

## 2020-06-22 PROCEDURE — 80048 BASIC METABOLIC PNL TOTAL CA: CPT

## 2020-06-22 PROCEDURE — 1111F DSCHRG MED/CURRENT MED MERGE: CPT | Performed by: NURSE PRACTITIONER

## 2020-06-22 PROCEDURE — 36415 COLL VENOUS BLD VENIPUNCTURE: CPT

## 2020-06-22 PROCEDURE — 83735 ASSAY OF MAGNESIUM: CPT

## 2020-06-22 ASSESSMENT — ENCOUNTER SYMPTOMS
CONSTIPATION: 0
DIARRHEA: 0
NAUSEA: 0
COUGH: 0

## 2020-06-22 NOTE — CARE COORDINATION
Shravan 45 Transitions Initial Follow Up Call    Call within 2 business days of discharge: Yes    Patient: Aura Rosales Patient : 1955   MRN: [id number]  Reason for Admission: NSTEMI  Discharge Date: 20 RARS: Readmission Risk Score: 45      Last Discharge [facility]       Complaint Diagnosis Description Type Department Provider    20 Abnormal Lab NSTEMI (non-ST elevated myocardial infarction) ([address] . .. ED to Hosp-Admission (Discharged) (ADMITTED) BINTA 4B Maddie Pastor MD; Minus Johnna Fried. .. [facility] [id number]    [facility]. He stated that he was doing \"ok\". He denied N/V, chest pain, fever/chills, abd pain, and his swelling is minimal.  He said that he has shortness of breath and cough. He said that since pulmonary rehab closed for Covid-19, his breathing has gotten worse. He is to start rehab for his hip pain, but he will wait until he goes pulmonary rehab and his breathing improves. Medications reviewed and he stated he had all medications. He was asking about a script for the nifedipine. He said the nurse told him he will start it on at discharge and he said that the script was not sent to his pharmacy. In reviewing progress notes and medication list no nifedipine was noted to restart on discharge. He did call his nephrologist, and he is looking into it. 1111F order completed. No home care at discharge. He will be receiving a blood pressure cuff in a couple of days, so he can keep track of his blood pressure. He has a VV with PCP on Friday. Explained CTN role and he was receptive to further outreach. Contact information provided.     Non-face-to-face services provided:  Scheduled appointment with PCP-  Scheduled appointment with Specialist-waiting on nephro with call back  Obtained and reviewed discharge summary and/or continuity of care documents  Assessment and support for

## 2020-06-22 NOTE — PROGRESS NOTES
[facility]  [facility]  [address] [place]  [address]  Dept: [phone]    Ambulatory Orthopedic Consult      CHIEF COMPLAINT:    Chief Complaint   Patient presents with    Hip Pain     right       HISTORY OF PRESENT ILLNESS:      The patient is a 60 y.o. male who is being seen at the request of  TERRANCE Alvarez CNP for consultation and evaluation of right hip pain. Patient said this problem started last week. Patient denies any injury. Patient says he was hospitalized recently due to his kidneys and being fluid overloaded. Patient says after he lost his water weight his started having right hip pain. Patient says when he was doing the frog leg view he had a lot of pain to his right hip/lower back area. Patient does not have any pain on the left. Patient denies any back history. Patient says two months ago his right hip was perfectly fine.     Past Medical History:    Past Medical History:   Diagnosis Date    Asthma     mod persistent    CAD in native artery, nonobstructive     Carotid stenosis, left 6/10/2013    Carpal tunnel syndrome     RIGHT    Cerebrovascular disease 4/23/2018    Chronic kidney disease 6/10/2013    COPD (chronic obstructive pulmonary disease) (HCC)     Diabetes mellitus (HCC)     insulin dependent    History of colon polyps     History of weakness of extremity     right sided    HTN (hypertension)     Hyperlipidemia     Lung, cysts, congenital     PTSD (post-traumatic stress disorder)     PTSD (post-traumatic stress disorder)     TIA (transient ischemic attack)     Type II or unspecified type diabetes mellitus without mention of complication, not stated as uncontrolled        Past Surgical History:    Past Surgical History:   Procedure Laterality Date    CAROTID ENDARTERECTOMY Left 7-2013    COLONOSCOPY      HERNIA REPAIR      DE COLSC FLX W/RMVL OF TUMOR POLYP LESION SNARE TQ N/A 6/27/2017    COLONOSCOPY POLYPECTOMY SNARE/ Xr Lumbar Spine (min 4 Views)    Result Date: 6/22/2020  History: Low back pain Findings: AP, lateral, oblique x-rays lumbosacral spine done in the office today shows minimal degenerative narrowing at L5-S1 with no significant evidence of fracture, subluxation, dislocation, radiopaque foreign body, radiopaque tumor. Significant sclerotic changes at the right sacroiliac joint are appreciated. Vascular calcifications are noted diffusely. Impression: Right sacroiliac joint DJD as described above. Xr Chest Portable    Result Date: 6/18/2020  EXAMINATION: ONE XRAY VIEW OF THE CHEST 6/18/2020 12:26 pm COMPARISON: May 15, 2020. HISTORY: ORDERING SYSTEM PROVIDED HISTORY: SOB TECHNOLOGIST PROVIDED HISTORY: SOB Acuity: Unknown Type of Exam: Initial FINDINGS: Cardiac and mediastinal contours are unchanged. Unchanged mild-to-moderate prominence of interstitial lung markings. No evidence of focal consolidation. No evidence of pleural effusion. No evidence of pneumothorax. No new osseous abnormalities. 1. No radiographic evidence of acute cardiopulmonary process. 2. Findings suggestive of COPD. Xr Hip W Pelvis Min 5 Vws Bilateral    Result Date: 6/16/2020  EXAMINATION: ONE XRAY VIEW OF THE PELVIS AND TWO XRAY VIEWS OF EACH OF THE BILATERAL HIPS 6/16/2020 1:21 pm COMPARISON: None HISTORY: ORDERING SYSTEM PROVIDED HISTORY: Chronic bilateral low back pain with bilateral sciatica TECHNOLOGIST PROVIDED HISTORY: recurrent low back pain FINDINGS: Pelvis: No acute fracture. No widening of the sacroiliac joints. Right hip: No acute fracture. No dislocation. Mild right hip joint space narrowing. Left hip: No acute fracture. Questionable mild increased cortical thickening along the medial cortex of the left femoral neck. Mild left hip joint space narrowing. Subchondral cystic change within the left femoral head with questionable subchondral sclerosis.      Questionable mild avascular necrosis involving the left femoral head. Suggestion of subtle cortical thickening along the left femoral neck could be seen with stress reaction-stress fracture. No discrete fracture line. Mild right hip osteoarthritis. ASSESSMENT:     1. Inflammation of right sacroiliac joint (HCC)         PLAN:       Discussed etiology and natural history of right sacroiliitis. The treatment options may include oral anti-inflammatories, bracing, injections, advanced imaging, activity modification, physical therapy and/or surgical intervention. The patient would like to proceed with physical therapy. Patient is not going to start quite yet because he wants to go to pulmonary rehab to build up his lungs before therapy. Patient cannot take NSAIDs due to his kidneys. No oral steroids due to his diabetes. Patient does want any medication  The patient will follow up as needed. We discussed that the patient should call us with any concerns or questions. Return if symptoms worsen or fail to improve. No orders of the defined types were placed in this encounter. Orders Placed This Encounter   Procedures    XR LUMBAR SPINE (MIN 4 VIEWS)     Ap, lateral, obliques     Standing Status:   Future     Number of Occurrences:   1     Standing Expiration Date:   6/22/2021     IKaylee RN am scribing for and in the presence of Dr. John Medina  6/29/2020 7:45 AM      I have reviewed and made changes accordingly to the work scribed by Kaylee Lorenzo RN. The documentation accurately reflects work and decisions made by me. I have also reviewed documentation completed by clinical staff.     John Medina DO, [facility] Medicine  6/29/2020 7:45 AM    This note is created with the assistance of a speech recognition program.  While intending to generate a document that actually reflects the content of the visit, the document can still have some errors including those of syntax and sound a like substitutions which may escape proof reading.  In such instances, actual meaning can be extrapolated by contextual diversion      Electronically signed by Ying Koroma DO, FAOAO on 6/29/2020 at 7:45 AM

## 2020-06-25 ENCOUNTER — TELEPHONE (OUTPATIENT)
Dept: INTERNAL MEDICINE | Age: 65
End: 2020-06-25

## 2020-06-25 NOTE — TELEPHONE ENCOUNTER
Pt calling stating the the Pulmonary Rehab at [facility] has reopened and they need a letter stating that he can return to pulmonary rehab. Order was originally faxed in January. Fax # [phone]. Pt also states that he has been taking his BP and it has been high. 168/75, 173/79, 177/75, 155/71, 193/75, 213/93, 215/91, and 179/76. Pt does have a video appt on Friday. Health Maintenance   Topic Date Due    Pneumococcal 0-64 years Vaccine (2 of 3 - PCV13) 10/02/2016    Shingles Vaccine (2 of 3) 12/27/2017    Diabetic retinal exam  06/01/2018    Colon cancer screen colonoscopy  06/27/2018    Annual Wellness Visit (AWV)  05/29/2019    Diabetic foot exam  02/19/2020    Lipid screen  08/13/2020    A1C test (Diabetic or Prediabetic)  09/16/2020    Potassium monitoring  06/22/2021    Creatinine monitoring  06/22/2021    DTaP/Tdap/Td vaccine (2 - Td) 12/14/2023    Flu vaccine  Completed    Hepatitis C screen  Completed    HIV screen  Completed    Hepatitis A vaccine  Aged Out    Hib vaccine  Aged Out    Meningococcal (ACWY) vaccine  Aged Out             (applicable per patient's age: Cancer Screenings, Depression Screening, Fall Risk Screening, Immunizations)    Hemoglobin A1C (%)   Date Value   06/16/2020 9.5 (A)   08/12/2019 9.8 (H)   08/11/2019 9.8 (H)     Microalb/Crt.  Ratio (mcg/mg creat)   Date Value   06/16/2020 2,663 (H)     LDL Cholesterol (mg/dL)   Date Value   08/13/2019 65     AST (U/L)   Date Value   06/18/2020 34     ALT (U/L)   Date Value   06/18/2020 26     BUN (mg/dL)   Date Value   06/22/2020 39 (H)      (goal A1C is < 7)   (goal LDL is <100) need 30-50% reduction from baseline     BP Readings from Last 3 Encounters:   06/20/20 (!) 141/64   06/16/20 (!) 168/66   05/18/20 (!) 142/78    (goal /80)      All Future Testing planned in [facility]:  Lab Frequency Next Occurrence   Cologuard (For External Results Only) Once 10/21/2020   Alpha-1-Antitrypsin w Phenotype Once 02/15/2020   Hemoglobin A1C Once 07/26/2020       Next Visit Date:  Future Appointments   Date Time Provider Mahesh Lyric   6/26/2020  3:00 PM TERRANCE Mai CNP [facility] [address] [facility] [facility]   6/30/2020  1:30 PM [facility]   9/21/2020  2:40 PM TERRANCE Mai CNP [facility] [address] [facility] [facility]            Patient Active Problem List:     Cigarette nicotine dependence without complication     Carpal tunnel syndrome on right     Colon polyps     Carotid stenosis, left s/p Endarterectomy     Mixed simple and mucopurulent chronic bronchitis ([address]     Pure hypercholesterolemia     CKD (chronic kidney disease) stage 3, GFR 30-59 ml/min ([facility])     Dyspnea and respiratory abnormalities     PTSD (post-traumatic stress disorder)     Transient cerebral ischemia     Essential hypertension     DM type 2, uncontrolled, with neuropathy ([facility])     COPD (chronic obstructive pulmonary disease) ([address]     Cerebral vascular disease     Primary insomnia     Hypertensive crisis     Non-obstructive Coronary artery disease of native artery of native heart with stable angina pectoris ([address]     Hyperparathyroidism ([address]     Hypovitaminosis D     Coronary artery disease with exertional angina ([facility])     Leg swelling     Hyponatremia     Anemia     NSTEMI (non-ST elevated myocardial infarction) ([address]     Hypoalbuminemia

## 2020-06-26 ENCOUNTER — VIRTUAL VISIT (OUTPATIENT)
Dept: INTERNAL MEDICINE | Age: 65
End: 2020-06-26
Payer: COMMERCIAL

## 2020-06-26 PROCEDURE — 99442 PR PHYS/QHP TELEPHONE EVALUATION 11-20 MIN: CPT | Performed by: NURSE PRACTITIONER

## 2020-06-26 PROCEDURE — 1111F DSCHRG MED/CURRENT MED MERGE: CPT | Performed by: NURSE PRACTITIONER

## 2020-06-26 RX ORDER — NIFEDIPINE 30 MG/1
30 TABLET, FILM COATED, EXTENDED RELEASE ORAL DAILY
Status: ON HOLD | COMMUNITY
End: 2020-08-13 | Stop reason: SDUPTHER

## 2020-06-26 ASSESSMENT — ENCOUNTER SYMPTOMS
DIFFICULTY BREATHING: 1
SHORTNESS OF BREATH: 1
COUGH: 1
BLURRED VISION: 0
ORTHOPNEA: 0

## 2020-06-26 ASSESSMENT — COPD QUESTIONNAIRES: COPD: 1

## 2020-06-26 NOTE — PROGRESS NOTES
Favian Valdez is a 60 y.o. male evaluated via telephone on 6/26/2020. Consent:  He and/or health care decision maker is aware that that he may receive a bill for this telephone service, depending on his insurance coverage, and has provided verbal consent to proceed: Yes      Documentation:  I communicated with the patient and/or health care decision maker about:  Hypertension   This is a chronic problem. The current episode started more than 1 year ago. The problem has been waxing and waning since onset. The problem is controlled. Associated symptoms include shortness of breath. Pertinent negatives include no anxiety, blurred vision, chest pain, headaches, malaise/fatigue, neck pain, orthopnea, palpitations, peripheral edema, PND or sweats. There are no associated agents to hypertension. Risk factors for coronary artery disease include diabetes mellitus, dyslipidemia, male gender and stress. Past treatments include calcium channel blockers, diuretics, alpha 1 blockers, beta blockers, angiotensin blockers and direct vasodilators. The current treatment provides moderate improvement. There are no compliance problems. Hypertensive end-organ damage includes angina, kidney disease, CAD/MI and heart failure. Identifiable causes of hypertension include chronic renal disease. COPD   He complains of cough, difficulty breathing and shortness of breath. This is a chronic problem. The current episode started more than 1 year ago. The problem occurs constantly. The problem has been waxing and waning. The cough is productive of sputum. Associated symptoms include dyspnea on exertion. Pertinent negatives include no chest pain, headaches, malaise/fatigue, PND or sweats. His symptoms are aggravated by any activity. His symptoms are alleviated by beta-agonist, steroid inhaler and ipratropium. He reports minimal improvement on treatment. Risk factors for lung disease include smoking/tobacco exposure.  His past medical history is significant for COPD. Details of this discussion including any medical advice provided:   Reviewed changes in medications since most recent hospitalization. No refills needed at present. Needs letter to resume pulmonary rehab. I affirm this is a Patient Initiated Episode with a Patient who has not had a related appointment within my department in the past 7 days or scheduled within the next 24 hours. Patient identification was verified at the start of the visit: Yes    Total Time: minutes: 11-20 minutes    1. Essential hypertension  - continue present medications  - NC DISCHARGE MEDS RECONCILED W/ CURRENT OUTPATIENT MED LIST    2.  Chronic obstructive pulmonary disease, unspecified COPD type ([facility] [address]  - continue present medications  - ok to resume pulmonary rehab        Note: not billable if this call serves to triage the patient into an appointment for the relevant concern      Chula Mendiola

## 2020-06-26 NOTE — LETTER
[address]  [address] [address]  Phone: [phone]  Fax: [phone], APRN - CNP        June 26, 2020     Patient: Griselda Chao   YOB: 1955   Date of Visit: 6/26/2020       To Whom It May Concern: It is my medical opinion that Trey Boyle is cleared to resume pulmonary rehab. If you have any questions or concerns, please don't hesitate to call.     Sincerely,        Aristeo Garcia, APRN - CNP

## 2020-06-29 ENCOUNTER — TELEPHONE (OUTPATIENT)
Dept: INTERNAL MEDICINE | Age: 65
End: 2020-06-29

## 2020-06-29 NOTE — TELEPHONE ENCOUNTER
Patient calling stating he now cannot exercise because his BP & sugars are LOW. Patient can only give me a reading of 119/50. He states they gave him some OJ, which brought it up a little, but not enough. I directed patient to the walk in as he is requesting to see you. He agrees to go.

## 2020-06-30 ENCOUNTER — CARE COORDINATION (OUTPATIENT)
Dept: CASE MANAGEMENT | Age: 65
End: 2020-06-30

## 2020-07-01 ENCOUNTER — CARE COORDINATION (OUTPATIENT)
Dept: CASE MANAGEMENT | Age: 65
End: 2020-07-01

## 2020-07-01 NOTE — CARE COORDINATION
Shravan 45 Transitions Follow Up Call    2020    Patient: Cony Evans  Patient : 1955   MRN: [id number]  Reason for Admission:  NSTEMI  Discharge Date: 20 RARS: Readmission Risk Score: 45         Spoke with: Cony Evans    Was able contact [facility] for transitional outreach. He stated that he was not doing well. He stated that he is having problems with lightheadedness/dizziness. He said that his blood pressures have been 224-213 systolic. Semaj Campoverde He could not make his pulmonology appointment, go to the walk-in clinic or completed pulmonary rehab because of the dizziness. He said the dizziness comes on when he gets up and walks about 20 steps. Encouraged him to stand still for a minute or two and then walk to where he is going. Also discussed ace wraps to legs. He said that when he gets dizzy, he sits down and it feels better, but is a lot better when he lies down. He said that his blood sugar so far have been 150's. He also said that he is short of breath and weak. He admitted that he is currenlty having a lot of stress in his life right now andonce he takes care of some things and the dizziness does not get better, he will try and get to the walk in clinic. No further concerns or questions. Care Transitions Subsequent and Final Call    Subsequent and Final Calls  Do you have any ongoing symptoms?:  Yes  Onset of Patient-reported symptoms:  Other  Have your medications changed?:  No  Do you have any questions related to your medications?:  No  Do you currently have any active services?:  Yes  Are you currently active with any services?:  Outpatient/Community Services  Do you have any needs or concerns that I can assist you with?:  No  Care Transitions Interventions  Other Interventions:             Follow Up  Future Appointments   Date Time Provider Mahesh Gunter   2020  2:40 PM TERRANCE Marroquin - CNP [facility] BERTHA Garza RN

## 2020-07-07 ENCOUNTER — CARE COORDINATION (OUTPATIENT)
Dept: CASE MANAGEMENT | Age: 65
End: 2020-07-07

## 2020-07-07 NOTE — CARE COORDINATION
Shravan 45 Transitions Follow Up Call    2020    Patient: Skip Rodriguez  Patient : 1955   MRN: [id number]  Reason for Admission:  NSTEMI  Discharge Date: 20 RARS: Readmission Risk Score: 45         Spoke with: Skip Rodriguez    Was able to contact H[facility] for transitional outreach. He stated that he was doing better. Was noted that he had shortness of breath and dry cough during call. He said that his breathing was better, blood pressures are still labile at times, blood sugars are turning to normal, no fever/chills, or chest pain. He said that the dizziness is now gone. He said that he spaced his blood pressure medications and that seemed to do the trick. He was able to go to pulmonary rehab without any problems. No questions or concerns at this time. Care Transitions Subsequent and Final Call    Subsequent and Final Calls  Do you have any ongoing symptoms?:  Yes  Onset of Patient-reported symptoms: In the past 7 days  Patient-reported symptoms:  Shortness of Breath  Have your medications changed?:  No  Do you have any questions related to your medications?:  No  Do you currently have any active services?:  Yes  Are you currently active with any services?:  Outpatient/Community Services  Do you have any needs or concerns that I can assist you with?:  No  Care Transitions Interventions  Other Interventions:             Follow Up  Future Appointments   Date Time Provider Mahesh Gunter   2020  2:40 PM TERRANCE Siegel - CNP [facility] BERTHA Juarez RN

## 2020-07-14 ENCOUNTER — CARE COORDINATION (OUTPATIENT)
Dept: CASE MANAGEMENT | Age: 65
End: 2020-07-14

## 2020-07-14 NOTE — CARE COORDINATION
Shravan 45 Transitions Follow Up Call    2020    Patient: Pete Ortiz  Patient : 1955   MRN: [id number]  Reason for Admission:  NSTEMI  Discharge Date: 20 RARS: Readmission Risk Score: 45         Spoke with: Pete Ortiz    Was able to contact Emiliano Sarahinimes[facility] for transitional outreach. He stated he was doing \"good\". He said said that his breathing was about the same. He said that it takes a while for the pulmonoary rehab to start helping with his breathing. He said that his cough is the same, no fever/chills, chest pain or dizziness. He did say that he has to have surgery on his eye. The optometrist said that he has blood in his eye and they are going to drain the blood and laser the blood vessels. The surgery is scheduled for . He said that his sugars are doing good. No questions or concerns at this time. Care Transitions Subsequent and Final Call    Subsequent and Final Calls  Do you have any ongoing symptoms?:  Yes  Onset of Patient-reported symptoms: In the past 7 days  Patient-reported symptoms:  Shortness of Breath  Have your medications changed?:  No  Do you have any questions related to your medications?:  No  Do you currently have any active services?:  No  Are you currently active with any services?:  Outpatient/Community Services  Do you have any needs or concerns that I can assist you with?:  No  Care Transitions Interventions  Other Interventions:             Follow Up  Future Appointments   Date Time Provider Mahesh Gunter   2020  2:40 PM TERRANCE Gregg - CNP [facility] BERTHA Lopez RN

## 2020-07-23 ENCOUNTER — TELEPHONE (OUTPATIENT)
Dept: INTERNAL MEDICINE | Age: 65
End: 2020-07-23

## 2020-07-23 NOTE — TELEPHONE ENCOUNTER
Patient calling stating he is getting eye surgery on 8/5 & they want him to hold aspirin & plavix a week prior. He states he was told to confirm that this is okay with you. Please advise. Health Maintenance   Topic Date Due    Pneumococcal 0-64 years Vaccine (2 of 3 - PCV13) 10/02/2016    Shingles Vaccine (2 of 3) 12/27/2017    Colon cancer screen colonoscopy  06/27/2018    Annual Wellness Visit (AWV)  05/29/2019    Diabetic foot exam  02/19/2020    Lipid screen  08/13/2020    Flu vaccine (1) 09/01/2020    A1C test (Diabetic or Prediabetic)  09/16/2020    Potassium monitoring  06/22/2021    Creatinine monitoring  06/22/2021    Diabetic retinal exam  07/14/2021    DTaP/Tdap/Td vaccine (2 - Td) 12/14/2023    Hepatitis C screen  Completed    HIV screen  Completed    Hepatitis A vaccine  Aged Out    Hib vaccine  Aged Out    Meningococcal (ACWY) vaccine  Aged Out             (applicable per patient's age: Cancer Screenings, Depression Screening, Fall Risk Screening, Immunizations)    Hemoglobin A1C (%)   Date Value   06/16/2020 9.5 (A)   08/12/2019 9.8 (H)   08/11/2019 9.8 (H)     Microalb/Crt.  Ratio (mcg/mg creat)   Date Value   06/16/2020 2,663 (H)     LDL Cholesterol (mg/dL)   Date Value   08/13/2019 65     AST (U/L)   Date Value   06/18/2020 34     ALT (U/L)   Date Value   06/18/2020 26     BUN (mg/dL)   Date Value   06/22/2020 39 (H)      (goal A1C is < 7)   (goal LDL is <100) need 30-50% reduction from baseline     BP Readings from Last 3 Encounters:   06/20/20 (!) 141/64   06/16/20 (!) 168/66   05/18/20 (!) 142/78    (goal /80)      All Future Testing planned in [facility]:  Lab Frequency Next Occurrence   Cologuard (For External Results Only) Once 10/21/2020   Alpha-1-Antitrypsin w Phenotype Once 02/15/2020   Hemoglobin A1C Once 07/26/2020       Next Visit Date:  Future Appointments   Date Time Provider Mahesh Gunter   9/21/2020  2:40 PM TERRANCE Dominguez - [facility] Patient Active Problem List:     Cigarette nicotine dependence without complication     Carpal tunnel syndrome on right     Colon polyps     Carotid stenosis, left s/p Endarterectomy     Mixed simple and mucopurulent chronic bronchitis ([facility])     Pure hypercholesterolemia     CKD (chronic kidney disease) stage 3, GFR 30-59 ml/min ([facility])     Dyspnea and respiratory abnormalities     PTSD (post-traumatic stress disorder)     Transient cerebral ischemia     Essential hypertension     DM type 2, uncontrolled, with neuropathy ([facility])     COPD (chronic obstructive pulmonary disease) ([facility]     Cerebral vascular disease     Primary insomnia     Hypertensive crisis     Non-obstructive Coronary artery disease of native artery of native heart with stable angina pectoris ([facility]     Hyperparathyroidism ([facility]     Hypovitaminosis D     Coronary artery disease with exertional angina ([facility])     Leg swelling     Hyponatremia     Anemia     NSTEMI (non-ST elevated myocardial infarction) ([address]     Hypoalbuminemia

## 2020-07-24 ENCOUNTER — CARE COORDINATION (OUTPATIENT)
Dept: CASE MANAGEMENT | Age: 65
End: 2020-07-24

## 2020-07-24 NOTE — CARE COORDINATION
Shrav[facility] Transitions Follow Up Call    2020    Patient: Denton Schwartz  Patient : 1955   MRN: [id number]  Reason for Admission:  NSTEMI  Discharge Date: 20 RARS: Readmission Risk Score: 45         Spoke with: Denton Schwartz    Was able to contact Delfina Larose for final outreach. He stated that he was doing \"good\". He said that is breathing is about the same and he continues to go to pulmonary rehab, no chest pain, and continues with chronic cough. He denied fever/chills and dizziness. He will be having surgery on his eye on . Informed of final call and he was in agreement. No questions or concerns. Episode ended. Care Transitions Subsequent and Final Call    Subsequent and Final Calls  Do you have any ongoing symptoms?:  Yes  Onset of Patient-reported symptoms: In the past 7 days  Patient-reported symptoms:  Other  Have your medications changed?:  No  Do you have any questions related to your medications?:  No  Do you currently have any active services?:  Yes  Are you currently active with any services?:  Outpatient/Community Services  Do you have any needs or concerns that I can assist you with?:  No  Care Transitions Interventions  Other Interventions:             Follow Up  Future Appointments   Date Time Provider Mahesh Gunter   2020  2:40 PM TERRANCE Tapia - CNP [facility] BERTHA Pacheco RN

## 2020-07-28 ENCOUNTER — CARE COORDINATION (OUTPATIENT)
Dept: CASE MANAGEMENT | Age: 65
End: 2020-07-28

## 2020-07-28 NOTE — CARE COORDINATION
Shravan 45 Transitions Follow Up Call    2020    Patient: Fuentes Francis  Patient : 1955   MRN: [id number]  Reason for Admission: NSTEMI  Discharge Date: 20 RARS: Readmission Risk Score: 38         Spoke with: Fuentes Francis    Returned Grayson's call from today. He stated that everytime he calls the clinic he does not get an answer from them. He was inquiring about his medication D2 &D3. He said that he was out and his pharmacy did not have it ready. On examining the notes in Epic, explained that it was sent in and will start tomorrow. Mendez Patel thanked this writer and did not had any further needs at this time      Care Transitions Subsequent and Final Call    Subsequent and Final Calls  Are you currently active with any services?:  Outpatient/Community Services  Care Transitions Interventions  Other Interventions:             Follow Up  Future Appointments   Date Time Provider Mahesh Gunter   2020  2:40 PM TERRANCE Bhardwaj - CNP [facility] BERTHA Xavier RN

## 2020-08-05 RX ORDER — ERGOCALCIFEROL 1.25 MG/1
50000 CAPSULE ORAL WEEKLY
Qty: 4 CAPSULE | Refills: 0 | Status: SHIPPED | OUTPATIENT
Start: 2020-08-05 | End: 2020-09-25

## 2020-08-05 NOTE — TELEPHONE ENCOUNTER
Refill request for Vitamin D. If appropriate please send medication(s) to patients pharmacy. Next appt: 9/21/2020      Health Maintenance   Topic Date Due    Pneumococcal 0-64 years Vaccine (2 of 3 - PCV13) 10/02/2016    Shingles Vaccine (2 of 3) 12/27/2017    Colon cancer screen colonoscopy  06/27/2018    Annual Wellness Visit (AWV)  05/29/2019    Diabetic foot exam  02/19/2020    Lipid screen  08/13/2020    Flu vaccine (1) 09/01/2020    A1C test (Diabetic or Prediabetic)  09/16/2020    Potassium monitoring  06/22/2021    Creatinine monitoring  06/22/2021    Diabetic retinal exam  07/14/2021    DTaP/Tdap/Td vaccine (2 - Td) 12/14/2023    Hepatitis C screen  Completed    HIV screen  Completed    Hepatitis A vaccine  Aged Out    Hib vaccine  Aged Out    Meningococcal (ACWY) vaccine  Aged Out       Hemoglobin A1C (%)   Date Value   06/16/2020 9.5 (A)   08/12/2019 9.8 (H)   08/11/2019 9.8 (H)             ( goal A1C is < 7)   Microalb/Crt.  Ratio (mcg/mg creat)   Date Value   06/16/2020 2,663 (H)     LDL Cholesterol (mg/dL)   Date Value   08/13/2019 65       (goal LDL is <100)   AST (U/L)   Date Value   06/18/2020 34     ALT (U/L)   Date Value   06/18/2020 26     BUN (mg/dL)   Date Value   06/22/2020 39 (H)     BP Readings from Last 3 Encounters:   06/20/20 (!) 141/64   06/16/20 (!) 168/66   05/18/20 (!) 142/78          (goal 120/80)          Patient Active Problem List:     Cigarette nicotine dependence without complication     Carpal tunnel syndrome on right     Colon polyps     Carotid stenosis, left s/p Endarterectomy     Mixed simple and mucopurulent chronic bronchitis (HCC)     Pure hypercholesterolemia     CKD (chronic kidney disease) stage 3, GFR 30-59 ml/min (HCC)     Dyspnea and respiratory abnormalities     PTSD (post-traumatic stress disorder)     Transient cerebral ischemia     Essential hypertension     DM type 2, uncontrolled, with neuropathy (HCC)     COPD (chronic obstructive pulmonary disease) ([facility]     Cerebral vascular disease     Primary insomnia     Hypertensive crisis     Non-obstructive Coronary artery disease of native artery of native heart with stable angina pectoris ([facility]     Hyperparathyroidism ([facility]     Hypovitaminosis D     Coronary artery disease with exertional angina (HCC)     Leg swelling     Hyponatremia     Anemia     NSTEMI (non-ST elevated myocardial infarction) (HCC)     Hypoalbuminemia

## 2020-08-10 ENCOUNTER — HOSPITAL ENCOUNTER (INPATIENT)
Age: 65
LOS: 3 days | Discharge: HOME | DRG: 917 | End: 2020-08-13
Attending: EMERGENCY MEDICINE | Admitting: INTERNAL MEDICINE
Payer: COMMERCIAL

## 2020-08-10 ENCOUNTER — APPOINTMENT (OUTPATIENT)
Dept: GENERAL RADIOLOGY | Age: 65
DRG: 917 | End: 2020-08-10
Payer: COMMERCIAL

## 2020-08-10 LAB
-: NORMAL
ABSOLUTE EOS #: 0.38 K/UL (ref 0–0.44)
ABSOLUTE IMMATURE GRANULOCYTE: 0.08 K/UL (ref 0–0.3)
ABSOLUTE LYMPH #: 1.82 K/UL (ref 1.1–3.7)
ABSOLUTE MONO #: 0.64 K/UL (ref 0.1–1.2)
ALBUMIN SERPL-MCNC: 2.8 G/DL (ref 3.5–5.2)
ALBUMIN/GLOBULIN RATIO: 1 (ref 1–2.5)
ALP BLD-CCNC: 80 U/L (ref 40–129)
ALT SERPL-CCNC: 16 U/L (ref 5–41)
AMPHETAMINE SCREEN URINE: NEGATIVE
ANION GAP SERPL CALCULATED.3IONS-SCNC: 15 MMOL/L (ref 9–17)
AST SERPL-CCNC: 17 U/L
BARBITURATE SCREEN URINE: NEGATIVE
BASOPHILS # BLD: 1 % (ref 0–2)
BASOPHILS ABSOLUTE: 0.05 K/UL (ref 0–0.2)
BENZODIAZEPINE SCREEN, URINE: NEGATIVE
BILIRUB SERPL-MCNC: <0.1 MG/DL (ref 0.3–1.2)
BILIRUBIN DIRECT: <0.08 MG/DL
BILIRUBIN, INDIRECT: ABNORMAL MG/DL (ref 0–1)
BNP INTERPRETATION: ABNORMAL
BUN BLDV-MCNC: 47 MG/DL (ref 8–23)
BUN/CREAT BLD: ABNORMAL (ref 9–20)
BUPRENORPHINE URINE: ABNORMAL
CALCIUM SERPL-MCNC: 9.1 MG/DL (ref 8.6–10.4)
CANNABINOID SCREEN URINE: NEGATIVE
CHLORIDE BLD-SCNC: 105 MMOL/L (ref 98–107)
CO2: 21 MMOL/L (ref 20–31)
COCAINE METABOLITE, URINE: POSITIVE
CREAT SERPL-MCNC: 2.43 MG/DL (ref 0.7–1.2)
DIFFERENTIAL TYPE: ABNORMAL
EOSINOPHILS RELATIVE PERCENT: 4 % (ref 1–4)
GFR AFRICAN AMERICAN: 33 ML/MIN
GFR NON-AFRICAN AMERICAN: 27 ML/MIN
GFR SERPL CREATININE-BSD FRML MDRD: ABNORMAL ML/MIN/{1.73_M2}
GFR SERPL CREATININE-BSD FRML MDRD: ABNORMAL ML/MIN/{1.73_M2}
GLOBULIN: ABNORMAL G/DL (ref 1.5–3.8)
GLUCOSE BLD-MCNC: 202 MG/DL (ref 70–99)
GLUCOSE BLD-MCNC: 393 MG/DL (ref 75–110)
HCT VFR BLD CALC: 41 % (ref 40.7–50.3)
HEMOGLOBIN: 14 G/DL (ref 13–17)
IMMATURE GRANULOCYTES: 1 %
LYMPHOCYTES # BLD: 20 % (ref 24–43)
MCH RBC QN AUTO: 30.6 PG (ref 25.2–33.5)
MCHC RBC AUTO-ENTMCNC: 34.1 G/DL (ref 28.4–34.8)
MCV RBC AUTO: 89.7 FL (ref 82.6–102.9)
MDMA URINE: ABNORMAL
METHADONE SCREEN, URINE: NEGATIVE
METHAMPHETAMINE, URINE: ABNORMAL
MONOCYTES # BLD: 7 % (ref 3–12)
NRBC AUTOMATED: 0 PER 100 WBC
OPIATES, URINE: NEGATIVE
OXYCODONE SCREEN URINE: NEGATIVE
PARTIAL THROMBOPLASTIN TIME: 22.1 SEC (ref 20.5–30.5)
PDW BLD-RTO: 12.2 % (ref 11.8–14.4)
PHENCYCLIDINE, URINE: NEGATIVE
PLATELET # BLD: 391 K/UL (ref 138–453)
PLATELET ESTIMATE: ABNORMAL
PMV BLD AUTO: 9.4 FL (ref 8.1–13.5)
POTASSIUM SERPL-SCNC: 5.3 MMOL/L (ref 3.7–5.3)
PRO-BNP: 4188 PG/ML
PROPOXYPHENE, URINE: ABNORMAL
RBC # BLD: 4.57 M/UL (ref 4.21–5.77)
RBC # BLD: ABNORMAL 10*6/UL
REASON FOR REJECTION: NORMAL
SEG NEUTROPHILS: 67 % (ref 36–65)
SEGMENTED NEUTROPHILS ABSOLUTE COUNT: 6.33 K/UL (ref 1.5–8.1)
SODIUM BLD-SCNC: 141 MMOL/L (ref 135–144)
TEST INFORMATION: ABNORMAL
TOTAL PROTEIN: 5.5 G/DL (ref 6.4–8.3)
TRICYCLIC ANTIDEPRESSANTS, UR: ABNORMAL
TROPONIN INTERP: ABNORMAL
TROPONIN T: ABNORMAL NG/ML
TROPONIN, HIGH SENSITIVITY: 46 NG/L (ref 0–22)
TROPONIN, HIGH SENSITIVITY: 49 NG/L (ref 0–22)
TROPONIN, HIGH SENSITIVITY: 52 NG/L (ref 0–22)
TROPONIN, HIGH SENSITIVITY: 65 NG/L (ref 0–22)
WBC # BLD: 9.3 K/UL (ref 3.5–11.3)
WBC # BLD: ABNORMAL 10*3/UL
ZZ NTE CLEAN UP: ORDERED TEST: NORMAL
ZZ NTE WITH NAME CLEAN UP: SPECIMEN SOURCE: NORMAL

## 2020-08-10 PROCEDURE — 85730 THROMBOPLASTIN TIME PARTIAL: CPT

## 2020-08-10 PROCEDURE — 6370000000 HC RX 637 (ALT 250 FOR IP): Performed by: STUDENT IN AN ORGANIZED HEALTH CARE EDUCATION/TRAINING PROGRAM

## 2020-08-10 PROCEDURE — 99285 EMERGENCY DEPT VISIT HI MDM: CPT

## 2020-08-10 PROCEDURE — 85025 COMPLETE CBC W/AUTO DIFF WBC: CPT

## 2020-08-10 PROCEDURE — 84484 ASSAY OF TROPONIN QUANT: CPT

## 2020-08-10 PROCEDURE — 96365 THER/PROPH/DIAG IV INF INIT: CPT

## 2020-08-10 PROCEDURE — 99223 1ST HOSP IP/OBS HIGH 75: CPT | Performed by: INTERNAL MEDICINE

## 2020-08-10 PROCEDURE — 96366 THER/PROPH/DIAG IV INF ADDON: CPT

## 2020-08-10 PROCEDURE — 83880 ASSAY OF NATRIURETIC PEPTIDE: CPT

## 2020-08-10 PROCEDURE — 83036 HEMOGLOBIN GLYCOSYLATED A1C: CPT

## 2020-08-10 PROCEDURE — 2500000003 HC RX 250 WO HCPCS: Performed by: STUDENT IN AN ORGANIZED HEALTH CARE EDUCATION/TRAINING PROGRAM

## 2020-08-10 PROCEDURE — 82947 ASSAY GLUCOSE BLOOD QUANT: CPT

## 2020-08-10 PROCEDURE — 2060000000 HC ICU INTERMEDIATE R&B

## 2020-08-10 PROCEDURE — 80076 HEPATIC FUNCTION PANEL: CPT

## 2020-08-10 PROCEDURE — 80048 BASIC METABOLIC PNL TOTAL CA: CPT

## 2020-08-10 PROCEDURE — 2580000003 HC RX 258: Performed by: STUDENT IN AN ORGANIZED HEALTH CARE EDUCATION/TRAINING PROGRAM

## 2020-08-10 PROCEDURE — 93005 ELECTROCARDIOGRAM TRACING: CPT | Performed by: INTERNAL MEDICINE

## 2020-08-10 PROCEDURE — 93005 ELECTROCARDIOGRAM TRACING: CPT | Performed by: STUDENT IN AN ORGANIZED HEALTH CARE EDUCATION/TRAINING PROGRAM

## 2020-08-10 PROCEDURE — 71046 X-RAY EXAM CHEST 2 VIEWS: CPT

## 2020-08-10 PROCEDURE — 80307 DRUG TEST PRSMV CHEM ANLYZR: CPT

## 2020-08-10 RX ORDER — GABAPENTIN 100 MG/1
200 CAPSULE ORAL 3 TIMES DAILY
Status: DISCONTINUED | OUTPATIENT
Start: 2020-08-10 | End: 2020-08-13 | Stop reason: HOSPADM

## 2020-08-10 RX ORDER — POLYETHYLENE GLYCOL 3350 17 G/17G
17 POWDER, FOR SOLUTION ORAL DAILY PRN
Status: DISCONTINUED | OUTPATIENT
Start: 2020-08-10 | End: 2020-08-13 | Stop reason: HOSPADM

## 2020-08-10 RX ORDER — SODIUM CHLORIDE 0.9 % (FLUSH) 0.9 %
10 SYRINGE (ML) INJECTION PRN
Status: DISCONTINUED | OUTPATIENT
Start: 2020-08-10 | End: 2020-08-13 | Stop reason: HOSPADM

## 2020-08-10 RX ORDER — ACETAMINOPHEN 325 MG/1
650 TABLET ORAL EVERY 6 HOURS PRN
Status: DISCONTINUED | OUTPATIENT
Start: 2020-08-10 | End: 2020-08-13 | Stop reason: HOSPADM

## 2020-08-10 RX ORDER — HYDRALAZINE HYDROCHLORIDE 50 MG/1
100 TABLET, FILM COATED ORAL EVERY 8 HOURS SCHEDULED
Status: DISCONTINUED | OUTPATIENT
Start: 2020-08-10 | End: 2020-08-13 | Stop reason: HOSPADM

## 2020-08-10 RX ORDER — HYDRALAZINE HYDROCHLORIDE 20 MG/ML
10 INJECTION INTRAMUSCULAR; INTRAVENOUS EVERY 6 HOURS PRN
Status: DISCONTINUED | OUTPATIENT
Start: 2020-08-10 | End: 2020-08-13 | Stop reason: HOSPADM

## 2020-08-10 RX ORDER — NICOTINE POLACRILEX 4 MG
15 LOZENGE BUCCAL PRN
Status: DISCONTINUED | OUTPATIENT
Start: 2020-08-10 | End: 2020-08-13 | Stop reason: HOSPADM

## 2020-08-10 RX ORDER — FENTANYL CITRATE 50 UG/ML
50 INJECTION, SOLUTION INTRAMUSCULAR; INTRAVENOUS ONCE
Status: DISCONTINUED | OUTPATIENT
Start: 2020-08-10 | End: 2020-08-10

## 2020-08-10 RX ORDER — ATORVASTATIN CALCIUM 80 MG/1
80 TABLET, FILM COATED ORAL DAILY
Status: DISCONTINUED | OUTPATIENT
Start: 2020-08-10 | End: 2020-08-13 | Stop reason: HOSPADM

## 2020-08-10 RX ORDER — POTASSIUM CHLORIDE 20 MEQ/1
40 TABLET, EXTENDED RELEASE ORAL PRN
Status: DISCONTINUED | OUTPATIENT
Start: 2020-08-10 | End: 2020-08-13 | Stop reason: HOSPADM

## 2020-08-10 RX ORDER — POTASSIUM CHLORIDE 7.45 MG/ML
10 INJECTION INTRAVENOUS PRN
Status: DISCONTINUED | OUTPATIENT
Start: 2020-08-10 | End: 2020-08-13 | Stop reason: HOSPADM

## 2020-08-10 RX ORDER — HEPARIN SODIUM 1000 [USP'U]/ML
4000 INJECTION, SOLUTION INTRAVENOUS; SUBCUTANEOUS PRN
Status: DISCONTINUED | OUTPATIENT
Start: 2020-08-10 | End: 2020-08-10

## 2020-08-10 RX ORDER — DEXTROSE MONOHYDRATE 25 G/50ML
12.5 INJECTION, SOLUTION INTRAVENOUS PRN
Status: DISCONTINUED | OUTPATIENT
Start: 2020-08-10 | End: 2020-08-13 | Stop reason: HOSPADM

## 2020-08-10 RX ORDER — GLUCOSAMINE HCL/CHONDROITIN SU 500-400 MG
CAPSULE ORAL
Qty: 100 STRIP | Refills: 11 | Status: SHIPPED | OUTPATIENT
Start: 2020-08-10 | End: 2021-01-26 | Stop reason: SDUPTHER

## 2020-08-10 RX ORDER — ACETAMINOPHEN 650 MG/1
650 SUPPOSITORY RECTAL EVERY 6 HOURS PRN
Status: DISCONTINUED | OUTPATIENT
Start: 2020-08-10 | End: 2020-08-13 | Stop reason: HOSPADM

## 2020-08-10 RX ORDER — CLOPIDOGREL BISULFATE 75 MG/1
75 TABLET ORAL DAILY
Status: DISCONTINUED | OUTPATIENT
Start: 2020-08-10 | End: 2020-08-13 | Stop reason: HOSPADM

## 2020-08-10 RX ORDER — ASPIRIN 81 MG/1
324 TABLET, CHEWABLE ORAL ONCE
Status: COMPLETED | OUTPATIENT
Start: 2020-08-10 | End: 2020-08-10

## 2020-08-10 RX ORDER — HEPARIN SODIUM 10000 [USP'U]/100ML
12 INJECTION, SOLUTION INTRAVENOUS CONTINUOUS
Status: DISCONTINUED | OUTPATIENT
Start: 2020-08-10 | End: 2020-08-10

## 2020-08-10 RX ORDER — LANCETS 30 GAUGE
EACH MISCELLANEOUS
Qty: 100 EACH | Refills: 11 | Status: ON HOLD | OUTPATIENT
Start: 2020-08-10 | End: 2020-12-06 | Stop reason: SDUPTHER

## 2020-08-10 RX ORDER — ALBUTEROL SULFATE 90 UG/1
2 AEROSOL, METERED RESPIRATORY (INHALATION) EVERY 4 HOURS PRN
Status: DISCONTINUED | OUTPATIENT
Start: 2020-08-10 | End: 2020-08-13 | Stop reason: HOSPADM

## 2020-08-10 RX ORDER — INSULIN GLARGINE 100 [IU]/ML
20 INJECTION, SOLUTION SUBCUTANEOUS NIGHTLY
Status: DISCONTINUED | OUTPATIENT
Start: 2020-08-10 | End: 2020-08-12

## 2020-08-10 RX ORDER — NITROGLYCERIN 20 MG/100ML
5 INJECTION INTRAVENOUS CONTINUOUS
Status: DISCONTINUED | OUTPATIENT
Start: 2020-08-10 | End: 2020-08-10

## 2020-08-10 RX ORDER — DEXTROSE MONOHYDRATE 50 MG/ML
100 INJECTION, SOLUTION INTRAVENOUS PRN
Status: DISCONTINUED | OUTPATIENT
Start: 2020-08-10 | End: 2020-08-13 | Stop reason: HOSPADM

## 2020-08-10 RX ORDER — HEPARIN SODIUM 1000 [USP'U]/ML
2000 INJECTION, SOLUTION INTRAVENOUS; SUBCUTANEOUS PRN
Status: DISCONTINUED | OUTPATIENT
Start: 2020-08-10 | End: 2020-08-10

## 2020-08-10 RX ORDER — CARVEDILOL 12.5 MG/1
25 TABLET ORAL 2 TIMES DAILY WITH MEALS
Status: DISCONTINUED | OUTPATIENT
Start: 2020-08-10 | End: 2020-08-13 | Stop reason: HOSPADM

## 2020-08-10 RX ORDER — ASPIRIN 81 MG/1
81 TABLET ORAL DAILY
Status: DISCONTINUED | OUTPATIENT
Start: 2020-08-10 | End: 2020-08-13 | Stop reason: HOSPADM

## 2020-08-10 RX ORDER — LOSARTAN POTASSIUM 50 MG/1
50 TABLET ORAL 2 TIMES DAILY
Status: DISCONTINUED | OUTPATIENT
Start: 2020-08-11 | End: 2020-08-13 | Stop reason: HOSPADM

## 2020-08-10 RX ORDER — NIFEDIPINE 30 MG/1
30 TABLET, EXTENDED RELEASE ORAL DAILY
Status: DISCONTINUED | OUTPATIENT
Start: 2020-08-10 | End: 2020-08-11

## 2020-08-10 RX ORDER — NITROGLYCERIN 0.4 MG/1
0.4 TABLET SUBLINGUAL EVERY 5 MIN PRN
Status: DISCONTINUED | OUTPATIENT
Start: 2020-08-10 | End: 2020-08-13 | Stop reason: HOSPADM

## 2020-08-10 RX ORDER — HEPARIN SODIUM 1000 [USP'U]/ML
4000 INJECTION, SOLUTION INTRAVENOUS; SUBCUTANEOUS ONCE
Status: DISCONTINUED | OUTPATIENT
Start: 2020-08-10 | End: 2020-08-10

## 2020-08-10 RX ORDER — SODIUM CHLORIDE 0.9 % (FLUSH) 0.9 %
10 SYRINGE (ML) INJECTION EVERY 12 HOURS SCHEDULED
Status: DISCONTINUED | OUTPATIENT
Start: 2020-08-10 | End: 2020-08-13 | Stop reason: HOSPADM

## 2020-08-10 RX ADMIN — INSULIN LISPRO 4 UNITS: 100 INJECTION, SOLUTION INTRAVENOUS; SUBCUTANEOUS at 21:31

## 2020-08-10 RX ADMIN — CARVEDILOL 25 MG: 12.5 TABLET, FILM COATED ORAL at 18:31

## 2020-08-10 RX ADMIN — GABAPENTIN 200 MG: 100 CAPSULE ORAL at 18:32

## 2020-08-10 RX ADMIN — ASPIRIN 324 MG: 81 TABLET, CHEWABLE ORAL at 10:00

## 2020-08-10 RX ADMIN — HYDRALAZINE HYDROCHLORIDE 100 MG: 50 TABLET, FILM COATED ORAL at 21:31

## 2020-08-10 RX ADMIN — NITROGLYCERIN 5 MCG/MIN: 20 INJECTION INTRAVENOUS at 11:19

## 2020-08-10 RX ADMIN — Medication 10 ML: at 21:36

## 2020-08-10 RX ADMIN — GABAPENTIN 200 MG: 100 CAPSULE ORAL at 21:31

## 2020-08-10 RX ADMIN — NITROGLYCERIN 0.4 MG: 0.4 TABLET SUBLINGUAL at 10:00

## 2020-08-10 RX ADMIN — INSULIN LISPRO 5 UNITS: 100 INJECTION, SOLUTION INTRAVENOUS; SUBCUTANEOUS at 21:36

## 2020-08-10 ASSESSMENT — PAIN DESCRIPTION - ONSET: ONSET: SUDDEN

## 2020-08-10 ASSESSMENT — ENCOUNTER SYMPTOMS
NAUSEA: 1
BLOOD IN STOOL: 0
CONSTIPATION: 0
ABDOMINAL PAIN: 0
RHINORRHEA: 0
COLOR CHANGE: 0
VOMITING: 0
COUGH: 1
WHEEZING: 0
ABDOMINAL DISTENTION: 0
SHORTNESS OF BREATH: 1
DIARRHEA: 0

## 2020-08-10 ASSESSMENT — PAIN DESCRIPTION - PAIN TYPE: TYPE: ACUTE PAIN

## 2020-08-10 ASSESSMENT — PAIN DESCRIPTION - ORIENTATION: ORIENTATION: LEFT

## 2020-08-10 ASSESSMENT — PAIN DESCRIPTION - LOCATION: LOCATION: CHEST

## 2020-08-10 ASSESSMENT — PAIN DESCRIPTION - FREQUENCY: FREQUENCY: CONTINUOUS

## 2020-08-10 NOTE — ED NOTES
Attempted straight stick x2 for repeat green top/troponin with no success.  Melissa to attempt     Sadia  [employer], RN  08/10/20 9490

## 2020-08-10 NOTE — ED NOTES
Dr. Beny Vanessa states he does not want the nitro IV continuous infusion discontinued - states it was discontinued in error and he will fix it.      Jenny Clay RN  08/10/20 5618

## 2020-08-10 NOTE — ED PROVIDER NOTES
Care assumed from Dr. Adolph Joseph,     Pt with cocaine chest pain. Pt admitted.       Phillip Jennings MD  08/10/20 5962

## 2020-08-10 NOTE — ED NOTES
Pt. Resting on stretcher, NAD, RR even and unlabored  Pt.  Denies needs at this time   Remains on full cardiac monitor  Call light within reach  Will continue to monitor and reassess     Selena Duenas RN  08/10/20 7933

## 2020-08-10 NOTE — ED NOTES
Pt expressed desire to eat and drink. Dr. Colette Gallegos approved a regular diet, box lunch and water given. No further needs at this time.      Asad Alejandre RN  08/10/20 2018

## 2020-08-10 NOTE — ED NOTES
Pt to ER by private vehicle c/o left sided non radiating chest pain that woke him from sleep this morning. Pt states nausea as well. Pt states his COPD is worse than usual as well and has not been managing his blood sugar at home due to lack of test strips. Pt arrives calm, resting comfortably on bed w/ rr even nad unlabored. Workup started, Dr. Barbara Casas @ bedside. Will continue to monitor.      Faith Toledo RN  08/10/20 1007

## 2020-08-10 NOTE — PROGRESS NOTES
This is a 80-year-old male presenting with central chest pressure developed 3 to 4 hours prior to arrival to the ED while walking. Relieved to some extent with nitro. On and off, not relieved by rest, aggravated on exertion. Associated with flutter-like sensation, nausea and productive cough. Patient mentioned that his grandson was sick at home. Denied fever chills body pain sore throat or headache. Patient has past medical history of type 2 diabetes mellitus, essential hypertension, nonobstructive CAD, carotid stenosis s/p endarterectomy COPD (not on home oxygen) and CKD stage III (baseline creatinine 2.4)    He quit smoking 5 years ago. Used to smoke 1 pack a day since the age of 26. Denies alcohol consumption. Denies substance use. 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 Pertinent labs:    Troponin: 65-49 (baseline troponins in 60s)  proBNP: 4188 (baseline in 1000s)    EKG: No acute ST elevation. T wave inversion inferolaterally,   Present in previous EKG. Creatinine: 2.43    Urine drug screen: Positive for cocaine      Assessment and plan:    Unstable angina: Trend troponin. Follow-up repeat EKG in the evening. S/p loading dose aspirin. Cardiology recommends trending tropes to assess the need for heparin drip. Continue nitro. Aspirin 81 mg daily. Coreg 25 mg twice daily, Lipitor 80 mg daily. Will start ACE I after creatinine results in the a.m.    UDS positive for cocaine    Echo 5/16/2020: EF 73%. Moderate to severe LVH. Carotid artery stenosis s/p left endarterectomy: On aspirin and Plavix    Type 2 diabetes mellitus, uncontrolled: A1c 6/16/2020: 9.7. Lantus 20 units nightly and sliding scale. Hypoglycemia protocol. Monitor blood glucose. COPD: On trelegy Ellipta and albuterol as needed    CKD stage III baseline creatinine 2.4. Monitor creatinine. Essential hypertension on Coreg 25 mg twice daily.   Resume remaining home antihypertensives if blood pressure is uncontrolled.     Dyslipidemia: On Lipitor 80 mg daily    History of cocaine use    Nonobstructive CAD: On aspirin Plavix and statin    Marianne Daigle MD  PGY-2, Internal Medicine Resident  Cy Muñiz  8/10/2020 3:22 PM      .

## 2020-08-10 NOTE — ED PROVIDER NOTES
[facility] ED  Emergency Department  Emergency Medicine Resident Sign-out     Care of Jass Conner was assumed from Dr. Jamal Hernandez and is being seen for Chest Pain  . The patient's initial evaluation and plan have been discussed with the prior provider who initially evaluated the patient.      EMERGENCY DEPARTMENT COURSE / MEDICAL DECISION MAKING:       MEDICATIONS GIVEN:  Orders Placed This Encounter   Medications    albuterol sulfate  (90 Base) MCG/ACT inhaler 2 puff    nitroGLYCERIN (NITROSTAT) SL tablet 0.4 mg    aspirin chewable tablet 324 mg    DISCONTD: fentaNYL (SUBLIMAZE) injection 50 mcg    DISCONTD: nitroGLYCERIN 50 mg in dextrose 5% 250 mL infusion    DISCONTD: heparin (porcine) injection 4,000 Units    DISCONTD: heparin (porcine) injection 4,000 Units    DISCONTD: heparin (porcine) injection 2,000 Units    DISCONTD: heparin 25,000 units in dextrose 5% 250 mL infusion    aspirin EC tablet 81 mg    atorvastatin (LIPITOR) tablet 80 mg    carvedilol (COREG) tablet 25 mg    fluticasone-umeclidin-vilant (TRELEGY ELLIPTA) 100-62.5-25 MCG/INH inhaler 1 puff    gabapentin (NEURONTIN) capsule 200 mg    sodium chloride flush 0.9 % injection 10 mL    sodium chloride flush 0.9 % injection 10 mL    OR Linked Order Group     acetaminophen (TYLENOL) tablet 650 mg     acetaminophen (TYLENOL) suppository 650 mg    polyethylene glycol (GLYCOLAX) packet 17 g    OR Linked Order Group     potassium chloride (KLOR-CON M) extended release tablet 40 mEq     potassium bicarb-citric acid (EFFER-K) effervescent tablet 40 mEq     potassium chloride 10 mEq/100 mL IVPB (Peripheral Line)    insulin glargine (LANTUS) injection vial 20 Units    insulin lispro (HUMALOG) injection vial 0-12 Units    insulin lispro (HUMALOG) injection vial 0-6 Units    glucose (GLUTOSE) 40 % oral gel 15 g    dextrose 50 % IV solution    glucagon (rDNA) injection 1 mg    dextrose 5 % solution    clopidogrel (PLAVIX) tablet 75 mg       LABS / RADIOLOGY:     Labs Reviewed   TROPONIN - Abnormal; Notable for the following components:       Result Value    Troponin, High Sensitivity 65 (*)     All other components within normal limits   CBC WITH AUTO DIFFERENTIAL - Abnormal; Notable for the following components:    Seg Neutrophils 67 (*)     Lymphocytes 20 (*)     Immature Granulocytes 1 (*)     All other components within normal limits   BASIC METABOLIC PANEL - Abnormal; Notable for the following components:    Glucose 202 (*)     BUN 47 (*)     CREATININE 2.43 (*)     GFR Non- 27 (*)     GFR  33 (*)     All other components within normal limits   BRAIN NATRIURETIC PEPTIDE - Abnormal; Notable for the following components:    Pro-BNP 4,188 (*)     All other components within normal limits   TROPONIN - Abnormal; Notable for the following components:    Troponin, High Sensitivity 49 (*)     All other components within normal limits   URINE DRUG SCREEN - Abnormal; Notable for the following components:    Cocaine Metabolite, Urine POSITIVE (*)     All other components within normal limits   SPECIMEN REJECTION   APTT   PREVIOUS SPECIMEN   TROPONIN   TROPONIN   TROPONIN   HEPATIC FUNCTION PANEL   HEMOGLOBIN A1C       Xr Chest (2 Vw)    Result Date: 8/10/2020  EXAMINATION: TWO XRAY VIEWS OF THE CHEST 8/10/2020 10:22 am COMPARISON: AP chest from 06/18/2020 HISTORY: ORDERING SYSTEM PROVIDED HISTORY: assess PNA, productive cough, likely copd exac, assess pneumothorax, assess for widened mediastinum TECHNOLOGIST PROVIDED HISTORY: assess PNA, productive cough, likely copd exac, assess pneumothorax, assess for widened mediastinum Reason for Exam: productive cough/ ? COPD,pneumonia, pneumothorax Acuity: Unknown Type of Exam: Unknown FINDINGS: Overlying ECG monitor leads and gown snaps. Carotid stent and clips left neck. Cardiac silhouette stable and nonenlarged.   Mediastinal structures midline unchanged; no mediastinal widening. No localized pulmonary opacity or blunting of the costophrenic angles. Unchanged prominence lung markings. No marked lung hyperinflation. No pneumothorax. Mild kyphoscoliosis thoracic spine but no acute bony or soft tissue abnormality. No acute cardiopulmonary disease. RECENT VITALS:     Temp: 98.8 °F (37.1 °C),  Pulse: 61, Resp: 29, BP: (!) 163/60, SpO2: 99 %      This patient is a 60 y.o. Male with  CP, improve with nitro, EKG: nonspec elevation, nitro drip, no ekg change, trop 60 CKD, admitted to medicine, Cards on board, no hep, asa.       ED Course as of Aug 10 1623   Mon Aug 10, 2020   1000 EKG shows sinus rhythm rate of 68, there is a slight rightward axis otherwise intervals and appropriate there is a nonspecific ST elevation in V3 to be unchanged from prior EKG, no reciprocal changes, patient continue have pain will likely get repeat EKG otherwise no T wave abnormalities    [EF]   1002 BP(!): 193/85 [EF]   1010 Patient has continued chest pain slightly worse now, due to borderline initial EKG we will get a repeat to assess for any evolving ACS      [EF]   1017 Repeat EKG shows continued ST elevation in V3 somewhat poor study still elevation appears to be approximately 1.5 mm there is more well-defined T wave inversions in leads 5 and 6 still relatively unchanged from previous EKG unchanged 0.5 mm depression lead to overall no significant changes from initial EKG    [EF]   1058 Troponin, High Sensitivity(!!): 65 [EF]   1058 Creatinine(!): 2.43 [EF]   1111 Patient has increasing chest pain however did have relief with nitro, plan to start nitro drip, heparin drip as troponin is elevated, he is baseline CKD his creatinine is at baseline, given active chest pain and elevated troponin have to assume an STEMI at this time we will get repeat troponin consult cardiology admit to medicine for possible catheterization    [EF]   1111 Bedside ultrasound shows adequate heart squeeze, there is no pericardial effusion, there is no enlarged right ventricle,    [EF]   1112  global squeeze in both long and short axis axis appear adequate with no regional wall abnormalities obviously apparent, there is bilateral lung sliding there is no obvious B-lines    [EF]   1115 We will get repeat troponin as well as repeat EKG to continue to trend any worsening evolution of an STEMI     [EF]   1124 Spoke to cardiology fellow, discussed with fellow patient denies cocaine use however given history fellow would like urine drug screen agrees continue nitro drip, repeat troponin if elevated he states start heparin    Patient accepted in to medicine stepdown service    [EF]   1146 3rd EKG shows no changes continued 1.5 mm elevation V3 T wave inversions unchanged    [EF]   1231 Second troponin downtrending cardiology recommends holding off on heparin continue nitro drip    [EF]   1603 Urine drug screen does show cocaine positive, this likely explains high blood pressure and chest pain, will update admitting team   Cocaine Metabolite, Urine(!): POSITIVE [EF]      ED Course User Index  [EF] Yani Hinton, DO       OUTSTANDING TASKS / RECOMMENDATIONS:    1. Awaiting bed     FINAL IMPRESSION:     1. Chest pain, unspecified type    2.  NSTEMI (non-ST elevated myocardial infarction) [facility])        DISPOSITION:         DISPOSITION:  []  Discharge   []  Transfer -    [x]  Admission -  Med/surg - medicine    []  Against Medical Advice   []  Eloped   FOLLOW-UP: TERRANCE Heath - [facility] [id number], [address] [facility], [place]  [phone]           DISCHARGE MEDICATIONS: New Prescriptions    BLOOD GLUCOSE MONITOR STRIPS    Test_4__times daily Diagnosis: 250.0   Diabetes Mellitus_x___Insulin Dependent____Non-Insulin Dependent    LANCETS MISC    Use_4__times daily Diagnisis:250.0  Diabetes Mellitus__x__Insulin Dependent___Non-Insulin Dependent          Laverne Garcia MD  Emergency Medicine Resident  [facility]     Laverne Garcia MD  Resident  08/10/20 1130

## 2020-08-10 NOTE — TELEPHONE ENCOUNTER
Pt needs test strips and lancets. Pt is currently hospitalized and is out for when he gets home. Medications pended.

## 2020-08-10 NOTE — H&P
[address]     Department of Internal Medicine - Staff Internal Medicine Teaching Service          ADMISSION NOTE/HISTORY AND PHYSICAL EXAMINATION   Date: 8/10/2020  Patient Name: Daphne Niño  Date of admission: 8/10/2020  9:45 AM  YOB: 1955  PCP: TERRANCE Call CNP  History Obtained From:  patient    CHIEF COMPLAINT     Chief complaint: Chest pain    HISTORY OF PRESENTING ILLNESS     The patient is a pleasant 60 y.o. male presents with a PMH of Non obstructive CAD, s/o left ICA stent, Diabetes mellitus type 2, HTN, CKD stage 3, with a chief complaint of chest pain x 1 night. The patient reports he was walking around in his house last night when he first noticed his chest pain,  Which substernal, non radiating, sharp, 5/10 in intensity, not relieved by rest, aggravated by exertion, relieved by sublingual nitroglycerin. After taking nitroglycerin, the chest pain was relieved but he still has some chest pressure. After waking up this morning, the patient had another episode of similar chest pain, which is again relieved by nitroglycerin and the patient still has some chest pressure. Patient also reports associated with \"flickering\" sensation in his chest.    Patient has always some baseline shortness of breath because of his COPD. Denies orhthopnea, PND. Patient also has associated nausea, denies vomiting. Has headache, denies any vision changes, abdominal pain. Patient also reports cough, yellow sputum x 2days. Denies fever, chills. Patient reports he is compliant with his medications. On Aspirin, Plavix. Patient was last hospitalised in 20 for NSTEMI. Last cath done in 2017. Findings: Left main: mild disease,LAD: 30-40% prox stenosis,LCX: mild disease,RCA: 40% mid stenosis    EK20 NSR LVH non-specific T changes   ECHO: 20  Summary  Left ventricle is normal in size with hyperdynamic systolic function.   Moderate to severe concentric left ventricular hypertrophy. Calculated ejection fraction is 73 %. Left atrium is mildly dilated. Mild mitral regurgitation. Mild tricuspid regurgitation. Estimated right ventricular systolic pressure  is 37 mmHg. MRA in 2013, showed no significant stenosis of Right carotid but 70% stenosis of left carotid. aortic arch and carotid angiogram with left internal carotid stent placement and balloon angioplasty in 2014. Quit smoking in the last 5 to 10 years, 22 pack year history of smoking. In the ER, bradycardia 66, high /85 saturating 99% on room air. Labs show Cr 2.43 (baseline 2.36), elevated pro-bnp 4,188, elevated troponins trending down (65>49) trops in prev admission(392 to 411). Glucose 202. Urine tox positive for cocaine. CXR shows no acute cardiopulmonary findings. EKG findings: EKG interpretation: Sinus rhythm 68. Normal intervals. Borderline right axis. No acute ST elevations, there is nonspecific ST elevation in the anterior leads however this is seen on prior EKG from 6/19/2020. There is also T wave inversion inferior laterally again seen on prior.   Repeat EKG interpretation, 1011: Sinus rhythm 68. Normal intervals again seen right axis. No evolving ST changes again seen in the inferior lateral T wave inversion   Repeat EKG interpretation, 1125: Sinus bradycardia 59. Normal intervals right axis. No acute ST elevation, again seen is the ST depression with T wave inversion inferior laterally no evolving changes    Patient is given an aspirin, put on nitro drip, and about to start heparin drip. Review of Systems   Constitutional: Positive for activity change. Negative for chills, diaphoresis, fatigue and fever. HENT: Negative for postnasal drip and rhinorrhea. Eyes: Negative for visual disturbance. Respiratory: Positive for cough and shortness of breath. Negative for wheezing. Cardiovascular: Positive for chest pain. Negative for leg swelling. \"flickering\" sensation in the chest, as reported by the patient   Gastrointestinal: Positive for nausea. Negative for abdominal distention, abdominal pain, blood in stool, constipation, diarrhea and vomiting. Skin: Negative for color change, pallor and wound. Neurological: Positive for headaches. Negative for light-headedness and numbness. Psychiatric/Behavioral: The patient is not nervous/anxious. PAST MEDICAL HISTORY     Past Medical History:   Diagnosis Date    Asthma     mod persistent    CAD in native artery, nonobstructive     Carotid stenosis, left 6/10/2013    Carpal tunnel syndrome     RIGHT    Cerebrovascular disease 4/23/2018    Chronic kidney disease 6/10/2013    COPD (chronic obstructive pulmonary disease) (HCC)     Diabetes mellitus (HCC)     insulin dependent    History of colon polyps     History of weakness of extremity     right sided    HTN (hypertension)     Hyperlipidemia     Lung, cysts, congenital     PTSD (post-traumatic stress disorder)     PTSD (post-traumatic stress disorder)     TIA (transient ischemic attack)     Type II or unspecified type diabetes mellitus without mention of complication, not stated as uncontrolled        PAST SURGICAL HISTORY     Past Surgical History:   Procedure Laterality Date    CAROTID ENDARTERECTOMY Left 7-2013    COLONOSCOPY      HERNIA REPAIR      WI COLSC FLX W/RMVL OF TUMOR POLYP LESION SNARE TQ N/A 6/27/2017    COLONOSCOPY POLYPECTOMY SNARE/ hot performed by Latasha Gomez DO at [facility] Left 2015    carotid stent, dr Vin Kinney     Lisinopril; Ace inhibitors; and Pcn [penicillins]    MEDICATIONS PRIOR TO ADMISSION     Prior to Admission medications    Medication Sig Start Date End Date Taking?  Authorizing Provider   blood glucose monitor strips Test_4__times daily Diagnosis: 250.0   Diabetes Mellitus_x___Insulin Dependent____Non-Insulin Dependent 8/10/20 TERRANCE Reddy CNP   Lancets MISC Use_4__times daily Diagnisis:250.0  Diabetes Mellitus__x__Insulin Dependent___Non-Insulin Dependent 8/10/20   TERRANCE Reddy CNP   vitamin D (ERGOCALCIFEROL) 1.25 MG (16428 UT) CAPS capsule TAKE 1 CAPSULE BY MOUTH ONCE A WEEK 8/5/20   TERRANCE Reddy CNP   NIFEdipine (ADALAT CC) 30 MG extended release tablet Take 30 mg by mouth daily    Historical Provider, MD   prazosin (MINIPRESS) 2 MG capsule Take 2 capsules by mouth 2 times daily 6/20/20   Alcides Shutter, DO   bumetanide (BUMEX) 1 MG tablet Take 1 tablet by mouth daily 6/21/20   Alcides Shutter, DO   Cholecalciferol (VITAMIN D) 50 MCG (2000 UT) TABS tablet Take 1 tablet by mouth daily 7/25/20 8/25/20  Alcides Shutter, DO   carvedilol (COREG) 25 MG tablet Take 1 tablet by mouth 2 times daily (with meals) 6/16/20   TERRANCE Reddy CNP   atorvastatin (LIPITOR) 80 MG tablet Take 1 tablet by mouth daily 6/16/20   TERRANCE Reddy CNP   clopidogrel (PLAVIX) 75 MG tablet Take 1 tablet by mouth daily 6/16/20   TERRANCE Reddy CNP   insulin glargine (BASAGLAR KWIKPEN) 100 UNIT/ML injection pen Inject 35 Units into the skin nightly Dispense with pen needle 6/16/20   TERRANCE Reddy CNP   insulin aspart (NOVOLOG FLEXPEN) 100 UNIT/ML injection pen Inject 5 Units into the skin 3 times daily (before meals) Sliding scale 6/16/20   TERRANCE Reddy CNP   nitroGLYCERIN (NITROSTAT) 0.4 MG SL tablet up to max of 3 total doses. If no relief after 1 dose, call 911. 6/16/20   TERRANCE Reddy CNP   Nutritional Supplements ([address] [place] [address]) LIQD Take 1 Can by mouth 3 times daily 6/16/20   TERRANCE Reddy CNP   isosorbide mononitrate (IMDUR) 30 MG extended release tablet TAKE 1 TABLET BY MOUTH ONCE DAILY 6/3/20   TERRANCE Reddy - CNP   gabapentin (NEURONTIN) 100 MG capsule Take 2 capsules by mouth 3 times daily for 90 doses.  6/3/20 7/3/20  Jcarlos Gale TERRANCE Vick CNP   losartan (COZAAR) 50 MG tablet Take 1 tablet by mouth 2 times daily 20   TERRANCE Christian CNP   QUEtiapine (SEROQUEL) 100 MG tablet Take 1 tablet by mouth nightly 20   TERRANCE Christian CNP   fluticasone-umeclidin-vilant (TRELEGY ELLIPTA) 100-62.5-25 MCG/INH AEPB INHALE 1 PUFF INTO THE LINGS DAILY 20   TERRANCE Christian CNP   albuterol-ipratropium (COMBIVENT RESPIMAT)  MCG/ACT AERS inhaler Inhale 1 puff into the lungs every 6 hours 20   TERRANCE Christian CNP   hydrALAZINE (APRESOLINE) 100 MG tablet Take 1 tablet by mouth every 8 hours 20   Valerie Hunt MD   COMFORT EZ PEN NEEDLES 31G X 8 MM MISC USE AS DIRECTED 20   TERRANCE Christian CNP   aspirin (ASPIRIN ADULT LOW STRENGTH) 81 MG EC tablet TAKE 1 TABLET BY MOUTH DAILY 20   TERRANCE Christian CNP   TRUE METRIX BLOOD GLUCOSE TEST strip TEST BLOOD SUGAR 4 TO 5 TIMES DAILY 19   TERRANCE Christian CNP   EASY COMFORT LANCETS MISC DX: DM-2 USE 3-4 TIMES DAILY 3/25/19   Zara Tafoya MD       SOCIAL HISTORY     Tobacco: Quit smoking in the last 5 to 10 years, 22 pack year history of smoking. Alcohol: Denies  Illicits: Heroin abuse - in remission     FAMILY HISTORY     Family History   Problem Relation Age of Onset    Cancer Mother     Heart Disease Father        PHYSICAL EXAM     Vitals: BP (!) 163/60   Pulse 61   Temp 98.8 °F (37.1 °C) (Oral)   Resp 29   Wt 149 lb (67.6 kg)   SpO2 99%   BMI 24.05 kg/m²   Tmax: Temp (24hrs), Av.8 °F (37.1 °C), Min:98.8 °F (37.1 °C), Max:98.8 °F (37.1 °C)    Last Body weight:   Wt Readings from Last 3 Encounters:   08/10/20 149 lb (67.6 kg)   20 149 lb (67.6 kg)   20 141 lb (64 kg)     Body Mass Index : Body mass index is 24.05 kg/m².       PHYSICAL EXAMINATION:  Constitutional: This is a well developed, well nourished, 18.5-24.9 - Normal 60y.o. year old male who is alert, oriented, cooperative and in no apparent distress. Head:normocephalic and atraumatic. EENT:  PERRLA. No conjunctival injections. Septum was midline, mucosa was without erythema, exudates or cobblestoning. No thrush was noted. Neck: Supple without thyromegaly. No elevated JVP. Trachea was midline. Respiratory: No wheezes heard bilaterally. Cardiovascular: Regular without murmur, clicks, gallops or rubs. Abdomen: Slightly rounded and soft without organomegaly. No rebound, rigidity or guarding was appreciated. Lymphatic: No lymphadenopathy. Musculoskeletal: Normal curvature of the spine. No gross muscle weakness. Extremities:  No lower extremity edema , pulses present. Skin:  Warm and dry. Good color, turgor and pigmentation. No lesions or scars.   No cyanosis or clubbing  Neurological/Psychiatric: The patient's general behavior, level of consciousness, thought content and emotional status is normal.          INVESTIGATIONS     Laboratory Testing:     Recent Results (from the past 24 hour(s))   Troponin    Collection Time: 08/10/20 10:07 AM   Result Value Ref Range    Troponin, High Sensitivity 65 (HH) 0 - 22 ng/L    Troponin T NOT REPORTED <0.03 ng/mL    Troponin Interp NOT REPORTED    CBC WITH AUTO DIFFERENTIAL    Collection Time: 08/10/20 10:07 AM   Result Value Ref Range    WBC 9.3 3.5 - 11.3 k/uL    RBC 4.57 4.21 - 5.77 m/uL    Hemoglobin 14.0 13.0 - 17.0 g/dL    Hematocrit 41.0 40.7 - 50.3 %    MCV 89.7 82.6 - 102.9 fL    MCH 30.6 25.2 - 33.5 pg    MCHC 34.1 28.4 - 34.8 g/dL    RDW 12.2 11.8 - 14.4 %    Platelets 271 713 - 354 k/uL    MPV 9.4 8.1 - 13.5 fL    NRBC Automated 0.0 0.0 per 100 WBC    Differential Type NOT REPORTED     Seg Neutrophils 67 (H) 36 - 65 %    Lymphocytes 20 (L) 24 - 43 %    Monocytes 7 3 - 12 %    Eosinophils % 4 1 - 4 %    Basophils 1 0 - 2 %    Immature Granulocytes 1 (H) 0 %    Segs Absolute 6.33 1.50 - 8.10 k/uL    Absolute Lymph # 1.82 1.10 - 3.70 k/uL    Absolute Mono # 0.64 0.10 - 1.20 k/uL    Absolute Eos # 0.38 0.00 - 0.44 k/uL    Basophils Absolute 0.05 0.00 - 0.20 k/uL    Absolute Immature Granulocyte 0.08 0.00 - 0.30 k/uL    WBC Morphology NOT REPORTED     RBC Morphology NOT REPORTED     Platelet Estimate NOT REPORTED    Basic Metabolic Panel    Collection Time: 08/10/20 10:07 AM   Result Value Ref Range    Glucose 202 (H) 70 - 99 mg/dL    BUN 47 (H) 8 - 23 mg/dL    CREATININE 2.43 (H) 0.70 - 1.20 mg/dL    Bun/Cre Ratio NOT REPORTED 9 - 20    Calcium 9.1 8.6 - 10.4 mg/dL    Sodium 141 135 - 144 mmol/L    Potassium 5.3 3.7 - 5.3 mmol/L    Chloride 105 98 - 107 mmol/L    CO2 21 20 - 31 mmol/L    Anion Gap 15 9 - 17 mmol/L    GFR Non-African American 27 (L) >60 mL/min    GFR  33 (L) >60 mL/min    GFR Comment          GFR Staging NOT REPORTED    Brain Natriuretic Peptide    Collection Time: 08/10/20 10:07 AM   Result Value Ref Range    Pro-BNP 4,188 (H) <300 pg/mL    BNP Interpretation Pro-BNP Reference Range:    SPECIMEN REJECTION    Collection Time: 08/10/20 10:07 AM   Result Value Ref Range    Specimen Source . BLOOD     Ordered Test PTT     Reason for Rejection       Unable to perform testing: Specimen quantity not sufficient.    - NOT REPORTED    Troponin    Collection Time: 08/10/20 11:32 AM   Result Value Ref Range    Troponin, High Sensitivity 49 (H) 0 - 22 ng/L    Troponin T NOT REPORTED <0.03 ng/mL    Troponin Interp NOT REPORTED        Imaging:   Xr Chest (2 Vw)    Result Date: 8/10/2020  No acute cardiopulmonary disease.        ASSESSMENT & PLAN     Principal Problem:    NSTEMI (non-ST elevated myocardial infarction) ([facility] [address]  Active Problems:    Pure hypercholesterolemia    Carotid stenosis, left s/p Endarterectomy    CKD (chronic kidney disease) stage 3, GFR 30-59 ml/min ([facility])    Essential hypertension    DM type 2, uncontrolled, with neuropathy ([facility])    Non-obstructive Coronary artery disease of native artery of native heart with stable angina pectoris [facility])  Resolved Problems:    * No resolved hospital problems. *      ASSESSMENT / PLAN:     IMPRESSION  This is a 60 y.o. male who presented with chest pain, previous h/o non-obstructive CAD and found to have moderately elevated troponins, elevated pro-BNP . Patient admitted to inpatient status for the work up and management of NSTEMI. 1.NSTEMI. elevated pro-bnp 4,188, elevated troponins trending down (65>49) trops in prev admission(392 to 411). Received aspirin 324mg once PO. Was put on nitro drip in the ER, didn't receive the heparin drip. Continue Aspirin EC 81mg PO daily. Continue lipitor 80mg PO daily. Trend troponins. EKG shows bradycardia, sinus rhythm and no new findings. Urine tox positive for cocaine. Cardiology consulted in the ER. Last cath done in . Findings: Left main: mild disease,LAD: 30-40% prox stenosis,LCX: mild disease,RCA: 40% mid stenosis  EK20 NSR LVH non-specific T changes   ECHO: 20. EF 73%. 2.CKD stage 3. Creatinine 2.43. (baseline 2.36). follow up on bmp. 3.Essential hypertension. Uncontrolled. On nitro drip in the ER. Continue coreg 25mg BID. Will resume home medications, if BP uncontrolled. 4.Diabetes Mellitus, Type 2. Hold home insulin. Start lantus 20 SC QHS. Start humalog. Continue neurontin 200mg TID. Check hba1c, poct glucose. 5.COPD. Continue Trelegy inhaler daily. Not on home oxygen. 6.Carotid stenosis, s/p left ICA stenosis. Stable. On Aspirin and plavix at home. F/u on lipid panel. Diet: Cardiac diet. NPO after midnight. DVT ppx: SCD cuffs  GI ppx: not indicated     PT/OT/SW: consulted  Discharge Planning:    Lisa Yang MD  Internal Medicine Resident, PGY-1  [address] [facility];  [place], [place]  8/10/2020, 1:22 PM

## 2020-08-10 NOTE — LETTER
[facility] Stepdown  [address] [facility] [address]  Phone: [phone]             August 13, 2020    Patient: Rob Lora   YOB: 1955   Date of Visit: 8/10/2020       To Whom It May Concern:    Blane Bermudez was seen and treated in our facility  beginning 8/10/2020 until . He may return to pulmonary rehab.       Sincerely,       Isaura Reynaga MD         Signature:__________________________________

## 2020-08-10 NOTE — ED NOTES
Pt. Ambulates to restroom with steady gait.  Urine sample provided, labeled and sent to lab via tube system     Rowena Simmonds, RN  08/10/20 1421

## 2020-08-10 NOTE — ED PROVIDER NOTES
[facility] ED  Emergency Department Encounter  EmergencyMedicine Resident     Pt Erica Newton  MRN: [id number]  [place] 1955  Date of evaluation: 8/10/20  PCP:  TERRANCE Marroquin CNP    CHIEF COMPLAINT       Chest pain    HISTORY OF PRESENT ILLNESS  (Location/Symptom, Timing/Onset, Context/Setting, Quality, Duration, Modifying Factors, Severity.)      Cony Evans is a 60 y.o. male who presents with chest pain  Symptoms began last night, worsening withnothing , relieved with nitro. Patient has had similar symptoms in the past.  Patient describes pain as pressure, and rates pain/discomfort 8 /10. Symptoms have been intermittent.          Pt denies nausea, diaphoresis, or vomiting during these episodes    Pt has  prior cardiac history including peripheral arterial disease he has a prior stent in his left carotid artery he is on Plavix he has been compliant states he did have a cardiac evaluation    Patient states he had no prior heart attacks or stents placed in his heart, he does not currently follow with a cardiologist he states he was short of breath for the past 2 days as well but primarily is here for chest pressure he has had a productive yellow cough for the past 3 days but no fevers chills or other sick contacts, he states he thinks his COPD might be acting up as well    PAST MEDICAL / SURGICAL / SOCIAL / FAMILY HISTORY      has a past medical history of Asthma, CAD in native artery, nonobstructive, Carotid stenosis, left, Carpal tunnel syndrome, Cerebrovascular disease, Chronic kidney disease, COPD (chronic obstructive pulmonary disease) ([facility] Diabetes mellitus ([facility] History of colon polyps, History of weakness of extremity, HTN (hypertension), Hyperlipidemia, Lung, cysts, congenital, PTSD (post-traumatic stress disorder), PTSD (post-traumatic stress disorder), TIA (transient ischemic attack), and Type II or unspecified type diabetes mellitus without mention of complication, not stated as uncontrolled. has a past surgical history that includes hernia repair; Tonsillectomy; Colonoscopy; Carotid endarterectomy (Left, -); vascular surgery (Left, ); and pr colsc flx w/rmvl of tumor polyp lesion snare tq (N/A, 2017). Social History     Socioeconomic History    Marital status:      Spouse name: Not on file    Number of children: Not on file    Years of education: Not on file    Highest education level: Not on file   Occupational History     Employer: N/A   Social Needs    Financial resource strain: Not on file    Food insecurity     Worry: Not on file     Inability: Not on file   [employer] needs     Medical: Not on file     Non-medical: Not on file   Tobacco Use    Smoking status: Former Smoker     Packs/day: 0.50     Years: 35.00     Pack years: 17.50     Types: Cigarettes     Last attempt to quit: 2014     Years since quittin.0    Smokeless tobacco: Never Used   Substance and Sexual Activity    Alcohol use: No     Alcohol/week: 0.0 standard drinks    Drug use: No    Sexual activity: Yes     Partners: Female   Lifestyle    Physical activity     Days per week: Not on file     Minutes per session: Not on file    Stress: Not on file   Relationships    Social connections     Talks on phone: Not on file     Gets together: Not on file     Attends [employer] service: Not on file     Active member of club or organization: Not on file     Attends meetings of clubs or organizations: Not on file     Relationship status: Not on file    Intimate partner violence     Fear of current or ex partner: Not on file     Emotionally abused: Not on file     Physically abused: Not on file     Forced sexual activity: Not on file   Other Topics Concern    Not on file   Social History Narrative    Not on file       Family History   Problem Relation Age of Onset    Cancer Mother     Heart Disease Father        Allergies:  Lisinopril;  Ace inhibitors; and Pcn [penicillins]    Home Medications:  Prior to Admission medications    Medication Sig Start Date End Date Taking? Authorizing Provider   blood glucose monitor strips Test_4__times daily Diagnosis: 250.0   Diabetes Mellitus_x___Insulin Dependent____Non-Insulin Dependent 8/10/20   TERRANCE King CNP   Lancets MISC Use_4__times daily Diagnisis:250.0  Diabetes Mellitus__x__Insulin Dependent___Non-Insulin Dependent 8/10/20   TERRANCE King CNP   vitamin D (ERGOCALCIFEROL) 1.25 MG (13805 UT) CAPS capsule TAKE 1 CAPSULE BY MOUTH ONCE A WEEK 8/5/20   TERRANCE King CNP   NIFEdipine (ADALAT CC) 30 MG extended release tablet Take 30 mg by mouth daily    Historical Provider, MD   prazosin (MINIPRESS) 2 MG capsule Take 2 capsules by mouth 2 times daily 6/20/20   Kassidy Shay DO   bumetanide (BUMEX) 1 MG tablet Take 1 tablet by mouth daily 6/21/20   Kassidy Shay DO   Cholecalciferol (VITAMIN D) 50 MCG (2000 UT) TABS tablet Take 1 tablet by mouth daily 7/25/20 8/25/20  Kassidy Shay DO   carvedilol (COREG) 25 MG tablet Take 1 tablet by mouth 2 times daily (with meals) 6/16/20   TERRANCE King CNP   atorvastatin (LIPITOR) 80 MG tablet Take 1 tablet by mouth daily 6/16/20   TERRANCE King CNP   clopidogrel (PLAVIX) 75 MG tablet Take 1 tablet by mouth daily 6/16/20   TERRANCE King CNP   insulin glargine (BASAGLAR KWIKPEN) 100 UNIT/ML injection pen Inject 35 Units into the skin nightly Dispense with pen needle 6/16/20   TERRANCE King CNP   insulin aspart (NOVOLOG FLEXPEN) 100 UNIT/ML injection pen Inject 5 Units into the skin 3 times daily (before meals) Sliding scale 6/16/20   TERRANCE King CNP   nitroGLYCERIN (NITROSTAT) 0.4 MG SL tablet up to max of 3 total doses.  If no relief after 1 dose, call 911. 6/16/20   TERRANCE King - CNP   Nutritional Supplements (GLUCERNA CARBSTEADY) LIQD Take 1 Can by mouth 3 times daily 6/16/20   TERRANCE Wong CNP   isosorbide mononitrate (IMDUR) 30 MG extended release tablet TAKE 1 TABLET BY MOUTH ONCE DAILY 6/3/20   TERRANCE Wong CNP   gabapentin (NEURONTIN) 100 MG capsule Take 2 capsules by mouth 3 times daily for 90 doses.  6/3/20 7/3/20  TERRANCE Wong CNP   losartan (COZAAR) 50 MG tablet Take 1 tablet by mouth 2 times daily 6/2/20   TERRANCE Wong CNP   QUEtiapine (SEROQUEL) 100 MG tablet Take 1 tablet by mouth nightly 6/2/20   TERRANCE Wong CNP   fluticasone-umeclidin-vilant (TRELEGY ELLIPTA) 100-62.5-25 MCG/INH AEPB INHALE 1 PUFF INTO THE LINGS DAILY 6/2/20   TERRANCE Wong CNP   albuterol-ipratropium (COMBIVENT RESPIMAT)  MCG/ACT AERS inhaler Inhale 1 puff into the lungs every 6 hours 6/2/20   TERRANCE Wong CNP   hydrALAZINE (APRESOLINE) 100 MG tablet Take 1 tablet by mouth every 8 hours 5/18/20   Shanon Ibarra MD   COMFORT EZ PEN NEEDLES 31G X 8 MM MISC USE AS DIRECTED 4/14/20   TERRANCE Wong CNP   aspirin (ASPIRIN ADULT LOW STRENGTH) 81 MG EC tablet TAKE 1 TABLET BY MOUTH DAILY 2/7/20   TERRANCE Wong CNP   TRUE METRIX BLOOD GLUCOSE TEST strip TEST BLOOD SUGAR 4 TO 5 TIMES DAILY 12/12/19   TERRANCE Wong CNP   EASY COMFORT LANCETS MISC DX: DM-2 USE 3-4 TIMES DAILY 3/25/19   Zara Parsons MD       REVIEW OF SYSTEMS    (2-9 systems for level 4, 10 or more for level 5)      General ROS - No fevers, No chills  Ophthalmic ROS - No discharge, No changes in vision  ENT ROS -  No sore throat, No rhinorrhea,   Respiratory ROS - no shortness of breath, no cough, no  wheezing  Cardiovascular ROS - positive  chest pain, no dyspnea on exertion  Gastrointestinal ROS - No abdominal pain, no nausea, no vomiting, no change in bowel habits, no black or bloody stools  Genito-Urinary ROS - No dysuria, trouble voiding, or hematuria  Musculoskeletal ROS - No myalgias, No arthalgias  Neurological ROS - No headache, no dizziness/lightheadedness, No focal weakness, no loss of sensation  Dermatological ROS - No lesions, No rash       PHYSICAL EXAM   (up to 7 for level 4, 8 or more for level 5)      INITIAL VITALS:   BP (!) 156/78   Pulse 62   Temp 98.8 °F (37.1 °C) (Oral)   Resp 15   Wt 149 lb (67.6 kg)   SpO2 100%   BMI 24.05 kg/m²     General Appearance: Uncomfortable appearing however nontoxic  HEENT: Head: normocephalic/atraumatic eyes: PERRLA, EOMT, conjunctiva not injected, sclerae nonicteric ears: External canals patent nose: Nares patent, no rhinorrhea, throat:mucous membranes moist, oropharynx clear     Neck: Trachea midline, no JVD. Left-sided incisional scar from prior carotid surgery    Lungs: Mildly tachypneic breathing 20 times a minute no obvious respiratory distress. CTA B/L, no wheezes/rhonchi     Cardiovascular: RRR, no murmur, 2+ peripheral pulses bilaterally. Cap refill less than 2 seconds. No lower extremity edema noted    Abdomen: Soft, nontender. No guarding or rebound tenderness. Neurologic: BLANC  to person, place, time, and event. No sensation deficits. Moving all extremities    Extremities: Skin warm, dry and intact.            PLAN (LABS / IMAGING / EKG):  Orders Placed This Encounter   Procedures    XR CHEST (2 VW)    Troponin    CBC WITH AUTO DIFFERENTIAL    Basic Metabolic Panel    Brain Natriuretic Peptide    Troponin    Urine Drug Screen    SPECIMEN REJECTION    APTT    PREVIOUS SPECIMEN    Basic Metabolic Panel w/ Reflex to MG    CBC auto differential    Troponin    LIPID PANEL    Hepatic Function Panel    Hemoglobin A1C    Diet NPO, After Midnight    DIET CARDIAC;    Telemetry Monitoring    Vital signs per unit routine    Telemetry monitoring - continuous duration    Notify physician    Up as tolerated    Daily weights    Intake and output    HYPOGLYCEMIA TREATMENT: blood glucose less than 50 mg/dL and patient ALERT and TOLERATING PO    HYPOGLYCEMIA TREATMENT: blood glucose less than 70 mg/dL and patient ALERT and TOLERATING PO    HYPOGLYCEMIA TREATMENT: blood glucose less than 70 mg/dL and patient NOT ALERT or NPO    Full Code    Inpatient consult to Cardiology    Inpatient consult to Internal Medicine    Inpatient consult to Case Management    OT eval and treat    PT evaluation and treat    MDI Treatment    Initiate Oxygen Therapy Protocol    Nasal Cannula oxygen    Pulse oximetry, continuous    POCT Glucose    EKG 12 Lead    EKG 12 lead    PATIENT STATUS (FROM ED OR OR/PROCEDURAL) Inpatient       MEDICATIONS ORDERED:  Orders Placed This Encounter   Medications    albuterol sulfate  (90 Base) MCG/ACT inhaler 2 puff    nitroGLYCERIN (NITROSTAT) SL tablet 0.4 mg    aspirin chewable tablet 324 mg    DISCONTD: fentaNYL (SUBLIMAZE) injection 50 mcg    DISCONTD: nitroGLYCERIN 50 mg in dextrose 5% 250 mL infusion    DISCONTD: heparin (porcine) injection 4,000 Units    DISCONTD: heparin (porcine) injection 4,000 Units    DISCONTD: heparin (porcine) injection 2,000 Units    DISCONTD: heparin 25,000 units in dextrose 5% 250 mL infusion    aspirin EC tablet 81 mg    atorvastatin (LIPITOR) tablet 80 mg    carvedilol (COREG) tablet 25 mg    fluticasone-umeclidin-vilant (TRELEGY ELLIPTA) 100-62.5-25 MCG/INH inhaler 1 puff    gabapentin (NEURONTIN) capsule 200 mg    sodium chloride flush 0.9 % injection 10 mL    sodium chloride flush 0.9 % injection 10 mL    OR Linked Order Group     acetaminophen (TYLENOL) tablet 650 mg     acetaminophen (TYLENOL) suppository 650 mg    polyethylene glycol (GLYCOLAX) packet 17 g    OR Linked Order Group     potassium chloride (KLOR-CON M) extended release tablet 40 mEq     potassium bicarb-citric acid (EFFER-K) effervescent tablet 40 mEq     potassium chloride 10 mEq/100 mL IVPB (Peripheral Line)    insulin glargine (LANTUS) injection vial 20 Units    insulin lispro (HUMALOG) injection vial 0-12 Units    insulin lispro (HUMALOG) injection vial 0-6 Units    glucose (GLUTOSE) 40 % oral gel 15 g    dextrose 50 % IV solution    glucagon (rDNA) injection 1 mg    dextrose 5 % solution    clopidogrel (PLAVIX) tablet 75 mg           DIAGNOSTIC RESULTS / EMERGENCY DEPARTMENT COURSE / MDM     LABS:  Results for orders placed or performed during the hospital encounter of 08/10/20   Troponin   Result Value Ref Range    Troponin, High Sensitivity 65 (HH) 0 - 22 ng/L    Troponin T NOT REPORTED <0.03 ng/mL    Troponin Interp NOT REPORTED    CBC WITH AUTO DIFFERENTIAL   Result Value Ref Range    WBC 9.3 3.5 - 11.3 k/uL    RBC 4.57 4.21 - 5.77 m/uL    Hemoglobin 14.0 13.0 - 17.0 g/dL    Hematocrit 41.0 40.7 - 50.3 %    MCV 89.7 82.6 - 102.9 fL    MCH 30.6 25.2 - 33.5 pg    MCHC 34.1 28.4 - 34.8 g/dL    RDW 12.2 11.8 - 14.4 %    Platelets 996 296 - 754 k/uL    MPV 9.4 8.1 - 13.5 fL    NRBC Automated 0.0 0.0 per 100 WBC    Differential Type NOT REPORTED     Seg Neutrophils 67 (H) 36 - 65 %    Lymphocytes 20 (L) 24 - 43 %    Monocytes 7 3 - 12 %    Eosinophils % 4 1 - 4 %    Basophils 1 0 - 2 %    Immature Granulocytes 1 (H) 0 %    Segs Absolute 6.33 1.50 - 8.10 k/uL    Absolute Lymph # 1.82 1.10 - 3.70 k/uL    Absolute Mono # 0.64 0.10 - 1.20 k/uL    Absolute Eos # 0.38 0.00 - 0.44 k/uL    Basophils Absolute 0.05 0.00 - 0.20 k/uL    Absolute Immature Granulocyte 0.08 0.00 - 0.30 k/uL    WBC Morphology NOT REPORTED     RBC Morphology NOT REPORTED     Platelet Estimate NOT REPORTED    Basic Metabolic Panel   Result Value Ref Range    Glucose 202 (H) 70 - 99 mg/dL    BUN 47 (H) 8 - 23 mg/dL    CREATININE 2.43 (H) 0.70 - 1.20 mg/dL    Bun/Cre Ratio NOT REPORTED 9 - 20    Calcium 9.1 8.6 - 10.4 mg/dL    Sodium 141 135 - 144 mmol/L    Potassium 5.3 3.7 - 5.3 mmol/L    Chloride 105 98 - 107 mmol/L    CO2 21 20 - 31 mmol/L    Anion Gap 15 9 - 17 mmol/L    GFR Non-African American 27 (L) >60 mL/min    GFR  33 (L) >60 mL/min    GFR Comment          GFR Staging NOT REPORTED    Brain Natriuretic Peptide   Result Value Ref Range    Pro-BNP 4,188 (H) <300 pg/mL    BNP Interpretation Pro-BNP Reference Range:    Troponin   Result Value Ref Range    Troponin, High Sensitivity 49 (H) 0 - 22 ng/L    Troponin T NOT REPORTED <0.03 ng/mL    Troponin Interp NOT REPORTED    Urine Drug Screen   Result Value Ref Range    Amphetamine Screen, Ur NEGATIVE NEGATIVE    Barbiturate Screen, Ur NEGATIVE NEGATIVE    Benzodiazepine Screen, Urine NEGATIVE NEGATIVE    Cocaine Metabolite, Urine POSITIVE (A) NEGATIVE    Methadone Screen, Urine NEGATIVE NEGATIVE    Opiates, Urine NEGATIVE NEGATIVE    Phencyclidine, Urine NEGATIVE NEGATIVE    Propoxyphene, Urine NOT REPORTED NEGATIVE    Cannabinoid Scrn, Ur NEGATIVE NEGATIVE    Oxycodone Screen, Ur NEGATIVE NEGATIVE    Methamphetamine, Urine NOT REPORTED NEGATIVE    Tricyclic Antidepressants, Urine NOT REPORTED NEGATIVE    MDMA, Urine NOT REPORTED NEGATIVE    Buprenorphine Urine NOT REPORTED NEGATIVE    Test Information       Assay provides medical screening only. The absence of expected drug(s) and/or metabolite(s) may indicate diluted or adulterated urine, limitations of testing or timing of collection. SPECIMEN REJECTION   Result Value Ref Range    Specimen Source . BLOOD     Ordered Test PTT     Reason for Rejection       Unable to perform testing: Specimen quantity not sufficient.    - NOT REPORTED    APTT   Result Value Ref Range    PTT 22.1 20.5 - 30.5 sec           RADIOLOGY:  No results found.     EKG      All EKG's are interpreted by the Emergency Department Physician who either signs or Co-signs this chart in the absence of a cardiologist.      Heart Score  7    EMERGENCY DEPARTMENT COURSE/IMPRESSION:  ED Course as of Aug 10 1641   Mon Aug 10, 2020   1000 EKG shows sinus rhythm rate of 68, there is a slight rightward axis otherwise intervals and MEDICATIONS:  New Prescriptions    BLOOD GLUCOSE MONITOR STRIPS    Test_4__times daily Diagnosis: 250.0   Diabetes Mellitus_x___Insulin Dependent____Non-Insulin Dependent    LANCETS MISC    Use_4__times daily Diagnisis:250.0  Diabetes Mellitus__x__Insulin Dependent___Non-Insulin Dependent       DO Otto Guzmán D.O.   Emergency Medicine Resident    (Please note that portions of this note were completed with a voice recognition program.  Efforts were made to edit the dictations but occasionally words aremis-transcribed.)       Omari Enriquez DO  Resident  08/10/20 [id number]

## 2020-08-10 NOTE — PROGRESS NOTES
[facility]  Documentation Note                Admission Dx: NSTEMI (non-ST elevated myocardial infarction) ([facility] [address] [I21.4]    Past Medical History:   has a past medical history of Asthma, CAD in native artery, nonobstructive, Carotid stenosis, left, Carpal tunnel syndrome, Cerebrovascular disease, Chronic kidney disease, COPD (chronic obstructive pulmonary disease) ([facility] [address] History of colon polyps, History of weakness of extremity, HTN (hypertension), Hyperlipidemia, Lung, cysts, congenital, PTSD (post-traumatic stress disorder), PTSD (post-traumatic stress disorder), TIA (transient ischemic attack), and Type II or unspecified type diabetes mellitus without mention of complication, not stated as uncontrolled. Previous Testing:     ECHO 5/16/2020: EF 73%, moderate-severe LVH, mild MR/TR, RVSP is 37 mmHg. STRESS 1/7/2020: No ischemia/infarct. EF 49%. CATH 9/27/17: Nonobstructive CAD. Previous office/hospital visit:   Dr. Jose Blood 10/8/19:   1. HTN  2. HLP  3. Stress test 9/26/17 Mild inferior ischemia EF 45%  4. Cardiac cath 9/27/17 N. O. CAD  5. Current cocaine abuse 9/2018  6. ECHO 9/5/18 EF 55%, Mod DD, Trace to mild MR, Trivial TR, RVSP 36mmHg, Trivial pulmonic insufficiency  7. Stress test 9/4/18 No reversible ischemia  8. Echo 8/19- EF 60%, mod LVH, mild MR/TR/PI    Plan --  1. Essential hypertension. Not well controlled. -will add hydralazine 25 mg t.i.d.. -continue Coreg, losartan, Norvasc, prazosin  -he is planning to see Nephrology soon. 2. Dyspnea on exertion with abnormal ECG concerning for ischemia.  -given his risk factors of med recommend patient undergo Persantine stress test to further risk stratify, rule out ischemia. 3. History of nonobstructive CAD. Patient is currently on Plavix, aspirin, statin. 4. Dyslipidemia. Patient is currently on a statin.     5. History of cocaine abuse in the past.    Lazarus Abide, [facility]

## 2020-08-10 NOTE — ED NOTES
Patient resting comfortably and in no acute distress. Patient denies any needs at this time. Awaiting meds from pharmacy.  Will continue to monitor     Rob Asher RN  08/10/20 [id number]

## 2020-08-11 LAB
ABSOLUTE EOS #: 0.38 K/UL (ref 0–0.44)
ABSOLUTE IMMATURE GRANULOCYTE: 0.06 K/UL (ref 0–0.3)
ABSOLUTE LYMPH #: 1.51 K/UL (ref 1.1–3.7)
ABSOLUTE MONO #: 0.54 K/UL (ref 0.1–1.2)
ANION GAP SERPL CALCULATED.3IONS-SCNC: 10 MMOL/L (ref 9–17)
BASOPHILS # BLD: 0 % (ref 0–2)
BASOPHILS ABSOLUTE: 0.03 K/UL (ref 0–0.2)
BUN BLDV-MCNC: 39 MG/DL (ref 8–23)
BUN/CREAT BLD: ABNORMAL (ref 9–20)
CALCIUM SERPL-MCNC: 8.5 MG/DL (ref 8.6–10.4)
CHLORIDE BLD-SCNC: 106 MMOL/L (ref 98–107)
CHOLESTEROL/HDL RATIO: 3.1
CHOLESTEROL: 195 MG/DL
CO2: 21 MMOL/L (ref 20–31)
CREAT SERPL-MCNC: 2.29 MG/DL (ref 0.7–1.2)
DIFFERENTIAL TYPE: ABNORMAL
EKG ATRIAL RATE: 59 BPM
EKG ATRIAL RATE: 61 BPM
EKG ATRIAL RATE: 68 BPM
EKG ATRIAL RATE: 68 BPM
EKG P AXIS: 73 DEGREES
EKG P AXIS: 74 DEGREES
EKG P AXIS: 75 DEGREES
EKG P AXIS: 75 DEGREES
EKG P-R INTERVAL: 126 MS
EKG P-R INTERVAL: 132 MS
EKG P-R INTERVAL: 138 MS
EKG P-R INTERVAL: 140 MS
EKG Q-T INTERVAL: 380 MS
EKG Q-T INTERVAL: 396 MS
EKG Q-T INTERVAL: 406 MS
EKG Q-T INTERVAL: 410 MS
EKG QRS DURATION: 90 MS
EKG QRS DURATION: 92 MS
EKG QRS DURATION: 94 MS
EKG QRS DURATION: 94 MS
EKG QTC CALCULATION (BAZETT): 404 MS
EKG QTC CALCULATION (BAZETT): 405 MS
EKG QTC CALCULATION (BAZETT): 408 MS
EKG QTC CALCULATION (BAZETT): 421 MS
EKG R AXIS: -14 DEGREES
EKG R AXIS: 111 DEGREES
EKG R AXIS: 113 DEGREES
EKG R AXIS: 73 DEGREES
EKG T AXIS: -155 DEGREES
EKG T AXIS: -162 DEGREES
EKG T AXIS: 149 DEGREES
EKG T AXIS: 179 DEGREES
EKG VENTRICULAR RATE: 59 BPM
EKG VENTRICULAR RATE: 61 BPM
EKG VENTRICULAR RATE: 68 BPM
EKG VENTRICULAR RATE: 68 BPM
EOSINOPHILS RELATIVE PERCENT: 5 % (ref 1–4)
ESTIMATED AVERAGE GLUCOSE: 266 MG/DL
FERRITIN: 109 UG/L (ref 30–400)
GFR AFRICAN AMERICAN: 35 ML/MIN
GFR NON-AFRICAN AMERICAN: 29 ML/MIN
GFR SERPL CREATININE-BSD FRML MDRD: ABNORMAL ML/MIN/{1.73_M2}
GFR SERPL CREATININE-BSD FRML MDRD: ABNORMAL ML/MIN/{1.73_M2}
GLUCOSE BLD-MCNC: 163 MG/DL (ref 75–110)
GLUCOSE BLD-MCNC: 227 MG/DL (ref 75–110)
GLUCOSE BLD-MCNC: 245 MG/DL (ref 75–110)
GLUCOSE BLD-MCNC: 258 MG/DL (ref 75–110)
GLUCOSE BLD-MCNC: 287 MG/DL (ref 70–99)
GLUCOSE BLD-MCNC: 343 MG/DL (ref 75–110)
HBA1C MFR BLD: 10.9 % (ref 4–6)
HCT VFR BLD CALC: 36.2 % (ref 40.7–50.3)
HDLC SERPL-MCNC: 62 MG/DL
HEMOGLOBIN: 11.6 G/DL (ref 13–17)
IMMATURE GRANULOCYTES: 1 %
IRON SATURATION: 28 % (ref 20–55)
IRON: 76 UG/DL (ref 59–158)
LDL CHOLESTEROL: 117 MG/DL (ref 0–130)
LYMPHOCYTES # BLD: 20 % (ref 24–43)
MCH RBC QN AUTO: 30.8 PG (ref 25.2–33.5)
MCHC RBC AUTO-ENTMCNC: 32 G/DL (ref 28.4–34.8)
MCV RBC AUTO: 96 FL (ref 82.6–102.9)
MONOCYTES # BLD: 7 % (ref 3–12)
NRBC AUTOMATED: 0 PER 100 WBC
PDW BLD-RTO: 12.4 % (ref 11.8–14.4)
PLATELET # BLD: 373 K/UL (ref 138–453)
PLATELET ESTIMATE: ABNORMAL
PMV BLD AUTO: 9.8 FL (ref 8.1–13.5)
POTASSIUM SERPL-SCNC: 4.4 MMOL/L (ref 3.7–5.3)
RBC # BLD: 3.77 M/UL (ref 4.21–5.77)
RBC # BLD: ABNORMAL 10*6/UL
SEG NEUTROPHILS: 67 % (ref 36–65)
SEGMENTED NEUTROPHILS ABSOLUTE COUNT: 5.18 K/UL (ref 1.5–8.1)
SODIUM BLD-SCNC: 137 MMOL/L (ref 135–144)
TOTAL IRON BINDING CAPACITY: 267 UG/DL (ref 250–450)
TRIGL SERPL-MCNC: 80 MG/DL
UNSATURATED IRON BINDING CAPACITY: 191 UG/DL (ref 112–347)
VITAMIN B-12: 471 PG/ML (ref 232–1245)
VLDLC SERPL CALC-MCNC: NORMAL MG/DL (ref 1–30)
WBC # BLD: 7.7 K/UL (ref 3.5–11.3)
WBC # BLD: ABNORMAL 10*3/UL

## 2020-08-11 PROCEDURE — 85025 COMPLETE CBC W/AUTO DIFF WBC: CPT

## 2020-08-11 PROCEDURE — 6370000000 HC RX 637 (ALT 250 FOR IP): Performed by: INTERNAL MEDICINE

## 2020-08-11 PROCEDURE — 6370000000 HC RX 637 (ALT 250 FOR IP): Performed by: STUDENT IN AN ORGANIZED HEALTH CARE EDUCATION/TRAINING PROGRAM

## 2020-08-11 PROCEDURE — 94664 DEMO&/EVAL PT USE INHALER: CPT

## 2020-08-11 PROCEDURE — G0328 FECAL BLOOD SCRN IMMUNOASSAY: HCPCS

## 2020-08-11 PROCEDURE — 94640 AIRWAY INHALATION TREATMENT: CPT

## 2020-08-11 PROCEDURE — 2580000003 HC RX 258: Performed by: STUDENT IN AN ORGANIZED HEALTH CARE EDUCATION/TRAINING PROGRAM

## 2020-08-11 PROCEDURE — 83550 IRON BINDING TEST: CPT

## 2020-08-11 PROCEDURE — 2060000000 HC ICU INTERMEDIATE R&B

## 2020-08-11 PROCEDURE — 99221 1ST HOSP IP/OBS SF/LOW 40: CPT | Performed by: INTERNAL MEDICINE

## 2020-08-11 PROCEDURE — 80061 LIPID PANEL: CPT

## 2020-08-11 PROCEDURE — 93010 ELECTROCARDIOGRAM REPORT: CPT | Performed by: INTERNAL MEDICINE

## 2020-08-11 PROCEDURE — 6360000002 HC RX W HCPCS: Performed by: INTERNAL MEDICINE

## 2020-08-11 PROCEDURE — 83540 ASSAY OF IRON: CPT

## 2020-08-11 PROCEDURE — 82728 ASSAY OF FERRITIN: CPT

## 2020-08-11 PROCEDURE — 97161 PT EVAL LOW COMPLEX 20 MIN: CPT

## 2020-08-11 PROCEDURE — 82947 ASSAY GLUCOSE BLOOD QUANT: CPT

## 2020-08-11 PROCEDURE — 99233 SBSQ HOSP IP/OBS HIGH 50: CPT | Performed by: INTERNAL MEDICINE

## 2020-08-11 PROCEDURE — 80048 BASIC METABOLIC PNL TOTAL CA: CPT

## 2020-08-11 PROCEDURE — 6360000002 HC RX W HCPCS: Performed by: STUDENT IN AN ORGANIZED HEALTH CARE EDUCATION/TRAINING PROGRAM

## 2020-08-11 PROCEDURE — 82607 VITAMIN B-12: CPT

## 2020-08-11 RX ORDER — PRAZOSIN HYDROCHLORIDE 1 MG/1
4 CAPSULE ORAL 2 TIMES DAILY
Status: DISCONTINUED | OUTPATIENT
Start: 2020-08-11 | End: 2020-08-13 | Stop reason: HOSPADM

## 2020-08-11 RX ORDER — SODIUM CHLORIDE 9 MG/ML
INJECTION, SOLUTION INTRAVENOUS CONTINUOUS
Status: DISCONTINUED | OUTPATIENT
Start: 2020-08-12 | End: 2020-08-13 | Stop reason: HOSPADM

## 2020-08-11 RX ORDER — ACETYLCYSTEINE 200 MG/ML
600 SOLUTION ORAL; RESPIRATORY (INHALATION) 2 TIMES DAILY
Status: DISPENSED | OUTPATIENT
Start: 2020-08-11 | End: 2020-08-13

## 2020-08-11 RX ORDER — NIFEDIPINE 60 MG/1
60 TABLET, FILM COATED, EXTENDED RELEASE ORAL DAILY
Status: DISCONTINUED | OUTPATIENT
Start: 2020-08-12 | End: 2020-08-12

## 2020-08-11 RX ORDER — NIFEDIPINE 30 MG/1
30 TABLET, EXTENDED RELEASE ORAL ONCE
Status: COMPLETED | OUTPATIENT
Start: 2020-08-11 | End: 2020-08-11

## 2020-08-11 RX ORDER — FLUTICASONE PROPIONATE 110 UG/1
1 AEROSOL, METERED RESPIRATORY (INHALATION) 2 TIMES DAILY
Status: DISCONTINUED | OUTPATIENT
Start: 2020-08-11 | End: 2020-08-13 | Stop reason: HOSPADM

## 2020-08-11 RX ADMIN — NIFEDIPINE 30 MG: 30 TABLET, FILM COATED, EXTENDED RELEASE ORAL at 12:24

## 2020-08-11 RX ADMIN — ATORVASTATIN CALCIUM 80 MG: 80 TABLET, FILM COATED ORAL at 07:56

## 2020-08-11 RX ADMIN — LOSARTAN POTASSIUM 50 MG: 50 TABLET, FILM COATED ORAL at 07:55

## 2020-08-11 RX ADMIN — GABAPENTIN 200 MG: 100 CAPSULE ORAL at 13:25

## 2020-08-11 RX ADMIN — Medication 600 MG: at 13:25

## 2020-08-11 RX ADMIN — GABAPENTIN 200 MG: 100 CAPSULE ORAL at 07:55

## 2020-08-11 RX ADMIN — HYDRALAZINE HYDROCHLORIDE 100 MG: 50 TABLET, FILM COATED ORAL at 21:01

## 2020-08-11 RX ADMIN — HYDRALAZINE HYDROCHLORIDE 100 MG: 50 TABLET, FILM COATED ORAL at 13:34

## 2020-08-11 RX ADMIN — CARVEDILOL 25 MG: 12.5 TABLET, FILM COATED ORAL at 16:37

## 2020-08-11 RX ADMIN — Medication 10 ML: at 07:55

## 2020-08-11 RX ADMIN — HYDRALAZINE HYDROCHLORIDE 10 MG: 20 INJECTION INTRAMUSCULAR; INTRAVENOUS at 03:49

## 2020-08-11 RX ADMIN — Medication 10 ML: at 20:54

## 2020-08-11 RX ADMIN — INSULIN LISPRO 4 UNITS: 100 INJECTION, SOLUTION INTRAVENOUS; SUBCUTANEOUS at 13:24

## 2020-08-11 RX ADMIN — GABAPENTIN 200 MG: 100 CAPSULE ORAL at 20:53

## 2020-08-11 RX ADMIN — PRAZOSIN HYDROCHLORIDE 4 MG: 1 CAPSULE ORAL at 22:01

## 2020-08-11 RX ADMIN — Medication 600 MG: at 20:54

## 2020-08-11 RX ADMIN — Medication 81 MG: at 07:56

## 2020-08-11 RX ADMIN — CLOPIDOGREL 75 MG: 75 TABLET, FILM COATED ORAL at 07:56

## 2020-08-11 RX ADMIN — FLUTICASONE PROPIONATE 1 PUFF: 110 AEROSOL, METERED RESPIRATORY (INHALATION) at 19:52

## 2020-08-11 RX ADMIN — PRAZOSIN HYDROCHLORIDE 4 MG: 1 CAPSULE ORAL at 10:30

## 2020-08-11 RX ADMIN — INSULIN LISPRO 8 UNITS: 100 INJECTION, SOLUTION INTRAVENOUS; SUBCUTANEOUS at 16:18

## 2020-08-11 RX ADMIN — HYDRALAZINE HYDROCHLORIDE 100 MG: 50 TABLET, FILM COATED ORAL at 05:50

## 2020-08-11 RX ADMIN — NIFEDIPINE 30 MG: 30 TABLET, FILM COATED, EXTENDED RELEASE ORAL at 10:14

## 2020-08-11 RX ADMIN — LOSARTAN POTASSIUM 50 MG: 50 TABLET, FILM COATED ORAL at 00:46

## 2020-08-11 ASSESSMENT — PAIN SCALES - GENERAL
PAINLEVEL_OUTOF10: 0

## 2020-08-11 NOTE — PROGRESS NOTES
Occupational Therapy    Occupational Therapy Not Seen Note    DATE: 2020  Name: Joel Baldwin  : 1955  MRN: [id number]    Patient not available for Occupational Therapy due to:    Pt independent with adls and functional mobility. Observed pt walking around nursing unit 2 x [place]. Pt with no OT acute care needs at this time, Will defer OT eval, RN in agreement      Electronically signed by VANESA Abdullahi on 2020 at 11:44 AM

## 2020-08-11 NOTE — CONSULTS
NEPHROLOGY     REASON FOR CONSULT:     Renal clearance prior to coronary angiogram in a patient with chronic kidney disease stage IV with baseline creatinine 2.2-2.4    HISTORY OF PRESENTING ILLNESS                 This is a 60 y.o. male who presented to the ER with complaints of chest pain/precordial.  Describes it as flickering. Reported no history of radiation. No history of nausea vomiting. No shortness of breath cough. Assessment in the ER showed uncontrolled blood pressure. Urine studies positive for cocaine. ACS protocol was instituted and cardiology consultation was asked for. According to nursing staff he is scheduled for coronary angiogram tomorrow. We have been consulted on CKD 4 and creatinine clearance prior to coronary angiogram.  As stated prior he already has chronic kidney disease stage IV. Etiology diabetic and hypertensive nephrosclerosis. Baseline creatinine 2.2-2.4. Has proteinuria significant. Was on angiotensin receptor blockers at home. Follows up with Dr. Rogelio Sanders.     Urine output decent  No history of contrast exposure  No history of hypotension  No history of NSAID/ephrotoxic agents  No history suggestive of obstruction  No history of nausea/vomiting/diarrhoea  Present history of BINU in past  No history of recurrent UTI infection/surgeries to KUB          PAST MEDICAL HISTORY         Diagnosis Date    Asthma     mod persistent    CAD in native artery, nonobstructive     Carotid stenosis, left 6/10/2013    Carpal tunnel syndrome     RIGHT    Cerebrovascular disease 4/23/2018    Chronic kidney disease 6/10/2013    COPD (chronic obstructive pulmonary disease) (HCC)     Diabetes mellitus (HCC)     insulin dependent    History of colon polyps     History of weakness of extremity     right sided    HTN (hypertension)     Hyperlipidemia     Lung, cysts, congenital     PTSD (post-traumatic stress disorder)     PTSD (post-traumatic stress disorder)     TIA (transient ischemic attack)     Type II or unspecified type diabetes mellitus without mention of complication, not stated as uncontrolled        PAST SURGICAL HISTORY          Procedure Laterality Date    CAROTID ENDARTERECTOMY Left 7-2013    COLONOSCOPY      HERNIA REPAIR      OK COLSC FLX W/RMVL OF TUMOR POLYP LESION SNARE TQ N/A 6/27/2017    COLONOSCOPY POLYPECTOMY SNARE/ hot performed by Chelsea Humphrey DO at [facility]ULAR SURGERY Left 2015    carotid stent, dr Jonas Burgess:                Medications Prior to Admission: blood glucose monitor strips, Test_4__times daily Diagnosis: 250.0   Diabetes Mellitus_x___Insulin Dependent____Non-Insulin Dependent  Lancets MISC, Use_4__times daily Diagnisis:250.0  Diabetes Mellitus__x__Insulin Dependent___Non-Insulin Dependent  vitamin D (ERGOCALCIFEROL) 1.25 MG (00951 UT) CAPS capsule, TAKE 1 CAPSULE BY MOUTH ONCE A WEEK  NIFEdipine (ADALAT CC) 30 MG extended release tablet, Take 30 mg by mouth daily  prazosin (MINIPRESS) 2 MG capsule, Take 2 capsules by mouth 2 times daily  bumetanide (BUMEX) 1 MG tablet, Take 1 tablet by mouth daily  Cholecalciferol (VITAMIN D) 50 MCG (2000 UT) TABS tablet, Take 1 tablet by mouth daily  carvedilol (COREG) 25 MG tablet, Take 1 tablet by mouth 2 times daily (with meals)  atorvastatin (LIPITOR) 80 MG tablet, Take 1 tablet by mouth daily  clopidogrel (PLAVIX) 75 MG tablet, Take 1 tablet by mouth daily  insulin glargine (BASAGLAR KWIKPEN) 100 UNIT/ML injection pen, Inject 35 Units into the skin nightly Dispense with pen needle  insulin aspart (NOVOLOG FLEXPEN) 100 UNIT/ML injection pen, Inject 5 Units into the skin 3 times daily (before meals) Sliding scale  nitroGLYCERIN (NITROSTAT) 0.4 MG SL tablet, up to max of 3 total doses. If no relief after 1 dose, call 911.   Nutritional Supplements (GLUCERNA CARBSTEADY) LIQD, Take 1 Can by mouth 3 times daily  isosorbide mononitrate (IMDUR) 30 MG extended release tablet, TAKE 1 TABLET BY MOUTH ONCE DAILY  gabapentin (NEURONTIN) 100 MG capsule, Take 2 capsules by mouth 3 times daily for 90 doses.   losartan (COZAAR) 50 MG tablet, Take 1 tablet by mouth 2 times daily  QUEtiapine (SEROQUEL) 100 MG tablet, Take 1 tablet by mouth nightly  fluticasone-umeclidin-vilant (TRELEGY ELLIPTA) 100-62.5-25 MCG/INH AEPB, INHALE 1 PUFF INTO THE LINGS DAILY  albuterol-ipratropium (COMBIVENT RESPIMAT)  MCG/ACT AERS inhaler, Inhale 1 puff into the lungs every 6 hours  hydrALAZINE (APRESOLINE) 100 MG tablet, Take 1 tablet by mouth every 8 hours  COMFORT EZ PEN NEEDLES 31G X 8 MM MISC, USE AS DIRECTED  aspirin (ASPIRIN ADULT LOW STRENGTH) 81 MG EC tablet, TAKE 1 TABLET BY MOUTH DAILY  TRUE METRIX BLOOD GLUCOSE TEST strip, TEST BLOOD SUGAR 4 TO 5 TIMES DAILY  EASY COMFORT LANCETS [facility]: DM-2 USE 3-4 TIMES DAILY  Scheduled Meds:    prazosin  4 mg Oral BID    insulin lispro  10 Units Subcutaneous Once    [START ON 8/12/2020] NIFEdipine  60 mg Oral Daily    NIFEdipine  30 mg Oral Once    acetylcysteine  600 mg Oral BID    aspirin  81 mg Oral Daily    atorvastatin  80 mg Oral Daily    carvedilol  25 mg Oral BID     fluticasone-umeclidin-vilant  1 puff Inhalation Daily    gabapentin  200 mg Oral TID    sodium chloride flush  10 mL Intravenous 2 times per day    [Held by provider] insulin glargine  20 Units Subcutaneous Nightly    insulin lispro  0-12 Units Subcutaneous TID     insulin lispro  0-6 Units Subcutaneous Nightly    clopidogrel  75 mg Oral Daily    hydrALAZINE  100 mg Oral 3 times per day    [Held by provider] losartan  50 mg Oral BID     Continuous Infusions:    [START ON 8/12/2020] sodium chloride      dextrose       PRN Meds:  albuterol sulfate HFA, nitroGLYCERIN, sodium chloride flush, acetaminophen **OR** acetaminophen, polyethylene glycol, potassium chloride **OR** potassium alternative oral replacement **OR** potassium chloride, glucose, dextrose, glucagon (rDNA), dextrose, hydrALAZINE    ALLERGY     Lisinopril;  Ace inhibitors; and Pcn [penicillins]       SOCIAL HISTORY     Social History     Socioeconomic History    Marital status:      Spouse name: Not on file    Number of children: Not on file    Years of education: Not on file    Highest education level: Not on file   Occupational History     Employer: N/A   Social Needs    Financial resource strain: Not on file    Food insecurity     Worry: Not on file     Inability: Not on file   [employer] needs     Medical: Not on file     Non-medical: Not on file   Tobacco Use    Smoking status: Former Smoker     Packs/day: 0.50     Years: 35.00     Pack years: 17.50     Types: Cigarettes     Last attempt to quit: 2014     Years since quittin.0    Smokeless tobacco: Never Used   Substance and Sexual Activity    Alcohol use: No     Alcohol/week: 0.0 standard drinks    Drug use: No    Sexual activity: Yes     Partners: Female   Lifestyle    Physical activity     Days per week: Not on file     Minutes per session: Not on file    Stress: Not on file   Relationships    Social connections     Talks on phone: Not on file     Gets together: Not on file     Attends Religion service: Not on file     Active member of club or organization: Not on file     Attends meetings of clubs or organizations: Not on file     Relationship status: Not on file    Intimate partner violence     Fear of current or ex partner: Not on file     Emotionally abused: Not on file     Physically abused: Not on file     Forced sexual activity: Not on file   Other Topics Concern    Not on file   Social History Narrative    Not on file       FAMILY HISTORY     Family History   Problem Relation Age of Onset    Cancer Mother     Heart Disease Father           REVIEW OF SYSTEM     Constitutional: No asthenia/weight loss/anorexia    HEENT : No epistaxis/visual blurriness/rhinorrhoea/sorethroat/trauma  Cardiovascular: Present chest pain/no palpitation/SOB  Respiratory: No cough/fever/SOB/Wheezing    Gastrointestinal: No abdominal pain/nausea/vomiting/diarrhoea/constipation  Genitourinary: No dysuria/pyuria/hematuria/incomplete emptying of bladder  Musculoskeletal:  No gait disturbance/weakness or joint complaints  Integumentary: No rash or pruritis. Neurological: No headache/diplopia/change in muscle strength/numbness or tingling. No change in gait, balance, coordination, mood, affect, memory, mentation, behavior. Psychiatric: No anxiety/depression. Endocrine: No temperature intolerance. No excessive thirst, fluid intake, or urination. No tremor. Hematologic/Lymphatic: No abnormal bruising or bleeding, blood clots or swolle lymph nodes. Allergic/Immunologic: No nasal congestion or hives      PHYSICAL EXAM     Vitals:    08/11/20 0700 08/11/20 0715 08/11/20 0731 08/11/20 1014   BP:   (!) 153/69 (!) 177/68   Pulse:  60 57    Resp:   18    Temp:   99.2 °F (37.3 °C)    TempSrc:   Temporal    SpO2:   97%    Weight: 132 lb 11.5 oz (60.2 kg)      Height:         24HR INTAKE/OUTPUT:      Intake/Output Summary (Last 24 hours) at 8/11/2020 1132  Last data filed at 8/11/2020 0501  Gross per 24 hour   Intake 495 ml   Output 200 ml   Net 295 ml       General appearance:Awake, alert, in no acute distress  Skin: warm and dry, no rash or erythema  Eyes: conjunctivae normal and sclera anicteric  ENT: no thrush no pharyngeal congestion  orodental hygiene   Neck: No JVD, Lymphadenopathty or thyromegaly  Respiratory: vesicular breath sounds,no wheeze/crackles  Cardiovascular: S1 S2 normal,no gallop or organic murmur. No carotid bruit  Abdomen:Non tender/non distended. Bowel sounds present  Extremities: No Cyanosis or Clubbing,Lower extremity edema  Neurological:Alert and oriented. No abnormalities of mood, affect, memory, mentation, or behavior are noted    INVESTIGATIONS      CBC:   Recent Labs     08/10/20  1007 08/11/20  0705   WBC 9.3 7.7   RBC 4.57 3.77*   HGB 14.0 11.6*   HCT 41.0 36.2*   MCV 89.7 96.0   MCH 30.6 30.8   MCHC 34.1 32.0   RDW 12.2 12.4    373   MPV 9.4 9.8      BMP:   Recent Labs     08/10/20  1007 08/11/20  0705    137   K 5.3 4.4    106   CO2 21 21   BUN 47* 39*   CREATININE 2.43* 2.29*   GLUCOSE 202* 287*   CALCIUM 9.1 8.5*        Phosphorus:  No results for input(s): PHOS in the last 72 hours. Magnesium: No results for input(s): MG in the last 72 hours. Albumin:   Recent Labs     08/10/20  1602   LABALBU 2.8*       Radiology:  Reviewed as available. ASSESSMENT     1. Chronic kidney disease stage IV secondary diabetic hypertensive nephrosclerosis with baseline creatinine 2.2-2.5. Follows up with Dr. Jaimee Aguiar  2. NSTEMI  3. HTN urgency - cocaine positive  4. Proteinuria  5. DM2  6. History of nonobstructive coronary artery disease in the past with preserved ejection fraction     PLAN:     1. The individual is well aware of contrast related nephropathy. He verbalized understanding is willing to proceed coronary angiogram tomorrow. Will institute TAMAR preventive measures. 2. Ok to resume home dose of ARB to optimize blood pressure control  3. Okay for coronary angiogram tomorrow if creatinine below 2.5    Thank you for the consultation. Please do not hesitate to call with questions. This note is created with the assistance of a speech-recognition program. While intending to generate a document that actually reflects the content of the visit, no guarantees can be provided that every mistake has been identified and corrected by editing.     Roz Hernandez MD, MRCP Khadra Tinoco, FACP   8/11/2020 11:32 AM    [facility] [place]

## 2020-08-11 NOTE — PROGRESS NOTES
[facility]  Internal Medicine Teaching Residency Program  Inpatient Daily Progress Note  ______________________________________________________________________________    Patient: Kt Rubi  YOB: 1955   QVO:[id number]    Acct: [de-identified]     Room: 79/3790-30  Admit date: 8/10/2020  Today's date: 08/11/20  Number of days in the hospital: 1    SUBJECTIVE   Admitting Diagnosis: NSTEMI (non-ST elevated myocardial infarction) ([facility] [address]  CC: Chest pain  Pt examined at bedside. Chart & results reviewed. Overnight no acute issues reported. Patient reports improvement in his chest pain. Vitals are stable except for the high blood pressure 168/69 saturating well at 97% on room air     Patient has always some baseline shortness of breath because of his COPD. Not on home oxygen    labs reviewed . troponins since admission 65>49> 52>46. POCT glucose 393. Per cardiology, Persantine stress test for further risk stratify rule out ischemia. Per nephro okay to resume home dose of are up to optimize blood pressure control, okay for coronary angiogram tomorrow if creatinine below 2.5. Review of Systems   Constitutional: Positive for activity change. Negative for chills, diaphoresis, fatigue and fever. HENT: Negative for postnasal drip and rhinorrhea. Eyes: Negative for visual disturbance. Respiratory: Positive for cough and chronic baseline shortness of breath. Negative for wheezing. Cardiovascular: Improving chest pain. Negative for leg swelling. \"flickering\" sensation in the chest, as reported by the patient   Gastrointestinal: Positive for nausea. Negative for abdominal distention, abdominal pain, blood in stool, constipation, diarrhea and vomiting. Skin: Negative for color change, pallor and wound. Neurological: Positive for headaches. Negative for light-headedness and numbness.    Psychiatric/Behavioral: The patient is not nervous/anxious. BRIEF HISTORY     The patient is a pleasant 60 y.o. male presents with a PMH of Non obstructive CAD, s/o left ICA stent, Diabetes mellitus type 2, HTN, CKD stage 3, with a chief complaint of chest pain x 1 night.      The patient reports he was walking around in his house last night when he first noticed his chest pain,  Which substernal, non radiating, sharp, 5/10 in intensity, not relieved by rest, aggravated by exertion, relieved by sublingual nitroglycerin. After taking nitroglycerin, the chest pain was relieved but he still has some chest pressure. After waking up this morning, the patient had another episode of similar chest pain, which is again relieved by nitroglycerin and the patient still has some chest pressure. Patient also reports associated with \"flickering\" sensation in his chest.     Patient has always some baseline shortness of breath because of his COPD. Denies orhthopnea, PND. Patient also has associated nausea, denies vomiting. Has headache, denies any vision changes, abdominal pain. Patient also reports cough, yellow sputum x 2days. Denies fever, chills.      Patient reports he is compliant with his medications. On Aspirin, Plavix. Patient was last hospitalised in 20 for NSTEMI.      Last cath done in 2017. Findings: Left main: mild disease,LAD: 30-40% prox stenosis,LCX: mild disease,RCA: 40% mid stenosis     EK20 NSR LVH non-specific T changes   ECHO: 20  Summary  Left ventricle is normal in size with hyperdynamic systolic function. Moderate to severe concentric left ventricular hypertrophy. Calculated ejection fraction is 73 %. Left atrium is mildly dilated. Mild mitral regurgitation. Mild tricuspid regurgitation. Estimated right ventricular systolic pressure  is 37 mmHg.     MRA in , showed no significant stenosis of Right carotid but 70% stenosis of left carotid. aortic arch and carotid angiogram with left internal carotid stent placement and balloon angioplasty in .       Quit smoking in the last 5 to 10 years, 22 pack year history of smoking.      In the ER, bradycardia 66, high /85 saturating 99% on room air.      Labs show Cr 2.43 (baseline 2.36), elevated pro-bnp 4,188, elevated troponins trending down (65>49) trops in prev admission(392 to 411). Glucose 202. Urine tox positive for cocaine.       CXR shows no acute cardiopulmonary findings. EKG findings: EKG interpretation: Sinus rhythm 68.  Normal intervals.  Borderline right axis.  No acute ST elevations, there is nonspecific ST elevation in the anterior leads however this is seen on prior EKG from 2020. Charlynne Plane is also T wave inversion inferior laterally again seen on prior.   Repeat EKG interpretation, 1011: Sinus rhythm 68.  Normal intervals again seen right axis.  No evolving ST changes again seen in the inferior lateral T wave inversion   Repeat EKG interpretation, 1125: Sinus bradycardia 59.  Normal intervals right axis.  No acute ST elevation, again seen is the ST depression with T wave inversion inferior laterally no evolving changes     Patient is given an aspirin, put on nitro drip, and about to start heparin dr    OBJECTIVE     Vital Signs:  BP (!) 167/70   Pulse 63   Temp 98.2 °F (36.8 °C) (Oral)   Resp 22   Ht 5' 8\" (1.727 m)   Wt 132 lb 11.5 oz (60.2 kg)   SpO2 98%   BMI 20.18 kg/m²     Temp (24hrs), Av.3 °F (36.8 °C), Min:97.8 °F (36.6 °C), Max:99.2 °F (37.3 °C)    In: 495   Out: 200 [Urine:200]    Physical Exam:  Constitutional: This is a well developed, well nourished, 18.5-24.9 - Normal 60y.o. year old male who is alert, oriented, cooperative and in no apparent distress. Head:normocephalic and atraumatic. EENT:  PERRLA. No conjunctival injections. Septum was midline, mucosa was without erythema, exudates or cobblestoning. No thrush was noted. Neck: Supple without thyromegaly. No elevated JVP. Trachea was midline.   Respiratory: No wheezes heard bilaterally. Cardiovascular: Regular without murmur, clicks, gallops or rubs. Abdomen: Slightly rounded and soft without organomegaly. No rebound, rigidity or guarding was appreciated. Lymphatic: No lymphadenopathy. Musculoskeletal: Normal curvature of the spine. No gross muscle weakness. Extremities:  No lower extremity edema , pulses present. Skin:  Warm and dry. Good color, turgor and pigmentation. No lesions or scars.   No cyanosis or clubbing  Neurological/Psychiatric: The patient's general behavior, level of consciousness, thought content and emotional status is normal.         Medications:  Scheduled Medications:    prazosin  4 mg Oral BID    insulin lispro  10 Units Subcutaneous Once    [START ON 8/12/2020] NIFEdipine  60 mg Oral Daily    NIFEdipine  30 mg Oral Once    acetylcysteine  600 mg Oral BID    aspirin  81 mg Oral Daily    atorvastatin  80 mg Oral Daily    carvedilol  25 mg Oral BID     fluticasone-umeclidin-vilant  1 puff Inhalation Daily    gabapentin  200 mg Oral TID    sodium chloride flush  10 mL Intravenous 2 times per day    [Held by provider] insulin glargine  20 Units Subcutaneous Nightly    insulin lispro  0-12 Units Subcutaneous TID     insulin lispro  0-6 Units Subcutaneous Nightly    clopidogrel  75 mg Oral Daily    hydrALAZINE  100 mg Oral 3 times per day    [Held by provider] losartan  50 mg Oral BID     Continuous Infusions:    [START ON 8/12/2020] sodium chloride      dextrose       PRN Medicationsalbuterol sulfate HFA, 2 puff, Q4H PRN  nitroGLYCERIN, 0.4 mg, Q5 Min PRN  sodium chloride flush, 10 mL, PRN  acetaminophen, 650 mg, Q6H PRN    Or  acetaminophen, 650 mg, Q6H PRN  polyethylene glycol, 17 g, Daily PRN  potassium chloride, 40 mEq, PRN    Or  potassium alternative oral replacement, 40 mEq, PRN    Or  potassium chloride, 10 mEq, PRN  glucose, 15 g, PRN  dextrose, 12.5 g, PRN  glucagon (rDNA), 1 mg, PRN  dextrose, 100 mL/hr, PRN  hydrALAZINE, 10 mg, Q6H PRN        Diagnostic Labs:  CBC:   Recent Labs     08/10/20  1007 08/11/20  0705   WBC 9.3 7.7   RBC 4.57 3.77*   HGB 14.0 11.6*   HCT 41.0 36.2*   MCV 89.7 96.0   RDW 12.2 12.4    373     BMP:   Recent Labs     08/10/20  1007 08/11/20  0705    137   K 5.3 4.4    106   CO2 21 21   BUN 47* 39*   CREATININE 2.43* 2.29*     BNP: No results for input(s): BNP in the last 72 hours. PT/INR: No results for input(s): PROTIME, INR in the last 72 hours. APTT:   Recent Labs     08/10/20  1307   APTT 22.1     CARDIAC ENZYMES: No results for input(s): CKMB, CKMBINDEX, TROPONINI in the last 72 hours. Invalid input(s): CKTOTAL;3  FASTING LIPID PANEL:  Lab Results   Component Value Date    CHOL 195 08/11/2020    HDL 62 08/11/2020    TRIG 80 08/11/2020     LIVER PROFILE:   Recent Labs     08/10/20  1602   AST 17   ALT 16   BILIDIR <0.08   BILITOT <0.10*   ALKPHOS 80      MICROBIOLOGY:   Lab Results   Component Value Date/Time    CULTURE NO SIGNIFICANT GROWTH 08/29/2019 01:20 PM       Imaging:    Xr Chest (2 Vw)    Result Date: 8/10/2020  No acute cardiopulmonary disease. ASSESSMENT & PLAN     Principal Problem:    NSTEMI (non-ST elevated myocardial infarction) ([facility] [address]  Active Problems:    Pure hypercholesterolemia    Carotid stenosis, left s/p Endarterectomy    CKD (chronic kidney disease) stage 3, GFR 30-59 ml/min ([facility])    Chest pain    Essential hypertension    DM type 2, uncontrolled, with neuropathy ([facility])    COPD (chronic obstructive pulmonary disease) ([facility])    Non-obstructive Coronary artery disease of native artery of native heart with stable angina pectoris ([facility] [address]  Resolved Problems:    * No resolved hospital problems. *      ASSESSMENT / PLAN:   This is a 60 y.o. male who presented with chest pain, previous h/o non-obstructive CAD and found to have moderately elevated troponins, elevated pro-BNP .  Patient admitted to inpatient status for the work up and management of

## 2020-08-11 NOTE — PROGRESS NOTES
Pts BP is 170/81 with a MAP of 103. Resident notified. Awaiting further orders. Will continue to monitor.

## 2020-08-11 NOTE — CONSULTS
[facility]    Consult / H&P               Today's Date: 8/11/2020  Patient Name: Chetan Peres  Date of admission: 8/10/2020  9:45 AM  Patient's age: 60 y.o., 1955  Admission Dx: NSTEMI (non-ST elevated myocardial infarction) [facility]) [I21.4]    Reason for Admission / Consult: Chest pain    Requesting Physician: Yaneth Westbrook MD    CHIEF COMPLAINT: Chest pain    History Obtained From:  patient, electronic medical record    HISTORY OF PRESENT ILLNESS:      The patient is a 60 y.o.  male with a history of nonobstructive CAD, carotid artery disease, diabetes, hypertension and CKD stage III who presented with a chief complaint of chest pain. Patient states that he started having 5/10 substernal chest pain while he was walking around in his house last night. He took a dose of sublingual nitroglycerin with which she had some relief but continued to have some residual chest pressure. He had another episode this a.m. which was again relieved with sublingual nitro, but due to a persistent fluttery sensation in his chest, he presented to the ED. He also endorses shortness of breath at baseline which he attributes to his COPD. In the ED, his blood pressure was elevated with SBP in 190s and heart rate in 60s. EKG showed T wave inversions in inferolateral leads, unchanged from previous EKGs. BMP was significant for elevated creatinine of 2.29, which is around his baseline. Troponin was elevated at 65 which on repeat down trended to 49. Cardiology was consulted for chest pain and troponin elevation.       Past Medical History:   has a past medical history of Asthma, CAD in native artery, nonobstructive, Carotid stenosis, left, Carpal tunnel syndrome, Cerebrovascular disease, Chronic kidney disease, COPD (chronic obstructive pulmonary disease) ([facility] [address] Diabetes mellitus ([facility] [address] History of colon polyps, History of weakness of extremity, HTN (hypertension), Hyperlipidemia, Lung, cysts, with pen needle 6/16/20   TERRANCE Lacy CNP   insulin aspart (NOVOLOG FLEXPEN) 100 UNIT/ML injection pen Inject 5 Units into the skin 3 times daily (before meals) Sliding scale 6/16/20   TERRANCE Lacy CNP   nitroGLYCERIN (NITROSTAT) 0.4 MG SL tablet up to max of 3 total doses. If no relief after 1 dose, call 911. 6/16/20   TERRANCE Lacy CNP   Nutritional Supplements ([address] [place] [address] [employer]) LIQD Take 1 Can by mouth 3 times daily 6/16/20   TERRANCE Lacy CNP   isosorbide mononitrate (IMDUR) 30 MG extended release tablet TAKE 1 TABLET BY MOUTH ONCE DAILY 6/3/20   TERRANCE Lacy CNP   gabapentin (NEURONTIN) 100 MG capsule Take 2 capsules by mouth 3 times daily for 90 doses.  6/3/20 7/3/20  TERRANCE Lacy CNP   losartan (COZAAR) 50 MG tablet Take 1 tablet by mouth 2 times daily 6/2/20   TERRANCE Lacy - CNP   QUEtiapine (SEROQUEL) 100 MG tablet Take 1 tablet by mouth nightly 6/2/20   TERRANCE Lacy CNP   fluticasone-umeclidin-vilant (TRELEGY ELLIPTA) 100-62.5-25 MCG/INH AEPB INHALE 1 PUFF INTO THE LINGS DAILY 6/2/20   TERRANCE Lacy CNP   albuterol-ipratropium (COMBIVENT RESPIMAT)  MCG/ACT AERS inhaler Inhale 1 puff into the lungs every 6 hours 6/2/20   TERRANCE Lacy CNP   hydrALAZINE (APRESOLINE) 100 MG tablet Take 1 tablet by mouth every 8 hours 5/18/20   Kenzie Chopra MD   COMFORT EZ PEN NEEDLES 31G X 8 MM MISC USE AS DIRECTED 4/14/20   TERRANCE Lacy CNP   aspirin (ASPIRIN ADULT LOW STRENGTH) 81 MG EC tablet TAKE 1 TABLET BY MOUTH DAILY 2/7/20   TERRANCE Lacy CNP   TRUE METRIX BLOOD GLUCOSE TEST strip TEST BLOOD SUGAR 4 TO 5 TIMES DAILY 12/12/19   TERRANCE Lacy CNP   EASY COMFORT LANCETS MISC DX: DM-2 USE 3-4 TIMES DAILY 3/25/19   Zara Lewis MD        Current Facility-Administered Medications: prazosin (MINIPRESS) capsule 4 mg, 4 mg, Oral, BID  insulin lispro (HUMALOG) injection vial 10 Units, 10 Units, Subcutaneous, Once  [START ON 8/12/2020] NIFEdipine (ADALAT CC) extended release tablet 60 mg, 60 mg, Oral, Daily  acetylcysteine (MUCOMYST) 20 % solution 600 mg, 600 mg, Oral, BID  [START ON 8/12/2020] 0.9 % sodium chloride infusion, , Intravenous, Continuous  albuterol sulfate  (90 Base) MCG/ACT inhaler 2 puff, 2 puff, Inhalation, Q4H PRN  nitroGLYCERIN (NITROSTAT) SL tablet 0.4 mg, 0.4 mg, Sublingual, Q5 Min PRN  aspirin EC tablet 81 mg, 81 mg, Oral, Daily  atorvastatin (LIPITOR) tablet 80 mg, 80 mg, Oral, Daily  carvedilol (COREG) tablet 25 mg, 25 mg, Oral, BID WC  fluticasone-umeclidin-vilant (TRELEGY ELLIPTA) 100-62.5-25 MCG/INH inhaler 1 puff, 1 puff, Inhalation, Daily  gabapentin (NEURONTIN) capsule 200 mg, 200 mg, Oral, TID  sodium chloride flush 0.9 % injection 10 mL, 10 mL, Intravenous, 2 times per day  sodium chloride flush 0.9 % injection 10 mL, 10 mL, Intravenous, PRN  acetaminophen (TYLENOL) tablet 650 mg, 650 mg, Oral, Q6H PRN **OR** acetaminophen (TYLENOL) suppository 650 mg, 650 mg, Rectal, Q6H PRN  polyethylene glycol (GLYCOLAX) packet 17 g, 17 g, Oral, Daily PRN  potassium chloride (KLOR-CON M) extended release tablet 40 mEq, 40 mEq, Oral, PRN **OR** potassium bicarb-citric acid (EFFER-K) effervescent tablet 40 mEq, 40 mEq, Oral, PRN **OR** potassium chloride 10 mEq/100 mL IVPB (Peripheral Line), 10 mEq, Intravenous, PRN  [Held by provider] insulin glargine (LANTUS) injection vial 20 Units, 20 Units, Subcutaneous, Nightly  insulin lispro (HUMALOG) injection vial 0-12 Units, 0-12 Units, Subcutaneous, TID WC  insulin lispro (HUMALOG) injection vial 0-6 Units, 0-6 Units, Subcutaneous, Nightly  glucose (GLUTOSE) 40 % oral gel 15 g, 15 g, Oral, PRN  dextrose 50 % IV solution, 12.5 g, Intravenous, PRN  glucagon (rDNA) injection 1 mg, 1 mg, Intramuscular, PRN  dextrose 5 % solution, 100 mL/hr, Intravenous, PRN  clopidogrel (PLAVIX) tablet 75 mg, 75 mg, Oral, Daily  hydrALAZINE (APRESOLINE) tablet 100 mg, 100 mg, Oral, 3 times per day  [Held by provider] losartan (COZAAR) tablet 50 mg, 50 mg, Oral, BID  hydrALAZINE (APRESOLINE) injection 10 mg, 10 mg, Intravenous, Q6H PRN    Allergies:  Lisinopril; Ace inhibitors; and Pcn [penicillins]    Social History:   reports that he quit smoking about 6 years ago. His smoking use included cigarettes. He has a 17.50 pack-year smoking history. He has never used smokeless tobacco. He reports that he does not drink alcohol or use drugs. Family History: family history includes Cancer in his mother; Heart Disease in his father. No h/o sudden cardiac death. Yes and No for premature CAD    REVIEW OF SYSTEMS:    · Constitutional: there has been no unanticipated weight loss. There's been No change in energy level, No change in activity level. · Eyes: No visual changes or diplopia. No scleral icterus. · ENT: No Headaches  · Cardiovascular: Remaining as above  · Respiratory: No previous pulmonary problems, No cough  · Gastrointestinal: No abdominal pain. No change in bowel or bladder habits. · Genitourinary: No dysuria, trouble voiding, or hematuria. · Musculoskeletal:  No gait disturbance, No weakness or joint complaints. · Integumentary: No rash or pruritis. · Neurological: No headache, diplopia, change in muscle strength, numbness or tingling. No change in gait, balance, coordination, mood, affect, memory, mentation, behavior. · Psychiatric: No anxiety, or depression. · Endocrine: No temperature intolerance. No excessive thirst, fluid intake, or urination. No tremor. · Hematologic/Lymphatic: No abnormal bruising or bleeding, blood clots or swollen lymph nodes. · Allergic/Immunologic: No nasal congestion or hives.       PHYSICAL EXAM:      BP (!) 166/82   Pulse 63   Temp 98.2 °F (36.8 °C) (Oral)   Resp 22   Ht 5' 8\" (1.727 m)   Wt 132 lb 11.5 oz (60.2 kg)   SpO2 98%   BMI 20.18 kg/m²      Intake/Output Summary (Last 24 hours) at 8/11/2020 1327  Last data filed at 8/11/2020 0501  Gross per 24 hour   Intake 495 ml   Output 200 ml   Net 295 ml         Constitutional and General Appearance: alert, cooperative, no distress and appears stated age  HEENT: PERRL, no cervical lymphadenopathy. No masses palpable. Normal oral mucosa  Respiratory:  · Normal excursion and expansion without use of accessory muscles  · Resp Auscultation: Good respiratory effort. No for increased work of breathing. On auscultation: clear to auscultation bilaterally  Cardiovascular:  · The apical impulse is not displaced  · Heart tones are crisp and normal. regular S1 and S2.  · Jugular venous pulsation Normal  · The carotid upstroke is normal in amplitude and contour without delay or bruit  · Peripheral pulses are symmetrical and full     Abdomen:   · No masses or tenderness  · Bowel sounds present  Extremities:  ·  No Cyanosis or Clubbing  ·  Lower extremity edema: No  ·  Skin: Warm and dry  Neurological:  · Alert and oriented. · Moves all extremities well  · No abnormalities of mood, affect, memory, mentation, or behavior are noted    DATA:    Diagnostics:        ECHO 5/16/2020: EF 73%, moderate-severe LVH, mild MR/TR, RVSP is 37 mmHg.      STRESS 1/7/2020: No ischemia/infarct. EF 49%.      CATH 9/27/17: Nonobstructive CAD.      Labs:     CBC:   Recent Labs     08/10/20  1007 08/11/20  0705   WBC 9.3 7.7   HGB 14.0 11.6*   HCT 41.0 36.2*    373       BMP:   Recent Labs     08/10/20  1007 08/11/20  0705    137   K 5.3 4.4   CO2 21 21   BUN 47* 39*   CREATININE 2.43* 2.29*   LABGLOM 27* 29*   GLUCOSE 202* 287*     Pro-BNP:    Recent Labs     08/10/20  1007   PROBNP 4,188*     BNP: No results for input(s): BNP in the last 72 hours. PT/INR: No results for input(s): PROTIME, INR in the last 72 hours.   APTT:  Recent Labs     08/10/20  1307   APTT 22.1     CARDIAC ENZYMES:No results for input(s): CKTOTAL, CKMB, CKMBINDEX, TROPONINI in the last 72 Physician Statement:    I have discussed the care of  Rob Lora , including pertinent history and exam findings, with the Cardiology fellow/resident. I have seen and examined the patient and the key elements of all parts of the encounter have been performed by me. I agree with the assessment, plan and orders as documented by the fellow/resident, after I modified exam findings and plan of treatments, and the final version is my approved version of the assessment. Additional Comments: Recurrent chest pain concerning for angina. Recommend cardiac cath. Need nephrology clearance. Increase nifedipine for better BP control.      Rocio Arroyo MD

## 2020-08-11 NOTE — PROGRESS NOTES
Physical Therapy    Facility/Department: [facility] 4B STEPDOWN  Initial Assessment    NAME: Charlotte Arechiga  : 1955  MRN: [id number]    Date of Service: 2020  Chief Complaint   Patient presents with    Chest Pain       Discharge Recommendations:    No therapy recommended at discharge. Assessment   Assessment: Pt ambulated 300 ft independently. Pt to be discharged from PT. Prognosis: Good  Decision Making: Low Complexity  PT Education: Goals;PT Role;Plan of Care;General Safety  REQUIRES PT FOLLOW UP: No  Activity Tolerance  Activity Tolerance: Patient Tolerated treatment well       Patient Diagnosis(es): The primary encounter diagnosis was Chest pain, unspecified type. A diagnosis of NSTEMI (non-ST elevated myocardial infarction) [facility]) was also pertinent to this visit. has a past medical history of Asthma, CAD in native artery, nonobstructive, Carotid stenosis, left, Carpal tunnel syndrome, Cerebrovascular disease, Chronic kidney disease, COPD (chronic obstructive pulmonary disease) ([facility] [address] Diabetes mellitus ([facility] [address] History of colon polyps, History of weakness of extremity, HTN (hypertension), Hyperlipidemia, Lung, cysts, congenital, PTSD (post-traumatic stress disorder), PTSD (post-traumatic stress disorder), TIA (transient ischemic attack), and Type II or unspecified type diabetes mellitus without mention of complication, not stated as uncontrolled. has a past surgical history that includes hernia repair; Tonsillectomy; Colonoscopy; Carotid endarterectomy (Left, 7-); vascular surgery (Left, 2015); and pr colsc flx w/rmvl of tumor polyp lesion snare tq (N/A, 2017).     Restrictions     Vision/Hearing  Vision: Impaired  Vision Exceptions: Wears glasses for reading  Hearing: Within functional limits     Subjective  Pain Screening  Patient Currently in Pain: No          Orientation  Orientation  Overall Orientation Status: Within Functional Limits  Social/Functional History  Social/Functional History  Lives With: Significant other  Type of Home: Apartment  Home Layout: One level  Home Access: Stairs to enter with rails  Entrance Stairs - Number of Steps: 5  Entrance Stairs - Rails: Both  Bathroom Shower/Tub: Tub/Shower unit  Bathroom Toilet: Standard  Bathroom Equipment: Grab bars in shower  ADL Assistance: Independent  Homemaking Assistance: Independent  Homemaking Responsibilities: Yes  Ambulation Assistance: Independent  Transfer Assistance: Independent  Active : No  Mode of Transportation: Cab  Occupation: On disability  Leisure & Hobbies: go to gym, reading, TV  Cognition   Cognition  Overall Cognitive Status: WFL    Objective     Observation/Palpation  Posture: Good    AROM RLE (degrees)  RLE AROM: WFL  AROM LLE (degrees)  LLE AROM : WFL  AROM RUE (degrees)  RUE AROM : WFL  AROM LUE (degrees)  LUE AROM : WFL  Strength RLE  Strength RLE: WFL  Comment: Grossly 5/5  Strength LLE  Strength LLE: WFL  Comment: Grossly 5/5  Strength RUE  Strength RUE: WFL  Comment: Grossly 5/5  Strength LUE  Strength LUE: WFL  Comment: Grossly 5/5  Motor Control  Gross Motor?: WFL  Sensation  Overall Sensation Status: WFL  Bed mobility  Bridging: Independent  Rolling to Left: Independent  Supine to Sit: Independent  Sit to Supine: Independent  Transfers  Sit to Stand: Independent  Stand to sit:  Independent  Ambulation  Ambulation?: Yes  More Ambulation?: No  Ambulation 1  Surface: level tile  Device: No Device  Assistance: Independent  Gait Deviations: None  Distance: 300 ft     Balance  Posture: Good  Sitting - Static: Good  Sitting - Dynamic: Good  Standing - Static: Good  Standing - Dynamic: Good        Plan   Safety Devices  Type of devices: Gait belt, Left in bed, Call light within reach, Nurse notified      AM-PAC Score     AM-PAC Inpatient Mobility without Stair Climbing Raw Score : 20 (08/11/20 0951)  AM-PAC Inpatient without Stair Climbing T-Scale Score : 60.57 (08/11/20 8144)  Mobility Inpatient CMS 0-100% Score: 0 (08/11/20 0951)  Mobility Inpatient without Stair CMS G-Code Modifier : [facility] (08/11/20 1227)       Goals  Patient Goals   Patient goals : Pt wants to return home and return to the gym       Therapy Time   Individual Concurrent Group Co-treatment   Time In 0836         Time Out 0846         Minutes [address] This treatment/evaluation completed by signing SPT. Signing PT agrees with treatment and documentation.

## 2020-08-12 ENCOUNTER — APPOINTMENT (OUTPATIENT)
Dept: CARDIAC CATH/INVASIVE PROCEDURES | Age: 65
DRG: 917 | End: 2020-08-12
Payer: COMMERCIAL

## 2020-08-12 LAB
ABSOLUTE EOS #: 0.33 K/UL (ref 0–0.44)
ABSOLUTE IMMATURE GRANULOCYTE: 0.06 K/UL (ref 0–0.3)
ABSOLUTE LYMPH #: 1.57 K/UL (ref 1.1–3.7)
ABSOLUTE MONO #: 0.47 K/UL (ref 0.1–1.2)
ANION GAP SERPL CALCULATED.3IONS-SCNC: 8 MMOL/L (ref 9–17)
BASOPHILS # BLD: 1 % (ref 0–2)
BASOPHILS ABSOLUTE: 0.04 K/UL (ref 0–0.2)
BUN BLDV-MCNC: 50 MG/DL (ref 8–23)
BUN/CREAT BLD: ABNORMAL (ref 9–20)
CALCIUM SERPL-MCNC: 8.4 MG/DL (ref 8.6–10.4)
CHLORIDE BLD-SCNC: 104 MMOL/L (ref 98–107)
CO2: 22 MMOL/L (ref 20–31)
CREAT SERPL-MCNC: 2.43 MG/DL (ref 0.7–1.2)
DIFFERENTIAL TYPE: ABNORMAL
EOSINOPHILS RELATIVE PERCENT: 5 % (ref 1–4)
GFR AFRICAN AMERICAN: 33 ML/MIN
GFR NON-AFRICAN AMERICAN: 27 ML/MIN
GFR SERPL CREATININE-BSD FRML MDRD: ABNORMAL ML/MIN/{1.73_M2}
GFR SERPL CREATININE-BSD FRML MDRD: ABNORMAL ML/MIN/{1.73_M2}
GLUCOSE BLD-MCNC: 102 MG/DL (ref 75–110)
GLUCOSE BLD-MCNC: 106 MG/DL (ref 75–110)
GLUCOSE BLD-MCNC: 121 MG/DL (ref 75–110)
GLUCOSE BLD-MCNC: 121 MG/DL (ref 75–110)
GLUCOSE BLD-MCNC: 145 MG/DL (ref 75–110)
GLUCOSE BLD-MCNC: 154 MG/DL (ref 75–110)
GLUCOSE BLD-MCNC: 235 MG/DL (ref 75–110)
GLUCOSE BLD-MCNC: 273 MG/DL (ref 70–99)
GLUCOSE BLD-MCNC: 281 MG/DL (ref 75–110)
GLUCOSE BLD-MCNC: 284 MG/DL (ref 75–110)
GLUCOSE BLD-MCNC: 336 MG/DL (ref 75–110)
GLUCOSE BLD-MCNC: 352 MG/DL (ref 75–110)
GLUCOSE BLD-MCNC: 453 MG/DL (ref 75–110)
GLUCOSE BLD-MCNC: 537 MG/DL (ref 75–110)
GLUCOSE BLD-MCNC: 583 MG/DL (ref 75–110)
GLUCOSE BLD-MCNC: >600 MG/DL (ref 75–110)
GLUCOSE BLD-MCNC: >600 MG/DL (ref 75–110)
HCT VFR BLD CALC: 33.5 % (ref 40.7–50.3)
HEMOGLOBIN: 10.8 G/DL (ref 13–17)
IMMATURE GRANULOCYTES: 1 %
LYMPHOCYTES # BLD: 23 % (ref 24–43)
MCH RBC QN AUTO: 31 PG (ref 25.2–33.5)
MCHC RBC AUTO-ENTMCNC: 32.2 G/DL (ref 28.4–34.8)
MCV RBC AUTO: 96.3 FL (ref 82.6–102.9)
MONOCYTES # BLD: 7 % (ref 3–12)
NRBC AUTOMATED: 0 PER 100 WBC
PDW BLD-RTO: 12.5 % (ref 11.8–14.4)
PLATELET # BLD: 355 K/UL (ref 138–453)
PLATELET ESTIMATE: ABNORMAL
PMV BLD AUTO: 9.7 FL (ref 8.1–13.5)
POTASSIUM SERPL-SCNC: 4.4 MMOL/L (ref 3.7–5.3)
RBC # BLD: 3.48 M/UL (ref 4.21–5.77)
RBC # BLD: ABNORMAL 10*6/UL
SEG NEUTROPHILS: 63 % (ref 36–65)
SEGMENTED NEUTROPHILS ABSOLUTE COUNT: 4.45 K/UL (ref 1.5–8.1)
SODIUM BLD-SCNC: 134 MMOL/L (ref 135–144)
WBC # BLD: 6.9 K/UL (ref 3.5–11.3)
WBC # BLD: ABNORMAL 10*3/UL

## 2020-08-12 PROCEDURE — 6370000000 HC RX 637 (ALT 250 FOR IP): Performed by: NURSE PRACTITIONER

## 2020-08-12 PROCEDURE — 94640 AIRWAY INHALATION TREATMENT: CPT

## 2020-08-12 PROCEDURE — 85025 COMPLETE CBC W/AUTO DIFF WBC: CPT

## 2020-08-12 PROCEDURE — 93458 L HRT ARTERY/VENTRICLE ANGIO: CPT | Performed by: INTERNAL MEDICINE

## 2020-08-12 PROCEDURE — 6360000002 HC RX W HCPCS: Performed by: INTERNAL MEDICINE

## 2020-08-12 PROCEDURE — 99232 SBSQ HOSP IP/OBS MODERATE 35: CPT | Performed by: INTERNAL MEDICINE

## 2020-08-12 PROCEDURE — B2111ZZ FLUOROSCOPY OF MULTIPLE CORONARY ARTERIES USING LOW OSMOLAR CONTRAST: ICD-10-PCS | Performed by: INTERNAL MEDICINE

## 2020-08-12 PROCEDURE — C1769 GUIDE WIRE: HCPCS

## 2020-08-12 PROCEDURE — 2500000003 HC RX 250 WO HCPCS

## 2020-08-12 PROCEDURE — 6360000004 HC RX CONTRAST MEDICATION

## 2020-08-12 PROCEDURE — 6370000000 HC RX 637 (ALT 250 FOR IP): Performed by: STUDENT IN AN ORGANIZED HEALTH CARE EDUCATION/TRAINING PROGRAM

## 2020-08-12 PROCEDURE — 2060000000 HC ICU INTERMEDIATE R&B

## 2020-08-12 PROCEDURE — C1725 CATH, TRANSLUMIN NON-LASER: HCPCS

## 2020-08-12 PROCEDURE — C1894 INTRO/SHEATH, NON-LASER: HCPCS

## 2020-08-12 PROCEDURE — 2580000003 HC RX 258: Performed by: STUDENT IN AN ORGANIZED HEALTH CARE EDUCATION/TRAINING PROGRAM

## 2020-08-12 PROCEDURE — 2709999900 HC NON-CHARGEABLE SUPPLY

## 2020-08-12 PROCEDURE — 6360000002 HC RX W HCPCS

## 2020-08-12 PROCEDURE — 4A023N7 MEASUREMENT OF CARDIAC SAMPLING AND PRESSURE, LEFT HEART, PERCUTANEOUS APPROACH: ICD-10-PCS | Performed by: INTERNAL MEDICINE

## 2020-08-12 PROCEDURE — 36415 COLL VENOUS BLD VENIPUNCTURE: CPT

## 2020-08-12 PROCEDURE — 2580000003 HC RX 258: Performed by: INTERNAL MEDICINE

## 2020-08-12 PROCEDURE — 80048 BASIC METABOLIC PNL TOTAL CA: CPT

## 2020-08-12 PROCEDURE — 82947 ASSAY GLUCOSE BLOOD QUANT: CPT

## 2020-08-12 PROCEDURE — 6370000000 HC RX 637 (ALT 250 FOR IP): Performed by: INTERNAL MEDICINE

## 2020-08-12 RX ORDER — SODIUM CHLORIDE 9 MG/ML
INJECTION, SOLUTION INTRAVENOUS CONTINUOUS
Status: CANCELLED | OUTPATIENT
Start: 2020-08-12

## 2020-08-12 RX ORDER — INSULIN GLARGINE 100 [IU]/ML
35 INJECTION, SOLUTION SUBCUTANEOUS NIGHTLY
Status: DISCONTINUED | OUTPATIENT
Start: 2020-08-12 | End: 2020-08-12

## 2020-08-12 RX ORDER — SODIUM CHLORIDE 0.9 % (FLUSH) 0.9 %
10 SYRINGE (ML) INJECTION PRN
Status: DISCONTINUED | OUTPATIENT
Start: 2020-08-12 | End: 2020-08-13 | Stop reason: HOSPADM

## 2020-08-12 RX ORDER — SODIUM CHLORIDE 0.9 % (FLUSH) 0.9 %
10 SYRINGE (ML) INJECTION EVERY 12 HOURS SCHEDULED
Status: DISCONTINUED | OUTPATIENT
Start: 2020-08-12 | End: 2020-08-13 | Stop reason: HOSPADM

## 2020-08-12 RX ORDER — NIFEDIPINE 90 MG/1
90 TABLET, FILM COATED, EXTENDED RELEASE ORAL DAILY
Status: DISCONTINUED | OUTPATIENT
Start: 2020-08-13 | End: 2020-08-13 | Stop reason: HOSPADM

## 2020-08-12 RX ORDER — INSULIN GLARGINE 100 [IU]/ML
35 INJECTION, SOLUTION SUBCUTANEOUS NIGHTLY
Status: DISCONTINUED | OUTPATIENT
Start: 2020-08-12 | End: 2020-08-13 | Stop reason: HOSPADM

## 2020-08-12 RX ORDER — ACETAMINOPHEN 325 MG/1
650 TABLET ORAL EVERY 4 HOURS PRN
Status: DISCONTINUED | OUTPATIENT
Start: 2020-08-12 | End: 2020-08-13 | Stop reason: HOSPADM

## 2020-08-12 RX ORDER — ISOSORBIDE MONONITRATE 30 MG/1
30 TABLET, EXTENDED RELEASE ORAL DAILY
Status: DISCONTINUED | OUTPATIENT
Start: 2020-08-12 | End: 2020-08-13

## 2020-08-12 RX ADMIN — GABAPENTIN 200 MG: 100 CAPSULE ORAL at 16:30

## 2020-08-12 RX ADMIN — INSULIN LISPRO 5 UNITS: 100 INJECTION, SOLUTION INTRAVENOUS; SUBCUTANEOUS at 00:58

## 2020-08-12 RX ADMIN — SODIUM CHLORIDE: 9 INJECTION, SOLUTION INTRAVENOUS at 03:40

## 2020-08-12 RX ADMIN — Medication 10 ML: at 21:06

## 2020-08-12 RX ADMIN — GABAPENTIN 200 MG: 100 CAPSULE ORAL at 07:44

## 2020-08-12 RX ADMIN — FLUTICASONE PROPIONATE 1 PUFF: 110 AEROSOL, METERED RESPIRATORY (INHALATION) at 20:48

## 2020-08-12 RX ADMIN — HYDRALAZINE HYDROCHLORIDE 100 MG: 50 TABLET, FILM COATED ORAL at 16:30

## 2020-08-12 RX ADMIN — HYDRALAZINE HYDROCHLORIDE 100 MG: 50 TABLET, FILM COATED ORAL at 06:34

## 2020-08-12 RX ADMIN — ISOSORBIDE MONONITRATE 30 MG: 30 TABLET ORAL at 09:31

## 2020-08-12 RX ADMIN — TIOTROPIUM BROMIDE INHALATION SPRAY 2 PUFF: 3.12 SPRAY, METERED RESPIRATORY (INHALATION) at 08:19

## 2020-08-12 RX ADMIN — INSULIN GLARGINE 35 UNITS: 100 INJECTION, SOLUTION SUBCUTANEOUS at 23:39

## 2020-08-12 RX ADMIN — NIFEDIPINE 60 MG: 60 TABLET, EXTENDED RELEASE ORAL at 09:31

## 2020-08-12 RX ADMIN — PRAZOSIN HYDROCHLORIDE 4 MG: 1 CAPSULE ORAL at 21:06

## 2020-08-12 RX ADMIN — GABAPENTIN 200 MG: 100 CAPSULE ORAL at 21:06

## 2020-08-12 RX ADMIN — PRAZOSIN HYDROCHLORIDE 4 MG: 1 CAPSULE ORAL at 07:43

## 2020-08-12 RX ADMIN — Medication 600 MG: at 07:43

## 2020-08-12 RX ADMIN — INSULIN LISPRO 6 UNITS: 100 INJECTION, SOLUTION INTRAVENOUS; SUBCUTANEOUS at 03:51

## 2020-08-12 RX ADMIN — ATORVASTATIN CALCIUM 80 MG: 80 TABLET, FILM COATED ORAL at 07:44

## 2020-08-12 RX ADMIN — INSULIN GLARGINE 35 UNITS: 100 INJECTION, SOLUTION SUBCUTANEOUS at 03:14

## 2020-08-12 RX ADMIN — CLOPIDOGREL 75 MG: 75 TABLET, FILM COATED ORAL at 07:44

## 2020-08-12 RX ADMIN — INSULIN LISPRO 5 UNITS: 100 INJECTION, SOLUTION INTRAVENOUS; SUBCUTANEOUS at 02:30

## 2020-08-12 RX ADMIN — INSULIN LISPRO 6 UNITS: 100 INJECTION, SOLUTION INTRAVENOUS; SUBCUTANEOUS at 19:43

## 2020-08-12 RX ADMIN — HYDRALAZINE HYDROCHLORIDE 100 MG: 50 TABLET, FILM COATED ORAL at 21:06

## 2020-08-12 RX ADMIN — Medication 81 MG: at 09:31

## 2020-08-12 RX ADMIN — Medication 10 ML: at 07:45

## 2020-08-12 ASSESSMENT — PAIN SCALES - GENERAL
PAINLEVEL_OUTOF10: 0

## 2020-08-12 NOTE — OP NOTE
[facility]        Date:   8/12/2020  Patient name:  Norine Sandhoff  Date of admission:  8/10/2020  9:45 AM  MRN:   [id number]  YOB: 1955    CARDIAC CATHETERIZATION      Operators:    Andrew Smith MD      Procedure performed:       [] Left Heart Catheterization. [] Graft Angiography.  [] Left Ventriculography. [] Right Heart Catheterization. [x] Coronary Angiography. [] Aortic Valve Studies. [] PCI:      [] Other:         Pre Procedure Conscious Sedation Data:    ASA Class:    [] I [x] II [] III [] IV    Mallampati Class:  [] I [x] II [] III [] IV        Indication:  [] STEMI      [] + Stress test  [] ACS      [] + EKG Changes  [] Non Q MI       [x] Significant Risk Factors  [x] Recurrent Angina             [] Diabetes Mellitus    [] New LBBB      [] Uncontrolled HTN. [] CHF / Low EF changes     [] Abnormal CTA / Ca Score  [] Other:       Procedure:  Access:  [] Femoral  [x] Radial  artery       [x] Right  [] Left    After informed consent was obtained with explanation of the risks and benefits, patient was brought to the cath lab. The right wrist was prepped and draped in sterile fashion. 1% lidocaine was used for local block. The  right radial artery was cannulated with 6  Fr sheath with brisk arterial blood return. A premixed injection of Verapamil, Heparin and Nitrogycerin was injected through the side port. The side port was frequently flushed and aspirated with normal saline. Findings:    LAD: Diffuse irregularities 30-40%.     LCx: Diffuse irregularities 20-30%.     RCA: Diffuse irregualrtities 40-50%. Lesion on Prox RCA: 50% stenosis. Lesion on Mid RCA: 50% stenosis. Ventriculography Findings:  LV was not done. Estimated blood loss: 5 ml    Conclusions:   Non-obstructive CAD. LV was not done. No change from cardiac cath 2017    Recommendations:  1. Medical Therapy. 2. Risk Factors Modification.   3. Post Cath Protocol        History and Risk Factors    [x] Hypertension     [] Family history of CAD  [x] Hyperlipidemia     [] Cerebrovascular Disease   [] Prior MI       [] Peripheral Vascular disease   [] Prior PCI              [x] Diabetes Mellitus    [] Left Main PCI. [] Currently on Dialysis. [] Prior CABG. [] Currently smoker. [] Cardiac Arrest outside of healthcare facility. [] Yes    [x] No        Witnessed     [] Yes   [] No     Arrest after arrival of EMS  [] Yes   [] No     [] Cardiac Arrest at other Facility. [] Yes   [x] No    Pre-Procedure Information. Heart Failure       [] Yes    [x] No        Class  [] I      [] II  [] III    [] IV. New Diagnosis    [] Yes  [] No    HF Type      [] Systolic   [] Diastolic          [] Unknown. Diagnostic Test:   EKG       [x] Normal   [] Abnormal    New antiarrhythmia medications:    [] Yes   [] No   New onset atrial fibrillation / Flutter     [] Yes   [] No   ECG Abnormalities:      [] V. Fib   [] Lynsey V. Tach           [] NS V. T   [] New LBBB           [] T. Inv  []  ST dev > 0.5 mm         [] PVC's freq  [] PVC's infrequent    Stress Test Performed:   [] Yes    [x] No     Type:     [] Stress Echo   [] Exercise Stress Test (no imaging)      [] Stress Nuclear  [] Stress Imaging     Results   [] Negative   [] Positive        [] Indeterminate  [] Unavailable     If Positive/ Risk / Extent of Ischemia:       [] Low  [] Intermediate         [] High  [] Unavailable      Cardiac CTA Performed:  [] Yes    [x] No      Results   [] CAD   [] Non obstructive CAD      [] No CAD   [] Uncertain      [] Unknown   [] Structural Disease.      Pre Procedure Medications: [x] Yes    [] No         [x] ASA  [] Beta Blockers      [] Nitrate  [] Ca Channel Blockers      [] Ranolazine  [] Statin       [] Plavix/Others antiplatelets      Citlali Barrera MD  Fellow, [id number] [facility]          Attending Physician Statement  I have discussed the case of Norine Sandhoff including pertinent history and exam findings with the resident. I have seen and examined the patient and the key elements of the encounter have been performed by me. I agree with the assessment, plan and orders as documented by the resident With changes made to the note.   I was present during entire procedure and performed all critical elements of the procedure    Electronically signed by Della Chun MD on 8/14/2020 at 1:05 PM.    [facility]      [phone]

## 2020-08-12 NOTE — PROGRESS NOTES
Patients blood sugar was 227 at 2100. Patient refused nightly humalog because he said his blood sugars usually run in the 200's and insulin would bring his blood sugar down too low. Writer rechecked blood sugar at 0050 and blood sugar was 537. Patient said he was only willing to take 5 units of humalog. 5 units given per orders. Writer will continue to monitor.

## 2020-08-12 NOTE — FLOWSHEET NOTE
SPIRITUAL CARE DEPARTMENT - [facility] [address]  PROGRESS NOTE    Shift date: 8/12/2020  Shift day: Wednesday   Shift # 2    Room # 7739/8494-93   Name: Mary Mckeon            Age: 60 y.o. Gender: male          Orthodoxy: Synagogue   Place of Judaism: [facility] (in [place])    Referral: Routine Visit    Admit Date & Time: 8/10/2020  9:45 AM    PATIENT/EVENT DESCRIPTION:  Mary Mckeon is a 60 y.o. male laying in bed watching TV, looking tired. SPIRITUAL ASSESSMENT/INTERVENTION:   provided active listening and ministry of presence to pt. Relates his wife has epilepsy and is not able to drive, but he has been talking to her on the phone. Requested prayers for his health, his wife's health, and their financial situation, as his food stamps do not buy enough food for the month. Pt also requested a Bible.  offered a prayer, brought the pt a Bible, and let pt know that chaplains are available 24/7. Pt expressed gratitude for 's visit. SPIRITUAL CARE FOLLOW-UP PLAN:  Chaplains will remain available to offer spiritual and emotional support as needed. 08/12/20 1917   Encounter Summary   Services provided to: Patient   Referral/Consult From: Rounding   Support System Spouse; Sikh/alisson community; Children   Place of Confucianist [facility] [place]   Continue Visiting   (8/12/2020)   Complexity of Encounter Moderate   Length of Encounter 30 minutes   Spiritual Assessment Completed Yes   Routine   Type Initial   Assessment Approachable; Anxious   Intervention Active listening;Explored feelings, thoughts, concerns;Explored coping resources;Prayer;Sustaining presence/ Ministry of presence;Provided reading materials/devotional materials   Outcome Expressed gratitude;Engaged in conversation;Expressed feelings/needs/concerns         Electronically signed by Clemente Mercado, on 8/12/2020 at 7:19 PM.  [address] [place]  [phone]

## 2020-08-12 NOTE — PROGRESS NOTES
Writer rechecked blood sugar at 0320. Blood sugar >600 still. Patient was willing to take 6 units of insulin ordered by resident. Educated patient on importance of insulin. Writer will continue to check blood sugar every hour.

## 2020-08-12 NOTE — PROGRESS NOTES
Patient refuses to take any more than 5 units of short acting insulin at a time despite education and a blood sugar of >600. Agreed to 35 units Lantus as well. Ordered both insulins and will have RN check hourly and cover, continuing to educate patient.

## 2020-08-12 NOTE — FLOWSHEET NOTE
Pt return to room 416 s/p cardiac cath. Vitals obtained and assessment completed. Bed locked and in lowest position with bed alarm on. Call light within reach. Will continue to monitor.         08/12/20 1549   RUE Neurovascular Assessment   Capillary Refill Less than/equal to 3 seconds   Color Appropriate for ethnicity   Temperature Warm   Sensation RUE Full sensation   R Radial Pulse +2   Puncture Site Assessment 1   Location Radial - right   Site Assessment No redness, drainage, swelling or hematoma   Hemostasis Intervention Radial Compression Device   Milliliters of Air Removed 0 mL     Electronically signed by Melanie Abel RN on 8/12/2020 at 3:54 PM

## 2020-08-12 NOTE — PROGRESS NOTES
Recent Labs     08/11/20  0705   CHOL 195   HDL 62     INR: No results for input(s): INR in the last 72 hours. ECHO 5/16/2020: EF 73%, moderate-severe LVH, mild MR/TR, RVSP is 37 mmHg.      STRESS 1/7/2020: No ischemia/infarct. EF 49%.      CATH 9/27/17: Nonobstructive CAD.   Objective:   Vitals: BP (!) 152/69   Pulse 61   Temp 97.4 °F (36.3 °C) (Temporal)   Resp 20   Ht 5' 8\" (1.727 m)   Wt 137 lb 12.6 oz (62.5 kg) Comment: two pillows, one blanket  SpO2 95%   BMI 20.95 kg/m²   General appearance: alert and cooperative with exam  HEENT: Head: Normocephalic, no lesions, without obvious abnormality. Neck: no JVD, trachea midline, no adenopathy  Lungs: Clear to auscultation  Heart: Regular rate and rhythm, s1/s2 auscultated, no murmurs  Abdomen: soft, non-tender, bowel sounds active  Extremities: no edema  Neurologic: not done        Assessment / Acute Cardiac Problems:   1. Recurrent chest pain  2. Mild troponin elevation -demand ischemia from CKD  3. H/o nonobstructive CAD on cardiac cath done in 2017  4. UDS positive for cocaine -however, patient denies intake   5. H/o left carotid disease s/p surgery and stent  6. CKD stage III  7. Diabetes  8.  Hypertension    Patient Active Problem List:     Cigarette nicotine dependence without complication     Carpal tunnel syndrome on right     Colon polyps     Carotid stenosis, left s/p Endarterectomy     Mixed simple and mucopurulent chronic bronchitis (HCC)     Pure hypercholesterolemia     CKD (chronic kidney disease) stage 3, GFR 30-59 ml/min ([facility])     Chest pain     Dyspnea and respiratory abnormalities     PTSD (post-traumatic stress disorder)     Transient cerebral ischemia     Essential hypertension     DM type 2, uncontrolled, with neuropathy (HCC)     COPD (chronic obstructive pulmonary disease) ([facility]     Cerebral vascular disease     Primary insomnia     Hypertensive crisis     Non-obstructive Coronary artery disease of native artery of native heart with stable angina pectoris ([facility] [address]     Hyperparathyroidism ([facility] [address]     Hypovitaminosis D     Coronary artery disease with exertional angina (HCC)     Leg swelling     Hyponatremia     Anemia     NSTEMI (non-ST elevated myocardial infarction) ([facility] [address]     Hypoalbuminemia      Plan of Treatment:   1. NSTEMI with Recurrent Angina. Cleared by nephrology for cardiac cath. Discussed with pt including risks and benefits. Pt verbalizes understanding and is agree to proceed. 2. HTN- Remains elevated. Will increase CCB. Continue BB, Imdur, hydralazine.     3. Orders to follow post cath    Electronically signed by TERRANCE Valenzuela CNP on 8/12/2020 at 11:30 AM  [address]  [phone]

## 2020-08-12 NOTE — PLAN OF CARE
Problem: RESPIRATORY  Intervention: Respiratory assessment  Note: BRONCHOSPASM/BRONCHOCONSTRICTION    IMPROVE  AERATION/BREATHSOUNDS  ADMINISTER BRONCHODILATOR THERAPY AS APPROPRIATE  ASSESS BREATH SOUNDS  PATIENT EDUCATION AS NEEDED

## 2020-08-12 NOTE — PROGRESS NOTES
PRN Meds:  albuterol sulfate HFA, nitroGLYCERIN, sodium chloride flush, acetaminophen **OR** acetaminophen, polyethylene glycol, potassium chloride **OR** potassium alternative oral replacement **OR** potassium chloride, glucose, dextrose, glucagon (rDNA), dextrose, hydrALAZINE  Home Meds:                Medications Prior to Admission: blood glucose monitor strips, Test_4__times daily Diagnosis: 250.0   Diabetes Mellitus_x___Insulin Dependent____Non-Insulin Dependent  Lancets MISC, Use_4__times daily Diagnisis:250.0  Diabetes Mellitus__x__Insulin Dependent___Non-Insulin Dependent  vitamin D (ERGOCALCIFEROL) 1.25 MG (07636 UT) CAPS capsule, TAKE 1 CAPSULE BY MOUTH ONCE A WEEK  NIFEdipine (ADALAT CC) 30 MG extended release tablet, Take 30 mg by mouth daily  prazosin (MINIPRESS) 2 MG capsule, Take 2 capsules by mouth 2 times daily  bumetanide (BUMEX) 1 MG tablet, Take 1 tablet by mouth daily  Cholecalciferol (VITAMIN D) 50 MCG (2000 UT) TABS tablet, Take 1 tablet by mouth daily  carvedilol (COREG) 25 MG tablet, Take 1 tablet by mouth 2 times daily (with meals)  atorvastatin (LIPITOR) 80 MG tablet, Take 1 tablet by mouth daily  clopidogrel (PLAVIX) 75 MG tablet, Take 1 tablet by mouth daily  insulin glargine (BASAGLAR KWIKPEN) 100 UNIT/ML injection pen, Inject 35 Units into the skin nightly Dispense with pen needle  insulin aspart (NOVOLOG FLEXPEN) 100 UNIT/ML injection pen, Inject 5 Units into the skin 3 times daily (before meals) Sliding scale  nitroGLYCERIN (NITROSTAT) 0.4 MG SL tablet, up to max of 3 total doses. If no relief after 1 dose, call 911. Nutritional Supplements (GLUCERNA CARBSTEADY) LIQD, Take 1 Can by mouth 3 times daily  isosorbide mononitrate (IMDUR) 30 MG extended release tablet, TAKE 1 TABLET BY MOUTH ONCE DAILY  gabapentin (NEURONTIN) 100 MG capsule, Take 2 capsules by mouth 3 times daily for 90 doses.   losartan (COZAAR) 50 MG tablet, Take 1 tablet by mouth 2 times daily  QUEtiapine (SEROQUEL) 100 MG tablet, Take 1 tablet by mouth nightly  fluticasone-umeclidin-vilant (TRELEGY ELLIPTA) 100-62.5-25 MCG/INH AEPB, INHALE 1 PUFF INTO THE LINGS DAILY  albuterol-ipratropium (COMBIVENT RESPIMAT)  MCG/ACT AERS inhaler, Inhale 1 puff into the lungs every 6 hours  hydrALAZINE (APRESOLINE) 100 MG tablet, Take 1 tablet by mouth every 8 hours  COMFORT EZ PEN NEEDLES 31G X 8 MM MISC, USE AS DIRECTED  aspirin (ASPIRIN ADULT LOW STRENGTH) 81 MG EC tablet, TAKE 1 TABLET BY MOUTH DAILY  TRUE METRIX BLOOD GLUCOSE TEST strip, TEST BLOOD SUGAR 4 TO 5 TIMES DAILY  EASY COMFORT LANCETS [facility], DX: DM-2 USE 3-4 TIMES DAILY    INVESTIGATIONS     Last 3 CMP:    Recent Labs     08/10/20  1007 08/10/20  1602 08/11/20  0705 08/12/20  0700     --  137 134*   K 5.3  --  4.4 4.4     --  106 104   CO2 21  --  21 22   BUN 47*  --  39* 50*   CREATININE 2.43*  --  2.29* 2.43*   CALCIUM 9.1  --  8.5* 8.4*   PROT  --  5.5*  --   --    LABALBU  --  2.8*  --   --    BILITOT  --  <0.10*  --   --    ALKPHOS  --  80  --   --    AST  --  17  --   --    ALT  --  16  --   --        Last 3 CBC:  Recent Labs     08/10/20  1007 08/11/20  0705 08/12/20  0700   WBC 9.3 7.7 6.9   RBC 4.57 3.77* 3.48*   HGB 14.0 11.6* 10.8*   HCT 41.0 36.2* 33.5*   MCV 89.7 96.0 96.3   MCH 30.6 30.8 31.0   MCHC 34.1 32.0 32.2   RDW 12.2 12.4 12.5    373 355   MPV 9.4 9.8 9.7       ASSESSMENT     1. Chronic kidney disease stage IV secondary diabetic hypertensive nephrosclerosis with baseline creatinine 2.2-2.5. Follows up with Dr. Jude Davis  2. NSTEMI  3. HTN urgency - cocaine positive   4. Proteinuria  5. DM2  6. History of nonobstructive coronary artery disease in the past with preserved ejection fraction    PLAN     1. Dang Savage for coronary angio. TAMAR preventive measures   2. Ok to resume home dose of ARB  3.  Will follow    Please do not hesitate to call with questions    This note is created with the assistance of a speech-recognition program. While intending to generate a document that actually reflects the content of the visit, no guarantees can be provided that every mistake has been identified and corrected by editing    Ankur Ludwig MD, [facility]), [address]   8/12/2020 10:11 AM  [facility]

## 2020-08-12 NOTE — PLAN OF CARE
Problem: Falls - Risk of:  Goal: Will remain free from falls  Description: Will remain free from falls  Outcome: Met This Shift  Note: Pt remains free of falls at this time. Bed locked in lowest position, siderails x2, call light in reach, and personal items within reach. Non-skid footwear applied. Pt ambulates in room with steady gait. Encouraged pt to call for assistance as needed for safety. Falling star posted outside of room. Will continue to monitor. Goal: Absence of physical injury  Description: Absence of physical injury  Outcome: Met This Shift     Problem: Safety:  Goal: Free from accidental physical injury  Description: Free from accidental physical injury  Outcome: Met This Shift  Goal: Free from intentional harm  Description: Free from intentional harm  Outcome: Met This Shift     Problem: Pain:  Goal: Patient's pain/discomfort is manageable  Description: Patient's pain/discomfort is manageable  Outcome: Met This Shift  Note: Pt able to communicate pain as needed. Patients pain was assessed on a 0-10 scale. Patient didn't complain of pain during shift. Patient uses call light appropriately. Pt satisfied with pain interventions.  Will continue to monitor      Problem: Cardiac:  Goal: Ability to maintain vital signs within normal range will improve  Description: Ability to maintain vital signs within normal range will improve  Outcome: Ongoing  Goal: Cardiovascular alteration will improve  Description: Cardiovascular alteration will improve  Outcome: Ongoing

## 2020-08-12 NOTE — PROGRESS NOTES
Patient admitted, consent signed and questions answered. Patient ready for procedure. Call light to reach with side rails up 2 of 2. Bilateral groin hair clipped. No one at bedside with patient. History and physical complete.

## 2020-08-12 NOTE — PROGRESS NOTES
[facility]  Internal Medicine Teaching Residency Program  Inpatient Daily Progress Note  ______________________________________________________________________________    Patient: Charlotte Arechiga  YOB: 1955   XSV:[id number]    Acct: [de-identified]     Room: [address] [place] [address]  Admit date: 8/10/2020  Today's date: 08/12/20  Number of days in the hospital: 2    SUBJECTIVE   Admitting Diagnosis: NSTEMI (non-ST elevated myocardial infarction) (HCC)`  CC: Complaints of 2 episodes of fluttering of chest   pt examined at bedside. Chart & results reviewed. Appears comfortable in bed. Consulted with cardiology-no evidence of ACS patient has no acute EKG changes and troponin elevation is chronic. Need for heparin drip. Plan for cardiac catheterization for today  Blood pressure uncontrolled 150/80 increase nifedipine to 60 mg . ROS:  Constitutional:  negative for chills, fevers, sweats  Respiratory:  negative for cough, dyspnea on exertion, hemoptysis, shortness of breath, wheezing  Cardiovascular:  negative for chest pain, chest pressure/discomfort, lower extremity edema, palpitations  Gastrointestinal:  negative for abdominal pain, constipation, diarrhea, nausea, vomiting  Neurological:  negative for dizziness, headache  BRIEF HISTORY   The patient is a pleasant 65 y. o. male presents with a PMH of Non obstructive CAD, s/o left ICA stent, Diabetes mellitus type 2, HTN, CKD stage 3, with a chief complaint of chest pain x 1 night.      The patient reports he was walking around in his house last night when he first noticed his chest pain,  Which substernal, non radiating, sharp, 5/10 in intensity, not relieved by rest, aggravated by exertion, relieved by sublingual nitroglycerin. After taking nitroglycerin, the chest pain was relieved but he still has some chest pressure.  After waking up this morning, the patient had another episode of similar chest pain, which is again relieved by nitroglycerin and the patient still has some chest pressure. Patient also reports associated with \"flickering\" sensation in his chest.     Patient has always some baseline shortness of breath because of his COPD. Denies orhthopnea, PND. Patient also has associated nausea, denies vomiting. Has headache, denies any vision changes, abdominal pain. Patient also reports cough, yellow sputum x 2days. Denies fever, chills.      Patient reports he is compliant with his medications. On Aspirin, Plavix. Patient was last hospitalised in 20 for NSTEMI.      Last cath done in . Findings: Left main: mild disease,LAD: 30-40% prox stenosis,LCX: mild disease,RCA: 40% mid stenosis     EK20 NSR LVH non-specific T changes   ECHO: 20  Summary  Left ventricle is normal in size with hyperdynamic systolic function. Moderate to severe concentric left ventricular hypertrophy. Calculated ejection fraction is 73 %. Left atrium is mildly dilated. Mild mitral regurgitation. Mild tricuspid regurgitation. Estimated right ventricular systolic pressure  is 37 mmHg.     MRA in , showed no significant stenosis of Right carotid but 70% stenosis of left carotid. aortic arch and carotid angiogram with left internal carotid stent placement and balloon angioplasty in .       Quit smoking in the last 5 to 10 years, 22 pack year history of smoking.      In the ER, bradycardia 66, high /85 saturating 99% on room air.      Labs show Cr 2.43 (baseline 2.36), elevated pro-bnp 4,188, elevated troponins trending down (65>49) trops in prev admission(392 to 411). Glucose 202. Urine tox positive for cocaine.       CXR shows no acute cardiopulmonary findings.  EKG findings: EKG interpretation: Sinus rhythm 68.  Normal intervals.  Borderline right axis.  No acute ST elevations, there is nonspecific ST elevation in the anterior leads however this is seen on prior EKG from 2020. Kathy Dart is also T wave inversion inferior laterally again seen on prior.   Repeat EKG interpretation, 1011: Sinus rhythm 68.  Normal intervals again seen right axis.  No evolving ST changes again seen in the inferior lateral T wave inversion   Repeat EKG interpretation, 1125: Sinus bradycardia 59.  Normal intervals right axis.  No acute ST elevation, again seen is the ST depression with T wave inversion inferior laterally no evolving changes     Patient is given an aspirin, put on nitro drip, and about to start heparin dr    OBJECTIVE     Vital Signs:  /61   Pulse 62   Temp 98.2 °F (36.8 °C) (Oral)   Resp 18   Ht 5' 8\" (1.727 m)   Wt 137 lb 12.6 oz (62.5 kg) Comment: two pillows, one blanket  SpO2 93%   BMI 20.95 kg/m²     Temp (24hrs), Av.4 °F (36.9 °C), Min:97.4 °F (36.3 °C), Max:99 °F (37.2 °C)    In: 1761.7   Out: -     Physical Exam:  Constitutional: This is a well developed, well nourished, 18.5-24.9 - Normal 60y.o. year old male who is alert, oriented, cooperative and in no apparent distress. Head:normocephalic and atraumatic. EENT:  PERRLA. No conjunctival injections. Neck: Supple without thyromegaly. No elevated JVP. Trachea was midline. Respiratory: Chest was symmetrical without dullness to percussion. Breath sounds bilaterally were clear to auscultation. There were no wheezes, rhonchi or rales. There is no intercostal retraction or use of accessory muscles. No egophony noted. Cardiovascular: Regular without murmur, clicks, gallops or rubs. Abdomen: Slightly rounded and soft without organomegaly. No rebound, rigidity or guarding was appreciated. Musculoskeletal:  No gross muscle weakness. Extremities:  No lower extremity edema, ulcerations, tenderness, varicosities or erythema. Muscle size, tone and strength are normal.  No involuntary movements are noted. Skin:  Warm and dry. Good color, turgor and pigmentation. No lesions or scars.   No cyanosis or clubbing  Neurological/Psychiatric: The patient's general behavior, level of consciousness, thought content and emotional status is normal.        Medications:  Scheduled Medications:    insulin glargine  35 Units Subcutaneous Nightly    insulin lispro  4 Units Subcutaneous Once    isosorbide mononitrate  30 mg Oral Daily    [START ON 8/13/2020] NIFEdipine  90 mg Oral Daily    prazosin  4 mg Oral BID    insulin lispro  10 Units Subcutaneous Once    acetylcysteine  600 mg Oral BID    fluticasone  1 puff Inhalation BID    tiotropium  2 puff Inhalation Daily    aspirin  81 mg Oral Daily    atorvastatin  80 mg Oral Daily    carvedilol  25 mg Oral BID WC    gabapentin  200 mg Oral TID    sodium chloride flush  10 mL Intravenous 2 times per day    insulin lispro  0-12 Units Subcutaneous TID WC    insulin lispro  0-6 Units Subcutaneous Nightly    clopidogrel  75 mg Oral Daily    hydrALAZINE  100 mg Oral 3 times per day    [Held by provider] losartan  50 mg Oral BID     Continuous Infusions:    sodium chloride 75 mL/hr at 08/12/20 0340    dextrose       PRN Medicationsalbuterol sulfate HFA, 2 puff, Q4H PRN  nitroGLYCERIN, 0.4 mg, Q5 Min PRN  sodium chloride flush, 10 mL, PRN  acetaminophen, 650 mg, Q6H PRN    Or  acetaminophen, 650 mg, Q6H PRN  polyethylene glycol, 17 g, Daily PRN  potassium chloride, 40 mEq, PRN    Or  potassium alternative oral replacement, 40 mEq, PRN    Or  potassium chloride, 10 mEq, PRN  glucose, 15 g, PRN  dextrose, 12.5 g, PRN  glucagon (rDNA), 1 mg, PRN  dextrose, 100 mL/hr, PRN  hydrALAZINE, 10 mg, Q6H PRN        Diagnostic Labs:  CBC:   Recent Labs     08/10/20  1007 08/11/20  0705 08/12/20  0700   WBC 9.3 7.7 6.9   RBC 4.57 3.77* 3.48*   HGB 14.0 11.6* 10.8*   HCT 41.0 36.2* 33.5*   MCV 89.7 96.0 96.3   RDW 12.2 12.4 12.5    373 355     BMP:   Recent Labs     08/10/20  1007 08/11/20  0705 08/12/20  0700    137 134*   K 5.3 4.4 4.4    106 104   CO2 21 21 22   BUN 47* 39* 50*   CREATININE 2.43* have moderately elevated troponins, elevated pro-BNP . Patient admitted to inpatient status for the work up and management of NSTEMI. Principal Problem:    NSTEMI (non-ST elevated myocardial infarction) ([address]  Plan: elevated pro-bnp 4,188, elevated troponins trending down (65>49) trops in prev admission(392 to 411). Received aspirin 324mg once PO. Was put on nitro drip in the ER, didn't receive the heparin drip. Continue Aspirin EC 81mg PO daily. Continue lipitor 80mg PO daily. Trend troponins. EKG shows bradycardia, sinus rhythm and no new findings. Urine tox positive for cocaine. . Cardiology consulted in the ER. Persantine stress test for further risk stratify rule out ischemia  Per cardiology, lab cath is planned for today     Last cath done in 2017. Findings: Left main: mild disease,LAD: 30-40% prox stenosis,LCX: mild disease,RCA: 40% mid stenosis  EK20 NSR LVH non-specific T changes   ECHO: 20. EF 73%. active Problems:    2. CKD stage 3. Creatinine 2.43. (baseline 2.36). follow up on bmp. Nephrology consulted for renal clearance prior to coronary angiogram.   As per nephro tavon for coronary angiography     3.Essential hypertension. Uncontrolled. On nitro drip in the ER. Continue coreg 25mg BID. Will resume home medications, if BP uncontrolled. Per nephro tavon to resume home dose of are up to optimize blood pressure control, okay for coronary angiogram tomorrow if creatinine below 2.5. Increase nifedipine to 60 mg     4. Diabetes Mellitus, Type 2. Hold home insulin. Start lantus 20 SC QHS. Start humalog. Continue neurontin 200mg TID. Check hba1c, poct glucose.      5. COPD. Continue Trelegy inhaler daily. Not on home oxygen.       6. Carotid stenosis, s/p left ICA stenosis. Stable. On Aspirin and plavix at home. F/u on lipid panel.     Diet: Cardiac diet.  NPO after midnight.       DVT ppx: SCD cuffs  GI ppx: not indicated      PT/OT:consulted   Discharge Planning / SW: Ongoing      Rick Mcknight

## 2020-08-13 VITALS
OXYGEN SATURATION: 96 % | RESPIRATION RATE: 32 BRPM | HEIGHT: 68 IN | DIASTOLIC BLOOD PRESSURE: 50 MMHG | HEART RATE: 64 BPM | WEIGHT: 137.79 LBS | BODY MASS INDEX: 20.88 KG/M2 | TEMPERATURE: 98.5 F | SYSTOLIC BLOOD PRESSURE: 110 MMHG

## 2020-08-13 PROBLEM — I21.4 NSTEMI (NON-ST ELEVATED MYOCARDIAL INFARCTION) (HCC): Status: RESOLVED | Noted: 2020-06-18 | Resolved: 2020-08-13

## 2020-08-13 LAB
ABSOLUTE EOS #: 0.4 K/UL (ref 0–0.44)
ABSOLUTE IMMATURE GRANULOCYTE: 0.05 K/UL (ref 0–0.3)
ABSOLUTE LYMPH #: 1.27 K/UL (ref 1.1–3.7)
ABSOLUTE MONO #: 0.51 K/UL (ref 0.1–1.2)
ANION GAP SERPL CALCULATED.3IONS-SCNC: 12 MMOL/L (ref 9–17)
BASOPHILS # BLD: 1 % (ref 0–2)
BASOPHILS ABSOLUTE: 0.05 K/UL (ref 0–0.2)
BUN BLDV-MCNC: 43 MG/DL (ref 8–23)
BUN/CREAT BLD: ABNORMAL (ref 9–20)
CALCIUM SERPL-MCNC: 8.3 MG/DL (ref 8.6–10.4)
CHLORIDE BLD-SCNC: 106 MMOL/L (ref 98–107)
CO2: 21 MMOL/L (ref 20–31)
CREAT SERPL-MCNC: 2.43 MG/DL (ref 0.7–1.2)
DIFFERENTIAL TYPE: ABNORMAL
EOSINOPHILS RELATIVE PERCENT: 5 % (ref 1–4)
GFR AFRICAN AMERICAN: 33 ML/MIN
GFR NON-AFRICAN AMERICAN: 27 ML/MIN
GFR SERPL CREATININE-BSD FRML MDRD: ABNORMAL ML/MIN/{1.73_M2}
GFR SERPL CREATININE-BSD FRML MDRD: ABNORMAL ML/MIN/{1.73_M2}
GLUCOSE BLD-MCNC: 150 MG/DL (ref 75–110)
GLUCOSE BLD-MCNC: 198 MG/DL (ref 75–110)
GLUCOSE BLD-MCNC: 244 MG/DL (ref 75–110)
GLUCOSE BLD-MCNC: 271 MG/DL (ref 70–99)
GLUCOSE BLD-MCNC: 375 MG/DL (ref 75–110)
HCT VFR BLD CALC: 34.1 % (ref 40.7–50.3)
HEMOGLOBIN: 10.6 G/DL (ref 13–17)
IMMATURE GRANULOCYTES: 1 %
LYMPHOCYTES # BLD: 16 % (ref 24–43)
MCH RBC QN AUTO: 30.1 PG (ref 25.2–33.5)
MCHC RBC AUTO-ENTMCNC: 31.1 G/DL (ref 28.4–34.8)
MCV RBC AUTO: 96.9 FL (ref 82.6–102.9)
MONOCYTES # BLD: 6 % (ref 3–12)
NRBC AUTOMATED: 0 PER 100 WBC
PDW BLD-RTO: 12.5 % (ref 11.8–14.4)
PLATELET # BLD: 362 K/UL (ref 138–453)
PLATELET ESTIMATE: ABNORMAL
PMV BLD AUTO: 10.1 FL (ref 8.1–13.5)
POTASSIUM SERPL-SCNC: 4.5 MMOL/L (ref 3.7–5.3)
RBC # BLD: 3.52 M/UL (ref 4.21–5.77)
RBC # BLD: ABNORMAL 10*6/UL
SEG NEUTROPHILS: 71 % (ref 36–65)
SEGMENTED NEUTROPHILS ABSOLUTE COUNT: 5.88 K/UL (ref 1.5–8.1)
SODIUM BLD-SCNC: 139 MMOL/L (ref 135–144)
WBC # BLD: 8.2 K/UL (ref 3.5–11.3)
WBC # BLD: ABNORMAL 10*3/UL

## 2020-08-13 PROCEDURE — 82947 ASSAY GLUCOSE BLOOD QUANT: CPT

## 2020-08-13 PROCEDURE — 94640 AIRWAY INHALATION TREATMENT: CPT

## 2020-08-13 PROCEDURE — 99232 SBSQ HOSP IP/OBS MODERATE 35: CPT | Performed by: INTERNAL MEDICINE

## 2020-08-13 PROCEDURE — 2580000003 HC RX 258: Performed by: INTERNAL MEDICINE

## 2020-08-13 PROCEDURE — 6370000000 HC RX 637 (ALT 250 FOR IP): Performed by: INTERNAL MEDICINE

## 2020-08-13 PROCEDURE — 36415 COLL VENOUS BLD VENIPUNCTURE: CPT

## 2020-08-13 PROCEDURE — 85025 COMPLETE CBC W/AUTO DIFF WBC: CPT

## 2020-08-13 PROCEDURE — 80048 BASIC METABOLIC PNL TOTAL CA: CPT

## 2020-08-13 RX ORDER — ISOSORBIDE MONONITRATE 60 MG/1
60 TABLET, EXTENDED RELEASE ORAL DAILY
Status: DISCONTINUED | OUTPATIENT
Start: 2020-08-14 | End: 2020-08-13 | Stop reason: HOSPADM

## 2020-08-13 RX ORDER — NIFEDIPINE 30 MG/1
60 TABLET, FILM COATED, EXTENDED RELEASE ORAL DAILY
Qty: 30 TABLET | Refills: 3 | Status: SHIPPED | OUTPATIENT
Start: 2020-08-13 | End: 2021-01-25 | Stop reason: SDUPTHER

## 2020-08-13 RX ORDER — ISOSORBIDE MONONITRATE 60 MG/1
60 TABLET, EXTENDED RELEASE ORAL DAILY
Qty: 30 TABLET | Refills: 3 | Status: SHIPPED | OUTPATIENT
Start: 2020-08-14 | End: 2021-01-05 | Stop reason: SDUPTHER

## 2020-08-13 RX ORDER — BUMETANIDE 1 MG/1
1 TABLET ORAL DAILY
Qty: 30 TABLET | Refills: 3 | Status: SHIPPED | OUTPATIENT
Start: 2020-08-19 | End: 2021-03-16

## 2020-08-13 RX ADMIN — PRAZOSIN HYDROCHLORIDE 4 MG: 1 CAPSULE ORAL at 08:43

## 2020-08-13 RX ADMIN — HYDRALAZINE HYDROCHLORIDE 100 MG: 50 TABLET, FILM COATED ORAL at 05:42

## 2020-08-13 RX ADMIN — CLOPIDOGREL 75 MG: 75 TABLET, FILM COATED ORAL at 08:43

## 2020-08-13 RX ADMIN — Medication 10 ML: at 08:44

## 2020-08-13 RX ADMIN — ISOSORBIDE MONONITRATE 30 MG: 30 TABLET ORAL at 08:44

## 2020-08-13 RX ADMIN — GABAPENTIN 200 MG: 100 CAPSULE ORAL at 08:43

## 2020-08-13 RX ADMIN — Medication 10 ML: at 08:43

## 2020-08-13 RX ADMIN — LOSARTAN POTASSIUM 50 MG: 50 TABLET, FILM COATED ORAL at 09:55

## 2020-08-13 RX ADMIN — ATORVASTATIN CALCIUM 80 MG: 80 TABLET, FILM COATED ORAL at 08:43

## 2020-08-13 RX ADMIN — Medication 81 MG: at 08:43

## 2020-08-13 RX ADMIN — CARVEDILOL 25 MG: 12.5 TABLET, FILM COATED ORAL at 08:43

## 2020-08-13 RX ADMIN — NIFEDIPINE 90 MG: 90 TABLET, EXTENDED RELEASE ORAL at 08:43

## 2020-08-13 RX ADMIN — TIOTROPIUM BROMIDE INHALATION SPRAY 2 PUFF: 3.12 SPRAY, METERED RESPIRATORY (INHALATION) at 08:52

## 2020-08-13 ASSESSMENT — PAIN SCALES - GENERAL: PAINLEVEL_OUTOF10: 0

## 2020-08-13 NOTE — PROGRESS NOTES
Pt FSBS 353, per order pt to get 5U insulin. Pt requests 6U of insulin and states \"if you give me 6U, it will make my blood sugar go to 150, I need 6Units\". RN adjusted dose to 6U per pt wishes. Will continue to monitor blood sugars closely.

## 2020-08-13 NOTE — PROGRESS NOTES
CLINICAL PHARMACY NOTE: MEDS TO [address] Select Patient?: Yes  Total # of Prescriptions Filled: 2   The following medications were delivered to the patient:  · Isosorbide, nifedipine  Total # of Interventions Completed: 0  Time Spent (min): 0    Additional Documentation:  Patient was asleep when I arrived.  Left Rx with YIN Lino since $0

## 2020-08-13 NOTE — CARE COORDINATION
Discharge [id number] [facility]  Clinical Case Management Department  Written by:  Encinas RN    Patient Name: Ginette Boyle  Attending Provider: Olivia Valdivia MD  Admit Date: 8/10/2020  9:45 AM  MRN: [id number]  Account: [de-identified]                     : 1955  Discharge Date: 2020      Disposition: home    Encinas RN

## 2020-08-13 NOTE — PROGRESS NOTES
Again called pharmacy to check status of Lantus, pharmacy states they will send up stat. Pt aware waiting on missing dose from pharmacy.

## 2020-08-13 NOTE — PROGRESS NOTES
Physician Progress Note      PATIENT:               Khadra Kinney  CSN #:                  [id number]  :                       1955  ADMIT DATE:       8/10/2020 9:45 AM  [address] [place] DATE:  RESPONDING  PROVIDER #:        Tiffany Cabello MD          QUERY TEXT:    Patient admitted with chest pain. If possible, please document in the   progress notes and discharge summary if you are evaluating and/or treating any   of the following: The medical record reflects the following:  Risk Factors: Illicit Drug use, Hx Smoker, CKD, CAD, HTN  Clinical Indicators: Urine positive for Cocaine, Trops: 65, 49, Chest Pain  Treatment: ASA 325mg, Cardiology Consult, Trending Troponins, Monitoring VS    Thank you, Jose Veliz MSN, RN CDS. For any questions please call   [phone]. Options provided:  -- NSTEMI due to Cocaine abuse  -- NSTEMI is not due to Cocaine abuse  -- Other - I will add my own diagnosis  -- Disagree - Not applicable / Not valid  -- Disagree - Clinically unable to determine / Unknown  -- Refer to Clinical Documentation Reviewer    PROVIDER RESPONSE TEXT:    This patient has an NSTEMI due to Cocaine abuse.     Query created by: Elisha Oates on 2020 8:55 AM      Electronically signed by:  Tiffany Cabello MD 2020 8:49 AM

## 2020-08-13 NOTE — PROGRESS NOTES
[facility]   Progress Note                   Date:   8/13/2020  Patient name: Charlotte Arechiga  Date of admission:  8/10/2020  9:45 AM  MRN:   [id number]  YOB: 1955  PCP: TERRANCE Elder CNP    Reason for Admission: NSTEMI (non-ST elevated myocardial infarction) ([facility] [address] [I21.4]    Subjective:       Clinical Changes / Abnormalities:Pt seen and examined in the room. Cath yesterday. No complications/concerns overnight. Tele SR.         Medications:   Scheduled Meds:   insulin lispro  4 Units Subcutaneous Once    isosorbide mononitrate  30 mg Oral Daily    NIFEdipine  90 mg Oral Daily    sodium chloride flush  10 mL Intravenous 2 times per day    insulin glargine  35 Units Subcutaneous Nightly    prazosin  4 mg Oral BID    insulin lispro  10 Units Subcutaneous Once    fluticasone  1 puff Inhalation BID    tiotropium  2 puff Inhalation Daily    aspirin  81 mg Oral Daily    atorvastatin  80 mg Oral Daily    carvedilol  25 mg Oral BID WC    gabapentin  200 mg Oral TID    sodium chloride flush  10 mL Intravenous 2 times per day    insulin lispro  0-12 Units Subcutaneous TID WC    insulin lispro  0-6 Units Subcutaneous Nightly    clopidogrel  75 mg Oral Daily    hydrALAZINE  100 mg Oral 3 times per day    losartan  50 mg Oral BID     Continuous Infusions:   sodium chloride Stopped (08/12/20 2315)    dextrose       CBC:   Recent Labs     08/11/20  0705 08/12/20  0700 08/13/20  0535   WBC 7.7 6.9 8.2   HGB 11.6* 10.8* 10.6*    355 362     BMP:    Recent Labs     08/11/20  0705 08/12/20  0700 08/13/20  0535    134* 139   K 4.4 4.4 4.5    104 106   CO2 21 22 21   BUN 39* 50* 43*   CREATININE 2.29* 2.43* 2.43*   GLUCOSE 287* 273* 271*     Hepatic:   Recent Labs     08/10/20  1602   AST 17   ALT 16   BILITOT <0.10*   ALKPHOS 80     Troponin:   Recent Labs     08/10/20  1132 08/10/20  1602 08/10/20  1953   TROPHS 49* 52* 46*     BNP: No results for input(s): BNP in the last 72 hours. Lipids:   Recent Labs     08/11/20  0705   CHOL 195   HDL 62     INR: No results for input(s): INR in the last 72 hours. ECHO 5/16/2020: EF 73%, moderate-severe LVH, mild MR/TR, RVSP is 37 mmHg.      STRESS 1/7/2020: No ischemia/infarct. EF 49%.      CATH 9/27/17: Nonobstructive CAD.   Objective:   Vitals: BP (!) 167/62   Pulse 72   Temp 99.2 °F (37.3 °C) (Temporal)   Resp 16   Ht 5' 8\" (1.727 m)   Wt 137 lb 12.6 oz (62.5 kg) Comment: two pillows, one blanket  SpO2 96%   BMI 20.95 kg/m²   General appearance: alert and cooperative with exam  HEENT: Head: Normocephalic, no lesions, without obvious abnormality. Neck: no JVD, trachea midline, no adenopathy  Lungs: Clear to auscultation  Heart: Regular rate and rhythm, s1/s2 auscultated, no murmurs  Abdomen: soft, non-tender, bowel sounds active  Extremities: no edema  Neurologic: not done    Cath 8/12/20  Findings:     LAD: Diffuse irregularities 30-40%.     LCx: Diffuse irregularities 20-30%.     RCA: Diffuse irregualrtities 40-50%.      Lesion on Prox RCA: 50% stenosis.      Lesion on Mid RCA: 50% stenosis.      Coronary Tree      Dominance: Right     Ventriculography Findings:  LV was not done.        Estimated blood loss: 5 ml     Conclusions:   Non-obstructive CAD.   LV was not done.   No change from cardiac cath 2017     Recommendations:  1. Medical Therapy. 2. Risk Factors Modification. 3. Post Cath Protocol    ECHO 5/16/2020: EF 73%, moderate-severe LVH, mild MR/TR, RVSP is 37 mmHg.      STRESS 1/7/2020: No ischemia/infarct. EF 49%.      CATH 9/27/17: Nonobstructive CAD.     Assessment / Acute Cardiac Problems:   1. Recurrent chest pain  2. Mild troponin elevation -demand ischemia from CKD  3. H/o nonobstructive CAD on cardiac cath done in 2017 and now 8/2020  4. UDS positive for cocaine -however, patient denies intake   5. H/o left carotid disease s/p surgery and stent  6.  CKD stage III  7. Diabetes  8. Hypertension    Patient Active Problem List:     Cigarette nicotine dependence without complication     Carpal tunnel syndrome on right     Colon polyps     Carotid stenosis, left s/p Endarterectomy     Mixed simple and mucopurulent chronic bronchitis (HCC)     Pure hypercholesterolemia     CKD (chronic kidney disease) stage 3, GFR 30-59 ml/min ([facility])     Chest pain     Dyspnea and respiratory abnormalities     PTSD (post-traumatic stress disorder)     Transient cerebral ischemia     Essential hypertension     DM type 2, uncontrolled, with neuropathy ([facility])     COPD (chronic obstructive pulmonary disease) ([facility] [place] [address]     Cerebral vascular disease     Primary insomnia     Hypertensive crisis     Non-obstructive Coronary artery disease of native artery of native heart with stable angina pectoris ([facility] [address]     Hyperparathyroidism ([facility] [address]     Hypovitaminosis D     Coronary artery disease with exertional angina ([facility])     Leg swelling     Hyponatremia     Anemia     NSTEMI (non-ST elevated myocardial infarction) ([facility] [address]     Hypoalbuminemia      Plan of Treatment:   1. NSTEMI with Recurrent Angina. Cath as above -Continue medical management and RF modifications. Discussed importance of substance abuse cessation. 2. HTN- Remains elevated. Will increase Imdur. Continue BB, CCB, ARB, & hydralazine. 3. Will sign off. Please call with further questions/concerns. F/U in clinic 1-2wks.     Electronically signed by Cindia Fothergill, APRN - CNP on 8/13/2020 at 10:08 AM  [address]  [phone]

## 2020-08-13 NOTE — PROGRESS NOTES
chief complaint of chest pain x 1 night.      The patient reports he was walking around in his house last night when he first noticed his chest pain,  Which substernal, non radiating, sharp, 5/10 in intensity, not relieved by rest, aggravated by exertion, relieved by sublingual nitroglycerin. After taking nitroglycerin, the chest pain was relieved but he still has some chest pressure. After waking up this morning, the patient had another episode of similar chest pain, which is again relieved by nitroglycerin and the patient still has some chest pressure. Patient also reports associated with \"flickering\" sensation in his chest.     Patient has always some baseline shortness of breath because of his COPD. Denies orhthopnea, PND. Patient also has associated nausea, denies vomiting. Has headache, denies any vision changes, abdominal pain. Patient also reports cough, yellow sputum x 2days. Denies fever, chills.      Patient reports he is compliant with his medications. On Aspirin, Plavix. Patient was last hospitalised in 20 for NSTEMI.      Last cath done in . Findings: Left main: mild disease,LAD: 30-40% prox stenosis,LCX: mild disease,RCA: 40% mid stenosis     EK20 NSR LVH non-specific T changes   ECHO: 20  Summary  Left ventricle is normal in size with hyperdynamic systolic function. Moderate to severe concentric left ventricular hypertrophy. Calculated ejection fraction is 73 %. Left atrium is mildly dilated. Mild mitral regurgitation. Mild tricuspid regurgitation. Estimated right ventricular systolic pressure  is 37 mmHg.     MRA in , showed no significant stenosis of Right carotid but 70% stenosis of left carotid. aortic arch and carotid angiogram with left internal carotid stent placement and balloon angioplasty in .       Quit smoking in the last 5 to 10 years, 22 pack year history of smoking.      In the ER, bradycardia 66, high /85 saturating 99% on room air.      Labs show Cr 2.43 (baseline 2.36), elevated pro-bnp 4,188, elevated troponins trending down (65>49) trops in prev admission(392 to 411). Glucose 202. Urine tox positive for cocaine.       CXR shows no acute cardiopulmonary findings. EKG findings: EKG interpretation: Sinus rhythm 68.  Normal intervals.  Borderline right axis.  No acute ST elevations, there is nonspecific ST elevation in the anterior leads however this is seen on prior EKG from 2020. Luiz Dancer is also T wave inversion inferior laterally again seen on prior.   Repeat EKG interpretation, 1011: Sinus rhythm 68.  Normal intervals again seen right axis.  No evolving ST changes again seen in the inferior lateral T wave inversion   Repeat EKG interpretation, 1125: Sinus bradycardia 59.  Normal intervals right axis.  No acute ST elevation, again seen is the ST depression with T wave inversion inferior laterally no evolving changes     Patient is given an aspirin, put on nitro drip, and about to start heparin dr    OBJECTIVE     Vital Signs:  BP (!) 167/62   Pulse 72   Temp 99.2 °F (37.3 °C) (Temporal)   Resp 16   Ht 5' 8\" (1.727 m)   Wt 137 lb 12.6 oz (62.5 kg) Comment: two pillows, one blanket  SpO2 96%   BMI 20.95 kg/m²     Temp (24hrs), Av.5 °F (36.9 °C), Min:97.9 °F (36.6 °C), Max:99.2 °F (37.3 °C)    In: 1192.3   Out: -     Physical Exam:   Constitutional: This is a well developed, well nourished, 18.5-24.9 - Normal 65 y. o. year old male who is alert, oriented, cooperative and in no apparent distress.  Head:normocephalic and atraumatic.    EENT:  PERRLA.  No conjunctival injections.   Septum was midline, mucosa was without erythema, exudates or cobblestoning.  No thrush was noted. Neck: Supple without thyromegaly. No elevated JVP. Trachea was midline. Respiratory: No wheezes heard bilaterally.   Cardiovascular: Regular without murmur, clicks, gallops or rubs. Abdomen: Slightly rounded and soft without organomegaly.  No rebound, rigidity or guarding was appreciated.    Lymphatic: No lymphadenopathy. Musculoskeletal: Normal curvature of the spine.  No gross muscle weakness.    Extremities:  No lower extremity edema , pulses present.    Skin:  Warm and dry.  Good color, turgor and pigmentation.  No lesions or scars.  No cyanosis or clubbing  Neurological/Psychiatric: The patient's general behavior, level of consciousness, thought content and emotional status is normal.        Medications:  Scheduled Medications:    [START ON 8/14/2020] isosorbide mononitrate  60 mg Oral Daily    insulin lispro  4 Units Subcutaneous Once    NIFEdipine  90 mg Oral Daily    sodium chloride flush  10 mL Intravenous 2 times per day    insulin glargine  35 Units Subcutaneous Nightly    prazosin  4 mg Oral BID    insulin lispro  10 Units Subcutaneous Once    fluticasone  1 puff Inhalation BID    tiotropium  2 puff Inhalation Daily    aspirin  81 mg Oral Daily    atorvastatin  80 mg Oral Daily    carvedilol  25 mg Oral BID WC    gabapentin  200 mg Oral TID    sodium chloride flush  10 mL Intravenous 2 times per day    insulin lispro  0-12 Units Subcutaneous TID WC    insulin lispro  0-6 Units Subcutaneous Nightly    clopidogrel  75 mg Oral Daily    hydrALAZINE  100 mg Oral 3 times per day    losartan  50 mg Oral BID     Continuous Infusions:    sodium chloride Stopped (08/12/20 3506)    dextrose       PRN Medicationssodium chloride flush, 10 mL, PRN  acetaminophen, 650 mg, Q4H PRN  albuterol sulfate HFA, 2 puff, Q4H PRN  nitroGLYCERIN, 0.4 mg, Q5 Min PRN  sodium chloride flush, 10 mL, PRN  acetaminophen, 650 mg, Q6H PRN    Or  acetaminophen, 650 mg, Q6H PRN  polyethylene glycol, 17 g, Daily PRN  potassium chloride, 40 mEq, PRN    Or  potassium alternative oral replacement, 40 mEq, PRN    Or  potassium chloride, 10 mEq, PRN  glucose, 15 g, PRN  dextrose, 12.5 g, PRN  glucagon (rDNA), 1 mg, PRN  dextrose, 100 mL/hr, PRN  hydrALAZINE, 10 mg, Q6H PRN        Diagnostic Labs:  CBC:   Recent Labs     08/11/20  0705 08/12/20  0700 08/13/20  0535   WBC 7.7 6.9 8.2   RBC 3.77* 3.48* 3.52*   HGB 11.6* 10.8* 10.6*   HCT 36.2* 33.5* 34.1*   MCV 96.0 96.3 96.9   RDW 12.4 12.5 12.5    355 362     BMP:   Recent Labs     08/11/20  0705 08/12/20  0700 08/13/20  0535    134* 139   K 4.4 4.4 4.5    104 106   CO2 21 22 21   BUN 39* 50* 43*   CREATININE 2.29* 2.43* 2.43*     BNP: No results for input(s): BNP in the last 72 hours. PT/INR: No results for input(s): PROTIME, INR in the last 72 hours. APTT:   Recent Labs     08/10/20  1307   APTT 22.1     CARDIAC ENZYMES: No results for input(s): CKMB, CKMBINDEX, TROPONINI in the last 72 hours. Invalid input(s): CKTOTAL;3  FASTING LIPID PANEL:  Lab Results   Component Value Date    CHOL 195 08/11/2020    HDL 62 08/11/2020    TRIG 80 08/11/2020     LIVER PROFILE:   Recent Labs     08/10/20  1602   AST 17   ALT 16   BILIDIR <0.08   BILITOT <0.10*   ALKPHOS 80      MICROBIOLOGY:   Lab Results   Component Value Date/Time    CULTURE NO SIGNIFICANT GROWTH 08/29/2019 01:20 PM       Imaging:    Xr Chest (2 Vw)    Result Date: 8/10/2020  No acute cardiopulmonary disease. ASSESSMENT & PLAN     Principal Problem:    NSTEMI (non-ST elevated myocardial infarction) ([facility] [address]  Active Problems:    Pure hypercholesterolemia    Carotid stenosis, left s/p Endarterectomy    CKD (chronic kidney disease) stage 3, GFR 30-59 ml/min ([facility])    Chest pain    Essential hypertension    DM type 2, uncontrolled, with neuropathy (HCC)    COPD (chronic obstructive pulmonary disease) ([facility])    Non-obstructive Coronary artery disease of native artery of native heart with stable angina pectoris ([facility] [address]  Resolved Problems:    * No resolved hospital problems. *      ASSESSMENT / PLAN:   This is a 61 y. o. male who presented with chest pain, previous h/o non-obstructive CAD and found to have moderately elevated troponins, elevated pro-BNP . Patient

## 2020-08-13 NOTE — PLAN OF CARE
Problem: Falls - Risk of:  Goal: Will remain free from falls  Description: Will remain free from falls  Outcome: Ongoing  Goal: Absence of physical injury  Description: Absence of physical injury  Outcome: Ongoing     Problem: Cardiac:  Goal: Ability to maintain vital signs within normal range will improve  Description: Ability to maintain vital signs within normal range will improve  Outcome: Ongoing  Goal: Cardiovascular alteration will improve  Description: Cardiovascular alteration will improve  Outcome: Ongoing     Problem: Safety:  Goal: Free from accidental physical injury  Description: Free from accidental physical injury  Outcome: Ongoing  Goal: Free from intentional harm  Description: Free from intentional harm  Outcome: Ongoing     Problem: Pain:  Goal: Patient's pain/discomfort is manageable  Description: Patient's pain/discomfort is manageable  Outcome: Ongoing

## 2020-08-13 NOTE — DISCHARGE SUMMARY
[address]     Department of Internal Medicine - Staff Internal Medicine Teaching Service    INPATIENT DISCHARGE SUMMARY      Patient Identification:  Everardo Jefferson is a 60 y.o. male. :  1955  MRN: [id number]     Acct: [de-identified]   PCP: TERRANCE Madrigal CNP  Admit Date:  8/10/2020  Discharge date and time: 2020  Attending Provider: Sumanth Marin MD                                     [address] Problem Lists:  Principal Problem (Resolved):    NSTEMI (non-ST elevated myocardial infarction) [facility])  Active Problems:    Pure hypercholesterolemia    Carotid stenosis, left s/p Endarterectomy    CKD (chronic kidney disease) stage 3, GFR 30-59 ml/min ([facility] [address]    Essential hypertension    DM type 2, uncontrolled, with neuropathy ([facility] [address]    COPD (chronic obstructive pulmonary disease) ([facility] [address]    Non-obstructive Coronary artery disease of native artery of native heart with stable angina pectoris [facility])  Resolved Problems:    Chest pain      HOSPITAL STAY     Brief Inpatient course:   Everardo Jefferson is a 60 y.o. male who was admitted for the management of NSTEMI (non-ST elevated myocardial infarction) (HCC),presented to the emergency department with chest pain. The patient is a pleasant 59 y. o. male presents with a PMH of Non obstructive CAD, s/o left ICA stent, Diabetes mellitus type 2, HTN, CKD stage 3, with a chief complaint of chest pain x 1 night.      The patient reports he was walking around in his house last night when he first noticed his chest pain,  Which substernal, non radiating, sharp, 5/10 in intensity, not relieved by rest, aggravated by exertion, relieved by sublingual nitroglycerin. After taking nitroglycerin, the chest pain was relieved but he still has some chest pressure.  After waking up this morning, the patient had another episode of similar chest pain, which is again relieved by nitroglycerin and the patient still has some drip in the ER, didn't receive the heparin drip. Continue Aspirin EC 81mg PO daily. Continue lipitor 80mg PO daily. EKG shows bradycardia, sinus rhythm and no new findings. Urine tox positive for cocaine. Cardiology consulted in the ER. Status post cardiac cath. findings show no change from cardiac cath in 2017, nonobstructive coronary artery disease.     Last cath done in 2017. Findings: Left main: mild disease,LAD: 30-40% prox stenosis,LCX: mild disease,RCA: 40% mid stenosis  EK20 NSR LVH non-specific T changes   ECHO: 20. EF 73%.     2. CKD stage 3. No elevated creatinine s/p cardiac cath. creatinine 2.43. (baseline 2.36).       3. Essential hypertension. Uncontrolled. On nitro drip in the ER. Continue coreg 25mg BID. Resume home medications. Per nephro tavon to resume home dose of are up to optimize blood pressure control. Nifedipine increased from 60 to 90 mg oral daily. per cardiology, will increase the dose of Imdur from 30 to 60 mg daily.     4.Diabetes Mellitus, Type 2. Hold home insulin. On lantus 35 SC QHS. Start humalog. Continue neurontin 200mg TID. Check hba1c, poct glucose.      5. COPD. Continue Trelegy inhaler daily. Not on home oxygen.       6. Carotid stenosis, s/p left ICA stenosis. Stable. On Aspirin and plavix at home. lipid panel within normal limits. Procedures/ Significant Interventions:    Cardiac cath done. Consults:     Consults:     Final Specialist Recommendations/Findings:   IP CONSULT TO CARDIOLOGY  IP CONSULT TO INTERNAL MEDICINE  IP CONSULT TO CASE MANAGEMENT  IP CONSULT TO NEPHROLOGY  IP CONSULT TO NEPHROLOGY      Any Hospital Acquired Infections: none    Discharge Functional Status:  stable    DISCHARGE PLAN     Disposition: home, requested a note to go back to pulmonary rehab, provided.      Patient Instructions:   Current Discharge Medication List      START taking these medications    Details   !! blood glucose monitor strips Test_4__times daily Diagnosis: 250.0   Diabetes Mellitus_x___Insulin Dependent____Non-Insulin Dependent  Qty: 100 strip, Refills: 11      !! Lancets MISC Use_4__times daily Diagnisis:250.0  Diabetes Mellitus__x__Insulin Dependent___Non-Insulin Dependent  Qty: 100 each, Refills: 11       !! - Potential duplicate medications found. Please discuss with provider. CONTINUE these medications which have NOT CHANGED    Details   vitamin D (ERGOCALCIFEROL) 1.25 MG (33156 UT) CAPS capsule TAKE 1 CAPSULE BY MOUTH ONCE A WEEK  Qty: 4 capsule, Refills: 0      NIFEdipine (ADALAT CC) 30 MG extended release tablet Take 30 mg by mouth daily      prazosin (MINIPRESS) 2 MG capsule Take 2 capsules by mouth 2 times daily  Qty: 30 capsule, Refills: 3      bumetanide (BUMEX) 1 MG tablet Take 1 tablet by mouth daily  Qty: 30 tablet, Refills: 3      Cholecalciferol (VITAMIN D) 50 MCG (2000 UT) TABS tablet Take 1 tablet by mouth daily  Qty: 31 tablet, Refills: 0      carvedilol (COREG) 25 MG tablet Take 1 tablet by mouth 2 times daily (with meals)  Qty: 60 tablet, Refills: 3    Associated Diagnoses: Essential hypertension      atorvastatin (LIPITOR) 80 MG tablet Take 1 tablet by mouth daily  Qty: 30 tablet, Refills: 3    Associated Diagnoses: Familial hypercholesterolemia      clopidogrel (PLAVIX) 75 MG tablet Take 1 tablet by mouth daily  Qty: 90 tablet, Refills: 3    Associated Diagnoses: Bilateral carotid artery stenosis      insulin glargine (BASAGLAR KWIKPEN) 100 UNIT/ML injection pen Inject 35 Units into the skin nightly Dispense with pen needle  Qty: 5 pen, Refills: 2    Associated Diagnoses: DM type 2, uncontrolled, with neuropathy (HCC)      insulin aspart (NOVOLOG FLEXPEN) 100 UNIT/ML injection pen Inject 5 Units into the skin 3 times daily (before meals) Sliding scale  Qty: 5 pen, Refills: 3    Associated Diagnoses: DM type 2, uncontrolled, with neuropathy (HCC)      nitroGLYCERIN (NITROSTAT) 0.4 MG SL tablet up to max of 3 total doses.  If no relief after COMFORT LANCETS MISC DX: DM-2 USE 3-4 TIMES DAILY  Qty: 100 each, Refills: 11    Associated Diagnoses: DM type 2, uncontrolled, with neuropathy ([address]       ! ! - Potential duplicate medications found. Please discuss with provider. Activity: activity as tolerated    Diet: cardiac diet    Follow-up:    TERRANCE Call CNP  [address] [facility]  [phone]    Schedule an appointment as soon as possible for a visit in 1 week  hospital follow up    Cynthia Morel MD  [address] [facility] [address] [facility]    Schedule an appointment as soon as possible for a visit in 4 weeks  hospital follow up. Sridevi Self, DO  [address] [facility] [address]  [address]    Schedule an appointment as soon as possible for a visit in 1 week  hospital follow up. Patient Instructions:   1. Follow-up with PCP in 1 week. 2. BMP in 2 days and fax results to TERRANCE Call CNP and Dr. Mildred Roque  3. Continue aspirin, Lipitor, Coreg 25 twice daily, hydralazine 100 mg 3 times daily, Cozaar 50 mg twice daily, prazosin 4 mg twice daily,  4. Continue Seroquel insulin, Plavix, Neurontin, vitamin D as per primary care. 5. Changed Imdur to 60 mg daily, Aldactone 60 mg daily. 6. Bumex held for now restarted after discussing with PCP.     Note that over 30 minutes was spent in preparing discharge papers, discussing discharge with patient, medication review, etc.      Chivo Campos MD  Internal Medicine Resident  [address] [facility]  8/13/2020 12:10 PM

## 2020-08-13 NOTE — PROGRESS NOTES
Notified provider via perfect serve lantus needs re timed for nighttime dose. Order changed. Requested dose of lantus from pharmacy at this time.

## 2020-08-13 NOTE — PROGRESS NOTES
NEPHROLOGY PROGRESS NOTE      SUBJECTIVE     Status post coronary angiogram yesterday. No evidence of TAMAR. Findings noted. No chest pain shortness of breath. [place] to be discharged. No chest pain/SOB. Appetite is decent. No urine complaints. OBJECTIVE     Vitals:    08/12/20 2337 08/13/20 0445 08/13/20 0800 08/13/20 0843   BP: (!) 171/59 (!) 178/86 (!) 167/62    Pulse:  79 83 72   Resp:  16 16    Temp: 98.6 °F (37 °C) 97.9 °F (36.6 °C) 99.2 °F (37.3 °C)    TempSrc: Oral Oral Temporal    SpO2: 95% 95% 96%    Weight:       Height:         24HR INTAKE/OUTPUT:      Intake/Output Summary (Last 24 hours) at 8/13/2020 1214  Last data filed at 8/12/2020 1744  Gross per 24 hour   Intake 1192.29 ml   Output --   Net 1192.29 ml       General appearance:Awake, alert, in no acute distress  HEENT: PERRLA  Respiratory::vesicular breath sounds,no wheeze/crackles  Cardiovascular:S1 S2 normal,no gallop or organic murmur. Abdomen:Non tender/non distended. Bowel sounds present  Extremities: No Cyanosis or Clubbing,Lower extremity edema  Neurological:Alert and oriented. No abnormalities of mood, affect, memory, mentation, or behavior are noted      MEDICATIONS     Scheduled Meds:    [START ON 8/14/2020] isosorbide mononitrate  60 mg Oral Daily    insulin lispro  4 Units Subcutaneous Once    NIFEdipine  90 mg Oral Daily    sodium chloride flush  10 mL Intravenous 2 times per day    insulin glargine  35 Units Subcutaneous Nightly    prazosin  4 mg Oral BID    insulin lispro  10 Units Subcutaneous Once    fluticasone  1 puff Inhalation BID    tiotropium  2 puff Inhalation Daily    aspirin  81 mg Oral Daily    atorvastatin  80 mg Oral Daily    carvedilol  25 mg Oral BID WC    gabapentin  200 mg Oral TID    sodium chloride flush  10 mL Intravenous 2 times per day    insulin lispro  0-12 Units Subcutaneous TID WC    insulin lispro  0-6 Units Subcutaneous Nightly    clopidogrel  75 mg Oral Daily    hydrALAZINE  100 mg Oral 3 times per day    losartan  50 mg Oral BID     Continuous Infusions:    sodium chloride Stopped (08/12/20 6565)    dextrose       PRN Meds:  sodium chloride flush, acetaminophen, albuterol sulfate HFA, nitroGLYCERIN, sodium chloride flush, acetaminophen **OR** acetaminophen, polyethylene glycol, potassium chloride **OR** potassium alternative oral replacement **OR** potassium chloride, glucose, dextrose, glucagon (rDNA), dextrose, hydrALAZINE  Home Meds:                Medications Prior to Admission: blood glucose monitor strips, Test_4__times daily Diagnosis: 250.0   Diabetes Mellitus_x___Insulin Dependent____Non-Insulin Dependent  Lancets MISC, Use_4__times daily Diagnisis:250.0  Diabetes Mellitus__x__Insulin Dependent___Non-Insulin Dependent  vitamin D (ERGOCALCIFEROL) 1.25 MG (38657 UT) CAPS capsule, TAKE 1 CAPSULE BY MOUTH ONCE A WEEK  prazosin (MINIPRESS) 2 MG capsule, Take 2 capsules by mouth 2 times daily  Cholecalciferol (VITAMIN D) 50 MCG (2000 UT) TABS tablet, Take 1 tablet by mouth daily  carvedilol (COREG) 25 MG tablet, Take 1 tablet by mouth 2 times daily (with meals)  atorvastatin (LIPITOR) 80 MG tablet, Take 1 tablet by mouth daily  clopidogrel (PLAVIX) 75 MG tablet, Take 1 tablet by mouth daily  insulin glargine (BASAGLAR KWIKPEN) 100 UNIT/ML injection pen, Inject 35 Units into the skin nightly Dispense with pen needle  insulin aspart (NOVOLOG FLEXPEN) 100 UNIT/ML injection pen, Inject 5 Units into the skin 3 times daily (before meals) Sliding scale  nitroGLYCERIN (NITROSTAT) 0.4 MG SL tablet, up to max of 3 total doses. If no relief after 1 dose, call 911. Nutritional Supplements (GLUCERNA CARBSTEADY) LIQD, Take 1 Can by mouth 3 times daily  gabapentin (NEURONTIN) 100 MG capsule, Take 2 capsules by mouth 3 times daily for 90 doses.   [DISCONTINUED] isosorbide mononitrate (IMDUR) 30 MG extended release tablet, TAKE 1 TABLET BY MOUTH ONCE DAILY  losartan (COZAAR) 50 MG tablet, Take 1 tablet by mouth 2 times daily  QUEtiapine (SEROQUEL) 100 MG tablet, Take 1 tablet by mouth nightly  fluticasone-umeclidin-vilant (TRELEGY ELLIPTA) 100-62.5-25 MCG/INH AEPB, INHALE 1 PUFF INTO THE LINGS DAILY  albuterol-ipratropium (COMBIVENT RESPIMAT)  MCG/ACT AERS inhaler, Inhale 1 puff into the lungs every 6 hours  hydrALAZINE (APRESOLINE) 100 MG tablet, Take 1 tablet by mouth every 8 hours  COMFORT EZ PEN NEEDLES 31G X 8 MM MISC, USE AS DIRECTED  aspirin (ASPIRIN ADULT LOW STRENGTH) 81 MG EC tablet, TAKE 1 TABLET BY MOUTH DAILY  TRUE METRIX BLOOD GLUCOSE TEST strip, TEST BLOOD SUGAR 4 TO 5 TIMES DAILY  EASY COMFORT LANCETS MISC, DX: DM-2 USE 3-4 TIMES DAILY    INVESTIGATIONS     Last 3 CMP:    Recent Labs     08/10/20  1602 08/11/20  0705 08/12/20  0700 08/13/20  0535   NA  --  137 134* 139   K  --  4.4 4.4 4.5   CL  --  106 104 106   CO2  --  21 22 21   BUN  --  39* 50* 43*   CREATININE  --  2.29* 2.43* 2.43*   CALCIUM  --  8.5* 8.4* 8.3*   PROT 5.5*  --   --   --    LABALBU 2.8*  --   --   --    BILITOT <0.10*  --   --   --    ALKPHOS 80  --   --   --    AST 17  --   --   --    ALT 16  --   --   --        Last 3 CBC:  Recent Labs     08/11/20  0705 08/12/20  0700 08/13/20  0535   WBC 7.7 6.9 8.2   RBC 3.77* 3.48* 3.52*   HGB 11.6* 10.8* 10.6*   HCT 36.2* 33.5* 34.1*   MCV 96.0 96.3 96.9   MCH 30.8 31.0 30.1   MCHC 32.0 32.2 31.1   RDW 12.4 12.5 12.5    355 362   MPV 9.8 9.7 10.1       ASSESSMENT     1. Chronic kidney disease stage IV secondary diabetic hypertensive nephrosclerosis with baseline creatinine 2.2-2.5. Follows up with Dr. Derek Dixon  2. NSTEMI status post coronary angiogram  3. HTN urgency - cocaine positive   4. Proteinuria  5. DM2  6. History of nonobstructive coronary artery disease in the past with preserved ejection fraction    PLAN     1. BMP tomorrow and results to be faxed. 2. Ok to resume home dose of ARB  3.   He will continue to follow-up with his nephrologist    Please do not hesitate to call with questions    This note is created with the assistance of a speech-recognition program. While intending to generate a document that actually reflects the content of the visit, no guarantees can be provided that every mistake has been identified and corrected by editing    Puneet Gordillo MD, [facility] Sindy Ortiz), [address]   8/13/2020 12:14 PM  [facility]

## 2020-08-14 ENCOUNTER — HOSPITAL ENCOUNTER (OUTPATIENT)
Age: 65
Discharge: HOME | End: 2020-08-14
Payer: COMMERCIAL

## 2020-08-14 ENCOUNTER — CARE COORDINATION (OUTPATIENT)
Dept: CASE MANAGEMENT | Age: 65
End: 2020-08-14

## 2020-08-14 LAB
ANION GAP SERPL CALCULATED.3IONS-SCNC: 12 MMOL/L (ref 9–17)
BUN BLDV-MCNC: 41 MG/DL (ref 8–23)
BUN/CREAT BLD: ABNORMAL (ref 9–20)
CALCIUM SERPL-MCNC: 8.7 MG/DL (ref 8.6–10.4)
CHLORIDE BLD-SCNC: 108 MMOL/L (ref 98–107)
CO2: 21 MMOL/L (ref 20–31)
CREAT SERPL-MCNC: 2.12 MG/DL (ref 0.7–1.2)
GFR AFRICAN AMERICAN: 38 ML/MIN
GFR NON-AFRICAN AMERICAN: 32 ML/MIN
GFR SERPL CREATININE-BSD FRML MDRD: ABNORMAL ML/MIN/{1.73_M2}
GFR SERPL CREATININE-BSD FRML MDRD: ABNORMAL ML/MIN/{1.73_M2}
GLUCOSE BLD-MCNC: 268 MG/DL (ref 70–99)
POTASSIUM SERPL-SCNC: 4.6 MMOL/L (ref 3.7–5.3)
SODIUM BLD-SCNC: 141 MMOL/L (ref 135–144)

## 2020-08-14 PROCEDURE — 80048 BASIC METABOLIC PNL TOTAL CA: CPT

## 2020-08-14 PROCEDURE — 36415 COLL VENOUS BLD VENIPUNCTURE: CPT

## 2020-08-14 NOTE — CARE COORDINATION
Shravan 45 Transitions Initial Follow Up Call    Call within 2 business days of discharge: Yes    Patient: Yale Cooks Patient : 1955   MRN: [id number]  Reason for Admission: Chest pain  Discharge Date: 20 RARS: Readmission Risk Score: 37      Last Discharge [facility]       Complaint Diagnosis Description Type Department Provider    8/10/20 Chest Pain Chest pain, unspecified type . .. ED to Hosp-Admission (Discharged) (ADMITTED) [facility] 4B Tonja Garcia MD; [facility]. ..           1st attempt to reach patient for Care Transitions. No answer and no voice mail. Will attempt at a later time/date. Call attempted at a later time, no answer and no mail box. Facility: [facility]      Care Transitions 24 Hour Call    Post Acute Services:   Outpatient/Community Services (Comment: pulmonary rehab)  Patient Home Equipment:  Nebulizer  Do you have support at home?:  Partner/Spouse/SO  Are you an active caregiver in your home?:  No  Care Transitions Interventions         Follow Up  Future Appointments   Date Time Provider Mahesh Gunter   2020  2:40 PM TERRANCE Mai - CNP [facility] BERTHA Galvin RN

## 2020-08-15 ENCOUNTER — CARE COORDINATION (OUTPATIENT)
Dept: CASE MANAGEMENT | Age: 65
End: 2020-08-15

## 2020-08-15 PROCEDURE — 1111F DSCHRG MED/CURRENT MED MERGE: CPT | Performed by: NURSE PRACTITIONER

## 2020-08-15 NOTE — CARE COORDINATION
Shravan 45 Transitions Initial Follow Up Call    Call within 2 business days of discharge: Yes    Patient: Elli Jennings Patient : 1955   MRN: [id number]  Reason for Admission: Chest pain  Discharge Date: 20 RARS: Readmission Risk Score: 37      Last Discharge [facility]       Complaint Diagnosis Description Type Department Provider    8/10/20 Chest Pain Chest pain, unspecified type . .. ED to Hosp-Admission (Discharged) (ADMITTED) [facility] Coy Kenney MD; [facility]. .. Spoke with: [address] [facility] [place]: [facility]    Non-face-to-face services provided:  Obtained and reviewed discharge summary and/or continuity of care documents     Spoke with patient who states he is doing well today. He has had no further chest pain, denies shortness of breath or cough. He has all his medications. He is concerned about his kidney function and questions some of the medications he is taking such as Bumex, Nifedipine, and Imdur. I expressed for him to discuss this with his MD.  BUN and Cr are declining. He will go on Monday to get another BMP. He denies any needs or concerns at this time. Will see pulmonary rehab on Monday and will schedule follow up with his PCP and cardiology. Care Transitions 24 Hour Call    Schedule Follow Up Appointment with PCP:  Completed  Do you have any ongoing symptoms?:  No  Do you have a copy of your discharge instructions?:  Yes  Do you have all of your prescriptions and are they filled?:  Yes  Have you been contacted by a [facility] Pharmacist?:  No  Have you scheduled your follow up appointment?:  Yes  How are you going to get to your appointment?:  Car - family or friend to transport  Were you discharged with any Home Care or Post Acute Services:  No  Post Acute Services:   Outpatient/Community Services (Comment: pulmonary rehab)  Patient Home Equipment:  Nebulizer  Do you have support at home?:  Partner/Spouse/SO  Do you feel like you have everything you need to keep you well at home?:  Yes  Are you an active caregiver in your home?:  No  Care Transitions Interventions         Follow Up  Future Appointments   Date Time Provider Mahesh Gunter   9/21/2020  2:40 PM TERRANCE Siegel - CNP [facility] Kerrie Beltre RN

## 2020-08-17 ENCOUNTER — HOSPITAL ENCOUNTER (OUTPATIENT)
Age: 65
Discharge: HOME | End: 2020-08-17
Payer: COMMERCIAL

## 2020-08-17 LAB
ANION GAP SERPL CALCULATED.3IONS-SCNC: 11 MMOL/L (ref 9–17)
BUN BLDV-MCNC: 47 MG/DL (ref 8–23)
BUN/CREAT BLD: ABNORMAL (ref 9–20)
CALCIUM SERPL-MCNC: 9.2 MG/DL (ref 8.6–10.4)
CHLORIDE BLD-SCNC: 106 MMOL/L (ref 98–107)
CO2: 22 MMOL/L (ref 20–31)
CREAT SERPL-MCNC: 2.61 MG/DL (ref 0.7–1.2)
GFR AFRICAN AMERICAN: 30 ML/MIN
GFR NON-AFRICAN AMERICAN: 25 ML/MIN
GFR SERPL CREATININE-BSD FRML MDRD: ABNORMAL ML/MIN/{1.73_M2}
GFR SERPL CREATININE-BSD FRML MDRD: ABNORMAL ML/MIN/{1.73_M2}
GLUCOSE BLD-MCNC: 292 MG/DL (ref 70–99)
POTASSIUM SERPL-SCNC: 4.7 MMOL/L (ref 3.7–5.3)
SODIUM BLD-SCNC: 139 MMOL/L (ref 135–144)

## 2020-08-17 PROCEDURE — 80048 BASIC METABOLIC PNL TOTAL CA: CPT

## 2020-08-17 PROCEDURE — 36415 COLL VENOUS BLD VENIPUNCTURE: CPT

## 2020-08-19 ENCOUNTER — CARE COORDINATION (OUTPATIENT)
Dept: CASE MANAGEMENT | Age: 65
End: 2020-08-19

## 2020-08-19 NOTE — CARE COORDINATION
Shravan 45 Transitions Follow Up Call    2020    Patient: Latasha Hermosillo  Patient : 1955   MRN: [id number]  Reason for Admission: NSTEMI  Discharge Date: 20 RARS: Readmission Risk Score: 37         Attempted to reach patient for subsequent transitional call. VM left to return call to [phone]. 1st attempt. Care Transitions Subsequent and Final Call    Subsequent and Final Calls  Are you currently active with any services?:  Outpatient/Community Services  Care Transitions Interventions  Other Interventions:             Follow Up  Future Appointments   Date Time Provider Mahesh Gunter   2020  2:40 PM TERRANCE Freeman - CNP [address] Patrizia Farley RN

## 2020-08-20 ENCOUNTER — CARE COORDINATION (OUTPATIENT)
Dept: CASE MANAGEMENT | Age: 65
End: 2020-08-20

## 2020-08-20 NOTE — CARE COORDINATION
[facility] Transitions Follow Up Call    2020    Patient: Leo Hernandez  Patient : 1955   MRN: [id number]  Reason for Admission: NSTEMI  Discharge Date: 20 RARS: Readmission Risk Score: 37         2nd attempt to reach patient for Care Transitions. No answer and no voice mail. Will attempt follow day. Care Transitions Subsequent and Final Call    Subsequent and Final Calls  Are you currently active with any services?:  Outpatient/Community Services  Care Transitions Interventions  Other Interventions:             Follow Up  Future Appointments   Date Time Provider Mahesh Gunter   2020  2:40 PM TERRANCE Edge - CNP [facility] BERTHA Brown RN

## 2020-08-21 ENCOUNTER — CARE COORDINATION (OUTPATIENT)
Dept: CASE MANAGEMENT | Age: 65
End: 2020-08-21

## 2020-08-21 NOTE — CARE COORDINATION
Shravan 45 Transitions Follow Up Call    2020    Patient: Denton Schwartz  Patient : 1955   MRN: <[id number]>  Reason for Admission:   Discharge Date: 20 RARS: Readmission Risk Score: 37       3rd attempt to reach patient for Care Transitions. No answer and no voice mail.     Follow Up  Future Appointments   Date Time Provider Mahesh Gunter   2020  2:40 PM TERRANCE Tapia - CNP [facility] BERTHA Macias RN

## 2020-08-27 NOTE — PLAN OF CARE
BRONCHOSPASM/BRONCHOCONSTRICTION     [x]         IMPROVE AERATION/BREATH SOUNDS  [x]   ADMINISTER BRONCHODILATOR THERAPY AS APPROPRIATE  [x]   ASSESS BREATH SOUNDS  []   IMPLEMENT AEROSOL/MDI PROTOCOL  [x]   PATIENT EDUCATION AS NEEDED
BRONCHOSPASM/BRONCHOCONSTRICTION     [x]         IMPROVE AERATION/BREATH SOUNDS  [x]   ADMINISTER BRONCHODILATOR THERAPY AS APPROPRIATE  [x]   ASSESS BREATH SOUNDS  []   IMPLEMENT AEROSOL/MDI PROTOCOL  [x]   PATIENT EDUCATION AS NEEDED    Patient stated that he does not take the medications that were prescribed to him here. He stated that he takes combivent at home as needed and Duoneb 4 times a day. I switched him over to his home meds per his request.  He stated that nebulized albuterol makes him \"worse\", so I discontinued that med.
BRONCHOSPASM/BRONCHOCONSTRICTION     [x]         IMPROVE AERATION/BREATH SOUNDS  [x]   ADMINISTER BRONCHODILATOR THERAPY AS APPROPRIATE  [x]   ASSESS BREATH SOUNDS  [x]   IMPLEMENT AEROSOL/MDI PROTOCOL  [x]   PATIENT EDUCATION AS NEEDED
PRN FOR BRONCHOSPASM/BRONCHOCONSTRICTION     ? IMPROVE AERATION/BREATH SOUNDS  ? ADMINISTER BRONCHODILATOR THERAPY AS APPROPRIATE  ? ASSESS BREATH SOUNDS  ? IMPLEMENT AEROSOL/MDI PROTOCOL  ? PATIENT EDUCATION AS NEEDED    PROVIDE ADEQUATE OXYGENATION WITH ACCEPTABLE SP02/ABG'S    ? IDENTIFY APPROPRIATE OXYGEN THERAPY  ? MONITOR SP02/ABG'S AS NEEDED   ?    PATIENT EDUCATION AS NEEDED    NONINVASIVE VENTILATION    PROVIDE OPTIMAL VENTILATION/ACCEPTABLE SPO2   IMPLEMENT NONINVASIVE VENTILATION PROTOCOL   MAINTAIN ACCEPTABLE SPO2   ASSESS SKIN INTEGRITY/BREAKDOWN SCORE   PATIENT EDUCATION AS NEEDED   BIPAP AS NEEDED
Problem: Falls - Risk of:  Goal: Will remain free from falls  Description  Will remain free from falls  8/23/2019 0648 by Bob Don RN  Outcome: Ongoing     Problem: Coping:  Goal: Ability to identify and develop effective coping behavior will improve  Description  Ability to identify and develop effective coping behavior will improve  8/23/2019 0648 by Bob Don RN  Outcome: Ongoing     Problem: Health Behavior:  Goal: Identification of resources available to assist in meeting health care needs will improve  Description  Identification of resources available to assist in meeting health care needs will improve  8/23/2019 0648 by Bob Don RN  Outcome: Ongoing
Problem: Falls - Risk of:  Goal: Will remain free from falls  Description  Will remain free from falls  Outcome: Met This Shift     Problem: Falls - Risk of:  Goal: Absence of physical injury  Description  Absence of physical injury  Outcome: Met This Shift
Problem: Falls - Risk of:  Goal: Will remain free from falls  Description  Will remain free from falls  Outcome: Ongoing     Problem: Falls - Risk of:  Goal: Absence of physical injury  Description  Absence of physical injury  Outcome: Ongoing     Problem:  Activity:  Goal: Ability to tolerate increased activity will improve  Description  Ability to tolerate increased activity will improve  Outcome: Ongoing     Problem: Cardiac:  Goal: Hemodynamic stability will improve  Description  Hemodynamic stability will improve  Outcome: Ongoing     Problem: Cardiac:  Goal: Complications related to the disease process, condition or treatment will be avoided or minimized  Description  Complications related to the disease process, condition or treatment will be avoided or minimized  Outcome: Ongoing     Problem: Coping:  Goal: Ability to identify and develop effective coping behavior will improve  Description  Ability to identify and develop effective coping behavior will improve  Outcome: Ongoing     Problem: Pain:  Goal: Pain level will decrease  Description  Pain level will decrease  Outcome: Ongoing     Problem: Musculor/Skeletal Functional Status  Goal: Highest potential functional level  Outcome: Ongoing     Problem: Musculor/Skeletal Functional Status  Goal: Absence of falls  Outcome: Ongoing
Problem: Falls - Risk of:  Goal: Will remain free from falls  Description  Will remain free from falls  Outcome: Ongoing     Problem: Nutrition  Goal: Optimal nutrition therapy  Outcome: Ongoing     Problem: Cardiac:  Goal: Hemodynamic stability will improve  Description  Hemodynamic stability will improve  Outcome: Ongoing
Problem: Falls - Risk of:  Goal: Will remain free from falls  Description  Will remain free from falls  Outcome: Ongoing  Goal: Absence of physical injury  Description  Absence of physical injury  Outcome: Ongoing     Problem: Nutrition  Goal: Optimal nutrition therapy  Outcome: Ongoing     Problem:  Activity:  Goal: Ability to tolerate increased activity will improve  Description  Ability to tolerate increased activity will improve  Outcome: Ongoing     Problem: Cardiac:  Goal: Hemodynamic stability will improve  Description  Hemodynamic stability will improve  Outcome: Ongoing  Goal: Complications related to the disease process, condition or treatment will be avoided or minimized  Description  Complications related to the disease process, condition or treatment will be avoided or minimized  Outcome: Ongoing  Goal: Cerebral tissue perfusion will improve  Description  Cerebral tissue perfusion will improve  Outcome: Ongoing     Problem: Coping:  Goal: Ability to identify and develop effective coping behavior will improve  Description  Ability to identify and develop effective coping behavior will improve  Outcome: Ongoing     Problem: Health Behavior:  Goal: Identification of resources available to assist in meeting health care needs will improve  Description  Identification of resources available to assist in meeting health care needs will improve  Outcome: Ongoing     Problem: Nutritional:  Goal: Ability to identify appropriate dietary choices will improve  Description  Ability to identify appropriate dietary choices will improve  Outcome: Ongoing     Problem: Pain:  Goal: Pain level will decrease  Description  Pain level will decrease  Outcome: Ongoing  Goal: Control of acute pain  Description  Control of acute pain  Outcome: Ongoing  Goal: Control of chronic pain  Description  Control of chronic pain  Outcome: Ongoing     Problem: Musculor/Skeletal Functional Status  Goal: Highest potential functional
Problem: Nutrition  Goal: Optimal nutrition therapy  Outcome: Ongoing     Problem: Coping:  Goal: Ability to identify and develop effective coping behavior will improve  Description  Ability to identify and develop effective coping behavior will improve  Outcome: Ongoing     Problem: Health Behavior:  Goal: Identification of resources available to assist in meeting health care needs will improve  Description  Identification of resources available to assist in meeting health care needs will improve  Outcome: Ongoing
chronic pain  Description  Control of chronic pain  Outcome: Met This Shift     Problem: Musculor/Skeletal Functional Status  Goal: Highest potential functional level  8/27/2019 1036 by Lizet Silva RN  Outcome: Met This Shift  8/27/2019 0701 by Lucinda Hamlin RN  Outcome: Ongoing  Goal: Absence of falls  Outcome: Met This Shift
The patient is here today for his clearance for closure of his colostomy. The patient has high rectal cancer had been resected  with no evidence for any recurrent cancer. The patient is scheduled later on this week for closure of the colostomy. The  patient has noticed that every time he eats the colostomy bag becomes fall and he has to work quite a bit on cleaning himself  and so he is therefore very interested in having this closure. He has been told that he could take dietary fiber to reduce the  gastrocolic Reflex.  Diagnosis/Problem  Primary Diagnosis(es): Malignant neoplasm of rectosigmoid junction New Oncology Problems/Diagnoses: 'Malignant neoplasm of  rectosigmoid junction(154.0/C19)'.   History of Present Illness    in 2020 the patient gave this History of Present Illness: The patient was in good health until one year ago when the patient  began to have blood in his bowel movements. On 2020 the diagnosis was made by colonoscopy. Patient was given a number  of CT scans and PET CT scans which demonstrated a high rectal cancer with no evidence of metastatic spread. The patient did not bring  with him the PET scans or any of the scans. In December of this year the patient had blood in his urine but was found to have a urinary  tract infection and this was treated with antibiotics. The patient had a prior history of prostate cancer which was treated with seeds and  external beam radiosurgery to a complete remission. Patient has noticed cramping of his hamstring muscles off and on for the past year.  the patient was then treated with involved field radiation therapy along with capecitabine and oxaliplatin.  The patient had a complete  response with resection of the tumor bed and the placement of an ileostomy which was done in  of this year.  The patient returns  today for surgical clearance for his reversal of his ileostomy.  Past medical History  The patient had a herniated disc operated on in  and have a laminectomy on S1, L4 and L5.  The patient escaped the disaster of 911 at the [employer] 10 minutes before the collapse on his taxi.  Social History  Patient is . Patient lives with spouse. Non­Smoker. Not applicable. Denies any prior alcohol use. Denies any illicit drug use.  Patient has had no occupational exposure.  Family History  The patient father  a sudden likely cardiac death at approximately age 75­80. The patient's mother  at age 82 from Alzheimer's  disease. The patient has 8 siblings all of whom are generally alive but not necessarily in good health. The patient has 3 children who are  alive and well.  Personal History  Patient reports having routine colonoscopy. Last done: Frequ Patient repots having routine PSA monitoring. This was last done: annual  Patient has received a Flu shot. Approx date: annual Patient has received a pneumovax injection. Approx date: not given yet Patient  denies having ever received blood products in the past.  Allergies  aspirin  Medications  Continued medications: diphenoxylate­atropine 2.5 mg­0.025 mg tablet, tadalafil 20 mg tablet, tamsulosin 0.4 mg capsule.   Victor Hugo Sanford : 1952 ([id number]) Page 1 of 3  Review Of Systems  Constitutional: Pt reports no recent weight loss. Pt reports no fatigue. Pt reports no loss of appetite. Patient denies night sweats. Fever :  No Fever. Chills No Rigors. No Chills.   Eyes: Pt reports no blurred vision. Pt reports no double vision. Patient denies any changes in vision.   Ears, Nose, Mouth, Throat: Pt reports no hearing loss. Pt reports no ringing in ears. Pt reports no sinus trouble. Pt reports no trouble  swallowing. Pt reports no sore throat. Denies nasal drainage. Pt reports no recent episodes of epistaxis. Pt reports no hoarseness.   Cardiac: Pt reports no chest pains. Pt reports no heart palpitations. Pt reports no light headedness. Pt reports no swelling in legs. Pt  reports no episodes of passing out.   Respiratory: Pt reports no cough. Pt reports no sputum production. Pt reports no hemoptysis. Pt reports no shortness of breath. Pt  reports no orthopnea. Pt reports no nocturnal paroxysmal dyspnea.   Gastrointestinal: Pt reports no nausea. Pt reports no vomiting. Pt reports no heartburn. Pt reports no constipation. Pt reports no diarrhea.  Pt reports no rectal bleeding. Pt reports no bowel incontinence.   Genito­Urinary: Pt reports no burning on urination. Pt reports no pain with urination. Pt reports no blood in urine. Pt reports no frequent  urination. Pt reports no urinary incontinence.   MusculoSkeletal: Pt reports no muscle pain. Pt reports no stiffness. Pt reports no joint pains. Patient denies any joint swelling. Pt  reports no back pains.   Skin:Denies any skin rashes.   Neurological: Pt reports no headaches. Pt reports no seizures. Pt reports no dizziness. Pt reports no loss of balance. Pt reports no  weakness of limbs. Pt reports no loss of sensation. Pt reports no tingling sensations. Pt reports no memory loss. Pt reports no thinking  difficulty.   Psychiatric: Pt reports no nervousness. Pt reports no depression Patient denies restlessness. Patient denies any difficulty sleeping.   Hematologic/Lymphatic/Immunologic: Patient denies bruising Patient denies any bleeding. Pt reports no lumps in arm­pits. Pt reports  no lumps in neck. Pt reports no lumps in groin.  Vitals  Vitals on 2020 9:53:00 AM: Height=66in, Uythqu=853gb, Temp=98.8f, Pulse=86, JaxofnyaDG=439, DiastolicBP=73, O2 Sat=98%  Physical Exam  Performance Status ­ECOG 0: Fully active, able to carry on all pre­disease performance without restriction Patient appears well nourished.  Skin:Normal Normal turgor. No rash noted. No lesions.   Eyes: Eyes with PERRLA. EOM's in­tact Sclera clear,no jaundice noted. Conjunctiva clear.   Ears, Nose,Mouth & Throat: Teeth normal, no missing teeth or dentures present. No ulcers. No thrush Auditory canals normal.  Description: %% Oropharynx without lesions. Mucositis None   Neck: No thyromegaly. No lymphadenopathy noted.   Cardiovascular: Normal sinus rhythm S1, S2 without murmurs. No Extrasystole.   Chest/Respiratory: No dyspnea. No rhonchi. No wheezing. No decreased breath sounds at bases. No rales. Unlabored breathing. No  accessory muscle use.   Breast: Normal.  Gastrointestinal: Normal Abdomen is soft and non­tender to touch. No hepatomegaly noted. No splenomegaly noted. No abdominal pain  or guarding noted. No abdominal mass(es) appreciated. No ascites noted.The patient has a colostomy forward/ileostomy in the right lower  quadrant.  Hematologic/Lymphatic: No petechiae noted. No purpura noted. No lymphadenopathy noted.   Musculoskeletal: No Kyphosis. No weakness. No spinal tenderness on percussion. No pain on hip rotation reported by patient. Normal  range of motion in upper and lower extremities.   Neurologic: Affect normal. No tremors observed. Biceps, brachioradialis, patellar and plantar reflexes symmetrical. Normal  proprioception/position sense Cranial nerves intact.   Psychiatry: Oriented to person/place/time. No anxiety observed during exam. No signs of depression noted during exam. Intact short­term  and long­term memory.   Extremities: No clubbing noted to fingers or toes. No cyanosis noted. Capillary refill less than two seconds. No edema. No calf  tenderness reported by patient.

## 2020-09-04 NOTE — TELEPHONE ENCOUNTER
Request for aspirin - med pended. Please fill if appropriate. Next Visit Date:  Future Appointments   Date Time Provider Mahesh Gunter   9/21/2020  2:40 PM Steve Alvarez APRN - CNP [id number] [facility]   Topic Date Due    Pneumococcal 0-64 years Vaccine (2 of 3 - PCV13) 10/02/2016    Shingles Vaccine (2 of 3) 12/27/2017    Colon cancer screen colonoscopy  06/27/2018    Annual Wellness Visit (AWV)  05/29/2019    Diabetic foot exam  02/19/2020    Flu vaccine (1) 09/01/2020    A1C test (Diabetic or Prediabetic)  11/10/2020    Diabetic retinal exam  07/14/2021    Lipid screen  08/11/2021    Potassium monitoring  08/17/2021    Creatinine monitoring  08/17/2021    DTaP/Tdap/Td vaccine (2 - Td) 12/14/2023    Hepatitis C screen  Completed    HIV screen  Completed    Hepatitis A vaccine  Aged Out    Hib vaccine  Aged Out    Meningococcal (ACWY) vaccine  Aged Out       Hemoglobin A1C (%)   Date Value   08/10/2020 10.9 (H)   06/16/2020 9.5 (A)   08/12/2019 9.8 (H)             ( goal A1C is < 7)   Microalb/Crt.  Ratio (mcg/mg creat)   Date Value   06/16/2020 2,663 (H)     LDL Cholesterol (mg/dL)   Date Value   08/11/2020 117       (goal LDL is <100)   AST (U/L)   Date Value   08/10/2020 17     ALT (U/L)   Date Value   08/10/2020 16     BUN (mg/dL)   Date Value   08/17/2020 47 (H)     BP Readings from Last 3 Encounters:   08/13/20 (!) 110/50   06/20/20 (!) 141/64   06/16/20 (!) 168/66          (goal 120/80)    All Future Testing planned in [facility]  Lab Frequency Next Occurrence   Cologuard (For External Results Only) Once 10/21/2020   Alpha-1-Antitrypsin w Phenotype Once 02/15/2020         Patient Active Problem List:     Cigarette nicotine dependence without complication     Carpal tunnel syndrome on right     Colon polyps     Carotid stenosis, left s/p Endarterectomy     Mixed simple and mucopurulent chronic bronchitis (HCC)     Pure hypercholesterolemia     CKD (chronic kidney disease) stage 3, GFR 30-59 ml/min ([facility])     Dyspnea and respiratory abnormalities     PTSD (post-traumatic stress disorder)     Transient cerebral ischemia     Essential hypertension     DM type 2, uncontrolled, with neuropathy ([facility])     COPD (chronic obstructive pulmonary disease) ([facility])     Cerebral vascular disease     Primary insomnia     Hypertensive crisis     Non-obstructive Coronary artery disease of native artery of native heart with stable angina pectoris ([address]     Hyperparathyroidism ([facility] [address]     Hypovitaminosis D     Coronary artery disease with exertional angina ([facility])     Leg swelling     Hyponatremia     Anemia     Hypoalbuminemia

## 2020-09-05 RX ORDER — ASPIRIN 81 MG/1
TABLET ORAL
Qty: 30 TABLET | Refills: 3 | Status: SHIPPED | OUTPATIENT
Start: 2020-09-05 | End: 2021-01-25 | Stop reason: SDUPTHER

## 2020-09-25 RX ORDER — FLUTICASONE FUROATE, UMECLIDINIUM BROMIDE AND VILANTEROL TRIFENATATE 100; 62.5; 25 UG/1; UG/1; UG/1
POWDER RESPIRATORY (INHALATION)
Qty: 60 EACH | Refills: 3 | Status: ON HOLD | OUTPATIENT
Start: 2020-09-25 | End: 2020-12-06 | Stop reason: SDUPTHER

## 2020-09-25 RX ORDER — QUETIAPINE FUMARATE 100 MG/1
100 TABLET, FILM COATED ORAL NIGHTLY
Qty: 30 TABLET | Refills: 3 | Status: SHIPPED | OUTPATIENT
Start: 2020-09-25 | End: 2021-02-23

## 2020-09-25 RX ORDER — ERGOCALCIFEROL 1.25 MG/1
50000 CAPSULE ORAL WEEKLY
Qty: 4 CAPSULE | Refills: 0 | Status: SHIPPED | OUTPATIENT
Start: 2020-09-25 | End: 2020-11-25 | Stop reason: DRUGHIGH

## 2020-09-25 RX ORDER — GABAPENTIN 100 MG/1
200 CAPSULE ORAL 3 TIMES DAILY
Qty: 180 CAPSULE | Refills: 3 | Status: SHIPPED | OUTPATIENT
Start: 2020-09-25 | End: 2020-10-05 | Stop reason: SDUPTHER

## 2020-09-25 RX ORDER — LOSARTAN POTASSIUM 50 MG/1
50 TABLET ORAL 2 TIMES DAILY
Qty: 60 TABLET | Refills: 3 | Status: ON HOLD
Start: 2020-09-25 | End: 2020-12-06 | Stop reason: HOSPADM

## 2020-09-25 NOTE — TELEPHONE ENCOUNTER
Request for vitamin D, trelegy, losartan, seroquel, gabapentin - meds pended. Please fill if appropriate. Next Visit Date:  No future appointments. Health Maintenance   Topic Date Due    Pneumococcal 0-64 years Vaccine (2 of 3 - PCV13) 10/02/2016    Shingles Vaccine (2 of 3) 12/27/2017    Colon cancer screen colonoscopy  06/27/2018    Annual Wellness Visit (AWV)  05/29/2019    Diabetic foot exam  02/19/2020    Flu vaccine (1) 09/01/2020    A1C test (Diabetic or Prediabetic)  11/10/2020    Statin Therapy  06/16/2021    Diabetic retinal exam  07/14/2021    Lipid screen  08/11/2021    Potassium monitoring  08/17/2021    Creatinine monitoring  08/17/2021    DTaP/Tdap/Td vaccine (2 - Td) 12/14/2023    Hepatitis C screen  Completed    HIV screen  Completed    Hepatitis A vaccine  Aged Out    Hib vaccine  Aged Out    Meningococcal (ACWY) vaccine  Aged Out       Hemoglobin A1C (%)   Date Value   08/10/2020 10.9 (H)   06/16/2020 9.5 (A)   08/12/2019 9.8 (H)             ( goal A1C is < 7)   Microalb/Crt.  Ratio (mcg/mg creat)   Date Value   06/16/2020 2,663 (H)     LDL Cholesterol (mg/dL)   Date Value   08/11/2020 117       (goal LDL is <100)   AST (U/L)   Date Value   08/10/2020 17     ALT (U/L)   Date Value   08/10/2020 16     BUN (mg/dL)   Date Value   08/17/2020 47 (H)     BP Readings from Last 3 Encounters:   08/13/20 (!) 110/50   06/20/20 (!) 141/64   06/16/20 (!) 168/66          (goal 120/80)    All Future Testing planned in [facility]  Lab Frequency Next Occurrence   Cologuard (For External Results Only) Once 10/21/2020   Alpha-1-Antitrypsin w Phenotype Once 02/15/2020         Patient Active Problem List:     Cigarette nicotine dependence without complication     Carpal tunnel syndrome on right     Colon polyps     Carotid stenosis, left s/p Endarterectomy     Mixed simple and mucopurulent chronic bronchitis (HCC)     Pure hypercholesterolemia     CKD (chronic kidney disease) stage 3, GFR 30-59 ml/min (HCC)     Dyspnea and respiratory abnormalities     PTSD (post-traumatic stress disorder)     Transient cerebral ischemia     Essential hypertension     DM type 2, uncontrolled, with neuropathy (HCC)     COPD (chronic obstructive pulmonary disease) ([facility] [address]     Cerebral vascular disease     Primary insomnia     Hypertensive crisis     Non-obstructive Coronary artery disease of native artery of native heart with stable angina pectoris ([facility] [address]     Hyperparathyroidism ([facility] [address]     Hypovitaminosis D     Coronary artery disease with exertional angina (HCC)     Leg swelling     Hyponatremia     Anemia     Hypoalbuminemia

## 2020-10-02 ENCOUNTER — TELEPHONE (OUTPATIENT)
Dept: INTERNAL MEDICINE | Age: 65
End: 2020-10-02

## 2020-10-05 RX ORDER — GABAPENTIN 100 MG/1
200 CAPSULE ORAL 3 TIMES DAILY
Qty: 180 CAPSULE | Refills: 3 | Status: SHIPPED | OUTPATIENT
Start: 2020-10-05 | End: 2021-02-23

## 2020-10-08 NOTE — TELEPHONE ENCOUNTER
Phone call to patients emergency contact - spoke with him & he understands why he can't take ibu. He will discuss other options with you at [facility].

## 2020-10-09 ENCOUNTER — VIRTUAL VISIT (OUTPATIENT)
Dept: INTERNAL MEDICINE | Age: 65
End: 2020-10-09
Payer: COMMERCIAL

## 2020-10-09 PROCEDURE — G0438 PPPS, INITIAL VISIT: HCPCS | Performed by: NURSE PRACTITIONER

## 2020-10-09 ASSESSMENT — PATIENT HEALTH QUESTIONNAIRE - PHQ9
2. FEELING DOWN, DEPRESSED OR HOPELESS: 0
SUM OF ALL RESPONSES TO PHQ QUESTIONS 1-9: 3
1. LITTLE INTEREST OR PLEASURE IN DOING THINGS: 3
SUM OF ALL RESPONSES TO PHQ QUESTIONS 1-9: 3
SUM OF ALL RESPONSES TO PHQ9 QUESTIONS 1 & 2: 3

## 2020-10-09 ASSESSMENT — LIFESTYLE VARIABLES: HOW OFTEN DO YOU HAVE A DRINK CONTAINING ALCOHOL: 0

## 2020-10-09 NOTE — PATIENT INSTRUCTIONS
Personalized Preventive Plan for Lauren Peña - 10/9/2020  Medicare offers a range of preventive health benefits. Some of the tests and screenings are paid in full while other may be subject to a deductible, co-insurance, and/or copay. Some of these benefits include a comprehensive review of your medical history including lifestyle, illnesses that may run in your family, and various assessments and screenings as appropriate. After reviewing your medical record and screening and assessments performed today your provider may have ordered immunizations, labs, imaging, and/or referrals for you. A list of these orders (if applicable) as well as your Preventive Care list are included within your After Visit Summary for your review. Other Preventive Recommendations:    · A preventive eye exam performed by an eye specialist is recommended every 1-2 years to screen for glaucoma; cataracts, macular degeneration, and other eye disorders. · A preventive dental visit is recommended every 6 months. · Try to get at least 150 minutes of exercise per week or 10,000 steps per day on a pedometer . · Order or download the FREE \"Exercise & Physical Activity: Your Everyday Guide\" from [employer]. Call 7-189.379.3537 or search [employer] online. · You need 0316-9399 mg of calcium and 4681-0856 IU of vitamin D per day. It is possible to meet your calcium requirement with diet alone, but a vitamin D supplement is usually necessary to meet this goal.  · When exposed to the sun, use a sunscreen that protects against both UVA and UVB radiation with an SPF of 30 or greater. Reapply every 2 to 3 hours or after sweating, drying off with a towel, or swimming. · Always wear a seat belt when traveling in a car. Always wear a helmet when riding a bicycle or motorcycle.     After Visit Summary mailed to the patient along with appointment card and all other paperwork/orders.    -Gilberto MATY

## 2020-10-09 NOTE — PROGRESS NOTES
Medicare Annual Wellness Visit  Are Name: Joana Fernández Date: 10/9/2020   MRN: [id number] Sex: Male   Age: 60 y.o. Ethnicity: Non-/Non    : 1955 Race: Campos Mcdaniels is here for Medicare AWV and Health Maintenance (Will address vaccines at next visit. Foot exam at next appt. colonoscopy ordered )    Screenings for behavioral, psychosocial and functional/safety risks, and cognitive dysfunction are all negative except as indicated below. These results, as well as other patient data from the [address] form, are documented in Flowsheets linked to this Encounter. Allergies   Allergen Reactions    Lisinopril      cough    Ace Inhibitors      coughing    Pcn [Penicillins]          Prior to Visit Medications    Medication Sig Taking? Authorizing Provider   gabapentin (NEURONTIN) 100 MG capsule Take 2 capsules by mouth 3 times daily for 90 doses.  Yes TERRANCE Monroe CNP   QUEtiapine (SEROQUEL) 100 MG tablet TAKE 1 TABLET BY MOUTH NIGHTLY Yes TERRANCE Monroe CNP   losartan (COZAAR) 50 MG tablet TAKE 1 TABLET BY MOUTH 2 TIMES DAILY Yes TERRANCE Monroe CNP   fluticasone-umeclidin-vilant (TRELEGY ELLIPTA) 100-62.5-25 MCG/INH AEPB INHALE ONE PUFF INTO THE LUNGS DAILY Yes TERRANCE Monroe CNP   vitamin D (ERGOCALCIFEROL) 1.25 MG (88820 UT) CAPS capsule TAKE 1 CAPSULE BY MOUTH ONCE A WEEK Yes TERRANCE Monroe CNP   aspirin (ASPIR-LOW) 81 MG EC tablet TAKE 1 TABLET BY MOUTH DAILY Yes TERRANCE Monroe CNP   GNP VITAMIN D MAXIMUM STRENGTH 50 MCG (2000 UT) TABS TAKE 1 TABLET BY MOUTH ONCE DAILY Yes TERRANCE Monroe CNP   NIFEdipine (ADALAT CC) 30 MG extended release tablet Take 2 tablets by mouth daily Yes Madison Becerra MD   isosorbide mononitrate (IMDUR) 60 MG extended release tablet Take 1 tablet by mouth daily Yes Madison Becerra MD   blood glucose monitor strips Test_4__times daily Diagnosis: 250.0   Diabetes Mellitus_x___Insulin Dependent____Non-Insulin Dependent Yes TERRANCE Patel CNP   Lancets MISC Use_4__times daily Diagnisis:250.0  Diabetes Mellitus__x__Insulin Dependent___Non-Insulin Dependent Yes TERRANCE Patel - CNP   prazosin (MINIPRESS) 2 MG capsule Take 2 capsules by mouth 2 times daily Yes Haven Reef, DO   carvedilol (COREG) 25 MG tablet Take 1 tablet by mouth 2 times daily (with meals) Yes TERRANCE Patel - CNP   atorvastatin (LIPITOR) 80 MG tablet Take 1 tablet by mouth daily Yes TERRANCE Patel - CNP   clopidogrel (PLAVIX) 75 MG tablet Take 1 tablet by mouth daily Yes TERRANCE Patel CNP   insulin glargine (BASAGLAR KWIKPEN) 100 UNIT/ML injection pen Inject 35 Units into the skin nightly Dispense with pen needle Yes TERRANCE Patel CNP   insulin aspart (NOVOLOG FLEXPEN) 100 UNIT/ML injection pen Inject 5 Units into the skin 3 times daily (before meals) Sliding scale Yes TERRANCE Patel - CNP   nitroGLYCERIN (NITROSTAT) 0.4 MG SL tablet up to max of 3 total doses. If no relief after 1 dose, call 911.  Yes TERRANCE Patel CNP   Nutritional Supplements (GLUCERNA CARBSTEADY) LIQD Take 1 Can by mouth 3 times daily Yes TERRANCE Patel - JEN   albuterol-ipratropium (COMBIVENT RESPIMAT)  MCG/ACT AERS inhaler Inhale 1 puff into the lungs every 6 hours Yes TERRANCE Patel - CNP   hydrALAZINE (APRESOLINE) 100 MG tablet Take 1 tablet by mouth every 8 hours Yes Veronica Rich MD   COMFORT EZ PEN NEEDLES 31G X 8 MM MISC USE AS DIRECTED Yes TERRANCE Patel CNP   TRUE METRIX BLOOD GLUCOSE TEST strip TEST BLOOD SUGAR 4 TO 5 TIMES DAILY Yes TERRANCE Patel - CNP   EASY COMFORT LANCETS MISC DX: DM-2 USE 3-4 TIMES DAILY Yes Zara Gomez MD   bumetanide (BUMEX) 1 MG tablet Take 1 tablet by mouth daily  Patient not taking: Reported on 10/9/2020  Jacquie De Los Santos MD         Past Medical History:   Diagnosis Date    Asthma mod persistent    CAD in native artery, nonobstructive     Carotid stenosis, left 6/10/2013    Carpal tunnel syndrome     RIGHT    Cerebrovascular disease 4/23/2018    Chronic kidney disease 6/10/2013    COPD (chronic obstructive pulmonary disease) (HCC)     Diabetes mellitus (HCC)     insulin dependent    History of colon polyps     History of weakness of extremity     right sided    HTN (hypertension)     Hyperlipidemia     Lung, cysts, congenital     PTSD (post-traumatic stress disorder)     PTSD (post-traumatic stress disorder)     TIA (transient ischemic attack)     Type II or unspecified type diabetes mellitus without mention of complication, not stated as uncontrolled        Past Surgical History:   Procedure Laterality Date    CAROTID ENDARTERECTOMY Left 7-2013    COLONOSCOPY      HERNIA REPAIR      PA COLSC FLX W/RMVL OF TUMOR POLYP LESION SNARE TQ N/A 6/27/2017    COLONOSCOPY POLYPECTOMY SNARE/ hot performed [facility] Koroma DO at [facility] V[facility]ULAR SURGERY Left 2015    carotid stent, dr Shahzad Arrieta         Family History   Problem Relation Age of Onset    Cancer Mother     Heart Disease Father        CareTeam (Including outside providers/suppliers regularly involved in providing care):   Patient Care Team:  TERRANCE Michelle CNP as PCP - General (Family Nurse Practitioner)  TERRANCE Michelle CNP as PCP - REHABILITATION [facility] Empaneled Provider  Shasta Hernandez MD as Orthopedic Surgeon (Orthopedic Surgery)  Zeny Rodriguez MD as Surgeon (Vascular Surgery)  Ozzy Cunningham MD as Consulting Physician (Internal Medicine)  Michael Limon MD as Resident (Internal Medicine)  TERRANCE Santiago CNP as Nurse Practitioner (Nurse Practitioner)    Wt Readings from Last 3 Encounters:   08/12/20 137 lb 12.6 oz (62.5 kg)   06/22/20 149 lb (67.6 kg)   06/19/20 141 lb (64 kg)      Patient-Reported Vitals 10/9/2020   Patient-Reported Weight - Patient-Reported Height 5 6   Patient-Reported Systolic 076   Patient-Reported Diastolic 61   Patient-Reported Pulse -      There is no height or weight on file to calculate BMI. Based upon direct observation of the patient, evaluation of cognition reveals recent and remote memory intact. Wt Readings from Last 3 Encounters:   08/12/20 137 lb 12.6 oz (62.5 kg)   06/22/20 149 lb (67.6 kg)   06/19/20 141 lb (64 kg)     Temp Readings from Last 3 Encounters:   08/13/20 98.5 °F (36.9 °C) (Temporal)   06/20/20 97.7 °F (36.5 °C) (Oral)   06/16/20 96.4 °F (35.8 °C) (Temporal)     BP Readings from Last 3 Encounters:   08/13/20 (!) 110/50   06/20/20 (!) 141/64   06/16/20 (!) 168/66     Pulse Readings from Last 3 Encounters:   08/13/20 64   06/20/20 68   06/16/20 55       Patient's complete Health Risk Assessment and screening values have been reviewed and are found in Flowsheets. The following problems were reviewed today and where indicated follow up appointments were made and/or referrals ordered. Positive Risk Factor Screenings with Interventions:     Cognitive: Words recalled: 2 Words Recalled  Clock Drawing Test (CDT) Score: (nick)  Total Score Interpretation: Positive Mini-Cog  Cognitive Impairment Interventions:  · Patient declines any further evaluation/treatment for cognitive impairment    General Health and ACP:  General  In general, how would you say your health is?: (!) Poor  In the past 7 days, have you experienced any of the following?  New or Increased Pain, New or Increased Fatigue, Loneliness, Social Isolation, Stress or Anger?: (!) Loneliness(d/t COVID)  Do you get the social and emotional support that you need?: Yes  Do you have a Living Will?: (!) No  Advance Directives     Power of  Living Will ACP-Advance Directive ACP-Power of     Not on File Not on File Filed [address] Risk Interventions:  · No Living Will: Advance Care Planning addressed with patient today and info sent with AVS    Health Habits/Nutrition:  Health Habits/Nutrition  Do you exercise for at least 20 minutes 2-3 times per week?: (!) No  Have you lost any weight without trying in the past 3 months?: (!) Yes  Do you eat fewer than 2 meals per day?: No  Have you seen a dentist within the past year?: (!) No     Health Habits/Nutrition Interventions:  · Inadequate physical activity:  patient is not ready to increase his/her physical activity level at this time  · Nutritional issues:  patient is not ready to address his/her nutritional/weight issues at this time  · Dental exam overdue:  patient encouraged to make appointment with his/her dentist    Hearing/Vision:  No exam data present  Hearing/Vision  Do you or your family notice any trouble with your hearing?: No  Do you have difficulty driving, watching TV, or doing any of your daily activities because of your eyesight?: No  Have you had an eye exam within the past year?: (!) No  Hearing/Vision Interventions:  · Vision concerns:  patient encouraged to make appointment with his/her eye specialist    Personalized Preventive Plan   Current Health Maintenance Status  Immunization History   Administered Date(s) Administered    Influenza 12/31/2012    Influenza Vaccine, unspecified formulation 12/31/2012    Influenza Virus Vaccine 10/15/2013, 10/14/2014, 10/02/2015, 11/02/2016, 10/30/2017, 10/21/2019    Influenza, Quadv, 6 mo and older, IM (Fluzone, Flulaval) 10/30/2017    Influenza, Quadv, IM, (6 mo and older Fluzone, Flulaval, Fluarix and 3 yrs and older Afluria) 11/02/2016, 10/21/2019    Pneumococcal Polysaccharide (Pneaadxry06) 10/02/2015    Tdap (Boostrix, Adacel) 12/14/2013    Zoster Live (Zostavax) 11/01/2017        Health Maintenance   Topic Date Due    Pneumococcal 0-64 years Vaccine (2 of 3 - PCV13) 10/02/2016    Shingles Vaccine (2 of 3) 12/27/2017    Colon cancer screen colonoscopy  06/27/2018    Annual Wellness Visit (AWV)  05/29/2019    Diabetic foot exam  02/19/2020    Flu vaccine (1) 09/01/2020    A1C test (Diabetic or Prediabetic)  11/10/2020    Diabetic retinal exam  07/14/2021    Lipid screen  08/11/2021    Potassium monitoring  08/17/2021    Creatinine monitoring  08/17/2021    DTaP/Tdap/Td vaccine (2 - Td) 12/14/2023    Hepatitis C screen  Completed    HIV screen  Completed    Hepatitis A vaccine  Aged Out    Hib vaccine  Aged Out    Meningococcal (ACWY) vaccine  Aged Out     Recommendations for [employer] Due: see orders and patient instructions/AVS.  . Recommended screening schedule for the next 5-10 years is provided to the patient in written form: see Patient Reece Frausto was seen today for medicare awv and health maintenance. Diagnoses and all orders for this visit:    Screening for colorectal cancer  -     [facility] Screening Colonoscopy    Routine general medical examination at a health care facility  -     [facility] Screening Colonoscopy  -     [address] [place] [address] [place]              Moiz Jimenez is a 60 y.o. male being evaluated by a Virtual Visit (phone) encounter to address concerns as mentioned above. A caregiver was present when appropriate. Due to this being a TeleHealth encounter (During BSSZG-77 public health emergency), evaluation of the following organ systems was limited: Vitals/Constitutional/EENT/Resp/CV/GI//MS/Neuro/Skin/Heme-Lymph-Imm. Pursuant to the emergency declaration under the [facility], [address] [place] [address] authority and the [employer] and [employer] General Act, this Virtual Visit was conducted with patient's (and/or legal guardian's) consent, to reduce the patient's risk of exposure to COVID-19 and provide necessary medical care.   The patient (and/or legal guardian) has also been advised to contact this office for worsening conditions or problems, and seek emergency medical treatment and/or call 911 if deemed necessary. Patient identification was verified at the start of the visit: Yes    Services were provided through phone to substitute for in-person clinic visit. Patient and provider were located at their individual homes. --TERRANCE Hernandez CNP on 10/9/2020 at 3:03 PM    An electronic signature was used to authenticate this note.

## 2020-10-19 ENCOUNTER — TELEPHONE (OUTPATIENT)
Dept: INTERNAL MEDICINE | Age: 65
End: 2020-10-19

## 2020-10-19 RX ORDER — AZITHROMYCIN 250 MG/1
TABLET, FILM COATED ORAL
Qty: 1 PACKET | Refills: 0 | Status: ON HOLD
Start: 2020-10-19 | End: 2020-12-06 | Stop reason: HOSPADM

## 2020-10-19 RX ORDER — PREDNISONE 10 MG/1
TABLET ORAL
Qty: 30 TABLET | Refills: 0 | Status: ON HOLD
Start: 2020-10-19 | End: 2020-12-06 | Stop reason: HOSPADM

## 2020-11-23 RX ORDER — IPRATROPIUM/ALBUTEROL SULFATE 20-100 MCG
MIST INHALER (GRAM) INHALATION
Qty: 4 G | Refills: 3 | OUTPATIENT
Start: 2020-11-23

## 2020-11-23 RX ORDER — PRAZOSIN HYDROCHLORIDE 2 MG/1
CAPSULE ORAL
Qty: 120 CAPSULE | Refills: 3 | OUTPATIENT
Start: 2020-11-23

## 2020-11-23 RX ORDER — ERGOCALCIFEROL 1.25 MG/1
50000 CAPSULE ORAL WEEKLY
Qty: 4 CAPSULE | Refills: 1 | OUTPATIENT
Start: 2020-11-23

## 2020-11-23 NOTE — TELEPHONE ENCOUNTER
Request for vitamin D, combivent respimat, prazosin - meds pended. Please fill if appropriate. Next Visit Date:  No future appointments. Health Maintenance   Topic Date Due    Pneumococcal 0-64 years Vaccine (2 of 3 - PCV13) 10/02/2016    Shingles Vaccine (2 of 3) 12/27/2017    Colon cancer screen colonoscopy  06/27/2018    Diabetic foot exam  02/19/2020    Flu vaccine (1) 09/01/2020    A1C test (Diabetic or Prediabetic)  11/10/2020    Diabetic retinal exam  07/14/2021    Lipid screen  08/11/2021    Potassium monitoring  08/17/2021    Creatinine monitoring  08/17/2021    Annual Wellness Visit (AWV)  10/10/2021    DTaP/Tdap/Td vaccine (2 - Td) 12/14/2023    Hepatitis C screen  Completed    HIV screen  Completed    Hepatitis A vaccine  Aged Out    Hib vaccine  Aged Out    Meningococcal (ACWY) vaccine  Aged Out       Hemoglobin A1C (%)   Date Value   08/10/2020 10.9 (H)   06/16/2020 9.5 (A)   08/12/2019 9.8 (H)             ( goal A1C is < 7)   Microalb/Crt.  Ratio (mcg/mg creat)   Date Value   06/16/2020 2,663 (H)     LDL Cholesterol (mg/dL)   Date Value   08/11/2020 117       (goal LDL is <100)   AST (U/L)   Date Value   08/10/2020 17     ALT (U/L)   Date Value   08/10/2020 16     BUN (mg/dL)   Date Value   08/17/2020 47 (H)     BP Readings from Last 3 Encounters:   08/13/20 (!) 110/50   06/20/20 (!) 141/64   06/16/20 (!) 168/66          (goal 120/80)    All Future Testing planned in [facility]  Lab Frequency Next Occurrence         Patient Active Problem List:     Cigarette nicotine dependence without complication     Carpal tunnel syndrome on right     Colon polyps     Carotid stenosis, left s/p Endarterectomy     Mixed simple and mucopurulent chronic bronchitis (HCC)     Pure hypercholesterolemia     CKD (chronic kidney disease) stage 3, GFR 30-59 ml/min (HCC)     Dyspnea and respiratory abnormalities     PTSD (post-traumatic stress disorder)     Transient cerebral ischemia     Essential hypertension     DM type 2, uncontrolled, with neuropathy ([facility] [address]     COPD (chronic obstructive pulmonary disease) (HCC)     Cerebral vascular disease     Primary insomnia     Hypertensive crisis     Non-obstructive Coronary artery disease of native artery of native heart with stable angina pectoris ([facility] [address]     Hyperparathyroidism ([facility] [address] [facility]     Hypovitaminosis D     Coronary artery disease with exertional angina (HCC)     Leg swelling     Hyponatremia     Anemia     Hypoalbuminemia

## 2020-11-25 RX ORDER — ERGOCALCIFEROL 1.25 MG/1
50000 CAPSULE ORAL WEEKLY
Qty: 4 CAPSULE | Refills: 0 | Status: SHIPPED | OUTPATIENT
Start: 2020-11-25 | End: 2021-01-05 | Stop reason: SDUPTHER

## 2020-11-25 RX ORDER — PRAZOSIN HYDROCHLORIDE 2 MG/1
4 CAPSULE ORAL 2 TIMES DAILY
Qty: 120 CAPSULE | Refills: 0 | Status: SHIPPED | OUTPATIENT
Start: 2020-11-25 | End: 2021-01-25

## 2020-11-25 NOTE — TELEPHONE ENCOUNTER
Pt called he is out of his medication. Appt schedule for 12/1. Pt would like to know if he can get refills on inhaler before his appt.

## 2020-12-03 ENCOUNTER — HOSPITAL ENCOUNTER (INPATIENT)
Age: 65
LOS: 2 days | Discharge: HOME | DRG: 638 | End: 2020-12-06
Attending: EMERGENCY MEDICINE | Admitting: INTERNAL MEDICINE
Payer: COMMERCIAL

## 2020-12-03 ENCOUNTER — APPOINTMENT (OUTPATIENT)
Dept: GENERAL RADIOLOGY | Age: 65
DRG: 638 | End: 2020-12-03
Payer: COMMERCIAL

## 2020-12-03 PROBLEM — E11.10 DKA, TYPE 2, NOT AT GOAL (HCC): Status: ACTIVE | Noted: 2020-12-03

## 2020-12-03 LAB
ALBUMIN SERPL-MCNC: 3.2 G/DL (ref 3.5–5.2)
ALBUMIN/GLOBULIN RATIO: 1 (ref 1–2.5)
ALLEN TEST: ABNORMAL
ALP BLD-CCNC: 118 U/L (ref 40–129)
ALT SERPL-CCNC: 17 U/L (ref 5–41)
ANION GAP SERPL CALCULATED.3IONS-SCNC: 10 MMOL/L (ref 9–17)
ANION GAP SERPL CALCULATED.3IONS-SCNC: 12 MMOL/L (ref 9–17)
ANION GAP SERPL CALCULATED.3IONS-SCNC: 19 MMOL/L (ref 9–17)
ANION GAP SERPL CALCULATED.3IONS-SCNC: 28 MMOL/L (ref 9–17)
ANION GAP: 13 MMOL/L (ref 7–16)
AST SERPL-CCNC: 22 U/L
BETA-HYDROXYBUTYRATE: 6.95 MMOL/L (ref 0.02–0.27)
BILIRUB SERPL-MCNC: 0.29 MG/DL (ref 0.3–1.2)
BUN BLDV-MCNC: 52 MG/DL (ref 8–23)
BUN BLDV-MCNC: 54 MG/DL (ref 8–23)
BUN BLDV-MCNC: 55 MG/DL (ref 8–23)
BUN BLDV-MCNC: 58 MG/DL (ref 8–23)
BUN/CREAT BLD: ABNORMAL (ref 9–20)
CALCIUM SERPL-MCNC: 8 MG/DL (ref 8.6–10.4)
CALCIUM SERPL-MCNC: 8.1 MG/DL (ref 8.6–10.4)
CALCIUM SERPL-MCNC: 8.2 MG/DL (ref 8.6–10.4)
CALCIUM SERPL-MCNC: 9.3 MG/DL (ref 8.6–10.4)
CHLORIDE BLD-SCNC: 101 MMOL/L (ref 98–107)
CHLORIDE BLD-SCNC: 104 MMOL/L (ref 98–107)
CHLORIDE BLD-SCNC: 105 MMOL/L (ref 98–107)
CHLORIDE BLD-SCNC: 94 MMOL/L (ref 98–107)
CHP ED QC CHECK: NORMAL
CHP ED QC CHECK: NORMAL
CHP ED QC CHECK: YES
CHP ED QC CHECK: YES
CO2: 10 MMOL/L (ref 20–31)
CO2: 14 MMOL/L (ref 20–31)
CO2: 19 MMOL/L (ref 20–31)
CO2: 21 MMOL/L (ref 20–31)
CREAT SERPL-MCNC: 3.36 MG/DL (ref 0.7–1.2)
CREAT SERPL-MCNC: 3.42 MG/DL (ref 0.7–1.2)
CREAT SERPL-MCNC: 3.51 MG/DL (ref 0.7–1.2)
CREAT SERPL-MCNC: 3.86 MG/DL (ref 0.7–1.2)
FIO2: ABNORMAL
GFR AFRICAN AMERICAN: 19 ML/MIN
GFR AFRICAN AMERICAN: 21 ML/MIN
GFR AFRICAN AMERICAN: 22 ML/MIN
GFR AFRICAN AMERICAN: 23 ML/MIN
GFR NON-AFRICAN AMERICAN: 16 ML/MIN
GFR NON-AFRICAN AMERICAN: 18 ML/MIN
GFR NON-AFRICAN AMERICAN: 18 ML/MIN
GFR NON-AFRICAN AMERICAN: 19 ML/MIN
GFR NON-AFRICAN AMERICAN: 20 ML/MIN
GFR SERPL CREATININE-BSD FRML MDRD: 25 ML/MIN
GFR SERPL CREATININE-BSD FRML MDRD: ABNORMAL ML/MIN/{1.73_M2}
GLUCOSE BLD-MCNC: 135 MG/DL
GLUCOSE BLD-MCNC: 137 MG/DL (ref 75–110)
GLUCOSE BLD-MCNC: 169 MG/DL (ref 75–110)
GLUCOSE BLD-MCNC: 170 MG/DL (ref 75–110)
GLUCOSE BLD-MCNC: 176 MG/DL (ref 75–110)
GLUCOSE BLD-MCNC: 181 MG/DL (ref 70–99)
GLUCOSE BLD-MCNC: 182 MG/DL
GLUCOSE BLD-MCNC: 182 MG/DL (ref 75–110)
GLUCOSE BLD-MCNC: 193 MG/DL (ref 75–110)
GLUCOSE BLD-MCNC: 261 MG/DL
GLUCOSE BLD-MCNC: 261 MG/DL (ref 75–110)
GLUCOSE BLD-MCNC: 286 MG/DL (ref 70–99)
GLUCOSE BLD-MCNC: 296 MG/DL
GLUCOSE BLD-MCNC: 296 MG/DL (ref 75–110)
GLUCOSE BLD-MCNC: 336 MG/DL (ref 70–99)
GLUCOSE BLD-MCNC: 367 MG/DL (ref 75–110)
GLUCOSE BLD-MCNC: 540 MG/DL (ref 70–99)
GLUCOSE BLD-MCNC: 549 MG/DL (ref 74–100)
HCO3 VENOUS: 15.4 MMOL/L (ref 22–29)
INR BLD: 0.9
LACTIC ACID, WHOLE BLOOD: 2.1 MMOL/L (ref 0.7–2.1)
MAGNESIUM: 2 MG/DL (ref 1.6–2.6)
MAGNESIUM: 2.1 MG/DL (ref 1.6–2.6)
MAGNESIUM: 2.2 MG/DL (ref 1.6–2.6)
MODE: ABNORMAL
NEGATIVE BASE EXCESS, VEN: 12 (ref 0–2)
O2 DEVICE/FLOW/%: ABNORMAL
O2 SAT, VEN: 68 % (ref 60–85)
PATIENT TEMP: ABNORMAL
PCO2, VEN: 38.1 MM HG (ref 41–51)
PH VENOUS: 7.21 (ref 7.32–7.43)
PHOSPHORUS: 3 MG/DL (ref 2.5–4.5)
PHOSPHORUS: 3.7 MG/DL (ref 2.5–4.5)
PO2, VEN: 42.5 MM HG (ref 30–50)
POC CHLORIDE: 98 MMOL/L (ref 98–107)
POC CREATININE: 3.11 MG/DL (ref 0.51–1.19)
POC HEMATOCRIT: 38 % (ref 41–53)
POC HEMOGLOBIN: 12.8 G/DL (ref 13.5–17.5)
POC IONIZED CALCIUM: 1.1 MMOL/L (ref 1.15–1.33)
POC LACTIC ACID: 1.68 MMOL/L (ref 0.56–1.39)
POC PCO2 TEMP: ABNORMAL MM HG
POC PH TEMP: ABNORMAL
POC PO2 TEMP: ABNORMAL MM HG
POC POTASSIUM: 5.5 MMOL/L (ref 3.5–4.5)
POC SODIUM: 126 MMOL/L (ref 138–146)
POSITIVE BASE EXCESS, VEN: ABNORMAL (ref 0–3)
POTASSIUM SERPL-SCNC: 4.2 MMOL/L (ref 3.7–5.3)
POTASSIUM SERPL-SCNC: 4.3 MMOL/L (ref 3.7–5.3)
POTASSIUM SERPL-SCNC: 4.7 MMOL/L (ref 3.7–5.3)
POTASSIUM SERPL-SCNC: 5.8 MMOL/L (ref 3.7–5.3)
PROTHROMBIN TIME: 9.8 SEC (ref 9–12)
SAMPLE SITE: ABNORMAL
SERUM OSMOLALITY: 326 MOSM/KG (ref 275–295)
SODIUM BLD-SCNC: 132 MMOL/L (ref 135–144)
SODIUM BLD-SCNC: 134 MMOL/L (ref 135–144)
SODIUM BLD-SCNC: 135 MMOL/L (ref 135–144)
SODIUM BLD-SCNC: 136 MMOL/L (ref 135–144)
TOTAL CO2, VENOUS: 17 MMOL/L (ref 23–30)
TOTAL PROTEIN: 6.4 G/DL (ref 6.4–8.3)
TROPONIN INTERP: ABNORMAL
TROPONIN INTERP: ABNORMAL
TROPONIN T: ABNORMAL NG/ML
TROPONIN T: ABNORMAL NG/ML
TROPONIN, HIGH SENSITIVITY: 132 NG/L (ref 0–22)
TROPONIN, HIGH SENSITIVITY: 136 NG/L (ref 0–22)

## 2020-12-03 PROCEDURE — 80053 COMPREHEN METABOLIC PANEL: CPT

## 2020-12-03 PROCEDURE — 99223 1ST HOSP IP/OBS HIGH 75: CPT | Performed by: INTERNAL MEDICINE

## 2020-12-03 PROCEDURE — 87040 BLOOD CULTURE FOR BACTERIA: CPT

## 2020-12-03 PROCEDURE — 80048 BASIC METABOLIC PNL TOTAL CA: CPT

## 2020-12-03 PROCEDURE — 83930 ASSAY OF BLOOD OSMOLALITY: CPT

## 2020-12-03 PROCEDURE — 84295 ASSAY OF SERUM SODIUM: CPT

## 2020-12-03 PROCEDURE — 82010 KETONE BODYS QUAN: CPT

## 2020-12-03 PROCEDURE — 2580000003 HC RX 258

## 2020-12-03 PROCEDURE — 84484 ASSAY OF TROPONIN QUANT: CPT

## 2020-12-03 PROCEDURE — 6370000000 HC RX 637 (ALT 250 FOR IP): Performed by: STUDENT IN AN ORGANIZED HEALTH CARE EDUCATION/TRAINING PROGRAM

## 2020-12-03 PROCEDURE — 83735 ASSAY OF MAGNESIUM: CPT

## 2020-12-03 PROCEDURE — 82565 ASSAY OF CREATININE: CPT

## 2020-12-03 PROCEDURE — 82947 ASSAY GLUCOSE BLOOD QUANT: CPT

## 2020-12-03 PROCEDURE — 85610 PROTHROMBIN TIME: CPT

## 2020-12-03 PROCEDURE — 82435 ASSAY OF BLOOD CHLORIDE: CPT

## 2020-12-03 PROCEDURE — 2580000003 HC RX 258: Performed by: STUDENT IN AN ORGANIZED HEALTH CARE EDUCATION/TRAINING PROGRAM

## 2020-12-03 PROCEDURE — 82803 BLOOD GASES ANY COMBINATION: CPT

## 2020-12-03 PROCEDURE — 84100 ASSAY OF PHOSPHORUS: CPT

## 2020-12-03 PROCEDURE — 83605 ASSAY OF LACTIC ACID: CPT

## 2020-12-03 PROCEDURE — 84132 ASSAY OF SERUM POTASSIUM: CPT

## 2020-12-03 PROCEDURE — 82330 ASSAY OF CALCIUM: CPT

## 2020-12-03 PROCEDURE — 85014 HEMATOCRIT: CPT

## 2020-12-03 PROCEDURE — 71045 X-RAY EXAM CHEST 1 VIEW: CPT

## 2020-12-03 PROCEDURE — 99285 EMERGENCY DEPT VISIT HI MDM: CPT

## 2020-12-03 PROCEDURE — 93005 ELECTROCARDIOGRAM TRACING: CPT | Performed by: STUDENT IN AN ORGANIZED HEALTH CARE EDUCATION/TRAINING PROGRAM

## 2020-12-03 PROCEDURE — 6360000002 HC RX W HCPCS: Performed by: STUDENT IN AN ORGANIZED HEALTH CARE EDUCATION/TRAINING PROGRAM

## 2020-12-03 RX ORDER — DEXTROSE MONOHYDRATE 50 MG/ML
100 INJECTION, SOLUTION INTRAVENOUS PRN
Status: DISCONTINUED | OUTPATIENT
Start: 2020-12-03 | End: 2020-12-03

## 2020-12-03 RX ORDER — DEXTROSE MONOHYDRATE 25 G/50ML
12.5 INJECTION, SOLUTION INTRAVENOUS PRN
Status: DISCONTINUED | OUTPATIENT
Start: 2020-12-03 | End: 2020-12-03

## 2020-12-03 RX ORDER — HYDRALAZINE HYDROCHLORIDE 50 MG/1
100 TABLET, FILM COATED ORAL ONCE
Status: COMPLETED | OUTPATIENT
Start: 2020-12-03 | End: 2020-12-03

## 2020-12-03 RX ORDER — INSULIN GLARGINE 100 [IU]/ML
15 INJECTION, SOLUTION SUBCUTANEOUS NIGHTLY
Status: DISCONTINUED | OUTPATIENT
Start: 2020-12-03 | End: 2020-12-04

## 2020-12-03 RX ORDER — HEPARIN SODIUM 5000 [USP'U]/ML
5000 INJECTION, SOLUTION INTRAVENOUS; SUBCUTANEOUS EVERY 8 HOURS SCHEDULED
Status: DISCONTINUED | OUTPATIENT
Start: 2020-12-03 | End: 2020-12-06 | Stop reason: HOSPADM

## 2020-12-03 RX ORDER — HYDRALAZINE HYDROCHLORIDE 50 MG/1
100 TABLET, FILM COATED ORAL EVERY 8 HOURS SCHEDULED
Status: DISCONTINUED | OUTPATIENT
Start: 2020-12-03 | End: 2020-12-06 | Stop reason: HOSPADM

## 2020-12-03 RX ORDER — MAGNESIUM SULFATE 1 G/100ML
1 INJECTION INTRAVENOUS PRN
Status: DISCONTINUED | OUTPATIENT
Start: 2020-12-03 | End: 2020-12-03

## 2020-12-03 RX ORDER — 0.9 % SODIUM CHLORIDE 0.9 %
1000 INTRAVENOUS SOLUTION INTRAVENOUS ONCE
Status: COMPLETED | OUTPATIENT
Start: 2020-12-03 | End: 2020-12-03

## 2020-12-03 RX ORDER — PRAZOSIN HYDROCHLORIDE 1 MG/1
4 CAPSULE ORAL 2 TIMES DAILY
Status: DISCONTINUED | OUTPATIENT
Start: 2020-12-03 | End: 2020-12-06 | Stop reason: HOSPADM

## 2020-12-03 RX ORDER — DEXTROSE MONOHYDRATE 50 MG/ML
100 INJECTION, SOLUTION INTRAVENOUS PRN
Status: DISCONTINUED | OUTPATIENT
Start: 2020-12-03 | End: 2020-12-06 | Stop reason: HOSPADM

## 2020-12-03 RX ORDER — GABAPENTIN 100 MG/1
200 CAPSULE ORAL NIGHTLY
Status: DISCONTINUED | OUTPATIENT
Start: 2020-12-03 | End: 2020-12-04

## 2020-12-03 RX ORDER — NIFEDIPINE 60 MG/1
60 TABLET, FILM COATED, EXTENDED RELEASE ORAL DAILY
Status: DISCONTINUED | OUTPATIENT
Start: 2020-12-03 | End: 2020-12-03

## 2020-12-03 RX ORDER — SODIUM CHLORIDE 450 MG/100ML
INJECTION, SOLUTION INTRAVENOUS CONTINUOUS
Status: DISCONTINUED | OUTPATIENT
Start: 2020-12-03 | End: 2020-12-03

## 2020-12-03 RX ORDER — NIFEDIPINE 30 MG/1
30 TABLET, EXTENDED RELEASE ORAL 2 TIMES DAILY
Status: DISCONTINUED | OUTPATIENT
Start: 2020-12-03 | End: 2020-12-06 | Stop reason: HOSPADM

## 2020-12-03 RX ORDER — CARVEDILOL 12.5 MG/1
25 TABLET ORAL 2 TIMES DAILY WITH MEALS
Status: DISCONTINUED | OUTPATIENT
Start: 2020-12-03 | End: 2020-12-06 | Stop reason: HOSPADM

## 2020-12-03 RX ORDER — PRAZOSIN HYDROCHLORIDE 1 MG/1
4 CAPSULE ORAL 2 TIMES DAILY
Status: DISCONTINUED | OUTPATIENT
Start: 2020-12-03 | End: 2020-12-03

## 2020-12-03 RX ORDER — ASPIRIN 81 MG/1
81 TABLET ORAL DAILY
Status: DISCONTINUED | OUTPATIENT
Start: 2020-12-03 | End: 2020-12-06 | Stop reason: HOSPADM

## 2020-12-03 RX ORDER — DEXTROSE MONOHYDRATE 25 G/50ML
12.5 INJECTION, SOLUTION INTRAVENOUS PRN
Status: DISCONTINUED | OUTPATIENT
Start: 2020-12-03 | End: 2020-12-06 | Stop reason: HOSPADM

## 2020-12-03 RX ORDER — INSULIN GLARGINE 100 [IU]/ML
20 INJECTION, SOLUTION SUBCUTANEOUS NIGHTLY
Status: DISCONTINUED | OUTPATIENT
Start: 2020-12-03 | End: 2020-12-03

## 2020-12-03 RX ORDER — HYDRALAZINE HYDROCHLORIDE 50 MG/1
100 TABLET, FILM COATED ORAL EVERY 8 HOURS SCHEDULED
Status: DISCONTINUED | OUTPATIENT
Start: 2020-12-03 | End: 2020-12-03

## 2020-12-03 RX ORDER — CLOPIDOGREL BISULFATE 75 MG/1
75 TABLET ORAL DAILY
Status: DISCONTINUED | OUTPATIENT
Start: 2020-12-03 | End: 2020-12-06 | Stop reason: HOSPADM

## 2020-12-03 RX ORDER — NICOTINE POLACRILEX 4 MG
15 LOZENGE BUCCAL PRN
Status: DISCONTINUED | OUTPATIENT
Start: 2020-12-03 | End: 2020-12-03

## 2020-12-03 RX ORDER — ATORVASTATIN CALCIUM 80 MG/1
80 TABLET, FILM COATED ORAL DAILY
Status: DISCONTINUED | OUTPATIENT
Start: 2020-12-03 | End: 2020-12-06 | Stop reason: HOSPADM

## 2020-12-03 RX ORDER — POTASSIUM CHLORIDE 7.45 MG/ML
10 INJECTION INTRAVENOUS PRN
Status: DISCONTINUED | OUTPATIENT
Start: 2020-12-03 | End: 2020-12-03

## 2020-12-03 RX ORDER — LOSARTAN POTASSIUM 50 MG/1
50 TABLET ORAL 2 TIMES DAILY
Status: DISCONTINUED | OUTPATIENT
Start: 2020-12-03 | End: 2020-12-04

## 2020-12-03 RX ORDER — DEXTROSE AND SODIUM CHLORIDE 5; .45 G/100ML; G/100ML
INJECTION, SOLUTION INTRAVENOUS
Status: COMPLETED
Start: 2020-12-03 | End: 2020-12-03

## 2020-12-03 RX ORDER — CARVEDILOL 12.5 MG/1
25 TABLET ORAL 2 TIMES DAILY WITH MEALS
Status: DISCONTINUED | OUTPATIENT
Start: 2020-12-03 | End: 2020-12-03

## 2020-12-03 RX ORDER — LABETALOL HYDROCHLORIDE 5 MG/ML
10 INJECTION, SOLUTION INTRAVENOUS ONCE
Status: DISCONTINUED | OUTPATIENT
Start: 2020-12-03 | End: 2020-12-04

## 2020-12-03 RX ORDER — DEXTROSE AND SODIUM CHLORIDE 5; .45 G/100ML; G/100ML
INJECTION, SOLUTION INTRAVENOUS CONTINUOUS PRN
Status: DISCONTINUED | OUTPATIENT
Start: 2020-12-03 | End: 2020-12-03

## 2020-12-03 RX ORDER — 0.9 % SODIUM CHLORIDE 0.9 %
15 INTRAVENOUS SOLUTION INTRAVENOUS ONCE
Status: DISCONTINUED | OUTPATIENT
Start: 2020-12-03 | End: 2020-12-03

## 2020-12-03 RX ORDER — NICOTINE POLACRILEX 4 MG
15 LOZENGE BUCCAL PRN
Status: DISCONTINUED | OUTPATIENT
Start: 2020-12-03 | End: 2020-12-06 | Stop reason: HOSPADM

## 2020-12-03 RX ADMIN — LOSARTAN POTASSIUM 50 MG: 50 TABLET, FILM COATED ORAL at 21:00

## 2020-12-03 RX ADMIN — SODIUM CHLORIDE 1000 ML: 9 INJECTION, SOLUTION INTRAVENOUS at 13:11

## 2020-12-03 RX ADMIN — DEXTROSE AND SODIUM CHLORIDE: 5; .45 INJECTION, SOLUTION INTRAVENOUS at 17:23

## 2020-12-03 RX ADMIN — HYDRALAZINE HYDROCHLORIDE 100 MG: 50 TABLET, FILM COATED ORAL at 22:28

## 2020-12-03 RX ADMIN — HYDRALAZINE HYDROCHLORIDE 100 MG: 50 TABLET, FILM COATED ORAL at 14:39

## 2020-12-03 RX ADMIN — PRAZOSIN HYDROCHLORIDE 4 MG: 1 CAPSULE ORAL at 19:37

## 2020-12-03 RX ADMIN — Medication 81 MG: at 17:23

## 2020-12-03 RX ADMIN — CARVEDILOL 25 MG: 12.5 TABLET, FILM COATED ORAL at 19:37

## 2020-12-03 RX ADMIN — DEXTROSE AND SODIUM CHLORIDE: 5; 450 INJECTION, SOLUTION INTRAVENOUS at 17:23

## 2020-12-03 RX ADMIN — HEPARIN SODIUM 5000 UNITS: 5000 INJECTION INTRAVENOUS; SUBCUTANEOUS at 23:26

## 2020-12-03 RX ADMIN — SODIUM CHLORIDE 5.22 UNITS/HR: 9 INJECTION, SOLUTION INTRAVENOUS at 12:47

## 2020-12-03 RX ADMIN — CLOPIDOGREL 75 MG: 75 TABLET, FILM COATED ORAL at 17:23

## 2020-12-03 RX ADMIN — SODIUM CHLORIDE 1000 ML: 9 INJECTION, SOLUTION INTRAVENOUS at 11:04

## 2020-12-03 RX ADMIN — NIFEDIPINE 30 MG: 30 TABLET, FILM COATED, EXTENDED RELEASE ORAL at 21:00

## 2020-12-03 RX ADMIN — HEPARIN SODIUM 5000 UNITS: 5000 INJECTION INTRAVENOUS; SUBCUTANEOUS at 17:24

## 2020-12-03 RX ADMIN — GABAPENTIN 200 MG: 100 CAPSULE ORAL at 21:00

## 2020-12-03 ASSESSMENT — ENCOUNTER SYMPTOMS
VOMITING: 1
COUGH: 0
ABDOMINAL PAIN: 0
NAUSEA: 1
ALLERGIC/IMMUNOLOGIC NEGATIVE: 1
CHEST TIGHTNESS: 0
SHORTNESS OF BREATH: 1
EYES NEGATIVE: 1
ABDOMINAL DISTENTION: 0

## 2020-12-03 NOTE — ED PROVIDER NOTES
[address] [facility]     Emergency Department     Faculty Note/ Attestation      Pt Name: Latasha Rasmussen                                       MRN: [id number]  [place] 1955  Date of evaluation: 12/3/2020    Patients PCP:    TERRANCE Garcia - JEN      Attestation  I performed a history and physical examination of the patient and discussed management with the resident. I reviewed the residents note and agree with the documented findings and plan of care. Any areas of disagreement are noted on the chart. I was personally present for the key portions of any procedures. I have documented in the chart those procedures where I was not present during the key portions. I have reviewed the emergency nurses triage note. I agree with the chief complaint, past medical history, past surgical history, allergies, medications, social and family history as documented unless otherwise noted below. For Physician Assistant/ Nurse Practitioner cases/documentation I have personally evaluated this patient and have completed at least one if not all key elements of the E/M (history, physical exam, and MDM). Additional findings are as noted.       Initial Screens:             Vitals:    Vitals:    12/03/20 1046   BP: (!) 178/81   Pulse: 102   Resp: 20   Temp: 97.9 °F (36.6 °C)   TempSrc: Oral   SpO2: 96%   Weight: 115 lb (52.2 kg)   Height: 5' 6\" (1.676 m)       CHIEF COMPLAINT       Chief Complaint   Patient presents with    Emesis             DIAGNOSTIC RESULTS             RADIOLOGY:   XR CHEST PORTABLE    (Results Pending)         LABS:  Labs Reviewed   CULTURE, BLOOD 1   CULTURE, BLOOD 1   COMPREHENSIVE METABOLIC PANEL   BETA-HYDROXYBUTYRATE   LACTIC ACID, WHOLE BLOOD   MAGNESIUM   OSMOLALITY   PROTIME-INR   TROPONIN   TROPONIN   URINALYSIS   POC BLOOD GAS AND CHEMISTRY         EMERGENCY DEPARTMENT COURSE:     -------------------------  BP: (!) 178/81, Temp: 97.9 °F (36.6 °C), Pulse: 102, Resp:

## 2020-12-03 NOTE — H&P
[facility]     Department of Internal Medicine - Staff Internal Medicine Teaching Service          ADMISSION NOTE/HISTORY AND PHYSICAL EXAMINATION   Date: 12/3/2020  Patient Name: Attila Holt  Date of admission: 12/3/2020 10:44 AM  YOB: 1955  PCP: TERRANCE Morataya CNP  History Obtained From:  patient    [address] [facility]     Chief complaint: nausea, vomiting. HISTORY OF PRESENTING ILLNESS     The patient is a pleasant 60 y.o. male with significant past medical history of diabetes mellitus on insulin, hypertension, CKD stage III, COPD presents to the ED with a chief complaint of nausea and vomiting since this morning. To begin with patient had acute onset of shortness of breath early morning at 5 AM 12/03/20. Patient states he was winded going from bed to washroom and then vomited. EMS was called and as per the patient his blood glucose was at 600 and was given 10 units of short-acting insulin. But his blood glucose is still elevated at 500 and was transferred to [facility] for further evaluation. Patient indicates that he takes good care of his insulin regimen he indicates he takes 1 unit of NovoLog for every 50 mg above 150 of blood sugar and 1 unit of NovoLog for every 15 g of carbs that he take. He recently missed appointments with dietitian consult for which he takes carb controlled diet instead of diabetic diet. He has a history of recent admission for diabetic coma because of hypoglycemia and was admitted for 18 days. On current evaluation in the ER his initial vitals are blood pressure of 178/81, SPO2 of 97. He missed his blood pressure medications this morning. His labs showed glucose 549 , beta hydroxybutyrate 6.95,  VBG 7.2/38.1/15.4, GFR of 20, sodium 126, lactic acid 1.6, Creatinine 3.51 , Osmolality 326. Patient is admitted. Diagnosed DKA currently being monitored.       Review of Systems   Constitutional: Negative for activity change, appetite change, chills and fever. HENT: Negative. Eyes: Negative. Respiratory: Positive for shortness of breath. Negative for cough and chest tightness. Cardiovascular: Negative. Gastrointestinal: Positive for nausea and vomiting. Negative for abdominal distention and abdominal pain. Endocrine: Negative for cold intolerance and heat intolerance. Genitourinary: Negative. Musculoskeletal: Negative. Allergic/Immunologic: Negative. Neurological: Negative. Hematological: Negative. Psychiatric/Behavioral: Negative. PAST MEDICAL HISTORY     Past Medical History:   Diagnosis Date    Asthma     mod persistent    CAD in native artery, nonobstructive     Carotid stenosis, left 6/10/2013    Carpal tunnel syndrome     RIGHT    Cerebrovascular disease 4/23/2018    Chronic kidney disease 6/10/2013    COPD (chronic obstructive pulmonary disease) (HCC)     Diabetes mellitus (HCC)     insulin dependent    History of colon polyps     History of weakness of extremity     right sided    HTN (hypertension)     Hyperlipidemia     Lung, cysts, congenital     PTSD (post-traumatic stress disorder)     PTSD (post-traumatic stress disorder)     TIA (transient ischemic attack)     Type II or unspecified type diabetes mellitus without mention of complication, not stated as uncontrolled        PAST SURGICAL HISTORY     Past Surgical History:   Procedure Laterality Date    CAROTID ENDARTERECTOMY Left 7-2013    COLONOSCOPY      HERNIA REPAIR      MI COLSC FLX W/RMVL OF TUMOR POLYP LESION SNARE TQ N/A 6/27/2017    COLONOSCOPY POLYPECTOMY SNARE/ hot performed by Alena Mckeon DO at [facility] Left 2015    carotid stent, dr Denise Ramirez     Lisinopril; Ace inhibitors; and Pcn [penicillins]    MEDICATIONS PRIOR TO ADMISSION     Prior to Admission medications    Medication Sig Start Date End Date Taking?  Authorizing Provider   albuterol-ipratropium (COMBIVENT RESPIMAT)  MCG/ACT AERS inhaler Inhale 1 puff into the lungs every 6 hours 11/25/20 Janee [facility], APRN - CNP   prazosin (MINIPRESS) 2 MG capsule Take 2 capsules by mouth 2 times daily 11/25/20 Janee Cra, APRN - CNP   vitamin D (ERGOCALCIFEROL) 1.25 MG (91812 UT) CAPS capsule Take 1 capsule by mouth once a week 11/25/20 Janee Cra, APRN - CNP   hydrALAZINE (APRESOLINE) 100 MG tablet TAKE 1 TABLET BY MOUTH EVERY 8 HOURS 11/5/20 Janee Cra, APRN - CNP   azithromycin (ZITHROMAX) 250 MG tablet Take 2 tabs (500 mg) on Day 1, and take 1 tab (250 mg) on days 2 through 5. 10/19/20   Jinny [facility], APRN - CNP   predniSONE (DELTASONE) 10 MG tablet Tapered dose 40mg x 3 days. .. 30mg x 3 days. .. 20mg x 3 days . Duc Thorpe .10mg x3days . ..then off 10/19/20   Jinny Cra, APRN - CNP   gabapentin (NEURONTIN) 100 MG capsule Take 2 capsules by mouth 3 times daily for 90 doses.  10/5/20 11/4/20  Jinny [facility], APRN - CNP   QUEtiapine (SEROQUEL) 100 MG tablet TAKE 1 TABLET BY MOUTH NIGHTLY 9/25/20 Janee Cra, APRN - CNP   losartan (COZAAR) 50 MG tablet TAKE 1 TABLET BY MOUTH 2 TIMES DAILY 9/25/20 Janee Cra, APRN - CNP   fluticasone-umeclidin-vilant (TRELEGY ELLIPTA) 267-52.5-12 MCG/INH AEPB INHALE ONE PUFF INTO THE LUNGS DAILY 9/25/20 Janee Cra, APRN - CNP   aspirin (ASPIR-LOW) 81 MG EC tablet TAKE 1 TABLET BY MOUTH DAILY 9/5/20 Janee Cra, APRN - CNP   GNP VITAMIN D MAXIMUM STRENGTH 50 MCG (2000 UT) TABS TAKE 1 TABLET BY MOUTH ONCE DAILY 9/5/20 Janee Cra, APRN - CNP   NIFEdipine (ADALAT CC) 30 MG extended release tablet Take 2 tablets by mouth daily 8/13/20   Latrell Luis MD   isosorbide mononitrate (IMDUR) 60 MG extended release tablet Take 1 tablet by mouth daily 8/14/20   Latrell Luis MD   bumetanide (BUMEX) 1 MG tablet Take 1 tablet by mouth daily  Patient not taking: Reported on 10/9/2020 8/19/20   Latrell Luis MD blood glucose monitor strips Test_4__times daily Diagnosis: 250.0   Diabetes Mellitus_x___Insulin Dependent____Non-Insulin Dependent 8/10/20   TERRANCE Mora - CNP   Lancets MISC Use_4__times daily Diagnisis:250.0  Diabetes Mellitus__x__Insulin Dependent___Non-Insulin Dependent 8/10/20   Rodolfo Nunes APRN - CNP   carvedilol (COREG) 25 MG tablet Take 1 tablet by mouth 2 times daily (with meals) 6/16/20   TERRANCE Mora - CNP   atorvastatin (LIPITOR) 80 MG tablet Take 1 tablet by mouth daily 6/16/20   TERRANCE Mora - CNP   clopidogrel (PLAVIX) 75 MG tablet Take 1 tablet by mouth daily 6/16/20   TERRANCE Mora - CNP   insulin glargine [facility]) 100 UNIT/ML injection pen Inject 35 Units into the skin nightly Dispense with pen needle 6/16/20   TERRANCE Mora CNP   insulin aspart (NOVOLOG FLEXPEN) 100 UNIT/ML injection pen Inject 5 Units into the skin 3 times daily (before meals) Sliding scale 6/16/20   TERRANCE Mora CNP   nitroGLYCERIN (NITROSTAT) 0.4 MG SL tablet up to max of 3 total doses. If no relief after 1 dose, call 911. 6/16/20   TERRANCE Mora CNP   Nutritional Supplements ([address] [place] [address]) LIQD Take 1 Can by mouth 3 times daily 6/16/20   TERRANCE Mora CNP   COMFORT EZ PEN NEEDLES 31G X 8 MM MISC USE AS DIRECTED 4/14/20   TERRANCE Mora CNP   TRUE METRIX BLOOD GLUCOSE TEST strip TEST BLOOD SUGAR 4 TO 5 TIMES DAILY 12/12/19   TERRANCE Mora CNP   EASY COMFORT LANCETS MISC DX: DM-2 USE 3-4 TIMES DAILY 3/25/19   Zara Tijerina MD       SOCIAL HISTORY     Tobacco: former smoker  Alcohol: no   Illicits: History of cocaine abuse.    Occupation:     FAMILY HISTORY     Family History   Problem Relation Age of Onset    Cancer Mother     Heart Disease Father        PHYSICAL EXAM     Vitals: BP (!) 221/68   Pulse 74   Temp 97.9 °F (36.6 °C) (Oral)   Resp 20   Ht 5' 6\" (1.676 m)   Wt 115 lb (52.2 kg) SpO2 97%   BMI 18.56 kg/m²   Tmax: Temp (24hrs), Av.9 °F (36.6 °C), Min:97.9 °F (36.6 °C), Max:97.9 °F (36.6 °C)    Last Body weight:   Wt Readings from Last 3 Encounters:   20 115 lb (52.2 kg)   20 137 lb 12.6 oz (62.5 kg)   20 149 lb (67.6 kg)     Body Mass Index : Body mass index is 18.56 kg/m². PHYSICAL EXAMINATION:  Constitutional: This is a well developed, well nourished, 17-18.4 - Mild malnutrition / Protein energy malnutrition Grade I 60y.o. year old male who is alert, oriented, cooperative and in no apparent distress. Head:normocephalic and atraumatic. EENT:  PERRLA. No conjunctival injections. Septum was midline, mucosa was without erythema, exudates or cobblestoning. No thrush was noted. Neck: Supple without thyromegaly. No elevated JVP. Trachea was midline. Respiratory: B/L lower lobes mild expiratory wheeze. Chest was symmetrical without dullness to percussion. Breath sounds bilaterally were clear to auscultation. There were no wheezes, rhonchi or rales. There is no intercostal retraction or use of accessory muscles. No egophony noted. Cardiovascular: Regular without murmur, clicks, gallops or rubs. Abdomen: Slightly rounded and soft without organomegaly. No rebound, rigidity or guarding was appreciated. Lymphatic: No lymphadenopathy. Musculoskeletal: Normal curvature of the spine. No gross muscle weakness. Extremities:  No lower extremity edema, ulcerations, tenderness, varicosities or erythema. Muscle size, tone and strength are normal.  No involuntary movements are noted. Skin:  Warm and dry. Good color, turgor and pigmentation. No lesions or scars.   No cyanosis or clubbing  Neurological/Psychiatric: The patient's general behavior, level of consciousness, thought content and emotional status is normal.          INVESTIGATIONS     Laboratory Testing:     Recent Results (from the past 24 hour(s))   Culture, Blood 1    Collection Time: 20 10:55 AM    Specimen: Blood   Result Value Ref Range    Specimen Description . BLOOD     Special Requests L FA 2 ML     Culture NO GROWTH 3 HOURS    Venous Blood Gas, POC    Collection Time: 12/03/20 11:03 AM   Result Value Ref Range    pH, Marlon 7.215 (L) 7.320 - 7.430    pCO2, Marlon 38.1 (L) 41.0 - 51.0 mm Hg    pO2, Marlon 42.5 30.0 - 50.0 mm Hg    HCO3, Venous 15.4 (L) 22.0 - 29.0 mmol/L    Total CO2, Venous 17 (L) 23.0 - 30.0 mmol/L    Negative Base Excess, Marlon 12 (H) 0.0 - 2.0    Positive Base Excess, Marlon NOT REPORTED 0.0 - 3.0    O2 Sat, Marlon 68 60.0 - 85.0 %    O2 Device/Flow/% NOT REPORTED     Rony Test NOT REPORTED     Sample Site NOT REPORTED     Mode NOT REPORTED     FIO2 NOT REPORTED     Pt Temp NOT REPORTED     POC pH Temp NOT REPORTED     POC pCO2 Temp NOT REPORTED mm Hg    POC pO2 Temp NOT REPORTED mm Hg   Hemoglobin and hematocrit, blood    Collection Time: 12/03/20 11:03 AM   Result Value Ref Range    POC Hemoglobin 12.8 (L) 13.5 - 17.5 g/dL    POC Hematocrit 38 (L) 41 - 53 %   Creatinine W/GFR Point of Care    Collection Time: 12/03/20 11:03 AM   Result Value Ref Range    POC Creatinine 3.11 (H) 0.51 - 1.19 mg/dL    GFR Comment 25 (L) >60 mL/min    GFR Non-African American 20 (L) >60 mL/min    GFR Comment         SODIUM (POC)    Collection Time: 12/03/20 11:03 AM   Result Value Ref Range    POC Sodium 126 (L) 138 - 146 mmol/L   POTASSIUM (POC)    Collection Time: 12/03/20 11:03 AM   Result Value Ref Range    POC Potassium 5.5 (H) 3.5 - 4.5 mmol/L   CHLORIDE (POC)    Collection Time: 12/03/20 11:03 AM   Result Value Ref Range    POC Chloride 98 98 - 107 mmol/L   CALCIUM, IONIC (POC)    Collection Time: 12/03/20 11:03 AM   Result Value Ref Range    POC Ionized Calcium 1.10 (L) 1.15 - 1.33 mmol/L   Lactic Acid, POC    Collection Time: 12/03/20 11:03 AM   Result Value Ref Range    POC Lactic Acid 1.68 (H) 0.56 - 1.39 mmol/L   POCT Glucose    Collection Time: 12/03/20 11:03 AM   Result Value Ref Range    POC Glucose 549 (HH) 74 - 100 mg/dL   Anion Gap (Calc) POC    Collection Time: 12/03/20 11:03 AM   Result Value Ref Range    Anion Gap 13 7 - 16 mmol/L   Comprehensive Metabolic Panel    Collection Time: 12/03/20 11:15 AM   Result Value Ref Range    Glucose 540 (HH) 70 - 99 mg/dL    BUN 58 (H) 8 - 23 mg/dL    CREATININE 3.51 (H) 0.70 - 1.20 mg/dL    Bun/Cre Ratio NOT REPORTED 9 - 20    Calcium 9.3 8.6 - 10.4 mg/dL    Sodium 132 (L) 135 - 144 mmol/L    Potassium 5.8 (H) 3.7 - 5.3 mmol/L    Chloride 94 (L) 98 - 107 mmol/L    CO2 10 (L) 20 - 31 mmol/L    Anion Gap 28 (H) 9 - 17 mmol/L    Alkaline Phosphatase 118 40 - 129 U/L    ALT 17 5 - 41 U/L    AST 22 <40 U/L    Total Bilirubin 0.29 (L) 0.3 - 1.2 mg/dL    Total Protein 6.4 6.4 - 8.3 g/dL    Alb 3.2 (L) 3.5 - 5.2 g/dL    Albumin/Globulin Ratio 1.0 1.0 - 2.5    GFR Non- 18 (L) >60 mL/min    GFR  21 (L) >60 mL/min    GFR Comment          GFR Staging NOT REPORTED    Beta-Hydroxybutyrate    Collection Time: 12/03/20 11:15 AM   Result Value Ref Range    Beta-Hydroxybutyrate 6.95 (H) 0.02 - 0.27 mmol/L   Lactic Acid, Whole Blood    Collection Time: 12/03/20 11:15 AM   Result Value Ref Range    Lactic Acid, Whole Blood 2.1 0.7 - 2.1 mmol/L   Magnesium    Collection Time: 12/03/20 11:15 AM   Result Value Ref Range    Magnesium 2.2 1.6 - 2.6 mg/dL   Osmolality    Collection Time: 12/03/20 11:15 AM   Result Value Ref Range    Serum Osmolality 326 (HH) 275 - 295 mOsm/kg   Protime-INR    Collection Time: 12/03/20 11:15 AM   Result Value Ref Range    Protime 9.8 9.0 - 12.0 sec    INR 0.9    Troponin    Collection Time: 12/03/20 11:15 AM   Result Value Ref Range    Troponin, High Sensitivity 136 (HH) 0 - 22 ng/L    Troponin T NOT REPORTED <0.03 ng/mL    Troponin Interp NOT REPORTED    Culture, Blood 1    Collection Time: 12/03/20 11:24 AM    Specimen: Blood   Result Value Ref Range    Specimen Description . BLOOD     Special Requests 2ML L FR Culture NO GROWTH 3 HOURS    Troponin    Collection Time: 12/03/20  1:26 PM   Result Value Ref Range    Troponin, High Sensitivity 132 (HH) 0 - 22 ng/L    Troponin T NOT REPORTED <0.03 ng/mL    Troponin Interp NOT REPORTED    POC Glucose Fingerstick    Collection Time: 12/03/20  1:47 PM   Result Value Ref Range    POC Glucose 367 (H) 75 - 110 mg/dL   POC Glucose Fingerstick    Collection Time: 12/03/20  3:13 PM   Result Value Ref Range    POC Glucose 296 (H) 75 - 110 mg/dL   POCT Glucose - every hour    Collection Time: 12/03/20  3:20 PM   Result Value Ref Range    Glucose 296 mg/dL    QC OK? ok        Imaging:   Xr Chest Portable    Result Date: 12/3/2020  No acute cardiopulmonary process       ASSESSMENT & PLAN     ASSESSMENT / PLAN:     IMPRESSION  This is a 60 y.o. male who presented with nausea and vomiting and found to have ketoacidosis with underlying DM type 1 DM. Patient admitted to inpatient status because of DKA. Active Problems:    DKA, type 2, not at goal [facility])  Resolved Problems:    * No resolved hospital problems. *    1. Diabetic ketoacidosis with underlying Type 1 DM : Fluid bolus 3L & Continue on IV 0.45 Normal saline infusion at 250ml/hr, continue  Insulin drip, Glucose checks every hour and BMP Q 4, add dextrose after BG <250 and continue insulin drip for 2 more hours and switch to SC insulin once pt is able to eat and ketoacidosis resolved in two successive BMP and HCO3 >15 and PH >7.2. 2.Essential hypertension : Resumed his home medications, pt asymptomatic,  will monitor. 3.CKD stage 3 with underlying HTN and DM : GFR and worsening since last admit, will consult nephrology input. DVT ppx: heparin 5000 Units  GI ppx: none    PT/OT/SW: ongoing  Discharge Planning: ongoing    Timbo Casas MD  Internal Medicine Resident, PGY-1  [facility];  [place], [place]  12/3/2020, 3:21 PM Attending Physician Statement  I have discussed the care of Bro Root, including pertinent history and exam findings,  with the resident. I have seen and examined the patient and the key elements of all parts of the encounter have been performed by me. I agree with the assessment, plan and orders as documented by the resident with additions . Improving DKA    Treatment plan Discussed with nursing staff in detail , all questions answered . Electronically signed by Nancy Casas MD on   12/4/20 at 4:27 PM EST    Please note that this chart was generated using voice recognition Dragon dictation software. Although every effort was made to ensure the accuracy of this automated transcription, some errors in transcription may have occurred.

## 2020-12-03 NOTE — ED NOTES
Per pt's new blood glucose reading pt's insulin rate does not need to be changed.       Aftab Armendariz RN  12/03/20 [id number]

## 2020-12-03 NOTE — ED NOTES
Pt BG was 169. Pt requesting to have insulin shut off as he does not want to go too low. Both infusions shut off. Resident notified.       Daniel Avelar RN  12/03/20 [id number]

## 2020-12-03 NOTE — ED NOTES
Bed: 02  Expected date:   Expected time:   Means of arrival:   Comments:  Medic Kateryna Puente [facility]  12/03/20 [id number]

## 2020-12-03 NOTE — ED PROVIDER NOTES
[facility] ED  Emergency Department Encounter  Emergency Medicine Resident     Pt Name: Lauren Peña  MRN: [id number]  [place] 1955  Date of evaluation: 12/3/20  PCP:  TERRANCE Ramirez CNP    Chief Complaint     Chief Complaint   Patient presents with    Emesis       History of present illness (HPI)  (Location/Symptom, Timing/Onset, Context/Setting, Quality, Duration, Modifying Factors, Severity.)      Lauren Peña is a 60 y.o. male who presented to the emergency department with high blood glucose readings prior to arrival. The patient reports feeling ill for the last couple of days including nausea, vomiting, emesis, dry mouth, polyuria, abdominal pain as well as increased shortness of breath throughout the period of time. Patient does have a history of coronary artery disease, COPD, does have a history of type 2 diabetes for which he states he has been in DKA before in the past.  Although patient is nonacute distress, does appear to be tachypneic upon initial evaluation. Patient's blood sugar was greater than 600 at home, patient took 10 of Lantus prior to coming to the emergency department for persistent hyperglycemia. Patient denies sick contacts, states he has a dry chronic cough but denies productive cough, states that he has had no loss of smell or taste, no positive Covid contacts, endorses generalized abdominal tenderness, polyuria, denies change in bowel function. Patient does state they have been compliant with his medications. .     Past medical / surgical/ social/ family history      Past Medical Hx:   Patient has no medical problems   has a past medical history of Asthma, CAD in native artery, nonobstructive, Carotid stenosis, left, Carpal tunnel syndrome, Cerebrovascular disease, Chronic kidney disease, COPD (chronic obstructive pulmonary disease) ([facility] [address] Diabetes mellitus ([facility] [address] History of colon polyps, History of weakness of extremity, HTN (hypertension), Hyperlipidemia, Lung, cysts, congenital, PTSD (post-traumatic stress disorder), PTSD (post-traumatic stress disorder), TIA (transient ischemic attack), and Type II or unspecified type diabetes mellitus without mention of complication, not stated as uncontrolled. Past Surgical Hx:  Patient has had no surgeries   has a past surgical history that includes hernia repair; Tonsillectomy; Colonoscopy; Carotid endarterectomy (Left, -); vascular surgery (Left, ); and pr colsc flx w/rmvl of tumor polyp lesion snare tq (N/A, 2017).     Social Hx:  Social History     Socioeconomic History    Marital status:      Spouse name: Not on file    Number of children: Not on file    Years of education: Not on file    Highest education level: Not on file   Occupational History     Employer: N/A   Social Needs    Financial resource strain: Not on file    Food insecurity     Worry: Not on file     Inability: Not on file   [employer] needs     Medical: Not on file     Non-medical: Not on file   Tobacco Use    Smoking status: Former Smoker     Packs/day: 0.50     Years: 35.00     Pack years: 17.50     Types: Cigarettes     Last attempt to quit: 2014     Years since quittin.4    Smokeless tobacco: Never Used   Substance and Sexual Activity    Alcohol use: No     Alcohol/week: 0.0 standard drinks    Drug use: No    Sexual activity: Yes     Partners: Female   Lifestyle    Physical activity     Days per week: Not on file     Minutes per session: Not on file    Stress: Not on file   Relationships    Social connections     Talks on phone: Not on file     Gets together: Not on file     Attends Faith service: Not on file     Active member of club or organization: Not on file     Attends meetings of clubs or organizations: Not on file     Relationship status: Not on file    Intimate partner violence     Fear of current or ex partner: Not on file     Emotionally abused: Not on file Physically abused: Not on file     Forced sexual activity: Not on file   Other Topics Concern    Not on file   Social History Narrative    Not on file       Family Hx:  No relevant family history is reported  Family History   Problem Relation Age of Onset    Cancer Mother     Heart Disease Father        Allergies:    No known medication allergies  Lisinopril; Ace inhibitors; and Pcn [penicillins]    Home Medications: The patient takes no medications  Prior to Admission medications    Medication Sig Start Date End Date Taking? Authorizing Provider   albuterol-ipratropium (COMBIVENT RESPIMAT)  MCG/ACT AERS inhaler Inhale 1 puff into the lungs every 6 hours 11/25/20   TERRANCE Hope CNP   prazosin (MINIPRESS) 2 MG capsule Take 2 capsules by mouth 2 times daily 11/25/20   TERRANCE Hope - CNP   vitamin D (ERGOCALCIFEROL) 1.25 MG (66628 UT) CAPS capsule Take 1 capsule by mouth once a week 11/25/20   TERRANCE Hope - CNP   hydrALAZINE (APRESOLINE) 100 MG tablet TAKE 1 TABLET BY MOUTH EVERY 8 HOURS 11/5/20   TERRANCE Hope - CNP   azithromycin (ZITHROMAX) 250 MG tablet Take 2 tabs (500 mg) on Day 1, and take 1 tab (250 mg) on days 2 through 5. 10/19/20   TERRANCE Hope CNP   predniSONE (DELTASONE) 10 MG tablet Tapered dose 40mg x 3 days. .. 30mg x 3 days. .. 20mg x 3 days . Price Ratisaacke .10mg x3days . ..then off 10/19/20   TERRANCE Hope CNP   gabapentin (NEURONTIN) 100 MG capsule Take 2 capsules by mouth 3 times daily for 90 doses.  10/5/20 11/4/20  TERRANCE Hope - CNP   QUEtiapine (SEROQUEL) 100 MG tablet TAKE 1 TABLET BY MOUTH NIGHTLY 9/25/20   TERRANCE Hope - CNP   losartan (COZAAR) 50 MG tablet TAKE 1 TABLET BY MOUTH 2 TIMES DAILY 9/25/20   TERRANCE Hope - JEN   fluticasone-umeclidin-vilant (Kelley Carver) 264-20.0-14 MCG/INH AEPB INHALE ONE PUFF INTO THE LUNGS DAILY 9/25/20   Saranya Mckeon APRN - CNP   aspirin (ASPIR-LOW) 81 MG EC tablet TAKE 1 TABLET BY MOUTH DAILY 9/5/20   Jacqualine Seip, APRN - CNP   GNP VITAMIN D MAXIMUM STRENGTH 50 MCG (2000 UT) TABS TAKE 1 TABLET BY MOUTH ONCE DAILY 9/5/20   Jacqualine Seip, APRN - CNP   NIFEdipine (ADALAT CC) 30 MG extended release tablet Take 2 tablets by mouth daily 8/13/20   Karen He MD   isosorbide mononitrate (IMDUR) 60 MG extended release tablet Take 1 tablet by mouth daily 8/14/20   Karen He MD   bumetanide (BUMEX) 1 MG tablet Take 1 tablet by mouth daily  Patient not taking: Reported on 10/9/2020 8/19/20   Karen He MD   blood glucose monitor strips Test_4__times daily Diagnosis: 250.0   Diabetes Mellitus_x___Insulin Dependent____Non-Insulin Dependent 8/10/20   Jacqualine Seip, APRN - CNP   Lancets MISC Use_4__times daily Diagnisis:250.0  Diabetes Mellitus__x__Insulin Dependent___Non-Insulin Dependent 8/10/20   Jacqualine Seip, APRN - CNP   carvedilol (COREG) 25 MG tablet Take 1 tablet by mouth 2 times daily (with meals) 6/16/20   Jacqualine Seip, APRN - CNP   atorvastatin (LIPITOR) 80 MG tablet Take 1 tablet by mouth daily 6/16/20   Jacqualine Seip, APRN - CNP   clopidogrel (PLAVIX) 75 MG tablet Take 1 tablet by mouth daily 6/16/20   Jacqualine Seip, APRN - CNP   insulin glargine (BASAGLAR KWIKPEN) 100 UNIT/ML injection pen Inject 35 Units into the skin nightly Dispense with pen needle 6/16/20   Jacqualine Seip, APRN - CNP   insulin aspart (NOVOLOG FLEXPEN) 100 UNIT/ML injection pen Inject 5 Units into the skin 3 times daily (before meals) Sliding scale 6/16/20   Jacqualine Seip, APRN - CNP   nitroGLYCERIN (NITROSTAT) 0.4 MG SL tablet up to max of 3 total doses.  If no relief after 1 dose, call 911. 6/16/20   Jacqualine Seip, APRN - CNP   Nutritional Supplements (GLUCERNA CARBSTEADY) LIQD Take 1 Can by mouth 3 times daily 6/16/20   Jacqualine Seip, APRN - CNP   COMFORT EZ PEN NEEDLES 31G X 8 MM MISC USE AS DIRECTED 4/14/20   Jacqualine Seip, APRN - CNP   TRUE METRIX BLOOD GLUCOSE TEST strip TEST BLOOD SUGAR 4 TO 5 TIMES DAILY 12/12/19   Rex Young, APRN - CNP   EASY COMFORT LANCETS MISC DX: DM-2 USE 3-4 TIMES DAILY 3/25/19   Zara Jerry MD       Review of systems  (2-9 systems for level 4, 10 or more for level 5)      Constitutional: Denies recent fever, chills. Eyes: No visual changes. Neck: No midline neck pain  Respiratory: Denies recent shortness of breath. Cardiac:  Denies recent chest pain. GI: + abdominal pain with nausea +vomiting. Denies Blood in the stool or black tarry stools. : denies dysuria.  +Polyuria  Musculoskeletal: Denies focal weakness. Neurologic: denies headache or focal weakness. Skin:  Denies any rash. Physical exam  (up to 7 for level 4, 8 or more for level 5)      INITIAL VITALS: BP (!) 158/72   Pulse 68   Temp 97.9 °F (36.6 °C) (Oral)   Resp 20   Ht 5' 6\" (1.676 m)   Wt 115 lb (52.2 kg)   SpO2 94%   BMI 18.56 kg/m²     CONSTITUTIONAL: Vital signs reviewed, Alert and oriented X 3. HEAD: Atraumatic, Normocephalic. EYES: Eyes are normal to inspection, Pupils equal, round and reactive to light. NECK: Normal ROM, No jugular venous distention, No meningeal signs. RESPIRATORY CHEST: No respiratory distress. ABDOMEN: Tenderness appreciated to general abdomen, worse in the epigastrum . without guarding No distension. No pulsatile masses palpated. BACK:  No midline bony tenderness to palpation. No paraspinal muscle tenderness   UPPER EXTREMITY: Inspection normal, No cyanosis. LOWER EXTREMITY: Pulses are 2+ and equal bilaterally with cap refill < 2 seconds. NEURO: GCS is 15.  5/5 strength in bilateral lower extremities with sensation to light touch intact. SKIN: Skin is warm, Skin is dry. PSYCHIATRIC: Oriented X 3, Normal affect.      Plan     DIAGNOSTIC ORDERS:  Orders Placed This Encounter   Procedures    Culture, Blood 1    Culture, Blood 1    XR CHEST PORTABLE    Comprehensive Metabolic Panel    hydrALAZINE (APRESOLINE) tablet 100 mg    DISCONTD: NIFEdipine (ADALAT CC) extended release tablet 60 mg    DISCONTD: prazosin (MINIPRESS) capsule 4 mg    dextrose 50 % IV solution    potassium chloride 10 mEq/100 mL IVPB (Peripheral Line)    magnesium sulfate 1 g in dextrose 5% 100 mL IVPB    OR Linked Order Group     sodium phosphate 10 mmol in dextrose 5 % 250 mL IVPB     sodium phosphate 15 mmol in dextrose 5 % 250 mL IVPB     sodium phosphate 20 mmol in dextrose 5 % 500 mL IVPB    FOLLOWED BY Linked Order Group     0.9 % sodium chloride bolus     0.45 % sodium chloride infusion    dextrose 5 % and 0.45 % sodium chloride infusion    heparin (porcine) injection 5,000 Units    insulin regular (HUMULIN R;NOVOLIN R) 100 Units in sodium chloride 0.9 % 100 mL infusion    hydrALAZINE (APRESOLINE) tablet 100 mg    labetalol (NORMODYNE;TRANDATE) injection 10 mg    carvedilol (COREG) tablet 25 mg    hydrALAZINE (APRESOLINE) tablet 100 mg    NIFEdipine (ADALAT CC) extended release tablet 60 mg    prazosin (MINIPRESS) capsule 4 mg    dextrose 5 % and 0.45 % NaCl 5-0.45 % infusion     EMILIE JUÁREZ: cabinet override       Differential diagnosis      HYPERGLYCEMIA/DKA  DKA, Hyperglycemia, Glucagonoma, Toxic exposure, Ingestion, HHS     Diagnostic Results     LABS:  Not indicated  Results for orders placed or performed during the hospital encounter of 12/03/20   Culture, Blood 1    Specimen: Blood   Result Value Ref Range    Specimen Description . BLOOD     Special Requests 2ML L FR     Culture NO GROWTH 3 HOURS    Culture, Blood 1    Specimen: Blood   Result Value Ref Range    Specimen Description . BLOOD     Special Requests L FA 2 ML     Culture NO GROWTH 3 HOURS    Comprehensive Metabolic Panel   Result Value Ref Range    Glucose 540 (HH) 70 - 99 mg/dL    BUN 58 (H) 8 - 23 mg/dL    CREATININE 3.51 (H) 0.70 - 1.20 mg/dL    Bun/Cre Ratio NOT REPORTED 9 - 20    Calcium 9.3 8.6 - 10.4 mg/dL    Sodium 132 (L) 135 - 144 mmol/L    Potassium 5.8 (H) 3.7 - 5.3 mmol/L    Chloride 94 (L) 98 - 107 mmol/L    CO2 10 (L) 20 - 31 mmol/L    Anion Gap 28 (H) 9 - 17 mmol/L    Alkaline Phosphatase 118 40 - 129 U/L    ALT 17 5 - 41 U/L    AST 22 <40 U/L    Total Bilirubin 0.29 (L) 0.3 - 1.2 mg/dL    Total Protein 6.4 6.4 - 8.3 g/dL    Alb 3.2 (L) 3.5 - 5.2 g/dL    Albumin/Globulin Ratio 1.0 1.0 - 2.5    GFR Non- 18 (L) >60 mL/min    GFR  21 (L) >60 mL/min    GFR Comment          GFR Staging NOT REPORTED    Beta-Hydroxybutyrate   Result Value Ref Range    Beta-Hydroxybutyrate 6.95 (H) 0.02 - 0.27 mmol/L   Lactic Acid, Whole Blood   Result Value Ref Range    Lactic Acid, Whole Blood 2.1 0.7 - 2.1 mmol/L   Magnesium   Result Value Ref Range    Magnesium 2.2 1.6 - 2.6 mg/dL   Osmolality   Result Value Ref Range    Serum Osmolality 326 (HH) 275 - 295 mOsm/kg   Protime-INR   Result Value Ref Range    Protime 9.8 9.0 - 12.0 sec    INR 0.9    Troponin   Result Value Ref Range    Troponin, High Sensitivity 136 (HH) 0 - 22 ng/L    Troponin T NOT REPORTED <0.03 ng/mL    Troponin Interp NOT REPORTED    Troponin   Result Value Ref Range    Troponin, High Sensitivity 132 (HH) 0 - 22 ng/L    Troponin T NOT REPORTED <0.03 ng/mL    Troponin Interp NOT REPORTED    Hemoglobin and hematocrit, blood   Result Value Ref Range    POC Hemoglobin 12.8 (L) 13.5 - 17.5 g/dL    POC Hematocrit 38 (L) 41 - 53 %   SODIUM (POC)   Result Value Ref Range    POC Sodium 126 (L) 138 - 146 mmol/L   POTASSIUM (POC)   Result Value Ref Range    POC Potassium 5.5 (H) 3.5 - 4.5 mmol/L   CHLORIDE (POC)   Result Value Ref Range    POC Chloride 98 98 - 107 mmol/L   CALCIUM, IONIC (POC)   Result Value Ref Range    POC Ionized Calcium 1.10 (L) 1.15 - 1.33 mmol/L   BASIC METABOLIC PANEL   Result Value Ref Range    Glucose 336 (H) 70 - 99 mg/dL    BUN 54 (H) 8 - 23 mg/dL    CREATININE 3.36 (H) 0.70 - 1.20 mg/dL    Bun/Cre Ratio NOT REPORTED 9 - 20 Calcium 8.2 (L) 8.6 - 10.4 mg/dL    Sodium 134 (L) 135 - 144 mmol/L    Potassium 4.7 3.7 - 5.3 mmol/L    Chloride 101 98 - 107 mmol/L    CO2 14 (L) 20 - 31 mmol/L    Anion Gap 19 (H) 9 - 17 mmol/L    GFR Non-African American 19 (L) >60 mL/min    GFR  23 (L) >60 mL/min    GFR Comment          GFR Staging NOT REPORTED    Venous Blood Gas, POC   Result Value Ref Range    pH, Marlon 7.215 (L) 7.320 - 7.430    pCO2, Marlon 38.1 (L) 41.0 - 51.0 mm Hg    pO2, Marlon 42.5 30.0 - 50.0 mm Hg    HCO3, Venous 15.4 (L) 22.0 - 29.0 mmol/L    Total CO2, Venous 17 (L) 23.0 - 30.0 mmol/L    Negative Base Excess, Marlon 12 (H) 0.0 - 2.0    Positive Base Excess, Marlon NOT REPORTED 0.0 - 3.0    O2 Sat, Marlon 68 60.0 - 85.0 %    O2 Device/Flow/% NOT REPORTED     Rony Test NOT REPORTED     Sample Site NOT REPORTED     Mode NOT REPORTED     FIO2 NOT REPORTED     Pt Temp NOT REPORTED     POC pH Temp NOT REPORTED     POC pCO2 Temp NOT REPORTED mm Hg    POC pO2 Temp NOT REPORTED mm Hg   Creatinine W/GFR Point of Care   Result Value Ref Range    POC Creatinine 3.11 (H) 0.51 - 1.19 mg/dL    GFR Comment 25 (L) >60 mL/min    GFR Non-African American 20 (L) >60 mL/min    GFR Comment         Lactic Acid, POC   Result Value Ref Range    POC Lactic Acid 1.68 (H) 0.56 - 1.39 mmol/L   POCT Glucose   Result Value Ref Range    POC Glucose 549 (HH) 74 - 100 mg/dL   Anion Gap (Calc) POC   Result Value Ref Range    Anion Gap 13 7 - 16 mmol/L   POC Glucose Fingerstick   Result Value Ref Range    POC Glucose 367 (H) 75 - 110 mg/dL   POCT Glucose - every hour   Result Value Ref Range    Glucose 296 mg/dL    QC OK? ok    POCT Glucose - every hour   Result Value Ref Range    Glucose 261 mg/dL    QC OK? ok    POC Glucose Fingerstick   Result Value Ref Range    POC Glucose 296 (H) 75 - 110 mg/dL   POC Glucose Fingerstick   Result Value Ref Range    POC Glucose 261 (H) 75 - 110 mg/dL   POC Glucose Fingerstick   Result Value Ref Range    POC Glucose 193 (H) 75 - 110 mg/dL   EKG 12 Lead   Result Value Ref Range    Ventricular Rate 98 BPM    Atrial Rate 98 BPM    P-R Interval 140 ms    QRS Duration 94 ms    Q-T Interval 350 ms    QTc Calculation (Bazett) 446 ms    P Axis 83 degrees    R Axis 116 degrees    T Axis 97 degrees       RADIOLOGY:  Not indicated  XR CHEST PORTABLE   Final Result   No acute cardiopulmonary process             EKG Interpretation  Rate: normal 98 bmp  Rhythm: normal sinus   Axis: right axis  Ectopy: none  Intervals: normal with good R-wave progression  Enlargements:  Left atrial enlargement  ST Segments: no acute change  T Waves: no acute change  Q Waves: none and nonspecific    Clinical Impression: non specific  Rate increase, qrs axis shifted right      Medical decision making  (MDM) / ED Course       DKA  This patient presents with hyperglycemia and symptoms concerning for DKA. Differential diagnosis includes other metabolic causes of hyperglycemia such as HHS, worsened diabetes or medication noncompliance. Considered possible causes of DKA to include infection (pancreatitis, UTI, pneumonia), infarction / ischemia (acute coronary syndrome, cerebral vascular accident), medication non-compliance with insulin therapy, illicit substance abuse, iatrogenic (including prescription medications and drug-drug interactions), idiopathic causes. Most likely etiology at this time is . The patient's hyperglycemia was treated with IV fluids, IV insulin therapy, serial reassessments. Further testing and evaluation is warranted so the patient will be brought into the hospital under the Medicine service.      Plan: POC glucose monitoring (Q1H), BMP (Q2H), blood gas, UA, serum ketones, CBC, LFTs / lipase, infectious workup (lactate/blood cultures, CHEST X-RAY), IVF, IV Insulin therapy, serial reassessment, admission for treatment of hyperglycemia    ED Course as of Dec 03 1808   Thu Dec 03, 2020   1137 Serum osmole is elevated, glucose is elevated, lactic acid within

## 2020-12-04 LAB
ABSOLUTE EOS #: 0.18 K/UL (ref 0–0.44)
ABSOLUTE IMMATURE GRANULOCYTE: <0.03 K/UL (ref 0–0.3)
ABSOLUTE LYMPH #: 1.23 K/UL (ref 1.1–3.7)
ABSOLUTE MONO #: 0.61 K/UL (ref 0.1–1.2)
ANION GAP SERPL CALCULATED.3IONS-SCNC: 13 MMOL/L (ref 9–17)
BASOPHILS # BLD: 1 % (ref 0–2)
BASOPHILS ABSOLUTE: 0.04 K/UL (ref 0–0.2)
BUN BLDV-MCNC: 55 MG/DL (ref 8–23)
BUN/CREAT BLD: ABNORMAL (ref 9–20)
CALCIUM SERPL-MCNC: 7.8 MG/DL (ref 8.6–10.4)
CHLORIDE BLD-SCNC: 107 MMOL/L (ref 98–107)
CHLORIDE, UR: 30 MMOL/L
CHP ED QC CHECK: YES
CO2: 17 MMOL/L (ref 20–31)
COMPLEMENT C3: 89 MG/DL (ref 90–180)
COMPLEMENT C4: 17 MG/DL (ref 10–40)
CREAT SERPL-MCNC: 3.71 MG/DL (ref 0.7–1.2)
CREATININE URINE: 122.2 MG/DL (ref 39–259)
DIFFERENTIAL TYPE: ABNORMAL
EKG ATRIAL RATE: 98 BPM
EKG P AXIS: 83 DEGREES
EKG P-R INTERVAL: 140 MS
EKG Q-T INTERVAL: 350 MS
EKG QRS DURATION: 94 MS
EKG QTC CALCULATION (BAZETT): 446 MS
EKG R AXIS: 116 DEGREES
EKG T AXIS: 97 DEGREES
EKG VENTRICULAR RATE: 98 BPM
EOSINOPHILS RELATIVE PERCENT: 2 % (ref 1–4)
GFR AFRICAN AMERICAN: 20 ML/MIN
GFR NON-AFRICAN AMERICAN: 17 ML/MIN
GFR SERPL CREATININE-BSD FRML MDRD: ABNORMAL ML/MIN/{1.73_M2}
GFR SERPL CREATININE-BSD FRML MDRD: ABNORMAL ML/MIN/{1.73_M2}
GLUCOSE BLD-MCNC: 122 MG/DL
GLUCOSE BLD-MCNC: 122 MG/DL (ref 75–110)
GLUCOSE BLD-MCNC: 156 MG/DL (ref 75–110)
GLUCOSE BLD-MCNC: 164 MG/DL (ref 75–110)
GLUCOSE BLD-MCNC: 166 MG/DL (ref 75–110)
GLUCOSE BLD-MCNC: 170 MG/DL (ref 75–110)
GLUCOSE BLD-MCNC: 184 MG/DL (ref 75–110)
GLUCOSE BLD-MCNC: 191 MG/DL (ref 75–110)
GLUCOSE BLD-MCNC: 205 MG/DL (ref 70–99)
GLUCOSE BLD-MCNC: 205 MG/DL (ref 75–110)
GLUCOSE BLD-MCNC: 257 MG/DL (ref 75–110)
GLUCOSE BLD-MCNC: 265 MG/DL
GLUCOSE BLD-MCNC: 265 MG/DL (ref 75–110)
GLUCOSE BLD-MCNC: 288 MG/DL (ref 75–110)
GLUCOSE BLD-MCNC: 298 MG/DL (ref 75–110)
GLUCOSE BLD-MCNC: 302 MG/DL
GLUCOSE BLD-MCNC: 302 MG/DL (ref 75–110)
GLUCOSE BLD-MCNC: 338 MG/DL (ref 75–110)
GLUCOSE BLD-MCNC: 355 MG/DL (ref 75–110)
GLUCOSE BLD-MCNC: 374 MG/DL
GLUCOSE BLD-MCNC: 374 MG/DL (ref 75–110)
GLUCOSE BLD-MCNC: 390 MG/DL (ref 75–110)
GLUCOSE BLD-MCNC: 402 MG/DL (ref 75–110)
GLUCOSE BLD-MCNC: 70 MG/DL
GLUCOSE BLD-MCNC: 70 MG/DL (ref 75–110)
HAV IGM SER IA-ACNC: NONREACTIVE
HCT VFR BLD CALC: 34.3 % (ref 40.7–50.3)
HEMOGLOBIN: 10.9 G/DL (ref 13–17)
HEPATITIS B CORE IGM ANTIBODY: NONREACTIVE
HEPATITIS B SURFACE ANTIGEN: NONREACTIVE
HEPATITIS C ANTIBODY: NONREACTIVE
IMMATURE GRANULOCYTES: 0 %
LACTIC ACID, WHOLE BLOOD: 1.7 MMOL/L (ref 0.7–2.1)
LYMPHOCYTES # BLD: 15 % (ref 24–43)
MCH RBC QN AUTO: 30.7 PG (ref 25.2–33.5)
MCHC RBC AUTO-ENTMCNC: 31.8 G/DL (ref 28.4–34.8)
MCV RBC AUTO: 96.6 FL (ref 82.6–102.9)
MONOCYTES # BLD: 8 % (ref 3–12)
NRBC AUTOMATED: 0 PER 100 WBC
OSMOLALITY URINE: 463 MOSM/KG (ref 80–1300)
PDW BLD-RTO: 12.5 % (ref 11.8–14.4)
PLATELET # BLD: 334 K/UL (ref 138–453)
PLATELET ESTIMATE: ABNORMAL
PMV BLD AUTO: 10.1 FL (ref 8.1–13.5)
POTASSIUM SERPL-SCNC: 4.4 MMOL/L (ref 3.7–5.3)
POTASSIUM, UR: 38.1 MMOL/L
RBC # BLD: 3.55 M/UL (ref 4.21–5.77)
RBC # BLD: ABNORMAL 10*6/UL
SEG NEUTROPHILS: 74 % (ref 36–65)
SEGMENTED NEUTROPHILS ABSOLUTE COUNT: 6.01 K/UL (ref 1.5–8.1)
SODIUM BLD-SCNC: 137 MMOL/L (ref 135–144)
SODIUM,UR: 33 MMOL/L
TOTAL CK: 150 U/L (ref 39–308)
TOTAL PROTEIN, URINE: 419 MG/DL
TROPONIN INTERP: ABNORMAL
TROPONIN T: ABNORMAL NG/ML
TROPONIN, HIGH SENSITIVITY: 126 NG/L (ref 0–22)
URINE TOTAL PROTEIN CREATININE RATIO: 3.43 (ref 0–0.2)
WBC # BLD: 8.1 K/UL (ref 3.5–11.3)
WBC # BLD: ABNORMAL 10*3/UL

## 2020-12-04 PROCEDURE — 86038 ANTINUCLEAR ANTIBODIES: CPT

## 2020-12-04 PROCEDURE — 84484 ASSAY OF TROPONIN QUANT: CPT

## 2020-12-04 PROCEDURE — 99222 1ST HOSP IP/OBS MODERATE 55: CPT | Performed by: INTERNAL MEDICINE

## 2020-12-04 PROCEDURE — 82570 ASSAY OF URINE CREATININE: CPT

## 2020-12-04 PROCEDURE — 2580000003 HC RX 258: Performed by: STUDENT IN AN ORGANIZED HEALTH CARE EDUCATION/TRAINING PROGRAM

## 2020-12-04 PROCEDURE — 6370000000 HC RX 637 (ALT 250 FOR IP): Performed by: STUDENT IN AN ORGANIZED HEALTH CARE EDUCATION/TRAINING PROGRAM

## 2020-12-04 PROCEDURE — 97165 OT EVAL LOW COMPLEX 30 MIN: CPT

## 2020-12-04 PROCEDURE — 86334 IMMUNOFIX E-PHORESIS SERUM: CPT

## 2020-12-04 PROCEDURE — 82436 ASSAY OF URINE CHLORIDE: CPT

## 2020-12-04 PROCEDURE — 84156 ASSAY OF PROTEIN URINE: CPT

## 2020-12-04 PROCEDURE — 80048 BASIC METABOLIC PNL TOTAL CA: CPT

## 2020-12-04 PROCEDURE — 94640 AIRWAY INHALATION TREATMENT: CPT

## 2020-12-04 PROCEDURE — 84133 ASSAY OF URINE POTASSIUM: CPT

## 2020-12-04 PROCEDURE — 82947 ASSAY GLUCOSE BLOOD QUANT: CPT

## 2020-12-04 PROCEDURE — 84165 PROTEIN E-PHORESIS SERUM: CPT

## 2020-12-04 PROCEDURE — 84300 ASSAY OF URINE SODIUM: CPT

## 2020-12-04 PROCEDURE — 97162 PT EVAL MOD COMPLEX 30 MIN: CPT

## 2020-12-04 PROCEDURE — G0108 DIAB MANAGE TRN  PER INDIV: HCPCS

## 2020-12-04 PROCEDURE — 6360000002 HC RX W HCPCS: Performed by: STUDENT IN AN ORGANIZED HEALTH CARE EDUCATION/TRAINING PROGRAM

## 2020-12-04 PROCEDURE — 2060000002 HC BURN ICU INTERMEDIATE R&B

## 2020-12-04 PROCEDURE — 85025 COMPLETE CBC W/AUTO DIFF WBC: CPT

## 2020-12-04 PROCEDURE — 83935 ASSAY OF URINE OSMOLALITY: CPT

## 2020-12-04 PROCEDURE — 86160 COMPLEMENT ANTIGEN: CPT

## 2020-12-04 PROCEDURE — 80074 ACUTE HEPATITIS PANEL: CPT

## 2020-12-04 PROCEDURE — 99232 SBSQ HOSP IP/OBS MODERATE 35: CPT | Performed by: INTERNAL MEDICINE

## 2020-12-04 PROCEDURE — 82550 ASSAY OF CK (CPK): CPT

## 2020-12-04 PROCEDURE — 83605 ASSAY OF LACTIC ACID: CPT

## 2020-12-04 PROCEDURE — 84155 ASSAY OF PROTEIN SERUM: CPT

## 2020-12-04 RX ORDER — GABAPENTIN 100 MG/1
200 CAPSULE ORAL 3 TIMES DAILY
Status: DISCONTINUED | OUTPATIENT
Start: 2020-12-04 | End: 2020-12-06 | Stop reason: HOSPADM

## 2020-12-04 RX ORDER — SODIUM CHLORIDE 9 MG/ML
INJECTION, SOLUTION INTRAVENOUS CONTINUOUS
Status: DISCONTINUED | OUTPATIENT
Start: 2020-12-04 | End: 2020-12-06 | Stop reason: HOSPADM

## 2020-12-04 RX ORDER — ISOSORBIDE MONONITRATE 60 MG/1
60 TABLET, EXTENDED RELEASE ORAL DAILY
Status: DISCONTINUED | OUTPATIENT
Start: 2020-12-04 | End: 2020-12-06 | Stop reason: HOSPADM

## 2020-12-04 RX ORDER — INSULIN GLARGINE 100 [IU]/ML
18 INJECTION, SOLUTION SUBCUTANEOUS ONCE
Status: DISCONTINUED | OUTPATIENT
Start: 2020-12-04 | End: 2020-12-04

## 2020-12-04 RX ORDER — SODIUM CHLORIDE 9 MG/ML
INJECTION, SOLUTION INTRAVENOUS CONTINUOUS
Status: DISCONTINUED | OUTPATIENT
Start: 2020-12-04 | End: 2020-12-04

## 2020-12-04 RX ORDER — IPRATROPIUM BROMIDE AND ALBUTEROL SULFATE 2.5; .5 MG/3ML; MG/3ML
1 SOLUTION RESPIRATORY (INHALATION)
Status: DISCONTINUED | OUTPATIENT
Start: 2020-12-04 | End: 2020-12-05

## 2020-12-04 RX ORDER — BUDESONIDE AND FORMOTEROL FUMARATE DIHYDRATE 160; 4.5 UG/1; UG/1
2 AEROSOL RESPIRATORY (INHALATION) 2 TIMES DAILY
Status: DISCONTINUED | OUTPATIENT
Start: 2020-12-04 | End: 2020-12-06 | Stop reason: HOSPADM

## 2020-12-04 RX ORDER — 0.9 % SODIUM CHLORIDE 0.9 %
1000 INTRAVENOUS SOLUTION INTRAVENOUS ONCE
Status: COMPLETED | OUTPATIENT
Start: 2020-12-04 | End: 2020-12-04

## 2020-12-04 RX ORDER — INSULIN GLARGINE 100 [IU]/ML
18 INJECTION, SOLUTION SUBCUTANEOUS 2 TIMES DAILY
Status: DISCONTINUED | OUTPATIENT
Start: 2020-12-04 | End: 2020-12-05

## 2020-12-04 RX ORDER — INSULIN GLARGINE 100 [IU]/ML
18 INJECTION, SOLUTION SUBCUTANEOUS 2 TIMES DAILY
Status: DISCONTINUED | OUTPATIENT
Start: 2020-12-04 | End: 2020-12-04

## 2020-12-04 RX ADMIN — PRAZOSIN HYDROCHLORIDE 4 MG: 1 CAPSULE ORAL at 11:26

## 2020-12-04 RX ADMIN — Medication 81 MG: at 08:45

## 2020-12-04 RX ADMIN — HEPARIN SODIUM 5000 UNITS: 5000 INJECTION INTRAVENOUS; SUBCUTANEOUS at 21:12

## 2020-12-04 RX ADMIN — GABAPENTIN 200 MG: 100 CAPSULE ORAL at 11:26

## 2020-12-04 RX ADMIN — ATORVASTATIN CALCIUM 80 MG: 80 TABLET, FILM COATED ORAL at 21:25

## 2020-12-04 RX ADMIN — BUDESONIDE AND FORMOTEROL FUMARATE DIHYDRATE 2 PUFF: 160; 4.5 AEROSOL RESPIRATORY (INHALATION) at 21:40

## 2020-12-04 RX ADMIN — SODIUM CHLORIDE 1000 ML: 9 INJECTION, SOLUTION INTRAVENOUS at 13:29

## 2020-12-04 RX ADMIN — HEPARIN SODIUM 5000 UNITS: 5000 INJECTION INTRAVENOUS; SUBCUTANEOUS at 16:23

## 2020-12-04 RX ADMIN — IPRATROPIUM BROMIDE AND ALBUTEROL SULFATE 1 AMPULE: .5; 3 SOLUTION RESPIRATORY (INHALATION) at 21:40

## 2020-12-04 RX ADMIN — HEPARIN SODIUM 5000 UNITS: 5000 INJECTION INTRAVENOUS; SUBCUTANEOUS at 06:05

## 2020-12-04 RX ADMIN — HYDRALAZINE HYDROCHLORIDE 100 MG: 50 TABLET, FILM COATED ORAL at 16:24

## 2020-12-04 RX ADMIN — CARVEDILOL 25 MG: 12.5 TABLET, FILM COATED ORAL at 08:45

## 2020-12-04 RX ADMIN — GABAPENTIN 200 MG: 100 CAPSULE ORAL at 21:24

## 2020-12-04 RX ADMIN — ISOSORBIDE MONONITRATE 60 MG: 60 TABLET ORAL at 11:26

## 2020-12-04 RX ADMIN — HYDRALAZINE HYDROCHLORIDE 100 MG: 50 TABLET, FILM COATED ORAL at 21:25

## 2020-12-04 RX ADMIN — HYDRALAZINE HYDROCHLORIDE 100 MG: 50 TABLET, FILM COATED ORAL at 06:05

## 2020-12-04 RX ADMIN — SODIUM CHLORIDE: 9 INJECTION, SOLUTION INTRAVENOUS at 13:42

## 2020-12-04 RX ADMIN — INSULIN LISPRO 10 UNITS: 100 INJECTION, SOLUTION INTRAVENOUS; SUBCUTANEOUS at 06:40

## 2020-12-04 RX ADMIN — NIFEDIPINE 30 MG: 30 TABLET, FILM COATED, EXTENDED RELEASE ORAL at 17:49

## 2020-12-04 RX ADMIN — IPRATROPIUM BROMIDE AND ALBUTEROL SULFATE 1 AMPULE: .5; 3 SOLUTION RESPIRATORY (INHALATION) at 16:41

## 2020-12-04 RX ADMIN — GABAPENTIN 200 MG: 100 CAPSULE ORAL at 16:23

## 2020-12-04 RX ADMIN — INSULIN GLARGINE 18 UNITS: 100 INJECTION, SOLUTION SUBCUTANEOUS at 07:38

## 2020-12-04 RX ADMIN — INSULIN GLARGINE 18 UNITS: 100 INJECTION, SOLUTION SUBCUTANEOUS at 21:12

## 2020-12-04 RX ADMIN — NIFEDIPINE 30 MG: 30 TABLET, FILM COATED, EXTENDED RELEASE ORAL at 08:46

## 2020-12-04 RX ADMIN — CLOPIDOGREL 75 MG: 75 TABLET, FILM COATED ORAL at 08:45

## 2020-12-04 RX ADMIN — PRAZOSIN HYDROCHLORIDE 4 MG: 1 CAPSULE ORAL at 21:25

## 2020-12-04 RX ADMIN — IPRATROPIUM BROMIDE AND ALBUTEROL SULFATE 1 AMPULE: .5; 3 SOLUTION RESPIRATORY (INHALATION) at 11:32

## 2020-12-04 RX ADMIN — BUDESONIDE AND FORMOTEROL FUMARATE DIHYDRATE 2 PUFF: 160; 4.5 AEROSOL RESPIRATORY (INHALATION) at 11:32

## 2020-12-04 RX ADMIN — CARVEDILOL 25 MG: 12.5 TABLET, FILM COATED ORAL at 16:23

## 2020-12-04 RX ADMIN — LOSARTAN POTASSIUM 50 MG: 50 TABLET, FILM COATED ORAL at 08:46

## 2020-12-04 RX ADMIN — INSULIN LISPRO 4 UNITS: 100 INJECTION, SOLUTION INTRAVENOUS; SUBCUTANEOUS at 21:26

## 2020-12-04 ASSESSMENT — PAIN SCALES - GENERAL
PAINLEVEL_OUTOF10: 0
PAINLEVEL_OUTOF10: 0

## 2020-12-04 NOTE — PROGRESS NOTES
Inpatient Diabetes  Education     Type and Reason for Visit: Patient Education  Met with patient in room 37 in ED. Patient reports long standing diabetes - dx type 1 in 1989, he also has COPD. He could relate his home regime, basal of 35 units lantus  Meal time plan 1 unit for every 15 grams CHO and correction for BG - 1 unit for every 50 over 150. Patient is familiar with diabetes self care and stated his diabetes is brittle, he has has education in past at [facility] when he resided in [place]. He was aware of DKA s/s and SOB and abd pain / vomiting. He expressed at home recently out of combivent and needed to get refil, but PCP VV was missed RX could not be refilled until seen by PCP. He had been feeling more SOB but thought it was related to missed / change with inhaler, but when he had vomitng and abd pain he knew he was getting into DKA. He fears low BG and keeps BG over 150 , near 200 at home. Patient also explained more stress with SO and daughter with mental health issues ongoing and that stress does effect his BG. Discussed with patient risk of keeping BG running high will increase risk for DKA. Suggested he also check unine ketones at home, he can obtain ketone strips OTC and cost is under 10$ at most pharmacy. Writer explained he could talk with PCP about use of correct scale with low or moderate ketone, large ketone in urine will need to seek care at Ed. - handouts also given on topic/ dka sick day care.       Verbally reviewed following information with patient  Blood glucose targets, hypo and hyperglycemia, importance of home blood glucose monitoring, heathy eating  plate method for CHO control portions, be active as recommended by health care providers, take medications oral and or insulin as directed  Written educational materials provided  _x__ Educational Booklets as available \" Cho counting reference book\"  _x__ Be safe with Needles teaching sheet /  [employer]  ___\"DKA and sick day sheets from [employer]\"  And covid - 19 and diabetes sheet    Contact number provided for outpatient DMED support session and follow up  needs    Nirav Edward RN CDE

## 2020-12-04 NOTE — CONSULTS
Nephrology Consult Note    Reason for Consult: Elevated Creatinine  Requesting Physician: Dr. Genvea Gonsalez    Chief Complaint: Presented with vomiting  History Obtained From: Patient    History of Present Illness: This is a 60 y.o. male who presented with vomiting. Patient is known to our service from previous hospitalization. He has chronic kidney disease and his baseline creatinine is in the range of 2.2 to 2.4 mg/dL and stage IV chronic kidney disease with an estimated proteinuria around 6 g/g of creatinine. He is under the care of . Patient has multiple hospitalization at [facility] due to complications related to diabetes and prior history of acute renal failure. He was also on dialysis for a short while but recover his kidney function and baseline creatinine most recent one from July 2020 was 2.6 mg/dL. Patient states that recently the dose of losartan increase to twice a day. Rest of his blood pressure medications are same including nifedipine. Patient also states that he had lost his privileges to food stable and from last 3 months there was nothing much for him to eat. He lost close to 70 pounds in the 1 year. According to patient he was in his usual state of health. He woke up this morning and he was not feeling well. He tried to drink some water but he threw up immediately. He got concerned and called EMS. Because his prior presentation of DKA was very much similar to this. When he presented to ER he had a positive beta hydroxybutyrate. Patient was treated for DKA and now his gap is closed. The reason nephrology was consulted because of elevated creatinine. His last creatinine during this hospitalization are as follows:  Results for Courtney Hernandez (MRN [id number]) as of 12/4/2020 12:56   Ref.  Range 8/17/2020 08:27 12/3/2020 11:15 12/3/2020 15:10 12/3/2020 17:52 12/3/2020 21:59 12/4/2020 11:48   Creatinine Latest Ref Range: 0.70 - 1.20 mg/dL 2.61 (H) 3.51 (H) 3.36 (H) 3.42 (H) 3.86 (H) 3.71 (H)       At present patient denies any chest pain  He denies any nausea and vomiting  His blood pressure is now better controlled  Last anion gap was 13    Past Medical History:        Diagnosis Date    Asthma     mod persistent    CAD in native artery, nonobstructive     Carotid stenosis, left 6/10/2013    Carpal tunnel syndrome     RIGHT    Cerebrovascular disease 4/23/2018    Chronic kidney disease 6/10/2013    COPD (chronic obstructive pulmonary disease) ([facility] [address]     Diabetes mellitus (HCC)     insulin dependent    History of colon polyps     History of weakness of extremity     right sided    HTN (hypertension)     Hyperlipidemia     Lung, cysts, congenital     PTSD (post-traumatic stress disorder)     PTSD (post-traumatic stress disorder)     TIA (transient ischemic attack)     Type II or unspecified type diabetes mellitus without mention of complication, not stated as uncontrolled        Past Surgical History:        Procedure Laterality Date    CAROTID ENDARTERECTOMY Left 7-2013    COLONOSCOPY      HERNIA REPAIR      [place] COLSC FLX W/RMVL OF TUMOR POLYP LESION SNARE TQ N/A 6/27/2017    COLONOSCOPY POLYPECTOMY SNARE/ hot performed by Dionicio Jacobo DO at [facility] Left 2015    carotid stent, dr Solorio Drivers       Current Medications:    insulin glargine (LANTUS) injection vial 18 Units, BID  gabapentin (NEURONTIN) capsule 200 mg, TID  isosorbide mononitrate (IMDUR) extended release tablet 60 mg, Daily  ipratropium-albuterol (DUONEB) nebulizer solution 1 ampule, Q4H WA  budesonide-formoterol (SYMBICORT) 160-4.5 MCG/ACT inhaler 2 puff, BID  insulin lispro (HUMALOG) injection vial 0-18 Units, TID WC  insulin lispro (HUMALOG) injection vial 0-9 Units, Nightly  0.9 % sodium chloride infusion, Continuous  0.9 % sodium chloride bolus, Once  aspirin EC tablet 81 mg, Daily  atorvastatin (LIPITOR) tablet 80 mg, Daily  clopidogrel (PLAVIX) tablet 75 mg, Daily  heparin (porcine) injection 5,000 Units, 3 times per day  carvedilol (COREG) tablet 25 mg, BID WC  hydrALAZINE (APRESOLINE) tablet 100 mg, 3 times per day  prazosin (MINIPRESS) capsule 4 mg, BID  NIFEdipine (PROCARDIA XL) extended release tablet 30 mg, BID  glucose (GLUTOSE) 40 % oral gel 15 g, PRN  dextrose 50 % IV solution, PRN  glucagon (rDNA) injection 1 mg, PRN  dextrose 5 % solution, PRN        Allergies:  Lisinopril;  Ace inhibitors; and Pcn [penicillins]    Social History:   Social History     Socioeconomic History    Marital status:      Spouse name: Not on file    Number of children: Not on file    Years of education: Not on file    Highest education level: Not on file   Occupational History     Employer: N/A   Social Needs    Financial resource strain: Not on file    Food insecurity     Worry: Not on file     Inability: Not on file   [employer] needs     Medical: Not on file     Non-medical: Not on file   Tobacco Use    Smoking status: Former Smoker     Packs/day: 0.50     Years: 35.00     Pack years: 17.50     Types: Cigarettes     Last attempt to quit: 2014     Years since quittin.4    Smokeless tobacco: Never Used   Substance and Sexual Activity    Alcohol use: No     Alcohol/week: 0.0 standard drinks    Drug use: No    Sexual activity: Yes     Partners: Female   Lifestyle    Physical activity     Days per week: Not on file     Minutes per session: Not on file    Stress: Not on file   Relationships    Social connections     Talks on phone: Not on file     Gets together: Not on file     Attends [employer] service: Not on file     Active member of club or organization: Not on file     Attends meetings of clubs or organizations: Not on file     Relationship status: Not on file    Intimate partner violence     Fear of current or ex partner: Not on file     Emotionally abused: Not on file     Physically abused: Not on file Forced sexual activity: Not on file   Other Topics Concern    Not on file   Social History Narrative    Not on file       Family History:   Family History   Problem Relation Age of Onset    Cancer Mother     Heart Disease Father        Review of Systems:    Constitutional: No fever or chills   HEENT:  No headache  Cardiac:  Chest pain or PND  Chest:   Has chronic cough and received pulmonary rehab   abdomen:  No abdominal pain nausea or vomiting. Neuro:  No focal weakness, abnormal movements orseizure like activity. Skin:   No rashes, no itching. :   No hematuria, no pyuria, no dysuria, no flank pain. Extremities:  No swelling. Objective:  CURRENT TEMPERATURE:  Temp: 97.9 °F (36.6 °C)  MAXIMUM TEMPERATURE OVER 24HRS:  No data recorded.     CURRENT RESPIRATORY RATE:  Resp: 20  CURRENT PULSE:  Pulse: 55  CURRENT BLOOD PRESSURE:  BP: (!) 142/60  24HR BLOOD PRESSURE RANGE:  Systolic (88KUC), PCH:532 , Min:124 , TLS:639   ; Diastolic (80CIN), ZBT:53, Min:47, Max:108    24HR INTAKE/OUTPUT:      Intake/Output Summary (Last 24 hours) at 12/4/2020 1259  Last data filed at 12/3/2020 2240  Gross per 24 hour   Intake 2790.16 ml   Output --   Net 2790.16 ml     Patient Vitals for the past 96 hrs (Last 3 readings):   Weight   12/03/20 1046 115 lb (52.2 kg)     Physical Exam:  General appearance: Patient was lying flat he was alert and oriented x3  Skin: warm and dry, no rash or erythema  Eyes: Conjunctiva was pink  ENT: :no thrush no pharyngeal congestion    Neck: No carotid bruit  Pulmonary: No wheezing or rhonchi  Cardiovascular: S1 and S2 audible no S3   abdomen: Soft and nontender extremities:no cyanosis, clubbing or edema    Labs:   CBC:  Recent Labs     12/04/20  0622   WBC 8.1   RBC 3.55*   HGB 10.9*   HCT 34.3*   MCV 96.6   MCH 30.7   MCHC 31.8   RDW 12.5      MPV 10.1      BMP:   Recent Labs     12/03/20  1752  12/03/20  2159  12/04/20  0401 12/04/20  0507 12/04/20  1148     --  136  -- --   --  137   K 4.3  --  4.2  --   --   --  4.4     --  105  --   --   --  107   CO2 19*  --  21  --   --   --  17*   BUN 52*  --  55*  --   --   --  55*   CREATININE 3.42*  --  3.86*  --   --   --  3.71*   GLUCOSE 286*   < > 181*   < > 302 374 205*   CALCIUM 8.1*  --  8.0*  --   --   --  7.8*    < > = values in this interval not displayed. Phosphorus:    Recent Labs     12/03/20  1752 12/03/20  2159   PHOS 3.7 3.0     Magnesium:   Recent Labs     12/03/20  1115 12/03/20  1752 12/03/20  2159   MG 2.2 2.1 2.0     Albumin:   Recent Labs     12/03/20  1115   LABALBU 3.2*       IRON:    Lab Results   Component Value Date    IRON 76 08/11/2020     Iron Saturation:  No components found for: PERCENTFE  TIBC:    Lab Results   Component Value Date    TIBC 267 08/11/2020     FERRITIN:    Lab Results   Component Value Date    FERRITIN 109 08/11/2020     SPEP:   Lab Results   Component Value Date    PROT 6.4 12/03/2020    PROT 6.2 11/12/2011    ALBCAL 3.3 09/27/2017    ALBPCT 63 09/27/2017    LABALPH 0.1 09/27/2017    LABALPH 0.8 09/27/2017    A1PCT 3 09/27/2017    A2PCT 15 09/27/2017    LABBETA 0.6 09/27/2017    BETAPCT 12 09/27/2017    GAMGLOB 0.4 09/27/2017    GGPCT 8 09/27/2017    PATH ELECTRONICALLY SIGNED. Artis Dye M.D. 09/27/2017    PATH ELECTRONICALLY SIGNED. Artis Dye M.D. 09/27/2017     UPEP:   Lab Results   Component Value Date     11/12/2011        C3: No results found for: C3  C4: No results found for: C4  MPO ANCA:  No results found for: MPO .   PR3 ANCA:  No results found for: PR3  Urine Sodium:    Lab Results   Component Value Date    IGNACIO 80 06/19/2020      Urine Potassium:    Lab Results   Component Value Date    KUR 36.1 08/13/2019     Urine Chloride:  No components found for: CLU  Urine Ph:  No components found for: PO4U  Urine Osmolarity:    Lab Results   Component Value Date    OSMOU 466 06/18/2020     Urine Creatinine:    Lab Results   Component Value Date    LABCREA 84.0 06/18/2020     Urine Eosinophils: No components found for: EOSU  Urine Protein:    Lab Results   Component Value Date     11/12/2011     Urinalysis:  U/A:   Lab Results   Component Value Date    NITRU NEGATIVE 06/18/2020    COLORU YELLOW 06/18/2020    PHUR 6.0 06/18/2020    WBCUA None 06/18/2020    RBCUA None 06/18/2020    MUCUS NOT REPORTED 06/18/2020    TRICHOMONAS NOT REPORTED 06/18/2020    YEAST NOT REPORTED 06/18/2020    BACTERIA NOT REPORTED 06/18/2020    CLARITYU Clear 09/12/2014    SPECGRAV 1.017 06/18/2020    LEUKOCYTESUR NEGATIVE 06/18/2020    UROBILINOGEN Normal 06/18/2020    BILIRUBINUR NEGATIVE 06/18/2020    BILIRUBINUR NEGATIVE 11/12/2011    BLOODU Negative 09/12/2014    GLUCOSEU TRACE 06/18/2020    GLUCOSEU 3+ 11/12/2011    [address] NEGATIVE 06/18/2020    AMORPHOUS NOT REPORTED 06/18/2020         Radiology:  Reviewed as available. Assessment:  1. Acute kidney injury versus progression of chronic kidney disease. Patient has fairly advanced chronic kidney disease and because of nephrotic range proteinuria he is high risk for progression. His creatinine remains in the range of 3.6 since admission. 2.  Type 1 diabetes  3. Presented with DKA and now his anion gap has been closed  4. Longstanding history of hypertension  5. Peripheral vascular disease  6. Elevated troponin management as per primary service  7. Prior history of acute renal failure  Plan:  1. Check bladder scan for postvoid residual volume  2. Hold losartan for now  3. BMP in a.m. 4.  Consider to start Coreg for blood pressure control if there is no contraindication    Thank you for the consultation. Please do not hesitate to call with questions.     Electronically signed by Steffi Jauregui MD on 12/4/2020 at 12:59 PM

## 2020-12-04 NOTE — ED NOTES
Pt resting on cot in no acute distress. All safety measures met. RR even and non labored. Pt denies any needs at this time.       Mike Bobo RN  12/04/20 0102

## 2020-12-04 NOTE — ED NOTES
Patient resting comfortably on stretcher, in no apparent distress  Respirations even and non-labored  Patient has no needs at this time  Call light remains within reach     Verna Schofield RN  12/04/20 3399

## 2020-12-04 NOTE — DISCHARGE INSTR - COC
Continuity of Care Form    Patient Name: Darryl Lunsford   :  1955  MRN:  [id number]    [facility] date:  12/3/2020  Discharge date:  ***    Code Status Order: Full Code   Advance Directives:     Admitting Physician:  Mary Anne Garcaí MD  PCP: Rex Young, TERRANCE Paul CNP    Discharging Nurse: [facility] Unit/Room#: 37/44  Discharging Unit Phone Number: ***    Emergency Contact:   Extended Emergency Contact Information  Primary Emergency Contact: Kim Dunne  Address: [address] Phone: [phone]  Work Phone: [phone]  Mobile Phone: [phone]  Relation: Other   needed?  No    Past Surgical History:  Past Surgical History:   Procedure Laterality Date    CAROTID ENDARTERECTOMY Left     COLONOSCOPY      HERNIA REPAIR      [place] COLSC FLX W/RMVL OF TUMOR POLYP LESION SNARE TQ N/A 2017    COLONOSCOPY POLYPECTOMY SNARE/ hot performed by Bobbi Tabares DO at [facility] VASCULAR SURGERY Left     carotid stent, dr Hi Gonzalez       Immunization History:   Immunization History   Administered Date(s) Administered    Influenza 2012    Influenza Vaccine, unspecified formulation 2012    Influenza Virus Vaccine 10/15/2013, 10/14/2014, 10/02/2015, 2016, 10/30/2017, 10/21/2019    Influenza, Quadv, 6 mo and older, IM (Fluzone, Flulaval) 10/30/2017    Influenza, Quadv, IM, (6 mo and older Fluzone, Flulaval, Fluarix and 3 yrs and older Afluria) 2016, 10/21/2019    Pneumococcal Polysaccharide ([id number]) 10/02/2015    Tdap (Boostrix, Adacel) 2013    Zoster Live (Zostavax) 2017       Active Problems:  Patient Active Problem List   Diagnosis Code    Cigarette nicotine dependence without complication O33.108    Carpal tunnel syndrome on right G56.01    Colon polyps K63.5    Carotid stenosis, left s/p Endarterectomy I65.22    Mixed simple and mucopurulent chronic bronchitis (HCC) J41.8    Pure GZIT:466435983}  Med Delivery   [address] [employer] MED Delivery:554820244}    Wound Care Documentation and Therapy:        Elimination:  Continence:   · Bowel: {YES / RP:59093}  · Bladder: {YES / DL:43929}  Urinary Catheter: {Urinary Catheter:184227231}   Colostomy/Ileostomy/Ileal Conduit: {YES / VJ:32295}       Date of Last BM: ***    Intake/Output Summary (Last 24 hours) at 2020 1243  Last data filed at 12/3/2020 2240  Gross per 24 hour   Intake 2790.16 ml   Output --   Net 2790.16 ml     I/O last 3 completed shifts:   In: 2790.2 [I.V.:790.2; IV Piggyback:]  Out: -     Safety Concerns:     [address] [employer] Safety Concerns:216865566}    Impairments/Disabilities:      [address] [employer] Impairments/Disabilities:840261300}    Nutrition Therapy:  Current Nutrition Therapy:   508 [employer] Diet List:126228023}    Routes of Feeding: {CHP DME Other Feedings:962020871}  Liquids: {Slp liquid thickness:04205}  Daily Fluid Restriction: {CHP DME Yes amt example:935363585}  Last Modified Barium Swallow with Video (Video Swallowing Test): {Done Not Done DIMV:505937208}    Treatments at the Time of Hospital Discharge:   Respiratory Treatments: ***  Oxygen Therapy:  {Therapy; copd oxygen:70747}  Ventilator:    {MH CC Vent SEEH:669578248}    Rehab Therapies: {THERAPEUTIC INTERVENTION:4706194132}  Weight Bearing Status/Restrictions: [address] [employer]  Weight Bearin}  Other Medical Equipment (for information only, NOT a DME order):  {EQUIPMENT:033996336}  Other Treatments: ***    Patient's personal belongings (please select all that are sent with patient):  {P DME Belongings:941705191}    RN SIGNATURE:  {Esignature:135415140}    CASE MANAGEMENT/SOCIAL WORK SECTION    Inpatient Status Date: ***    Readmission Risk Assessment Score:  Readmission Risk              Risk of Unplanned Readmission:        44           Discharging to Facility/ Agency   · Name:   · Address:  · Phone:  · Fax:    Dialysis Facility (if applicable)   · Name:  · Address:  · Dialysis

## 2020-12-04 NOTE — PROGRESS NOTES
[facility]  Internal Medicine Teaching Residency Program  Inpatient Daily Progress Note  ______________________________________________________________________________    Patient: Darryl Lunsford  YOB: 1955   [id number]    Acct: [de-identified]     Room: Replaced by [facility]  Admit date: 12/3/2020  Today's date: 12/04/20  Number of days in the hospital: 0    SUBJECTIVE   Admitting Diagnosis: <principal problem not specified>  CC: Nausea/ vomiting  Pt examined at bedside. Chart & results reviewed. Pt refused insulin due to concern of hypoglycemia  He was advised to continue take dextrose and insulin  Anion gap resolved and bridged to [facility] insulin   Worsening renal function since admission. Nephrology consult placed. Diabetic education for insulin management today. ROS:  Constitutional:  negative for chills, fevers, sweats  Respiratory:  negative for cough, dyspnea on exertion, hemoptysis, shortness of breath, wheezing  Cardiovascular:  negative for chest pain, chest pressure/discomfort, lower extremity edema, palpitations  Gastrointestinal:  negative for abdominal pain, constipation, diarrhea, nausea, vomiting  Neurological:  negative for dizziness, headache  BRIEF HISTORY     The patient is a pleasant 60 y.o. male with significant past medical history of diabetes mellitus on insulin, hypertension, CKD stage III, COPD presents to the ED with a chief complaint of nausea and vomiting since this morning.     To begin with patient had acute onset of shortness of breath early morning at 5 AM 12/03/20. Patient states he was winded going from bed to washroom and then vomited. EMS was called and as per the patient his blood glucose was at 600 and was given 10 units of short-acting insulin.   But his blood glucose is still elevated at 500 and was transferred to [facility] for further evaluation.     Patient indicates that he takes good care of his insulin regimen he indicates he takes 1 unit of NovoLog for every 50 mg above 150 of blood sugar and 1 unit of NovoLog for every 15 g of carbs that he take. He recently missed appointments with dietitian consult for which he takes carb controlled diet instead of diabetic diet.     He has a history of recent admission for diabetic coma because of hypoglycemia and was admitted for 18 days.     On current evaluation in the ER his initial vitals are blood pressure of 178/81, SPO2 of 97. He missed his blood pressure medications this morning.      His labs showed glucose 549 , beta hydroxybutyrate 6.95,  VBG 7.2/38.1/15.4, GFR of 20, sodium 126, lactic acid 1.6, Creatinine 3.51 , Osmolality 326.     Patient is admitted. Diagnosed DKA currently being monitored. OBJECTIVE     Vital Signs:  BP (!) 124/51   Pulse 60   Temp 97.9 °F (36.6 °C) (Oral)   Resp 20   Ht 5' 6\" (1.676 m)   Wt 115 lb (52.2 kg)   SpO2 93%   BMI 18.56 kg/m²     Temp (24hrs), Av.9 °F (36.6 °C), Min:97.9 °F (36.6 °C), Max:97.9 °F (36.6 °C)    In: 2790.2   Out: -     Physical Exam:  Constitutional: This is a well developed, well nourished, 17-18.4 - Mild malnutrition / Protein energy malnutrition Grade I 60y.o. year old male who is alert, oriented, cooperative and in no apparent distress. Head:normocephalic and atraumatic. EENT:  PERRLA. No conjunctival injections. Septum was midline, mucosa was without erythema, exudates or cobblestoning. No thrush was noted. Neck: Supple without thyromegaly. No elevated JVP. Trachea was midline. Respiratory: B/L lower lobes wheezing. Chest was symmetrical without dullness to percussion. Breath sounds bilaterally were clear to auscultation. There were no wheezes, rhonchi or rales. There is no intercostal retraction or use of accessory muscles. No egophony noted. Cardiovascular: Regular without murmur, clicks, gallops or rubs. Abdomen: Slightly rounded and soft without organomegaly.  No rebound, rigidity or guarding was appreciated. Lymphatic: No lymphadenopathy. Musculoskeletal: Normal curvature of the spine. No gross muscle weakness. Extremities:  No lower extremity edema, ulcerations, tenderness, varicosities or erythema. Muscle size, tone and strength are normal.  No involuntary movements are noted. Skin:  Warm and dry. Good color, turgor and pigmentation. No lesions or scars. No cyanosis or clubbing. Neurological/Psychiatric: The patient's general behavior, level of consciousness, thought content and emotional status is normal.        Medications:  Scheduled Medications:    insulin glargine  18 Units Subcutaneous BID    aspirin  81 mg Oral Daily    atorvastatin  80 mg Oral Daily    clopidogrel  75 mg Oral Daily    heparin (porcine)  5,000 Units Subcutaneous 3 times per day    labetalol  10 mg Intravenous Once    carvedilol  25 mg Oral BID WC    hydrALAZINE  100 mg Oral 3 times per day    prazosin  4 mg Oral BID    NIFEdipine  30 mg Oral BID    gabapentin  200 mg Oral Nightly    losartan  50 mg Oral BID    insulin lispro  0-12 Units Subcutaneous TID WC    insulin lispro  0-6 Units Subcutaneous Nightly     Continuous Infusions:    dextrose       PRN Medicationsglucose, 15 g, PRN  dextrose, 12.5 g, PRN  glucagon (rDNA), 1 mg, PRN  dextrose, 100 mL/hr, PRN        Diagnostic Labs:  CBC:   Recent Labs     12/04/20  0622   WBC 8.1   RBC 3.55*   HGB 10.9*   HCT 34.3*   MCV 96.6   RDW 12.5        BMP:   Recent Labs     12/03/20  1510 12/03/20  1752 12/03/20  2159   * 135 136   K 4.7 4.3 4.2    104 105   CO2 14* 19* 21   PHOS  --  3.7 3.0   BUN 54* 52* 55*   CREATININE 3.36* 3.42* 3.86*     BNP: No results for input(s): BNP in the last 72 hours. PT/INR:   Recent Labs     12/03/20  1115   PROTIME 9.8   INR 0.9     APTT: No results for input(s): APTT in the last 72 hours. CARDIAC ENZYMES: No results for input(s): CKMB, CKMBINDEX, TROPONINI in the last 72 hours.     Invalid input(s): Attending Physician Statement  I have discussed the care of Margi Fonseca, including pertinent history and exam findings,  with the resident. I have seen and examined the patient and the key elements of all parts of the encounter have been performed by me. I agree with the assessment, plan and orders as documented by the resident with additions . Treatment plan Discussed with nursing staff in detail , all questions answered . Electronically signed by Diana Julio MD on   12/5/20 at 10:20 AM EST    Please note that this chart was generated using voice recognition Dragon dictation software. Although every effort was made to ensure the accuracy of this automated transcription, some errors in transcription may have occurred.

## 2020-12-04 NOTE — ED NOTES
Pt resting on cot in no acute distress. All safety measures met. RR even and non labored. Pt denies any needs at this time.       Anjana Oconnell RN  12/03/20 1955

## 2020-12-04 NOTE — ED NOTES
Pt resting on cot in no acute distress. All safety measures met. RR even and non labored. Pt denies any needs at this time.       Launie Nyhan, RN  12/03/20 2032

## 2020-12-04 NOTE — ED NOTES
Patient refused Humalog. Current blood sugar is 355. Admitting team perfect served and updated. Will continue hourly glucose checks.      Rebeka Ybarra RN  12/04/20 8002

## 2020-12-04 NOTE — PROGRESS NOTES
Physical Therapy    Facility/Department: [address] BURN UNIT  Initial Assessment    NAME: Ondina Lord  : 1955  MRN: [id number]    Date of Service: 2020  Chief Complaint   Patient presents with    Emesis     Discharge Recommendations:    No therapy recommended at discharge. PT Equipment Recommendations  Equipment Needed: No    Assessment   Body structures, Functions, Activity limitations: Decreased functional mobility ; Decreased balance;Decreased endurance  Assessment: Pt completed bed mobility independently and transfers with SBA. Pt ambulated 10' with no AD and SBA. Continued acute physical therapy is needed to address endurance and return to prior level of functioning. Prognosis: Good  Decision Making: Medium Complexity  PT Education: Goals; General Safety;Gait Training;PT Role;Plan of Care; Functional Mobility Training  REQUIRES PT FOLLOW UP: Yes  Activity Tolerance  Activity Tolerance: Patient Tolerated treatment well       Patient Diagnosis(es): The primary encounter diagnosis was Dehydration. A diagnosis of Diabetic ketoacidosis without coma associated with other specified diabetes mellitus ([address] was also pertinent to this visit. has a past medical history of Asthma, CAD in native artery, nonobstructive, Carotid stenosis, left, Carpal tunnel syndrome, Cerebrovascular disease, Chronic kidney disease, COPD (chronic obstructive pulmonary disease) ([address] Diabetes mellitus ([address] History of colon polyps, History of weakness of extremity, HTN (hypertension), Hyperlipidemia, Lung, cysts, congenital, PTSD (post-traumatic stress disorder), PTSD (post-traumatic stress disorder), TIA (transient ischemic attack), and Type II or unspecified type diabetes mellitus without mention of complication, not stated as uncontrolled. has a past surgical history that includes hernia repair; Tonsillectomy; Colonoscopy;  Carotid endarterectomy (Left, -); vascular surgery (Left, ); and pr colsc flx w/rmvl of tumor polyp lesion snare tq (N/A, 6/27/2017). Restrictions  Restrictions/Precautions  Restrictions/Precautions: General Precautions, Up as Tolerated  Required Braces or Orthoses?: No  Position Activity Restriction  Other position/activity restrictions: up as tolerated  Vision/Hearing  Vision: Within Functional Limits  Hearing: Within functional limits     Subjective  General  Chart Reviewed: Yes  Patient assessed for rehabilitation services?: Yes  Family / Caregiver Present: No  Follows Commands: Within Functional Limits  Subjective  Subjective: Pt and RN agreeable for PT. Pt laying in bed upon arrival. Co-eval with OT.   Pain Screening  Patient Currently in Pain: Denies  Vital Signs  Patient Currently in Pain: Denies       Orientation  Orientation  Overall Orientation Status: Within Functional Limits  Social/Functional History  Social/Functional History  Lives With: Significant other  Type of Home: House(duplex on bottom level)  Home Layout: One level  Home Access: Stairs to enter with rails  Entrance Stairs - Number of Steps: 5  Bathroom Shower/Tub: Tub/Shower unit  Bathroom Toilet: Standard  Home Equipment: (pt reported no use of DME At baseline)  ADL Assistance: [address] [place] [address] Avenue: Independent  Homemaking Responsibilities: Yes  Meal Prep Responsibility: Primary  Laundry Responsibility: Primary  Cleaning Responsibility: Primary  Ambulation Assistance: Independent  Transfer Assistance: Independent  Active : No  Patient's  Info: medical cab  Cognition   Cognition  Overall Cognitive Status: WFL    Objective        AROM RLE (degrees)  RLE AROM: WFL  AROM LLE (degrees)  LLE AROM : WFL  AROM RUE (degrees)  RUE AROM : WFL  AROM LUE (degrees)  LUE AROM : WFL  Strength RLE  Strength RLE: WFL  Strength LLE  Strength LLE: WFL  Strength RUE  Strength RUE: WFL  Strength LUE  Strength LUE: WFL     Sensation  Overall Sensation Status: WFL(Pt denies any numbness or tingling at this time)  Bed mobility  Supine to Sit: Independent  Sit to Supine: Independent  Scooting: Independent  Transfers  Sit to Stand: Stand by assistance  Stand to sit: Stand by assistance  Ambulation  Ambulation?: Yes  Ambulation 1  Surface: level tile  Device: No Device  Assistance: Stand by assistance  Quality of Gait: Steady rivera, no LOB or unsteadiness. Pt reports becoming quickly fatigued and lacks endurance. Gait Deviations: Decreased step length;Decreased step height  Distance: 10'  Stairs/Curb  Stairs?: No     Balance  Posture: Good  Sitting - Static: Good  Sitting - Dynamic: Good  Standing - Static: Good;-  Standing - Dynamic: Fair;+  Comments: Pt stood independently for 5' while beds in patient's room were exchanged. Pt stood with no AD, HHA on wall. Plan   Plan  Times per week: 3-5x/week  Current Treatment Recommendations: Strengthening, Balance Training, Endurance Training, Functional Mobility Training, Transfer Training, Gait Training, Stair training, Safety Education & Training, Patient/Caregiver Education & Training  Safety Devices  Type of devices:  All fall risk precautions in place, Nurse notified, Call light within reach, Left in bed  Restraints  Initially in place: No    AM-PAC Score  AM-PAC Inpatient Mobility Raw Score : 22 (12/04/20 1506)  AM-PAC Inpatient T-Scale Score : 53.28 (12/04/20 1506)  Mobility Inpatient CMS 0-100% Score: 20.91 (12/04/20 1506)  Mobility Inpatient CMS G-Code Modifier : Larry Espino (12/04/20 1506)        Goals  Short term goals  Time Frame for Short term goals: 14  Short term goal 1: Pt will complete transfers independently  Short term goal 2: Pt will ambulate 300' with no AD and SBA  Short term goal 3: Pt will ascend/descend 5 steps with a railing and SBA  Short term goal 4: Pt will tolerate 30 minutes of physical therapy demonstrating increased endurance       Therapy Time   Individual Concurrent Group Co-treatment   Time In 1425         Time Out 1435         Minutes 10 Candice Berumen    This treatment/evaluation completed by signing SPT. Signing PT agrees with treatment and documentation.

## 2020-12-04 NOTE — ED NOTES
Report received from Rima Sherman, [facility]. Writer introduced self to pt. Pt resting on stretcher, NAD noted. Denies needs.      Ester Cai RN  12/04/20 [id number]

## 2020-12-04 NOTE — PROGRESS NOTES
Occupational Therapy   Occupational Therapy Initial Assessment  Date: 2020   Patient Name: Suly Hernandez  MRN: [id number]     : 1955    Date of Service: 2020    Discharge Recommendations:    No occupationa therapy recommended at discharge. Assessment   Treatment Diagnosis: DKA  Prognosis: Good  Decision Making: Low Complexity  Patient Education: pt ed on POC, purpose of eval, benefits of therapy. good return  No Skilled OT: Independent with ADL's;No OT goals identified; Independent with functional mobility  REQUIRES OT FOLLOW UP: No  Activity Tolerance  Activity Tolerance: Patient Tolerated treatment well  Safety Devices  Safety Devices in place: Yes  Type of devices: Call light within reach; Left in bed  Restraints  Initially in place: No         Patient Diagnosis(es): The primary encounter diagnosis was Dehydration. A diagnosis of Diabetic ketoacidosis without coma associated with other specified diabetes mellitus ([address] was also pertinent to this visit. has a past medical history of Asthma, CAD in native artery, nonobstructive, Carotid stenosis, left, Carpal tunnel syndrome, Cerebrovascular disease, Chronic kidney disease, COPD (chronic obstructive pulmonary disease) ([address] Diabetes mellitus ([address] History of colon polyps, History of weakness of extremity, HTN (hypertension), Hyperlipidemia, Lung, cysts, congenital, PTSD (post-traumatic stress disorder), PTSD (post-traumatic stress disorder), TIA (transient ischemic attack), and Type II or unspecified type diabetes mellitus without mention of complication, not stated as uncontrolled. has a past surgical history that includes hernia repair; Tonsillectomy; Colonoscopy; Carotid endarterectomy (Left, -); vascular surgery (Left, ); and pr colsc flx w/rmvl of tumor polyp lesion snare tq (N/A, 2017).     Treatment Diagnosis: DKA      Restrictions  Restrictions/Precautions  Restrictions/Precautions: General Precautions, Up as Tolerated  Required Braces or Orthoses?: No  Position Activity Restriction  Other position/activity restrictions: up as tolerated    Subjective   General  Patient assessed for rehabilitation services?: Yes  Family / Caregiver Present: No  Diagnosis: DKA  General Comment  Comments: Rn ok'd for therapy this afternoon.  Pt agreeable to participate in session and cooperative throughout  Patient Currently in Pain: Denies    Oxygen Therapy  O2 Device: None (Room air)    Social/Functional History  Social/Functional History  Lives With: Significant other  Type of Home: House(duplex on bottom level)  Home Layout: One level  Home Access: Stairs to enter with rails  Entrance Stairs - Number of Steps: 5  Bathroom Shower/Tub: Tub/Shower unit  Bathroom Toilet: Standard  Home Equipment: (pt reported no use of DME At baseline)  ADL Assistance: [address] [place] [address] Avenue: Independent  Homemaking Responsibilities: Yes  Meal Prep Responsibility: Primary  Laundry Responsibility: Primary  Cleaning Responsibility: Primary  Ambulation Assistance: Independent  Transfer Assistance: Independent  Active : No  Patient's  Info: medical cab       Objective   Vision: Within Functional Limits  Hearing: Within functional limits    Orientation  Overall Orientation Status: Within Functional Limits     Balance  Sitting Balance: Independent(~5 minutes on EOB)  Standing Balance: Supervision  Standing Balance  Time: ~5 minutes  Activity: pt completed functional mobility within hospital room and stood in order for bed to be switched  Comment: pt with no LOB  ADL  Feeding: Independent  Grooming: Independent  UE Bathing: Independent  LE Bathing: Modified independent   UE Dressing: Independent  LE Dressing: Modified independent   Toileting: Modified independent   Tone RUE  RUE Tone: Normotonic  Tone LUE  LUE Tone: Normotonic  Coordination  Movements Are Fluid And Coordinated: Yes     Bed mobility  Supine to Sit: Independent  Sit to Supine: Independent  Scooting: Independent  Transfers  Sit to stand: Supervision  Stand to sit: Supervision     Cognition  Overall Cognitive Status: WFL        Sensation  Overall Sensation Status: WFL        LUE AROM (degrees)  LUE AROM : WFL  Left Hand AROM (degrees)  Left Hand AROM: WFL  RUE AROM (degrees)  RUE AROM : WFL  Right Hand AROM (degrees)  Right Hand AROM: WFL  LUE Strength  Gross LUE Strength: WFL  L Hand General: 4+/5  RUE Strength  Gross RUE Strength: WFL  R Hand General: 4+/5         Plan   Plan  Times per week: D/C OT           AM-PAC Score        AM-[facility] Inpatient Daily Activity Raw Score: 24 (12/04/20 1457)  AM-PAC Inpatient ADL T-Scale Score : 57.54 (12/04/20 1457)  ADL Inpatient CMS 0-100% Score: 0 (12/04/20 1457)  ADL Inpatient CMS G-Code Modifier : [facility] (12/04/20 1457)       Therapy Time   Individual Concurrent Group Co-treatment   Time In [address]         Time Out 1435         Minutes 895 [address] Nikolay Pradhan OTR/L

## 2020-12-04 NOTE — ED NOTES
Patient resting comfortably on stretcher, in no apparent distress  Respirations even and non-labored  Patient has no needs at this time  Call light remains within reach     Rubbie Closs, RN  12/04/20 3446

## 2020-12-04 NOTE — ED NOTES
Writer spoke with admitting team in regards to increasing blood sugars  Blood sugars to be monitored as is and if blood sugar keeps increasing, admitting team states they may change the insulin order    Admitting team also made aware that patient was requesting a breathing treatment  Admitting team also made aware that this patient needs a diet order    Patient resting comfortably in no apparent distress  O2 sat dropped to 88 while sleeping; states he has sleep apnea  Placed on 2L NC O2 bringing O2 sat up to 190 Charo Lamar RN  12/04/20 7173

## 2020-12-04 NOTE — ED NOTES
Patient resting comfortably on stretcher, in no apparent distress  Respirations even and non-labored  Patient has no needs at this time  Call light remains within reach     Jolie Kinney RN  12/04/20 6466

## 2020-12-05 LAB
ABSOLUTE EOS #: 0.33 K/UL (ref 0–0.44)
ABSOLUTE IMMATURE GRANULOCYTE: <0.03 K/UL (ref 0–0.3)
ABSOLUTE LYMPH #: 1.45 K/UL (ref 1.1–3.7)
ABSOLUTE MONO #: 0.64 K/UL (ref 0.1–1.2)
ANION GAP SERPL CALCULATED.3IONS-SCNC: 11 MMOL/L (ref 9–17)
BASOPHILS # BLD: 1 % (ref 0–2)
BASOPHILS ABSOLUTE: 0.03 K/UL (ref 0–0.2)
BUN BLDV-MCNC: 53 MG/DL (ref 8–23)
BUN/CREAT BLD: ABNORMAL (ref 9–20)
CALCIUM SERPL-MCNC: 7.5 MG/DL (ref 8.6–10.4)
CHLORIDE BLD-SCNC: 109 MMOL/L (ref 98–107)
CO2: 18 MMOL/L (ref 20–31)
CREAT SERPL-MCNC: 3.33 MG/DL (ref 0.7–1.2)
DIFFERENTIAL TYPE: ABNORMAL
EOSINOPHILS RELATIVE PERCENT: 5 % (ref 1–4)
GFR AFRICAN AMERICAN: 23 ML/MIN
GFR NON-AFRICAN AMERICAN: 19 ML/MIN
GFR SERPL CREATININE-BSD FRML MDRD: ABNORMAL ML/MIN/{1.73_M2}
GFR SERPL CREATININE-BSD FRML MDRD: ABNORMAL ML/MIN/{1.73_M2}
GLUCOSE BLD-MCNC: 134 MG/DL (ref 75–110)
GLUCOSE BLD-MCNC: 163 MG/DL (ref 75–110)
GLUCOSE BLD-MCNC: 173 MG/DL (ref 75–110)
GLUCOSE BLD-MCNC: 182 MG/DL (ref 75–110)
GLUCOSE BLD-MCNC: 182 MG/DL (ref 75–110)
GLUCOSE BLD-MCNC: 194 MG/DL (ref 75–110)
GLUCOSE BLD-MCNC: 195 MG/DL (ref 75–110)
GLUCOSE BLD-MCNC: 203 MG/DL (ref 70–99)
GLUCOSE BLD-MCNC: 216 MG/DL (ref 75–110)
GLUCOSE BLD-MCNC: 218 MG/DL (ref 75–110)
GLUCOSE BLD-MCNC: 222 MG/DL (ref 75–110)
GLUCOSE BLD-MCNC: 246 MG/DL (ref 75–110)
HCT VFR BLD CALC: 31.7 % (ref 40.7–50.3)
HEMOGLOBIN: 9.7 G/DL (ref 13–17)
IMMATURE GRANULOCYTES: 0 %
LYMPHOCYTES # BLD: 23 % (ref 24–43)
MCH RBC QN AUTO: 30.7 PG (ref 25.2–33.5)
MCHC RBC AUTO-ENTMCNC: 30.6 G/DL (ref 28.4–34.8)
MCV RBC AUTO: 100.3 FL (ref 82.6–102.9)
MONOCYTES # BLD: 10 % (ref 3–12)
NRBC AUTOMATED: 0 PER 100 WBC
PDW BLD-RTO: 12.4 % (ref 11.8–14.4)
PLATELET # BLD: 280 K/UL (ref 138–453)
PLATELET ESTIMATE: ABNORMAL
PMV BLD AUTO: 10 FL (ref 8.1–13.5)
POTASSIUM SERPL-SCNC: 4.5 MMOL/L (ref 3.7–5.3)
RBC # BLD: 3.16 M/UL (ref 4.21–5.77)
RBC # BLD: ABNORMAL 10*6/UL
SEG NEUTROPHILS: 61 % (ref 36–65)
SEGMENTED NEUTROPHILS ABSOLUTE COUNT: 3.93 K/UL (ref 1.5–8.1)
SODIUM BLD-SCNC: 138 MMOL/L (ref 135–144)
WBC # BLD: 6.4 K/UL (ref 3.5–11.3)
WBC # BLD: ABNORMAL 10*3/UL

## 2020-12-05 PROCEDURE — 85025 COMPLETE CBC W/AUTO DIFF WBC: CPT

## 2020-12-05 PROCEDURE — 2580000003 HC RX 258: Performed by: STUDENT IN AN ORGANIZED HEALTH CARE EDUCATION/TRAINING PROGRAM

## 2020-12-05 PROCEDURE — 6370000000 HC RX 637 (ALT 250 FOR IP): Performed by: STUDENT IN AN ORGANIZED HEALTH CARE EDUCATION/TRAINING PROGRAM

## 2020-12-05 PROCEDURE — 99232 SBSQ HOSP IP/OBS MODERATE 35: CPT | Performed by: INTERNAL MEDICINE

## 2020-12-05 PROCEDURE — 82947 ASSAY GLUCOSE BLOOD QUANT: CPT

## 2020-12-05 PROCEDURE — 80048 BASIC METABOLIC PNL TOTAL CA: CPT

## 2020-12-05 PROCEDURE — 94640 AIRWAY INHALATION TREATMENT: CPT

## 2020-12-05 PROCEDURE — 36415 COLL VENOUS BLD VENIPUNCTURE: CPT

## 2020-12-05 PROCEDURE — 6360000002 HC RX W HCPCS: Performed by: STUDENT IN AN ORGANIZED HEALTH CARE EDUCATION/TRAINING PROGRAM

## 2020-12-05 PROCEDURE — 2060000002 HC BURN ICU INTERMEDIATE R&B

## 2020-12-05 RX ORDER — INSULIN GLARGINE 100 [IU]/ML
20 INJECTION, SOLUTION SUBCUTANEOUS 2 TIMES DAILY
Status: DISCONTINUED | OUTPATIENT
Start: 2020-12-05 | End: 2020-12-06 | Stop reason: HOSPADM

## 2020-12-05 RX ORDER — IPRATROPIUM BROMIDE AND ALBUTEROL SULFATE 2.5; .5 MG/3ML; MG/3ML
1 SOLUTION RESPIRATORY (INHALATION) EVERY 6 HOURS PRN
Status: DISCONTINUED | OUTPATIENT
Start: 2020-12-05 | End: 2020-12-06 | Stop reason: HOSPADM

## 2020-12-05 RX ADMIN — ATORVASTATIN CALCIUM 80 MG: 80 TABLET, FILM COATED ORAL at 20:31

## 2020-12-05 RX ADMIN — CARVEDILOL 25 MG: 12.5 TABLET, FILM COATED ORAL at 18:11

## 2020-12-05 RX ADMIN — NIFEDIPINE 30 MG: 30 TABLET, FILM COATED, EXTENDED RELEASE ORAL at 18:11

## 2020-12-05 RX ADMIN — SODIUM CHLORIDE: 9 INJECTION, SOLUTION INTRAVENOUS at 06:40

## 2020-12-05 RX ADMIN — BUDESONIDE AND FORMOTEROL FUMARATE DIHYDRATE 2 PUFF: 160; 4.5 AEROSOL RESPIRATORY (INHALATION) at 21:25

## 2020-12-05 RX ADMIN — GABAPENTIN 200 MG: 100 CAPSULE ORAL at 20:31

## 2020-12-05 RX ADMIN — GABAPENTIN 200 MG: 100 CAPSULE ORAL at 08:56

## 2020-12-05 RX ADMIN — INSULIN GLARGINE 20 UNITS: 100 INJECTION, SOLUTION SUBCUTANEOUS at 08:59

## 2020-12-05 RX ADMIN — BUDESONIDE AND FORMOTEROL FUMARATE DIHYDRATE 2 PUFF: 160; 4.5 AEROSOL RESPIRATORY (INHALATION) at 07:39

## 2020-12-05 RX ADMIN — HYDRALAZINE HYDROCHLORIDE 100 MG: 50 TABLET, FILM COATED ORAL at 22:13

## 2020-12-05 RX ADMIN — PRAZOSIN HYDROCHLORIDE 4 MG: 1 CAPSULE ORAL at 08:56

## 2020-12-05 RX ADMIN — IPRATROPIUM BROMIDE AND ALBUTEROL SULFATE 1 AMPULE: .5; 3 SOLUTION RESPIRATORY (INHALATION) at 11:58

## 2020-12-05 RX ADMIN — HEPARIN SODIUM 5000 UNITS: 5000 INJECTION INTRAVENOUS; SUBCUTANEOUS at 14:55

## 2020-12-05 RX ADMIN — ISOSORBIDE MONONITRATE 60 MG: 60 TABLET ORAL at 08:56

## 2020-12-05 RX ADMIN — Medication 81 MG: at 08:56

## 2020-12-05 RX ADMIN — INSULIN GLARGINE 20 UNITS: 100 INJECTION, SOLUTION SUBCUTANEOUS at 20:34

## 2020-12-05 RX ADMIN — NIFEDIPINE 30 MG: 30 TABLET, FILM COATED, EXTENDED RELEASE ORAL at 08:56

## 2020-12-05 RX ADMIN — IPRATROPIUM BROMIDE AND ALBUTEROL SULFATE 1 AMPULE: .5; 3 SOLUTION RESPIRATORY (INHALATION) at 07:39

## 2020-12-05 RX ADMIN — SODIUM CHLORIDE: 9 INJECTION, SOLUTION INTRAVENOUS at 09:01

## 2020-12-05 RX ADMIN — HYDRALAZINE HYDROCHLORIDE 100 MG: 50 TABLET, FILM COATED ORAL at 06:59

## 2020-12-05 RX ADMIN — IPRATROPIUM BROMIDE AND ALBUTEROL SULFATE 1 AMPULE: .5; 3 SOLUTION RESPIRATORY (INHALATION) at 15:28

## 2020-12-05 RX ADMIN — PRAZOSIN HYDROCHLORIDE 4 MG: 1 CAPSULE ORAL at 20:30

## 2020-12-05 RX ADMIN — CLOPIDOGREL 75 MG: 75 TABLET, FILM COATED ORAL at 08:56

## 2020-12-05 RX ADMIN — GABAPENTIN 200 MG: 100 CAPSULE ORAL at 14:54

## 2020-12-05 RX ADMIN — CARVEDILOL 25 MG: 12.5 TABLET, FILM COATED ORAL at 08:56

## 2020-12-05 RX ADMIN — HYDRALAZINE HYDROCHLORIDE 100 MG: 50 TABLET, FILM COATED ORAL at 14:54

## 2020-12-05 RX ADMIN — HEPARIN SODIUM 5000 UNITS: 5000 INJECTION INTRAVENOUS; SUBCUTANEOUS at 07:45

## 2020-12-05 ASSESSMENT — ENCOUNTER SYMPTOMS
SHORTNESS OF BREATH: 1
RECTAL PAIN: 0
CHEST TIGHTNESS: 0
APNEA: 0
PHOTOPHOBIA: 0
TROUBLE SWALLOWING: 0
ALLERGIC/IMMUNOLOGIC NEGATIVE: 1
VOICE CHANGE: 0
COUGH: 1
VOMITING: 0

## 2020-12-05 NOTE — PROGRESS NOTES
[facility]  Internal Medicine Teaching Residency Program  Inpatient Daily Progress Note  ______________________________________________________________________________    Patient: Betsy Alicia  YOB: 1955   HQ    Acct: [de-identified]     Room: [address] [place] [address]  Admit date: 12/3/2020  Today's date: 20  Number of days in the hospital: 1    SUBJECTIVE   Admitting Diagnosis: DKA, type 2, not at goal [facility])  CC: Nausea/ vomiting  Pt examined at bedside. Chart & results reviewed. No acute events overnight  Pt vitals stable  No new complaints  Blood glucose controlled with SQ insulin and MDSS. No other complaints from the patient  Renal function improving. ROS:  Constitutional:  negative for chills, fevers, sweats  Respiratory:  negative for cough, dyspnea on exertion, hemoptysis, shortness of breath, wheezing  Cardiovascular:  negative for chest pain, chest pressure/discomfort, lower extremity edema, palpitations  Gastrointestinal:  negative for abdominal pain, constipation, diarrhea, nausea, vomiting  Neurological:  negative for dizziness, headache    BRIEF HISTORY     The patient is a pleasant 60 y.o. male with significant past medical history of diabetes mellitus on insulin, hypertension, CKD stage III, COPD presents to the ED with a chief complaint of nausea and vomiting since this morning.     To begin with patient had acute onset of shortness of breath early morning at 5 AM 20. Patient states he was winded going from bed to washroom and then vomited. EMS was called and as per the patient his blood glucose was at 600 and was given 10 units of short-acting insulin.   But his blood glucose is still elevated at 500 and was transferred to [place] [address] for further evaluation.     Patient indicates that he takes good care of his insulin regimen he indicates he takes 1 unit of NovoLog for every 50 mg above 150 of blood sugar and 1 unit of NovoLog for every 15 g of carbs that he take. He recently missed appointments with dietitian consult for which he takes carb controlled diet instead of diabetic diet.     He has a history of recent admission for diabetic coma because of hypoglycemia and was admitted for 18 days.     On current evaluation in the ER his initial vitals are blood pressure of 178/81, SPO2 of 97. He missed his blood pressure medications this morning.      His labs showed glucose 549 , beta hydroxybutyrate 6.95,  VBG 7.2/38.1/15.4, GFR of 20, sodium 126, lactic acid 1.6, Creatinine 3.51 , Osmolality 326.     Patient is admitted. Diagnosed DKA currently being monitored. OBJECTIVE     Vital Signs:  BP (!) 147/57   Pulse 62   Temp 98.3 °F (36.8 °C) (Oral)   Resp 16   Ht 5' 6\" (1.676 m)   Wt 115 lb (52.2 kg)   SpO2 96%   BMI 18.56 kg/m²     Temp (24hrs), Av.2 °F (36.8 °C), Min:98 °F (36.7 °C), Max:98.4 °F (36.9 °C)    In: -   Out: 300 [Urine:300]    Physical Exam:  Constitutional: This is a well developed, well nourished, 17-18.4 - Mild malnutrition / Protein energy malnutrition Grade I 60y.o. year old male who is alert, oriented, cooperative and in no apparent distress. Head:normocephalic and atraumatic. EENT:  PERRLA. No conjunctival injections. Septum was midline, mucosa was without erythema, exudates or cobblestoning. No thrush was noted. Neck: Supple without thyromegaly. No elevated JVP. Trachea was midline. Respiratory: B/L lower lobes wheezing improving. Chest was symmetrical without dullness to percussion. Breath sounds bilaterally were clear to auscultation. There were no wheezes, rhonchi or rales. There is no intercostal retraction or use of accessory muscles. No egophony noted. Cardiovascular: Regular without murmur, clicks, gallops or rubs. Abdomen: Slightly rounded and soft without organomegaly. No rebound, rigidity or guarding was appreciated. Lymphatic: No lymphadenopathy.   Musculoskeletal: input(s): APTT in the last 72 hours. CARDIAC ENZYMES: No results for input(s): CKMB, CKMBINDEX, TROPONINI in the last 72 hours. Invalid input(s): CKTOTAL;3  FASTING LIPID PANEL:  Lab Results   Component Value Date    CHOL 195 08/11/2020    HDL 62 08/11/2020    TRIG 80 08/11/2020     LIVER PROFILE:   Recent Labs     12/03/20  1115   AST 22   ALT 17   BILITOT 0.29*   ALKPHOS 118      MICROBIOLOGY:   Lab Results   Component Value Date/Time    CULTURE NO GROWTH 2 DAYS 12/03/2020 11:24 AM       Imaging:    Xr Chest Portable    Result Date: 12/3/2020  No acute cardiopulmonary process       ASSESSMENT & PLAN     ASSESSMENT / PLAN:     Principal Problem:    DKA, type 2, not at goal [facility])  Active Problems:    CKD (chronic kidney disease) stage 3, GFR 30-59 ml/min ([facility])    Essential hypertension    DM type 2, uncontrolled, with neuropathy ([facility])    Non-obstructive Coronary artery disease of native artery of native heart with stable angina pectoris ([facility]    Acute kidney injury superimposed on CKD ([facility]    Hyponatremia    Anemia  Resolved Problems:    * No resolved hospital problems. *        1. Diabetic ketoacidosis with underlying Type 1 DM : Anion gap closed, patient able to eat  Sugars is running high at 400, dose of Lantus 10 units nightly, will give his regular basal Lantus 18 units in the morning and 18 units in the evening, medium dose sliding scale premeal.  Continue glucose checks every hour. Monitor for signs of hypoglycemia, continue hypoglycemia protocol. 2.Essential hypertension : Continue on his home blood pressure medications, Lisinopril held for worsening renal function and for BINU. 3.BINU on CKD : GFR and worsening creatinine since last admit, nephrology  Following will monitor BMP. 4. Hyponatremia - resolved  5. Normocytic anemia most likely secondary to CKD - Outpatient follow up with PCP. DVT ppx : Heparin 5000 units  GI ppx: None    PT/OT: Ongoing.   Discharge Planning / SW: Ongoing most likely tomorrow    Sarmad Davis MD  Internal Medicine Resident, PGY-1  [facility]; [place], [place]  12/5/2020, 7:20 AM   Attending Physician Statement  I have discussed the care of Carine Garcia, including pertinent history and exam findings,  with the resident. I have seen and examined the patient and the key elements of all parts of the encounter have been performed by me. I agree with the assessment, plan and orders as documented by the resident with additions . Treatment plan Discussed with nursing staff in detail , all questions answered . Electronically signed by Jenny Portillo MD on   12/5/20 at 3:20 PM EST    Please note that this chart was generated using voice recognition Dragon dictation software. Although every effort was made to ensure the accuracy of this automated transcription, some errors in transcription may have occurred.

## 2020-12-05 NOTE — CARE COORDINATION
Case Management Initial Discharge Plan  Aldo Tafoya,             Met with:patient to discuss discharge plans. Information verified: address, contacts, phone number, , insurance Yes    Emergency Contact/Next of Kin name & number: Mary Ellen Arroyo    PCP: TERRANCE Rea CNP  Date of last visit: Has an appt on     Insurance Provider: Yuliana Case    Discharge Planning    Living Arrangements:  SO  Support Systems:  SO    Home has 1 stories  4 stairs to climb to get into front door, 0stairs to climb to reach second floor  Location of bedroom/bathroom in home Main Floor    Patient able to perform ADL's:Independent    Current Services (outpatient & in home) N/A  DME equipment: N/A  DME provider:     Receiving oral anticoagulation therapy? Yes Plavix    If indicated:   Physician managing anticoagulation treatment: PCP  Where does patient obtain lab work for ATC treatment? Does not have blood draws      Potential Assistance Needed:  N/A    Patient agreeable to home care: No  [place] of choice provided:  n/a    Prior SNF/Rehab Placement and Facility: N/A  Agreeable to SNF/Rehab: Yes or No  [place] of choice provided: n/a     Evaluation: yes    Expected Discharge date:       Patient expects to be discharged to:  Home  Follow Up Appointment: Best Day/ Time:      Transportation provider: Medical Cab  Transportation arrangements needed for discharge: Yes Medical cab needs to be called    Readmission Risk              Risk of Unplanned Readmission:        39             Does patient have a readmission risk score greater than 14?: Yes  If yes, follow-up appointment must be made within 7 days of discharge.      Goals of Care:       Discharge Plan: Pt will discharge home independently          Electronically signed by MARIZOL Ibarra on 20 at 10:58 AM EST

## 2020-12-05 NOTE — PROGRESS NOTES
Nephrology Progress Note      SUBJECTIVE    Patient seen. Chart reviewed. No acute events overnight. Hemodynamically stable. Afebrile. Off insulin drip. Creatinine back to his baseline. Not very compliant with premeal insulin. However he is agreeable for long-acting insulin as ordered in the chart. Oral intake appropriate. Able to ambulate on his own. No complaints of chest pain, however does report some shortness of breath on baseline from his previous history of COPD. Is on bronchodilator therapy. Review of Systems   Constitutional: Negative for chills, fatigue and fever. HENT: Negative for congestion, trouble swallowing and voice change. Eyes: Negative for photophobia and visual disturbance. Respiratory: Positive for cough and shortness of breath. Negative for apnea and chest tightness. Cardiovascular: Negative. Gastrointestinal: Negative for rectal pain and vomiting. Endocrine: Negative. Genitourinary: Negative for decreased urine volume and urgency. Musculoskeletal: Negative for myalgias, neck pain and neck stiffness. Skin: Negative. Allergic/Immunologic: Negative. Neurological: Negative. Psychiatric/Behavioral: Negative.           OBJECTIVE      CURRENT TEMPERATURE:  Temp: 98 °F (36.7 °C)  MAXIMUM TEMPERATURE OVER 24HRS:  Temp (24hrs), Av.2 °F (36.8 °C), Min:98 °F (36.7 °C), Max:98.4 °F (36.9 °C)    CURRENT RESPIRATORY RATE:  Resp: 18  CURRENT PULSE:  Pulse: 70  CURRENT BLOOD PRESSURE:  BP: (!) 140/59  24HR BLOOD PRESSURE RANGE:  Systolic (71AWK), YOY:906 , Min:118 , NOJ:769   ; Diastolic (23FIW), GXQ:16, Min:55, Max:59    24HR INTAKE/OUTPUT:      Intake/Output Summary (Last 24 hours) at 2020 1402  Last data filed at 2020 1919  Gross per 24 hour   Intake --   Output 300 ml   Net -300 ml       PHYSICAL EXAM      GENERAL APPEARANCE:Awake, alert, in no acute distress  SKIN: warm and dry, no rash or erythema  EYES: conjunctivae normal and sclera anicteric  ENT: no thrush no pharyngeal congestion   NECK: Flat JVD  PULMONARY: S1-S2 audible no S3  CADRDIOVASCULAR: S1 and S2 audible no S3   ABDOMEN: Soft and nontender bowel sounds are positive  EXTREMITIES: Trace edema    CURRENT MEDICATIONS      insulin glargine (LANTUS) injection vial 20 Units, BID  insulin lispro (HUMALOG) injection vial 0-12 Units, TID WC  insulin lispro (HUMALOG) injection vial 0-6 Units, Nightly  gabapentin (NEURONTIN) capsule 200 mg, TID  isosorbide mononitrate (IMDUR) extended release tablet 60 mg, Daily  ipratropium-albuterol (DUONEB) nebulizer solution 1 ampule, Q4H WA  budesonide-formoterol (SYMBICORT) 160-4.5 MCG/ACT inhaler 2 puff, BID  0.9 % sodium chloride infusion, Continuous  aspirin EC tablet 81 mg, Daily  atorvastatin (LIPITOR) tablet 80 mg, Daily  clopidogrel (PLAVIX) tablet 75 mg, Daily  heparin (porcine) injection 5,000 Units, 3 times per day  carvedilol (COREG) tablet 25 mg, BID WC  hydrALAZINE (APRESOLINE) tablet 100 mg, 3 times per day  prazosin (MINIPRESS) capsule 4 mg, BID  NIFEdipine (PROCARDIA XL) extended release tablet 30 mg, BID  glucose (GLUTOSE) 40 % oral gel 15 g, PRN  dextrose 50 % IV solution, PRN  glucagon (rDNA) injection 1 mg, PRN  dextrose 5 % solution, PRN          LABS       CBC:   Recent Labs     12/05/20  0509   WBC 6.4   RBC 3.16*   HGB 9.7*   HCT 31.7*   RDW 12.4      MPV 10.0      BMP:   Recent Labs     12/05/20  0509      K 4.5   *   CO2 18*   BUN 53*   CREATININE 3.33*   GLUCOSE 203*   CALCIUM 7.5*        Phosphorus:    Recent Labs     12/03/20  2159   PHOS 3.0     Magnesium:   Recent Labs     12/03/20  2159   MG 2.0     Albumin:   Recent Labs     12/03/20  1115   LABALBU 3.2*       RIZWAN: No results found for: RIZWAN  SPEP:   Lab Results   Component Value Date    PROT 4.1 12/04/2020    PROT 6.2 11/12/2011    ALBCAL PENDING 12/04/2020    ALBPCT PENDING 12/04/2020    LABALPH PENDING 12/04/2020    LABALPH PENDING 12/04/2020    A1PCT PENDING 12/04/2020    A2PCT PENDING 12/04/2020    LABBETA PENDING 12/04/2020    BETAPCT PENDING 12/04/2020    GAMGLOB PENDING 12/04/2020    GGPCT PENDING 12/04/2020    PATH PENDING 12/04/2020     UPEP:   Lab Results   Component Value Date     11/12/2011        C3:   Lab Results   Component Value Date    C3 89 (L) 12/04/2020     C4:   Lab Results   Component Value Date    C4 17 12/04/2020   Urine Sodium:    Lab Results   Component Value Date    IGNACIO 33 12/04/2020      Urine Potassium:    Lab Results   Component Value Date    KUR 38.1 12/04/2020   Urine Osmolarity:    Lab Results   Component Value Date    OSMOU 463 12/04/2020     Urine Creatinine:    Lab Results   Component Value Date    LABCREA 122.2 12/04/2020     Urine Eosinophils: No components found for: EOSU  Urine Protein:    Lab Results   Component Value Date     11/12/2011     Urinalysis:  U/A:   Lab Results   Component Value Date    NITRU NEGATIVE 06/18/2020    COLORU YELLOW 06/18/2020    PHUR 6.0 06/18/2020    WBCUA None 06/18/2020    RBCUA None 06/18/2020    MUCUS NOT REPORTED 06/18/2020    TRICHOMONAS NOT REPORTED 06/18/2020    YEAST NOT REPORTED 06/18/2020    BACTERIA NOT REPORTED 06/18/2020    CLARITYU Clear 09/12/2014    SPECGRAV 1.017 06/18/2020    LEUKOCYTESUR NEGATIVE 06/18/2020    UROBILINOGEN Normal 06/18/2020    BILIRUBINUR NEGATIVE 06/18/2020    BILIRUBINUR NEGATIVE 11/12/2011    BLOODU Negative 09/12/2014    GLUCOSEU TRACE 06/18/2020    GLUCOSEU 3+ 11/12/2011    KETUA NEGATIVE 06/18/2020    AMORPHOUS NOT REPORTED 06/18/2020       BMP:   Recent Labs     12/05/20  0509      K 4.5   *   CO2 18*   BUN 53*   CREATININE 3.33*   GLUCOSE 203*   CALCIUM 7.5*        Phosphorus:    Recent Labs     12/03/20  1752 12/03/20  2159   PHOS 3.7 3.0     Magnesium:   Recent Labs     12/03/20  1115 12/03/20  1752 12/03/20  2159   MG 2.2 2.1 2.0     Albumin:   Recent Labs     12/03/20  1115   LABALBU 3.2*       Assessment:  1.   Acute kidney

## 2020-12-05 NOTE — PLAN OF CARE
Problem: RESPIRATORY  Intervention: Respiratory assessment  Note: INGRID OLMEDO, PPatient Assessment complete. DKA, type 2, not at goal [facility]) [E11.10]  DKA, type 2, not at goal [facility]) [E11.10] . Vitals:    12/05/20 1627   BP: (!) 148/59   Pulse: 63   Resp: 18   Temp: 98.6 °F (37 °C)   SpO2: 97%   . Patients home meds are   Prior to Admission medications    Medication Sig Start Date End Date Taking? Authorizing Provider   albuterol-ipratropium (COMBIVENT RESPIMAT)  MCG/ACT AERS inhaler Inhale 1 puff into the lungs every 6 hours 11/25/20   TERRANCE Benavides CNP   prazosin (MINIPRESS) 2 MG capsule Take 2 capsules by mouth 2 times daily 11/25/20   TERRANCE Benavides - CNP   vitamin D (ERGOCALCIFEROL) 1.25 MG (91126 UT) CAPS capsule Take 1 capsule by mouth once a week 11/25/20   TERRANCE Benavides - CNP   hydrALAZINE (APRESOLINE) 100 MG tablet TAKE 1 TABLET BY MOUTH EVERY 8 HOURS 11/5/20   TERRANCE Benavides - CNP   azithromycin (ZITHROMAX) 250 MG tablet Take 2 tabs (500 mg) on Day 1, and take 1 tab (250 mg) on days 2 through 5. 10/19/20   TERRANCE Benavides - CNP   predniSONE (DELTASONE) 10 MG tablet Tapered dose 40mg x 3 days. .. 30mg x 3 days. .. 20mg x 3 days . Benedetta Ou .10mg x3days . ..then off 10/19/20   TERRANCE Benavides - CNP   gabapentin (NEURONTIN) 100 MG capsule Take 2 capsules by mouth 3 times daily for 90 doses.  10/5/20 11/4/20  TERRANCE Benavides - CNP   QUEtiapine (SEROQUEL) 100 MG tablet TAKE 1 TABLET BY MOUTH NIGHTLY 9/25/20   TERRANCE Benavides - CNP   losartan (COZAAR) 50 MG tablet TAKE 1 TABLET BY MOUTH 2 TIMES DAILY 9/25/20   Evon Chavarria, TERRANCE - JEN   fluticasone-umeclidin-vilant (TRELEGY ELLIPTA) 674-46.0-66 MCG/INH AEPB INHALE ONE PUFF INTO THE LUNGS DAILY 9/25/20   TERRANCE Benavides CNP   aspirin (ASPIR-LOW) 81 MG EC tablet TAKE 1 TABLET BY MOUTH DAILY 9/5/20   TERRANCE Benavides CNP   GNP VITAMIN D MAXIMUM STRENGTH 50 MCG (2000 UT) TABS TAKE 1 TABLET BY MOUTH ONCE DAILY 9/5/20   TERRANCE Oates CNP   NIFEdipine (ADALAT CC) 30 MG extended release tablet Take 2 tablets by mouth daily 8/13/20   Anaid Corley MD   isosorbide mononitrate (IMDUR) 60 MG extended release tablet Take 1 tablet by mouth daily 8/14/20   Anaid Corley MD   bumetanide (BUMEX) 1 MG tablet Take 1 tablet by mouth daily  Patient not taking: Reported on 10/9/2020 8/19/20   Anaid Corley MD   blood glucose monitor strips Test_4__times daily Diagnosis: 250.0   Diabetes Mellitus_x___Insulin Dependent____Non-Insulin Dependent 8/10/20   TERRANCE Oates CNP   Lancets MISC Use_4__times daily Diagnisis:250.0  Diabetes Mellitus__x__Insulin Dependent___Non-Insulin Dependent 8/10/20   TERRANCE Oates CNP   carvedilol (COREG) 25 MG tablet Take 1 tablet by mouth 2 times daily (with meals) 6/16/20   TERRANCE Oates CNP   atorvastatin (LIPITOR) 80 MG tablet Take 1 tablet by mouth daily 6/16/20   TERRANCE Oates CNP   clopidogrel (PLAVIX) 75 MG tablet Take 1 tablet by mouth daily 6/16/20   TERRANCE Oates CNP   insulin glargine (BASAGLAR KWIKPEN) 100 UNIT/ML injection pen Inject 35 Units into the skin nightly Dispense with pen needle 6/16/20   TERRANCE Oates CNP   insulin aspart (NOVOLOG FLEXPEN) 100 UNIT/ML injection pen Inject 5 Units into the skin 3 times daily (before meals) Sliding scale 6/16/20   TERRANCE Oates CNP   nitroGLYCERIN (NITROSTAT) 0.4 MG SL tablet up to max of 3 total doses.  If no relief after 1 dose, call 911. 6/16/20   TERRANCE Oates CNP   Nutritional Supplements (GLUCERNA CARBSTEADY) LIQD Take 1 Can by mouth 3 times daily 6/16/20   TERRANCE Oates CNP   COMFORT EZ PEN NEEDLES 31G X 8 MM MISC USE AS DIRECTED 4/14/20   TERRANCE Oates CNP   TRUE METRIX BLOOD GLUCOSE TEST strip TEST BLOOD SUGAR 4 TO 5 TIMES DAILY 12/12/19   TERRANCE Oates - CNP   EASY COMFORT LANCETS [facility] DX: DM-2 USE 3-4 TIMES DAILY 3/25/19   Zara Magdaleno MD   .    Assessment     Admit for DKA  History of COPD. Currently baseline. On Symbicort. Will initiate Aerosol Pr tocol. Change Duo Neb to as needed.      RR 18  Breath Sounds: clear      Bronchodilator assessment at level  1  meds  Hyperinflation assessment at level   Secretion Management assessment at level      [x]    Bronchodilator Assessment  BRONCHODILATOR ASSESSMENT SCORE  Score 0 1 2 3 4 5   Breath Sounds   [x]  Patient Baseline []  No Wheeze good aeration []  Faint, scattered wheezing, good aeration []  Expiratory Wheezing and or moderately diminished []  Insp/Exp wheeze and/or very diminished []  Insp/Exp and/ or marked distress   Respiratory Rate   [x]  Patient Baseline []  Less than 20 []  Less than 20 []  20-25 []  Greater than 25 []  Greater than 25   Peak flow % of Pred or PB [x]  NA   []  Greater than 90%  []  81-90% []  71-80% []  Less than or equal to 70%  or unable to perform []  Unable due to Respiratory Distress   Dyspnea re [x]  Patient Baseline []  No SOB []  No SOB []  SOB on exertion []  SOB min activity []  At rest/acute   e FEV% Predicted       [x]  NA []  Above 69%  []  Unable []  Above 60-69%  []  Unable []  Above 50-59%  []  Unable []  Above 35-49%  []  Unable []  Less than 35%  []  Unable                   Intervention: Administer treatments as ordered  Note: BRONCHOSPASM/BRONCHOCONSTRICTION     [x]         IMPROVE AERATION/BREATH SOUNDS  [x]   ADMINISTER BRONCHODILATOR THERAPY AS APPROPRIATE  [x]   ASSESS BREATH SOUNDS  [x]   IMPLEMENT AEROSOL/MDI PROTOCOL  [x]   PATIENT EDUCATION AS NEEDED

## 2020-12-06 VITALS
SYSTOLIC BLOOD PRESSURE: 122 MMHG | WEIGHT: 114.99 LBS | TEMPERATURE: 98.1 F | OXYGEN SATURATION: 99 % | HEART RATE: 62 BPM | HEIGHT: 66 IN | RESPIRATION RATE: 20 BRPM | BODY MASS INDEX: 18.48 KG/M2 | DIASTOLIC BLOOD PRESSURE: 50 MMHG

## 2020-12-06 PROBLEM — E87.1 HYPONATREMIA: Status: RESOLVED | Noted: 2020-05-17 | Resolved: 2020-12-06

## 2020-12-06 LAB
ANION GAP SERPL CALCULATED.3IONS-SCNC: 7 MMOL/L (ref 9–17)
ANION GAP SERPL CALCULATED.3IONS-SCNC: 9 MMOL/L (ref 9–17)
BUN BLDV-MCNC: 54 MG/DL (ref 8–23)
BUN BLDV-MCNC: 55 MG/DL (ref 8–23)
BUN/CREAT BLD: ABNORMAL (ref 9–20)
BUN/CREAT BLD: ABNORMAL (ref 9–20)
CALCIUM SERPL-MCNC: 7.5 MG/DL (ref 8.6–10.4)
CALCIUM SERPL-MCNC: 7.9 MG/DL (ref 8.6–10.4)
CHLORIDE BLD-SCNC: 108 MMOL/L (ref 98–107)
CHLORIDE BLD-SCNC: 111 MMOL/L (ref 98–107)
CO2: 20 MMOL/L (ref 20–31)
CO2: 20 MMOL/L (ref 20–31)
CREAT SERPL-MCNC: 3.22 MG/DL (ref 0.7–1.2)
CREAT SERPL-MCNC: 3.33 MG/DL (ref 0.7–1.2)
GFR AFRICAN AMERICAN: 23 ML/MIN
GFR AFRICAN AMERICAN: 24 ML/MIN
GFR NON-AFRICAN AMERICAN: 19 ML/MIN
GFR NON-AFRICAN AMERICAN: 20 ML/MIN
GFR SERPL CREATININE-BSD FRML MDRD: ABNORMAL ML/MIN/{1.73_M2}
GLUCOSE BLD-MCNC: 120 MG/DL (ref 75–110)
GLUCOSE BLD-MCNC: 127 MG/DL (ref 75–110)
GLUCOSE BLD-MCNC: 130 MG/DL (ref 75–110)
GLUCOSE BLD-MCNC: 139 MG/DL (ref 75–110)
GLUCOSE BLD-MCNC: 153 MG/DL (ref 75–110)
GLUCOSE BLD-MCNC: 174 MG/DL (ref 70–99)
GLUCOSE BLD-MCNC: 208 MG/DL (ref 75–110)
GLUCOSE BLD-MCNC: 221 MG/DL (ref 75–110)
GLUCOSE BLD-MCNC: 222 MG/DL (ref 75–110)
GLUCOSE BLD-MCNC: 251 MG/DL (ref 70–99)
GLUCOSE BLD-MCNC: 260 MG/DL (ref 75–110)
POTASSIUM SERPL-SCNC: 4.7 MMOL/L (ref 3.7–5.3)
POTASSIUM SERPL-SCNC: 4.8 MMOL/L (ref 3.7–5.3)
SODIUM BLD-SCNC: 137 MMOL/L (ref 135–144)
SODIUM BLD-SCNC: 138 MMOL/L (ref 135–144)

## 2020-12-06 PROCEDURE — 6370000000 HC RX 637 (ALT 250 FOR IP): Performed by: INTERNAL MEDICINE

## 2020-12-06 PROCEDURE — 36415 COLL VENOUS BLD VENIPUNCTURE: CPT

## 2020-12-06 PROCEDURE — 99238 HOSP IP/OBS DSCHRG MGMT 30/<: CPT | Performed by: INTERNAL MEDICINE

## 2020-12-06 PROCEDURE — 6370000000 HC RX 637 (ALT 250 FOR IP): Performed by: STUDENT IN AN ORGANIZED HEALTH CARE EDUCATION/TRAINING PROGRAM

## 2020-12-06 PROCEDURE — 82947 ASSAY GLUCOSE BLOOD QUANT: CPT

## 2020-12-06 PROCEDURE — 80048 BASIC METABOLIC PNL TOTAL CA: CPT

## 2020-12-06 PROCEDURE — 94640 AIRWAY INHALATION TREATMENT: CPT

## 2020-12-06 RX ORDER — FLUTICASONE FUROATE, UMECLIDINIUM BROMIDE AND VILANTEROL TRIFENATATE 100; 62.5; 25 UG/1; UG/1; UG/1
POWDER RESPIRATORY (INHALATION)
Qty: 60 EACH | Refills: 3 | Status: SHIPPED | OUTPATIENT
Start: 2020-12-06 | End: 2020-12-06 | Stop reason: SDUPTHER

## 2020-12-06 RX ORDER — FLUTICASONE FUROATE, UMECLIDINIUM BROMIDE AND VILANTEROL TRIFENATATE 100; 62.5; 25 UG/1; UG/1; UG/1
POWDER RESPIRATORY (INHALATION)
Qty: 60 EACH | Refills: 3 | Status: SHIPPED | OUTPATIENT
Start: 2020-12-06 | End: 2021-01-27

## 2020-12-06 RX ORDER — IPRATROPIUM BROMIDE AND ALBUTEROL SULFATE 2.5; .5 MG/3ML; MG/3ML
3 SOLUTION RESPIRATORY (INHALATION) EVERY 6 HOURS PRN
Qty: 360 ML | Refills: 3 | Status: SHIPPED | OUTPATIENT
Start: 2020-12-06 | End: 2021-11-01

## 2020-12-06 RX ORDER — LANCETS 30 GAUGE
EACH MISCELLANEOUS
Qty: 100 EACH | Refills: 11 | Status: SHIPPED | OUTPATIENT
Start: 2020-12-06

## 2020-12-06 RX ADMIN — NIFEDIPINE 30 MG: 30 TABLET, FILM COATED, EXTENDED RELEASE ORAL at 08:20

## 2020-12-06 RX ADMIN — CLOPIDOGREL 75 MG: 75 TABLET, FILM COATED ORAL at 08:21

## 2020-12-06 RX ADMIN — IPRATROPIUM BROMIDE AND ALBUTEROL SULFATE 1 AMPULE: .5; 3 SOLUTION RESPIRATORY (INHALATION) at 08:29

## 2020-12-06 RX ADMIN — PRAZOSIN HYDROCHLORIDE 4 MG: 1 CAPSULE ORAL at 08:21

## 2020-12-06 RX ADMIN — CARVEDILOL 25 MG: 12.5 TABLET, FILM COATED ORAL at 08:21

## 2020-12-06 RX ADMIN — BUDESONIDE AND FORMOTEROL FUMARATE DIHYDRATE 2 PUFF: 160; 4.5 AEROSOL RESPIRATORY (INHALATION) at 08:27

## 2020-12-06 RX ADMIN — ISOSORBIDE MONONITRATE 60 MG: 60 TABLET ORAL at 08:21

## 2020-12-06 RX ADMIN — Medication 81 MG: at 08:21

## 2020-12-06 RX ADMIN — GABAPENTIN 200 MG: 100 CAPSULE ORAL at 13:26

## 2020-12-06 RX ADMIN — HYDRALAZINE HYDROCHLORIDE 100 MG: 50 TABLET, FILM COATED ORAL at 06:10

## 2020-12-06 RX ADMIN — HYDRALAZINE HYDROCHLORIDE 100 MG: 50 TABLET, FILM COATED ORAL at 13:25

## 2020-12-06 RX ADMIN — INSULIN GLARGINE 20 UNITS: 100 INJECTION, SOLUTION SUBCUTANEOUS at 08:24

## 2020-12-06 RX ADMIN — GABAPENTIN 200 MG: 100 CAPSULE ORAL at 08:21

## 2020-12-06 NOTE — PROGRESS NOTES
CLINICAL PHARMACY NOTE: MEDS TO [address] Select Patient?: No  Total # of Prescriptions Filled: 1   The following medications were delivered to the patient:  · Duoneb  Total # of Interventions Completed: 0  Time Spent (min): 0    Additional Documentation: med delivered to patient 12/6 at 1:40pm. Patient in room 163. No co-pay.

## 2020-12-06 NOTE — PROGRESS NOTES
Peter Mcclain was evaluated today and a DME order was entered for a nebulizer compressor in order to administer Ipratropium due to the diagnosis of COPD. The need for this equipment and treatment was discussed with the patient and he understands and is in agreement. Attending Physician Statement  I have discussed the care of Peter Mcclain, including pertinent history and exam findings,  with the resident. I have seen and examined the patient and the key elements of all parts of the encounter have been performed by me. I agree with the assessment, plan and orders as documented by the resident with additions . Treatment plan Discussed with nursing staff in detail , all questions answered . Electronically signed by Federico Rivera MD on   12/6/20 at 2:08 PM EST    Please note that this chart was generated using voice recognition Dragon dictation software. Although every effort was made to ensure the accuracy of this automated transcription, some errors in transcription may have occurred.

## 2020-12-06 NOTE — PROGRESS NOTES
[facility]  Internal Medicine Teaching Residency Program  Inpatient Daily Progress Note  ______________________________________________________________________________    Patient: Suly Hernandez  YOB: 1955   RM    Acct: [de-identified]     Room: [address] [place] [address]  Admit date: 12/3/2020  Today's date: 20  Number of days in the hospital: 2    SUBJECTIVE   Admitting Diagnosis: DKA, type 2, not at goal [facility])  CC: Nausea/ vomiting    Pt examined at bedside. Chart & results reviewed. No acute events overnight  Vitals stable  No new complaints from the patient. Will continue to hold losartan for BINU      ROS:  Constitutional:  negative for chills, fevers, sweats  Respiratory:  negative for cough, dyspnea on exertion, hemoptysis, shortness of breath, wheezing  Cardiovascular:  negative for chest pain, chest pressure/discomfort, lower extremity edema, palpitations  Gastrointestinal:  negative for abdominal pain, constipation, diarrhea, nausea, vomiting  Neurological:  negative for dizziness, headache    BRIEF HISTORY     The patient is a pleasant 60 y.o. male with significant past medical history of diabetes mellitus on insulin, hypertension, CKD stage III, COPD presents to the ED with a chief complaint of nausea and vomiting since this morning.     To begin with patient had acute onset of shortness of breath early morning at 5 AM 20. Patient states he was winded going from bed to washroom and then vomited. EMS was called and as per the patient his blood glucose was at 600 and was given 10 units of short-acting insulin. But his blood glucose is still elevated at 500 and [facility].     Patient indicates that he takes good care of his insulin regimen he indicates he takes 1 unit of NovoLog for every 50 mg above 150 of blood sugar and 1 unit of NovoLog for every 15 g of carbs that he take.   He recently missed appointments with dietitian consult for which he takes carb controlled diet instead of diabetic diet.     He has a history of recent admission for diabetic coma because of hypoglycemia and was admitted for 18 days.     On current evaluation in the ER his initial vitals are blood pressure of 178/81, SPO2 of 97. He missed his blood pressure medications this morning.      His labs showed glucose 549 , beta hydroxybutyrate 6.95,  VBG 7.2/38.1/15.4, GFR of 20, sodium 126, lactic acid 1.6, Creatinine 3.51 , Osmolality 326.     Patient is admitted. Diagnosed DKA and BINU on CKD. currently being monitored. OBJECTIVE     Vital Signs:  BP (!) 172/66   Pulse 70   Temp 98.3 °F (36.8 °C) (Oral)   Resp 18   Ht 5' 6\" (1.676 m)   Wt 114 lb 15.9 oz (52.2 kg)   SpO2 99%   BMI 18.56 kg/m²     Temp (24hrs), Av.2 °F (36.8 °C), Min:98 °F (36.7 °C), Max:98.6 °F (37 °C)    In: -   Out: 850 [Urine:850]    Physical Exam:  Constitutional: This is a well developed, well nourished, 17-18.4 - Mild malnutrition / Protein energy malnutrition Grade I 60y.o. year old male who is alert, oriented, cooperative and in no apparent distress. Head:normocephalic and atraumatic. EENT:  PERRLA. No conjunctival injections. Septum was midline, mucosa was without erythema, exudates or cobblestoning. No thrush was noted. Neck: Supple without thyromegaly. No elevated JVP. Trachea was midline. Respiratory: clear to auscultation. Chest was symmetrical without dullness to percussion. Breath sounds bilaterally were clear to auscultation. There were no wheezes, rhonchi or rales. There is no intercostal retraction or use of accessory muscles. No egophony noted. Cardiovascular: Regular without murmur, clicks, gallops or rubs. Abdomen: Slightly rounded and soft without organomegaly. No rebound, rigidity or guarding was appreciated. Lymphatic: No lymphadenopathy. Musculoskeletal: Normal curvature of the spine.   No gross muscle weakness. Extremities:  No lower extremity edema, ulcerations, tenderness, varicosities or erythema. Muscle size, tone and strength are normal.  No involuntary movements are noted. Skin:  Warm and dry. Good color, turgor and pigmentation. No lesions or scars. No cyanosis or clubbing. Neurological/Psychiatric: The patient's general behavior, level of consciousness, thought content and emotional status is normal.        Medications:  Scheduled Medications:    insulin glargine  20 Units Subcutaneous BID    insulin lispro  0-12 Units Subcutaneous TID WC    insulin lispro  0-6 Units Subcutaneous Nightly    gabapentin  200 mg Oral TID    isosorbide mononitrate  60 mg Oral Daily    budesonide-formoterol  2 puff Inhalation BID    aspirin  81 mg Oral Daily    atorvastatin  80 mg Oral Daily    clopidogrel  75 mg Oral Daily    heparin (porcine)  5,000 Units Subcutaneous 3 times per day    carvedilol  25 mg Oral BID WC    hydrALAZINE  100 mg Oral 3 times per day    prazosin  4 mg Oral BID    NIFEdipine  30 mg Oral BID     Continuous Infusions:    sodium chloride 100 mL/hr at 12/05/20 0901    dextrose       PRN Medicationsipratropium-albuterol, 1 ampule, Q6H PRN  glucose, 15 g, PRN  dextrose, 12.5 g, PRN  glucagon (rDNA), 1 mg, PRN  dextrose, 100 mL/hr, PRN        Diagnostic Labs:  CBC:   Recent Labs     12/04/20  0622 12/05/20  0509   WBC 8.1 6.4   RBC 3.55* 3.16*   HGB 10.9* 9.7*   HCT 34.3* 31.7*   MCV 96.6 100.3   RDW 12.5 12.4    280     BMP:   Recent Labs     12/03/20  1752 12/03/20  2159  12/05/20  0509 12/06/20  0006 12/06/20  0538    136   < > 138 137 138   K 4.3 4.2   < > 4.5 4.8 4.7    105   < > 109* 108* 111*   CO2 19* 21   < > 18* 20 20   PHOS 3.7 3.0  --   --   --   --    BUN 52* 55*   < > 53* 55* 54*   CREATININE 3.42* 3.86*   < > 3.33* 3.33* 3.22*    < > = values in this interval not displayed. BNP: No results for input(s): BNP in the last 72 hours.   PT/INR: Recent Labs     12/03/20  1115   PROTIME 9.8   INR 0.9     APTT: No results for input(s): APTT in the last 72 hours. CARDIAC ENZYMES: No results for input(s): CKMB, CKMBINDEX, TROPONINI in the last 72 hours. Invalid input(s): CKTOTAL;3  FASTING LIPID PANEL:  Lab Results   Component Value Date    CHOL 195 08/11/2020    HDL 62 08/11/2020    TRIG 80 08/11/2020     LIVER PROFILE:   Recent Labs     12/03/20  1115   AST 22   ALT 17   BILITOT 0.29*   ALKPHOS 118      MICROBIOLOGY:   Lab Results   Component Value Date/Time    CULTURE NO GROWTH 3 DAYS 12/03/2020 11:24 AM       Imaging:    Xr Chest Portable    Result Date: 12/3/2020  No acute cardiopulmonary process       ASSESSMENT & PLAN     ASSESSMENT / PLAN:     Principal Problem:    DKA, type 2, not at goal [facility])  Active Problems:    CKD (chronic kidney disease) stage 3, GFR 30-59 ml/min ([facility])    Essential hypertension    DM type 2, uncontrolled, with neuropathy ([facility])    Non-obstructive Coronary artery disease of native artery of native heart with stable angina pectoris ([facility] [address]    Acute kidney injury superimposed on CKD ([facility] [address]    Hyponatremia    Anemia  Resolved Problems:    * No resolved hospital problems. *    1. Diabetic ketoacidosis :   Anion gap closed, patient able to eat. Continue regular basal Lantus 20 units in the morning and 20 units in the evening, medium dose sliding scale. Continue glucose checks   Monitor for signs of hypoglycemia, continue hypoglycemia protocol. 2.Essential hypertension : Continue on his home blood pressure medications, will restart Lisinopril held for worsening renal function and for BINU. 3.BINU on CKD 4 : Resolving and  nephrology following will monitor BMP. 4. Hyponatremia - resolved. 5. Normocytic anemia most likely secondary to CKD - Outpatient follow up with PCP. 6. HX of CAD : continue aspirin and statin. DVT ppx : Heparin 5000 units. GI ppx: None. PT/OT: Ongoing.   Discharge Planning / SW:  Will discharge today.    Antwan Cardoza MD  Internal Medicine Resident, PGY-1  [address],Attending Physician Statement  I have discussed the care of Ondina Lord, including pertinent history and exam findings,  with the resident. I have seen and examined the patient and the key elements of all parts of the encounter have been performed by me. I agree with the assessment, plan and orders as documented by the resident with additions . PK to go home      Treatment plan Discussed with nursing staff in detail , all questions answered . Electronically signed by Kadeem Joshi MD on   12/6/20 at 10:42 AM EST    Please note that this chart was generated using voice recognition Dragon dictation software. Although every effort was made to ensure the accuracy of this automated transcription, some errors in transcription may have occurred.

## 2020-12-06 NOTE — PLAN OF CARE
Problem: Falls - Risk of:  Goal: Will remain free from falls  Description: Will remain free from falls  12/6/2020 1624 by Saul Grande RN  Outcome: Completed  12/6/2020 0240 by Sean Stein RN  Outcome: Ongoing  Goal: Absence of physical injury  Description: Absence of physical injury  12/6/2020 1624 by Saul Grande RN  Outcome: Completed  12/6/2020 0240 by Sean Stein RN  Outcome: Ongoing

## 2020-12-07 LAB
ALBUMIN (CALCULATED): 2.3 G/DL (ref 3.2–5.2)
ALBUMIN PERCENT: 55 % (ref 45–65)
ALPHA 1 PERCENT: 3 % (ref 3–6)
ALPHA 2 PERCENT: 17 % (ref 6–13)
ALPHA-1-GLOBULIN: 0.1 G/DL (ref 0.1–0.4)
ALPHA-2-GLOBULIN: 0.7 G/DL (ref 0.5–0.9)
ANTI-NUCLEAR ANTIBODY (ANA): NEGATIVE
BETA GLOBULIN: 0.5 G/DL (ref 0.5–1.1)
BETA PERCENT: 13 % (ref 11–19)
GAMMA GLOBULIN %: 12 % (ref 9–20)
GAMMA GLOBULIN: 0.5 G/DL (ref 0.5–1.5)
PATHOLOGIST: ABNORMAL
PATHOLOGIST: NORMAL
PROTEIN ELECTROPHORESIS, SERUM: ABNORMAL
SERUM IFX INTERP: NORMAL
TOTAL PROT. SUM,%: 100 % (ref 98–102)
TOTAL PROT. SUM: 4.1 G/DL (ref 6.3–8.2)
TOTAL PROTEIN: 4.1 G/DL (ref 6.4–8.3)

## 2020-12-07 RX ORDER — HYDRALAZINE HYDROCHLORIDE 100 MG/1
100 TABLET, FILM COATED ORAL EVERY 8 HOURS SCHEDULED
Qty: 90 TABLET | Refills: 2 | Status: SHIPPED | OUTPATIENT
Start: 2020-12-07 | End: 2021-01-25 | Stop reason: SDUPTHER

## 2020-12-07 NOTE — PROGRESS NOTES
Refill ordered for hospital discharge medications Hydralazine and Combivent as medications ordered were inadequate to last until patient is able to follow up with PCP.

## 2020-12-07 NOTE — DISCHARGE SUMMARY
[address]     Department of Internal Medicine - Staff Internal Medicine Teaching Service    INPATIENT DISCHARGE SUMMARY      Patient Identification:  Attila Holt is a 60 y.o. male.   :  1955  MRN: [id number]     Acct: [de-identified]   PCP: TERRANCE Morataya CNP  Admit Date:  12/3/2020  Discharge date and time: 2020  3:30 PM   Attending Provider: No att. providers found                                     [address] Problem Lists:  Principal Problem:    DKA, type 2, not at goal [facility])  Active Problems:    CKD (chronic kidney disease) stage 3, GFR 30-59 ml/min ([facility] [address]    Essential hypertension    DM type 2, uncontrolled, with neuropathy ([facility] [address]    Non-obstructive Coronary artery disease of native artery of native heart with stable angina pectoris ([facility] [address]    Acute kidney injury superimposed on CKD ([facility] [address]    Anemia  Resolved Problems:    Hyponatremia      HOSPITAL STAY     Brief Inpatient course:   Attila Holt is a 60 y.o. male who was admitted for the management of DKA, type 2, not at goal [facility]), presented to the emergency department with  past medical history of diabetes mellitus on insulin, hypertension, CKD stage III, COPD presents to the ED with a chief complaint of nausea and vomiting since this morning.     To begin with patient had acute onset of shortness of breath early morning at 5 AM 20.  Patient states he was winded going from bed to washroom and then vomited.  EMS was called and as per the patient his blood glucose was at 600 and was given 10 units of short-acting insulin.  But his blood glucose is still elevated at 500 and was transferred to [facility] for further evaluation.     Patient indicates that he takes good care of his insulin regimen he indicates he takes 1 unit of NovoLog for every 50 mg above 150 of blood sugar and 1 unit of NovoLog for every 15 g of carbs that he take. Sanket Haddad recently missed appointments with dietitian consult for which he takes carb controlled diet instead of diabetic diet.     He has a history of recent admission for diabetic coma because of hypoglycemia and was admitted for 18 days.     On current evaluation in the ER his initial vitals are blood pressure of 178/81, SPO2 of 97. He missed his blood pressure medications this morning.      His labs showed glucose 549 , beta hydroxybutyrate 6.95,  VBG 7.2/38.1/15.4, GFR of 20, sodium 126, lactic acid 1.6, Creatinine 3.51 , Osmolality 326.     Patient is admitted.  Diagnosed DKA and BINU on CKD. He was treated for DKA and was managed with insulin infusion and IV high flow bolus and maintenance fluids. Anion gap was closed, pt was later able to eat. Insulin infusion bridged to SC insulin and his blood sugars are back to baseline. His BINU improved with IV fluids and nephrology consulted because eof progression of CKD and scheduled to follow up outpatient with them. Over his DKA is resolved . He received diabetic education and was discharged.        Procedures/ Significant Interventions:      None    Consults:     Consults:     Final Specialist Recommendations/Findings:   IP CONSULT TO INTERNAL MEDICINE  IP CONSULT TO CASE MANAGEMENT  IP CONSULT TO NEPHROLOGY  IP CONSULT TO DIABETES EDUCATOR      Any Hospital Acquired Infections: none    Discharge Functional Status:  stable    DISCHARGE PLAN     Disposition: home    Patient Instructions:   Discharge Medication List as of 12/6/2020  1:16 PM      START taking these medications    Details   ipratropium-albuterol (DUONEB) 0.5-2.5 (3) MG/3ML SOLN nebulizer solution Inhale 3 mLs into the lungs every 6 hours as needed for Shortness of Breath, Disp-360 mL,R-3Normal         CONTINUE these medications which have CHANGED    Details   !! Lancets MISC Disp-100 each,R-11, NormalUse_4__times daily Diagnisis:250.0  Diabetes Mellitus__x__Insulin Dependent___Non-Insulin Dependent      fluticasone-umeclidin-vilant (Kiesha Snyder ELLIPTA) 100-62.5-25 MCG/INH AEPB INHALE ONE PUFF INTO THE LUNGS DAILY, Disp-60 each,R-3Normal       !! - Potential duplicate medications found. Please discuss with provider. CONTINUE these medications which have NOT CHANGED    Details   albuterol-ipratropium (COMBIVENT RESPIMAT)  MCG/ACT AERS inhaler Inhale 1 puff into the lungs every 6 hours, Disp-4 g,R-0Normal      prazosin (MINIPRESS) 2 MG capsule Take 2 capsules by mouth 2 times daily, Disp-120 capsule,R-0Normal      vitamin D (ERGOCALCIFEROL) 1.25 MG (64295 UT) CAPS capsule Take 1 capsule by mouth once a week, Disp-4 capsule,R-0IS PATIENT SUPPOSED TO CONTINUE THIS THERAPY? Normal      hydrALAZINE (APRESOLINE) 100 MG tablet TAKE 1 TABLET BY MOUTH EVERY 8 HOURS, Disp-90 tablet,R-0Normal      gabapentin (NEURONTIN) 100 MG capsule Take 2 capsules by mouth 3 times daily for 90 doses. , Disp-180 capsule,R-3Normal      QUEtiapine (SEROQUEL) 100 MG tablet TAKE 1 TABLET BY MOUTH NIGHTLY, Disp-30 tablet,R-3Normal      aspirin (ASPIR-LOW) 81 MG EC tablet TAKE 1 TABLET BY MOUTH DAILY, Disp-30 tablet,R-3Normal      GNP VITAMIN D MAXIMUM STRENGTH 50 MCG (2000 UT) TABS TAKE 1 TABLET BY MOUTH ONCE DAILY, Disp-30 tablet,R-3Normal      NIFEdipine (ADALAT CC) 30 MG extended release tablet Take 2 tablets by mouth daily, Disp-30 tablet,R-3Normal      isosorbide mononitrate (IMDUR) 60 MG extended release tablet Take 1 tablet by mouth daily, Disp-30 tablet,R-3Normal      bumetanide (BUMEX) 1 MG tablet Take 1 tablet by mouth daily, Disp-30 tablet,R-3Normal      !! blood glucose monitor strips Test_4__times daily Diagnosis: 250.0   Diabetes Mellitus_x___Insulin Dependent____Non-Insulin Dependent, Disp-100 strip,R-11, Normal      carvedilol (COREG) 25 MG tablet Take 1 tablet by mouth 2 times daily (with meals), Disp-60 tablet, R-3Normal      atorvastatin (LIPITOR) 80 MG tablet Take 1 tablet by mouth daily, Disp-30 tablet, R-3Normal      clopidogrel (PLAVIX) 75 MG tablet Take 1 tablet by mouth daily, Disp-90 tablet, R-3Normal      insulin glargine (BASAGLAR KWIKPEN) 100 UNIT/ML injection pen Inject 35 Units into the skin nightly Dispense with pen needle, Disp-5 pen, R-2Normal      insulin aspart (NOVOLOG FLEXPEN) 100 UNIT/ML injection pen Inject 5 Units into the skin 3 times daily (before meals) Sliding scale, Disp-5 pen, R-3Normal      nitroGLYCERIN (NITROSTAT) 0.4 MG SL tablet up to max of 3 total doses. If no relief after 1 dose, call 911., Disp-25 tablet, R-2Normal      Nutritional Supplements (GLUCERNA CARBSTEADY) LIQD Take 1 Can by mouth 3 times daily, Disp-237 mL, R-5Print      COMFORT EZ PEN NEEDLES 31G X 8 MM MISC Disp-100 each, R-5, Normal      !! TRUE METRIX BLOOD GLUCOSE TEST strip TEST BLOOD SUGAR 4 TO 5 TIMES DAILY, Disp-100 strip, R-7Normal      !! EASY COMFORT LANCETS MISC Disp-100 each, R-11, NormalDX: DM-2 USE 3-4 TIMES DAILY       ! ! - Potential duplicate medications found. Please discuss with provider. STOP taking these medications       azithromycin (ZITHROMAX) 250 MG tablet Comments:   Reason for Stopping:         predniSONE (DELTASONE) 10 MG tablet Comments:   Reason for Stopping:         losartan (COZAAR) 50 MG tablet Comments:   Reason for Stopping:               Activity: activity as tolerated    Diet: diabetic diet    Follow-up:    TERRANCE Parra [facility] [phone]    In 1 week  post hospitalization follow up for DKA    MD MAVIS [address], [address]  [phone]    In 1 week  Follow up for CKD stage 4      Patient Instructions:     Please follow up with your PCP once discharged from the hospital. Call for an appointment.    strongly encourage to call Dr. Divya Allan office to make a follow-up appointment as kidney function is approaching end-stage kidney disease  given scripts for BMP to check coming Monday and once every week for next 2 weeks  Call or reach out for medical atten tion in case of worsening symptoms. Stop taking losartan because of worsened kidney function. Follow up with your PCP. Continue the rest of your home medications. Follow up labs: BMP on Monday and BMP once/week for next 2 weeks. Follow up imaging: none    Note that over 30 minutes was spent in preparing discharge papers, discussing discharge with patient, medication review, etc.      Ronal Roberts MD, MD  Internal Medicine Resident, PGY-1  [address], [place]  12/7/2020, 12:27 PM

## 2020-12-09 ENCOUNTER — TELEPHONE (OUTPATIENT)
Dept: FAMILY MEDICINE CLINIC | Age: 65
End: 2020-12-09

## 2020-12-09 LAB
CULTURE: NORMAL
CULTURE: NORMAL
Lab: NORMAL
Lab: NORMAL
SPECIMEN DESCRIPTION: NORMAL
SPECIMEN DESCRIPTION: NORMAL

## 2021-01-04 RX ORDER — ISOSORBIDE MONONITRATE 30 MG/1
TABLET, EXTENDED RELEASE ORAL
Qty: 30 TABLET | Refills: 3 | OUTPATIENT
Start: 2021-01-04

## 2021-01-04 NOTE — TELEPHONE ENCOUNTER
MS     -115    .16/.08/.38     ([facility] [address]     COPD (chronic obstructive pulmonary disease) (HCC)     Cerebral vascular disease     Primary insomnia     Hypertensive crisis     Non-obstructive Coronary artery disease of native artery of native heart with stable angina pectoris ([address]     Hyperparathyroidism ([address]     Hypovitaminosis D     Acute kidney injury superimposed on CKD ([facility] [address]     Coronary artery disease with exertional angina (HCC)     Leg swelling     Anemia     Hypoalbuminemia     DKA, type 2, not at goal [facility])

## 2021-01-05 ENCOUNTER — TELEPHONE (OUTPATIENT)
Dept: INTERNAL MEDICINE | Age: 66
End: 2021-01-05

## 2021-01-05 ENCOUNTER — VIRTUAL VISIT (OUTPATIENT)
Dept: INTERNAL MEDICINE | Age: 66
End: 2021-01-05
Payer: COMMERCIAL

## 2021-01-05 DIAGNOSIS — C18.9 MALIGNANT NEOPLASM OF COLON, UNSPECIFIED PART OF COLON (HCC): ICD-10-CM

## 2021-01-05 DIAGNOSIS — I25.118 CORONARY ARTERY DISEASE OF NATIVE ARTERY OF NATIVE HEART WITH STABLE ANGINA PECTORIS (HCC): ICD-10-CM

## 2021-01-05 DIAGNOSIS — R56.9 SEIZURE (HCC): ICD-10-CM

## 2021-01-05 DIAGNOSIS — Z23 NEED FOR PROPHYLACTIC VACCINATION AND INOCULATION AGAINST VARICELLA: ICD-10-CM

## 2021-01-05 DIAGNOSIS — N18.6 ESRD (END STAGE RENAL DISEASE) (HCC): ICD-10-CM

## 2021-01-05 DIAGNOSIS — Z12.11 COLON CANCER SCREENING: ICD-10-CM

## 2021-01-05 DIAGNOSIS — E21.3 HYPERPARATHYROIDISM (HCC): ICD-10-CM

## 2021-01-05 DIAGNOSIS — I65.23 BILATERAL CAROTID ARTERY OCCLUSION: ICD-10-CM

## 2021-01-05 DIAGNOSIS — Z23 NEED FOR 23-POLYVALENT PNEUMOCOCCAL POLYSACCHARIDE VACCINE: ICD-10-CM

## 2021-01-05 DIAGNOSIS — M46.1 INFLAMMATION OF RIGHT SACROILIAC JOINT (HCC): ICD-10-CM

## 2021-01-05 DIAGNOSIS — Z13.31 POSITIVE DEPRESSION SCREENING: ICD-10-CM

## 2021-01-05 DIAGNOSIS — I10 ESSENTIAL HYPERTENSION: ICD-10-CM

## 2021-01-05 DIAGNOSIS — E55.9 VITAMIN D DEFICIENCY: ICD-10-CM

## 2021-01-05 DIAGNOSIS — R05.8 COUGH PRODUCTIVE OF PURULENT SPUTUM: ICD-10-CM

## 2021-01-05 DIAGNOSIS — J41.8 MIXED SIMPLE AND MUCOPURULENT CHRONIC BRONCHITIS (HCC): ICD-10-CM

## 2021-01-05 PROCEDURE — 99214 OFFICE O/P EST MOD 30 MIN: CPT | Performed by: NURSE PRACTITIONER

## 2021-01-05 PROCEDURE — G8431 POS CLIN DEPRES SCRN F/U DOC: HCPCS | Performed by: NURSE PRACTITIONER

## 2021-01-05 RX ORDER — PNEUMOCOCCAL VACCINE POLYVALENT 25; 25; 25; 25; 25; 25; 25; 25; 25; 25; 25; 25; 25; 25; 25; 25; 25; 25; 25; 25; 25; 25; 25 UG/.5ML; UG/.5ML; UG/.5ML; UG/.5ML; UG/.5ML; UG/.5ML; UG/.5ML; UG/.5ML; UG/.5ML; UG/.5ML; UG/.5ML; UG/.5ML; UG/.5ML; UG/.5ML; UG/.5ML; UG/.5ML; UG/.5ML; UG/.5ML; UG/.5ML; UG/.5ML; UG/.5ML; UG/.5ML; UG/.5ML
0.5 INJECTION, SOLUTION INTRAMUSCULAR; SUBCUTANEOUS ONCE
Qty: 0.5 ML | Refills: 0 | Status: SHIPPED | OUTPATIENT
Start: 2021-01-05 | End: 2021-01-05

## 2021-01-05 RX ORDER — PREDNISONE 10 MG/1
TABLET ORAL
Qty: 10 TABLET | Refills: 0 | Status: SHIPPED | OUTPATIENT
Start: 2021-01-05 | End: 2021-03-16

## 2021-01-05 RX ORDER — ERGOCALCIFEROL 1.25 MG/1
50000 CAPSULE ORAL WEEKLY
Qty: 4 CAPSULE | Refills: 3 | Status: ON HOLD | OUTPATIENT
Start: 2021-01-05 | End: 2022-06-04

## 2021-01-05 RX ORDER — DOXYCYCLINE HYCLATE 100 MG
100 TABLET ORAL 2 TIMES DAILY
Qty: 20 TABLET | Refills: 0 | Status: SHIPPED | OUTPATIENT
Start: 2021-01-05 | End: 2021-01-15

## 2021-01-05 RX ORDER — ISOSORBIDE MONONITRATE 60 MG/1
60 TABLET, EXTENDED RELEASE ORAL DAILY
Qty: 30 TABLET | Refills: 3 | Status: SHIPPED | OUTPATIENT
Start: 2021-01-05 | End: 2021-11-01

## 2021-01-05 ASSESSMENT — COLUMBIA-SUICIDE SEVERITY RATING SCALE - C-SSRS
1. WITHIN THE PAST MONTH, HAVE YOU WISHED YOU WERE DEAD OR WISHED YOU COULD GO TO SLEEP AND NOT WAKE UP?: NO
6. HAVE YOU EVER DONE ANYTHING, STARTED TO DO ANYTHING, OR PREPARED TO DO ANYTHING TO END YOUR LIFE?: NO

## 2021-01-05 ASSESSMENT — PATIENT HEALTH QUESTIONNAIRE - PHQ9
3. TROUBLE FALLING OR STAYING ASLEEP: 0
5. POOR APPETITE OR OVEREATING: 3
SUM OF ALL RESPONSES TO PHQ9 QUESTIONS 1 & 2: 6
10. IF YOU CHECKED OFF ANY PROBLEMS, HOW DIFFICULT HAVE THESE PROBLEMS MADE IT FOR YOU TO DO YOUR WORK, TAKE CARE OF THINGS AT HOME, OR GET ALONG WITH OTHER PEOPLE: 0
2. FEELING DOWN, DEPRESSED OR HOPELESS: 3
SUM OF ALL RESPONSES TO PHQ QUESTIONS 1-9: 12
7. TROUBLE CONCENTRATING ON THINGS, SUCH AS READING THE NEWSPAPER OR WATCHING TELEVISION: 0

## 2021-01-05 NOTE — PROGRESS NOTES
2021    TELEHEALTH EVALUATION -- Audio/Visual (During MRFBR-69 public health emergency)    HPI:    Serena Kat (:  1955) has requested an audio/video evaluation for the following concern(s):    HPI  COPD:  Current treatment includes short-acting beta agonist inhaler, anticholinergic inhaler, combined beta agonist/antichoinergic inhaler, which has been somewhat effective. Residual symptoms: chronic dyspnea. He denies decreased exercise tolerance. He requires his rescue inhaler 4 time(s) per day. Treatment Adherence:   Medication compliance:  compliant most of the time  Diet compliance:  compliant most of the time  Weight trend: decreasing  Current exercise: no regular exercise  Barriers: impairment:  physical: debilitated and lack of motivation    Diabetes Mellitus Type 2: Current symptoms/problems include neuropathy. Home blood sugar records: fasting range: 150's  Any episodes of hypoglycemia? no  Eye exam current (within one year): unknown  Tobacco history: He  reports that he quit smoking about 6 years ago. His smoking use included cigarettes. He has a 17.50 pack-year smoking history. He has never used smokeless tobacco.   Daily Aspirin? Yes    Hypertension:  Home blood pressure monitoring: No.  He is not adherent to a low sodium diet. Patient denies headache and lightheadedness. Antihypertensive medication side effects: no medication side effects noted. Use of agents associated with hypertension: none. Hyperlipidemia:  No new myalgias or GI upset on atorvastatin (Lipitor).        Lab Results   Component Value Date    LABA1C 10.9 (H) 08/10/2020    LABA1C 9.5 (A) 2020    LABA1C 9.8 (H) 2019     Lab Results   Component Value Date    LABMICR 2,663 (H) 2020    CREATININE 3.22 (H) 2020     Lab Results   Component Value Date    ALT 17 2020    AST 22 2020     Lab Results   Component Value Date    CHOL 195 2020    TRIG 80 2020 HDL 62 08/11/2020        Mood Disorder:  Patient presents for follow-up of depression. Current complaints include: anhedonia, depressed mood and changes in appetite/weight: decreased appetite and weight loss. He denies any other symptoms. Symptoms/signs of diana: none. External stressors: illness or family illness. Current treatment includes: seroquel. Medication side effects: none. Review of Systems   Constitutional: Positive for appetite change and unexpected weight change. Respiratory: Positive for shortness of breath. Psychiatric/Behavioral: Positive for decreased concentration and dysphoric mood. Negative for self-injury and suicidal ideas. All other systems reviewed and are negative. Prior to Visit Medications    Medication Sig Taking?  Authorizing Provider   isosorbide mononitrate (IMDUR) 60 MG extended release tablet Take 1 tablet by mouth daily Yes TERRANCE Price CNP   vitamin D (ERGOCALCIFEROL) 1.25 MG (61279 UT) CAPS capsule Take 1 capsule by mouth once a week Yes TERRANCE Price CNP   predniSONE (DELTASONE) 10 MG tablet Take 40 mg x 3 days then 30 mg x 3 days, then 20 mg x 3 days, then 10 mg x 3 days Yes TERRANCE Price CNP   doxycycline hyclate (VIBRA-TABS) 100 MG tablet Take 1 tablet by mouth 2 times daily for 10 days Yes TERRANCE Price CNP   albuterol-ipratropium (COMBIVENT RESPIMAT)  MCG/ACT AERS inhaler Inhale 1 puff into the lungs every 6 hours Yes Elfego Enriquez MD   hydrALAZINE (APRESOLINE) 100 MG tablet Take 1 tablet by mouth every 8 hours Yes MD Dallas Soliman MISC Use_4__times daily Diagnisis:250.0  Diabetes Mellitus__x__Insulin Dependent___Non-Insulin Dependent Yes Kadeem Martinez MD   fluticasone-umeclidin-vilant (Armida Query) 100-62.5-25 MCG/INH AEPB INHALE ONE PUFF INTO THE LUNGS DAILY Yes Jose Enriquez MD ipratropium-albuterol (DUONEB) 0.5-2.5 (3) MG/3ML SOLN nebulizer solution Inhale 3 mLs into the lungs every 6 hours as needed for Shortness of Breath Yes Elfego Enriquez MD   prazosin (MINIPRESS) 2 MG capsule Take 2 capsules by mouth 2 times daily Yes TERRANCE Parson CNP   gabapentin (NEURONTIN) 100 MG capsule Take 2 capsules by mouth 3 times daily for 90 doses.  Yes TERRANCE Parson CNP   QUEtiapine (SEROQUEL) 100 MG tablet TAKE 1 TABLET BY MOUTH NIGHTLY Yes TERRANCE Parson CNP   aspirin (ASPIR-LOW) 81 MG EC tablet TAKE 1 TABLET BY MOUTH DAILY Yes TERRANCE Parson CNP   GNP VITAMIN D MAXIMUM STRENGTH 50 MCG (2000 UT) TABS TAKE 1 TABLET BY MOUTH ONCE DAILY Yes TERRANCE Parson CNP   NIFEdipine (ADALAT CC) 30 MG extended release tablet Take 2 tablets by mouth daily  Patient taking differently: Take 30 mg by mouth daily Takes 30mg 3 times daily Yes Moraima Colon MD   bumetanide (BUMEX) 1 MG tablet Take 1 tablet by mouth daily Yes Moraima Colon MD   blood glucose monitor strips Test_4__times daily Diagnosis: 250.0   Diabetes Mellitus_x___Insulin Dependent____Non-Insulin Dependent Yes TERRANCE Parson CNP   carvedilol (COREG) 25 MG tablet Take 1 tablet by mouth 2 times daily (with meals) Yes TERRANCE Parson CNP   atorvastatin (LIPITOR) 80 MG tablet Take 1 tablet by mouth daily Yes TERRANCE Parson CNP   clopidogrel (PLAVIX) 75 MG tablet Take 1 tablet by mouth daily Yes TERRANCE Parson CNP   insulin glargine (BASAGLAR KWIKPEN) 100 UNIT/ML injection pen Inject 35 Units into the skin nightly Dispense with pen needle Yes TERRANCE Parson CNP   insulin aspart (NOVOLOG FLEXPEN) 100 UNIT/ML injection pen Inject 5 Units into the skin 3 times daily (before meals) Sliding scale Yes TERRANCE Parson CNP nitroGLYCERIN (NITROSTAT) 0.4 MG SL tablet up to max of 3 total doses. If no relief after 1 dose, call 911. Yes Leala Earing, APRN - CNP   Nutritional Supplements (GLUCERNA CARBSTEADY) LIQD Take 1 Can by mouth 3 times daily Yes Nina Earing, APRN - CNP   COMFORT EZ PEN NEEDLES 31G X 8 MM MISC USE AS DIRECTED Yes Leala Earing, APRN - CNP   TRUE METRIX BLOOD GLUCOSE TEST strip TEST BLOOD SUGAR 4 TO 5 TIMES DAILY Yes Leala Earing, APRN - CNP   EASY COMFORT LANCETS MISC DX: DM-2 USE 3-4 TIMES DAILY Yes Zara Soto MD   zoster recombinant adjuvanted vaccine (SHINGRIX) 50 MCG/0.5ML SUSR injection 50 MCG IM then repeat 2-6 months.   Juanaala Earing, APRN - CNP   pneumococcal polyvalent (PNEUMOVAX 23) 25 MCG/0.5ML inj Inject 0.5 mLs into the muscle once for 1 dose  Leala Earing, APRN - CNP       Social History     Tobacco Use    Smoking status: Former Smoker     Packs/day: 0.50     Years: 35.00     Pack years: 17.50     Types: Cigarettes     Quit date: 2014     Years since quittin.5    Smokeless tobacco: Never Used   Substance Use Topics    Alcohol use: No     Alcohol/week: 0.0 standard drinks    Drug use: No        Allergies   Allergen Reactions    Lisinopril      cough    Ace Inhibitors      coughing    Pcn [Penicillins]      Past Medical History:   Diagnosis Date    Asthma     mod persistent    CAD in native artery, nonobstructive     Carotid stenosis, left 6/10/2013    Carpal tunnel syndrome     RIGHT    Cerebrovascular disease 2018    Chronic kidney disease 6/10/2013    COPD [facility] disease) [facility] [address]     Diabetes mellitus (HCC)     insulin dependent    History of colon polyps     History of weakness of extremity     right sided    HTN (hypertension)     Hyperlipidemia     Lung, cysts, congenital     PTSD (post-traumatic stress disorder)     PTSD (post-traumatic stress disorder)     TIA (transient ischemic attack)  Type II or unspecified type diabetes mellitus without mention of complication, not stated as uncontrolled      Current Outpatient Medications   Medication Sig Dispense Refill    isosorbide mononitrate (IMDUR) 60 MG extended release tablet Take 1 tablet by mouth daily 30 tablet 3    vitamin D (ERGOCALCIFEROL) 1.25 MG (06655 UT) CAPS capsule Take 1 capsule by mouth once a week 4 capsule 3    predniSONE (DELTASONE) 10 MG tablet Take 40 mg x 3 days then 30 mg x 3 days, then 20 mg x 3 days, then 10 mg x 3 days 10 tablet 0    doxycycline hyclate (VIBRA-TABS) 100 MG tablet Take 1 tablet by mouth 2 times daily for 10 days 20 tablet 0    albuterol-ipratropium (COMBIVENT RESPIMAT)  MCG/ACT AERS inhaler Inhale 1 puff into the lungs every 6 hours 4 g 0    hydrALAZINE (APRESOLINE) 100 MG tablet Take 1 tablet by mouth every 8 hours 90 tablet 2    Lancets MISC Use_4__times daily Diagnisis:250.0  Diabetes Mellitus__x__Insulin Dependent___Non-Insulin Dependent 100 each 11    fluticasone-umeclidin-vilant (TRELEGY ELLIPTA) 100-62.5-25 MCG/INH AEPB INHALE ONE PUFF INTO THE LUNGS DAILY 60 each 3    ipratropium-albuterol (DUONEB) 0.5-2.5 (3) MG/3ML SOLN nebulizer solution Inhale 3 mLs into the lungs every 6 hours as needed for Shortness of Breath 360 mL 3    prazosin (MINIPRESS) 2 MG capsule Take 2 capsules by mouth 2 times daily 120 capsule 0    gabapentin (NEURONTIN) 100 MG capsule Take 2 capsules by mouth 3 times daily for 90 doses.  180 capsule 3    QUEtiapine (SEROQUEL) 100 MG tablet TAKE 1 TABLET BY MOUTH NIGHTLY 30 tablet 3    aspirin (ASPIR-LOW) 81 MG EC tablet TAKE 1 TABLET BY MOUTH DAILY 30 tablet 3    GNP VITAMIN D MAXIMUM STRENGTH 50 MCG (2000 UT) TABS TAKE 1 TABLET BY MOUTH ONCE DAILY 30 tablet 3    NIFEdipine (ADALAT CC) 30 MG extended release tablet Take 2 tablets by mouth daily (Patient taking differently: Take 30 mg by mouth daily Takes 30mg 3 times daily) 30 tablet 3  bumetanide (BUMEX) 1 MG tablet Take 1 tablet by mouth daily 30 tablet 3    blood glucose monitor strips Test_4__times daily Diagnosis: 250.0   Diabetes Mellitus_x___Insulin Dependent____Non-Insulin Dependent 100 strip 11    carvedilol (COREG) 25 MG tablet Take 1 tablet by mouth 2 times daily (with meals) 60 tablet 3    atorvastatin (LIPITOR) 80 MG tablet Take 1 tablet by mouth daily 30 tablet 3    clopidogrel (PLAVIX) 75 MG tablet Take 1 tablet by mouth daily 90 tablet 3    insulin glargine (BASAGLAR KWIKPEN) 100 UNIT/ML injection pen Inject 35 Units into the skin nightly Dispense with pen needle 5 pen 2    insulin aspart (NOVOLOG FLEXPEN) 100 UNIT/ML injection pen Inject 5 Units into the skin 3 times daily (before meals) Sliding scale 5 pen 3    nitroGLYCERIN (NITROSTAT) 0.4 MG SL tablet up to max of 3 total doses. If no relief after 1 dose, call 911. 25 tablet 2    Nutritional Supplements (GLUCERNA CARBSTEADY) LIQD Take 1 Can by mouth 3 times daily 237 mL 5    COMFORT EZ PEN NEEDLES 31G X 8 MM MISC USE AS DIRECTED 100 each 5    TRUE METRIX BLOOD GLUCOSE TEST strip TEST BLOOD SUGAR 4 TO 5 TIMES DAILY 100 strip 7    EASY COMFORT LANCETS MISC DX: DM-2 USE 3-4 TIMES DAILY 100 each 11    zoster recombinant adjuvanted vaccine (SHINGRIX) 50 MCG/0.5ML SUSR injection 50 MCG IM then repeat 2-6 months. 0.5 mL 1    pneumococcal polyvalent (PNEUMOVAX 23) 25 MCG/0.5ML inj Inject 0.5 mLs into the muscle once for 1 dose 0.5 mL 0     No current facility-administered medications for this visit.           PHYSICAL EXAMINATION:  [ INSTRUCTIONS:  \"[x]\" Indicates a positive item  \"[]\" Indicates a negative item  -- DELETE ALL ITEMS NOT EXAMINED]  Vital Signs: (As obtained by patient/caregiver or practitioner observation)    Blood pressure-  Heart rate-    Respiratory rate-    Temperature-  Pulse oximetry-     Constitutional: [x] Appears well-developed and well-nourished [x] No apparent distress      [] Abnormal- Mental status  [x] Alert and awake  [x] Oriented to person/place/time [x]Able to follow commands      Eyes:  EOM    []  Normal  [] Abnormal-  Sclera  [x]  Normal  [] Abnormal -         Discharge [x]  None visible  [] Abnormal -    HENT:   [x] Normocephalic, atraumatic. [] Abnormal   [] Mouth/Throat: Mucous membranes are moist.     External Ears [x] Normal  [] Abnormal-     Neck: [x] No visualized mass     Pulmonary/Chest: [x] Respiratory effort normal.  [x] No visualized signs of difficulty breathing or respiratory distress        [] Abnormal-      Musculoskeletal:   [] Normal gait with no signs of ataxia         [x] Normal range of motion of neck        [] Abnormal-       Neurological:        [x] No Facial Asymmetry (Cranial nerve 7 motor function) (limited exam to video visit)          [x] No gaze palsy        [] Abnormal-         Skin:        [x] No significant exanthematous lesions or discoloration noted on facial skin         [] Abnormal-            Psychiatric:       [x] Normal Affect [x] No Hallucinations        [] Abnormal-     Other pertinent observable physical exam findings-     ASSESSMENT/PLAN:  1. DM type 2, uncontrolled, with neuropathy (HCC)  - continue present medications  - Hemoglobin A1C; Future    2. Need for prophylactic vaccination and inoculation against varicella  - script for shingrix mailed to patient    3. Positive depression screening  - Positive Screen for Clinical Depression with a Documented Follow-up Plan     4. Essential hypertension  - continue present medications  - isosorbide mononitrate (IMDUR) 60 MG extended release tablet; Take 1 tablet by mouth daily  Dispense: 30 tablet; Refill: 3    5. Cough productive of purulent sputum  - predniSONE (DELTASONE) 10 MG tablet; Take 40 mg x 3 days then 30 mg x 3 days, then 20 mg x 3 days, then 10 mg x 3 days  Dispense: 10 tablet;  Refill: 0 - doxycycline hyclate (VIBRA-TABS) 100 MG tablet; Take 1 tablet by mouth 2 times daily for 10 days  Dispense: 20 tablet; Refill: 0    6. Need for 23-polyvalent pneumococcal polysaccharide vaccine  - pneumococcal polyvalent (PNEUMOVAX 23) 25 MCG/0.5ML inj; Inject 0.5 mLs into the muscle once for 1 dose  Dispense: 0.5 mL; Refill: 0    7. Colon cancer screening  - [facility] Screening Colonoscopy    8. Vitamin D deficiency  - vitamin D (ERGOCALCIFEROL) 1.25 MG (51738 UT) CAPS capsule; Take 1 capsule by mouth once a week  Dispense: 4 capsule; Refill: 3    9. Inflammation of right sacroiliac joint ([address]  10. Mixed simple and mucopurulent chronic bronchitis ([address]  11. Malignant neoplasm of colon, unspecified part of colon ([address]  12. ESRD (end stage renal disease) ([address]  13. Bilateral carotid artery occlusion  14. Seizure ([address]  15. Hyperparathyroidism ([address]  16. Coronary artery disease of native artery of native heart with stable angina pectoris ([address]  - continue follow up of chronic conditions      Return in about 6 weeks (around 2/16/2021). Rehan You is a 73 y.o. male being evaluated by a Virtual Visit (video visit) encounter to address concerns as mentioned above. A caregiver was present when appropriate. Due to this being a TeleHealth encounter (During LVVJL-00 public health emergency), evaluation of the following organ systems was limited: Vitals/Constitutional/EENT/Resp/CV/GI//MS/Neuro/Skin/Heme-Lymph-Imm. Pursuant to the emergency declaration under the [address] [place] [address] and the [employer] Act, this Virtual Visit was conducted with patient's (and/or legal guardian's) consent, to reduce the patient's risk of exposure to COVID-19 and provide necessary medical care. The patient (and/or legal guardian) has also been advised to contact this office for worsening conditions or problems, and seek emergency medical treatment and/or call 911 if deemed necessary. Patient identification was verified at the start of the visit: Yes    Total time spent on this encounter: Not billed by time    Services were provided through a video synchronous discussion virtually to substitute for in-person clinic visit. Patient and provider were located at their individual homes. --TERRANCE Seymour CNP on 1/8/2021 at 3:10 PM    An electronic signature was used to authenticate this note.

## 2021-01-05 NOTE — TELEPHONE ENCOUNTER
PC from patient pharmacy stating that the prescription that was sent over today 1/5/2020 for prednisone isn't stating the directions clear enough for them to understand, do you need 30 tablets to do the therapy as ordered or do you need only 10 tablets to complete the therapy? 40 mg x 3 days then 30 mg x 3 days, then 20 mg x 3 days, then 10 mg x 3 days     These are the directions that was sent over for Prednisone.

## 2021-01-05 NOTE — TELEPHONE ENCOUNTER
PC to pharmacy - Spoke to [address] [place] [address], clarified directions on medications.

## 2021-01-08 ENCOUNTER — TELEPHONE (OUTPATIENT)
Dept: GASTROENTEROLOGY | Age: 66
End: 2021-01-08

## 2021-01-08 DIAGNOSIS — Z23 NEED FOR PROPHYLACTIC VACCINATION AND INOCULATION AGAINST VARICELLA: ICD-10-CM

## 2021-01-08 DIAGNOSIS — Z23 NEED FOR 23-POLYVALENT PNEUMOCOCCAL POLYSACCHARIDE VACCINE: Primary | ICD-10-CM

## 2021-01-08 PROBLEM — M46.1 INFLAMMATION OF RIGHT SACROILIAC JOINT (HCC): Status: ACTIVE | Noted: 2021-01-08

## 2021-01-08 PROBLEM — N18.6 ESRD (END STAGE RENAL DISEASE) (HCC): Status: ACTIVE | Noted: 2021-01-08

## 2021-01-08 PROBLEM — R56.9 SEIZURE (HCC): Status: ACTIVE | Noted: 2021-01-08

## 2021-01-08 PROBLEM — C18.9 MALIGNANT NEOPLASM OF COLON (HCC): Status: ACTIVE | Noted: 2021-01-08

## 2021-01-08 PROBLEM — I65.23 BILATERAL CAROTID ARTERY OCCLUSION: Status: ACTIVE | Noted: 2021-01-08

## 2021-01-08 RX ORDER — PNEUMOCOCCAL VACCINE POLYVALENT 25; 25; 25; 25; 25; 25; 25; 25; 25; 25; 25; 25; 25; 25; 25; 25; 25; 25; 25; 25; 25; 25; 25 UG/.5ML; UG/.5ML; UG/.5ML; UG/.5ML; UG/.5ML; UG/.5ML; UG/.5ML; UG/.5ML; UG/.5ML; UG/.5ML; UG/.5ML; UG/.5ML; UG/.5ML; UG/.5ML; UG/.5ML; UG/.5ML; UG/.5ML; UG/.5ML; UG/.5ML; UG/.5ML; UG/.5ML; UG/.5ML; UG/.5ML
0.5 INJECTION, SOLUTION INTRAMUSCULAR; SUBCUTANEOUS ONCE
Qty: 0.5 ML | Refills: 0 | Status: SHIPPED | OUTPATIENT
Start: 2021-01-08 | End: 2021-01-08

## 2021-01-08 ASSESSMENT — ENCOUNTER SYMPTOMS: SHORTNESS OF BREATH: 1

## 2021-01-08 NOTE — TELEPHONE ENCOUNTER
Attempted to reach pt at phone number listed; number is unable to accept calls. Mailed colon screen letter to pt home address. Sending referral back to provider. No way to contact pt via phone.

## 2021-01-25 DIAGNOSIS — E78.01 FAMILIAL HYPERCHOLESTEROLEMIA: ICD-10-CM

## 2021-01-25 DIAGNOSIS — J41.8 MIXED SIMPLE AND MUCOPURULENT CHRONIC BRONCHITIS (HCC): ICD-10-CM

## 2021-01-25 DIAGNOSIS — I65.22 CAROTID STENOSIS, LEFT: ICD-10-CM

## 2021-01-25 DIAGNOSIS — I10 ESSENTIAL HYPERTENSION: ICD-10-CM

## 2021-01-25 DIAGNOSIS — I65.23 BILATERAL CAROTID ARTERY STENOSIS: ICD-10-CM

## 2021-01-25 RX ORDER — CLOPIDOGREL BISULFATE 75 MG/1
75 TABLET ORAL DAILY
Qty: 90 TABLET | Refills: 3 | Status: SHIPPED | OUTPATIENT
Start: 2021-01-25 | End: 2021-06-24 | Stop reason: SDUPTHER

## 2021-01-25 RX ORDER — IPRATROPIUM/ALBUTEROL SULFATE 20-100 MCG
MIST INHALER (GRAM) INHALATION
Qty: 4 G | Refills: 3 | Status: ON HOLD
Start: 2021-01-25 | End: 2021-05-26 | Stop reason: HOSPADM

## 2021-01-25 RX ORDER — ATORVASTATIN CALCIUM 80 MG/1
80 TABLET, FILM COATED ORAL DAILY
Qty: 30 TABLET | Refills: 3 | Status: ON HOLD | OUTPATIENT
Start: 2021-01-25 | End: 2021-05-24

## 2021-01-25 RX ORDER — PRAZOSIN HYDROCHLORIDE 2 MG/1
CAPSULE ORAL
Qty: 120 CAPSULE | Refills: 3 | Status: SHIPPED | OUTPATIENT
Start: 2021-01-25 | End: 2021-06-24

## 2021-01-25 RX ORDER — NIFEDIPINE 30 MG/1
30 TABLET, FILM COATED, EXTENDED RELEASE ORAL DAILY
Qty: 90 TABLET | Refills: 3 | Status: ON HOLD
Start: 2021-01-25 | End: 2021-05-13 | Stop reason: HOSPADM

## 2021-01-25 RX ORDER — CARVEDILOL 25 MG/1
25 TABLET ORAL 2 TIMES DAILY WITH MEALS
Qty: 60 TABLET | Refills: 3 | Status: ON HOLD | OUTPATIENT
Start: 2021-01-25 | End: 2021-05-24

## 2021-01-25 RX ORDER — ASPIRIN 81 MG/1
TABLET ORAL
Qty: 30 TABLET | Refills: 3 | Status: SHIPPED | OUTPATIENT
Start: 2021-01-25 | End: 2021-06-23

## 2021-01-25 RX ORDER — HYDRALAZINE HYDROCHLORIDE 100 MG/1
100 TABLET, FILM COATED ORAL EVERY 8 HOURS SCHEDULED
Qty: 90 TABLET | Refills: 3 | Status: SHIPPED | OUTPATIENT
Start: 2021-01-25 | End: 2021-11-01

## 2021-01-25 NOTE — TELEPHONE ENCOUNTER
Request for Multiple meds      Next Visit Date:  Future Appointments   Date Time Provider Mahesh Gunter   2/16/2021  1:20 PM Yoni Edmondson, APRN - CNP [id number] Maintenance   Topic Date Due    AAA screen  1955    Shingles Vaccine (2 of 3) 12/27/2017    Colon cancer screen colonoscopy  06/27/2018    Diabetic foot exam  02/19/2020    Flu vaccine (1) 09/01/2020    A1C test (Diabetic or Prediabetic)  11/10/2020    Pneumococcal 65+ yrs at Risk Vaccine (1 of 2 - PCV13) 01/07/2021    Diabetic microalbuminuria test  06/16/2021    Diabetic retinal exam  07/14/2021    Lipid screen  08/11/2021    Annual Wellness Visit (AWV)  10/10/2021    Potassium monitoring  12/06/2021    Creatinine monitoring  12/06/2021    DTaP/Tdap/Td vaccine (2 - Td) 12/14/2023    Hepatitis C screen  Completed    HIV screen  Completed    Hepatitis A vaccine  Aged Out    Hib vaccine  Aged Out    Meningococcal (ACWY) vaccine  Aged Out       Hemoglobin A1C (%)   Date Value   08/10/2020 10.9 (H)   06/16/2020 9.5 (A)   08/12/2019 9.8 (H)             ( goal A1C is < 7)   Microalb/Crt.  Ratio (mcg/mg creat)   Date Value   06/16/2020 2,663 (H)     LDL Cholesterol (mg/dL)   Date Value   08/11/2020 117       (goal LDL is <100)   AST (U/L)   Date Value   12/03/2020 22     ALT (U/L)   Date Value   12/03/2020 17     BUN (mg/dL)   Date Value   12/06/2020 54 (H)     BP Readings from Last 3 Encounters:   12/06/20 (!) 122/50   08/13/20 (!) 110/50   06/20/20 (!) 141/64          (goal 120/80)    All Future Testing planned in [facility]  Lab Frequency Next Occurrence   Basic Metabolic Panel Once 24/87/0944   Basic Metabolic Panel Once 70/72/3565   Basic Metabolic Panel Once 41/44/8051   Hemoglobin A1C Once 04/15/2021         Patient Active Problem List:     Cigarette nicotine dependence without complication     Carpal tunnel syndrome on right     Colon polyps     Carotid stenosis, left s/p Endarterectomy     Mixed simple and mucopurulent chronic bronchitis (HCC)     Pure hypercholesterolemia     CKD (chronic kidney disease) stage 3, GFR 30-59 ml/min (HCC)     Dyspnea and respiratory abnormalities     PTSD (post-traumatic stress disorder)     Transient cerebral ischemia     Essential hypertension     DM type 2, uncontrolled, with neuropathy (HCC)     COPD (chronic obstructive pulmonary disease) (HCC)     Cerebral vascular disease     Primary insomnia     Hypertensive crisis     Non-obstructive Coronary artery disease of native artery of native heart with stable angina pectoris (HCC)     Hyperparathyroidism ([address]     Hypovitaminosis D     Acute kidney injury superimposed on CKD ([address]     Coronary artery disease with exertional angina (HCC)     Leg swelling     Anemia     Hypoalbuminemia     DKA, type 2, not at goal [facility])     Inflammation of right sacroiliac joint ([address]     Malignant neoplasm of colon ([address]     ESRD (end stage renal disease) ([address]     Bilateral carotid artery occlusion     Seizure ([address]

## 2021-01-26 RX ORDER — GLUCOSAMINE HCL/CHONDROITIN SU 500-400 MG
CAPSULE ORAL
Qty: 100 STRIP | Refills: 11 | Status: ON HOLD
Start: 2021-01-26 | End: 2021-05-25 | Stop reason: HOSPADM

## 2021-01-26 NOTE — TELEPHONE ENCOUNTER
PC from patient - patient states insurance will not cover meter and test strips that patient currently has. Needs new script sent over for both so insurance can cover. Please resend scripts. Health Maintenance   Topic Date Due    AAA screen  1955    Shingles Vaccine (2 of 3) 12/27/2017    Colon cancer screen colonoscopy  06/27/2018    Diabetic foot exam  02/19/2020    Flu vaccine (1) 09/01/2020    A1C test (Diabetic or Prediabetic)  11/10/2020    Pneumococcal 65+ yrs at Risk Vaccine (1 of 2 - PCV13) 01/07/2021    Diabetic microalbuminuria test  06/16/2021    Diabetic retinal exam  07/14/2021    Lipid screen  08/11/2021    Annual Wellness Visit (AWV)  10/10/2021    Potassium monitoring  12/06/2021    Creatinine monitoring  12/06/2021    DTaP/Tdap/Td vaccine (2 - Td) 12/14/2023    Hepatitis C screen  Completed    HIV screen  Completed    Hepatitis A vaccine  Aged Out    Hib vaccine  Aged Out    Meningococcal (ACWY) vaccine  Aged Out             (applicable per patient's age: Cancer Screenings, Depression Screening, Fall Risk Screening, Immunizations)    Hemoglobin A1C (%)   Date Value   08/10/2020 10.9 (H)   06/16/2020 9.5 (A)   08/12/2019 9.8 (H)     Microalb/Crt.  Ratio (mcg/mg creat)   Date Value   06/16/2020 2,663 (H)     LDL Cholesterol (mg/dL)   Date Value   08/11/2020 117     AST (U/L)   Date Value   12/03/2020 22     ALT (U/L)   Date Value   12/03/2020 17     BUN (mg/dL)   Date Value   12/06/2020 54 (H)      (goal A1C is < 7)   (goal LDL is <100) need 30-50% reduction from baseline     BP Readings from Last 3 Encounters:   12/06/20 (!) 122/50   08/13/20 (!) 110/50   06/20/20 (!) 141/64    (goal /80)      All Future Testing planned in [facility]:  Lab Frequency Next Occurrence   Basic Metabolic Panel Once 01/38/4356   Basic Metabolic Panel Once 71/72/6339   Basic Metabolic Panel Once 81/47/3344   Hemoglobin A1C Once 04/15/2021       Next Visit Date:  Future Appointments Date Time Provider Mahesh Lyric   2/16/2021  1:20 PM Nina Sanchez, APRN -  [place]             Patient Active Problem List:     Cigarette nicotine dependence without complication     Carpal tunnel syndrome on right     Colon polyps     Carotid stenosis, left s/p Endarterectomy     Mixed simple and mucopurulent chronic bronchitis (HCC)     Pure hypercholesterolemia     CKD (chronic kidney disease) stage 3, GFR 30-59 ml/min ([facility])     Dyspnea and respiratory abnormalities     PTSD (post-traumatic stress disorder)     Transient cerebral ischemia     Essential hypertension     DM type 2, uncontrolled, with neuropathy (HCC)     COPD (chronic obstructive pulmonary disease) ([address]     Cerebral vascular disease     Primary insomnia     Hypertensive crisis     Non-obstructive Coronary artery disease of native artery of native heart with stable angina pectoris ([address]     Hyperparathyroidism ([address]     Hypovitaminosis D     Acute kidney injury superimposed on CKD ([address]     Coronary artery disease with exertional angina (HCC)     Leg swelling     Anemia     Hypoalbuminemia     DKA, type 2, not at goal [facility])     Inflammation of right sacroiliac joint ([address]     Malignant neoplasm of colon ([address]     ESRD (end stage renal disease) ([address]     Bilateral carotid artery occlusion     Seizure ([address]

## 2021-01-27 DIAGNOSIS — J41.8 MIXED SIMPLE AND MUCOPURULENT CHRONIC BRONCHITIS (HCC): ICD-10-CM

## 2021-01-27 RX ORDER — FLUTICASONE FUROATE, UMECLIDINIUM BROMIDE AND VILANTEROL TRIFENATATE 100; 62.5; 25 UG/1; UG/1; UG/1
POWDER RESPIRATORY (INHALATION)
Qty: 1 EACH | Refills: 3 | Status: ON HOLD | OUTPATIENT
Start: 2021-01-27 | End: 2021-05-24

## 2021-01-27 NOTE — TELEPHONE ENCOUNTER
Refill request for Sara Olmstead. If appropriate please send medication(s) to patients pharmacy. Next appt: 2/16/2021      Health Maintenance   Topic Date Due    AAA screen  1955    Shingles Vaccine (2 of 3) 12/27/2017    Colon cancer screen colonoscopy  06/27/2018    Diabetic foot exam  02/19/2020    Flu vaccine (1) 09/01/2020    A1C test (Diabetic or Prediabetic)  11/10/2020    Pneumococcal 65+ yrs at Risk Vaccine (1 of 2 - PCV13) 01/07/2021    Diabetic microalbuminuria test  06/16/2021    Diabetic retinal exam  07/14/2021    Lipid screen  08/11/2021    Annual Wellness Visit (AWV)  10/10/2021    Potassium monitoring  12/06/2021    Creatinine monitoring  12/06/2021    DTaP/Tdap/Td vaccine (2 - Td) 12/14/2023    Hepatitis C screen  Completed    HIV screen  Completed    Hepatitis A vaccine  Aged Out    Hib vaccine  Aged Out    Meningococcal (ACWY) vaccine  Aged Out       Hemoglobin A1C (%)   Date Value   08/10/2020 10.9 (H)   06/16/2020 9.5 (A)   08/12/2019 9.8 (H)             ( goal A1C is < 7)   Microalb/Crt.  Ratio (mcg/mg creat)   Date Value   06/16/2020 2,663 (H)     LDL Cholesterol (mg/dL)   Date Value   08/11/2020 117       (goal LDL is <100)   AST (U/L)   Date Value   12/03/2020 22     ALT (U/L)   Date Value   12/03/2020 17     BUN (mg/dL)   Date Value   12/06/2020 54 (H)     BP Readings from Last 3 Encounters:   12/06/20 (!) 122/50   08/13/20 (!) 110/50   06/20/20 (!) 141/64          (goal 120/80)          Patient Active Problem List:     Cigarette nicotine dependence without complication     Carpal tunnel syndrome on right     Colon polyps     Carotid stenosis, left s/p Endarterectomy     Mixed simple and mucopurulent chronic bronchitis (HCC)     Pure hypercholesterolemia     CKD (chronic kidney disease) stage 3, GFR 30-59 ml/min (HCC)     Dyspnea and respiratory abnormalities     PTSD (post-traumatic stress disorder)     Transient cerebral ischemia     Essential hypertension DM type 2, uncontrolled, with neuropathy ([address]     COPD (chronic obstructive pulmonary disease) (HCC)     Cerebral vascular disease     Primary insomnia     Hypertensive crisis     Non-obstructive Coronary artery disease of native artery of native heart with stable angina pectoris (HCC)     Hyperparathyroidism ([address]     Hypovitaminosis D     Acute kidney injury superimposed on CKD ([address]     Coronary artery disease with exertional angina (HCC)     Leg swelling     Anemia     Hypoalbuminemia     DKA, type 2, not at goal [facility])     Inflammation of right sacroiliac joint ([address]     Malignant neoplasm of colon ([address]     ESRD (end stage renal disease) ([address]     Bilateral carotid artery occlusion     Seizure ([address]

## 2021-02-02 DIAGNOSIS — I10 ESSENTIAL HYPERTENSION: ICD-10-CM

## 2021-02-02 RX ORDER — LOSARTAN POTASSIUM 50 MG/1
50 TABLET ORAL 2 TIMES DAILY
Qty: 60 TABLET | Refills: 4 | OUTPATIENT
Start: 2021-02-02

## 2021-02-02 NOTE — TELEPHONE ENCOUNTER
Refill request for Losartan. If appropriate please send medication(s) to patients pharmacy. Next appt: 2/16/2021      Health Maintenance   Topic Date Due    AAA screen  1955    Shingles Vaccine (2 of 3) 12/27/2017    Colon cancer screen colonoscopy  06/27/2018    Diabetic foot exam  02/19/2020    Flu vaccine (1) 09/01/2020    A1C test (Diabetic or Prediabetic)  11/10/2020    Pneumococcal 65+ yrs at Risk Vaccine (1 of 2 - PCV13) 01/07/2021    Diabetic microalbuminuria test  06/16/2021    Diabetic retinal exam  07/14/2021    Lipid screen  08/11/2021    Annual Wellness Visit (AWV)  10/10/2021    Potassium monitoring  12/06/2021    Creatinine monitoring  12/06/2021    DTaP/Tdap/Td vaccine (2 - Td) 12/14/2023    Hepatitis C screen  Completed    HIV screen  Completed    Hepatitis A vaccine  Aged Out    Hib vaccine  Aged Out    Meningococcal (ACWY) vaccine  Aged Out       Hemoglobin A1C (%)   Date Value   08/10/2020 10.9 (H)   06/16/2020 9.5 (A)   08/12/2019 9.8 (H)             ( goal A1C is < 7)   Microalb/Crt.  Ratio (mcg/mg creat)   Date Value   06/16/2020 2,663 (H)     LDL Cholesterol (mg/dL)   Date Value   08/11/2020 117       (goal LDL is <100)   AST (U/L)   Date Value   12/03/2020 22     ALT (U/L)   Date Value   12/03/2020 17     BUN (mg/dL)   Date Value   12/06/2020 54 (H)     BP Readings from Last 3 Encounters:   12/06/20 (!) 122/50   08/13/20 (!) 110/50   06/20/20 (!) 141/64          (goal 120/80)          Patient Active Problem List:     Cigarette nicotine dependence without complication     Carpal tunnel syndrome on right     Colon polyps     Carotid stenosis, left s/p Endarterectomy     Mixed simple and mucopurulent chronic bronchitis (HCC)     Pure hypercholesterolemia     CKD (chronic kidney disease) stage 3, GFR 30-59 ml/min (HCC)     Dyspnea and respiratory abnormalities     PTSD (post-traumatic stress disorder)     Transient cerebral ischemia     Essential hypertension     DM type 2, uncontrolled, with neuropathy ([address]     COPD (chronic obstructive pulmonary disease) (HCC)     Cerebral vascular disease     Primary insomnia     Hypertensive crisis     Non-obstructive Coronary artery disease of native artery of native heart with stable angina pectoris (HCC)     Hyperparathyroidism ([address]     Hypovitaminosis D     Acute kidney injury superimposed on CKD ([address]     Coronary artery disease with exertional angina (HCC)     Leg swelling     Anemia     Hypoalbuminemia     DKA, type 2, not at goal [facility])     Inflammation of right sacroiliac joint ([address]     Malignant neoplasm of colon ([address]     ESRD (end stage renal disease) ([address]     Bilateral carotid artery occlusion     Seizure ([address]

## 2021-02-23 DIAGNOSIS — F51.01 PRIMARY INSOMNIA: ICD-10-CM

## 2021-02-23 DIAGNOSIS — M54.42 CHRONIC BILATERAL LOW BACK PAIN WITH BILATERAL SCIATICA: ICD-10-CM

## 2021-02-23 DIAGNOSIS — I10 ESSENTIAL HYPERTENSION: ICD-10-CM

## 2021-02-23 DIAGNOSIS — M54.41 CHRONIC BILATERAL LOW BACK PAIN WITH BILATERAL SCIATICA: ICD-10-CM

## 2021-02-23 DIAGNOSIS — G89.29 CHRONIC BILATERAL LOW BACK PAIN WITH BILATERAL SCIATICA: ICD-10-CM

## 2021-02-23 RX ORDER — DM/PE/ACETAMINOPHEN/CHLORPHENR 10-5-325-2
TABLET, SEQUENTIAL ORAL
Qty: 30 TABLET | Refills: 3 | Status: ON HOLD | OUTPATIENT
Start: 2021-02-23 | End: 2022-06-04

## 2021-02-23 RX ORDER — QUETIAPINE FUMARATE 100 MG/1
100 TABLET, FILM COATED ORAL NIGHTLY
Qty: 30 TABLET | Refills: 4 | Status: SHIPPED | OUTPATIENT
Start: 2021-02-23

## 2021-02-23 RX ORDER — GABAPENTIN 100 MG/1
CAPSULE ORAL
Qty: 180 CAPSULE | Refills: 3 | Status: SHIPPED | OUTPATIENT
Start: 2021-02-23 | End: 2021-06-24

## 2021-02-23 RX ORDER — LOSARTAN POTASSIUM 50 MG/1
50 TABLET ORAL 2 TIMES DAILY
Qty: 60 TABLET | Refills: 4 | Status: SHIPPED | OUTPATIENT
Start: 2021-02-23 | End: 2021-11-01

## 2021-02-23 RX ORDER — BUMETANIDE 2 MG/1
TABLET ORAL
Qty: 60 TABLET | Refills: 2 | Status: ON HOLD
Start: 2021-02-23 | End: 2021-05-13 | Stop reason: HOSPADM

## 2021-02-23 NOTE — TELEPHONE ENCOUNTER
Request for multiple meds pended. Losartan The original prescription was discontinued on 12/6/2020 by Bean Barajas MD for the following reason: Stop Taking at Discharge. Renewing this prescription may not be appropriate. Last Visit Date: 1/5/2021  Next Visit Date:  Future Appointments   Date Time Provider Mahesh Gunter   3/16/2021  3:20 PM Severo Fox, APRN - CNP [id number] [facility]   Topic Date Due    AAA screen  1955    COVID-19 Vaccine (1 of 2) 01/07/1972    Shingles Vaccine (2 of 3) 12/27/2017    Colon cancer screen colonoscopy  06/27/2018    Diabetic foot exam  02/19/2020    Flu vaccine (1) 09/01/2020    A1C test (Diabetic or Prediabetic)  11/10/2020    Pneumococcal 65+ yrs at Risk Vaccine (1 of 2 - PCV13) 01/07/2021    Diabetic retinal exam  07/14/2021    Lipid screen  08/11/2021    Annual Wellness Visit (AWV)  10/10/2021    Potassium monitoring  12/06/2021    Creatinine monitoring  12/06/2021    DTaP/Tdap/Td vaccine (2 - Td) 12/14/2023    Hepatitis C screen  Completed    HIV screen  Completed    Hepatitis A vaccine  Aged Out    Hib vaccine  Aged Out    Meningococcal (ACWY) vaccine  Aged Out       Hemoglobin A1C (%)   Date Value   08/10/2020 10.9 (H)   06/16/2020 9.5 (A)   08/12/2019 9.8 (H)             ( goal A1C is < 7)   Microalb/Crt.  Ratio (mcg/mg creat)   Date Value   06/16/2020 2,663 (H)     LDL Cholesterol (mg/dL)   Date Value   08/11/2020 117       (goal LDL is <100)   AST (U/L)   Date Value   12/03/2020 22     ALT (U/L)   Date Value   12/03/2020 17     BUN (mg/dL)   Date Value   12/06/2020 54 (H)     BP Readings from Last 3 Encounters:   12/06/20 (!) 122/50   08/13/20 (!) 110/50   06/20/20 (!) 141/64          (goal 120/80)    All Future Testing planned in [facility]  Lab Frequency Next Occurrence   Basic Metabolic Panel Once 89/24/2727   Basic Metabolic Panel Once 34/04/7949   Basic Metabolic Panel Once 39/88/0048   Hemoglobin A1C Once 04/15/2021         Patient Active Problem List:     Cigarette nicotine dependence without complication     Carpal tunnel syndrome on right     Colon polyps     Carotid stenosis, left s/p Endarterectomy     Mixed simple and mucopurulent chronic bronchitis (HCC)     Pure hypercholesterolemia     CKD (chronic kidney disease) stage 3, GFR 30-59 ml/min ([facility])     Dyspnea and respiratory abnormalities     PTSD (post-traumatic stress disorder)     Transient cerebral ischemia     Essential hypertension     DM type 2, uncontrolled, with neuropathy (HCC)     COPD (chronic obstructive pulmonary disease) ([address]     Cerebral vascular disease     Primary insomnia     Hypertensive crisis     Non-obstructive Coronary artery disease of native artery of native heart with stable angina pectoris ([address]     Hyperparathyroidism ([address]     Hypovitaminosis D     Acute kidney injury superimposed on CKD ([address]     Coronary artery disease with exertional angina (HCC)     Leg swelling     Anemia     Hypoalbuminemia     DKA, type 2, not at goal [facility])     Inflammation of right sacroiliac joint ([address]     Malignant neoplasm of colon ([address]     ESRD (end stage renal disease) ([address]     Bilateral carotid artery occlusion     Seizure ([address]

## 2021-02-26 NOTE — TELEPHONE ENCOUNTER
2nd attempt;Called pt and LVM regarding referral.     3rd attempt; mailed colon screen letter to pt home address.

## 2021-03-04 DIAGNOSIS — I25.10 CAD IN NATIVE ARTERY: ICD-10-CM

## 2021-03-05 RX ORDER — NITROGLYCERIN 0.4 MG/1
TABLET SUBLINGUAL
Qty: 25 TABLET | Refills: 2 | Status: SHIPPED | OUTPATIENT
Start: 2021-03-05 | End: 2022-10-11 | Stop reason: SDUPTHER

## 2021-03-05 RX ORDER — INSULIN GLARGINE 100 [IU]/ML
INJECTION, SOLUTION SUBCUTANEOUS
Qty: 15 ML | Refills: 2 | Status: ON HOLD | OUTPATIENT
Start: 2021-03-05 | End: 2021-05-13 | Stop reason: SDUPTHER

## 2021-03-05 NOTE — TELEPHONE ENCOUNTER
Refill request for Nitrostat and Basaglar. If appropriate please send medication(s) to patients pharmacy. Next appt: 3/16/2021      Health Maintenance   Topic Date Due    AAA screen  Never done    COVID-19 Vaccine (1 of 2) Never done    Shingles Vaccine (2 of 3) 12/27/2017    Colon cancer screen colonoscopy  06/27/2018    Diabetic foot exam  02/19/2020    Flu vaccine (1) 09/01/2020    A1C test (Diabetic or Prediabetic)  11/10/2020    Pneumococcal 65+ yrs at Risk Vaccine (1 of 2 - PCV13) 01/07/2021    Diabetic retinal exam  07/14/2021    Lipid screen  08/11/2021    Annual Wellness Visit (AWV)  10/10/2021    Potassium monitoring  12/06/2021    Creatinine monitoring  12/06/2021    DTaP/Tdap/Td vaccine (2 - Td) 12/14/2023    Hepatitis C screen  Completed    HIV screen  Completed    Hepatitis A vaccine  Aged Out    Hib vaccine  Aged Out    Meningococcal (ACWY) vaccine  Aged Out       Hemoglobin A1C (%)   Date Value   08/10/2020 10.9 (H)   06/16/2020 9.5 (A)   08/12/2019 9.8 (H)             ( goal A1C is < 7)   Microalb/Crt.  Ratio (mcg/mg creat)   Date Value   06/16/2020 2,663 (H)     LDL Cholesterol (mg/dL)   Date Value   08/11/2020 117       (goal LDL is <100)   AST (U/L)   Date Value   12/03/2020 22     ALT (U/L)   Date Value   12/03/2020 17     BUN (mg/dL)   Date Value   12/06/2020 54 (H)     BP Readings from Last 3 Encounters:   12/06/20 (!) 122/50   08/13/20 (!) 110/50   06/20/20 (!) 141/64          (goal 120/80)          Patient Active Problem List:     Cigarette nicotine dependence without complication     Carpal tunnel syndrome on right     Colon polyps     Carotid stenosis, left s/p Endarterectomy     Mixed simple and mucopurulent chronic bronchitis (HCC)     Pure hypercholesterolemia     CKD (chronic kidney disease) stage 3, GFR 30-59 ml/min (HCC)     Dyspnea and respiratory abnormalities     PTSD (post-traumatic stress disorder)     Transient cerebral ischemia     Essential hypertension DM type 2, uncontrolled, with neuropathy ([address]     COPD (chronic obstructive pulmonary disease) (HCC)     Cerebral vascular disease     Primary insomnia     Hypertensive crisis     Non-obstructive Coronary artery disease of native artery of native heart with stable angina pectoris (HCC)     Hyperparathyroidism ([address]     Hypovitaminosis D     Acute kidney injury superimposed on CKD ([address]     Coronary artery disease with exertional angina (HCC)     Leg swelling     Anemia     Hypoalbuminemia     DKA, type 2, not at goal [facility])     Inflammation of right sacroiliac joint ([address]     Malignant neoplasm of colon ([address]     ESRD (end stage renal disease) ([address]     Bilateral carotid artery occlusion     Seizure ([address]

## 2021-03-16 ENCOUNTER — VIRTUAL VISIT (OUTPATIENT)
Dept: INTERNAL MEDICINE | Age: 66
End: 2021-03-16
Payer: COMMERCIAL

## 2021-03-16 DIAGNOSIS — I10 ESSENTIAL HYPERTENSION: ICD-10-CM

## 2021-03-16 DIAGNOSIS — J98.8 RESPIRATORY INFECTION: ICD-10-CM

## 2021-03-16 DIAGNOSIS — J44.9 CHRONIC OBSTRUCTIVE PULMONARY DISEASE, UNSPECIFIED COPD TYPE (HCC): ICD-10-CM

## 2021-03-16 DIAGNOSIS — N18.32 STAGE 3B CHRONIC KIDNEY DISEASE (HCC): ICD-10-CM

## 2021-03-16 PROCEDURE — 99442 PR PHYS/QHP TELEPHONE EVALUATION 11-20 MIN: CPT | Performed by: NURSE PRACTITIONER

## 2021-03-16 RX ORDER — PREDNISONE 20 MG/1
20 TABLET ORAL 2 TIMES DAILY
Qty: 10 TABLET | Refills: 0 | Status: SHIPPED | OUTPATIENT
Start: 2021-03-16 | End: 2021-03-21

## 2021-03-16 RX ORDER — FLUTICASONE PROPIONATE 50 MCG
SPRAY, SUSPENSION (ML) NASAL
COMMUNITY
Start: 2021-02-23

## 2021-03-16 RX ORDER — DOXYCYCLINE 100 MG/1
100 TABLET ORAL 2 TIMES DAILY
Qty: 20 TABLET | Refills: 0 | Status: SHIPPED | OUTPATIENT
Start: 2021-03-16 | End: 2021-03-26

## 2021-03-16 RX ORDER — INSULIN ASPART 100 [IU]/ML
5 INJECTION, SOLUTION INTRAVENOUS; SUBCUTANEOUS
Qty: 5 PEN | Refills: 3 | Status: SHIPPED | OUTPATIENT
Start: 2021-03-16

## 2021-03-16 NOTE — PROGRESS NOTES
Eamon Mccarty is a 73 y.o. male evaluated via telephone on 3/16/2021. Consent:  He and/or health care decision maker is aware that that he may receive a bill for this telephone service, depending on his insurance coverage, and has provided verbal consent to proceed: Yes      Documentation:  I communicated with the patient and/or health care decision maker about:  COPD:  Current treatment includes short-acting beta agonist inhaler, anticholinergic inhale and combined beta agonist/antichoinergic inhaler which ha been somewhat effective. Residual symptoms: chronic dyspnea and cough productive of green sputum in moderate amounts. He denies any other symptoms. He requires his rescue inhaler 4 time(s) per day. Treatment Adherence:   Medication compliance:  compliant most of the time  Diet compliance:  compliant most of the time  Weight trend: decreasing  Current exercise: no regular exercise  Barriers: lack of motivation    Diabetes Mellitus Type 2: Current symptoms/problems include none and neuropathy. Home blood sugar records: unable to provide readings  Any episodes of hypoglycemia? no  Eye exam current (within one year): no  Tobacco history: He  reports that he quit smoking about 6 years ago. His smoking use included cigarettes. He has a 17.50 pack-year smoking history. He has never used smokeless tobacco.   Daily Aspirin? Yes    Hypertension:  Home blood pressure monitoring: No.  He is not adherent to a low sodium diet. Patient complains of shortness of breath and fatigue. Antihypertensive medication side effects: no medication side effects noted. Use of agents associated with hypertension: none. Hyperlipidemia:  No new myalgias or GI upset on atorvastatin (Lipitor).        Lab Results   Component Value Date    LABA1C 10.9 (H) 08/10/2020    LABA1C 9.5 (A) 06/16/2020    LABA1C 9.8 (H) 08/12/2019     Lab Results   Component Value Date    LABMICR 2,663 (H) 06/16/2020    CREATININE 3.22 (H) 12/06/2020

## 2021-03-16 NOTE — PATIENT INSTRUCTIONS
Return To Clinic 4/6/2021 for 3 week follow up in office. PATIENT TO BRING IN ALL MEDICATION BOTTLES, not a list.     The medication list included in this document is our record of what you are currently taking, including any changes that were made at today's visit. If you find any differences when compared to your medications at home, or have any questions that were not answered at your visit, please contact the office. It is very important for your care that you keep your appointment. If for some reason you are unable to keep your appointment, it is equally important that you call our office at [phone] to cancel your appointment and reschedule. Failure to do so may result in your termination from our practice. After Visit Summary mailed to the patient along with appointment card and all other paperwork/orders. Medications have been e-scribed to pharmacy of patients choice. Lab orders reprinted and mailed to patient along with scheduling phone number and instructions. Referral for Nephrology sent to [facility]. Specialty office will contact patient for appointment. A copy of referral with contact information and address given to patient.  Patient should contact specialist office if no contact has been made to patient within 1 week.     MATY Andrews

## 2021-04-06 ENCOUNTER — OFFICE VISIT (OUTPATIENT)
Dept: INTERNAL MEDICINE | Age: 66
End: 2021-04-06
Payer: COMMERCIAL

## 2021-04-06 VITALS
WEIGHT: 134.6 LBS | SYSTOLIC BLOOD PRESSURE: 168 MMHG | DIASTOLIC BLOOD PRESSURE: 90 MMHG | HEART RATE: 72 BPM | BODY MASS INDEX: 21.63 KG/M2 | HEIGHT: 66 IN

## 2021-04-06 DIAGNOSIS — N18.32 STAGE 3B CHRONIC KIDNEY DISEASE (HCC): ICD-10-CM

## 2021-04-06 DIAGNOSIS — H61.23 BILATERAL IMPACTED CERUMEN: ICD-10-CM

## 2021-04-06 DIAGNOSIS — Z23 NEED FOR PROPHYLACTIC VACCINATION AND INOCULATION AGAINST VARICELLA: ICD-10-CM

## 2021-04-06 DIAGNOSIS — I25.10 CAD IN NATIVE ARTERY: ICD-10-CM

## 2021-04-06 DIAGNOSIS — Z23 NEED FOR PROPHYLACTIC VACCINATION AGAINST STREPTOCOCCUS PNEUMONIAE (PNEUMOCOCCUS): ICD-10-CM

## 2021-04-06 DIAGNOSIS — Z13.6 SCREENING FOR AAA (ABDOMINAL AORTIC ANEURYSM): ICD-10-CM

## 2021-04-06 DIAGNOSIS — I10 ESSENTIAL HYPERTENSION: ICD-10-CM

## 2021-04-06 DIAGNOSIS — J44.9 CHRONIC OBSTRUCTIVE PULMONARY DISEASE, UNSPECIFIED COPD TYPE (HCC): ICD-10-CM

## 2021-04-06 DIAGNOSIS — Z12.11 SCREENING FOR COLON CANCER: ICD-10-CM

## 2021-04-06 LAB — HBA1C MFR BLD: 9.8 %

## 2021-04-06 PROCEDURE — 83036 HEMOGLOBIN GLYCOSYLATED A1C: CPT | Performed by: NURSE PRACTITIONER

## 2021-04-06 PROCEDURE — 90670 PCV13 VACCINE IM: CPT | Performed by: NURSE PRACTITIONER

## 2021-04-06 PROCEDURE — 99214 OFFICE O/P EST MOD 30 MIN: CPT | Performed by: NURSE PRACTITIONER

## 2021-04-06 PROCEDURE — 69209 REMOVE IMPACTED EAR WAX UNI: CPT | Performed by: NURSE PRACTITIONER

## 2021-04-06 PROCEDURE — 99211 OFF/OP EST MAY X REQ PHY/QHP: CPT | Performed by: NURSE PRACTITIONER

## 2021-04-06 RX ORDER — CLONIDINE HYDROCHLORIDE 0.1 MG/1
0.1 TABLET ORAL 2 TIMES DAILY
Qty: 60 TABLET | Refills: 3 | Status: ON HOLD
Start: 2021-04-06 | End: 2021-05-13 | Stop reason: HOSPADM

## 2021-04-06 NOTE — PROGRESS NOTES
2021     Goldy Burgess (:  1955) is a 73 y.o. male, here for evaluation of the following medical concerns:    HPI  COPD:  Current treatment includes inhaled steroid, anticholinergic inhaler, which has been effective. Residual symptoms: chronic dyspnea. He denies any other symptoms. He requires his rescue inhaler 3 time(s) per day. Diabetes Mellitus Type 2: Current symptoms/problems include none and neuropathy. Medication compliance:  compliant most of the time  Diabetic diet compliance:  compliant most of the time,  Weight trend: decreasing  Current exercise: no regular exercise  Barriers: lack of motivation    Home blood sugar records: fasting range: unable to provide numbers  Any episodes of hypoglycemia? yes   Eye exam current (within one year): yes   reports that he quit smoking about 6 years ago. His smoking use included cigarettes. He has a 17.50 pack-year smoking history. He has never used smokeless tobacco.   Daily Aspirin? Yes    Lab Results   Component Value Date    LABA1C 9.8 (A) 2021    LABA1C 10.9 (H) 08/10/2020    LABA1C 9.5 (A) 2020     Lab Results   Component Value Date    LABMICR 2,663 (H) 2020    CREATININE 3.22 (H) 2020     Lab Results   Component Value Date    ALT 17 2020    AST 22 2020     Lab Results   Component Value Date    CHOL 195 2020    TRIG 80 2020    HDL 62 2020        Hypertension:  Home blood pressure monitoring: No.  He is not adherent to a low sodium diet. Patient denies palpitations. Antihypertensive medication side effects: no medication side effects noted. Use of agents associated with hypertension: none.                                         Sodium (mmol/L)   Date Value   2020 138    BUN (mg/dL)   Date Value   2020 54 (H)    Glucose (mg/dL)   Date Value   2020 251 (H)   2012 172 (H)      Potassium (mmol/L)   Date Value   2020 4.7    CREATININE (mg/dL)   Date Value 12/06/2020 3.22 (H)           Review of Systems   All other systems reviewed and are negative. Prior to Visit Medications    Medication Sig Taking? Authorizing Provider   cloNIDine (CATAPRES) 0.1 MG tablet Take 1 tablet by mouth 2 times daily Yes TERRANCE Casper CNP   glucagon 1 MG injection Inject 1 mg into the muscle See Admin Instructions Follow package directions for low blood sugar. Yes TERRANCE Casper CNP   fluticasone (FLONASE) 50 MCG/ACT nasal spray  Yes Historical Provider, MD   insulin aspart (NOVOLOG FLEXPEN) 100 UNIT/ML injection pen Inject 5 Units into the skin 3 times daily (before meals) Sliding scale Yes TERRANCE Casper CNP   BASAGLAR KWIKPEN 100 UNIT/ML injection pen INJECT 35 UNITS INTO THE SKIN NIGHTLY Yes TERRANCE Casper CNP   nitroGLYCERIN (NITROSTAT) 0.4 MG SL tablet USE 1 TABLET UNDER THE TONGUE UP TO MAXIMUM OF 3 TOTAL DOSES. IF NO RELIEF AFTER 1 DOSE, CALL 911. Yes TERRANCE Casper CNP   gabapentin (NEURONTIN) 100 MG capsule TAKE 2 CAPSULES BY MOUTH 3 TIMES DAILY Yes TERRANCE Casper CNP   QUEtiapine (SEROQUEL) 100 MG tablet TAKE 1 TABLET BY MOUTH NIGHTLY Yes TERRANCE Casper CNP   losartan (COZAAR) 50 MG tablet TAKE 1 TABLET BY MOUTH 2 TIMES DAILY Yes TERRANCE Casper CNP   GNP VITAMIN D MAXIMUM STRENGTH 50 MCG (2000 UT) TABS TAKE 1 TABLET BY MOUTH ONCE DAILY Yes TERRANCE Casper CNP   bumetanide (BUMEX) 2 MG tablet TAKE 1 TABLET BY MOUTH 2 TIMES DAILY Yes TERRANCE Casper CNP   Shivani Running 100-62.5-25 MCG/INH AEPB INHALE ONE PUFF INTO THE LUNGS DAILY Yes TERRANCE Casper CNP   blood glucose monitor strips Test_4__times daily Diagnosis: 250.0   Diabetes Mellitus_x___Insulin Dependent____Non-Insulin Dependent Yes TERRANCE Casper CNP   blood glucose monitor kit and supplies Dispense sufficient amount for indicated testing frequency plus additional to accommodate PRN testing needs. Dispense all needed supplies to include: monitor, strips, lancing device, lancets, control solutions, alcohol swabs.  Yes Bertha Saint, APRN - CNP   prazosin (MINIPRESS) 2 MG capsule 2 CAPSULES BY MOUTH (4MG) TWICE DAILY Yes Bertha Saint, APRN - CNP   COMBIVENT RESPIMAT  MCG/ACT AERS inhaler INHALE 1 PUFF INTO THE LUNGS EVERY 6 HOURS Yes Bertha Saint, APRN - CNP   hydrALAZINE (APRESOLINE) 100 MG tablet Take 1 tablet by mouth every 8 hours Yes Bertha Saint, APRN - CNP   aspirin (ASPIR-LOW) 81 MG EC tablet TAKE 1 TABLET BY MOUTH DAILY Yes Bertha Saint, APRN - CNP   NIFEdipine (ADALAT CC) 30 MG extended release tablet Take 1 tablet by mouth daily Takes 30mg 3 times daily Yes Bertha Saint, APRN - CNP   carvedilol (COREG) 25 MG tablet Take 1 tablet by mouth 2 times daily (with meals) Yes Bertha Saint, APRN - CNP   atorvastatin (LIPITOR) 80 MG tablet Take 1 tablet by mouth daily Yes Bertha Saint, APRN - CNP   clopidogrel (PLAVIX) 75 MG tablet Take 1 tablet by mouth daily Yes Bertha Saint, APRN - CNP   isosorbide mononitrate (IMDUR) 60 MG extended release tablet Take 1 tablet by mouth daily Yes Bertha Saint, APRN - CNP   vitamin D (ERGOCALCIFEROL) 1.25 MG (77503 UT) CAPS capsule Take 1 capsule by mouth once a week Yes Bertha Saint, APRN - CNP   Lancets MISC Use_4__times daily Diagnisis:250.0  Diabetes Mellitus__x__Insulin Dependent___Non-Insulin Dependent Yes Cesar Stanley MD   ipratropium-albuterol (DUONEB) 0.5-2.5 (3) MG/3ML SOLN nebulizer solution Inhale 3 mLs into the lungs every 6 hours as needed for Shortness of Breath Yes Elfego Acuna MD   Nutritional Supplements (GLUCERNA CARBSTEADY) LIQD Take 1 Can by mouth 3 times daily Yes Bertha Saint, APRN - CNP   COMFORT EZ PEN NEEDLES 31G X 8 MM MISC USE AS DIRECTED Yes Bertha Saint, APRN - CNP   TRUE METRIX BLOOD GLUCOSE TEST strip TEST BLOOD SUGAR 4 TO 5 TIMES DAILY  Patient not taking: Reported on 2021  Trip Hanson, APRN - CNP        Social History     Tobacco Use    Smoking status: Former Smoker     Packs/day: 0.50     Years: 35.00     Pack years: 17.50     Types: Cigarettes     Quit date: 2014     Years since quittin.8    Smokeless tobacco: Never Used   Substance Use Topics    Alcohol use: No     Alcohol/week: 0.0 standard drinks        Vitals:    21 1540 21 1622   BP: (!) 165/86  Comment: medication taken this am (!) 168/90   Site: Left Upper Arm Right Upper Arm   Position: Sitting Sitting   Cuff Size: Medium Adult Medium Adult   Pulse: 71 72   Weight: 134 lb 9.6 oz (61.1 kg)    Height: 5' 6\" (1.676 m)      Estimated body mass index is 21.73 kg/m² as calculated from the following:    Height as of this encounter: 5' 6\" (1.676 m). Weight as of this encounter: 134 lb 9.6 oz (61.1 kg). Physical Exam  Vitals signs and nursing note reviewed. Constitutional:       General: He is not in acute distress. Appearance: Normal appearance. HENT:      Head: Normocephalic and atraumatic. Right Ear: External ear normal.      Left Ear: External ear normal.      Ears:      Comments: Impacted cerumen bilaterally prior to irrigation. TM's pam grey with visible light reflex after irrigation. Nose: Nose normal.      Mouth/Throat:      Mouth: Mucous membranes are moist.      Pharynx: Oropharynx is clear. Eyes:      Conjunctiva/sclera: Conjunctivae normal.      Pupils: Pupils are equal, round, and reactive to light. Neck:      Musculoskeletal: Normal range of motion and neck supple. Cardiovascular:      Rate and Rhythm: Normal rate. Rhythm irregular. Pulses:           Dorsalis pedis pulses are 1+ on the right side and 1+ on the left side. Posterior tibial pulses are 1+ on the right side and 1+ on the left side. Pulmonary:      Effort: Pulmonary effort is normal.      Breath sounds: Decreased breath sounds present.    Abdominal:      General: Bowel sounds are normal.      Palpations: Abdomen is soft. Musculoskeletal: Normal range of motion. Feet:      Right foot:      Protective Sensation: 10 sites tested. 10 sites sensed. Skin integrity: Dry skin present. Toenail Condition: Right toenails are abnormally thick. Left foot:      Protective Sensation: 10 sites tested. 10 sites sensed. Skin integrity: Dry skin present. Toenail Condition: Left toenails are abnormally thick. Skin:     General: Skin is warm and dry. Neurological:      General: No focal deficit present. Mental Status: He is alert and oriented to person, place, and time. Psychiatric:         Mood and Affect: Mood normal.         Behavior: Behavior normal.         Thought Content: Thought content normal.         Judgment: Judgment normal.         ASSESSMENT/PLAN:  1. DM type 2, uncontrolled, with neuropathy (HCC)  - resistant to advice about medications as he says he knows his body  -  DIABETES FOOT EXAM  - POCT glycosylated hemoglobin (Hb A1C)  - glucagon 1 MG injection; Inject 1 mg into the muscle See Admin Instructions Follow package directions for low blood sugar. Dispense: 1 kit; Refill: 3  - [address]    2. Need for prophylactic vaccination against Streptococcus pneumoniae (pneumococcus)  - Pneumococcal conjugate vaccine 13-valent PCV13  - DC IMMUNIZ ADMIN,1 SINGLE/COMB VAC/TOXOID    3. Need for prophylactic vaccination and inoculation against varicella  - zoster recombinant adjuvanted vaccine [facility]) 50 MCG/0.5ML SUSR injection; Inject 0.5 mLs into the muscle once for 1 dose  Dispense: 0.5 mL; Refill: 0    4. Screening for colon cancer  - [facility] Screening Colonoscopy    5. Essential hypertension  - continue present medicaitons  - cloNIDine (CATAPRES) 0.1 MG tablet; Take 1 tablet by mouth 2 times daily  Dispense: 60 tablet; Refill: 3    6. Screening for AAA (abdominal aortic aneurysm)  - US SCREENING FOR AAA; Future    7. Chronic obstructive pulmonary disease, unspecified COPD type ([facility] [address]  - continue present medications  - [facility] 9 [facility]    8. Bilateral impacted cerumen  - MO REMOVAL IMPACTED CERUMEN IRRIGATION/LVG UNILAT    9. CAD in native artery, nonobstructive  - continue follow up with cardiology    10. Stage 3b chronic kidney disease  - encouraged to follow up with nephrology      Return in about 3 months (around 7/6/2021). An electronic signature was used to authenticate this note.     --TERRANCE Tobar - CNP on 4/30/2021 at 3:45 PM

## 2021-04-06 NOTE — PATIENT INSTRUCTIONS
Return To Clinic 7/19/2021 for 3 month follow up in office. After Visit Summary given and reviewed. The medication list included in this document is our record of what you are currently taking, including any changes that were made at today's visit. If you find any differences when compared to your medications at home, or have any questions that were not answered at your visit, please contact the office. It is very important for your care that you keep your appointment. If for some reason you are unable to keep your appointment, it is equally important that you call our office at [phone] to cancel your appointment and reschedule. Failure to do so may result in your termination from our practice. Medications have been e-scribed to pharmacy of patients choice. TESTING INSTRUCTIONS    Your doctor has ordered a/an AAA Screen for you, this will be done at any [facility] location of your choice    You will be called by the Scheduling Department to schedule your testing. If you do not hear from them within a week, please call them at [phone] to schedule the appointment. On the day of your appointment, please report to the Admitting Department, located on the main floor of the [facility] center behind the information desk. If you cannot keep this appointment, please call [phone] to cancel and reschedule your appointment. An order for Colonoscopy was placed by your physician. [facility] will be contacting you to schedule this procedure. Please contact the office at [phone] if you have not heard from scheduling within 1 week. Referral for Podiatry sent to [facility]. Specialty office will contact patient for appointment. A copy of referral with contact information and address given to patient. Patient should contact specialist office if no contact has been made to patient within 1 week.      Referral for Pulmonary Rehab sent to [facility] . Specialty office will contact patient for appointment. A copy of referral with contact information and address given to patient. Patient should contact specialist office if no contact has been made to patient within 1 week. Script for Shingrix was printed and given to patient. Patient was instructed to take script(s) to pharmacy to get filled. Patient was administered vaccination(s) for [facility] in the office.  VIS given to patient for each vaccination given.     -MATY Macias

## 2021-05-03 ENCOUNTER — APPOINTMENT (OUTPATIENT)
Dept: GENERAL RADIOLOGY | Age: 66
DRG: 280 | End: 2021-05-03
Payer: COMMERCIAL

## 2021-05-03 ENCOUNTER — HOSPITAL ENCOUNTER (INPATIENT)
Age: 66
LOS: 11 days | Discharge: HOME | DRG: 280 | End: 2021-05-14
Attending: EMERGENCY MEDICINE | Admitting: INTERNAL MEDICINE
Payer: COMMERCIAL

## 2021-05-03 DIAGNOSIS — I16.1 HYPERTENSIVE EMERGENCY: Primary | ICD-10-CM

## 2021-05-03 LAB
-: NORMAL
ABSOLUTE EOS #: 0.13 K/UL (ref 0–0.44)
ABSOLUTE IMMATURE GRANULOCYTE: <0.03 K/UL (ref 0–0.3)
ABSOLUTE LYMPH #: 0.81 K/UL (ref 1.1–3.7)
ABSOLUTE MONO #: 0.31 K/UL (ref 0.1–1.2)
ALBUMIN SERPL-MCNC: 3.1 G/DL (ref 3.5–5.2)
ALBUMIN/GLOBULIN RATIO: 1.1 (ref 1–2.5)
ALP BLD-CCNC: 104 U/L (ref 40–129)
ALT SERPL-CCNC: 21 U/L (ref 5–41)
AMORPHOUS: NORMAL
ANION GAP SERPL CALCULATED.3IONS-SCNC: 10 MMOL/L (ref 9–17)
AST SERPL-CCNC: 22 U/L
BACTERIA: NORMAL
BASOPHILS # BLD: 0 % (ref 0–2)
BASOPHILS ABSOLUTE: <0.03 K/UL (ref 0–0.2)
BILIRUB SERPL-MCNC: <0.1 MG/DL (ref 0.3–1.2)
BILIRUBIN URINE: NEGATIVE
BNP INTERPRETATION: ABNORMAL
BUN BLDV-MCNC: 73 MG/DL (ref 8–23)
BUN/CREAT BLD: ABNORMAL (ref 9–20)
CALCIUM SERPL-MCNC: 8.6 MG/DL (ref 8.6–10.4)
CASTS UA: NORMAL /LPF (ref 0–8)
CHLORIDE BLD-SCNC: 106 MMOL/L (ref 98–107)
CO2: 19 MMOL/L (ref 20–31)
COLOR: YELLOW
COMMENT UA: ABNORMAL
CREAT SERPL-MCNC: 2.7 MG/DL (ref 0.7–1.2)
CRYSTALS, UA: NORMAL /HPF
DIFFERENTIAL TYPE: ABNORMAL
EKG ATRIAL RATE: 61 BPM
EKG P AXIS: 68 DEGREES
EKG P-R INTERVAL: 136 MS
EKG Q-T INTERVAL: 404 MS
EKG QRS DURATION: 88 MS
EKG QTC CALCULATION (BAZETT): 406 MS
EKG R AXIS: -93 DEGREES
EKG T AXIS: 123 DEGREES
EKG VENTRICULAR RATE: 61 BPM
EOSINOPHILS RELATIVE PERCENT: 3 % (ref 1–4)
EPITHELIAL CELLS UA: NORMAL /HPF (ref 0–5)
GFR AFRICAN AMERICAN: 29 ML/MIN
GFR NON-AFRICAN AMERICAN: 24 ML/MIN
GFR SERPL CREATININE-BSD FRML MDRD: ABNORMAL ML/MIN/{1.73_M2}
GFR SERPL CREATININE-BSD FRML MDRD: ABNORMAL ML/MIN/{1.73_M2}
GLUCOSE BLD-MCNC: 174 MG/DL (ref 70–99)
GLUCOSE BLD-MCNC: 324 MG/DL (ref 75–110)
GLUCOSE BLD-MCNC: 333 MG/DL (ref 75–110)
GLUCOSE BLD-MCNC: 393 MG/DL (ref 75–110)
GLUCOSE BLD-MCNC: 435 MG/DL (ref 75–110)
GLUCOSE URINE: ABNORMAL
HCT VFR BLD CALC: 35.9 % (ref 40.7–50.3)
HEMOGLOBIN: 11.9 G/DL (ref 13–17)
IMMATURE GRANULOCYTES: 0 %
INR BLD: 0.9
KETONES, URINE: NEGATIVE
LEUKOCYTE ESTERASE, URINE: NEGATIVE
LYMPHOCYTES # BLD: 16 % (ref 24–43)
MCH RBC QN AUTO: 30.7 PG (ref 25.2–33.5)
MCHC RBC AUTO-ENTMCNC: 33.1 G/DL (ref 28.4–34.8)
MCV RBC AUTO: 92.8 FL (ref 82.6–102.9)
MONOCYTES # BLD: 6 % (ref 3–12)
MUCUS: NORMAL
NITRITE, URINE: NEGATIVE
NRBC AUTOMATED: 0 PER 100 WBC
OTHER OBSERVATIONS UA: NORMAL
PARTIAL THROMBOPLASTIN TIME: 20.7 SEC (ref 20.5–30.5)
PDW BLD-RTO: 12.4 % (ref 11.8–14.4)
PH UA: 6 (ref 5–8)
PLATELET # BLD: ABNORMAL K/UL (ref 138–453)
PLATELET ESTIMATE: ABNORMAL
PLATELET, FLUORESCENCE: NORMAL K/UL (ref 138–453)
PLATELET, IMMATURE FRACTION: NORMAL % (ref 1.1–10.3)
PMV BLD AUTO: ABNORMAL FL (ref 8.1–13.5)
POTASSIUM SERPL-SCNC: 5.5 MMOL/L (ref 3.7–5.3)
PRO-BNP: ABNORMAL PG/ML
PROTEIN UA: ABNORMAL
PROTHROMBIN TIME: 9.6 SEC (ref 9.1–12.3)
RBC # BLD: 3.87 M/UL (ref 4.21–5.77)
RBC # BLD: ABNORMAL 10*6/UL
RBC UA: NORMAL /HPF (ref 0–4)
RENAL EPITHELIAL, UA: NORMAL /HPF
SEG NEUTROPHILS: 74 % (ref 36–65)
SEGMENTED NEUTROPHILS ABSOLUTE COUNT: 3.67 K/UL (ref 1.5–8.1)
SODIUM BLD-SCNC: 135 MMOL/L (ref 135–144)
SPECIFIC GRAVITY UA: 1.02 (ref 1–1.03)
TOTAL PROTEIN: 5.8 G/DL (ref 6.4–8.3)
TRICHOMONAS: NORMAL
TROPONIN INTERP: ABNORMAL
TROPONIN INTERP: ABNORMAL
TROPONIN T: ABNORMAL NG/ML
TROPONIN T: ABNORMAL NG/ML
TROPONIN, HIGH SENSITIVITY: 80 NG/L (ref 0–22)
TROPONIN, HIGH SENSITIVITY: 96 NG/L (ref 0–22)
TURBIDITY: CLEAR
URINE HGB: NEGATIVE
UROBILINOGEN, URINE: NORMAL
WBC # BLD: 5 K/UL (ref 3.5–11.3)
WBC # BLD: ABNORMAL 10*3/UL
WBC UA: NORMAL /HPF (ref 0–5)
YEAST: NORMAL

## 2021-05-03 PROCEDURE — 6370000000 HC RX 637 (ALT 250 FOR IP): Performed by: STUDENT IN AN ORGANIZED HEALTH CARE EDUCATION/TRAINING PROGRAM

## 2021-05-03 PROCEDURE — 80307 DRUG TEST PRSMV CHEM ANLYZR: CPT

## 2021-05-03 PROCEDURE — 85730 THROMBOPLASTIN TIME PARTIAL: CPT

## 2021-05-03 PROCEDURE — 99223 1ST HOSP IP/OBS HIGH 75: CPT | Performed by: INTERNAL MEDICINE

## 2021-05-03 PROCEDURE — 81001 URINALYSIS AUTO W/SCOPE: CPT

## 2021-05-03 PROCEDURE — 2060000000 HC ICU INTERMEDIATE R&B

## 2021-05-03 PROCEDURE — 94640 AIRWAY INHALATION TREATMENT: CPT

## 2021-05-03 PROCEDURE — 93005 ELECTROCARDIOGRAM TRACING: CPT | Performed by: STUDENT IN AN ORGANIZED HEALTH CARE EDUCATION/TRAINING PROGRAM

## 2021-05-03 PROCEDURE — 82947 ASSAY GLUCOSE BLOOD QUANT: CPT

## 2021-05-03 PROCEDURE — 85055 RETICULATED PLATELET ASSAY: CPT

## 2021-05-03 PROCEDURE — 83880 ASSAY OF NATRIURETIC PEPTIDE: CPT

## 2021-05-03 PROCEDURE — 96374 THER/PROPH/DIAG INJ IV PUSH: CPT

## 2021-05-03 PROCEDURE — 6370000000 HC RX 637 (ALT 250 FOR IP): Performed by: NURSE PRACTITIONER

## 2021-05-03 PROCEDURE — 99285 EMERGENCY DEPT VISIT HI MDM: CPT

## 2021-05-03 PROCEDURE — 71045 X-RAY EXAM CHEST 1 VIEW: CPT

## 2021-05-03 PROCEDURE — 85025 COMPLETE CBC W/AUTO DIFF WBC: CPT

## 2021-05-03 PROCEDURE — C9248 INJ, CLEVIDIPINE BUTYRATE: HCPCS | Performed by: STUDENT IN AN ORGANIZED HEALTH CARE EDUCATION/TRAINING PROGRAM

## 2021-05-03 PROCEDURE — 6360000002 HC RX W HCPCS: Performed by: STUDENT IN AN ORGANIZED HEALTH CARE EDUCATION/TRAINING PROGRAM

## 2021-05-03 PROCEDURE — 2580000003 HC RX 258: Performed by: NURSE PRACTITIONER

## 2021-05-03 PROCEDURE — 85610 PROTHROMBIN TIME: CPT

## 2021-05-03 PROCEDURE — 93010 ELECTROCARDIOGRAM REPORT: CPT | Performed by: INTERNAL MEDICINE

## 2021-05-03 PROCEDURE — 80053 COMPREHEN METABOLIC PANEL: CPT

## 2021-05-03 PROCEDURE — 84484 ASSAY OF TROPONIN QUANT: CPT

## 2021-05-03 PROCEDURE — 76937 US GUIDE VASCULAR ACCESS: CPT

## 2021-05-03 RX ORDER — NIFEDIPINE 30 MG/1
30 TABLET, EXTENDED RELEASE ORAL DAILY
Status: DISCONTINUED | OUTPATIENT
Start: 2021-05-03 | End: 2021-05-07

## 2021-05-03 RX ORDER — POLYETHYLENE GLYCOL 3350 17 G/17G
17 POWDER, FOR SOLUTION ORAL DAILY PRN
Status: DISCONTINUED | OUTPATIENT
Start: 2021-05-03 | End: 2021-05-14 | Stop reason: HOSPADM

## 2021-05-03 RX ORDER — PRAZOSIN HYDROCHLORIDE 1 MG/1
4 CAPSULE ORAL 2 TIMES DAILY
Status: DISCONTINUED | OUTPATIENT
Start: 2021-05-03 | End: 2021-05-13

## 2021-05-03 RX ORDER — CARVEDILOL 12.5 MG/1
25 TABLET ORAL 2 TIMES DAILY WITH MEALS
Status: DISCONTINUED | OUTPATIENT
Start: 2021-05-03 | End: 2021-05-04

## 2021-05-03 RX ORDER — CLOPIDOGREL BISULFATE 75 MG/1
75 TABLET ORAL DAILY
Status: DISCONTINUED | OUTPATIENT
Start: 2021-05-03 | End: 2021-05-08

## 2021-05-03 RX ORDER — ALBUTEROL SULFATE 2.5 MG/3ML
2.5 SOLUTION RESPIRATORY (INHALATION) EVERY 6 HOURS PRN
Status: DISCONTINUED | OUTPATIENT
Start: 2021-05-03 | End: 2021-05-14 | Stop reason: HOSPADM

## 2021-05-03 RX ORDER — QUETIAPINE FUMARATE 100 MG/1
100 TABLET, FILM COATED ORAL NIGHTLY
Status: DISCONTINUED | OUTPATIENT
Start: 2021-05-03 | End: 2021-05-14 | Stop reason: HOSPADM

## 2021-05-03 RX ORDER — NICOTINE 21 MG/24HR
1 PATCH, TRANSDERMAL 24 HOURS TRANSDERMAL DAILY PRN
Status: DISCONTINUED | OUTPATIENT
Start: 2021-05-03 | End: 2021-05-14 | Stop reason: HOSPADM

## 2021-05-03 RX ORDER — BUMETANIDE 1 MG/1
2 TABLET ORAL DAILY
Status: DISCONTINUED | OUTPATIENT
Start: 2021-05-03 | End: 2021-05-14 | Stop reason: HOSPADM

## 2021-05-03 RX ORDER — PROMETHAZINE HYDROCHLORIDE 12.5 MG/1
12.5 TABLET ORAL EVERY 6 HOURS PRN
Status: DISCONTINUED | OUTPATIENT
Start: 2021-05-03 | End: 2021-05-14 | Stop reason: HOSPADM

## 2021-05-03 RX ORDER — IPRATROPIUM BROMIDE AND ALBUTEROL SULFATE 2.5; .5 MG/3ML; MG/3ML
1 SOLUTION RESPIRATORY (INHALATION)
Status: DISCONTINUED | OUTPATIENT
Start: 2021-05-03 | End: 2021-05-13

## 2021-05-03 RX ORDER — ISOSORBIDE MONONITRATE 60 MG/1
60 TABLET, EXTENDED RELEASE ORAL DAILY
Status: DISCONTINUED | OUTPATIENT
Start: 2021-05-04 | End: 2021-05-14 | Stop reason: HOSPADM

## 2021-05-03 RX ORDER — SODIUM CHLORIDE 9 MG/ML
INJECTION, SOLUTION INTRAVENOUS CONTINUOUS
Status: DISCONTINUED | OUTPATIENT
Start: 2021-05-03 | End: 2021-05-07

## 2021-05-03 RX ORDER — HEPARIN SODIUM 5000 [USP'U]/ML
5000 INJECTION, SOLUTION INTRAVENOUS; SUBCUTANEOUS EVERY 8 HOURS SCHEDULED
Status: DISCONTINUED | OUTPATIENT
Start: 2021-05-03 | End: 2021-05-14 | Stop reason: HOSPADM

## 2021-05-03 RX ORDER — FLUTICASONE PROPIONATE 50 MCG
2 SPRAY, SUSPENSION (ML) NASAL DAILY
Status: DISCONTINUED | OUTPATIENT
Start: 2021-05-03 | End: 2021-05-14 | Stop reason: HOSPADM

## 2021-05-03 RX ORDER — IPRATROPIUM BROMIDE AND ALBUTEROL SULFATE 2.5; .5 MG/3ML; MG/3ML
3 SOLUTION RESPIRATORY (INHALATION) EVERY 6 HOURS PRN
Status: DISCONTINUED | OUTPATIENT
Start: 2021-05-03 | End: 2021-05-14 | Stop reason: HOSPADM

## 2021-05-03 RX ORDER — SODIUM CHLORIDE 0.9 % (FLUSH) 0.9 %
5-40 SYRINGE (ML) INJECTION PRN
Status: DISCONTINUED | OUTPATIENT
Start: 2021-05-03 | End: 2021-05-14 | Stop reason: HOSPADM

## 2021-05-03 RX ORDER — ATORVASTATIN CALCIUM 80 MG/1
80 TABLET, FILM COATED ORAL DAILY
Status: DISCONTINUED | OUTPATIENT
Start: 2021-05-03 | End: 2021-05-14 | Stop reason: HOSPADM

## 2021-05-03 RX ORDER — INSULIN GLARGINE 100 [IU]/ML
35 INJECTION, SOLUTION SUBCUTANEOUS NIGHTLY
Status: DISCONTINUED | OUTPATIENT
Start: 2021-05-03 | End: 2021-05-12

## 2021-05-03 RX ORDER — ONDANSETRON 2 MG/ML
4 INJECTION INTRAMUSCULAR; INTRAVENOUS EVERY 6 HOURS PRN
Status: DISCONTINUED | OUTPATIENT
Start: 2021-05-03 | End: 2021-05-14 | Stop reason: HOSPADM

## 2021-05-03 RX ORDER — ACETAMINOPHEN 650 MG/1
650 SUPPOSITORY RECTAL EVERY 6 HOURS PRN
Status: DISCONTINUED | OUTPATIENT
Start: 2021-05-03 | End: 2021-05-12

## 2021-05-03 RX ORDER — LOSARTAN POTASSIUM 50 MG/1
50 TABLET ORAL 2 TIMES DAILY
Status: DISCONTINUED | OUTPATIENT
Start: 2021-05-03 | End: 2021-05-13

## 2021-05-03 RX ORDER — HYDRALAZINE HYDROCHLORIDE 50 MG/1
100 TABLET, FILM COATED ORAL EVERY 8 HOURS SCHEDULED
Status: DISCONTINUED | OUTPATIENT
Start: 2021-05-03 | End: 2021-05-13

## 2021-05-03 RX ORDER — ASPIRIN 81 MG/1
81 TABLET ORAL DAILY
Status: DISCONTINUED | OUTPATIENT
Start: 2021-05-03 | End: 2021-05-14 | Stop reason: HOSPADM

## 2021-05-03 RX ORDER — SODIUM CHLORIDE 9 MG/ML
25 INJECTION, SOLUTION INTRAVENOUS PRN
Status: DISCONTINUED | OUTPATIENT
Start: 2021-05-03 | End: 2021-05-14 | Stop reason: HOSPADM

## 2021-05-03 RX ORDER — ACETAMINOPHEN 325 MG/1
650 TABLET ORAL EVERY 6 HOURS PRN
Status: DISCONTINUED | OUTPATIENT
Start: 2021-05-03 | End: 2021-05-12

## 2021-05-03 RX ORDER — SODIUM CHLORIDE 0.9 % (FLUSH) 0.9 %
5-40 SYRINGE (ML) INJECTION EVERY 12 HOURS SCHEDULED
Status: DISCONTINUED | OUTPATIENT
Start: 2021-05-03 | End: 2021-05-14 | Stop reason: HOSPADM

## 2021-05-03 RX ORDER — BUDESONIDE AND FORMOTEROL FUMARATE DIHYDRATE 160; 4.5 UG/1; UG/1
2 AEROSOL RESPIRATORY (INHALATION) 2 TIMES DAILY
Status: DISCONTINUED | OUTPATIENT
Start: 2021-05-03 | End: 2021-05-09 | Stop reason: RX

## 2021-05-03 RX ORDER — GABAPENTIN 100 MG/1
200 CAPSULE ORAL 2 TIMES DAILY
Status: DISCONTINUED | OUTPATIENT
Start: 2021-05-03 | End: 2021-05-08

## 2021-05-03 RX ORDER — CLONIDINE HYDROCHLORIDE 0.1 MG/1
0.1 TABLET ORAL 2 TIMES DAILY
Status: DISCONTINUED | OUTPATIENT
Start: 2021-05-03 | End: 2021-05-04

## 2021-05-03 RX ORDER — ACETAMINOPHEN 500 MG
1000 TABLET ORAL ONCE
Status: COMPLETED | OUTPATIENT
Start: 2021-05-03 | End: 2021-05-03

## 2021-05-03 RX ADMIN — CLEVIPIDINE 2 MG/HR: 0.5 EMULSION INTRAVENOUS at 15:10

## 2021-05-03 RX ADMIN — GABAPENTIN 200 MG: 100 CAPSULE ORAL at 20:27

## 2021-05-03 RX ADMIN — SODIUM CHLORIDE 50 ML/HR: 9 INJECTION, SOLUTION INTRAVENOUS at 19:04

## 2021-05-03 RX ADMIN — NIFEDIPINE 30 MG: 30 TABLET, FILM COATED, EXTENDED RELEASE ORAL at 17:58

## 2021-05-03 RX ADMIN — INSULIN GLARGINE 35 UNITS: 100 INJECTION, SOLUTION SUBCUTANEOUS at 21:39

## 2021-05-03 RX ADMIN — LOSARTAN POTASSIUM 50 MG: 50 TABLET, FILM COATED ORAL at 20:27

## 2021-05-03 RX ADMIN — ACETAMINOPHEN 1000 MG: 500 TABLET ORAL at 13:56

## 2021-05-03 RX ADMIN — CLONIDINE HYDROCHLORIDE 0.1 MG: 0.1 TABLET ORAL at 20:27

## 2021-05-03 RX ADMIN — INSULIN LISPRO 3 UNITS: 100 INJECTION, SOLUTION INTRAVENOUS; SUBCUTANEOUS at 21:34

## 2021-05-03 RX ADMIN — IPRATROPIUM BROMIDE AND ALBUTEROL SULFATE 1 AMPULE: .5; 2.5 SOLUTION RESPIRATORY (INHALATION) at 21:01

## 2021-05-03 RX ADMIN — HYDRALAZINE HYDROCHLORIDE 100 MG: 50 TABLET, FILM COATED ORAL at 23:24

## 2021-05-03 RX ADMIN — BUMETANIDE 2 MG: 1 TABLET ORAL at 20:30

## 2021-05-03 RX ADMIN — CLOPIDOGREL 75 MG: 75 TABLET, FILM COATED ORAL at 17:59

## 2021-05-03 RX ADMIN — CARVEDILOL 25 MG: 12.5 TABLET, FILM COATED ORAL at 17:58

## 2021-05-03 ASSESSMENT — ENCOUNTER SYMPTOMS
COUGH: 0
NAUSEA: 0
ABDOMINAL PAIN: 0
SORE THROAT: 0
BLOOD IN STOOL: 0
DIARRHEA: 0
SHORTNESS OF BREATH: 1
VOMITING: 0
CONSTIPATION: 0
PHOTOPHOBIA: 0

## 2021-05-03 ASSESSMENT — PAIN SCALES - GENERAL: PAINLEVEL_OUTOF10: 1

## 2021-05-03 ASSESSMENT — VISUAL ACUITY
OU: 20/50
OS: 20/70
OD: 20/70

## 2021-05-03 NOTE — ED NOTES
The following labs labeled with pt sticker and tubed:     [] Lavender   [] on ice   [] Blue   [] Green/yellow  [] Green/black [] on ice  [] Pink  [] Red  [] Yellow     [] COVID-19 swab    [] Rapid     [x] Urine Sample  [] Pelvic Cultures    [] Blood Cultures            Bharait Leon RN  05/03/21 4062

## 2021-05-03 NOTE — ED NOTES
The following labs labeled with pt sticker and tubed:     [] Lavender   [] on ice   [] Blue   [x] Green/yellow  [x] Green/black [] on ice  [] Pink  [] Red  [x] Yellow     [] COVID-19 swab    [] Rapid     [] Urine Sample  [] Pelvic Cultures    [] Blood Cultures            Alka Staley RN  05/03/21 2537

## 2021-05-03 NOTE — ED NOTES
The following labs labeled with pt sticker and tubed:     [x] Lavender   [] on ice   [] Blue   [x] Green/yellow  [] Green/black [] on ice  [] Pink  [] Red  [x] Yellow     [] COVID-19 swab    [] Rapid     [] Urine Sample  [] Pelvic Cultures    [] Blood Cultures            Bharati Leon RN  05/03/21 9925

## 2021-05-03 NOTE — ED PROVIDER NOTES
[facility]  Emergency Department  Faculty Attestation     I performed a history and physical examination of the patient and discussed management with the resident. I reviewed the residents note and agree with the documented findings and plan of care. Any areas of disagreement are noted on the chart. I was personally present for the key portions of any procedures. I have documented in the chart those procedures where I was not present during the key portions. I have reviewed the emergency nurses triage note. I agree with the chief complaint, past medical history, past surgical history, allergies, medications, social and family history as documented unless otherwise noted below. For Physician Assistant/ Nurse Practitioner cases/documentation I have personally evaluated this patient and have completed at least one if not all key elements of the E/M (history, physical exam, and MDM). Additional findings are as noted. Primary Care Physician:  TERRANCE Mcmahon - CNP    Screenings:  [unfilled]    CHIEF COMPLAINT       Chief Complaint   Patient presents with    Hypertension     dizziness and HTN       RECENT VITALS:   Temp: 98.3 °F (36.8 °C),  Pulse: 64, Resp: 16, BP: (!) 229/79    LABS:  Labs Reviewed   CBC WITH AUTO DIFFERENTIAL - Abnormal; Notable for the following components:       Result Value    RBC 3.87 (*)     Hemoglobin 11.9 (*)     Hematocrit 35.9 (*)     Seg Neutrophils 74 (*)     Lymphocytes 16 (*)     Absolute Lymph # 0.81 (*)     All other components within normal limits   IMMATURE PLATELET FRACTION   COMPREHENSIVE METABOLIC PANEL W/ REFLEX TO MG FOR LOW K   TROPONIN   URINE RT REFLEX TO CULTURE   PROTIME-INR   APTT       Radiology  XR CHEST PORTABLE   Final Result   1. No acute abnormality.              CRITICAL CARE: There was  a high probability of clinically significant/life threatening deterioration in this patient's condition which required my urgent intervention. Total critical care time was 5 minutes. This excludes any time for separately reportable procedures. EKG:   EKG Interpretation    Interpreted by me    Rhythm: normal sinus   Rate: normal  Axis: Right  Ectopy: none  Conduction: normal  ST Segments: Elevation V2  T Waves: Inversion 1, L, V6  Q Waves: V1, V2    Clinical Impression: Nonspecific T wave changes    Attending Physician Additional  Notes    Patient has severe hypertension, on multiple medications, having high blood pressure today associate with blurring of his vision. No other strokelike symptoms. No headache. No chest pain or diaphoresis. He does have shortness of breath which is baseline for him from his COPD, normally takes Combivent. No cough sputum hemoptysis fever chills or sweats. No Covid symptoms or exposures. No edema. He has been compliant with his medications. On exam he is hypertensive but appears nontoxic. GCS is 15. Normal pupils and extraocular movements. Normal motor strength. Normal finger-nose movements. No tremor. Lungs are diminished with mild expiratory prolongation but no active wheezing. Mild JVD but no gallop. No edema. Impression is hypertensive urgency. Plan is laboratory studies, EKG, cardiac monitor, IV antihypertensives, reassess. Anton Lucas.  Belem Perez MD, [facility]  Attending Emergency  Physician               Chris Brooks MD  05/03/21 9414

## 2021-05-03 NOTE — ED PROVIDER NOTES
[facility] ED  Emergency Department Encounter  EmergencyMedicine Resident     Pt [place]  MRN: [id number]  [place] 1955  Date of evaluation: 5/3/21  PCP:  TERRANCE Katz CNP    CHIEF COMPLAINT       Chief Complaint   Patient presents with    Hypertension     dizziness and HTN       HISTORY OF PRESENT ILLNESS  (Location/Symptom, Timing/Onset, Context/Setting, Quality, Duration, Modifying Factors, Severity.)      Magali Ritchie is a 73 y.o. male who presents with Hypertensive emergency. Patient notes that he is a history of being a poorly controlled hypertensive. He notes his blood pressure is always high in the 200s every morning and takes his blood pressure pills and then resolves on constantly is battling with blood pressure just prior to all of his daily doses. He notes that the day that he woke up and had blurry vision, headache, nausea, and worsening shortness of breath on exertion. His blood pressure this morning was 235 and he took all his medications did not mary his symptoms. Patient is here as he is concerned his blood pressure might be too high. He denies any fevers, chills, sore throat, chest pain, worsening cough baseline, abdominal pain, vomiting, diarrhea, numbness or tingling or weakness. PAST MEDICAL / SURGICAL / SOCIAL / FAMILY HISTORY      has a past medical history of Asthma, CAD in native artery, nonobstructive, Carotid stenosis, left, Carpal tunnel syndrome, Cerebrovascular disease, Chronic kidney disease, COPD (chronic obstructive pulmonary disease) ([facility] [address] Diabetes mellitus ([facility] [address] History of colon polyps, History of weakness of extremity, HTN (hypertension), Hyperlipidemia, Lung, cysts, congenital, PTSD (post-traumatic stress disorder), PTSD (post-traumatic stress disorder), TIA (transient ischemic attack), and Type II or unspecified type diabetes mellitus without mention of complication, not stated as uncontrolled.        has a past surgical history that includes hernia repair; Tonsillectomy; Colonoscopy; Carotid endarterectomy (Left, -); vascular surgery (Left, 2015); and pr colsc flx w/rmvl of tumor polyp lesion snare tq (N/A, 2017).       Social History     Socioeconomic History    Marital status:      Spouse name: Not on file    Number of children: Not on file    Years of education: Not on file    Highest education level: Not on file   Occupational History     Employer: N/A   Social Needs    Financial resource strain: Not on file    Food insecurity     Worry: Not on file     Inability: Not on file   [employer] needs     Medical: Not on file     Non-medical: Not on file   Tobacco Use    Smoking status: Former Smoker     Packs/day: 0.50     Years: 35.00     Pack years: 17.50     Types: Cigarettes     Quit date: 2014     Years since quittin.8    Smokeless tobacco: Never Used   Substance and Sexual Activity    Alcohol use: No     Alcohol/week: 0.0 standard drinks    Drug use: No    Sexual activity: Yes     Partners: Female   Lifestyle    Physical activity     Days per week: Not on file     Minutes per session: Not on file    Stress: Not on file   Relationships    Social connections     Talks on phone: Not on file     Gets together: Not on file     Attends [employer] service: Not on file     Active member of club or organization: Not on file     Attends meetings of clubs or organizations: Not on file     Relationship status: Not on file    Intimate partner violence     Fear of current or ex partner: Not on file     Emotionally abused: Not on file     Physically abused: Not on file     Forced sexual activity: Not on file   Other Topics Concern    Not on file   Social History Narrative    Not on file       Family History   Problem Relation Age of Onset    Cancer Mother     Heart Disease Father        Allergies:  Lisinopril, Ace inhibitors, and Pcn [penicillins]    Home Medications:  Prior to Dispense all needed supplies to include: monitor, strips, lancing device, lancets, control solutions, alcohol swabs.  1/26/21   TERRANCE Steve CNP   prazosin (MINIPRESS) 2 MG capsule 2 CAPSULES BY MOUTH (4MG) TWICE DAILY 1/25/21   TERRANCE Steve CNP   COMBIVENT RESPIMAT  MCG/ACT AERS inhaler INHALE 1 PUFF INTO THE LUNGS EVERY 6 HOURS 1/25/21   TERRANCE Steve CNP   hydrALAZINE (APRESOLINE) 100 MG tablet Take 1 tablet by mouth every 8 hours 1/25/21   TERRANCE Steve CNP   aspirin (ASPIR-LOW) 81 MG EC tablet TAKE 1 TABLET BY MOUTH DAILY 1/25/21   TERRANCE Steve CNP   NIFEdipine (ADALAT CC) 30 MG extended release tablet Take 1 tablet by mouth daily Takes 30mg 3 times daily 1/25/21   TERRANCE Steve CNP   carvedilol (COREG) 25 MG tablet Take 1 tablet by mouth 2 times daily (with meals) 1/25/21   TERRANCE Steve CNP   atorvastatin (LIPITOR) 80 MG tablet Take 1 tablet by mouth daily 1/25/21   TERRANCE Steve CNP   clopidogrel (PLAVIX) 75 MG tablet Take 1 tablet by mouth daily 1/25/21   TERRANCE Steve CNP   isosorbide mononitrate (IMDUR) 60 MG extended release tablet Take 1 tablet by mouth daily 1/5/21   TERRANCE Steve CNP   vitamin D (ERGOCALCIFEROL) 1.25 MG (17054 UT) CAPS capsule Take 1 capsule by mouth once a week 1/5/21   TERRANCE Steve CNP   Lancets MISC Use_4__times daily Diagnisis:250.0  Diabetes Mellitus__x__Insulin Dependent___Non-Insulin Dependent 12/6/20   Uriel Nguyen MD   ipratropium-albuterol (DUONEB) 0.5-2.5 (3) MG/3ML SOLN nebulizer solution Inhale 3 mLs into the lungs every 6 hours as needed for Shortness of Breath 12/6/20   Elfego Roberts MD   Nutritional Supplements (GLUCERNA CARBSTEADY) LIQD Take 1 Can by mouth 3 times daily 6/16/20   TERRANCE Steve CNP   COMFORT EZ PEN NEEDLES 31G X 8 MM MISC USE AS DIRECTED 4/14/20   TERRANCE Steve CNP BLOOD GLUCOSE TEST strip TEST BLOOD SUGAR 4 TO 5 TIMES DAILY  Patient not taking: Reported on 4/6/2021 12/12/19   Blaise Fernandez, APRN - CNP       REVIEW OF SYSTEMS    (2-9 systems for level 4, 10 or more for level 5)      Review of Systems   Constitutional: Negative for chills, fatigue and fever. HENT: Negative for congestion and sore throat. Eyes: Positive for visual disturbance. Negative for photophobia. Respiratory: Positive for shortness of breath (exertional). Negative for cough. Cardiovascular: Negative for chest pain, palpitations and leg swelling. Gastrointestinal: Negative for abdominal pain, blood in stool, constipation, diarrhea, nausea and vomiting. Genitourinary: Negative for dysuria and hematuria. Musculoskeletal: Negative for arthralgias and myalgias. Skin: Negative for rash and wound. Neurological: Negative for weakness and numbness. PHYSICAL EXAM   (up to 7 for level 4, 8 or more for level 5)      INITIAL VITALS:   BP (!) 229/79 Pulse 64   Temp 98.3 °F (36.8 °C) (Oral)   Resp 16   Wt 140 lb (63.5 kg)   SpO2 95%   BMI 22.60 kg/m²     Physical Exam  Vitals signs and nursing note reviewed. Constitutional:       General: He is not in acute distress. Appearance: Normal appearance. He is well-developed and normal weight. He is not toxic-appearing or diaphoretic. HENT:      Head: Normocephalic and atraumatic. Right Ear: External ear normal.      Left Ear: External ear normal.      Nose: Nose normal.      Mouth/Throat:      Mouth: Mucous membranes are moist.      Pharynx: Oropharynx is clear. Eyes:      General: No scleral icterus. Extraocular Movements: Extraocular movements intact. Conjunctiva/sclera: Conjunctivae normal.      Pupils: Pupils are equal, round, and reactive to light. Neck:      Musculoskeletal: Normal range of motion and neck supple. No neck rigidity. Vascular: No JVD. Trachea: No tracheal deviation.    Cardiovascular: Rate and Rhythm: Normal rate and regular rhythm. Pulses: Normal pulses. Heart sounds: Normal heart sounds, S1 normal and S2 normal. No murmur. No friction rub. No gallop. Pulmonary:      Effort: Pulmonary effort is normal. No respiratory distress. Breath sounds: Normal breath sounds. Abdominal:      General: Abdomen is flat. There is no distension. Palpations: Abdomen is soft. Tenderness: There is no abdominal tenderness. There is no guarding or rebound. Musculoskeletal: Normal range of motion. General: No swelling or tenderness. Skin:     General: Skin is warm and dry. Capillary Refill: Capillary refill takes less than 2 seconds. Neurological:      General: No focal deficit present. Mental Status: He is alert and oriented to person, place, and time. Motor: No abnormal muscle tone. Comments: Cranial nerves II through XII intact on examination. 5/5 strength in all joints. Sensations equal tact light touch and pain all extremities.   Normal finger-nose and heel shin test.         DIFFERENTIAL  DIAGNOSIS     PLAN (LABS / IMAGING / EKG):  Orders Placed This Encounter   Procedures    XR CHEST PORTABLE    Comprehensive Metabolic Panel w/ Reflex to MG    CBC Auto Differential    Troponin    Urinalysis Reflex to Culture    Protime-INR    APTT    Immature Platelet Fraction    Brain Natriuretic Peptide    Comprehensive Metabolic Panel w/ Reflex to MG    CBC    Troponin    Lipid panel - fasting    Drug screen multi urine    Microscopic Urinalysis    DIET CARDIAC;    Visual acuity screening    Vital signs per unit routine    Notify Physician    Strict Bedrest    Daily weights    Intake and output    Neurovascular checks    Neuro checks    Obtain initial SBP and decrease SBP by 25% in first 8 hours    Telemetry Monitoring    Full Code    Inpatient consult to Hospitalist    Inpatient consult to IV Team    Initiate Oxygen Therapy Protocol    MDI Treatment    MDI Treatment    EKG 12 Lead    EKG 12 Lead    EKG 12 lead    Insert peripheral IV    PATIENT STATUS (FROM ED OR OR/PROCEDURAL) Inpatient       MEDICATIONS ORDERED:  Orders Placed This Encounter   Medications    clevidipine (CLEVIPREX) infusion    acetaminophen (TYLENOL) tablet 1,000 mg    aspirin EC tablet 81 mg    atorvastatin (LIPITOR) tablet 80 mg    insulin glargine (LANTUS) injection vial 35 Units    bumetanide (BUMEX) tablet 2 mg    carvedilol (COREG) tablet 25 mg    cloNIDine (CATAPRES) tablet 0.1 mg    clopidogrel (PLAVIX) tablet 75 mg    albuterol (PROVENTIL) nebulizer solution 2.5 mg    fluticasone (FLONASE) 50 MCG/ACT nasal spray 2 spray    gabapentin (NEURONTIN) capsule 200 mg    hydrALAZINE (APRESOLINE) tablet 100 mg    insulin lispro (HUMALOG) injection vial 5 Units    ipratropium-albuterol (DUONEB) nebulizer solution 3 mL    isosorbide mononitrate (IMDUR) extended release tablet 60 mg    losartan (COZAAR) tablet 50 mg    NIFEdipine (PROCARDIA XL) extended release tablet 30 mg    prazosin (MINIPRESS) capsule 4 mg    QUEtiapine (SEROQUEL) tablet 100 mg    DISCONTD: fluticasone-umeclidin-vilant (TRELEGY ELLIPTA) 100-62.5-25 MCG/INH inhaler 1 puff    0.9 % sodium chloride infusion    sodium chloride flush 0.9 % injection 5-40 mL    sodium chloride flush 0.9 % injection 5-40 mL    0.9 % sodium chloride infusion    nicotine (NICODERM CQ) 21 MG/24HR 1 patch    OR Linked Order Group     promethazine (PHENERGAN) tablet 12.5 mg     ondansetron (ZOFRAN) injection 4 mg    OR Linked Order Group     acetaminophen (TYLENOL) tablet 650 mg     acetaminophen (TYLENOL) suppository 650 mg    polyethylene glycol (GLYCOLAX) packet 17 g    heparin (porcine) injection 5,000 Units    budesonide-formoterol (SYMBICORT) 160-4.5 MCG/ACT inhaler 2 puff    tiotropium (SPIRIVA RESPIMAT) 2.5 MCG/ACT inhaler 2 puff       DDX: ACS/MI, hypertensive emergency, acute CHF, arrhythmia, electrolyte abnormality, CKD, BINU    DIAGNOSTIC RESULTS / EMERGENCY DEPARTMENT COURSE / MDM   LAB RESULTS:  Results for orders placed or performed during the hospital encounter of 05/03/21   Comprehensive Metabolic Panel w/ Reflex to MG   Result Value Ref Range    Glucose 174 (H) 70 - 99 mg/dL    BUN 73 (H) 8 - 23 mg/dL    CREATININE 2.70 (H) 0.70 - 1.20 mg/dL    Bun/Cre Ratio NOT REPORTED 9 - 20    Calcium 8.6 8.6 - 10.4 mg/dL    Sodium 135 135 - 144 mmol/L    Potassium 5.5 (H) 3.7 - 5.3 mmol/L    Chloride 106 98 - 107 mmol/L    CO2 19 (L) 20 - 31 mmol/L    Anion Gap 10 9 - 17 mmol/L    Alkaline Phosphatase 104 40 - 129 U/L    ALT 21 5 - 41 U/L    AST 22 <40 U/L    Total Bilirubin <0.10 (L) 0.3 - 1.2 mg/dL    Total Protein 5.8 (L) 6.4 - 8.3 g/dL    Albumin 3.1 (L) 3.5 - 5.2 g/dL    Albumin/Globulin Ratio 1.1 1.0 - 2.5    GFR Non-African American 24 (L) >60 mL/min    GFR  29 (L) >60 mL/min    GFR Comment          GFR Staging NOT REPORTED    CBC Auto Differential   Result Value Ref Range    WBC 5.0 3.5 - 11.3 k/uL    RBC 3.87 (L) 4.21 - 5.77 m/uL    Hemoglobin 11.9 (L) 13.0 - 17.0 g/dL    Hematocrit 35.9 (L) 40.7 - 50.3 %    MCV 92.8 82.6 - 102.9 fL    MCH 30.7 25.2 - 33.5 pg    MCHC 33.1 28.4 - 34.8 g/dL    RDW 12.4 11.8 - 14.4 %    Platelets See Reflexed IPF Result 138 - 453 k/uL    MPV NOT REPORTED 8.1 - 13.5 fL    NRBC Automated 0.0 0.0 per 100 WBC    Differential Type NOT REPORTED     WBC Morphology NOT REPORTED     RBC Morphology NOT REPORTED     Platelet Estimate NOT REPORTED     Seg Neutrophils 74 (H) 36 - 65 %    Lymphocytes 16 (L) 24 - 43 %    Monocytes 6 3 - 12 %    Eosinophils % 3 1 - 4 %    Basophils 0 0 - 2 %    Immature Granulocytes 0 0 %    Segs Absolute 3.67 1.50 - 8.10 k/uL    Absolute Lymph # 0.81 (L) 1.10 - 3.70 k/uL    Absolute Mono # 0.31 0.10 - 1.20 k/uL    Absolute Eos # 0.13 0.00 - 0.44 k/uL    Basophils Absolute <0.03 0.00 - 0.20 k/uL    Absolute Immature Granulocyte <0.03 0.00 - 0.30 k/uL   Troponin   Result Value Ref Range    Troponin, High Sensitivity 96 (HH) 0 - 22 ng/L    Troponin T NOT REPORTED <0.03 ng/mL    Troponin Interp NOT REPORTED    Urinalysis Reflex to Culture    Specimen: Urine, clean catch   Result Value Ref Range    Color, UA YELLOW YELLOW    Turbidity UA CLEAR CLEAR    Glucose, Ur 1+ (A) NEGATIVE    Bilirubin Urine NEGATIVE NEGATIVE    Ketones, Urine NEGATIVE NEGATIVE    Specific Gravity, UA 1.016 1.005 - 1.030    Urine Hgb NEGATIVE NEGATIVE    pH, UA 6.0 5.0 - 8.0    Protein, UA 4+ (A) NEGATIVE    Urobilinogen, Urine Normal Normal    Nitrite, Urine NEGATIVE NEGATIVE    Leukocyte Esterase, Urine NEGATIVE NEGATIVE    Urinalysis Comments NOT REPORTED    Protime-INR   Result Value Ref Range    Protime 9.6 9.1 - 12.3 sec    INR 0.9    APTT   Result Value Ref Range    PTT 20.7 20.5 - 30.5 sec   Immature Platelet Fraction   Result Value Ref Range    Platelet, Immature Fraction NOT REPORTED 1.1 - 10.3 %    Platelet, Fluorescence Platelet clumps present, count appears adequate. 138 - 453 k/uL   Brain Natriuretic Peptide   Result Value Ref Range    Pro-BNP 13,486 (H) <300 pg/mL    BNP Interpretation Pro-BNP Reference Range:    Microscopic Urinalysis   Result Value Ref Range    -          WBC, UA 2 TO 5 0 - 5 /HPF    RBC, UA 0 TO 2 0 - 4 /HPF    Casts UA  0 - 8 /LPF     2 TO 5 HYALINE Reference range defined for non-centrifuged specimen. Crystals, UA NOT REPORTED None /HPF    Epithelial Cells UA 0 TO 2 0 - 5 /HPF    Renal Epithelial, UA NOT REPORTED 0 /HPF    Bacteria, UA NOT REPORTED None    Mucus, UA NOT REPORTED None    Trichomonas, UA NOT REPORTED None    Amorphous, UA NOT REPORTED None    Other Observations UA NOT REPORTED NOT REQ.     Yeast, UA NOT REPORTED None   EKG 12 Lead   Result Value Ref Range    Ventricular Rate 61 BPM    Atrial Rate 61 BPM    P-R Interval 136 ms    QRS Duration 88 ms    Q-T Interval 404 ms    QTc Calculation (Bazett) 406 ms P Axis 68 degrees    R Axis -93 degrees    T Axis 123 degrees       IMPRESSION: 18-year-old male presents for hypertensive emergency with blurry vision. Patient does not appear to be in acute distress but chronically ill-appearing. Vitals reveal hypertension of 229/79 is alert vital stable nonconcerning. Clear lung sounds bilaterally. Regular rate and rhythm. Abdomen is soft nontender with no guarding/rigidity tenderness. No focal neuro deficits. Bilateral calves nontender palpation with no appreciable edema. Cap refill is in 2. Concern for above differential diagnosis. Will order CMP, CBC, coags, troponin, urinalysis, EKG, chest x-ray, and Cleviprex. Plan for admission    RADIOLOGY:  Xr Chest Portable    Result Date: 5/3/2021  EXAMINATION: ONE XRAY VIEW OF THE CHEST 5/3/2021 1:50 pm COMPARISON: 12/03/2020 HISTORY: ORDERING SYSTEM PROVIDED HISTORY: shortness of breath TECHNOLOGIST PROVIDED HISTORY: shortness of breath FINDINGS: There is biapical scarring. The cardiac silhouette is within normal limits. There is no pneumothorax or pleural effusion. Status post left endarterectomy and carotid stent. 1.  No acute abnormality.        EKG  EKG Interpretation    Interpreted by emergency department physician    Rhythm: normal sinus   Rate: normal  Axis: normal  Ectopy: none  Conduction: normal  ST Segments: elevation in  v2  T Waves: inversion in  v6, I and aVl  Q Waves: v1 and v2    Clinical Impression: non-specific EKG    Geoffry Ohs      All EKG's are interpreted by the Emergency Department Physician who either signs or Co-signs this chart in the absence of a cardiologist.    EMERGENCY DEPARTMENT COURSE:  ED Course as of May 03 1742   Mon May 03, 2021   1432 Troponin, High Sensitivity(!!): 96 [CS]   1441 CMP reveals elevated creatinine of 2.7 with a BUN of 73 and a potassium of 5.5.    [CS]   1508 Talked to [facility] who accepted admission.    [CS]   1548 Pro-BNP(!): 13,486 [CS]      ED Course User Index  [CS] Jimmie Floyd DO         PROCEDURES:  none    CONSULTS:  IP CONSULT TO HOSPITALIST  IP CONSULT TO IV TEAM    CRITICAL CARE:  Please see attending note    FINAL IMPRESSION      1. Hypertensive emergency          DISPOSITION / PLAN     DISPOSITION Admitted 05/03/2021 04:37:08 PM      PATIENT REFERRED TO:  No follow-up provider specified.     DISCHARGE MEDICATIONS:  New Prescriptions    No medications on file       Jimmie Floyd DO  Emergency Medicine Resident    (Please note that portions of thisnote were completed with a voice recognition program.  Efforts were made to edit the dictations but occasionally words are mis-transcribed.)       Jimmie Floyd DO  Resident  05/03/21 [id number]

## 2021-05-03 NOTE — ED NOTES
Bed: 20  Expected date:   Expected time:   Means of arrival:   Comments:   W Anastacio Cavazos,Fl [address], RN  05/03/21 7280

## 2021-05-03 NOTE — H&P
[facility]  Office: [address], DO, Kasandra Kumar, DO, Suzy Finn, DO, Louise Guerra Blood, DO, Alistair Garcia MD, Manjula Ryan MD, Tana Shea MD, Jonelle Rodas MD, Tawnya Estrada MD, Jayden Gross MD, Carlos Escamilla MD, Komal Weber MD, Veronica Pinon, DO, Shazia Cowan MD, Zora Sandhu, DO, Gissel Holland MD,  Twan Raya, DO, Brandon Hernandez MD, Lindsay Aviles MD, Shayla Chapman MD, Mihir Meraz MD, Josephine Kothari, [facility], [facility], CNP, Cindy Olivera, CNP, Fabián Kelly, CNS, Alisia Gilbert, CNP, Angle Knapp, CNP, Elijah Britt, CNP, Elysia Pa, CNP, Shubham Green, CNP, Hi Aguilar PA-C, Rosalind Alan, [facility], Ami Davis, CNP, Vinayak Blount, [facility], Gerardo House, CNP, Magalys Glaser, CNP, Scott Rodrigez, CNP, Rafaela Springer, [facility]    HISTORY AND PHYSICAL EXAMINATION            Date:   5/3/2021  Patient name:  Soraya Roberts  Date of admission:  5/3/2021  1:18 PM  MRN:   [id number]  Account:  [de-identified]  YOB: 1955  PCP:    TERRANCE Mills CNP  Room:   20/20  Code Status:    Full Code    Chief Complaint:     Chief Complaint   Patient presents with    Hypertension     dizziness and HTN       History Obtained From:     patient, electronic medical record    History of Present Illness: This is a 76-year-old male with past medical history of asthma, CAD, CVA, CKD, diabetes, hypertension who is coming in with complaints of uncontrolled hypertension and headache. Patient reports that he usually has trouble controlling his blood pressure even after being on multiple medications however this morning it was extra high with systolic being around 808. Patient was also complaining of headache. Patient tried taking Tylenol for the headache which did not seem to help.   Currently denies any dizziness, acute changes with his vision or hearing, nausea, vomiting, chest pain.  Patient did complain of having heart palpitations. Patient denies any shortness of breath, abdominal pain, constipation, diarrhea or any trouble with his urination. Past Medical History:     Past Medical History:   Diagnosis Date    Asthma     mod persistent    CAD in native artery, nonobstructive     Carotid stenosis, left 6/10/2013    Carpal tunnel syndrome     RIGHT    Cerebrovascular disease 4/23/2018    Chronic kidney disease 6/10/2013    COPD (chronic obstructive pulmonary disease) (HCC)     Diabetes mellitus (HCC)     insulin dependent    History of colon polyps     History of weakness of extremity     right sided    HTN (hypertension)     Hyperlipidemia     Lung, cysts, congenital     PTSD (post-traumatic stress disorder)     PTSD (post-traumatic stress disorder)     TIA (transient ischemic attack)     Type II or unspecified type diabetes mellitus without mention of complication, not stated as uncontrolled         Past Surgical History:     Past Surgical History:   Procedure Laterality Date    CAROTID ENDARTERECTOMY Left 7-2013    COLONOSCOPY      HERNIA REPAIR      ND COLSC FLX W/RMVL OF TUMOR POLYP LESION SNARE TQ N/A 6/27/2017    COLONOSCOPY POLYPECTOMY SNARE/ hot performed by Shyam Alejo DO at [facility] VASCULAR SURGERY Left 2015    carotid stent, dr Jones Im        Medications Prior to Admission:     Prior to Admission medications    Medication Sig Start Date End Date Taking? Authorizing Provider   cloNIDine (CATAPRES) 0.1 MG tablet Take 1 tablet by mouth 2 times daily 4/6/21   TERRANCE Sofia CNP   glucagon 1 MG injection Inject 1 mg into the muscle See Admin Instructions Follow package directions for low blood sugar.  4/6/21   Indy Bolton APRN - JEN   fluticasone [facility]) 50 MCG/ACT nasal spray  2/23/21   Historical Provider, MD   insulin aspart (NOVOLOG FLEXPEN) 100 UNIT/ML injection pen Inject 5 Units into the skin 3 times daily (before meals) Sliding scale 3/16/21   Renford Nails, APRN - CNP   BASAGLAR KWIKPEN 100 UNIT/ML injection pen INJECT 35 UNITS INTO THE SKIN NIGHTLY 3/5/21   Renford Nails, TERRANCE - CNP   nitroGLYCERIN (NITROSTAT) 0.4 MG SL tablet USE 1 TABLET UNDER THE TONGUE UP TO MAXIMUM OF 3 TOTAL DOSES. IF NO RELIEF AFTER 1 DOSE, CALL 911. 3/5/21   [facility] Che, TERRANCE - CNP   gabapentin (NEURONTIN) 100 MG capsule TAKE 2 CAPSULES BY MOUTH 3 TIMES DAILY 2/23/21 5/24/21  Renford Nails, APRN - CNP   QUEtiapine (SEROQUEL) 100 MG tablet TAKE 1 TABLET BY MOUTH NIGHTLY 2/23/21   Renford Nails, TERRANCE - CNP   losartan (COZAAR) 50 MG tablet TAKE 1 TABLET BY MOUTH 2 TIMES DAILY 2/23/21   Renford Nails, TERRANCE - CNP   GNP VITAMIN D MAXIMUM STRENGTH 50 MCG (2000 UT) TABS TAKE 1 TABLET BY MOUTH ONCE DAILY 2/23/21   Renford Nails, TERRANCE - CNP   bumetanide (BUMEX) 2 MG tablet TAKE 1 TABLET BY MOUTH 2 TIMES DAILY 2/23/21   Renford Nails, TERRANCE - JEN   Alexandra Lente 100-62.5-25 MCG/INH AEPB INHALE ONE PUFF INTO THE LUNGS DAILY 1/27/21   TERRANCE Sofia - CNP   blood glucose monitor strips Test_4__times daily Diagnosis: 250.0   Diabetes Mellitus_x___Insulin Dependent____Non-Insulin Dependent 1/26/21   Renford NailsTERRANCE - CNP   blood glucose monitor kit and supplies Dispense sufficient amount for indicated testing frequency plus additional to accommodate PRN testing needs. Dispense all needed supplies to include: monitor, strips, lancing device, lancets, control solutions, alcohol swabs.  1/26/21   Juniorford Che, TERRANCE - CNP   prazosin (MINIPRESS) 2 MG capsule 2 CAPSULES BY MOUTH (4MG) TWICE DAILY 1/25/21   Renford Nails, TERRANCE - JEN   COMBIVENT RESPIMAT  MCG/ACT AERS inhaler INHALE 1 PUFF INTO THE LUNGS EVERY 6 HOURS 1/25/21   Renford Nails, TERRANCE - CNP   hydrALAZINE (APRESOLINE) 100 MG tablet Take 1 tablet by mouth every 8 hours 1/25/21   TERRANCE Sofia - CNP   aspirin (ASPIR-LOW) 81 MG EC tablet TAKE 1 TABLET BY MOUTH DAILY 1/25/21   TERRANCE Tobar CNP   NIFEdipine (ADALAT CC) 30 MG extended release tablet Take 1 tablet by mouth daily Takes 30mg 3 times daily 1/25/21   TERRANCE Tobar CNP   carvedilol (COREG) 25 MG tablet Take 1 tablet by mouth 2 times daily (with meals) 1/25/21   TERRANCE Tobar CNP   atorvastatin (LIPITOR) 80 MG tablet Take 1 tablet by mouth daily 1/25/21   TERRANCE Tobar CNP   clopidogrel (PLAVIX) 75 MG tablet Take 1 tablet by mouth daily 1/25/21   TERRANCE Tobar CNP   isosorbide mononitrate (IMDUR) 60 MG extended release tablet Take 1 tablet by mouth daily 1/5/21   TERRANCE Tobar CNP   vitamin D (ERGOCALCIFEROL) 1.25 MG (46711 UT) CAPS capsule Take 1 capsule by mouth once a week 1/5/21   TERRANCE Tobar CNP   Lancets MISC Use_4__times daily Diagnisis:250.0  Diabetes Mellitus__x__Insulin Dependent___Non-Insulin Dependent 12/6/20   Isaura Trevino MD   ipratropium-albuterol (DUONEB) 0.5-2.5 (3) MG/3ML SOLN nebulizer solution Inhale 3 mLs into the lungs every 6 hours as needed for Shortness of Breath 12/6/20   Elfego Baker MD   Nutritional Supplements ([address] [place] [address]) LIQD Take 1 Can by mouth 3 times daily 6/16/20   TERRANCE Tobar CNP   COMFORT EZ PEN NEEDLES 31G X 8 MM MISC USE AS DIRECTED 4/14/20   TERRANCE Tobar CNP   TRUE METRIX BLOOD GLUCOSE TEST strip TEST BLOOD SUGAR 4 TO 5 TIMES DAILY  Patient not taking: Reported on 4/6/2021 12/12/19   TERRANCE Tobar CNP        Allergies:     Lisinopril, Ace inhibitors, and Pcn [penicillins]    Social History:     Tobacco:    reports that he quit smoking about 6 years ago. His smoking use included cigarettes. He has a 17.50 pack-year smoking history. He has never used smokeless tobacco.  Alcohol:      reports no history of alcohol use. Drug Use:  reports no history of drug use.     Family History:     Family History Problem Relation Age of Onset    Cancer Mother     Heart Disease Father        Review of Systems:     Positive and Negative as described in HPI.     CONSTITUTIONAL:  negative for fevers, chills, sweats, fatigue, weight loss  HEENT:  negative for vision, hearing changes, runny nose, throat pain  RESPIRATORY:  negative for shortness of breath, cough, congestion, wheezing  CARDIOVASCULAR:  negative for chest pain, palpitations  GASTROINTESTINAL:  negative for nausea, vomiting, diarrhea, constipation, change in bowel habits, abdominal pain   GENITOURINARY:  negative for difficulty of urination, burning with urination, frequency   INTEGUMENT:  negative for rash, skin lesions, easy bruising   HEMATOLOGIC/LYMPHATIC:  negative for swelling/edema   ALLERGIC/IMMUNOLOGIC:  negative for urticaria , itching  ENDOCRINE:  negative increase in drinking, increase in urination, hot or cold intolerance  MUSCULOSKELETAL:  negative joint pains, muscle aches, swelling of joints  NEUROLOGICAL:  negative for headaches, dizziness, lightheadedness, numbness, pain, tingling extremities  BEHAVIOR/PSYCH:  negative for depression, anxiety    Physical Exam:   BP (!) 175/65   Pulse 66   Temp 98.3 °F (36.8 °C) (Oral)   Resp 15   Wt 140 lb (63.5 kg)   SpO2 95%   BMI 22.60 kg/m²   Temp (24hrs), Av.3 °F (36.8 °C), Min:98.3 °F (36.8 °C), Max:98.3 °F (36.8 °C)    Recent Labs     21  1757   POCGLU 393*     No intake or output data in the 24 hours ending 21 1842    General Appearance: alert, well appearing, and in no acute distress  Mental status: oriented to person, place, and time  Head: normocephalic, atraumatic  Eye: no icterus, redness, pupils equal and reactive, extraocular eye movements intact, conjunctiva clear  Ear: normal external ear, no discharge, hearing intact  Nose: no drainage noted  Mouth: mucous membranes moist  Neck: supple, no carotid bruits, thyroid not palpable  Lungs: Bilateral equal air entry, clear to ausculation, no wheezing, rales or rhonchi, normal effort  Cardiovascular: normal rate, regular rhythm, no murmur, gallop, rub  Abdomen: Soft, nontender, nondistended, normal bowel sounds, no hepatomegaly or splenomegaly  Neurologic: There are no new focal motor or sensory deficits, normal muscle tone and bulk, no abnormal sensation, normal speech, cranial nerves II through XII grossly intact  Skin: No gross lesions, rashes, bruising or bleeding on exposed skin area  Extremities: peripheral pulses palpable, no pedal edema or calf pain with palpation  Psych: normal affect    Investigations:      Laboratory Testing:  Recent Results (from the past 24 hour(s))   EKG 12 Lead    Collection Time: 05/03/21  1:21 PM   Result Value Ref Range    Ventricular Rate 61 BPM    Atrial Rate 61 BPM    P-R Interval 136 ms    QRS Duration 88 ms    Q-T Interval 404 ms    QTc Calculation (Bazett) 406 ms    P Axis 68 degrees    R Axis -93 degrees    T Axis 123 degrees   Comprehensive Metabolic Panel w/ Reflex to MG    Collection Time: 05/03/21  1:38 PM   Result Value Ref Range    Glucose 174 (H) 70 - 99 mg/dL    BUN 73 (H) 8 - 23 mg/dL    CREATININE 2.70 (H) 0.70 - 1.20 mg/dL    Bun/Cre Ratio NOT REPORTED 9 - 20    Calcium 8.6 8.6 - 10.4 mg/dL    Sodium 135 135 - 144 mmol/L    Potassium 5.5 (H) 3.7 - 5.3 mmol/L    Chloride 106 98 - 107 mmol/L    CO2 19 (L) 20 - 31 mmol/L    Anion Gap 10 9 - 17 mmol/L    Alkaline Phosphatase 104 40 - 129 U/L    ALT 21 5 - 41 U/L    AST 22 <40 U/L    Total Bilirubin <0.10 (L) 0.3 - 1.2 mg/dL    Total Protein 5.8 (L) 6.4 - 8.3 g/dL    Albumin 3.1 (L) 3.5 - 5.2 g/dL    Albumin/Globulin Ratio 1.1 1.0 - 2.5    GFR Non-African American 24 (L) >60 mL/min    GFR  29 (L) >60 mL/min    GFR Comment          GFR Staging NOT REPORTED    CBC Auto Differential    Collection Time: 05/03/21  1:38 PM   Result Value Ref Range    WBC 5.0 3.5 - 11.3 k/uL    RBC 3.87 (L) 4.21 - 5.77 m/uL    Hemoglobin 11.9 (L) 13.0 - 17.0 g/dL    Hematocrit 35.9 (L) 40.7 - 50.3 %    MCV 92.8 82.6 - 102.9 fL    MCH 30.7 25.2 - 33.5 pg    MCHC 33.1 28.4 - 34.8 g/dL    RDW 12.4 11.8 - 14.4 %    Platelets See Reflexed IPF Result 138 - 453 k/uL    MPV NOT REPORTED 8.1 - 13.5 fL    NRBC Automated 0.0 0.0 per 100 WBC    Differential Type NOT REPORTED     WBC Morphology NOT REPORTED     RBC Morphology NOT REPORTED     Platelet Estimate NOT REPORTED     Seg Neutrophils 74 (H) 36 - 65 %    Lymphocytes 16 (L) 24 - 43 %    Monocytes 6 3 - 12 %    Eosinophils % 3 1 - 4 %    Basophils 0 0 - 2 %    Immature Granulocytes 0 0 %    Segs Absolute 3.67 1.50 - 8.10 k/uL    Absolute Lymph # 0.81 (L) 1.10 - 3.70 k/uL    Absolute Mono # 0.31 0.10 - 1.20 k/uL    Absolute Eos # 0.13 0.00 - 0.44 k/uL    Basophils Absolute <0.03 0.00 - 0.20 k/uL    Absolute Immature Granulocyte <0.03 0.00 - 0.30 k/uL   Troponin    Collection Time: 05/03/21  1:38 PM   Result Value Ref Range    Troponin, High Sensitivity 96 (HH) 0 - 22 ng/L    Troponin T NOT REPORTED <0.03 ng/mL    Troponin Interp NOT REPORTED    Immature Platelet Fraction    Collection Time: 05/03/21  1:38 PM   Result Value Ref Range    Platelet, Immature Fraction NOT REPORTED 1.1 - 10.3 %    Platelet, Fluorescence Platelet clumps present, count appears adequate.  138 - 453 k/uL   Protime-INR    Collection Time: 05/03/21  3:05 PM   Result Value Ref Range    Protime 9.6 9.1 - 12.3 sec    INR 0.9    APTT    Collection Time: 05/03/21  3:05 PM   Result Value Ref Range    PTT 20.7 20.5 - 30.5 sec   Brain Natriuretic Peptide    Collection Time: 05/03/21  3:05 PM   Result Value Ref Range    Pro-BNP 13,486 (H) <300 pg/mL    BNP Interpretation Pro-BNP Reference Range:    Urinalysis Reflex to Culture    Collection Time: 05/03/21  5:15 PM    Specimen: Urine, clean catch   Result Value Ref Range    Color, UA YELLOW YELLOW    Turbidity UA CLEAR CLEAR    Glucose, Ur 1+ (A) NEGATIVE    Bilirubin Urine NEGATIVE NEGATIVE    Ketones, threes and last for admissions  -Current creatinine is 2.70  -We will consult nephrology    Hyperkalemia  -Repeat with morning blood work    Rule out CHF  -Order echocardiogram  -Consult cardiology  -proBNP elevated at 13,486    Diabetes type 2  -Continue Lantus 35 units nightly and 5 units of short-acting with meals  -We will add sliding scale insulin    Consultations:   IP CONSULT TO HOSPITALIST  IP CONSULT TO IV TEAM     Patient is admitted as inpatient status because of co-morbidities listed above, severity of signs and symptoms as outlined, requirement for current medical therapies and most importantly because of direct risk to patient if care not provided in a hospital setting. Expected length of stay > 48 hours.     Nicole Jacobo MD  5/3/2021  6:42 PM    Copy sent to TERRANCE Yang - CNP

## 2021-05-03 NOTE — Clinical Note
Patient Class: Inpatient [101]   REQUIRED: Diagnosis: Hypertensive emergency [902005]   Estimated Length of Stay: Estimated stay of more than 2 midnights   Admitting Provider: Demetrius Sawant [[id number]]   Telemetry Bed Required?: Yes

## 2021-05-04 LAB
ALBUMIN SERPL-MCNC: 2.3 G/DL (ref 3.5–5.2)
ALBUMIN/GLOBULIN RATIO: 1 (ref 1–2.5)
ALP BLD-CCNC: 83 U/L (ref 40–129)
ALT SERPL-CCNC: 15 U/L (ref 5–41)
AMPHETAMINE SCREEN URINE: NEGATIVE
ANION GAP SERPL CALCULATED.3IONS-SCNC: 9 MMOL/L (ref 9–17)
AST SERPL-CCNC: 12 U/L
BARBITURATE SCREEN URINE: NEGATIVE
BENZODIAZEPINE SCREEN, URINE: NEGATIVE
BILIRUB SERPL-MCNC: <0.1 MG/DL (ref 0.3–1.2)
BUN BLDV-MCNC: 69 MG/DL (ref 8–23)
BUN/CREAT BLD: ABNORMAL (ref 9–20)
BUPRENORPHINE URINE: ABNORMAL
CALCIUM SERPL-MCNC: 7.5 MG/DL (ref 8.6–10.4)
CANNABINOID SCREEN URINE: NEGATIVE
CHLORIDE BLD-SCNC: 106 MMOL/L (ref 98–107)
CHOLESTEROL/HDL RATIO: 3.8
CHOLESTEROL: 256 MG/DL
CO2: 21 MMOL/L (ref 20–31)
COCAINE METABOLITE, URINE: POSITIVE
CREAT SERPL-MCNC: 3.12 MG/DL (ref 0.7–1.2)
GFR AFRICAN AMERICAN: 24 ML/MIN
GFR NON-AFRICAN AMERICAN: 20 ML/MIN
GFR SERPL CREATININE-BSD FRML MDRD: ABNORMAL ML/MIN/{1.73_M2}
GFR SERPL CREATININE-BSD FRML MDRD: ABNORMAL ML/MIN/{1.73_M2}
GLUCOSE BLD-MCNC: 113 MG/DL (ref 75–110)
GLUCOSE BLD-MCNC: 131 MG/DL (ref 75–110)
GLUCOSE BLD-MCNC: 146 MG/DL (ref 70–99)
GLUCOSE BLD-MCNC: 155 MG/DL (ref 75–110)
GLUCOSE BLD-MCNC: 159 MG/DL (ref 75–110)
GLUCOSE BLD-MCNC: 176 MG/DL (ref 75–110)
GLUCOSE BLD-MCNC: 238 MG/DL (ref 75–110)
HCT VFR BLD CALC: 31.4 % (ref 40.7–50.3)
HDLC SERPL-MCNC: 68 MG/DL
HEMOGLOBIN: 9.8 G/DL (ref 13–17)
LDL CHOLESTEROL: 158 MG/DL (ref 0–130)
MCH RBC QN AUTO: 31 PG (ref 25.2–33.5)
MCHC RBC AUTO-ENTMCNC: 31.2 G/DL (ref 28.4–34.8)
MCV RBC AUTO: 99.4 FL (ref 82.6–102.9)
MDMA URINE: ABNORMAL
METHADONE SCREEN, URINE: NEGATIVE
METHAMPHETAMINE, URINE: ABNORMAL
NRBC AUTOMATED: 0 PER 100 WBC
OPIATES, URINE: NEGATIVE
OXYCODONE SCREEN URINE: NEGATIVE
PDW BLD-RTO: 12.2 % (ref 11.8–14.4)
PHENCYCLIDINE, URINE: NEGATIVE
PLATELET # BLD: 322 K/UL (ref 138–453)
PMV BLD AUTO: 9.6 FL (ref 8.1–13.5)
POTASSIUM SERPL-SCNC: 4.5 MMOL/L (ref 3.7–5.3)
PROPOXYPHENE, URINE: ABNORMAL
RBC # BLD: 3.16 M/UL (ref 4.21–5.77)
SODIUM BLD-SCNC: 136 MMOL/L (ref 135–144)
TEST INFORMATION: ABNORMAL
TOTAL PROTEIN: 4.7 G/DL (ref 6.4–8.3)
TRICYCLIC ANTIDEPRESSANTS, UR: ABNORMAL
TRIGL SERPL-MCNC: 148 MG/DL
TROPONIN INTERP: ABNORMAL
TROPONIN INTERP: ABNORMAL
TROPONIN T: ABNORMAL NG/ML
TROPONIN T: ABNORMAL NG/ML
TROPONIN, HIGH SENSITIVITY: 89 NG/L (ref 0–22)
TROPONIN, HIGH SENSITIVITY: 90 NG/L (ref 0–22)
VLDLC SERPL CALC-MCNC: ABNORMAL MG/DL (ref 1–30)
WBC # BLD: 5.8 K/UL (ref 3.5–11.3)

## 2021-05-04 PROCEDURE — 6360000002 HC RX W HCPCS: Performed by: NURSE PRACTITIONER

## 2021-05-04 PROCEDURE — 2060000000 HC ICU INTERMEDIATE R&B

## 2021-05-04 PROCEDURE — 2580000003 HC RX 258: Performed by: NURSE PRACTITIONER

## 2021-05-04 PROCEDURE — 99222 1ST HOSP IP/OBS MODERATE 55: CPT | Performed by: INTERNAL MEDICINE

## 2021-05-04 PROCEDURE — 6370000000 HC RX 637 (ALT 250 FOR IP): Performed by: NURSE PRACTITIONER

## 2021-05-04 PROCEDURE — 99233 SBSQ HOSP IP/OBS HIGH 50: CPT | Performed by: INTERNAL MEDICINE

## 2021-05-04 PROCEDURE — 82947 ASSAY GLUCOSE BLOOD QUANT: CPT

## 2021-05-04 PROCEDURE — 6370000000 HC RX 637 (ALT 250 FOR IP): Performed by: INTERNAL MEDICINE

## 2021-05-04 PROCEDURE — 80061 LIPID PANEL: CPT

## 2021-05-04 PROCEDURE — 94640 AIRWAY INHALATION TREATMENT: CPT

## 2021-05-04 PROCEDURE — 85027 COMPLETE CBC AUTOMATED: CPT

## 2021-05-04 PROCEDURE — 80053 COMPREHEN METABOLIC PANEL: CPT

## 2021-05-04 PROCEDURE — 36415 COLL VENOUS BLD VENIPUNCTURE: CPT

## 2021-05-04 PROCEDURE — 84484 ASSAY OF TROPONIN QUANT: CPT

## 2021-05-04 RX ORDER — NICOTINE POLACRILEX 4 MG
15 LOZENGE BUCCAL PRN
Status: DISCONTINUED | OUTPATIENT
Start: 2021-05-04 | End: 2021-05-14 | Stop reason: HOSPADM

## 2021-05-04 RX ORDER — DEXTROSE MONOHYDRATE 25 G/50ML
12.5 INJECTION, SOLUTION INTRAVENOUS PRN
Status: DISCONTINUED | OUTPATIENT
Start: 2021-05-04 | End: 2021-05-14 | Stop reason: HOSPADM

## 2021-05-04 RX ORDER — CLONIDINE HYDROCHLORIDE 0.2 MG/1
0.2 TABLET ORAL 2 TIMES DAILY
Status: DISCONTINUED | OUTPATIENT
Start: 2021-05-04 | End: 2021-05-05

## 2021-05-04 RX ORDER — DEXTROSE MONOHYDRATE 50 MG/ML
100 INJECTION, SOLUTION INTRAVENOUS PRN
Status: DISCONTINUED | OUTPATIENT
Start: 2021-05-04 | End: 2021-05-14 | Stop reason: HOSPADM

## 2021-05-04 RX ADMIN — CLOPIDOGREL 75 MG: 75 TABLET, FILM COATED ORAL at 09:30

## 2021-05-04 RX ADMIN — HYDRALAZINE HYDROCHLORIDE 100 MG: 50 TABLET, FILM COATED ORAL at 13:32

## 2021-05-04 RX ADMIN — FLUTICASONE PROPIONATE 2 SPRAY: 50 SPRAY, METERED NASAL at 09:29

## 2021-05-04 RX ADMIN — HEPARIN SODIUM 5000 UNITS: 5000 INJECTION INTRAVENOUS; SUBCUTANEOUS at 06:56

## 2021-05-04 RX ADMIN — CARVEDILOL 37.5 MG: 25 TABLET, FILM COATED ORAL at 17:20

## 2021-05-04 RX ADMIN — HYDRALAZINE HYDROCHLORIDE 100 MG: 50 TABLET, FILM COATED ORAL at 06:56

## 2021-05-04 RX ADMIN — GABAPENTIN 200 MG: 100 CAPSULE ORAL at 20:13

## 2021-05-04 RX ADMIN — IPRATROPIUM BROMIDE AND ALBUTEROL SULFATE 1 AMPULE: .5; 2.5 SOLUTION RESPIRATORY (INHALATION) at 20:03

## 2021-05-04 RX ADMIN — PRAZOSIN HYDROCHLORIDE 4 MG: 1 CAPSULE ORAL at 00:13

## 2021-05-04 RX ADMIN — QUETIAPINE FUMARATE 100 MG: 100 TABLET ORAL at 21:49

## 2021-05-04 RX ADMIN — INSULIN GLARGINE 35 UNITS: 100 INJECTION, SOLUTION SUBCUTANEOUS at 21:50

## 2021-05-04 RX ADMIN — Medication 81 MG: at 09:29

## 2021-05-04 RX ADMIN — BUDESONIDE AND FORMOTEROL FUMARATE DIHYDRATE 2 PUFF: 160; 4.5 AEROSOL RESPIRATORY (INHALATION) at 07:19

## 2021-05-04 RX ADMIN — ATORVASTATIN CALCIUM 80 MG: 80 TABLET, FILM COATED ORAL at 09:29

## 2021-05-04 RX ADMIN — HEPARIN SODIUM 5000 UNITS: 5000 INJECTION INTRAVENOUS; SUBCUTANEOUS at 21:50

## 2021-05-04 RX ADMIN — HYDRALAZINE HYDROCHLORIDE 100 MG: 50 TABLET, FILM COATED ORAL at 21:50

## 2021-05-04 RX ADMIN — HEPARIN SODIUM 5000 UNITS: 5000 INJECTION INTRAVENOUS; SUBCUTANEOUS at 13:35

## 2021-05-04 RX ADMIN — LOSARTAN POTASSIUM 50 MG: 50 TABLET, FILM COATED ORAL at 13:30

## 2021-05-04 RX ADMIN — PRAZOSIN HYDROCHLORIDE 4 MG: 1 CAPSULE ORAL at 13:30

## 2021-05-04 RX ADMIN — CARVEDILOL 25 MG: 12.5 TABLET, FILM COATED ORAL at 09:29

## 2021-05-04 RX ADMIN — BUMETANIDE 2 MG: 1 TABLET ORAL at 13:31

## 2021-05-04 RX ADMIN — PRAZOSIN HYDROCHLORIDE 4 MG: 1 CAPSULE ORAL at 21:49

## 2021-05-04 RX ADMIN — NIFEDIPINE 30 MG: 30 TABLET, FILM COATED, EXTENDED RELEASE ORAL at 17:20

## 2021-05-04 RX ADMIN — SODIUM CHLORIDE: 9 INJECTION, SOLUTION INTRAVENOUS at 02:34

## 2021-05-04 RX ADMIN — IPRATROPIUM BROMIDE AND ALBUTEROL SULFATE 1 AMPULE: .5; 2.5 SOLUTION RESPIRATORY (INHALATION) at 13:58

## 2021-05-04 RX ADMIN — ISOSORBIDE MONONITRATE 60 MG: 60 TABLET ORAL at 13:30

## 2021-05-04 RX ADMIN — CLONIDINE HYDROCHLORIDE 0.1 MG: 0.1 TABLET ORAL at 09:30

## 2021-05-04 RX ADMIN — CLONIDINE HYDROCHLORIDE 0.2 MG: 0.2 TABLET ORAL at 20:13

## 2021-05-04 RX ADMIN — IPRATROPIUM BROMIDE AND ALBUTEROL SULFATE 1 AMPULE: .5; 2.5 SOLUTION RESPIRATORY (INHALATION) at 07:18

## 2021-05-04 RX ADMIN — BUDESONIDE AND FORMOTEROL FUMARATE DIHYDRATE 2 PUFF: 160; 4.5 AEROSOL RESPIRATORY (INHALATION) at 20:04

## 2021-05-04 RX ADMIN — GABAPENTIN 200 MG: 100 CAPSULE ORAL at 13:30

## 2021-05-04 ASSESSMENT — PAIN SCALES - GENERAL
PAINLEVEL_OUTOF10: 0

## 2021-05-04 NOTE — ED NOTES
ED to inpatient nurses report    Chief Complaint   Patient presents with    Hypertension     dizziness and HTN      Present to ED from home  LOC: alert and orientated to name, place, date  Vital signs   Vitals:    05/04/21 0531 05/04/21 0601 05/04/21 0631 05/04/21 0702   BP: (!) 140/52 (!) 134/54 (!) 144/57 (!) 178/56   Pulse: 57 59 58 65   Resp:       Temp:       TempSrc:       SpO2:       Weight:          Oxygen Baseline 2LNC    Current needs required 2LNC  LDAs:   Peripheral IV 05/03/21 Left; Anterior Forearm (Active)     Mobility: Independent  Pending ED orders: cardiology marily  Present condition: Stable  Code Status: [unfilled]   Consults:  []  Hospitalist  Completed  [] yes [] no  []  Medicine  Completed  [] yes [] No  [x]  Cardiology  Completed  [] yes [x] No  []  GI   Completed  [] yes [] No  []  Neurology  Completed  [] yes [] No  []  Nephrology Completed  [] yes [] No  []  Vascular  Completed  [] yes [] No   []  Surgery  Completed  [] yes [] No   []  Urology  Completed  [] yes [] No   []  Plastics  Completed  [] yes [] No   []  ENT  Completed  [] yes [] No   []  Other   Completed  [] yes [] No  Pertinent event(s) hypertensive emergency, SBB >200, started on cleviprex, now off cleviprex, goal SBP<200  Pertinent event(s)   Electronically signed by Santana Lobo RN on 5/4/2021 at 7:29 AM       Santana Lobo RN  05/04/21 0753

## 2021-05-04 NOTE — PROGRESS NOTES
[facility]  Office: [address], DO, Angel Luis Pink, DO, Venus Manning, DO, Fazal Rhoades Blood, DO, Cristina Thpaa MD, Doug Trejo MD, Fadia White MD, Dot Gutierres MD, Bridget Arce MD, Toñito Rey MD, Roman Mathew MD, Roberto Conteh MD, Bro Verdin, DO, Krystin Bustos MD, Kia Hull, DO, Cordelia Barthel, MD,  Hilario Jin, DO, Paul Thakkar MD, Filippo Salgado MD, Shekhar Yeh MD, Nelli Curran MD, Angie Kay, [facility], [facility], CNP, Bud Gonzalez, CNP, Nuria Begum, CNS, Viviana Tinsley, CNP, Giancarlo Christy, CNP, Shira Armijo, CNP, Jayne Holocmb, CNP, Divya Pena, CNP, ANCA RaviC, Kurt Will, [facility], Lacho Burger, CNP, Sina Desouza, CNP, Anirudh Gomez, CNP, Mayela Ashford, CNP, Sam Haro, CNP, [facility], [address] [place] [address]    Progress Note    5/4/2021    11:26 AM    Name:   Goldy Burgess  MRN:     [id number]     Acct:      [de-identified]   Room:   2022/2022-01   Day:  1  Admit Date:  5/3/2021  1:18 PM    PCP:   TERRANCE Levine CNP  Code Status:  Full Code    Subjective:     C/C:   Chief Complaint   Patient presents with    Hypertension     dizziness and HTN     Interval History Status: not changed. Patient was seen and evaluated at bedside this morning. Patient reports she still having headaches. Patient also wanted to change his diet to carb control as he takes at home. Brief History: This is a 71-year-old male with past medical history of asthma, CAD, CVA, CKD, diabetes, hypertension who is coming in with complaints of uncontrolled hypertension and headache. Patient reports that he usually has trouble controlling his blood pressure even after being on multiple medications however this morning it was extra high with systolic being around 844. Patient was also complaining of headache.   Patient tried taking Tylenol for the headache which did not seem to help. Currently denies any dizziness, acute changes with his vision or hearing, nausea, vomiting, chest pain. Patient did complain of having heart palpitations. Patient denies any shortness of breath, abdominal pain, constipation, diarrhea or any trouble with his urination.       Review of Systems:     Constitutional:  negative for chills, fevers, sweats  Respiratory:  negative for cough, dyspnea on exertion, shortness of breath, wheezing  Cardiovascular:  negative for chest pain, chest pressure/discomfort, lower extremity edema, palpitations  Gastrointestinal:  negative for abdominal pain, constipation, diarrhea, nausea, vomiting  Neurological:  negative for dizziness, headache    Medications: Allergies:     Allergies   Allergen Reactions    Lisinopril      cough    Ace Inhibitors      coughing    Pcn [Penicillins]        Current Meds:   Scheduled Meds:    aspirin  81 mg Oral Daily    atorvastatin  80 mg Oral Daily    insulin glargine  35 Units Subcutaneous Nightly    bumetanide  2 mg Oral Daily    carvedilol  25 mg Oral BID WC    cloNIDine  0.1 mg Oral BID    clopidogrel  75 mg Oral Daily    fluticasone  2 spray Each Nostril Daily    gabapentin  200 mg Oral BID    hydrALAZINE  100 mg Oral 3 times per day    insulin lispro  5 Units Subcutaneous TID     isosorbide mononitrate  60 mg Oral Daily    losartan  50 mg Oral BID    NIFEdipine  30 mg Oral Daily    prazosin  4 mg Oral BID    QUEtiapine  100 mg Oral Nightly    sodium chloride flush  5-40 mL Intravenous 2 times per day    heparin (porcine)  5,000 Units Subcutaneous 3 times per day    budesonide-formoterol  2 puff Inhalation BID    insulin lispro  0-6 Units Subcutaneous TID     insulin lispro  0-3 Units Subcutaneous Nightly    ipratropium-albuterol  1 ampule Inhalation Q4H While awake     Continuous Infusions:    clevidipine Stopped (05/04/21 0222)    sodium chloride 50 mL/hr at 05/04/21 0234    sodium chloride PRN Meds: albuterol, ipratropium-albuterol, sodium chloride flush, sodium chloride, nicotine, promethazine **OR** ondansetron, acetaminophen **OR** acetaminophen, polyethylene glycol    Data:     Past Medical History:   has a past medical history of Asthma, CAD in native artery, nonobstructive, Carotid stenosis, left, Carpal tunnel syndrome, Cerebrovascular disease, Chronic kidney disease, COPD (chronic obstructive pulmonary disease) ([address] Diabetes mellitus ([facility] History of colon polyps, History of weakness of extremity, HTN (hypertension), Hyperlipidemia, Lung, cysts, congenital, PTSD (post-traumatic stress disorder), PTSD (post-traumatic stress disorder), TIA (transient ischemic attack), and Type II or unspecified type diabetes mellitus without mention of complication, not stated as uncontrolled. Social History:   reports that he quit smoking about 6 years ago. His smoking use included cigarettes. He has a 17.50 pack-year smoking history. He has never used smokeless tobacco. He reports that he does not drink alcohol or use drugs. Family History:   Family History   Problem Relation Age of Onset    Cancer Mother     Heart Disease Father        Vitals:  BP (!) 146/52   Pulse 70   Temp 98 °F (36.7 °C) (Oral)   Resp 20   Ht 5' 6\" (1.676 m)   Wt 127 lb 14.4 oz (58 kg)   SpO2 100%   BMI 20.64 kg/m²   Temp (24hrs), Av.2 °F (36.8 °C), Min:98 °F (36.7 °C), Max:98.3 °F (36.8 °C)    Recent Labs     218 21  2138 21  0222 21  0945   POCGLU 333* 435* 113* 131*       I/O (24Hr):   No intake or output data in the 24 hours ending 21 1126    Labs:  Hematology:  Recent Labs     21  1338 21  1505 21  0518   WBC 5.0  --  5.8   RBC 3.87*  --  3.16*   HGB 11.9*  --  9.8*   HCT 35.9*  --  31.4*   MCV 92.8  --  99.4   MCH 30.7  --  31.0   MCHC 33.1  --  31.2   RDW 12.4  --  12.2   PLT See Reflexed IPF Result  --  322   MPV NOT REPORTED  --  9.6   INR  --  0.9  -- Chemistry:  Recent Labs     05/03/21  1338 05/03/21  1505 05/03/21  1726 05/04/21  0023 05/04/21  0518     --   --   --  136   K 5.5*  --   --   --  4.5     --   --   --  106   CO2 19*  --   --   --  21   GLUCOSE 174*  --   --   --  146*   BUN 73*  --   --   --  69*   CREATININE 2.70*  --   --   --  3.12*   ANIONGAP 10  --   --   --  9   LABGLOM 24*  --   --   --  20*   GFRAA 29*  --   --   --  24*   CALCIUM 8.6  --   --   --  7.5*   PROBNP  --  13,486*  --   --   --    TROPHS 96*  --  80* 90* 89*     Recent Labs     05/03/21  1338 05/03/21  1757 05/03/21  1955 05/03/21  2038 05/03/21  2138 05/04/21  0222 05/04/21  0518 05/04/21  0945   PROT 5.8*  --   --   --   --   --  4.7*  --    LABALBU 3.1*  --   --   --   --   --  2.3*  --    AST 22  --   --   --   --   --  12  --    ALT 21  --   --   --   --   --  15  --    ALKPHOS 104  --   --   --   --   --  83  --    BILITOT <0.10*  --   --   --   --   --  <0.10*  --    CHOL  --   --   --   --   --   --  256*  --    HDL  --   --   --   --   --   --  68  --    LDLCHOLESTEROL  --   --   --   --   --   --  158*  --    CHOLHDLRATIO  --   --   --   --   --   --  3.8  --    TRIG  --   --   --   --   --   --  148  --    VLDL  --   --   --   --   --   --  NOT REPORTED  --    POCGLU  --  393* 324* 333* 435* 113*  --  131*     ABG:  Lab Results   Component Value Date    POCPH 7.39 06/17/2013    POCPCO2 46 06/17/2013    POCPO2 288 06/17/2013    POCHCO3 27.8 06/17/2013    NBEA NOT REPORTED 06/17/2013    PBEA 3 06/17/2013    CHN4BPZ 29 06/17/2013    SBQF4WWO 100 06/17/2013    FIO2 NOT REPORTED 12/03/2020     Lab Results   Component Value Date/Time    SPECIAL 2ML L FR 12/03/2020 11:24 AM     Lab Results   Component Value Date/Time    CULTURE NO GROWTH 6 DAYS 12/03/2020 11:24 AM       Radiology:  Xr Chest Portable    Result Date: 5/3/2021  1. No acute abnormality.        Physical Examination:        General appearance:  alert, cooperative and no distress  Mental Status: oriented to person, place and time and normal affect  Lungs:  clear to auscultation bilaterally, normal effort  Heart:  regular rate and rhythm, no murmur  Abdomen:  soft, nontender, nondistended, normal bowel sounds, no masses, hepatomegaly, splenomegaly  Extremities:  no edema, redness, tenderness in the calves  Skin:  no gross lesions, rashes, induration    Assessment:        Hospital Problems           Last Modified POA    Hypertensive emergency 5/3/2021 Yes          Plan:        Patient status inpatient in the Progressive Unit/Step down     Hypertensive emergency  -Initial blood pressure was 229/79 upon admission.  -Patient on Bumex, Coreg, clonidine, hydralazine, Imdur, losartan, nifedipine, prazosin  -Clevidipine drip turned off  -Cardiology nephrology on board      Non-STEMI  -Patient not complaining of any chest pain  -Troponin trending down  -Awaiting echocardiogram     CKD  -Creatinine trended up this morning  -Nephrology on board     Hyperkalemia  -Resolved     Rule out CHF  -Order echocardiogram  -Consult cardiology  -proBNP elevated at 13,486     Diabetes type 2  -Continue Lantus 35 units nightly and 5 units of short-acting with meals  -We will add sliding scale insulin     Rule out AAA  -Abdominal ultrasound ordered    Fanta Charles MD  5/4/2021  11:26 AM

## 2021-05-04 NOTE — PROGRESS NOTES
[facility]  Documentation Note                Admission Dx: Hypertensive emergency [I16.1]    Past Medical History:   has a past medical history of Asthma, CAD in native artery, nonobstructive, Carotid stenosis, left, Carpal tunnel syndrome, Cerebrovascular disease, Chronic kidney disease, COPD (chronic obstructive pulmonary disease) ([facility] Diabetes mellitus ([facility] History of colon polyps, History of weakness of extremity, HTN (hypertension), Hyperlipidemia, Lung, cysts, congenital, PTSD (post-traumatic stress disorder), PTSD (post-traumatic stress disorder), TIA (transient ischemic attack), and Type II or unspecified type diabetes mellitus without mention of complication, not stated as uncontrolled. Previous Testing:     CATH 8/12/2020: Nonobstructive CAD. LV was not done. No change from cath in 2017. ECHO 5/16/2020: EF 73%, moderate-severe LVH, mild MR/TR, RVSP is 37 mmHg.      STRESS 1/7/2020: No ischemia/infarct. EF 49%.      Previous office/hospital visit:   SERGIO Abreu NP 8/13/2020:   1. Recurrent chest pain  2. Mild troponin elevation -demand ischemia from CKD  3. H/o nonobstructive CAD on cardiac cath done in 2017 and now 8/2020  4. UDS positive for cocaine -however, patient denies intake   5. H/o left carotid disease s/p surgery and stent  6. CKD stage III  7. Diabetes  8. Hypertension    Plan --   1. NSTEMI with Recurrent Angina. Cath as above -Continue medical management and RF modifications. Discussed importance of substance abuse cessation. 2. HTN- Remains elevated. Will increase Imdur. Continue BB, CCB, ARB, & hydralazine. 3. Will sign off. Please call with further questions/concerns. F/U in clinic 1-2wks.     Nicole Miller [facility]

## 2021-05-04 NOTE — ED NOTES
Report received from YIN Erwin  Pt. Resting on stretcher, eyes open, RR even and non-labored  Pt.  Updated on POC  Will continue to monitor   RT David at bedside   Pt .requesting meal trini Moore RN  05/04/21 2188

## 2021-05-04 NOTE — ED NOTES
Pt states he will not take more than 4 units of insulin \"or it will bottom him out\". [employer] sent to admitting team, as all orders for insulin he is refusing. Will continue to monitor.       Naresh Hayden RN  05/03/21 2006

## 2021-05-04 NOTE — PLAN OF CARE
PROVIDE ADEQUATE OXYGENATION WITH ACCEPTABLE SP02/ABG'S    [x]  IDENTIFY APPROPRIATE OXYGEN THERAPY  [x]   MONITOR SP02/ABG'S AS NEEDED   [x]   PATIENT EDUCATION AS NEEDED   BRONCHOSPASM/BRONCHOCONSTRICTION     [x]         IMPROVE AERATION/BREATH SOUNDS  [x]   ADMINISTER BRONCHODILATOR THERAPY AS APPROPRIATE  [x]   ASSESS BREATH SOUNDS  []   IMPLEMENT AEROSOL/MDI PROTOCOL  [x]   PATIENT EDUCATION AS NEEDED

## 2021-05-04 NOTE — ED NOTES
Pt continues to take himself off of monitor. Pt is educated that he is on a drip to control his blood pressure and absolutely cannot be off of monitor for any reason. Pt placed back on full monitor. Pt is given meal tray. No other complaints @ this time. Pt blood sugar checked and at 324.       Bryant Coronado RN  05/03/21 2003

## 2021-05-04 NOTE — CONSULTS
Renal Consult Note    Patient :  Annita Saenz; 73 y.o. MRN# [id number]  Location:  2022/2022-01  Attending:  Eliott Spatz, MD  Admit Date:  5/3/2021   Hospital Day: 1    Reason for Consult:     Asked by Dr Eliott Spatz, MD to see for BINU/Elevated Creatinine. History Obtained From:     patient, patient's nurse    History of Present Illness:     Annita Saenz; 73 y.o. male with past medical history of CKD 4 and follows up with Dr. Kwadwo Brunner with baseline creatinine seems to be around 2 to 2.4 mg/DL, nephrotic range proteinuria, hypertension, history of ischemic left CVA, hyperlipidemia, history of carotid stenosis status post enterectomy, diabetes type 1, smoking history, asthma, noncompliance, peripheral vascular disease presented to the hospital with symptoms of uncontrolled hypertension, headache. His blood pressure admission was 229/79, heart rate 64. Chest x-ray was done which showed no acute abnormalities 5/3/2021. His creatinine admission was 2.70 mg/DL on 5/3/2021 which increased to 3.12 mg/DL today. Patient was last admitted in December 2020 and his creatinine on discharge was 3.22 on 12/6/2020. No other creatinine values are available in the middle. Other lab work showed potassium of 5.5 which improved to 4.5 today. Initial bicarb was 19, sodium 135, chloride 106, BUN 73, calcium 8.6. UA was done which shows 4+ protein, 2-5 WBCs, negative leuk esterase, negative nitrates, RBC 0-2. Patient had required initiation of clevidipine drip. Patient has been transitioned to oral medications and blood pressure seems to be responding. He does take losartan 50 mg twice a day at home as well. Nephrology is consulted due to acute kidney injury and hypertensive emergency. Past History/Allergies? Social History:     Past Medical History:   Diagnosis Date    Asthma     mod persistent    CAD in native artery, nonobstructive     Carotid stenosis, left 6/10/2013    Carpal tunnel syndrome     RIGHT    Cerebrovascular disease 2018    Chronic kidney disease 6/10/2013    COPD (chronic obstructive pulmonary disease) (HCC)     Diabetes mellitus (HCC)     insulin dependent    History of colon polyps     History of weakness of extremity     right sided    HTN (hypertension)     Hyperlipidemia     Lung, cysts, congenital     PTSD (post-traumatic stress disorder)     PTSD (post-traumatic stress disorder)     TIA (transient ischemic attack)     Type II or unspecified type diabetes mellitus without mention of complication, not stated as uncontrolled        Past Surgical History:   Procedure Laterality Date    CAROTID ENDARTERECTOMY Left     COLONOSCOPY      HERNIA REPAIR      MT COLSC FLX W/RMVL OF TUMOR POLYP LESION SNARE TQ N/A 2017    COLONOSCOPY POLYPECTOMY SNARE/ hot performed by Lupe Lewis DO at [facility] VASCULAR SURGERY Left     carotid stent, dr Amber Del Angel        Allergies   Allergen Reactions    Lisinopril      cough    Ace Inhibitors      coughing    Pcn [Penicillins]        Social History     Socioeconomic History    Marital status:      Spouse name: Not on file    Number of children: Not on file    Years of education: Not on file    Highest education level: Not on file   Occupational History     Employer: N/A   Social Needs    Financial resource strain: Not on file    Food insecurity     Worry: Not on file     Inability: Not on file   [employer] needs     Medical: Not on file     Non-medical: Not on file   Tobacco Use    Smoking status: Former Smoker     Packs/day: 0.50     Years: 35.00     Pack years: 17.50     Types: Cigarettes     Quit date: 2014     Years since quittin.8    Smokeless tobacco: Never Used   Substance and Sexual Activity    Alcohol use: No     Alcohol/week: 0.0 standard drinks    Drug use: No    Sexual activity: Yes     Partners: Female   Lifestyle    Physical activity     Days per week: Not on file     Minutes per session: Not on file    Stress: Not on file   Relationships    Social connections     Talks on phone: Not on file     Gets together: Not on file     Attends [employer] service: Not on file     Active member of club or organization: Not on file     Attends meetings of clubs or organizations: Not on file     Relationship status: Not on file    Intimate partner violence     Fear of current or ex partner: Not on file     Emotionally abused: Not on file     Physically abused: Not on file     Forced sexual activity: Not on file   Other Topics Concern    Not on file   Social History Narrative    Not on file       Family History:        Family History   Problem Relation Age of Onset    Cancer Mother     Heart Disease Father        Outpatient Medications:     Medications Prior to Admission: cloNIDine (CATAPRES) 0.1 MG tablet, Take 1 tablet by mouth 2 times daily  BASAGLAR KWIKPEN 100 UNIT/ML injection pen, INJECT 35 UNITS INTO THE SKIN NIGHTLY  nitroGLYCERIN (NITROSTAT) 0.4 MG SL tablet, USE 1 TABLET UNDER THE TONGUE UP TO MAXIMUM OF 3 TOTAL DOSES.  IF NO RELIEF AFTER 1 DOSE, CALL 911.  gabapentin (NEURONTIN) 100 MG capsule, TAKE 2 CAPSULES BY MOUTH 3 TIMES DAILY  QUEtiapine (SEROQUEL) 100 MG tablet, TAKE 1 TABLET BY MOUTH NIGHTLY  losartan (COZAAR) 50 MG tablet, TAKE 1 TABLET BY MOUTH 2 TIMES DAILY  GNP VITAMIN D MAXIMUM STRENGTH 50 MCG (2000 UT) TABS, TAKE 1 TABLET BY MOUTH ONCE DAILY  bumetanide (BUMEX) 2 MG tablet, TAKE 1 TABLET BY MOUTH 2 TIMES DAILY  TRELEGY ELLIPTA 100-62.5-25 MCG/INH AEPB, INHALE ONE PUFF INTO THE LUNGS DAILY  prazosin (MINIPRESS) 2 MG capsule, 2 CAPSULES BY MOUTH (4MG) TWICE DAILY  COMBIVENT RESPIMAT  MCG/ACT AERS inhaler, INHALE 1 PUFF INTO THE LUNGS EVERY 6 HOURS  hydrALAZINE (APRESOLINE) 100 MG tablet, Take 1 tablet by mouth every 8 hours  aspirin (ASPIR-LOW) 81 MG EC tablet, TAKE 1 TABLET BY MOUTH DAILY  NIFEdipine (ADALAT CC) 30 MG extended release tablet, Take 1 tablet by mouth daily Takes 30mg 3 times daily  carvedilol (COREG) 25 MG tablet, Take 1 tablet by mouth 2 times daily (with meals)  atorvastatin (LIPITOR) 80 MG tablet, Take 1 tablet by mouth daily  clopidogrel (PLAVIX) 75 MG tablet, Take 1 tablet by mouth daily  isosorbide mononitrate (IMDUR) 60 MG extended release tablet, Take 1 tablet by mouth daily  vitamin D (ERGOCALCIFEROL) 1.25 MG (91970 UT) CAPS capsule, Take 1 capsule by mouth once a week  ipratropium-albuterol (DUONEB) 0.5-2.5 (3) MG/3ML SOLN nebulizer solution, Inhale 3 mLs into the lungs every 6 hours as needed for Shortness of Breath  Nutritional Supplements (GLUCERNA CARBSTEADY) LIQD, Take 1 Can by mouth 3 times daily  glucagon 1 MG injection, Inject 1 mg into the muscle See Admin Instructions Follow package directions for low blood sugar. fluticasone (FLONASE) 50 MCG/ACT nasal spray,   insulin aspart (NOVOLOG FLEXPEN) 100 UNIT/ML injection pen, Inject 5 Units into the skin 3 times daily (before meals) Sliding scale  blood glucose monitor strips, Test_4__times daily Diagnosis: 250.0   Diabetes Mellitus_x___Insulin Dependent____Non-Insulin Dependent  blood glucose monitor kit and supplies, Dispense sufficient amount for indicated testing frequency plus additional to accommodate PRN testing needs. Dispense all needed supplies to include: monitor, strips, lancing device, lancets, control solutions, alcohol swabs.   Lancets MISC, Use_4__times daily Diagnisis:250.0  Diabetes Mellitus__x__Insulin Dependent___Non-Insulin Dependent  COMFORT EZ PEN NEEDLES 31G X 8 MM MISC, USE AS DIRECTED  TRUE METRIX BLOOD GLUCOSE TEST strip, TEST BLOOD SUGAR 4 TO 5 TIMES DAILY (Patient not taking: Reported on 4/6/2021)    Current Medications:     Scheduled Meds:    cloNIDine  0.2 mg Oral BID    carvedilol  37.5 mg Oral BID WC    aspirin  81 mg Oral Daily    atorvastatin  80 mg Oral Daily    insulin glargine  35 Units Subcutaneous Nightly    bumetanide  2 mg Oral Daily  clopidogrel  75 mg Oral Daily    fluticasone  2 spray Each Nostril Daily    gabapentin  200 mg Oral BID    hydrALAZINE  100 mg Oral 3 times per day    insulin lispro  5 Units Subcutaneous TID     isosorbide mononitrate  60 mg Oral Daily    losartan  50 mg Oral BID    NIFEdipine  30 mg Oral Daily    prazosin  4 mg Oral BID    QUEtiapine  100 mg Oral Nightly    sodium chloride flush  5-40 mL Intravenous 2 times per day    heparin (porcine)  5,000 Units Subcutaneous 3 times per day    budesonide-formoterol  2 puff Inhalation BID    insulin lispro  0-6 Units Subcutaneous TID     insulin lispro  0-3 Units Subcutaneous Nightly    ipratropium-albuterol  1 ampule Inhalation Q4H While awake     Continuous Infusions:    dextrose      clevidipine Stopped (21)    sodium chloride 50 mL/hr at 21 0234    sodium chloride       PRN Meds:  glucose, dextrose, glucagon (rDNA), dextrose, albuterol, ipratropium-albuterol, sodium chloride flush, sodium chloride, nicotine, promethazine **OR** ondansetron, acetaminophen **OR** acetaminophen, polyethylene glycol    Review of Systems:     Constitutional: No fever, no chills, no lethargy, no weakness. HEENT:  Positive headache, no otalgia, itchy eyes, nasal discharge or sore throat. Cardiac:  No chest pain, dyspnea, orthopnea or PND. Chest:   No cough, phlegm or wheezing. Abdomen:  No abdominal pain, nausea or vomiting. Neuro:  No focal weakness, abnormal movements or seizure like activity. Skin:   No rashes, no itching. :   No hematuria, no pyuria, no dysuria, no flank pain. Extremities:  No swelling or joint pains. ROS was otherwise negative except as mentioned in the [address] Input/Output:       No intake/output data recorded.     Vital Signs:   Temperature:  Temp: 98.8 °F (37.1 °C)  TMax:   Temp (24hrs), Av.4 °F (36.9 °C), Min:98 °F (36.7 °C), Max:98.8 °F (37.1 °C)    Respirations:  Resp: 20  Pulse:   Pulse: 56  BP:    BP: (!) 133/46  BP Range: Systolic (19GVN), ZEO:351 , Min:125 , WOS:213       Diastolic (25ERE), ZFI:84, Min:46, Max:97      Physical Examination:     General:  AAO x 3, speaking in full sentences, no accessory muscle use. HEENT: Atraumatic, normocephalic, no throat congestion, moist mucosa. Eyes:   Pupils equal, round and reactive to light, EOMI. Neck:   Supple  Chest:   Bilateral vesicular breath sounds, no rales or wheezes. Cardiac:  S1 S2 RR, no murmurs, gallops or rubs. Abdomen: Soft, non-tender, no masses or organomegaly, BS audible. :   No suprapubic or flank tenderness. Neuro:  AAO x 3, No FND. SKIN:  No rashes, good skin turgor. Extremities:  No edema. Labs:       Recent Labs     05/03/21  1338 05/04/21 0518   WBC 5.0 5.8   RBC 3.87* 3.16*   HGB 11.9* 9.8*   HCT 35.9* 31.4*   MCV 92.8 99.4   MCH 30.7 31.0   MCHC 33.1 31.2   RDW 12.4 12.2   PLT See Reflexed IPF Result 322   MPV NOT REPORTED 9.6      BMP:   Recent Labs     05/03/21  1338 05/04/21  0518    136   K 5.5* 4.5    106   CO2 19* 21   BUN 73* 69*   CREATININE 2.70* 3.12*   GLUCOSE 174* 146*   CALCIUM 8.6 7.5*      Albumin:    Recent Labs     05/03/21  1338 05/04/21  0518   LABALBU 3.1* 2.3*     BNP:      Lab Results   Component Value Date    BNP 84 11/27/2013     RIZWAN:      Lab Results   Component Value Date    RIZWAN NEGATIVE 12/04/2020     SPEP:  Lab Results   Component Value Date    PROT 4.7 05/04/2021    PROT 6.2 11/12/2011    ALBCAL 2.3 12/04/2020    ALBPCT 55 12/04/2020    LABALPH 0.1 12/04/2020    LABALPH 0.7 12/04/2020    A1PCT 3 12/04/2020    A2PCT 17 12/04/2020    LABBETA 0.5 12/04/2020    BETAPCT 13 12/04/2020    GAMGLOB 0.5 12/04/2020    GGPCT 12 12/04/2020    PATH ELECTRONICALLY SIGNED. Myrna Barlow M.D. 12/04/2020    PATH ELECTRONICALLY SIGNED. Myrna Barlow M.D. 12/04/2020     UPEP:     Lab Results   Component Value Date    LABPE  11/12/2011     ELEVATED PROTEIN CONCENTRATION.   MOST SERUM PROTEINS ARE DETECTED IN THIS URINE. C3:     Lab Results   Component Value Date    C3 89 12/04/2020     C4:     Lab Results   Component Value Date    C4 17 12/04/2020     Hep BsAg:         Lab Results   Component Value Date    HEPBSAG NONREACTIVE 12/04/2020     Hep C AB:          Lab Results   Component Value Date    HEPCAB NONREACTIVE 12/04/2020       Urinalysis/Chemistries:      Lab Results   Component Value Date    NITRU NEGATIVE 05/03/2021    COLORU YELLOW 05/03/2021    PHUR 6.0 05/03/2021    WBCUA 2 TO 5 05/03/2021    RBCUA 0 TO 2 05/03/2021    MUCUS NOT REPORTED 05/03/2021    TRICHOMONAS NOT REPORTED 05/03/2021    YEAST NOT REPORTED 05/03/2021    BACTERIA NOT REPORTED 05/03/2021    CLARITYU Clear 09/12/2014    SPECGRAV 1.016 05/03/2021    LEUKOCYTESUR NEGATIVE 05/03/2021    UROBILINOGEN Normal 05/03/2021    BILIRUBINUR NEGATIVE 05/03/2021    BILIRUBINUR NEGATIVE 11/12/2011    BLOODU Negative 09/12/2014    GLUCOSEU 1+ 05/03/2021    GLUCOSEU 3+ 11/12/2011    KETUA NEGATIVE 05/03/2021    AMORPHOUS NOT REPORTED 05/03/2021     Urine Sodium:     Lab Results   Component Value Date    IGNACIO 33 12/04/2020     Urine Potassium:    Lab Results   Component Value Date    KUR 38.1 12/04/2020     Urine Chloride:    Lab Results   Component Value Date    CLUR 30 12/04/2020     Urine Osmolarity:   Lab Results   Component Value Date    OSMOU 463 12/04/2020     Urine Protein:     Lab Results   Component Value Date     11/12/2011     Urine Creatinine:     Lab Results   Component Value Date    LABCREA 122.2 12/04/2020     Radiology:     Reviewed. Assessment:     1. Acute Kidney Injury versus progression of his underlying renal disease, could have been affected by hemodynamic effects of hypertension and further complicated by ARB usage at home. Baseline creatinine previously was around 2 to 2.4 mg/DL but in December 2020 he was in mid 3's.  2.  Hypertensive emergency. 3.  Hyperkalemia-improved. 4.  Type 1 diabetes. 5.  Hypertension.   6.

## 2021-05-04 NOTE — PLAN OF CARE
Problem: Falls - Risk of:  Goal: Will remain free from falls  Description: Will remain free from falls  Outcome: Ongoing  Goal: Absence of physical injury  Description: Absence of physical injury  Outcome: Ongoing     Problem:  Activity:  Goal: Ability to tolerate increased activity will improve  Description: Ability to tolerate increased activity will improve  Outcome: Ongoing     Problem: Gas Exchange - Impaired:  Goal: Levels of oxygenation will improve  Description: Levels of oxygenation will improve  Outcome: Ongoing

## 2021-05-04 NOTE — CARE COORDINATION
Case Management Initial Discharge Plan  Goldy Burgess,             Met with:patient to discuss discharge plans. Information verified: address, contacts, phone number, , insurance Yes    Emergency Contact/Next of Kin name & number: Elizabeth Mayers [phone]    PCP: TERRANCE Levine CNP  Date of last visit: 2021    Insurance Provider: Jose Alberto Segura    Discharge Planning    Living Arrangements:  Spouse/Significant Other, Children   Support Systems:  Spouse/Significant Other, Children    Home-lower duplex  4 stairs to climb to get into front door    Patient able to perform ADL's:Independent    Current Services (outpatient & in home) none  DME equipment: none  DME provider: n/a    Receiving oral anticoagulation therapy? No          Potential Assistance Needed:  N/A    Patient agreeable to home care: No  [place] of choice provided:  n/a    Prior SNF/Rehab Placement and Facility: N/A  Agreeable to SNF/Rehab: No  [place] of choice provided: n/a     Evaluation: n/a    Expected Discharge date:  21    Patient expects to be discharged to:  home  Follow Up Appointment: Best Day/ Time: Monday AM    Transportation provider: medical cab  Transportation arrangements needed for discharge: Yes    Readmission Risk              Risk of Unplanned Readmission:        41             Does patient have a readmission risk score greater than 14?: No  If yes, follow-up appointment must be made within 7 days of discharge.      Goals of Care: b/p under control      Discharge Plan: home with bryson and daughter    Pharmacy-[facility]          Electronically signed by Audie Galeazzi, RN on 21 at 9:13 AM EDT

## 2021-05-04 NOTE — ED NOTES
The following labs labeled with pt sticker and tubed:     [x] Lavender   [] on ice   [] Blue   [x] Green/yellow  [] Green/black [] on ice  [] Pink  [] Red  [] Yellow     [] COVID-19 swab    [] Rapid     [] Urine Sample  [] Pelvic Cultures    [] Blood Cultures          Jose Mack RN  05/04/21 0034       Jose Mack RN  05/04/21 [facility]7 [facility] Cant[facility] RN  05/04/21 0051

## 2021-05-04 NOTE — CONSULTS
[facility]    Consult / H&P               Today's Date: 5/4/2021  Patient Name: Zina Harris  Date of admission: 5/3/2021  1:18 PM  Patient's age: 73 y. o., 1955  Admission Dx: Hypertensive emergency [I16.1]    Reason for Consult:  Cardiac evaluation    Requesting Physician: Katya Sotomayor MD    CHIEF COMPLAINT: Hypertensive emergency    History Obtained From:  patient, electronic medical record    HISTORY OF PRESENT ILLNESS:      The patient is a 73 y.o.  male with past medical history of hypertension, CAD, CKD, diabetes, COPD who came into the ER with a chief complaint of uncontrolled blood pressure associated with severe headache and blurred vision. He was complaining of headache he checked his blood pressure it was 240/120 after which he called EMS. Patient denied any chest pain, palpitation, orthopnea, PND. Patient did endorse shortness of breath but it is baseline for him because of his COPD. Cardiology was consulted because of elevated troponin with a flat trend 96 >80 >89. proBNP 47052 up from 4000 on 8/10/2020. EKG was done showed normal sinus rhythm with right axis deviation and T wave inversion in lateral leads. Patient had a cardiac cath done in 2015, in 2017 and 2020 all showed nonobstructive CAD with preserved LV function. Patient last echo was done on 5/15/2020 which showed hyperdynamic systolic function with EF 73%, moderate to severe LVH, dilated left atrium mild MR and TR.     Past Medical History:   has a past medical history of Asthma, CAD in native artery, nonobstructive, Carotid stenosis, left, Carpal tunnel syndrome, Cerebrovascular disease, Chronic kidney disease, COPD (chronic obstructive pulmonary disease) ([address] Diabetes mellitus ([address] History of colon polyps, History of weakness of extremity, HTN (hypertension), Hyperlipidemia, Lung, cysts, congenital, PTSD (post-traumatic stress disorder), PTSD (post-traumatic stress disorder), TIA (transient ischemic attack), and Type II or unspecified type diabetes mellitus without mention of complication, not stated as uncontrolled. Past Surgical History:   has a past surgical history that includes hernia repair; Tonsillectomy; Colonoscopy; Carotid endarterectomy (Left, 7-2013); vascular surgery (Left, 2015); and pr colsc flx w/rmvl of tumor polyp lesion snare tq (N/A, 6/27/2017). Home Medications:    Prior to Admission medications    Medication Sig Start Date End Date Taking? Authorizing Provider   cloNIDine (CATAPRES) 0.1 MG tablet Take 1 tablet by mouth 2 times daily 4/6/21   TERRANCE Topete CNP   glucagon 1 MG injection Inject 1 mg into the muscle See Admin Instructions Follow package directions for low blood sugar. 4/6/21   TERRANCE Topete CNP   fluticasone [facility]) 50 MCG/ACT nasal spray  2/23/21   Historical Provider, MD   insulin aspart (NOVOLOG FLEXPEN) 100 UNIT/ML injection pen Inject 5 Units into the skin 3 times daily (before meals) Sliding scale 3/16/21   TERRANCE Topete CNP   BASAGLAR KWIKPEN 100 UNIT/ML injection pen INJECT 35 UNITS INTO THE SKIN NIGHTLY 3/5/21   TERRANCE Topete CNP   nitroGLYCERIN (NITROSTAT) 0.4 MG SL tablet USE 1 TABLET UNDER THE TONGUE UP TO MAXIMUM OF 3 TOTAL DOSES.  IF NO RELIEF AFTER 1 DOSE, CALL 911. 3/5/21   TERRANCE Topete CNP   gabapentin (NEURONTIN) 100 MG capsule TAKE 2 CAPSULES BY MOUTH 3 TIMES DAILY 2/23/21 5/24/21  TERRANCE Topete CNP   QUEtiapine (SEROQUEL) 100 MG tablet TAKE 1 TABLET BY MOUTH NIGHTLY 2/23/21   TERRANCE Topete CNP   losartan (COZAAR) 50 MG tablet TAKE 1 TABLET BY MOUTH 2 TIMES DAILY 2/23/21   TERRANCE Topete CNP   GNP VITAMIN D MAXIMUM STRENGTH 50 MCG (2000 UT) TABS TAKE 1 TABLET BY MOUTH ONCE DAILY 2/23/21   TERRANCE Topete CNP   bumetanide (BUMEX) 2 MG tablet TAKE 1 TABLET BY MOUTH 2 TIMES DAILY 2/23/21   TERRANCE Topete CNP   Fransisca Soles 100-62.5-25 MCG/INH AEPB INHALE ONE PUFF INTO THE LUNGS DAILY 1/27/21   TERRANCE Ross CNP   blood glucose monitor strips Test_4__times daily Diagnosis: 250.0   Diabetes Mellitus_x___Insulin Dependent____Non-Insulin Dependent 1/26/21   TERRANCE Ross CNP   blood glucose monitor kit and supplies Dispense sufficient amount for indicated testing frequency plus additional to accommodate PRN testing needs. Dispense all needed supplies to include: monitor, strips, lancing device, lancets, control solutions, alcohol swabs.  1/26/21   TERRANCE Ross CNP   prazosin (MINIPRESS) 2 MG capsule 2 CAPSULES BY MOUTH (4MG) TWICE DAILY 1/25/21   TERRANCE Ross CNP   COMBIVENT RESPIMAT  MCG/ACT AERS inhaler INHALE 1 PUFF INTO THE LUNGS EVERY 6 HOURS 1/25/21   TERRANCE Ross CNP   hydrALAZINE (APRESOLINE) 100 MG tablet Take 1 tablet by mouth every 8 hours 1/25/21   TERRANCE Ross CNP   aspirin (ASPIR-LOW) 81 MG EC tablet TAKE 1 TABLET BY MOUTH DAILY 1/25/21   TERRANCE Ross CNP   NIFEdipine (ADALAT CC) 30 MG extended release tablet Take 1 tablet by mouth daily Takes 30mg 3 times daily 1/25/21   TERRANCE Ross CNP   carvedilol (COREG) 25 MG tablet Take 1 tablet by mouth 2 times daily (with meals) 1/25/21   TERRANCE Ross CNP   atorvastatin (LIPITOR) 80 MG tablet Take 1 tablet by mouth daily 1/25/21   TERRANCE Ross CNP   clopidogrel (PLAVIX) 75 MG tablet Take 1 tablet by mouth daily 1/25/21   TERRANCE Ross CNP   isosorbide mononitrate (IMDUR) 60 MG extended release tablet Take 1 tablet by mouth daily 1/5/21   TERRANCE Ross CNP   vitamin D (ERGOCALCIFEROL) 1.25 MG (77926 UT) CAPS capsule Take 1 capsule by mouth once a week 1/5/21   TERRANCE Ross CNP   Lancets MISC Use_4__times daily Diagnisis:250.0  Diabetes Mellitus__x__Insulin Dependent___Non-Insulin Dependent 12/6/20   Ivette Aase, MD tremor. · Hematologic/Lymphatic: No abnormal bruising or bleeding, blood clots or swollen lymph nodes. · Allergic/Immunologic: No nasal congestion or hives. PHYSICAL EXAM:      BP (!) 150/69   Pulse 75   Temp 98.3 °F (36.8 °C) (Oral)   Resp 15   Wt 140 lb (63.5 kg)   SpO2 97%   BMI 22.60 kg/m²    Constitutional and General Appearance: alert, cooperative, no distress and appears stated age  HEENT: PERRL, no cervical lymphadenopathy. No masses palpable. Normal oral mucosa  Respiratory:  · Normal excursion and expansion without use of accessory muscles  · Resp Auscultation: Good respiratory effort. No for increased work of breathing. On auscultation: clear to auscultation bilaterally  Cardiovascular:  · Loud S1 heart sound. · Systolic ejection murmur with a click loud at left sternal heart border and right second intercostal space with radiation to carotids. Abdomen:   · No masses or tenderness  · Bowel sounds present  Extremities:  ·  No Cyanosis or Clubbing  ·  Lower extremity edema: No  ·  Skin: Warm and dry  Neurological:  · Alert and oriented.   · Moves all extremities well  · No abnormalities of mood, affect, memory, mentation, or behavior are noted    Labs:   CBC:   Recent Labs     05/03/21  1338 05/04/21  0518   WBC 5.0 5.8   HGB 11.9* 9.8*   HCT 35.9* 31.4*   PLT See Reflexed IPF Result 322     BMP:   Recent Labs     05/03/21  1338 05/04/21  0518    136   K 5.5* 4.5   CO2 19* 21   BUN 73* 69*   CREATININE 2.70* 3.12*   LABGLOM 24* 20*   GLUCOSE 174* 146*     PT/INR:   Recent Labs     05/03/21  1505   PROTIME 9.6   INR 0.9     APTT:  Recent Labs     05/03/21  1505   APTT 20.7     FASTING LIPID PANEL:  Lab Results   Component Value Date    HDL 68 05/04/2021    TRIG 148 05/04/2021     LIVER PROFILE:  Recent Labs     05/03/21  1338 05/04/21  0518   AST 22 12   ALT 21 15   LABALBU 3.1* 2.3*       IMPRESSION:    1. NSTEMI type II likely from supply demand mismatch and CKD with elevated troponin

## 2021-05-04 NOTE — ED NOTES
Pt resting comfortably on stretcher, respirations even and unlabored. No complaints @ this time. Will continue to monitor.            Naresh Hayden RN  05/04/21 1001

## 2021-05-05 ENCOUNTER — APPOINTMENT (OUTPATIENT)
Dept: ULTRASOUND IMAGING | Age: 66
DRG: 280 | End: 2021-05-05
Payer: COMMERCIAL

## 2021-05-05 PROBLEM — E43 SEVERE MALNUTRITION (HCC): Status: ACTIVE | Noted: 2021-05-05

## 2021-05-05 LAB
CHLORIDE, UR: 37 MMOL/L
CREATININE URINE: 51.5 MG/DL (ref 39–259)
GLUCOSE BLD-MCNC: 121 MG/DL (ref 75–110)
GLUCOSE BLD-MCNC: 139 MG/DL (ref 75–110)
GLUCOSE BLD-MCNC: 144 MG/DL (ref 75–110)
GLUCOSE BLD-MCNC: 69 MG/DL (ref 75–110)
GLUCOSE BLD-MCNC: 85 MG/DL (ref 75–110)
GLUCOSE BLD-MCNC: 87 MG/DL (ref 75–110)
LV EF: 65 %
LVEF MODALITY: NORMAL
SODIUM,UR: 33 MMOL/L
TOTAL PROTEIN, URINE: 183 MG/DL
TROPONIN INTERP: ABNORMAL
TROPONIN T: ABNORMAL NG/ML
TROPONIN, HIGH SENSITIVITY: 77 NG/L (ref 0–22)
TROPONIN, HIGH SENSITIVITY: 87 NG/L (ref 0–22)
TROPONIN, HIGH SENSITIVITY: 89 NG/L (ref 0–22)
URINE TOTAL PROTEIN CREATININE RATIO: 3.55 (ref 0–0.2)

## 2021-05-05 PROCEDURE — 2580000003 HC RX 258: Performed by: INTERNAL MEDICINE

## 2021-05-05 PROCEDURE — 6370000000 HC RX 637 (ALT 250 FOR IP): Performed by: NURSE PRACTITIONER

## 2021-05-05 PROCEDURE — 6360000002 HC RX W HCPCS: Performed by: NURSE PRACTITIONER

## 2021-05-05 PROCEDURE — 76706 US ABDL AORTA SCREEN AAA: CPT

## 2021-05-05 PROCEDURE — 84484 ASSAY OF TROPONIN QUANT: CPT

## 2021-05-05 PROCEDURE — 6370000000 HC RX 637 (ALT 250 FOR IP): Performed by: INTERNAL MEDICINE

## 2021-05-05 PROCEDURE — 84156 ASSAY OF PROTEIN URINE: CPT

## 2021-05-05 PROCEDURE — 36415 COLL VENOUS BLD VENIPUNCTURE: CPT

## 2021-05-05 PROCEDURE — 76775 US EXAM ABDO BACK WALL LIM: CPT

## 2021-05-05 PROCEDURE — 99232 SBSQ HOSP IP/OBS MODERATE 35: CPT | Performed by: PHYSICIAN ASSISTANT

## 2021-05-05 PROCEDURE — 82570 ASSAY OF URINE CREATININE: CPT

## 2021-05-05 PROCEDURE — 94640 AIRWAY INHALATION TREATMENT: CPT

## 2021-05-05 PROCEDURE — 6370000000 HC RX 637 (ALT 250 FOR IP): Performed by: PHYSICIAN ASSISTANT

## 2021-05-05 PROCEDURE — 82436 ASSAY OF URINE CHLORIDE: CPT

## 2021-05-05 PROCEDURE — 82947 ASSAY GLUCOSE BLOOD QUANT: CPT

## 2021-05-05 PROCEDURE — 84300 ASSAY OF URINE SODIUM: CPT

## 2021-05-05 PROCEDURE — 93306 TTE W/DOPPLER COMPLETE: CPT

## 2021-05-05 PROCEDURE — 2580000003 HC RX 258: Performed by: NURSE PRACTITIONER

## 2021-05-05 PROCEDURE — 2060000000 HC ICU INTERMEDIATE R&B

## 2021-05-05 PROCEDURE — 99232 SBSQ HOSP IP/OBS MODERATE 35: CPT | Performed by: INTERNAL MEDICINE

## 2021-05-05 RX ORDER — CARVEDILOL 25 MG/1
25 TABLET ORAL 2 TIMES DAILY WITH MEALS
Status: DISCONTINUED | OUTPATIENT
Start: 2021-05-05 | End: 2021-05-14 | Stop reason: HOSPADM

## 2021-05-05 RX ORDER — CLONIDINE HYDROCHLORIDE 0.1 MG/1
0.1 TABLET ORAL 2 TIMES DAILY
Status: DISCONTINUED | OUTPATIENT
Start: 2021-05-05 | End: 2021-05-07

## 2021-05-05 RX ADMIN — HEPARIN SODIUM 5000 UNITS: 5000 INJECTION INTRAVENOUS; SUBCUTANEOUS at 08:43

## 2021-05-05 RX ADMIN — PRAZOSIN HYDROCHLORIDE 4 MG: 1 CAPSULE ORAL at 20:49

## 2021-05-05 RX ADMIN — BUMETANIDE 2 MG: 1 TABLET ORAL at 08:37

## 2021-05-05 RX ADMIN — CLOPIDOGREL 75 MG: 75 TABLET, FILM COATED ORAL at 08:39

## 2021-05-05 RX ADMIN — DEXTROSE MONOHYDRATE 100 ML/HR: 50 INJECTION, SOLUTION INTRAVENOUS at 07:17

## 2021-05-05 RX ADMIN — GABAPENTIN 200 MG: 100 CAPSULE ORAL at 20:49

## 2021-05-05 RX ADMIN — CLONIDINE HYDROCHLORIDE 0.1 MG: 0.1 TABLET ORAL at 20:49

## 2021-05-05 RX ADMIN — BUDESONIDE AND FORMOTEROL FUMARATE DIHYDRATE 2 PUFF: 160; 4.5 AEROSOL RESPIRATORY (INHALATION) at 08:57

## 2021-05-05 RX ADMIN — IPRATROPIUM BROMIDE AND ALBUTEROL SULFATE 1 AMPULE: .5; 2.5 SOLUTION RESPIRATORY (INHALATION) at 20:51

## 2021-05-05 RX ADMIN — ATORVASTATIN CALCIUM 80 MG: 80 TABLET, FILM COATED ORAL at 08:37

## 2021-05-05 RX ADMIN — ISOSORBIDE MONONITRATE 60 MG: 60 TABLET ORAL at 08:40

## 2021-05-05 RX ADMIN — NIFEDIPINE 30 MG: 30 TABLET, FILM COATED, EXTENDED RELEASE ORAL at 08:39

## 2021-05-05 RX ADMIN — HYDRALAZINE HYDROCHLORIDE 100 MG: 50 TABLET, FILM COATED ORAL at 22:57

## 2021-05-05 RX ADMIN — CARVEDILOL 37.5 MG: 25 TABLET, FILM COATED ORAL at 08:37

## 2021-05-05 RX ADMIN — SODIUM CHLORIDE: 9 INJECTION, SOLUTION INTRAVENOUS at 20:43

## 2021-05-05 RX ADMIN — PRAZOSIN HYDROCHLORIDE 4 MG: 1 CAPSULE ORAL at 08:40

## 2021-05-05 RX ADMIN — INSULIN GLARGINE 35 UNITS: 100 INJECTION, SOLUTION SUBCUTANEOUS at 20:57

## 2021-05-05 RX ADMIN — HEPARIN SODIUM 5000 UNITS: 5000 INJECTION INTRAVENOUS; SUBCUTANEOUS at 22:59

## 2021-05-05 RX ADMIN — IPRATROPIUM BROMIDE AND ALBUTEROL SULFATE 1 AMPULE: .5; 2.5 SOLUTION RESPIRATORY (INHALATION) at 08:57

## 2021-05-05 RX ADMIN — DEXTROSE MONOHYDRATE 12.5 G: 25 INJECTION, SOLUTION INTRAVENOUS at 07:13

## 2021-05-05 RX ADMIN — Medication 81 MG: at 08:37

## 2021-05-05 RX ADMIN — GABAPENTIN 200 MG: 100 CAPSULE ORAL at 08:38

## 2021-05-05 RX ADMIN — HYDRALAZINE HYDROCHLORIDE 100 MG: 50 TABLET, FILM COATED ORAL at 08:40

## 2021-05-05 RX ADMIN — IPRATROPIUM BROMIDE AND ALBUTEROL SULFATE 1 AMPULE: .5; 2.5 SOLUTION RESPIRATORY (INHALATION) at 16:15

## 2021-05-05 RX ADMIN — BUDESONIDE AND FORMOTEROL FUMARATE DIHYDRATE 2 PUFF: 160; 4.5 AEROSOL RESPIRATORY (INHALATION) at 20:51

## 2021-05-05 RX ADMIN — CLONIDINE HYDROCHLORIDE 0.2 MG: 0.2 TABLET ORAL at 08:37

## 2021-05-05 RX ADMIN — ACETAMINOPHEN 650 MG: 325 TABLET ORAL at 13:15

## 2021-05-05 ASSESSMENT — PAIN SCALES - GENERAL
PAINLEVEL_OUTOF10: 7
PAINLEVEL_OUTOF10: 0

## 2021-05-05 NOTE — PLAN OF CARE
Patient currently in bed, participates in care. RN instructed patient to save urine in urinal so it can be documented. Patient denies pain, patient ambulates self, gait is steady. Patients blood pressure improving , took po blood pressure medications. Patients oxygen status is 100% on 2 lnc, Patient afraid to wean off oxygen states he needs it he feels winded with ambulation. Patient remains free of injury, turns self, has a good appetite, bed is low and locked, call light is within reach, floor is free of clutter. RN continuing to monitor.

## 2021-05-05 NOTE — PLAN OF CARE
Ongoing  5/4/2021 2022 by Yumiko Howard RN  Outcome: Ongoing     Problem: Physical Regulation:  Goal: Complications related to the disease process, condition or treatment will be avoided or minimized  Description: Complications related to the disease process, condition or treatment will be avoided or minimized  5/5/2021 0942 by Reginald Arroyo RN  Outcome: Ongoing  5/4/2021 2022 by Yumiko Howard RN  Outcome: Ongoing

## 2021-05-05 NOTE — PROGRESS NOTES
uncontrolled hypertension, non-obstructive CAD, HFpEF, CKD, uncontrolled DM. He presented to the ED complaining of elevated blood pressure and headache. Blood pressure noted to be elevated to 229/79 upon presentation. Pt tested positive for cocaine, but denies use. He was placed on clevidipine infusion. He was also noted to have an BINU and nephrology was consulted. Troponin elevated and cardiology was consulted. Both kidney injury and troponin elevation attributed to uncontrolled hypertension. Patient's home med regimen was resumed and he was successfully weaned of clevidipine drip. Echocardiogram did not reveal any wall motion abnormality and no further cardiac work-up was pursued. Blood glucose control was established with the patient's home insulin regimen. Review of Systems:     Constitutional:  negative for chills, fevers, sweats  Respiratory:  negative for cough, dyspnea on exertion, shortness of breath, wheezing  Cardiovascular:  negative for chest pain, chest pressure/discomfort, lower extremity edema, palpitations  Gastrointestinal:  negative for abdominal pain, constipation, diarrhea, nausea, vomiting  Neurological:  negative for dizziness, headache    Medications: Allergies:     Allergies   Allergen Reactions    Lisinopril      cough    Ace Inhibitors      coughing    Pcn [Penicillins]        Current Meds:   Scheduled Meds:    cloNIDine  0.1 mg Oral BID    carvedilol  25 mg Oral BID WC    aspirin  81 mg Oral Daily    atorvastatin  80 mg Oral Daily    insulin glargine  35 Units Subcutaneous Nightly    bumetanide  2 mg Oral Daily    clopidogrel  75 mg Oral Daily    fluticasone  2 spray Each Nostril Daily    gabapentin  200 mg Oral BID    hydrALAZINE  100 mg Oral 3 times per day    isosorbide mononitrate  60 mg Oral Daily    [Held by provider] losartan  50 mg Oral BID    NIFEdipine  30 mg Oral Daily    prazosin  4 mg Oral BID    QUEtiapine  100 mg Oral Nightly    sodium chloride flush  5-40 mL Intravenous 2 times per day    heparin (porcine)  5,000 Units Subcutaneous 3 times per day    budesonide-formoterol  2 puff Inhalation BID    insulin lispro  0-6 Units Subcutaneous TID WC    insulin lispro  0-3 Units Subcutaneous Nightly    ipratropium-albuterol  1 ampule Inhalation Q4H While awake     Continuous Infusions:    dextrose 100 mL/hr (21)    clevidipine Stopped (21)    sodium chloride 50 mL/hr at 21 0234    sodium chloride       PRN Meds: glucose, dextrose, glucagon (rDNA), dextrose, albuterol, ipratropium-albuterol, sodium chloride flush, sodium chloride, nicotine, promethazine **OR** ondansetron, acetaminophen **OR** acetaminophen, polyethylene glycol    Data:     Past Medical History:   has a past medical history of Asthma, CAD in native artery, nonobstructive, Carotid stenosis, left, Carpal tunnel syndrome, Cerebrovascular disease, Chronic kidney disease, COPD (chronic obstructive pulmonary disease) ([facility] [address] Diabetes mellitus ([facility] [address] History of colon polyps, History of weakness of extremity, HTN (hypertension), Hyperlipidemia, Lung, cysts, congenital, PTSD (post-traumatic stress disorder), PTSD (post-traumatic stress disorder), TIA (transient ischemic attack), and Type II or unspecified type diabetes mellitus without mention of complication, not stated as uncontrolled. Social History:   reports that he quit smoking about 6 years ago. His smoking use included cigarettes. He has a 17.50 pack-year smoking history. He has never used smokeless tobacco. He reports that he does not drink alcohol or use drugs.      Family History:   Family History   Problem Relation Age of Onset    Cancer Mother     Heart Disease Father        Vitals:  BP (!) 97/43   Pulse 59   Temp 98 °F (36.7 °C) (Oral)   Resp 17   Ht 5' 6\" (1.676 m)   Wt 127 lb 6.4 oz (57.8 kg)   SpO2 99%   BMI 20.56 kg/m²   Temp (24hrs), Av.2 °F (36.8 °C), Min:98 °F (36.7 °C), Max:98.4 °F (36.9 °C)    Recent Labs     05/04/21 2010 05/05/21  0708 05/05/21  0826 05/05/21  1224   POCGLU 238* 69* 85 144*       I/O (24Hr): Intake/Output Summary (Last 24 hours) at 5/5/2021 1426  Last data filed at 5/5/2021 0600  Gross per 24 hour   Intake 1354 ml   Output 1500 ml   Net -146 ml       Labs:  Hematology:  Recent Labs     05/03/21 1338 05/03/21  1505 05/04/21 0518   WBC 5.0  --  5.8   RBC 3.87*  --  3.16*   HGB 11.9*  --  9.8*   HCT 35.9*  --  31.4*   MCV 92.8  --  99.4   MCH 30.7  --  31.0   MCHC 33.1  --  31.2   RDW 12.4  --  12.2   PLT See Reflexed IPF Result  --  322   MPV NOT REPORTED  --  9.6   INR  --  0.9  --      Chemistry:  Recent Labs     05/03/21 1338 05/03/21  1505 05/03/21  1505 05/04/21 0518 05/05/21  0025 05/05/21 0626 05/05/21  1218     --   --  136  --   --   --    K 5.5*  --   --  4.5  --   --   --      --   --  106  --   --   --    CO2 19*  --   --  21  --   --   --    GLUCOSE 174*  --   --  146*  --   --   --    BUN 73*  --   --  69*  --   --   --    CREATININE 2.70*  --   --  3.12*  --   --   --    ANIONGAP 10  --   --  9  --   --   --    LABGLOM 24*  --   --  20*  --   --   --    GFRAA 29*  --   --  24*  --   --   --    CALCIUM 8.6  --   --  7.5*  --   --   --    PROBNP  --  13,486*  --   --   --   --   --    TROPHS 96*  --    < > 89* 77* 87* 89*    < > = values in this interval not displayed.      Recent Labs     05/03/21 1338 05/03/21  1338 05/04/21  0518 05/04/21 0518 05/04/21  1417 05/04/21  1711 05/04/21 2010 05/05/21 0708 05/05/21 0826 05/05/21  1224   PROT 5.8*  --  4.7*  --   --   --   --   --   --   --    LABALBU 3.1*  --  2.3*  --   --   --   --   --   --   --    AST 22  --  12  --   --   --   --   --   --   --    ALT 21  --  15  --   --   --   --   --   --   --    ALKPHOS 104  --  83  --   --   --   --   --   --   --    BILITOT <0.10*  --  <0.10*  --   --   --   --   --   --   --    CHOL  --   --  256*  --   --   --   --   --   --   --    HDL  --   -- 68  --   --   --   --   --   --   --    LDLCHOLESTEROL  --   --  158*  --   --   --   --   --   --   --    CHOLHDLRATIO  --   --  3.8  --   --   --   --   --   --   --    TRIG  --   --  148  --   --   --   --   --   --   --    VLDL  --   --  NOT REPORTED  --   --   --   --   --   --   --    POCGLU  --    < >  --    < > 155* 159* 238* 69* 85 144*    < > = values in this interval not displayed. ABG:  Lab Results   Component Value Date    POCPH 7.39 06/17/2013    POCPCO2 46 06/17/2013    POCPO2 288 06/17/2013    POCHCO3 27.8 06/17/2013    NBEA NOT REPORTED 06/17/2013    PBEA 3 06/17/2013    XIX0KTH 29 06/17/2013    YQJL1UGH 100 06/17/2013    FIO2 NOT REPORTED 12/03/2020     Lab Results   Component Value Date/Time    SPECIAL 2ML L FR 12/03/2020 11:24 AM     Lab Results   Component Value Date/Time    CULTURE NO GROWTH 6 DAYS 12/03/2020 11:24 AM       Radiology:  Xr Chest Portable    Result Date: 5/3/2021  1. No acute abnormality. Physical Examination:        General appearance:  alert, cooperative and no distress  Mental Status:  oriented to person, place and time and normal affect  Lungs:  clear to auscultation bilaterally, normal effort  Heart:  regular rate and rhythm. There is a high pitched systolic murmur 2/6, heard best at the apex.   Abdomen:  soft, nontender, nondistended, normal bowel sounds, no masses, hepatomegaly, splenomegaly  Extremities:  no edema, redness, tenderness in the calves  Skin:  no gross lesions, rashes, induration    Assessment:        Hospital Problems           Last Modified POA    * (Principal) Hypertensive emergency 5/5/2021 Yes    CKD (chronic kidney disease) stage 4, GFR 15-29 ml/min ([facility] [address] 5/5/2021 Yes    Pure hypercholesterolemia 5/5/2021 Yes    Carotid stenosis, left s/p Endarterectomy 5/5/2021 Yes    DM type 2, uncontrolled, with neuropathy ([facility] [address] 5/5/2021 Yes    COPD (chronic obstructive pulmonary disease) ([facility] 5/5/2021 Yes    Non-obstructive Coronary artery disease of native artery of native heart with stable angina pectoris ([facility] [address] 5/5/2021 Yes    Overview Signed 8/12/2019 11:46 AM by TERRANCE Molina - CNP     9/2017     LMCA: Mild irregularities 10-20%. LAD: 30-40% proximal stenosis    LCx: Mild irregularities 10-20%. RCA: 40% mid stenosis         Severe malnutrition ([facility] [address] 5/5/2021 Yes          Plan:        #Hypertensive emergency  - resolved. Continue current regimen  - losartan held per nephrology    #Hypotension  - pt had hypotensive episode this a.m. after clonidine and carvedilol were increased yesterday. Pt likely has longstanding hypertension and has developed a tolerance and dependence on higher than normal blood pressure. I will resume previous dose of clonidine at 0.1 mg tid and carvedilol 25 mg bid. I am okay with systolic pressure being elevated as long as it is less than 160. Can continue to slowly titrate as OP    #NSTEMI, type 2  - reviewed echocardiogram, no visible wall motion abnormality. Cath from August 2020 with non-obstructive coronary disease. Continue aspirin, plavix, statin, BB    #HFpEF/concentric LVH hypertrophy  - secondary to prolonged hypertension. Establish blood pressure control  - continue bumex  - consider work-up for cardiac amyloid per echo report. SPEP from 2020 unremarkable. Consider cardiac MRI as OP as deemed necessary by cardiology. #Type 2 DM, uncontrolled, complicated by nephropathy, neuropathy  - continue lantus 35U qhs. Hypotensive episode this a.m. likely from administration of basal insulin despite NPO status  - blood glucose checks ac/hs, low dose sliding scale    #CKD 4  - nephrology following    #COPD   - stable.  Continue inhaler regimen    #Heparin DVT prophylaxis    #Disposition: likely discharge tomorrow pending normalization of blood pressure    Dmitriy Jimenez PA-C  5/5/2021  2:26 PM

## 2021-05-05 NOTE — PROGRESS NOTES
Reviewed Echo result. No further cardiac workup planned at this time. 2D Echo 5/5/2021  Summary  Normal LV size . Severe concentric LVH. Septal wall thickness 2.1 cm  No obvious wall motion abnormality seen. Normal LV systolic function with LVEF >65%. Grade I diastolic dysfunction  Normal RV size and function. RV systolic pressure 38 mmHg  Moderately dilated LA and RA appears normal in size. No obvious significant structural valvular abnormality noted. No significant valvular stenosis or regurgitation noted. Normal aortic root dimension. No significant pericardial effusion noted. No obvious intra-cardiac mass or shunt noted. IVC normal diameter and inspiratory variation indicating normal RA filling  Pressure.         Electronically signed by TERRANCE Cool CNP on 5/5/2021 at 2:52 PM  [facility]      [phone]

## 2021-05-05 NOTE — PROGRESS NOTES
20.5  · BMI Categories: Underweight (BMI less than 22) age over 72       Nutrition Diagnosis:   · Severe malnutrition, In context of chronic illness related to inadequate protein-energy intake, cardiac dysfunction as evidenced by poor intake prior to admission, weight loss greater than or equal to 20% in 1 year(Need for ONS)      Nutrition Interventions:   Food and/or Nutrient Delivery:  Continue Current Diet, Start Oral Nutrition Supplement  Nutrition Education/Counseling:  Education not indicated   Coordination of Nutrition Care:  Continue to monitor while inpatient    Goals: Set   Pt to meet % of est'd needs daily via PO       Nutrition Monitoring and Evaluation:   Food/Nutrient Intake Outcomes:  Food and Nutrient Intake, Supplement Intake  Physical Signs/Symptoms Outcomes:  Biochemical Data, Nutrition Focused Physical Findings, Skin, Weight, GI Status, Fluid Status or Edema     Discharge Planning:     Too soon to determine     Electronically signed by Chato Richter RD, LD on 5/5/21 at 2:13 PM EDT    Contact: [phone]

## 2021-05-05 NOTE — PROGRESS NOTES
[facility]   Progress Note                   Date:   5/5/2021  Patient name: Obdulia Vargas  Date of admission:  5/3/2021  1:18 PM  MRN:   [id number]  YOB: 1955  PCP: TERRANCE Smart CNP    Reason for Admission: Hypertensive emergency [I16.1]    Subjective:       Clinical Changes / Abnormalities: Patient seen and examined at the bed side, no new acute events overnight. Patient is doing well, has no complaints. Denies chest pain or SOB. States that he experienced an episode of vertigo when returning from the . Encouraged to change position slowly and to make sure he stays hydrated. Reviewed vitals, labs, tele, & previous testing.         Medications:   Scheduled Meds:   cloNIDine  0.2 mg Oral BID    carvedilol  37.5 mg Oral BID WC    aspirin  81 mg Oral Daily    atorvastatin  80 mg Oral Daily    insulin glargine  35 Units Subcutaneous Nightly    bumetanide  2 mg Oral Daily    clopidogrel  75 mg Oral Daily    fluticasone  2 spray Each Nostril Daily    gabapentin  200 mg Oral BID    hydrALAZINE  100 mg Oral 3 times per day    insulin lispro  5 Units Subcutaneous TID WC    isosorbide mononitrate  60 mg Oral Daily    [Held by provider] losartan  50 mg Oral BID    NIFEdipine  30 mg Oral Daily    prazosin  4 mg Oral BID    QUEtiapine  100 mg Oral Nightly    sodium chloride flush  5-40 mL Intravenous 2 times per day    heparin (porcine)  5,000 Units Subcutaneous 3 times per day    budesonide-formoterol  2 puff Inhalation BID    insulin lispro  0-6 Units Subcutaneous TID WC    insulin lispro  0-3 Units Subcutaneous Nightly    ipratropium-albuterol  1 ampule Inhalation Q4H While awake     Continuous Infusions:   dextrose 100 mL/hr (05/05/21 0717)    clevidipine Stopped (05/04/21 0222)    sodium chloride 50 mL/hr at 05/04/21 0234    sodium chloride       CBC:   Recent Labs     05/03/21  1338 05/04/21  0518   WBC 5.0 5.8   HGB 11.9* 9.8*   PLT See Reflexed IPF Result 322     BMP:    Recent Labs     05/03/21  1338 05/04/21  0518    136   K 5.5* 4.5    106   CO2 19* 21   BUN 73* 69*   CREATININE 2.70* 3.12*   GLUCOSE 174* 146*     Hepatic:   Recent Labs     05/03/21  1338 05/04/21  0518   AST 22 12   ALT 21 15   BILITOT <0.10* <0.10*   ALKPHOS 104 83     Troponin:   Recent Labs     05/04/21  0518 05/05/21  0025 05/05/21  0626   TROPHS 89* 77* 87*     BNP: No results for input(s): BNP in the last 72 hours. Lipids:   Recent Labs     05/04/21  0518   CHOL 256*   HDL 68     INR:   Recent Labs     05/03/21  1505   INR 0.9       Previous Testing:      CATH 8/12/2020: Nonobstructive CAD. LV was not done. No change from cath in 2017.      ECHO 5/16/2020: EF 73%, moderate-severe LVH, mild MR/TR, RVSP is 37 mmHg.      STRESS 1/7/2020: No ischemia/infarct. EF 49%. Objective:   Vitals: BP (!) 138/56   Pulse 63   Temp 98.1 °F (36.7 °C) (Oral)   Resp 16   Ht 5' 6\" (1.676 m)   Wt 127 lb 6.4 oz (57.8 kg)   SpO2 99%   BMI 20.56 kg/m²   General appearance: alert and cooperative with exam  HEENT: Head: Normocephalic, no lesions, without obvious abnormality. Neck: no JVD, trachea midline, no adenopathy  Lungs: Clear to auscultation. Slightly diminished in bases bilat. Heart: Regular rate and rhythm, s1/s2 auscultated, no murmurs  Abdomen: soft, non-tender, bowel sounds active  Extremities: no edema  Neurologic: not done        Assessment / Acute Cardiac Problems:     1. Elevated Troponin  2. Hypertensive emergency. 3. Nonobstructive CAD on cardiac cath x3 ( 2015, 2017, 2020)  4. Diabetes mellitus  5. COPD  6. Hyperlipidemia  7. Chronic kidney disease  8.  Ex-smoker    Patient Active Problem List:     Cigarette nicotine dependence without complication     Carpal tunnel syndrome on right     Colon polyps     Carotid stenosis, left s/p Endarterectomy     Mixed simple and mucopurulent chronic bronchitis ([address]     Pure hypercholesterolemia     CKD (chronic kidney disease) stage 3, GFR 30-59 ml/min ([facility])     Dyspnea and respiratory abnormalities     PTSD (post-traumatic stress disorder)     Transient cerebral ischemia     Essential hypertension     DM type 2, uncontrolled, with neuropathy (HCC)     COPD (chronic obstructive pulmonary disease) ([facility])     Cerebral vascular disease     Primary insomnia     Hypertensive crisis     Non-obstructive Coronary artery disease of native artery of native heart with stable angina pectoris ([facility])     Hyperparathyroidism ([facility] [address]     Hypovitaminosis D     Acute kidney injury superimposed on CKD ([facility] [address]     Coronary artery disease with exertional angina ([facility])     Leg swelling     Anemia     Hypoalbuminemia     DKA, type 2, not at goal [facility])     Inflammation of right sacroiliac joint ([facility] [address]     Malignant neoplasm of colon ([facility] [address]     ESRD (end stage renal disease) ([facility] [address]     Bilateral carotid artery occlusion     Seizure ([facility] [address]     Hypertensive emergency      Plan of Treatment:   1. NSTEMI type II likely from supply demand mismatch and CKD with elevated troponin with a flat trend. 52-03-28. (Patient last troponin on 12/4/2020 was 126)  2. HTN. Stable. Continue Coreg, catapres, hydralazine, Imdur, Procardia XL, and minipress. Cozaar currently on hold per Nephrology  3. Echo results pending. If no significant WMA, valvular disease or reduced EF, no further cardiac workup planned. 4. No objection to discharge pending echo results per CV standpoint. Patient to follow up in clinic in 2 weeks.     Electronically signed by TERRANCE Jacob CNP on 5/5/2021 at 11:57 [address] [facility].  [phone]

## 2021-05-05 NOTE — PROGRESS NOTES
Renal Progress Note    Patient :  Joe Mcrae; 73 y.o. MRN# [id number]  Location:  2022/2022-01  Attending:  Carlton Jett MD  Admit Date:  5/3/2021   Hospital Day: 2      Subjective:     Patient was seen and examined. No new issues overnight. No labs available from morning. Blood pressure was on the lower end, his clonidine was decreased to 0.1 mg twice a day. Urine output documented as about 1.5 L in the last 24 hours. Outpatient Medications:     Medications Prior to Admission: cloNIDine (CATAPRES) 0.1 MG tablet, Take 1 tablet by mouth 2 times daily  BASAGLAR KWIKPEN 100 UNIT/ML injection pen, INJECT 35 UNITS INTO THE SKIN NIGHTLY  nitroGLYCERIN (NITROSTAT) 0.4 MG SL tablet, USE 1 TABLET UNDER THE TONGUE UP TO MAXIMUM OF 3 TOTAL DOSES.  IF NO RELIEF AFTER 1 DOSE, CALL 911.  gabapentin (NEURONTIN) 100 MG capsule, TAKE 2 CAPSULES BY MOUTH 3 TIMES DAILY  QUEtiapine (SEROQUEL) 100 MG tablet, TAKE 1 TABLET BY MOUTH NIGHTLY  losartan (COZAAR) 50 MG tablet, TAKE 1 TABLET BY MOUTH 2 TIMES DAILY  GNP VITAMIN D MAXIMUM STRENGTH 50 MCG (2000 UT) TABS, TAKE 1 TABLET BY MOUTH ONCE DAILY  bumetanide (BUMEX) 2 MG tablet, TAKE 1 TABLET BY MOUTH 2 TIMES DAILY  TRELEGY ELLIPTA 100-62.5-25 MCG/INH AEPB, INHALE ONE PUFF INTO THE LUNGS DAILY  prazosin (MINIPRESS) 2 MG capsule, 2 CAPSULES BY MOUTH (4MG) TWICE DAILY  COMBIVENT RESPIMAT  MCG/ACT AERS inhaler, INHALE 1 PUFF INTO THE LUNGS EVERY 6 HOURS  hydrALAZINE (APRESOLINE) 100 MG tablet, Take 1 tablet by mouth every 8 hours  aspirin (ASPIR-LOW) 81 MG EC tablet, TAKE 1 TABLET BY MOUTH DAILY  NIFEdipine (ADALAT CC) 30 MG extended release tablet, Take 1 tablet by mouth daily Takes 30mg 3 times daily  carvedilol (COREG) 25 MG tablet, Take 1 tablet by mouth 2 times daily (with meals)  atorvastatin (LIPITOR) 80 MG tablet, Take 1 tablet by mouth daily  clopidogrel (PLAVIX) 75 MG tablet, Take 1 tablet by mouth daily  isosorbide mononitrate (IMDUR) 60 MG extended release tablet, Take 1 tablet by mouth daily  vitamin D (ERGOCALCIFEROL) 1.25 MG (10710 UT) CAPS capsule, Take 1 capsule by mouth once a week  ipratropium-albuterol (DUONEB) 0.5-2.5 (3) MG/3ML SOLN nebulizer solution, Inhale 3 mLs into the lungs every 6 hours as needed for Shortness of Breath  Nutritional Supplements (GLUCERNA CARBSTEADY) LIQD, Take 1 Can by mouth 3 times daily  glucagon 1 MG injection, Inject 1 mg into the muscle See Admin Instructions Follow package directions for low blood sugar. fluticasone (FLONASE) 50 MCG/ACT nasal spray,   insulin aspart (NOVOLOG FLEXPEN) 100 UNIT/ML injection pen, Inject 5 Units into the skin 3 times daily (before meals) Sliding scale  blood glucose monitor strips, Test_4__times daily Diagnosis: 250.0   Diabetes Mellitus_x___Insulin Dependent____Non-Insulin Dependent  blood glucose monitor kit and supplies, Dispense sufficient amount for indicated testing frequency plus additional to accommodate PRN testing needs. Dispense all needed supplies to include: monitor, strips, lancing device, lancets, control solutions, alcohol swabs.   Lancets MISC, Use_4__times daily Diagnisis:250.0  Diabetes Mellitus__x__Insulin Dependent___Non-Insulin Dependent  COMFORT EZ PEN NEEDLES 31G X 8 MM MISC, USE AS DIRECTED  TRUE METRIX BLOOD GLUCOSE TEST strip, TEST BLOOD SUGAR 4 TO 5 TIMES DAILY (Patient not taking: Reported on 4/6/2021)    Current Medications:     Scheduled Meds:    cloNIDine  0.1 mg Oral BID    carvedilol  25 mg Oral BID WC    aspirin  81 mg Oral Daily    atorvastatin  80 mg Oral Daily    insulin glargine  35 Units Subcutaneous Nightly    bumetanide  2 mg Oral Daily    clopidogrel  75 mg Oral Daily    fluticasone  2 spray Each Nostril Daily    gabapentin  200 mg Oral BID    hydrALAZINE  100 mg Oral 3 times per day    isosorbide mononitrate  60 mg Oral Daily    [Held by provider] losartan  50 mg Oral BID    NIFEdipine  30 mg Oral Daily    prazosin  4 mg Oral BID    QUEtiapine  100 mg Oral Nightly    sodium chloride flush  5-40 mL Intravenous 2 times per day    heparin (porcine)  5,000 Units Subcutaneous 3 times per day    budesonide-formoterol  2 puff Inhalation BID    insulin lispro  0-6 Units Subcutaneous TID WC    insulin lispro  0-3 Units Subcutaneous Nightly    ipratropium-albuterol  1 ampule Inhalation Q4H While awake     Continuous Infusions:    dextrose 100 mL/hr (21 0717)    sodium chloride 50 mL/hr at 21 0234    sodium chloride       PRN Meds:  glucose, dextrose, glucagon (rDNA), dextrose, albuterol, ipratropium-albuterol, sodium chloride flush, sodium chloride, nicotine, promethazine **OR** ondansetron, acetaminophen **OR** acetaminophen, polyethylene glycol    Input/Output:       I/O last 3 completed shifts: In: 4 [P.O.:1000; I.V.:354]  Out: 1500 [Urine:1500]. Patient Vitals for the past 96 hrs (Last 3 readings):   Weight   21 0715 127 lb 6.4 oz (57.8 kg)   21 0900 127 lb 14.4 oz (58 kg)   21 1320 140 lb (63.5 kg)       Vital Signs:   Temperature:  Temp: 98 °F (36.7 °C)  TMax:   Temp (24hrs), Av.1 °F (36.7 °C), Min:98 °F (36.7 °C), Max:98.2 °F (36.8 °C)    Respirations:  Resp: 17  Pulse:   Pulse: 59  BP:    BP: (!) 97/43  BP Range: Systolic (65ESY), MADISYN:592 , Min:97 , YYV:518       Diastolic (43KKX), CUS:41, Min:37, Max:56      Physical Examination:     General:  AAO x 3, speaking in full sentences, no accessory muscle use. HEENT: Atraumatic, normocephalic, no throat congestion, moist mucosa. Eyes:   Pupils equal, round and reactive to light, EOMI. Neck:   Supple  Chest:   Bilateral vesicular breath sounds, no rales or wheezes. Cardiac:  S1 S2 RR, no murmurs, gallops or rubs. Abdomen: Soft, non-tender, no masses or organomegaly, BS audible. :   No suprapubic or flank tenderness. Neuro:  AAO x 3, No FND. SKIN:  No rashes, good skin turgor. Extremities:  No edema.     Labs:       Recent Labs     21  1338 05/04/21  0518   WBC 5.0 5.8   RBC 3.87* 3.16*   HGB 11.9* 9.8*   HCT 35.9* 31.4*   MCV 92.8 99.4   MCH 30.7 31.0   MCHC 33.1 31.2   RDW 12.4 12.2   PLT See Reflexed IPF Result 322   MPV NOT REPORTED 9.6      BMP:   Recent Labs     05/03/21  1338 05/04/21  0518    136   K 5.5* 4.5    106   CO2 19* 21   BUN 73* 69*   CREATININE 2.70* 3.12*   GLUCOSE 174* 146*   CALCIUM 8.6 7.5*      Albumin:    Recent Labs     05/03/21  1338 05/04/21  0518   LABALBU 3.1* 2.3*     BNP:      Lab Results   Component Value Date    BNP 84 11/27/2013     RIZWAN:      Lab Results   Component Value Date    RIZWAN NEGATIVE 12/04/2020     SPEP:  Lab Results   Component Value Date    PROT 4.7 05/04/2021    PROT 6.2 11/12/2011    ALBCAL 2.3 12/04/2020    ALBPCT 55 12/04/2020    LABALPH 0.1 12/04/2020    LABALPH 0.7 12/04/2020    A1PCT 3 12/04/2020    A2PCT 17 12/04/2020    LABBETA 0.5 12/04/2020    BETAPCT 13 12/04/2020    GAMGLOB 0.5 12/04/2020    GGPCT 12 12/04/2020    PATH ELECTRONICALLY SIGNED. Jhoana Carver M.D. 12/04/2020    PATH ELECTRONICALLY SIGNED. Jhoana Carver M.D. 12/04/2020     UPEP:     Lab Results   Component Value Date    LABPE  11/12/2011     ELEVATED PROTEIN CONCENTRATION. MOST SERUM PROTEINS ARE DETECTED IN THIS URINE.      C3:     Lab Results   Component Value Date    C3 89 12/04/2020     C4:     Lab Results   Component Value Date    C4 17 12/04/2020     Hep BsAg:         Lab Results   Component Value Date    HEPBSAG NONREACTIVE 12/04/2020     Hep C AB:          Lab Results   Component Value Date    HEPCAB NONREACTIVE 12/04/2020       Urinalysis/Chemistries:      Lab Results   Component Value Date    NITRU NEGATIVE 05/03/2021    COLORU YELLOW 05/03/2021    PHUR 6.0 05/03/2021    WBCUA 2 TO 5 05/03/2021    RBCUA 0 TO 2 05/03/2021    MUCUS NOT REPORTED 05/03/2021    TRICHOMONAS NOT REPORTED 05/03/2021    YEAST NOT REPORTED 05/03/2021    BACTERIA NOT REPORTED 05/03/2021    CLARITYU Clear 09/12/2014    SPECGRAV 1.016 05/03/2021    LEUKOCYTESUR NEGATIVE 05/03/2021    UROBILINOGEN Normal 05/03/2021    BILIRUBINUR NEGATIVE 05/03/2021    BILIRUBINUR NEGATIVE 11/12/2011    BLOODU Negative 09/12/2014    GLUCOSEU 1+ 05/03/2021    GLUCOSEU 3+ 11/12/2011    KETUA NEGATIVE 05/03/2021    AMORPHOUS NOT REPORTED 05/03/2021     Urine Sodium:     Lab Results   Component Value Date    IGNACIO 33 12/04/2020     Urine Potassium:    Lab Results   Component Value Date    KUR 38.1 12/04/2020     Urine Chloride:    Lab Results   Component Value Date    CLUR 30 12/04/2020     Urine Osmolarity:   Lab Results   Component Value Date    OSMOU 463 12/04/2020      Urine Creatinine:     Lab Results   Component Value Date    LABCREA 122.2 12/04/2020     Radiology:     Reviewed. Assessment:     1. Acute Kidney Injury versus progression of his underlying renal disease, could have been affected by hemodynamic effects of hypertension and further complicated by ARB usage at home. Baseline creatinine previously was around 2 to 2.4 mg/DL but in December 2020 he was in mid 3's.  2.  Hypertensive emergency. 3.  Hyperkalemia-improved. 4.  Type 1 diabetes. 5.  Hypertension. 6.  Metabolic acidosis. 7.  History of noncompliance. 8.  History of nephrotic range proteinuria. 9.  History of ischemic left CVA. 10.  History of carotid stenosis status post enterectomy. 11.  Smoking. 12.  Peripheral vascular disease  13. 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 Dory Porras. Baseline creatinine previously was around 2 to 2.4 mg/DL but in December 2020 he was in mid 3's. Plan:   1. Agree with decreasing clonidine. 2.  Continue to hold losartan. 3.  Monitor strict I's and O's and renal function. 4.  Records to be obtained from Dr. Jewell Tang office. 5.  BMP in AM.  6.  Will follow. Nutrition   Please ensure that patient is on a renal diet/TF. Avoid nephrotoxic drugs/contrast exposure. We will continue to follow along with you.

## 2021-05-06 PROBLEM — I50.32 CHRONIC HEART FAILURE WITH PRESERVED EJECTION FRACTION (HCC): Status: ACTIVE | Noted: 2021-05-06

## 2021-05-06 PROBLEM — E87.20 METABOLIC ACIDOSIS: Status: ACTIVE | Noted: 2021-05-06

## 2021-05-06 PROBLEM — I51.7 LEFT VENTRICULAR HYPERTROPHY: Status: ACTIVE | Noted: 2021-05-06

## 2021-05-06 LAB
ALBUMIN SERPL-MCNC: 2.3 G/DL (ref 3.5–5.2)
ANION GAP SERPL CALCULATED.3IONS-SCNC: 12 MMOL/L (ref 9–17)
BUN BLDV-MCNC: 85 MG/DL (ref 8–23)
BUN/CREAT BLD: ABNORMAL (ref 9–20)
CALCIUM SERPL-MCNC: 7 MG/DL (ref 8.6–10.4)
CHLORIDE BLD-SCNC: 105 MMOL/L (ref 98–107)
CO2: 18 MMOL/L (ref 20–31)
CREAT SERPL-MCNC: 3.82 MG/DL (ref 0.7–1.2)
GFR AFRICAN AMERICAN: 19 ML/MIN
GFR NON-AFRICAN AMERICAN: 16 ML/MIN
GFR SERPL CREATININE-BSD FRML MDRD: ABNORMAL ML/MIN/{1.73_M2}
GFR SERPL CREATININE-BSD FRML MDRD: ABNORMAL ML/MIN/{1.73_M2}
GLUCOSE BLD-MCNC: 123 MG/DL (ref 75–110)
GLUCOSE BLD-MCNC: 177 MG/DL (ref 75–110)
GLUCOSE BLD-MCNC: 186 MG/DL (ref 75–110)
GLUCOSE BLD-MCNC: 213 MG/DL (ref 75–110)
GLUCOSE BLD-MCNC: 226 MG/DL (ref 70–99)
GLUCOSE BLD-MCNC: 234 MG/DL (ref 75–110)
HCT VFR BLD CALC: 27.9 % (ref 40.7–50.3)
HEMOGLOBIN: 8.7 G/DL (ref 13–17)
MAGNESIUM: 1.3 MG/DL (ref 1.6–2.6)
MCH RBC QN AUTO: 31 PG (ref 25.2–33.5)
MCHC RBC AUTO-ENTMCNC: 31.2 G/DL (ref 28.4–34.8)
MCV RBC AUTO: 99.3 FL (ref 82.6–102.9)
NRBC AUTOMATED: 0 PER 100 WBC
PDW BLD-RTO: 12.5 % (ref 11.8–14.4)
PHOSPHORUS: 3.7 MG/DL (ref 2.5–4.5)
PLATELET # BLD: 295 K/UL (ref 138–453)
PMV BLD AUTO: 9.9 FL (ref 8.1–13.5)
POTASSIUM SERPL-SCNC: 5.1 MMOL/L (ref 3.7–5.3)
RBC # BLD: 2.81 M/UL (ref 4.21–5.77)
SODIUM BLD-SCNC: 135 MMOL/L (ref 135–144)
WBC # BLD: 8.7 K/UL (ref 3.5–11.3)

## 2021-05-06 PROCEDURE — 94761 N-INVAS EAR/PLS OXIMETRY MLT: CPT

## 2021-05-06 PROCEDURE — 99232 SBSQ HOSP IP/OBS MODERATE 35: CPT | Performed by: PHYSICIAN ASSISTANT

## 2021-05-06 PROCEDURE — 83735 ASSAY OF MAGNESIUM: CPT

## 2021-05-06 PROCEDURE — 6360000002 HC RX W HCPCS: Performed by: NURSE PRACTITIONER

## 2021-05-06 PROCEDURE — 80069 RENAL FUNCTION PANEL: CPT

## 2021-05-06 PROCEDURE — 36415 COLL VENOUS BLD VENIPUNCTURE: CPT

## 2021-05-06 PROCEDURE — 6370000000 HC RX 637 (ALT 250 FOR IP): Performed by: NURSE PRACTITIONER

## 2021-05-06 PROCEDURE — 82947 ASSAY GLUCOSE BLOOD QUANT: CPT

## 2021-05-06 PROCEDURE — 99232 SBSQ HOSP IP/OBS MODERATE 35: CPT | Performed by: INTERNAL MEDICINE

## 2021-05-06 PROCEDURE — 2700000000 HC OXYGEN THERAPY PER DAY

## 2021-05-06 PROCEDURE — 94640 AIRWAY INHALATION TREATMENT: CPT

## 2021-05-06 PROCEDURE — 85027 COMPLETE CBC AUTOMATED: CPT

## 2021-05-06 PROCEDURE — 6370000000 HC RX 637 (ALT 250 FOR IP): Performed by: PHYSICIAN ASSISTANT

## 2021-05-06 PROCEDURE — 2060000000 HC ICU INTERMEDIATE R&B

## 2021-05-06 PROCEDURE — 2580000003 HC RX 258: Performed by: NURSE PRACTITIONER

## 2021-05-06 RX ORDER — SODIUM BICARBONATE 650 MG/1
1300 TABLET ORAL 2 TIMES DAILY
Status: DISCONTINUED | OUTPATIENT
Start: 2021-05-06 | End: 2021-05-13

## 2021-05-06 RX ADMIN — HYDRALAZINE HYDROCHLORIDE 100 MG: 50 TABLET, FILM COATED ORAL at 16:36

## 2021-05-06 RX ADMIN — BUDESONIDE AND FORMOTEROL FUMARATE DIHYDRATE 2 PUFF: 160; 4.5 AEROSOL RESPIRATORY (INHALATION) at 20:40

## 2021-05-06 RX ADMIN — CLOPIDOGREL 75 MG: 75 TABLET, FILM COATED ORAL at 08:43

## 2021-05-06 RX ADMIN — PRAZOSIN HYDROCHLORIDE 4 MG: 1 CAPSULE ORAL at 08:43

## 2021-05-06 RX ADMIN — INSULIN GLARGINE 33 UNITS: 100 INJECTION, SOLUTION SUBCUTANEOUS at 22:14

## 2021-05-06 RX ADMIN — HEPARIN SODIUM 5000 UNITS: 5000 INJECTION INTRAVENOUS; SUBCUTANEOUS at 08:10

## 2021-05-06 RX ADMIN — HEPARIN SODIUM 5000 UNITS: 5000 INJECTION INTRAVENOUS; SUBCUTANEOUS at 22:14

## 2021-05-06 RX ADMIN — HEPARIN SODIUM 5000 UNITS: 5000 INJECTION INTRAVENOUS; SUBCUTANEOUS at 16:36

## 2021-05-06 RX ADMIN — SODIUM BICARBONATE 1300 MG: 648 TABLET ORAL at 16:35

## 2021-05-06 RX ADMIN — CARVEDILOL 25 MG: 25 TABLET, FILM COATED ORAL at 08:44

## 2021-05-06 RX ADMIN — ATORVASTATIN CALCIUM 80 MG: 80 TABLET, FILM COATED ORAL at 22:02

## 2021-05-06 RX ADMIN — GABAPENTIN 200 MG: 100 CAPSULE ORAL at 08:45

## 2021-05-06 RX ADMIN — PRAZOSIN HYDROCHLORIDE 4 MG: 1 CAPSULE ORAL at 22:02

## 2021-05-06 RX ADMIN — Medication 81 MG: at 08:43

## 2021-05-06 RX ADMIN — NIFEDIPINE 30 MG: 30 TABLET, FILM COATED, EXTENDED RELEASE ORAL at 08:43

## 2021-05-06 RX ADMIN — CARVEDILOL 25 MG: 25 TABLET, FILM COATED ORAL at 16:36

## 2021-05-06 RX ADMIN — IPRATROPIUM BROMIDE AND ALBUTEROL SULFATE 1 AMPULE: .5; 2.5 SOLUTION RESPIRATORY (INHALATION) at 20:40

## 2021-05-06 RX ADMIN — IPRATROPIUM BROMIDE AND ALBUTEROL SULFATE 1 AMPULE: .5; 2.5 SOLUTION RESPIRATORY (INHALATION) at 13:28

## 2021-05-06 RX ADMIN — CLONIDINE HYDROCHLORIDE 0.1 MG: 0.1 TABLET ORAL at 22:02

## 2021-05-06 RX ADMIN — CLONIDINE HYDROCHLORIDE 0.1 MG: 0.1 TABLET ORAL at 08:44

## 2021-05-06 RX ADMIN — HYDRALAZINE HYDROCHLORIDE 100 MG: 50 TABLET, FILM COATED ORAL at 08:10

## 2021-05-06 RX ADMIN — SODIUM CHLORIDE, PRESERVATIVE FREE 10 ML: 5 INJECTION INTRAVENOUS at 22:06

## 2021-05-06 RX ADMIN — GABAPENTIN 200 MG: 100 CAPSULE ORAL at 22:01

## 2021-05-06 RX ADMIN — ISOSORBIDE MONONITRATE 60 MG: 60 TABLET ORAL at 08:43

## 2021-05-06 RX ADMIN — BUDESONIDE AND FORMOTEROL FUMARATE DIHYDRATE 2 PUFF: 160; 4.5 AEROSOL RESPIRATORY (INHALATION) at 09:14

## 2021-05-06 RX ADMIN — IPRATROPIUM BROMIDE AND ALBUTEROL SULFATE 1 AMPULE: .5; 2.5 SOLUTION RESPIRATORY (INHALATION) at 09:14

## 2021-05-06 RX ADMIN — IPRATROPIUM BROMIDE AND ALBUTEROL SULFATE 1 AMPULE: .5; 2.5 SOLUTION RESPIRATORY (INHALATION) at 17:17

## 2021-05-06 NOTE — PROGRESS NOTES
Renal Progress Note    Patient :  Deanna Perera; 73 y.o. MRN# [id number]  Location:  2022/2022-01  Attending:  Dell Kinney MD  Admit Date:  5/3/2021   Hospital Day: 3      Subjective:     Oral intake good. Urine output good. Feels well. No shortness of breath orthopnea. Creatinine slightly higher than yesterday at 3.8. Likely related to ATN from proper fluctuation of blood pressure and also from diuretic use. Patient tells me he uses Bumex sparingly only when he has lower extremity edema. Blood pressure now in the 130150 range. Medications adjusted, Cozaar on hold, clonidine dose decreased, Coreg dose decreased as well. Patient admits to labile blood pressures. Came in as a pressure was more than 332 systolic. Tells me typically the pressure comes down when he takes his medicines however this time it did not. Urine drug screen was positive for cocaine. He believes he may have inhaled accidentally because his son uses cocaine and he was cleaning up after him. Baseline creatinine now in the 33.2 range. Proteinuria in the 3 to 4 g range. Follows up with [facility] last seen by him more than a year ago. Appetite good. No fatigue or weight loss. Outpatient Medications:     Medications Prior to Admission: cloNIDine (CATAPRES) 0.1 MG tablet, Take 1 tablet by mouth 2 times daily  BASAGLAR KWIKPEN 100 UNIT/ML injection pen, INJECT 35 UNITS INTO THE SKIN NIGHTLY  nitroGLYCERIN (NITROSTAT) 0.4 MG SL tablet, USE 1 TABLET UNDER THE TONGUE UP TO MAXIMUM OF 3 TOTAL DOSES.  IF NO RELIEF AFTER 1 DOSE, CALL 911.  gabapentin (NEURONTIN) 100 MG capsule, TAKE 2 CAPSULES BY MOUTH 3 TIMES DAILY  QUEtiapine (SEROQUEL) 100 MG tablet, TAKE 1 TABLET BY MOUTH NIGHTLY  losartan (COZAAR) 50 MG tablet, TAKE 1 TABLET BY MOUTH 2 TIMES DAILY  GNP VITAMIN D MAXIMUM STRENGTH 50 MCG (2000 UT) TABS, TAKE 1 TABLET BY MOUTH ONCE DAILY  bumetanide (BUMEX) 2 MG tablet, TAKE 1 TABLET BY MOUTH 2 TIMES DAILY  TRELEGY ELLIPTA 100-62.5-25 MCG/INH AEPB, INHALE ONE PUFF INTO THE LUNGS DAILY  prazosin (MINIPRESS) 2 MG capsule, 2 CAPSULES BY MOUTH (4MG) TWICE DAILY  COMBIVENT RESPIMAT  MCG/ACT AERS inhaler, INHALE 1 PUFF INTO THE LUNGS EVERY 6 HOURS  hydrALAZINE (APRESOLINE) 100 MG tablet, Take 1 tablet by mouth every 8 hours  aspirin (ASPIR-LOW) 81 MG EC tablet, TAKE 1 TABLET BY MOUTH DAILY  NIFEdipine (ADALAT CC) 30 MG extended release tablet, Take 1 tablet by mouth daily Takes 30mg 3 times daily  carvedilol (COREG) 25 MG tablet, Take 1 tablet by mouth 2 times daily (with meals)  atorvastatin (LIPITOR) 80 MG tablet, Take 1 tablet by mouth daily  clopidogrel (PLAVIX) 75 MG tablet, Take 1 tablet by mouth daily  isosorbide mononitrate (IMDUR) 60 MG extended release tablet, Take 1 tablet by mouth daily  vitamin D (ERGOCALCIFEROL) 1.25 MG (29385 UT) CAPS capsule, Take 1 capsule by mouth once a week  ipratropium-albuterol (DUONEB) 0.5-2.5 (3) MG/3ML SOLN nebulizer solution, Inhale 3 mLs into the lungs every 6 hours as needed for Shortness of Breath  Nutritional Supplements (GLUCERNA CARBSTEADY) LIQD, Take 1 Can by mouth 3 times daily  glucagon 1 MG injection, Inject 1 mg into the muscle See Admin Instructions Follow package directions for low blood sugar. fluticasone (FLONASE) 50 MCG/ACT nasal spray,   insulin aspart (NOVOLOG FLEXPEN) 100 UNIT/ML injection pen, Inject 5 Units into the skin 3 times daily (before meals) Sliding scale  blood glucose monitor strips, Test_4__times daily Diagnosis: 250.0   Diabetes Mellitus_x___Insulin Dependent____Non-Insulin Dependent  blood glucose monitor kit and supplies, Dispense sufficient amount for indicated testing frequency plus additional to accommodate PRN testing needs. Dispense all needed supplies to include: monitor, strips, lancing device, lancets, control solutions, alcohol swabs.   Lancets MISC, Use_4__times daily Diagnisis:250.0  Diabetes Mellitus__x__Insulin Dependent___Non-Insulin Dependent  COMFORT EZ PEN NEEDLES 31G X 8 MM MISC, USE AS DIRECTED  TRUE METRIX BLOOD GLUCOSE TEST strip, TEST BLOOD SUGAR 4 TO 5 TIMES DAILY (Patient not taking: Reported on 2021)    Current Medications:     Scheduled Meds:    sodium bicarbonate  1,300 mg Oral BID    cloNIDine  0.1 mg Oral BID    carvedilol  25 mg Oral BID WC    aspirin  81 mg Oral Daily    atorvastatin  80 mg Oral Daily    insulin glargine  35 Units Subcutaneous Nightly    [Held by provider] bumetanide  2 mg Oral Daily    clopidogrel  75 mg Oral Daily    fluticasone  2 spray Each Nostril Daily    gabapentin  200 mg Oral BID    hydrALAZINE  100 mg Oral 3 times per day    isosorbide mononitrate  60 mg Oral Daily    [Held by provider] losartan  50 mg Oral BID    NIFEdipine  30 mg Oral Daily    prazosin  4 mg Oral BID    QUEtiapine  100 mg Oral Nightly    sodium chloride flush  5-40 mL Intravenous 2 times per day    heparin (porcine)  5,000 Units Subcutaneous 3 times per day    budesonide-formoterol  2 puff Inhalation BID    insulin lispro  0-6 Units Subcutaneous TID     insulin lispro  0-3 Units Subcutaneous Nightly    ipratropium-albuterol  1 ampule Inhalation Q4H While awake     Continuous Infusions:    dextrose 100 mL/hr (21 0717)    sodium chloride 50 mL/hr at 21    sodium chloride       PRN Meds:  glucose, dextrose, glucagon (rDNA), dextrose, albuterol, ipratropium-albuterol, sodium chloride flush, sodium chloride, nicotine, promethazine **OR** ondansetron, acetaminophen **OR** acetaminophen, polyethylene glycol    Input/Output:       I/O last 3 completed shifts: In: 2482 [P.O.:1300; I.V.:1182]  Out: 8432 [Urine:1685].       Patient Vitals for the past 96 hrs (Last 3 readings):   Weight   21 0715 127 lb 6.4 oz (57.8 kg)   21 0900 127 lb 14.4 oz (58 kg)   21 1320 140 lb (63.5 kg)       Vital Signs:   Temperature:  Temp: 98.4 °F (36.9 °C)  TMax:   Temp (24hrs), Av °F (36.7 °C), Min:97.7 °F (36.5 °C), Max:98.4 °F (36.9 °C)    Respirations:  Resp: 16  Pulse:   Pulse: 60  BP:    BP: (!) 130/37  BP Range: Systolic (01QZW), NOU:003 , Min:118 , LGB:160       Diastolic (09SSV), HZJ:08, Min:36, Max:62      Physical Examination:     General:  AAO x 3, speaking in full sentences, no accessory muscle use. HEENT: Atraumatic, normocephalic, no throat congestion, moist mucosa. Eyes:   Pupils equal, round and reactive to light, EOMI. Neck:   No JVD, no thyromegaly, no lymphadenopathy. Chest:  Bilateral vesicular breath sounds, no rales or wheezes. Cardiac:  S1 S2 RR, no murmurs, gallops or rubs, JVP not raised. Abdomen: Soft, non-tender, no masses or organomegaly, BS audible. :   No suprapubic or flank tenderness. Neuro:  AAO x 3, No FND. SKIN:  No rashes, good skin turgor. Extremities:  Trace edema, palpable peripheral pulses, no calf tenderness.     Labs:       Recent Labs     05/03/21  1338 05/04/21  0518 05/06/21  0551   WBC 5.0 5.8 8.7   RBC 3.87* 3.16* 2.81*   HGB 11.9* 9.8* 8.7*   HCT 35.9* 31.4* 27.9*   MCV 92.8 99.4 99.3   MCH 30.7 31.0 31.0   MCHC 33.1 31.2 31.2   RDW 12.4 12.2 12.5   PLT See Reflexed IPF Result 322 295   MPV NOT REPORTED 9.6 9.9      BMP:   Recent Labs     05/03/21  1338 05/04/21  0518 05/06/21  0551    136 135   K 5.5* 4.5 5.1    106 105   CO2 19* 21 18*   BUN 73* 69* 85*   CREATININE 2.70* 3.12* 3.82*   GLUCOSE 174* 146* 226*   CALCIUM 8.6 7.5* 7.0*      Phosphorus:     Recent Labs     05/06/21  0551   PHOS 3.7     Magnesium:    Recent Labs     05/06/21  0551   MG 1.3*     Albumin:    Recent Labs     05/03/21  1338 05/04/21  0518 05/06/21  0551   LABALBU 3.1* 2.3* 2.3*     BNP:      Lab Results   Component Value Date    BNP 84 11/27/2013     RIZWAN:      Lab Results   Component Value Date    RIZWAN NEGATIVE 12/04/2020     SPEP:  Lab Results   Component Value Date    PROT 4.7 05/04/2021    PROT 6.2 11/12/2011    ALBCAL 2.3 12/04/2020    ALBPCT 55 12/04/2020    LABALPH 0.1 12/04/2020    LABALPH 0.7 12/04/2020    A1PCT 3 12/04/2020    A2PCT 17 12/04/2020    LABBETA 0.5 12/04/2020    BETAPCT 13 12/04/2020    GAMGLOB 0.5 12/04/2020    GGPCT 12 12/04/2020    PATH ELECTRONICALLY SIGNED. Quinn Magallanes M.D. 12/04/2020    PATH ELECTRONICALLY SIGNED. Quinn Magallanes M.D. 12/04/2020     UPEP:     Lab Results   Component Value Date    LABPE  11/12/2011     ELEVATED PROTEIN CONCENTRATION. MOST SERUM PROTEINS ARE DETECTED IN THIS URINE.      C3:     Lab Results   Component Value Date    C3 89 12/04/2020     C4:     Lab Results   Component Value Date    C4 17 12/04/2020     MPO ANCA:   No results found for: MPO  PR3 ANCA:   No results found for: PR3  Anti-GBM:   No results found for: GBMABIGG  Hep BsAg:         Lab Results   Component Value Date    HEPBSAG NONREACTIVE 12/04/2020     Hep C AB:          Lab Results   Component Value Date    HEPCAB NONREACTIVE 12/04/2020       Urinalysis/Chemistries:      Lab Results   Component Value Date    NITRU NEGATIVE 05/03/2021    COLORU YELLOW 05/03/2021    PHUR 6.0 05/03/2021    WBCUA 2 TO 5 05/03/2021    RBCUA 0 TO 2 05/03/2021    MUCUS NOT REPORTED 05/03/2021    TRICHOMONAS NOT REPORTED 05/03/2021    YEAST NOT REPORTED 05/03/2021    BACTERIA NOT REPORTED 05/03/2021    CLARITYU Clear 09/12/2014    SPECGRAV 1.016 05/03/2021    LEUKOCYTESUR NEGATIVE 05/03/2021    UROBILINOGEN Normal 05/03/2021    BILIRUBINUR NEGATIVE 05/03/2021    BILIRUBINUR NEGATIVE 11/12/2011    BLOODU Negative 09/12/2014    GLUCOSEU 1+ 05/03/2021    GLUCOSEU 3+ 11/12/2011    KETUA NEGATIVE 05/03/2021    AMORPHOUS NOT REPORTED 05/03/2021     Urine Sodium:     Lab Results   Component Value Date    IGNACIO 33 05/05/2021     Urine Potassium:    Lab Results   Component Value Date    KUR 38.1 12/04/2020     Urine Chloride:    Lab Results   Component Value Date    CLUR 37 05/05/2021     Urine Osmolarity:   Lab Results   Component Value Date    OSMOU 463 12/04/2020     Urine Protein:   No components found for: TOTALPROTEIN, URINE   Urine Creatinine:     Lab Results   Component Value Date    LABCREA 51.5 05/05/2021     Urine Eosinophils:  No components found for: UEOS    Radiology:     CXR:     Assessment:     1. Acute Kidney Injury: Likely ischemic ATN from fluctuating blood pressures, volume depletion from Bumex use. Baseline creatinine 3.03.3 peaked up to 3.8 expected to improve  2. 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 Uncontrolled hypertension multifactorial, cocaine in the system could be a contributing factor, now better controlled  4. 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 Continue to hold Bumex and Cozaar  2. Agree with current blood pressure regimen  3. Normal saline 50 mg for 24 hours  4. Once creatinine starts improving patient can be discharged home hopefully tomorrow same was discussed with him  5. Outpatient follow-up with [facility] in 3 to 4 weeks    Nutrition   Please ensure that patient is on a renal diet/TF. Avoid nephrotoxic drugs/contrast exposure. We will continue to follow along with you.

## 2021-05-06 NOTE — PROGRESS NOTES
[facility]  Office: [address], DO, Kim Frias, DO, Roberto Jack, DO, Viktor Forrest Blood, DO, Je Maldonado MD, Misty Lynch MD, Grace Sanders MD, Silvina Ayon MD, Halley Wen MD, Erika Hazel MD, Emeterio Sahni MD, Moses Randhawa MD, Tonny Reyes DO, Gina Escalante MD, Akbar Bryant DO, Dale Lee MD,  Sue Merino, DO, Radha Rivas MD, Angeles Ervin MD, Evelyn Maya MD, Saturnino Moreno MD, Bonnie Prescott Cha, CNP, Jason Law, CNP, Trey Potts, CNS, Lidia Byrnes, CNP, Linda Damon, CNP, Olga Mans, CNP, Yaya Aiken, CNP, Davida Alegre, CNP, Milissa Pallas, PA-C, Trudi Soulier, DNP, Shahid Luke, CNP, Evelin Freitas, CNP, Tanya Sanders, CNP, Jarek Magallanes, CNP, Ru Reeder, CNP, [facility], [address] [place] [address]    Progress Note    5/6/2021    10:33 AM    Name:   Liza Salter  MRN:     [id number]     Acct:      [de-identified]   Room:   2022/2022-01   Day:  3  Admit Date:  5/3/2021  1:18 PM    PCP:   TERRANCE Casper CNP  Code Status:  Full Code    Subjective:     C/C:   Chief Complaint   Patient presents with    Hypertension     dizziness and HTN     Interval History Status: improved. Pt seen and evaluated this morning. He is feeling better today. He had a slight headache this morning which has since resolved. Blood pressure is appropriately controlled. No additional episodes of hypotension. Labs reviewed. He did have some worsening in his creatinine today (3.12 --> 3.82). He is afebrile and hemodynamically stable. Brief History:     73year-old male with history of longstanding uncontrolled hypertension, non-obstructive CAD, HFpEF, CKD, uncontrolled DM. He presented to the ED complaining of elevated blood pressure and headache. Blood pressure noted to be elevated to 229/79 upon presentation. Pt tested positive for cocaine, but denies use. He was placed on clevidipine infusion. He was also noted to have an BINU and nephrology was consulted. Troponin elevated and cardiology was consulted. Both kidney injury and troponin elevation attributed to uncontrolled hypertension. Patient's home med regimen was resumed and he was successfully weaned of clevidipine drip. Echocardiogram did not reveal any wall motion abnormality and no further cardiac work-up was pursued. Blood glucose control was established with the patient's home insulin regimen. Review of Systems:     Constitutional:  negative for chills, fevers, sweats  Respiratory:  negative for cough, dyspnea on exertion, shortness of breath, wheezing  Cardiovascular:  negative for chest pain, chest pressure/discomfort, lower extremity edema, palpitations  Gastrointestinal:  negative for abdominal pain, constipation, diarrhea, nausea, vomiting  Neurological:  negative for dizziness, headache    Medications: Allergies:     Allergies   Allergen Reactions    Lisinopril      cough    Ace Inhibitors      coughing    Pcn [Penicillins]        Current Meds:   Scheduled Meds:    sodium bicarbonate  1,300 mg Oral BID    cloNIDine  0.1 mg Oral BID    carvedilol  25 mg Oral BID WC    aspirin  81 mg Oral Daily    atorvastatin  80 mg Oral Daily    insulin glargine  35 Units Subcutaneous Nightly    [Held by provider] bumetanide  2 mg Oral Daily    clopidogrel  75 mg Oral Daily    fluticasone  2 spray Each Nostril Daily    gabapentin  200 mg Oral BID    hydrALAZINE  100 mg Oral 3 times per day    isosorbide mononitrate  60 mg Oral Daily    [Held by provider] losartan  50 mg Oral BID    NIFEdipine  30 mg Oral Daily    prazosin  4 mg Oral BID    QUEtiapine  100 mg Oral Nightly    sodium chloride flush  5-40 mL Intravenous 2 times per day    heparin (porcine)  5,000 Units Subcutaneous 3 times per day    budesonide-formoterol  2 puff Inhalation BID    insulin lispro  0-6 Units Subcutaneous TID WC  insulin lispro  0-3 Units Subcutaneous Nightly    ipratropium-albuterol  1 ampule Inhalation Q4H While awake     Continuous Infusions:    dextrose 100 mL/hr (21 0717)    sodium chloride 50 mL/hr at 21    sodium chloride       PRN Meds: glucose, dextrose, glucagon (rDNA), dextrose, albuterol, ipratropium-albuterol, sodium chloride flush, sodium chloride, nicotine, promethazine **OR** ondansetron, acetaminophen **OR** acetaminophen, polyethylene glycol    Data:     Past Medical History:   has a past medical history of Asthma, CAD in native artery, nonobstructive, Carotid stenosis, left, Carpal tunnel syndrome, Cerebrovascular disease, Chronic kidney disease, COPD (chronic obstructive pulmonary disease) ([facility] [address] Diabetes mellitus ([facility] [address] History of colon polyps, History of weakness of extremity, HTN (hypertension), Hyperlipidemia, Lung, cysts, congenital, PTSD (post-traumatic stress disorder), PTSD (post-traumatic stress disorder), TIA (transient ischemic attack), and Type II or unspecified type diabetes mellitus without mention of complication, not stated as uncontrolled. Social History:   reports that he quit smoking about 6 years ago. His smoking use included cigarettes. He has a 17.50 pack-year smoking history. He has never used smokeless tobacco. He reports that he does not drink alcohol or use drugs. Family History:   Family History   Problem Relation Age of Onset    Cancer Mother     Heart Disease Father        Vitals:  BP (!) 165/62   Pulse 64   Temp 98.1 °F (36.7 °C) (Oral)   Resp 16   Ht 5' 6\" (1.676 m)   Wt 127 lb 6.4 oz (57.8 kg)   SpO2 99%   BMI 20.56 kg/m²   Temp (24hrs), Av.9 °F (36.6 °C), Min:97.7 °F (36.5 °C), Max:98.1 °F (36.7 °C)    Recent Labs     21  0123 21  0334 21  0650   POCGLU 139* 234* 186* 213*       I/O (24Hr):     Intake/Output Summary (Last 24 hours) at 2021 1033  Last data filed at 2021 0850  Gross per 24 hour   Intake 2782 ml   Output 1685 ml   Net 1097 ml       Labs:  Hematology:  Recent Labs     05/03/21  1338 05/03/21  1505 05/04/21  0518 05/06/21  0551   WBC 5.0  --  5.8 8.7   RBC 3.87*  --  3.16* 2.81*   HGB 11.9*  --  9.8* 8.7*   HCT 35.9*  --  31.4* 27.9*   MCV 92.8  --  99.4 99.3   MCH 30.7  --  31.0 31.0   MCHC 33.1  --  31.2 31.2   RDW 12.4  --  12.2 12.5   PLT See Reflexed IPF Result  --  322 295   MPV NOT REPORTED  --  9.6 9.9   INR  --  0.9  --   --      Chemistry:  Recent Labs     05/03/21  1338 05/03/21  1505 05/03/21  1505 05/04/21  0518 05/05/21  0025 05/05/21  0626 05/05/21  1218 05/06/21  0551     --   --  136  --   --   --  135   K 5.5*  --   --  4.5  --   --   --  5.1     --   --  106  --   --   --  105   CO2 19*  --   --  21  --   --   --  18*   GLUCOSE 174*  --   --  146*  --   --   --  226*   BUN 73*  --   --  69*  --   --   --  85*   CREATININE 2.70*  --   --  3.12*  --   --   --  3.82*   MG  --   --   --   --   --   --   --  1.3*   ANIONGAP 10  --   --  9  --   --   --  12   LABGLOM 24*  --   --  20*  --   --   --  16*   GFRAA 29*  --   --  24*  --   --   --  19*   CALCIUM 8.6  --   --  7.5*  --   --   --  7.0*   PHOS  --   --   --   --   --   --   --  3.7   PROBNP  --  13,486*  --   --   --   --   --   --    TROPHS 96*  --    < > 89* 77* 87* 89*  --     < > = values in this interval not displayed.      Recent Labs     05/03/21  1338 05/03/21  1338 05/04/21  0518 05/04/21  0518 05/05/21  1618 05/05/21  1821 05/05/21 2020 05/06/21  0123 05/06/21  0334 05/06/21  0551 05/06/21  0650   PROT 5.8*  --  4.7*  --   --   --   --   --   --   --   --    LABALBU 3.1*  --  2.3*  --   --   --   --   --   --  2.3*  --    AST 22  --  12  --   --   --   --   --   --   --   --    ALT 21  --  15  --   --   --   --   --   --   --   --    ALKPHOS 104  --  83  --   --   --   --   --   --   --   --    BILITOT <0.10*  --  <0.10*  --   --   --   --   --   --   --   --    CHOL  --   --  256*  --   -- DVT prophylaxis    #Disposition: likely discharge tomorrow pending improvement in creatinine    Modesto Resendiz PA-C  5/6/2021  10:33 AM

## 2021-05-06 NOTE — PLAN OF CARE
Call light within reach. Bed in lowest position. IV fluids given. Input and output assessed. EKG done. Pain assessed.

## 2021-05-07 LAB
ANION GAP SERPL CALCULATED.3IONS-SCNC: 11 MMOL/L (ref 9–17)
ANION GAP SERPL CALCULATED.3IONS-SCNC: 13 MMOL/L (ref 9–17)
BUN BLDV-MCNC: 84 MG/DL (ref 8–23)
BUN BLDV-MCNC: 85 MG/DL (ref 8–23)
BUN/CREAT BLD: ABNORMAL (ref 9–20)
BUN/CREAT BLD: ABNORMAL (ref 9–20)
CALCIUM SERPL-MCNC: 7.6 MG/DL (ref 8.6–10.4)
CALCIUM SERPL-MCNC: 7.7 MG/DL (ref 8.6–10.4)
CHLORIDE BLD-SCNC: 108 MMOL/L (ref 98–107)
CHLORIDE BLD-SCNC: 111 MMOL/L (ref 98–107)
CO2: 19 MMOL/L (ref 20–31)
CO2: 19 MMOL/L (ref 20–31)
CREAT SERPL-MCNC: 3.9 MG/DL (ref 0.7–1.2)
CREAT SERPL-MCNC: 3.91 MG/DL (ref 0.7–1.2)
GFR AFRICAN AMERICAN: 19 ML/MIN
GFR AFRICAN AMERICAN: 19 ML/MIN
GFR NON-AFRICAN AMERICAN: 16 ML/MIN
GFR NON-AFRICAN AMERICAN: 16 ML/MIN
GFR SERPL CREATININE-BSD FRML MDRD: ABNORMAL ML/MIN/{1.73_M2}
GLUCOSE BLD-MCNC: 111 MG/DL (ref 75–110)
GLUCOSE BLD-MCNC: 132 MG/DL (ref 75–110)
GLUCOSE BLD-MCNC: 167 MG/DL (ref 75–110)
GLUCOSE BLD-MCNC: 175 MG/DL (ref 70–99)
GLUCOSE BLD-MCNC: 212 MG/DL (ref 75–110)
GLUCOSE BLD-MCNC: 220 MG/DL (ref 70–99)
GLUCOSE BLD-MCNC: 95 MG/DL (ref 75–110)
HCT VFR BLD CALC: 29.1 % (ref 40.7–50.3)
HEMOGLOBIN: 8.6 G/DL (ref 13–17)
MCH RBC QN AUTO: 30.5 PG (ref 25.2–33.5)
MCHC RBC AUTO-ENTMCNC: 29.6 G/DL (ref 28.4–34.8)
MCV RBC AUTO: 103.2 FL (ref 82.6–102.9)
NRBC AUTOMATED: 0 PER 100 WBC
PDW BLD-RTO: 12.9 % (ref 11.8–14.4)
PLATELET # BLD: 315 K/UL (ref 138–453)
PMV BLD AUTO: 10.3 FL (ref 8.1–13.5)
POTASSIUM SERPL-SCNC: 5.2 MMOL/L (ref 3.7–5.3)
POTASSIUM SERPL-SCNC: 5.2 MMOL/L (ref 3.7–5.3)
RBC # BLD: 2.82 M/UL (ref 4.21–5.77)
SODIUM BLD-SCNC: 138 MMOL/L (ref 135–144)
SODIUM BLD-SCNC: 143 MMOL/L (ref 135–144)
WBC # BLD: 6.6 K/UL (ref 3.5–11.3)

## 2021-05-07 PROCEDURE — 6370000000 HC RX 637 (ALT 250 FOR IP): Performed by: PHYSICIAN ASSISTANT

## 2021-05-07 PROCEDURE — 2060000000 HC ICU INTERMEDIATE R&B

## 2021-05-07 PROCEDURE — 6360000002 HC RX W HCPCS: Performed by: NURSE PRACTITIONER

## 2021-05-07 PROCEDURE — 80048 BASIC METABOLIC PNL TOTAL CA: CPT

## 2021-05-07 PROCEDURE — 99232 SBSQ HOSP IP/OBS MODERATE 35: CPT | Performed by: INTERNAL MEDICINE

## 2021-05-07 PROCEDURE — 94761 N-INVAS EAR/PLS OXIMETRY MLT: CPT

## 2021-05-07 PROCEDURE — 82947 ASSAY GLUCOSE BLOOD QUANT: CPT

## 2021-05-07 PROCEDURE — 94640 AIRWAY INHALATION TREATMENT: CPT

## 2021-05-07 PROCEDURE — 36415 COLL VENOUS BLD VENIPUNCTURE: CPT

## 2021-05-07 PROCEDURE — 6370000000 HC RX 637 (ALT 250 FOR IP): Performed by: NURSE PRACTITIONER

## 2021-05-07 PROCEDURE — 2580000003 HC RX 258: Performed by: NURSE PRACTITIONER

## 2021-05-07 PROCEDURE — 6370000000 HC RX 637 (ALT 250 FOR IP): Performed by: INTERNAL MEDICINE

## 2021-05-07 PROCEDURE — 85027 COMPLETE CBC AUTOMATED: CPT

## 2021-05-07 RX ORDER — METHYLPREDNISOLONE SODIUM SUCCINATE 125 MG/2ML
125 INJECTION, POWDER, LYOPHILIZED, FOR SOLUTION INTRAMUSCULAR; INTRAVENOUS ONCE
Status: COMPLETED | OUTPATIENT
Start: 2021-05-07 | End: 2021-05-07

## 2021-05-07 RX ORDER — NIFEDIPINE 60 MG/1
60 TABLET, FILM COATED, EXTENDED RELEASE ORAL DAILY
Status: DISCONTINUED | OUTPATIENT
Start: 2021-05-08 | End: 2021-05-10

## 2021-05-07 RX ORDER — CLONIDINE HYDROCHLORIDE 0.1 MG/1
0.1 TABLET ORAL 3 TIMES DAILY
Status: DISCONTINUED | OUTPATIENT
Start: 2021-05-07 | End: 2021-05-12

## 2021-05-07 RX ADMIN — GABAPENTIN 200 MG: 100 CAPSULE ORAL at 09:05

## 2021-05-07 RX ADMIN — NIFEDIPINE 30 MG: 30 TABLET, FILM COATED, EXTENDED RELEASE ORAL at 09:04

## 2021-05-07 RX ADMIN — IPRATROPIUM BROMIDE AND ALBUTEROL SULFATE 1 AMPULE: .5; 2.5 SOLUTION RESPIRATORY (INHALATION) at 08:37

## 2021-05-07 RX ADMIN — INSULIN GLARGINE 35 UNITS: 100 INJECTION, SOLUTION SUBCUTANEOUS at 21:29

## 2021-05-07 RX ADMIN — CARVEDILOL 25 MG: 25 TABLET, FILM COATED ORAL at 09:05

## 2021-05-07 RX ADMIN — METHYLPREDNISOLONE SODIUM SUCCINATE 125 MG: 125 INJECTION, POWDER, FOR SOLUTION INTRAMUSCULAR; INTRAVENOUS at 23:13

## 2021-05-07 RX ADMIN — CLONIDINE HYDROCHLORIDE 0.1 MG: 0.1 TABLET ORAL at 20:00

## 2021-05-07 RX ADMIN — CLOPIDOGREL 75 MG: 75 TABLET, FILM COATED ORAL at 09:05

## 2021-05-07 RX ADMIN — BUDESONIDE AND FORMOTEROL FUMARATE DIHYDRATE 2 PUFF: 160; 4.5 AEROSOL RESPIRATORY (INHALATION) at 08:37

## 2021-05-07 RX ADMIN — CLONIDINE HYDROCHLORIDE 0.1 MG: 0.1 TABLET ORAL at 15:18

## 2021-05-07 RX ADMIN — HEPARIN SODIUM 5000 UNITS: 5000 INJECTION INTRAVENOUS; SUBCUTANEOUS at 09:05

## 2021-05-07 RX ADMIN — BUDESONIDE AND FORMOTEROL FUMARATE DIHYDRATE 2 PUFF: 160; 4.5 AEROSOL RESPIRATORY (INHALATION) at 20:15

## 2021-05-07 RX ADMIN — ALBUTEROL SULFATE 2.5 MG: 2.5 SOLUTION RESPIRATORY (INHALATION) at 21:04

## 2021-05-07 RX ADMIN — SODIUM BICARBONATE 1300 MG: 648 TABLET ORAL at 19:59

## 2021-05-07 RX ADMIN — ALBUTEROL SULFATE 2.5 MG: 2.5 SOLUTION RESPIRATORY (INHALATION) at 01:33

## 2021-05-07 RX ADMIN — HYDRALAZINE HYDROCHLORIDE 100 MG: 50 TABLET, FILM COATED ORAL at 09:04

## 2021-05-07 RX ADMIN — PRAZOSIN HYDROCHLORIDE 4 MG: 1 CAPSULE ORAL at 19:59

## 2021-05-07 RX ADMIN — Medication 81 MG: at 09:04

## 2021-05-07 RX ADMIN — SODIUM CHLORIDE, PRESERVATIVE FREE 10 ML: 5 INJECTION INTRAVENOUS at 20:01

## 2021-05-07 RX ADMIN — IPRATROPIUM BROMIDE AND ALBUTEROL SULFATE 1 AMPULE: .5; 2.5 SOLUTION RESPIRATORY (INHALATION) at 10:57

## 2021-05-07 RX ADMIN — IPRATROPIUM BROMIDE AND ALBUTEROL SULFATE 3 ML: .5; 3 SOLUTION RESPIRATORY (INHALATION) at 21:04

## 2021-05-07 RX ADMIN — PRAZOSIN HYDROCHLORIDE 4 MG: 1 CAPSULE ORAL at 09:04

## 2021-05-07 RX ADMIN — IPRATROPIUM BROMIDE AND ALBUTEROL SULFATE 1 AMPULE: .5; 2.5 SOLUTION RESPIRATORY (INHALATION) at 16:06

## 2021-05-07 RX ADMIN — ATORVASTATIN CALCIUM 80 MG: 80 TABLET, FILM COATED ORAL at 20:00

## 2021-05-07 RX ADMIN — QUETIAPINE FUMARATE 100 MG: 100 TABLET ORAL at 01:04

## 2021-05-07 RX ADMIN — IPRATROPIUM BROMIDE AND ALBUTEROL SULFATE 1 AMPULE: .5; 2.5 SOLUTION RESPIRATORY (INHALATION) at 20:15

## 2021-05-07 RX ADMIN — SODIUM BICARBONATE 1300 MG: 648 TABLET ORAL at 09:04

## 2021-05-07 RX ADMIN — ACETAMINOPHEN 650 MG: 325 TABLET ORAL at 17:00

## 2021-05-07 RX ADMIN — HEPARIN SODIUM 5000 UNITS: 5000 INJECTION INTRAVENOUS; SUBCUTANEOUS at 18:20

## 2021-05-07 RX ADMIN — GABAPENTIN 200 MG: 100 CAPSULE ORAL at 20:00

## 2021-05-07 RX ADMIN — ISOSORBIDE MONONITRATE 60 MG: 60 TABLET ORAL at 09:05

## 2021-05-07 RX ADMIN — HYDRALAZINE HYDROCHLORIDE 100 MG: 50 TABLET, FILM COATED ORAL at 18:22

## 2021-05-07 RX ADMIN — QUETIAPINE FUMARATE 100 MG: 100 TABLET ORAL at 19:59

## 2021-05-07 RX ADMIN — CLONIDINE HYDROCHLORIDE 0.1 MG: 0.1 TABLET ORAL at 09:05

## 2021-05-07 ASSESSMENT — PAIN SCALES - GENERAL: PAINLEVEL_OUTOF10: 8

## 2021-05-07 NOTE — PLAN OF CARE
Problem: Falls - Risk of:  Goal: Will remain free from falls  Description: Will remain free from falls  5/7/2021 1702 by José Gurrola RN  Outcome: Ongoing  5/7/2021 1006 by Binu Bedoya RN  Outcome: Ongoing  Goal: Absence of physical injury  Description: Absence of physical injury  5/7/2021 1702 by José Gurrola RN  Outcome: Ongoing  5/7/2021 1006 by Binu Bedoya RN  Outcome: Ongoing     Problem:  Activity:  Goal: Ability to tolerate increased activity will improve  Description: Ability to tolerate increased activity will improve  5/7/2021 1702 by José Gurrola RN  Outcome: Ongoing  5/7/2021 1006 by Binu Bedoya RN  Outcome: Ongoing     Problem: Gas Exchange - Impaired:  Goal: Levels of oxygenation will improve  Description: Levels of oxygenation will improve  5/7/2021 1702 by José Gurrola RN  Outcome: Ongoing  5/7/2021 1006 by Binu Bedoya RN  Outcome: Ongoing     Problem: Cardiac:  Goal: Ability to maintain vital signs within normal range will improve  Description: Ability to maintain vital signs within normal range will improve  5/7/2021 1702 by José Gurrola RN  Outcome: Ongoing  5/7/2021 1006 by Binu Bedoya RN  Outcome: Ongoing  Goal: Cardiovascular alteration will improve  Description: Cardiovascular alteration will improve  5/7/2021 1702 by José Gurrola RN  Outcome: Ongoing  5/7/2021 1006 by Binu Bedoya RN  Outcome: Ongoing     Problem: Health Behavior:  Goal: Will modify at least one risk factor affecting health status  Description: Will modify at least one risk factor affecting health status  5/7/2021 1702 by José Gurrola RN  Outcome: Ongoing  5/7/2021 1006 by Binu Bedoya RN  Outcome: Ongoing  Goal: Identification of resources available to assist in meeting health care needs will improve  Description: Identification of resources available to assist in meeting health care needs will improve  5/7/2021 1702 by José Gurrola RN  Outcome: Ongoing  5/7/2021 1006 by Luo Mcgregor RN  Outcome: Ongoing     Problem: Physical Regulation:  Goal: Complications related to the disease process, condition or treatment will be avoided or minimized  Description: Complications related to the disease process, condition or treatment will be avoided or minimized  5/7/2021 1702 by Edouard Miranda RN  Outcome: Ongoing  5/7/2021 1006 by Lou Mcgregor RN  Outcome: Ongoing     Problem: Nutrition  Goal: Optimal nutrition therapy  Description: Nutrition Problem #1: Severe malnutrition, In context of chronic illness  Intervention: Food and/or Nutrient Delivery: Continue Current Diet, Start Oral Nutrition Supplement  Nutritional Goals: Pt to meet % of est'd needs daily via PO     5/7/2021 1702 by Edouard Miranda RN  Outcome: Ongoing  5/7/2021 1452 by Rashaun Purcell RD, LD  Note: Nutrition Problem #1: Severe malnutrition, In context of chronic illness  Intervention: Food and/or Nutrient Delivery: Continue Current Diet, Continue Oral Nutrition Supplement  Nutritional Goals: Pt to meet % of est'd needs daily via PO    5/7/2021 1006 by Lou Mcgregor RN  Outcome: Ongoing     Problem: Pain:  Goal: Pain level will decrease  Description: Pain level will decrease  5/7/2021 1702 by Edouard Miranda RN  Outcome: Ongoing  5/7/2021 1006 by Lou Mcgregor RN  Outcome: Ongoing  Goal: Control of acute pain  Description: Control of acute pain  5/7/2021 1702 by Edouard Miranda RN  Outcome: Ongoing  5/7/2021 1006 by Lou Mcgregor RN  Outcome: Ongoing  Goal: Control of chronic pain  Description: Control of chronic pain  5/7/2021 1702 by Edouard Miranda RN  Outcome: Ongoing  5/7/2021 1006 by Lou Mcgregor RN  Outcome: Ongoing

## 2021-05-07 NOTE — PLAN OF CARE
Call light within reach. Bed in lowest position. Patient on telemetry. Monitored labs. Pain assessed.

## 2021-05-07 NOTE — PROGRESS NOTES
Comprehensive Nutrition Assessment    Type and Reason for Visit:  Reassess    Nutrition Recommendations/Plan: Continue current Carbohydrate Controlled, Low Sodium Diet as tolerated. Continue diabetic oral nutrition supplements (Glucerna) 3x/d as tolerated. Monitor/encourage intakes. Nutrition Assessment:  Patient continues on a Carbohydrate Controlled Diet with oral nutrition supplements 3x/d. Patient's appetite and oral intake has improved. Blood glucose range x past 24 hours =  mg/dL. Malnutrition Assessment:  Malnutrition Status:  Severe malnutrition    Context:  Chronic Illness     Findings of the 6 clinical characteristics of malnutrition:  Energy Intake:  7 - 75% or less estimated energy requirements for 1 month or longer  Weight Loss:  7 - Greater than 20% over 1 year     Body Fat Loss:  Unable to assess     Muscle Mass Loss:  Unable to assess    Fluid Accumulation:  No significant fluid accumulation     Strength:  Not Performed    Estimated Daily Nutrient Needs:  Energy (kcal):  8874-3809 kcal/d (25-30 kcal/kg); Weight Used for Energy Requirements:  Current(65.1 kg)     Protein (g):  70-85 g protein/d (1.1-1.3 g/kg); Weight Used for Protein Requirements:  Current(65.1 kg)        Fluid (ml/day):  5005-5054 mL fluid/d; Method Used for Fluid Requirements:  ml/Kg(25-30)      Nutrition Related Findings:  Labs/Meds reviewed. Date of last BM unknown.       Wounds:  None       Current Nutrition Therapies:    DIET CARB CONTROL; Carb Control: 5 carb choices (75 gms)/meal; Low Sodium (2 GM)  Dietary Nutrition Supplements: Diabetic Oral Supplement    Anthropometric Measures:  · Height: 5' 6\" (167.6 cm)  · Current Body Weight: 143 lb 9.6 oz (65.1 kg)   · Admission Body Weight: 127 lb (57.6 kg)    · Usual Body Weight: 165 lb (74.8 kg)(per previous RD note)     · Ideal Body Weight: 142 lbs; % Ideal Body Weight 101.1 %   · BMI: 23.2  · Adjusted Body Weight:  No Adjustment   · BMI Categories: Normal Weight (BMI 18.5-24. 9)       Nutrition Diagnosis:   · Severe malnutrition, In context of chronic illness related to inadequate protein-energy intake, cardiac dysfunction as evidenced by poor intake prior to admission, weight loss greater than or equal to 20% in 1 year(Need for ONS)    Nutrition Interventions:   Food and/or Nutrient Delivery:  Continue Current Diet, Continue Oral Nutrition Supplement  Nutrition Education/Counseling:  No recommendation at this time   Coordination of Nutrition Care:  Continue to monitor while inpatient    Goals:  Pt to meet % of est'd needs daily via PO       Nutrition Monitoring and Evaluation:   Behavioral-Environmental Outcomes:  None Identified   Food/Nutrient Intake Outcomes:  Food and Nutrient Intake, Supplement Intake  Physical Signs/Symptoms Outcomes:  Biochemical Data, Nutrition Focused Physical Findings, Skin, Weight     Discharge Planning:     Too soon to determine     Electronically signed by Fiorella Vergara RD, LD on 5/7/21 at 2:50 PM EDT    Contact: [phone]

## 2021-05-07 NOTE — PROGRESS NOTES
[facility]  Office: [address], DO, Ally Mike, DO, Stacie Whitehead, DO, Dileep Morales Blood, DO, Alondra Luther MD, Gudelia Chávez MD, Adriana Hurst MD, Florina Rasmussen MD, Rocio Thacker MD, Perla Warren MD, Roseann Rivas MD, Sandra Henderson MD, Ra Mathews DO, Rito Hoff MD, Eric Layton DO, Cris Gillette MD,  Heraclio Javier DO, Elmer Byrd MD, Lisa Mccarthy MD, Pankaj Layton MD, Sari Valentin MD, Colleen Garza, Eyal Hwoard, CNP, Albino Ray, CNP, Mabel Lincoln, CAITLIN, Norbert Petersen, CNP, Gerda Charles, CNP, Jackie Flower, CNP, Cecile Whitney, CNP, Marjorie Simpson, CNP, Bert Stewart PA-C, Mian Springer, CASTRO, May Gonzalez, CNP, [facility], CNP, Jez Chacon, CNP, Rae Harden, CNP, Kiley Zhao, CNP, Gisselle Savage, [address] [place] [address]    Progress Note    5/7/2021    5:12 PM    Name:   Lissette Burgess  MRN:     [id number]     Acct:      [de-identified]   Room:   2022/2022-01   Day:  4  Admit Date:  5/3/2021  1:18 PM    PCP:   TERRANCE Burger CNP  Code Status:  Full Code    Subjective:     C/C:   Chief Complaint   Patient presents with    Hypertension     dizziness and HTN     Interval History Status: improved. Pt seen and evaluated this morning. . Labs reviewed. He did have some worsening in his creatinine today (3.12 --> 3.82->3.91). He is afebrile and hemodynamically stable. Brief History:     73year-old male with history of longstanding uncontrolled hypertension, non-obstructive CAD, HFpEF, CKD, uncontrolled DM. He presented to the ED complaining of elevated blood pressure and headache. Blood pressure noted to be elevated to 229/79 upon presentation. Pt tested positive for cocaine, but denies use. He was placed on clevidipine infusion. He was also noted to have an BINU and nephrology was consulted. Troponin elevated and cardiology was consulted.  Both kidney injury and troponin elevation attributed to uncontrolled hypertension. Patient's home med regimen was resumed and he was successfully weaned of clevidipine drip. Echocardiogram did not reveal any wall motion abnormality and no further cardiac work-up was pursued. Blood glucose control was established with the patient's home insulin regimen. Review of Systems:     Constitutional:  negative for chills, fevers, sweats  Respiratory:  negative for cough, dyspnea on exertion, shortness of breath, wheezing  Cardiovascular:  negative for chest pain, chest pressure/discomfort, lower extremity edema, palpitations  Gastrointestinal:  negative for abdominal pain, constipation, diarrhea, nausea, vomiting  Neurological:  negative for dizziness, headache    Medications: Allergies:     Allergies   Allergen Reactions    Lisinopril      cough    Ace Inhibitors      coughing    Pcn [Penicillins]        Current Meds:   Scheduled Meds:    [START ON 5/8/2021] NIFEdipine  60 mg Oral Daily    cloNIDine  0.1 mg Oral TID    sodium bicarbonate  1,300 mg Oral BID    carvedilol  25 mg Oral BID WC    aspirin  81 mg Oral Daily    atorvastatin  80 mg Oral Daily    insulin glargine  35 Units Subcutaneous Nightly    [Held by provider] bumetanide  2 mg Oral Daily    clopidogrel  75 mg Oral Daily    fluticasone  2 spray Each Nostril Daily    gabapentin  200 mg Oral BID    hydrALAZINE  100 mg Oral 3 times per day    isosorbide mononitrate  60 mg Oral Daily    [Held by provider] losartan  50 mg Oral BID    prazosin  4 mg Oral BID    QUEtiapine  100 mg Oral Nightly    sodium chloride flush  5-40 mL Intravenous 2 times per day    heparin (porcine)  5,000 Units Subcutaneous 3 times per day    budesonide-formoterol  2 puff Inhalation BID    insulin lispro  0-6 Units Subcutaneous TID WC    insulin lispro  0-3 Units Subcutaneous Nightly    ipratropium-albuterol  1 ampule Inhalation Q4H While awake     Continuous Infusions:    dextrose 100 mL/hr (21 0717)    sodium chloride       PRN Meds: glucose, dextrose, glucagon (rDNA), dextrose, albuterol, ipratropium-albuterol, sodium chloride flush, sodium chloride, nicotine, promethazine **OR** ondansetron, acetaminophen **OR** acetaminophen, polyethylene glycol    Data:     Past Medical History:   has a past medical history of Asthma, CAD in native artery, nonobstructive, Carotid stenosis, left, Carpal tunnel syndrome, Cerebrovascular disease, Chronic kidney disease, COPD (chronic obstructive pulmonary disease) ([facility] [address] [facility] History of colon polyps, History of weakness of extremity, HTN (hypertension), Hyperlipidemia, Lung, cysts, congenital, PTSD (post-traumatic stress disorder), PTSD (post-traumatic stress disorder), TIA (transient ischemic attack), and Type II or unspecified type diabetes mellitus without mention of complication, not stated as uncontrolled. Social History:   reports that he quit smoking about 6 years ago. His smoking use included cigarettes. He has a 17.50 pack-year smoking history. He has never used smokeless tobacco. He reports that he does not drink alcohol or use drugs. Family History:   Family History   Problem Relation Age of Onset    Cancer Mother     Heart Disease Father        Vitals:  BP (!) 130/46   Pulse 59   Temp 98.6 °F (37 °C) (Oral)   Resp 16   Ht 5' 6\" (1.676 m)   Wt 143 lb 9.6 oz (65.1 kg)   SpO2 96%   BMI 23.18 kg/m²   Temp (24hrs), Av.4 °F (36.9 °C), Min:97.7 °F (36.5 °C), Max:98.6 °F (37 °C)    Recent Labs     21  0533 21  0659 21  1043 21  1546   POCGLU 95 111* 167* 212*       I/O (24Hr):     Intake/Output Summary (Last 24 hours) at 2021 1712  Last data filed at 2021 1043  Gross per 24 hour   Intake    Output 1250 ml   Net -1250 ml       Labs:  Hematology:  Recent Labs     21  0551 21  0659   WBC 8.7 6.6   RBC 2.81* 2.82*   HGB 8.7* 8.6*   HCT 27.9* 29.1*   MCV 99.3 hepatomegaly, splenomegaly  Extremities:  no edema, redness, tenderness in the calves  Skin:  no gross lesions, rashes, induration. There is decreased skin turgor    Assessment:        Hospital Problems           Last Modified POA    * (Principal) Hypertensive emergency 5/5/2021 Yes    CKD (chronic kidney disease) stage 4, GFR 15-29 ml/min ([address] 5/5/2021 Yes    Pure hypercholesterolemia 5/5/2021 Yes    Carotid stenosis, left s/p Endarterectomy 5/5/2021 Yes    DM type 2, uncontrolled, with neuropathy ([address] 5/5/2021 Yes    COPD (chronic obstructive pulmonary disease) ([address] 5/5/2021 Yes    Non-obstructive Coronary artery disease of native artery of native heart with stable angina pectoris ([address] 5/5/2021 Yes    Overview Signed 8/12/2019 11:46 AM by Calixto Santos, APRN - CNP     9/2017     LMCA: Mild irregularities 10-20%. LAD: 30-40% proximal stenosis    LCx: Mild irregularities 10-20%. RCA: 40% mid stenosis         Severe malnutrition ([address] 7/0/7336 Yes    Metabolic acidosis 5/5/3542 Yes    Chronic heart failure with preserved ejection fraction ([address] 5/6/2021 Yes    Left ventricular hypertrophy 5/6/2021 Yes          Plan:        #Hypertensive emergency  - resolved. Continue current regimen  - losartan held per nephrology, n other meds adjusted    #Hypotensive episode two days back, now bp high    #NSTEMI, type 2  - reviewed echocardiogram, no visible wall motion abnormality. Cath from August 2020 with non-obstructive coronary disease. Continue aspirin, plavix, statin, BB    #HFpEF/concentric LVH hypertrophy  - secondary to prolonged hypertension. Establish blood pressure control  - continue to hold bumex  - consider work-up for cardiac amyloid per echo report. SPEP from 2020 unremarkable. Consider cardiac MRI as OP as deemed necessary by cardiology.     #Type 2 DM, uncontrolled, complicated by nephropathy, neuropathy  - continue lantus 35U qhs  - blood glucose checks ac/hs, low dose sliding scale    #BINU on CKD 4  - Likely transient ATN from hypotensive episode --> poor renal perfusion. Nephrology following. Bumex and losartan held. Notably, pt only takes bumex on as needed basis at home.  - add bicarbonate supplement    #COPD   - stable.  Continue inhaler regimen    #Heparin DVT prophylaxis    #Disposition: likely discharge tomorrow pending improvement in creatinine    Emily Dan MD  5/7/2021  5:12 PM

## 2021-05-08 ENCOUNTER — APPOINTMENT (OUTPATIENT)
Dept: GENERAL RADIOLOGY | Age: 66
DRG: 280 | End: 2021-05-08
Payer: COMMERCIAL

## 2021-05-08 LAB
ANION GAP SERPL CALCULATED.3IONS-SCNC: 11 MMOL/L (ref 9–17)
ANION GAP SERPL CALCULATED.3IONS-SCNC: 13 MMOL/L (ref 9–17)
ANION GAP SERPL CALCULATED.3IONS-SCNC: 15 MMOL/L (ref 9–17)
BUN BLDV-MCNC: 89 MG/DL (ref 8–23)
BUN BLDV-MCNC: 92 MG/DL (ref 8–23)
BUN BLDV-MCNC: 94 MG/DL (ref 8–23)
BUN/CREAT BLD: ABNORMAL (ref 9–20)
CALCIUM SERPL-MCNC: 8 MG/DL (ref 8.6–10.4)
CALCIUM SERPL-MCNC: 8.1 MG/DL (ref 8.6–10.4)
CALCIUM SERPL-MCNC: 8.1 MG/DL (ref 8.6–10.4)
CHLORIDE BLD-SCNC: 102 MMOL/L (ref 98–107)
CHLORIDE BLD-SCNC: 105 MMOL/L (ref 98–107)
CHLORIDE BLD-SCNC: 108 MMOL/L (ref 98–107)
CO2: 17 MMOL/L (ref 20–31)
CO2: 18 MMOL/L (ref 20–31)
CO2: 18 MMOL/L (ref 20–31)
CREAT SERPL-MCNC: 3.74 MG/DL (ref 0.7–1.2)
CREAT SERPL-MCNC: 3.78 MG/DL (ref 0.7–1.2)
CREAT SERPL-MCNC: 4.07 MG/DL (ref 0.7–1.2)
GFR AFRICAN AMERICAN: 18 ML/MIN
GFR AFRICAN AMERICAN: 20 ML/MIN
GFR AFRICAN AMERICAN: 20 ML/MIN
GFR NON-AFRICAN AMERICAN: 15 ML/MIN
GFR NON-AFRICAN AMERICAN: 16 ML/MIN
GFR NON-AFRICAN AMERICAN: 16 ML/MIN
GFR SERPL CREATININE-BSD FRML MDRD: ABNORMAL ML/MIN/{1.73_M2}
GLUCOSE BLD-MCNC: 130 MG/DL (ref 75–110)
GLUCOSE BLD-MCNC: 167 MG/DL (ref 75–110)
GLUCOSE BLD-MCNC: 216 MG/DL (ref 75–110)
GLUCOSE BLD-MCNC: 295 MG/DL (ref 70–99)
GLUCOSE BLD-MCNC: 299 MG/DL (ref 75–110)
GLUCOSE BLD-MCNC: 325 MG/DL (ref 75–110)
GLUCOSE BLD-MCNC: 336 MG/DL (ref 70–99)
GLUCOSE BLD-MCNC: 340 MG/DL (ref 75–110)
GLUCOSE BLD-MCNC: 349 MG/DL (ref 75–110)
GLUCOSE BLD-MCNC: 350 MG/DL (ref 75–110)
GLUCOSE BLD-MCNC: 352 MG/DL (ref 75–110)
GLUCOSE BLD-MCNC: 481 MG/DL (ref 70–99)
GLUCOSE BLD-MCNC: 75 MG/DL (ref 75–110)
HCT VFR BLD CALC: 29.4 % (ref 40.7–50.3)
HEMOGLOBIN: 9 G/DL (ref 13–17)
MCH RBC QN AUTO: 31.3 PG (ref 25.2–33.5)
MCHC RBC AUTO-ENTMCNC: 30.6 G/DL (ref 28.4–34.8)
MCV RBC AUTO: 102.1 FL (ref 82.6–102.9)
NRBC AUTOMATED: 0 PER 100 WBC
PDW BLD-RTO: 13 % (ref 11.8–14.4)
PLATELET # BLD: 331 K/UL (ref 138–453)
PMV BLD AUTO: 10.4 FL (ref 8.1–13.5)
POTASSIUM SERPL-SCNC: 5 MMOL/L (ref 3.7–5.3)
POTASSIUM SERPL-SCNC: 5.3 MMOL/L (ref 3.7–5.3)
POTASSIUM SERPL-SCNC: 5.6 MMOL/L (ref 3.7–5.3)
RBC # BLD: 2.88 M/UL (ref 4.21–5.77)
SODIUM BLD-SCNC: 134 MMOL/L (ref 135–144)
SODIUM BLD-SCNC: 136 MMOL/L (ref 135–144)
SODIUM BLD-SCNC: 137 MMOL/L (ref 135–144)
WBC # BLD: 8.4 K/UL (ref 3.5–11.3)

## 2021-05-08 PROCEDURE — 6370000000 HC RX 637 (ALT 250 FOR IP): Performed by: PHYSICIAN ASSISTANT

## 2021-05-08 PROCEDURE — 6370000000 HC RX 637 (ALT 250 FOR IP): Performed by: NURSE PRACTITIONER

## 2021-05-08 PROCEDURE — 6370000000 HC RX 637 (ALT 250 FOR IP): Performed by: INTERNAL MEDICINE

## 2021-05-08 PROCEDURE — 85027 COMPLETE CBC AUTOMATED: CPT

## 2021-05-08 PROCEDURE — 2500000003 HC RX 250 WO HCPCS: Performed by: INTERNAL MEDICINE

## 2021-05-08 PROCEDURE — 99232 SBSQ HOSP IP/OBS MODERATE 35: CPT | Performed by: INTERNAL MEDICINE

## 2021-05-08 PROCEDURE — 71045 X-RAY EXAM CHEST 1 VIEW: CPT

## 2021-05-08 PROCEDURE — 36415 COLL VENOUS BLD VENIPUNCTURE: CPT

## 2021-05-08 PROCEDURE — 2060000000 HC ICU INTERMEDIATE R&B

## 2021-05-08 PROCEDURE — 6360000002 HC RX W HCPCS: Performed by: NURSE PRACTITIONER

## 2021-05-08 PROCEDURE — 93970 EXTREMITY STUDY: CPT

## 2021-05-08 PROCEDURE — 2580000003 HC RX 258: Performed by: NURSE PRACTITIONER

## 2021-05-08 PROCEDURE — 80048 BASIC METABOLIC PNL TOTAL CA: CPT

## 2021-05-08 PROCEDURE — 2580000003 HC RX 258: Performed by: INTERNAL MEDICINE

## 2021-05-08 PROCEDURE — 94640 AIRWAY INHALATION TREATMENT: CPT

## 2021-05-08 RX ORDER — DEXTROSE MONOHYDRATE 25 G/50ML
50 INJECTION, SOLUTION INTRAVENOUS ONCE
Status: COMPLETED | OUTPATIENT
Start: 2021-05-08 | End: 2021-05-08

## 2021-05-08 RX ORDER — DEXTROSE MONOHYDRATE 25 G/50ML
25 INJECTION, SOLUTION INTRAVENOUS ONCE
Status: DISCONTINUED | OUTPATIENT
Start: 2021-05-08 | End: 2021-05-08

## 2021-05-08 RX ORDER — HYDRALAZINE HYDROCHLORIDE 20 MG/ML
10 INJECTION INTRAMUSCULAR; INTRAVENOUS EVERY 6 HOURS PRN
Status: DISCONTINUED | OUTPATIENT
Start: 2021-05-08 | End: 2021-05-14 | Stop reason: HOSPADM

## 2021-05-08 RX ORDER — GABAPENTIN 100 MG/1
200 CAPSULE ORAL 3 TIMES DAILY
Status: DISCONTINUED | OUTPATIENT
Start: 2021-05-08 | End: 2021-05-14 | Stop reason: HOSPADM

## 2021-05-08 RX ORDER — CLONIDINE HYDROCHLORIDE 0.1 MG/1
0.1 TABLET ORAL EVERY 4 HOURS PRN
Status: DISCONTINUED | OUTPATIENT
Start: 2021-05-08 | End: 2021-05-14 | Stop reason: HOSPADM

## 2021-05-08 RX ORDER — GUAIFENESIN 600 MG/1
600 TABLET, EXTENDED RELEASE ORAL 2 TIMES DAILY
Status: DISCONTINUED | OUTPATIENT
Start: 2021-05-08 | End: 2021-05-14 | Stop reason: HOSPADM

## 2021-05-08 RX ORDER — BUMETANIDE 0.25 MG/ML
2 INJECTION, SOLUTION INTRAMUSCULAR; INTRAVENOUS ONCE
Status: COMPLETED | OUTPATIENT
Start: 2021-05-08 | End: 2021-05-08

## 2021-05-08 RX ADMIN — HYDRALAZINE HYDROCHLORIDE 100 MG: 50 TABLET, FILM COATED ORAL at 21:52

## 2021-05-08 RX ADMIN — HEPARIN SODIUM 5000 UNITS: 5000 INJECTION INTRAVENOUS; SUBCUTANEOUS at 05:01

## 2021-05-08 RX ADMIN — HEPARIN SODIUM 5000 UNITS: 5000 INJECTION INTRAVENOUS; SUBCUTANEOUS at 13:24

## 2021-05-08 RX ADMIN — INSULIN LISPRO 4 UNITS: 100 INJECTION, SOLUTION INTRAVENOUS; SUBCUTANEOUS at 12:24

## 2021-05-08 RX ADMIN — INSULIN HUMAN 10 UNITS: 100 INJECTION, SOLUTION PARENTERAL at 10:19

## 2021-05-08 RX ADMIN — DEXTROSE MONOHYDRATE 50 G: 25 INJECTION, SOLUTION INTRAVENOUS at 10:18

## 2021-05-08 RX ADMIN — GABAPENTIN 200 MG: 100 CAPSULE ORAL at 21:51

## 2021-05-08 RX ADMIN — CLONIDINE HYDROCHLORIDE 0.1 MG: 0.1 TABLET ORAL at 05:00

## 2021-05-08 RX ADMIN — CLONIDINE HYDROCHLORIDE 0.1 MG: 0.1 TABLET ORAL at 21:53

## 2021-05-08 RX ADMIN — SODIUM BICARBONATE 1300 MG: 648 TABLET ORAL at 21:52

## 2021-05-08 RX ADMIN — BUDESONIDE AND FORMOTEROL FUMARATE DIHYDRATE 2 PUFF: 160; 4.5 AEROSOL RESPIRATORY (INHALATION) at 08:02

## 2021-05-08 RX ADMIN — CARVEDILOL 25 MG: 25 TABLET, FILM COATED ORAL at 08:12

## 2021-05-08 RX ADMIN — GABAPENTIN 200 MG: 100 CAPSULE ORAL at 13:25

## 2021-05-08 RX ADMIN — BUMETANIDE 2 MG: 0.25 INJECTION INTRAMUSCULAR; INTRAVENOUS at 10:15

## 2021-05-08 RX ADMIN — CLONIDINE HYDROCHLORIDE 0.1 MG: 0.1 TABLET ORAL at 02:18

## 2021-05-08 RX ADMIN — GUAIFENESIN 600 MG: 600 TABLET, EXTENDED RELEASE ORAL at 21:51

## 2021-05-08 RX ADMIN — CARVEDILOL 25 MG: 25 TABLET, FILM COATED ORAL at 18:18

## 2021-05-08 RX ADMIN — HYDRALAZINE HYDROCHLORIDE 100 MG: 50 TABLET, FILM COATED ORAL at 13:25

## 2021-05-08 RX ADMIN — HEPARIN SODIUM 5000 UNITS: 5000 INJECTION INTRAVENOUS; SUBCUTANEOUS at 21:50

## 2021-05-08 RX ADMIN — IPRATROPIUM BROMIDE AND ALBUTEROL SULFATE 1 AMPULE: .5; 2.5 SOLUTION RESPIRATORY (INHALATION) at 16:10

## 2021-05-08 RX ADMIN — ISOSORBIDE MONONITRATE 60 MG: 60 TABLET ORAL at 08:11

## 2021-05-08 RX ADMIN — SODIUM CHLORIDE, PRESERVATIVE FREE 10 ML: 5 INJECTION INTRAVENOUS at 08:12

## 2021-05-08 RX ADMIN — CLOPIDOGREL 75 MG: 75 TABLET, FILM COATED ORAL at 08:11

## 2021-05-08 RX ADMIN — SODIUM ZIRCONIUM CYCLOSILICATE 10 G: 5 POWDER, FOR SUSPENSION ORAL at 10:17

## 2021-05-08 RX ADMIN — GABAPENTIN 200 MG: 100 CAPSULE ORAL at 08:11

## 2021-05-08 RX ADMIN — GUAIFENESIN 600 MG: 600 TABLET, EXTENDED RELEASE ORAL at 13:22

## 2021-05-08 RX ADMIN — INSULIN GLARGINE 35 UNITS: 100 INJECTION, SOLUTION SUBCUTANEOUS at 21:46

## 2021-05-08 RX ADMIN — CLONIDINE HYDROCHLORIDE 0.1 MG: 0.1 TABLET ORAL at 13:25

## 2021-05-08 RX ADMIN — HYDRALAZINE HYDROCHLORIDE 100 MG: 50 TABLET, FILM COATED ORAL at 05:00

## 2021-05-08 RX ADMIN — ATORVASTATIN CALCIUM 80 MG: 80 TABLET, FILM COATED ORAL at 21:52

## 2021-05-08 RX ADMIN — SODIUM CHLORIDE, PRESERVATIVE FREE 10 ML: 5 INJECTION INTRAVENOUS at 21:55

## 2021-05-08 RX ADMIN — CLONIDINE HYDROCHLORIDE 0.1 MG: 0.1 TABLET ORAL at 08:12

## 2021-05-08 RX ADMIN — IPRATROPIUM BROMIDE AND ALBUTEROL SULFATE 1 AMPULE: .5; 2.5 SOLUTION RESPIRATORY (INHALATION) at 08:02

## 2021-05-08 RX ADMIN — IPRATROPIUM BROMIDE AND ALBUTEROL SULFATE 1 AMPULE: .5; 2.5 SOLUTION RESPIRATORY (INHALATION) at 23:42

## 2021-05-08 RX ADMIN — NIFEDIPINE 60 MG: 60 TABLET, EXTENDED RELEASE ORAL at 08:11

## 2021-05-08 RX ADMIN — SODIUM BICARBONATE 1300 MG: 648 TABLET ORAL at 08:11

## 2021-05-08 RX ADMIN — SODIUM ZIRCONIUM CYCLOSILICATE 5 G: 10 POWDER, FOR SUSPENSION ORAL at 13:25

## 2021-05-08 RX ADMIN — Medication 81 MG: at 08:11

## 2021-05-08 RX ADMIN — PRAZOSIN HYDROCHLORIDE 4 MG: 1 CAPSULE ORAL at 08:11

## 2021-05-08 ASSESSMENT — PAIN DESCRIPTION - FREQUENCY: FREQUENCY: INTERMITTENT

## 2021-05-08 ASSESSMENT — PAIN DESCRIPTION - PAIN TYPE: TYPE: ACUTE PAIN

## 2021-05-08 ASSESSMENT — PAIN DESCRIPTION - PROGRESSION: CLINICAL_PROGRESSION: NOT CHANGED

## 2021-05-08 ASSESSMENT — PAIN DESCRIPTION - DESCRIPTORS: DESCRIPTORS: ACHING

## 2021-05-08 ASSESSMENT — PAIN DESCRIPTION - LOCATION: LOCATION: LEG

## 2021-05-08 ASSESSMENT — PAIN DESCRIPTION - DIRECTION: RADIATING_TOWARDS: RIGHT CALF

## 2021-05-08 ASSESSMENT — PAIN DESCRIPTION - ONSET: ONSET: ON-GOING

## 2021-05-08 ASSESSMENT — PAIN SCALES - GENERAL: PAINLEVEL_OUTOF10: 4

## 2021-05-08 NOTE — PROGRESS NOTES
Patient called out stating that he felt dizzy and was thinking his blood sugar was low. RN checked blood sugar and found it to be 44. Pt alert and oriented wanting to drink juice and eat a box lunch. RN messaged on-call NP. Will recheck blood sugar in 15 minutes.     Electronically signed by Bernarda Waldrop RN on 5/8/2021 at 1:50 AM

## 2021-05-08 NOTE — PROGRESS NOTES
mouth daily Takes 30mg 3 times daily  carvedilol (COREG) 25 MG tablet, Take 1 tablet by mouth 2 times daily (with meals)  atorvastatin (LIPITOR) 80 MG tablet, Take 1 tablet by mouth daily  clopidogrel (PLAVIX) 75 MG tablet, Take 1 tablet by mouth daily  isosorbide mononitrate (IMDUR) 60 MG extended release tablet, Take 1 tablet by mouth daily  vitamin D (ERGOCALCIFEROL) 1.25 MG (39243 UT) CAPS capsule, Take 1 capsule by mouth once a week  ipratropium-albuterol (DUONEB) 0.5-2.5 (3) MG/3ML SOLN nebulizer solution, Inhale 3 mLs into the lungs every 6 hours as needed for Shortness of Breath  Nutritional Supplements (GLUCERNA CARBSTEADY) LIQD, Take 1 Can by mouth 3 times daily  glucagon 1 MG injection, Inject 1 mg into the muscle See Admin Instructions Follow package directions for low blood sugar. fluticasone (FLONASE) 50 MCG/ACT nasal spray,   insulin aspart (NOVOLOG FLEXPEN) 100 UNIT/ML injection pen, Inject 5 Units into the skin 3 times daily (before meals) Sliding scale  blood glucose monitor strips, Test_4__times daily Diagnosis: 250.0   Diabetes Mellitus_x___Insulin Dependent____Non-Insulin Dependent  blood glucose monitor kit and supplies, Dispense sufficient amount for indicated testing frequency plus additional to accommodate PRN testing needs. Dispense all needed supplies to include: monitor, strips, lancing device, lancets, control solutions, alcohol swabs.   Lancets MISC, Use_4__times daily Diagnisis:250.0  Diabetes Mellitus__x__Insulin Dependent___Non-Insulin Dependent  COMFORT EZ PEN NEEDLES 31G X 8 MM MISC, USE AS DIRECTED  TRUE METRIX BLOOD GLUCOSE TEST strip, TEST BLOOD SUGAR 4 TO 5 TIMES DAILY (Patient not taking: Reported on 4/6/2021)    Current Medications:     Scheduled Meds:    NIFEdipine  60 mg Oral Daily    cloNIDine  0.1 mg Oral TID    sodium bicarbonate  1,300 mg Oral BID    carvedilol  25 mg Oral BID WC    aspirin  81 mg Oral Daily    atorvastatin  80 mg Oral Daily    insulin glargine bilaterally  Cardiac:  S1 S2 RR, no murmurs, gallops or rubs, JVP not raised. Abdomen: Soft, non-tender, no masses or organomegaly, BS audible. :   No suprapubic or flank tenderness. Neuro:  AAO x 3, No FND. SKIN:  No rashes, good skin turgor. Extremities:  1-2+ edema, palpable peripheral pulses    Labs:       Recent Labs     05/06/21  0551 05/07/21  0659 05/08/21  0554   WBC 8.7 6.6 8.4   RBC 2.81* 2.82* 2.88*   HGB 8.7* 8.6* 9.0*   HCT 27.9* 29.1* 29.4*   MCV 99.3 103.2* 102.1   MCH 31.0 30.5 31.3   MCHC 31.2 29.6 30.6   RDW 12.5 12.9 13.0    315 331   MPV 9.9 10.3 10.4      BMP:   Recent Labs     05/07/21  1044 05/07/21  1238 05/08/21  0554    138 137   K 5.2 5.2 5.6*   * 108* 108*   CO2 19* 19* 18*   BUN 85* 84* 92*   CREATININE 3.91* 3.90* 4.07*   GLUCOSE 175* 220* 295*   CALCIUM 7.7* 7.6* 8.0*      Phosphorus:     Recent Labs     05/06/21  0551   PHOS 3.7     Magnesium:    Recent Labs     05/06/21  0551   MG 1.3*     Albumin:    Recent Labs     05/06/21  0551   LABALBU 2.3*     BNP:      Lab Results   Component Value Date    BNP 84 11/27/2013     RIZWAN:      Lab Results   Component Value Date    RIZWAN NEGATIVE 12/04/2020     SPEP:  Lab Results   Component Value Date    PROT 4.7 05/04/2021    PROT 6.2 11/12/2011    ALBCAL 2.3 12/04/2020    ALBPCT 55 12/04/2020    LABALPH 0.1 12/04/2020    LABALPH 0.7 12/04/2020    A1PCT 3 12/04/2020    A2PCT 17 12/04/2020    LABBETA 0.5 12/04/2020    BETAPCT 13 12/04/2020    GAMGLOB 0.5 12/04/2020    GGPCT 12 12/04/2020    PATH ELECTRONICALLY SIGNED. Endy Williamson M.D. 12/04/2020    PATH ELECTRONICALLY SIGNED. Endy Williamson M.D. 12/04/2020     UPEP:     Lab Results   Component Value Date    LABPE  11/12/2011     ELEVATED PROTEIN CONCENTRATION. MOST SERUM PROTEINS ARE DETECTED IN THIS URINE.      C3:     Lab Results   Component Value Date    C3 89 12/04/2020     C4:     Lab Results   Component Value Date    C4 17 12/04/2020     MPO ANCA:   No results found for: MPO  PR3 ANCA:   No results found for: PR3  Anti-GBM:   No results found for: GBMABIGG  Hep BsAg:         Lab Results   Component Value Date    HEPBSAG NONREACTIVE 12/04/2020     Hep C AB:          Lab Results   Component Value Date    HEPCAB NONREACTIVE 12/04/2020       Urinalysis/Chemistries:      Lab Results   Component Value Date    NITRU NEGATIVE 05/03/2021    COLORU YELLOW 05/03/2021    PHUR 6.0 05/03/2021    WBCUA 2 TO 5 05/03/2021    RBCUA 0 TO 2 05/03/2021    MUCUS NOT REPORTED 05/03/2021    TRICHOMONAS NOT REPORTED 05/03/2021    YEAST NOT REPORTED 05/03/2021    BACTERIA NOT REPORTED 05/03/2021    CLARITYU Clear 09/12/2014    SPECGRAV 1.016 05/03/2021    LEUKOCYTESUR NEGATIVE 05/03/2021    UROBILINOGEN Normal 05/03/2021    BILIRUBINUR NEGATIVE 05/03/2021    BILIRUBINUR NEGATIVE 11/12/2011    BLOODU Negative 09/12/2014    GLUCOSEU 1+ 05/03/2021    GLUCOSEU 3+ 11/12/2011    KETUA NEGATIVE 05/03/2021    AMORPHOUS NOT REPORTED 05/03/2021     Urine Sodium:     Lab Results   Component Value Date    IGNACIO 33 05/05/2021     Urine Potassium:    Lab Results   Component Value Date    KUR 38.1 12/04/2020     Urine Chloride:    Lab Results   Component Value Date    CLUR 37 05/05/2021     Urine Osmolarity:   Lab Results   Component Value Date    OSMOU 463 12/04/2020     Urine Protein:   No components found for: TOTALPROTEIN, URINE   Urine Creatinine:     Lab Results   Component Value Date    LABCREA 51.5 05/05/2021     Urine Eosinophils:  No components found for: UEOS    Radiology:     CXR:     Assessment:     1. Acute Kidney Injury: Likely ischemic ATN from fluctuating blood pressures, volume depletion from Bumex use. Baseline creatinine 3.03.3 worsening now 4.07, worsening acidosis. 2.  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   Uncontrolled hypertension multifactorial, cocaine in the system could be a contributing factor, now better controlled  4. 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 IV Bumex 2mg x one stat, insulin 10U x one and D50% 50g, Start Lokelma 5 grams TID. Repeat BMP today at 1400.   2.  Nifedipine 60mg ER daily in am.    3.  No maintenance IVF. Continue oral bicarb,   4. Risk of RRT discussed with patient, he is agreeable if needed, as he required short term HD in past.   5.  Outpatient follow-up with [facility] 1-2 weeks upon eventual d/c.   6.   Clonidine  0.1mg TID  Following  Will discuss with Dr. Susy Macias, [facility]  [facility]    Attending Physician Statement  I have discussed the care of Srinivasan Atwood, including pertinent history and exam findings with the CNP. I have reviewed the key elements of all parts of the encounter with the CNP. I have seen and examined the patient. I agree with the assessment and plan and status of the problem list as documented. Addiitionally I recommend that with the poor response to IV Bumex and positive fluid overload and acute on chronic renal failure, dialysis will be initiated on 5/10/2021. I have stopped the patient's Plavix. I have consulted IR to place a temporary dialysis catheter. The patient is in agreement with the plan.     China Kelly MD  Nephrology Attending Physician  [facility]

## 2021-05-08 NOTE — PROGRESS NOTES
Made attn aware:::Pt c/o pain in BLE dull intermittent 7/10. RLE 2+ Pitting Edema, LLE 3+ Pitting Edema, BUE 1+ pitting. /73 (107), Temp 98.0, RR 18, HR 75, ) 02 98%. Wheezy bilat lung sounds d/t resp tx. Prior to Tx lungs sounded diminished. Pt is asking to see doctor at bedside ASAP. Nissa Crenshaw Waiting on response. Pt weight is up by 6% in 24hrs-- Attn will be at bedside shortly. Wt.5/3/21-- 140lb admission wt--Pt states it was not accurate. Wt.5/4/21--127lb = ? Wt /5/21--127lb = ?  Wt.5/7/21--142lb = Standing Scale  Wt.5/8/21--153lb = Standing Scale       Dr. Cadence Aguayo came to to assess Pt at bedside. New orders placed. Nephro updated      IV Dex and Insulin given. Lokelma Given. BG was 352 which calls for 5units--pt requested on 4units to be given. I again explained why I gave him the IV Dex and Insulin. He still would like to speak with attn and ask questions concerning his sugars. 2mg IV bumex given at 1015---output since has been 150mls---Pt states normally after he takes Bumex he is urinating q15min and output is usually more than 150mls each time. He also said he does not feel any retention. However, I can Bladder scan if necessary. Pt also asking for Mucinex. Pts K+ is now 5.0 BUN 89 and Cr. 3.74-Made attn/ Nephro aware. -- Attn will be at bedside shortly. Pt urinated 150mls---Bladder scanned-- PVR 200mls--Will cont. To monitor. Pt refused 5pm insulin-- Pt educated. **Pt does not want any information given out to family/friends. NO Updates. **      Partial linen change

## 2021-05-08 NOTE — PLAN OF CARE
Problem: Falls - Risk of:  Goal: Will remain free from falls  Description: Will remain free from falls  5/8/2021 0352 by Khushboo Bullock RN  Outcome: Ongoing  5/7/2021 1702 by Jemima Perez RN  Outcome: Ongoing  Goal: Absence of physical injury  Description: Absence of physical injury  5/8/2021 0352 by Khushboo Bullock RN  Outcome: Ongoing  5/7/2021 1702 by Jemima Perez RN  Outcome: Ongoing     Problem:  Activity:  Goal: Ability to tolerate increased activity will improve  Description: Ability to tolerate increased activity will improve  5/8/2021 0352 by Khushboo Bullock RN  Outcome: Ongoing  5/7/2021 1702 by Jemima Perez RN  Outcome: Ongoing     Problem: Gas Exchange - Impaired:  Goal: Levels of oxygenation will improve  Description: Levels of oxygenation will improve  5/8/2021 0352 by Khushboo Bullock RN  Outcome: Ongoing  5/7/2021 1702 by Jemima Perez RN  Outcome: Ongoing     Problem: Cardiac:  Goal: Ability to maintain vital signs within normal range will improve  Description: Ability to maintain vital signs within normal range will improve  5/8/2021 0352 by Khushboo Bullock RN  Outcome: Ongoing  5/7/2021 1702 by Jemima Perez RN  Outcome: Ongoing  Goal: Cardiovascular alteration will improve  Description: Cardiovascular alteration will improve  5/8/2021 0352 by Khushboo Bullock RN  Outcome: Ongoing  5/7/2021 1702 by Jemima Perez RN  Outcome: Ongoing     Problem: Health Behavior:  Goal: Will modify at least one risk factor affecting health status  Description: Will modify at least one risk factor affecting health status  5/8/2021 0352 by Khushboo Bullock RN  Outcome: Ongoing  5/7/2021 1702 by Jemima Perez RN  Outcome: Ongoing  Goal: Identification of resources available to assist in meeting health care needs will improve  Description: Identification of resources available to assist in meeting health care needs will improve  5/8/2021 0352 by Khushboo Bullock RN  Outcome: Ongoing  5/7/2021 1702 by Scott Robison RN  Outcome: Ongoing     Problem: Physical Regulation:  Goal: Complications related to the disease process, condition or treatment will be avoided or minimized  Description: Complications related to the disease process, condition or treatment will be avoided or minimized  5/8/2021 0352 by May Carr RN  Outcome: Ongoing  5/7/2021 1702 by Scott Robison RN  Outcome: Ongoing     Problem: Nutrition  Goal: Optimal nutrition therapy  Description: Nutrition Problem #1: Severe malnutrition, In context of chronic illness  Intervention: Food and/or Nutrient Delivery: Continue Current Diet, Start Oral Nutrition Supplement  Nutritional Goals: Pt to meet % of est'd needs daily via PO     5/8/2021 0352 by May Carr RN  Outcome: Ongoing  5/7/2021 1702 by Scott Robison RN  Outcome: Ongoing  5/7/2021 1452 by Timbo Chopra RD, LD  Note: Nutrition Problem #1: Severe malnutrition, In context of chronic illness  Intervention: Food and/or Nutrient Delivery: Continue Current Diet, Continue Oral Nutrition Supplement  Nutritional Goals: Pt to meet % of est'd needs daily via PO       Problem: Pain:  Goal: Pain level will decrease  Description: Pain level will decrease  5/8/2021 0352 by May Carr RN  Outcome: Ongoing  5/7/2021 1702 by Scott Robison RN  Outcome: Ongoing  Goal: Control of acute pain  Description: Control of acute pain  5/8/2021 0352 by May Carr RN  Outcome: Ongoing  5/7/2021 1702 by Scott Robison RN  Outcome: Ongoing  Goal: Control of chronic pain  Description: Control of chronic pain  5/8/2021 0352 by May Carr RN  Outcome: Ongoing  5/7/2021 1702 by Scott Robison RN  Outcome: Ongoing   Electronically signed by May Carr RN on 5/8/2021 at 3:53 AM

## 2021-05-08 NOTE — PLAN OF CARE
Ongoing  5/8/2021 0352 by Fatimah Colmenares RN  Outcome: Ongoing     Problem: Physical Regulation:  Goal: Complications related to the disease process, condition or treatment will be avoided or minimized  Description: Complications related to the disease process, condition or treatment will be avoided or minimized  5/8/2021 1355 by Killian Greene RN  Outcome: Ongoing  5/8/2021 0352 by Fatimah Colmenares RN  Outcome: Ongoing     Problem: Nutrition  Goal: Optimal nutrition therapy  Description: Nutrition Problem #1: Severe malnutrition, In context of chronic illness  Intervention: Food and/or Nutrient Delivery: Continue Current Diet, Start Oral Nutrition Supplement  Nutritional Goals: Pt to meet % of est'd needs daily via PO     5/8/2021 1355 by Killian Greene RN  Outcome: Ongoing  5/8/2021 0352 by Fatimah Colmenares RN  Outcome: Ongoing     Problem: Pain:  Goal: Pain level will decrease  Description: Pain level will decrease  5/8/2021 1355 by Killian Greene RN  Outcome: Ongoing  5/8/2021 0352 by Fatimah Colmenares RN  Outcome: Ongoing  Goal: Control of acute pain  Description: Control of acute pain  5/8/2021 1355 by Killian Greene RN  Outcome: Ongoing  5/8/2021 0352 by Fatimah Colmenares RN  Outcome: Ongoing  Goal: Control of chronic pain  Description: Control of chronic pain  5/8/2021 1355 by Killian Greene RN  Outcome: Ongoing  5/8/2021 0352 by Fatimah Colmenares RN  Outcome: Ongoing

## 2021-05-08 NOTE — PROGRESS NOTES
[facility]  Office: [address], DO, Terrell Labs, DO, Amaury Pascual, DO, Alaina Justin Blood, DO, Gin Chandra MD, Clinton Lamar MD, Tu Nelson MD, Reshma Hawk MD, Helena Coreas MD, Marbella Sullivan MD, Aiyana Mcnamara MD, Jeovany Calderón MD, Brittany Peter, DO, Naresh Dangelo MD, Doe Montez, DO, Monika Bolanos MD,  Carole Sim, DO, Tomi Kaur MD, Nils Deluca MD, Efren Montes MD, Montana Garcia MD, Timothyp Stanton, Dontae Paredes, CNP, Greg Warner, CNP, Li Kim, CNS, Shlomo Marr, CNP, Pricila Solis, CNP, Divina Juarez, CNP, Kasey Milan, CNP, Stanton Clark, CNP, Pacheco Israel PA-C, Muna Bishop, [place], Arelis Clark, CNP, Twan Estrada, CNP, Karen Renteria, CNP, Kaela Haro, CNP, Jose Trammell, CNP, Jayne Richard, [address] [place] [address]    Progress Note    5/8/2021    12:36 PM    Name:   Servando Sierra  MRN:     [id number]     [place]:      [de-identified]   Room:   2022/2022-01   Day:  5  Admit Date:  5/3/2021  1:18 PM    PCP:   TERRANCE Ribeiro CNP  Code Status:  Full Code    Subjective:     C/C:   Chief Complaint   Patient presents with    Hypertension     dizziness and HTN     Interval History Status: improved. Pt seen and evaluated this morning. Patient started having lower extremity swelling, right lower extremity swelling and pain, Doppler ordered,  Blood pressure has been running high, given all the medications this morning,  Nephrology notified,  Bumex 1 dose given,  Creatinine is over 4 at this time,  Patient had dialysis in the past, at this time he feels no chest pain or shortness of breath    Brief History:     73year-old male with history of longstanding uncontrolled hypertension, non-obstructive CAD, HFpEF, CKD, uncontrolled DM. He presented to the ED complaining of elevated blood pressure and headache.  Blood pressure noted to be elevated to 229/79 upon presentation. Pt tested positive for cocaine, but denies use. He was placed on clevidipine infusion. He was also noted to have an BINU and nephrology was consulted. Troponin elevated and cardiology was consulted. Both kidney injury and troponin elevation attributed to uncontrolled hypertension. Patient's home med regimen was resumed and he was successfully weaned of clevidipine drip. Echocardiogram did not reveal any wall motion abnormality and no further cardiac work-up was pursued. Blood glucose control was established with the patient's home insulin regimen. Review of Systems:     Constitutional:  negative for chills, fevers, sweats  Respiratory:  negative for cough, dyspnea on exertion, shortness of breath, wheezing  Cardiovascular:  negative for chest pain, chest pressure/discomfort, lower extremity edema, palpitations  Gastrointestinal:  negative for abdominal pain, constipation, diarrhea, nausea, vomiting  Neurological:  negative for dizziness, headache    Medications: Allergies:     Allergies   Allergen Reactions    Lisinopril      cough    Ace Inhibitors      coughing    Pcn [Penicillins]        Current Meds:   Scheduled Meds:    sodium zirconium cyclosilicate  5 g Oral TID    gabapentin  200 mg Oral TID    NIFEdipine  60 mg Oral Daily    cloNIDine  0.1 mg Oral TID    sodium bicarbonate  1,300 mg Oral BID    carvedilol  25 mg Oral BID WC    aspirin  81 mg Oral Daily    atorvastatin  80 mg Oral Daily    insulin glargine  35 Units Subcutaneous Nightly    [Held by provider] bumetanide  2 mg Oral Daily    clopidogrel  75 mg Oral Daily    fluticasone  2 spray Each Nostril Daily    hydrALAZINE  100 mg Oral 3 times per day    isosorbide mononitrate  60 mg Oral Daily    [Held by provider] losartan  50 mg Oral BID    prazosin  4 mg Oral BID    QUEtiapine  100 mg Oral Nightly    sodium chloride flush  5-40 mL Intravenous 2 times per day    heparin (porcine)  5,000 Units Subcutaneous 3 times per day    budesonide-formoterol  2 puff Inhalation BID    insulin lispro  0-6 Units Subcutaneous TID WC    insulin lispro  0-3 Units Subcutaneous Nightly    ipratropium-albuterol  1 ampule Inhalation Q4H While awake     Continuous Infusions:    dextrose 100 mL/hr (21 0717)    sodium chloride       PRN Meds: cloNIDine, hydrALAZINE, glucose, dextrose, glucagon (rDNA), dextrose, albuterol, ipratropium-albuterol, sodium chloride flush, sodium chloride, nicotine, promethazine **OR** ondansetron, acetaminophen **OR** acetaminophen, polyethylene glycol    Data:     Past Medical History:   has a past medical history of Asthma, CAD in native artery, nonobstructive, Carotid stenosis, left, Carpal tunnel syndrome, Cerebrovascular disease, Chronic kidney disease, COPD (chronic obstructive pulmonary disease) ([facility] [address] Diabetes mellitus ([facility] [address] History of colon polyps, History of weakness of extremity, HTN (hypertension), Hyperlipidemia, Lung, cysts, congenital, PTSD (post-traumatic stress disorder), PTSD (post-traumatic stress disorder), TIA (transient ischemic attack), and Type II or unspecified type diabetes mellitus without mention of complication, not stated as uncontrolled. Social History:   reports that he quit smoking about 6 years ago. His smoking use included cigarettes. He has a 17.50 pack-year smoking history. He has never used smokeless tobacco. He reports that he does not drink alcohol or use drugs. Family History:   Family History   Problem Relation Age of Onset    Cancer Mother     Heart Disease Father        Vitals:  BP (!) 154/51   Pulse 72   Temp 97 °F (36.1 °C) (Oral)   Resp 18   Ht 5' 6\" (1.676 m)   Wt 152 lb (68.9 kg)   SpO2 99%   BMI 24.53 kg/m²   Temp (24hrs), Av.9 °F (36.6 °C), Min:97 °F (36.1 °C), Max:98.6 °F (37 °C)    Recent Labs     21  0201 21  0238 21  0342 21  0703   POCGLU 75 130* 167* 299*       I/O (24Hr):     Intake/Output Summary (Last 24 hours) at 5/8/2021 1236  Last data filed at 5/8/2021 1101  Gross per 24 hour   Intake 710 ml   Output 350 ml   Net 360 ml       Labs:  Hematology:  Recent Labs     05/06/21  0551 05/07/21  0659 05/08/21  0554   WBC 8.7 6.6 8.4   RBC 2.81* 2.82* 2.88*   HGB 8.7* 8.6* 9.0*   HCT 27.9* 29.1* 29.4*   MCV 99.3 103.2* 102.1   MCH 31.0 30.5 31.3   MCHC 31.2 29.6 30.6   RDW 12.5 12.9 13.0    315 331   MPV 9.9 10.3 10.4     Chemistry:  Recent Labs     05/06/21  0551 05/06/21  0551 05/07/21  1238 05/08/21  0554 05/08/21  1124      < > 138 137 136   K 5.1   < > 5.2 5.6* 5.0      < > 108* 108* 105   CO2 18*   < > 19* 18* 18*   GLUCOSE 226*   < > 220* 295* 481*   BUN 85*   < > 84* 92* 89*   CREATININE 3.82*   < > 3.90* 4.07* 3.74*   MG 1.3*  --   --   --   --    ANIONGAP 12   < > 11 11 13   LABGLOM 16*   < > 16* 15* 16*   GFRAA 19*   < > 19* 18* 20*   CALCIUM 7.0*   < > 7.6* 8.0* 8.1*   PHOS 3.7  --   --   --   --     < > = values in this interval not displayed. Recent Labs     05/06/21  0551 05/06/21  0551 05/07/21  1546 05/07/21 2029 05/08/21  0201 05/08/21  0238 05/08/21  0342 05/08/21  0703   LABALBU 2.3*  --   --   --   --   --   --   --    POCGLU  --    < > 212* 216* 75 130* 167* 299*    < > = values in this interval not displayed. ABG:  Lab Results   Component Value Date    POCPH 7.39 06/17/2013    POCPCO2 46 06/17/2013    POCPO2 288 06/17/2013    POCHCO3 27.8 06/17/2013    NBEA NOT REPORTED 06/17/2013    PBEA 3 06/17/2013    AXM9XXL 29 06/17/2013    UXLR9PAB 100 06/17/2013    FIO2 NOT REPORTED 12/03/2020     Lab Results   Component Value Date/Time    SPECIAL 2ML L FR 12/03/2020 11:24 AM     Lab Results   Component Value Date/Time    CULTURE NO GROWTH 6 DAYS 12/03/2020 11:24 AM       Radiology:  Xr Chest Portable    Result Date: 5/3/2021  1. No acute abnormality.        Physical Examination:        General appearance:  alert, cooperative and no distress  Mental Status:  oriented to person, place and time and normal affect  Lungs:  Breath sounds are distant, lungs are otherwise CTAB  Heart:  regular rate and rhythm. There is a high pitched systolic murmur 2/6, heard best at the apex. Abdomen:  soft, nontender, nondistended, normal bowel sounds, no masses, hepatomegaly, splenomegaly  Extremities:  no edema, redness, tenderness in the calves  Skin:  no gross lesions, rashes, induration. There is decreased skin turgor    Assessment:        Hospital Problems           Last Modified POA    * (Principal) Hypertensive emergency 5/5/2021 Yes    CKD (chronic kidney disease) stage 4, GFR 15-29 ml/min ([address] 5/5/2021 Yes    Pure hypercholesterolemia 5/5/2021 Yes    Carotid stenosis, left s/p Endarterectomy 5/5/2021 Yes    DM type 2, uncontrolled, with neuropathy ([address] 5/5/2021 Yes    COPD (chronic obstructive pulmonary disease) ([address] 5/5/2021 Yes    Non-obstructive Coronary artery disease of native artery of native heart with stable angina pectoris ([address] 5/5/2021 Yes    Overview Signed 8/12/2019 11:46 AM by Raven Boyle, APRN - CNP     9/2017     LMCA: Mild irregularities 10-20%. LAD: 30-40% proximal stenosis    LCx: Mild irregularities 10-20%. RCA: 40% mid stenosis         Severe malnutrition ([address] 2/4/8059 Yes    Metabolic acidosis 1/9/8025 Yes    Chronic heart failure with preserved ejection fraction ([address] 5/6/2021 Yes    Left ventricular hypertrophy 5/6/2021 Yes          Plan:        #Hypertensive emergency  - resolved. Continue current regimen  - losartan held per nephrology, n other meds adjusted    #Hypotensive episode two days back, now bp high    #Lower extremity swelling and pain in the right lower extremity, venous Doppler ordered to rule out any DVT, patient on DVT prophylaxis    #NSTEMI, type 2  - reviewed echocardiogram, no visible wall motion abnormality. Cath from August 2020 with non-obstructive coronary disease.  Continue aspirin, plavix, statin, BB    #HFpEF/concentric LVH hypertrophy  -

## 2021-05-08 NOTE — CARE COORDINATION
Transitional planning-talked with Dr. Ovidio Vázquez to have Dialysis Monday after temp cath placed. Talked with Jean Paul Moore is to go home with his family. Will call his own cab.

## 2021-05-09 ENCOUNTER — APPOINTMENT (OUTPATIENT)
Dept: GENERAL RADIOLOGY | Age: 66
DRG: 280 | End: 2021-05-09
Payer: COMMERCIAL

## 2021-05-09 LAB
ANION GAP SERPL CALCULATED.3IONS-SCNC: 10 MMOL/L (ref 9–17)
BUN BLDV-MCNC: 99 MG/DL (ref 8–23)
BUN/CREAT BLD: ABNORMAL (ref 9–20)
CALCIUM SERPL-MCNC: 8.3 MG/DL (ref 8.6–10.4)
CHLORIDE BLD-SCNC: 106 MMOL/L (ref 98–107)
CO2: 20 MMOL/L (ref 20–31)
CREAT SERPL-MCNC: 3.88 MG/DL (ref 0.7–1.2)
EKG ATRIAL RATE: 62 BPM
EKG P AXIS: 76 DEGREES
EKG P-R INTERVAL: 148 MS
EKG Q-T INTERVAL: 420 MS
EKG QRS DURATION: 90 MS
EKG QTC CALCULATION (BAZETT): 426 MS
EKG R AXIS: 64 DEGREES
EKG T AXIS: 171 DEGREES
EKG VENTRICULAR RATE: 62 BPM
GFR AFRICAN AMERICAN: 19 ML/MIN
GFR NON-AFRICAN AMERICAN: 16 ML/MIN
GFR SERPL CREATININE-BSD FRML MDRD: ABNORMAL ML/MIN/{1.73_M2}
GFR SERPL CREATININE-BSD FRML MDRD: ABNORMAL ML/MIN/{1.73_M2}
GLUCOSE BLD-MCNC: 121 MG/DL (ref 75–110)
GLUCOSE BLD-MCNC: 124 MG/DL (ref 75–110)
GLUCOSE BLD-MCNC: 274 MG/DL (ref 75–110)
GLUCOSE BLD-MCNC: 70 MG/DL (ref 75–110)
GLUCOSE BLD-MCNC: 78 MG/DL (ref 75–110)
GLUCOSE BLD-MCNC: 97 MG/DL (ref 70–99)
HCT VFR BLD CALC: 28.3 % (ref 40.7–50.3)
HEMOGLOBIN: 8.5 G/DL (ref 13–17)
MCH RBC QN AUTO: 31.4 PG (ref 25.2–33.5)
MCHC RBC AUTO-ENTMCNC: 30 G/DL (ref 28.4–34.8)
MCV RBC AUTO: 104.4 FL (ref 82.6–102.9)
NRBC AUTOMATED: 0.3 PER 100 WBC
PDW BLD-RTO: 13.2 % (ref 11.8–14.4)
PLATELET # BLD: 328 K/UL (ref 138–453)
PMV BLD AUTO: 10.4 FL (ref 8.1–13.5)
POTASSIUM SERPL-SCNC: 4.5 MMOL/L (ref 3.7–5.3)
RBC # BLD: 2.71 M/UL (ref 4.21–5.77)
SODIUM BLD-SCNC: 136 MMOL/L (ref 135–144)
TROPONIN INTERP: ABNORMAL
TROPONIN INTERP: ABNORMAL
TROPONIN T: ABNORMAL NG/ML
TROPONIN T: ABNORMAL NG/ML
TROPONIN, HIGH SENSITIVITY: 138 NG/L (ref 0–22)
TROPONIN, HIGH SENSITIVITY: 141 NG/L (ref 0–22)
WBC # BLD: 11 K/UL (ref 3.5–11.3)

## 2021-05-09 PROCEDURE — 93005 ELECTROCARDIOGRAM TRACING: CPT | Performed by: INTERNAL MEDICINE

## 2021-05-09 PROCEDURE — 6360000002 HC RX W HCPCS: Performed by: INTERNAL MEDICINE

## 2021-05-09 PROCEDURE — 6370000000 HC RX 637 (ALT 250 FOR IP): Performed by: INTERNAL MEDICINE

## 2021-05-09 PROCEDURE — 84484 ASSAY OF TROPONIN QUANT: CPT

## 2021-05-09 PROCEDURE — 80048 BASIC METABOLIC PNL TOTAL CA: CPT

## 2021-05-09 PROCEDURE — 2580000003 HC RX 258: Performed by: NURSE PRACTITIONER

## 2021-05-09 PROCEDURE — 82947 ASSAY GLUCOSE BLOOD QUANT: CPT

## 2021-05-09 PROCEDURE — 2060000000 HC ICU INTERMEDIATE R&B

## 2021-05-09 PROCEDURE — 6370000000 HC RX 637 (ALT 250 FOR IP): Performed by: PHYSICIAN ASSISTANT

## 2021-05-09 PROCEDURE — 6370000000 HC RX 637 (ALT 250 FOR IP): Performed by: NURSE PRACTITIONER

## 2021-05-09 PROCEDURE — 94640 AIRWAY INHALATION TREATMENT: CPT

## 2021-05-09 PROCEDURE — 6360000002 HC RX W HCPCS: Performed by: NURSE PRACTITIONER

## 2021-05-09 PROCEDURE — 2700000000 HC OXYGEN THERAPY PER DAY

## 2021-05-09 PROCEDURE — 99232 SBSQ HOSP IP/OBS MODERATE 35: CPT | Performed by: INTERNAL MEDICINE

## 2021-05-09 PROCEDURE — 36415 COLL VENOUS BLD VENIPUNCTURE: CPT

## 2021-05-09 PROCEDURE — 85027 COMPLETE CBC AUTOMATED: CPT

## 2021-05-09 PROCEDURE — 71045 X-RAY EXAM CHEST 1 VIEW: CPT

## 2021-05-09 RX ORDER — METHYLPREDNISOLONE 4 MG/1
4 TABLET ORAL
Status: COMPLETED | OUTPATIENT
Start: 2021-05-10 | End: 2021-05-14

## 2021-05-09 RX ORDER — METHYLPREDNISOLONE 4 MG/1
4 TABLET ORAL
Status: DISPENSED | OUTPATIENT
Start: 2021-05-10 | End: 2021-05-13

## 2021-05-09 RX ORDER — METHYLPREDNISOLONE 4 MG/1
8 TABLET ORAL NIGHTLY
Status: COMPLETED | OUTPATIENT
Start: 2021-05-10 | End: 2021-05-10

## 2021-05-09 RX ORDER — METHYLPREDNISOLONE 4 MG/1
4 TABLET ORAL NIGHTLY
Status: COMPLETED | OUTPATIENT
Start: 2021-05-11 | End: 2021-05-13

## 2021-05-09 RX ORDER — METHYLPREDNISOLONE 4 MG/1
4 TABLET ORAL
Status: COMPLETED | OUTPATIENT
Start: 2021-05-10 | End: 2021-05-11

## 2021-05-09 RX ADMIN — QUETIAPINE FUMARATE 100 MG: 100 TABLET ORAL at 23:26

## 2021-05-09 RX ADMIN — ACETAMINOPHEN 650 MG: 325 TABLET ORAL at 13:28

## 2021-05-09 RX ADMIN — GUAIFENESIN 600 MG: 600 TABLET, EXTENDED RELEASE ORAL at 20:21

## 2021-05-09 RX ADMIN — PRAZOSIN HYDROCHLORIDE 4 MG: 1 CAPSULE ORAL at 09:41

## 2021-05-09 RX ADMIN — SODIUM ZIRCONIUM CYCLOSILICATE 5 G: 10 POWDER, FOR SUSPENSION ORAL at 00:45

## 2021-05-09 RX ADMIN — IPRATROPIUM BROMIDE AND ALBUTEROL SULFATE 1 AMPULE: .5; 2.5 SOLUTION RESPIRATORY (INHALATION) at 06:56

## 2021-05-09 RX ADMIN — INSULIN GLARGINE 35 UNITS: 100 INJECTION, SOLUTION SUBCUTANEOUS at 21:46

## 2021-05-09 RX ADMIN — SODIUM ZIRCONIUM CYCLOSILICATE 5 G: 10 POWDER, FOR SUSPENSION ORAL at 13:27

## 2021-05-09 RX ADMIN — SODIUM ZIRCONIUM CYCLOSILICATE 5 G: 10 POWDER, FOR SUSPENSION ORAL at 20:20

## 2021-05-09 RX ADMIN — CARVEDILOL 25 MG: 25 TABLET, FILM COATED ORAL at 19:13

## 2021-05-09 RX ADMIN — HEPARIN SODIUM 5000 UNITS: 5000 INJECTION INTRAVENOUS; SUBCUTANEOUS at 05:52

## 2021-05-09 RX ADMIN — PRAZOSIN HYDROCHLORIDE 4 MG: 1 CAPSULE ORAL at 00:44

## 2021-05-09 RX ADMIN — QUETIAPINE FUMARATE 100 MG: 100 TABLET ORAL at 00:43

## 2021-05-09 RX ADMIN — CLONIDINE HYDROCHLORIDE 0.1 MG: 0.1 TABLET ORAL at 13:28

## 2021-05-09 RX ADMIN — HYDRALAZINE HYDROCHLORIDE 10 MG: 20 INJECTION INTRAMUSCULAR; INTRAVENOUS at 05:53

## 2021-05-09 RX ADMIN — HEPARIN SODIUM 5000 UNITS: 5000 INJECTION INTRAVENOUS; SUBCUTANEOUS at 13:28

## 2021-05-09 RX ADMIN — HEPARIN SODIUM 5000 UNITS: 5000 INJECTION INTRAVENOUS; SUBCUTANEOUS at 20:20

## 2021-05-09 RX ADMIN — SODIUM CHLORIDE, PRESERVATIVE FREE 10 ML: 5 INJECTION INTRAVENOUS at 09:41

## 2021-05-09 RX ADMIN — GABAPENTIN 200 MG: 100 CAPSULE ORAL at 13:27

## 2021-05-09 RX ADMIN — METHYLPREDNISOLONE 24 MG: 16 TABLET ORAL at 13:28

## 2021-05-09 RX ADMIN — IPRATROPIUM BROMIDE AND ALBUTEROL SULFATE 1 AMPULE: .5; 2.5 SOLUTION RESPIRATORY (INHALATION) at 15:24

## 2021-05-09 RX ADMIN — HYDRALAZINE HYDROCHLORIDE 100 MG: 50 TABLET, FILM COATED ORAL at 13:27

## 2021-05-09 RX ADMIN — ALBUTEROL SULFATE 2.5 MG: 2.5 SOLUTION RESPIRATORY (INHALATION) at 21:35

## 2021-05-09 RX ADMIN — ATORVASTATIN CALCIUM 80 MG: 80 TABLET, FILM COATED ORAL at 20:22

## 2021-05-09 RX ADMIN — IPRATROPIUM BROMIDE AND ALBUTEROL SULFATE 1 AMPULE: .5; 2.5 SOLUTION RESPIRATORY (INHALATION) at 21:35

## 2021-05-09 RX ADMIN — GUAIFENESIN 600 MG: 600 TABLET, EXTENDED RELEASE ORAL at 09:41

## 2021-05-09 RX ADMIN — Medication 81 MG: at 09:42

## 2021-05-09 RX ADMIN — CLONIDINE HYDROCHLORIDE 0.1 MG: 0.1 TABLET ORAL at 20:21

## 2021-05-09 RX ADMIN — ISOSORBIDE MONONITRATE 60 MG: 60 TABLET ORAL at 09:41

## 2021-05-09 RX ADMIN — GABAPENTIN 200 MG: 100 CAPSULE ORAL at 20:21

## 2021-05-09 RX ADMIN — SODIUM BICARBONATE 1300 MG: 648 TABLET ORAL at 20:20

## 2021-05-09 RX ADMIN — HYDRALAZINE HYDROCHLORIDE 100 MG: 50 TABLET, FILM COATED ORAL at 20:21

## 2021-05-09 RX ADMIN — SODIUM BICARBONATE 1300 MG: 648 TABLET ORAL at 09:41

## 2021-05-09 RX ADMIN — SODIUM ZIRCONIUM CYCLOSILICATE 5 G: 10 POWDER, FOR SUSPENSION ORAL at 09:40

## 2021-05-09 RX ADMIN — SODIUM CHLORIDE, PRESERVATIVE FREE 10 ML: 5 INJECTION INTRAVENOUS at 21:47

## 2021-05-09 RX ADMIN — CLONIDINE HYDROCHLORIDE 0.1 MG: 0.1 TABLET ORAL at 09:42

## 2021-05-09 RX ADMIN — IPRATROPIUM BROMIDE AND ALBUTEROL SULFATE 1 AMPULE: .5; 2.5 SOLUTION RESPIRATORY (INHALATION) at 11:33

## 2021-05-09 RX ADMIN — PRAZOSIN HYDROCHLORIDE 4 MG: 1 CAPSULE ORAL at 20:20

## 2021-05-09 RX ADMIN — GABAPENTIN 200 MG: 100 CAPSULE ORAL at 09:41

## 2021-05-09 RX ADMIN — CARVEDILOL 25 MG: 25 TABLET, FILM COATED ORAL at 09:42

## 2021-05-09 RX ADMIN — NIFEDIPINE 60 MG: 60 TABLET, EXTENDED RELEASE ORAL at 09:41

## 2021-05-09 ASSESSMENT — PAIN SCALES - GENERAL
PAINLEVEL_OUTOF10: 0
PAINLEVEL_OUTOF10: 7

## 2021-05-09 ASSESSMENT — PAIN DESCRIPTION - PROGRESSION: CLINICAL_PROGRESSION: NOT CHANGED

## 2021-05-09 NOTE — PROGRESS NOTES
Renal Progress Note    Patient :  Lani Velasquez; 73 y.o. MRN# [id number]  Location:  2022/2022-01  Attending:  Emily Dan MD  Admit Date:  5/3/2021   Hospital Day: 6      Subjective:     Patient seen and examined. U/O 2775  Plavix held, will need Brendon placed tomorrow for HD. Upset about dietary slow response and his inability to manage his blood sugars the way he wants to. Cr with essentially a plateau 1.66 today, Na 136, K 4.5. Outpatient Medications:     Medications Prior to Admission: cloNIDine (CATAPRES) 0.1 MG tablet, Take 1 tablet by mouth 2 times daily  BASAGLAR KWIKPEN 100 UNIT/ML injection pen, INJECT 35 UNITS INTO THE SKIN NIGHTLY  nitroGLYCERIN (NITROSTAT) 0.4 MG SL tablet, USE 1 TABLET UNDER THE TONGUE UP TO MAXIMUM OF 3 TOTAL DOSES.  IF NO RELIEF AFTER 1 DOSE, CALL 911.  gabapentin (NEURONTIN) 100 MG capsule, TAKE 2 CAPSULES BY MOUTH 3 TIMES DAILY  QUEtiapine (SEROQUEL) 100 MG tablet, TAKE 1 TABLET BY MOUTH NIGHTLY  losartan (COZAAR) 50 MG tablet, TAKE 1 TABLET BY MOUTH 2 TIMES DAILY  GNP VITAMIN D MAXIMUM STRENGTH 50 MCG (2000 UT) TABS, TAKE 1 TABLET BY MOUTH ONCE DAILY  bumetanide (BUMEX) 2 MG tablet, TAKE 1 TABLET BY MOUTH 2 TIMES DAILY  TRELEGY ELLIPTA 100-62.5-25 MCG/INH AEPB, INHALE ONE PUFF INTO THE LUNGS DAILY  prazosin (MINIPRESS) 2 MG capsule, 2 CAPSULES BY MOUTH (4MG) TWICE DAILY  COMBIVENT RESPIMAT  MCG/ACT AERS inhaler, INHALE 1 PUFF INTO THE LUNGS EVERY 6 HOURS  hydrALAZINE (APRESOLINE) 100 MG tablet, Take 1 tablet by mouth every 8 hours  aspirin (ASPIR-LOW) 81 MG EC tablet, TAKE 1 TABLET BY MOUTH DAILY  NIFEdipine (ADALAT CC) 30 MG extended release tablet, Take 1 tablet by mouth daily Takes 30mg 3 times daily  carvedilol (COREG) 25 MG tablet, Take 1 tablet by mouth 2 times daily (with meals)  atorvastatin (LIPITOR) 80 MG tablet, Take 1 tablet by mouth daily  clopidogrel (PLAVIX) 75 MG tablet, Take 1 tablet by mouth daily  isosorbide mononitrate (IMDUR) 60 MG isosorbide mononitrate  60 mg Oral Daily    [Held by provider] losartan  50 mg Oral BID    prazosin  4 mg Oral BID    QUEtiapine  100 mg Oral Nightly    sodium chloride flush  5-40 mL Intravenous 2 times per day    heparin (porcine)  5,000 Units Subcutaneous 3 times per day    budesonide-formoterol  2 puff Inhalation BID    insulin lispro  0-6 Units Subcutaneous TID WC    insulin lispro  0-3 Units Subcutaneous Nightly    ipratropium-albuterol  1 ampule Inhalation Q4H While awake     Continuous Infusions:    dextrose 100 mL/hr (21 0717)    sodium chloride       PRN Meds:  cloNIDine, hydrALAZINE, glucose, dextrose, glucagon (rDNA), dextrose, albuterol, ipratropium-albuterol, sodium chloride flush, sodium chloride, nicotine, promethazine **OR** ondansetron, acetaminophen **OR** acetaminophen, polyethylene glycol    Input/Output:       I/O last 3 completed shifts: In: 910 [P.O.:890; I.V.:20]  Out: 4398 [Urine:2775; Emesis/NG output:300]. Patient Vitals for the past 96 hrs (Last 3 readings):   Weight   21 0832 152 lb (68.9 kg)   21 0800 142 lb 9.6 oz (64.7 kg)   21 0413 143 lb 9.6 oz (65.1 kg)       Vital Signs:   Temperature:  Temp: 97.7 °F (36.5 °C)  TMax:   Temp (24hrs), Av.9 °F (36.6 °C), Min:97 °F (36.1 °C), Max:98.6 °F (37 °C)    Respirations:  Resp: 16  Pulse:   Pulse: 73  BP:    BP: (!) 185/62  BP Range: Systolic (49VZZ), YSD:881 , Min:141 , JCI:892       Diastolic (68YSH), HWS:12, Min:47, Max:67      Physical Examination:     General:  AAO x 3, speaking in full sentences, no accessory muscle use. HEENT: Atraumatic, normocephalic, no throat congestion, moist mucosa. Eyes:   Pupils equal, round and reactive to light, EOMI. Neck:   No JVD  Chest:              Diminished bilaterally  Cardiac:  S1 S2 RR, no murmurs, gallops or rubs  Abdomen: Soft, non-tender, ND, +BS, no fluid wave  :   No suprapubic or flank tenderness. Neuro:  AAO x 3, No FND.   SKIN:  No rashes, good skin turgor. Extremities:  1-2+ edema, palpable peripheral pulses    Labs:       Recent Labs     05/07/21  0659 05/08/21  0554 05/09/21  0615   WBC 6.6 8.4 11.0   RBC 2.82* 2.88* 2.71*   HGB 8.6* 9.0* 8.5*   HCT 29.1* 29.4* 28.3*   .2* 102.1 104.4*   MCH 30.5 31.3 31.4   MCHC 29.6 30.6 30.0   RDW 12.9 13.0 13.2    331 328   MPV 10.3 10.4 10.4      BMP:   Recent Labs     05/08/21  1124 05/08/21  1452 05/09/21  0615    134* 136   K 5.0 5.3 4.5    102 106   CO2 18* 17* 20   BUN 89* 94* 99*   CREATININE 3.74* 3.78* 3.88*   GLUCOSE 481* 336* 97   CALCIUM 8.1* 8.1* 8.3*      Phosphorus:     No results for input(s): PHOS in the last 72 hours. Magnesium:    No results for input(s): MG in the last 72 hours. Albumin:    No results for input(s): LABALBU in the last 72 hours. BNP:      Lab Results   Component Value Date    BNP 84 11/27/2013     RIZWAN:      Lab Results   Component Value Date    RIZWAN NEGATIVE 12/04/2020     SPEP:  Lab Results   Component Value Date    PROT 4.7 05/04/2021    PROT 6.2 11/12/2011    ALBCAL 2.3 12/04/2020    ALBPCT 55 12/04/2020    LABALPH 0.1 12/04/2020    LABALPH 0.7 12/04/2020    A1PCT 3 12/04/2020    A2PCT 17 12/04/2020    LABBETA 0.5 12/04/2020    BETAPCT 13 12/04/2020    GAMGLOB 0.5 12/04/2020    GGPCT 12 12/04/2020    PATH ELECTRONICALLY SIGNED. Maame Siu M.D. 12/04/2020    PATH ELECTRONICALLY SIGNED. Maame Siu M.D. 12/04/2020     UPEP:     Lab Results   Component Value Date    LABPE  11/12/2011     ELEVATED PROTEIN CONCENTRATION. MOST SERUM PROTEINS ARE DETECTED IN THIS URINE.      C3:     Lab Results   Component Value Date    C3 89 12/04/2020     C4:     Lab Results   Component Value Date    C4 17 12/04/2020     MPO ANCA:   No results found for: MPO  PR3 ANCA:   No results found for: PR3  Anti-GBM:   No results found for: GBMABIGG  Hep BsAg:         Lab Results   Component Value Date    HEPBSAG NONREACTIVE 12/04/2020     Hep C AB: Lab Results   Component Value Date    HEPCAB NONREACTIVE 12/04/2020       Urinalysis/Chemistries:      Lab Results   Component Value Date    NITRU NEGATIVE 05/03/2021    COLORU YELLOW 05/03/2021    PHUR 6.0 05/03/2021    WBCUA 2 TO 5 05/03/2021    RBCUA 0 TO 2 05/03/2021    MUCUS NOT REPORTED 05/03/2021    TRICHOMONAS NOT REPORTED 05/03/2021    YEAST NOT REPORTED 05/03/2021    BACTERIA NOT REPORTED 05/03/2021    CLARITYU Clear 09/12/2014    SPECGRAV 1.016 05/03/2021    LEUKOCYTESUR NEGATIVE 05/03/2021    UROBILINOGEN Normal 05/03/2021    BILIRUBINUR NEGATIVE 05/03/2021    BILIRUBINUR NEGATIVE 11/12/2011    BLOODU Negative 09/12/2014    GLUCOSEU 1+ 05/03/2021    GLUCOSEU 3+ 11/12/2011    KETUA NEGATIVE 05/03/2021    AMORPHOUS NOT REPORTED 05/03/2021     Urine Sodium:     Lab Results   Component Value Date    IGNACIO 33 05/05/2021     Urine Potassium:    Lab Results   Component Value Date    KUR 38.1 12/04/2020     Urine Chloride:    Lab Results   Component Value Date    CLUR 37 05/05/2021     Urine Osmolarity:   Lab Results   Component Value Date    OSMOU 463 12/04/2020     Urine Protein:   No components found for: TOTALPROTEIN, URINE   Urine Creatinine:     Lab Results   Component Value Date    LABCREA 51.5 05/05/2021     Urine Eosinophils:  No components found for: UEOS    Radiology:     CXR:     Assessment:     1. Acute Kidney Injury: Likely ischemic ATN from fluctuating blood pressures, volume depletion from Bumex use. Baseline creatinine 3.03.3 with plateau 9.66, worsening acidosis. 2.  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 Uncontrolled hypertension multifactorial, cocaine in the system could be a contributing factor, now better controlled  4. 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 IR to place temporary catheter tomorrow, will initiate dialysis tomorrow 5/10/21  2. Plavix has been held. 3.  No maintenance IVF. Continue oral bicarb,   4. Need for RRT discussed again with patient, he remains agreeable, as he required short term HD in past.   5.  Outpatient follow-up with [facility] 1-2 weeks upon eventual d/c.   6.  Continue Clonidine  0.1mg TID  Following  Will discuss with Dr. Tripp Pabon, [facility]  [facility]    Attending Physician Statement  I have discussed the care of Jose Armstrong, including pertinent history and exam findings with the CNP. I have reviewed the key elements of all parts of the encounter with the CNP. I have seen and examined the patient. I agree with the assessment and plan and status of the problem list as documented.        Randy Arteaga MD  Nephrology Attending Physician  [facility]

## 2021-05-09 NOTE — PLAN OF CARE
Problem: Falls - Risk of:  Goal: Will remain free from falls  Description: Will remain free from falls  Outcome: Ongoing  Goal: Absence of physical injury  Description: Absence of physical injury  Outcome: Ongoing     Problem:  Activity:  Goal: Ability to tolerate increased activity will improve  Description: Ability to tolerate increased activity will improve  Outcome: Ongoing     Problem: Gas Exchange - Impaired:  Goal: Levels of oxygenation will improve  Description: Levels of oxygenation will improve  Outcome: Ongoing     Problem: Cardiac:  Goal: Ability to maintain vital signs within normal range will improve  Description: Ability to maintain vital signs within normal range will improve  Outcome: Ongoing  Goal: Cardiovascular alteration will improve  Description: Cardiovascular alteration will improve  Outcome: Ongoing     Problem: Health Behavior:  Goal: Will modify at least one risk factor affecting health status  Description: Will modify at least one risk factor affecting health status  Outcome: Ongoing  Goal: Identification of resources available to assist in meeting health care needs will improve  Description: Identification of resources available to assist in meeting health care needs will improve  Outcome: Ongoing     Problem: Physical Regulation:  Goal: Complications related to the disease process, condition or treatment will be avoided or minimized  Description: Complications related to the disease process, condition or treatment will be avoided or minimized  Outcome: Ongoing     Problem: Nutrition  Goal: Optimal nutrition therapy  Description: Nutrition Problem #1: Severe malnutrition, In context of chronic illness  Intervention: Food and/or Nutrient Delivery: Continue Current Diet, Start Oral Nutrition Supplement  Nutritional Goals: Pt to meet % of est'd needs daily via PO     Outcome: Ongoing     Problem: Pain:  Goal: Pain level will decrease  Description: Pain level will decrease  Outcome: Ongoing  Goal: Control of acute pain  Description: Control of acute pain  Outcome: Ongoing  Goal: Control of chronic pain  Description: Control of chronic pain  Outcome: Ongoing

## 2021-05-09 NOTE — PROGRESS NOTES
[facility]  Office: [address], DO, Beatriz Bushress, DO, Rasheed Joyner, DO, Marlon Late Blood, DO, Ondina Diallo MD, Wale Rust MD, Maryanne Wheat MD, Troy Berg MD, Jo Ann Sylvester MD, Paula Mcknight MD, Colleen Prather MD, Bijan Ornelas MD, Yolanda Black, DO, Adriana Johnson MD, Maria R Purcell, DO, Lisa Strickland MD,  Cassandra Rushing, DO, Alex Hanley MD, Rula Sanchez MD, Katelin Royal MD, Delilah Melvin MD, Titus Acuña, [facility], [facility], CNP, Sadia Resendez, CNP, Akbar Escamilla, CNS, Alfredo Osgood, CNP, Regina Alert, CNP, Ayla Bianchi, CNP, Maurice Steele, CNP, Frank Benjamin, CNP, TRUDI Ventura-C, Christy Guzman, [facility], Toña French, CNP, Bowen Davila, CNP, Delaney Padilla, CNP, Wesly Mcnamara, CNP, Melanie Villasenor, CNP, Twan Lawler, [address] [place] [address]    Progress Note    5/9/2021    12:06 PM    Name:   Obdulia Vargas  MRN:     [id number]     Acct:      [de-identified]   Room:   2022/202201   Day:  6  Admit Date:  5/3/2021  1:18 PM    PCP:   [facility] Smart CNP  Code Status:  Full Code    Subjective:     C/C:   Chief Complaint   Patient presents with    Hypertension     dizziness and HTN     Interval History Status: improved. Pt seen and evaluated this morning. Patient started having lower extremity swelling, right lower extremity swelling and pain, Doppler negative for PE  Blood pressure has been running high, given all the medications this morning,  Nephrology on board, and managing medications  Bumex 1 dose given yesterday, not enough urine output, plan to get the Brendon catheter and dialysis tomorrow    Brief History:     73year-old male with history of longstanding uncontrolled hypertension, non-obstructive CAD, HFpEF, CKD, uncontrolled DM. He presented to the ED complaining of elevated blood pressure and headache.  Blood pressure noted to be elevated to 229/79 upon presentation. Pt tested positive for cocaine, but denies use. He was placed on clevidipine infusion. He was also noted to have an BINU and nephrology was consulted. Troponin elevated and cardiology was consulted. Both kidney injury and troponin elevation attributed to uncontrolled hypertension. Patient's home med regimen was resumed and he was successfully weaned of clevidipine drip. Echocardiogram did not reveal any wall motion abnormality and no further cardiac work-up was pursued. Blood glucose control was established with the patient's home insulin regimen. Plan to get the Brendon catheter and dialysis soon    Review of Systems:     Constitutional:  negative for chills, fevers, sweats  Respiratory:  negative for cough, dyspnea on exertion, shortness of breath, wheezing  Cardiovascular:  negative for chest pain, chest pressure/discomfort, lower extremity edema, palpitations  Gastrointestinal:  negative for abdominal pain, constipation, diarrhea, nausea, vomiting  Neurological:  negative for dizziness, headache    Medications: Allergies:     Allergies   Allergen Reactions    Lisinopril      cough    Ace Inhibitors      coughing    Pcn [Penicillins]        Current Meds:   Scheduled Meds:    methylPREDNISolone  24 mg Oral Once    [START ON 5/10/2021] methylPREDNISolone  4 mg Oral QAM AC    [START ON 5/10/2021] methylPREDNISolone  4 mg Oral Lunch    [START ON 5/10/2021] methylPREDNISolone  4 mg Oral Dinner    [START ON 5/10/2021] methylPREDNISolone  8 mg Oral Nightly    [START ON 5/11/2021] methylPREDNISolone  4 mg Oral Nightly    sodium zirconium cyclosilicate  5 g Oral TID    gabapentin  200 mg Oral TID    guaiFENesin  600 mg Oral BID    NIFEdipine  60 mg Oral Daily    cloNIDine  0.1 mg Oral TID    sodium bicarbonate  1,300 mg Oral BID    carvedilol  25 mg Oral BID WC    aspirin  81 mg Oral Daily    atorvastatin  80 mg Oral Daily    insulin glargine  35 Units Subcutaneous Nightly    [Held by provider] bumetanide  2 mg Oral Daily    fluticasone  2 spray Each Nostril Daily    hydrALAZINE  100 mg Oral 3 times per day    isosorbide mononitrate  60 mg Oral Daily    [Held by provider] losartan  50 mg Oral BID    prazosin  4 mg Oral BID    QUEtiapine  100 mg Oral Nightly    sodium chloride flush  5-40 mL Intravenous 2 times per day    heparin (porcine)  5,000 Units Subcutaneous 3 times per day    budesonide-formoterol  2 puff Inhalation BID    insulin lispro  0-6 Units Subcutaneous TID WC    insulin lispro  0-3 Units Subcutaneous Nightly    ipratropium-albuterol  1 ampule Inhalation Q4H While awake     Continuous Infusions:    dextrose 100 mL/hr (05/05/21 0717)    sodium chloride       PRN Meds: cloNIDine, hydrALAZINE, glucose, dextrose, glucagon (rDNA), dextrose, albuterol, ipratropium-albuterol, sodium chloride flush, sodium chloride, nicotine, promethazine **OR** ondansetron, acetaminophen **OR** acetaminophen, polyethylene glycol    Data:     Past Medical History:   has a past medical history of Asthma, CAD in native artery, nonobstructive, Carotid stenosis, left, Carpal tunnel syndrome, Cerebrovascular disease, Chronic kidney disease, COPD (chronic obstructive pulmonary disease) ([facility] Diabetes mellitus ([facility] History of colon polyps, History of weakness of extremity, HTN (hypertension), Hyperlipidemia, Lung, cysts, congenital, PTSD (post-traumatic stress disorder), PTSD (post-traumatic stress disorder), TIA (transient ischemic attack), and Type II or unspecified type diabetes mellitus without mention of complication, not stated as uncontrolled. Social History:   reports that he quit smoking about 6 years ago. His smoking use included cigarettes. He has a 17.50 pack-year smoking history. He has never used smokeless tobacco. He reports that he does not drink alcohol or use drugs.      Family History:   Family History   Problem Relation Age of Onset    Cancer Mother     Heart Disease Father        Vitals:  BP (!) 205/69   Pulse 84   Temp 97.6 °F (36.4 °C) (Oral)   Resp 18   Ht 5' 6\" (1.676 m)   Wt 155 lb 9.6 oz (70.6 kg)   SpO2 99%   BMI 25.11 kg/m²   Temp (24hrs), Av.8 °F (36.6 °C), Min:97 °F (36.1 °C), Max:98.6 °F (37 °C)    Recent Labs     21  0800   POCGLU 340* 349* 78 70*       I/O (24Hr): Intake/Output Summary (Last 24 hours) at 2021 1206  Last data filed at 2021 0940  Gross per 24 hour   Intake 570 ml   Output 2725 ml   Net -2155 ml       Labs:  Hematology:  Recent Labs     21  0659 21  0554 21  0615   WBC 6.6 8.4 11.0   RBC 2.82* 2.88* 2.71*   HGB 8.6* 9.0* 8.5*   HCT 29.1* 29.4* 28.3*   .2* 102.1 104.4*   MCH 30.5 31.3 31.4   MCHC 29.6 30.6 30.0   RDW 12.9 13.0 13.2    331 328   MPV 10.3 10.4 10.4     Chemistry:  Recent Labs     21  1124 21  14521  0615    134* 136   K 5.0 5.3 4.5    102 106   CO2 18* 17* 20   GLUCOSE 481* 336* 97   BUN 89* 94* 99*   CREATININE 3.74* 3.78* 3.88*   ANIONGAP 13 15 10   LABGLOM 16* 16* 16*   GFRAA 20* 20* 19*   CALCIUM 8.1* 8.1* 8.3*     Recent Labs     21  1703 05/08/21  2014 05/08/21  2055 05/09/21  0447 05/09/21  0800   POCGLU 350* 325* 340* 349* 78 70*     ABG:  Lab Results   Component Value Date    POCPH 7.39 2013    POCPCO2 46 2013    POCPO2 288 2013    POCHCO3 27.8 2013    NBEA NOT REPORTED 2013    PBEA 3 2013    GCW4QVH 29 2013    ZOTZ6OCA 100 2013    FIO2 NOT REPORTED 2020     Lab Results   Component Value Date/Time    SPECIAL 2ML L FR 2020 11:24 AM     Lab Results   Component Value Date/Time    CULTURE NO GROWTH 6 DAYS 2020 11:24 AM       Radiology:  Xr Chest Portable    Result Date: 5/3/2021  1. No acute abnormality.        Physical Examination:        General appearance:  alert, cooperative and no distress  Mental Status: oriented to person, place and time and normal affect  Lungs:  Breath sounds are distant, lungs are otherwise CTAB  Heart:  regular rate and rhythm. There is a high pitched systolic murmur 2/6, heard best at the apex. Abdomen:  soft, nontender, nondistended, normal bowel sounds, no masses, hepatomegaly, splenomegaly  Extremities:  no edema, redness, tenderness in the calves  Skin:  no gross lesions, rashes, induration. There is decreased skin turgor    Assessment:        Hospital Problems           Last Modified POA    * (Principal) Hypertensive emergency 5/5/2021 Yes    CKD (chronic kidney disease) stage 4, GFR 15-29 ml/min ([address] 5/5/2021 Yes    Pure hypercholesterolemia 5/5/2021 Yes    Carotid stenosis, left s/p Endarterectomy 5/5/2021 Yes    DM type 2, uncontrolled, with neuropathy ([address] 5/5/2021 Yes    COPD (chronic obstructive pulmonary disease) ([address] 5/5/2021 Yes    Non-obstructive Coronary artery disease of native artery of native heart with stable angina pectoris ([address] 5/5/2021 Yes    Overview Signed 8/12/2019 11:46 AM by Hay Quiroga, APRN - CNP     9/2017     LMCA: Mild irregularities 10-20%. LAD: 30-40% proximal stenosis    LCx: Mild irregularities 10-20%. RCA: 40% mid stenosis         Severe malnutrition ([address] 9/5/1470 Yes    Metabolic acidosis 0/9/6286 Yes    Chronic heart failure with preserved ejection fraction ([address] 5/6/2021 Yes    Left ventricular hypertrophy 5/6/2021 Yes          Plan:        #Hypertensive emergency  - resolved. Continue current regimen  - losartan held per nephrology, n other meds adjusted    #BINU on CKD 4  - Likely transient ATN from hypotensive episode --> poor renal perfusion.   Given dose of Bumex again yesterday, creatinine not improving much, plan to start the patient on dialysis by nephrology, will get the Brendon catheter soon    #Lower extremity swelling and pain in the right lower extremity, venous Doppler negative for DVT    #NSTEMI, type 2  - reviewed

## 2021-05-10 ENCOUNTER — APPOINTMENT (OUTPATIENT)
Dept: DIALYSIS | Age: 66
DRG: 280 | End: 2021-05-10
Payer: COMMERCIAL

## 2021-05-10 ENCOUNTER — APPOINTMENT (OUTPATIENT)
Dept: INTERVENTIONAL RADIOLOGY/VASCULAR | Age: 66
DRG: 280 | End: 2021-05-10
Payer: COMMERCIAL

## 2021-05-10 LAB
ANION GAP SERPL CALCULATED.3IONS-SCNC: 13 MMOL/L (ref 9–17)
BUN BLDV-MCNC: 110 MG/DL (ref 8–23)
BUN/CREAT BLD: ABNORMAL (ref 9–20)
CALCIUM SERPL-MCNC: 7.8 MG/DL (ref 8.6–10.4)
CHLORIDE BLD-SCNC: 101 MMOL/L (ref 98–107)
CO2: 20 MMOL/L (ref 20–31)
CREAT SERPL-MCNC: 3.68 MG/DL (ref 0.7–1.2)
FERRITIN: 79 UG/L (ref 30–400)
GFR AFRICAN AMERICAN: 20 ML/MIN
GFR NON-AFRICAN AMERICAN: 17 ML/MIN
GFR SERPL CREATININE-BSD FRML MDRD: ABNORMAL ML/MIN/{1.73_M2}
GFR SERPL CREATININE-BSD FRML MDRD: ABNORMAL ML/MIN/{1.73_M2}
GLUCOSE BLD-MCNC: 171 MG/DL (ref 75–110)
GLUCOSE BLD-MCNC: 180 MG/DL (ref 75–110)
GLUCOSE BLD-MCNC: 226 MG/DL (ref 75–110)
GLUCOSE BLD-MCNC: 259 MG/DL (ref 75–110)
GLUCOSE BLD-MCNC: 316 MG/DL (ref 75–110)
GLUCOSE BLD-MCNC: 322 MG/DL (ref 75–110)
GLUCOSE BLD-MCNC: 39 MG/DL (ref 75–110)
GLUCOSE BLD-MCNC: 401 MG/DL (ref 70–99)
GLUCOSE BLD-MCNC: 418 MG/DL (ref 75–110)
GLUCOSE BLD-MCNC: 44 MG/DL (ref 75–110)
HBV SURFACE AB TITR SER: 117.7 MIU/ML
HCT VFR BLD CALC: 25.7 % (ref 40.7–50.3)
HEMOGLOBIN: 8.1 G/DL (ref 13–17)
HEPATITIS B CORE IGM ANTIBODY: NONREACTIVE
HEPATITIS B CORE TOTAL ANTIBODY: REACTIVE
HEPATITIS B SURFACE ANTIGEN: NONREACTIVE
HEPATITIS C ANTIBODY: NONREACTIVE
IRON SATURATION: 20 % (ref 20–55)
IRON: 63 UG/DL (ref 59–158)
MCH RBC QN AUTO: 31.2 PG (ref 25.2–33.5)
MCHC RBC AUTO-ENTMCNC: 31.5 G/DL (ref 28.4–34.8)
MCV RBC AUTO: 98.8 FL (ref 82.6–102.9)
NRBC AUTOMATED: 0.2 PER 100 WBC
PDW BLD-RTO: 13.2 % (ref 11.8–14.4)
PLATELET # BLD: 322 K/UL (ref 138–453)
PMV BLD AUTO: 10.7 FL (ref 8.1–13.5)
POTASSIUM SERPL-SCNC: 5.3 MMOL/L (ref 3.7–5.3)
PTH INTACT: 225.4 PG/ML (ref 15–65)
RBC # BLD: 2.6 M/UL (ref 4.21–5.77)
SODIUM BLD-SCNC: 134 MMOL/L (ref 135–144)
TOTAL IRON BINDING CAPACITY: 316 UG/DL (ref 250–450)
UNSATURATED IRON BINDING CAPACITY: 253 UG/DL (ref 112–347)
WBC # BLD: 8.5 K/UL (ref 3.5–11.3)

## 2021-05-10 PROCEDURE — C1752 CATH,HEMODIALYSIS,SHORT-TERM: HCPCS

## 2021-05-10 PROCEDURE — 6370000000 HC RX 637 (ALT 250 FOR IP): Performed by: NURSE PRACTITIONER

## 2021-05-10 PROCEDURE — 83540 ASSAY OF IRON: CPT

## 2021-05-10 PROCEDURE — 6360000002 HC RX W HCPCS: Performed by: NURSE PRACTITIONER

## 2021-05-10 PROCEDURE — 82728 ASSAY OF FERRITIN: CPT

## 2021-05-10 PROCEDURE — 82947 ASSAY GLUCOSE BLOOD QUANT: CPT

## 2021-05-10 PROCEDURE — 5A1D70Z PERFORMANCE OF URINARY FILTRATION, INTERMITTENT, LESS THAN 6 HOURS PER DAY: ICD-10-PCS | Performed by: INTERNAL MEDICINE

## 2021-05-10 PROCEDURE — 86705 HEP B CORE ANTIBODY IGM: CPT

## 2021-05-10 PROCEDURE — 36556 INSERT NON-TUNNEL CV CATH: CPT

## 2021-05-10 PROCEDURE — 2580000003 HC RX 258: Performed by: NURSE PRACTITIONER

## 2021-05-10 PROCEDURE — 90935 HEMODIALYSIS ONE EVALUATION: CPT | Performed by: INTERNAL MEDICINE

## 2021-05-10 PROCEDURE — 80048 BASIC METABOLIC PNL TOTAL CA: CPT

## 2021-05-10 PROCEDURE — 83550 IRON BINDING TEST: CPT

## 2021-05-10 PROCEDURE — 94640 AIRWAY INHALATION TREATMENT: CPT

## 2021-05-10 PROCEDURE — 86803 HEPATITIS C AB TEST: CPT

## 2021-05-10 PROCEDURE — 90935 HEMODIALYSIS ONE EVALUATION: CPT

## 2021-05-10 PROCEDURE — 6370000000 HC RX 637 (ALT 250 FOR IP): Performed by: INTERNAL MEDICINE

## 2021-05-10 PROCEDURE — 94761 N-INVAS EAR/PLS OXIMETRY MLT: CPT

## 2021-05-10 PROCEDURE — 6370000000 HC RX 637 (ALT 250 FOR IP): Performed by: PHYSICIAN ASSISTANT

## 2021-05-10 PROCEDURE — 77001 FLUOROGUIDE FOR VEIN DEVICE: CPT

## 2021-05-10 PROCEDURE — 86704 HEP B CORE ANTIBODY TOTAL: CPT

## 2021-05-10 PROCEDURE — 2060000000 HC ICU INTERMEDIATE R&B

## 2021-05-10 PROCEDURE — 86317 IMMUNOASSAY INFECTIOUS AGENT: CPT

## 2021-05-10 PROCEDURE — 2700000000 HC OXYGEN THERAPY PER DAY

## 2021-05-10 PROCEDURE — C1769 GUIDE WIRE: HCPCS

## 2021-05-10 PROCEDURE — C1894 INTRO/SHEATH, NON-LASER: HCPCS

## 2021-05-10 PROCEDURE — 36415 COLL VENOUS BLD VENIPUNCTURE: CPT

## 2021-05-10 PROCEDURE — 76937 US GUIDE VASCULAR ACCESS: CPT

## 2021-05-10 PROCEDURE — 2709999900 HC NON-CHARGEABLE SUPPLY

## 2021-05-10 PROCEDURE — 6360000002 HC RX W HCPCS: Performed by: INTERNAL MEDICINE

## 2021-05-10 PROCEDURE — 99232 SBSQ HOSP IP/OBS MODERATE 35: CPT | Performed by: INTERNAL MEDICINE

## 2021-05-10 PROCEDURE — 87340 HEPATITIS B SURFACE AG IA: CPT

## 2021-05-10 PROCEDURE — 6360000002 HC RX W HCPCS: Performed by: PHYSICIAN ASSISTANT

## 2021-05-10 PROCEDURE — 05HM33Z INSERTION OF INFUSION DEVICE INTO RIGHT INTERNAL JUGULAR VEIN, PERCUTANEOUS APPROACH: ICD-10-PCS | Performed by: RADIOLOGY

## 2021-05-10 PROCEDURE — 85027 COMPLETE CBC AUTOMATED: CPT

## 2021-05-10 PROCEDURE — 83970 ASSAY OF PARATHORMONE: CPT

## 2021-05-10 RX ORDER — NIFEDIPINE 90 MG/1
90 TABLET, FILM COATED, EXTENDED RELEASE ORAL NIGHTLY
Status: DISCONTINUED | OUTPATIENT
Start: 2021-05-11 | End: 2021-05-14 | Stop reason: HOSPADM

## 2021-05-10 RX ORDER — HEPARIN SODIUM 1000 [USP'U]/ML
1400 INJECTION, SOLUTION INTRAVENOUS; SUBCUTANEOUS PRN
Status: DISCONTINUED | OUTPATIENT
Start: 2021-05-10 | End: 2021-05-13

## 2021-05-10 RX ORDER — HEPARIN SODIUM (PORCINE) LOCK FLUSH IV SOLN 100 UNIT/ML 100 UNIT/ML
SOLUTION INTRAVENOUS
Status: COMPLETED | OUTPATIENT
Start: 2021-05-10 | End: 2021-05-10

## 2021-05-10 RX ORDER — HEPARIN SODIUM 1000 [USP'U]/ML
1500 INJECTION, SOLUTION INTRAVENOUS; SUBCUTANEOUS PRN
Status: DISCONTINUED | OUTPATIENT
Start: 2021-05-10 | End: 2021-05-13

## 2021-05-10 RX ADMIN — ALBUTEROL SULFATE 2.5 MG: 2.5 SOLUTION RESPIRATORY (INHALATION) at 05:00

## 2021-05-10 RX ADMIN — NIFEDIPINE 60 MG: 60 TABLET, EXTENDED RELEASE ORAL at 08:18

## 2021-05-10 RX ADMIN — GUAIFENESIN 600 MG: 600 TABLET, EXTENDED RELEASE ORAL at 21:47

## 2021-05-10 RX ADMIN — IPRATROPIUM BROMIDE AND ALBUTEROL SULFATE 1 AMPULE: .5; 2.5 SOLUTION RESPIRATORY (INHALATION) at 07:59

## 2021-05-10 RX ADMIN — HEPARIN 7500 UNITS: 100 SYRINGE at 10:41

## 2021-05-10 RX ADMIN — GABAPENTIN 200 MG: 100 CAPSULE ORAL at 17:50

## 2021-05-10 RX ADMIN — GUAIFENESIN 600 MG: 600 TABLET, EXTENDED RELEASE ORAL at 08:19

## 2021-05-10 RX ADMIN — HEPARIN SODIUM 1500 UNITS: 1000 INJECTION INTRAVENOUS; SUBCUTANEOUS at 16:13

## 2021-05-10 RX ADMIN — HEPARIN SODIUM 5000 UNITS: 5000 INJECTION INTRAVENOUS; SUBCUTANEOUS at 17:50

## 2021-05-10 RX ADMIN — METHYLPREDNISOLONE 8 MG: 4 TABLET ORAL at 21:47

## 2021-05-10 RX ADMIN — CARVEDILOL 25 MG: 25 TABLET, FILM COATED ORAL at 17:51

## 2021-05-10 RX ADMIN — ACETAMINOPHEN 650 MG: 325 TABLET ORAL at 17:55

## 2021-05-10 RX ADMIN — QUETIAPINE FUMARATE 100 MG: 100 TABLET ORAL at 21:48

## 2021-05-10 RX ADMIN — CLONIDINE HYDROCHLORIDE 0.1 MG: 0.1 TABLET ORAL at 17:51

## 2021-05-10 RX ADMIN — METHYLPREDNISOLONE 4 MG: 4 TABLET ORAL at 11:20

## 2021-05-10 RX ADMIN — IPRATROPIUM BROMIDE AND ALBUTEROL SULFATE 1 AMPULE: .5; 2.5 SOLUTION RESPIRATORY (INHALATION) at 11:39

## 2021-05-10 RX ADMIN — SODIUM BICARBONATE 1300 MG: 648 TABLET ORAL at 08:19

## 2021-05-10 RX ADMIN — HEPARIN 7000 UNITS: 100 SYRINGE at 10:40

## 2021-05-10 RX ADMIN — METHYLPREDNISOLONE 4 MG: 4 TABLET ORAL at 17:50

## 2021-05-10 RX ADMIN — PRAZOSIN HYDROCHLORIDE 4 MG: 1 CAPSULE ORAL at 21:50

## 2021-05-10 RX ADMIN — HEPARIN SODIUM 1400 UNITS: 1000 INJECTION INTRAVENOUS; SUBCUTANEOUS at 16:13

## 2021-05-10 RX ADMIN — SODIUM BICARBONATE 1300 MG: 648 TABLET ORAL at 21:47

## 2021-05-10 RX ADMIN — METHYLPREDNISOLONE 4 MG: 4 TABLET ORAL at 08:18

## 2021-05-10 RX ADMIN — SODIUM CHLORIDE, PRESERVATIVE FREE 10 ML: 5 INJECTION INTRAVENOUS at 08:19

## 2021-05-10 RX ADMIN — Medication 81 MG: at 08:20

## 2021-05-10 RX ADMIN — HYDRALAZINE HYDROCHLORIDE 100 MG: 50 TABLET, FILM COATED ORAL at 17:50

## 2021-05-10 RX ADMIN — CLONIDINE HYDROCHLORIDE 0.1 MG: 0.1 TABLET ORAL at 21:51

## 2021-05-10 RX ADMIN — HEPARIN SODIUM 5000 UNITS: 5000 INJECTION INTRAVENOUS; SUBCUTANEOUS at 21:49

## 2021-05-10 RX ADMIN — PRAZOSIN HYDROCHLORIDE 4 MG: 1 CAPSULE ORAL at 08:19

## 2021-05-10 RX ADMIN — IPRATROPIUM BROMIDE AND ALBUTEROL SULFATE 1 AMPULE: .5; 2.5 SOLUTION RESPIRATORY (INHALATION) at 21:47

## 2021-05-10 RX ADMIN — HYDRALAZINE HYDROCHLORIDE 100 MG: 50 TABLET, FILM COATED ORAL at 21:47

## 2021-05-10 RX ADMIN — CARVEDILOL 25 MG: 25 TABLET, FILM COATED ORAL at 08:53

## 2021-05-10 RX ADMIN — SODIUM ZIRCONIUM CYCLOSILICATE 5 G: 10 POWDER, FOR SUSPENSION ORAL at 17:49

## 2021-05-10 RX ADMIN — CLONIDINE HYDROCHLORIDE 0.1 MG: 0.1 TABLET ORAL at 08:19

## 2021-05-10 RX ADMIN — INSULIN GLARGINE 35 UNITS: 100 INJECTION, SOLUTION SUBCUTANEOUS at 21:49

## 2021-05-10 RX ADMIN — SODIUM ZIRCONIUM CYCLOSILICATE 5 G: 10 POWDER, FOR SUSPENSION ORAL at 08:18

## 2021-05-10 RX ADMIN — ISOSORBIDE MONONITRATE 60 MG: 60 TABLET ORAL at 08:18

## 2021-05-10 RX ADMIN — HYDRALAZINE HYDROCHLORIDE 100 MG: 50 TABLET, FILM COATED ORAL at 04:49

## 2021-05-10 RX ADMIN — INSULIN LISPRO 4 UNITS: 100 INJECTION, SOLUTION INTRAVENOUS; SUBCUTANEOUS at 11:19

## 2021-05-10 RX ADMIN — ATORVASTATIN CALCIUM 80 MG: 80 TABLET, FILM COATED ORAL at 21:48

## 2021-05-10 RX ADMIN — SODIUM ZIRCONIUM CYCLOSILICATE 5 G: 10 POWDER, FOR SUSPENSION ORAL at 21:51

## 2021-05-10 RX ADMIN — GABAPENTIN 200 MG: 100 CAPSULE ORAL at 08:19

## 2021-05-10 RX ADMIN — HEPARIN SODIUM 5000 UNITS: 5000 INJECTION INTRAVENOUS; SUBCUTANEOUS at 04:49

## 2021-05-10 RX ADMIN — GABAPENTIN 200 MG: 100 CAPSULE ORAL at 21:48

## 2021-05-10 ASSESSMENT — PAIN SCALES - GENERAL
PAINLEVEL_OUTOF10: 0
PAINLEVEL_OUTOF10: 2
PAINLEVEL_OUTOF10: 0

## 2021-05-10 NOTE — CARE COORDINATION
JOAQUIM met with patient to discuss OP dialysis arrangements. Patient reports he follows Dr. Dawna Clay, and would follow him for now but would like to eventually switch to [facility]. Patient requesting clinic closest to his home, closest covered by insurance is [address] [place] [address]. Patient prefers MWF morning schedule. Patient utilizes cab through [facility]. Patient does not feel that he will be at clinic long as he reports his levels usually go down.      JOAQUIM faxed referral to [address] [facility].

## 2021-05-10 NOTE — BRIEF OP NOTE
Brief Postoperative Note Non Tunneled HD Catheter    Deanna Perera  YOB: 1955  [id number]    Pre-operative Diagnosis: Acute Renal Failure      Post-operative Diagnosis: Same    Procedure: Non tunneled Dialysis Catheter    Anesthesia: 1%Lidocaine     Surgeons/Assistants: Karyle Amabile Redfox, PA-C    Estimated Blood Loss: Minimal    Complications: none    13 Fr x 20 cm non tunneled HD Catheter placed via Site:  Right Internal Jugular Vein. Sutured in place and sterile dressing applied. Locked with Heparin  May use HD cath for dialysis.     Electronically signed by TRUDI Chester on 5/10/2021 at 10:39 AM

## 2021-05-10 NOTE — PLAN OF CARE
Problem: Falls - Risk of:  Goal: Will remain free from falls  Description: Will remain free from falls  5/9/2021 2159 by Chasity Zimmerman RN  Outcome: Met This Shift  5/9/2021 1445 by Sneha Haney RN  Outcome: Ongoing

## 2021-05-10 NOTE — PROGRESS NOTES
[facility]   Progress Note                   Date:   5/10/2021  Patient name: Luis Fisher  Date of admission:  5/3/2021  1:18 PM  MRN:   [id number]  YOB: 1955  PCP: TERRANCE Markham CNP    Reason for Admission: Hypertensive emergency [I16.1]    Subjective:       Clinical Changes / Abnormalities: Patient seen and examined in room after discussion with RN. Pt. Denies any chest pain or SOB. States he had some \"pressure\" yesterday but he states after the breathing treatment this has improved. HTN overnight and improved this AM after medications. Per RN patient to start HD tx today.         Medications:   Scheduled Meds:   methylPREDNISolone  4 mg Oral QAM AC    methylPREDNISolone  4 mg Oral Lunch    methylPREDNISolone  4 mg Oral Dinner    methylPREDNISolone  8 mg Oral Nightly    [START ON 5/11/2021] methylPREDNISolone  4 mg Oral Nightly    fluticasone-umeclidin-vilant  1 puff Inhalation Daily    sodium zirconium cyclosilicate  5 g Oral TID    gabapentin  200 mg Oral TID    guaiFENesin  600 mg Oral BID    NIFEdipine  60 mg Oral Daily    cloNIDine  0.1 mg Oral TID    sodium bicarbonate  1,300 mg Oral BID    carvedilol  25 mg Oral BID WC    aspirin  81 mg Oral Daily    atorvastatin  80 mg Oral Daily    insulin glargine  35 Units Subcutaneous Nightly    [Held by provider] bumetanide  2 mg Oral Daily    fluticasone  2 spray Each Nostril Daily    hydrALAZINE  100 mg Oral 3 times per day    isosorbide mononitrate  60 mg Oral Daily    [Held by provider] losartan  50 mg Oral BID    prazosin  4 mg Oral BID    QUEtiapine  100 mg Oral Nightly    sodium chloride flush  5-40 mL Intravenous 2 times per day    heparin (porcine)  5,000 Units Subcutaneous 3 times per day    insulin lispro  0-6 Units Subcutaneous TID WC    insulin lispro  0-3 Units Subcutaneous Nightly    ipratropium-albuterol  1 ampule Inhalation Q4H While awake     Continuous Infusions:   dextrose 100 mL/hr (05/05/21 0717)    sodium chloride       CBC:   Recent Labs     05/08/21  0554 05/09/21  0615 05/10/21  0636   WBC 8.4 11.0 8.5   HGB 9.0* 8.5* 8.1*    328 322     BMP:    Recent Labs     05/08/21  1452 05/09/21  0615 05/10/21  0636   * 136 134*   K 5.3 4.5 5.3    106 101   CO2 17* 20 20   BUN 94* 99* 110*   CREATININE 3.78* 3.88* 3.68*   GLUCOSE 336* 97 401*     Hepatic:   No results for input(s): AST, ALT, ALB, BILITOT, ALKPHOS in the last 72 hours. Troponin:   Recent Labs     05/09/21  1350 05/09/21  1612   TROPHS 141* 138*     BNP: No results for input(s): BNP in the last 72 hours. Lipids:   No results for input(s): CHOL, HDL in the last 72 hours. Invalid input(s): LDLCALCU  INR:   No results for input(s): INR in the last 72 hours. Previous Testing:      CATH 8/12/2020: Nonobstructive CAD. LV was not done. No change from cath in 2017.      ECHO 5/16/2020: EF 73%, moderate-severe LVH, mild MR/TR, RVSP is 37 mmHg.      STRESS 1/7/2020: No ischemia/infarct. EF 49%. Objective:   Vitals: BP (!) 144/52   Pulse 69   Temp 97.8 °F (36.6 °C) (Oral)   Resp 18   Ht 5' 6\" (1.676 m)   Wt 155 lb 9.6 oz (70.6 kg)   SpO2 98%   BMI 25.11 kg/m²   General appearance: alert and cooperative with exam  HEENT: Head: Normocephalic, no lesions, without obvious abnormality. Neck: no JVD, trachea midline, no adenopathy  Lungs: Dim throughout but no audible rales or rhonchi fpresently. Fine exp wheezing anteriorly  Heart: Regular rate and rhythm, s1/s2 auscultated, no murmurs. SR  Abdomen: soft, non-tender, bowel sounds active  Extremities: no edema  Neurologic: not done    Echo 5/5/21  2D Echo 5/5/2021  Summary  Normal LV size . Severe concentric LVH. Septal wall thickness 2.1 cm  No obvious wall motion abnormality seen. Normal LV systolic function with LVEF >65%. Grade I diastolic dysfunction  Normal RV size and function.  RV systolic pressure 38 mmHg  Moderately dilated LA and RA appears echo. Denies any chest pain. No plans for further ischemic work-up at this time. Will check echo  2. HTN - elevated overnight but improved this AM. Continue Coreg, catapres, hydralazine, Imdur, & minipress. Will increase Procardia XL to 90 at switch to night for better PM control. 3. No further CV work-up at this time.  Please call with further questions/concerns    Electronically signed by TERRANCE Jenkins CNP on 5/10/2021 at [address]. [phone]

## 2021-05-10 NOTE — PROGRESS NOTES
Nephrology Progress Note        Subjective: patient evaluated on dialysis. Pt is stable on dialysis. Access cannulated without problems. No new issues overnite. Stable hemodynamics. Temporary dialysis catheter placed today. Undergoing dialysis today 2 L of fluid removed. Still quite edematous. Blood pressures running high. Working on outpatient dialysis spot at [address] [facility] renal [facility]. Blood sugar still running high. Azotemia has worsened BUN is up to 110. Weight is now up to 73 kg which is a gain of 15 kg since admission. Objective:  CURRENT TEMPERATURE:  Temp: 97.7 °F (36.5 °C)  MAXIMUM TEMPERATURE OVER 24HRS:  Temp (24hrs), Av.8 °F (36.6 °C), Min:97.6 °F (36.4 °C), Max:98 °F (36.7 °C)    CURRENT RESPIRATORY RATE:  Resp: 17  CURRENT PULSE:  Pulse: 64  CURRENT BLOOD PRESSURE:  BP: (!) 160/67  24HR BLOOD PRESSURE RANGE:  Systolic (90PPF), ITF:084 , Min:129 , LAURA:894   ; Diastolic (15FPV), GDQ:01, Min:52, Max:86    24HR INTAKE/OUTPUT:      Intake/Output Summary (Last 24 hours) at 5/10/2021 1615  Last data filed at 5/10/2021 0856  Gross per 24 hour   Intake    Output 1250 ml   Net -1250 ml     Patient Vitals for the past 96 hrs (Last 3 readings):   Weight   05/10/21 1401 161 lb 13.1 oz (73.4 kg)   21 0934 155 lb 9.6 oz (70.6 kg)   21 0832 152 lb (68.9 kg)         Physical Exam:  General appearance:Awake, alert, in no acute distress  Skin: warm and dry, no rash or erythema  Eyes: conjunctivae normal and sclera anicteric  ENT:no thrush no pharyngeal congestion   Neck:  No JVD, No Thyromegaly  Pulmonary: clear to auscultation and no wheezing or rhonchi, basal crackles  Cardiovascular: normal S1 and S2. NO rubs and NO murmur.  No S3 OR S4   Abdomen: soft nontender, bowel sounds present, no organomegaly,  no ascites   Extremities: no cyanosis, clubbing 2+ edema     Access:  Temporary right IJ vein dialysis catheter    Current Medications:    [START ON 2021] NIFEdipine (ADALAT CC) extended release tablet 90 mg, Nightly  heparin (porcine) injection 1,400 Units, PRN  heparin (porcine) injection 1,500 Units, PRN  methylPREDNISolone (MEDROL) tablet 4 mg, QAM AC  methylPREDNISolone (MEDROL) tablet 4 mg, Lunch  methylPREDNISolone (MEDROL) tablet 4 mg, Dinner  methylPREDNISolone (MEDROL) tablet 8 mg, Nightly  [START ON 5/11/2021] methylPREDNISolone (MEDROL) tablet 4 mg, Nightly  fluticasone-umeclidin-vilant (TRELEGY ELLIPTA) 100-62.5-25 MCG/INH inhaler 1 puff, Daily  cloNIDine (CATAPRES) tablet 0.1 mg, Q4H PRN  sodium zirconium cyclosilicate (LOKELMA) oral suspension 5 g, TID  gabapentin (NEURONTIN) capsule 200 mg, TID  hydrALAZINE (APRESOLINE) injection 10 mg, Q6H PRN  guaiFENesin (MUCINEX) extended release tablet 600 mg, BID  cloNIDine (CATAPRES) tablet 0.1 mg, TID  sodium bicarbonate tablet 1,300 mg, BID  carvedilol (COREG) tablet 25 mg, BID WC  glucose (GLUTOSE) 40 % oral gel 15 g, PRN  dextrose 50 % IV solution, PRN  glucagon (rDNA) injection 1 mg, PRN  dextrose 5 % solution, PRN  aspirin EC tablet 81 mg, Daily  atorvastatin (LIPITOR) tablet 80 mg, Daily  insulin glargine (LANTUS) injection vial 35 Units, Nightly  [Held by provider] bumetanide (BUMEX) tablet 2 mg, Daily  albuterol (PROVENTIL) nebulizer solution 2.5 mg, Q6H PRN  fluticasone (FLONASE) 50 MCG/ACT nasal spray 2 spray, Daily  hydrALAZINE (APRESOLINE) tablet 100 mg, 3 times per day  ipratropium-albuterol (DUONEB) nebulizer solution 3 mL, Q6H PRN  isosorbide mononitrate (IMDUR) extended release tablet 60 mg, Daily  [Held by provider] losartan (COZAAR) tablet 50 mg, BID  prazosin (MINIPRESS) capsule 4 mg, BID  QUEtiapine (SEROQUEL) tablet 100 mg, Nightly  sodium chloride flush 0.9 % injection 5-40 mL, 2 times per day  sodium chloride flush 0.9 % injection 5-40 mL, PRN  0.9 % sodium chloride infusion, PRN  nicotine (NICODERM CQ) 21 MG/24HR 1 patch, Daily PRN  promethazine (PHENERGAN) tablet 12.5 mg, Q6H PRN    Or  ondansetron (ZOFRAN) injection 4 mg, Q6H PRN  acetaminophen (TYLENOL) tablet 650 mg, Q6H PRN    Or  acetaminophen (TYLENOL) suppository 650 mg, Q6H PRN  polyethylene glycol (GLYCOLAX) packet 17 g, Daily PRN  heparin (porcine) injection 5,000 Units, 3 times per day  insulin lispro (HUMALOG) injection vial 0-6 Units, TID WC  insulin lispro (HUMALOG) injection vial 0-3 Units, Nightly  ipratropium-albuterol (DUONEB) nebulizer solution 1 ampule, Q4H While awake      Labs:   CBC:   Recent Labs     05/08/21  0554 05/09/21  0615 05/10/21  0636   WBC 8.4 11.0 8.5   RBC 2.88* 2.71* 2.60*   HGB 9.0* 8.5* 8.1*   HCT 29.4* 28.3* 25.7*    328 322   MPV 10.4 10.4 10.7      BMP:   Recent Labs     05/08/21  1452 05/09/21  0615 05/10/21  0636   * 136 134*   K 5.3 4.5 5.3    106 101   CO2 17* 20 20   BUN 94* 99* 110*   CREATININE 3.78* 3.88* 3.68*   GLUCOSE 336* 97 401*   CALCIUM 8.1* 8.3* 7.8*        Phosphorus:  No results for input(s): PHOS in the last 72 hours. Magnesium: No results for input(s): MG in the last 72 hours. Albumin: No results for input(s): LABALBU in the last 72 hours. Dialysis bath: Dialysis Bath  K+ (Potassium): 2  Ca+ (Calcium): 2  Na+ (Sodium): 140  HCO3 (Bicarb): 35    Radiology:  Reviewed as available. Assessment:  1 acute kidney injury from ischemic ATN from fluctuating blood pressure, baseline creatinine 3-3.3 peaked up to 3.9, down to 3.6 although some of the decrease appears to be dilutional than real.  2.chronic kidney disease stage IV secondary to diabetic nephrosclerosis baseline 3-3.3 proteinuria 3 to 4 g   3 DM: Type 2 diabetes with nephropathy  4 EDEMA : From fluid overload  5. ANEMIA OF CHRONIC DISEASE: Hemoglobin 8.1  6. SECONDARY HYPERPARATHYROIDISM: PTH not checked yet    Plan:  1. Wt removal and dialysis orders reviewed. 2.  Plan for hemodialysis again tomorrow aim for 3 to 4 L of fluid removal  3. Cont pt on aranesp and zemplar per protocol. 4. Follow up labs ordered. 5.  Check iron studies  6.

## 2021-05-10 NOTE — PROGRESS NOTES
Dialysis Post Treatment Note  Vitals:    05/10/21 1621   BP: (!) 158/63   Pulse: 66   Resp: 23   Temp: 98.1 °F (36.7 °C)   SpO2: 100%     Pre-Weight = 73.4kg  Post-weight = Weight: 157 lb 6.5 oz (71.4 kg)  Total Liters Processed = Total Liters Processed (l/min): 24.1 l/min  Rinseback Volume (mL) = Rinseback Volume (ml): 310 ml  Net Removal (mL) = NET Removed (ml): 2000 ml  Patient's dry weight=Not established yet  Type of access used=RIJ QM temp cath  Length of treatment=120 minutes    Pt tolerated 1st HD tx very well without compliants.

## 2021-05-10 NOTE — PLAN OF CARE
Problem: Falls - Risk of:  Goal: Will remain free from falls  Description: Will remain free from falls  5/10/2021 1144 by Izzy Sher RN  Outcome: Ongoing  5/9/2021 2159 by Annette Pérez RN  Outcome: Met This Shift  Goal: Absence of physical injury  Description: Absence of physical injury  5/10/2021 1144 by Izzy Sher RN  Outcome: Ongoing  5/9/2021 2159 by Annette Pérez RN  Outcome: Met This Shift     Problem:  Activity:  Goal: Ability to tolerate increased activity will improve  Description: Ability to tolerate increased activity will improve  5/10/2021 1144 by Izzy Sher RN  Outcome: Ongoing  5/9/2021 2159 by Annette Pérez RN  Outcome: Met This Shift     Problem: Gas Exchange - Impaired:  Goal: Levels of oxygenation will improve  Description: Levels of oxygenation will improve  5/10/2021 1144 by Izzy Sher RN  Outcome: Ongoing  5/9/2021 2159 by Annette Pérez RN  Outcome: Met This Shift     Problem: Cardiac:  Goal: Ability to maintain vital signs within normal range will improve  Description: Ability to maintain vital signs within normal range will improve  5/10/2021 1144 by Izzy Sher RN  Outcome: Ongoing  5/9/2021 2159 by Annette Pérez RN  Outcome: Met This Shift  Goal: Cardiovascular alteration will improve  Description: Cardiovascular alteration will improve  5/10/2021 1144 by Izzy Sher RN  Outcome: Ongoing  5/9/2021 2159 by Annette Pérez RN  Outcome: Met This Shift     Problem: Health Behavior:  Goal: Will modify at least one risk factor affecting health status  Description: Will modify at least one risk factor affecting health status  5/10/2021 1144 by Izzy Sher RN  Outcome: Ongoing  5/9/2021 2159 by Annette Pérez RN  Outcome: Met This Shift  Goal: Identification of resources available to assist in meeting health care needs will improve  Description: Identification of resources available to assist in meeting health care needs will improve  5/10/2021 1144 by Scout Hyatt RN  Outcome: Ongoing  5/9/2021 2159 by Malinda Delgado RN  Outcome: Met This Shift     Problem: Physical Regulation:  Goal: Complications related to the disease process, condition or treatment will be avoided or minimized  Description: Complications related to the disease process, condition or treatment will be avoided or minimized  5/10/2021 1144 by Scout Hyatt RN  Outcome: Ongoing  5/9/2021 2159 by Malinda Delgado RN  Outcome: Met This Shift     Problem: Nutrition  Goal: Optimal nutrition therapy  Description: Nutrition Problem #1: Severe malnutrition, In context of chronic illness  Intervention: Food and/or Nutrient Delivery: Continue Current Diet, Start Oral Nutrition Supplement  Nutritional Goals: Pt to meet % of est'd needs daily via PO     5/10/2021 1144 by Scout Hyatt RN  Outcome: Ongoing  5/9/2021 2159 by Malinda Delgado RN  Outcome: Met This Shift     Problem: Pain:  Goal: Pain level will decrease  Description: Pain level will decrease  5/10/2021 1144 by Scout Hyatt RN  Outcome: Ongoing  5/9/2021 2159 by Malinda Delgado RN  Outcome: Met This Shift  Goal: Control of acute pain  Description: Control of acute pain  5/10/2021 1144 by Scout Hyatt RN  Outcome: Ongoing  5/9/2021 2159 by Malinda Delgado RN  Outcome: Met This Shift  Goal: Control of chronic pain  Description: Control of chronic pain  5/10/2021 1144 by Scout Hyatt RN  Outcome: Ongoing  5/9/2021 2159 by Malinda Delgado RN  Outcome: Met This Shift 74

## 2021-05-11 ENCOUNTER — APPOINTMENT (OUTPATIENT)
Dept: INTERVENTIONAL RADIOLOGY/VASCULAR | Age: 66
DRG: 280 | End: 2021-05-11
Payer: COMMERCIAL

## 2021-05-11 LAB
ANION GAP SERPL CALCULATED.3IONS-SCNC: 15 MMOL/L (ref 9–17)
BUN BLDV-MCNC: 95 MG/DL (ref 8–23)
BUN/CREAT BLD: ABNORMAL (ref 9–20)
CALCIUM SERPL-MCNC: 7.6 MG/DL (ref 8.6–10.4)
CHLORIDE BLD-SCNC: 102 MMOL/L (ref 98–107)
CO2: 20 MMOL/L (ref 20–31)
CREAT SERPL-MCNC: 3.16 MG/DL (ref 0.7–1.2)
EKG ATRIAL RATE: 58 BPM
EKG P AXIS: 81 DEGREES
EKG P-R INTERVAL: 126 MS
EKG Q-T INTERVAL: 394 MS
EKG QRS DURATION: 92 MS
EKG QTC CALCULATION (BAZETT): 386 MS
EKG R AXIS: 73 DEGREES
EKG T AXIS: -152 DEGREES
EKG VENTRICULAR RATE: 58 BPM
GFR AFRICAN AMERICAN: 24 ML/MIN
GFR NON-AFRICAN AMERICAN: 20 ML/MIN
GFR SERPL CREATININE-BSD FRML MDRD: ABNORMAL ML/MIN/{1.73_M2}
GFR SERPL CREATININE-BSD FRML MDRD: ABNORMAL ML/MIN/{1.73_M2}
GLUCOSE BLD-MCNC: 169 MG/DL (ref 75–110)
GLUCOSE BLD-MCNC: 198 MG/DL (ref 70–99)
GLUCOSE BLD-MCNC: 200 MG/DL (ref 75–110)
GLUCOSE BLD-MCNC: 204 MG/DL (ref 75–110)
GLUCOSE BLD-MCNC: 234 MG/DL (ref 75–110)
GLUCOSE BLD-MCNC: 236 MG/DL (ref 75–110)
GLUCOSE BLD-MCNC: 296 MG/DL (ref 75–110)
HCT VFR BLD CALC: 29.7 % (ref 40.7–50.3)
HEMOGLOBIN: 9.1 G/DL (ref 13–17)
MCH RBC QN AUTO: 31.3 PG (ref 25.2–33.5)
MCHC RBC AUTO-ENTMCNC: 30.6 G/DL (ref 28.4–34.8)
MCV RBC AUTO: 102.1 FL (ref 82.6–102.9)
NRBC AUTOMATED: 0.2 PER 100 WBC
PDW BLD-RTO: 13.2 % (ref 11.8–14.4)
PLATELET # BLD: 311 K/UL (ref 138–453)
PMV BLD AUTO: 10.3 FL (ref 8.1–13.5)
POTASSIUM SERPL-SCNC: 4.8 MMOL/L (ref 3.7–5.3)
PTH INTACT: 269.5 PG/ML (ref 15–65)
RBC # BLD: 2.91 M/UL (ref 4.21–5.77)
SODIUM BLD-SCNC: 137 MMOL/L (ref 135–144)
WBC # BLD: 8.4 K/UL (ref 3.5–11.3)

## 2021-05-11 PROCEDURE — 90935 HEMODIALYSIS ONE EVALUATION: CPT | Performed by: INTERNAL MEDICINE

## 2021-05-11 PROCEDURE — 6370000000 HC RX 637 (ALT 250 FOR IP): Performed by: NURSE PRACTITIONER

## 2021-05-11 PROCEDURE — 36415 COLL VENOUS BLD VENIPUNCTURE: CPT

## 2021-05-11 PROCEDURE — C1750 CATH, HEMODIALYSIS,LONG-TERM: HCPCS

## 2021-05-11 PROCEDURE — 6370000000 HC RX 637 (ALT 250 FOR IP): Performed by: INTERNAL MEDICINE

## 2021-05-11 PROCEDURE — 2709999900 HC NON-CHARGEABLE SUPPLY

## 2021-05-11 PROCEDURE — 82947 ASSAY GLUCOSE BLOOD QUANT: CPT

## 2021-05-11 PROCEDURE — 2700000000 HC OXYGEN THERAPY PER DAY

## 2021-05-11 PROCEDURE — 2500000003 HC RX 250 WO HCPCS: Performed by: RADIOLOGY

## 2021-05-11 PROCEDURE — 99232 SBSQ HOSP IP/OBS MODERATE 35: CPT | Performed by: NURSE PRACTITIONER

## 2021-05-11 PROCEDURE — 0JH63XZ INSERTION OF TUNNELED VASCULAR ACCESS DEVICE INTO CHEST SUBCUTANEOUS TISSUE AND FASCIA, PERCUTANEOUS APPROACH: ICD-10-PCS | Performed by: RADIOLOGY

## 2021-05-11 PROCEDURE — 6360000002 HC RX W HCPCS: Performed by: NURSE PRACTITIONER

## 2021-05-11 PROCEDURE — 93010 ELECTROCARDIOGRAM REPORT: CPT | Performed by: INTERNAL MEDICINE

## 2021-05-11 PROCEDURE — 6360000002 HC RX W HCPCS: Performed by: INTERNAL MEDICINE

## 2021-05-11 PROCEDURE — 94640 AIRWAY INHALATION TREATMENT: CPT

## 2021-05-11 PROCEDURE — 77001 FLUOROGUIDE FOR VEIN DEVICE: CPT

## 2021-05-11 PROCEDURE — 05PYX3Z REMOVAL OF INFUSION DEVICE FROM UPPER VEIN, EXTERNAL APPROACH: ICD-10-PCS | Performed by: RADIOLOGY

## 2021-05-11 PROCEDURE — 2580000003 HC RX 258: Performed by: INTERNAL MEDICINE

## 2021-05-11 PROCEDURE — 36556 INSERT NON-TUNNEL CV CATH: CPT

## 2021-05-11 PROCEDURE — 85027 COMPLETE CBC AUTOMATED: CPT

## 2021-05-11 PROCEDURE — 2580000003 HC RX 258: Performed by: NURSE PRACTITIONER

## 2021-05-11 PROCEDURE — 94761 N-INVAS EAR/PLS OXIMETRY MLT: CPT

## 2021-05-11 PROCEDURE — 36558 INSERT TUNNELED CV CATH: CPT

## 2021-05-11 PROCEDURE — 6360000002 HC RX W HCPCS: Performed by: RADIOLOGY

## 2021-05-11 PROCEDURE — 90935 HEMODIALYSIS ONE EVALUATION: CPT

## 2021-05-11 PROCEDURE — 2060000000 HC ICU INTERMEDIATE R&B

## 2021-05-11 PROCEDURE — 80048 BASIC METABOLIC PNL TOTAL CA: CPT

## 2021-05-11 PROCEDURE — 6370000000 HC RX 637 (ALT 250 FOR IP): Performed by: PHYSICIAN ASSISTANT

## 2021-05-11 PROCEDURE — C9248 INJ, CLEVIDIPINE BUTYRATE: HCPCS | Performed by: NURSE PRACTITIONER

## 2021-05-11 RX ORDER — HEPARIN SODIUM 5000 [USP'U]/ML
INJECTION, SOLUTION INTRAVENOUS; SUBCUTANEOUS
Status: COMPLETED | OUTPATIENT
Start: 2021-05-11 | End: 2021-05-11

## 2021-05-11 RX ORDER — LOSARTAN POTASSIUM 50 MG/1
50 TABLET ORAL 2 TIMES DAILY
Status: DISCONTINUED | OUTPATIENT
Start: 2021-05-11 | End: 2021-05-14 | Stop reason: HOSPADM

## 2021-05-11 RX ORDER — CLINDAMYCIN PHOSPHATE 600 MG/50ML
600 INJECTION INTRAVENOUS ONCE
Status: DISCONTINUED | OUTPATIENT
Start: 2021-05-11 | End: 2021-05-14 | Stop reason: HOSPADM

## 2021-05-11 RX ORDER — CEFAZOLIN SODIUM 1 G/50ML
1000 INJECTION, SOLUTION INTRAVENOUS ONCE
Status: DISCONTINUED | OUTPATIENT
Start: 2021-05-11 | End: 2021-05-11

## 2021-05-11 RX ADMIN — IPRATROPIUM BROMIDE AND ALBUTEROL SULFATE 1 AMPULE: .5; 2.5 SOLUTION RESPIRATORY (INHALATION) at 21:57

## 2021-05-11 RX ADMIN — SODIUM CHLORIDE, PRESERVATIVE FREE 10 ML: 5 INJECTION INTRAVENOUS at 08:04

## 2021-05-11 RX ADMIN — CARVEDILOL 25 MG: 25 TABLET, FILM COATED ORAL at 08:04

## 2021-05-11 RX ADMIN — ISOSORBIDE MONONITRATE 60 MG: 60 TABLET ORAL at 08:04

## 2021-05-11 RX ADMIN — GUAIFENESIN 600 MG: 600 TABLET, EXTENDED RELEASE ORAL at 20:22

## 2021-05-11 RX ADMIN — GABAPENTIN 200 MG: 100 CAPSULE ORAL at 21:40

## 2021-05-11 RX ADMIN — METHYLPREDNISOLONE 4 MG: 4 TABLET ORAL at 20:23

## 2021-05-11 RX ADMIN — CLONIDINE HYDROCHLORIDE 0.1 MG: 0.1 TABLET ORAL at 15:42

## 2021-05-11 RX ADMIN — HEPARIN SODIUM 5000 UNITS: 5000 INJECTION INTRAVENOUS; SUBCUTANEOUS at 05:47

## 2021-05-11 RX ADMIN — SODIUM ZIRCONIUM CYCLOSILICATE 5 G: 10 POWDER, FOR SUSPENSION ORAL at 08:52

## 2021-05-11 RX ADMIN — ATORVASTATIN CALCIUM 80 MG: 80 TABLET, FILM COATED ORAL at 20:22

## 2021-05-11 RX ADMIN — PRAZOSIN HYDROCHLORIDE 4 MG: 1 CAPSULE ORAL at 08:52

## 2021-05-11 RX ADMIN — Medication 81 MG: at 08:04

## 2021-05-11 RX ADMIN — PRAZOSIN HYDROCHLORIDE 4 MG: 1 CAPSULE ORAL at 22:49

## 2021-05-11 RX ADMIN — CARVEDILOL 25 MG: 25 TABLET, FILM COATED ORAL at 18:47

## 2021-05-11 RX ADMIN — IRON SUCROSE 300 MG: 20 INJECTION, SOLUTION INTRAVENOUS at 17:30

## 2021-05-11 RX ADMIN — HEPARIN SODIUM 1.6 ML: 5000 INJECTION INTRAVENOUS; SUBCUTANEOUS at 15:08

## 2021-05-11 RX ADMIN — HYDRALAZINE HYDROCHLORIDE 100 MG: 50 TABLET, FILM COATED ORAL at 05:49

## 2021-05-11 RX ADMIN — METHYLPREDNISOLONE 4 MG: 4 TABLET ORAL at 08:04

## 2021-05-11 RX ADMIN — LOSARTAN POTASSIUM 50 MG: 50 TABLET, FILM COATED ORAL at 22:24

## 2021-05-11 RX ADMIN — CLEVIPIDINE 2 MG/HR: 0.5 EMULSION INTRAVENOUS at 19:06

## 2021-05-11 RX ADMIN — HYDRALAZINE HYDROCHLORIDE 100 MG: 50 TABLET, FILM COATED ORAL at 15:42

## 2021-05-11 RX ADMIN — IPRATROPIUM BROMIDE AND ALBUTEROL SULFATE 1 AMPULE: .5; 2.5 SOLUTION RESPIRATORY (INHALATION) at 06:38

## 2021-05-11 RX ADMIN — INSULIN GLARGINE 35 UNITS: 100 INJECTION, SOLUTION SUBCUTANEOUS at 22:24

## 2021-05-11 RX ADMIN — GABAPENTIN 200 MG: 100 CAPSULE ORAL at 15:42

## 2021-05-11 RX ADMIN — SODIUM BICARBONATE 1300 MG: 648 TABLET ORAL at 08:04

## 2021-05-11 RX ADMIN — HYDRALAZINE HYDROCHLORIDE 100 MG: 50 TABLET, FILM COATED ORAL at 21:40

## 2021-05-11 RX ADMIN — HEPARIN SODIUM 1400 UNITS: 1000 INJECTION INTRAVENOUS; SUBCUTANEOUS at 14:00

## 2021-05-11 RX ADMIN — GUAIFENESIN 600 MG: 600 TABLET, EXTENDED RELEASE ORAL at 08:04

## 2021-05-11 RX ADMIN — CLONIDINE HYDROCHLORIDE 0.1 MG: 0.1 TABLET ORAL at 20:22

## 2021-05-11 RX ADMIN — METHYLPREDNISOLONE 4 MG: 4 TABLET ORAL at 18:47

## 2021-05-11 RX ADMIN — GABAPENTIN 200 MG: 100 CAPSULE ORAL at 08:04

## 2021-05-11 RX ADMIN — LOSARTAN POTASSIUM 50 MG: 50 TABLET, FILM COATED ORAL at 15:42

## 2021-05-11 RX ADMIN — SODIUM BICARBONATE 1300 MG: 648 TABLET ORAL at 20:22

## 2021-05-11 RX ADMIN — HEPARIN SODIUM 5000 UNITS: 5000 INJECTION INTRAVENOUS; SUBCUTANEOUS at 15:43

## 2021-05-11 RX ADMIN — QUETIAPINE FUMARATE 100 MG: 100 TABLET ORAL at 21:40

## 2021-05-11 RX ADMIN — HEPARIN SODIUM 5000 UNITS: 5000 INJECTION INTRAVENOUS; SUBCUTANEOUS at 22:25

## 2021-05-11 RX ADMIN — CLEVIPIDINE 2 MG/HR: 0.5 EMULSION INTRAVENOUS at 17:34

## 2021-05-11 RX ADMIN — CLONIDINE HYDROCHLORIDE 0.1 MG: 0.1 TABLET ORAL at 08:04

## 2021-05-11 RX ADMIN — CLONIDINE HYDROCHLORIDE 0.1 MG: 0.1 TABLET ORAL at 01:01

## 2021-05-11 RX ADMIN — HEPARIN SODIUM 1500 UNITS: 1000 INJECTION INTRAVENOUS; SUBCUTANEOUS at 14:00

## 2021-05-11 ASSESSMENT — PAIN SCALES - GENERAL
PAINLEVEL_OUTOF10: 0

## 2021-05-11 NOTE — CARE COORDINATION
JOAQUIM received call from [facility] at [address] [facility] who obtained information needed to submit for financial review. JOAQUIM provided past dialysis initial start date of 8/17/2019. Kala will follow up as updates are received.

## 2021-05-11 NOTE — PROGRESS NOTES
[facility]  Office: [address], DO, Dar Holman, DO, Yady Hancock, DO, Davin Mccray, DO, Maximino Bojorquez MD, Sharmin Lewis MD, Tommy Alfonso MD, Willette Klinefelter, MD, Estefania Jara MD, Abhi Brown MD, Tessa Levin MD, Rosa Maria Gilbert MD, Rohan Benjamin, DO, Rene Oliva MD, Paxton Ocampo, DO, Bean Friend MD,  Sammy Sheth, DO, Nic Garcia MD, Nicole Jacobo MD, Fritz William MD, Nusrat Saba MD, Bobbi Roberts, Santana Yeager, CNP, Jhony Owen, CNP, [place], CNS, Shayy Nazario, CNP, Rito Chavez, CNP, Brett Brenner, CNP, Isac Booth, CNP, Marcy Hdz, CNP, Joshua Johnson PA-C, Kenneth Franz, [facility], Bharat Kraft, CNP, Lucinda Tierney, CNP, Lanny Vidal, CNP, Marland Cowden, CNP, Anahy Dooley, CNP, Joselito Tobin, [address]    Progress Note    5/10/2021    11:31 PM    Name:   Silvia Downing  MRN:     [id number]     Katherine Officer:      [de-identified]   Room:   2022/2022-01   Day:  7  Admit Date:  5/3/2021  1:18 PM    PCP:   TERRANCE Hutton CNP  Code Status:  Full Code    Subjective:     C/C:   Chief Complaint   Patient presents with    Hypertension     dizziness and HTN     Interval History Status: improved. Pt seen and evaluated this morning. Patient started having lower extremity swelling, right lower extremity swelling and pain, Doppler negative for PE  Blood pressure has been running high,   Nephrology on board, and managing medications   the Brendon catheter placed and dialysis done Monday  Sugars running high , pt like to manage insuling doses by himself , directs nurses how much he wants     Brief History:     73year-old male with history of longstanding uncontrolled hypertension, non-obstructive CAD, HFpEF, CKD, uncontrolled DM. He presented to the ED complaining of elevated blood pressure and headache.  Blood pressure noted to be elevated to 229/79 upon presentation. Pt tested positive for cocaine, but denies use. He was placed on clevidipine infusion. He was also noted to have an BINU and nephrology was consulted. Troponin elevated and cardiology was consulted. Both kidney injury and troponin elevation attributed to uncontrolled hypertension. Patient's home med regimen was resumed and he was successfully weaned of clevidipine drip. Echocardiogram did not reveal any wall motion abnormality and no further cardiac work-up was pursued. Blood glucose control was established with the patient's home insulin regimen. Plan to get the Brendon catheter and dialysis soon    Review of Systems:     Constitutional:  negative for chills, fevers, sweats  Respiratory:  negative for cough, dyspnea on exertion, shortness of breath, wheezing  Cardiovascular:  negative for chest pain, chest pressure/discomfort, lower extremity edema, palpitations  Gastrointestinal:  negative for abdominal pain, constipation, diarrhea, nausea, vomiting  Neurological:  negative for dizziness, headache    Medications: Allergies:     Allergies   Allergen Reactions    Lisinopril      cough    Ace Inhibitors      coughing    Pcn [Penicillins]        Current Meds:   Scheduled Meds:    [START ON 5/11/2021] NIFEdipine  90 mg Oral Nightly    methylPREDNISolone  4 mg Oral QAM AC    methylPREDNISolone  4 mg Oral Lunch    methylPREDNISolone  4 mg Oral Dinner    [START ON 5/11/2021] methylPREDNISolone  4 mg Oral Nightly    fluticasone-umeclidin-vilant  1 puff Inhalation Daily    sodium zirconium cyclosilicate  5 g Oral TID    gabapentin  200 mg Oral TID    guaiFENesin  600 mg Oral BID    cloNIDine  0.1 mg Oral TID    sodium bicarbonate  1,300 mg Oral BID    carvedilol  25 mg Oral BID WC    aspirin  81 mg Oral Daily    atorvastatin  80 mg Oral Daily    insulin glargine  35 Units Subcutaneous Nightly    [Held by provider] bumetanide  2 mg Oral Daily    fluticasone  2 spray Each Nostril Daily  hydrALAZINE  100 mg Oral 3 times per day    isosorbide mononitrate  60 mg Oral Daily    [Held by provider] losartan  50 mg Oral BID    prazosin  4 mg Oral BID    QUEtiapine  100 mg Oral Nightly    sodium chloride flush  5-40 mL Intravenous 2 times per day    heparin (porcine)  5,000 Units Subcutaneous 3 times per day    insulin lispro  0-6 Units Subcutaneous TID WC    insulin lispro  0-3 Units Subcutaneous Nightly    ipratropium-albuterol  1 ampule Inhalation Q4H While awake     Continuous Infusions:    dextrose 100 mL/hr (05/05/21 0717)    sodium chloride       PRN Meds: heparin (porcine), heparin (porcine), cloNIDine, hydrALAZINE, glucose, dextrose, glucagon (rDNA), dextrose, albuterol, ipratropium-albuterol, sodium chloride flush, sodium chloride, nicotine, promethazine **OR** ondansetron, acetaminophen **OR** acetaminophen, polyethylene glycol    Data:     Past Medical History:   has a past medical history of Asthma, CAD in native artery, nonobstructive, Carotid stenosis, left, Carpal tunnel syndrome, Cerebrovascular disease, Chronic kidney disease, COPD (chronic obstructive pulmonary disease) ([facility] [address] Diabetes mellitus ([facility] [address] History of colon polyps, History of weakness of extremity, HTN (hypertension), Hyperlipidemia, Lung, cysts, congenital, PTSD (post-traumatic stress disorder), PTSD (post-traumatic stress disorder), TIA (transient ischemic attack), and Type II or unspecified type diabetes mellitus without mention of complication, not stated as uncontrolled. Social History:   reports that he quit smoking about 6 years ago. His smoking use included cigarettes. He has a 17.50 pack-year smoking history. He has never used smokeless tobacco. He reports that he does not drink alcohol or use drugs.      Family History:   Family History   Problem Relation Age of Onset    Cancer Mother     Heart Disease Father        Vitals:  BP (!) 161/57   Pulse 72   Temp 98.1 °F (36.7 °C)   Resp 23   Ht 5' 6\" General appearance:  alert, cooperative and no distress  Mental Status:  oriented to person, place and time and normal affect  Lungs:  Breath sounds are distant, lungs are otherwise CTAB  Heart:  regular rate and rhythm. There is a high pitched systolic murmur 2/6, heard best at the apex. Abdomen:  soft, nontender, nondistended, normal bowel sounds, no masses, hepatomegaly, splenomegaly  Extremities:  no edema, redness, tenderness in the calves  Skin:  no gross lesions, rashes, induration. There is decreased skin turgor    Assessment:        Hospital Problems           Last Modified POA    * (Principal) Hypertensive emergency 5/5/2021 Yes    CKD (chronic kidney disease) stage 4, GFR 15-29 ml/min ([address] 5/5/2021 Yes    Pure hypercholesterolemia 5/5/2021 Yes    Carotid stenosis, left s/p Endarterectomy 5/5/2021 Yes    DM type 2, uncontrolled, with neuropathy ([address] 5/5/2021 Yes    COPD (chronic obstructive pulmonary disease) ([address] 5/5/2021 Yes    Non-obstructive Coronary artery disease of native artery of native heart with stable angina pectoris ([address] 5/5/2021 Yes    Overview Signed 8/12/2019 11:46 AM by TERRANCE Higuera - CNP     9/2017     LMCA: Mild irregularities 10-20%. LAD: 30-40% proximal stenosis    LCx: Mild irregularities 10-20%. RCA: 40% mid stenosis         Severe malnutrition ([address] 8/0/9438 Yes    Metabolic acidosis 8/9/7133 Yes    Chronic heart failure with preserved ejection fraction ([address] 5/6/2021 Yes    Left ventricular hypertrophy 5/6/2021 Yes          Plan:        #Hypertensive emergency  - resolved. Continue current regimen  - losartan held per nephrology, n other meds adjusted    #BINU on CKD 4  - Likely transient ATN from hypotensive episode --> poor renal perfusion.   Given dose of Bumex again yesterday, creatinine not improving much,  patient started on dialysis by nephrology,  the Brendon catheter placed     #Lower extremity swelling and pain in the right lower extremity, venous Doppler negative for DVT    #NSTEMI, type 2  - reviewed echocardiogram, no visible wall motion abnormality. Cath from August 2020 with non-obstructive coronary disease. Continue aspirin, plavix, statin, BB    #HFpEF/concentric LVH hypertrophy  - secondary to prolonged hypertension. Establish blood pressure control  - consider work-up for cardiac amyloid per echo report. SPEP from 2020 unremarkable. Consider cardiac MRI as OP as deemed necessary by cardiology. #Type 2 DM, uncontrolled, complicated by nephropathy, neuropathy  - continue lantus 35U qhs  - blood glucose checks ac/hs, low dose sliding scale  - pt noncompliant and try to push to manage his insulin doses by him self here     #COPD   - stable.  Continue inhaler regimen    #Heparin DVT prophylaxis    #Disposition: As per nephrology, dialysis started     Dixon Riley MD  5/10/2021  11:31 PM

## 2021-05-11 NOTE — PROGRESS NOTES
Nephrology Progress Note        Subjective: patient evaluated on dialysis. Pt is stable on dialysis. Access cannulated without problems. No new issues overnite. Stable hemodynamics. Hemodialysis yesterday 2 L of fluid removed. That was his first treatment. Getting a second treatment today. 3-1/2 L estimated for removal.  Temporary catheter works well. Azotemia persists. Hypertension persists as well. Weight is down to 71 kg still about 10-15 kg heavier. Outpatient dialysis spot being arranged at [facility] renal care unit under Dr. Luz Marina Armando      Objective:  CURRENT TEMPERATURE:  Temp: 96.8 °F (36 °C)  MAXIMUM TEMPERATURE OVER 24HRS:  Temp (24hrs), Av.4 °F (36.3 °C), Min:96.8 °F (36 °C), Max:98.1 °F (36.7 °C)    CURRENT RESPIRATORY RATE:  Resp: 16  CURRENT PULSE:  Pulse: 64  CURRENT BLOOD PRESSURE:  BP: (!) 188/79  24HR BLOOD PRESSURE RANGE:  Systolic (07DNK), SEQ:915 , Min:143 , AEK:198   ; Diastolic (96WLQ), UPK:56, Min:57, Max:79    24HR INTAKE/OUTPUT:      Intake/Output Summary (Last 24 hours) at 2021 1231  Last data filed at 5/10/2021 2300  Gross per 24 hour   Intake 610 ml   Output 2760 ml   Net -2150 ml     Patient Vitals for the past 96 hrs (Last 3 readings):   Weight   21 1015 158 lb 4.6 oz (71.8 kg)   21 0545 156 lb 9.6 oz (71 kg)   05/10/21 1621 157 lb 6.5 oz (71.4 kg)         Physical Exam:  General appearance:Awake, alert, in no acute distress  Skin: warm and dry, no rash or erythema  Eyes: conjunctivae normal and sclera anicteric  ENT:no thrush no pharyngeal congestion   Neck:  No JVD, No Thyromegaly  Pulmonary: clear to auscultation and no wheezing or rhonchi   Cardiovascular: normal S1 and S2. NO rubs and NO murmur.  No S3 OR S4   Abdomen: soft nontender, bowel sounds present, no organomegaly,  no ascites   Extremities: no cyanosis, clubbing 2+ edema edema     Access:  Temporary right IJ vein dialysis catheter    Current Medications:    losartan (COZAAR) tablet 50 mg, BID  NIFEdipine (ADALAT CC) extended release tablet 90 mg, Nightly  heparin (porcine) injection 1,400 Units, PRN  heparin (porcine) injection 1,500 Units, PRN  methylPREDNISolone (MEDROL) tablet 4 mg, QAM AC  methylPREDNISolone (MEDROL) tablet 4 mg, Lunch  methylPREDNISolone (MEDROL) tablet 4 mg, Dinner  methylPREDNISolone (MEDROL) tablet 4 mg, Nightly  fluticasone-umeclidin-vilant (TRELEGY ELLIPTA) 100-62.5-25 MCG/INH inhaler 1 puff, Daily  cloNIDine (CATAPRES) tablet 0.1 mg, Q4H PRN  gabapentin (NEURONTIN) capsule 200 mg, TID  hydrALAZINE (APRESOLINE) injection 10 mg, Q6H PRN  guaiFENesin (MUCINEX) extended release tablet 600 mg, BID  cloNIDine (CATAPRES) tablet 0.1 mg, TID  sodium bicarbonate tablet 1,300 mg, BID  carvedilol (COREG) tablet 25 mg, BID WC  glucose (GLUTOSE) 40 % oral gel 15 g, PRN  dextrose 50 % IV solution, PRN  glucagon (rDNA) injection 1 mg, PRN  dextrose 5 % solution, PRN  aspirin EC tablet 81 mg, Daily  atorvastatin (LIPITOR) tablet 80 mg, Daily  insulin glargine (LANTUS) injection vial 35 Units, Nightly  [Held by provider] bumetanide (BUMEX) tablet 2 mg, Daily  albuterol (PROVENTIL) nebulizer solution 2.5 mg, Q6H PRN  fluticasone (FLONASE) 50 MCG/ACT nasal spray 2 spray, Daily  hydrALAZINE (APRESOLINE) tablet 100 mg, 3 times per day  ipratropium-albuterol (DUONEB) nebulizer solution 3 mL, Q6H PRN  isosorbide mononitrate (IMDUR) extended release tablet 60 mg, Daily  [Held by provider] losartan (COZAAR) tablet 50 mg, BID  prazosin (MINIPRESS) capsule 4 mg, BID  QUEtiapine (SEROQUEL) tablet 100 mg, Nightly  sodium chloride flush 0.9 % injection 5-40 mL, 2 times per day  sodium chloride flush 0.9 % injection 5-40 mL, PRN  0.9 % sodium chloride infusion, PRN  nicotine (NICODERM CQ) 21 MG/24HR 1 patch, Daily PRN  promethazine (PHENERGAN) tablet 12.5 mg, Q6H PRN    Or  ondansetron (ZOFRAN) injection 4 mg, Q6H PRN  acetaminophen (TYLENOL) tablet 650 mg, Q6H PRN    Or  acetaminophen (TYLENOL) suppository 650 mg, Q6H PRN  polyethylene glycol (GLYCOLAX) packet 17 g, Daily PRN  heparin (porcine) injection 5,000 Units, 3 times per day  insulin lispro (HUMALOG) injection vial 0-6 Units, TID WC  insulin lispro (HUMALOG) injection vial 0-3 Units, Nightly  ipratropium-albuterol (DUONEB) nebulizer solution 1 ampule, Q4H While awake      Labs:   CBC:   Recent Labs     05/09/21  0615 05/10/21  0636 05/11/21  0630   WBC 11.0 8.5 8.4   RBC 2.71* 2.60* 2.91*   HGB 8.5* 8.1* 9.1*   HCT 28.3* 25.7* 29.7*    322 311   MPV 10.4 10.7 10.3      BMP:   Recent Labs     05/09/21  0615 05/10/21  0636 05/11/21  0630    134* 137   K 4.5 5.3 4.8    101 102   CO2 20 20 20   BUN 99* 110* 95*   CREATININE 3.88* 3.68* 3.16*   GLUCOSE 97 401* 198*   CALCIUM 8.3* 7.8* 7.6*        Phosphorus:  No results for input(s): PHOS in the last 72 hours. Magnesium: No results for input(s): MG in the last 72 hours. Albumin: No results for input(s): LABALBU in the last 72 hours. Dialysis bath: Dialysis Bath  K+ (Potassium): 2  Ca+ (Calcium): 3  Na+ (Sodium): 140  HCO3 (Bicarb): 35    Radiology:  Reviewed as available. Assessment:  1 acute kidney injury from ischemic ATN fluctuating blood pressure baseline 3-3.3 peaked up to 3.9, now dialysis dependent for clearance and ultrafiltration. Azotemia was more than 110. Still about 10 to 15 kg heavier than his admission weight   2. Chronic kidney disease stage IV from diabetic nephrosclerosis baseline 3-3.3 proteinuria about 3 to 4 g  2. HTN: Blood pressure still uncontrolled, likely to improve with adequate ultrafiltration  3 DM: With complications  4 EDEMA : From fluid overload  5. ANEMIA OF CHRONIC DISEASE: Hemoglobin 8.1  6. SECONDARY HYPERPARATHYROIDISM:     Plan:  1. Wt removal and dialysis orders reviewed. 2.  Repeat dialysis again tomorrow  3. Cont pt on aranesp per protocol. 4.  Place tunneled catheter either today or tomorrow  5.   Continue to challenge dry weight   6. Start Cozaar 50 twice a day  7. Start intravenous iron  8. Discharge as soon as outpatient dialysis arrangements complete  9. We will follow        Please do not hesitate to call with questions.     Electronically signed by Dorota Martinez MD on 5/11/2021 at 12:31 PM

## 2021-05-11 NOTE — PROGRESS NOTES
Comprehensive Nutrition Assessment    Type and Reason for Visit:  Reassess    Nutrition Recommendations/Plan:   1. Continue current Diet: Carb Control 5 carb choices, low sodium (2 g)   2. Continue Diabetic ONS TID  3. Monitor BM, consider bowel regimen - last BM unknown per chart review  4. Will continue to monitor nutritional status/ plan of care    Nutrition Assessment: Pt off of floor during time of visit - receiving HD. Anticipate tunneled catheter per Nephrology and awaiting permanent HD site before discharge. Unable to access PO intake per chart review (intake from other encounter). Weight fluctuations noted d/t medical condition. Continue current diet + Diabetic ONS to help with calorie/protein intake. Malnutrition Assessment:  Malnutrition Status:  Severe malnutrition    Context:  Chronic Illness     Findings of the 6 clinical characteristics of malnutrition:  Energy Intake:  7 - 75% or less estimated energy requirements for 1 month or longer  Weight Loss:  7 - Greater than 20% over 1 year     Body Fat Loss:  Unable to assess     Muscle Mass Loss:  Unable to assess    Fluid Accumulation:  No significant fluid accumulation     Strength:  Not Performed    Estimated Daily Nutrient Needs:  Energy (kcal):  3700-7337 kcal/d (25-30 kcal/kg); Weight Used for Energy Requirements:  Current(65.1 kg)     Protein (g):  70-85 g protein/d (1.1-1.3 g/kg); Weight Used for Protein Requirements:  Current(65.1 kg)        Fluid (ml/day):  3901-5974 mL fluid/d; Method Used for Fluid Requirements:  ml/Kg(25-30)      Nutrition Related Findings: +2 pitting edema BLE. Last BM unknown. Meds: Lantus, Medrol.  Labs: BUN 95 mg/dL, Creatinine 3.16 mg/dL, Ca 7.6 mg/dL Glu 204-296 mg/dL x 24 hrs        Current Nutrition Therapies:    DIET CARB CONTROL; Carb Control: 5 carb choices (75 gms)/meal; Low Sodium (2 GM)  Dietary Nutrition Supplements: Diabetic Oral Supplement    Anthropometric Measures:  · Height: 5' 6\" (167.6 cm)  · Current Body Weight: (158 lb 4.6 oz)   · Admission Body Weight: 127 lb (57.6 kg)    · Usual Body Weight: 165 lb (74.8 kg)(per previous RD note)     · Ideal Body Weight: 142 lbs; % Ideal Body Weight 101.1 %   · BMI: 23.2  · BMI Categories: Normal Weight (BMI 18.5-24. 9)       Nutrition Diagnosis:   · Severe malnutrition, In context of chronic illness related to inadequate protein-energy intake, cardiac dysfunction as evidenced by poor intake prior to admission, weight loss greater than or equal to 20% in 1 year(Need for ONS)      Nutrition Interventions:   Food and/or Nutrient Delivery:  Continue Current Diet, Continue Oral Nutrition Supplement  Nutrition Education/Counseling:  No recommendation at this time   Coordination of Nutrition Care:  Continue to monitor while inpatient    Goals:  Pt to meet % of est'd needs daily via PO       Nutrition Monitoring and Evaluation:   Behavioral-Environmental Outcomes:  None Identified   Food/Nutrient Intake Outcomes:  Food and Nutrient Intake, Supplement Intake  Physical Signs/Symptoms Outcomes:  Biochemical Data, Nutrition Focused Physical Findings, Skin, Weight     Discharge Planning:     Too soon to determine     Electronically signed by Timoteo Ovalles, MS, RD, LD on 5/11/21 at 1:32 PM EDT    Contact: [address] Villegas

## 2021-05-11 NOTE — PROGRESS NOTES
morning it was extra high with systolic being around 993. Patient was also complaining of headache. Patient tried taking Tylenol for the headache which did not seem to help. Currently denies any dizziness, acute changes with his vision or hearing, nausea, vomiting, chest pain. Patient did complain of having heart palpitations. Patient denies any shortness of breath, abdominal pain, constipation, diarrhea or any trouble with his urination    Review of Systems:     Constitutional:  negative for chills, fevers, sweats  Respiratory:  negative for cough, dyspnea on exertion, shortness of breath, wheezing  Cardiovascular:  negative for chest pain, chest pressure/discomfort, lower extremity edema, palpitations  Gastrointestinal:  negative for abdominal pain, constipation, diarrhea, nausea, vomiting  Neurological:  negative for dizziness,+ fluctuating headache    Medications: Allergies:     Allergies   Allergen Reactions    Lisinopril      cough    Ace Inhibitors      coughing    Pcn [Penicillins]        Current Meds:   Scheduled Meds:    NIFEdipine  90 mg Oral Nightly    methylPREDNISolone  4 mg Oral QAM AC    methylPREDNISolone  4 mg Oral Lunch    methylPREDNISolone  4 mg Oral Dinner    methylPREDNISolone  4 mg Oral Nightly    fluticasone-umeclidin-vilant  1 puff Inhalation Daily    sodium zirconium cyclosilicate  5 g Oral TID    gabapentin  200 mg Oral TID    guaiFENesin  600 mg Oral BID    cloNIDine  0.1 mg Oral TID    sodium bicarbonate  1,300 mg Oral BID    carvedilol  25 mg Oral BID WC    aspirin  81 mg Oral Daily    atorvastatin  80 mg Oral Daily    insulin glargine  35 Units Subcutaneous Nightly    [Held by provider] bumetanide  2 mg Oral Daily    fluticasone  2 spray Each Nostril Daily    hydrALAZINE  100 mg Oral 3 times per day    isosorbide mononitrate  60 mg Oral Daily    [Held by provider] losartan  50 mg Oral BID    prazosin  4 mg Oral BID    QUEtiapine  100 mg Oral Nightly 05/11/21 0037 05/11/21 0455 05/11/21  0631   POCGLU 259* 296* 234* 204*       I/O (24Hr):     Intake/Output Summary (Last 24 hours) at 5/11/2021 0910  Last data filed at 5/10/2021 2300  Gross per 24 hour   Intake 610 ml   Output 2760 ml   Net -2150 ml       Labs:  Hematology:  Recent Labs     05/09/21  0615 05/10/21  0636 05/11/21  0630   WBC 11.0 8.5 8.4   RBC 2.71* 2.60* 2.91*   HGB 8.5* 8.1* 9.1*   HCT 28.3* 25.7* 29.7*   .4* 98.8 102.1   MCH 31.4 31.2 31.3   MCHC 30.0 31.5 30.6   RDW 13.2 13.2 13.2    322 311   MPV 10.4 10.7 10.3     Chemistry:  Recent Labs     05/09/21  0615 05/09/21  1350 05/09/21  1612 05/10/21  0636 05/11/21  0630     --   --  134* 137   K 4.5  --   --  5.3 4.8     --   --  101 102   CO2 20  --   --  20 20   GLUCOSE 97  --   --  401* 198*   BUN 99*  --   --  110* 95*   CREATININE 3.88*  --   --  3.68* 3.16*   ANIONGAP 10  --   --  13 15   LABGLOM 16*  --   --  17* 20*   GFRAA 19*  --   --  20* 24*   CALCIUM 8.3*  --   --  7.8* 7.6*   TROPHS  --  141* 138*  --   --      Recent Labs     05/10/21  1531 05/10/21  1745 05/10/21  1957 05/11/21  0037 05/11/21  0455 05/11/21  0631   POCGLU 171* 180* 259* 296* 234* 204*     ABG:  Lab Results   Component Value Date    POCPH 7.39 06/17/2013    POCPCO2 46 06/17/2013    POCPO2 288 06/17/2013    POCHCO3 27.8 06/17/2013    NBEA NOT REPORTED 06/17/2013    PBEA 3 06/17/2013    MCF2RDU 29 06/17/2013    ZSUS7QDX 100 06/17/2013    FIO2 NOT REPORTED 12/03/2020     Lab Results   Component Value Date/Time    SPECIAL 2ML L FR 12/03/2020 11:24 AM     Lab Results   Component Value Date/Time    CULTURE NO GROWTH 6 DAYS 12/03/2020 11:24 AM       Radiology:  Us Renal Limited    Result Date: 5/5/2021  Benign left renal cyst     Xr Chest Portable    Result Date: 5/9/2021  Stable pulmonary vascular congestion     Xr Chest Portable    Result Date: 5/8/2021  Increasing bilateral interstitial infiltrates noted compared to prior study     Ir Non Tunneled Cath Wo Port Replacement    Result Date: 5/10/2021  Successful placement of non tunneled hemodialysis catheter. Okay to use hemodialysis catheter. Physical Examination:        General appearance:  alert, cooperative and no distress  Mental Status:  oriented to person, place and time and normal affect  Lungs:  clear to auscultation bilaterally, normal effort  Heart:  regular rate and rhythm, no murmur  Abdomen:  soft, nontender, nondistended, normal bowel sounds, no masses, hepatomegaly, splenomegaly  Extremities: +2-3 BLE pitting edema, redness, tenderness in the calves  Skin:  no gross lesions, rashes, induration    Assessment:        Hospital Problems           Last Modified POA    * (Principal) Hypertensive emergency 5/5/2021 Yes    CKD (chronic kidney disease) stage 4, GFR 15-29 ml/min ([address] 5/5/2021 Yes    Pure hypercholesterolemia 5/5/2021 Yes    Carotid stenosis, left s/p Endarterectomy 5/5/2021 Yes    DM type 2, uncontrolled, with neuropathy ([address] 5/5/2021 Yes    COPD (chronic obstructive pulmonary disease) ([facility] 5/5/2021 Yes    Non-obstructive Coronary artery disease of native artery of native heart with stable angina pectoris ([address] 5/5/2021 Yes    Overview Signed 8/12/2019 11:46 AM by TERRANCE Diamond - CNP     9/2017     LMCA: Mild irregularities 10-20%. LAD: 30-40% proximal stenosis    LCx: Mild irregularities 10-20%. RCA: 40% mid stenosis         Severe malnutrition ([address] 3/1/0189 Yes    Metabolic acidosis 8/4/1431 Yes    Chronic heart failure with preserved ejection fraction ([address] 5/6/2021 Yes    Left ventricular hypertrophy 5/6/2021 Yes          Plan:        Hypertensive emergency: Fluctuating.    -clevidipine discontinued, restarted today post iHD   - continue Coreg, Catapres, Imdur, nifedipine, Minipress.   -refractory hypertension persists.    -Losartan/Bumex on hold secondary to kidney function/UF    CKD stage IV and uremia: Nephrology following.    -Baseline CRT 2.0-2.4. - Initial UA with proteinuria.   - BUN was 73, increase to 95 today ([id number]). -iHD initiated with QM treatment 2 today   -tunnel cath placed 5/11 post HD    Diabetes mellitus type 2 with neuropathy/nephropathy: A.m. blood sugar elevated. - Continue ROSANNE Carey. Monitor patient for hyper/hypoglycemic event  -referral to Dr. Benny Griggs as outpatient   -Paitent take 1 unit humalog per 15 carbs in addition to SSI as follow 1 unit every 50 points over 150   -Continue basal 5 units with meals and adjust SSI     AYAH: venofer X3 doses     Dyslipidemia: With history of carotid stenosis status post endarterectomy    Secondary hyperparathyroidism: See #2. COPD: On 3 L low flow nasal cannula, he does not wear O2 at home  -  Home inhalers    CAD: Without exacerbation.    -Cardiac cath from August 2020 showing nonobstructive CAD. - Continue ASA, Plavix, beta-blocker, statin    Acute on chronic diastolic CHF with LVH: -4.3 L fluid deficit.    - Bumex on hold UF with iHD   -Continue BB.   Worsened by hypertensive emergency    GI/DVT proph    TERRANCE Hunter - NP  5/11/2021  9:10 AM

## 2021-05-12 ENCOUNTER — APPOINTMENT (OUTPATIENT)
Dept: DIALYSIS | Age: 66
DRG: 280 | End: 2021-05-12
Payer: COMMERCIAL

## 2021-05-12 LAB
ANION GAP SERPL CALCULATED.3IONS-SCNC: 8 MMOL/L (ref 9–17)
BUN BLDV-MCNC: 57 MG/DL (ref 8–23)
BUN/CREAT BLD: ABNORMAL (ref 9–20)
CALCIUM SERPL-MCNC: 7.7 MG/DL (ref 8.6–10.4)
CHLORIDE BLD-SCNC: 104 MMOL/L (ref 98–107)
CO2: 26 MMOL/L (ref 20–31)
CREAT SERPL-MCNC: 2.53 MG/DL (ref 0.7–1.2)
GFR AFRICAN AMERICAN: 31 ML/MIN
GFR NON-AFRICAN AMERICAN: 26 ML/MIN
GFR SERPL CREATININE-BSD FRML MDRD: ABNORMAL ML/MIN/{1.73_M2}
GFR SERPL CREATININE-BSD FRML MDRD: ABNORMAL ML/MIN/{1.73_M2}
GLUCOSE BLD-MCNC: 100 MG/DL (ref 75–110)
GLUCOSE BLD-MCNC: 130 MG/DL (ref 75–110)
GLUCOSE BLD-MCNC: 142 MG/DL (ref 75–110)
GLUCOSE BLD-MCNC: 206 MG/DL (ref 75–110)
GLUCOSE BLD-MCNC: 213 MG/DL (ref 75–110)
GLUCOSE BLD-MCNC: 250 MG/DL (ref 75–110)
GLUCOSE BLD-MCNC: 251 MG/DL (ref 75–110)
GLUCOSE BLD-MCNC: 26 MG/DL (ref 75–110)
GLUCOSE BLD-MCNC: 90 MG/DL (ref 70–99)
GLUCOSE BLD-MCNC: 92 MG/DL (ref 75–110)
HCT VFR BLD CALC: 26.5 % (ref 40.7–50.3)
HEMOGLOBIN: 8.5 G/DL (ref 13–17)
MCH RBC QN AUTO: 31.4 PG (ref 25.2–33.5)
MCHC RBC AUTO-ENTMCNC: 32.1 G/DL (ref 28.4–34.8)
MCV RBC AUTO: 97.8 FL (ref 82.6–102.9)
NRBC AUTOMATED: 0.6 PER 100 WBC
PDW BLD-RTO: 13 % (ref 11.8–14.4)
PLATELET # BLD: 286 K/UL (ref 138–453)
PMV BLD AUTO: 10.1 FL (ref 8.1–13.5)
POTASSIUM SERPL-SCNC: 3.9 MMOL/L (ref 3.7–5.3)
RBC # BLD: 2.71 M/UL (ref 4.21–5.77)
SODIUM BLD-SCNC: 138 MMOL/L (ref 135–144)
WBC # BLD: 8.9 K/UL (ref 3.5–11.3)

## 2021-05-12 PROCEDURE — 85027 COMPLETE CBC AUTOMATED: CPT

## 2021-05-12 PROCEDURE — 6360000002 HC RX W HCPCS: Performed by: INTERNAL MEDICINE

## 2021-05-12 PROCEDURE — 6370000000 HC RX 637 (ALT 250 FOR IP): Performed by: INTERNAL MEDICINE

## 2021-05-12 PROCEDURE — 99232 SBSQ HOSP IP/OBS MODERATE 35: CPT | Performed by: NURSE PRACTITIONER

## 2021-05-12 PROCEDURE — 36415 COLL VENOUS BLD VENIPUNCTURE: CPT

## 2021-05-12 PROCEDURE — 90935 HEMODIALYSIS ONE EVALUATION: CPT | Performed by: INTERNAL MEDICINE

## 2021-05-12 PROCEDURE — 6370000000 HC RX 637 (ALT 250 FOR IP): Performed by: PHYSICIAN ASSISTANT

## 2021-05-12 PROCEDURE — 6370000000 HC RX 637 (ALT 250 FOR IP): Performed by: NURSE PRACTITIONER

## 2021-05-12 PROCEDURE — C9248 INJ, CLEVIDIPINE BUTYRATE: HCPCS | Performed by: NURSE PRACTITIONER

## 2021-05-12 PROCEDURE — 6360000002 HC RX W HCPCS: Performed by: NURSE PRACTITIONER

## 2021-05-12 PROCEDURE — 80048 BASIC METABOLIC PNL TOTAL CA: CPT

## 2021-05-12 PROCEDURE — 94640 AIRWAY INHALATION TREATMENT: CPT

## 2021-05-12 PROCEDURE — 2580000003 HC RX 258: Performed by: INTERNAL MEDICINE

## 2021-05-12 PROCEDURE — 2060000000 HC ICU INTERMEDIATE R&B

## 2021-05-12 PROCEDURE — 2580000003 HC RX 258: Performed by: NURSE PRACTITIONER

## 2021-05-12 PROCEDURE — 90935 HEMODIALYSIS ONE EVALUATION: CPT

## 2021-05-12 PROCEDURE — 82947 ASSAY GLUCOSE BLOOD QUANT: CPT

## 2021-05-12 RX ORDER — CLONIDINE HYDROCHLORIDE 0.2 MG/1
0.2 TABLET ORAL 3 TIMES DAILY
Status: DISCONTINUED | OUTPATIENT
Start: 2021-05-12 | End: 2021-05-14 | Stop reason: HOSPADM

## 2021-05-12 RX ORDER — INSULIN GLARGINE 100 [IU]/ML
20 INJECTION, SOLUTION SUBCUTANEOUS NIGHTLY
Status: DISCONTINUED | OUTPATIENT
Start: 2021-05-12 | End: 2021-05-13

## 2021-05-12 RX ADMIN — HYDRALAZINE HYDROCHLORIDE 100 MG: 50 TABLET, FILM COATED ORAL at 05:58

## 2021-05-12 RX ADMIN — PRAZOSIN HYDROCHLORIDE 4 MG: 1 CAPSULE ORAL at 09:56

## 2021-05-12 RX ADMIN — METHYLPREDNISOLONE 4 MG: 4 TABLET ORAL at 20:13

## 2021-05-12 RX ADMIN — GABAPENTIN 200 MG: 100 CAPSULE ORAL at 08:32

## 2021-05-12 RX ADMIN — CLEVIPIDINE 6 MG/HR: 0.5 EMULSION INTRAVENOUS at 03:06

## 2021-05-12 RX ADMIN — IRON SUCROSE 300 MG: 20 INJECTION, SOLUTION INTRAVENOUS at 08:33

## 2021-05-12 RX ADMIN — CLONIDINE HYDROCHLORIDE 0.2 MG: 0.2 TABLET ORAL at 20:13

## 2021-05-12 RX ADMIN — LOSARTAN POTASSIUM 50 MG: 50 TABLET, FILM COATED ORAL at 20:13

## 2021-05-12 RX ADMIN — CLEVIPIDINE 8 MG/HR: 0.5 EMULSION INTRAVENOUS at 10:15

## 2021-05-12 RX ADMIN — ISOSORBIDE MONONITRATE 60 MG: 60 TABLET ORAL at 08:32

## 2021-05-12 RX ADMIN — CARVEDILOL 25 MG: 25 TABLET, FILM COATED ORAL at 08:32

## 2021-05-12 RX ADMIN — GABAPENTIN 200 MG: 100 CAPSULE ORAL at 20:12

## 2021-05-12 RX ADMIN — LOSARTAN POTASSIUM 50 MG: 50 TABLET, FILM COATED ORAL at 08:32

## 2021-05-12 RX ADMIN — HEPARIN SODIUM 5000 UNITS: 5000 INJECTION INTRAVENOUS; SUBCUTANEOUS at 14:15

## 2021-05-12 RX ADMIN — PRAZOSIN HYDROCHLORIDE 4 MG: 1 CAPSULE ORAL at 21:19

## 2021-05-12 RX ADMIN — SODIUM CHLORIDE, PRESERVATIVE FREE 10 ML: 5 INJECTION INTRAVENOUS at 08:32

## 2021-05-12 RX ADMIN — CLONIDINE HYDROCHLORIDE 0.1 MG: 0.1 TABLET ORAL at 14:14

## 2021-05-12 RX ADMIN — SODIUM BICARBONATE 1300 MG: 648 TABLET ORAL at 20:13

## 2021-05-12 RX ADMIN — GUAIFENESIN 600 MG: 600 TABLET, EXTENDED RELEASE ORAL at 08:32

## 2021-05-12 RX ADMIN — DEXTROSE MONOHYDRATE 12.5 G: 25 INJECTION, SOLUTION INTRAVENOUS at 03:16

## 2021-05-12 RX ADMIN — IPRATROPIUM BROMIDE AND ALBUTEROL SULFATE 1 AMPULE: .5; 2.5 SOLUTION RESPIRATORY (INHALATION) at 21:04

## 2021-05-12 RX ADMIN — CLONIDINE HYDROCHLORIDE 0.1 MG: 0.1 TABLET ORAL at 08:32

## 2021-05-12 RX ADMIN — HEPARIN SODIUM 5000 UNITS: 5000 INJECTION INTRAVENOUS; SUBCUTANEOUS at 06:01

## 2021-05-12 RX ADMIN — GUAIFENESIN 600 MG: 600 TABLET, EXTENDED RELEASE ORAL at 20:13

## 2021-05-12 RX ADMIN — GABAPENTIN 200 MG: 100 CAPSULE ORAL at 14:14

## 2021-05-12 RX ADMIN — HYDRALAZINE HYDROCHLORIDE 100 MG: 50 TABLET, FILM COATED ORAL at 21:55

## 2021-05-12 RX ADMIN — SODIUM BICARBONATE 1300 MG: 648 TABLET ORAL at 08:32

## 2021-05-12 RX ADMIN — NIFEDIPINE 90 MG: 90 TABLET, EXTENDED RELEASE ORAL at 20:13

## 2021-05-12 RX ADMIN — CARVEDILOL 25 MG: 25 TABLET, FILM COATED ORAL at 17:24

## 2021-05-12 RX ADMIN — HYDRALAZINE HYDROCHLORIDE 100 MG: 50 TABLET, FILM COATED ORAL at 14:14

## 2021-05-12 RX ADMIN — Medication 81 MG: at 08:32

## 2021-05-12 RX ADMIN — INSULIN GLARGINE 20 UNITS: 100 INJECTION, SOLUTION SUBCUTANEOUS at 21:20

## 2021-05-12 RX ADMIN — HEPARIN SODIUM 5000 UNITS: 5000 INJECTION INTRAVENOUS; SUBCUTANEOUS at 21:55

## 2021-05-12 RX ADMIN — METHYLPREDNISOLONE 4 MG: 4 TABLET ORAL at 08:32

## 2021-05-12 ASSESSMENT — PAIN SCALES - GENERAL: PAINLEVEL_OUTOF10: 0

## 2021-05-12 ASSESSMENT — PAIN SCALES - WONG BAKER: WONGBAKER_NUMERICALRESPONSE: 0

## 2021-05-12 NOTE — CARE COORDINATION
SW contacted [address] [facility] [address] Renal [place] still awaiting financial clearance. SW inquired about available shifts, Chasity Diego said patient is looking at TTS second shift. SW notified RN manager Erica Vazquez and will follow up with patient. 36  SW provided tentative schedule information to patient, patient agreeable to TTS second shift. SW will follow up once chair time confirmed.

## 2021-05-12 NOTE — PROGRESS NOTES
Dialysis Post Treatment Note  Vitals:    05/12/21 1336   BP: (!) 148/51   Pulse: 63   Resp: 18   Temp: 96.9 °F (36.1 °C)   SpO2:      Pre-Weight = 69kg  Post-weight = Weight: 145 lb 8.1 oz (66 kg)  Total Liters Processed = Total Liters Processed (l/min): 60.3 l/min  Rinseback Volume (mL) = Rinseback Volume (ml): 300 ml  Net Removal (mL) = NET Removed (ml): 3200 ml  Patient's dry weight=n/a  Type of access used=cvc  Length of treatment=210    paul well no issues

## 2021-05-12 NOTE — PROGRESS NOTES
Patient was also complaining of headache. Patient tried taking Tylenol for the headache which did not seem to help. Currently denies any dizziness, acute changes with his vision or hearing, nausea, vomiting, chest pain. Patient did complain of having heart palpitations. Patient denies any shortness of breath, abdominal pain, constipation, diarrhea or any trouble with his urination    Review of Systems:     Constitutional:  negative for chills, fevers, sweats  Respiratory:  negative for cough, dyspnea on exertion, shortness of breath, wheezing  Cardiovascular:  negative for chest pain, chest pressure/discomfort, lower extremity edema, palpitations  Gastrointestinal:  negative for abdominal pain, constipation, diarrhea, nausea, vomiting  Neurological:  negative for dizziness,no headache    Medications: Allergies:     Allergies   Allergen Reactions    Lisinopril      cough    Ace Inhibitors      coughing    Pcn [Penicillins]        Current Meds:   Scheduled Meds:    losartan  50 mg Oral BID    iron sucrose  300 mg Intravenous Daily    insulin lispro  5 Units Subcutaneous TID WC    insulin lispro  0-6 Units Subcutaneous TID     clindamycin (CLEOCIN) IV  600 mg Intravenous Once    [Held by provider] NIFEdipine  90 mg Oral Nightly    methylPREDNISolone  4 mg Oral QAM AC    methylPREDNISolone  4 mg Oral Lunch    methylPREDNISolone  4 mg Oral Nightly    fluticasone-umeclidin-vilant  1 puff Inhalation Daily    gabapentin  200 mg Oral TID    guaiFENesin  600 mg Oral BID    cloNIDine  0.1 mg Oral TID    sodium bicarbonate  1,300 mg Oral BID    carvedilol  25 mg Oral BID WC    aspirin  81 mg Oral Daily    atorvastatin  80 mg Oral Daily    [Held by provider] insulin glargine  35 Units Subcutaneous Nightly    [Held by provider] bumetanide  2 mg Oral Daily    fluticasone  2 spray Each Nostril Daily    hydrALAZINE  100 mg Oral 3 times per day    isosorbide mononitrate  60 mg Oral Daily    [Held by provider] losartan  50 mg Oral BID    prazosin  4 mg Oral BID    QUEtiapine  100 mg Oral Nightly    sodium chloride flush  5-40 mL Intravenous 2 times per day    heparin (porcine)  5,000 Units Subcutaneous 3 times per day    ipratropium-albuterol  1 ampule Inhalation Q4H While awake     Continuous Infusions:    clevidipine 6 mg/hr (21 0558)    dextrose 100 mL/hr (21 0717)    sodium chloride       PRN Meds: heparin (porcine), heparin (porcine), cloNIDine, hydrALAZINE, glucose, dextrose, glucagon (rDNA), dextrose, albuterol, ipratropium-albuterol, sodium chloride flush, sodium chloride, nicotine, promethazine **OR** ondansetron, acetaminophen **OR** acetaminophen, polyethylene glycol    Data:     Past Medical History:   has a past medical history of Asthma, CAD in native artery, nonobstructive, Carotid stenosis, left, Carpal tunnel syndrome, Cerebrovascular disease, Chronic kidney disease, COPD (chronic obstructive pulmonary disease) ([facility] [address] Diabetes mellitus ([facility] [address] History of colon polyps, History of weakness of extremity, HTN (hypertension), Hyperlipidemia, Lung, cysts, congenital, PTSD (post-traumatic stress disorder), PTSD (post-traumatic stress disorder), TIA (transient ischemic attack), and Type II or unspecified type diabetes mellitus without mention of complication, not stated as uncontrolled. Social History:   reports that he quit smoking about 6 years ago. His smoking use included cigarettes. He has a 17.50 pack-year smoking history. He has never used smokeless tobacco. He reports that he does not drink alcohol or use drugs.      Family History:   Family History   Problem Relation Age of Onset    Cancer Mother     Heart Disease Father        Vitals:  BP (!) 161/42   Pulse 71   Temp 97.9 °F (36.6 °C) (Oral)   Resp 18   Ht 5' 6\" (1.676 m)   Wt 155 lb (70.3 kg)   SpO2 96%   BMI 25.02 kg/m²   Temp (24hrs), Av.3 °F (36.3 °C), Min:96.8 °F (36 °C), Max:98.6 °F (37 °C)    Recent Labs     05/12/21  0333 05/12/21  0507 05/12/21  0557 05/12/21  0825   POCGLU 100 92 130* 142*       I/O (24Hr):     Intake/Output Summary (Last 24 hours) at 5/12/2021 0929  Last data filed at 5/12/2021 7329  Gross per 24 hour   Intake 1661 ml   Output 3520 ml   Net -1859 ml       Labs:  Hematology:  Recent Labs     05/10/21  0636 05/11/21  0630 05/12/21  0501   WBC 8.5 8.4 8.9   RBC 2.60* 2.91* 2.71*   HGB 8.1* 9.1* 8.5*   HCT 25.7* 29.7* 26.5*   MCV 98.8 102.1 97.8   MCH 31.2 31.3 31.4   MCHC 31.5 30.6 32.1   RDW 13.2 13.2 13.0    311 286   MPV 10.7 10.3 10.1     Chemistry:  Recent Labs     05/09/21  1350 05/09/21  1612 05/10/21  0636 05/11/21  0630 05/12/21  0501   NA  --   --  134* 137 138   K  --   --  5.3 4.8 3.9   CL  --   --  101 102 104   CO2  --   --  20 20 26   GLUCOSE  --   --  401* 198* 90   BUN  --   --  110* 95* 57*   CREATININE  --   --  3.68* 3.16* 2.53*   ANIONGAP  --   --  13 15 8*   LABGLOM  --   --  17* 20* 26*   GFRAA  --   --  20* 24* 31*   CALCIUM  --   --  7.8* 7.6* 7.7*   TROPHS 141* 138*  --   --   --      Recent Labs     05/11/21  2144 05/12/21  0315 05/12/21  0333 05/12/21  0507 05/12/21  0557 05/12/21  0825   POCGLU 200* 26* 100 92 130* 142*     ABG:  Lab Results   Component Value Date    POCPH 7.39 06/17/2013    POCPCO2 46 06/17/2013    POCPO2 288 06/17/2013    POCHCO3 27.8 06/17/2013    NBEA NOT REPORTED 06/17/2013    PBEA 3 06/17/2013    XUM8SBC 29 06/17/2013    WXKG5EAX 100 06/17/2013    FIO2 NOT REPORTED 12/03/2020     Lab Results   Component Value Date/Time    SPECIAL 2ML L FR 12/03/2020 11:24 AM     Lab Results   Component Value Date/Time    CULTURE NO GROWTH 6 DAYS 12/03/2020 11:24 AM       Radiology:  Us Renal Limited    Result Date: 5/5/2021  Benign left renal cyst     Xr Chest Portable    Result Date: 5/9/2021  Stable pulmonary vascular congestion     Xr Chest Portable    Result Date: 5/8/2021  Increasing bilateral interstitial infiltrates noted compared to prior study Ir Non Tunneled Cath Wo Port Replacement    Result Date: 5/10/2021  Successful placement of non tunneled hemodialysis catheter. Okay to use hemodialysis catheter. Physical Examination:        General appearance:  alert, cooperative and no distress  Mental Status:  oriented to person, place and time and normal affect  Lungs:  clear to auscultation bilaterally, normal effort  Heart:  regular rate and rhythm, no murmur  Abdomen:  soft, nontender, nondistended, normal bowel sounds, no masses, hepatomegaly, splenomegaly  Extremities: +2-3 BLE pitting edema, redness, tenderness in the calves  Skin:  no gross lesions, rashes, induration    Assessment:        Hospital Problems           Last Modified POA    * (Principal) Hypertensive emergency 5/5/2021 Yes    CKD (chronic kidney disease) stage 4, GFR 15-29 ml/min ([address] 5/5/2021 Yes    Pure hypercholesterolemia 5/5/2021 Yes    Carotid stenosis, left s/p Endarterectomy 5/5/2021 Yes    DM type 2, uncontrolled, with neuropathy ([address] 5/5/2021 Yes    COPD (chronic obstructive pulmonary disease) ([address] 5/5/2021 Yes    Non-obstructive Coronary artery disease of native artery of native heart with stable angina pectoris ([address] 5/5/2021 Yes    Overview Signed 8/12/2019 11:46 AM by TERRANCE Danielle - CNP     9/2017     LMCA: Mild irregularities 10-20%. LAD: 30-40% proximal stenosis    LCx: Mild irregularities 10-20%.     RCA: 40% mid stenosis         Severe malnutrition ([address] 4/7/5783 Yes    Metabolic acidosis 8/4/3431 Yes    Chronic heart failure with preserved ejection fraction ([address] 5/6/2021 Yes    Left ventricular hypertrophy 5/6/2021 Yes          Plan:        Hypertensive emergency: Fluctuating.    -clevidipine restarted 5/11 post iHD- plan to discontinue 5/12  - continue Coreg, Increase vcatapres, Imdur, nifedipine, Minipress.   -refractory hypertension persists.    -Losartan/Bumex on hold secondary to kidney function/UF    CKD stage IV and uremia: Nephrology following.    -Baseline CRT 2.0-2.4.   - Initial UA with proteinuria.   - BUN was 73, increase to 95 today ([id number]). -iHD initiated with QM treatment 3 today   -tunnel cath placed 5/11 post HD    Diabetes mellitus type 2 with neuropathy/nephropathy: fluctuating   - A.m. blood sugar low today    - Continue Lantus at decreased dose, LIISS. Monitor patient for hyper/hypoglycemic event  -referral to Dr. Roopa Walsh as outpatient   -Paitent take 1 unit humalog per 15 carbs in addition to SSI as follow 1 unit every 50 points over 150   -Continue basal 5 units with meals and adjust SSI     AYAH: venofer X3 doses     Dyslipidemia: With history of carotid stenosis status post endarterectomy    Secondary hyperparathyroidism: See #2. COPD: On 3 L low flow nasal cannula, he does not wear O2 at home  -  Home inhalers    Chronic Hepatitis B infection: Referral to GI as outpatient with Dr. Justo Hernandez- discussed with GI    CAD: Without exacerbation.    -Cardiac cath from August 2020 showing nonobstructive CAD. - Continue ASA, Plavix, beta-blocker, statin    Acute on chronic diastolic CHF with LVH:    - Bumex on hold UF with iHD   -Continue BB.   Worsened by hypertensive emergency    GI/DVT proph    Dispo: home once iHD established as outpatient, follow up with GI as outpatient for chronic hep B infection- TERRANCE Fuentes - NP  5/12/2021  9:29 AM

## 2021-05-12 NOTE — PLAN OF CARE
Problem: Falls - Risk of:  Goal: Will remain free from falls  Description: Will remain free from falls  Outcome: Ongoing  Goal: Absence of physical injury  Description: Absence of physical injury  Outcome: Ongoing     Problem:  Activity:  Goal: Ability to tolerate increased activity will improve  Description: Ability to tolerate increased activity will improve  Outcome: Ongoing     Problem: Cardiac:  Goal: Ability to maintain vital signs within normal range will improve  Description: Ability to maintain vital signs within normal range will improve  Outcome: Ongoing  Goal: Cardiovascular alteration will improve  Description: Cardiovascular alteration will improve  Outcome: Ongoing

## 2021-05-12 NOTE — PROGRESS NOTES
Nephrology Progress Note        Subjective: The patient is seen and evaluated on dialysis. Patient is stable on dialysis. Access cannulated without problems. No new issues overnite. Stable hemodynamics. Third consecutive treatment for clearance and fluid removal.  Dialysis orders are reviewed with the dialysis staff. I entered the dialysis orders personally into the patient record today.         Objective:  CURRENT TEMPERATURE:  Temp: 97.9 °F (36.6 °C)  MAXIMUM TEMPERATURE OVER 24HRS:  Temp (24hrs), Av.5 °F (36.4 °C), Min:96.8 °F (36 °C), Max:98.6 °F (37 °C)    CURRENT RESPIRATORY RATE:  Resp: 21  CURRENT PULSE:  Pulse: 70  CURRENT BLOOD PRESSURE:  BP: (!) 183/59  24HR BLOOD PRESSURE RANGE:  Systolic (08DTO), KYZ:616 , Min:120 , WFJ:198   ; Diastolic (96URD), YTV:30, Min:33, Max:109    24HR INTAKE/OUTPUT:      Intake/Output Summary (Last 24 hours) at 2021 1141  Last data filed at 2021 8818  Gross per 24 hour   Intake 1661 ml   Output 3520 ml   Net -1859 ml     Patient Vitals for the past 96 hrs (Last 3 readings):   Weight   21 1034 152 lb 1.9 oz (69 kg)   21 0630 155 lb (70.3 kg)   21 1400 151 lb 0.2 oz (68.5 kg)         Physical Exam:  General appearance:Awake, alert, in no acute distress, on supplemental oxygen by nasal cannula  Skin: warm and dry, no rash or erythema  Eyes: conjunctivae pale and sclera anicteric  ENT:no thrush, moist mucous membranes  Neck:  No JVD, midline trachea, no accessory muscle use  Pulmonary: Good bilateral air entry with diminished breath sounds at the bases and no rales or rhonchi appreciated  Cardiovascular: Regular rate and rhythm positive S1 and S2 no rubs  Abdomen: soft nontender, bowel sounds present, no ascites   Extremities: 1+ bilateral lower extremity edema, improving  Access:  Dialysis catheter    Current Medications:    losartan (COZAAR) tablet 50 mg, BID  iron sucrose (VENOFER) 300 mg in sodium chloride 0.9 % 250 mL IVPB, Daily  insulin lispro (HUMALOG) injection vial 5 Units, TID WC  insulin lispro (HUMALOG) injection vial 0-6 Units, TID WC  clindamycin (CLEOCIN) 600 mg in dextrose 5 % 50 mL IVPB, Once  clevidipine (CLEVIPREX) infusion, Continuous  [Held by provider] NIFEdipine (ADALAT CC) extended release tablet 90 mg, Nightly  heparin (porcine) injection 1,400 Units, PRN  heparin (porcine) injection 1,500 Units, PRN  methylPREDNISolone (MEDROL) tablet 4 mg, QAM AC  methylPREDNISolone (MEDROL) tablet 4 mg, Lunch  methylPREDNISolone (MEDROL) tablet 4 mg, Nightly  fluticasone-umeclidin-vilant (TRELEGY ELLIPTA) 100-62.5-25 MCG/INH inhaler 1 puff, Daily  cloNIDine (CATAPRES) tablet 0.1 mg, Q4H PRN  gabapentin (NEURONTIN) capsule 200 mg, TID  hydrALAZINE (APRESOLINE) injection 10 mg, Q6H PRN  guaiFENesin (MUCINEX) extended release tablet 600 mg, BID  cloNIDine (CATAPRES) tablet 0.1 mg, TID  sodium bicarbonate tablet 1,300 mg, BID  carvedilol (COREG) tablet 25 mg, BID WC  glucose (GLUTOSE) 40 % oral gel 15 g, PRN  dextrose 50 % IV solution, PRN  glucagon (rDNA) injection 1 mg, PRN  dextrose 5 % solution, PRN  aspirin EC tablet 81 mg, Daily  atorvastatin (LIPITOR) tablet 80 mg, Daily  [Held by provider] insulin glargine (LANTUS) injection vial 35 Units, Nightly  [Held by provider] bumetanide (BUMEX) tablet 2 mg, Daily  albuterol (PROVENTIL) nebulizer solution 2.5 mg, Q6H PRN  fluticasone (FLONASE) 50 MCG/ACT nasal spray 2 spray, Daily  hydrALAZINE (APRESOLINE) tablet 100 mg, 3 times per day  ipratropium-albuterol (DUONEB) nebulizer solution 3 mL, Q6H PRN  isosorbide mononitrate (IMDUR) extended release tablet 60 mg, Daily  [Held by provider] losartan (COZAAR) tablet 50 mg, BID  prazosin (MINIPRESS) capsule 4 mg, BID  QUEtiapine (SEROQUEL) tablet 100 mg, Nightly  sodium chloride flush 0.9 % injection 5-40 mL, 2 times per day  sodium chloride flush 0.9 % injection 5-40 mL, PRN  0.9 % sodium chloride infusion, PRN  nicotine (NICODERM CQ) 21 MG/24HR 1 patch, Daily PRN  promethazine (PHENERGAN) tablet 12.5 mg, Q6H PRN    Or  ondansetron (ZOFRAN) injection 4 mg, Q6H PRN  acetaminophen (TYLENOL) tablet 650 mg, Q6H PRN    Or  acetaminophen (TYLENOL) suppository 650 mg, Q6H PRN  polyethylene glycol (GLYCOLAX) packet 17 g, Daily PRN  heparin (porcine) injection 5,000 Units, 3 times per day  ipratropium-albuterol (DUONEB) nebulizer solution 1 ampule, Q4H While awake      Labs:   CBC:   Recent Labs     05/10/21  0636 05/11/21  0630 05/12/21  0501   WBC 8.5 8.4 8.9   RBC 2.60* 2.91* 2.71*   HGB 8.1* 9.1* 8.5*   HCT 25.7* 29.7* 26.5*    311 286   MPV 10.7 10.3 10.1      BMP:   Recent Labs     05/10/21  0636 05/11/21  0630 05/12/21  0501   * 137 138   K 5.3 4.8 3.9    102 104   CO2 20 20 26   * 95* 57*   CREATININE 3.68* 3.16* 2.53*   GLUCOSE 401* 198* 90   CALCIUM 7.8* 7.6* 7.7*        Phosphorus:  No results for input(s): PHOS in the last 72 hours. Magnesium: No results for input(s): MG in the last 72 hours. Albumin: No results for input(s): LABALBU in the last 72 hours. Dialysis bath: Dialysis Bath  K+ (Potassium): 4  Ca+ (Calcium): 2.25  Na+ (Sodium): 138  HCO3 (Bicarb): 35    Radiology:  Reviewed as available. Assessment:  1. Dialysis dependent acute on chronic kidney injury  2.essential hypertension  3. Underlying diabetes mellitus with complications  4. Fluid overload with resultant edema  5. ANEMIA OF CHRONIC DISEASE:   6. SECONDARY HYPERPARATHYROIDISM:     Plan:  1. Weight removal and dialysis orders reviewed. 2.  I have entered the orders personally into the patient's chart today  3. Work on arranging outpatient dialysis schedule  4. Follow up labs ordered  5. Evaluate daily the need for dialysis      Please do not hesitate to call with questions.     Electronically signed by Alicia Fuchs MD on 5/12/2021 at 11:41 AM

## 2021-05-13 ENCOUNTER — APPOINTMENT (OUTPATIENT)
Dept: DIALYSIS | Age: 66
DRG: 280 | End: 2021-05-13
Payer: COMMERCIAL

## 2021-05-13 PROBLEM — I16.1 HYPERTENSIVE EMERGENCY: Status: RESOLVED | Noted: 2021-05-03 | Resolved: 2021-05-13

## 2021-05-13 PROBLEM — E43 SEVERE MALNUTRITION (HCC): Status: RESOLVED | Noted: 2021-05-05 | Resolved: 2021-05-13

## 2021-05-13 PROBLEM — E87.20 METABOLIC ACIDOSIS: Status: RESOLVED | Noted: 2021-05-06 | Resolved: 2021-05-13

## 2021-05-13 LAB
ANION GAP SERPL CALCULATED.3IONS-SCNC: 9 MMOL/L (ref 9–17)
BUN BLDV-MCNC: 48 MG/DL (ref 8–23)
BUN/CREAT BLD: ABNORMAL (ref 9–20)
CALCIUM SERPL-MCNC: 7.2 MG/DL (ref 8.6–10.4)
CHLORIDE BLD-SCNC: 98 MMOL/L (ref 98–107)
CO2: 24 MMOL/L (ref 20–31)
CREAT SERPL-MCNC: 2.77 MG/DL (ref 0.7–1.2)
GFR AFRICAN AMERICAN: 28 ML/MIN
GFR NON-AFRICAN AMERICAN: 23 ML/MIN
GFR SERPL CREATININE-BSD FRML MDRD: ABNORMAL ML/MIN/{1.73_M2}
GFR SERPL CREATININE-BSD FRML MDRD: ABNORMAL ML/MIN/{1.73_M2}
GLUCOSE BLD-MCNC: 189 MG/DL (ref 75–110)
GLUCOSE BLD-MCNC: 195 MG/DL (ref 70–99)
GLUCOSE BLD-MCNC: 209 MG/DL (ref 75–110)
GLUCOSE BLD-MCNC: 240 MG/DL (ref 75–110)
GLUCOSE BLD-MCNC: 260 MG/DL (ref 75–110)
GLUCOSE BLD-MCNC: 284 MG/DL (ref 75–110)
GLUCOSE BLD-MCNC: 386 MG/DL (ref 75–110)
GLUCOSE BLD-MCNC: 61 MG/DL (ref 75–110)
HCT VFR BLD CALC: 26 % (ref 40.7–50.3)
HEMOGLOBIN: 8.2 G/DL (ref 13–17)
MCH RBC QN AUTO: 31.4 PG (ref 25.2–33.5)
MCHC RBC AUTO-ENTMCNC: 31.5 G/DL (ref 28.4–34.8)
MCV RBC AUTO: 99.6 FL (ref 82.6–102.9)
NRBC AUTOMATED: 0.5 PER 100 WBC
PDW BLD-RTO: 13.2 % (ref 11.8–14.4)
PLATELET # BLD: 277 K/UL (ref 138–453)
PMV BLD AUTO: 10.7 FL (ref 8.1–13.5)
POTASSIUM SERPL-SCNC: 4.2 MMOL/L (ref 3.7–5.3)
RBC # BLD: 2.61 M/UL (ref 4.21–5.77)
SODIUM BLD-SCNC: 131 MMOL/L (ref 135–144)
WBC # BLD: 10.2 K/UL (ref 3.5–11.3)

## 2021-05-13 PROCEDURE — 80048 BASIC METABOLIC PNL TOTAL CA: CPT

## 2021-05-13 PROCEDURE — 6370000000 HC RX 637 (ALT 250 FOR IP): Performed by: NURSE PRACTITIONER

## 2021-05-13 PROCEDURE — 85027 COMPLETE CBC AUTOMATED: CPT

## 2021-05-13 PROCEDURE — 6370000000 HC RX 637 (ALT 250 FOR IP): Performed by: INTERNAL MEDICINE

## 2021-05-13 PROCEDURE — 94761 N-INVAS EAR/PLS OXIMETRY MLT: CPT

## 2021-05-13 PROCEDURE — 82947 ASSAY GLUCOSE BLOOD QUANT: CPT

## 2021-05-13 PROCEDURE — 36415 COLL VENOUS BLD VENIPUNCTURE: CPT

## 2021-05-13 PROCEDURE — 2060000000 HC ICU INTERMEDIATE R&B

## 2021-05-13 PROCEDURE — 6360000002 HC RX W HCPCS: Performed by: INTERNAL MEDICINE

## 2021-05-13 PROCEDURE — 76937 US GUIDE VASCULAR ACCESS: CPT

## 2021-05-13 PROCEDURE — 90935 HEMODIALYSIS ONE EVALUATION: CPT | Performed by: INTERNAL MEDICINE

## 2021-05-13 PROCEDURE — 94640 AIRWAY INHALATION TREATMENT: CPT

## 2021-05-13 PROCEDURE — 6370000000 HC RX 637 (ALT 250 FOR IP): Performed by: PHYSICIAN ASSISTANT

## 2021-05-13 PROCEDURE — 6360000002 HC RX W HCPCS: Performed by: NURSE PRACTITIONER

## 2021-05-13 PROCEDURE — 90935 HEMODIALYSIS ONE EVALUATION: CPT

## 2021-05-13 PROCEDURE — 99239 HOSP IP/OBS DSCHRG MGMT >30: CPT | Performed by: NURSE PRACTITIONER

## 2021-05-13 PROCEDURE — 2580000003 HC RX 258: Performed by: NURSE PRACTITIONER

## 2021-05-13 PROCEDURE — 2580000003 HC RX 258: Performed by: INTERNAL MEDICINE

## 2021-05-13 RX ORDER — PRAZOSIN HYDROCHLORIDE 1 MG/1
2 CAPSULE ORAL 2 TIMES DAILY
Status: DISCONTINUED | OUTPATIENT
Start: 2021-05-13 | End: 2021-05-14 | Stop reason: HOSPADM

## 2021-05-13 RX ORDER — HEPARIN SODIUM 1000 [USP'U]/ML
1600 INJECTION, SOLUTION INTRAVENOUS; SUBCUTANEOUS PRN
Status: DISCONTINUED | OUTPATIENT
Start: 2021-05-13 | End: 2021-05-14 | Stop reason: HOSPADM

## 2021-05-13 RX ORDER — INSULIN GLARGINE 100 [IU]/ML
10 INJECTION, SOLUTION SUBCUTANEOUS NIGHTLY
Qty: 15 ML | Refills: 2 | Status: SHIPPED | OUTPATIENT
Start: 2021-05-13

## 2021-05-13 RX ORDER — CLONIDINE HYDROCHLORIDE 0.2 MG/1
0.2 TABLET ORAL 3 TIMES DAILY
Qty: 60 TABLET | Refills: 0 | Status: SHIPPED | OUTPATIENT
Start: 2021-05-13 | End: 2021-06-11 | Stop reason: SDUPTHER

## 2021-05-13 RX ORDER — HYDRALAZINE HYDROCHLORIDE 50 MG/1
50 TABLET, FILM COATED ORAL EVERY 8 HOURS SCHEDULED
Status: DISCONTINUED | OUTPATIENT
Start: 2021-05-13 | End: 2021-05-14

## 2021-05-13 RX ORDER — SODIUM BICARBONATE 650 MG/1
1300 TABLET ORAL 2 TIMES DAILY
Qty: 120 TABLET | Refills: 0 | Status: SHIPPED | OUTPATIENT
Start: 2021-05-13 | End: 2021-06-12

## 2021-05-13 RX ORDER — IPRATROPIUM BROMIDE AND ALBUTEROL SULFATE 2.5; .5 MG/3ML; MG/3ML
1 SOLUTION RESPIRATORY (INHALATION)
Status: DISCONTINUED | OUTPATIENT
Start: 2021-05-13 | End: 2021-05-14 | Stop reason: HOSPADM

## 2021-05-13 RX ORDER — INSULIN GLARGINE 100 [IU]/ML
10 INJECTION, SOLUTION SUBCUTANEOUS NIGHTLY
Status: DISCONTINUED | OUTPATIENT
Start: 2021-05-13 | End: 2021-05-14 | Stop reason: HOSPADM

## 2021-05-13 RX ORDER — NIFEDIPINE 90 MG/1
90 TABLET, EXTENDED RELEASE ORAL NIGHTLY
Qty: 30 TABLET | Refills: 0 | Status: SHIPPED | OUTPATIENT
Start: 2021-05-13 | End: 2021-06-11 | Stop reason: SDUPTHER

## 2021-05-13 RX ADMIN — ATORVASTATIN CALCIUM 80 MG: 80 TABLET, FILM COATED ORAL at 22:00

## 2021-05-13 RX ADMIN — IPRATROPIUM BROMIDE AND ALBUTEROL SULFATE 1 AMPULE: .5; 3 SOLUTION RESPIRATORY (INHALATION) at 21:22

## 2021-05-13 RX ADMIN — HEPARIN SODIUM 5000 UNITS: 5000 INJECTION INTRAVENOUS; SUBCUTANEOUS at 22:00

## 2021-05-13 RX ADMIN — GUAIFENESIN 600 MG: 600 TABLET, EXTENDED RELEASE ORAL at 09:05

## 2021-05-13 RX ADMIN — SODIUM CHLORIDE, PRESERVATIVE FREE 10 ML: 5 INJECTION INTRAVENOUS at 20:54

## 2021-05-13 RX ADMIN — GABAPENTIN 200 MG: 100 CAPSULE ORAL at 14:18

## 2021-05-13 RX ADMIN — GABAPENTIN 200 MG: 100 CAPSULE ORAL at 20:56

## 2021-05-13 RX ADMIN — METHYLPREDNISOLONE 4 MG: 4 TABLET ORAL at 09:05

## 2021-05-13 RX ADMIN — HYDRALAZINE HYDROCHLORIDE 50 MG: 50 TABLET, FILM COATED ORAL at 14:18

## 2021-05-13 RX ADMIN — QUETIAPINE FUMARATE 100 MG: 100 TABLET ORAL at 20:54

## 2021-05-13 RX ADMIN — GUAIFENESIN 600 MG: 600 TABLET, EXTENDED RELEASE ORAL at 20:55

## 2021-05-13 RX ADMIN — CARVEDILOL 25 MG: 25 TABLET, FILM COATED ORAL at 17:44

## 2021-05-13 RX ADMIN — IPRATROPIUM BROMIDE AND ALBUTEROL SULFATE 1 AMPULE: .5; 3 SOLUTION RESPIRATORY (INHALATION) at 08:26

## 2021-05-13 RX ADMIN — CLONIDINE HYDROCHLORIDE 0.2 MG: 0.2 TABLET ORAL at 09:06

## 2021-05-13 RX ADMIN — ISOSORBIDE MONONITRATE 60 MG: 60 TABLET ORAL at 09:05

## 2021-05-13 RX ADMIN — CARVEDILOL 25 MG: 25 TABLET, FILM COATED ORAL at 09:06

## 2021-05-13 RX ADMIN — Medication 81 MG: at 09:06

## 2021-05-13 RX ADMIN — PRAZOSIN HYDROCHLORIDE 4 MG: 1 CAPSULE ORAL at 09:04

## 2021-05-13 RX ADMIN — IPRATROPIUM BROMIDE AND ALBUTEROL SULFATE 1 AMPULE: .5; 3 SOLUTION RESPIRATORY (INHALATION) at 16:52

## 2021-05-13 RX ADMIN — INSULIN LISPRO 3 UNITS: 100 INJECTION, SOLUTION INTRAVENOUS; SUBCUTANEOUS at 21:12

## 2021-05-13 RX ADMIN — SODIUM BICARBONATE 1300 MG: 648 TABLET ORAL at 09:07

## 2021-05-13 RX ADMIN — PRAZOSIN HYDROCHLORIDE 2 MG: 1 CAPSULE ORAL at 20:51

## 2021-05-13 RX ADMIN — METHYLPREDNISOLONE 4 MG: 4 TABLET ORAL at 20:55

## 2021-05-13 RX ADMIN — CLONIDINE HYDROCHLORIDE 0.2 MG: 0.2 TABLET ORAL at 20:56

## 2021-05-13 RX ADMIN — HYDRALAZINE HYDROCHLORIDE 100 MG: 50 TABLET, FILM COATED ORAL at 06:09

## 2021-05-13 RX ADMIN — NIFEDIPINE 90 MG: 90 TABLET, EXTENDED RELEASE ORAL at 20:54

## 2021-05-13 RX ADMIN — HEPARIN SODIUM 1600 UNITS: 1000 INJECTION INTRAVENOUS; SUBCUTANEOUS at 14:19

## 2021-05-13 RX ADMIN — HYDRALAZINE HYDROCHLORIDE 50 MG: 50 TABLET, FILM COATED ORAL at 21:00

## 2021-05-13 RX ADMIN — LOSARTAN POTASSIUM 50 MG: 50 TABLET, FILM COATED ORAL at 20:56

## 2021-05-13 RX ADMIN — IRON SUCROSE 300 MG: 20 INJECTION, SOLUTION INTRAVENOUS at 09:04

## 2021-05-13 RX ADMIN — HEPARIN SODIUM 5000 UNITS: 5000 INJECTION INTRAVENOUS; SUBCUTANEOUS at 06:09

## 2021-05-13 RX ADMIN — HEPARIN SODIUM 1600 UNITS: 1000 INJECTION INTRAVENOUS; SUBCUTANEOUS at 14:20

## 2021-05-13 RX ADMIN — SODIUM CHLORIDE, PRESERVATIVE FREE 10 ML: 5 INJECTION INTRAVENOUS at 09:07

## 2021-05-13 RX ADMIN — CLONIDINE HYDROCHLORIDE 0.2 MG: 0.2 TABLET ORAL at 14:18

## 2021-05-13 RX ADMIN — QUETIAPINE FUMARATE 100 MG: 100 TABLET ORAL at 00:40

## 2021-05-13 RX ADMIN — GABAPENTIN 200 MG: 100 CAPSULE ORAL at 09:07

## 2021-05-13 RX ADMIN — INSULIN GLARGINE 10 UNITS: 100 INJECTION, SOLUTION SUBCUTANEOUS at 21:07

## 2021-05-13 ASSESSMENT — PAIN SCALES - GENERAL
PAINLEVEL_OUTOF10: 0

## 2021-05-13 NOTE — PROGRESS NOTES
[facility]  Office: [address], DO, Hector Spencecm, DO, Ana Neely, DO, Sue Lee Blood, DO, Shahzad Cortes MD, Shaun Carrera MD, Dotty Solis MD, Michelle Cottrell MD, Crystal Magaña MD, Fidencio Brooks MD, Erich Kerr MD, Audrey Connolly MD, Dara Snyder, DO, Antonio Seals MD, Daphne Shane, DO, Leonidas Guerin MD,  Gómez Savage, DO, Elina Amado MD, Da Jett MD, Christy Begum MD, Larissa Casey MD, Lico Sotomayor, Fareed Pulido, CNP, Day Carmona, CNP, Valerie Navarrete, CNS, Patsy Diego, CNP, Vannie Epley, CNP, Yocasta Franco, CNP, Lissy Snowden, CNP, Ruslan Loyola, CNP, Demond Almeida PA-C, Akbar Lee, [facility], Jean Oakes, CNP, Elli Bell, CNP, Belinda Santos, CNP, Nikhil Reddy, CNP, Charlie Magallanes, CNP, Jose Elizabeth, [address] [place] [address]    Progress Note    5/13/2021    8:58 AM    Name:   Srinivasan Atwood  MRN:     [id number]     Acct:      [de-identified]   Room:   2022/2022-01  IP Day:  8  Admit Date:  5/3/2021  1:18 PM    PCP:   TERRANCE Sofia CNP  Code Status:  Full Code    Subjective:     C/C:   Chief Complaint   Patient presents with    Hypertension     dizziness and HTN     Interval History Status: improved. Patient evaluated in room, hypoglycemic still this morning but arguing about dose of lantus to receive tonight  T/R/Sa outpatient iHD patient - waiting on chair time per CM  BLE pitting edema improving; weight documentation appears to be inaccurate  Data 7.7 L fluid deficit since admission    Brief History: This is a 71-year-old male with past medical history of asthma, CAD, CVA, CKD, diabetes, hypertension who is coming in with complaints of uncontrolled hypertension and headache.   Patient reports that he usually has trouble controlling his blood pressure even after being on multiple medications however this morning it was extra high with systolic being around 230. Patient was also complaining of headache. Patient tried taking Tylenol for the headache which did not seem to help. Currently denies any dizziness, acute changes with his vision or hearing, nausea, vomiting, chest pain. Patient did complain of having heart palpitations. Patient denies any shortness of breath, abdominal pain, constipation, diarrhea or any trouble with his urination    Review of Systems:     Constitutional:  negative for chills, fevers, sweats  Respiratory:  negative for cough, dyspnea on exertion, shortness of breath, wheezing  Cardiovascular:  negative for chest pain, chest pressure/discomfort, lower extremity edema, palpitations  Gastrointestinal:  negative for abdominal pain, constipation, diarrhea, nausea, vomiting  Neurological:  negative for dizziness,no headache    Medications: Allergies:     Allergies   Allergen Reactions    Lisinopril      cough    Ace Inhibitors      coughing    Pcn [Penicillins]        Current Meds:   Scheduled Meds:    ipratropium-albuterol  1 ampule Inhalation Q4H WA    insulin glargine  10 Units Subcutaneous Nightly    cloNIDine  0.2 mg Oral TID    losartan  50 mg Oral BID    iron sucrose  300 mg Intravenous Daily    insulin lispro  5 Units Subcutaneous TID     insulin lispro  0-6 Units Subcutaneous TID     clindamycin (CLEOCIN) IV  600 mg Intravenous Once    NIFEdipine  90 mg Oral Nightly    methylPREDNISolone  4 mg Oral QAM AC    methylPREDNISolone  4 mg Oral Lunch    methylPREDNISolone  4 mg Oral Nightly    fluticasone-umeclidin-vilant  1 puff Inhalation Daily    gabapentin  200 mg Oral TID    guaiFENesin  600 mg Oral BID    sodium bicarbonate  1,300 mg Oral BID    carvedilol  25 mg Oral BID WC    aspirin  81 mg Oral Daily    atorvastatin  80 mg Oral Daily    [Held by provider] bumetanide  2 mg Oral Daily    fluticasone  2 spray Each Nostril Daily    hydrALAZINE  100 mg Oral 3 times per day    isosorbide mononitrate 60 mg Oral Daily    prazosin  4 mg Oral BID    QUEtiapine  100 mg Oral Nightly    sodium chloride flush  5-40 mL Intravenous 2 times per day    heparin (porcine)  5,000 Units Subcutaneous 3 times per day     Continuous Infusions:    clevidipine 6 mg/hr (21)    dextrose 100 mL/hr (21)    sodium chloride       PRN Meds: heparin (porcine), heparin (porcine), cloNIDine, hydrALAZINE, glucose, dextrose, glucagon (rDNA), dextrose, albuterol, ipratropium-albuterol, sodium chloride flush, sodium chloride, nicotine, promethazine **OR** ondansetron, polyethylene glycol    Data:     Past Medical History:   has a past medical history of Asthma, CAD in native artery, nonobstructive, Carotid stenosis, left, Carpal tunnel syndrome, Cerebrovascular disease, Chronic kidney disease, COPD (chronic obstructive pulmonary disease) ([facility] [address] Diabetes mellitus ([facility] [address] History of colon polyps, History of weakness of extremity, HTN (hypertension), Hyperlipidemia, Lung, cysts, congenital, PTSD (post-traumatic stress disorder), PTSD (post-traumatic stress disorder), TIA (transient ischemic attack), and Type II or unspecified type diabetes mellitus without mention of complication, not stated as uncontrolled. Social History:   reports that he quit smoking about 6 years ago. His smoking use included cigarettes. He has a 17.50 pack-year smoking history. He has never used smokeless tobacco. He reports that he does not drink alcohol or use drugs.      Family History:   Family History   Problem Relation Age of Onset    Cancer Mother     Heart Disease Father        Vitals:  BP (!) 141/51   Pulse 64   Temp 98.2 °F (36.8 °C) (Oral)   Resp 20   Ht 5' 6\" (1.676 m)   Wt 150 lb 6.4 oz (68.2 kg)   SpO2 95%   BMI 24.28 kg/m²   Temp (24hrs), Av.9 °F (36.6 °C), Min:96.9 °F (36.1 °C), Max:98.6 °F (37 °C)    Recent Labs     21  0730   POCGLU 250* 260* 189* 240*       I/O (24Hr): Intake/Output Summary (Last 24 hours) at 5/13/2021 0858  Last data filed at 5/13/2021 0422  Gross per 24 hour   Intake 1877 ml   Output 3500 ml   Net -1623 ml       Labs:  Hematology:  Recent Labs     05/11/21  0630 05/12/21  0501 05/13/21  0603   WBC 8.4 8.9 10.2   RBC 2.91* 2.71* 2.61*   HGB 9.1* 8.5* 8.2*   HCT 29.7* 26.5* 26.0*   .1 97.8 99.6   MCH 31.3 31.4 31.4   MCHC 30.6 32.1 31.5   RDW 13.2 13.0 13.2    286 277   MPV 10.3 10.1 10.7     Chemistry:  Recent Labs     05/11/21  0630 05/12/21  0501 05/13/21  0603    138 131*   K 4.8 3.9 4.2    104 98   CO2 20 26 24   GLUCOSE 198* 90 195*   BUN 95* 57* 48*   CREATININE 3.16* 2.53* 2.77*   ANIONGAP 15 8* 9   LABGLOM 20* 26* 23*   GFRAA 24* 31* 28*   CALCIUM 7.6* 7.7* 7.2*     Recent Labs     05/12/21  1611 05/12/21  1712 05/12/21  1958 05/13/21  0011 05/13/21  0528 05/13/21  0730   POCGLU 251* 213* 250* 260* 189* 240*     ABG:  Lab Results   Component Value Date    POCPH 7.39 06/17/2013    POCPCO2 46 06/17/2013    POCPO2 288 06/17/2013    POCHCO3 27.8 06/17/2013    NBEA NOT REPORTED 06/17/2013    PBEA 3 06/17/2013    AXX9ZLV 29 06/17/2013    GILA1QRH 100 06/17/2013    FIO2 NOT REPORTED 12/03/2020     Lab Results   Component Value Date/Time    SPECIAL 2ML L FR 12/03/2020 11:24 AM     Lab Results   Component Value Date/Time    CULTURE NO GROWTH 6 DAYS 12/03/2020 11:24 AM       Radiology:  Us Renal Limited    Result Date: 5/5/2021  Benign left renal cyst     Xr Chest Portable    Result Date: 5/9/2021  Stable pulmonary vascular congestion     Xr Chest Portable    Result Date: 5/8/2021  Increasing bilateral interstitial infiltrates noted compared to prior study     Ir Non Tunneled Cath Wo Port Replacement    Result Date: 5/10/2021  Successful placement of non tunneled hemodialysis catheter. Okay to use hemodialysis catheter.        Physical Examination:        General appearance:  alert, cooperative and no distress  Mental Status: oriented to person, place and time and normal affect  Lungs:  clear to auscultation bilaterally, normal effort  Heart:  regular rate and rhythm, no murmur  Abdomen:  soft, nontender, nondistended, normal bowel sounds, no masses, hepatomegaly, splenomegaly  Extremities: +1 BLE pitting edema, redness, tenderness in the calves  Skin:  no gross lesions, rashes, induration, temp HD cath    Assessment:        Hospital Problems           Last Modified POA    * (Principal) Hypertensive emergency 5/5/2021 Yes    CKD (chronic kidney disease) stage 4, GFR 15-29 ml/min ([address] 5/5/2021 Yes    Pure hypercholesterolemia 5/5/2021 Yes    Carotid stenosis, left s/p Endarterectomy 5/5/2021 Yes    DM type 2, uncontrolled, with neuropathy ([address] 5/5/2021 Yes    COPD (chronic obstructive pulmonary disease) ([facility] [address] 5/5/2021 Yes    Non-obstructive Coronary artery disease of native artery of native heart with stable angina pectoris ([address] 5/5/2021 Yes    Overview Signed 8/12/2019 11:46 AM by TERRANCE Stout - CNP     9/2017     LMCA: Mild irregularities 10-20%. LAD: 30-40% proximal stenosis    LCx: Mild irregularities 10-20%. RCA: 40% mid stenosis         Severe malnutrition ([address] 8/8/7562 Yes    Metabolic acidosis 0/2/8394 Yes    Chronic heart failure with preserved ejection fraction ([address] 5/6/2021 Yes    Left ventricular hypertrophy 5/6/2021 Yes          Plan:        Hypertensive emergency: improved  -clevidipine discontinued 5/12  - continue Coreg, losartan, Increased catapres, Imdur, nifedipine, Minipress. CKD stage IV and uremia: Nephrology following.    -Baseline CRT 2.0-2.4.   - Initial UA with proteinuria.   - BUN was 73, increase to 95 today ([id number]).     -iHD initiated with QM treatment 4 today   -tunnel cath placed 5/11 post HD    Diabetes mellitus type 2 with neuropathy/nephropathy: fluctuating   - A.m. blood sugar low again today    -patient has poor understanding on medication control and refuses treatment  - Continue Lantus at decreased dose, LIISS. Monitor patient for hyper/hypoglycemic event  -referral to Dr. Tessy Norris as outpatient   -Paitent take 1 unit humalog per 15 carbs in addition to SSI as follow 1 unit every 50 points over 150   -Continue basal 5 units with meals and adjust SSI     AYAH: venofer X3 doses     Dyslipidemia: With history of carotid stenosis status post endarterectomy    Secondary hyperparathyroidism: See #2. COPD: On 3 L low flow nasal cannula, he does not wear O2 at home  -  Home inhalers   -home o2 eval    Chronic Hepatitis B infection: Referral to GI as outpatient with Dr. Maldonado Ramey- discussed with GI    CAD: Without exacerbation.    -Cardiac cath from August 2020 showing nonobstructive CAD. - Continue ASA, Plavix, beta-blocker, statin    Acute on chronic diastolic CHF with LVH:    - Bumex on hold UF with iHD   -Continue BB.   Worsened by hypertensive emergency    GI/DVT proph    Dispo: home once iHD established as outpatient T/R/Sa, follow up with GI as outpatient for chronic hep B infection- TERRANCE Dao - NP  5/13/2021  8:58 AM

## 2021-05-13 NOTE — PROGRESS NOTES
Nephrology Progress Note        Subjective: patient evaluated on dialysis. Pt is stable on dialysis. Access cannulated without problems. No new issues overnite. Stable hemodynamics. Ultrafiltration only to be done today, 3 L of fluid to be removed. For some reason has exceeded fluid restriction over the last 2 days. For some reason got started on Cleviprex yesterday even systolic pressure at max was 169. I would target a systolic pressure of 1 14-5 70 to be able to achieve adequate ultrafiltration which would be a better way of controlling his pressure. Objective:  CURRENT TEMPERATURE:  Temp: 98.1 °F (36.7 °C)  MAXIMUM TEMPERATURE OVER 24HRS:  Temp (24hrs), Av.1 °F (36.7 °C), Min:96.9 °F (36.1 °C), Max:98.9 °F (37.2 °C)    CURRENT RESPIRATORY RATE:  Resp: 24  CURRENT PULSE:  Pulse: 59  CURRENT BLOOD PRESSURE:  BP: (!) 136/56  24HR BLOOD PRESSURE RANGE:  Systolic (57RYH), FDS:547 , Min:120 , ICE:382   ; Diastolic (29EZJ), PDJ:65, Min:46, Max:71    24HR INTAKE/OUTPUT:      Intake/Output Summary (Last 24 hours) at 2021 1307  Last data filed at 2021 0422  Gross per 24 hour   Intake 1627 ml   Output 3500 ml   Net -1873 ml     Patient Vitals for the past 96 hrs (Last 3 readings):   Weight   21 1013 157 lb 13.6 oz (71.6 kg)   21 1715 150 lb 6.4 oz (68.2 kg)   21 1336 145 lb 8.1 oz (66 kg)         Physical Exam:  General appearance:Awake, alert, in no acute distress  Skin: warm and dry, no rash or erythema  Eyes: conjunctivae normal and sclera anicteric  ENT:no thrush no pharyngeal congestion   Neck:  No JVD, No Thyromegaly  Pulmonary: clear to auscultation and no wheezing or rhonchi   Cardiovascular: normal S1 and S2. NO rubs and NO murmur.  No S3 OR S4   Abdomen: soft nontender, bowel sounds present, no organomegaly,  no ascites   Extremities: no cyanosis, clubbing or edema     Access:  previous permacath    Current Medications:    ipratropium-albuterol (DUONEB) nebulizer solution 1 [facility] injection 4 mg, Q6H PRN  polyethylene glycol (GLYCOLAX) packet 17 g, Daily PRN  heparin (porcine) injection 5,000 Units, 3 times per day      Labs:   CBC:   Recent Labs     05/11/21  0630 05/12/21  0501 05/13/21  0603   WBC 8.4 8.9 10.2   RBC 2.91* 2.71* 2.61*   HGB 9.1* 8.5* 8.2*   HCT 29.7* 26.5* 26.0*    286 277   MPV 10.3 10.1 10.7      BMP:   Recent Labs     05/11/21  0630 05/12/21  0501 05/13/21  0603    138 131*   K 4.8 3.9 4.2    104 98   CO2 20 26 24   BUN 95* 57* 48*   CREATININE 3.16* 2.53* 2.77*   GLUCOSE 198* 90 195*   CALCIUM 7.6* 7.7* 7.2*        Phosphorus:  No results for input(s): PHOS in the last 72 hours. Magnesium: No results for input(s): MG in the last 72 hours. Albumin: No results for input(s): LABALBU in the last 72 hours. Dialysis bath: Dialysis Bath  K+ (Potassium): 4  Ca+ (Calcium): 2.25  Na+ (Sodium): 138  HCO3 (Bicarb): 35    Radiology:  Reviewed as available. Assessment:  1 acute on chronic kidney disease, now dialysis dependent for ultrafiltration and clearances. Dry weight 67 kg, has a tunneled catheter for access. 2.HTN: Fluctuating control likely from volume overload  3 DM: With diabetic nephrosclerosis  4 EDEMA : From fluid overload  5. ANEMIA OF CHRONIC DISEASE:   6.SECONDARY HYPERPARATHYROIDISM:     Plan:  1. Wt removal and dialysis orders reviewed. 2.  Avoid intravenous infusion to control pressure, systolic pressure of 1 54-0 70 acceptable  3. Cont pt on aranesp and zemplar per protocol. 4. Follow up labs ordered. 5 decrease hydralazine to 50 3 times a day, to maintain blood pressure so adequate ultrafiltration can be achieved. 6.  We will do dialysis tomorrow and then patient can be discharged. Outpatient spot has been arranged Tuesday Thursday Saturday at the [address] [facility] renal care [facility]. Dr Jamia Ramirez his nephrologist was informed about this. Patient can start outpatient dialysis on Tuesday.       Please do not hesitate to call with questions.     Electronically signed by Kwadwo Solis MD on 5/13/2021 at 1:07 PM

## 2021-05-13 NOTE — PROGRESS NOTES
Comprehensive Nutrition Assessment    Type and Reason for Visit:  Reassess    Nutrition Recommendations/Plan:   -Continue 5 CHO diabetic, 2 gm Na diet   -Recommend modifying from glucerna supplements TID to nepro supplements TID  -Will continue to monitor po intake, weights and labs     Nutrition Assessment:  Pt currently off the floor during time of visit @ HD. Pt well known to writer from previous admissions. Per EMR, pt has been consuming % of his meals. Pt reports UBW of 165 lbs x 1 yr ago per previous RD note. Pt w/ hx of wt flux from 134-159 lbs x 1 yr. Pt noted w/ wt flux since admission - unable to accurately assess wt loss at this time. Will modify to renal supplements and will continue to monitor po intake and wt trends. Malnutrition Assessment:  Malnutrition Status:  Insufficient data    Context:  Chronic Illness     Findings of the 6 clinical characteristics of malnutrition:  Energy Intake:  No significant decrease in energy intake   Weight Loss:  Unable to assess r/t fluid      Body Fat Loss:  Unable to assess     Muscle Mass Loss:  Unable to assess    Fluid Accumulation:  No significant fluid accumulation     Strength:  Not Performed    Estimated Daily Nutrient Needs:  Energy MSJ: 1867-7096 kcals/d  Weight Used for Energy Requirements:  Current -76.4     Protein (g):  84-98 gms/d (1.3-.5 gm/kg);  Weight Used for Protein Requirements:  IBW  Fluid (ml/day):  8823-9445 mL fluid/d; Method Used for Fluid Requirements:  ml/Kg(25-30)  Or per MD discretion     Nutrition Related Findings:  Na 131, glucose 195; meds reviewed      Wounds:  None       Current Nutrition Therapies:    DIET CARB CONTROL; Carb Control: 5 carb choices (75 gms)/meal; Low Sodium (2 GM)  Dietary Nutrition Supplements: Diabetic Oral Supplement    Anthropometric Measures:  · Height: 5' 6\" (167.6 cm)  · Current Body Weight: (158 lb 4.6 oz)   · Admission Body Weight: 127 lb (57.6 kg)    · Usual Body Weight: 165 lb (74.8 kg)(per previous RD note)     · Ideal Body Weight: 142 lbs; % Ideal Body Weight 101.1 %   · BMI: 23.2  · BMI Categories: Normal Weight (BMI 18.5-24. 9)       Nutrition Diagnosis:   · Increased nutrient needs related to catabolic illness as evidenced by Need for ONS      Nutrition Interventions:   Food and/or Nutrient Delivery:  Continue Current Diet, Modify Oral Nutrition Supplement  Nutrition Education/Counseling:  No recommendation at this time   Coordination of Nutrition Care:  Continue to monitor while inpatient    Goals: Achieved   Pt to meet % of est'd needs daily via PO       Nutrition Monitoring and Evaluation:   Food/Nutrient Intake Outcomes:  Food and Nutrient Intake, Supplement Intake  Physical Signs/Symptoms Outcomes:  Biochemical Data, Nutrition Focused Physical Findings, Skin, Weight     Discharge Planning:     Too soon to determine     Electronically signed by Anh Kern RD, LD on 5/13/21 at 12:54 PM EDT    Contact: [phone]

## 2021-05-13 NOTE — PROGRESS NOTES
CLINICAL PHARMACY NOTE: MEDS TO [address] Select Patient?: Yes  Total # of Prescriptions Filled: 3   The following medications were delivered to the patient:  · Sodium Bicarbonate 650mg  · Clonidine 0.2mg  · Nifedipine 90mg  Total # of Interventions Completed: 0  Time Spent (min): 30    Additional Documentation:    Rebecca Johnson was refill too soon

## 2021-05-13 NOTE — PLAN OF CARE
minimized  Outcome: Ongoing     Problem: Nutrition  Goal: Optimal nutrition therapy  Description: Nutrition Problem #1: Severe malnutrition, In context of chronic illness  Intervention: Food and/or Nutrient Delivery: Continue Current Diet, Start Oral Nutrition Supplement  Nutritional Goals: Pt to meet % of est'd needs daily via PO     Outcome: Ongoing     Problem: Pain:  Goal: Pain level will decrease  Description: Pain level will decrease  Outcome: Ongoing  Goal: Control of acute pain  Description: Control of acute pain  Outcome: Ongoing  Goal: Control of chronic pain  Description: Control of chronic pain  Outcome: Ongoing

## 2021-05-13 NOTE — DISCHARGE SUMMARY
[facility]  Office: [address], DO, Marybeth Sher, DO, Arelisfide Ryder, DO, Brandon Mccray, DO, Cliff Benoit MD, Ashok Canales MD, Agustín Leon MD, Janet Blanc MD, Kasey Ceballos MD, Nicole Patton MD, Shira Mccollum MD, Izabel Luther MD, Vidya Tamez, DO, Casa Tripathi MD, Zhang Anthony, DO, Raquel Matute MD,  Alexx Hernandez, DO, Ignacio De Leon MD, Abhi Thorpe MD, Mike Maldonado MD, Laverne Bray MD, Christy Hernandez, [facility], [facility], CNP, Shaun Dhaliwal, CNP, Ludy Carrillo, CNS, Shazia Franco, CNP, Lynette Toro, CNP, Nisha Rodríguez, CNP, Hilaria Butcher, CNP, Rocael Yeager, CNP, Kishore Whitfield PA-C, Ember Esparza, [facility], Julissa Looney, CNP, Joanie Hood, CNP, Misty Tejeda, CNP, Nicoletta Kocher, CNP, Tristin Savage, CNP, Artice Siemens, [address] [place] [address]    Discharge Summary     Patient ID: Bud Kitchen  :  1955   MRN: [id number]     ACCOUNT:  [de-identified]   Patient's PCP: TERRANCE Hinkle CNP  Admit Date: 5/3/2021   Discharge Date: 2021     Length of Stay: 10  Code Status:  Full Code  Admitting Physician: Josemanuel Shipman DO  Discharge Physician: TERRANCE Toledo NP     Active Discharge Diagnoses:     Hospital Problem Lists:  Principal Problem (Resolved):     Hypertensive emergency  Active Problems:    CKD (chronic kidney disease) stage 4, GFR 15-29 ml/min ([facility])    Pure hypercholesterolemia    Carotid stenosis, left s/p Endarterectomy    DM type 2, uncontrolled, with neuropathy (HCC)    COPD (chronic obstructive pulmonary disease) ([facility])    Non-obstructive Coronary artery disease of native artery of native heart with stable angina pectoris ([facility])    Severe malnutrition ([facility])    Chronic heart failure with preserved ejection fraction ([facility])    Left ventricular hypertrophy  Resolved Problems:    Metabolic acidosis      Admission Condition:  fair     Discharged Condition: fair Hospital Stay:     Hospital Course:  Mimi Victor is a 73 y.o. male who was admitted for the management of   Hypertensive emergency , presented to ER with Hypertension (dizziness and HTN)    This is a 27-year-old male with past medical history of asthma, CAD, CVA, CKD, diabetes, hypertension who is coming in with complaints of uncontrolled hypertension and headache.  Patient reports that he usually has trouble controlling his blood pressure even after being on multiple medications however this morning it was extra high with systolic being around 241.  Patient was also complaining of headache.  Patient tried taking Tylenol for the headache which did not seem to help.  Currently denies any dizziness, acute changes with his vision or hearing, nausea, vomiting, chest pain.  Patient did complain of having heart palpitations.  Patient denies any shortness of breath, abdominal pain, constipation, diarrhea or any trouble with his urination      Patient evaluated in room, hypoglycemic still this morning but arguing about dose of lantus to receive tonight  T/R/Sa outpatient iHD patient - waiting on chair time per CM  BLE pitting edema improving; weight documentation appears to be inaccurate  Negative 7.7 L fluid deficit since admission    Significant therapeutic interventions:     1. Hypertensive emergency: improved  -clevidipine discontinued 5/12  - continue Coreg, losartan, Increased catapres, Imdur, nifedipine, Minipress.       2. CKD stage IV and uremia: Nephrology following.    -Baseline CRT 2.0-2.4.   - Initial UA with proteinuria.   - BUN was 73, increase to 95 today ([id number]). -iHD initiated with QM treatment 4 today   -tunnel cath placed 5/11 post HD     3. Diabetes mellitus type 2 with neuropathy/nephropathy: fluctuating   - A.m. blood sugar low again today    -patient has poor understanding on medication control and refuses treatment  - Continue Lantus at decreased dose, LIISS.   Monitor patient for GLUCOSE 172 04/16/2012     05/13/2021    K 4.2 05/13/2021    CL 98 05/13/2021    CO2 24 05/13/2021    BUN 48 05/13/2021    CREATININE 2.77 05/13/2021    ANIONGAP 9 05/13/2021    ALKPHOS 83 05/04/2021    ALT 15 05/04/2021    AST 12 05/04/2021    BILITOT <0.10 05/04/2021    LABALBU 2.3 05/06/2021    ALBUMIN 1.0 05/04/2021    LABGLOM 23 05/13/2021    GFRAA 28 05/13/2021    GFR      05/13/2021    GFR NOT REPORTED 05/13/2021    PROT 4.7 05/04/2021    PROT 6.2 11/12/2011    CALCIUM 7.2 05/13/2021     PT/INR:    Lab Results   Component Value Date    PROTIME 9.6 05/03/2021    INR 0.9 05/03/2021     PTT:   Lab Results   Component Value Date    APTT 20.7 05/03/2021     FLP:    Lab Results   Component Value Date    CHOL 256 05/04/2021    TRIG 148 05/04/2021    HDL 68 05/04/2021     U/A:    Lab Results   Component Value Date    COLORU YELLOW 05/03/2021    TURBIDITY CLEAR 05/03/2021    SPECGRAV 1.016 05/03/2021    HGBUR NEGATIVE 05/03/2021    PHUR 6.0 05/03/2021    PROTEINU 4+ 05/03/2021    GLUCOSEU 1+ 05/03/2021    GLUCOSEU 3+ 11/12/2011    KETUA NEGATIVE 05/03/2021    BILIRUBINUR NEGATIVE 05/03/2021    BILIRUBINUR NEGATIVE 11/12/2011    UROBILINOGEN Normal 05/03/2021    NITRU NEGATIVE 05/03/2021    LEUKOCYTESUR NEGATIVE 05/03/2021     TSH:    Lab Results   Component Value Date    TSH 2.91 05/15/2020        Radiology:  Xr Chest Portable    Result Date: 5/9/2021  Stable pulmonary vascular congestion     Xr Chest Portable    Result Date: 5/8/2021  Increasing bilateral interstitial infiltrates noted compared to prior study     Ir Tunneled Cvc Place Wo Sq Port/pump > 5 Years    Result Date: 5/11/2021  Successful ultrasound and fluoroscopy guided tunneled catheter placement through conversion of old temp cath site. Ir Non Tunneled Cath Wo Port Replacement    Result Date: 5/10/2021  Successful placement of non tunneled hemodialysis catheter. Okay to use hemodialysis catheter.        Consultations:    Consults:     Final Specialist Recommendations/Findings:   IP CONSULT TO HOSPITALIST  IP CONSULT TO IV TEAM  IP CONSULT TO CARDIOLOGY  IP CONSULT TO NEPHROLOGY  IP CONSULT TO IV TEAM  IP CONSULT TO CARDIOLOGY  IP CONSULT TO IV TEAM  IP CONSULT TO IV TEAM      The patient was seen and examined on day of discharge and this discharge summary is in conjunction with any daily progress note from day of discharge. Discharge plan:     Disposition: Home    Physician Follow Up: [facility]  [facility] [address]  [phone]  Schedule an appointment as soon as possible for a visit in 2 weeks  Cardiology    Elena Partida MD  [address]  [facility] [address] [facility]  [phone]    Call  for follow up after hospitalization    Riley Mariee MD  [address] [place] [address].  [facility], [address]  [address] [address]  [phone]    Call  for follow up after hospitalization, to discuss insulin pump    [facility] [address]. [facility], [address]    Tuesday, Thursday, and Saturdays beginning 5/18/21 at 9:45am. Please arrive at 9:30am for first treatment. Elbazelda Chelita, APRN - CNP  [address] [facility] [address]  [phone]    In 1 week  for follow up after hospitalization    Leena Bush [facility] [address] [facility]  [phone]             Requiring Further Evaluation/Follow Up POST HOSPITALIZATION/Incidental Findings: Continue to take your medications as prescribed. Hemodialysis as scheduled on Tuesday, Saturday at 945. Follow-up with nephrology as indicated. Referral has been made to endocrinology.      Diet: regular diet, diabetic diet and renal diet    Activity: As tolerated    Instructions to Patient: see attached     Discharge Medications:      Medication List      START taking these medications    sodium bicarbonate 650 MG tablet  Take 2 tablets by mouth 2 times daily        CHANGE how you take these medications    Basaglar KwikPen 100 UNIT/ML injection pen Generic drug: insulin glargine  Inject 10 Units into the skin nightly  What changed: See the new instructions. cloNIDine 0.2 MG tablet  Commonly known as: CATAPRES  Take 1 tablet by mouth 3 times daily  What changed:   · medication strength  · how much to take  · when to take this     NIFEdipine 90 MG extended release tablet  Commonly known as: PROCARDIA XL  Take 1 tablet by mouth nightly  What changed:   · medication strength  · how much to take  · when to take this  · additional instructions        CONTINUE taking these medications    aspirin 81 MG EC tablet  Commonly known as: Aspir-Low  TAKE 1 TABLET BY MOUTH DAILY     atorvastatin 80 MG tablet  Commonly known as: LIPITOR  Take 1 tablet by mouth daily     blood glucose monitor kit and supplies  Dispense sufficient amount for indicated testing frequency plus additional to accommodate PRN testing needs. Dispense all needed supplies to include: monitor, strips, lancing device, lancets, control solutions, alcohol swabs. carvedilol 25 MG tablet  Commonly known as: COREG  Take 1 tablet by mouth 2 times daily (with meals)     clopidogrel 75 MG tablet  Commonly known as: PLAVIX  Take 1 tablet by mouth daily     Comfort EZ Pen Needles 31G X 8 MM Misc  Generic drug: Insulin Pen Needle  USE AS DIRECTED     fluticasone 50 MCG/ACT nasal spray  Commonly known as: FLONASE     gabapentin 100 MG capsule  Commonly known as: NEURONTIN  TAKE 2 CAPSULES BY MOUTH 3 TIMES DAILY     glucagon 1 MG injection  Inject 1 mg into the muscle See Admin Instructions Follow package directions for low blood sugar.      Glucerna Carbsteady Liqd  Take 1 Can by mouth 3 times daily     GNP Vitamin D Maximum Strength 50 MCG (2000 UT) Tabs  Generic drug: Cholecalciferol  TAKE 1 TABLET BY MOUTH ONCE DAILY     hydrALAZINE 100 MG tablet  Commonly known as: APRESOLINE  Take 1 tablet by mouth every 8 hours     * ipratropium-albuterol 0.5-2.5 (3) MG/3ML Soln nebulizer solution  Commonly known as: DUONEB  Inhale 3 mLs into the lungs every 6 hours as needed for Shortness of Breath     * Combivent Respimat  MCG/ACT Aers inhaler  Generic drug: albuterol-ipratropium  INHALE 1 PUFF INTO THE LUNGS EVERY 6 HOURS     isosorbide mononitrate 60 MG extended release tablet  Commonly known as: IMDUR  Take 1 tablet by mouth daily     Lancets Misc  Use_4__times daily Diagnisis:250.0  Diabetes Mellitus__x__Insulin Dependent___Non-Insulin Dependent     losartan 50 MG tablet  Commonly known as: COZAAR  TAKE 1 TABLET BY MOUTH 2 TIMES DAILY     nitroGLYCERIN 0.4 MG SL tablet  Commonly known as: NITROSTAT  USE 1 TABLET UNDER THE TONGUE UP TO MAXIMUM OF 3 TOTAL DOSES. IF NO RELIEF AFTER 1 DOSE, CALL 911. NovoLOG FlexPen 100 UNIT/ML injection pen  Generic drug: insulin aspart  Inject 5 Units into the skin 3 times daily (before meals) Sliding scale     prazosin 2 MG capsule  Commonly known as: MINIPRESS  2 CAPSULES BY MOUTH (4MG) TWICE DAILY     QUEtiapine 100 MG tablet  Commonly known as: SEROQUEL  TAKE 1 TABLET BY MOUTH NIGHTLY     Trelegy Ellipta 100-62.5-25 MCG/INH Aepb  Generic drug: fluticasone-umeclidin-vilant  INHALE ONE PUFF INTO THE LUNGS DAILY     * True Metrix Blood Glucose Test strip  Generic drug: blood glucose test strips  TEST BLOOD SUGAR 4 TO 5 TIMES DAILY     * blood glucose test strips  Test_4__times daily Diagnosis: 250.0   Diabetes Mellitus_x___Insulin Dependent____Non-Insulin Dependent     vitamin D 1.25 MG (42718 UT) Caps capsule  Commonly known as: ERGOCALCIFEROL  Take 1 capsule by mouth once a week         * This list has 4 medication(s) that are the same as other medications prescribed for you. Read the directions carefully, and ask your doctor or other care provider to review them with you.             STOP taking these medications    bumetanide 2 MG tablet  Commonly known as: BUMEX           Where to Get Your Medications      [facility] [address] [facility] [address], [facility] [address]    Phone: [phone]   · Basaglar KwikPen 100 UNIT/ML injection pen  · cloNIDine 0.2 MG tablet  · NIFEdipine 90 MG extended release tablet  · sodium bicarbonate 650 MG tablet         Discharge Procedure Carri Fontaine MD, Endocrinology, [facility]   Referral Priority: Routine Referral Type: Eval and Treat   Referral Reason: Specialty Services Required   Referred to Provider: Tasneem Denis Requested Specialty: Endocrinology   Number of Visits Requested: 1       Time Spent on discharge is  31 mins in patient examination, evaluation, counseling as well as medication reconciliation, prescriptions for required medications, discharge plan and follow up. Discussed with attending  Electronically signed by   TERRANCE Dias NP  5/13/2021  12:25 PM      Thank you Dr. Delfin Spatz, APRN - CNP for the opportunity to be involved in this patient's care.

## 2021-05-13 NOTE — CARE COORDINATION
Transitional planning- OP dialysis TTHS at [facility]. SW note states cannot start until 5-.  PABLO Mota NP

## 2021-05-13 NOTE — PROGRESS NOTES
Dialysis Post Treatment Note  Vitals:    05/13/21 1321   BP: (!) 141/59   Pulse: 60   Resp: 20   Temp: 98.1 °F (36.7 °C)   SpO2:      Pre-Weight = 71.6kg  Post-weight = Weight: 153 lb 7 oz (69.6 kg)  Total Liters Processed = Total Liters Processed (l/min): 55.4 l/min  Rinseback Volume (mL) = Rinseback Volume (ml): 300 ml  Net Removal (mL) = NET Removed (ml): 3180 ml  Patient's dry weight=   Type of access used= CATH  Length of treatment= 180 min

## 2021-05-13 NOTE — CARE COORDINATION
SW contacted [facility], patient confirmed TTS 9:45am beginning Tuesday 5/18/21. Clinic cannot accomodate Saturday start due to staffing. Clinic requested updated Hep B labs, med list, and orders. Information faxed to clinic.

## 2021-05-14 ENCOUNTER — APPOINTMENT (OUTPATIENT)
Dept: DIALYSIS | Age: 66
DRG: 280 | End: 2021-05-14
Payer: COMMERCIAL

## 2021-05-14 VITALS
SYSTOLIC BLOOD PRESSURE: 152 MMHG | DIASTOLIC BLOOD PRESSURE: 50 MMHG | WEIGHT: 151.46 LBS | RESPIRATION RATE: 18 BRPM | OXYGEN SATURATION: 94 % | HEART RATE: 70 BPM | HEIGHT: 66 IN | BODY MASS INDEX: 24.34 KG/M2 | TEMPERATURE: 98.7 F

## 2021-05-14 LAB
ANION GAP SERPL CALCULATED.3IONS-SCNC: 13 MMOL/L (ref 9–17)
BUN BLDV-MCNC: 73 MG/DL (ref 8–23)
BUN/CREAT BLD: ABNORMAL (ref 9–20)
CALCIUM SERPL-MCNC: 7.1 MG/DL (ref 8.6–10.4)
CHLORIDE BLD-SCNC: 91 MMOL/L (ref 98–107)
CO2: 21 MMOL/L (ref 20–31)
CREAT SERPL-MCNC: 3.66 MG/DL (ref 0.7–1.2)
GFR AFRICAN AMERICAN: 20 ML/MIN
GFR NON-AFRICAN AMERICAN: 17 ML/MIN
GFR SERPL CREATININE-BSD FRML MDRD: ABNORMAL ML/MIN/{1.73_M2}
GFR SERPL CREATININE-BSD FRML MDRD: ABNORMAL ML/MIN/{1.73_M2}
GLUCOSE BLD-MCNC: 226 MG/DL (ref 75–110)
GLUCOSE BLD-MCNC: 264 MG/DL (ref 75–110)
GLUCOSE BLD-MCNC: 280 MG/DL (ref 75–110)
GLUCOSE BLD-MCNC: 320 MG/DL (ref 75–110)
GLUCOSE BLD-MCNC: 416 MG/DL (ref 75–110)
GLUCOSE BLD-MCNC: 427 MG/DL (ref 70–99)
HCT VFR BLD CALC: 24.9 % (ref 40.7–50.3)
HEMOGLOBIN: 7.9 G/DL (ref 13–17)
MCH RBC QN AUTO: 31.7 PG (ref 25.2–33.5)
MCHC RBC AUTO-ENTMCNC: 31.7 G/DL (ref 28.4–34.8)
MCV RBC AUTO: 100 FL (ref 82.6–102.9)
NRBC AUTOMATED: 0.2 PER 100 WBC
PDW BLD-RTO: 13.2 % (ref 11.8–14.4)
PLATELET # BLD: 238 K/UL (ref 138–453)
PMV BLD AUTO: 10.9 FL (ref 8.1–13.5)
POTASSIUM SERPL-SCNC: 4.7 MMOL/L (ref 3.7–5.3)
RBC # BLD: 2.49 M/UL (ref 4.21–5.77)
SODIUM BLD-SCNC: 125 MMOL/L (ref 135–144)
WBC # BLD: 10.3 K/UL (ref 3.5–11.3)

## 2021-05-14 PROCEDURE — 6370000000 HC RX 637 (ALT 250 FOR IP): Performed by: INTERNAL MEDICINE

## 2021-05-14 PROCEDURE — 2580000003 HC RX 258: Performed by: NURSE PRACTITIONER

## 2021-05-14 PROCEDURE — 6360000002 HC RX W HCPCS: Performed by: INTERNAL MEDICINE

## 2021-05-14 PROCEDURE — 2700000000 HC OXYGEN THERAPY PER DAY

## 2021-05-14 PROCEDURE — 94761 N-INVAS EAR/PLS OXIMETRY MLT: CPT

## 2021-05-14 PROCEDURE — 90935 HEMODIALYSIS ONE EVALUATION: CPT

## 2021-05-14 PROCEDURE — 85027 COMPLETE CBC AUTOMATED: CPT

## 2021-05-14 PROCEDURE — 1800000000 HC LEAVE OF ABSENCE

## 2021-05-14 PROCEDURE — 80048 BASIC METABOLIC PNL TOTAL CA: CPT

## 2021-05-14 PROCEDURE — 82947 ASSAY GLUCOSE BLOOD QUANT: CPT

## 2021-05-14 PROCEDURE — 6370000000 HC RX 637 (ALT 250 FOR IP): Performed by: NURSE PRACTITIONER

## 2021-05-14 PROCEDURE — 51798 US URINE CAPACITY MEASURE: CPT

## 2021-05-14 PROCEDURE — 94640 AIRWAY INHALATION TREATMENT: CPT

## 2021-05-14 PROCEDURE — 90935 HEMODIALYSIS ONE EVALUATION: CPT | Performed by: INTERNAL MEDICINE

## 2021-05-14 PROCEDURE — 99239 HOSP IP/OBS DSCHRG MGMT >30: CPT | Performed by: INTERNAL MEDICINE

## 2021-05-14 PROCEDURE — 6360000002 HC RX W HCPCS: Performed by: NURSE PRACTITIONER

## 2021-05-14 PROCEDURE — 6370000000 HC RX 637 (ALT 250 FOR IP): Performed by: PHYSICIAN ASSISTANT

## 2021-05-14 PROCEDURE — 36415 COLL VENOUS BLD VENIPUNCTURE: CPT

## 2021-05-14 RX ORDER — HYDRALAZINE HYDROCHLORIDE 50 MG/1
100 TABLET, FILM COATED ORAL EVERY 8 HOURS SCHEDULED
Status: DISCONTINUED | OUTPATIENT
Start: 2021-05-14 | End: 2021-05-14 | Stop reason: HOSPADM

## 2021-05-14 RX ADMIN — SODIUM CHLORIDE, PRESERVATIVE FREE 10 ML: 5 INJECTION INTRAVENOUS at 08:28

## 2021-05-14 RX ADMIN — CARVEDILOL 25 MG: 25 TABLET, FILM COATED ORAL at 08:20

## 2021-05-14 RX ADMIN — ONDANSETRON 4 MG: 2 INJECTION INTRAMUSCULAR; INTRAVENOUS at 14:46

## 2021-05-14 RX ADMIN — GABAPENTIN 200 MG: 100 CAPSULE ORAL at 13:19

## 2021-05-14 RX ADMIN — GUAIFENESIN 600 MG: 600 TABLET, EXTENDED RELEASE ORAL at 08:19

## 2021-05-14 RX ADMIN — CLONIDINE HYDROCHLORIDE 0.2 MG: 0.2 TABLET ORAL at 13:18

## 2021-05-14 RX ADMIN — CLONIDINE HYDROCHLORIDE 0.2 MG: 0.2 TABLET ORAL at 08:20

## 2021-05-14 RX ADMIN — HEPARIN SODIUM 5000 UNITS: 5000 INJECTION INTRAVENOUS; SUBCUTANEOUS at 05:30

## 2021-05-14 RX ADMIN — HYDRALAZINE HYDROCHLORIDE 100 MG: 50 TABLET, FILM COATED ORAL at 14:40

## 2021-05-14 RX ADMIN — Medication 81 MG: at 13:15

## 2021-05-14 RX ADMIN — IPRATROPIUM BROMIDE AND ALBUTEROL SULFATE 1 AMPULE: .5; 3 SOLUTION RESPIRATORY (INHALATION) at 07:59

## 2021-05-14 RX ADMIN — PRAZOSIN HYDROCHLORIDE 2 MG: 1 CAPSULE ORAL at 13:17

## 2021-05-14 RX ADMIN — IPRATROPIUM BROMIDE AND ALBUTEROL SULFATE 1 AMPULE: .5; 3 SOLUTION RESPIRATORY (INHALATION) at 16:04

## 2021-05-14 RX ADMIN — ISOSORBIDE MONONITRATE 60 MG: 60 TABLET ORAL at 13:16

## 2021-05-14 RX ADMIN — ATORVASTATIN CALCIUM 80 MG: 80 TABLET, FILM COATED ORAL at 13:15

## 2021-05-14 RX ADMIN — HEPARIN SODIUM 1600 UNITS: 1000 INJECTION INTRAVENOUS; SUBCUTANEOUS at 11:45

## 2021-05-14 RX ADMIN — BUMETANIDE 2 MG: 1 TABLET ORAL at 13:15

## 2021-05-14 RX ADMIN — GABAPENTIN 200 MG: 100 CAPSULE ORAL at 08:19

## 2021-05-14 RX ADMIN — HEPARIN SODIUM 1600 UNITS: 1000 INJECTION INTRAVENOUS; SUBCUTANEOUS at 11:42

## 2021-05-14 RX ADMIN — METHYLPREDNISOLONE 4 MG: 4 TABLET ORAL at 13:15

## 2021-05-14 RX ADMIN — LOSARTAN POTASSIUM 50 MG: 50 TABLET, FILM COATED ORAL at 08:19

## 2021-05-14 RX ADMIN — HYDRALAZINE HYDROCHLORIDE 50 MG: 50 TABLET, FILM COATED ORAL at 05:23

## 2021-05-14 RX ADMIN — INSULIN LISPRO 4 UNITS: 100 INJECTION, SOLUTION INTRAVENOUS; SUBCUTANEOUS at 13:19

## 2021-05-14 ASSESSMENT — PAIN SCALES - GENERAL
PAINLEVEL_OUTOF10: 0

## 2021-05-14 ASSESSMENT — PAIN SCALES - WONG BAKER
WONGBAKER_NUMERICALRESPONSE: 0
WONGBAKER_NUMERICALRESPONSE: 0

## 2021-05-14 NOTE — DISCHARGE INSTR - COC
Continuity of Care Form    Patient Name: Joe Mcrae   :  1955  MRN:  [id number]    [facility] date:  5/3/2021  Discharge date:  ***    Code Status Order: Full Code   Advance Directives:   Advance Care Flowsheet Documentation       Date/Time Healthcare Directive Type of Healthcare Directive Copy in [address] Agent's Name Healthcare Agent's Phone Number    [phone]  No, patient does not have an advance directive for healthcare treatment -- -- -- -- --            Admitting Physician:  Anastasia Brush DO  PCP: TERRANCE Roth CNP    Discharging Nurse: [facility] Unit/Room#:   Discharging Unit Phone Number: ***    Emergency Contact:   Extended Emergency Contact Information  Primary Emergency Contact: Kim Dunne  Address: [place] [address] Phone: [phone]  Work Phone: [phone]  Mobile Phone: [phone]  Relation: Other   needed?  No    Past Surgical History:  Past Surgical History:   Procedure Laterality Date    CAROTID ENDARTERECTOMY Left     COLONOSCOPY      HERNIA REPAIR      IR TUNNELED CATHETER PLACEMENT GREATER THAN 5 YEARS  2021    IR TUNNELED CATHETER PLACEMENT GREATER THAN 5 YEARS 2021 Ankush Malik MD [facility] SPECIAL PROCEDURES    DE COLSC FLX W/RMVL OF TUMOR POLYP LESION SNARE TQ N/A 2017    COLONOSCOPY POLYPECTOMY SNARE/ hot performed by Ventura Parikh DO at [facility] Left     carotid stent, dr Lino Faust       Immunization History:   Immunization History   Administered Date(s) Administered    COVID-19, Moderna, PF, 100mcg/0.5mL 2021    Influenza 2012    Influenza Vaccine, unspecified formulation 2012    Influenza Virus Vaccine 10/15/2013, 10/14/2014, 10/02/2015, 2016, 10/30/2017, 10/21/2019    Influenza, Shelly Juventino, 6 mo and older, IM (Fluzone, Flulaval) 10/30/2017    Influenza, Quadv, IM, (6 mo and older Fluzone, Flulaval, Fluarix and 3 yrs and older Afluria) 11/02/2016, 10/21/2019    Pneumococcal Conjugate 13-valent (Pzyuvaj23) 04/06/2021    Pneumococcal Polysaccharide (Lmswlekjy22) 10/02/2015    Tdap (Boostrix, Adacel) 12/14/2013    Zoster Live (Zostavax) 11/01/2017       Active Problems:  Patient Active Problem List   Diagnosis Code    Cigarette nicotine dependence without complication X49.154    Carpal tunnel syndrome on right G56.01    Colon polyps K63.5    Carotid stenosis, left s/p Endarterectomy I65.22    Mixed simple and mucopurulent chronic bronchitis ([facility]) J41.8    Pure hypercholesterolemia E78.00    CKD (chronic kidney disease) stage 4, GFR 15-29 ml/min ([facility]) N18.4    CKD (chronic kidney disease) stage 3, GFR 30-59 ml/min ([facility]) N18.30    Dyspnea and respiratory abnormalities R06.00, R06.89    PTSD (post-traumatic stress disorder) F43.10    Transient cerebral ischemia G45.9    Essential hypertension I10    DM type 2, uncontrolled, with neuropathy ([facility]) E11.40, E11.65    COPD (chronic obstructive pulmonary disease) ([address] J44.9    Cerebral vascular disease I67.9    Primary insomnia F51.01    Hypertensive crisis I16.9    Non-obstructive Coronary artery disease of native artery of native heart with stable angina pectoris ([facility]) I25.118    Hyperparathyroidism ([address] E21.3    Hypovitaminosis D E55.9    Acute kidney injury superimposed on CKD ([address] N17.9, N18.9    Coronary artery disease with exertional angina ([address] I25.118    Leg swelling M79.89    Anemia D64.9    Hypoalbuminemia E88.09    DKA, type 2, not at goal [facility]) E11.10    Inflammation of right sacroiliac joint ([address] M46.1    Malignant neoplasm of colon ([address] C18.9    ESRD (end stage renal disease) ([address] N18.6    Bilateral carotid artery occlusion I65.23    Seizure ([address] R56.9    Chronic heart failure with preserved ejection fraction (HCC) I50.32    Left ventricular hypertrophy I51.7       Isolation/Infection:   Isolation            No Isolation example:[id number]:::0}  Last Modified Barium Swallow with Video (Video Swallowing Test): {Done Not Done RQPL:947361244:::8}    Treatments at the Time of Hospital Discharge:   Respiratory Treatments: ***  Oxygen Therapy:  {Therapy; copd oxygen:51655:::0}  Ventilator:    {[facility] Vent List:614056983:::0}    Rehab Therapies: {THERAPEUTIC INTERVENTION:0557425275}  Weight Bearing Status/Restrictions: {[facility] Weight Bearin:::0}  Other Medical Equipment (for information only, NOT a DME order):  {EQUIPMENT:483344482}  Other Treatments: ***    Patient's personal belongings (please select all that are sent with patient):  {CHP DME Belongings:298902809:::0}    RN SIGNATURE:  {Esignature:312954489:::0}    CASE MANAGEMENT/SOCIAL WORK SECTION    Inpatient Status Date: ***    Readmission Risk Assessment Score:  Readmission Risk              Risk of Unplanned Readmission:        48           Discharging to Facility/ Agency   Name:   Address:  Phone:  Fax:    Dialysis Facility (if applicable)   Name:  Address:  Dialysis Schedule:  Phone:  Fax:    / signature: {Esignature:[id number]:::0}    PHYSICIAN SECTION    Prognosis: Fair    Condition at Discharge: Stable    Rehab Potential (if transferring to Rehab):  Fair    Recommended Labs or Other Treatments After Discharge: Continue current treatment    Physician Certification: Home    Update Admission H&P: No change in H&P    PHYSICIAN SIGNATURE:  Electronically signed by Abhi Thorpe MD on 21 at 2:45 PM EDT

## 2021-05-14 NOTE — PLAN OF CARE
Problem: Falls - Risk of:  Goal: Will remain free from falls  Description: Will remain free from falls  5/14/2021 1227 by Primo Gordon RN  Outcome: Ongoing  5/14/2021 0645 by Carol Garcia RN  Outcome: Ongoing  Goal: Absence of physical injury  Description: Absence of physical injury  5/14/2021 1227 by Primo Gordon RN  Outcome: Ongoing  5/14/2021 0645 by Carol Garcia RN  Outcome: Ongoing     Problem:  Activity:  Goal: Ability to tolerate increased activity will improve  Description: Ability to tolerate increased activity will improve  5/14/2021 1227 by Primo Gordon RN  Outcome: Ongoing  5/14/2021 0645 by Carol Garcia RN  Outcome: Ongoing     Problem: Gas Exchange - Impaired:  Goal: Levels of oxygenation will improve  Description: Levels of oxygenation will improve  5/14/2021 1227 by Primo Gordon RN  Outcome: Ongoing  5/14/2021 0645 by Carol Garcia RN  Outcome: Ongoing     Problem: Cardiac:  Goal: Ability to maintain vital signs within normal range will improve  Description: Ability to maintain vital signs within normal range will improve  5/14/2021 1227 by Primo Gordon RN  Outcome: Ongoing  5/14/2021 0645 by Carol Garcia RN  Outcome: Ongoing  Goal: Cardiovascular alteration will improve  Description: Cardiovascular alteration will improve  5/14/2021 1227 by Primo Gordon RN  Outcome: Ongoing  5/14/2021 0645 by Carol Garcia RN  Outcome: Ongoing     Problem: Health Behavior:  Goal: Will modify at least one risk factor affecting health status  Description: Will modify at least one risk factor affecting health status  5/14/2021 1227 by Primo Gordon RN  Outcome: Ongoing  5/14/2021 0645 by Carol Garcia RN  Outcome: Ongoing  Goal: Identification of resources available to assist in meeting health care needs will improve  Description: Identification of resources available to assist in meeting health care needs will improve  5/14/2021 1227 by Primo Gordon RN  Outcome: Ongoing  5/14/2021 7698 by Antonio Nicole RN  Outcome: Ongoing     Problem: Physical Regulation:  Goal: Complications related to the disease process, condition or treatment will be avoided or minimized  Description: Complications related to the disease process, condition or treatment will be avoided or minimized  5/14/2021 1227 by Carmen Herron RN  Outcome: Ongoing  5/14/2021 0645 by Antonio Nicole RN  Outcome: Ongoing     Problem: Nutrition  Goal: Optimal nutrition therapy  Description: Nutrition Problem #1: Severe malnutrition, In context of chronic illness  Intervention: Food and/or Nutrient Delivery: Continue Current Diet, Start Oral Nutrition Supplement  Nutritional Goals: Pt to meet % of est'd needs daily via PO     5/14/2021 1227 by Carmen Herron RN  Outcome: Ongoing  5/14/2021 0645 by Antonio Nicole RN  Outcome: Ongoing     Problem: Pain:  Goal: Pain level will decrease  Description: Pain level will decrease  5/14/2021 1227 by Carmen Herron RN  Outcome: Ongoing  5/14/2021 0645 by Antonio Nicole RN  Outcome: Ongoing  Goal: Control of acute pain  Description: Control of acute pain  5/14/2021 1227 by Carmen Herron RN  Outcome: Ongoing  5/14/2021 0645 by Antonio Nicole RN  Outcome: Ongoing  Goal: Control of chronic pain  Description: Control of chronic pain  5/14/2021 1227 by Carmen Herron RN  Outcome: Ongoing  5/14/2021 0645 by Antonio Nicole RN  Outcome: Ongoing

## 2021-05-14 NOTE — DISCHARGE SUMMARY
[facility]  Office: [address], DO, Hector Filiberto, DO, Ana Neely, DO, Sue Rosa Blood, DO, Shahzad Cortes MD, Shaun Carrera MD, Dotty Solis MD, Michelle Cottrell MD, Crystal Magaña MD, Fidencio Brooks MD, Erich Kerr MD, Audrey Connolly MD, Dara Snyder, DO, Antonio Seals MD, Daphne Shane, DO, Leonidas Guerin MD,  Gómez Savage, DO, Elina Amado MD, Da Jett MD, Christy Begum MD, Larissa Casey MD, Lico Sotomayor, [facility], [facility], CNP, Day Carmona, CNP, Valerie Navarrete, CNS, Patsy Diego, CNP, Vannie Epley, CNP, Yocasta Franco, CNP, Lissy Snowden, CNP, Ruslan Loyola, CNP, Demond Almeida PA-C, Akbar Lee, [facility], Jean Oakes, CNP, Elli Bell, CNP, Belinda Santos, CNP, Nikhil Reddy, CNP, Charlie Magallanes, CNP, Jose Cordovaamp, [address]    Discharge Summary     Patient ID: Srinivasan Atwood  :  1955   MRN: [id number]     ACCOUNT:  [de-identified]   Patient's PCP: TERRANCE Sofia CNP  Admit Date: 5/3/2021   Discharge Date: 2021     Length of Stay: 11  Code Status:  Full Code  Admitting Physician: Deloris Goldstein DO  Discharge Physician: Da Jett MD     Active Discharge Diagnoses:     Hospital Problem Lists:  Principal Problem (Resolved):     Hypertensive emergency  Active Problems:    CKD (chronic kidney disease) stage 4, GFR 15-29 ml/min ([facility])    Pure hypercholesterolemia    Carotid stenosis, left s/p Endarterectomy    DM type 2, uncontrolled, with neuropathy (HCC)    COPD (chronic obstructive pulmonary disease) ([facility])    Non-obstructive Coronary artery disease of native artery of native heart with stable angina pectoris (HCC)    Chronic heart failure with preserved ejection fraction ([facility])    Left ventricular hypertrophy  Resolved Problems:    Severe malnutrition ([address]    Metabolic acidosis      Admission Condition:  poor     Discharged Condition: fair    Hospital Stay:     Hospital Course:      52-year-old male with history of asthma, CAD, CVA, CKD, diabetes, hypertension who came in with complaints of uncontrolled blood pressure and headache. Patient ended up undergoing dialysis as well during this admission due to kidney disease and uncontrolled blood pressure. He will be discharged on Coreg, losartan, Catapres, Imdur, nifedipine, Minipress. Patient is to follow-up with nephrology as an outpatient. Patient is to follow-up with GI as an outpatient as well for chronic hepatitis B infection. Rest of his medications will be continued as same.       Significant therapeutic interventions: Dialysis    Significant Diagnostic Studies:   Labs / Micro:  CBC:   Lab Results   Component Value Date    WBC 10.3 05/14/2021    RBC 2.49 05/14/2021    RBC 3.96 04/16/2012    HGB 7.9 05/14/2021    HCT 24.9 05/14/2021    .0 05/14/2021    MCH 31.7 05/14/2021    MCHC 31.7 05/14/2021    RDW 13.2 05/14/2021     05/14/2021     04/16/2012     BMP:    Lab Results   Component Value Date    GLUCOSE 427 05/14/2021    GLUCOSE 172 04/16/2012     05/14/2021    K 4.7 05/14/2021    CL 91 05/14/2021    CO2 21 05/14/2021    ANIONGAP 13 05/14/2021    BUN 73 05/14/2021    CREATININE 3.66 05/14/2021    BUNCRER NOT REPORTED 05/14/2021    CALCIUM 7.1 05/14/2021    LABGLOM 17 05/14/2021    GFRAA 20 05/14/2021    GFR      05/14/2021    GFR NOT REPORTED 05/14/2021     HFP:    Lab Results   Component Value Date    PROT 4.7 05/04/2021    PROT 6.2 11/12/2011     CMP:    Lab Results   Component Value Date    GLUCOSE 427 05/14/2021    GLUCOSE 172 04/16/2012     05/14/2021    K 4.7 05/14/2021    CL 91 05/14/2021    CO2 21 05/14/2021    BUN 73 05/14/2021    CREATININE 3.66 05/14/2021    ANIONGAP 13 05/14/2021    ALKPHOS 83 05/04/2021    ALT 15 05/04/2021    AST 12 05/04/2021    BILITOT <0.10 05/04/2021    LABALBU 2.3 05/06/2021    ALBUMIN 1.0 05/04/2021    LABGLOM 17 05/14/2021    GFRAA 20 05/14/2021    GFR      05/14/2021    GFR NOT REPORTED 05/14/2021    PROT 4.7 05/04/2021    PROT 6.2 11/12/2011    CALCIUM 7.1 05/14/2021     PT/INR:    Lab Results   Component Value Date    PROTIME 9.6 05/03/2021    INR 0.9 05/03/2021     PTT:   Lab Results   Component Value Date    APTT 20.7 05/03/2021     FLP:    Lab Results   Component Value Date    CHOL 256 05/04/2021    TRIG 148 05/04/2021    HDL 68 05/04/2021     U/A:    Lab Results   Component Value Date    COLORU YELLOW 05/03/2021    TURBIDITY CLEAR 05/03/2021    SPECGRAV 1.016 05/03/2021    HGBUR NEGATIVE 05/03/2021    PHUR 6.0 05/03/2021    PROTEINU 4+ 05/03/2021    GLUCOSEU 1+ 05/03/2021    GLUCOSEU 3+ 11/12/2011    KETUA NEGATIVE 05/03/2021    BILIRUBINUR NEGATIVE 05/03/2021    BILIRUBINUR NEGATIVE 11/12/2011    UROBILINOGEN Normal 05/03/2021    NITRU NEGATIVE 05/03/2021    LEUKOCYTESUR NEGATIVE 05/03/2021     TSH:    Lab Results   Component Value Date    TSH 2.91 05/15/2020        Radiology:  Xr Chest Portable    Result Date: 5/9/2021  Stable pulmonary vascular congestion     Xr Chest Portable    Result Date: 5/8/2021  Increasing bilateral interstitial infiltrates noted compared to prior study     Ir Tunneled Cvc Place Wo Sq Port/pump > 5 Years    Result Date: 5/11/2021  Successful ultrasound and fluoroscopy guided tunneled catheter placement through conversion of old temp cath site. Ir Non Tunneled Cath Wo Port Replacement    Result Date: 5/10/2021  Successful placement of non tunneled hemodialysis catheter. Okay to use hemodialysis catheter.        Consultations:    Consults:     Final Specialist Recommendations/Findings:   IP CONSULT TO HOSPITALIST  IP CONSULT TO IV TEAM  IP CONSULT TO CARDIOLOGY  IP CONSULT TO NEPHROLOGY  IP CONSULT TO IV TEAM  IP CONSULT TO CARDIOLOGY  IP CONSULT TO IV TEAM  IP CONSULT TO IV TEAM      The patient was seen and examined on day of discharge and this discharge summary is in conjunction with any daily progress note from day of discharge. Discharge plan:     Disposition: Home    Physician Follow Up: [facility]  [address] [address]  [phone]  Schedule an appointment as soon as possible for a visit in 2 weeks  Cardiology    MD [address]  [address] [facility]  [phone]    Call  for follow up after hospitalization    Maeve Nazario MD  [address] [facility].  [facility]  [address]  [phone]    Call  for follow up after hospitalization, to discuss insulin pump    [facility]  [address]. [facility], [address]    Tuesday, Thursday, and Saturdays beginning 5/18/21 at 9:45am. Please arrive at 9:30am for first treatment. Nabor Tai, TERRANCE [facility] [address]  [phone]    In 1 week  for follow up after hospitalization    Maeve Nazario MD  [address]  [phone]             Requiring Further Evaluation/Follow Up POST HOSPITALIZATION/Incidental Findings: Continue dialysis as an outpatient    Diet: renal diet    Activity: As tolerated    Instructions to Patient: Be compliant with your diet, medications, follow-ups    Discharge Medications:      Medication List      START taking these medications    sodium bicarbonate 650 MG tablet  Take 2 tablets by mouth 2 times daily        CHANGE how you take these medications    Basaglar KwikPen 100 UNIT/ML injection pen  Generic drug: insulin glargine  Inject 10 Units into the skin nightly  What changed: See the new instructions.      cloNIDine 0.2 MG tablet  Commonly known as: CATAPRES  Take 1 tablet by mouth 3 times daily  What changed:   medication strength  how much to take  when to take this     NIFEdipine 90 MG extended release tablet  Commonly known as: PROCARDIA XL  Take 1 tablet by mouth nightly  What changed:   medication strength  how much to take  when to take this  additional instructions CONTINUE taking these medications    aspirin 81 MG EC tablet  Commonly known as: Aspir-Low  TAKE 1 TABLET BY MOUTH DAILY     atorvastatin 80 MG tablet  Commonly known as: LIPITOR  Take 1 tablet by mouth daily     blood glucose monitor kit and supplies  Dispense sufficient amount for indicated testing frequency plus additional to accommodate PRN testing needs. Dispense all needed supplies to include: monitor, strips, lancing device, lancets, control solutions, alcohol swabs. carvedilol 25 MG tablet  Commonly known as: COREG  Take 1 tablet by mouth 2 times daily (with meals)     clopidogrel 75 MG tablet  Commonly known as: PLAVIX  Take 1 tablet by mouth daily     Comfort EZ Pen Needles 31G X 8 MM Misc  Generic drug: Insulin Pen Needle  USE AS DIRECTED     fluticasone 50 MCG/ACT nasal spray  Commonly known as: FLONASE     gabapentin 100 MG capsule  Commonly known as: NEURONTIN  TAKE 2 CAPSULES BY MOUTH 3 TIMES DAILY     glucagon 1 MG injection  Inject 1 mg into the muscle See Admin Instructions Follow package directions for low blood sugar.      Glucerna Carbsteady Liqd  Take 1 Can by mouth 3 times daily     GNP Vitamin D Maximum Strength 50 MCG (2000 UT) Tabs  Generic drug: Cholecalciferol  TAKE 1 TABLET BY MOUTH ONCE DAILY     hydrALAZINE 100 MG tablet  Commonly known as: APRESOLINE  Take 1 tablet by mouth every 8 hours     * ipratropium-albuterol 0.5-2.5 (3) MG/3ML Soln nebulizer solution  Commonly known as: DUONEB  Inhale 3 mLs into the lungs every 6 hours as needed for Shortness of Breath     * Combivent Respimat  MCG/ACT Aers inhaler  Generic drug: albuterol-ipratropium  INHALE 1 PUFF INTO THE LUNGS EVERY 6 HOURS     isosorbide mononitrate 60 MG extended release tablet  Commonly known as: IMDUR  Take 1 tablet by mouth daily     Lancets Misc  Use_4__times daily Diagnisis:250.0  Diabetes Mellitus__x__Insulin Dependent___Non-Insulin Dependent     losartan 50 MG tablet  Commonly known as: COZAAR  TAKE 1 Endocrinology   Number of Visits Requested: 1       Time Spent on discharge is  40 mins in patient examination, evaluation, counseling as well as medication reconciliation, prescriptions for required medications, discharge plan and follow up. Electronically signed by   Derald Jeans, MD  5/14/2021  2:45 PM      Thank you TERRANCE Hankins - JEN for the opportunity to be involved in this patient's care.

## 2021-05-14 NOTE — PROGRESS NOTES
Dialysis Post Treatment Note  Vitals:    05/14/21 1238   BP: (!) 183/67   Pulse: 70   Resp: 18   Temp: 98.7 °F (37.1 °C)   SpO2:      Pre-Weight = 71.7kg  Post-weight = Weight: 151 lb 7.3 oz (68.7 kg)  Total Liters Processed = Total Liters Processed (l/min): 84.3 l/min  Rinseback Volume (mL) = Rinseback Volume (ml): 300 ml  Net Removal (mL) = NET Removed (ml): 3000 ml  Patient's dry weight= 3L as tolerated  Type of access used=Catheter  Length of treatment=210

## 2021-05-14 NOTE — PROGRESS NOTES
Nephrology Progress Note        Subjective: patient evaluated on dialysis. Pt is stable on dialysis. Access cannulated without problems. No new issues overnite. Stable hemodynamics. Tolerating treatment well. 3.5 L ultrafiltration planned. Blood pressure slightly high, dry weight now will be around 68 kg. Did discuss this with his primary nephrologist Dr Yesenia Horta, advised him to challenge dry weight. Still has significant residual renal function. Can resume dialysis on Tuesday. Antihypertensives to continue. Low potassium diet to continue. Still has significant lower extremity edema. Is in negative fluid balance of at least 9 L based on intake output record, somehow does not reflect on the weight. Objective:  CURRENT TEMPERATURE:  Temp: 98 °F (36.7 °C)  MAXIMUM TEMPERATURE OVER 24HRS:  Temp (24hrs), Av.3 °F (36.8 °C), Min:98 °F (36.7 °C), Max:98.8 °F (37.1 °C)    CURRENT RESPIRATORY RATE:  Resp: 16  CURRENT PULSE:  Pulse: 67  CURRENT BLOOD PRESSURE:  BP: (!) 175/68  24HR BLOOD PRESSURE RANGE:  Systolic (41RGU), RICH:281 , Min:125 , JPM:556   ; Diastolic (16MSW), WFR:45, Min:52, Max:89    24HR INTAKE/OUTPUT:      Intake/Output Summary (Last 24 hours) at 2021 1219  Last data filed at 2021 0500  Gross per 24 hour   Intake 1573 ml   Output 3480 ml   Net -1907 ml     Patient Vitals for the past 96 hrs (Last 3 readings):   Weight   21 0840 158 lb 1.1 oz (71.7 kg)   21 0400 157 lb 8 oz (71.4 kg)   21 1321 153 lb 7 oz (69.6 kg)         Physical Exam:  General appearance:Awake, alert, in no acute distress  Skin: warm and dry, no rash or erythema  Eyes: conjunctivae normal and sclera anicteric  ENT:no thrush no pharyngeal congestion   Neck:  No JVD, No Thyromegaly  Pulmonary: clear to auscultation and no wheezing or rhonchi   Cardiovascular: normal S1 and S2. NO rubs and NO murmur.  No S3 OR S4   Abdomen: soft nontender, bowel sounds present, no organomegaly,  no ascites   Extremities: no cyanosis, clubbing +edema     Access:  previous permacath    Current Medications:    hydrALAZINE (APRESOLINE) tablet 100 mg, 3 times per day  ipratropium-albuterol (DUONEB) nebulizer solution 1 ampule, Q4H WA  insulin glargine (LANTUS) injection vial 10 Units, Nightly  heparin (porcine) injection 1,600 Units, PRN  heparin (porcine) injection 1,600 Units, PRN  prazosin (MINIPRESS) capsule 2 mg, BID  insulin lispro (HUMALOG) injection vial 0-6 Units, TID WC  insulin lispro (HUMALOG) injection vial 0-3 Units, Nightly  cloNIDine (CATAPRES) tablet 0.2 mg, TID  losartan (COZAAR) tablet 50 mg, BID  insulin lispro (HUMALOG) injection vial 5 Units, TID WC  clindamycin (CLEOCIN) 600 mg in dextrose 5 % 50 mL IVPB, Once  NIFEdipine (ADALAT CC) extended release tablet 90 mg, Nightly  methylPREDNISolone (MEDROL) tablet 4 mg, QAM AC  fluticasone-umeclidin-vilant (TRELEGY ELLIPTA) 100-62.5-25 MCG/INH inhaler 1 puff, Daily  cloNIDine (CATAPRES) tablet 0.1 mg, Q4H PRN  gabapentin (NEURONTIN) capsule 200 mg, TID  hydrALAZINE (APRESOLINE) injection 10 mg, Q6H PRN  guaiFENesin (MUCINEX) extended release tablet 600 mg, BID  carvedilol (COREG) tablet 25 mg, BID WC  glucose (GLUTOSE) 40 % oral gel 15 g, PRN  dextrose 50 % IV solution, PRN  glucagon (rDNA) injection 1 mg, PRN  dextrose 5 % solution, PRN  aspirin EC tablet 81 mg, Daily  atorvastatin (LIPITOR) tablet 80 mg, Daily  bumetanide (BUMEX) tablet 2 mg, Daily  albuterol (PROVENTIL) nebulizer solution 2.5 mg, Q6H PRN  fluticasone (FLONASE) 50 MCG/ACT nasal spray 2 spray, Daily  ipratropium-albuterol (DUONEB) nebulizer solution 3 mL, Q6H PRN  isosorbide mononitrate (IMDUR) extended release tablet 60 mg, Daily  QUEtiapine (SEROQUEL) tablet 100 mg, Nightly  sodium chloride flush 0.9 % injection 5-40 mL, 2 times per day  sodium chloride flush 0.9 % injection 5-40 mL, PRN  0.9 % sodium chloride infusion, PRN  nicotine (NICODERM CQ) 21 MG/24HR 1 patch, Daily PRN  promethazine (PHENERGAN) tablet 12.5 mg, Q6H PRN    Or  ondansetron (ZOFRAN) injection 4 mg, Q6H PRN  polyethylene glycol (GLYCOLAX) packet 17 g, Daily PRN  heparin (porcine) injection 5,000 Units, 3 times per day      Labs:   CBC:   Recent Labs     05/12/21  0501 05/13/21  0603 05/14/21  0431   WBC 8.9 10.2 10.3   RBC 2.71* 2.61* 2.49*   HGB 8.5* 8.2* 7.9*   HCT 26.5* 26.0* 24.9*    277 238   MPV 10.1 10.7 10.9      BMP:   Recent Labs     05/12/21  0501 05/13/21  0603    131*   K 3.9 4.2    98   CO2 26 24   BUN 57* 48*   CREATININE 2.53* 2.77*   GLUCOSE 90 195*   CALCIUM 7.7* 7.2*        Phosphorus:  No results for input(s): PHOS in the last 72 hours. Magnesium: No results for input(s): MG in the last 72 hours. Albumin: No results for input(s): LABALBU in the last 72 hours. Dialysis bath: Dialysis Bath  K+ (Potassium): 2  Ca+ (Calcium): 3  Na+ (Sodium): 140  HCO3 (Bicarb): 35    Radiology:  Reviewed as available. Assessment:  1 acute on chronic kidney disease secondary to ischemic ATN in the setting of advanced diabetic nephrosclerosis, now dependent on hemodialysis, for clearance and ultrafiltration. outpatient dialysis spot arranged Tuesday Thursday Saturday at the [address] [facility] renal care [place] unit: dialysis bath reviewed with nurse. 2.HTN: Blood pressure is still somewhat high, expected to improve with further ultrafiltration  3 DM: With nephropathy  4 EDEMA : Improving edema  5. ANEMIA OF CHRONIC DISEASE: On erythropoietin stimulating agents  6. SECONDARY HYPERPARATHYROIDISM: On Hectorol    Plan:  1. Wt removal and dialysis orders reviewed. 2.  Stable for discharge today after dialysis. Outpatient dialysis on Tuesday at the [facility] [facility] renal care [place] unit  3. Cont pt on aranesp and zemplar per protocol. 4. Follow up labs ordered.'  5.  Fluid restriction emphasized  6. Increase hydralazine back to 100 3 times daily which can then be reduced in the outpatient setting  7.   Discussed with his primary nephrologist  Joanie Contreras, and updated him about his current admission. Please do not hesitate to call with questions.     Electronically signed by Kwadwo Solis MD on 5/14/2021 at 12:19 PM

## 2021-05-14 NOTE — PROGRESS NOTES
Home Oxygen Evaluation    Home Oxygen Evaluation completed. Patient is on 2 liters per minute via nasal cannula. Resting SpO2 = 98%  Resting SpO2 on room air = 94%    SpO2 on room air with exercise = 92%  SpO2 on oxygen as above with exercise = 97%    Nocturnal Oximetry with patient on room air is recommended is SpO2 is between 89% and 95% (requires additional order).     Ekwok Sand  4:08 PM

## 2021-05-14 NOTE — CARE COORDINATION
JOAQUIM refaxed records as [facility] had not received records. Clinic requesting dialysis discharge orders, JOAQUIM notified charge YIN Piedra.

## 2021-05-14 NOTE — FLOWSHEET NOTE
Discharge instructions reviewed with pt/family, discussed medications, VU, denies any further questions or anxiety related to discharge. IV/tele discontinued. Pt discharged to home with family. Taken from room via wheelchair by CNA, personal belongings taken with.

## 2021-05-14 NOTE — PLAN OF CARE
Ongoing  Goal: Control of acute pain  Description: Control of acute pain  Outcome: Ongoing  Goal: Control of chronic pain  Description: Control of chronic pain  Outcome: Ongoing

## 2021-05-15 ENCOUNTER — APPOINTMENT (OUTPATIENT)
Dept: GENERAL RADIOLOGY | Age: 66
DRG: 280 | End: 2021-05-15
Payer: COMMERCIAL

## 2021-05-15 ENCOUNTER — HOSPITAL ENCOUNTER (INPATIENT)
Age: 66
LOS: 2 days | Discharge: HOME | DRG: 280 | End: 2021-05-17
Attending: EMERGENCY MEDICINE | Admitting: FAMILY MEDICINE
Payer: COMMERCIAL

## 2021-05-15 DIAGNOSIS — R06.00 DYSPNEA, UNSPECIFIED TYPE: Primary | ICD-10-CM

## 2021-05-15 PROBLEM — R06.02 SOB (SHORTNESS OF BREATH): Status: ACTIVE | Noted: 2021-05-15

## 2021-05-15 PROBLEM — E11.65 UNCONTROLLED TYPE 2 DIABETES MELLITUS WITH HYPERGLYCEMIA (HCC): Status: ACTIVE | Noted: 2021-05-15

## 2021-05-15 LAB
ABSOLUTE EOS #: 0.31 K/UL (ref 0–0.44)
ABSOLUTE IMMATURE GRANULOCYTE: 0.32 K/UL (ref 0–0.3)
ABSOLUTE LYMPH #: 1.02 K/UL (ref 1.1–3.7)
ABSOLUTE MONO #: 0.76 K/UL (ref 0.1–1.2)
ALLEN TEST: ABNORMAL
ANION GAP SERPL CALCULATED.3IONS-SCNC: 9 MMOL/L (ref 9–17)
ANION GAP: 11 MMOL/L (ref 7–16)
BASOPHILS # BLD: 1 % (ref 0–2)
BASOPHILS ABSOLUTE: 0.05 K/UL (ref 0–0.2)
BNP INTERPRETATION: ABNORMAL
BUN BLDV-MCNC: 58 MG/DL (ref 8–23)
BUN/CREAT BLD: ABNORMAL (ref 9–20)
CALCIUM SERPL-MCNC: 8.1 MG/DL (ref 8.6–10.4)
CHLORIDE BLD-SCNC: 98 MMOL/L (ref 98–107)
CO2: 25 MMOL/L (ref 20–31)
CREAT SERPL-MCNC: 3.05 MG/DL (ref 0.7–1.2)
D-DIMER QUANTITATIVE: 2.06 MG/L FEU
DIFFERENTIAL TYPE: ABNORMAL
EOSINOPHILS RELATIVE PERCENT: 4 % (ref 1–4)
FIO2: ABNORMAL
GFR AFRICAN AMERICAN: 25 ML/MIN
GFR NON-AFRICAN AMERICAN: 18 ML/MIN
GFR NON-AFRICAN AMERICAN: 21 ML/MIN
GFR SERPL CREATININE-BSD FRML MDRD: 21 ML/MIN
GFR SERPL CREATININE-BSD FRML MDRD: ABNORMAL ML/MIN/{1.73_M2}
GLUCOSE BLD-MCNC: 164 MG/DL (ref 75–110)
GLUCOSE BLD-MCNC: 226 MG/DL (ref 74–100)
GLUCOSE BLD-MCNC: 227 MG/DL (ref 70–99)
GLUCOSE BLD-MCNC: 247 MG/DL (ref 75–110)
GLUCOSE BLD-MCNC: 372 MG/DL (ref 75–110)
HCO3 VENOUS: 27.6 MMOL/L (ref 22–29)
HCT VFR BLD CALC: 24.3 % (ref 40.7–50.3)
HEMOGLOBIN: 7.8 G/DL (ref 13–17)
IMMATURE GRANULOCYTES: 4 %
LYMPHOCYTES # BLD: 11 % (ref 24–43)
MCH RBC QN AUTO: 31.8 PG (ref 25.2–33.5)
MCHC RBC AUTO-ENTMCNC: 32.1 G/DL (ref 28.4–34.8)
MCV RBC AUTO: 99.2 FL (ref 82.6–102.9)
MODE: ABNORMAL
MONOCYTES # BLD: 9 % (ref 3–12)
NEGATIVE BASE EXCESS, VEN: ABNORMAL (ref 0–2)
NRBC AUTOMATED: 0.2 PER 100 WBC
O2 DEVICE/FLOW/%: ABNORMAL
O2 SAT, VEN: 87 % (ref 60–85)
PATIENT TEMP: ABNORMAL
PCO2, VEN: 47.9 MM HG (ref 41–51)
PDW BLD-RTO: 13.3 % (ref 11.8–14.4)
PH VENOUS: 7.37 (ref 7.32–7.43)
PLATELET # BLD: 252 K/UL (ref 138–453)
PLATELET ESTIMATE: ABNORMAL
PMV BLD AUTO: 10.7 FL (ref 8.1–13.5)
PO2, VEN: 55 MM HG (ref 30–50)
POC BUN: 58 MG/DL (ref 8–26)
POC CHLORIDE: 97 MMOL/L (ref 98–107)
POC CREATININE: 3.51 MG/DL (ref 0.51–1.19)
POC HEMATOCRIT: 22 % (ref 41–53)
POC HEMOGLOBIN: 7.4 G/DL (ref 13.5–17.5)
POC IONIZED CALCIUM: 1.15 MMOL/L (ref 1.15–1.33)
POC LACTIC ACID: 0.56 MMOL/L (ref 0.56–1.39)
POC PCO2 TEMP: ABNORMAL MM HG
POC PH TEMP: ABNORMAL
POC PO2 TEMP: ABNORMAL MM HG
POC POTASSIUM: 4.5 MMOL/L (ref 3.5–4.5)
POC SODIUM: 134 MMOL/L (ref 138–146)
POC TCO2: 28 MMOL/L (ref 22–30)
POSITIVE BASE EXCESS, VEN: 2 (ref 0–3)
POTASSIUM SERPL-SCNC: 4.6 MMOL/L (ref 3.7–5.3)
PRO-BNP: 6126 PG/ML
RBC # BLD: 2.45 M/UL (ref 4.21–5.77)
RBC # BLD: ABNORMAL 10*6/UL
SAMPLE SITE: ABNORMAL
SEG NEUTROPHILS: 72 % (ref 36–65)
SEGMENTED NEUTROPHILS ABSOLUTE COUNT: 6.46 K/UL (ref 1.5–8.1)
SODIUM BLD-SCNC: 132 MMOL/L (ref 135–144)
TOTAL CO2, VENOUS: ABNORMAL MMOL/L (ref 23–30)
TROPONIN INTERP: ABNORMAL
TROPONIN INTERP: ABNORMAL
TROPONIN T: ABNORMAL NG/ML
TROPONIN T: ABNORMAL NG/ML
TROPONIN, HIGH SENSITIVITY: 155 NG/L (ref 0–22)
TROPONIN, HIGH SENSITIVITY: 174 NG/L (ref 0–22)
WBC # BLD: 8.9 K/UL (ref 3.5–11.3)
WBC # BLD: ABNORMAL 10*3/UL

## 2021-05-15 PROCEDURE — 36415 COLL VENOUS BLD VENIPUNCTURE: CPT

## 2021-05-15 PROCEDURE — 2580000003 HC RX 258: Performed by: INTERNAL MEDICINE

## 2021-05-15 PROCEDURE — 82330 ASSAY OF CALCIUM: CPT

## 2021-05-15 PROCEDURE — 85379 FIBRIN DEGRADATION QUANT: CPT

## 2021-05-15 PROCEDURE — 83036 HEMOGLOBIN GLYCOSYLATED A1C: CPT

## 2021-05-15 PROCEDURE — 2060000000 HC ICU INTERMEDIATE R&B

## 2021-05-15 PROCEDURE — 80048 BASIC METABOLIC PNL TOTAL CA: CPT

## 2021-05-15 PROCEDURE — 96365 THER/PROPH/DIAG IV INF INIT: CPT

## 2021-05-15 PROCEDURE — 71045 X-RAY EXAM CHEST 1 VIEW: CPT

## 2021-05-15 PROCEDURE — 6360000002 HC RX W HCPCS: Performed by: INTERNAL MEDICINE

## 2021-05-15 PROCEDURE — 93005 ELECTROCARDIOGRAM TRACING: CPT | Performed by: STUDENT IN AN ORGANIZED HEALTH CARE EDUCATION/TRAINING PROGRAM

## 2021-05-15 PROCEDURE — 90935 HEMODIALYSIS ONE EVALUATION: CPT

## 2021-05-15 PROCEDURE — 84520 ASSAY OF UREA NITROGEN: CPT

## 2021-05-15 PROCEDURE — 84484 ASSAY OF TROPONIN QUANT: CPT

## 2021-05-15 PROCEDURE — 80051 ELECTROLYTE PANEL: CPT

## 2021-05-15 PROCEDURE — 83605 ASSAY OF LACTIC ACID: CPT

## 2021-05-15 PROCEDURE — 83880 ASSAY OF NATRIURETIC PEPTIDE: CPT

## 2021-05-15 PROCEDURE — 82947 ASSAY GLUCOSE BLOOD QUANT: CPT

## 2021-05-15 PROCEDURE — 99223 1ST HOSP IP/OBS HIGH 75: CPT | Performed by: INTERNAL MEDICINE

## 2021-05-15 PROCEDURE — 82565 ASSAY OF CREATININE: CPT

## 2021-05-15 PROCEDURE — 6370000000 HC RX 637 (ALT 250 FOR IP): Performed by: INTERNAL MEDICINE

## 2021-05-15 PROCEDURE — G0378 HOSPITAL OBSERVATION PER HR: HCPCS

## 2021-05-15 PROCEDURE — 96366 THER/PROPH/DIAG IV INF ADDON: CPT

## 2021-05-15 PROCEDURE — 96375 TX/PRO/DX INJ NEW DRUG ADDON: CPT

## 2021-05-15 PROCEDURE — 2500000003 HC RX 250 WO HCPCS: Performed by: STUDENT IN AN ORGANIZED HEALTH CARE EDUCATION/TRAINING PROGRAM

## 2021-05-15 PROCEDURE — 85025 COMPLETE CBC W/AUTO DIFF WBC: CPT

## 2021-05-15 PROCEDURE — 94640 AIRWAY INHALATION TREATMENT: CPT

## 2021-05-15 PROCEDURE — 96374 THER/PROPH/DIAG INJ IV PUSH: CPT

## 2021-05-15 PROCEDURE — 85014 HEMATOCRIT: CPT

## 2021-05-15 PROCEDURE — 1800000000 HC LEAVE OF ABSENCE

## 2021-05-15 PROCEDURE — 96372 THER/PROPH/DIAG INJ SC/IM: CPT

## 2021-05-15 PROCEDURE — 99285 EMERGENCY DEPT VISIT HI MDM: CPT

## 2021-05-15 PROCEDURE — 82803 BLOOD GASES ANY COMBINATION: CPT

## 2021-05-15 RX ORDER — NICOTINE 21 MG/24HR
1 PATCH, TRANSDERMAL 24 HOURS TRANSDERMAL DAILY PRN
Status: DISCONTINUED | OUTPATIENT
Start: 2021-05-15 | End: 2021-05-17 | Stop reason: HOSPADM

## 2021-05-15 RX ORDER — ISOSORBIDE MONONITRATE 60 MG/1
60 TABLET, EXTENDED RELEASE ORAL DAILY
Status: DISCONTINUED | OUTPATIENT
Start: 2021-05-16 | End: 2021-05-17 | Stop reason: HOSPADM

## 2021-05-15 RX ORDER — ACETAMINOPHEN 650 MG/1
650 SUPPOSITORY RECTAL EVERY 6 HOURS PRN
Status: DISCONTINUED | OUTPATIENT
Start: 2021-05-15 | End: 2021-05-17 | Stop reason: HOSPADM

## 2021-05-15 RX ORDER — IPRATROPIUM BROMIDE AND ALBUTEROL SULFATE 2.5; .5 MG/3ML; MG/3ML
1 SOLUTION RESPIRATORY (INHALATION)
Status: DISCONTINUED | OUTPATIENT
Start: 2021-05-15 | End: 2021-05-15

## 2021-05-15 RX ORDER — FUROSEMIDE 10 MG/ML
40 INJECTION INTRAMUSCULAR; INTRAVENOUS ONCE
Status: DISCONTINUED | OUTPATIENT
Start: 2021-05-15 | End: 2021-05-17 | Stop reason: HOSPADM

## 2021-05-15 RX ORDER — CLONIDINE HYDROCHLORIDE 0.1 MG/1
0.2 TABLET ORAL 3 TIMES DAILY
Status: DISCONTINUED | OUTPATIENT
Start: 2021-05-15 | End: 2021-05-17 | Stop reason: HOSPADM

## 2021-05-15 RX ORDER — IPRATROPIUM BROMIDE AND ALBUTEROL SULFATE 2.5; .5 MG/3ML; MG/3ML
3 SOLUTION RESPIRATORY (INHALATION) EVERY 6 HOURS PRN
Status: DISCONTINUED | OUTPATIENT
Start: 2021-05-15 | End: 2021-05-17

## 2021-05-15 RX ORDER — PROMETHAZINE HYDROCHLORIDE 12.5 MG/1
12.5 TABLET ORAL EVERY 6 HOURS PRN
Status: DISCONTINUED | OUTPATIENT
Start: 2021-05-15 | End: 2021-05-17 | Stop reason: HOSPADM

## 2021-05-15 RX ORDER — HYDRALAZINE HYDROCHLORIDE 50 MG/1
100 TABLET, FILM COATED ORAL EVERY 8 HOURS SCHEDULED
Status: DISCONTINUED | OUTPATIENT
Start: 2021-05-15 | End: 2021-05-17 | Stop reason: HOSPADM

## 2021-05-15 RX ORDER — PRAZOSIN HYDROCHLORIDE 1 MG/1
4 CAPSULE ORAL 2 TIMES DAILY
Status: DISCONTINUED | OUTPATIENT
Start: 2021-05-15 | End: 2021-05-17 | Stop reason: HOSPADM

## 2021-05-15 RX ORDER — POLYETHYLENE GLYCOL 3350 17 G/17G
17 POWDER, FOR SOLUTION ORAL DAILY PRN
Status: DISCONTINUED | OUTPATIENT
Start: 2021-05-15 | End: 2021-05-17 | Stop reason: HOSPADM

## 2021-05-15 RX ORDER — CLOPIDOGREL BISULFATE 75 MG/1
75 TABLET ORAL DAILY
Status: DISCONTINUED | OUTPATIENT
Start: 2021-05-15 | End: 2021-05-17 | Stop reason: HOSPADM

## 2021-05-15 RX ORDER — CARVEDILOL 12.5 MG/1
25 TABLET ORAL 2 TIMES DAILY WITH MEALS
Status: DISCONTINUED | OUTPATIENT
Start: 2021-05-15 | End: 2021-05-17 | Stop reason: HOSPADM

## 2021-05-15 RX ORDER — ONDANSETRON 2 MG/ML
4 INJECTION INTRAMUSCULAR; INTRAVENOUS EVERY 6 HOURS PRN
Status: DISCONTINUED | OUTPATIENT
Start: 2021-05-15 | End: 2021-05-17 | Stop reason: HOSPADM

## 2021-05-15 RX ORDER — SODIUM CHLORIDE 0.9 % (FLUSH) 0.9 %
5-40 SYRINGE (ML) INJECTION PRN
Status: DISCONTINUED | OUTPATIENT
Start: 2021-05-15 | End: 2021-05-17 | Stop reason: HOSPADM

## 2021-05-15 RX ORDER — HEPARIN SODIUM 1000 [USP'U]/ML
1600 INJECTION, SOLUTION INTRAVENOUS; SUBCUTANEOUS PRN
Status: DISCONTINUED | OUTPATIENT
Start: 2021-05-15 | End: 2021-05-17 | Stop reason: HOSPADM

## 2021-05-15 RX ORDER — LOSARTAN POTASSIUM 50 MG/1
50 TABLET ORAL 2 TIMES DAILY
Status: DISCONTINUED | OUTPATIENT
Start: 2021-05-15 | End: 2021-05-17 | Stop reason: HOSPADM

## 2021-05-15 RX ORDER — PREDNISONE 20 MG/1
40 TABLET ORAL DAILY
Status: DISCONTINUED | OUTPATIENT
Start: 2021-05-18 | End: 2021-05-17 | Stop reason: HOSPADM

## 2021-05-15 RX ORDER — FLUTICASONE PROPIONATE 50 MCG
1 SPRAY, SUSPENSION (ML) NASAL DAILY
Status: DISCONTINUED | OUTPATIENT
Start: 2021-05-15 | End: 2021-05-17 | Stop reason: HOSPADM

## 2021-05-15 RX ORDER — NIFEDIPINE 90 MG/1
90 TABLET, FILM COATED, EXTENDED RELEASE ORAL NIGHTLY
Status: DISCONTINUED | OUTPATIENT
Start: 2021-05-15 | End: 2021-05-17 | Stop reason: HOSPADM

## 2021-05-15 RX ORDER — SODIUM CHLORIDE 9 MG/ML
INJECTION, SOLUTION INTRAVENOUS CONTINUOUS
Status: DISCONTINUED | OUTPATIENT
Start: 2021-05-15 | End: 2021-05-15

## 2021-05-15 RX ORDER — SODIUM BICARBONATE 650 MG/1
1300 TABLET ORAL 2 TIMES DAILY
Status: DISCONTINUED | OUTPATIENT
Start: 2021-05-15 | End: 2021-05-17 | Stop reason: HOSPADM

## 2021-05-15 RX ORDER — BUDESONIDE AND FORMOTEROL FUMARATE DIHYDRATE 160; 4.5 UG/1; UG/1
2 AEROSOL RESPIRATORY (INHALATION) 2 TIMES DAILY
Status: DISCONTINUED | OUTPATIENT
Start: 2021-05-15 | End: 2021-05-15

## 2021-05-15 RX ORDER — QUETIAPINE FUMARATE 100 MG/1
100 TABLET, FILM COATED ORAL NIGHTLY
Status: DISCONTINUED | OUTPATIENT
Start: 2021-05-15 | End: 2021-05-17 | Stop reason: HOSPADM

## 2021-05-15 RX ORDER — FAMOTIDINE 20 MG/1
10 TABLET, FILM COATED ORAL DAILY
Status: DISCONTINUED | OUTPATIENT
Start: 2021-05-15 | End: 2021-05-17 | Stop reason: HOSPADM

## 2021-05-15 RX ORDER — ATORVASTATIN CALCIUM 80 MG/1
80 TABLET, FILM COATED ORAL DAILY
Status: DISCONTINUED | OUTPATIENT
Start: 2021-05-15 | End: 2021-05-17 | Stop reason: HOSPADM

## 2021-05-15 RX ORDER — NITROGLYCERIN 0.4 MG/1
0.4 TABLET SUBLINGUAL EVERY 5 MIN PRN
Status: DISCONTINUED | OUTPATIENT
Start: 2021-05-15 | End: 2021-05-17 | Stop reason: HOSPADM

## 2021-05-15 RX ORDER — ACETAMINOPHEN 325 MG/1
650 TABLET ORAL EVERY 6 HOURS PRN
Status: DISCONTINUED | OUTPATIENT
Start: 2021-05-15 | End: 2021-05-17 | Stop reason: HOSPADM

## 2021-05-15 RX ORDER — HEPARIN SODIUM 5000 [USP'U]/ML
5000 INJECTION, SOLUTION INTRAVENOUS; SUBCUTANEOUS EVERY 8 HOURS SCHEDULED
Status: DISCONTINUED | OUTPATIENT
Start: 2021-05-15 | End: 2021-05-17 | Stop reason: HOSPADM

## 2021-05-15 RX ORDER — GABAPENTIN 100 MG/1
100 CAPSULE ORAL 3 TIMES DAILY
Status: DISCONTINUED | OUTPATIENT
Start: 2021-05-15 | End: 2021-05-17 | Stop reason: HOSPADM

## 2021-05-15 RX ORDER — NUT.TX.IMPAIRED DIGESTIVE FXN 0.035-1/ML
1 LIQUID (ML) ORAL 3 TIMES DAILY
Status: DISCONTINUED | OUTPATIENT
Start: 2021-05-15 | End: 2021-05-15 | Stop reason: RX

## 2021-05-15 RX ORDER — INSULIN GLARGINE 100 [IU]/ML
10 INJECTION, SOLUTION SUBCUTANEOUS NIGHTLY
Status: DISCONTINUED | OUTPATIENT
Start: 2021-05-15 | End: 2021-05-17 | Stop reason: HOSPADM

## 2021-05-15 RX ORDER — SODIUM CHLORIDE 0.9 % (FLUSH) 0.9 %
5-40 SYRINGE (ML) INJECTION EVERY 12 HOURS SCHEDULED
Status: DISCONTINUED | OUTPATIENT
Start: 2021-05-15 | End: 2021-05-17 | Stop reason: HOSPADM

## 2021-05-15 RX ORDER — ALBUTEROL SULFATE 2.5 MG/3ML
2.5 SOLUTION RESPIRATORY (INHALATION)
Status: DISCONTINUED | OUTPATIENT
Start: 2021-05-15 | End: 2021-05-17 | Stop reason: HOSPADM

## 2021-05-15 RX ORDER — ASPIRIN 81 MG/1
81 TABLET ORAL DAILY
Status: DISCONTINUED | OUTPATIENT
Start: 2021-05-15 | End: 2021-05-17 | Stop reason: HOSPADM

## 2021-05-15 RX ORDER — NITROGLYCERIN 20 MG/100ML
5-200 INJECTION INTRAVENOUS CONTINUOUS
Status: DISCONTINUED | OUTPATIENT
Start: 2021-05-15 | End: 2021-05-17 | Stop reason: HOSPADM

## 2021-05-15 RX ORDER — SODIUM CHLORIDE 9 MG/ML
25 INJECTION, SOLUTION INTRAVENOUS PRN
Status: DISCONTINUED | OUTPATIENT
Start: 2021-05-15 | End: 2021-05-17 | Stop reason: HOSPADM

## 2021-05-15 RX ORDER — ERGOCALCIFEROL 1.25 MG/1
50000 CAPSULE ORAL WEEKLY
Status: DISCONTINUED | OUTPATIENT
Start: 2021-05-17 | End: 2021-05-17 | Stop reason: HOSPADM

## 2021-05-15 RX ORDER — METHYLPREDNISOLONE SODIUM SUCCINATE 125 MG/2ML
40 INJECTION, POWDER, LYOPHILIZED, FOR SOLUTION INTRAMUSCULAR; INTRAVENOUS EVERY 6 HOURS
Status: DISCONTINUED | OUTPATIENT
Start: 2021-05-15 | End: 2021-05-17 | Stop reason: HOSPADM

## 2021-05-15 RX ADMIN — METHYLPREDNISOLONE SODIUM SUCCINATE 40 MG: 125 INJECTION, POWDER, FOR SOLUTION INTRAMUSCULAR; INTRAVENOUS at 23:15

## 2021-05-15 RX ADMIN — NITROGLYCERIN 20 MCG/MIN: 20 INJECTION INTRAVENOUS at 14:34

## 2021-05-15 RX ADMIN — IPRATROPIUM BROMIDE AND ALBUTEROL SULFATE 1 AMPULE: .5; 2.5 SOLUTION RESPIRATORY (INHALATION) at 21:44

## 2021-05-15 RX ADMIN — HEPARIN SODIUM 1600 UNITS: 1000 INJECTION INTRAVENOUS; SUBCUTANEOUS at 19:54

## 2021-05-15 RX ADMIN — SODIUM CHLORIDE, PRESERVATIVE FREE 10 ML: 5 INJECTION INTRAVENOUS at 23:16

## 2021-05-15 RX ADMIN — HEPARIN SODIUM 5000 UNITS: 5000 INJECTION INTRAVENOUS; SUBCUTANEOUS at 23:16

## 2021-05-15 RX ADMIN — INSULIN GLARGINE 10 UNITS: 100 INJECTION, SOLUTION SUBCUTANEOUS at 23:59

## 2021-05-15 RX ADMIN — GABAPENTIN 100 MG: 100 CAPSULE ORAL at 23:23

## 2021-05-15 RX ADMIN — HEPARIN SODIUM 1600 UNITS: 1000 INJECTION INTRAVENOUS; SUBCUTANEOUS at 19:52

## 2021-05-15 RX ADMIN — SODIUM BICARBONATE 1300 MG: 648 TABLET ORAL at 23:23

## 2021-05-15 ASSESSMENT — PAIN SCALES - GENERAL
PAINLEVEL_OUTOF10: 0

## 2021-05-15 NOTE — ED PROVIDER NOTES
FACULTY SIGN-OUT  ADDENDUM     Care of this patient was assumed from previous attending physician. The patient's initial evaluation and plan have been discussed with the prior provider who initially evaluated the patient. Attestation  I was available and discussed any additional care issues that arose and coordinated the management plans with the resident(s) caring for the patient during my duty period. Any areas of disagreement with resident's documentation of care or procedures are noted on the chart. I was personally present for the key portions of any/all procedures, during my duty period. I have documented in the chart those procedures where I was not present during the key portions. ED COURSE      The patient was given the following medications:  Orders Placed This Encounter   Medications    nitroGLYCERIN 50 mg in dextrose 5% 250 mL infusion       RECENT VITALS:   Temp: 97.5 °F (36.4 °C), Pulse: 53, Resp: 12, BP: (!) 131/55    MEDICAL DECISION MAKING        Cynthia Young is a 73 y.o. male who presents to the Emergency Department with complaints of shortness of breath    Shahzad Hdz MD, MD, F.A.C.E.P.   Attending Emergency Physician    (Please note that portions of this note were completed with a voice recognition program.  Efforts were made to edit the dictations but occasionally words are mis-transcribed.)       Shahzad Hdz MD  05/15/21 6985

## 2021-05-15 NOTE — PROGRESS NOTES
Dialysis Post Treatment Note  Vitals:    05/15/21 1953   BP: (!) 143/61   Pulse: 62   Resp: 16   Temp: 97.9 °F (36.6 °C)   SpO2:      Pre-Weight = 70.6kg  Post-weight = Weight: 150 lb 9.2 oz (68.3 kg)  Total Liters Processed = Total Liters Processed (l/min): 47.8 l/min  Rinseback Volume (mL) = Rinseback Volume (ml): 300 ml  Net Removal (mL) = NET Removed (ml): 2370 ml  Patient's dry weight=N/A  Type of access used=perm catheter  Length of treatment=120    Pt tolerated tx well with no complaints

## 2021-05-15 NOTE — ED NOTES
Pt to ed from home c/o shortness of breath, sudden onset. Patient is aox3. Patient denies pain. Patient given prednisone and 2g of mag prior to arrival by EMS. Patient just started dialysis this past week for the first time, reports having it four days in a row. Patient states eventually he will be tuesday/thursday/saturday.       Tripp Banuelos RN  05/15/21 2053

## 2021-05-15 NOTE — ED NOTES
Pt resting on cot, on monitor, NAD noted. Patient reports he is feeling much better, easier breathing.       Arthur Eden RN  05/15/21 2840

## 2021-05-15 NOTE — H&P
CPAP, patient is a daily smoker, patient is working harder to breathe, he is tachypneic and using some accessory muscles. Patient is requesting a CPAP be removed, will do a trial off of CPAP. Patient denies any chest pain, fever, chills, recent upper respiratory illness, abdominal pain, back pain. Patient is a type II diabetic.   Patient received prednisone and magnesium by EMS    Patient states he is already feeling better after getting treatments by EMS and breathing treatment in hospital  Currently off BiPAP and is on oxygen via nasal cannula  His last dialysis was yesterday  He makes urine  Past Medical History:     Past Medical History:   Diagnosis Date    Asthma     mod persistent    CAD in native artery, nonobstructive     Carotid stenosis, left 6/10/2013    Carpal tunnel syndrome     RIGHT    Cerebrovascular disease 4/23/2018    Chronic kidney disease 6/10/2013    COPD (chronic obstructive pulmonary disease) ([facility] [address]     Diabetes mellitus (HCC)     insulin dependent    History of colon polyps     History of weakness of extremity     right sided    HTN (hypertension)     Hyperlipidemia     Lung, cysts, congenital     PTSD (post-traumatic stress disorder)     PTSD (post-traumatic stress disorder)     TIA (transient ischemic attack)     Type II or unspecified type diabetes mellitus without mention of complication, not stated as uncontrolled         Past Surgical History:     Past Surgical History:   Procedure Laterality Date    CAROTID ENDARTERECTOMY Left 7-2013    COLONOSCOPY      HERNIA REPAIR      IR TUNNELED CATHETER PLACEMENT GREATER THAN 5 YEARS  5/11/2021    IR TUNNELED CATHETER PLACEMENT GREATER THAN 5 YEARS 5/11/2021 Artemio Roberts MD [facility]    CA COLSC FLX W/RMVL OF TUMOR POLYP LESION SNARE TQ N/A 6/27/2017    COLONOSCOPY POLYPECTOMY SNARE/ hot performed by Bradford Strickland DO at [facility] VASCULAR SURGERY Left 2015    carotid dr Leoncio gtz Starch        Medications Prior to Admission:     Prior to Admission medications    Medication Sig Start Date End Date Taking? Authorizing Provider   sodium bicarbonate 650 MG tablet Take 2 tablets by mouth 2 times daily 5/13/21 6/12/21  TERRANCE Sahu NP   insulin glargine [facility]) 100 UNIT/ML injection pen Inject 10 Units into the skin nightly 5/13/21   TERRANCE Sahu NP   cloNIDine (CATAPRES) 0.2 MG tablet Take 1 tablet by mouth 3 times daily 5/13/21   TERRANCE Sahu NP   NIFEdipine (PROCARDIA XL) 90 MG extended release tablet Take 1 tablet by mouth nightly 5/13/21   TERRANCE Sahu NP   glucagon 1 MG injection Inject 1 mg into the muscle See Admin Instructions Follow package directions for low blood sugar. 4/6/21   TERRANCE Hutton CNP   fluticasone [facility]) 50 MCG/ACT nasal spray  2/23/21   Historical Provider, MD   insulin aspart (NOVOLOG FLEXPEN) 100 UNIT/ML injection pen Inject 5 Units into the skin 3 times daily (before meals) Sliding scale 3/16/21   TERRANCE Hutton CNP   nitroGLYCERIN (NITROSTAT) 0.4 MG SL tablet USE 1 TABLET UNDER THE TONGUE UP TO MAXIMUM OF 3 TOTAL DOSES.  IF NO RELIEF AFTER 1 DOSE, CALL 911. 3/5/21   TERRANCE Hutton CNP   gabapentin (NEURONTIN) 100 MG capsule TAKE 2 CAPSULES BY MOUTH 3 TIMES DAILY 2/23/21 5/24/21  TERRANCE Hutton CNP   QUEtiapine (SEROQUEL) 100 MG tablet TAKE 1 TABLET BY MOUTH NIGHTLY 2/23/21   TERRANCE Hutton CNP   losartan (COZAAR) 50 MG tablet TAKE 1 TABLET BY MOUTH 2 TIMES DAILY 2/23/21   TERRANCE Hutton CNP   GNP VITAMIN D MAXIMUM STRENGTH 50 MCG (2000 UT) TABS TAKE 1 TABLET BY MOUTH ONCE DAILY 2/23/21   TERRANCE Hutton CNP   Rohan Cough 100-62.5-25 MCG/INH AEPB INHALE ONE PUFF INTO THE LUNGS DAILY 1/27/21   TERRANCE Hutton CNP   blood glucose monitor strips Test_4__times daily Diagnosis: 250.0   Diabetes Mellitus_x___Insulin Dependent____Non-Insulin Dependent 1/26/21   TERRANCE Hutton CNP   blood glucose monitor kit and supplies Dispense sufficient amount for indicated testing frequency plus additional to accommodate PRN testing needs. Dispense all needed supplies to include: monitor, strips, lancing device, lancets, control solutions, alcohol swabs.  1/26/21   TERRANCE Hutton CNP   prazosin (MINIPRESS) 2 MG capsule 2 CAPSULES BY MOUTH (4MG) TWICE DAILY 1/25/21   TERRANCE Hutton CNP   COMBIVENT RESPIMAT  MCG/ACT AERS inhaler INHALE 1 PUFF INTO THE LUNGS EVERY 6 HOURS 1/25/21   TERRANCE Hutton CNP   hydrALAZINE (APRESOLINE) 100 MG tablet Take 1 tablet by mouth every 8 hours 1/25/21   TERRANCE Hutton CNP   aspirin (ASPIR-LOW) 81 MG EC tablet TAKE 1 TABLET BY MOUTH DAILY 1/25/21   TERRANCE Hutton CNP   carvedilol (COREG) 25 MG tablet Take 1 tablet by mouth 2 times daily (with meals) 1/25/21   TERRANCE Hutton CNP   atorvastatin (LIPITOR) 80 MG tablet Take 1 tablet by mouth daily 1/25/21   TERRANCE Hutton CNP   clopidogrel (PLAVIX) 75 MG tablet Take 1 tablet by mouth daily 1/25/21   TERRANCE Hutton CNP   isosorbide mononitrate (IMDUR) 60 MG extended release tablet Take 1 tablet by mouth daily 1/5/21   TERRANCE Hutton CNP   vitamin D (ERGOCALCIFEROL) 1.25 MG (18942 UT) CAPS capsule Take 1 capsule by mouth once a week 1/5/21   TERRANCE Hutton CNP   Lancets MISC Use_4__times daily Diagnisis:250.0  Diabetes Mellitus__x__Insulin Dependent___Non-Insulin Dependent 12/6/20   Elkin Grajeda MD   ipratropium-albuterol (DUONEB) 0.5-2.5 (3) MG/3ML SOLN nebulizer solution Inhale 3 mLs into the lungs every 6 hours as needed for Shortness of Breath 12/6/20   Venkat Prajwal Jorden Ormond, MD   Nutritional Supplements (GLUCERNA CARBSTEADY) LIQD Take 1 Can by mouth 3 times daily 6/16/20   TERRANCE Hutton - CNP   COMFORT EZ PEN NEEDLES 31G X 8 MM MISC USE AS DIRECTED 20   TERRANCE Casper CNP   TRUE METRIX BLOOD GLUCOSE TEST strip TEST BLOOD SUGAR 4 TO 5 TIMES DAILY  Patient not taking: Reported on 19   TERRANCE Casper CNP        Allergies:     Lisinopril, Ace inhibitors, and Pcn [penicillins]    Social History:     Tobacco:    reports that he quit smoking about 6 years ago. His smoking use included cigarettes. He has a 17.50 pack-year smoking history. He has never used smokeless tobacco.  Alcohol:      reports no history of alcohol use. Drug Use:  reports no history of drug use. Family History:     Family History   Problem Relation Age of Onset    Cancer Mother     Heart Disease Father        Review of Systems:     Positive and Negative as described in HPI.     CONSTITUTIONAL:  negative for fevers, chills, sweats, fatigue, weight loss  HEENT:  negative for vision, hearing changes, runny nose, throat pain  RESPIRATORY:  + for shortness of breath, cough, congestion, wheezing  CARDIOVASCULAR:  + for chest pain with breathing difficulty, denies palpitations  GASTROINTESTINAL:  negative for nausea, vomiting, diarrhea, constipation, change in bowel habits, abdominal pain   GENITOURINARY:  negative for difficulty of urination, burning with urination, frequency   INTEGUMENT:  negative for rash, skin lesions, easy bruising   HEMATOLOGIC/LYMPHATIC:  negative for swelling/edema   ALLERGIC/IMMUNOLOGIC:  negative for urticaria , itching  ENDOCRINE:  negative increase in drinking, increase in urination, hot or cold intolerance  MUSCULOSKELETAL:  negative joint pains, muscle aches, swelling of joints  NEUROLOGICAL:  negative for headaches, dizziness, lightheadedness, numbness, pain, tingling extremities  BEHAVIOR/PSYCH:  negative for depression, anxiety    Physical Exam:   BP (!) 124/55   Pulse 53   Temp 97.5 °F (36.4 °C) (Oral)   Resp 10   SpO2 98%   Temp (24hrs), Av.5 °F (36.4 °C), Min:97.5 °F (36.4 °C), Max:97.5 °F (36.4 °C)    Recent Labs 2.0    Positive Base Excess, Marlon 2 0.0 - 3.0    O2 Sat, Marlon 87 (H) 60.0 - 85.0 %    O2 Device/Flow/% NOT REPORTED     Rony Test NOT REPORTED     Sample Site NOT REPORTED     Mode NOT REPORTED     FIO2 NOT REPORTED     Pt Temp NOT REPORTED     POC pH Temp NOT REPORTED     POC pCO2 Temp NOT REPORTED mm Hg    POC pO2 Temp NOT REPORTED mm Hg   ELECTROLYTES PLUS    Collection Time: 05/15/21  1:37 PM   Result Value Ref Range    POC Sodium 134 (L) 138 - 146 mmol/L    POC Potassium 4.5 3.5 - 4.5 mmol/L    POC Chloride 97 (L) 98 - 107 mmol/L    POC TCO2 28 22 - 30 mmol/L    Anion Gap 11 7 - 16 mmol/L   Hemoglobin and hematocrit, blood    Collection Time: 05/15/21  1:37 PM   Result Value Ref Range    POC Hemoglobin 7.4 (L) 13.5 - 17.5 g/dL    POC Hematocrit 22 (L) 41 - 53 %   Creatinine W/GFR Point of Care    Collection Time: 05/15/21  1:37 PM   Result Value Ref Range    POC Creatinine 3.51 (H) 0.51 - 1.19 mg/dL    GFR Comment 21 (L) >60 mL/min    GFR Non- 18 (L) >60 mL/min    GFR Comment         CALCIUM, IONIC (POC)    Collection Time: 05/15/21  1:37 PM   Result Value Ref Range    POC Ionized Calcium 1.15 1.15 - 1.33 mmol/L   POCT urea (BUN)    Collection Time: 05/15/21  1:37 PM   Result Value Ref Range    POC BUN 58 (H) 8 - 26 mg/dL   Lactic Acid, POC    Collection Time: 05/15/21  1:37 PM   Result Value Ref Range    POC Lactic Acid 0.56 0.56 - 1.39 mmol/L   POCT Glucose    Collection Time: 05/15/21  1:37 PM   Result Value Ref Range    POC Glucose 226 (H) 74 - 100 mg/dL   Basic Metabolic Panel    Collection Time: 05/15/21  1:44 PM   Result Value Ref Range    Glucose 227 (H) 70 - 99 mg/dL    BUN 58 (H) 8 - 23 mg/dL    CREATININE 3.05 (H) 0.70 - 1.20 mg/dL    Bun/Cre Ratio NOT REPORTED 9 - 20    Calcium 8.1 (L) 8.6 - 10.4 mg/dL    Sodium 132 (L) 135 - 144 mmol/L    Potassium 4.6 3.7 - 5.3 mmol/L    Chloride 98 98 - 107 mmol/L    CO2 25 20 - 31 mmol/L    Anion Gap 9 9 - 17 mmol/L    GFR Non-African American 21 (L) >60 mL/min    GFR  25 (L) >60 mL/min    GFR Comment          GFR Staging NOT REPORTED    Brain Natriuretic Peptide    Collection Time: 05/15/21  1:44 PM   Result Value Ref Range    Pro-BNP 6,126 (H) <300 pg/mL    BNP Interpretation Pro-BNP Reference Range:    CBC Auto Differential    Collection Time: 05/15/21  1:44 PM   Result Value Ref Range    WBC 8.9 3.5 - 11.3 k/uL    RBC 2.45 (L) 4.21 - 5.77 m/uL    Hemoglobin 7.8 (L) 13.0 - 17.0 g/dL    Hematocrit 24.3 (L) 40.7 - 50.3 %    MCV 99.2 82.6 - 102.9 fL    MCH 31.8 25.2 - 33.5 pg    MCHC 32.1 28.4 - 34.8 g/dL    RDW 13.3 11.8 - 14.4 %    Platelets 280 065 - 211 k/uL    MPV 10.7 8.1 - 13.5 fL    NRBC Automated 0.2 (H) 0.0 per 100 WBC    Differential Type NOT REPORTED     WBC Morphology NOT REPORTED     RBC Morphology NOT REPORTED     Platelet Estimate NOT REPORTED     Seg Neutrophils 72 (H) 36 - 65 %    Lymphocytes 11 (L) 24 - 43 %    Monocytes 9 3 - 12 %    Eosinophils % 4 1 - 4 %    Basophils 1 0 - 2 %    Immature Granulocytes 4 (H) 0 %    Segs Absolute 6.46 1.50 - 8.10 k/uL    Absolute Lymph # 1.02 (L) 1.10 - 3.70 k/uL    Absolute Mono # 0.76 0.10 - 1.20 k/uL    Absolute Eos # 0.31 0.00 - 0.44 k/uL    Basophils Absolute 0.05 0.00 - 0.20 k/uL    Absolute Immature Granulocyte 0.32 (H) 0.00 - 0.30 k/uL   D-Dimer, Quantitative    Collection Time: 05/15/21  1:44 PM   Result Value Ref Range    D-Dimer, Quant 2.06 mg/L FEU   Troponin    Collection Time: 05/15/21  1:44 PM   Result Value Ref Range    Troponin, High Sensitivity 174 (HH) 0 - 22 ng/L    Troponin T NOT REPORTED <0.03 ng/mL    Troponin Interp NOT REPORTED    Troponin    Collection Time: 05/15/21  3:47 PM   Result Value Ref Range    Troponin, High Sensitivity 155 (HH) 0 - 22 ng/L    Troponin T NOT REPORTED <0.03 ng/mL    Troponin Interp NOT REPORTED        Imaging/Diagnostics:  XR CHEST PORTABLE    Result Date: 5/15/2021  Findings compatible with mild interstitial edema.  Dialysis catheter terminates at the distal SVC. XR CHEST PORTABLE    Result Date: 5/9/2021  Stable pulmonary vascular congestion     IR TUNNELED CVC PLACE WO SQ PORT/PUMP > 5 YEARS    Result Date: 5/11/2021  Successful ultrasound and fluoroscopy guided tunneled catheter placement through conversion of old temp cath site. IR NON TUNNELED CATH WO PORT REPLACEMENT    Result Date: 5/10/2021  Successful placement of non tunneled hemodialysis catheter. Okay to use hemodialysis catheter. Assessment :      Hospital Problems         Last Modified POA    * (Principal) SOB (shortness of breath) 5/15/2021 Yes    COPD with acute exacerbation ([address] 5/15/2021 Yes    Essential hypertension 5/15/2021 Yes    Acute kidney injury superimposed on CKD (HCC)-currently dialysis patient 5/15/2021 Yes    Leg swelling 5/15/2021 Yes    Anemia 5/15/2021 Yes    Chronic heart failure with preserved ejection fraction ([address] 5/15/2021 Yes    Uncontrolled type 2 diabetes mellitus with hyperglycemia ([facility] [address] 5/15/2021 Yes          Plan:     Patient status inpatient in the Progressive Unit/Step down    Resume home medications  Start IV Solu-Medrol and aerosol treatments  DVT prophylaxis with heparin  GI prophylaxis  Lasix 40 mg IV x1  Insulin sliding scale in addition to home dose of Lantus insulin  Consult nephrology being a dialysis patient    Consultations:   IP CONSULT TO HOSPITALIST  IP CONSULT TO NEPHROLOGY     Patient is admitted as inpatient status because of co-morbidities listed above, severity of signs and symptoms as outlined, requirement for current medical therapies and most importantly because of direct risk to patient if care not provided in a hospital setting. Expected length of stay > 48 hours.     Gurpreet Gordillo MD  5/15/2021  5:08 PM    Copy sent to Dr. Nirmala Bang, APRN - CNP

## 2021-05-15 NOTE — ED PROVIDER NOTES
[facility]     Emergency Department     Faculty Note/ Attestation      Pt Name: Annita Saenz                                       MRN: [id number]  Mahsagffan 1955  Date of evaluation: 5/15/2021    Patients PCP:    TERRANCE Jensen - CNP    Attestation  I performed a history and physical examination of the patient and discussed management with the resident. I reviewed the residents note and agree with the documented findings and plan of care. Any areas of disagreement are noted on the chart. I was personally present for the key portions of any procedures. I have documented in the chart those procedures where I was not present during the key portions. I have reviewed the emergency nurses triage note. I agree with the chief complaint, past medical history, past surgical history, allergies, medications, social and family history as documented unless otherwise noted below. For Physician Assistant/ Nurse Practitioner cases/documentation I have personally evaluated this patient and have completed at least one if not all key elements of the E/M (history, physical exam, and MDM). Additional findings are as noted. Initial Screens:             Vitals:    Vitals:    05/15/21 1334   BP: (!) 144/74   Pulse: 54   Resp: 23   Temp: 97.5 °F (36.4 °C)   TempSrc: Oral   SpO2: 96%       CHIEF COMPLAINT       Chief Complaint   Patient presents with    Shortness of Breath       Patient just discharged yesterday severe shortness of breath now patient required CPAP magnesium was given prednisone by EMS patient notes some improvement specifically with the CPAP        EMERGENCY DEPARTMENT COURSE:     -------------------------  BP: (!) 144/74, Temp: 97.5 °F (36.4 °C), Pulse: 54, Resp: 23  Physical Exam  Constitutional:       Appearance: He is well-developed. He is not diaphoretic. HENT:      Head: Normocephalic and atraumatic.       Right Ear: External ear normal.      Left Ear: External ear normal.   Eyes:      General: No scleral icterus. Right eye: No discharge. Left eye: No discharge. Neck:      Trachea: No tracheal deviation. Pulmonary:      Effort: Pulmonary effort is normal. No respiratory distress. Breath sounds: No stridor. Musculoskeletal:         General: Normal range of motion. Cervical back: Normal range of motion. Skin:     General: Skin is warm and dry. Neurological:      Mental Status: He is alert and oriented to person, place, and time. Coordination: Coordination normal.   Psychiatric:         Behavior: Behavior normal.         Comments  Patient ultrasound consistent with pulmonary edema patient will need admission for fluid overload patient respiratory distress improving after magnesium CPAP patient no longer wanting to wear the CPAP current blood pressures still hypertensive  EKG Interpretation   Interpreted by Magi Nevarez DO    Rhythm: sinus bradycardia  Rate: bradycardia  Axis: normal  Ectopy: none  Conduction: normal  ST Segments: normal  T Waves: Inversion V6,I and AVL  Q Waves: none    Clinical Impression: nonspecific          Magi Nevarez DO,, DO, RDMS.   Attending Emergency Physician          Magi Nevarez DO  05/15/21 1520

## 2021-05-15 NOTE — ED PROVIDER NOTES
[address] [facility]  Emergency Department  Emergency Medicine Resident Sign-out     Care of Joselin Knight was assumed from Dr. Vianney Benoit and is being seen for Shortness of Breath  . The patient's initial evaluation and plan have been discussed with the prior provider who initially evaluated the patient.      EMERGENCY DEPARTMENT COURSE / MEDICAL DECISION MAKING:       MEDICATIONS GIVEN:  ED Medication Orders (From admission, onward)    Start Ordered     Status Ordering Provider    05/15/21 1715 05/15/21 1701  furosemide (LASIX) injection 40 mg  ONCE      Last MAR action: Held - by Duglas Martinez on 05/15/21 at 1732 [address] Yoly K    05/15/21 1415 05/15/21 1402  nitroGLYCERIN 50 mg in dextrose 5% 250 mL infusion  CONTINUOUS      Last MAR action: Stopped - by Duglas Martinez on 05/15/21 at 6901 [facility] / RADIOLOGY:     Labs Reviewed   BASIC METABOLIC PANEL - Abnormal; Notable for the following components:       Result Value    Glucose 227 (*)     BUN 58 (*)     CREATININE 3.05 (*)     Calcium 8.1 (*)     Sodium 132 (*)     GFR Non- 21 (*)     GFR  25 (*)     All other components within normal limits   BRAIN NATRIURETIC PEPTIDE - Abnormal; Notable for the following components:    Pro-BNP 6,126 (*)     All other components within normal limits   CBC WITH AUTO DIFFERENTIAL - Abnormal; Notable for the following components:    RBC 2.45 (*)     Hemoglobin 7.8 (*)     Hematocrit 24.3 (*)     NRBC Automated 0.2 (*)     Seg Neutrophils 72 (*)     Lymphocytes 11 (*)     Immature Granulocytes 4 (*)     Absolute Lymph # 1.02 (*)     Absolute Immature Granulocyte 0.32 (*)     All other components within normal limits   TROPONIN - Abnormal; Notable for the following components:    Troponin, High Sensitivity 174 (*)     All other components within normal limits   TROPONIN - Abnormal; Notable for the following components:    Troponin, High Sensitivity 155 (*)     All other components within normal limits   ELECTROLYTES PLUS - Abnormal; Notable for the following components:    POC Sodium 134 (*)     POC Chloride 97 (*)     All other components within normal limits   HGB/HCT - Abnormal; Notable for the following components:    POC Hemoglobin 7.4 (*)     POC Hematocrit 22 (*)     All other components within normal limits   VENOUS BLOOD GAS, POINT OF CARE - Abnormal; Notable for the following components:    pO2, Marlon 55.0 (*)     O2 Sat, Marlon 87 (*)     All other components within normal limits   CREATININE W/GFR POINT OF CARE - Abnormal; Notable for the following components:    POC Creatinine 3.51 (*)     GFR Comment 21 (*)     GFR Non- 18 (*)     All other components within normal limits   UREA N (POC) - Abnormal; Notable for the following components:    POC BUN 58 (*)     All other components within normal limits   POCT GLUCOSE - Abnormal; Notable for the following components:    POC Glucose 226 (*)     All other components within normal limits   D-DIMER, QUANTITATIVE   CALCIUM, IONIC (POC)   LACTIC ACID,POINT OF CARE   POC BLOOD GAS       XR CHEST PORTABLE    Result Date: 5/15/2021  EXAMINATION: ONE XRAY VIEW OF THE CHEST 5/15/2021 2:11 pm COMPARISON: 05/09/2021, 05/08/2021 HISTORY: ORDERING SYSTEM PROVIDED HISTORY: sob TECHNOLOGIST PROVIDED HISTORY: sob FINDINGS: Tunneled right internal jugular dialysis catheter terminates at the distal SVC. Vascular congestion with mild septal thickening noted. No focal consolidation. There is no effusion or pneumothorax. The cardiomediastinal silhouette is stable. The osseous structures are stable. Findings compatible with mild interstitial edema. Dialysis catheter terminates at the distal SVC. RECENT VITALS:     Temp: 97.5 °F (36.4 °C),  Pulse: 53, Resp: 10, BP: (!) 124/55, SpO2: 98 %    This patient is a 73 y.o. Male with recent discharge from hospital for HTN urgency. Hx ESRD on HD. Dyspnea today. Alena B lines on US. Pericardial effusion. CPAP, Nitro drip with improvement. Troponin elevated but at baseline for patient in light for renal function. ED Course as of May 15 1746   Sat May 15, 2021   1358 . [WK]   1400 Patient found to have pericardial effusion and pulm edema on transthoracic echocardiogram will start nitroglycerin for acute pulmonary edema. [RENATE]   1521 Patient had elevated D-dimer 2.06, however patient has elevated creatinine, cute on chronic kidney injury, patient's troponin is elevated from baseline, patient has elevated BNP, this is consistent with prior studies, patient has clinical pulmonary edema, nitroglycerin was started, will admit patient to Intermed services for further evaluation. [RENATE]   4613 Patient admitted to Intermed services, signed out to oncoming resident. [RENATE]      ED Course User Index  [RENATE] Sandra Reyna DO  [WK] Emily Sandoval DO         OUTSTANDING TASKS / RECOMMENDATIONS:    1. Awaiting inpatient bed     FINAL IMPRESSION:     1. Dyspnea, unspecified type        DISPOSITION:       DISPOSITION:  []  Discharge   []  Transfer -    [x]  Admission -  [facility]   []  Against Medical Advice   []  Eloped   FOLLOW-UP: No follow-up provider specified. DISCHARGE MEDICATIONS: New Prescriptions    No medications on file          Leola Stubbs, [place]  Emergency Medicine Resident Physician, PGY-3  05/15/21 5:46 PM            Iván Fern[address]dez 1722, DO  Resident  05/15/21 1940

## 2021-05-15 NOTE — PROGRESS NOTES
Pharmacy Note     Renal Dose Adjustment    Augustine Nicole is a 73 y.o. male. Pharmacist assessment of renally cleared medications. Recent Labs     05/14/21  1000 05/15/21  1344   BUN 73* 58*       Recent Labs     05/15/21  1337 05/15/21  1344   CREATININE 3.51* 3.05*       CrCl cannot be calculated (Unknown ideal weight.). Height:   Ht Readings from Last 1 Encounters:   05/07/21 5' 6\" (1.676 m)     Weight:  Wt Readings from Last 1 Encounters:   05/15/21 155 lb 10.3 oz (70.6 kg)       The following medication dose has been adjusted based upon renal function per P&T Guidelines:             Decrease Pepcid to 10 mg PO daily. Thank you. Will continue to follow.   Sheri Hensley, PharmD

## 2021-05-15 NOTE — ED NOTES
Bed: 24  Expected date:   Expected time:   Means of arrival:   Comments:  R Tracy Infante RN  05/15/21 2768

## 2021-05-15 NOTE — ED NOTES
Discontinue nitro drip and hold lasix per verbal order Dr. Uribe Asp. Will given lasix upon arrival back to ed once dialysis completed.       Brittany Maradiaga RN  05/15/21 1644

## 2021-05-16 ENCOUNTER — ANCILLARY PROCEDURE (OUTPATIENT)
Dept: EMERGENCY DEPT | Age: 66
DRG: 280 | End: 2021-05-16
Payer: COMMERCIAL

## 2021-05-16 ENCOUNTER — APPOINTMENT (OUTPATIENT)
Dept: GENERAL RADIOLOGY | Age: 66
DRG: 280 | End: 2021-05-16
Payer: COMMERCIAL

## 2021-05-16 LAB
ANION GAP SERPL CALCULATED.3IONS-SCNC: 16 MMOL/L (ref 9–17)
BUN BLDV-MCNC: 51 MG/DL (ref 8–23)
BUN/CREAT BLD: ABNORMAL (ref 9–20)
CALCIUM SERPL-MCNC: 8.4 MG/DL (ref 8.6–10.4)
CHLORIDE BLD-SCNC: 98 MMOL/L (ref 98–107)
CO2: 21 MMOL/L (ref 20–31)
CREAT SERPL-MCNC: 3.1 MG/DL (ref 0.7–1.2)
ESTIMATED AVERAGE GLUCOSE: 220 MG/DL
GFR AFRICAN AMERICAN: 25 ML/MIN
GFR NON-AFRICAN AMERICAN: 20 ML/MIN
GFR SERPL CREATININE-BSD FRML MDRD: ABNORMAL ML/MIN/{1.73_M2}
GFR SERPL CREATININE-BSD FRML MDRD: ABNORMAL ML/MIN/{1.73_M2}
GLUCOSE BLD-MCNC: 196 MG/DL (ref 75–110)
GLUCOSE BLD-MCNC: 198 MG/DL (ref 75–110)
GLUCOSE BLD-MCNC: 234 MG/DL (ref 70–99)
GLUCOSE BLD-MCNC: 268 MG/DL (ref 75–110)
GLUCOSE BLD-MCNC: 339 MG/DL (ref 75–110)
GLUCOSE BLD-MCNC: 362 MG/DL (ref 75–110)
GLUCOSE BLD-MCNC: 417 MG/DL (ref 75–110)
GLUCOSE BLD-MCNC: 440 MG/DL (ref 75–110)
HBA1C MFR BLD: 9.3 % (ref 4–6)
HCT VFR BLD CALC: 26.7 % (ref 40.7–50.3)
HEMOGLOBIN: 8.3 G/DL (ref 13–17)
MCH RBC QN AUTO: 31.1 PG (ref 25.2–33.5)
MCHC RBC AUTO-ENTMCNC: 31.1 G/DL (ref 28.4–34.8)
MCV RBC AUTO: 100 FL (ref 82.6–102.9)
NRBC AUTOMATED: 0 PER 100 WBC
PDW BLD-RTO: 13.4 % (ref 11.8–14.4)
PLATELET # BLD: 309 K/UL (ref 138–453)
PMV BLD AUTO: 11 FL (ref 8.1–13.5)
POTASSIUM SERPL-SCNC: 4.7 MMOL/L (ref 3.7–5.3)
RBC # BLD: 2.67 M/UL (ref 4.21–5.77)
SODIUM BLD-SCNC: 135 MMOL/L (ref 135–144)
TROPONIN INTERP: ABNORMAL
TROPONIN T: ABNORMAL NG/ML
TROPONIN, HIGH SENSITIVITY: 120 NG/L (ref 0–22)
WBC # BLD: 11.4 K/UL (ref 3.5–11.3)

## 2021-05-16 PROCEDURE — 1800000000 HC LEAVE OF ABSENCE

## 2021-05-16 PROCEDURE — 71045 X-RAY EXAM CHEST 1 VIEW: CPT

## 2021-05-16 PROCEDURE — 6370000000 HC RX 637 (ALT 250 FOR IP): Performed by: INTERNAL MEDICINE

## 2021-05-16 PROCEDURE — 80048 BASIC METABOLIC PNL TOTAL CA: CPT

## 2021-05-16 PROCEDURE — 96375 TX/PRO/DX INJ NEW DRUG ADDON: CPT

## 2021-05-16 PROCEDURE — 96376 TX/PRO/DX INJ SAME DRUG ADON: CPT

## 2021-05-16 PROCEDURE — 94640 AIRWAY INHALATION TREATMENT: CPT

## 2021-05-16 PROCEDURE — 99232 SBSQ HOSP IP/OBS MODERATE 35: CPT | Performed by: FAMILY MEDICINE

## 2021-05-16 PROCEDURE — 6360000002 HC RX W HCPCS: Performed by: INTERNAL MEDICINE

## 2021-05-16 PROCEDURE — 6360000002 HC RX W HCPCS: Performed by: FAMILY MEDICINE

## 2021-05-16 PROCEDURE — 99232 SBSQ HOSP IP/OBS MODERATE 35: CPT | Performed by: INTERNAL MEDICINE

## 2021-05-16 PROCEDURE — 82947 ASSAY GLUCOSE BLOOD QUANT: CPT

## 2021-05-16 PROCEDURE — G0378 HOSPITAL OBSERVATION PER HR: HCPCS

## 2021-05-16 PROCEDURE — 85027 COMPLETE CBC AUTOMATED: CPT

## 2021-05-16 PROCEDURE — 6370000000 HC RX 637 (ALT 250 FOR IP): Performed by: FAMILY MEDICINE

## 2021-05-16 PROCEDURE — 2580000003 HC RX 258: Performed by: INTERNAL MEDICINE

## 2021-05-16 PROCEDURE — 2060000000 HC ICU INTERMEDIATE R&B

## 2021-05-16 PROCEDURE — 2700000000 HC OXYGEN THERAPY PER DAY

## 2021-05-16 PROCEDURE — 84484 ASSAY OF TROPONIN QUANT: CPT

## 2021-05-16 PROCEDURE — 96372 THER/PROPH/DIAG INJ SC/IM: CPT

## 2021-05-16 PROCEDURE — 93308 TTE F-UP OR LMTD: CPT

## 2021-05-16 PROCEDURE — 36415 COLL VENOUS BLD VENIPUNCTURE: CPT

## 2021-05-16 PROCEDURE — 94761 N-INVAS EAR/PLS OXIMETRY MLT: CPT

## 2021-05-16 PROCEDURE — 2500000003 HC RX 250 WO HCPCS: Performed by: FAMILY MEDICINE

## 2021-05-16 RX ORDER — CLONIDINE HYDROCHLORIDE 0.1 MG/1
0.1 TABLET ORAL EVERY 4 HOURS PRN
Status: DISCONTINUED | OUTPATIENT
Start: 2021-05-16 | End: 2021-05-17 | Stop reason: HOSPADM

## 2021-05-16 RX ORDER — HYDRALAZINE HYDROCHLORIDE 20 MG/ML
10 INJECTION INTRAMUSCULAR; INTRAVENOUS EVERY 6 HOURS PRN
Status: DISCONTINUED | OUTPATIENT
Start: 2021-05-16 | End: 2021-05-17 | Stop reason: HOSPADM

## 2021-05-16 RX ORDER — LABETALOL HYDROCHLORIDE 5 MG/ML
20 INJECTION, SOLUTION INTRAVENOUS ONCE
Status: COMPLETED | OUTPATIENT
Start: 2021-05-16 | End: 2021-05-16

## 2021-05-16 RX ADMIN — Medication 20 MG: at 15:43

## 2021-05-16 RX ADMIN — CLONIDINE HYDROCHLORIDE 0.2 MG: 0.1 TABLET ORAL at 08:30

## 2021-05-16 RX ADMIN — HEPARIN SODIUM 5000 UNITS: 5000 INJECTION INTRAVENOUS; SUBCUTANEOUS at 22:52

## 2021-05-16 RX ADMIN — INSULIN GLARGINE 10 UNITS: 100 INJECTION, SOLUTION SUBCUTANEOUS at 21:15

## 2021-05-16 RX ADMIN — METHYLPREDNISOLONE SODIUM SUCCINATE 40 MG: 125 INJECTION, POWDER, FOR SOLUTION INTRAMUSCULAR; INTRAVENOUS at 17:24

## 2021-05-16 RX ADMIN — CLONIDINE HYDROCHLORIDE 0.1 MG: 0.1 TABLET ORAL at 23:06

## 2021-05-16 RX ADMIN — Medication 81 MG: at 08:31

## 2021-05-16 RX ADMIN — FAMOTIDINE 10 MG: 20 TABLET, FILM COATED ORAL at 08:31

## 2021-05-16 RX ADMIN — CARVEDILOL 25 MG: 12.5 TABLET, FILM COATED ORAL at 17:24

## 2021-05-16 RX ADMIN — GABAPENTIN 100 MG: 100 CAPSULE ORAL at 15:44

## 2021-05-16 RX ADMIN — HYDRALAZINE HYDROCHLORIDE 100 MG: 50 TABLET, FILM COATED ORAL at 15:45

## 2021-05-16 RX ADMIN — HYDRALAZINE HYDROCHLORIDE 100 MG: 50 TABLET, FILM COATED ORAL at 21:07

## 2021-05-16 RX ADMIN — CLONIDINE HYDROCHLORIDE 0.2 MG: 0.1 TABLET ORAL at 21:06

## 2021-05-16 RX ADMIN — CLOPIDOGREL 75 MG: 75 TABLET, FILM COATED ORAL at 08:30

## 2021-05-16 RX ADMIN — GABAPENTIN 100 MG: 100 CAPSULE ORAL at 08:30

## 2021-05-16 RX ADMIN — METHYLPREDNISOLONE SODIUM SUCCINATE 40 MG: 125 INJECTION, POWDER, FOR SOLUTION INTRAMUSCULAR; INTRAVENOUS at 05:13

## 2021-05-16 RX ADMIN — CLONIDINE HYDROCHLORIDE 0.2 MG: 0.1 TABLET ORAL at 15:46

## 2021-05-16 RX ADMIN — HYDRALAZINE HYDROCHLORIDE 100 MG: 50 TABLET, FILM COATED ORAL at 05:13

## 2021-05-16 RX ADMIN — ISOSORBIDE MONONITRATE 60 MG: 60 TABLET ORAL at 08:31

## 2021-05-16 RX ADMIN — PRAZOSIN HYDROCHLORIDE 4 MG: 1 CAPSULE ORAL at 08:30

## 2021-05-16 RX ADMIN — METHYLPREDNISOLONE SODIUM SUCCINATE 40 MG: 125 INJECTION, POWDER, FOR SOLUTION INTRAMUSCULAR; INTRAVENOUS at 22:52

## 2021-05-16 RX ADMIN — HEPARIN SODIUM 5000 UNITS: 5000 INJECTION INTRAVENOUS; SUBCUTANEOUS at 05:13

## 2021-05-16 RX ADMIN — LOSARTAN POTASSIUM 50 MG: 50 TABLET, FILM COATED ORAL at 21:06

## 2021-05-16 RX ADMIN — HYDRALAZINE HYDROCHLORIDE 10 MG: 20 INJECTION INTRAMUSCULAR; INTRAVENOUS at 12:48

## 2021-05-16 RX ADMIN — SODIUM BICARBONATE 1300 MG: 648 TABLET ORAL at 08:30

## 2021-05-16 RX ADMIN — GABAPENTIN 100 MG: 100 CAPSULE ORAL at 21:06

## 2021-05-16 RX ADMIN — CARVEDILOL 25 MG: 12.5 TABLET, FILM COATED ORAL at 08:30

## 2021-05-16 RX ADMIN — PRAZOSIN HYDROCHLORIDE 4 MG: 1 CAPSULE ORAL at 21:07

## 2021-05-16 RX ADMIN — IPRATROPIUM BROMIDE AND ALBUTEROL SULFATE 3 ML: .5; 2.5 SOLUTION RESPIRATORY (INHALATION) at 10:55

## 2021-05-16 RX ADMIN — SODIUM BICARBONATE 1300 MG: 648 TABLET ORAL at 21:06

## 2021-05-16 RX ADMIN — INSULIN LISPRO 5 UNITS: 100 INJECTION, SOLUTION INTRAVENOUS; SUBCUTANEOUS at 17:31

## 2021-05-16 RX ADMIN — LOSARTAN POTASSIUM 50 MG: 50 TABLET, FILM COATED ORAL at 08:30

## 2021-05-16 RX ADMIN — IPRATROPIUM BROMIDE AND ALBUTEROL SULFATE 3 ML: .5; 2.5 SOLUTION RESPIRATORY (INHALATION) at 21:54

## 2021-05-16 RX ADMIN — INSULIN LISPRO 5 UNITS: 100 INJECTION, SOLUTION INTRAVENOUS; SUBCUTANEOUS at 12:02

## 2021-05-16 RX ADMIN — INSULIN LISPRO 8 UNITS: 100 INJECTION, SOLUTION INTRAVENOUS; SUBCUTANEOUS at 12:02

## 2021-05-16 RX ADMIN — SODIUM CHLORIDE, PRESERVATIVE FREE 10 ML: 5 INJECTION INTRAVENOUS at 21:17

## 2021-05-16 RX ADMIN — METHYLPREDNISOLONE SODIUM SUCCINATE 40 MG: 125 INJECTION, POWDER, FOR SOLUTION INTRAMUSCULAR; INTRAVENOUS at 12:01

## 2021-05-16 RX ADMIN — INSULIN LISPRO 2 UNITS: 100 INJECTION, SOLUTION INTRAVENOUS; SUBCUTANEOUS at 17:32

## 2021-05-16 ASSESSMENT — ENCOUNTER SYMPTOMS
DIARRHEA: 0
NAUSEA: 0
CHEST TIGHTNESS: 0
BLOOD IN STOOL: 0
COUGH: 1
SHORTNESS OF BREATH: 1
WHEEZING: 0
ABDOMINAL PAIN: 0
VOMITING: 0
CONSTIPATION: 0

## 2021-05-16 ASSESSMENT — PAIN SCALES - GENERAL
PAINLEVEL_OUTOF10: 0
PAINLEVEL_OUTOF10: 0

## 2021-05-16 NOTE — PLAN OF CARE
Problem: Falls - Risk of:  Goal: Will remain free from falls  Description: Will remain free from falls  5/16/2021 0259 by Nely Gaytan RN  Outcome: Ongoing  5/16/2021 0258 by Nely Gaytan RN  Outcome: Ongoing     Problem: Fluid Volume:  Goal: Hemodynamic stability will improve  Description: Hemodynamic stability will improve  Outcome: Ongoing

## 2021-05-16 NOTE — PROGRESS NOTES
[facility]  Office: [address], DO, Lindsay Leyva, DO, Zulema Plana, DO, Raman Moreira Blood, DO, Marylene Freiberg, MD, Martina Jacobson MD, Starla Meraz MD, Autumn Presley MD, Margi Hull MD, Elaine Vidal MD, Renetta Hadley MD, Fatimah Valencia MD, Mihir Godwin, DO, Quinn Berman MD, Hedy Jimenez, DO, Paddy Burrows MD,  Orien Dakin, DO, Meryl Means MD, Lo Knox MD, Olive Padgett MD, Geo Roy MD, Wicho Gilmore, Santana Jacobo, CNP, Sera Wells, CNP, Jose G Medrnao, CNS, Christy Esteban, CNP, Bryanna Lewis, CNP, Lloyd Slade, CNP, Cynthia Barclay, CNP, Karen Larios, CNP, Burke Garcia PA-C, Ashley Dickinson, [facility], Yvan Feliciano, CNP, Bailey Chou, CNP, Justin French, CNP, Adrianne So, CNP, Vandana Duke, CNP, Jackie Shi, [facility]    Progress Note    5/16/2021    5:20 PM    Name:   Milad Rhodes  MRN:     [id number]     Acct:      [de-identified]   Room:   [address] [place] [address] Day:  1  Admit Date:  5/15/2021  1:29 PM    PCP:   TERRANCE Sequeira CNP  Code Status:  Full Code    Subjective:     C/C:   Chief Complaint   Patient presents with    Shortness of Breath     Interval History Status: improved. Patient breathing is better. He feels he is close to his baseline. Blood pressure still labile, meds were held overnight. Denies any chest pain. Does not like any changes in his diabetes medications. Labs and vitals from overnight and notes reviewed   Discussed with the nurse    Brief History:     Per chart  Admitted through ER with following information:  Michael Bhakta a 73 y. o. male who presents with sudden onset shortness of breath, patient has a newly diagnosed chronic kidney disease requiring dialysis, secondary to hypertension, patient was recently discharged from the hospital for what sounds like shortness of breath, hypertensive crisis, new onset kidney disease with dialysis 4 days in a row. Krys Bates states that he went shopping today was walking up the stairs when he had sudden onset shortness of breath did 3 DuoNeb treatments back-to-back. Krys Bates arrives to the hospital wearing CPAP, patient is a daily smoker, patient is working harder to breathe, he is tachypneic and using some accessory muscles.  Patient is requesting a CPAP be removed, will do a trial off of CPAP.  Patient denies any chest pain, fever, chills, recent upper respiratory illness, abdominal pain, back pain.  Patient is a type II diabetic. Patient received prednisone and magnesium by EMS     Patient states he is already feeling better after getting treatments by EMS and breathing treatment in hospital  Currently off BiPAP and is on oxygen via nasal cannula  His last dialysis was yesterday  He makes urine    Review of Systems:     Review of Systems   Constitutional: Negative for chills, diaphoresis and fever. HENT: Negative for congestion. Eyes: Negative for visual disturbance. Respiratory: Positive for cough and shortness of breath. Negative for chest tightness and wheezing. Cardiovascular: Negative for chest pain, palpitations and leg swelling. Gastrointestinal: Negative for abdominal pain, blood in stool, constipation, diarrhea, nausea and vomiting. Genitourinary: Negative for difficulty urinating. Neurological: Negative for dizziness, weakness, light-headedness, numbness and headaches. All other systems reviewed and are negative. Medications: Allergies:     Allergies   Allergen Reactions    Lisinopril      cough    Ace Inhibitors      coughing    Pcn [Penicillins]        Current Meds:   Scheduled Meds:    isosorbide mononitrate  60 mg Oral Daily    [START ON 5/17/2021] vitamin D  50,000 Units Oral Weekly    prazosin  4 mg Oral BID    hydrALAZINE  100 mg Oral 3 times per day    aspirin  81 mg Oral Daily    carvedilol  25 mg Oral BID WC    atorvastatin  80 mg Oral Daily    clopidogrel  75 mg Oral Daily    gabapentin  100 mg Oral TID    QUEtiapine  100 mg Oral Nightly    losartan  50 mg Oral BID    fluticasone  1 spray Each Nostril Daily    glucagon (rDNA)  1 mg Intramuscular See Admin Instructions    sodium bicarbonate  1,300 mg Oral BID    insulin glargine  10 Units Subcutaneous Nightly    cloNIDine  0.2 mg Oral TID    NIFEdipine  90 mg Oral Nightly    insulin lispro  5 Units Subcutaneous TID WC    insulin lispro  0-12 Units Subcutaneous TID WC    insulin lispro  0-6 Units Subcutaneous Nightly    sodium chloride flush  5-40 mL Intravenous 2 times per day    famotidine  10 mg Oral Daily    heparin (porcine)  5,000 Units Subcutaneous 3 times per day    methylPREDNISolone  40 mg Intravenous Q6H    Followed by   Luis Eduardo Andrade ON 5/18/2021] predniSONE  40 mg Oral Daily    furosemide  40 mg Intravenous Once    fluticasone-umeclidin-vilant  1 puff Inhalation Daily     Continuous Infusions:    nitroGLYCERIN Stopped (05/15/21 1732)    sodium chloride       PRN Meds: hydrALAZINE, ipratropium-albuterol, nitroGLYCERIN, sodium chloride flush, sodium chloride, promethazine **OR** ondansetron, nicotine, polyethylene glycol, acetaminophen **OR** acetaminophen, albuterol, heparin (porcine), heparin (porcine)    Data:     Past Medical History:   has a past medical history of Asthma, CAD in native artery, nonobstructive, Carotid stenosis, left, Carpal tunnel syndrome, Cerebrovascular disease, Chronic kidney disease, COPD (chronic obstructive pulmonary disease) ([facility] [address] Diabetes mellitus ([facility] [address] History of colon polyps, History of weakness of extremity, HTN (hypertension), Hyperlipidemia, Lung, cysts, congenital, PTSD (post-traumatic stress disorder), PTSD (post-traumatic stress disorder), TIA (transient ischemic attack), and Type II or unspecified type diabetes mellitus without mention of complication, not stated as uncontrolled.     Social History:   reports that he quit smoking about 6 years ago. His smoking use included cigarettes. He has a 17.50 pack-year smoking history. He has never used smokeless tobacco. He reports that he does not drink alcohol and does not use drugs. Family History:   Family History   Problem Relation Age of Onset    Cancer Mother     Heart Disease Father        Vitals:  BP (!) 185/72   Pulse 70   Temp 98.4 °F (36.9 °C) (Oral)   Resp (!) 33   Ht 5' 6\" (1.676 m)   Wt 155 lb 10.3 oz (70.6 kg)   SpO2 98%   BMI 25.12 kg/m²   Temp (24hrs), Av.2 °F (36.8 °C), Min:97.6 °F (36.4 °C), Max:98.8 °F (37.1 °C)    Recent Labs     21  0227 21  0512 21  0818 21  1112   POCGLU 417* 339* 198* 362*       I/O (24Hr):     Intake/Output Summary (Last 24 hours) at 2021 1720  Last data filed at 2021 3278  Gross per 24 hour   Intake 650 ml   Output 2895 ml   Net -2245 ml       Labs:  Hematology:  Recent Labs     21  0431 05/15/21  1344 21  0721   WBC 10.3 8.9 11.4*   RBC 2.49* 2.45* 2.67*   HGB 7.9* 7.8* 8.3*   HCT 24.9* 24.3* 26.7*   .0 99.2 100.0   MCH 31.7 31.8 31.1   MCHC 31.7 32.1 31.1   RDW 13.2 13.3 13.4    252 309   MPV 10.9 10.7 11.0   DDIMER  --  2.06  --      Chemistry:  Recent Labs     21  1000 05/15/21  1337 05/15/21  1344 05/15/21  1547 21  0721   *  --  132*  --  135   K 4.7  --  4.6  --  4.7   CL 91*  --  98  --  98   CO2 21  --  25  --  21   GLUCOSE 427*  --  227*  --  234*   BUN 73*  --  58*  --  51*   CREATININE 3.66* 3.51* 3.05*  --  3.10*   ANIONGAP 13  --  9  --  16   LABGLOM 17* 18* 21*  --  20*   GFRAA 20*  --  25*  --  25*   CALCIUM 7.1*  --  8.1*  --  8.4*   PROBNP  --   --  6,126*  --   --    TROPHS  --   --  174* 155* 120*     Recent Labs     05/15/21  2137 05/15/21  2326 21  0132 21  0227 21  0512 21  0818 21  1112   LABA1C 9.3*  --   --   --   --   --   --    POCGLU  --  372* 440* 417* 339* 198* 362*     ABG:  Lab Results   Component Value Date    POCPH 7.39 06/17/2013    POCPCO2 46 06/17/2013    POCPO2 288 06/17/2013    POCHCO3 27.8 06/17/2013    NBEA NOT REPORTED 06/17/2013    PBEA 3 06/17/2013    CIT0JUB 29 06/17/2013    GRUF0OZF 100 06/17/2013    FIO2 NOT REPORTED 05/15/2021     Lab Results   Component Value Date/Time    SPECIAL 2ML L FR 12/03/2020 11:24 AM     Lab Results   Component Value Date/Time    CULTURE NO GROWTH 6 DAYS 12/03/2020 11:24 AM       Radiology:  XR CHEST PORTABLE    Result Date: 5/16/2021  No significant change in chest findings. XR CHEST PORTABLE    Result Date: 5/15/2021  Findings compatible with mild interstitial edema. Dialysis catheter terminates at the distal SVC. XR CHEST PORTABLE    Result Date: 5/9/2021  Stable pulmonary vascular congestion     IR TUNNELED CVC PLACE WO SQ PORT/PUMP > 5 YEARS    Result Date: 5/11/2021  Successful ultrasound and fluoroscopy guided tunneled catheter placement through conversion of old temp cath site. IR NON TUNNELED CATH WO PORT REPLACEMENT    Result Date: 5/10/2021  Successful placement of non tunneled hemodialysis catheter. Okay to use hemodialysis catheter. Physical Examination:        Physical Exam  Vitals and nursing note reviewed. Constitutional:       General: He is not in acute distress. HENT:      Head: Normocephalic and atraumatic. Eyes:      Conjunctiva/sclera: Conjunctivae normal.      Pupils: Pupils are equal, round, and reactive to light. Cardiovascular:      Rate and Rhythm: Normal rate and regular rhythm. Heart sounds: No murmur heard. Pulmonary:      Effort: Pulmonary effort is normal. No accessory muscle usage or respiratory distress. Breath sounds: No stridor. Decreased breath sounds and wheezing present. No rhonchi or rales. Chest:      Comments: Tunneled cath noted  Abdominal:      General: Bowel sounds are normal. There is no distension. Palpations: Abdomen is soft. Abdomen is not rigid. Tenderness:  There is no abdominal tenderness. There is no guarding. Musculoskeletal:         General: No tenderness. Skin:     General: Skin is warm and dry. Findings: No erythema, lesion or rash. Neurological:      Mental Status: He is alert and oriented to person, place, and time. Cranial Nerves: No cranial nerve deficit. Motor: No seizure activity. Psychiatric:         Speech: Speech normal.         Behavior: Behavior normal. Behavior is cooperative.          Assessment:        Hospital Problems         Last Modified POA    * (Principal) COPD with acute exacerbation ([address] 5/16/2021 Yes    Essential hypertension 5/15/2021 Yes    Acute kidney injury superimposed on CKD (HCC)-currently dialysis patient 5/15/2021 Yes    Leg swelling 5/15/2021 Yes    Anemia 5/15/2021 Yes    Chronic heart failure with preserved ejection fraction ([address] 5/15/2021 Yes    SOB (shortness of breath) 5/16/2021 Yes    Uncontrolled type 2 diabetes mellitus with hyperglycemia ([address] 5/15/2021 Yes          Plan:          COPD exacerbation: continue breathing treatment, continue steroids, mucolytics and home aerosols    Uncontrolled HTN : please do not hold any BP medication without consulting physician, continue current regimen, add prn antihypertensives, appreciate nephro recommendations    BINU/CKD4 : Placed on dialysis recently during last admission, nephrology is on board, they evaluate for dialysis need    HTN: continue home medications    HPL: continue home medications    DM2: Continue diabetes home medications , patient does not like changes in insulin sliding scale, hypoglycemia protocol    DVT prophylaxis    Keep for today to maximize breathing status and BP, will reassess in am , likely to discharge    Discussed with the patient and the nurse      Mane Turner MD  5/16/2021  5:20 PM

## 2021-05-16 NOTE — FLOWSHEET NOTE
Assessment: The patient was calm and approachable. Intervention:  engaged in active listening.  asked if they would like prayer and informed them chaplains are available 24/7. The  was able to provide the patient with a Large Print Bible per the patient's request.     Outcome: They engaged in the conversation. Chaplains will remain available to offer spiritual and emotional support as needed. 05/16/21 3274   Encounter Summary   Services provided to: Patient   Referral/Consult From: Rounding   Continue Visiting   (05/16/21)   Complexity of Encounter Moderate   Length of Encounter 15 minutes   Spiritual Assessment Completed Yes   Routine   Type Initial   Assessment Calm; Approachable   Intervention Active listening;Provided reading materials/devotional materials   Outcome Engaged in conversation;Expressed gratitude

## 2021-05-16 NOTE — CARE COORDINATION
Case Management Initial Discharge Plan  Joselin Knight,             Met with:patient to discuss discharge plans. Information verified: address, contacts, phone number, , insurance Yes    Emergency Contact/Next of Kin name & number:   Crystal Zacarias (bryson) [phone]    PCP: TERRANCE Mcmahon CNP  Date of last visit: 1 month ago    Insurance Provider: Matt Zamudio    Discharge Planning    Living Arrangements:  Spouse/Significant Other   Support Systems:  Spouse/Significant Other, Children    Home is lower level duplex  2 stairs to climb to get into front door,   Location of bedroom/bathroom in home main    Patient able to perform ADL's:Independent    Current Services (outpatient & in home) none  DME equipment: glucometer  DME provider:     Receiving oral anticoagulation therapy? No    If indicated:   Physician managing anticoagulation treatment: n/a  Where does patient obtain lab work for ATC treatment? n/a      Potential Assistance Needed:  [address], Durable Medical Equipment    Patient agreeable to home care: No  [place] of choice provided:  n/a    Prior SNF/Rehab Placement and Facility: none  Agreeable to SNF/Rehab: No  [place] of choice provided: n/a     Evaluation: n/a    Expected Discharge date:  21    Patient expects to be discharged to:  Home  Follow Up Appointment: Best Day/ Time: Monday PM    Transportation provider: bryson  Transportation arrangements needed for discharge: No    Readmission Risk              Risk of Unplanned Readmission:  55             Does patient have a readmission risk score greater than 14?: Yes  If yes, follow-up appointment must be made within 7 days of discharge. Goals of Care: breathe easier      Discharge Plan: Home independent with bryson.           Electronically signed by Stefania Ruiz RN on 21 at 5:08 PM EDT

## 2021-05-16 NOTE — PROGRESS NOTES
Estefania Painting, PPatient Assessment complete. SOB (shortness of breath) [R06.02] . Vitals:    05/15/21 2146   BP:    Pulse:    Resp: 22   Temp:    SpO2: 96%   . Patients home meds are   Prior to Admission medications    Medication Sig Start Date End Date Taking? Authorizing Provider   sodium bicarbonate 650 MG tablet Take 2 tablets by mouth 2 times daily 5/13/21 6/12/21  TERRANCE Velasco NP   insulin glargine Kimberley Francisca) 100 UNIT/ML injection pen Inject 10 Units into the skin nightly 5/13/21   TERRANCE Velasco NP   cloNIDine (CATAPRES) 0.2 MG tablet Take 1 tablet by mouth 3 times daily 5/13/21   TERRANCE Velasco NP   NIFEdipine (PROCARDIA XL) 90 MG extended release tablet Take 1 tablet by mouth nightly 5/13/21   TERRANCE Velasco NP   glucagon 1 MG injection Inject 1 mg into the muscle See Admin Instructions Follow package directions for low blood sugar. 4/6/21   Juanell Schilder, APRN - CNP   fluticasone [place]) 50 MCG/ACT nasal spray  2/23/21   Historical Provider, MD   insulin aspart (NOVOLOG FLEXPEN) 100 UNIT/ML injection pen Inject 5 Units into the skin 3 times daily (before meals) Sliding scale 3/16/21   Juanell Schilder, APRN - CNP   nitroGLYCERIN (NITROSTAT) 0.4 MG SL tablet USE 1 TABLET UNDER THE TONGUE UP TO MAXIMUM OF 3 TOTAL DOSES.  IF NO RELIEF AFTER 1 DOSE, CALL 911. 3/5/21   Juanell Schilder, APRN - CNP   gabapentin (NEURONTIN) 100 MG capsule TAKE 2 CAPSULES BY MOUTH 3 TIMES DAILY 2/23/21 5/24/21  Juanell Schilder, APRN - CNP   QUEtiapine (SEROQUEL) 100 MG tablet TAKE 1 TABLET BY MOUTH NIGHTLY 2/23/21   Juanell Schilder, APRN - CNP   losartan (COZAAR) 50 MG tablet TAKE 1 TABLET BY MOUTH 2 TIMES DAILY 2/23/21   Juanell Schilder, APRN - CNP   GNP VITAMIN D MAXIMUM STRENGTH 50 MCG (2000 UT) TABS TAKE 1 TABLET BY MOUTH ONCE DAILY 2/23/21   Juanell Schilder, APRN - JEN   Jacy Nares 100-62.5-25 MCG/INH AEPB INHALE ONE PUFF INTO THE LUNGS DAILY 1/27/21   Juanell Schilder, APRN - CNP blood glucose monitor strips Test_4__times daily Diagnosis: 250.0   Diabetes Mellitus_x___Insulin Dependent____Non-Insulin Dependent 1/26/21   TERRANCE Hutton CNP   blood glucose monitor kit and supplies Dispense sufficient amount for indicated testing frequency plus additional to accommodate PRN testing needs. Dispense all needed supplies to include: monitor, strips, lancing device, lancets, control solutions, alcohol swabs.  1/26/21   TERRANCE Hutton CNP   prazosin (MINIPRESS) 2 MG capsule 2 CAPSULES BY MOUTH (4MG) TWICE DAILY 1/25/21   TERRANCE Hutton CNP   COMBIVENT RESPIMAT  MCG/ACT AERS inhaler INHALE 1 PUFF INTO THE LUNGS EVERY 6 HOURS 1/25/21   TERRANCE Hutton CNP   hydrALAZINE (APRESOLINE) 100 MG tablet Take 1 tablet by mouth every 8 hours 1/25/21   TERRANCE Hutton CNP   aspirin (ASPIR-LOW) 81 MG EC tablet TAKE 1 TABLET BY MOUTH DAILY 1/25/21   TERRANCE Hutton CNP   carvedilol (COREG) 25 MG tablet Take 1 tablet by mouth 2 times daily (with meals) 1/25/21   TERRANCE Hutton CNP   atorvastatin (LIPITOR) 80 MG tablet Take 1 tablet by mouth daily 1/25/21   TERRANCE Hutton CNP   clopidogrel (PLAVIX) 75 MG tablet Take 1 tablet by mouth daily 1/25/21   TERRANCE Hutton CNP   isosorbide mononitrate (IMDUR) 60 MG extended release tablet Take 1 tablet by mouth daily 1/5/21   TERRANCE Hutton CNP   vitamin D (ERGOCALCIFEROL) 1.25 MG (13079 UT) CAPS capsule Take 1 capsule by mouth once a week 1/5/21   TERRANCE Hutton CNP   Lancets MISC Use_4__times daily Diagnisis:250.0  Diabetes Mellitus__x__Insulin Dependent___Non-Insulin Dependent 12/6/20   Elkin Grajeda MD   ipratropium-albuterol (DUONEB) 0.5-2.5 (3) MG/3ML SOLN nebulizer solution Inhale 3 mLs into the lungs every 6 hours as needed for Shortness of Breath 12/6/20   Venkat Prajwal Jorden Ormond, MD   Nutritional Supplements ([address] [place] [address]) LIQD Take 1 Can by mouth 3 times daily 6/16/20   TERRANCE Arizmendi CNP   COMFORT EZ PEN NEEDLES 31G X 8 MM MISC USE AS DIRECTED 4/14/20   Jhonathan Can, APRN - CNP   TRUE METRIX BLOOD GLUCOSE TEST strip TEST BLOOD SUGAR 4 TO 5 TIMES DAILY  Patient not taking: Reported on 4/6/2021 12/12/19   Jhonathan Can, APRN - CNP   . Assessment   Pt has a HX of COPD and CHF and states he takes duoneb 4 times every day.  States he takes trellegy not symbicort    RR 18  Breath Sounds: DIm      Bronchodilator assessment at level  3  Hyperinflation assessment at level   Secretion Management assessment at level      [x]    Bronchodilator Assessment  BRONCHODILATOR ASSESSMENT SCORE  Score 0 1 2 3 4 5   Breath Sounds   []  Patient Baseline []  No Wheeze good aeration []  Faint, scattered wheezing, good aeration [x]  Expiratory Wheezing and or moderately diminished []  Insp/Exp wheeze and/or very diminished []  Insp/Exp and/ or marked distress   Respiratory Rate   []  Patient Baseline [x]  Less than 20 [x]  Less than 20 []  20-25 []  Greater than 25 []  Greater than 25   Peak flow % of Pred or PB [x]  NA   []  Greater than 90%  []  81-90% []  71-80% []  Less than or equal to 70%  or unable to perform []  Unable due to Respiratory Distress   Dyspnea re []  Patient Baseline []  No SOB []  No SOB [x]  SOB on exertion []  SOB min activity []  At rest/acute   e FEV% Predicted       [x]  NA []  Above 69%  []  Unable []  Above 60-69%  []  Unable []  Above 50-59%  []  Unable []  Above 35-49%  []  Unable []  Less than 35%  []  Unable                 []  Hyperinflation Assessment  Score 1 2 3   CXR and Breath Sounds   []  Clear []  No atelectasis  Basilar aeration []  Atelectasis or absent basilar breath sounds   Incentive Spirometry Volume  (Per IBW)   []  Greater than or equal to 15ml/Kg []  less than 15ml/Kg []  less than 15ml/Kg   Surgery within last 2 weeks []  None or general   []  Abdominal or thoracic surgery  []  Abdominal or thoracic Chronic Pulmonary Historyre []  No []  Yes []  Yes     []  Secretion Management Assessment  Score 1 2 3   Bilateral Breath Sounds   []  Occasional Rhonchi []  Scattered Rhonchi []  Course Rhonchi and/or poor aeration   Sputum    []  Small amount of thin secretions []  Moderate amount of viscous secretions []  Copius, Viscious Yellow/ Secretions   CXR as reported by physician []  clear  []  Unavailable []  Infiltrates and/or consolidation  []  Unavailable []  Mucus Plugging and or lobar consolidation  []  Unavailable   Cough []  Strong, productive cough []  Weak productive cough []  No cough or weak non-productive cough   Estefania Painting, [facility]  9:48 PM                            FEMALE                                  MALE                            FEV1 Predicted Normal Values                        FEV1 Predicted Normal Values          Age                                     Height in Feet and Inches       Age                                     Height in Feet and Inches       4' 11\" 5' 1\" 5' 3\" 5' 5\" 5' 7\" 5' 9\" 5' 11\" 6' 1\"  4' 11\" 5' 1\" 5' 3\" 5' 5\" 5' 7\" 5' 9\" 5' 11\" 6' 1\"   42 - 45 2.49 2.66 2.84 3.03 3.22 3.42 3.62 3.83 42 - 45 2.82 3.03 3.26 3.49 3.72 3.96 4.22 4.47   46 - 49 2.40 2.57 2.76 2.94 3.14 3.33 3.54 3.75 46 - 49 2.70 2.92 3.14 3.37 3.61 3.85 4.10 4.36   50 - 53 2.31 2.48 2.66 2.85 3.04 3.24 3.45 3.66 50 - 53 2.58 2.80 3.02 3.25 3.49 3.73 3.98 4.24   54 - 57 2.21 2.38 2.57 2.75 2.95 3.14 3.35 3.56 54 - 57 2.46 2.67 2.89 3.12 3.36 3.60 3.85 4.11   58 - 61 2.10 2.28 2.46 2.65 2.84 3.04 3.24 3.45 58 - 61 2.32 2.54 2.76 2.99 3.23 3.47 3.72 3.98   62 - 65 1.99 2.17 2.35 2.54 2.73 2.93 3.13 3.34 62 - 65 2.19 2.40 2.62 2.85 3.09 3.33 3.58 3.84   66 - 69 1.88 2.05 2.23 2.42 2.61 2.81 3.02 3.23 66 - 69 2.04 2.26 2.48 2.71 2.95 3.19 3.44 3.70   70+ 1.82 1.99 2.17 2.36 2.55 2.75 2.95 3.16 70+ 1.97 2.19 2.41 2.64 2.87 3.12 3.37 3.62             Predicted Peak Expiratory Flow Rate

## 2021-05-16 NOTE — CONSULTS
Nephrology ESRD Consult Note    Reason for Consult:  Fluid and hypertension management for pt with ESRD     Requesting Physician: Dr. Fabiano Givens    History Obtained From:  patient, electronic medical record    HD Unit:       US renal care with the unit TTS    Dry Weight:       ???    Chief Complaint: Shortness of breath    History of Present Illness: This is a 73 y.o. male with end stage renal disease on hemodialysis Jpblqcw-Yiqxqazf-Lyfyxgou who presents to the hospital for evaluation of shortness of breath after walking upstairs despite using short beta agonist inhalation treatments. Patient recently discharged on 5/14/2021 readmitted on 5/15/2021. Patient seen and evaluated at bedside. Recently started on hemodialysis. TTS  renal care. Hemodialysis completed yesterday removed almost 3 L over 120 minutes. Tolerated well. Patient complains of mild shortness of breath at best improved since admission. Patient seen evaluated bedside no acute complaints today eating breakfast.  Labs reviewed as available. Access previous tunnel cath    His outpatient history and dialysis orders, usual dry wt  and out pt dialysis run sheets were reviewed.      Past Medical History:        Diagnosis Date    Asthma     mod persistent    CAD in native artery, nonobstructive     Carotid stenosis, left 6/10/2013    Carpal tunnel syndrome     RIGHT    Cerebrovascular disease 4/23/2018    Chronic kidney disease 6/10/2013    COPD (chronic obstructive pulmonary disease) (HCC)     Diabetes mellitus (HCC)     insulin dependent    History of colon polyps     History of weakness of extremity     right sided    HTN (hypertension)     Hyperlipidemia     Lung, cysts, congenital     PTSD (post-traumatic stress disorder)     PTSD (post-traumatic stress disorder)     TIA (transient ischemic attack)     Type II or unspecified type diabetes mellitus without mention of complication, not stated as uncontrolled Access:  previous permacath    Past Surgical History:        Procedure Laterality Date    CAROTID ENDARTERECTOMY Left 7-2013    COLONOSCOPY      HERNIA REPAIR      IR TUNNELED CATHETER PLACEMENT GREATER THAN 5 YEARS  5/11/2021    IR TUNNELED CATHETER PLACEMENT GREATER THAN 5 YEARS 5/11/2021 Vasiliy Ortiz MD [facility] SPECIAL PROCEDURES    RI COLSC FLX W/RMVL OF TUMOR POLYP LESION SNARE TQ N/A 6/27/2017    COLONOSCOPY POLYPECTOMY SNARE/ hot performed by Ida Harris DO at [facility] Left 2015    carotid stent, dr Leonel David       Outpatient Medications:     Medications Prior to Admission: sodium bicarbonate 650 MG tablet, Take 2 tablets by mouth 2 times daily  insulin glargine (BASAGLAR KWIKPEN) 100 UNIT/ML injection pen, Inject 10 Units into the skin nightly  cloNIDine (CATAPRES) 0.2 MG tablet, Take 1 tablet by mouth 3 times daily  NIFEdipine (PROCARDIA XL) 90 MG extended release tablet, Take 1 tablet by mouth nightly  glucagon 1 MG injection, Inject 1 mg into the muscle See Admin Instructions Follow package directions for low blood sugar. fluticasone (FLONASE) 50 MCG/ACT nasal spray,   insulin aspart (NOVOLOG FLEXPEN) 100 UNIT/ML injection pen, Inject 5 Units into the skin 3 times daily (before meals) Sliding scale  nitroGLYCERIN (NITROSTAT) 0.4 MG SL tablet, USE 1 TABLET UNDER THE TONGUE UP TO MAXIMUM OF 3 TOTAL DOSES.  IF NO RELIEF AFTER 1 DOSE, CALL 911.  gabapentin (NEURONTIN) 100 MG capsule, TAKE 2 CAPSULES BY MOUTH 3 TIMES DAILY  QUEtiapine (SEROQUEL) 100 MG tablet, TAKE 1 TABLET BY MOUTH NIGHTLY  losartan (COZAAR) 50 MG tablet, TAKE 1 TABLET BY MOUTH 2 TIMES DAILY  GNP VITAMIN D MAXIMUM STRENGTH 50 MCG (2000 UT) TABS, TAKE 1 TABLET BY MOUTH ONCE DAILY  TRELEGY ELLIPTA 100-62.5-25 MCG/INH AEPB, INHALE ONE PUFF INTO THE LUNGS DAILY  blood glucose monitor strips, Test_4__times daily Diagnosis: 250.0   Diabetes Mellitus_x___Insulin Dependent____Non-Insulin Dependent  blood glucose monitor kit and supplies, Dispense sufficient amount for indicated testing frequency plus additional to accommodate PRN testing needs. Dispense all needed supplies to include: monitor, strips, lancing device, lancets, control solutions, alcohol swabs.   prazosin (MINIPRESS) 2 MG capsule, 2 CAPSULES BY MOUTH (4MG) TWICE DAILY  COMBIVENT RESPIMAT  MCG/ACT AERS inhaler, INHALE 1 PUFF INTO THE LUNGS EVERY 6 HOURS  hydrALAZINE (APRESOLINE) 100 MG tablet, Take 1 tablet by mouth every 8 hours  aspirin (ASPIR-LOW) 81 MG EC tablet, TAKE 1 TABLET BY MOUTH DAILY  carvedilol (COREG) 25 MG tablet, Take 1 tablet by mouth 2 times daily (with meals)  atorvastatin (LIPITOR) 80 MG tablet, Take 1 tablet by mouth daily  clopidogrel (PLAVIX) 75 MG tablet, Take 1 tablet by mouth daily  isosorbide mononitrate (IMDUR) 60 MG extended release tablet, Take 1 tablet by mouth daily  vitamin D (ERGOCALCIFEROL) 1.25 MG (27933 UT) CAPS capsule, Take 1 capsule by mouth once a week  Lancets MISC, Use_4__times daily Diagnisis:250.0  Diabetes Mellitus__x__Insulin Dependent___Non-Insulin Dependent  ipratropium-albuterol (DUONEB) 0.5-2.5 (3) MG/3ML SOLN nebulizer solution, Inhale 3 mLs into the lungs every 6 hours as needed for Shortness of Breath  Nutritional Supplements (GLUCERNA CARBSTEADY) LIQD, Take 1 Can by mouth 3 times daily  COMFORT EZ PEN NEEDLES 31G X 8 MM MISC, USE AS DIRECTED  TRUE METRIX BLOOD GLUCOSE TEST strip, TEST BLOOD SUGAR 4 TO 5 TIMES DAILY (Patient not taking: Reported on 4/6/2021)    Current Medications:    nitroGLYCERIN 50 mg in dextrose 5% 250 mL infusion, Continuous  ipratropium-albuterol (DUONEB) nebulizer solution 3 mL, Q6H PRN  isosorbide mononitrate (IMDUR) extended release tablet 60 mg, Daily  [START ON 5/17/2021] vitamin D (ERGOCALCIFEROL) capsule 50,000 Units, Weekly  prazosin (MINIPRESS) capsule 4 mg, BID  hydrALAZINE (APRESOLINE) tablet 100 mg, 3 times per day  aspirin EC tablet 81 mg, Daily  carvedilol (COREG) tablet 25 mg, BID WC  atorvastatin (LIPITOR) tablet 80 mg, Daily  clopidogrel (PLAVIX) tablet 75 mg, Daily  gabapentin (NEURONTIN) capsule 100 mg, TID  QUEtiapine (SEROQUEL) tablet 100 mg, Nightly  losartan (COZAAR) tablet 50 mg, BID  nitroGLYCERIN (NITROSTAT) SL tablet 0.4 mg, Q5 Min PRN  fluticasone (FLONASE) 50 MCG/ACT nasal spray 1 spray, Daily  glucagon (rDNA) injection 1 mg, See Admin Instructions  sodium bicarbonate tablet 1,300 mg, BID  insulin glargine (LANTUS) injection vial 10 Units, Nightly  cloNIDine (CATAPRES) tablet 0.2 mg, TID  NIFEdipine (ADALAT CC) extended release tablet 90 mg, Nightly  insulin lispro (HUMALOG) injection vial 5 Units, TID WC  insulin lispro (HUMALOG) injection vial 0-12 Units, TID WC  insulin lispro (HUMALOG) injection vial 0-6 Units, Nightly  sodium chloride flush 0.9 % injection 5-40 mL, 2 times per day  sodium chloride flush 0.9 % injection 5-40 mL, PRN  0.9 % sodium chloride infusion, PRN  famotidine (PEPCID) tablet 10 mg, Daily  promethazine (PHENERGAN) tablet 12.5 mg, Q6H PRN   Or  ondansetron (ZOFRAN) injection 4 mg, Q6H PRN  nicotine (NICODERM CQ) 21 MG/24HR 1 patch, Daily PRN  polyethylene glycol (GLYCOLAX) packet 17 g, Daily PRN  acetaminophen (TYLENOL) tablet 650 mg, Q6H PRN   Or  acetaminophen (TYLENOL) suppository 650 mg, Q6H PRN  albuterol (PROVENTIL) nebulizer solution 2.5 mg, Q2H PRN  heparin (porcine) injection 5,000 Units, 3 times per day  methylPREDNISolone sodium (SOLU-MEDROL) injection 40 mg, Q6H   Followed by  Derl Peabody ON 5/18/2021] predniSONE (DELTASONE) tablet 40 mg, Daily  furosemide (LASIX) injection 40 mg, Once  heparin (porcine) injection 1,600 Units, PRN  heparin (porcine) injection 1,600 Units, PRN  fluticasone-umeclidin-vilant (TRELEGY ELLIPTA) 100-62.5-25 MCG/INH inhaler 1 puff, Daily        Allergies:  Lisinopril, Ace inhibitors, and Pcn [penicillins]    Social History:    Social History     Socioeconomic History    Marital No cough, phlegm or wheezing. Abdomen:  No abdominal pain, nausea or vomiting. Neuro:  No focal weakness, abnormal movements orseizure like activity. Skin:   No rashes, no itching. :   No hematuria, no pyuria, no dysuria, no flank pain. Extremities:  No swelling or joint pains. ROS was otherwise negative except as mentioned in the [address] Objective:  Constitutional:    CURRENT TEMPERATURE:  Temp: 98.8 °F (37.1 °C)  MAXIMUM TEMPERATURE OVER 24HRS:  Temp (24hrs), Av.9 °F (36.6 °C), Min:97.5 °F (36.4 °C), Max:98.8 °F (37.1 °C)    CURRENT RESPIRATORY RATE:  Resp: 19  CURRENT PULSE:  Pulse: 69  CURRENT BLOOD PRESSURE:  BP: (!) 162/61  24HR BLOOD PRESSURE RANGE:  Systolic (69NCT), AYE:883 , Min:120 , GYE:938   ; Diastolic (16EZS), TXP:10, Min:52, Max:74    24HR INTAKE/OUTPUT:      Intake/Output Summary (Last 24 hours) at 2021 0815  Last data filed at 2021 6556  Gross per 24 hour   Intake 650 ml   Output 2895 ml   Net -2245 ml         Physical Exam:  AAO x 3,          speaking in full sentences, no accessory muscle use. HEENT: Atraumatic, normocephalic, no throat congestion, moist mucosa. Eyes:   Pupils equal, round and reactive to light, EOMI. Neck:   No JVD, no thyromegaly, no lymphadenopathy. Chest:              Bilateral vesicular breath sounds, no rales or wheezes. Diminished sounds in the bases posteriorly rhonchi noted left side versus right side  Cardiac:  S1 S2 RR, no murmurs, gallops or rubs . Abdomen: Soft, non-tender, no masses or organomegaly, BS audible. :   No suprapubic or flank tenderness. Neuro:  AAO x 3, No FND. SKIN:  No rashes, good skin turgor.   Extremities:  2+ bilateral lower extremity edema, palpable peripheral pulses, no calf tenderness    Access: Previous PermCath    Labs:  PTH:   Lab Results   Component Value Date    IPTH 225.4 05/10/2021     CBC:   Recent Labs     21  0431 05/15/21  1344 21  0721   WBC 10.3 8.9 11.4*   RBC 2.49* 2.45* 2.67*   HGB 7.9* 7.8* 8.3*   HCT 24.9* 24.3* 26.7*   .0 99.2 100.0   MCH 31.7 31.8 31.1   MCHC 31.7 32.1 31.1   RDW 13.2 13.3 13.4    252 309   MPV 10.9 10.7 11.0      BMP:   Recent Labs     05/14/21  1000 05/15/21  1337 05/15/21  1344 05/16/21  0721   *  --  132* 135   K 4.7  --  4.6 4.7   CL 91*  --  98 98   CO2 21  --  25 21   BUN 73*  --  58* 51*   CREATININE 3.66* 3.51* 3.05* 3.10*   GLUCOSE 427*  --  227* 234*   CALCIUM 7.1*  --  8.1* 8.4*      IRON :IRON:    Lab Results   Component Value Date    IRON 63 05/10/2021     TIBC:    Lab Results   Component Value Date    TIBC 316 05/10/2021     FERRITIN:    Lab Results   Component Value Date    FERRITIN 79 05/10/2021     Assessment:  1. Acute on chronic kidney disease stage IV -dialysis dependent Tuesday Thursday Saturday since last hospitalization  2. Chronic kidney disease stage IV from diabetic nephropathy  3. Shortness of breath secondary to fluid overload/COPD  4. Type 2 diabetes  5. Essential hypertension    Plan:  1. Will reassess a.m. for hemodialysis need. 2. Strict Input and Output, Daily weight   3. Low Potassium, Low phosphorus and low salt diet. Fluids  restricted to 1500ml/day. 4. IV Aranesp/Epogen for anemia of chronic disease with HD weekly. 5. IV Zemplar per protocol for secondary hyperparathyroidism   6. Labs in a.m.  7.  Following. Thank you for the consultation. Please do not hesitate to call with questions. Electronically signed by Perez Medrano CNP, APRN - CNP on 5/16/2021 at 8:15 AM  Attending Physician Statement  I have discussed the care of Domo Valenzuela, including pertinent history and exam findings,  with the CNP. I have reviewed the key elements of all parts of the encounter with the CNP. I agree with the assessment, plan and orders as documented.     Shelly Pino MD MD, MRCP Metta Basket), FACP   5/16/2021 1:29 PM    Nephrology [address] [place] [address] Drive

## 2021-05-16 NOTE — PROGRESS NOTES
Patient has had significant hypertension today as his blood pressure med's were held last night due to his fear of \"bottoming out\". Physician was notified twice and two orders were received for one time doses of hydralazine and labetalol. Patient had no response to either medication and systolic pressure has been consistent in the 180's. Patient discussed with writer that the only thing that works for him is an extra dose of clonidine. Physician was then notified and orders were received for clonidine Q4, PRN, for systolic blood pressure greater than 150.

## 2021-05-17 VITALS
RESPIRATION RATE: 18 BRPM | BODY MASS INDEX: 24.31 KG/M2 | WEIGHT: 151.24 LBS | HEART RATE: 60 BPM | HEIGHT: 66 IN | TEMPERATURE: 98.1 F | DIASTOLIC BLOOD PRESSURE: 73 MMHG | SYSTOLIC BLOOD PRESSURE: 166 MMHG | OXYGEN SATURATION: 98 %

## 2021-05-17 LAB
ANION GAP SERPL CALCULATED.3IONS-SCNC: 15 MMOL/L (ref 9–17)
BUN BLDV-MCNC: 79 MG/DL (ref 8–23)
BUN/CREAT BLD: ABNORMAL (ref 9–20)
CALCIUM SERPL-MCNC: 8.3 MG/DL (ref 8.6–10.4)
CHLORIDE BLD-SCNC: 100 MMOL/L (ref 98–107)
CO2: 19 MMOL/L (ref 20–31)
CREAT SERPL-MCNC: 3.51 MG/DL (ref 0.7–1.2)
EKG ATRIAL RATE: 55 BPM
EKG P AXIS: 70 DEGREES
EKG P-R INTERVAL: 138 MS
EKG Q-T INTERVAL: 420 MS
EKG QRS DURATION: 94 MS
EKG QTC CALCULATION (BAZETT): 401 MS
EKG R AXIS: 69 DEGREES
EKG T AXIS: 137 DEGREES
EKG VENTRICULAR RATE: 55 BPM
GFR AFRICAN AMERICAN: 21 ML/MIN
GFR NON-AFRICAN AMERICAN: 18 ML/MIN
GFR SERPL CREATININE-BSD FRML MDRD: ABNORMAL ML/MIN/{1.73_M2}
GFR SERPL CREATININE-BSD FRML MDRD: ABNORMAL ML/MIN/{1.73_M2}
GLUCOSE BLD-MCNC: 184 MG/DL (ref 75–110)
GLUCOSE BLD-MCNC: 240 MG/DL (ref 75–110)
GLUCOSE BLD-MCNC: 261 MG/DL (ref 75–110)
GLUCOSE BLD-MCNC: 284 MG/DL (ref 70–99)
GLUCOSE BLD-MCNC: 316 MG/DL (ref 75–110)
GLUCOSE BLD-MCNC: 387 MG/DL (ref 75–110)
GLUCOSE BLD-MCNC: 444 MG/DL (ref 75–110)
HCT VFR BLD CALC: 28.2 % (ref 40.7–50.3)
HEMOGLOBIN: 8.5 G/DL (ref 13–17)
MCH RBC QN AUTO: 31.3 PG (ref 25.2–33.5)
MCHC RBC AUTO-ENTMCNC: 30.1 G/DL (ref 28.4–34.8)
MCV RBC AUTO: 103.7 FL (ref 82.6–102.9)
NRBC AUTOMATED: 0 PER 100 WBC
PDW BLD-RTO: 13.4 % (ref 11.8–14.4)
PLATELET # BLD: 332 K/UL (ref 138–453)
PMV BLD AUTO: 10.9 FL (ref 8.1–13.5)
POTASSIUM SERPL-SCNC: 4.9 MMOL/L (ref 3.7–5.3)
RBC # BLD: 2.72 M/UL (ref 4.21–5.77)
SODIUM BLD-SCNC: 134 MMOL/L (ref 135–144)
WBC # BLD: 14.5 K/UL (ref 3.5–11.3)

## 2021-05-17 PROCEDURE — G0378 HOSPITAL OBSERVATION PER HR: HCPCS

## 2021-05-17 PROCEDURE — 6370000000 HC RX 637 (ALT 250 FOR IP): Performed by: NURSE PRACTITIONER

## 2021-05-17 PROCEDURE — 96372 THER/PROPH/DIAG INJ SC/IM: CPT

## 2021-05-17 PROCEDURE — 97161 PT EVAL LOW COMPLEX 20 MIN: CPT

## 2021-05-17 PROCEDURE — 6370000000 HC RX 637 (ALT 250 FOR IP): Performed by: INTERNAL MEDICINE

## 2021-05-17 PROCEDURE — 2580000003 HC RX 258: Performed by: INTERNAL MEDICINE

## 2021-05-17 PROCEDURE — 99232 SBSQ HOSP IP/OBS MODERATE 35: CPT | Performed by: FAMILY MEDICINE

## 2021-05-17 PROCEDURE — 6360000002 HC RX W HCPCS: Performed by: FAMILY MEDICINE

## 2021-05-17 PROCEDURE — 80048 BASIC METABOLIC PNL TOTAL CA: CPT

## 2021-05-17 PROCEDURE — 90935 HEMODIALYSIS ONE EVALUATION: CPT | Performed by: INTERNAL MEDICINE

## 2021-05-17 PROCEDURE — 1800000000 HC LEAVE OF ABSENCE

## 2021-05-17 PROCEDURE — 94761 N-INVAS EAR/PLS OXIMETRY MLT: CPT

## 2021-05-17 PROCEDURE — 93010 ELECTROCARDIOGRAM REPORT: CPT | Performed by: INTERNAL MEDICINE

## 2021-05-17 PROCEDURE — 6360000002 HC RX W HCPCS: Performed by: INTERNAL MEDICINE

## 2021-05-17 PROCEDURE — 90935 HEMODIALYSIS ONE EVALUATION: CPT

## 2021-05-17 PROCEDURE — 82947 ASSAY GLUCOSE BLOOD QUANT: CPT

## 2021-05-17 PROCEDURE — 2700000000 HC OXYGEN THERAPY PER DAY

## 2021-05-17 PROCEDURE — 85027 COMPLETE CBC AUTOMATED: CPT

## 2021-05-17 PROCEDURE — 6370000000 HC RX 637 (ALT 250 FOR IP): Performed by: FAMILY MEDICINE

## 2021-05-17 PROCEDURE — 36415 COLL VENOUS BLD VENIPUNCTURE: CPT

## 2021-05-17 PROCEDURE — 97116 GAIT TRAINING THERAPY: CPT

## 2021-05-17 PROCEDURE — 96376 TX/PRO/DX INJ SAME DRUG ADON: CPT

## 2021-05-17 PROCEDURE — 94640 AIRWAY INHALATION TREATMENT: CPT

## 2021-05-17 PROCEDURE — 5A1D70Z PERFORMANCE OF URINARY FILTRATION, INTERMITTENT, LESS THAN 6 HOURS PER DAY: ICD-10-PCS | Performed by: INTERNAL MEDICINE

## 2021-05-17 RX ORDER — NICOTINE POLACRILEX 4 MG
15 LOZENGE BUCCAL PRN
Status: DISCONTINUED | OUTPATIENT
Start: 2021-05-17 | End: 2021-05-17 | Stop reason: HOSPADM

## 2021-05-17 RX ORDER — DOXYCYCLINE HYCLATE 100 MG
100 TABLET ORAL 2 TIMES DAILY
Qty: 10 TABLET | Refills: 0 | Status: ON HOLD | OUTPATIENT
Start: 2021-05-17 | End: 2021-05-25 | Stop reason: HOSPADM

## 2021-05-17 RX ORDER — IPRATROPIUM BROMIDE AND ALBUTEROL SULFATE 2.5; .5 MG/3ML; MG/3ML
3 SOLUTION RESPIRATORY (INHALATION) 3 TIMES DAILY
Status: DISCONTINUED | OUTPATIENT
Start: 2021-05-17 | End: 2021-05-17 | Stop reason: HOSPADM

## 2021-05-17 RX ORDER — PREDNISONE 10 MG/1
TABLET ORAL
Qty: 30 TABLET | Refills: 0 | Status: ON HOLD | OUTPATIENT
Start: 2021-05-17 | End: 2021-05-25 | Stop reason: HOSPADM

## 2021-05-17 RX ORDER — DEXTROSE MONOHYDRATE 50 MG/ML
100 INJECTION, SOLUTION INTRAVENOUS PRN
Status: DISCONTINUED | OUTPATIENT
Start: 2021-05-17 | End: 2021-05-17 | Stop reason: HOSPADM

## 2021-05-17 RX ORDER — DEXTROSE MONOHYDRATE 25 G/50ML
12.5 INJECTION, SOLUTION INTRAVENOUS PRN
Status: DISCONTINUED | OUTPATIENT
Start: 2021-05-17 | End: 2021-05-17 | Stop reason: HOSPADM

## 2021-05-17 RX ADMIN — LOSARTAN POTASSIUM 50 MG: 50 TABLET, FILM COATED ORAL at 08:34

## 2021-05-17 RX ADMIN — SODIUM BICARBONATE 1300 MG: 648 TABLET ORAL at 08:35

## 2021-05-17 RX ADMIN — Medication 81 MG: at 08:35

## 2021-05-17 RX ADMIN — QUETIAPINE FUMARATE 100 MG: 100 TABLET ORAL at 00:41

## 2021-05-17 RX ADMIN — INSULIN LISPRO 6 UNITS: 100 INJECTION, SOLUTION INTRAVENOUS; SUBCUTANEOUS at 08:40

## 2021-05-17 RX ADMIN — CLONIDINE HYDROCHLORIDE 0.2 MG: 0.1 TABLET ORAL at 08:35

## 2021-05-17 RX ADMIN — HYDRALAZINE HYDROCHLORIDE 10 MG: 20 INJECTION INTRAMUSCULAR; INTRAVENOUS at 02:04

## 2021-05-17 RX ADMIN — HYDRALAZINE HYDROCHLORIDE 100 MG: 50 TABLET, FILM COATED ORAL at 05:59

## 2021-05-17 RX ADMIN — IPRATROPIUM BROMIDE AND ALBUTEROL SULFATE 3 ML: .5; 2.5 SOLUTION RESPIRATORY (INHALATION) at 07:57

## 2021-05-17 RX ADMIN — HEPARIN SODIUM 1600 UNITS: 1000 INJECTION INTRAVENOUS; SUBCUTANEOUS at 14:00

## 2021-05-17 RX ADMIN — FAMOTIDINE 10 MG: 20 TABLET, FILM COATED ORAL at 08:34

## 2021-05-17 RX ADMIN — ISOSORBIDE MONONITRATE 60 MG: 60 TABLET ORAL at 08:35

## 2021-05-17 RX ADMIN — SODIUM CHLORIDE, PRESERVATIVE FREE 10 ML: 5 INJECTION INTRAVENOUS at 08:39

## 2021-05-17 RX ADMIN — GABAPENTIN 100 MG: 100 CAPSULE ORAL at 15:05

## 2021-05-17 RX ADMIN — HEPARIN SODIUM 5000 UNITS: 5000 INJECTION INTRAVENOUS; SUBCUTANEOUS at 05:59

## 2021-05-17 RX ADMIN — CLOPIDOGREL 75 MG: 75 TABLET, FILM COATED ORAL at 08:34

## 2021-05-17 RX ADMIN — HYDRALAZINE HYDROCHLORIDE 100 MG: 50 TABLET, FILM COATED ORAL at 15:06

## 2021-05-17 RX ADMIN — CLONIDINE HYDROCHLORIDE 0.1 MG: 0.1 TABLET ORAL at 04:21

## 2021-05-17 RX ADMIN — INSULIN LISPRO 4 UNITS: 100 INJECTION, SOLUTION INTRAVENOUS; SUBCUTANEOUS at 00:49

## 2021-05-17 RX ADMIN — CLONIDINE HYDROCHLORIDE 0.2 MG: 0.1 TABLET ORAL at 15:05

## 2021-05-17 RX ADMIN — METHYLPREDNISOLONE SODIUM SUCCINATE 40 MG: 125 INJECTION, POWDER, FOR SOLUTION INTRAMUSCULAR; INTRAVENOUS at 05:14

## 2021-05-17 RX ADMIN — INSULIN LISPRO 12 UNITS: 100 INJECTION, SOLUTION INTRAVENOUS; SUBCUTANEOUS at 15:51

## 2021-05-17 RX ADMIN — CARVEDILOL 25 MG: 12.5 TABLET, FILM COATED ORAL at 08:35

## 2021-05-17 RX ADMIN — INSULIN LISPRO 5 UNITS: 100 INJECTION, SOLUTION INTRAVENOUS; SUBCUTANEOUS at 05:14

## 2021-05-17 RX ADMIN — PRAZOSIN HYDROCHLORIDE 4 MG: 1 CAPSULE ORAL at 09:52

## 2021-05-17 RX ADMIN — GABAPENTIN 100 MG: 100 CAPSULE ORAL at 08:34

## 2021-05-17 ASSESSMENT — ENCOUNTER SYMPTOMS
NAUSEA: 0
WHEEZING: 0
CONSTIPATION: 0
CHEST TIGHTNESS: 0
VOMITING: 0
DIARRHEA: 0
SHORTNESS OF BREATH: 1
ABDOMINAL PAIN: 0
BLOOD IN STOOL: 0

## 2021-05-17 ASSESSMENT — PAIN SCALES - GENERAL
PAINLEVEL_OUTOF10: 0

## 2021-05-17 NOTE — PROGRESS NOTES
Physical Therapy    Facility/Department: [address] STEPDOWN  Initial Assessment    NAME: Live Orr  : 1955  MRN: [id number]    Date of Service: 2021    Discharge Recommendations:    No further therapy required at discharge. PT Equipment Recommendations  Equipment Needed: No    Assessment   Assessment: Pt ambulated 600ft w/ no AD SBA with supervision, pt demonstrating baseline gait this date. Pt demonstrates independence in functional mobility and verbalizes no concerns in regards to functional mobility upon discharge. Pt educated on purpose of PT eval, POC, and importance of mobility. D/C PT. Prognosis: Good  Decision Making: Low Complexity  PT Education: Goals;PT Role;Plan of Care  REQUIRES PT FOLLOW UP: No  Activity Tolerance  Activity Tolerance: Patient Tolerated treatment well       Patient Diagnosis(es): The encounter diagnosis was Dyspnea, unspecified type. has a past medical history of Asthma, CAD in native artery, nonobstructive, Carotid stenosis, left, Carpal tunnel syndrome, Cerebrovascular disease, Chronic kidney disease, COPD (chronic obstructive pulmonary disease) ([address] Diabetes mellitus ([address] History of colon polyps, History of weakness of extremity, HTN (hypertension), Hyperlipidemia, Lung, cysts, congenital, PTSD (post-traumatic stress disorder), PTSD (post-traumatic stress disorder), TIA (transient ischemic attack), and Type II or unspecified type diabetes mellitus without mention of complication, not stated as uncontrolled. has a past surgical history that includes hernia repair; Tonsillectomy; Colonoscopy; Carotid endarterectomy (Left, 7-); vascular surgery (Left, ); pr colsc flx w/rmvl of tumor polyp lesion snare tq (N/A, 2017); and IR TUNNELED CVC PLACE WO SQ PORT/PUMP > 5 YEARS (2021).     Restrictions  Restrictions/Precautions  Restrictions/Precautions: Up as Tolerated  Required Braces or Orthoses?: No  Vision/Hearing  Vision: Within Functional Limits  Hearing: Within functional limits     Subjective  General  Patient assessed for rehabilitation services?: Yes  Response To Previous Treatment: Not applicable  Family / Caregiver Present: No  Follows Commands: Within Functional Limits  Subjective  Subjective: RN and pt in agreement for PT eval; pt seated EOB upon PT arrival, pt on 1L NC upon writer's arrival. Pt pleasant and cooperative throughout session.   Pain Screening  Patient Currently in Pain: Denies  Vital Signs  Patient Currently in Pain: Denies       Orientation  Orientation  Overall Orientation Status: Within Functional Limits  Social/Functional History  Social/Functional History  Lives With: Significant other, Daughter (pt reports fiance and daughter are unable to provide support upon D/C)  Type of Home: Apartment (Duplex, lower level)  Home Layout: One level  Home Access: Stairs to enter with rails  Entrance Stairs - Number of Steps: 4  Entrance Stairs - Rails: Right  Bathroom Shower/Tub: Tub/Shower unit  Bathroom Toilet: Standard  Bathroom Equipment: Shower chair  Home Equipment: Standard walker (No DME use at baseline)  ADL Assistance: [address] [place] [address] Avenue: Independent  Homemaking Responsibilities: Yes  Ambulation Assistance: Independent  Transfer Assistance: Independent  Active : No  Mode of Transportation: Other (Medical transport)  Cognition   Cognition  Overall Cognitive Status: WFL    Objective  Joint Mobility  Spine: WFL  ROM RLE: WFL  ROM LLE: WFL  ROM RUE: WFL  ROM LUE: WFL  Strength RLE  Strength RLE: WFL  Strength LLE  Strength LLE: WFL  Strength RUE  Strength RUE: WFL  Strength LUE  Strength LUE: WFL  Motor Control  Gross Motor?: WFL  Sensation  Overall Sensation Status: WFL (pt denies numbness/tingling)  Bed mobility  Comment: Did not assess- pt seated EOB upon PT arrival, retired seated EOB upon writer's exit  Transfers  Sit to Stand: Supervision  Stand to sit: Supervision  Comment: Supervision provided for

## 2021-05-17 NOTE — PLAN OF CARE
Problem: Falls - Risk of:  Goal: Will remain free from falls  Description: Will remain free from falls  Outcome: Met This Shift     Problem: Fluid Volume:  Goal: Hemodynamic stability will improve  Description: Hemodynamic stability will improve  Outcome: Ongoing     Problem: Respiratory:  Goal: Respiratory status will improve  Description: Respiratory status will improve  Outcome: Ongoing

## 2021-05-17 NOTE — DISCHARGE SUMMARY
[facility]  Office: [address], , Escobar Osvaldo, DO, Juan C Isaac, DO, Krystin Mccray, DO, Edwin Cook MD, Steve Walton MD, Edwin Pop MD, Chandni Childers MD, Nicole Rahman MD, Taty Ortiz MD, Mg Roche MD, Edy Hanson MD, Lakshmi Dangelo, DO, Dixon Riley MD, Lexus Mckeon DO, Moe Edouard MD,  Julio Crocker DO, Elroy Bo MD, Derald Jeans, MD, J Luis Villalobos MD, Lora Roth MD, Shanice Paul, [facility], [facility], [facility], Vahid Singer, CNP, Errol Valdovinos, CNS, Carolann Wagner, CNP, Val Iqbal, CNP, Trina Clay, CNP, Melo Manning, CNP, Stevo Hull, CNP, Sujatha Forrest PA-C, Zoran Tamez [facility], Portia Flower, CNP, Katelyn Gerber, CNP, Ryan Hsu, CNP, Antoine Holter, CNP, Rafael Reddy, CNP, [facility], [address] [facility]. [address]    Discharge Summary     Patient ID: Feliciano Gastelum  :  1955   MRN: [id number]     ACCOUNT:  [de-identified]   Patient's PCP: TERRANCE Mcgovern CNP  Admit Date: 5/15/2021   Discharge Date: 2021     Length of Stay: 2  Code Status:  Full Code  Admitting Physician: Chandni Childers MD  Discharge Physician: Chandni Childers MD     Active Discharge Diagnoses:     Hospital Problem Lists:  Principal Problem:    COPD with acute exacerbation ([facility]  Active Problems:    Essential hypertension    Acute kidney injury superimposed on CKD (HCC)-currently dialysis patient    Leg swelling    Anemia    Chronic heart failure with preserved ejection fraction (HCC)    SOB (shortness of breath)    Uncontrolled type 2 diabetes mellitus with hyperglycemia ([facility]  Resolved Problems:    * No resolved hospital problems. *      Admission Condition:  poor     Discharged Condition: fair    Hospital Stay:     HPI:      Per chart  Admitted through ER with following information:  Ralf Kapadia a 73 y. o. male who presents with sudden onset shortness of breath, patient has a newly diagnosed chronic kidney disease requiring dialysis, secondary to hypertension, patient was recently discharged from the hospital for what sounds like shortness of breath, hypertensive crisis, new onset kidney disease with dialysis 4 days in a row.  Patient states that he went shopping today was walking up the stairs when he had sudden onset shortness of breath did 3 DuoNeb treatments back-to-back.  Patient arrives to the hospital wearing CPAP, patient is a daily smoker, patient is working harder to breathe, he is tachypneic and using some accessory muscles.  Patient is requesting a CPAP be removed, will do a trial off of CPAP.  Patient denies any chest pain, fever, chills, recent upper respiratory illness, abdominal pain, back pain.  Patient is a type II diabetic.   Patient received prednisone and magnesium by EMS     Patient states he is already feeling better after getting treatments by EMS and breathing treatment in hospital  Currently off BiPAP and is on oxygen via nasal cannula  His last dialysis was yesterday  He makes urine     During the admission patient was managed as follow    COPD exacerbation: continue breathing treatment, continue steroids, mucolytics and home aerosols     Uncontrolled HTN : please do not hold any BP medication without consulting physician, continue current regimen, add prn antihypertensives, appreciate nephro recommendations     BINU/CKD4 : Placed on dialysis recently during last admission, nephrology is on board, they evaluate for dialysis need     HTN: continue home medications     HPL: continue home medications     DM2: Continue diabetes home medications , patient does not like changes in insulin sliding scale, hypoglycemia protocol     DVT prophylaxis     Keep for today to maximize breathing status and BP, will reassess in am , likely to discharge     Discussed with the patient and the nurse        DC today after HD    Significant therapeutic interventions: Significant Diagnostic Studies:     Radiology:  XR CHEST PORTABLE    Result Date: 5/16/2021  No significant change in chest findings. XR CHEST PORTABLE    Result Date: 5/15/2021  Findings compatible with mild interstitial edema. Dialysis catheter terminates at the distal SVC. IR TUNNELED CVC PLACE WO SQ PORT/PUMP > 5 YEARS    Result Date: 5/11/2021  Successful ultrasound and fluoroscopy guided tunneled catheter placement through conversion of old temp cath site. Consultations:    Consults:     Final Specialist Recommendations/Findings:   IP CONSULT TO HOSPITALIST  IP CONSULT TO NEPHROLOGY      The patient was seen and examined on day of discharge and this discharge summary is in conjunction with any daily progress note from day of discharge. Discharge plan:     Disposition: Home    Physician Follow Up: Follow-up With  Details  Why  Nikolay TERRANCE Campos CNP  In 1 week    [id number] [address] [facility]  [phone]         Requiring Further Evaluation/Follow Up POST HOSPITALIZATION/Incidental Findings:     Diet: cardiac diet, diabetic diet and renal diet    Activity: As tolerated    Instructions to Patient:     Discharge Medications:      Medication List      START taking these medications    doxycycline hyclate 100 MG tablet  Commonly known as: VIBRA-TABS  Take 1 tablet by mouth 2 times daily for 5 days     predniSONE 10 MG tablet  Commonly known as: DELTASONE  Please take 4 tablets daily X 3 days then 3 tablets daily X 3 days then 2 tablets daily X 3 days then 1 tablet daily X 3 days. CHANGE how you take these medications    blood glucose test strips  Test_4__times daily Diagnosis: 250.0   Diabetes Mellitus_x___Insulin Dependent____Non-Insulin Dependent  What changed: Another medication with the same name was removed. Continue taking this medication, and follow the directions you see here.         CONTINUE taking these medications    aspirin 81 MG EC tablet  Commonly known as: Aspir-Low  TAKE 1 TABLET BY MOUTH DAILY     atorvastatin 80 MG tablet  Commonly known as: LIPITOR  Take 1 tablet by mouth daily     Basaglar KwikPen 100 UNIT/ML injection pen  Generic drug: insulin glargine  Inject 10 Units into the skin nightly     blood glucose monitor kit and supplies  Dispense sufficient amount for indicated testing frequency plus additional to accommodate PRN testing needs. Dispense all needed supplies to include: monitor, strips, lancing device, lancets, control solutions, alcohol swabs. carvedilol 25 MG tablet  Commonly known as: COREG  Take 1 tablet by mouth 2 times daily (with meals)     cloNIDine 0.2 MG tablet  Commonly known as: CATAPRES  Take 1 tablet by mouth 3 times daily     clopidogrel 75 MG tablet  Commonly known as: PLAVIX  Take 1 tablet by mouth daily     Comfort EZ Pen Needles 31G X 8 MM Misc  Generic drug: Insulin Pen Needle  USE AS DIRECTED     fluticasone 50 MCG/ACT nasal spray  Commonly known as: FLONASE     gabapentin 100 MG capsule  Commonly known as: NEURONTIN  TAKE 2 CAPSULES BY MOUTH 3 TIMES DAILY     glucagon 1 MG injection  Inject 1 mg into the muscle See Admin Instructions Follow package directions for low blood sugar.      Glucerna Carbsteady Liqd  Take 1 Can by mouth 3 times daily     GNP Vitamin D Maximum Strength 50 MCG (2000 UT) Tabs  Generic drug: Cholecalciferol  TAKE 1 TABLET BY MOUTH ONCE DAILY     hydrALAZINE 100 MG tablet  Commonly known as: APRESOLINE  Take 1 tablet by mouth every 8 hours     * ipratropium-albuterol 0.5-2.5 (3) MG/3ML Soln nebulizer solution  Commonly known as: DUONEB  Inhale 3 mLs into the lungs every 6 hours as needed for Shortness of Breath     * Combivent Respimat  MCG/ACT Aers inhaler  Generic drug: albuterol-ipratropium  INHALE 1 PUFF INTO THE LUNGS EVERY 6 HOURS     isosorbide mononitrate 60 MG extended release tablet  Commonly known as: IMDUR  Take 1 tablet by mouth daily     Lancets Alisia Paul CNP for the opportunity to be involved in this patient's care.

## 2021-05-17 NOTE — PROGRESS NOTES
TASIA Palmeratient Assessment complete. SOB (shortness of breath) [R06.02] . Vitals:    05/17/21 0340   BP: (!) 169/69   Pulse: 58   Resp: 12   Temp: 97.6 °F (36.4 °C)   SpO2: 100%   . Patients home meds are   Prior to Admission medications    Medication Sig Start Date End Date Taking? Authorizing Provider   sodium bicarbonate 650 MG tablet Take 2 tablets by mouth 2 times daily 5/13/21 6/12/21  TERRANCE Cannon - NP   insulin glargine [facility]) 100 UNIT/ML injection pen Inject 10 Units into the skin nightly 5/13/21   TERRANCE Cannon - NP   cloNIDine (CATAPRES) 0.2 MG tablet Take 1 tablet by mouth 3 times daily 5/13/21   TERRANCE Cannon - NP   NIFEdipine (PROCARDIA XL) 90 MG extended release tablet Take 1 tablet by mouth nightly 5/13/21   Antolin FarmTERRANCE - NP   glucagon 1 MG injection Inject 1 mg into the muscle See Admin Instructions Follow package directions for low blood sugar. 4/6/21   Jordyis Neena, APRN - CNP   fluticasone [facility]) 50 MCG/ACT nasal spray  2/23/21   Historical Provider, MD   insulin aspart (NOVOLOG FLEXPEN) 100 UNIT/ML injection pen Inject 5 Units into the skin 3 times daily (before meals) Sliding scale 3/16/21   Jordyis Neena, TERRANCE - CNP   nitroGLYCERIN (NITROSTAT) 0.4 MG SL tablet USE 1 TABLET UNDER THE TONGUE UP TO MAXIMUM OF 3 TOTAL DOSES.  IF NO RELIEF AFTER 1 DOSE, CALL 911. 3/5/21   Jaycee Chaudhary, APRN - CNP   gabapentin (NEURONTIN) 100 MG capsule TAKE 2 CAPSULES BY MOUTH 3 TIMES DAILY 2/23/21 5/24/21  Jordyis Neena, APRN - CNP   QUEtiapine (SEROQUEL) 100 MG tablet TAKE 1 TABLET BY MOUTH NIGHTLY 2/23/21   Jordyis Neena, APRN - CNP   losartan (COZAAR) 50 MG tablet TAKE 1 TABLET BY MOUTH 2 TIMES DAILY 2/23/21   Jordyis Neena, APRN - CNP   GNP VITAMIN D MAXIMUM STRENGTH 50 MCG (2000 UT) TABS TAKE 1 TABLET BY MOUTH ONCE DAILY 2/23/21   Jaycee Chaudhary, APRN - CNP   TRELEGY ELLIPTA 100-62.5-25 MCG/INH AEPB INHALE ONE PUFF INTO THE LUNGS DAILY 1/27/21 TERRANCE Sequeira CNP   blood glucose monitor strips Test_4__times daily Diagnosis: 250.0   Diabetes Mellitus_x___Insulin Dependent____Non-Insulin Dependent 1/26/21   TERRANCE Sequeira CNP   blood glucose monitor kit and supplies Dispense sufficient amount for indicated testing frequency plus additional to accommodate PRN testing needs. Dispense all needed supplies to include: monitor, strips, lancing device, lancets, control solutions, alcohol swabs.  1/26/21   TERRANCE Sequeira CNP   prazosin (MINIPRESS) 2 MG capsule 2 CAPSULES BY MOUTH (4MG) TWICE DAILY 1/25/21   Maria Dolores Hernandez, APRN - CNP   COMBIVENT RESPIMAT  MCG/ACT AERS inhaler INHALE 1 PUFF INTO THE LUNGS EVERY 6 HOURS 1/25/21   Maria Dolores Hernandez, APRN - CNP   hydrALAZINE (APRESOLINE) 100 MG tablet Take 1 tablet by mouth every 8 hours 1/25/21   Maria Dolores Gravbritney, APRN - CNP   aspirin (ASPIR-LOW) 81 MG EC tablet TAKE 1 TABLET BY MOUTH DAILY 1/25/21   Maria Dolores Gravbritney, APRN - CNP   carvedilol (COREG) 25 MG tablet Take 1 tablet by mouth 2 times daily (with meals) 1/25/21   Maria Dolores Gravbritney, TERRANCE Paul CNP   atorvastatin (LIPITOR) 80 MG tablet Take 1 tablet by mouth daily 1/25/21   Maria Dolores [facility], APRN - CNP   clopidogrel (PLAVIX) 75 MG tablet Take 1 tablet by mouth daily 1/25/21   Maria Dolores [facility], APRN - CNP   isosorbide mononitrate (IMDUR) 60 MG extended release tablet Take 1 tablet by mouth daily 1/5/21   Maria Dolores Gravbritney, APRN - CNP   vitamin D (ERGOCALCIFEROL) 1.25 MG (46303 UT) CAPS capsule Take 1 capsule by mouth once a week 1/5/21   TERRANCE Sequeira CNP   Lancets MISC Use_4__times daily Diagnisis:250.0  Diabetes Mellitus__x__Insulin Dependent___Non-Insulin Dependent 12/6/20   Concepcion Mathur MD   ipratropium-albuterol (DUONEB) 0.5-2.5 (3) MG/3ML SOLN nebulizer solution Inhale 3 mLs into the lungs every 6 hours as needed for Shortness of Breath 12/6/20   Elfego Radford MD   Nutritional Supplements (GLUCERNA CARBSTEADY) LIQD Take 1 Can by mouth 3 times daily 6/16/20   TERRANCE Burger CNP   COMFORT EZ PEN NEEDLES 31G X 8 MM MISC USE AS DIRECTED 4/14/20   TERRANCE Burger CNP   TRUE METRIX BLOOD GLUCOSE TEST strip TEST BLOOD SUGAR 4 TO 5 TIMES DAILY  Patient not taking: Reported on 4/6/2021 12/12/19   TERRANCE Burger CNP   . Assessment   Patient has COPD/CHF and takes duoneb breathing treatments at home     RR 16   Breath Sounds: Clear/Diminished     Bronchodilator assessment at level  2  [x]    Bronchodilator Assessment  BRONCHODILATOR ASSESSMENT SCORE  Score 0 1 2 3 4 5   Breath Sounds   []  Patient Baseline []  No Wheeze good aeration []  Faint, scattered wheezing, good aeration [x]  Expiratory Wheezing and or moderately diminished []  Insp/Exp wheeze and/or very diminished []  Insp/Exp and/ or marked distress   Respiratory Rate   []  Patient Baseline []  Less than 20 [x]  Less than 20 []  20-25 []  Greater than 25 []  Greater than 25   Peak flow % of Pred or PB [x]  NA   []  Greater than 90%  []  81-90% []  71-80% []  Less than or equal to 70%  or unable to perform []  Unable due to Respiratory Distress   Dyspnea re []  Patient Baseline []  No SOB [x]  No SOB []  SOB on exertion []  SOB min activity []  At rest/acute   e FEV% Predicted       [x]  NA []  Above 69%  []  Unable []  Above 60-69%  []  Unable []  Above 50-59%  []  Unable []  Above 35-49%  []  Unable []  Less than 35%  []  Unable             []  Hyperinflation Assessment  Score 1 2 3   CXR and Breath Sounds   []  Clear []  No atelectasis  Basilar aeration []  Atelectasis or absent basilar breath sounds   Incentive Spirometry Volume  (Per IBW)   []  Greater than or equal to 15ml/Kg []  less than 15ml/Kg []  less than 15ml/Kg   Surgery within last 2 weeks []  None or general   []  Abdominal or thoracic surgery  []  Abdominal or thoracic   Chronic Pulmonary Historyre []  No []  Yes []  Yes     []  Secretion Management Assessment  Score 1 2 3   Bilateral Breath Sounds   []  Occasional Rhonchi []  Scattered Rhonchi []  Course Rhonchi and/or poor aeration   Sputum    []  Small amount of thin secretions []  Moderate amount of viscous secretions []  Copius, Viscious Yellow/ Secretions   CXR as reported by physician []  clear  []  Unavailable []  Infiltrates and/or consolidation  []  Unavailable []  Mucus Plugging and or lobar consolidation  []  Unavailable   Cough []  Strong, productive cough []  Weak productive cough []  No cough or weak non-productive cough   Bethene Sicard, [facility]  4:19 AM                            FEMALE                                  MALE                            FEV1 Predicted Normal Values                        FEV1 Predicted Normal Values          Age                                     Height in Feet and Inches       Age                                     Height in Feet and Inches       4' 11\" 5' 1\" 5' 3\" 5' 5\" 5' 7\" 5' 9\" 5' 11\" 6' 1\"  4' 11\" 5' 1\" 5' 3\" 5' 5\" 5' 7\" 5' 9\" 5' 11\" 6' 1\"   42 - 45 2.49 2.66 2.84 3.03 3.22 3.42 3.62 3.83 42 - 45 2.82 3.03 3.26 3.49 3.72 3.96 4.22 4.47   46 - 49 2.40 2.57 2.76 2.94 3.14 3.33 3.54 3.75 46 - 49 2.70 2.92 3.14 3.37 3.61 3.85 4.10 4.36   50 - 53 2.31 2.48 2.66 2.85 3.04 3.24 3.45 3.66 50 - 53 2.58 2.80 3.02 3.25 3.49 3.73 3.98 4.24   54 - 57 2.21 2.38 2.57 2.75 2.95 3.14 3.35 3.56 54 - 57 2.46 2.67 2.89 3.12 3.36 3.60 3.85 4.11   58 - 61 2.10 2.28 2.46 2.65 2.84 3.04 3.24 3.45 58 - 61 2.32 2.54 2.76 2.99 3.23 3.47 3.72 3.98   62 - 65 1.99 2.17 2.35 2.54 2.73 2.93 3.13 3.34 62 - 65 2.19 2.40 2.62 2.85 3.09 3.33 3.58 3.84   66 - 69 1.88 2.05 2.23 2.42 2.61 2.81 3.02 3.23 66 - 69 2.04 2.26 2.48 2.71 2.95 3.19 3.44 3.70   70+ 1.82 1.99 2.17 2.36 2.55 2.75 2.95 3.16 70+ 1.97 2.19 2.41 2.64 2.87 3.12 3.37 3.62       Predicted Peak Expiratory Flow Rate                                       Height (in)  Female       Height (in) Male           Age 64 62 64 63 57 71 78 74

## 2021-05-17 NOTE — CARE COORDINATION
Spoke to David Nur at [email or web address] and she stated that they can start pt on Tuesday 5/18 at 9:30am.    Chair time will be on T,TH,S @ 9:45am.  Information was added to AVS.

## 2021-05-17 NOTE — PROGRESS NOTES
Occupational Therapy    Occupational Therapy Not Seen Note    DATE: 2021  Name: Annita Saenz  : 1955  MRN: [id number]    Patient not available for Occupational Therapy due to:    Hemodialysis: Per PT Danii Lang pt is likely (I), will attempt later as pt OOR at HD. Next Scheduled Treatment: Attempt on  as appropriate.     Electronically signed by Michael Robb OT on 2021 at 10:55 AM

## 2021-05-17 NOTE — PROGRESS NOTES
CLINICAL PHARMACY NOTE: MEDS TO BEDS    Total # of Prescriptions Filled: 2   The following medications were delivered to the patient:  · Prednisone 10mg tablet  · Doxycycline 100mg tablet    Additional Documentation: medications delivered to the pt in room 430 on 05.17.21 at 10:13am

## 2021-05-17 NOTE — PROGRESS NOTES
3. Cont pt on aranesp and zemplar per protocol. 4. Follow up labs ordered. 5.  Fluid restriction strictly emphasized      Please do not hesitate to call with questions.     Electronically signed by Kwadwo Solis MD on 5/17/2021 at 12:32 PM

## 2021-05-17 NOTE — DISCHARGE INSTR - COC
and older Fluzone, Flulaval, Fluarix and 3 yrs and older Afluria) 11/02/2016, 10/21/2019    Pneumococcal Conjugate 13-valent ([id number]) 04/06/2021    Pneumococcal Polysaccharide ([id number]) 10/02/2015    Tdap (Boostrix, Adacel) 12/14/2013    Zoster Live (Zostavax) 11/01/2017       Active Problems:  Patient Active Problem List   Diagnosis Code    Cigarette nicotine dependence without complication S77.835    Carpal tunnel syndrome on right G56.01    Colon polyps K63.5    Carotid stenosis, left s/p Endarterectomy I65.22    Mixed simple and mucopurulent chronic bronchitis ([facility]) J41.8    Pure hypercholesterolemia E78.00    CKD (chronic kidney disease) stage 4, GFR 15-29 ml/min ([facility]) N18.4    CKD (chronic kidney disease) stage 3, GFR 30-59 ml/min ([facility]) N18.30    Dyspnea and respiratory abnormalities R06.00, R06.89    PTSD (post-traumatic stress disorder) F43.10    Transient cerebral ischemia G45.9    Essential hypertension I10    DM type 2, uncontrolled, with neuropathy ([facility]) E11.40, E11.65    COPD with acute exacerbation ([facility]) J44.1    Cerebral vascular disease I67.9    Primary insomnia F51.01    Hypertensive crisis I16.9    Non-obstructive Coronary artery disease of native artery of native heart with stable angina pectoris ([facility]) I25.118    Hyperparathyroidism ([facility]) E21.3    Hypovitaminosis D E55.9    Acute kidney injury superimposed on CKD ([facility])-currently dialysis patient N17.9, N18.9    Coronary artery disease with exertional angina ([facility]) I25.118    Leg swelling M79.89    Anemia D64.9    Hypoalbuminemia E88.09    DKA, type 2, not at goal [facility]) E11.10    Inflammation of right sacroiliac joint ([address] M46.1    Malignant neoplasm of colon ([address] C18.9    ESRD (end stage renal disease) ([address] N18.6    Bilateral carotid artery occlusion I65.23    Seizure ([address] R56.9    Chronic heart failure with preserved ejection fraction (HCC) I50.32    Left ventricular hypertrophy I51.7    SOB (shortness of breath) R06.02    Uncontrolled type 2 diabetes mellitus with hyperglycemia (HCC) E11.65       Isolation/Infection:   Isolation          No Isolation        Patient Infection Status     Infection Onset Added Last Indicated Last Indicated By Review Planned Expiration Resolved Resolved By    None active    Resolved    COVID-19 Rule Out 20 COVID-19 (Ordered)   20 Rule-Out Test Resulted          Nurse Assessment:  Last Vital Signs: BP (!) 166/73   Pulse 60   Temp 97.9 °F (36.6 °C)   Resp 18   Ht 5' 6\" (1.676 m)   Wt 151 lb 3.8 oz (68.6 kg)   SpO2 98%   BMI 24.41 kg/m²     Last documented pain score (0-10 scale): Pain Level: 0  Last Weight:   Wt Readings from Last 1 Encounters:   21 151 lb 3.8 oz (68.6 kg)     Mental Status:  {IP PT MENTAL STATUS:}    IV Access:  { [facility] IV ACCESS:269187059}    Nursing Mobility/ADLs:  Walking   {P DME BLNX:267596603}  Transfer  {P DME ONSV:996305879}  Bathing  {P DME JPKQ:040809520}  Dressing  {P DME MTZ}  Toileting  {CHP DME JQJJ:233670974}  Feeding  {[facility] DME ECIE:785136264}  Med Admin  {[facility] DME DEAX:106370385}  Med Delivery   { [facility] MED Delivery:523725211}    Wound Care Documentation and Therapy:        Elimination:  Continence:   · Bowel: {YES / VJ:94768}  · Bladder: {YES / UD:53018}  Urinary Catheter: {Urinary Catheter:349317186}   Colostomy/Ileostomy/Ileal Conduit: {YES / FB:19530}       Date of Last BM: ***    Intake/Output Summary (Last 24 hours) at 2021 1545  Last data filed at 2021 1406  Gross per 24 hour   Intake 1005 ml   Output 4260 ml   Net -3255 ml     I/O last 3 completed shifts: In: 1005 [P. O.:900;  I.V.:5]  Out: 4260 [Urine:750]    Safety Concerns:     [address] LALO Safety Concerns:596690358}    Impairments/Disabilities:      [address] LALO Impairments/Disabilities:848222928}    Nutrition Therapy:  Current Nutrition Therapy:   [address] MAY Diet List:302268280}    Routes of Feeding: {CHP DME Other Feedings:992382593}  Liquids: {Slp liquid thickness:52062}  Daily Fluid Restriction: {CHP DME Yes amt example:974945016}  Last Modified Barium Swallow with Video (Video Swallowing Test): {Done Not Done LXJO:302848706}    Treatments at the Time of Hospital Discharge:   Respiratory Treatments: ***  Oxygen Therapy:  {Therapy; copd oxygen:40557}  Ventilator:    { CC Vent VKBJ:123283324}    Rehab Therapies: {THERAPEUTIC INTERVENTION:8609298843}  Weight Bearing Status/Restrictions: { CC Weight Bearin}  Other Medical Equipment (for information only, NOT a DME order):  {EQUIPMENT:385039042}  Other Treatments: ***    Patient's personal belongings (please select all that are sent with patient):  {CHP DME Belongings:303933450}    RN SIGNATURE:  {Esignature:344985352}    CASE MANAGEMENT/SOCIAL WORK SECTION    Inpatient Status Date: ***    Readmission Risk Assessment Score:  Readmission Risk              Risk of Unplanned Readmission:  57           Discharging to Facility/ Agency   · Name:   · Address:  · Phone:  · Fax:    Dialysis Facility (if applicable)   · Name:  · Address:  · Dialysis Schedule:  · Phone:  · Fax:    / signature: {Esignature:708340676}    PHYSICIAN SECTION    Prognosis: {Prognosis:5878110385}    Condition at Discharge: [address] [facility] Patient Condition:742576745}    Rehab Potential (if transferring to Rehab): {Prognosis:8404798187}    Recommended Labs or Other Treatments After Discharge: ***    Physician Certification: I certify the above information and transfer of Grady Santos  is necessary for the continuing treatment of the diagnosis listed and that he requires {Admit to Appropriate Level of Care:33953} for {GREATER/LESS:957262356} 30 days.      Update Admission H&P: {[facility]64[facility]}    PHYSICIAN SIGNATURE:  {Esignature:213507715}

## 2021-05-17 NOTE — PROGRESS NOTES
disease with dialysis 4 days in a row. Cristobal Dennis states that he went shopping today was walking up the stairs when he had sudden onset shortness of breath did 3 DuoNeb treatments back-to-back. Cristobal Dennis arrives to the hospital wearing CPAP, patient is a daily smoker, patient is working harder to breathe, he is tachypneic and using some accessory muscles.  Patient is requesting a CPAP be removed, will do a trial off of CPAP.  Patient denies any chest pain, fever, chills, recent upper respiratory illness, abdominal pain, back pain.  Patient is a type II diabetic. Patient received prednisone and magnesium by EMS     Patient states he is already feeling better after getting treatments by EMS and breathing treatment in hospital  Currently off BiPAP and is on oxygen via nasal cannula  His last dialysis was yesterday  He makes urine    Review of Systems:     Review of Systems   Constitutional: Negative for chills, diaphoresis and fever. HENT: Negative for congestion. Eyes: Negative for visual disturbance. Respiratory: Positive for shortness of breath. Negative for chest tightness and wheezing. Cardiovascular: Negative for chest pain, palpitations and leg swelling. Gastrointestinal: Negative for abdominal pain, blood in stool, constipation, diarrhea, nausea and vomiting. Genitourinary: Negative for difficulty urinating. Neurological: Negative for dizziness, weakness, light-headedness, numbness and headaches. All other systems reviewed and are negative. Medications: Allergies:     Allergies   Allergen Reactions    Lisinopril      cough    Ace Inhibitors      coughing    Pcn [Penicillins]        Current Meds:   Scheduled Meds:    ipratropium-albuterol  3 mL Inhalation TID    isosorbide mononitrate  60 mg Oral Daily    vitamin D  50,000 Units Oral Weekly    prazosin  4 mg Oral BID    hydrALAZINE  100 mg Oral 3 times per day    aspirin  81 mg Oral Daily    carvedilol  25 mg Oral BID     atorvastatin  80 mg Oral Daily    clopidogrel  75 mg Oral Daily    gabapentin  100 mg Oral TID    QUEtiapine  100 mg Oral Nightly    losartan  50 mg Oral BID    fluticasone  1 spray Each Nostril Daily    glucagon (rDNA)  1 mg Intramuscular See Admin Instructions    sodium bicarbonate  1,300 mg Oral BID    insulin glargine  10 Units Subcutaneous Nightly    cloNIDine  0.2 mg Oral TID    NIFEdipine  90 mg Oral Nightly    insulin lispro  5 Units Subcutaneous TID WC    insulin lispro  0-12 Units Subcutaneous TID WC    insulin lispro  0-6 Units Subcutaneous Nightly    sodium chloride flush  5-40 mL Intravenous 2 times per day    famotidine  10 mg Oral Daily    heparin (porcine)  5,000 Units Subcutaneous 3 times per day    methylPREDNISolone  40 mg Intravenous Q6H    Followed by   Keith Morrell ON 5/18/2021] predniSONE  40 mg Oral Daily    furosemide  40 mg Intravenous Once    fluticasone-umeclidin-vilant  1 puff Inhalation Daily     Continuous Infusions:    nitroGLYCERIN Stopped (05/15/21 1732)    sodium chloride       PRN Meds: hydrALAZINE, cloNIDine, nitroGLYCERIN, sodium chloride flush, sodium chloride, promethazine **OR** ondansetron, nicotine, polyethylene glycol, acetaminophen **OR** acetaminophen, albuterol, heparin (porcine), heparin (porcine)    Data:     Past Medical History:   has a past medical history of Asthma, CAD in native artery, nonobstructive, Carotid stenosis, left, Carpal tunnel syndrome, Cerebrovascular disease, Chronic kidney disease, COPD (chronic obstructive pulmonary disease) ([facility] [address] Diabetes mellitus ([facility] [address] History of colon polyps, History of weakness of extremity, HTN (hypertension), Hyperlipidemia, Lung, cysts, congenital, PTSD (post-traumatic stress disorder), PTSD (post-traumatic stress disorder), TIA (transient ischemic attack), and Type II or unspecified type diabetes mellitus without mention of complication, not stated as uncontrolled.     Social History:   reports that he quit smoking about 6 years ago. His smoking use included cigarettes. He has a 17.50 pack-year smoking history. He has never used smokeless tobacco. He reports that he does not drink alcohol and does not use drugs. Family History:   Family History   Problem Relation Age of Onset    Cancer Mother     Heart Disease Father        Vitals:  BP (!) 181/73   Pulse 67   Temp 97.6 °F (36.4 °C) (Temporal)   Resp 13   Ht 5' 6\" (1.676 m)   Wt 155 lb 10.3 oz (70.6 kg)   SpO2 99%   BMI 25.12 kg/m²   Temp (24hrs), Av.2 °F (36.8 °C), Min:97.6 °F (36.4 °C), Max:98.5 °F (36.9 °C)    Recent Labs     21  2103 21  0044 21  0420 21  0739   POCGLU 268* 387* 316* 261*       I/O (24Hr):     Intake/Output Summary (Last 24 hours) at 2021 0845  Last data filed at 2021 0559  Gross per 24 hour   Intake 905 ml   Output 750 ml   Net 155 ml       Labs:  Hematology:  Recent Labs     05/15/21  1344 21  0721   WBC 8.9 11.4*   RBC 2.45* 2.67*   HGB 7.8* 8.3*   HCT 24.3* 26.7*   MCV 99.2 100.0   MCH 31.8 31.1   MCHC 32.1 31.1   RDW 13.3 13.4    309   MPV 10.7 11.0   DDIMER 2.06  --      Chemistry:  Recent Labs     21  1000 05/15/21  1337 05/15/21  1344 05/15/21  1547 21  0721   *  --  132*  --  135   K 4.7  --  4.6  --  4.7   CL 91*  --  98  --  98   CO2 21  --  25  --  21   GLUCOSE 427*  --  227*  --  234*   BUN 73*  --  58*  --  51*   CREATININE 3.66* 3.51* 3.05*  --  3.10*   ANIONGAP 13  --  9  --  16   LABGLOM 17* 18* 21*  --  20*   GFRAA 20*  --  25*  --  25*   CALCIUM 7.1*  --  8.1*  --  8.4*   PROBNP  --   --  6,126*  --   --    TROPHS  --   --  174* 155* 120*     Recent Labs     05/15/21  2137 21  1112 21  1729 21  2103 21  0044 21  0420 21  0739   LABA1C 9.3*  --   --   --   --   --   --    POCGLU  --  362* 196* 268* 387* 316* 261*     ABG:  Lab Results   Component Value Date    POCPH 7.39 2013    POCPCO2 46 2013 General: No tenderness. Skin:     General: Skin is warm and dry. Findings: No erythema, lesion or rash. Neurological:      Mental Status: He is alert and oriented to person, place, and time. Cranial Nerves: No cranial nerve deficit. Motor: No seizure activity. Psychiatric:         Speech: Speech normal.         Behavior: Behavior normal. Behavior is cooperative.          Assessment:        Hospital Problems         Last Modified POA    * (Principal) COPD with acute exacerbation ([address] 5/16/2021 Yes    Essential hypertension 5/15/2021 Yes    Acute kidney injury superimposed on CKD (HCC)-currently dialysis patient 5/15/2021 Yes    Leg swelling 5/15/2021 Yes    Anemia 5/15/2021 Yes    Chronic heart failure with preserved ejection fraction ([address] 5/15/2021 Yes    SOB (shortness of breath) 5/16/2021 Yes    Uncontrolled type 2 diabetes mellitus with hyperglycemia ([address] 5/15/2021 Yes          Plan:          COPD exacerbation: continue breathing treatment, continue steroids, mucolytics and home aerosols    Uncontrolled HTN : please do not hold any BP medication without consulting physician, continue current regimen, add prn antihypertensives, appreciate nephro recommendations    BINU/CKD4 : Placed on dialysis recently during last admission, nephrology is on board, they evaluate for dialysis need    HTN: continue home medications    HPL: continue home medications    DM2: Continue diabetes home medications , patient does not like changes in insulin sliding scale, hypoglycemia protocol    DVT prophylaxis    Keep for today to maximize breathing status and BP, will reassess in am , likely to discharge    Discussed with the patient and the nurse       DC today after HD    Silvina Ayon MD  5/17/2021  8:45 AM

## 2021-05-18 ENCOUNTER — CARE COORDINATION (OUTPATIENT)
Dept: CASE MANAGEMENT | Age: 66
End: 2021-05-18

## 2021-05-18 PROCEDURE — 1800000000 HC LEAVE OF ABSENCE

## 2021-05-18 NOTE — CARE COORDINATION
[facility] Transitions Initial Follow Up Call    Call within 2 business days of discharge: Yes    Patient: Angus Tellez Patient : 1955   MRN: [id number]  Reason for Admission: Dyspnea  Discharge Date: 21 RARS: Readmission Risk Score: 62      Last Discharge [facility]       Complaint Diagnosis Description Type Department Provider    5/15/21 Shortness of Breath Dyspnea, unspecified type ED to Hosp-Admission (Discharged) (ADMITTED) [facility] Auburn Boas, MD; Moraima Levy,...           1st attempt to reach patient for Care Transitions. No answer and no voice mail. Will attempt to contact pt following day D/T dialysis today. Facility: [facility]    Care Transitions 24 Hour Call    Do you have all of your prescriptions and are they filled?: Yes  Post Acute Services:  Outpatient/Community Services (Comment: pulmonary rehab)  Patient Home Equipment: Nebulizer  Do you have support at home?: Partner/Spouse/SO  Are you an active caregiver in your home?: No  Care Transitions Interventions         Follow Up  Future Appointments   Date Time Provider Mahesh Gunter   2021  2:20 PM TRERANCE Cook - CNP [facility] BERTHA Bobby RN

## 2021-05-19 ENCOUNTER — APPOINTMENT (OUTPATIENT)
Dept: GENERAL RADIOLOGY | Age: 66
DRG: 280 | End: 2021-05-19
Payer: COMMERCIAL

## 2021-05-19 ENCOUNTER — HOSPITAL ENCOUNTER (INPATIENT)
Age: 66
LOS: 7 days | Discharge: HOME | DRG: 280 | End: 2021-05-26
Attending: EMERGENCY MEDICINE | Admitting: INTERNAL MEDICINE
Payer: COMMERCIAL

## 2021-05-19 DIAGNOSIS — E78.01 FAMILIAL HYPERCHOLESTEROLEMIA: ICD-10-CM

## 2021-05-19 DIAGNOSIS — R06.03 RESPIRATORY DISTRESS: Primary | ICD-10-CM

## 2021-05-19 DIAGNOSIS — J41.8 MIXED SIMPLE AND MUCOPURULENT CHRONIC BRONCHITIS (HCC): ICD-10-CM

## 2021-05-19 PROBLEM — J96.90 RESPIRATORY FAILURE (HCC): Status: ACTIVE | Noted: 2021-05-19

## 2021-05-19 LAB
ABSOLUTE EOS #: 0.04 K/UL (ref 0–0.44)
ABSOLUTE IMMATURE GRANULOCYTE: 0.2 K/UL (ref 0–0.3)
ABSOLUTE LYMPH #: 1 K/UL (ref 1.1–3.7)
ABSOLUTE MONO #: 0.87 K/UL (ref 0.1–1.2)
ANION GAP SERPL CALCULATED.3IONS-SCNC: 13 MMOL/L (ref 9–17)
BASOPHILS # BLD: 0 % (ref 0–2)
BASOPHILS ABSOLUTE: 0.03 K/UL (ref 0–0.2)
BNP INTERPRETATION: ABNORMAL
BUN BLDV-MCNC: 51 MG/DL (ref 8–23)
BUN/CREAT BLD: ABNORMAL (ref 9–20)
CALCIUM SERPL-MCNC: 8.4 MG/DL (ref 8.6–10.4)
CHLORIDE BLD-SCNC: 98 MMOL/L (ref 98–107)
CO2: 25 MMOL/L (ref 20–31)
CREAT SERPL-MCNC: 2.66 MG/DL (ref 0.7–1.2)
DIFFERENTIAL TYPE: ABNORMAL
EOSINOPHILS RELATIVE PERCENT: 0 % (ref 1–4)
ESTIMATED AVERAGE GLUCOSE: 226 MG/DL
GFR AFRICAN AMERICAN: 29 ML/MIN
GFR NON-AFRICAN AMERICAN: 24 ML/MIN
GFR SERPL CREATININE-BSD FRML MDRD: ABNORMAL ML/MIN/{1.73_M2}
GFR SERPL CREATININE-BSD FRML MDRD: ABNORMAL ML/MIN/{1.73_M2}
GLUCOSE BLD-MCNC: 203 MG/DL (ref 75–110)
GLUCOSE BLD-MCNC: 214 MG/DL (ref 70–99)
GLUCOSE BLD-MCNC: 282 MG/DL (ref 75–110)
GLUCOSE BLD-MCNC: 287 MG/DL (ref 75–110)
GLUCOSE BLD-MCNC: 360 MG/DL (ref 75–110)
GLUCOSE BLD-MCNC: 370 MG/DL (ref 75–110)
HBA1C MFR BLD: 9.5 % (ref 4–6)
HCT VFR BLD CALC: 27.7 % (ref 40.7–50.3)
HEMOGLOBIN: 8.7 G/DL (ref 13–17)
IMMATURE GRANULOCYTES: 2 %
LYMPHOCYTES # BLD: 9 % (ref 24–43)
MAGNESIUM: 2.7 MG/DL (ref 1.6–2.6)
MCH RBC QN AUTO: 31.1 PG (ref 25.2–33.5)
MCHC RBC AUTO-ENTMCNC: 31.4 G/DL (ref 28.4–34.8)
MCV RBC AUTO: 98.9 FL (ref 82.6–102.9)
MONOCYTES # BLD: 8 % (ref 3–12)
NRBC AUTOMATED: 0 PER 100 WBC
PDW BLD-RTO: 13.6 % (ref 11.8–14.4)
PLATELET # BLD: 340 K/UL (ref 138–453)
PLATELET ESTIMATE: ABNORMAL
PMV BLD AUTO: 10.5 FL (ref 8.1–13.5)
POTASSIUM SERPL-SCNC: 4.4 MMOL/L (ref 3.7–5.3)
PRO-BNP: 7462 PG/ML
RBC # BLD: 2.8 M/UL (ref 4.21–5.77)
RBC # BLD: ABNORMAL 10*6/UL
SEG NEUTROPHILS: 81 % (ref 36–65)
SEGMENTED NEUTROPHILS ABSOLUTE COUNT: 8.73 K/UL (ref 1.5–8.1)
SODIUM BLD-SCNC: 136 MMOL/L (ref 135–144)
TROPONIN INTERP: ABNORMAL
TROPONIN T: ABNORMAL NG/ML
TROPONIN, HIGH SENSITIVITY: 96 NG/L (ref 0–22)
WBC # BLD: 10.9 K/UL (ref 3.5–11.3)
WBC # BLD: ABNORMAL 10*3/UL

## 2021-05-19 PROCEDURE — 2060000000 HC ICU INTERMEDIATE R&B

## 2021-05-19 PROCEDURE — 36415 COLL VENOUS BLD VENIPUNCTURE: CPT

## 2021-05-19 PROCEDURE — 96374 THER/PROPH/DIAG INJ IV PUSH: CPT

## 2021-05-19 PROCEDURE — 83735 ASSAY OF MAGNESIUM: CPT

## 2021-05-19 PROCEDURE — 83880 ASSAY OF NATRIURETIC PEPTIDE: CPT

## 2021-05-19 PROCEDURE — 85025 COMPLETE CBC W/AUTO DIFF WBC: CPT

## 2021-05-19 PROCEDURE — 6370000000 HC RX 637 (ALT 250 FOR IP): Performed by: INTERNAL MEDICINE

## 2021-05-19 PROCEDURE — 6360000002 HC RX W HCPCS: Performed by: NURSE PRACTITIONER

## 2021-05-19 PROCEDURE — 6370000000 HC RX 637 (ALT 250 FOR IP): Performed by: NURSE PRACTITIONER

## 2021-05-19 PROCEDURE — 71045 X-RAY EXAM CHEST 1 VIEW: CPT

## 2021-05-19 PROCEDURE — 76937 US GUIDE VASCULAR ACCESS: CPT

## 2021-05-19 PROCEDURE — 94660 CPAP INITIATION&MGMT: CPT

## 2021-05-19 PROCEDURE — 93005 ELECTROCARDIOGRAM TRACING: CPT | Performed by: STUDENT IN AN ORGANIZED HEALTH CARE EDUCATION/TRAINING PROGRAM

## 2021-05-19 PROCEDURE — 99222 1ST HOSP IP/OBS MODERATE 55: CPT | Performed by: INTERNAL MEDICINE

## 2021-05-19 PROCEDURE — 82947 ASSAY GLUCOSE BLOOD QUANT: CPT

## 2021-05-19 PROCEDURE — 99283 EMERGENCY DEPT VISIT LOW MDM: CPT

## 2021-05-19 PROCEDURE — 2500000003 HC RX 250 WO HCPCS: Performed by: STUDENT IN AN ORGANIZED HEALTH CARE EDUCATION/TRAINING PROGRAM

## 2021-05-19 PROCEDURE — 2580000003 HC RX 258: Performed by: NURSE PRACTITIONER

## 2021-05-19 PROCEDURE — 99223 1ST HOSP IP/OBS HIGH 75: CPT | Performed by: INTERNAL MEDICINE

## 2021-05-19 PROCEDURE — 94664 DEMO&/EVAL PT USE INHALER: CPT

## 2021-05-19 PROCEDURE — 84484 ASSAY OF TROPONIN QUANT: CPT

## 2021-05-19 PROCEDURE — 94640 AIRWAY INHALATION TREATMENT: CPT

## 2021-05-19 PROCEDURE — 6360000002 HC RX W HCPCS: Performed by: STUDENT IN AN ORGANIZED HEALTH CARE EDUCATION/TRAINING PROGRAM

## 2021-05-19 PROCEDURE — 83036 HEMOGLOBIN GLYCOSYLATED A1C: CPT

## 2021-05-19 PROCEDURE — 80048 BASIC METABOLIC PNL TOTAL CA: CPT

## 2021-05-19 PROCEDURE — 2700000000 HC OXYGEN THERAPY PER DAY

## 2021-05-19 RX ORDER — FUROSEMIDE 10 MG/ML
40 INJECTION INTRAMUSCULAR; INTRAVENOUS ONCE
Status: DISCONTINUED | OUTPATIENT
Start: 2021-05-19 | End: 2021-05-19

## 2021-05-19 RX ORDER — QUETIAPINE FUMARATE 100 MG/1
100 TABLET, FILM COATED ORAL NIGHTLY
Status: DISCONTINUED | OUTPATIENT
Start: 2021-05-19 | End: 2021-05-26 | Stop reason: HOSPADM

## 2021-05-19 RX ORDER — NICOTINE 21 MG/24HR
1 PATCH, TRANSDERMAL 24 HOURS TRANSDERMAL DAILY PRN
Status: DISCONTINUED | OUTPATIENT
Start: 2021-05-19 | End: 2021-05-26 | Stop reason: HOSPADM

## 2021-05-19 RX ORDER — IPRATROPIUM BROMIDE AND ALBUTEROL SULFATE 2.5; .5 MG/3ML; MG/3ML
1 SOLUTION RESPIRATORY (INHALATION) 4 TIMES DAILY
Status: DISCONTINUED | OUTPATIENT
Start: 2021-05-19 | End: 2021-05-26 | Stop reason: HOSPADM

## 2021-05-19 RX ORDER — CLONIDINE HYDROCHLORIDE 0.2 MG/1
0.2 TABLET ORAL 3 TIMES DAILY
Status: DISCONTINUED | OUTPATIENT
Start: 2021-05-19 | End: 2021-05-26 | Stop reason: HOSPADM

## 2021-05-19 RX ORDER — SODIUM CHLORIDE 9 MG/ML
25 INJECTION, SOLUTION INTRAVENOUS PRN
Status: DISCONTINUED | OUTPATIENT
Start: 2021-05-19 | End: 2021-05-26 | Stop reason: HOSPADM

## 2021-05-19 RX ORDER — PROMETHAZINE HYDROCHLORIDE 12.5 MG/1
12.5 TABLET ORAL EVERY 6 HOURS PRN
Status: DISCONTINUED | OUTPATIENT
Start: 2021-05-19 | End: 2021-05-26 | Stop reason: HOSPADM

## 2021-05-19 RX ORDER — IPRATROPIUM BROMIDE AND ALBUTEROL SULFATE 2.5; .5 MG/3ML; MG/3ML
1 SOLUTION RESPIRATORY (INHALATION)
Status: DISCONTINUED | OUTPATIENT
Start: 2021-05-19 | End: 2021-05-19

## 2021-05-19 RX ORDER — ATORVASTATIN CALCIUM 80 MG/1
80 TABLET, FILM COATED ORAL DAILY
Status: DISCONTINUED | OUTPATIENT
Start: 2021-05-19 | End: 2021-05-26 | Stop reason: HOSPADM

## 2021-05-19 RX ORDER — ACETAMINOPHEN 325 MG/1
650 TABLET ORAL EVERY 6 HOURS PRN
Status: DISCONTINUED | OUTPATIENT
Start: 2021-05-19 | End: 2021-05-26 | Stop reason: HOSPADM

## 2021-05-19 RX ORDER — ASPIRIN 81 MG/1
81 TABLET ORAL DAILY
Status: DISCONTINUED | OUTPATIENT
Start: 2021-05-19 | End: 2021-05-26 | Stop reason: HOSPADM

## 2021-05-19 RX ORDER — PREDNISONE 20 MG/1
40 TABLET ORAL DAILY
Status: DISCONTINUED | OUTPATIENT
Start: 2021-05-21 | End: 2021-05-21

## 2021-05-19 RX ORDER — CARVEDILOL 25 MG/1
25 TABLET ORAL 2 TIMES DAILY WITH MEALS
Status: DISCONTINUED | OUTPATIENT
Start: 2021-05-19 | End: 2021-05-26 | Stop reason: HOSPADM

## 2021-05-19 RX ORDER — ISOSORBIDE MONONITRATE 60 MG/1
60 TABLET, EXTENDED RELEASE ORAL DAILY
Status: DISCONTINUED | OUTPATIENT
Start: 2021-05-19 | End: 2021-05-26 | Stop reason: HOSPADM

## 2021-05-19 RX ORDER — METHYLPREDNISOLONE SODIUM SUCCINATE 125 MG/2ML
125 INJECTION, POWDER, LYOPHILIZED, FOR SOLUTION INTRAMUSCULAR; INTRAVENOUS ONCE
Status: COMPLETED | OUTPATIENT
Start: 2021-05-19 | End: 2021-05-19

## 2021-05-19 RX ORDER — SODIUM BICARBONATE 650 MG/1
1300 TABLET ORAL 2 TIMES DAILY
Status: DISCONTINUED | OUTPATIENT
Start: 2021-05-19 | End: 2021-05-26 | Stop reason: HOSPADM

## 2021-05-19 RX ORDER — BUMETANIDE 0.25 MG/ML
2 INJECTION, SOLUTION INTRAMUSCULAR; INTRAVENOUS ONCE
Status: DISCONTINUED | OUTPATIENT
Start: 2021-05-19 | End: 2021-05-26 | Stop reason: HOSPADM

## 2021-05-19 RX ORDER — ACETAMINOPHEN 650 MG/1
650 SUPPOSITORY RECTAL EVERY 6 HOURS PRN
Status: DISCONTINUED | OUTPATIENT
Start: 2021-05-19 | End: 2021-05-26 | Stop reason: HOSPADM

## 2021-05-19 RX ORDER — INSULIN GLARGINE 100 [IU]/ML
10 INJECTION, SOLUTION SUBCUTANEOUS NIGHTLY
Status: DISCONTINUED | OUTPATIENT
Start: 2021-05-19 | End: 2021-05-20

## 2021-05-19 RX ORDER — ONDANSETRON 2 MG/ML
4 INJECTION INTRAMUSCULAR; INTRAVENOUS EVERY 6 HOURS PRN
Status: DISCONTINUED | OUTPATIENT
Start: 2021-05-19 | End: 2021-05-26 | Stop reason: HOSPADM

## 2021-05-19 RX ORDER — HYDRALAZINE HYDROCHLORIDE 50 MG/1
100 TABLET, FILM COATED ORAL EVERY 8 HOURS SCHEDULED
Status: DISCONTINUED | OUTPATIENT
Start: 2021-05-19 | End: 2021-05-26 | Stop reason: HOSPADM

## 2021-05-19 RX ORDER — SODIUM CHLORIDE 0.9 % (FLUSH) 0.9 %
5-40 SYRINGE (ML) INJECTION EVERY 12 HOURS SCHEDULED
Status: DISCONTINUED | OUTPATIENT
Start: 2021-05-19 | End: 2021-05-26 | Stop reason: HOSPADM

## 2021-05-19 RX ORDER — NIFEDIPINE 90 MG/1
90 TABLET, FILM COATED, EXTENDED RELEASE ORAL NIGHTLY
Status: DISCONTINUED | OUTPATIENT
Start: 2021-05-19 | End: 2021-05-26 | Stop reason: HOSPADM

## 2021-05-19 RX ORDER — ALBUTEROL SULFATE 2.5 MG/3ML
2.5 SOLUTION RESPIRATORY (INHALATION) EVERY 4 HOURS PRN
Status: DISCONTINUED | OUTPATIENT
Start: 2021-05-19 | End: 2021-05-26 | Stop reason: HOSPADM

## 2021-05-19 RX ORDER — FAMOTIDINE 20 MG/1
20 TABLET, FILM COATED ORAL 2 TIMES DAILY
Status: DISCONTINUED | OUTPATIENT
Start: 2021-05-19 | End: 2021-05-26 | Stop reason: HOSPADM

## 2021-05-19 RX ORDER — NICOTINE POLACRILEX 4 MG
15 LOZENGE BUCCAL PRN
Status: DISCONTINUED | OUTPATIENT
Start: 2021-05-19 | End: 2021-05-26 | Stop reason: HOSPADM

## 2021-05-19 RX ORDER — DEXTROSE MONOHYDRATE 25 G/50ML
12.5 INJECTION, SOLUTION INTRAVENOUS PRN
Status: DISCONTINUED | OUTPATIENT
Start: 2021-05-19 | End: 2021-05-26 | Stop reason: HOSPADM

## 2021-05-19 RX ORDER — DEXTROSE MONOHYDRATE 50 MG/ML
100 INJECTION, SOLUTION INTRAVENOUS PRN
Status: DISCONTINUED | OUTPATIENT
Start: 2021-05-19 | End: 2021-05-26 | Stop reason: HOSPADM

## 2021-05-19 RX ORDER — ALBUTEROL SULFATE 2.5 MG/3ML
2.5 SOLUTION RESPIRATORY (INHALATION)
Status: DISCONTINUED | OUTPATIENT
Start: 2021-05-19 | End: 2021-05-19

## 2021-05-19 RX ORDER — METHYLPREDNISOLONE SODIUM SUCCINATE 40 MG/ML
40 INJECTION, POWDER, LYOPHILIZED, FOR SOLUTION INTRAMUSCULAR; INTRAVENOUS EVERY 6 HOURS
Status: DISCONTINUED | OUTPATIENT
Start: 2021-05-19 | End: 2021-05-21

## 2021-05-19 RX ORDER — POLYETHYLENE GLYCOL 3350 17 G/17G
17 POWDER, FOR SOLUTION ORAL DAILY PRN
Status: DISCONTINUED | OUTPATIENT
Start: 2021-05-19 | End: 2021-05-26 | Stop reason: HOSPADM

## 2021-05-19 RX ORDER — PRAZOSIN HYDROCHLORIDE 1 MG/1
4 CAPSULE ORAL 2 TIMES DAILY
Status: DISCONTINUED | OUTPATIENT
Start: 2021-05-19 | End: 2021-05-26 | Stop reason: HOSPADM

## 2021-05-19 RX ORDER — CLOPIDOGREL BISULFATE 75 MG/1
75 TABLET ORAL DAILY
Status: DISCONTINUED | OUTPATIENT
Start: 2021-05-19 | End: 2021-05-26 | Stop reason: HOSPADM

## 2021-05-19 RX ORDER — SODIUM CHLORIDE 0.9 % (FLUSH) 0.9 %
5-40 SYRINGE (ML) INJECTION PRN
Status: DISCONTINUED | OUTPATIENT
Start: 2021-05-19 | End: 2021-05-26 | Stop reason: HOSPADM

## 2021-05-19 RX ORDER — LOSARTAN POTASSIUM 50 MG/1
50 TABLET ORAL 2 TIMES DAILY
Status: DISCONTINUED | OUTPATIENT
Start: 2021-05-19 | End: 2021-05-26 | Stop reason: HOSPADM

## 2021-05-19 RX ADMIN — ATORVASTATIN CALCIUM 80 MG: 80 TABLET, FILM COATED ORAL at 21:00

## 2021-05-19 RX ADMIN — SODIUM CHLORIDE, PRESERVATIVE FREE 10 ML: 5 INJECTION INTRAVENOUS at 21:02

## 2021-05-19 RX ADMIN — Medication 81 MG: at 10:50

## 2021-05-19 RX ADMIN — METHYLPREDNISOLONE SODIUM SUCCINATE 125 MG: 125 INJECTION, POWDER, FOR SOLUTION INTRAMUSCULAR; INTRAVENOUS at 05:19

## 2021-05-19 RX ADMIN — CLONIDINE HYDROCHLORIDE 0.2 MG: 0.2 TABLET ORAL at 10:50

## 2021-05-19 RX ADMIN — HYDRALAZINE HYDROCHLORIDE 100 MG: 50 TABLET, FILM COATED ORAL at 15:40

## 2021-05-19 RX ADMIN — CARVEDILOL 25 MG: 25 TABLET, FILM COATED ORAL at 16:51

## 2021-05-19 RX ADMIN — IPRATROPIUM BROMIDE AND ALBUTEROL SULFATE 1 AMPULE: .5; 3 SOLUTION RESPIRATORY (INHALATION) at 21:49

## 2021-05-19 RX ADMIN — IPRATROPIUM BROMIDE AND ALBUTEROL SULFATE 1 AMPULE: .5; 2.5 SOLUTION RESPIRATORY (INHALATION) at 16:46

## 2021-05-19 RX ADMIN — IPRATROPIUM BROMIDE 0.5 MG: 0.5 SOLUTION RESPIRATORY (INHALATION) at 05:21

## 2021-05-19 RX ADMIN — INSULIN GLARGINE 10 UNITS: 100 INJECTION, SOLUTION SUBCUTANEOUS at 20:58

## 2021-05-19 RX ADMIN — ENOXAPARIN SODIUM 40 MG: 40 INJECTION, SOLUTION INTRAVENOUS; SUBCUTANEOUS at 10:48

## 2021-05-19 RX ADMIN — CLONIDINE HYDROCHLORIDE 0.2 MG: 0.2 TABLET ORAL at 21:00

## 2021-05-19 RX ADMIN — INSULIN LISPRO 7 UNITS: 100 INJECTION, SOLUTION INTRAVENOUS; SUBCUTANEOUS at 16:51

## 2021-05-19 RX ADMIN — SODIUM BICARBONATE 1300 MG: 648 TABLET ORAL at 21:02

## 2021-05-19 RX ADMIN — LOSARTAN POTASSIUM 50 MG: 50 TABLET, FILM COATED ORAL at 21:00

## 2021-05-19 RX ADMIN — CLONIDINE HYDROCHLORIDE 0.2 MG: 0.2 TABLET ORAL at 15:40

## 2021-05-19 RX ADMIN — PRAZOSIN HYDROCHLORIDE 4 MG: 1 CAPSULE ORAL at 12:09

## 2021-05-19 RX ADMIN — NIFEDIPINE 90 MG: 90 TABLET, EXTENDED RELEASE ORAL at 21:02

## 2021-05-19 RX ADMIN — INSULIN LISPRO 2 UNITS: 100 INJECTION, SOLUTION INTRAVENOUS; SUBCUTANEOUS at 20:57

## 2021-05-19 RX ADMIN — INSULIN LISPRO 2 UNITS: 100 INJECTION, SOLUTION INTRAVENOUS; SUBCUTANEOUS at 23:57

## 2021-05-19 RX ADMIN — INSULIN LISPRO 7 UNITS: 100 INJECTION, SOLUTION INTRAVENOUS; SUBCUTANEOUS at 12:09

## 2021-05-19 RX ADMIN — LOSARTAN POTASSIUM 50 MG: 50 TABLET, FILM COATED ORAL at 10:49

## 2021-05-19 RX ADMIN — IPRATROPIUM BROMIDE AND ALBUTEROL SULFATE 1 AMPULE: .5; 2.5 SOLUTION RESPIRATORY (INHALATION) at 11:37

## 2021-05-19 RX ADMIN — CARVEDILOL 25 MG: 25 TABLET, FILM COATED ORAL at 10:50

## 2021-05-19 RX ADMIN — FAMOTIDINE 20 MG: 20 TABLET, FILM COATED ORAL at 10:51

## 2021-05-19 RX ADMIN — METHYLPREDNISOLONE SODIUM SUCCINATE 40 MG: 40 INJECTION, POWDER, FOR SOLUTION INTRAMUSCULAR; INTRAVENOUS at 23:57

## 2021-05-19 RX ADMIN — IPRATROPIUM BROMIDE AND ALBUTEROL SULFATE 1 AMPULE: .5; 3 SOLUTION RESPIRATORY (INHALATION) at 07:57

## 2021-05-19 RX ADMIN — CLOPIDOGREL 75 MG: 75 TABLET, FILM COATED ORAL at 10:50

## 2021-05-19 RX ADMIN — ALBUTEROL SULFATE 15 MG: 2.5 SOLUTION RESPIRATORY (INHALATION) at 05:20

## 2021-05-19 RX ADMIN — SODIUM BICARBONATE 1300 MG: 648 TABLET ORAL at 10:49

## 2021-05-19 RX ADMIN — ISOSORBIDE MONONITRATE 60 MG: 60 TABLET ORAL at 10:50

## 2021-05-19 RX ADMIN — SODIUM CHLORIDE, PRESERVATIVE FREE 10 ML: 5 INJECTION INTRAVENOUS at 10:50

## 2021-05-19 RX ADMIN — FAMOTIDINE 20 MG: 20 TABLET, FILM COATED ORAL at 21:01

## 2021-05-19 RX ADMIN — HYDRALAZINE HYDROCHLORIDE 100 MG: 50 TABLET, FILM COATED ORAL at 23:40

## 2021-05-19 RX ADMIN — METHYLPREDNISOLONE SODIUM SUCCINATE 40 MG: 40 INJECTION, POWDER, FOR SOLUTION INTRAMUSCULAR; INTRAVENOUS at 15:40

## 2021-05-19 RX ADMIN — PRAZOSIN HYDROCHLORIDE 4 MG: 1 CAPSULE ORAL at 23:38

## 2021-05-19 ASSESSMENT — ENCOUNTER SYMPTOMS
COUGH: 1
NAUSEA: 0
RHINORRHEA: 0
BACK PAIN: 0
SHORTNESS OF BREATH: 1
VOMITING: 0
ABDOMINAL PAIN: 0

## 2021-05-19 NOTE — ED PROVIDER NOTES
[facility]  Emergency Department Encounter  Emergency Medicine Resident     Pt Name: Kenny Chand  MRN: [id number]  [place] 1955  Date of evaluation: 5/19/21  PCP:  TERRANCE Jefferson CNP    CHIEF COMPLAINT       Chief Complaint   Patient presents with    Respiratory Distress       HISTORY OF PRESENT ILLNESS  (Location/Symptom, Timing/Onset, Context/Setting, Quality, Duration, Modifying Factors, Severity.)    Kenny Chand is a 73 y.o. male who presents with respiratory distress. Patient brought in by [employer]. Patient recently discharged, sent home with doxycycline, Combivent. Patient states that he took several, events prior to EMS arrival.  Was tripoding on arrival.  EMS gave patient 2 g IV magnesium, placed patient on CPAP. Patient's breathing began to improve. On arrival, patient moving forward, diaphoretic. Speaking in short sentences. States he feels short of breath. States he feels similar to the way he was last time he was admitted. Denies any drug use,. PPE Worn:  Gloves: Yes  Eye Protection: Goggles  Mask: Surgical Mask  Gown: NO    PAST MEDICAL / SURGICAL / SOCIAL / FAMILY HISTORY    has a past medical history of Asthma, CAD in native artery, nonobstructive, Carotid stenosis, left, Carpal tunnel syndrome, Cerebrovascular disease, Chronic kidney disease, COPD (chronic obstructive pulmonary disease) ([address] Diabetes mellitus ([address] History of colon polyps, History of weakness of extremity, HTN (hypertension), Hyperlipidemia, Lung, cysts, congenital, PTSD (post-traumatic stress disorder), PTSD (post-traumatic stress disorder), TIA (transient ischemic attack), and Type II or unspecified type diabetes mellitus without mention of complication, not stated as uncontrolled. has a past surgical history that includes hernia repair; Tonsillectomy; Colonoscopy;  Carotid endarterectomy (Left, 7-2013); vascular surgery (Left, 2015); pr colsc flx w/rmvl of tumor polyp lesion snare tq (N/A, 2017); and IR TUNNELED CVC PLACE WO SQ PORT/PUMP > 5 YEARS (2021). Social History     Socioeconomic History    Marital status:      Spouse name: Not on file    Number of children: Not on file    Years of education: Not on file    Highest education level: Not on file   Occupational History     Employer: N/A   Tobacco Use    Smoking status: Former Smoker     Packs/day: 0.50     Years: 35.00     Pack years: 17.50     Types: Cigarettes     Quit date: 2014     Years since quittin.8    Smokeless tobacco: Never Used   Vaping Use    Vaping Use: Never used   Substance and Sexual Activity    Alcohol use: No     Alcohol/week: 0.0 standard drinks    Drug use: No    Sexual activity: Yes     Partners: Female   Other Topics Concern    Not on file   Social History Narrative    Not on file     Social Determinants of Health     Financial Resource Strain:     Difficulty of Paying Living Expenses:    Food Insecurity:     Worried About Running Out of Food in the Last Year:     [address] in the Last Year:    Transportation Needs:     Lack of Transportation (Medical):      Lack of Transportation (Non-Medical):    Physical Activity:     Days of Exercise per Week:     Minutes of Exercise per Session:    Stress:     Feeling of Stress :    Social Connections:     Frequency of Communication with Friends and Family:     Frequency of Social Gatherings with Friends and Family:     Attends Faith Services:     Active Member of Clubs or Organizations:     Attends Club or Organization Meetings:     Marital Status:    Intimate Partner Violence:     Fear of Current or Ex-Partner:     Emotionally Abused:     Physically Abused:     Sexually Abused:        Family History   Problem Relation Age of Onset    Cancer Mother     Heart Disease Father        Allergies:    Lisinopril, Ace inhibitors, and Pcn [penicillins]    Home Medications:  Prior to Admission medications    Medication Sig Start Date End Date Taking? Authorizing Provider   predniSONE (DELTASONE) 10 MG tablet Please take 4 tablets daily X 3 days then 3 tablets daily X 3 days then 2 tablets daily X 3 days then 1 tablet daily X 3 days. 5/17/21   Allie Matos MD   doxycycline hyclate (VIBRA-TABS) 100 MG tablet Take 1 tablet by mouth 2 times daily for 5 days 5/17/21 5/22/21  Allie Matos MD   sodium bicarbonate 650 MG tablet Take 2 tablets by mouth 2 times daily 5/13/21 6/12/21  TERRANCE Herr NP   insulin glargine [facility]) 100 UNIT/ML injection pen Inject 10 Units into the skin nightly 5/13/21   TERRANCE eHrr NP   cloNIDine (CATAPRES) 0.2 MG tablet Take 1 tablet by mouth 3 times daily 5/13/21   TERRANCE Herr NP   NIFEdipine (PROCARDIA XL) 90 MG extended release tablet Take 1 tablet by mouth nightly 5/13/21   TERRANCE Herr NP   glucagon 1 MG injection Inject 1 mg into the muscle See Admin Instructions Follow package directions for low blood sugar. 4/6/21   TERRANCE Arizmendi CNP   fluticasone [facility]) 50 MCG/ACT nasal spray  2/23/21   Historical Provider, MD   insulin aspart (NOVOLOG FLEXPEN) 100 UNIT/ML injection pen Inject 5 Units into the skin 3 times daily (before meals) Sliding scale 3/16/21   TERRANCE Arizmendi CNP   nitroGLYCERIN (NITROSTAT) 0.4 MG SL tablet USE 1 TABLET UNDER THE TONGUE UP TO MAXIMUM OF 3 TOTAL DOSES.  IF NO RELIEF AFTER 1 DOSE, CALL 911. 3/5/21   TERRANCE Arizmendi CNP   gabapentin (NEURONTIN) 100 MG capsule TAKE 2 CAPSULES BY MOUTH 3 TIMES DAILY 2/23/21 5/24/21  TERRANCE Arizmendi - CNP   QUEtiapine (SEROQUEL) 100 MG tablet TAKE 1 TABLET BY MOUTH NIGHTLY 2/23/21   TERRANCE Arizmendi - CNP   losartan (COZAAR) 50 MG tablet TAKE 1 TABLET BY MOUTH 2 TIMES DAILY 2/23/21   TERRANCE Arizmendi CNP   GNP VITAMIN D MAXIMUM STRENGTH 50 MCG (2000 UT) TABS TAKE 1 TABLET BY MOUTH ONCE DAILY 2/23/21   Jhonathan Lord, APRN - CNP Noberto Blades 100-62.5-25 MCG/INH AEPB INHALE ONE PUFF INTO THE LUNGS DAILY 1/27/21   TERRANCE Lino CNP   blood glucose monitor strips Test_4__times daily Diagnosis: 250.0   Diabetes Mellitus_x___Insulin Dependent____Non-Insulin Dependent 1/26/21   TERRANCE Lino CNP   blood glucose monitor kit and supplies Dispense sufficient amount for indicated testing frequency plus additional to accommodate PRN testing needs. Dispense all needed supplies to include: monitor, strips, lancing device, lancets, control solutions, alcohol swabs.  1/26/21   Stefania Dawn, APRN - CNP   prazosin (MINIPRESS) 2 MG capsule 2 CAPSULES BY MOUTH (4MG) TWICE DAILY 1/25/21   Stefania Dawn, TERRANCE - CNP   COMBIVENT RESPIMAT  MCG/ACT AERS inhaler INHALE 1 PUFF INTO THE LUNGS EVERY 6 HOURS 1/25/21   Stefania Dawn, APRN - CNP   hydrALAZINE (APRESOLINE) 100 MG tablet Take 1 tablet by mouth every 8 hours 1/25/21   Stefania Nereyda, APRN - CNP   aspirin (ASPIR-LOW) 81 MG EC tablet TAKE 1 TABLET BY MOUTH DAILY 1/25/21   Stefania Nereyda, APRN - CNP   carvedilol (COREG) 25 MG tablet Take 1 tablet by mouth 2 times daily (with meals) 1/25/21   Stefania Nereyda, APRN - CNP   atorvastatin (LIPITOR) 80 MG tablet Take 1 tablet by mouth daily 1/25/21   Stefania Nereyda, APRN - CNP   clopidogrel (PLAVIX) 75 MG tablet Take 1 tablet by mouth daily 1/25/21   [facility]ania[facility] Institute for Rehabilitationi, APRN - CNP   isosorbide mononitrate (IMDUR) 60 MG extended release tablet Take 1 tablet by mouth daily 1/5/21   Stefania Nereyda, APRN - CNP   vitamin D (ERGOCALCIFEROL) 1.25 MG (99625 UT) CAPS capsule Take 1 capsule by mouth once a week 1/5/21   Stefania Dawn, APRN - CNP   Lancets MISC Use_4__times daily Diagnisis:250.0  Diabetes Mellitus__x__Insulin Dependent___Non-Insulin Dependent 12/6/20   Saadia Husain MD   ipratropium-albuterol (DUONEB) 0.5-2.5 (3) MG/3ML SOLN nebulizer solution Inhale 3 mLs into the lungs every 6 hours as needed for Shortness of Breath 12/6/20   [facility] Ac[facility] MD   Nutritional Supplements (GLUCERNA CARBSTEADY) LIQD Take 1 Can by mouth 3 times daily 6/16/20   Bertha Saint, APRN - CNP   COMFORT EZ PEN NEEDLES 31G X 8 MM MISC USE AS DIRECTED 4/14/20   Bertha Saint, APRN - CNP       REVIEW OF SYSTEMS    (2-9 systems for level 4, 10 or more for level 5)    Review of Systems   Constitutional: Positive for fatigue. Negative for chills and fever. HENT: Negative for congestion and rhinorrhea. Respiratory: Positive for cough and shortness of breath. Cardiovascular: Positive for chest pain. Gastrointestinal: Negative for abdominal pain, nausea and vomiting. Musculoskeletal: Negative for back pain and myalgias. Neurological: Negative for light-headedness and headaches. All other systems reviewed and are negative. PHYSICAL EXAM   (up to 7 for level 4, 8 or more for level 5)    INITIAL VITALS:   ED Triage Vitals   BP Temp Temp src Pulse Resp SpO2 Height Weight   05/19/21 0513 05/19/21 0507 -- 05/19/21 0513 05/19/21 0507 05/19/21 0521 -- --   (!) 125/58 98 °F (36.7 °C)  58 22 100 %         Physical Exam  Vitals and nursing note reviewed. Constitutional:       General: He is in acute distress. Appearance: Normal appearance. He is ill-appearing. Cardiovascular:      Rate and Rhythm: Normal rate and regular rhythm. Pulses: Normal pulses. Radial pulses are 2+ on the right side and 2+ on the left side. Posterior tibial pulses are 2+ on the right side and 2+ on the left side. Heart sounds: Normal heart sounds. No murmur heard. No friction rub. Pulmonary:      Effort: Respiratory distress present. Breath sounds: Decreased breath sounds and wheezing present. Comments: Diffusely tight breath sounds throughout all lung fields, wheezing  Abdominal:      Palpations: Abdomen is soft. Tenderness: There is no abdominal tenderness.    Musculoskeletal: (*)     Immature Granulocytes 2 (*)     Segs Absolute 8.73 (*)     Absolute Lymph # 1.00 (*)     All other components within normal limits   MAGNESIUM - Abnormal; Notable for the following components:    Magnesium 2.7 (*)     All other components within normal limits       RADIOLOGY:  XR CHEST PORTABLE    Result Date: 5/19/2021  EXAMINATION: ONE XRAY VIEW OF THE CHEST 5/19/2021 5:34 am COMPARISON: Chest portable May 16, 2021 HISTORY: ORDERING SYSTEM PROVIDED HISTORY: shortness of breath TECHNOLOGIST PROVIDED HISTORY: -SIRD shortness of breath Reason for Exam: upr,sob,hx asthma,pt on CPAP FINDINGS: Right internal jugular approach central venous dual lumen catheter is noted with distal tip located within the SVC. The heart is normal in size and configuration. The mediastinal contours are within normal limits. The lungs are well aerated. The pleural surfaces are normal and no evidence of a pleural effusion is seen. Bones and soft tissues are unremarkable. Unremarkable single upright portable AP view of the chest.       EKG    EKG Interpretation    Interpreted by me    Rhythm: Sinus bradycardia  Rate: Bradycardic, 59  Axis: normal  Ectopy: none  Conduction: normal  ST Segments: no acute change  T Waves: T wave inversion  V6  Q Waves: none    Clinical Impression: Sinus bradycardia, rate of 59. No ST segment changes, no arrhythmia, no ectopy. Normal axis, poor R wave progression. Baseline wander with possible T wave inversion in v6. Inversion V6 appears chronic    All EKG's are interpreted by the Emergency Department Physician whoeither signs or Co-signs this chart in the absence of a cardiologist.    Impression:  Patient presents in respiratory distress. On CPAP. Switch to BiPAP. Diffusely tight wheezing throughout all lung fields, decreased air movement throughout all fields. Not moving much air. Will keep patient on the BiPAP, continue breathing treatments. Has received magnesium.   Took prednisone earlier in the night. Will give patient another round of Solu-Medrol however. Continue breathing treatments. Chest x-ray, lab work. Does have a dialysis cath to the right chest.  No fistulas. States he was recently started on dialysis. States he supposed to go Tuesday, Thursday, Saturday. States he has not missed any sessions since he was just in the hospital.  I do not hear crackles in the lung, has no edema in the legs. This could be secondary to volume overload. EMERGENCY DEPARTMENT COURSE:    Lab work as above. Troponin high but less than usual.  EKG without acute changes. BNP elevated but within patient's limits. Chest x-ray of completed. Still concerned however this could be secondary to volume overload and with patient's poor renal function, he may not be able to excrete very much. Was originally going to try Lasix but patient states that is ineffective so we will switch to Bumex. Patient requires admission. Spoke with [facility] who accepted patient to their service. MDM  Number of Diagnoses or Management Options  Respiratory distress: new, needed workup     Amount and/or Complexity of Data Reviewed  Clinical lab tests: ordered and reviewed  Tests in the radiology section of CPT®: ordered and reviewed  Review and summarize past medical records: yes  Discuss the patient with other providers: yes  Independent visualization of images, tracings, or specimens: yes    Risk of Complications, Morbidity, and/or Mortality  Presenting problems: moderate  Diagnostic procedures: moderate  Management options: moderate    Patient Progress  Patient progress: stable      PROCEDURES:  none    CONSULTS:  IP CONSULT TO HOSPITALIST    CRITICAL CARE:  Please see attending note    FINAL IMPRESSION     1. Respiratory distress          DISPOSITION / PLAN   DISPOSITION Admitted 05/19/2021 06:28:32 AM    PATIENT REFERRED TO:  No follow-up provider specified.     DISCHARGE MEDICATIONS:  New Prescriptions    No

## 2021-05-19 NOTE — CONSULTS
Renal Consult Note    Patient :  Cynthia Young; 73 y.o. MRN# [id number]  Location:  2012/2012-01  Attending:  Celia Sauceda MD  Admit Date:  5/19/2021   Hospital Day: 0    Reason for Consult:     Asked by Dr Celia Sauceda MD to see for BINU/Elevated Creatinine. History Obtained From:     patient, electronic medical record, patient's nurse    HD Access:     previous permacat    HD Unit:      renal care [facility]    Nephrologist:     Dr. Nancy Knight    History of Present Illness:     Cynthia Young; 73 y.o. male with past medical history as mentioned below presented to the hospital with the chief complaint of increasing shortness of breath. Patient was just discharged about 2 days ago from the hospital and was initiated on dialysis that admission with acute kidney injury in the setting of advanced kidney disease stage IV. Patient mentioned that he did go for his first dialysis treatment at the outpatient unit in [address] [facility] renal care yesterday and overnight started having some increasing shortness of breath and hence came to the hospital for evaluation. Patient does have history of COPD as well. He also complained of positive sputum production. Nephrology is consulted due to acute kidney injury being dialysis dependent. Past History/Allergies? Social History:     Past Medical History:   Diagnosis Date    Asthma     mod persistent    CAD in native artery, nonobstructive     Carotid stenosis, left 6/10/2013    Carpal tunnel syndrome     RIGHT    Cerebrovascular disease 4/23/2018    Chronic kidney disease 6/10/2013    COPD (chronic obstructive pulmonary disease) (HCC)     Diabetes mellitus (HCC)     insulin dependent    History of colon polyps     History of weakness of extremity     right sided    HTN (hypertension)     Hyperlipidemia     Lung, cysts, congenital     PTSD (post-traumatic stress disorder)     PTSD (post-traumatic stress disorder)     TIA (transient ischemic attack)     Type II or unspecified type diabetes mellitus without mention of complication, not stated as uncontrolled        Past Surgical History:   Procedure Laterality Date    CAROTID ENDARTERECTOMY Left     COLONOSCOPY      HERNIA REPAIR      IR TUNNELED CATHETER PLACEMENT GREATER THAN 5 YEARS  2021    IR TUNNELED CATHETER PLACEMENT GREATER THAN 5 YEARS 2021 Jacqulyne Homans, MD [facility]ED[facility]    NC COLSC FLX W/RMVL OF TUMOR POLYP LESION SNARE TQ N/A 2017    COLONOSCOPY POLYPECTOMY SNARE/ hot performed by Domingo Moreno DO at [facility] VASCULAR SURGERY Left     carotid stent, dr Ignacio Brunner       Allergies   Allergen Reactions    Lisinopril      cough    Ace Inhibitors      coughing    Pcn [Penicillins]        Social History     Socioeconomic History    Marital status:      Spouse name: Not on file    Number of children: Not on file    Years of education: Not on file    Highest education level: Not on file   Occupational History     Employer: N/A   Tobacco Use    Smoking status: Former Smoker     Packs/day: 0.50     Years: 35.00     Pack years: 17.50     Types: Cigarettes     Quit date: 2014     Years since quittin.8    Smokeless tobacco: Never Used   Vaping Use    Vaping Use: Never used   Substance and Sexual Activity    Alcohol use: No     Alcohol/week: 0.0 standard drinks    Drug use: No    Sexual activity: Yes     Partners: Female   Other Topics Concern    Not on file   Social History Narrative    Not on file     Social Determinants of Health     Financial Resource Strain:     Difficulty of Paying Living Expenses:    Food Insecurity:     Worried About Running Out of Food in the Last Year:     Ran Out of Food in the Last Year:    Transportation Needs:     Lack of Transportation (Medical):      Lack of Transportation (Non-Medical):    Physical Activity:     Days of Exercise per Week:     Minutes of Exercise per Session:    Stress:     Feeling isosorbide mononitrate  60 mg Oral Daily    losartan  50 mg Oral BID    NIFEdipine  90 mg Oral Nightly    prazosin  4 mg Oral BID    QUEtiapine  100 mg Oral Nightly    sodium bicarbonate  1,300 mg Oral BID    sodium chloride flush  5-40 mL Intravenous 2 times per day    famotidine  20 mg Oral BID    enoxaparin  40 mg Subcutaneous Daily    ipratropium-albuterol  1 ampule Inhalation Q4H WA    insulin lispro  0-12 Units Subcutaneous TID WC    insulin lispro  0-6 Units Subcutaneous Nightly    methylPREDNISolone  40 mg Intravenous Q6H    Followed by   Zari Guevara ON 2021] predniSONE  40 mg Oral Daily    bumetanide  2 mg Intravenous Once    ipratropium-albuterol  1 ampule Inhalation 4x daily     Continuous Infusions:    dextrose      sodium chloride       PRN Meds:  albuterol, ipratropium, glucose, dextrose, glucagon (rDNA), dextrose, sodium chloride flush, sodium chloride, promethazine **OR** ondansetron, nicotine, polyethylene glycol, acetaminophen **OR** acetaminophen, albuterol    Review of Systems:     Constitutional: No fever, no chills, no lethargy, no weakness. HEENT:  No headache, otalgia, itchy eyes, nasal discharge or sore throat. Cardiac:  No chest pain, positive dyspnea, no orthopnea or PND. Chest:   +ve cough, phlegm, +ve wheezing. Abdomen:  No abdominal pain, nausea or vomiting. Neuro:  No focal weakness, abnormal movements orseizure like activity. Skin:   No rashes, no itching. :   No hematuria, no pyuria, no dysuria, no flank pain. Extremities:  No swelling or joint pains. ROS was otherwise negative except as mentioned in the [address]      Input/Output:     I/O last 3 completed shifts:  In: -   Out: 400 [Urine:400]      Vital Signs:   Temperature:  Temp: 97.9 °F (36.6 °C)  TMax:   Temp (24hrs), Av °F (36.7 °C), Min:97.9 °F (36.6 °C), Max:98.1 °F (36.7 °C)    Respirations:  Resp: 18  Pulse:   Pulse: 60  BP:    BP: (!) 166/63  BP Range: Systolic (94OQB), YUMIKO:275 , Min:125 , FSI:044       Diastolic (14HWL), FAU:35, Min:51, Max:63      Physical Examination:     General:  AAO x 3, speaking in full sentences, no accessory muscle use. HEENT: Atraumatic, normocephalic, no throat congestion, moist mucosa. Eyes:   Pupils equal, round and reactive to light, EOMI. Neck:   Supple  Chest:   Decreased breath sounds, no rales positive wheezes. Cardiac:  S1 S2 RR, no murmurs, gallops or rubs. Abdomen: Soft, non-tender, no masses or organomegaly, BS audible. :   No suprapubic or flank tenderness. Neuro:  AAO x 3, No FND. SKIN:  No rashes, good skin turgor. Extremities:  No edema. Labs:       Recent Labs     05/17/21  0835 05/19/21  0512   WBC 14.5* 10.9   RBC 2.72* 2.80*   HGB 8.5* 8.7*   HCT 28.2* 27.7*   .7* 98.9   MCH 31.3 31.1   MCHC 30.1 31.4   RDW 13.4 13.6    340   MPV 10.9 10.5      BMP:   Recent Labs     05/17/21  0835 05/19/21  0512   * 136   K 4.9 4.4    98   CO2 19* 25   BUN 79* 51*   CREATININE 3.51* 2.66*   GLUCOSE 284* 214*   CALCIUM 8.3* 8.4*     Magnesium:    Recent Labs     05/19/21  0512   MG 2.7*     BNP:      Lab Results   Component Value Date    BNP 84 11/27/2013     PTH:     Lab Results   Component Value Date    IPTH 225.4 05/10/2021     Urinalysis/Chemistries:      Lab Results   Component Value Date    NITRU NEGATIVE 05/03/2021    COLORU YELLOW 05/03/2021    PHUR 6.0 05/03/2021    WBCUA 2 TO 5 05/03/2021    RBCUA 0 TO 2 05/03/2021    MUCUS NOT REPORTED 05/03/2021    TRICHOMONAS NOT REPORTED 05/03/2021    YEAST NOT REPORTED 05/03/2021    BACTERIA NOT REPORTED 05/03/2021    CLARITYU Clear 09/12/2014    SPECGRAV 1.016 05/03/2021    LEUKOCYTESUR NEGATIVE 05/03/2021    UROBILINOGEN Normal 05/03/2021    BILIRUBINUR NEGATIVE 05/03/2021    BILIRUBINUR NEGATIVE 11/12/2011    BLOODU Negative 09/12/2014    GLUCOSEU 1+ 05/03/2021    GLUCOSEU 3+ 11/12/2011    KETUA NEGATIVE 05/03/2021    AMORPHOUS NOT REPORTED 05/03/2021       Radiology:     Reviewed. Assessment:     1. Acute kidney injury stage IV currently dialysis dependent running on TTS schedule at  renal care [facility] unit under Dr. Neil Abebe via tunnel catheter. 2.  Acute shortness of breath. 3.  COPD exacerbation. 4.  Diabetes type 2.  5.  Essential hypertension. 6.  Wheezes positive. 7.  History of COPD. Plan:   1. No acute need for dialysis today. Will get regular dialysis in a.m. as per TTS schedule. 2. Strict Input and Output, Daily weigh and document in the chart. 3. Low Potassium, Low phosphorus and low salt diet. Fluids to be restricted to 1500ml/day. 4. IV Aranesp/Epogen for anemia of chronic disease with HD weekly. 5. IV Zemplar per protocol for secondary hyperparathyroidism with HD thrice a week. 6.  Shortness of breath and wheezing/COPD exacerbation treatment per primary. 7.  BMP in AM.  8.  Will follow. Nutrition   Please ensure that patient is on a renal diet/TF. Thank you for the consultation. Please do not hesitate to contact us for any further questions/concerns. We will continue to follow along with you. Alexis Portillo MD  [facility]     This note is created with the assistance of a speech-recognition program. While intending to generate a document that actually reflects the content of the visit, no guarantees can be provided that every mistake has been identified and corrected by editing.

## 2021-05-19 NOTE — ED NOTES
Bed: 08  Expected date:   Expected time:   Means of arrival:   Comments:  FOREIGN [id number] Apolinar Abarca RN  05/19/21 1144

## 2021-05-19 NOTE — ED PROVIDER NOTES
[facility]     Emergency Department     Faculty Attestation    I performed a history and physical examination of the patient and discussed management with the resident. I have reviewed and agree with the residents findings including all diagnostic interpretations, and treatment plans as written. Any areas of disagreement are noted on the chart. I was personally present for the key portions of any procedures. I have documented in the chart those procedures where I was not present during the key portions. I have reviewed the emergency nurses triage note. I agree with the chief complaint, past medical history, past surgical history, allergies, medications, social and family history as documented unless otherwise noted below. Documentation of the HPI, Physical Exam and Medical Decision Making performed by scribadrienne is based on my personal performance of the HPI, PE and MDM. For Physician Assistant/ Nurse Practitioner cases/documentation I have personally evaluated this patient and have completed at least one if not all key elements of the E/M (history, physical exam, and MDM). Additional findings are as noted. Recent discharge, no dialysis pt, dyspneic tonight, no fever, no cp,   pe vss alert, dyspneic, very diminished bilaterally,   Abdomen non tender, no distension, no rigidity, no guarding, 2 + ankle edema, no calf tenderness,     Trop stable, cxr stable, vss, admitted,     EKG Interpretation    Interpreted by me  Sinus bradycardia, hr 59, no st elevation, normal axis, qtc 403    CRITICAL CARE: There was a high probability of clinically significant/life threatening deterioration in this patient's condition which required my urgent intervention. Total critical care time was 10 minutes. This excludes any time for separately reportable procedures.        ALFREDOWinslow Indian Health Care CenterAjit 24, DO  05/19/21 [address], DO  05/19/21 0700

## 2021-05-19 NOTE — PROGRESS NOTES
Jessie Marshall, [facility]. Respiratory failure ([address] [J96.90] . Vitals:    05/19/21 1139   BP:    Pulse:    Resp:    Temp:    SpO2: 98%   . Patients home meds are   Prior to Admission medications    Medication Sig Start Date End Date Taking? Authorizing Provider   predniSONE (DELTASONE) 10 MG tablet Please take 4 tablets daily X 3 days then 3 tablets daily X 3 days then 2 tablets daily X 3 days then 1 tablet daily X 3 days. 5/17/21   Blanca Chaudhary MD   doxycycline hyclate (VIBRA-TABS) 100 MG tablet Take 1 tablet by mouth 2 times daily for 5 days 5/17/21 5/22/21  Blanca Chaudhary MD   sodium bicarbonate 650 MG tablet Take 2 tablets by mouth 2 times daily 5/13/21 6/12/21  TERRANCE Cain NP   insulin glargine [facility]) 100 UNIT/ML injection pen Inject 10 Units into the skin nightly 5/13/21   TERRANCE Cain NP   cloNIDine (CATAPRES) 0.2 MG tablet Take 1 tablet by mouth 3 times daily 5/13/21   TERRANCE Cain NP   NIFEdipine (PROCARDIA XL) 90 MG extended release tablet Take 1 tablet by mouth nightly 5/13/21   TERRANCE Cain NP   glucagon 1 MG injection Inject 1 mg into the muscle See Admin Instructions Follow package directions for low blood sugar. 4/6/21   TERRANCE Steve CNP   fluticasone Hulon Sane) 50 MCG/ACT nasal spray  2/23/21   Historical Provider, MD   insulin aspart (NOVOLOG FLEXPEN) 100 UNIT/ML injection pen Inject 5 Units into the skin 3 times daily (before meals) Sliding scale 3/16/21   TERRANCE Steve CNP   nitroGLYCERIN (NITROSTAT) 0.4 MG SL tablet USE 1 TABLET UNDER THE TONGUE UP TO MAXIMUM OF 3 TOTAL DOSES.  IF NO RELIEF AFTER 1 DOSE, CALL 911. 3/5/21   TERRANCE Steve CNP   gabapentin (NEURONTIN) 100 MG capsule TAKE 2 CAPSULES BY MOUTH 3 TIMES DAILY 2/23/21 5/24/21  TERRANCE Steve CNP   QUEtiapine (SEROQUEL) 100 MG tablet TAKE 1 TABLET BY MOUTH NIGHTLY 2/23/21   TERRANCE Steve CNP   losartan (COZAAR) 50 MG tablet TAKE 1 TABLET BY MOUTH 2 TIMES DAILY 2/23/21   TERRANCE Ribeiro CNP   GNP VITAMIN D MAXIMUM STRENGTH 50 MCG (2000 UT) TABS TAKE 1 TABLET BY MOUTH ONCE DAILY 2/23/21   TERRANCE Ribeiro CNP   Buzzy Curtis 100-62.5-25 MCG/INH AEPB INHALE ONE PUFF INTO THE LUNGS DAILY 1/27/21   TERRANCE Ribeiro CNP   blood glucose monitor strips Test_4__times daily Diagnosis: 250.0   Diabetes Mellitus_x___Insulin Dependent____Non-Insulin Dependent 1/26/21   TERRANCE Ribeiro CNP   blood glucose monitor kit and supplies Dispense sufficient amount for indicated testing frequency plus additional to accommodate PRN testing needs. Dispense all needed supplies to include: monitor, strips, lancing device, lancets, control solutions, alcohol swabs.  1/26/21   TERRANCE Ribeiro CNP   prazosin (MINIPRESS) 2 MG capsule 2 CAPSULES BY MOUTH (4MG) TWICE DAILY 1/25/21   TERRANCE Ribeiro CNP   COMBIVENT RESPIMAT  MCG/ACT AERS inhaler INHALE 1 PUFF INTO THE LUNGS EVERY 6 HOURS 1/25/21   TERRANCE Ribeiro CNP   hydrALAZINE (APRESOLINE) 100 MG tablet Take 1 tablet by mouth every 8 hours 1/25/21   TERRANCE Ribeiro CNP   aspirin (ASPIR-LOW) 81 MG EC tablet TAKE 1 TABLET BY MOUTH DAILY 1/25/21   TERRANCE Ribeiro CNP   carvedilol (COREG) 25 MG tablet Take 1 tablet by mouth 2 times daily (with meals) 1/25/21   TERRANCE Ribeiro CNP   atorvastatin (LIPITOR) 80 MG tablet Take 1 tablet by mouth daily 1/25/21   TERRANCE Ribeiro CNP   clopidogrel (PLAVIX) 75 MG tablet Take 1 tablet by mouth daily 1/25/21   TERRANCE Ribeiro CNP   isosorbide mononitrate (IMDUR) 60 MG extended release tablet Take 1 tablet by mouth daily 1/5/21   TERRANCE Ribeiro CNP   vitamin D (ERGOCALCIFEROL) 1.25 MG (23627 UT) CAPS capsule Take 1 capsule by mouth once a week 1/5/21   Bree Merlos, APRN - CNP   Lancets MISC Use_4__times daily Diagnisis:250.0  Diabetes Mellitus__x__Insulin Dependent___Non-Insulin Dependent 12/6/20   Jose Luismary Negron MD   ipratropium-albuterol (DUONEB) 0.5-2.5 (3) MG/3ML SOLN nebulizer solution Inhale 3 mLs into the lungs every 6 hours as needed for Shortness of Breath 12/6/20   Elfego Lawson MD   Nutritional Supplements (GLUCERNA CARBSTEADY) LIQD Take 1 Can by mouth 3 times daily 6/16/20   TERRANCE Jefferson CNP   COMFORT EZ PEN NEEDLES 31G X 8 MM MISC USE AS DIRECTED 4/14/20   TERRANCE Jefferson CNP   .   Recent Surgical History: None = 0     Assessment     RR 18  Breath Sounds: expiratory wheezes throughout      Bronchodilator assessment at level  3    [x]    Bronchodilator Assessment  BRONCHODILATOR ASSESSMENT SCORE  Score 0 1 2 3 4 5   Breath Sounds   []  Patient Baseline []  No Wheeze good aeration []  Faint, scattered wheezing, good aeration [x]  Expiratory Wheezing and or moderately diminished []  Insp/Exp wheeze and/or very diminished []  Insp/Exp and/ or marked distress   Respiratory Rate   []  Patient Baseline []  Less than 20 [x]  Less than 20 []  20-25 []  Greater than 25 []  Greater than 25   Peak flow % of Pred or PB [x]  NA   []  Greater than 90%  []  81-90% []  71-80% []  Less than or equal to 70%  or unable to perform []  Unable due to Respiratory Distress   Dyspnea re []  Patient Baseline []  No SOB []  No SOB [x]  SOB on exertion []  SOB min activity []  At rest/acute   e FEV% Predicted       [x]  NA []  Above 69%  []  Unable []  Above 60-69%  []  Unable []  Above 50-59%  []  Unable []  Above 35-49%  []  Unable []  Less than 35%  []  Unable              Reji Puente RCP  12:03 PM

## 2021-05-19 NOTE — H&P
[facility]  Office: [address], DO, Philippe Mac, DO, Brandon Amezcua, DO, Jaron Mccray, DO, Mathew Solitario MD, Baldev Frederick MD, Trip Burger MD, Allie Matos MD, Farshad Montgomery MD, Chacorta Powell MD, Tiffany Rosenthal MD, Denton Velasquez MD, Olga Lee DO, Rod Hilario MD, Marissa Marcelo DO, Christian Alston MD,  Alma Rosa Driscoll DO, Damaris Brown MD, Brenden Causey MD, Nelli Laguna MD, Darlene Hensley MD, Victoria Loja, [facility], [facility], [facility], Kishore Aceves, CNP, Escobar Roth, CNS, Kaya Guido, CNP, Vincent Kidd, CNP, Pricila Radford, CNP, Cynthia Bermeo, CNP, Riki Currie, CNP, Bijan Linares PA-C, Leyla John, [facility], Michael Carver, CNP, Jeananne Goltz, CNP, Leslie Shaw, CNP, Marine Marshall, CNP, Esperanza Perkins, CNP, Blaise Hernandez, [address]    HISTORY AND PHYSICAL EXAMINATION            Date:   5/19/2021  Patient name:  Augustine Nicole  Date of admission:  5/19/2021  5:05 AM  MRN:   [id number]  Account:  [de-identified]  YOB: 1955  PCP:    TERRANCE Arizmendi CNP  Room:   08/08  Code Status:    Prior    Chief Complaint:     Chief Complaint   Patient presents with    Respiratory Distress       History Obtained From:     patient, electronic medical record    History of Present Illness:     Aguustine Nicole is a 73 y.o. M with history of ESRD on HD, COPD who presented with shortness of breath. States symptoms going on for the past 2 days, but has been having intermittent shortness of breath going on for the past 2 years. Having worsening dyspnea with exertion. Also having sputum production. Was recently admitted 2 days prior for COPD exacerbation, d/c on steroids and antibiotics at that time. States he went home and developed worsening shortness of breath causing him to come back to ER. Has significant secondhand smoke exposure at home.      Past Medical History:     Past Medical History:   Diagnosis Date    Asthma     mod persistent    CAD in native artery, nonobstructive     Carotid stenosis, left 6/10/2013    Carpal tunnel syndrome     RIGHT    Cerebrovascular disease 4/23/2018    Chronic kidney disease 6/10/2013    COPD (chronic obstructive pulmonary disease) (HCC)     Diabetes mellitus (HCC)     insulin dependent    History of colon polyps     History of weakness of extremity     right sided    HTN (hypertension)     Hyperlipidemia     Lung, cysts, congenital     PTSD (post-traumatic stress disorder)     PTSD (post-traumatic stress disorder)     TIA (transient ischemic attack)     Type II or unspecified type diabetes mellitus without mention of complication, not stated as uncontrolled         Past Surgical History:     Past Surgical History:   Procedure Laterality Date    CAROTID ENDARTERECTOMY Left 7-2013    COLONOSCOPY      HERNIA REPAIR      IR TUNNELED CATHETER PLACEMENT GREATER THAN 5 YEARS  5/11/2021    IR TUNNELED CATHETER PLACEMENT GREATER THAN 5 YEARS 5/11/2021 Ankush Malik MD [facility] SPECIAL PROCEDURES    SC COLSC FLX W/RMVL OF TUMOR POLYP LESION SNARE TQ N/A 6/27/2017    COLONOSCOPY POLYPECTOMY SNARE/ hot performed by Ventura Parikh DO at [facility] VASCULAR SURGERY Left 2015    carotid stent, dr Lino Faust        Medications Prior to Admission:     Prior to Admission medications    Medication Sig Start Date End Date Taking? Authorizing Provider   predniSONE (DELTASONE) 10 MG tablet Please take 4 tablets daily X 3 days then 3 tablets daily X 3 days then 2 tablets daily X 3 days then 1 tablet daily X 3 days.  5/17/21   Enoch Pak MD   doxycycline hyclate (VIBRA-TABS) 100 MG tablet Take 1 tablet by mouth 2 times daily for 5 days 5/17/21 5/22/21  Enoch Pak MD   sodium bicarbonate 650 MG tablet Take 2 tablets by mouth 2 times daily 5/13/21 6/12/21  TERRANCE Hunter - NP   insulin glargine [facility]) 100 UNIT/ML injection pen Inject 10 Units into the skin nightly 5/13/21   TERRANCE Velasco NP   cloNIDine (CATAPRES) 0.2 MG tablet Take 1 tablet by mouth 3 times daily 5/13/21   TERRANCE Velasco NP   NIFEdipine (PROCARDIA XL) 90 MG extended release tablet Take 1 tablet by mouth nightly 5/13/21   TERRANCE Velasco NP   glucagon 1 MG injection Inject 1 mg into the muscle See Admin Instructions Follow package directions for low blood sugar. 4/6/21   Juanell Schilder, APRN - CNP   fluticasone [facility] Octave) 50 MCG/ACT nasal spray  2/23/21   Historical Provider, MD   insulin aspart (NOVOLOG FLEXPEN) 100 UNIT/ML injection pen Inject 5 Units into the skin 3 times daily (before meals) Sliding scale 3/16/21   Juanell Schilder, APRN - CNP   nitroGLYCERIN (NITROSTAT) 0.4 MG SL tablet USE 1 TABLET UNDER THE TONGUE UP TO MAXIMUM OF 3 TOTAL DOSES. IF NO RELIEF AFTER 1 DOSE, CALL 911. 3/5/21   Juanell Schilder, APRN - CNP   gabapentin (NEURONTIN) 100 MG capsule TAKE 2 CAPSULES BY MOUTH 3 TIMES DAILY 2/23/21 5/24/21  Juanell Schilder, APRN - CNP   QUEtiapine (SEROQUEL) 100 MG tablet TAKE 1 TABLET BY MOUTH NIGHTLY 2/23/21   Juanell Schilder, APRN - CNP   losartan (COZAAR) 50 MG tablet TAKE 1 TABLET BY MOUTH 2 TIMES DAILY 2/23/21   Juanell Schilder, APRN - CNP   GNP VITAMIN D MAXIMUM STRENGTH 50 MCG (2000 UT) TABS TAKE 1 TABLET BY MOUTH ONCE DAILY 2/23/21   Juanell Schilder, APRN - CNP   Jacy Nares 100-62.5-25 MCG/INH AEPB INHALE ONE PUFF INTO THE LUNGS DAILY 1/27/21   Juanell Schilder, APRN - CNP   blood glucose monitor strips Test_4__times daily Diagnosis: 250.0   Diabetes Mellitus_x___Insulin Dependent____Non-Insulin Dependent 1/26/21   Juanell Schilder, APRN - CNP   blood glucose monitor kit and supplies Dispense sufficient amount for indicated testing frequency plus additional to accommodate PRN testing needs. Dispense all needed supplies to include: monitor, strips, lancing device, lancets, control solutions, alcohol swabs.  1/26/21 TERRANCE Mcmahon CNP   prazosin (MINIPRESS) 2 MG capsule 2 CAPSULES BY MOUTH (4MG) TWICE DAILY 1/25/21   TERRANCE Mcmahon CNP   COMBIVENT RESPIMAT  MCG/ACT AERS inhaler INHALE 1 PUFF INTO THE LUNGS EVERY 6 HOURS 1/25/21   TERRANCE Mcmahon CNP   hydrALAZINE (APRESOLINE) 100 MG tablet Take 1 tablet by mouth every 8 hours 1/25/21   TERRANCE Mcmahon CNP   aspirin (ASPIR-LOW) 81 MG EC tablet TAKE 1 TABLET BY MOUTH DAILY 1/25/21   TERRANCE Mcmahon CNP   carvedilol (COREG) 25 MG tablet Take 1 tablet by mouth 2 times daily (with meals) 1/25/21   TERRANCE Mcmahon CNP   atorvastatin (LIPITOR) 80 MG tablet Take 1 tablet by mouth daily 1/25/21   TERRANCE Mcmahon CNP   clopidogrel (PLAVIX) 75 MG tablet Take 1 tablet by mouth daily 1/25/21   TERRANCE Mcmahon CNP   isosorbide mononitrate (IMDUR) 60 MG extended release tablet Take 1 tablet by mouth daily 1/5/21   TERRANCE Mcmahon CNP   vitamin D (ERGOCALCIFEROL) 1.25 MG (62993 UT) CAPS capsule Take 1 capsule by mouth once a week 1/5/21   TERRANCE Mcmahon CNP   Lancets MISC Use_4__times daily Diagnisis:250.0  Diabetes Mellitus__x__Insulin Dependent___Non-Insulin Dependent 12/6/20   Luis Benavidez MD   ipratropium-albuterol (DUONEB) 0.5-2.5 (3) MG/3ML SOLN nebulizer solution Inhale 3 mLs into the lungs every 6 hours as needed for Shortness of Breath 12/6/20   Elfego Thrasher MD   Nutritional Supplements ([address] [place] [address]) LIQD Take 1 Can by mouth 3 times daily 6/16/20   TERRANCE Mcmahon CNP   COMFORT EZ PEN NEEDLES 31G X 8 MM MISC USE AS DIRECTED 4/14/20   TERRANCE Mcmahon CNP        Allergies:     Lisinopril, Ace inhibitors, and Pcn [penicillins]    Social History:     Tobacco:    reports that he quit smoking about 6 years ago. His smoking use included cigarettes. He has a 17.50 pack-year smoking history.  He has never used smokeless tobacco.  Alcohol: reports no history of alcohol use. Drug Use:  reports no history of drug use. Family History:     Family History   Problem Relation Age of Onset    Cancer Mother     Heart Disease Father        Review of Systems:     Positive and Negative as described in HPI.     CONSTITUTIONAL:  negative for fevers, chills  HEENT:  negative for vision, hearing changes, runny nose, throat pain  RESPIRATORY:  Positive for shortness of breath, wheezing  CARDIOVASCULAR:  negative for chest pain, palpitations  GASTROINTESTINAL:  negative for nausea, vomiting, diarrhea, constipation, change in bowel habits, abdominal pain   GENITOURINARY:  negative for difficulty of urination, burning with urination, frequency   INTEGUMENT:  negative for rash, skin lesions, easy bruising   HEMATOLOGIC/LYMPHATIC:  negative for swelling/edema   ALLERGIC/IMMUNOLOGIC:  negative for urticaria , itching  ENDOCRINE:  negative increase in drinking, increase in urination, hot or cold intolerance  MUSCULOSKELETAL:  negative joint pains, muscle aches, swelling of joints  NEUROLOGICAL:  negative for headaches, dizziness, lightheadedness, numbness, pain, tingling extremities  BEHAVIOR/PSYCH:  negative for depression, anxiety    Physical Exam:   BP (!) 140/51   Pulse 58   Temp 98 °F (36.7 °C)   Resp 23   SpO2 99%   Temp (24hrs), Av °F (36.7 °C), Min:98 °F (36.7 °C), Max:98 °F (36.7 °C)    Recent Labs     21  0420 21  0739 21  1127 21  1549   POCGLU 316* 261* 240* 444*     No intake or output data in the 24 hours ending 21 0825    General Appearance: alert, no distress, on NC  Mental status: oriented to person, place, and time  Head: normocephalic, atraumatic  Eye: no icterus, redness, pupils equal and reactive, extraocular eye movements intact, conjunctiva clear  Ear: normal external ear, no discharge, hearing intact  Nose: no drainage noted  Mouth: mucous membranes moist  Lungs: Bilateral equal air entry, diffuse wheezing bilaterally   Cardiovascular: normal rate, regular rhythm  Abdomen: Soft, nontender, nondistended, normal bowel sounds  Neurologic: There are no new focal motor or sensory deficits, normal muscle tone and bulk, no abnormal sensation, normal speech, cranial nerves II through XII grossly intact  Skin: No gross lesions, rashes, bruising or bleeding on exposed skin area  Extremities: peripheral pulses palpable, no pedal edema or calf pain with palpation  Psych: normal affect    Investigations:      Laboratory Testing:  Recent Results (from the past 24 hour(s))   Troponin    Collection Time: 05/19/21  5:12 AM   Result Value Ref Range    Troponin, High Sensitivity 96 (HH) 0 - 22 ng/L    Troponin T NOT REPORTED <0.03 ng/mL    Troponin Interp NOT REPORTED    Brain Natriuretic Peptide    Collection Time: 05/19/21  5:12 AM   Result Value Ref Range    Pro-BNP 7,462 (H) <300 pg/mL    BNP Interpretation Pro-BNP Reference Range:    BASIC METABOLIC PANEL    Collection Time: 05/19/21  5:12 AM   Result Value Ref Range    Glucose 214 (H) 70 - 99 mg/dL    BUN 51 (H) 8 - 23 mg/dL    CREATININE 2.66 (H) 0.70 - 1.20 mg/dL    Bun/Cre Ratio NOT REPORTED 9 - 20    Calcium 8.4 (L) 8.6 - 10.4 mg/dL    Sodium 136 135 - 144 mmol/L    Potassium 4.4 3.7 - 5.3 mmol/L    Chloride 98 98 - 107 mmol/L    CO2 25 20 - 31 mmol/L    Anion Gap 13 9 - 17 mmol/L    GFR Non-African American 24 (L) >60 mL/min    GFR  29 (L) >60 mL/min    GFR Comment          GFR Staging NOT REPORTED    CBC WITH AUTO DIFFERENTIAL    Collection Time: 05/19/21  5:12 AM   Result Value Ref Range    WBC 10.9 3.5 - 11.3 k/uL    RBC 2.80 (L) 4.21 - 5.77 m/uL    Hemoglobin 8.7 (L) 13.0 - 17.0 g/dL    Hematocrit 27.7 (L) 40.7 - 50.3 %    MCV 98.9 82.6 - 102.9 fL    MCH 31.1 25.2 - 33.5 pg    MCHC 31.4 28.4 - 34.8 g/dL    RDW 13.6 11.8 - 14.4 %    Platelets 396 539 - 697 k/uL    MPV 10.5 8.1 - 13.5 fL    NRBC Automated 0.0 0.0 per 100 WBC    Differential Type NOT REPORTED Seg Neutrophils 81 (H) 36 - 65 %    Lymphocytes 9 (L) 24 - 43 %    Monocytes 8 3 - 12 %    Eosinophils % 0 (L) 1 - 4 %    Basophils 0 0 - 2 %    Immature Granulocytes 2 (H) 0 %    Segs Absolute 8.73 (H) 1.50 - 8.10 k/uL    Absolute Lymph # 1.00 (L) 1.10 - 3.70 k/uL    Absolute Mono # 0.87 0.10 - 1.20 k/uL    Absolute Eos # 0.04 0.00 - 0.44 k/uL    Basophils Absolute 0.03 0.00 - 0.20 k/uL    Absolute Immature Granulocyte 0.20 0.00 - 0.30 k/uL    WBC Morphology NOT REPORTED     RBC Morphology NOT REPORTED     Platelet Estimate NOT REPORTED    MAGNESIUM    Collection Time: 05/19/21  5:12 AM   Result Value Ref Range    Magnesium 2.7 (H) 1.6 - 2.6 mg/dL       Imaging/Diagnostics:  XR CHEST PORTABLE    Result Date: 5/19/2021  Unremarkable single upright portable AP view of the chest.     XR CHEST PORTABLE    Result Date: 5/16/2021  No significant change in chest findings. XR CHEST PORTABLE    Result Date: 5/15/2021  Findings compatible with mild interstitial edema. Dialysis catheter terminates at the distal SVC.        Assessment :      Hospital Problems         Last Modified POA    * (Principal) COPD exacerbation ([address] 5/19/2021 Yes    Carotid stenosis, left s/p Endarterectomy 5/19/2021 Yes    Essential hypertension 5/19/2021 Yes    ESRD (end stage renal disease) ([address] 5/19/2021 Yes    Chronic heart failure with preserved ejection fraction ([address] 5/19/2021 Yes    Uncontrolled type 2 diabetes mellitus with hyperglycemia ([address] 5/19/2021 Yes          Plan:     Patient status inpatient in the Progressive Unit/Step down    - Vitals, labs, imaging, medications reviewed  - Continue IV steroids  - RT evaluation, aerosol protocol   - Supplemental O2, wean as tolerated  - Home O2 evaluation on d/c if needed   - Nephrology consult for ESRD on HD  - Continue home BP medications  - Monitor glucose  - Continue home lantus  - Insulin correction   - Avoid secondhand smoke exposure at home, discussed with patient     Consultations: IP CONSULT TO HOSPITALIST  IP CONSULT TO NEPHROLOGY  IP CONSULT TO IV TEAM     Patient is admitted as inpatient status because of co-morbidities listed above, severity of signs and symptoms as outlined, requirement for current medical therapies and most importantly because of direct risk to patient if care not provided in a hospital setting. Expected length of stay > 48 hours.     Milton Canseco MD  5/19/2021  8:25 AM    Copy sent to Dr. Tay Ramirez, APRN - CNP

## 2021-05-19 NOTE — ED TRIAGE NOTES
Pt recently in and out of the hospital for difficulty breathing and a cyst on his lung. Patient has been using a whole pack of combivent at home without relief of SOB. Pt given mag en route and placed on CPAP.

## 2021-05-20 ENCOUNTER — APPOINTMENT (OUTPATIENT)
Dept: GENERAL RADIOLOGY | Age: 66
DRG: 280 | End: 2021-05-20
Payer: COMMERCIAL

## 2021-05-20 LAB
ANION GAP SERPL CALCULATED.3IONS-SCNC: 13 MMOL/L (ref 9–17)
BUN BLDV-MCNC: 79 MG/DL (ref 8–23)
BUN/CREAT BLD: ABNORMAL (ref 9–20)
CALCIUM SERPL-MCNC: 8.6 MG/DL (ref 8.6–10.4)
CHLORIDE BLD-SCNC: 99 MMOL/L (ref 98–107)
CO2: 25 MMOL/L (ref 20–31)
CREAT SERPL-MCNC: 3.97 MG/DL (ref 0.7–1.2)
EKG ATRIAL RATE: 59 BPM
EKG ATRIAL RATE: 60 BPM
EKG ATRIAL RATE: 63 BPM
EKG P AXIS: 77 DEGREES
EKG P AXIS: 86 DEGREES
EKG P-R INTERVAL: 122 MS
EKG P-R INTERVAL: 130 MS
EKG Q-T INTERVAL: 394 MS
EKG Q-T INTERVAL: 402 MS
EKG Q-T INTERVAL: 408 MS
EKG QRS DURATION: 88 MS
EKG QRS DURATION: 90 MS
EKG QRS DURATION: 94 MS
EKG QTC CALCULATION (BAZETT): 399 MS
EKG QTC CALCULATION (BAZETT): 402 MS
EKG QTC CALCULATION (BAZETT): 403 MS
EKG R AXIS: 66 DEGREES
EKG R AXIS: 66 DEGREES
EKG R AXIS: 73 DEGREES
EKG T AXIS: -111 DEGREES
EKG T AXIS: -133 DEGREES
EKG T AXIS: -174 DEGREES
EKG VENTRICULAR RATE: 59 BPM
EKG VENTRICULAR RATE: 60 BPM
EKG VENTRICULAR RATE: 62 BPM
GFR AFRICAN AMERICAN: 19 ML/MIN
GFR NON-AFRICAN AMERICAN: 15 ML/MIN
GFR SERPL CREATININE-BSD FRML MDRD: ABNORMAL ML/MIN/{1.73_M2}
GFR SERPL CREATININE-BSD FRML MDRD: ABNORMAL ML/MIN/{1.73_M2}
GLUCOSE BLD-MCNC: 188 MG/DL (ref 75–110)
GLUCOSE BLD-MCNC: 193 MG/DL (ref 75–110)
GLUCOSE BLD-MCNC: 196 MG/DL (ref 75–110)
GLUCOSE BLD-MCNC: 201 MG/DL (ref 75–110)
GLUCOSE BLD-MCNC: 212 MG/DL (ref 75–110)
GLUCOSE BLD-MCNC: 224 MG/DL (ref 75–110)
GLUCOSE BLD-MCNC: 245 MG/DL (ref 75–110)
GLUCOSE BLD-MCNC: 249 MG/DL (ref 75–110)
GLUCOSE BLD-MCNC: 265 MG/DL (ref 70–99)
GLUCOSE BLD-MCNC: 302 MG/DL (ref 75–110)
GLUCOSE BLD-MCNC: 313 MG/DL (ref 75–110)
HCT VFR BLD CALC: 27.9 % (ref 40.7–50.3)
HEMOGLOBIN: 8.6 G/DL (ref 13–17)
MAGNESIUM: 2.1 MG/DL (ref 1.6–2.6)
MCH RBC QN AUTO: 31.3 PG (ref 25.2–33.5)
MCHC RBC AUTO-ENTMCNC: 30.8 G/DL (ref 28.4–34.8)
MCV RBC AUTO: 101.5 FL (ref 82.6–102.9)
NRBC AUTOMATED: 0 PER 100 WBC
PDW BLD-RTO: 13.5 % (ref 11.8–14.4)
PLATELET # BLD: 333 K/UL (ref 138–453)
PMV BLD AUTO: 10.4 FL (ref 8.1–13.5)
POTASSIUM SERPL-SCNC: 5.4 MMOL/L (ref 3.7–5.3)
POTASSIUM SERPL-SCNC: 5.4 MMOL/L (ref 3.7–5.3)
RBC # BLD: 2.75 M/UL (ref 4.21–5.77)
SODIUM BLD-SCNC: 137 MMOL/L (ref 135–144)
TROPONIN INTERP: ABNORMAL
TROPONIN T: ABNORMAL NG/ML
TROPONIN, HIGH SENSITIVITY: 85 NG/L (ref 0–22)
WBC # BLD: 10.7 K/UL (ref 3.5–11.3)

## 2021-05-20 PROCEDURE — 99232 SBSQ HOSP IP/OBS MODERATE 35: CPT | Performed by: INTERNAL MEDICINE

## 2021-05-20 PROCEDURE — 6370000000 HC RX 637 (ALT 250 FOR IP): Performed by: INTERNAL MEDICINE

## 2021-05-20 PROCEDURE — 2700000000 HC OXYGEN THERAPY PER DAY

## 2021-05-20 PROCEDURE — 6360000002 HC RX W HCPCS: Performed by: INTERNAL MEDICINE

## 2021-05-20 PROCEDURE — 80048 BASIC METABOLIC PNL TOTAL CA: CPT

## 2021-05-20 PROCEDURE — APPNB30 APP NON BILLABLE TIME 0-30 MINS: Performed by: NURSE PRACTITIONER

## 2021-05-20 PROCEDURE — 94660 CPAP INITIATION&MGMT: CPT

## 2021-05-20 PROCEDURE — 6370000000 HC RX 637 (ALT 250 FOR IP): Performed by: NURSE PRACTITIONER

## 2021-05-20 PROCEDURE — 2580000003 HC RX 258: Performed by: NURSE PRACTITIONER

## 2021-05-20 PROCEDURE — 84132 ASSAY OF SERUM POTASSIUM: CPT

## 2021-05-20 PROCEDURE — 6360000002 HC RX W HCPCS: Performed by: STUDENT IN AN ORGANIZED HEALTH CARE EDUCATION/TRAINING PROGRAM

## 2021-05-20 PROCEDURE — 2060000000 HC ICU INTERMEDIATE R&B

## 2021-05-20 PROCEDURE — 82947 ASSAY GLUCOSE BLOOD QUANT: CPT

## 2021-05-20 PROCEDURE — 83735 ASSAY OF MAGNESIUM: CPT

## 2021-05-20 PROCEDURE — 36415 COLL VENOUS BLD VENIPUNCTURE: CPT

## 2021-05-20 PROCEDURE — 93010 ELECTROCARDIOGRAM REPORT: CPT | Performed by: INTERNAL MEDICINE

## 2021-05-20 PROCEDURE — 6360000002 HC RX W HCPCS: Performed by: NURSE PRACTITIONER

## 2021-05-20 PROCEDURE — 71045 X-RAY EXAM CHEST 1 VIEW: CPT

## 2021-05-20 PROCEDURE — 84484 ASSAY OF TROPONIN QUANT: CPT

## 2021-05-20 PROCEDURE — 94640 AIRWAY INHALATION TREATMENT: CPT

## 2021-05-20 PROCEDURE — 93005 ELECTROCARDIOGRAM TRACING: CPT | Performed by: INTERNAL MEDICINE

## 2021-05-20 PROCEDURE — 90935 HEMODIALYSIS ONE EVALUATION: CPT

## 2021-05-20 PROCEDURE — 85027 COMPLETE CBC AUTOMATED: CPT

## 2021-05-20 RX ORDER — HEPARIN SODIUM 1000 [USP'U]/ML
1600 INJECTION, SOLUTION INTRAVENOUS; SUBCUTANEOUS PRN
Status: DISCONTINUED | OUTPATIENT
Start: 2021-05-20 | End: 2021-05-26 | Stop reason: HOSPADM

## 2021-05-20 RX ORDER — INSULIN GLARGINE 100 [IU]/ML
20 INJECTION, SOLUTION SUBCUTANEOUS NIGHTLY
Status: DISCONTINUED | OUTPATIENT
Start: 2021-05-20 | End: 2021-05-26 | Stop reason: HOSPADM

## 2021-05-20 RX ORDER — MORPHINE SULFATE 2 MG/ML
2 INJECTION, SOLUTION INTRAMUSCULAR; INTRAVENOUS ONCE
Status: DISCONTINUED | OUTPATIENT
Start: 2021-05-20 | End: 2021-05-20

## 2021-05-20 RX ORDER — DOXYCYCLINE HYCLATE 100 MG
100 TABLET ORAL 2 TIMES DAILY
Status: COMPLETED | OUTPATIENT
Start: 2021-05-20 | End: 2021-05-24

## 2021-05-20 RX ORDER — ALPRAZOLAM 0.25 MG/1
0.25 TABLET ORAL ONCE
Status: COMPLETED | OUTPATIENT
Start: 2021-05-20 | End: 2021-05-20

## 2021-05-20 RX ORDER — NITROGLYCERIN 0.4 MG/1
0.4 TABLET SUBLINGUAL EVERY 5 MIN PRN
Status: DISCONTINUED | OUTPATIENT
Start: 2021-05-20 | End: 2021-05-26 | Stop reason: HOSPADM

## 2021-05-20 RX ADMIN — INSULIN LISPRO 4 UNITS: 100 INJECTION, SOLUTION INTRAVENOUS; SUBCUTANEOUS at 08:18

## 2021-05-20 RX ADMIN — HEPARIN SODIUM 1600 UNITS: 1000 INJECTION INTRAVENOUS; SUBCUTANEOUS at 12:25

## 2021-05-20 RX ADMIN — LOSARTAN POTASSIUM 50 MG: 50 TABLET, FILM COATED ORAL at 20:49

## 2021-05-20 RX ADMIN — HYDRALAZINE HYDROCHLORIDE 100 MG: 50 TABLET, FILM COATED ORAL at 13:48

## 2021-05-20 RX ADMIN — IPRATROPIUM BROMIDE AND ALBUTEROL SULFATE 1 AMPULE: .5; 3 SOLUTION RESPIRATORY (INHALATION) at 20:30

## 2021-05-20 RX ADMIN — PRAZOSIN HYDROCHLORIDE 4 MG: 1 CAPSULE ORAL at 08:17

## 2021-05-20 RX ADMIN — SODIUM BICARBONATE 1300 MG: 648 TABLET ORAL at 08:17

## 2021-05-20 RX ADMIN — IPRATROPIUM BROMIDE AND ALBUTEROL SULFATE 1 AMPULE: .5; 3 SOLUTION RESPIRATORY (INHALATION) at 08:11

## 2021-05-20 RX ADMIN — LOSARTAN POTASSIUM 50 MG: 50 TABLET, FILM COATED ORAL at 08:17

## 2021-05-20 RX ADMIN — ALBUTEROL SULFATE 2.5 MG: 2.5 SOLUTION RESPIRATORY (INHALATION) at 00:20

## 2021-05-20 RX ADMIN — INSULIN LISPRO 4 UNITS: 100 INJECTION, SOLUTION INTRAVENOUS; SUBCUTANEOUS at 18:09

## 2021-05-20 RX ADMIN — FAMOTIDINE 20 MG: 20 TABLET, FILM COATED ORAL at 08:18

## 2021-05-20 RX ADMIN — INSULIN GLARGINE 20 UNITS: 100 INJECTION, SOLUTION SUBCUTANEOUS at 20:54

## 2021-05-20 RX ADMIN — CARVEDILOL 25 MG: 25 TABLET, FILM COATED ORAL at 08:18

## 2021-05-20 RX ADMIN — NITROGLYCERIN 0.4 MG: 0.4 TABLET SUBLINGUAL at 01:42

## 2021-05-20 RX ADMIN — HYDRALAZINE HYDROCHLORIDE 100 MG: 50 TABLET, FILM COATED ORAL at 21:47

## 2021-05-20 RX ADMIN — ISOSORBIDE MONONITRATE 60 MG: 60 TABLET ORAL at 08:17

## 2021-05-20 RX ADMIN — PRAZOSIN HYDROCHLORIDE 4 MG: 1 CAPSULE ORAL at 20:49

## 2021-05-20 RX ADMIN — NIFEDIPINE 90 MG: 90 TABLET, EXTENDED RELEASE ORAL at 20:48

## 2021-05-20 RX ADMIN — SODIUM CHLORIDE, PRESERVATIVE FREE 10 ML: 5 INJECTION INTRAVENOUS at 08:31

## 2021-05-20 RX ADMIN — INSULIN LISPRO 3 UNITS: 100 INJECTION, SOLUTION INTRAVENOUS; SUBCUTANEOUS at 01:25

## 2021-05-20 RX ADMIN — METHYLPREDNISOLONE SODIUM SUCCINATE 40 MG: 40 INJECTION, POWDER, FOR SOLUTION INTRAMUSCULAR; INTRAVENOUS at 08:18

## 2021-05-20 RX ADMIN — FAMOTIDINE 20 MG: 20 TABLET, FILM COATED ORAL at 20:49

## 2021-05-20 RX ADMIN — ALBUTEROL SULFATE 2.5 MG: 2.5 SOLUTION RESPIRATORY (INHALATION) at 03:45

## 2021-05-20 RX ADMIN — ALBUTEROL SULFATE 2.5 MG: 2.5 SOLUTION RESPIRATORY (INHALATION) at 09:46

## 2021-05-20 RX ADMIN — CARVEDILOL 25 MG: 25 TABLET, FILM COATED ORAL at 17:24

## 2021-05-20 RX ADMIN — CLONIDINE HYDROCHLORIDE 0.2 MG: 0.2 TABLET ORAL at 08:18

## 2021-05-20 RX ADMIN — INSULIN LISPRO 4 UNITS: 100 INJECTION, SOLUTION INTRAVENOUS; SUBCUTANEOUS at 14:32

## 2021-05-20 RX ADMIN — METHYLPREDNISOLONE SODIUM SUCCINATE 40 MG: 40 INJECTION, POWDER, FOR SOLUTION INTRAMUSCULAR; INTRAVENOUS at 21:48

## 2021-05-20 RX ADMIN — NITROGLYCERIN 0.4 MG: 0.4 TABLET SUBLINGUAL at 03:32

## 2021-05-20 RX ADMIN — DOXYCYCLINE HYCLATE 100 MG: 100 TABLET, COATED ORAL at 13:48

## 2021-05-20 RX ADMIN — CLOPIDOGREL 75 MG: 75 TABLET, FILM COATED ORAL at 08:18

## 2021-05-20 RX ADMIN — NITROGLYCERIN 0.4 MG: 0.4 TABLET SUBLINGUAL at 20:16

## 2021-05-20 RX ADMIN — Medication 81 MG: at 08:18

## 2021-05-20 RX ADMIN — IPRATROPIUM BROMIDE AND ALBUTEROL SULFATE 1 AMPULE: .5; 3 SOLUTION RESPIRATORY (INHALATION) at 12:24

## 2021-05-20 RX ADMIN — DOXYCYCLINE HYCLATE 100 MG: 100 TABLET, COATED ORAL at 20:49

## 2021-05-20 RX ADMIN — ALPRAZOLAM 0.25 MG: 0.25 TABLET ORAL at 18:27

## 2021-05-20 RX ADMIN — CLONIDINE HYDROCHLORIDE 0.2 MG: 0.2 TABLET ORAL at 21:47

## 2021-05-20 RX ADMIN — QUETIAPINE FUMARATE 100 MG: 100 TABLET ORAL at 00:54

## 2021-05-20 RX ADMIN — ATORVASTATIN CALCIUM 80 MG: 80 TABLET, FILM COATED ORAL at 20:49

## 2021-05-20 RX ADMIN — METHYLPREDNISOLONE SODIUM SUCCINATE 40 MG: 40 INJECTION, POWDER, FOR SOLUTION INTRAMUSCULAR; INTRAVENOUS at 17:24

## 2021-05-20 RX ADMIN — INSULIN LISPRO 4 UNITS: 100 INJECTION, SOLUTION INTRAVENOUS; SUBCUTANEOUS at 02:13

## 2021-05-20 RX ADMIN — CLONIDINE HYDROCHLORIDE 0.2 MG: 0.2 TABLET ORAL at 13:48

## 2021-05-20 RX ADMIN — ENOXAPARIN SODIUM 40 MG: 40 INJECTION, SOLUTION INTRAVENOUS; SUBCUTANEOUS at 08:18

## 2021-05-20 RX ADMIN — SODIUM BICARBONATE 1300 MG: 648 TABLET ORAL at 20:49

## 2021-05-20 RX ADMIN — HYDRALAZINE HYDROCHLORIDE 100 MG: 50 TABLET, FILM COATED ORAL at 08:17

## 2021-05-20 RX ADMIN — SODIUM CHLORIDE, PRESERVATIVE FREE 10 ML: 5 INJECTION INTRAVENOUS at 20:58

## 2021-05-20 ASSESSMENT — PAIN SCALES - GENERAL
PAINLEVEL_OUTOF10: 0
PAINLEVEL_OUTOF10: 5
PAINLEVEL_OUTOF10: 0

## 2021-05-20 ASSESSMENT — PAIN DESCRIPTION - LOCATION: LOCATION: CHEST

## 2021-05-20 NOTE — PLAN OF CARE
Pain assessed. Vitals and assessment done. Patient on telemetry. Patient on nasal canula/ bipap. Call light within reach. Bed in lowest position.

## 2021-05-20 NOTE — PROGRESS NOTES
PROVIDE ADEQUATE OXYGENATION WITH ACCEPTABLE SP02/ABG'S    [x]  IDENTIFY APPROPRIATE OXYGEN THERAPY  [x]   MONITOR SP02/ABG'S AS NEEDED   [x]   PATIENT EDUCATION AS NEEDED    BRONCHOSPASM/BRONCHOCONSTRICTION     [x]         IMPROVE AERATION/BREATH SOUNDS  [x]   ADMINISTER BRONCHODILATOR THERAPY AS APPROPRIATE  [x]   ASSESS BREATH SOUNDS  [x]   IMPLEMENT AEROSOL/MDI PROTOCOL  [x]   PATIENT EDUCATION AS NEEDED    [x]  NON INVASIVE VENTILATION  [x]  PROVIDE OPTIMAL VENTILATION/ACCEPTABLE SP02  []  IMPLEMENT NON INVASIVE VENTILATION PROTOCOL  [x]  ASSESSMENT SKIN INTEGRITY  [x]  PATIENT EDUCATION AS NEEDED  [x]  BIPAP AS NEEDED

## 2021-05-20 NOTE — PROGRESS NOTES
Patient complained of chest pain. EKG done. NP notified. Labs and sublingual nitro ordered. Continue to monitor.

## 2021-05-20 NOTE — PROGRESS NOTES
Renal Progress Note    Patient :  Obdulia Vargas; 73 y.o. MRN# [id number]  Location:  2012/2012-01  Attending:  Colleen Prather MD  Admit Date:  5/19/2021   Hospital Day: 1      Subjective:       Patient was seen and examined. No new issues reported overnight. Patient does mention that he still has some shortness of breath. Currently has BiPAP on. He did have his regular hemodialysis done today, he only ran for 168 minutes and came off early. Got about 2 L removed. Outpatient Medications:     Medications Prior to Admission: predniSONE (DELTASONE) 10 MG tablet, Please take 4 tablets daily X 3 days then 3 tablets daily X 3 days then 2 tablets daily X 3 days then 1 tablet daily X 3 days. doxycycline hyclate (VIBRA-TABS) 100 MG tablet, Take 1 tablet by mouth 2 times daily for 5 days  sodium bicarbonate 650 MG tablet, Take 2 tablets by mouth 2 times daily  insulin glargine (BASAGLAR KWIKPEN) 100 UNIT/ML injection pen, Inject 10 Units into the skin nightly  cloNIDine (CATAPRES) 0.2 MG tablet, Take 1 tablet by mouth 3 times daily  NIFEdipine (PROCARDIA XL) 90 MG extended release tablet, Take 1 tablet by mouth nightly  glucagon 1 MG injection, Inject 1 mg into the muscle See Admin Instructions Follow package directions for low blood sugar. fluticasone (FLONASE) 50 MCG/ACT nasal spray,   insulin aspart (NOVOLOG FLEXPEN) 100 UNIT/ML injection pen, Inject 5 Units into the skin 3 times daily (before meals) Sliding scale  nitroGLYCERIN (NITROSTAT) 0.4 MG SL tablet, USE 1 TABLET UNDER THE TONGUE UP TO MAXIMUM OF 3 TOTAL DOSES.  IF NO RELIEF AFTER 1 DOSE, CALL 911.  gabapentin (NEURONTIN) 100 MG capsule, TAKE 2 CAPSULES BY MOUTH 3 TIMES DAILY  QUEtiapine (SEROQUEL) 100 MG tablet, TAKE 1 TABLET BY MOUTH NIGHTLY  losartan (COZAAR) 50 MG tablet, TAKE 1 TABLET BY MOUTH 2 TIMES DAILY  GNP VITAMIN D MAXIMUM STRENGTH 50 MCG (2000 UT) TABS, TAKE 1 TABLET BY MOUTH ONCE DAILY  TRELEGY ELLIPTA 100-62.5-25 MCG/INH AEPB, INHALE ONE PUFF losartan  50 mg Oral BID    NIFEdipine  90 mg Oral Nightly    prazosin  4 mg Oral BID    QUEtiapine  100 mg Oral Nightly    sodium bicarbonate  1,300 mg Oral BID    sodium chloride flush  5-40 mL Intravenous 2 times per day    famotidine  20 mg Oral BID    enoxaparin  40 mg Subcutaneous Daily    insulin lispro  0-12 Units Subcutaneous TID WC    insulin lispro  0-6 Units Subcutaneous Nightly    methylPREDNISolone  40 mg Intravenous Q6H    Followed by   Marbella Choe ON 2021] predniSONE  40 mg Oral Daily    bumetanide  2 mg Intravenous Once    ipratropium-albuterol  1 ampule Inhalation 4x daily     Continuous Infusions:    dextrose      sodium chloride       PRN Meds:  nitroGLYCERIN, heparin (porcine), heparin (porcine), albuterol, glucose, dextrose, glucagon (rDNA), dextrose, sodium chloride flush, sodium chloride, promethazine **OR** ondansetron, nicotine, polyethylene glycol, acetaminophen **OR** acetaminophen    Input/Output:       I/O last 3 completed shifts: In: 0   Out: 2.7 . Patient Vitals for the past 96 hrs (Last 3 readings):   Weight   21 1215 148 lb 2.4 oz (67.2 kg)   21 0916 153 lb 3.5 oz (69.5 kg)       Vital Signs:   Temperature:  Temp: 98.7 °F (37.1 °C)  TMax:   Temp (24hrs), Av °F (36.7 °C), Min:97.5 °F (36.4 °C), Max:98.7 °F (37.1 °C)    Respirations:  Resp: 15  Pulse:   Pulse: 64  BP:    BP: (!) 155/63  BP Range: Systolic (73WTL), NYU:876 , Min:118 , TWI:953       Diastolic (26LIL), MEB:26, Min:56, Max:113      Physical Examination:     General:  Currently has BiPAP on, no accessory muscle use. HEENT: Atraumatic, normocephalic, no throat congestion, moist mucosa. Eyes:   Pupils equal, round and reactive to light, EOMI. Neck:   Supple  Chest:   Decreased breath sounds, no rales are positive wheezes. Cardiac:  S1 S2 RR, no murmurs, gallops or rubs. Abdomen: Soft, non-tender, no masses or organomegaly, BS audible.   :   No suprapubic or flank 05/03/2021    MUCUS NOT REPORTED 05/03/2021    TRICHOMONAS NOT REPORTED 05/03/2021    YEAST NOT REPORTED 05/03/2021    BACTERIA NOT REPORTED 05/03/2021    CLARITYU Clear 09/12/2014    SPECGRAV 1.016 05/03/2021    LEUKOCYTESUR NEGATIVE 05/03/2021    UROBILINOGEN Normal 05/03/2021    BILIRUBINUR NEGATIVE 05/03/2021    BILIRUBINUR NEGATIVE 11/12/2011    BLOODU Negative 09/12/2014    GLUCOSEU 1+ 05/03/2021    GLUCOSEU 3+ 11/12/2011    KETUA NEGATIVE 05/03/2021    AMORPHOUS NOT REPORTED 05/03/2021     Urine Sodium:     Lab Results   Component Value Date    IGNACIO 33 05/05/2021     Urine Potassium:    Lab Results   Component Value Date    KUR 38.1 12/04/2020     Urine Chloride:    Lab Results   Component Value Date    CLUR 37 05/05/2021     Urine Osmolarity:   Lab Results   Component Value Date    OSMOU 463 12/04/2020      Urine Creatinine:     Lab Results   Component Value Date    LABCREA 51.5 05/05/2021     Radiology:     Reviewed. Assessment:     1. Acute kidney injury stage IV currently dialysis dependent running on TTS schedule at  renal care [facility] unit under Dr. Marlen Rivas via tunnel catheter. 2.  Acute shortness of breath. 3.  COPD exacerbation. 4.  Diabetes type 2.  5.  Essential hypertension. 6.  Wheezes positive. 7.  History of COPD. Plan:   1. Patient did get regular hemodialysis done today. Orders were confirmed with the dialysis nurse. 2.  COPD exacerbation treatment as per primary. 3.  BMP in AM.  4.  Will follow. Nutrition   Please ensure that patient is on a renal diet/TF. Avoid nephrotoxic drugs/contrast exposure. We will continue to follow along with you. Alexis Olivas MD  [facility]     This note is created with the assistance of a speech-recognition program. While intending to generate a document that actually reflects the content of the visit, no guarantees can be provided that every mistake has been identified and corrected by editing.

## 2021-05-20 NOTE — PROGRESS NOTES
Dialysis Post Treatment Note  Vitals:    05/20/21 1215   BP: (!) 118/56   Pulse: 57   Resp: 20   Temp: 98 °F (36.7 °C)   SpO2: 100%     Pre-Weight = 69.5  Post-weight = Weight: 148 lb 2.4 oz (67.2 kg)  Total Liters Processed = Total Liters Processed (l/min): 62 l/min  Rinseback Volume (mL) = Rinseback Volume (ml): 300 ml  Net Removal (mL) = NET Removed (ml): 2.18 ml  Patient's dry weight= N/A  Type of access used= Catheter  Length of treatment= 168, pt requested to come off early.

## 2021-05-20 NOTE — PROGRESS NOTES
[facility]  Office: [address], DO, Sheridan Turner, DO, Marjan Bajwaport, DO, Marlo Kanner Blood, DO, Jovanna Dash MD, Maryuri Barnett MD, Carissa Case MD, Diane Webb MD, Carolann Pearce MD, Yesenia Nichole MD, Noreen Guerrero MD, Dariel Yeager MD, Rohini Claudio DO, Dell Kinney MD, Mohini Pastrana, DO, Sahil Marx MD,  Karen Cole, DO, Karen Hand MD, Candice Paz MD, Lynn Palumbo MD, Romulo Vasques MD, Dayron Rocha, Leila Chilel, CNP, Raffaele Alonso, CNP, Twan Kessler, CNS, Desi Villagomez, CNP, Analy Bojorquez, CNP, Larisa Rubi, CNP, Rianna Caldera, CNP, Eric Sher, CNP, Fallon Jeffers PA-C, Jose Strickland, [facility], Benny Darden, CNP, Clemente Barrera, CNP, Mike Garcia, CNP, Winston Montes, CNP, Rosalinda Carrasco, CNP, Talib Hurtado, [address] [place] [address]    Second Visit Note  For more detailed information please refer to the progress note of the day      5/20/2021    1:40 AM    Name:   Deanna Perera  MRN:     [id number]     [place]:      [de-identified]   Room:   2012/2012-01   Day:  1  Admit Date:  5/19/2021  5:05 AM    PCP:   Delfin Spatz, APRN - CNP  Code Status:  Full Code    Was asked to evaluate patient for the concern of chest pain and respiratory distress. EKG was completed and had concern of junctional rhythm. On-call provider ordered stat laboratories and chest x-ray. Pt vitals were reviewed   Vitals:    05/19/21 2338 05/20/21 0020 05/20/21 0053 05/20/21 0108   BP: (!) 168/62  (!) 156/59 (!) 154/59   Pulse:    66   Resp:       Temp:       TempSrc:       SpO2:  99%  100%       New labs were reviewed   No recent labs updated from prior prior in the day    Patient was seen  Patient states that his breathing is off because his blood sugars are off and does not want to entertain any other ideas etiologies or work-up at this time.   He denies any radiation of the chest pain he states it is completely to the left side of his chest and this always happens when his blood sugars are high. Of significant interest patient had consumed to box lunches for the concern of hypoglycemia. However glucose was not hypoglycemic    Patient states he is on a very strict sliding scale at home and he often adjusted it for every dose of steroid as well. Patient very adamant that he is not being treated aggressive enough for his hyperglycemia. On exam there is coarse rhonchi throughout posterior with scattered wheezes. He is currently on BiPAP therapy  Chest x-ray was just completed    Repeat blood sugar 301, patient requesting to get 4 units of insulin in addition to previously dosed insulin. Updated plan :     1. We will dose with an additional 4 units of insulin  2. Continue to monitor laboratories  3. Await chest x-ray results and consider IV Lasix  4. Await laboratory results and consider IV magnesium for smooth muscle relaxants  5. Patient declined any morphine or Ativan to help with the ease of breathing  6.  We will continue to monitor      TERRANCE Real CNP  5/20/2021  1:40 AM

## 2021-05-20 NOTE — PROGRESS NOTES
[facility]  Office: [address], DO, Dena Arreola, DO, Shanell Elder, DO, Eder Mccray, DO, Connor Bartlett MD, Luiza Gramajo MD, Nick Fofana MD, Fili Cannon MD, Mary Martin MD, Ne Parsons MD, Dex Rees MD, May Blanchard MD, Vilma Carrillo, DO, Deepthi Carranza MD, Austin Crocker DO, Blair Ryder MD,  Jaycee Daniel DO, Anna Buitrago MD, Silvino Tidwell MD, Florencio Saunders MD, Sky Alarcon MD, [facility], Hemal Zarco, CNP, Daniel Linder, CNP, John Mcfarlane, CNS, Mitchell Heart, CNP, Ara Schwartz, CNP, Arthur Paige, CNP, Jarred Meehan, CNP, Stefania Santana, CNP, Saleem Frye PA-C, Maury Bartholomew, [facility], Yolanda Dempsey, CNP, Wendi Fermin, CNP, Kirt Mclaughlin, CNP, Cynthia Le, CNP, Dixon Sandy, [employer], Negra Ace, [address] [place] [address]    Progress Note    5/20/2021    11:09 AM    Name:   Grady Santos  MRN:     [id number]     Acct:      [de-identified]   Room:   2012/2012-01  IP Day:  1  Admit Date:  5/19/2021  5:05 AM    PCP:   TERRANCE Topete CNP  Code Status:  Full Code    Subjective:     C/C:   Chief Complaint   Patient presents with    Respiratory Distress     Interval History Status: not changed. Having some sob / chest pain overnight  Seen in HD  Currently on bipap  Still significant wheezing  Glucose uncontrolled  States his lantus dose was adjusted last admission   No other complaints     Brief History:     Grady Santos is a 73 y.o. M with history of ESRD on HD, COPD who presented with shortness of breath. States symptoms going on for the past 2 days, but has been having intermittent shortness of breath going on for the past 2 years. Having worsening dyspnea with exertion. Also having sputum production. Was recently admitted 2 days prior for COPD exacerbation, d/c on steroids and antibiotics at that time.  States he went home and developed worsening shortness of breath causing him to come back to ER. Has significant secondhand smoke exposure at home. Review of Systems:     Constitutional:  negative for chills, fevers, sweats  Respiratory:  Positive for sob   Cardiovascular:  negative for chest pain, chest pressure/discomfort  Gastrointestinal:  negative for abdominal pain, constipation, diarrhea, nausea, vomiting  Neurological:  negative for dizziness, headache    Medications: Allergies:     Allergies   Allergen Reactions    Lisinopril      cough    Ace Inhibitors      coughing    Pcn [Penicillins]        Current Meds:   Scheduled Meds:    insulin glargine  20 Units Subcutaneous Nightly    aspirin  81 mg Oral Daily    atorvastatin  80 mg Oral Daily    carvedilol  25 mg Oral BID WC    cloNIDine  0.2 mg Oral TID    clopidogrel  75 mg Oral Daily    hydrALAZINE  100 mg Oral 3 times per day    isosorbide mononitrate  60 mg Oral Daily    losartan  50 mg Oral BID    NIFEdipine  90 mg Oral Nightly    prazosin  4 mg Oral BID    QUEtiapine  100 mg Oral Nightly    sodium bicarbonate  1,300 mg Oral BID    sodium chloride flush  5-40 mL Intravenous 2 times per day    famotidine  20 mg Oral BID    enoxaparin  40 mg Subcutaneous Daily    insulin lispro  0-12 Units Subcutaneous TID     insulin lispro  0-6 Units Subcutaneous Nightly    methylPREDNISolone  40 mg Intravenous Q6H    Followed by   [facility] [place] ON 5/21/2021] predniSONE  40 mg Oral Daily    bumetanide  2 mg Intravenous Once    ipratropium-albuterol  1 ampule Inhalation 4x daily     Continuous Infusions:    dextrose      sodium chloride       PRN Meds: nitroGLYCERIN, heparin (porcine), heparin (porcine), albuterol, glucose, dextrose, glucagon (rDNA), dextrose, sodium chloride flush, sodium chloride, promethazine **OR** ondansetron, nicotine, polyethylene glycol, acetaminophen **OR** acetaminophen    Data:     Past Medical History:   has a past medical history of Asthma, CAD in native artery, nonobstructive, Carotid stenosis, left, Carpal tunnel syndrome, Cerebrovascular disease, Chronic kidney disease, COPD (chronic obstructive pulmonary disease) ([facility] Diabetes mellitus ([facility] History of colon polyps, History of weakness of extremity, HTN (hypertension), Hyperlipidemia, Lung, cysts, congenital, PTSD (post-traumatic stress disorder), PTSD (post-traumatic stress disorder), TIA (transient ischemic attack), and Type II or unspecified type diabetes mellitus without mention of complication, not stated as uncontrolled. Social History:   reports that he quit smoking about 6 years ago. His smoking use included cigarettes. He has a 17.50 pack-year smoking history. He has never used smokeless tobacco. He reports that he does not drink alcohol and does not use drugs. Family History:   Family History   Problem Relation Age of Onset    Cancer Mother     Heart Disease Father        Vitals:  BP (!) 195/80   Pulse 69   Temp 97.5 °F (36.4 °C)   Resp 20   Wt 153 lb 3.5 oz (69.5 kg)   SpO2 100%   BMI 24.73 kg/m²   Temp (24hrs), Av.9 °F (36.6 °C), Min:97.5 °F (36.4 °C), Max:98.1 °F (36.7 °C)    Recent Labs     21  0057 21  0159 21  0332 21  0645   POCGLU 313* 302* 193* 224*       I/O (24Hr):     Intake/Output Summary (Last 24 hours) at 2021 1109  Last data filed at 2021 1334  Gross per 24 hour   Intake --   Output 400 ml   Net -400 ml       Labs:  Hematology:  Recent Labs     21  0512 21  0641   WBC 10.9 10.7   RBC 2.80* 2.75*   HGB 8.7* 8.6*   HCT 27.7* 27.9*   MCV 98.9 101.5   MCH 31.1 31.3   MCHC 31.4 30.8   RDW 13.6 13.5    333   MPV 10.5 10.4     Chemistry:  Recent Labs     21  0512 21  0200 21  0641     --  137   K 4.4 5.4* 5.4*   CL 98  --  99   CO2 25  --  25   GLUCOSE 214*  --  265*   BUN 51*  --  79*   CREATININE 2.66*  --  3.97*   MG 2.7* 2.1  --    ANIONGAP 13  --  13   LABGLOM 24*  --  15*   GFRAA 29*  --  19* CALCIUM 8.4*  --  8.6   PROBNP 7,462*  --   --    TROPHS 96* 85*  --      Recent Labs     05/19/21  0954 05/19/21  1946 05/19/21  2336 05/20/21  0057 05/20/21  0159 05/20/21  0332 05/20/21  0645   LABA1C 9.5*  --   --   --   --   --   --    POCGLU  --  203* 287* 313* 302* 193* 224*     ABG:  Lab Results   Component Value Date    POCPH 7.39 06/17/2013    POCPCO2 46 06/17/2013    POCPO2 288 06/17/2013    POCHCO3 27.8 06/17/2013    NBEA NOT REPORTED 06/17/2013    PBEA 3 06/17/2013    LFL5IAW 29 06/17/2013    CBZS5ECS 100 06/17/2013    FIO2 NOT REPORTED 05/15/2021     Lab Results   Component Value Date/Time    SPECIAL 2ML L FR 12/03/2020 11:24 AM     Lab Results   Component Value Date/Time    CULTURE NO GROWTH 6 DAYS 12/03/2020 11:24 AM       Radiology:  XR CHEST PORTABLE    Result Date: 5/20/2021  Unremarkable single upright portable AP view of the chest.     XR CHEST PORTABLE    Result Date: 5/19/2021  Unremarkable single upright portable AP view of the chest.     XR CHEST PORTABLE    Result Date: 5/16/2021  No significant change in chest findings. XR CHEST PORTABLE    Result Date: 5/15/2021  Findings compatible with mild interstitial edema. Dialysis catheter terminates at the distal SVC.        Physical Examination:        General appearance:  alert, on BIPAP  Mental Status:  oriented to person, place and time and normal affect  Lungs:  Diffuse wheezing bilaterally   Heart:  regular rate and rhythm  Abdomen:  soft, nontender, nondistended, normal bowel sounds  Extremities:  no edema, redness, tenderness in the calves  Skin:  no gross lesions, rashes, induration    Assessment:        Hospital Problems         Last Modified POA    * (Principal) COPD exacerbation ([address] 5/19/2021 Yes    Carotid stenosis, left s/p Endarterectomy 5/19/2021 Yes    Essential hypertension 5/19/2021 Yes    ESRD (end stage renal disease) ([address] 5/19/2021 Yes    Chronic heart failure with preserved ejection fraction ([address] 5/19/2021 Yes Uncontrolled type 2 diabetes mellitus with hyperglycemia ([facility] 5/19/2021 Yes          Plan:        - Vitals, labs, imaging, medications reviewed  - Continue IV steroids  - RT evaluation, aerosol protocol   - Supplemental O2, wean as tolerated  - Home O2 evaluation on d/c if needed   - BIPAP PRN  - Nephrology consult for ESRD on HD  - Continue home BP medications  - Monitor glucose  - Continue home lantus - increase to 20 nightly  - Continue insulin correction   - Avoid secondhand smoke exposure at home, discussed with patient     Oralia Bhakta MD  5/20/2021  11:09 AM

## 2021-05-20 NOTE — PROGRESS NOTES
Occupational Therapy Not Seen Note    DATE: 2021  Name: Lexi Sosa  : 1955  MRN: [id number]    Patient not available for Occupational Therapy due to:    Hemodialysis:    Next Scheduled Treatment: CK       Electronically signed by Nay Del Angel OT on 2021 at 10:22 AM

## 2021-05-20 NOTE — PROGRESS NOTES
[facility]ukund Santana, [facility] Res[facility] failure ([address] [J96.90] . Vitals:    05/20/21 1115   BP: (!) 166/76   Pulse: 62   Resp:    Temp:    SpO2:    . Patients home meds are   Prior to Admission medications    Medication Sig Start Date End Date Taking? Authorizing Provider   predniSONE (DELTASONE) 10 MG tablet Please take 4 tablets daily X 3 days then 3 tablets daily X 3 days then 2 tablets daily X 3 days then 1 tablet daily X 3 days. 5/17/21   Jonelle Rodas MD   doxycycline hyclate (VIBRA-TABS) 100 MG tablet Take 1 tablet by mouth 2 times daily for 5 days 5/17/21 5/22/21  Jonelle Rodas MD   sodium bicarbonate 650 MG tablet Take 2 tablets by mouth 2 times daily 5/13/21 6/12/21  TERRANCE Calzada NP   insulin glargine [facility]) 100 UNIT/ML injection pen Inject 10 Units into the skin nightly 5/13/21   TERRANCE Calzada NP   cloNIDine (CATAPRES) 0.2 MG tablet Take 1 tablet by mouth 3 times daily 5/13/21   TERRANCE Calzada NP   NIFEdipine (PROCARDIA XL) 90 MG extended release tablet Take 1 tablet by mouth nightly 5/13/21   TERRANCE Calzada NP   glucagon 1 MG injection Inject 1 mg into the muscle See Admin Instructions Follow package directions for low blood sugar. 4/6/21   TERRANCE Mills CNP   fluticasone Oumar Hence) 50 MCG/ACT nasal spray  2/23/21   Historical Provider, MD   insulin aspart (NOVOLOG FLEXPEN) 100 UNIT/ML injection pen Inject 5 Units into the skin 3 times daily (before meals) Sliding scale 3/16/21   TERRANCE Mills CNP   nitroGLYCERIN (NITROSTAT) 0.4 MG SL tablet USE 1 TABLET UNDER THE TONGUE UP TO MAXIMUM OF 3 TOTAL DOSES.  IF NO RELIEF AFTER 1 DOSE, CALL 911. 3/5/21   TERRANCE Mills CNP   gabapentin (NEURONTIN) 100 MG capsule TAKE 2 CAPSULES BY MOUTH 3 TIMES DAILY 2/23/21 5/24/21  TERRANCE Mills CNP   QUEtiapine (SEROQUEL) 100 MG tablet TAKE 1 TABLET BY MOUTH NIGHTLY 2/23/21   TERRANCE Mills CNP   losartan (COZAAR) 50 MG tablet TAKE 1 TABLET BY MOUTH 2 TIMES DAILY 2/23/21   TERRANCE Tobar CNP   GNP VITAMIN D MAXIMUM STRENGTH 50 MCG (2000 UT) TABS TAKE 1 TABLET BY MOUTH ONCE DAILY 2/23/21   TERRANCE Tobar CNP   Tami Cooler 100-62.5-25 MCG/INH AEPB INHALE ONE PUFF INTO THE LUNGS DAILY 1/27/21   TERRANCE Tobar CNP   blood glucose monitor strips Test_4__times daily Diagnosis: 250.0   Diabetes Mellitus_x___Insulin Dependent____Non-Insulin Dependent 1/26/21   TERRANCE Tobar CNP   blood glucose monitor kit and supplies Dispense sufficient amount for indicated testing frequency plus additional to accommodate PRN testing needs. Dispense all needed supplies to include: monitor, strips, lancing device, lancets, control solutions, alcohol swabs.  1/26/21   TERRANCE Tobar CNP   prazosin (MINIPRESS) 2 MG capsule 2 CAPSULES BY MOUTH (4MG) TWICE DAILY 1/25/21   TERRANCE Tobar CNP   COMBIVENT RESPIMAT  MCG/ACT AERS inhaler INHALE 1 PUFF INTO THE LUNGS EVERY 6 HOURS 1/25/21   TERRANCE Tobar CNP   hydrALAZINE (APRESOLINE) 100 MG tablet Take 1 tablet by mouth every 8 hours 1/25/21   TERRANCE Tobar CNP   aspirin (ASPIR-LOW) 81 MG EC tablet TAKE 1 TABLET BY MOUTH DAILY 1/25/21   TERRANCE Tobar CNP   carvedilol (COREG) 25 MG tablet Take 1 tablet by mouth 2 times daily (with meals) 1/25/21   TERRANCE Tobar CNP   atorvastatin (LIPITOR) 80 MG tablet Take 1 tablet by mouth daily 1/25/21   TERRANCE Tobar CNP   clopidogrel (PLAVIX) 75 MG tablet Take 1 tablet by mouth daily 1/25/21   TERRANCE Tobar CNP   isosorbide mononitrate (IMDUR) 60 MG extended release tablet Take 1 tablet by mouth daily 1/5/21   TERRANCE Tobar CNP   vitamin D (ERGOCALCIFEROL) 1.25 MG (69416 UT) CAPS capsule Take 1 capsule by mouth once a week 1/5/21   TERRANCE Tobar - CNP   Lancets MISC Use_4__times daily Diagnisis:250.0  Diabetes Mellitus__x__Insulin Dependent___Non-Insulin Dependent 12/6/20   Anayeli Gonzalez MD   ipratropium-albuterol (DUONEB) 0.5-2.5 (3) MG/3ML SOLN nebulizer solution Inhale 3 mLs into the lungs every 6 hours as needed for Shortness of Breath 12/6/20   Elfego Molina MD   Nutritional Supplements (GLUCERNA CARBSTEADY) LIQD Take 1 Can by mouth 3 times daily 6/16/20   TERRANCE Ross CNP   COMFORT EZ PEN NEEDLES 31G X 8 MM MISC USE AS DIRECTED 4/14/20   TERRANCE Ross CNP   .   Recent Surgical History: None = 0     Assessment     RR 28  Breath Sounds: Diminished, expiratory wheezes throughout      Bronchodilator assessment at level  3  [x]    Bronchodilator Assessment  BRONCHODILATOR ASSESSMENT SCORE  Score 0 1 2 3 4 5   Breath Sounds   []  Patient Baseline []  No Wheeze good aeration []  Faint, scattered wheezing, good aeration [x]  Expiratory Wheezing and or moderately diminished []  Insp/Exp wheeze and/or very diminished []  Insp/Exp and/ or marked distress   Respiratory Rate   []  Patient Baseline []  Less than 20 []  Less than 20 [x]  20-25 []  Greater than 25 []  Greater than 25   Peak flow % of Pred or PB [x]  NA   []  Greater than 90%  []  81-90% []  71-80% []  Less than or equal to 70%  or unable to perform []  Unable due to Respiratory Distress   Dyspnea re []  Patient Baseline []  No SOB []  No SOB [x]  SOB on exertion []  SOB min activity []  At rest/acute   e FEV% Predicted       [x]  NA []  Above 69%  []  Unable []  Above 60-69%  []  Unable []  Above 50-59%  []  Unable []  Above 35-49%  []  Unable []  Less than 35%  []  Unable            Reji Puente RCP  11:46 AM

## 2021-05-21 PROBLEM — J96.01 ACUTE RESPIRATORY FAILURE WITH HYPOXIA (HCC): Status: ACTIVE | Noted: 2021-05-19

## 2021-05-21 LAB
ANION GAP SERPL CALCULATED.3IONS-SCNC: 15 MMOL/L (ref 9–17)
BUN BLDV-MCNC: 66 MG/DL (ref 8–23)
BUN/CREAT BLD: ABNORMAL (ref 9–20)
CALCIUM SERPL-MCNC: 7.8 MG/DL (ref 8.6–10.4)
CHLORIDE BLD-SCNC: 95 MMOL/L (ref 98–107)
CO2: 23 MMOL/L (ref 20–31)
CREAT SERPL-MCNC: 3.44 MG/DL (ref 0.7–1.2)
GFR AFRICAN AMERICAN: 22 ML/MIN
GFR NON-AFRICAN AMERICAN: 18 ML/MIN
GFR SERPL CREATININE-BSD FRML MDRD: ABNORMAL ML/MIN/{1.73_M2}
GFR SERPL CREATININE-BSD FRML MDRD: ABNORMAL ML/MIN/{1.73_M2}
GLUCOSE BLD-MCNC: 159 MG/DL (ref 75–110)
GLUCOSE BLD-MCNC: 179 MG/DL (ref 75–110)
GLUCOSE BLD-MCNC: 196 MG/DL (ref 75–110)
GLUCOSE BLD-MCNC: 235 MG/DL (ref 75–110)
GLUCOSE BLD-MCNC: 386 MG/DL (ref 70–99)
GLUCOSE BLD-MCNC: 435 MG/DL (ref 75–110)
POTASSIUM SERPL-SCNC: 4.7 MMOL/L (ref 3.7–5.3)
SODIUM BLD-SCNC: 133 MMOL/L (ref 135–144)

## 2021-05-21 PROCEDURE — 6360000002 HC RX W HCPCS: Performed by: INTERNAL MEDICINE

## 2021-05-21 PROCEDURE — 6370000000 HC RX 637 (ALT 250 FOR IP): Performed by: INTERNAL MEDICINE

## 2021-05-21 PROCEDURE — 6370000000 HC RX 637 (ALT 250 FOR IP): Performed by: NURSE PRACTITIONER

## 2021-05-21 PROCEDURE — 2700000000 HC OXYGEN THERAPY PER DAY

## 2021-05-21 PROCEDURE — 82947 ASSAY GLUCOSE BLOOD QUANT: CPT

## 2021-05-21 PROCEDURE — 1200000000 HC SEMI PRIVATE

## 2021-05-21 PROCEDURE — 94640 AIRWAY INHALATION TREATMENT: CPT

## 2021-05-21 PROCEDURE — 6360000002 HC RX W HCPCS: Performed by: STUDENT IN AN ORGANIZED HEALTH CARE EDUCATION/TRAINING PROGRAM

## 2021-05-21 PROCEDURE — 80048 BASIC METABOLIC PNL TOTAL CA: CPT

## 2021-05-21 PROCEDURE — 2580000003 HC RX 258: Performed by: NURSE PRACTITIONER

## 2021-05-21 PROCEDURE — 99232 SBSQ HOSP IP/OBS MODERATE 35: CPT | Performed by: INTERNAL MEDICINE

## 2021-05-21 PROCEDURE — 36415 COLL VENOUS BLD VENIPUNCTURE: CPT

## 2021-05-21 PROCEDURE — 94761 N-INVAS EAR/PLS OXIMETRY MLT: CPT

## 2021-05-21 PROCEDURE — 6360000002 HC RX W HCPCS: Performed by: NURSE PRACTITIONER

## 2021-05-21 RX ORDER — LORAZEPAM 0.5 MG/1
0.5 TABLET ORAL ONCE
Status: COMPLETED | OUTPATIENT
Start: 2021-05-21 | End: 2021-05-22

## 2021-05-21 RX ORDER — METHYLPREDNISOLONE SODIUM SUCCINATE 40 MG/ML
40 INJECTION, POWDER, LYOPHILIZED, FOR SOLUTION INTRAMUSCULAR; INTRAVENOUS EVERY 8 HOURS
Status: DISCONTINUED | OUTPATIENT
Start: 2021-05-21 | End: 2021-05-22

## 2021-05-21 RX ORDER — GUAIFENESIN 600 MG/1
600 TABLET, EXTENDED RELEASE ORAL 2 TIMES DAILY
Status: DISCONTINUED | OUTPATIENT
Start: 2021-05-21 | End: 2021-05-26 | Stop reason: HOSPADM

## 2021-05-21 RX ADMIN — SODIUM BICARBONATE 1300 MG: 648 TABLET ORAL at 08:12

## 2021-05-21 RX ADMIN — CLONIDINE HYDROCHLORIDE 0.2 MG: 0.2 TABLET ORAL at 14:14

## 2021-05-21 RX ADMIN — ISOSORBIDE MONONITRATE 60 MG: 60 TABLET ORAL at 08:13

## 2021-05-21 RX ADMIN — CLONIDINE HYDROCHLORIDE 0.2 MG: 0.2 TABLET ORAL at 08:12

## 2021-05-21 RX ADMIN — IPRATROPIUM BROMIDE AND ALBUTEROL SULFATE 1 AMPULE: .5; 3 SOLUTION RESPIRATORY (INHALATION) at 08:22

## 2021-05-21 RX ADMIN — CLOPIDOGREL 75 MG: 75 TABLET, FILM COATED ORAL at 08:12

## 2021-05-21 RX ADMIN — PRAZOSIN HYDROCHLORIDE 4 MG: 1 CAPSULE ORAL at 21:03

## 2021-05-21 RX ADMIN — QUETIAPINE FUMARATE 100 MG: 100 TABLET ORAL at 00:41

## 2021-05-21 RX ADMIN — ATORVASTATIN CALCIUM 80 MG: 80 TABLET, FILM COATED ORAL at 08:13

## 2021-05-21 RX ADMIN — SODIUM CHLORIDE, PRESERVATIVE FREE 10 ML: 5 INJECTION INTRAVENOUS at 21:04

## 2021-05-21 RX ADMIN — INSULIN LISPRO 4 UNITS: 100 INJECTION, SOLUTION INTRAVENOUS; SUBCUTANEOUS at 09:37

## 2021-05-21 RX ADMIN — SODIUM CHLORIDE, PRESERVATIVE FREE 10 ML: 5 INJECTION INTRAVENOUS at 08:10

## 2021-05-21 RX ADMIN — ALBUTEROL SULFATE 2.5 MG: 2.5 SOLUTION RESPIRATORY (INHALATION) at 03:44

## 2021-05-21 RX ADMIN — METHYLPREDNISOLONE SODIUM SUCCINATE 40 MG: 40 INJECTION, POWDER, FOR SOLUTION INTRAMUSCULAR; INTRAVENOUS at 04:29

## 2021-05-21 RX ADMIN — ALBUTEROL SULFATE 2.5 MG: 2.5 SOLUTION RESPIRATORY (INHALATION) at 15:39

## 2021-05-21 RX ADMIN — METHYLPREDNISOLONE SODIUM SUCCINATE 40 MG: 40 INJECTION, POWDER, FOR SOLUTION INTRAMUSCULAR; INTRAVENOUS at 13:02

## 2021-05-21 RX ADMIN — PREDNISONE 40 MG: 20 TABLET ORAL at 09:32

## 2021-05-21 RX ADMIN — LOSARTAN POTASSIUM 50 MG: 50 TABLET, FILM COATED ORAL at 22:49

## 2021-05-21 RX ADMIN — IPRATROPIUM BROMIDE AND ALBUTEROL SULFATE 1 AMPULE: .5; 3 SOLUTION RESPIRATORY (INHALATION) at 15:27

## 2021-05-21 RX ADMIN — SODIUM BICARBONATE 1300 MG: 648 TABLET ORAL at 21:03

## 2021-05-21 RX ADMIN — CARVEDILOL 25 MG: 25 TABLET, FILM COATED ORAL at 16:28

## 2021-05-21 RX ADMIN — ALBUTEROL SULFATE 2.5 MG: 2.5 SOLUTION RESPIRATORY (INHALATION) at 22:25

## 2021-05-21 RX ADMIN — GUAIFENESIN 600 MG: 600 TABLET, EXTENDED RELEASE ORAL at 21:03

## 2021-05-21 RX ADMIN — CLONIDINE HYDROCHLORIDE 0.2 MG: 0.2 TABLET ORAL at 21:03

## 2021-05-21 RX ADMIN — NITROGLYCERIN 0.4 MG: 0.4 TABLET SUBLINGUAL at 21:20

## 2021-05-21 RX ADMIN — ALBUTEROL SULFATE 2.5 MG: 2.5 SOLUTION RESPIRATORY (INHALATION) at 17:51

## 2021-05-21 RX ADMIN — INSULIN LISPRO 18 UNITS: 100 INJECTION, SOLUTION INTRAVENOUS; SUBCUTANEOUS at 12:02

## 2021-05-21 RX ADMIN — INSULIN GLARGINE 20 UNITS: 100 INJECTION, SOLUTION SUBCUTANEOUS at 21:10

## 2021-05-21 RX ADMIN — NIFEDIPINE 90 MG: 90 TABLET, EXTENDED RELEASE ORAL at 21:03

## 2021-05-21 RX ADMIN — CARVEDILOL 25 MG: 25 TABLET, FILM COATED ORAL at 08:13

## 2021-05-21 RX ADMIN — GUAIFENESIN 600 MG: 600 TABLET, EXTENDED RELEASE ORAL at 13:02

## 2021-05-21 RX ADMIN — DOXYCYCLINE HYCLATE 100 MG: 100 TABLET, COATED ORAL at 21:03

## 2021-05-21 RX ADMIN — PRAZOSIN HYDROCHLORIDE 4 MG: 1 CAPSULE ORAL at 08:11

## 2021-05-21 RX ADMIN — Medication 81 MG: at 08:13

## 2021-05-21 RX ADMIN — METHYLPREDNISOLONE SODIUM SUCCINATE 40 MG: 40 INJECTION, POWDER, FOR SOLUTION INTRAMUSCULAR; INTRAVENOUS at 21:04

## 2021-05-21 RX ADMIN — FAMOTIDINE 20 MG: 20 TABLET, FILM COATED ORAL at 21:10

## 2021-05-21 RX ADMIN — LOSARTAN POTASSIUM 50 MG: 50 TABLET, FILM COATED ORAL at 08:12

## 2021-05-21 RX ADMIN — HYDRALAZINE HYDROCHLORIDE 100 MG: 50 TABLET, FILM COATED ORAL at 14:14

## 2021-05-21 RX ADMIN — FAMOTIDINE 20 MG: 20 TABLET, FILM COATED ORAL at 08:13

## 2021-05-21 RX ADMIN — DOXYCYCLINE HYCLATE 100 MG: 100 TABLET, COATED ORAL at 08:19

## 2021-05-21 RX ADMIN — HYDRALAZINE HYDROCHLORIDE 100 MG: 50 TABLET, FILM COATED ORAL at 22:49

## 2021-05-21 RX ADMIN — IPRATROPIUM BROMIDE AND ALBUTEROL SULFATE 1 AMPULE: .5; 3 SOLUTION RESPIRATORY (INHALATION) at 19:37

## 2021-05-21 ASSESSMENT — PAIN SCALES - GENERAL
PAINLEVEL_OUTOF10: 0

## 2021-05-21 NOTE — PROGRESS NOTES
Kandi Singletary, [facility] Res[facility] ([address] [J96.90] . Vitals:    05/21/21 0826   BP:    Pulse:    Resp:    Temp:    SpO2: 100%   . Patients home meds are   Prior to Admission medications    Medication Sig Start Date End Date Taking? Authorizing Provider   predniSONE (DELTASONE) 10 MG tablet Please take 4 tablets daily X 3 days then 3 tablets daily X 3 days then 2 tablets daily X 3 days then 1 tablet daily X 3 days. 5/17/21   Brittani Dodson MD   doxycycline hyclate (VIBRA-TABS) 100 MG tablet Take 1 tablet by mouth 2 times daily for 5 days 5/17/21 5/22/21  Brittani Dodson MD   sodium bicarbonate 650 MG tablet Take 2 tablets by mouth 2 times daily 5/13/21 6/12/21  TERRANCE Ferguson NP   insulin glargine [facility]) 100 UNIT/ML injection pen Inject 10 Units into the skin nightly 5/13/21   TERRANCE Ferguson NP   cloNIDine (CATAPRES) 0.2 MG tablet Take 1 tablet by mouth 3 times daily 5/13/21   TERRANCE Ferguson NP   NIFEdipine (PROCARDIA XL) 90 MG extended release tablet Take 1 tablet by mouth nightly 5/13/21   TERRANCE Ferguson NP   glucagon 1 MG injection Inject 1 mg into the muscle See Admin Instructions Follow package directions for low blood sugar. 4/6/21   TERRANCE Tobar CNP   fluticasone Linell Jeff) 50 MCG/ACT nasal spray  2/23/21   Historical Provider, MD   insulin aspart (NOVOLOG FLEXPEN) 100 UNIT/ML injection pen Inject 5 Units into the skin 3 times daily (before meals) Sliding scale 3/16/21   TERRANCE Tobar CNP   nitroGLYCERIN (NITROSTAT) 0.4 MG SL tablet USE 1 TABLET UNDER THE TONGUE UP TO MAXIMUM OF 3 TOTAL DOSES.  IF NO RELIEF AFTER 1 DOSE, CALL 911. 3/5/21   TERRANCE Tobar CNP   gabapentin (NEURONTIN) 100 MG capsule TAKE 2 CAPSULES BY MOUTH 3 TIMES DAILY 2/23/21 5/24/21  TERRANCE Tobar CNP   QUEtiapine (SEROQUEL) 100 MG tablet TAKE 1 TABLET BY MOUTH NIGHTLY 2/23/21   TERRANCE Tobar - CNP   losartan (COZAAR) 50 MG tablet TAKE 1 TABLET BY MOUTH 2 TIMES DAILY 2/23/21   TERRANCE Topete CNP   GNP VITAMIN D MAXIMUM STRENGTH 50 MCG (2000 UT) TABS TAKE 1 TABLET BY MOUTH ONCE DAILY 2/23/21   TERRANCE Topete CNP   Fransisca Soles 100-62.5-25 MCG/INH AEPB INHALE ONE PUFF INTO THE LUNGS DAILY 1/27/21   TERRANCE Topete CNP   blood glucose monitor strips Test_4__times daily Diagnosis: 250.0   Diabetes Mellitus_x___Insulin Dependent____Non-Insulin Dependent 1/26/21   TERRANCE Topete CNP   blood glucose monitor kit and supplies Dispense sufficient amount for indicated testing frequency plus additional to accommodate PRN testing needs. Dispense all needed supplies to include: monitor, strips, lancing device, lancets, control solutions, alcohol swabs.  1/26/21   TERRANCE Topete CNP   prazosin (MINIPRESS) 2 MG capsule 2 CAPSULES BY MOUTH (4MG) TWICE DAILY 1/25/21   TERRANCE Topete CNP   COMBIVENT RESPIMAT  MCG/ACT AERS inhaler INHALE 1 PUFF INTO THE LUNGS EVERY 6 HOURS 1/25/21   TERRANCE Topete CNP   hydrALAZINE (APRESOLINE) 100 MG tablet Take 1 tablet by mouth every 8 hours 1/25/21   TERRANCE Topete CNP   aspirin (ASPIR-LOW) 81 MG EC tablet TAKE 1 TABLET BY MOUTH DAILY 1/25/21   TERRANCE Topete CNP   carvedilol (COREG) 25 MG tablet Take 1 tablet by mouth 2 times daily (with meals) 1/25/21   TERRANCE Topete CNP   atorvastatin (LIPITOR) 80 MG tablet Take 1 tablet by mouth daily 1/25/21   TERRANCE Topete CNP   clopidogrel (PLAVIX) 75 MG tablet Take 1 tablet by mouth daily 1/25/21   TERRANCE Topete CNP   isosorbide mononitrate (IMDUR) 60 MG extended release tablet Take 1 tablet by mouth daily 1/5/21   TERRANCE Topete CNP   vitamin D (ERGOCALCIFEROL) 1.25 MG (31384 UT) CAPS capsule Take 1 capsule by mouth once a week 1/5/21   Blaise Fernandez, APRN - CNP   Lancets MISC Use_4__times daily Diagnisis:250.0  Diabetes

## 2021-05-21 NOTE — PLAN OF CARE
Problem: Falls - Risk of:  Goal: Will remain free from falls  Description: Will remain free from falls  5/21/2021 1026 by Milly Brenner RN  Outcome: Ongoing  5/21/2021 1025 by Milly Brenner RN  Outcome: Ongoing  Goal: Absence of physical injury  Description: Absence of physical injury  5/21/2021 1026 by Milly Brenner RN  Outcome: Ongoing  5/21/2021 1025 by Milly Brenner RN  Outcome: Ongoing     Problem: Pain:  Goal: Pain level will decrease  Description: Pain level will decrease  5/21/2021 1026 by Milly Brenner RN  Outcome: Ongoing  5/21/2021 1025 by Milly Brenner RN  Outcome: Ongoing  Goal: Control of acute pain  Description: Control of acute pain  5/21/2021 1026 by Milly Brenner RN  Outcome: Ongoing  5/21/2021 1025 by Milly Brenner RN  Outcome: Ongoing  Goal: Control of chronic pain  Description: Control of chronic pain  5/21/2021 1026 by Milly Brenner RN  Outcome: Ongoing  5/21/2021 1025 by Milly Brenner RN  Outcome: Ongoing     Problem: Gas Exchange - Impaired:  Goal: Levels of oxygenation will improve  Description: Levels of oxygenation will improve  5/21/2021 1026 by Milly Brenner RN  Outcome: Ongoing  5/21/2021 1025 by Milly Brenner RN  Outcome: Ongoing

## 2021-05-21 NOTE — PROGRESS NOTES
Occupational Therapy    Occupational Therapy Not Seen Note    DATE: 2021  Name: Lissette Burgess  : 1955  MRN: [id number]    Patient not available for Occupational Therapy due to:    Patient Declined: pt reported increased SOB and declined OOB activity despite max encouragement and ed on the benefits of therapy. Pt stated pt would be agreeable to therapy 21.     Next Scheduled Treatment: 21    Electronically signed by Jose Alejandro Charlton S/OT on 2021 at 10:11 AM

## 2021-05-21 NOTE — PROGRESS NOTES
Nephrology Progress Note        Subjective: Patient is seen and examined. Breathing is still an issue. Still has some mild shortness of breath and speaks in about 3-4 word sentences before needing to take of breath. Edema in the legs is improving. Chest x-ray yesterday was within normal limits. He says he would like to see if we could cut his Bumex dose. He also would like his losartan dose cut. However, his blood pressure today is 158/55 on twice daily losartan 50 mg. No particular chest pain. Appetite is reasonable. He is still having issues with blood sugar control. He has had to delay his meals to catch up to the proper timing for his insulin.     Objective:  CURRENT TEMPERATURE:  Temp:  (pt refused!)  MAXIMUM TEMPERATURE OVER 24HRS:  Temp (24hrs), Av.9 °F (36.6 °C), Min:97.6 °F (36.4 °C), Max:98.1 °F (36.7 °C)    CURRENT RESPIRATORY RATE:  Resp: 20  CURRENT PULSE:  Pulse: 66  CURRENT BLOOD PRESSURE:  BP: (!) 158/55  24HR BLOOD PRESSURE RANGE:  Systolic (68URF), OTS:308 , Min:133 , XQK:593   ; Diastolic (64KLO), WPC:22, Min:54, Max:114    24HR INTAKE/OUTPUT:      Intake/Output Summary (Last 24 hours) at 2021 1638  Last data filed at 2021 0800  Gross per 24 hour   Intake 110 ml   Output 425 ml   Net -315 ml     Weight:      Physical Exam:  General appearance:Awake, alert, in no acute distress  Skin: warm and dry, no rash or erythema  Eyes: conjunctivae pale and sclera anicteric  ENT: Moist mucous membranes  Neck: Positive accessory muscle use, midline trachea  Pulmonary: Good bilateral air entry with some scattered wheezes but no rales or rhonchi appreciated  Cardiovascular: Regular rate and rhythm with positive S1 and S2 and no rubs  Abdomen: Soft and not tender not distended with active bowel sounds Extremities: 1+ bilateral lower extremity pitting edema    Access:  previous permacath    Current Medications:    insulin lispro (HUMALOG) injection vial 0-18 Units, TID WC  insulin 05/19/21  0512 05/20/21  0641   WBC 10.9 10.7   RBC 2.80* 2.75*   HGB 8.7* 8.6*   HCT 27.7* 27.9*   MCV 98.9 101.5   MCH 31.1 31.3   MCHC 31.4 30.8   RDW 13.6 13.5    333   MPV 10.5 10.4      BMP:   Recent Labs     05/19/21  0512 05/20/21  0200 05/20/21  0641 05/21/21  1003     --  137 133*   K 4.4 5.4* 5.4* 4.7   CL 98  --  99 95*   CO2 25  --  25 23   BUN 51*  --  79* 66*   CREATININE 2.66*  --  3.97* 3.44*   GLUCOSE 214*  --  265* 386*   CALCIUM 8.4*  --  8.6 7.8*        Phosphorus:  No results for input(s): PHOS in the last 72 hours. Magnesium:   Recent Labs     05/19/21  0512 05/20/21  0200   MG 2.7* 2.1     Albumin: No results for input(s): LABALBU in the last 72 hours. Dialysis bath: Dialysis Bath  K+ (Potassium): 2  Ca+ (Calcium): 2.25  Na+ (Sodium): 137  HCO3 (Bicarb): 35    Radiology:  Reviewed as available. Assessment:  1. Dialysis dependent acute kidney injury with high likelihood of ending up being ESRD in the future  2. Dyspnea secondary to COPD exacerbation and also in the setting of fluid overload  3. Total body volume overload  4. Essential hypertension with not optimal control       Plan:  1. Continue medications as ordered including higher dose losartan   2. Hemodialysis tomorrow as scheduled  3. Attempt to challenge the patient's weight to help make the fluid overload situation better  4. Attempt to optimize blood sugars to help with the patient's thirst mechanism and decreased the patient's fluid intake      Please do not hesitate to call with questions.     Electronically signed by Akbar Ellis MD on 5/21/2021 at 4:38 PM

## 2021-05-21 NOTE — PROGRESS NOTES
[facility]  Office: [address], DO, Fernando Seat, DO, Paz Syed, DO, Marlenemita UlloaMiguelangel Blood, DO, Zaynab Sotomayor MD, Loc Pabon MD, Josephine Kemp MD, Wang Sandoval MD, Neetu Meza MD, Kathy Strange MD, Rosas Zhao MD, Sky Mcgrath MD, Aftab Wright, DO, Dana Falcon MD, Iwona Schmidt, DO, Micha Orr MD,  Yusuf Allison, DO, Dalia Ramos MD, Maria Elena Pena MD, José Luis Fox MD, Isamar Buitrago MD, Rachel Baker, Marcos Stevens, CNP, Charlie Carroll, CNP, Joan Peña, CNS, Steve Hughes, CNP, Harvey Hernandez, CNP, Jodie Gomez, CNP, Greg Allen, CNP, Keron Noel, CNP, ANCA MustafaC, Renate Hargrove, [facility], Ronal Baird, CNP, Usha Woodson, CNP, Miguel Dodson, CNP, Mina Russ, CNP, Janie Tong, CNP, Beto Zaragoza, [address] [place] [address]    Progress Note    5/21/2021    12:19 PM    Name:   Elyssa Vázquez  MRN:     [id number]     Acct:      [de-identified]   Room:   2012/2012-01  IP Day:  2  Admit Date:  5/19/2021  5:05 AM    PCP:   TERRANCE Kapoor CNP  Code Status:  Full Code    Subjective:     C/C:   Chief Complaint   Patient presents with    Respiratory Distress     Interval History Status: not changed. Still having sob  Also c/o not feeling well, states due to glucose  On different correction scale at home  Counseled on avoid 2nd hand smoke at home  Discussed insulin dosing with RN    Brief History:     Elyssa Vázquez is a 73 y.o. M with history of ESRD on HD, COPD who presented with shortness of breath. States symptoms going on for the past 2 days, but has been having intermittent shortness of breath going on for the past 2 years. Having worsening dyspnea with exertion. Also having sputum production. Was recently admitted 2 days prior for COPD exacerbation, d/c on steroids and antibiotics at that time.  States he went home and developed worsening shortness of breath causing him to come back to ER. Has significant secondhand smoke exposure at home. Review of Systems:     Constitutional:  negative for chills, fevers, sweats  Respiratory:  Positive for sob   Cardiovascular:  negative for chest pain, chest pressure/discomfort  Gastrointestinal:  negative for abdominal pain, constipation, diarrhea, nausea, vomiting  Neurological:  negative for dizziness, headache    Medications: Allergies:     Allergies   Allergen Reactions    Lisinopril      cough    Ace Inhibitors      coughing    Pcn [Penicillins]        Current Meds:   Scheduled Meds:    insulin lispro  0-18 Units Subcutaneous TID WC    insulin lispro  0-9 Units Subcutaneous Nightly    methylPREDNISolone  40 mg Intravenous Q8H    guaiFENesin  600 mg Oral BID    insulin glargine  20 Units Subcutaneous Nightly    doxycycline hyclate  100 mg Oral BID    aspirin  81 mg Oral Daily    atorvastatin  80 mg Oral Daily    carvedilol  25 mg Oral BID WC    cloNIDine  0.2 mg Oral TID    clopidogrel  75 mg Oral Daily    hydrALAZINE  100 mg Oral 3 times per day    isosorbide mononitrate  60 mg Oral Daily    losartan  50 mg Oral BID    NIFEdipine  90 mg Oral Nightly    prazosin  4 mg Oral BID    QUEtiapine  100 mg Oral Nightly    sodium bicarbonate  1,300 mg Oral BID    sodium chloride flush  5-40 mL Intravenous 2 times per day    famotidine  20 mg Oral BID    enoxaparin  40 mg Subcutaneous Daily    bumetanide  2 mg Intravenous Once    ipratropium-albuterol  1 ampule Inhalation 4x daily     Continuous Infusions:    dextrose      sodium chloride       PRN Meds: nitroGLYCERIN, heparin (porcine), heparin (porcine), albuterol, glucose, dextrose, glucagon (rDNA), dextrose, sodium chloride flush, sodium chloride, promethazine **OR** ondansetron, nicotine, polyethylene glycol, acetaminophen **OR** acetaminophen    Data:     Past Medical History:   has a past medical history of Asthma, CAD in native artery, [facility] 5/19/2021 Yes    Uncontrolled type 2 diabetes mellitus with hyperglycemia ([facility] 5/19/2021 Yes    Acute respiratory failure with hypoxia ([facility] 5/21/2021 Yes          Plan:        - Vitals, labs, imaging, medications reviewed  - Continue IV steroids  - RT evaluation, aerosol protocol   - Supplemental O2, wean as tolerated  - Home O2 evaluation on d/c if needed   - BIPAP PRN  - Nephrology consult for ESRD on HD  - Continue home BP medications  - Monitor glucose  - Continue home lantus - increased to 20 nightly  - Continue insulin correction - high dose  - Additional insulin per home regimen   - Avoid secondhand smoke exposure at home, discussed with patient  - Continue IV steroids another 24 hours given wheezing     Alia Viera MD  5/21/2021  12:19 PM

## 2021-05-21 NOTE — PLAN OF CARE
BRONCHOSPASM/BRONCHOCONSTRICTION     [x]         IMPROVE AERATION/BREATH SOUNDS  [x]   ADMINISTER BRONCHODILATOR THERAPY AS APPROPRIATE  [x]   ASSESS BREATH SOUNDS  []   IMPLEMENT AEROSOL/MDI PROTOCOL  [x]   PATIENT EDUCATION AS NEEDED   PROVIDE ADEQUATE OXYGENATION WITH ACCEPTABLE SP02/ABG'S    [x]  IDENTIFY APPROPRIATE OXYGEN THERAPY  [x]   MONITOR SP02/ABG'S AS NEEDED   [x]   PATIENT EDUCATION AS NEEDED     NON INVASIVE VENTILATION  PROVIDE OPTIMAL VENTILATION/ACCEPTABLE SP02  IMPLEMENT NON INVASIVE VENTILATION PROTOCOL  ASSESSMENT SKIN INTEGRITY  PATIENT EDUCATION AS NEEDED  BIPAP AS NEEDED

## 2021-05-22 LAB
ANION GAP SERPL CALCULATED.3IONS-SCNC: 15 MMOL/L (ref 9–17)
BUN BLDV-MCNC: 95 MG/DL (ref 8–23)
BUN/CREAT BLD: ABNORMAL (ref 9–20)
CALCIUM SERPL-MCNC: 8 MG/DL (ref 8.6–10.4)
CHLORIDE BLD-SCNC: 94 MMOL/L (ref 98–107)
CO2: 23 MMOL/L (ref 20–31)
CREAT SERPL-MCNC: 4.17 MG/DL (ref 0.7–1.2)
GFR AFRICAN AMERICAN: 17 ML/MIN
GFR NON-AFRICAN AMERICAN: 14 ML/MIN
GFR SERPL CREATININE-BSD FRML MDRD: ABNORMAL ML/MIN/{1.73_M2}
GFR SERPL CREATININE-BSD FRML MDRD: ABNORMAL ML/MIN/{1.73_M2}
GLUCOSE BLD-MCNC: 173 MG/DL (ref 75–110)
GLUCOSE BLD-MCNC: 189 MG/DL (ref 75–110)
GLUCOSE BLD-MCNC: 277 MG/DL (ref 75–110)
GLUCOSE BLD-MCNC: 295 MG/DL (ref 75–110)
GLUCOSE BLD-MCNC: 324 MG/DL (ref 75–110)
GLUCOSE BLD-MCNC: 347 MG/DL (ref 70–99)
GLUCOSE BLD-MCNC: 393 MG/DL (ref 75–110)
HCT VFR BLD CALC: 30.8 % (ref 40.7–50.3)
HEMOGLOBIN: 9.7 G/DL (ref 13–17)
MCH RBC QN AUTO: 31.9 PG (ref 25.2–33.5)
MCHC RBC AUTO-ENTMCNC: 31.5 G/DL (ref 28.4–34.8)
MCV RBC AUTO: 101.3 FL (ref 82.6–102.9)
NRBC AUTOMATED: 0 PER 100 WBC
PDW BLD-RTO: 13.2 % (ref 11.8–14.4)
PLATELET # BLD: 395 K/UL (ref 138–453)
PMV BLD AUTO: 10.3 FL (ref 8.1–13.5)
POTASSIUM SERPL-SCNC: 4.9 MMOL/L (ref 3.7–5.3)
RBC # BLD: 3.04 M/UL (ref 4.21–5.77)
SODIUM BLD-SCNC: 132 MMOL/L (ref 135–144)
WBC # BLD: 13.8 K/UL (ref 3.5–11.3)

## 2021-05-22 PROCEDURE — 6370000000 HC RX 637 (ALT 250 FOR IP): Performed by: NURSE PRACTITIONER

## 2021-05-22 PROCEDURE — 99232 SBSQ HOSP IP/OBS MODERATE 35: CPT | Performed by: INTERNAL MEDICINE

## 2021-05-22 PROCEDURE — 80048 BASIC METABOLIC PNL TOTAL CA: CPT

## 2021-05-22 PROCEDURE — 6370000000 HC RX 637 (ALT 250 FOR IP): Performed by: INTERNAL MEDICINE

## 2021-05-22 PROCEDURE — 82947 ASSAY GLUCOSE BLOOD QUANT: CPT

## 2021-05-22 PROCEDURE — 2580000003 HC RX 258: Performed by: NURSE PRACTITIONER

## 2021-05-22 PROCEDURE — 2060000000 HC ICU INTERMEDIATE R&B

## 2021-05-22 PROCEDURE — 6360000002 HC RX W HCPCS: Performed by: INTERNAL MEDICINE

## 2021-05-22 PROCEDURE — 6360000002 HC RX W HCPCS: Performed by: STUDENT IN AN ORGANIZED HEALTH CARE EDUCATION/TRAINING PROGRAM

## 2021-05-22 PROCEDURE — 2700000000 HC OXYGEN THERAPY PER DAY

## 2021-05-22 PROCEDURE — 99222 1ST HOSP IP/OBS MODERATE 55: CPT | Performed by: INTERNAL MEDICINE

## 2021-05-22 PROCEDURE — 94640 AIRWAY INHALATION TREATMENT: CPT

## 2021-05-22 PROCEDURE — 85027 COMPLETE CBC AUTOMATED: CPT

## 2021-05-22 PROCEDURE — 90935 HEMODIALYSIS ONE EVALUATION: CPT | Performed by: INTERNAL MEDICINE

## 2021-05-22 PROCEDURE — 36415 COLL VENOUS BLD VENIPUNCTURE: CPT

## 2021-05-22 PROCEDURE — 6360000002 HC RX W HCPCS: Performed by: NURSE PRACTITIONER

## 2021-05-22 PROCEDURE — 94761 N-INVAS EAR/PLS OXIMETRY MLT: CPT

## 2021-05-22 PROCEDURE — 90935 HEMODIALYSIS ONE EVALUATION: CPT

## 2021-05-22 PROCEDURE — 94660 CPAP INITIATION&MGMT: CPT

## 2021-05-22 RX ORDER — MONTELUKAST SODIUM 10 MG/1
10 TABLET ORAL NIGHTLY
Status: DISCONTINUED | OUTPATIENT
Start: 2021-05-22 | End: 2021-05-26 | Stop reason: HOSPADM

## 2021-05-22 RX ORDER — LORAZEPAM 0.5 MG/1
0.5 TABLET ORAL ONCE
Status: COMPLETED | OUTPATIENT
Start: 2021-05-22 | End: 2021-05-23

## 2021-05-22 RX ADMIN — CLONIDINE HYDROCHLORIDE 0.2 MG: 0.2 TABLET ORAL at 13:46

## 2021-05-22 RX ADMIN — ENOXAPARIN SODIUM 40 MG: 40 INJECTION, SOLUTION INTRAVENOUS; SUBCUTANEOUS at 08:20

## 2021-05-22 RX ADMIN — IPRATROPIUM BROMIDE AND ALBUTEROL SULFATE 1 AMPULE: .5; 3 SOLUTION RESPIRATORY (INHALATION) at 20:10

## 2021-05-22 RX ADMIN — LORAZEPAM 0.5 MG: 0.5 TABLET ORAL at 00:55

## 2021-05-22 RX ADMIN — ALBUTEROL SULFATE 2.5 MG: 2.5 SOLUTION RESPIRATORY (INHALATION) at 06:29

## 2021-05-22 RX ADMIN — GUAIFENESIN 600 MG: 600 TABLET, EXTENDED RELEASE ORAL at 08:20

## 2021-05-22 RX ADMIN — GUAIFENESIN 600 MG: 600 TABLET, EXTENDED RELEASE ORAL at 21:56

## 2021-05-22 RX ADMIN — HYDRALAZINE HYDROCHLORIDE 100 MG: 50 TABLET, FILM COATED ORAL at 13:46

## 2021-05-22 RX ADMIN — HEPARIN SODIUM 1600 UNITS: 1000 INJECTION INTRAVENOUS; SUBCUTANEOUS at 20:49

## 2021-05-22 RX ADMIN — INSULIN LISPRO 7 UNITS: 100 INJECTION, SOLUTION INTRAVENOUS; SUBCUTANEOUS at 08:29

## 2021-05-22 RX ADMIN — Medication 81 MG: at 08:20

## 2021-05-22 RX ADMIN — CARVEDILOL 25 MG: 25 TABLET, FILM COATED ORAL at 21:55

## 2021-05-22 RX ADMIN — ISOSORBIDE MONONITRATE 60 MG: 60 TABLET ORAL at 08:20

## 2021-05-22 RX ADMIN — PRAZOSIN HYDROCHLORIDE 4 MG: 1 CAPSULE ORAL at 08:20

## 2021-05-22 RX ADMIN — CLONIDINE HYDROCHLORIDE 0.2 MG: 0.2 TABLET ORAL at 08:20

## 2021-05-22 RX ADMIN — IPRATROPIUM BROMIDE AND ALBUTEROL SULFATE 1 AMPULE: .5; 3 SOLUTION RESPIRATORY (INHALATION) at 12:42

## 2021-05-22 RX ADMIN — IPRATROPIUM BROMIDE AND ALBUTEROL SULFATE 1 AMPULE: .5; 3 SOLUTION RESPIRATORY (INHALATION) at 07:46

## 2021-05-22 RX ADMIN — CARVEDILOL 25 MG: 25 TABLET, FILM COATED ORAL at 08:20

## 2021-05-22 RX ADMIN — DOXYCYCLINE HYCLATE 100 MG: 100 TABLET, COATED ORAL at 21:56

## 2021-05-22 RX ADMIN — METHYLPREDNISOLONE SODIUM SUCCINATE 40 MG: 40 INJECTION, POWDER, FOR SOLUTION INTRAMUSCULAR; INTRAVENOUS at 06:21

## 2021-05-22 RX ADMIN — PREDNISONE 30 MG: 20 TABLET ORAL at 21:55

## 2021-05-22 RX ADMIN — LOSARTAN POTASSIUM 50 MG: 50 TABLET, FILM COATED ORAL at 08:20

## 2021-05-22 RX ADMIN — INSULIN GLARGINE 20 UNITS: 100 INJECTION, SOLUTION SUBCUTANEOUS at 21:57

## 2021-05-22 RX ADMIN — HYDRALAZINE HYDROCHLORIDE 100 MG: 50 TABLET, FILM COATED ORAL at 21:56

## 2021-05-22 RX ADMIN — CLOPIDOGREL 75 MG: 75 TABLET, FILM COATED ORAL at 08:21

## 2021-05-22 RX ADMIN — PRAZOSIN HYDROCHLORIDE 4 MG: 1 CAPSULE ORAL at 21:56

## 2021-05-22 RX ADMIN — FAMOTIDINE 20 MG: 20 TABLET, FILM COATED ORAL at 08:20

## 2021-05-22 RX ADMIN — MONTELUKAST SODIUM 10 MG: 10 TABLET, FILM COATED ORAL at 21:56

## 2021-05-22 RX ADMIN — SODIUM CHLORIDE, PRESERVATIVE FREE 10 ML: 5 INJECTION INTRAVENOUS at 21:56

## 2021-05-22 RX ADMIN — LOSARTAN POTASSIUM 50 MG: 50 TABLET, FILM COATED ORAL at 21:56

## 2021-05-22 RX ADMIN — INSULIN LISPRO 3 UNITS: 100 INJECTION, SOLUTION INTRAVENOUS; SUBCUTANEOUS at 06:31

## 2021-05-22 RX ADMIN — SODIUM BICARBONATE 1300 MG: 648 TABLET ORAL at 08:20

## 2021-05-22 RX ADMIN — DOXYCYCLINE HYCLATE 100 MG: 100 TABLET, COATED ORAL at 08:20

## 2021-05-22 RX ADMIN — SODIUM BICARBONATE 1300 MG: 648 TABLET ORAL at 21:55

## 2021-05-22 RX ADMIN — NIFEDIPINE 90 MG: 90 TABLET, EXTENDED RELEASE ORAL at 21:56

## 2021-05-22 RX ADMIN — SODIUM CHLORIDE, PRESERVATIVE FREE 10 ML: 5 INJECTION INTRAVENOUS at 08:21

## 2021-05-22 RX ADMIN — ATORVASTATIN CALCIUM 80 MG: 80 TABLET, FILM COATED ORAL at 21:56

## 2021-05-22 RX ADMIN — HYDRALAZINE HYDROCHLORIDE 100 MG: 50 TABLET, FILM COATED ORAL at 06:21

## 2021-05-22 RX ADMIN — CLONIDINE HYDROCHLORIDE 0.2 MG: 0.2 TABLET ORAL at 21:56

## 2021-05-22 RX ADMIN — FAMOTIDINE 20 MG: 20 TABLET, FILM COATED ORAL at 21:56

## 2021-05-22 ASSESSMENT — PAIN SCALES - GENERAL
PAINLEVEL_OUTOF10: 0
PAINLEVEL_OUTOF10: 0

## 2021-05-22 NOTE — PROGRESS NOTES
[facility]  Office: [address], DO, Mireya Sheriff, DO, Lou Tobar, DO, Vivian Larry Blood, DO, Abdoul Roman MD, Jessica Hadley MD, Dilshad Villagran MD, Kevin Moreland MD, Trinity Clarke MD, Kelsi Meehan MD, Oralia Bhakta MD, Gabriel Tran MD, Ginette Alvarez, DO, Jesus Jonas MD, May Gomez, DO, Harrietta Goldberg, MD,  Saul Cruz, DO, Katya Sotomayor MD, Daria Pedraza MD, Melissa Gilbert MD, Dandy Vazquez MD, [facility], Tian Palafox, CNP, Morris Cain, CNP, Kenyatta José, CNS, Festus Crowder, CNP, Andre Montoya, CNP, Son Hwang, CNP, Sneha Baez, CNP, Jose Bonilla, CNP, ANCA CuellarC, Daniela Sandoval, [facility], Mahamed Fleming, CNP, Alexy Ventura, CNP, Alek Mccarthy, CNP, Vidya Faust, CNP, Kennedy Plush, CNP, Salma Gutierrez, [address] [place] [address]    Progress Note    5/22/2021    3:09 PM    Name:   Zina Harris  MRN:     [id number]     [place]:      [de-identified]   Room:   2012/2012-01  IP Day:  3  Admit Date:  5/19/2021  5:05 AM    PCP:   TERRANCE Powell CNP  Code Status:  Full Code    Subjective:     C/C:   Chief Complaint   Patient presents with    Respiratory Distress     Interval History Status: not changed. Patient still having shortness of breath which he attributes to poor glucose control but still refuses to comply with our recommendations regarding sliding scale insulin. Nephrology saw the patient today and also recommended improved control. We discussed that glucose control could be more difficult due to his steroid administration for his COPD. He still insist on controlling his own insulin regimen. We discussed outpatient pulmonary follow-up, follows with Dr. Aliya Smith outpatient but has been unable to get an appointment since Covid. Brief History:     Zina Harris is a 73 y.o. M with history of ESRD on HD, COPD who presented with shortness of breath.  States ipratropium-albuterol  1 ampule Inhalation 4x daily     Continuous Infusions:    dextrose      sodium chloride       PRN Meds: nitroGLYCERIN, heparin (porcine), heparin (porcine), albuterol, glucose, dextrose, glucagon (rDNA), dextrose, sodium chloride flush, sodium chloride, promethazine **OR** ondansetron, nicotine, polyethylene glycol, acetaminophen **OR** acetaminophen    Data:     Past Medical History:   has a past medical history of Asthma, CAD in native artery, nonobstructive, Carotid stenosis, left, Carpal tunnel syndrome, Cerebrovascular disease, Chronic kidney disease, COPD (chronic obstructive pulmonary disease) [facility] History of colon polyps, History of weakness of extremity, HTN (hypertension), Hyperlipidemia, Lung, cysts, congenital, PTSD (post-traumatic stress disorder), PTSD (post-traumatic stress disorder), TIA (transient ischemic attack), and Type II or unspecified type diabetes mellitus without mention of complication, not stated as uncontrolled. Social History:   reports that he quit smoking about 6 years ago. His smoking use included cigarettes. He has a 17.50 pack-year smoking history. He has never used smokeless tobacco. He reports that he does not drink alcohol and does not use drugs. Family History:   Family History   Problem Relation Age of Onset    Cancer Mother     Heart Disease Father        Vitals:  BP (!) 156/58   Pulse 62   Temp 97.9 °F (36.6 °C) (Oral)   Resp 20   Wt 148 lb 2.4 oz (67.2 kg)   SpO2 99%   BMI 23.91 kg/m²   Temp (24hrs), Av.2 °F (36.8 °C), Min:97.9 °F (36.6 °C), Max:98.7 °F (37.1 °C)    Recent Labs     21  0633 21  0758 21  1125 21  1421   POCGLU 393* 324* 295* 277*       I/O (24Hr):     Intake/Output Summary (Last 24 hours) at 2021 1509  Last data filed at 2021 1741  Gross per 24 hour   Intake --   Output 1000 ml   Net -1000 ml       Labs:  Hematology:  Recent Labs     21  7410 05/22/21  0804   WBC 10.7 13.8*   RBC 2.75* 3.04*   HGB 8.6* 9.7*   HCT 27.9* 30.8*   .5 101.3   MCH 31.3 31.9   MCHC 30.8 31.5   RDW 13.5 13.2    395   MPV 10.4 10.3     Chemistry:  Recent Labs     05/20/21  0200 05/20/21  0641 05/21/21  1003 05/22/21  0804   NA  --  137 133* 132*   K 5.4* 5.4* 4.7 4.9   CL  --  99 95* 94*   CO2  --  25 23 23   GLUCOSE  --  265* 386* 347*   BUN  --  79* 66* 95*   CREATININE  --  3.97* 3.44* 4.17*   MG 2.1  --   --   --    ANIONGAP  --  13 15 15   LABGLOM  --  15* 18* 14*   GFRAA  --  19* 22* 17*   CALCIUM  --  8.6 7.8* 8.0*   TROPHS 85*  --   --   --      Recent Labs     05/21/21  1734 05/21/21 2011 05/22/21  0633 05/22/21  0758 05/22/21  1125 05/22/21  1421   POCGLU 159* 235* 393* 324* 295* 277*     ABG:  Lab Results   Component Value Date    POCPH 7.39 06/17/2013    POCPCO2 46 06/17/2013    POCPO2 288 06/17/2013    POCHCO3 27.8 06/17/2013    NBEA NOT REPORTED 06/17/2013    PBEA 3 06/17/2013    SSD1XUE 29 06/17/2013    DQHY9NZH 100 06/17/2013    FIO2 NOT REPORTED 05/15/2021     Lab Results   Component Value Date/Time    SPECIAL 2ML L FR 12/03/2020 11:24 AM     Lab Results   Component Value Date/Time    CULTURE NO GROWTH 6 DAYS 12/03/2020 11:24 AM       Radiology:  XR CHEST PORTABLE    Result Date: 5/20/2021  Unremarkable single upright portable AP view of the chest.     XR CHEST PORTABLE    Result Date: 5/19/2021  Unremarkable single upright portable AP view of the chest.     XR CHEST PORTABLE    Result Date: 5/16/2021  No significant change in chest findings. XR CHEST PORTABLE    Result Date: 5/15/2021  Findings compatible with mild interstitial edema. Dialysis catheter terminates at the distal SVC.        Physical Examination:        General appearance:  alert, on BIPAP  Mental Status:  oriented to person, place and time and normal affect  Lungs:  Diffuse wheezing bilaterally   Heart:  regular rate and rhythm  Abdomen:  soft, nontender, nondistended, normal

## 2021-05-22 NOTE — PROGRESS NOTES
sodium (SOLU-MEDROL) injection 40 mg, Q8H  guaiFENesin (MUCINEX) extended release tablet 600 mg, BID  insulin lispro (HUMALOG) injection vial 3 Units, Q8H  nitroGLYCERIN (NITROSTAT) SL tablet 0.4 mg, Q5 Min PRN  insulin glargine (LANTUS) injection vial 20 Units, Nightly  heparin (porcine) injection 1,600 Units, PRN  heparin (porcine) injection 1,600 Units, PRN  doxycycline hyclate (VIBRA-TABS) tablet 100 mg, BID  albuterol (PROVENTIL) nebulizer solution 2.5 mg, Q4H PRN  aspirin EC tablet 81 mg, Daily  atorvastatin (LIPITOR) tablet 80 mg, Daily  carvedilol (COREG) tablet 25 mg, BID WC  cloNIDine (CATAPRES) tablet 0.2 mg, TID  clopidogrel (PLAVIX) tablet 75 mg, Daily  hydrALAZINE (APRESOLINE) tablet 100 mg, 3 times per day  isosorbide mononitrate (IMDUR) extended release tablet 60 mg, Daily  losartan (COZAAR) tablet 50 mg, BID  NIFEdipine (ADALAT CC) extended release tablet 90 mg, Nightly  prazosin (MINIPRESS) capsule 4 mg, BID  QUEtiapine (SEROQUEL) tablet 100 mg, Nightly  sodium bicarbonate tablet 1,300 mg, BID  glucose (GLUTOSE) 40 % oral gel 15 g, PRN  dextrose 50 % IV solution, PRN  glucagon (rDNA) injection 1 mg, PRN  dextrose 5 % solution, PRN  sodium chloride flush 0.9 % injection 5-40 mL, 2 times per day  sodium chloride flush 0.9 % injection 5-40 mL, PRN  0.9 % sodium chloride infusion, PRN  famotidine (PEPCID) tablet 20 mg, BID  promethazine (PHENERGAN) tablet 12.5 mg, Q6H PRN   Or  ondansetron (ZOFRAN) injection 4 mg, Q6H PRN  nicotine (NICODERM CQ) 21 MG/24HR 1 patch, Daily PRN  polyethylene glycol (GLYCOLAX) packet 17 g, Daily PRN  enoxaparin (LOVENOX) injection 40 mg, Daily  acetaminophen (TYLENOL) tablet 650 mg, Q6H PRN   Or  acetaminophen (TYLENOL) suppository 650 mg, Q6H PRN  bumetanide (BUMEX) injection 2 mg, Once  ipratropium-albuterol (DUONEB) nebulizer solution 1 ampule, 4x daily      Labs:   CBC:   Recent Labs     05/20/21  0641 05/22/21  0804   WBC 10.7 13.8*   RBC 2.75* 3.04*   HGB 8.6* 9.7* HCT 27.9* 30.8*   .5 101.3   MCH 31.3 31.9   MCHC 30.8 31.5   RDW 13.5 13.2    395   MPV 10.4 10.3      BMP:   Recent Labs     05/20/21  0641 05/21/21  1003 05/22/21  0804    133* 132*   K 5.4* 4.7 4.9   CL 99 95* 94*   CO2 25 23 23   BUN 79* 66* 95*   CREATININE 3.97* 3.44* 4.17*   GLUCOSE 265* 386* 347*   CALCIUM 8.6 7.8* 8.0*        Phosphorus:  No results for input(s): PHOS in the last 72 hours. Magnesium:   Recent Labs     05/20/21  0200   MG 2.1     Albumin: No results for input(s): LABALBU in the last 72 hours. Radiology:  Reviewed as available. Assessment:  1. Dialysis dependent acute kidney injury with high likelihood of ending up being ESRD in the future  2. Dyspnea secondary to COPD exacerbation and also in the setting of fluid overload, appears to be in status asthmaticus  3. Total body volume overload - improving with dialysis. 4.  Essential hypertension with not optimal control       Plan:  1. Continue medications as ordered including Cozaar 50mg BID   2. Hemodialysis today as scheduled, orders reviewed with dialysis RN. 3.  Attempt to challenge the patient's weight to help make the fluid overload situation better, 3 L UF done today  4. Recommend to optimize blood sugars to help with the patient's thirst mechanism and continue to restrict patient's fluid intake to 1500cc/day. 5.  Emergent breathing treatment stat. Please do not hesitate to call with questions. Electronically signed by HENOK Bach on 5/22/2021 at 10:14 AM     Attending Physician Statement  I have discussed the care of Domo Valenzuela, including pertinent history and exam findings with the resident/fellow. I have reviewed the key elements of all parts of the encounter with the resident/fellow. I have seen and examined the patient with the resident/fellow. I agree with the assessment and plan and status of the problem list as documented.       .  Electronically signed by Radha Duncan MD on 5/22/2021 at 8:18 PM

## 2021-05-22 NOTE — CONSULTS
Patient - Elyssa Vázquez   MRN -  [id number]   Acct # - [de-identified]   - 1955        Date of evaluation -  2021    REASON FOR THE CONSULTATION:  COPD   HISTORY OF PRESENT ILLNESS:    Elyssa Vázquez is a 73y.o. year old male admitted because of worsening shortness of breath. He was discharged 2 days prior to hospital admission and had been started on hemodialysis. According to patient he did go to his first dialysis session. He continues to feel worsening shortness of breath and sputum production without hemoptysis. He does not smoke but has secondhand smoke exposure. He has multiple inhalers at home trilogy: Combivent.   He is frustrated because of hyperglycemia            Immunization   Immunization History   Administered Date(s) Administered    COVID-19, Moderna, PF, 100mcg/0.5mL 2021    Influenza 2012    Influenza Vaccine, unspecified formulation 2012    Influenza Virus Vaccine 10/15/2013, 10/14/2014, 10/02/2015, 2016, 10/30/2017, 10/21/2019    Influenza, Quadv, 6 mo and older, IM (Fluzone, Flulaval) 10/30/2017    Influenza, Quadv, IM, (6 mo and older Fluzone, Flulaval, Fluarix and 3 yrs and older Afluria) 2016, 10/21/2019    Pneumococcal Conjugate 13-valent ([id number]) 2021    Pneumococcal Polysaccharide ([id number]) 10/02/2015    Tdap (Boostrix, Adacel) 2013    Zoster Live (Zostavax) 2017        Pneumococcal Vaccine     [] Up to date    [] Indicated   [] Refused  [] Contraindicated       Influenza Vaccine   [] Up to date    [] Indicated   [] Refused  [] Contraindicated            Pulmonary Rehab     [] completed    [] Indicated   [] Refused  [] Contraindicated       PAST MEDICAL HISTORY:       Diagnosis Date    Asthma     mod persistent    CAD in native artery, nonobstructive     Carotid stenosis, left 6/10/2013    Carpal tunnel syndrome     RIGHT    Cerebrovascular disease 2018    Chronic kidney disease 6/10/2013    COPD (chronic obstructive pulmonary disease) (HCC)     Diabetes mellitus (HCC)     insulin dependent    History of colon polyps     History of weakness of extremity     right sided    HTN (hypertension)     Hyperlipidemia     Lung, cysts, congenital     PTSD (post-traumatic stress disorder)     PTSD (post-traumatic stress disorder)     TIA (transient ischemic attack)     Type II or unspecified type diabetes mellitus without mention of complication, not stated as uncontrolled          Family History:       Problem Relation Age of Onset    Cancer Mother     Heart Disease Father        SURGICAL HISTORY:   Past Surgical History:   Procedure Laterality Date    CAROTID ENDARTERECTOMY Left 7-2013    COLONOSCOPY      HERNIA REPAIR      IR TUNNELED CATHETER PLACEMENT GREATER THAN 5 YEARS  5/11/2021    IR TUNNELED CATHETER PLACEMENT GREATER THAN 5 YEARS 5/11/2021 Jacqulyne Homans, MD [facility]    CT COLSC FLX W/RMVL OF TUMOR POLYP LESION SNARE TQ N/A 6/27/2017    COLONOSCOPY POLYPECTOMY SNARE/ hot performed by Domnigo Moreno DO at [facility] VASCULAR SURGERY Left 2015    carotid stent, dr Juan Gtz:      TOBACCO:   reports that he quit smoking about 6 years ago. His smoking use included cigarettes. He has a 17.50 pack-year smoking history. He has never used smokeless tobacco.  ETOH:   reports no history of alcohol use. ALLERGIES:    Allergies   Allergen Reactions    Lisinopril      cough    Ace Inhibitors      coughing    Pcn [Penicillins]        Home Meds:   Prior to Admission medications    Medication Sig Start Date End Date Taking? Authorizing Provider   predniSONE (DELTASONE) 10 MG tablet Please take 4 tablets daily X 3 days then 3 tablets daily X 3 days then 2 tablets daily X 3 days then 1 tablet daily X 3 days.  5/17/21   Ashley Sandhu MD   doxycycline hyclate (VIBRA-TABS) 100 MG tablet Take 1 tablet by mouth 2 times daily for 5 days 5/17/21 5/22/21  Jonelle Rodas MD   sodium bicarbonate 650 MG tablet Take 2 tablets by mouth 2 times daily 5/13/21 6/12/21  TERRANCE Calzada NP   insulin glargine [facility]) 100 UNIT/ML injection pen Inject 10 Units into the skin nightly 5/13/21   TERRANCE Calzada NP   cloNIDine (CATAPRES) 0.2 MG tablet Take 1 tablet by mouth 3 times daily 5/13/21   TERRANCE Calzada NP   NIFEdipine (PROCARDIA XL) 90 MG extended release tablet Take 1 tablet by mouth nightly 5/13/21   TERRANCE Calzada NP   glucagon 1 MG injection Inject 1 mg into the muscle See Admin Instructions Follow package directions for low blood sugar. 4/6/21   TERRANCE Mills CNP   fluticasone [facility]) 50 MCG/ACT nasal spray  2/23/21   Historical Provider, MD   insulin aspart (NOVOLOG FLEXPEN) 100 UNIT/ML injection pen Inject 5 Units into the skin 3 times daily (before meals) Sliding scale 3/16/21   TERRANCE Mills CNP   nitroGLYCERIN (NITROSTAT) 0.4 MG SL tablet USE 1 TABLET UNDER THE TONGUE UP TO MAXIMUM OF 3 TOTAL DOSES.  IF NO RELIEF AFTER 1 DOSE, CALL 911. 3/5/21   TERRANCE Mills CNP   gabapentin (NEURONTIN) 100 MG capsule TAKE 2 CAPSULES BY MOUTH 3 TIMES DAILY 2/23/21 5/24/21  TERRANCE Mills CNP   QUEtiapine (SEROQUEL) 100 MG tablet TAKE 1 TABLET BY MOUTH NIGHTLY 2/23/21   TERRANCE Mills CNP   losartan (COZAAR) 50 MG tablet TAKE 1 TABLET BY MOUTH 2 TIMES DAILY 2/23/21   TERRANCE Mills CNP   GNP VITAMIN D MAXIMUM STRENGTH 50 MCG (2000 UT) TABS TAKE 1 TABLET BY MOUTH ONCE DAILY 2/23/21   TERRANCE Mills CNP   Milo Linker 100-62.5-25 MCG/INH AEPB INHALE ONE PUFF INTO THE LUNGS DAILY 1/27/21   TERRANCE Mills CNP   blood glucose monitor strips Test_4__times daily Diagnosis: 250.0   Diabetes Mellitus_x___Insulin Dependent____Non-Insulin Dependent 1/26/21   TERRANCE Mills CNP   blood glucose monitor kit and supplies Dispense sufficient amount for indicated testing frequency plus additional to accommodate PRN testing needs. Dispense all needed supplies to include: monitor, strips, lancing device, lancets, control solutions, alcohol swabs.  1/26/21   TERRANCE Roth CNP   prazosin (MINIPRESS) 2 MG capsule 2 CAPSULES BY MOUTH (4MG) TWICE DAILY 1/25/21   TERRANCE Roth CNP   COMBIVENT RESPIMAT  MCG/ACT AERS inhaler INHALE 1 PUFF INTO THE LUNGS EVERY 6 HOURS 1/25/21   TERRANCE Roth CNP   hydrALAZINE (APRESOLINE) 100 MG tablet Take 1 tablet by mouth every 8 hours 1/25/21   TERRANCE Roth CNP   aspirin (ASPIR-LOW) 81 MG EC tablet TAKE 1 TABLET BY MOUTH DAILY 1/25/21   TERRANCE Roth CNP   carvedilol (COREG) 25 MG tablet Take 1 tablet by mouth 2 times daily (with meals) 1/25/21   TERRANCE Roth CNP   atorvastatin (LIPITOR) 80 MG tablet Take 1 tablet by mouth daily 1/25/21   TERRANCE Roth CNP   clopidogrel (PLAVIX) 75 MG tablet Take 1 tablet by mouth daily 1/25/21   TERRANCE Roth CNP   isosorbide mononitrate (IMDUR) 60 MG extended release tablet Take 1 tablet by mouth daily 1/5/21   TERRANCE Roth CNP   vitamin D (ERGOCALCIFEROL) 1.25 MG (11476 UT) CAPS capsule Take 1 capsule by mouth once a week 1/5/21   TERRANCE Roth CNP   Lancets MISC Use_4__times daily Diagnisis:250.0  Diabetes Mellitus__x__Insulin Dependent___Non-Insulin Dependent 12/6/20   David Turner MD   ipratropium-albuterol (DUONEB) 0.5-2.5 (3) MG/3ML SOLN nebulizer solution Inhale 3 mLs into the lungs every 6 hours as needed for Shortness of Breath 12/6/20   Elfego Benjamin MD   Nutritional Supplements ([address] [place] [address]) LIQD Take 1 Can by mouth 3 times daily 6/16/20   TERRANCE Roth CNP   COMFORT EZ PEN NEEDLES 31G X 8 MM MISC USE AS DIRECTED 4/14/20   TERRANCE Roth CNP     CURRENT MEDS :  Scheduled Meds:   montelukast  10 mg Oral Nightly    LORazepam  0.5 mg Oral Once    insulin lispro  0-18 Units Subcutaneous TID WC    insulin lispro  0-9 Units Subcutaneous Nightly    methylPREDNISolone  40 mg Intravenous Q8H    guaiFENesin  600 mg Oral BID    insulin lispro  3 Units Subcutaneous Q8H    insulin glargine  20 Units Subcutaneous Nightly    doxycycline hyclate  100 mg Oral BID    aspirin  81 mg Oral Daily    atorvastatin  80 mg Oral Daily    carvedilol  25 mg Oral BID WC    cloNIDine  0.2 mg Oral TID    clopidogrel  75 mg Oral Daily    hydrALAZINE  100 mg Oral 3 times per day    isosorbide mononitrate  60 mg Oral Daily    losartan  50 mg Oral BID    NIFEdipine  90 mg Oral Nightly    prazosin  4 mg Oral BID    QUEtiapine  100 mg Oral Nightly    sodium bicarbonate  1,300 mg Oral BID    sodium chloride flush  5-40 mL Intravenous 2 times per day    famotidine  20 mg Oral BID    enoxaparin  40 mg Subcutaneous Daily    bumetanide  2 mg Intravenous Once    ipratropium-albuterol  1 ampule Inhalation 4x daily       Continuous Infusions:   dextrose      sodium chloride             REVIEW OF SYSTEMS:  CONSTITUTIONAL:  negative for  fevers, chills, sweats, fatigue, malaise, anorexia and weight loss  EYES:  negative for  double vision, blurred vision, dry eyes, eye discharge and redness  HEENT:  negative for  hearing loss, tinnitus, ear drainage, earaches and nasal congestion  RESPIRATORY:  See hpi  CARDIOVASCULAR:  negative for  chest pain, palpitations, orthopnea, PND  GASTROINTESTINAL:  negative for nausea, vomiting, change in bowel habits, diarrhea, constipation, abdominal pain, pruritus, abdominal mass and abdominal distention  GENITOURINARY:  negative for frequency, dysuria, nocturia, urinary incontinence and hesitancy  INTEGUMENT/BREAST:  negative for rash, skin lesion(s), dryness, skin color change, changes in lesion, pruritus and changes in hair  HEMATOLOGIC/LYMPHATIC:  negative for easy bruising, bleeding, lymphadenopathy, petechiae and swelling/edema  ALLERGIC/IMMUNOLOGIC:  negative for recurrent infections, urticaria and drug reactions  ENDOCRINE:  negative for heat intolerance, cold intolerance, tremor, weight changes and change in bowel habits  MUSCULOSKELETAL:  negative for  myalgias, arthralgias, pain, joint swelling, stiff joints and decreased range of motion  NEUROLOGICAL:  negative for headaches, dizziness, seizures, memory problems, speech problems, visual disturbance and coordination problems  BEHAVIOR/PSYCH:  negative for poor appetite, increased appetite, decreased sleep, increased sleep, decreased energy level, increased energy level and poor concentration   skin - no rash no dermatitis     Vitals:  BP (!) 156/58   Pulse 62   Temp 97.9 °F (36.6 °C) (Oral)   Resp 20   Wt 148 lb 2.4 oz (67.2 kg)   SpO2 99%   BMI 23.91 kg/m²     PHYSICAL EXAM:  Head and neck atraumatic, normocephalic    Lymph nodes-no cervical, supraclavicular lymphadenopathy    Neck-no JVP elevation    Lungs - AP diameter of chest increased. Thoracic expansion and diaphragmatic excursion diminished. BS diminished and expiratory phase prolonged. No dullness to percussion or tenderness to palpation. No bronchial breath sounds . CVS- S1, S2 regular. No S3 no S4, no murmurs    Abdomen-nontender, nondistended. Bowel sounds are present. No organomegaly    Lower extremity-no edema    Upper extremity-no edema    Neurological-grossly normal cranial nerves. No overt motor deficit             CBC:   Recent Labs     05/20/21  0641 05/22/21  0804   WBC 10.7 13.8*   HGB 8.6* 9.7*    395     BMP:    Recent Labs     05/20/21  0641 05/21/21  1003 05/22/21  0804    133* 132*   K 5.4* 4.7 4.9   CL 99 95* 94*   CO2 25 23 23   BUN 79* 66* 95*   CREATININE 3.97* 3.44* 4.17*   GLUCOSE 265* 386* 347*     Hepatic: No results for input(s): AST, ALT, ALB, BILITOT, ALKPHOS in the last 72 hours.   Amylase: No results found for: AMYLASE  Lipase: No results found for: LIPASE  Troponin: No results for input(s): TROPONINI in the last 72 hours. BNP: No results for input(s): BNP in the last 72 hours. Lipids: No results for input(s): CHOL, HDL in the last 72 hours. Invalid input(s): LDLCALCU  ABGs: No results found for: PHART, PO2ART, VJE3SCA  INR: No results for input(s): INR in the last 72 hours. Thyroid:   Lab Results   Component Value Date    TSH 2.91 05/15/2020     Urinalysis: No results for input(s): BACTERIA, BLOODU, CLARITYU, COLORU, PHUR, PROTEINU, RBCUA, SPECGRAV, BILIRUBINUR, NITRU, WBCUA, LEUKOCYTESUR, GLUCOSEU in the last 72 hours. XR CHEST PORTABLE    Result Date: 5/20/2021  No acute cardiopulmonary abnormality. XR CHEST PORTABLE    Result Date: 5/20/2021  Unremarkable single upright portable AP view of the chest.     XR CHEST PORTABLE    Result Date: 5/19/2021  Unremarkable single upright portable AP view of the chest.     XR CHEST PORTABLE    Result Date: 5/16/2021  No significant change in chest findings.         IMPRESSION:    Patient Active Problem List   Diagnosis Code    Cigarette nicotine dependence without complication P07.711    Carpal tunnel syndrome on right G56.01    Colon polyps K63.5    Carotid stenosis, left s/p Endarterectomy I65.22    Mixed simple and mucopurulent chronic bronchitis (HCC) J41.8    Pure hypercholesterolemia E78.00    CKD (chronic kidney disease) stage 4, GFR 15-29 ml/min (HCC) N18.4    CKD (chronic kidney disease) stage 3, GFR 30-59 ml/min (HCC) N18.30    Dyspnea and respiratory abnormalities R06.00, R06.89    PTSD (post-traumatic stress disorder) F43.10    Transient cerebral ischemia G45.9    Essential hypertension I10    DM type 2, uncontrolled, with neuropathy ([address] E11.40, E11.65    COPD with acute exacerbation ([address] J44.1    Cerebral vascular disease I67.9    Primary insomnia F51.01    Hypertensive crisis I16.9    Non-obstructive Coronary artery disease of native

## 2021-05-22 NOTE — PLAN OF CARE
Problem: Falls - Risk of:  Goal: Will remain free from falls  Description: Will remain free from falls  5/22/2021 1521 by Santiago Palumbo RN  Outcome: Ongoing     Problem: Falls - Risk of:  Goal: Absence of physical injury  Description: Absence of physical injury  5/22/2021 1521 by Santiago Palumbo RN  Outcome: Ongoing     Problem: Pain:  Goal: Pain level will decrease  Description: Pain level will decrease  5/22/2021 1521 by Santiago Palumbo RN  Outcome: Ongoing     Problem: Pain:  Goal: Control of acute pain  Description: Control of acute pain  5/22/2021 1521 by Santiago Palumbo RN  Outcome: Ongoing     Problem: Pain:  Goal: Control of chronic pain  Description: Control of chronic pain  5/22/2021 1521 by Santiago Palumbo RN  Outcome: Ongoing     Problem: Gas Exchange - Impaired:  Goal: Levels of oxygenation will improve  Description: Levels of oxygenation will improve  5/22/2021 1521 by Santiago Palumbo RN  Outcome: Ongoing

## 2021-05-22 NOTE — PLAN OF CARE
Problem: Gas Exchange - Impaired:  Goal: Levels of oxygenation will improve  Description: Levels of oxygenation will improve  5/22/2021 1025 by Ofelia Yarbrough RCP  Outcome: Ongoing  Note:   PROVIDE ADEQUATE OXYGENATION WITH ACCEPTABLE SP02/ABG'S    [x]  IDENTIFY APPROPRIATE OXYGEN THERAPY  [x]   MONITOR SP02/ABG'S AS NEEDED   [x]   PATIENT EDUCATION AS NEEDED     5/22/2021 0446 by Amanda Oliver RN  Outcome: Ongoing     Problem: RESPIRATORY  Intervention: Respiratory assessment  Note: TASIA ROSEatient Assessment complete. Respiratory failure ([address] [J96.90] . Vitals:    05/22/21 0800   BP: (!) 164/57   Pulse: 72   Resp: 20   Temp: 98.7 °F (37.1 °C)   SpO2: 96%   . Patients home meds are   Prior to Admission medications    Medication Sig Start Date End Date Taking? Authorizing Provider   predniSONE (DELTASONE) 10 MG tablet Please take 4 tablets daily X 3 days then 3 tablets daily X 3 days then 2 tablets daily X 3 days then 1 tablet daily X 3 days. 5/17/21   Florina Rasmussen MD   doxycycline hyclate (VIBRA-TABS) 100 MG tablet Take 1 tablet by mouth 2 times daily for 5 days 5/17/21 5/22/21  Florina Rasmussen MD   sodium bicarbonate 650 MG tablet Take 2 tablets by mouth 2 times daily 5/13/21 6/12/21  TERRANCE Recinos NP   insulin glargine [facility]) 100 UNIT/ML injection pen Inject 10 Units into the skin nightly 5/13/21   TERRANCE Recinos NP   cloNIDine (CATAPRES) 0.2 MG tablet Take 1 tablet by mouth 3 times daily 5/13/21   TERRANCE Recinos NP   NIFEdipine (PROCARDIA XL) 90 MG extended release tablet Take 1 tablet by mouth nightly 5/13/21   TERRANCE Recinos NP   glucagon 1 MG injection Inject 1 mg into the muscle See Admin Instructions Follow package directions for low blood sugar.  4/6/21   TERRANCE Burger CNP   fluticasone [facility]) 50 MCG/ACT nasal spray  2/23/21   Historical Provider, MD   insulin aspart (NOVOLOG FLEXPEN) 100 UNIT/ML injection pen Inject 5 Units into the skin 3 times daily (before meals) Sliding scale 3/16/21   Arthea Spray, APRN - CNP   nitroGLYCERIN (NITROSTAT) 0.4 MG SL tablet USE 1 TABLET UNDER THE TONGUE UP TO MAXIMUM OF 3 TOTAL DOSES. IF NO RELIEF AFTER 1 DOSE, CALL 911. 3/5/21   Arthea Spray, APRN - CNP   gabapentin (NEURONTIN) 100 MG capsule TAKE 2 CAPSULES BY MOUTH 3 TIMES DAILY 2/23/21 5/24/21  Arthea Spray, APRN - CNP   QUEtiapine (SEROQUEL) 100 MG tablet TAKE 1 TABLET BY MOUTH NIGHTLY 2/23/21   Arthea Spray, APRN - CNP   losartan (COZAAR) 50 MG tablet TAKE 1 TABLET BY MOUTH 2 TIMES DAILY 2/23/21   Arthea Spray, APRN - CNP   GNP VITAMIN D MAXIMUM STRENGTH 50 MCG (2000 UT) TABS TAKE 1 TABLET BY MOUTH ONCE DAILY 2/23/21   Arthea Spray, APRN - CNP   Raenelle Battles 100-62.5-25 MCG/INH AEPB INHALE ONE PUFF INTO THE LUNGS DAILY 1/27/21   Arthea Spray, APRN - CNP   blood glucose monitor strips Test_4__times daily Diagnosis: 250.0   Diabetes Mellitus_x___Insulin Dependent____Non-Insulin Dependent 1/26/21   Arthea Spray, APRN - CNP   blood glucose monitor kit and supplies Dispense sufficient amount for indicated testing frequency plus additional to accommodate PRN testing needs. Dispense all needed supplies to include: monitor, strips, lancing device, lancets, control solutions, alcohol swabs.  1/26/21   Arthea Spray, APRN - CNP   prazosin (MINIPRESS) 2 MG capsule 2 CAPSULES BY MOUTH (4MG) TWICE DAILY 1/25/21   Arthea Spray, APRN - CNP   COMBIVENT RESPIMAT  MCG/ACT AERS inhaler INHALE 1 PUFF INTO THE LUNGS EVERY 6 HOURS 1/25/21   Arthea Spray, APRN - CNP   hydrALAZINE (APRESOLINE) 100 MG tablet Take 1 tablet by mouth every 8 hours 1/25/21   Arthea Spray, APRN - CNP   aspirin (ASPIR-LOW) 81 MG EC tablet TAKE 1 TABLET BY MOUTH DAILY 1/25/21   Arthea Spray, APRN - CNP   carvedilol (COREG) 25 MG tablet Take 1 tablet by mouth 2 times daily (with meals) 1/25/21   Arthea Spray, APRN - CNP   atorvastatin (LIPITOR) 80 MG tablet Take 1 tablet by mouth daily 1/25/21   TERRANCE Loving CNP   clopidogrel (PLAVIX) 75 MG tablet Take 1 tablet by mouth daily 1/25/21   TERRANCE Loving CNP   isosorbide mononitrate (IMDUR) 60 MG extended release tablet Take 1 tablet by mouth daily 1/5/21   TERRANCE Loving CNP   vitamin D (ERGOCALCIFEROL) 1.25 MG (88003 UT) CAPS capsule Take 1 capsule by mouth once a week 1/5/21   TERRANCE Loving CNP   Lancets MISC Use_4__times daily Diagnisis:250.0  Diabetes Mellitus__x__Insulin Dependent___Non-Insulin Dependent 12/6/20   Carmel Hancock MD   ipratropium-albuterol (DUONEB) 0.5-2.5 (3) MG/3ML SOLN nebulizer solution Inhale 3 mLs into the lungs every 6 hours as needed for Shortness of Breath 12/6/20   Elfego Dominguez MD   Nutritional Supplements ([address] [place] [address]) LIQD Take 1 Can by mouth 3 times daily 6/16/20   TERRANCE Loving CNP   COMFORT EZ PEN NEEDLES 31G X 8 MM MISC USE AS DIRECTED 4/14/20   TERRANCE Loving CNP   Assessment     Copy Pasted HP note from MD,     Brief History:      Sruthi Brooks is a 73 y.o. M with history of ESRD on HD, COPD who presented with shortness of breath. States symptoms going on for the past 2 days, but has been having intermittent shortness of breath going on for the past 2 years. Having worsening dyspnea with exertion. Also having sputum production. Was recently admitted 2 days prior for COPD exacerbation, d/c on steroids and antibiotics at that time. States he went home and developed worsening shortness of breath causing him to come back to ER. Has significant secondhand smoke exposure at home. 05/22, Readmit for exacerbation of copd. Remains to have some wheezing, MILLER which has been ongoing and worsening. Somewhat improved. Continues to be on Low Flow Oxgyen. Plan for home evaluation upon discharge. On Steroids. Home meds reviewed. Will continue to treat and asses by Aerosol Protocol. Pulmonary consulted.       RR 24  Breath Sounds: diminished, wheeze      Bronchodilator assessment at level  3  Hyperinflation assessment at level   Secretion Management assessment at level      [x]    Bronchodilator Assessment  BRONCHODILATOR ASSESSMENT SCORE  Score 0 1 2 3 4 5   Breath Sounds   []  Patient Baseline []  No Wheeze good aeration []  Faint, scattered wheezing, good aeration [x]  Expiratory Wheezing and or moderately diminished []  Insp/Exp wheeze and/or very diminished []  Insp/Exp and/ or marked distress   Respiratory Rate   []  Patient Baseline []  Less than 20 []  Less than 20 [x]  20-25 []  Greater than 25 []  Greater than 25   Peak flow % of Pred or PB [x]  NA   []  Greater than 90%  []  81-90% []  71-80% []  Less than or equal to 70%  or unable to perform []  Unable due to Respiratory Distress   Dyspnea re []  Patient Baseline []  No SOB []  No SOB [x]  SOB on exertion []  SOB min activity []  At rest/acute   e FEV% Predicted       [x]  NA []  Above 69%  []  Unable []  Above 60-69%  []  Unable []  Above 50-59%  []  Unable []  Above 35-49%  []  Unable []  Less than 35%  []  Unable            Problem: Impaired respiratory status  Intervention: Biphasic positive airway pressure (BIPAP)  Note: NON INVASIVE VENTILATION  PROVIDE OPTIMAL VENTILATION/ACCEPTABLE SP02  IMPLEMENT NON INVASIVE VENTILATION PROTOCOL  ASSESSMENT SKIN INTEGRITY  PATIENT EDUCATION AS NEEDED  BIPAP AS NEEDED         Intervention: Administer treatments as ordered  Note: BRONCHOSPASM/BRONCHOCONSTRICTION     [x]         IMPROVE AERATION/BREATH SOUNDS  [x]   ADMINISTER BRONCHODILATOR THERAPY AS APPROPRIATE  [x]   ASSESS BREATH SOUNDS  [x]   IMPLEMENT AEROSOL/MDI PROTOCOL  [x]   PATIENT EDUCATION AS NEEDED

## 2021-05-22 NOTE — PROGRESS NOTES
Patient became aggravated and expressed his frustration that he doesn't know what to do because the staff are not doing things his way. Writer sat with patient and listened to his concerns. The patient feels a lack of control and is dissatisfied with the plan of care. Writer spoke with pt to explain the goals of his plan of care and how the staff are trying to accomodates his wishes but not all tests and procedures are available when he wants to have them done (dialsys). Patient became less aggravated and expressed he is willing to work with writer and staff and agrees with his current plan of care. Dr. Reed Toth updated and aware. Will continue to monitor pt.

## 2021-05-22 NOTE — PROGRESS NOTES
Writer is holding Solumedrol, insulin and ativan per patient request due to concern of fluctuating blood sugars. Dr. Reed Toth is aware. Pt is waiting for dialysis to make a decision on medications. Writer monitoring pt.

## 2021-05-23 DIAGNOSIS — I10 ESSENTIAL HYPERTENSION: ICD-10-CM

## 2021-05-23 DIAGNOSIS — J41.8 MIXED SIMPLE AND MUCOPURULENT CHRONIC BRONCHITIS (HCC): ICD-10-CM

## 2021-05-23 DIAGNOSIS — E78.01 FAMILIAL HYPERCHOLESTEROLEMIA: ICD-10-CM

## 2021-05-23 LAB
ALBUMIN SERPL-MCNC: 2.6 G/DL (ref 3.5–5.2)
ANION GAP SERPL CALCULATED.3IONS-SCNC: 11 MMOL/L (ref 9–17)
BUN BLDV-MCNC: 59 MG/DL (ref 8–23)
BUN/CREAT BLD: ABNORMAL (ref 9–20)
CALCIUM SERPL-MCNC: 7.6 MG/DL (ref 8.6–10.4)
CHLORIDE BLD-SCNC: 93 MMOL/L (ref 98–107)
CO2: 25 MMOL/L (ref 20–31)
CREAT SERPL-MCNC: 3.04 MG/DL (ref 0.7–1.2)
GFR AFRICAN AMERICAN: 25 ML/MIN
GFR NON-AFRICAN AMERICAN: 21 ML/MIN
GFR SERPL CREATININE-BSD FRML MDRD: ABNORMAL ML/MIN/{1.73_M2}
GFR SERPL CREATININE-BSD FRML MDRD: ABNORMAL ML/MIN/{1.73_M2}
GLUCOSE BLD-MCNC: 207 MG/DL (ref 75–110)
GLUCOSE BLD-MCNC: 250 MG/DL (ref 75–110)
GLUCOSE BLD-MCNC: 273 MG/DL (ref 75–110)
GLUCOSE BLD-MCNC: 285 MG/DL (ref 75–110)
GLUCOSE BLD-MCNC: 292 MG/DL (ref 75–110)
GLUCOSE BLD-MCNC: 324 MG/DL (ref 75–110)
GLUCOSE BLD-MCNC: 348 MG/DL (ref 70–99)
HCT VFR BLD CALC: 27.9 % (ref 40.7–50.3)
HEMOGLOBIN: 8.8 G/DL (ref 13–17)
MAGNESIUM: 1.5 MG/DL (ref 1.6–2.6)
MCH RBC QN AUTO: 31.2 PG (ref 25.2–33.5)
MCHC RBC AUTO-ENTMCNC: 31.5 G/DL (ref 28.4–34.8)
MCV RBC AUTO: 98.9 FL (ref 82.6–102.9)
NRBC AUTOMATED: 0 PER 100 WBC
PDW BLD-RTO: 12.8 % (ref 11.8–14.4)
PHOSPHORUS: 4.8 MG/DL (ref 2.5–4.5)
PLATELET # BLD: 352 K/UL (ref 138–453)
PMV BLD AUTO: 10 FL (ref 8.1–13.5)
POTASSIUM SERPL-SCNC: 4.2 MMOL/L (ref 3.7–5.3)
RBC # BLD: 2.82 M/UL (ref 4.21–5.77)
SODIUM BLD-SCNC: 129 MMOL/L (ref 135–144)
WBC # BLD: 11.4 K/UL (ref 3.5–11.3)

## 2021-05-23 PROCEDURE — 2060000000 HC ICU INTERMEDIATE R&B

## 2021-05-23 PROCEDURE — 6370000000 HC RX 637 (ALT 250 FOR IP): Performed by: INTERNAL MEDICINE

## 2021-05-23 PROCEDURE — 99232 SBSQ HOSP IP/OBS MODERATE 35: CPT | Performed by: INTERNAL MEDICINE

## 2021-05-23 PROCEDURE — 6360000002 HC RX W HCPCS: Performed by: STUDENT IN AN ORGANIZED HEALTH CARE EDUCATION/TRAINING PROGRAM

## 2021-05-23 PROCEDURE — 6360000002 HC RX W HCPCS: Performed by: NURSE PRACTITIONER

## 2021-05-23 PROCEDURE — 80069 RENAL FUNCTION PANEL: CPT

## 2021-05-23 PROCEDURE — 2580000003 HC RX 258: Performed by: NURSE PRACTITIONER

## 2021-05-23 PROCEDURE — 36415 COLL VENOUS BLD VENIPUNCTURE: CPT

## 2021-05-23 PROCEDURE — 83735 ASSAY OF MAGNESIUM: CPT

## 2021-05-23 PROCEDURE — 6370000000 HC RX 637 (ALT 250 FOR IP): Performed by: NURSE PRACTITIONER

## 2021-05-23 PROCEDURE — 82947 ASSAY GLUCOSE BLOOD QUANT: CPT

## 2021-05-23 PROCEDURE — 94640 AIRWAY INHALATION TREATMENT: CPT

## 2021-05-23 PROCEDURE — 85027 COMPLETE CBC AUTOMATED: CPT

## 2021-05-23 PROCEDURE — 94761 N-INVAS EAR/PLS OXIMETRY MLT: CPT

## 2021-05-23 RX ORDER — ALBUTEROL SULFATE 2.5 MG/3ML
2.5 SOLUTION RESPIRATORY (INHALATION) 2 TIMES DAILY
Status: DISCONTINUED | OUTPATIENT
Start: 2021-05-24 | End: 2021-05-26 | Stop reason: HOSPADM

## 2021-05-23 RX ORDER — FLUTICASONE PROPIONATE 50 MCG
1 SPRAY, SUSPENSION (ML) NASAL DAILY
Status: DISCONTINUED | OUTPATIENT
Start: 2021-05-23 | End: 2021-05-26 | Stop reason: HOSPADM

## 2021-05-23 RX ADMIN — IPRATROPIUM BROMIDE AND ALBUTEROL SULFATE 1 AMPULE: .5; 3 SOLUTION RESPIRATORY (INHALATION) at 08:00

## 2021-05-23 RX ADMIN — HYDRALAZINE HYDROCHLORIDE 100 MG: 50 TABLET, FILM COATED ORAL at 14:21

## 2021-05-23 RX ADMIN — IPRATROPIUM BROMIDE AND ALBUTEROL SULFATE 1 AMPULE: .5; 3 SOLUTION RESPIRATORY (INHALATION) at 16:24

## 2021-05-23 RX ADMIN — PREDNISONE 30 MG: 20 TABLET ORAL at 08:11

## 2021-05-23 RX ADMIN — IPRATROPIUM BROMIDE AND ALBUTEROL SULFATE 1 AMPULE: .5; 3 SOLUTION RESPIRATORY (INHALATION) at 12:39

## 2021-05-23 RX ADMIN — ALBUTEROL SULFATE 2.5 MG: 2.5 SOLUTION RESPIRATORY (INHALATION) at 00:00

## 2021-05-23 RX ADMIN — CLONIDINE HYDROCHLORIDE 0.2 MG: 0.2 TABLET ORAL at 08:16

## 2021-05-23 RX ADMIN — NIFEDIPINE 90 MG: 90 TABLET, EXTENDED RELEASE ORAL at 20:23

## 2021-05-23 RX ADMIN — CLONIDINE HYDROCHLORIDE 0.2 MG: 0.2 TABLET ORAL at 20:22

## 2021-05-23 RX ADMIN — FAMOTIDINE 20 MG: 20 TABLET, FILM COATED ORAL at 20:22

## 2021-05-23 RX ADMIN — LOSARTAN POTASSIUM 50 MG: 50 TABLET, FILM COATED ORAL at 20:23

## 2021-05-23 RX ADMIN — SODIUM BICARBONATE 1300 MG: 648 TABLET ORAL at 20:22

## 2021-05-23 RX ADMIN — FLUTICASONE PROPIONATE 1 SPRAY: 50 SPRAY, METERED NASAL at 11:21

## 2021-05-23 RX ADMIN — GUAIFENESIN 600 MG: 600 TABLET, EXTENDED RELEASE ORAL at 20:22

## 2021-05-23 RX ADMIN — Medication 81 MG: at 08:11

## 2021-05-23 RX ADMIN — CARVEDILOL 25 MG: 25 TABLET, FILM COATED ORAL at 08:16

## 2021-05-23 RX ADMIN — GUAIFENESIN 600 MG: 600 TABLET, EXTENDED RELEASE ORAL at 08:16

## 2021-05-23 RX ADMIN — LORAZEPAM 0.5 MG: 0.5 TABLET ORAL at 01:52

## 2021-05-23 RX ADMIN — INSULIN LISPRO 3 UNITS: 100 INJECTION, SOLUTION INTRAVENOUS; SUBCUTANEOUS at 12:17

## 2021-05-23 RX ADMIN — ENOXAPARIN SODIUM 40 MG: 40 INJECTION, SOLUTION INTRAVENOUS; SUBCUTANEOUS at 08:16

## 2021-05-23 RX ADMIN — CLOPIDOGREL 75 MG: 75 TABLET, FILM COATED ORAL at 08:16

## 2021-05-23 RX ADMIN — HYDRALAZINE HYDROCHLORIDE 100 MG: 50 TABLET, FILM COATED ORAL at 05:01

## 2021-05-23 RX ADMIN — LOSARTAN POTASSIUM 50 MG: 50 TABLET, FILM COATED ORAL at 08:15

## 2021-05-23 RX ADMIN — PRAZOSIN HYDROCHLORIDE 4 MG: 1 CAPSULE ORAL at 08:15

## 2021-05-23 RX ADMIN — ATORVASTATIN CALCIUM 80 MG: 80 TABLET, FILM COATED ORAL at 20:22

## 2021-05-23 RX ADMIN — DOXYCYCLINE HYCLATE 100 MG: 100 TABLET, COATED ORAL at 20:22

## 2021-05-23 RX ADMIN — CARVEDILOL 25 MG: 25 TABLET, FILM COATED ORAL at 16:53

## 2021-05-23 RX ADMIN — DOXYCYCLINE HYCLATE 100 MG: 100 TABLET, COATED ORAL at 08:16

## 2021-05-23 RX ADMIN — PRAZOSIN HYDROCHLORIDE 4 MG: 1 CAPSULE ORAL at 20:23

## 2021-05-23 RX ADMIN — ISOSORBIDE MONONITRATE 60 MG: 60 TABLET ORAL at 08:16

## 2021-05-23 RX ADMIN — SODIUM BICARBONATE 1300 MG: 648 TABLET ORAL at 08:11

## 2021-05-23 RX ADMIN — SODIUM CHLORIDE, PRESERVATIVE FREE 10 ML: 5 INJECTION INTRAVENOUS at 08:17

## 2021-05-23 RX ADMIN — IPRATROPIUM BROMIDE AND ALBUTEROL SULFATE 1 AMPULE: .5; 3 SOLUTION RESPIRATORY (INHALATION) at 20:55

## 2021-05-23 RX ADMIN — CLONIDINE HYDROCHLORIDE 0.2 MG: 0.2 TABLET ORAL at 14:21

## 2021-05-23 RX ADMIN — INSULIN LISPRO 5 UNITS: 100 INJECTION, SOLUTION INTRAVENOUS; SUBCUTANEOUS at 08:28

## 2021-05-23 RX ADMIN — INSULIN GLARGINE 20 UNITS: 100 INJECTION, SOLUTION SUBCUTANEOUS at 20:30

## 2021-05-23 RX ADMIN — SODIUM CHLORIDE, PRESERVATIVE FREE 10 ML: 5 INJECTION INTRAVENOUS at 20:26

## 2021-05-23 RX ADMIN — MONTELUKAST SODIUM 10 MG: 10 TABLET, FILM COATED ORAL at 20:22

## 2021-05-23 RX ADMIN — FAMOTIDINE 20 MG: 20 TABLET, FILM COATED ORAL at 08:10

## 2021-05-23 ASSESSMENT — PAIN SCALES - WONG BAKER
WONGBAKER_NUMERICALRESPONSE: 0
WONGBAKER_NUMERICALRESPONSE: 0

## 2021-05-23 ASSESSMENT — PAIN SCALES - GENERAL
PAINLEVEL_OUTOF10: 0
PAINLEVEL_OUTOF10: 0

## 2021-05-23 NOTE — PLAN OF CARE
Problem: Gas Exchange - Impaired:  Goal: Levels of oxygenation will improve  Description: Levels of oxygenation will improve  5/23/2021 1033 by Harini Gr RCP  Outcome: Ongoing  Note:   PROVIDE ADEQUATE OXYGENATION WITH ACCEPTABLE SP02/ABG'S    [x]  IDENTIFY APPROPRIATE OXYGEN THERAPY  [x]   MONITOR SP02/ABG'S AS NEEDED   [x]   PATIENT EDUCATION AS NEEDED        Problem: RESPIRATORY  Intervention: Respiratory assessment  Note: TASIA ROSEatient Assessment complete. Respiratory failure ([address] [J96.90] . Vitals:    05/23/21 0752   BP: (!) 148/49   Pulse: 64   Resp: 22   Temp: 97.4 °F (36.3 °C)   SpO2: 93%   . Patients home meds are   Prior to Admission medications    Medication Sig Start Date End Date Taking? Authorizing Provider   predniSONE (DELTASONE) 10 MG tablet Please take 4 tablets daily X 3 days then 3 tablets daily X 3 days then 2 tablets daily X 3 days then 1 tablet daily X 3 days. 5/17/21   Panda Doctor, MD   sodium bicarbonate 650 MG tablet Take 2 tablets by mouth 2 times daily 5/13/21 6/12/21  Keanu Blankenship, APRN - NP   insulin glargine Kayley Savin) 100 UNIT/ML injection pen Inject 10 Units into the skin nightly 5/13/21   Keanu Blankenship, APRN - NP   cloNIDine (CATAPRES) 0.2 MG tablet Take 1 tablet by mouth 3 times daily 5/13/21   Keanu Blankenship, APRN - NP   NIFEdipine (PROCARDIA XL) 90 MG extended release tablet Take 1 tablet by mouth nightly 5/13/21   Keanu Blankenship, APRN - NP   glucagon 1 MG injection Inject 1 mg into the muscle See Admin Instructions Follow package directions for low blood sugar. 4/6/21   Nirmala Forts, APRN - CNP   fluticasone [facility]) 50 MCG/ACT nasal spray  2/23/21   Historical Provider, MD   insulin aspart (NOVOLOG FLEXPEN) 100 UNIT/ML injection pen Inject 5 Units into the skin 3 times daily (before meals) Sliding scale 3/16/21   Nirmala Forts, APRN - CNP   nitroGLYCERIN (NITROSTAT) 0.4 MG SL tablet USE 1 TABLET UNDER THE TONGUE UP TO MAXIMUM OF 3 TOTAL DOSES.  IF NO RELIEF AFTER 1 DOSE, CALL 911. 3/5/21   Arthea Spray, APRN - CNP   gabapentin (NEURONTIN) 100 MG capsule TAKE 2 CAPSULES BY MOUTH 3 TIMES DAILY 2/23/21 5/24/21  Arthea Spray, APRN - CNP   QUEtiapine (SEROQUEL) 100 MG tablet TAKE 1 TABLET BY MOUTH NIGHTLY 2/23/21   Arthea Spray, APRN - CNP   losartan (COZAAR) 50 MG tablet TAKE 1 TABLET BY MOUTH 2 TIMES DAILY 2/23/21   Arthea Spray, APRN - CNP   GNP VITAMIN D MAXIMUM STRENGTH 50 MCG (2000 UT) TABS TAKE 1 TABLET BY MOUTH ONCE DAILY 2/23/21   Arthea Spray, APRN - CNP   Raenelle Battles 100-62.5-25 MCG/INH AEPB INHALE ONE PUFF INTO THE LUNGS DAILY 1/27/21   Arthea Spray, APRN - CNP   blood glucose monitor strips Test_4__times daily Diagnosis: 250.0   Diabetes Mellitus_x___Insulin Dependent____Non-Insulin Dependent 1/26/21   Arthea Spray, APRN - CNP   blood glucose monitor kit and supplies Dispense sufficient amount for indicated testing frequency plus additional to accommodate PRN testing needs. Dispense all needed supplies to include: monitor, strips, lancing device, lancets, control solutions, alcohol swabs.  1/26/21   Arthea Spray, APRN - CNP   prazosin (MINIPRESS) 2 MG capsule 2 CAPSULES BY MOUTH (4MG) TWICE DAILY 1/25/21   Arthea Spray, APRN - CNP   COMBIVENT RESPIMAT  MCG/ACT AERS inhaler INHALE 1 PUFF INTO THE LUNGS EVERY 6 HOURS 1/25/21   Arthea Spray, APRN - CNP   hydrALAZINE (APRESOLINE) 100 MG tablet Take 1 tablet by mouth every 8 hours 1/25/21   Arthea Spray, APRN - CNP   aspirin (ASPIR-LOW) 81 MG EC tablet TAKE 1 TABLET BY MOUTH DAILY 1/25/21   Arthea Spray, APRN - CNP   carvedilol (COREG) 25 MG tablet Take 1 tablet by mouth 2 times daily (with meals) 1/25/21   Arthea Spray, APRN - CNP   atorvastatin (LIPITOR) 80 MG tablet Take 1 tablet by mouth daily 1/25/21   Arthea Spray, APRN - CNP   clopidogrel (PLAVIX) 75 MG tablet Take 1 tablet by mouth daily 1/25/21   Joselin Almeida, APRN - CNP isosorbide mononitrate (IMDUR) 60 MG extended release tablet Take 1 tablet by mouth daily 1/5/21   TERRANCE Roth CNP   vitamin D (ERGOCALCIFEROL) 1.25 MG (73935 UT) CAPS capsule Take 1 capsule by mouth once a week 1/5/21   TERRANCE Roth CNP   Lancets MISC Use_4__times daily Diagnisis:250.0  Diabetes Mellitus__x__Insulin Dependent___Non-Insulin Dependent 12/6/20   David Turner MD   ipratropium-albuterol (DUONEB) 0.5-2.5 (3) MG/3ML SOLN nebulizer solution Inhale 3 mLs into the lungs every 6 hours as needed for Shortness of Breath 12/6/20   Elfego Benjamin MD   Nutritional Supplements (GLUCERNA CARBSTEADY) LIQD Take 1 Can by mouth 3 times daily 6/16/20   TERRANCE Roth CNP   COMFORT EZ PEN NEEDLES 31G X 8 MM MISC USE AS DIRECTED 4/14/20   TERRANCE Roth CNP   . Assessment     Assessment     Copy Pasted HP note from MD,     Brief History:      Particlalit Mcrae is a 73 y.o. M with history of ESRD on HD, COPD who presented with shortness of breath. States symptoms going on for the past 2 days, but has been having intermittent shortness of breath going on for the past 2 years. Having worsening dyspnea with exertion. Also having sputum production. Was recently admitted 2 days prior for COPD exacerbation, d/c on steroids and antibiotics at that time. States he went home and developed worsening shortness of breath causing him to come back to ER. Has significant secondhand smoke exposure at home. 05/22, Readmit for exacerbation of copd. Remains to have some wheezing, MILLER which has been ongoing and worsening. Somewhat improved. Continues to be on Low Flow Oxgyen. Plan for home evaluation upon discharge. On Steroids. Home meds reviewed. Will continue to treat and asses by Aerosol Protocol. Pulmonary consulted. 05/23, remains to have minimal  MILLER. Complains of congestion with difficulty in secretions.   Given Acapella Device but unable to activate because of no expiratory airflow. Changed Solumedrol to Prednisone daily per  MD because of BS issues. Dr. Zeferino Baird note read. Will increase frequency of treatments.  Place on Bipap at HS to let respiratory muscles rest.         RR 26  Breath Sounds: diminished, wheeze      Bronchodilator assessment at level  4      [x]    Bronchodilator Assessment  BRONCHODILATOR ASSESSMENT SCORE  Score 0 1 2 3 4 5   Breath Sounds   []  Patient Baseline []  No Wheeze good aeration []  Faint, scattered wheezing, good aeration []  Expiratory Wheezing and or moderately diminished [x]  Insp/Exp wheeze and/or very diminished []  Insp/Exp and/ or marked distress   Respiratory Rate   []  Patient Baseline []  Less than 20 []  Less than 20 []  20-25 [x]  Greater than 25 []  Greater than 25   Peak flow % of Pred or PB [x]  NA   []  Greater than 90%  []  81-90% []  71-80% []  Less than or equal to 70%  or unable to perform []  Unable due to Respiratory Distress   Dyspnea re []  Patient Baseline []  No SOB []  No SOB []  SOB on exertion [x]  SOB min activity []  At rest/acute   e FEV% Predicted       [x]  NA []  Above 69%  []  Unable []  Above 60-69%  []  Unable []  Above 50-59%  []  Unable []  Above 35-49%  []  Unable []  Less than 35%  []  Unable              Problem: RESPIRATORY  Intervention: Administer treatments as ordered  Note: BRONCHOSPASM/BRONCHOCONSTRICTION     [x]         IMPROVE AERATION/BREATH SOUNDS  [x]   ADMINISTER BRONCHODILATOR THERAPY AS APPROPRIATE  [x]   ASSESS BREATH SOUNDS  [x]   IMPLEMENT AEROSOL/MDI PROTOCOL  [x]   PATIENT EDUCATION AS NEEDED       Problem: Impaired respiratory status  Intervention: Biphasic positive airway pressure (BIPAP)  Note: NON INVASIVE VENTILATION  PROVIDE OPTIMAL VENTILATION/ACCEPTABLE SP02  IMPLEMENT NON INVASIVE VENTILATION PROTOCOL  ASSESSMENT SKIN INTEGRITY  PATIENT EDUCATION AS NEEDED  BIPAP AS NEEDED

## 2021-05-23 NOTE — PLAN OF CARE
Problem: Falls - Risk of:  Goal: Will remain free from falls  Description: Will remain free from falls  5/23/2021 0354 by Ignacio Suárez RN  Outcome: Ongoing  5/22/2021 1521 by Nahun Will RN  Outcome: Ongoing  Goal: Absence of physical injury  Description: Absence of physical injury  5/23/2021 0354 by Ignacio Suárez RN  Outcome: Ongoing  5/22/2021 1521 by Nahun Will RN  Outcome: Ongoing     Problem: Pain:  Goal: Pain level will decrease  Description: Pain level will decrease  5/23/2021 0354 by Ignacio Suárez RN  Outcome: Ongoing  5/22/2021 1521 by Nahun Will RN  Outcome: Ongoing  Goal: Control of acute pain  Description: Control of acute pain  5/23/2021 0354 by Ignacio Suárez RN  Outcome: Ongoing  5/22/2021 1521 by Nahun Will RN  Outcome: Ongoing  Goal: Control of chronic pain  Description: Control of chronic pain  5/23/2021 0354 by Ignacio Suárez RN  Outcome: Ongoing  5/22/2021 1521 by Nahun Will RN  Outcome: Ongoing     Problem: Gas Exchange - Impaired:  Goal: Levels of oxygenation will improve  Description: Levels of oxygenation will improve  5/23/2021 0354 by Ignacio Suárez RN  Outcome: Ongoing  5/22/2021 1521 by Nahun Will RN  Outcome: Ongoing

## 2021-05-23 NOTE — PROGRESS NOTES
Patient - Feliciano Gastelum   MRN -  [id number]   Acct # - [de-identified]   - 1955        Date of evaluation -  2021    REASON FOR THE CONSULTATION:  COPD   HISTORY OF PRESENT ILLNESS:    Feliciano Gastelum is a 73y.o. year old male admitted because of worsening shortness of breath. He was discharged 2 days prior to hospital admission and had been started on hemodialysis. According to patient he did go to his first dialysis session. He continues to feel worsening shortness of breath and sputum production without hemoptysis. He does not smoke but has secondhand smoke exposure. He has multiple inhalers at home trilogy: Combivent.   He is frustrated because of hyperglycemia      2021   Subjective  No acute events       Immunization   Immunization History   Administered Date(s) Administered    COVID-19, Moderna, PF, 100mcg/0.5mL 2021    Influenza 2012    Influenza Vaccine, unspecified formulation 2012    Influenza Virus Vaccine 10/15/2013, 10/14/2014, 10/02/2015, 2016, 10/30/2017, 10/21/2019    Influenza, Quadv, 6 mo and older, IM (Fluzone, Flulaval) 10/30/2017    Influenza, Quadv, IM, (6 mo and older Fluzone, Flulaval, Fluarix and 3 yrs and older Afluria) 2016, 10/21/2019    Pneumococcal Conjugate 13-valent ([id number]) 2021    Pneumococcal Polysaccharide ([id number]) 10/02/2015    Tdap (Boostrix, Adacel) 2013    Zoster Live (Zostavax) 2017        Pneumococcal Vaccine     [] Up to date    [] Indicated   [] Refused  [] Contraindicated       Influenza Vaccine   [] Up to date    [] Indicated   [] Refused  [] Contraindicated            Pulmonary Rehab     [] completed    [] Indicated   [] Refused  [] Contraindicated       PAST MEDICAL HISTORY:       Diagnosis Date    Asthma     mod persistent    CAD in native artery, nonobstructive     Carotid stenosis, left 6/10/2013    Carpal tunnel syndrome     RIGHT    Cerebrovascular disease 2018    Chronic kidney disease 6/10/2013    COPD (chronic obstructive pulmonary disease) (HCC)     Diabetes mellitus (HCC)     insulin dependent    History of colon polyps     History of weakness of extremity     right sided    HTN (hypertension)     Hyperlipidemia     Lung, cysts, congenital     PTSD (post-traumatic stress disorder)     PTSD (post-traumatic stress disorder)     TIA (transient ischemic attack)     Type II or unspecified type diabetes mellitus without mention of complication, not stated as uncontrolled          Family History:       Problem Relation Age of Onset    Cancer Mother     Heart Disease Father        SURGICAL HISTORY:   Past Surgical History:   Procedure Laterality Date    CAROTID ENDARTERECTOMY Left 7-2013    COLONOSCOPY      HERNIA REPAIR      IR TUNNELED CATHETER PLACEMENT GREATER THAN 5 YEARS  5/11/2021    IR TUNNELED CATHETER PLACEMENT GREATER THAN 5 YEARS 5/11/2021 Brielle Knight MD [facility]    OR COLSC FLX W/RMVL OF TUMOR POLYP LESION SNARE TQ N/A 6/27/2017    COLONOSCOPY POLYPECTOMY SNARE/ hot performed [facility] Simone Powell DO at [facility] V[facility]ULAR SURGERY Left 2015    carotid stent, dr Yuki Zapata:      TOBACCO:   reports that he quit smoking about 6 years ago. His smoking use included cigarettes. He has a 17.50 pack-year smoking history. He has never used smokeless tobacco.  ETOH:   reports no history of alcohol use. ALLERGIES:    Allergies   Allergen Reactions    Lisinopril      cough    Ace Inhibitors      coughing    Pcn [Penicillins]        Home Meds:   Prior to Admission medications    Medication Sig Start Date End Date Taking? Authorizing Provider   predniSONE (DELTASONE) 10 MG tablet Please take 4 tablets daily X 3 days then 3 tablets daily X 3 days then 2 tablets daily X 3 days then 1 tablet daily X 3 days.  5/17/21   Kevin Moreland MD   sodium bicarbonate 650 MG tablet Take 2 tablets by mouth 2 times daily 5/13/21 6/12/21  TERRANCE Velasco NP   insulin glargine [facility]) 100 UNIT/ML injection pen Inject 10 Units into the skin nightly 5/13/21   TERRANCE Velasco NP   cloNIDine (CATAPRES) 0.2 MG tablet Take 1 tablet by mouth 3 times daily 5/13/21   TERRANCE Velasco NP   NIFEdipine (PROCARDIA XL) 90 MG extended release tablet Take 1 tablet by mouth nightly 5/13/21   TERRANCE Velasco NP   glucagon 1 MG injection Inject 1 mg into the muscle See Admin Instructions Follow package directions for low blood sugar. 4/6/21   Juanell Schilder, APRN - CNP   fluticasone Parnell Octave) 50 MCG/ACT nasal spray  2/23/21   Historical Provider, MD   insulin aspart (NOVOLOG FLEXPEN) 100 UNIT/ML injection pen Inject 5 Units into the skin 3 times daily (before meals) Sliding scale 3/16/21   Juanell Schilder, APRN - CNP   nitroGLYCERIN (NITROSTAT) 0.4 MG SL tablet USE 1 TABLET UNDER THE TONGUE UP TO MAXIMUM OF 3 TOTAL DOSES. IF NO RELIEF AFTER 1 DOSE, CALL 911. 3/5/21   Juanell Schilder, APRN - CNP   gabapentin (NEURONTIN) 100 MG capsule TAKE 2 CAPSULES BY MOUTH 3 TIMES DAILY 2/23/21 5/24/21  Juanell Schilder, APRN - CNP   QUEtiapine (SEROQUEL) 100 MG tablet TAKE 1 TABLET BY MOUTH NIGHTLY 2/23/21   Juanell Schilder, APRN - CNP   losartan (COZAAR) 50 MG tablet TAKE 1 TABLET BY MOUTH 2 TIMES DAILY 2/23/21   Juanell Schilder, APRN - CNP   GNP VITAMIN D MAXIMUM STRENGTH 50 MCG (2000 UT) TABS TAKE 1 TABLET BY MOUTH ONCE DAILY 2/23/21   Juanell Schilder, APRN - CNP Karey Nares 100-62.5-25 MCG/INH AEPB INHALE ONE PUFF INTO THE LUNGS DAILY 1/27/21   Juanell Schilder, APRN - CNP   blood glucose monitor strips Test_4__times daily Diagnosis: 250.0   Diabetes Mellitus_x___Insulin Dependent____Non-Insulin Dependent 1/26/21   Juanell Schilder, APRN - CNP   blood glucose monitor kit and supplies Dispense sufficient amount for indicated testing frequency plus additional to accommodate PRN testing needs. JVP elevation    Lungs - AP diameter of chest increased. Thoracic expansion and diaphragmatic excursion diminished. BS diminished and expiratory phase prolonged. No dullness to percussion or tenderness to palpation. No bronchial breath sounds . CVS- S1, S2 regular. No S3 no S4, no murmurs    Abdomen-nontender, nondistended. Bowel sounds are present. No organomegaly    Lower extremity-no edema    Upper extremity-no edema    Neurological-grossly normal cranial nerves. No overt motor deficit             CBC:   Recent Labs     05/22/21  0804 05/23/21  0925   WBC 13.8* 11.4*   HGB 9.7* 8.8*    352     BMP:    Recent Labs     05/21/21  1003 05/22/21  0804 05/23/21  0925   * 132* 129*   K 4.7 4.9 4.2   CL 95* 94* 93*   CO2 23 23 25   BUN 66* 95* 59*   CREATININE 3.44* 4.17* 3.04*   GLUCOSE 386* 347* 348*     Hepatic: No results for input(s): AST, ALT, ALB, BILITOT, ALKPHOS in the last 72 hours. Amylase: No results found for: AMYLASE  Lipase: No results found for: LIPASE  Troponin: No results for input(s): TROPONINI in the last 72 hours. BNP: No results for input(s): BNP in the last 72 hours. Lipids: No results for input(s): CHOL, HDL in the last 72 hours. Invalid input(s): LDLCALCU  ABGs: No results found for: PHART, PO2ART, [id number]  INR: No results for input(s): INR in the last 72 hours. Thyroid:   Lab Results   Component Value Date    TSH 2.91 05/15/2020     Urinalysis: No results for input(s): BACTERIA, BLOODU, CLARITYU, COLORU, PHUR, PROTEINU, RBCUA, SPECGRAV, BILIRUBINUR, NITRU, WBCUA, LEUKOCYTESUR, GLUCOSEU in the last 72 hours. XR CHEST PORTABLE    Result Date: 5/20/2021  No acute cardiopulmonary abnormality.      XR CHEST PORTABLE    Result Date: 5/20/2021  Unremarkable single upright portable AP view of the chest.     XR CHEST PORTABLE    Result Date: 5/19/2021  Unremarkable single upright portable AP view of the chest.     XR CHEST PORTABLE    Result Date: 5/16/2021  No significant change in chest findings.         IMPRESSION:    Patient Active Problem List   Diagnosis Code    Cigarette nicotine dependence without complication Q82.755    Carpal tunnel syndrome on right G56.01    Colon polyps K63.5    Carotid stenosis, left s/p Endarterectomy I65.22    Mixed simple and mucopurulent chronic bronchitis (HCC) J41.8    Pure hypercholesterolemia E78.00    CKD (chronic kidney disease) stage 4, GFR 15-29 ml/min ([facility]) N18.4    CKD (chronic kidney disease) stage 3, GFR 30-59 ml/min ([facility]) N18.30    Dyspnea and respiratory abnormalities R06.00, R06.89    PTSD (post-traumatic stress disorder) F43.10    Transient cerebral ischemia G45.9    Essential hypertension I10    DM type 2, uncontrolled, with neuropathy ([address] E11.40, E11.65    COPD with acute exacerbation ([address] J44.1    Cerebral vascular disease I67.9    Primary insomnia F51.01    Hypertensive crisis I16.9    Non-obstructive Coronary artery disease of native artery of native heart with stable angina pectoris ([facility]) I25.118    Hyperparathyroidism ([facility]) E21.3    Hypovitaminosis D E55.9    Acute kidney injury superimposed on CKD ([facility])-currently dialysis patient N17.9, N18.9    Coronary artery disease with exertional angina ([facility]) I25.118    COPD exacerbation ([facility]) J44.1    Leg swelling M79.89    Anemia D64.9    Hypoalbuminemia E88.09    DKA, type 2, not at goal [facility]) E11.10    Inflammation of right sacroiliac joint ([address] M46.1    Malignant neoplasm of colon ([address] C18.9    ESRD (end stage renal disease) ([address] N18.6    Bilateral carotid artery occlusion I65.23    Seizure ([address] R56.9    Chronic heart failure with preserved ejection fraction (HCC) I50.32    Left ventricular hypertrophy I51.7    SOB (shortness of breath) R06.02    Uncontrolled type 2 diabetes mellitus with hyperglycemia ([facility]) E11.65    Acute respiratory failure with hypoxia ([facility]) J96.01        :                PLAN:      Continue prednisone for 4 days  DuoNeb  Hemodialysis as per nephrology            I hope this updates you on my evaluation and clinical thinking. Thank you for allowing me to participate in his care.      Sincerely,      Electronically signed by Ratna Ojeda MD on 5/23/2021 at 2:24 PM

## 2021-05-23 NOTE — PROGRESS NOTES
Nephrology Progress Note        Subjective: Both respiratory status and edema improving, as is blood pressure control. Frustrated and feels that if he could time his meals on his own his blood sugars would be better controlled. No CP.    Feels congested but able to tolerate RA, less use of accessory muscles today, he was started on montelukast yesterday,  dyspnea is worse with getting out of bed    Tolerated dialysis well yesterday, did require breathing tx near the end, Net Removal of 2550mL      Objective:  CURRENT TEMPERATURE:  Temp: 97.4 °F (36.3 °C)  MAXIMUM TEMPERATURE OVER 24HRS:  Temp (24hrs), Av.8 °F (36.6 °C), Min:97.4 °F (36.3 °C), Max:98.8 °F (37.1 °C)    CURRENT RESPIRATORY RATE:  Resp: 22  CURRENT PULSE:  Pulse: 64  CURRENT BLOOD PRESSURE:  BP: (!) 148/49  24HR BLOOD PRESSURE RANGE:  Systolic (98JRW), LWA:035 , Min:126 , YTH:984   ; Diastolic (83TMR), KTL:29, Min:49, Max:94    24HR INTAKE/OUTPUT:      Intake/Output Summary (Last 24 hours) at 2021 4337  Last data filed at 2021  Gross per 24 hour   Intake 120 ml   Output 2970 ml   Net -2850 ml     Weight:      Physical Exam:  General appearance:Awake, alert, work of breathing better than yesterday, congested, but doing fairly well on RA  skin: warm and dry, no rash or erythema  Eyes: conjunctivae pale and sclera anicteric  ENT: Moist mucous membranes  Neck: + accessory muscle use, midline trachea  Pulmonary: Markedly decreased air entry bilaterally, course  Cardiovascular: Regular rate and rhythm with positive S1 and S2 and no rubs  Abdomen: Soft and ND, NT, +BS  Extremities: trace-+1 BLE    Access:  previous permacath    Current Medications:    fluticasone (FLONASE) 50 MCG/ACT nasal spray 1 spray, Daily  montelukast (SINGULAIR) tablet 10 mg, Nightly  predniSONE (DELTASONE) tablet 30 mg, Daily  insulin lispro (HUMALOG) injection vial 0-18 Units, TID WC  insulin lispro (HUMALOG) injection vial 0-9 Units, Nightly  guaiFENesin BMP:   Recent Labs     05/21/21  1003 05/22/21  0804   * 132*   K 4.7 4.9   CL 95* 94*   CO2 23 23   BUN 66* 95*   CREATININE 3.44* 4.17*   GLUCOSE 386* 347*   CALCIUM 7.8* 8.0*        Phosphorus:  No results for input(s): PHOS in the last 72 hours. Magnesium:   No results for input(s): MG in the last 72 hours. Albumin: No results for input(s): LABALBU in the last 72 hours. Radiology:  Reviewed as available. Assessment:  1. Dialysis dependent acute kidney injury with high likelihood of ending up being ESRD in the future  2. Dyspnea secondary to COPD exacerbation and also in the setting of fluid overload, appears to be in status asthmaticus  3. Total body volume overload - improving with dialysis. 4.  Essential hypertension with improving control. Plan:  1. Continue medications as ordered including Cozaar 50mg BID   2. No dialysis needs today, daily assessment. 3.  Will continue to challenge dry weight to improve fluid overload. 4.  Recommend to optimize blood sugars to help with the patient's thirst mechanism and continue to restrict patient's fluid intake to 1500cc/day. 5.  Following. Will discuss with Dr. Roldan Delgadillo. Please do not hesitate to call with questions. Electronically signed by HENOK Hinkle on 5/23/2021 at 9:16 AM     Continues to have problems with dyspnea at rest and exertion. Tells me overall slightly better. Was put on montelukast yesterday. Also has been using incentive spirometer which is has been helping him. Continues breathing treatments, prednisone continues. Dry weight has been challenged significantly, 2.7 L removed yesterday, now weight is down to 64 kg. Blood pressures fluctuate based on catecholamine surges mostly stable  On examination some use of accessory muscles, still not able to complete sentences, decreased air entry bilaterally, lower extremity edema minimal  Plan  1. Hemodialysis on Tuesday  2.   Continue antihypertensives including Cozaar  3. Fluid restriction 1.5 L a day  4. Outpatient dialysis at [facility] unit Tuesday Thursday Saturday as previously    5. We will follow    Attending Physician Statement  I have discussed the care of Tamir Becerra, including pertinent history and exam findings with the resident/fellow. I have reviewed the key elements of all parts of the encounter with the resident/fellow. I have seen and examined the patient with the resident/fellow. I agree with the assessment and plan and status of the problem list as documented.       .  Electronically signed by Candice Mosley MD on 5/23/2021 at 6:00 PM

## 2021-05-23 NOTE — PROGRESS NOTES
[x]  NON INVASIVE VENTILATION  [x]  PROVIDE OPTIMAL VENTILATION/ACCEPTABLE SP02  [x]  IMPLEMENT NON INVASIVE VENTILATION PROTOCOL  [x]  ASSESSMENT SKIN INTEGRITY  [x]  PATIENT EDUCATION AS NEEDED  [x]  BIPAP AS NEEDED

## 2021-05-23 NOTE — PROGRESS NOTES
BRONCHOSPASM/BRONCHOCONSTRICTION   [x]  IMPROVE AERATION/BREATH SOUNDS  [x]   ADMINISTER BRONCHODILATOR THERAPY AS APPROPRIATE  [x]   ASSESS BREATH SOUNDS  [x]   IMPLEMENT AEROSOL/MDI PROTOCOL  [x]   PATIENT EDUCATION AS NEEDED    NONINVASIVE VENTILATION    PROVIDE OPTIMAL VENTILATION/ACCEPTABLE SPO2   IMPLEMENT NONINVASIVE VENTILATION PROTOCOL   MAINTAIN ACCEPTABLE SPO2   ASSESS SKIN INTEGRITY/BREAKDOWN SCORE   PATIENT EDUCATION AS NEEDED   BIPAP AS NEEDED

## 2021-05-23 NOTE — PROGRESS NOTES
[facility]  Office: [address], DO, Jolene Dines, DO, Cherri Mireille, DO, Fiorella Codding Blood, DO, Farhan Arteaga MD, Emily Rose MD, Velvet Boyle MD, Mane Turner MD, Daniel Buchanan MD, Madelaine Garcia MD, Fani Rodriguez MD, Nesha Waters MD, Lee Swanson, DO, Dominga Rodas MD, Elicia Nelson DO, Carmela Flower MD,  Pura Ardon, DO, Oma Richard MD, Skylar Vazquez MD, Mina Acevedo MD, Clifford Salvador MD, Trung Macias, Dario Scrape, CNP, Terry Amas, CNP, Laura Pulse, CNS, Fred Halsted, CNP, Lesli Fonseca, CNP, Scott Dinning, CNP, Pilar Pulse, CNP, Ce Alicia, CNP, Dominique Curran PA-C, Alexander , [facility], Talib Moses, CNP, Saida Hawk, CNP, Ruslan Rai, CNP, Chad Parsons, CNP, Ramandeep Whitlock, CNP, Judith Quiroz, [address] [place] [address]    Progress Note    5/23/2021    10:44 AM    Name:   Joel Baldwin  MRN:     [id number]     Acct:      [de-identified]   Room:   2012/2012-01  IP Day:  4  Admit Date:  5/19/2021  5:05 AM    PCP:   TERRANCE Burton CNP  Code Status:  Full Code    Subjective:     C/C:   Chief Complaint   Patient presents with    Respiratory Distress     Interval History Status: not changed. Patient still frustrated regarding hyperglycemia but feels better today. Prednisone was added yesterday and Solu-Medrol was discontinued. Blood sugars have decreased since yesterday    Brief History:     Joel Baldwin is a 73 y.o. M with history of ESRD on HD, COPD who presented with shortness of breath. States symptoms going on for the past 2 days, but has been having intermittent shortness of breath going on for the past 2 years. Having worsening dyspnea with exertion. Also having sputum production. Was recently admitted 2 days prior for COPD exacerbation, d/c on steroids and antibiotics at that time.  States he went home and developed worsening shortness of breath causing him to come back to ER. Has significant secondhand smoke exposure at home. Review of Systems:     Constitutional:  negative for chills, fevers, sweats  Respiratory:  Positive for sob   Cardiovascular:  negative for chest pain, chest pressure/discomfort  Gastrointestinal:  negative for abdominal pain, constipation, diarrhea, nausea, vomiting  Neurological:  negative for dizziness, headache    Medications: Allergies:     Allergies   Allergen Reactions    Lisinopril      cough    Ace Inhibitors      coughing    Pcn [Penicillins]        Current Meds:   Scheduled Meds:    fluticasone  1 spray Each Nostril Daily    [START ON 5/24/2021] albuterol  2.5 mg Nebulization BID    montelukast  10 mg Oral Nightly    predniSONE  30 mg Oral Daily    insulin lispro  0-18 Units Subcutaneous TID WC    insulin lispro  0-9 Units Subcutaneous Nightly    guaiFENesin  600 mg Oral BID    insulin lispro  3 Units Subcutaneous Q8H    insulin glargine  20 Units Subcutaneous Nightly    doxycycline hyclate  100 mg Oral BID    aspirin  81 mg Oral Daily    atorvastatin  80 mg Oral Daily    carvedilol  25 mg Oral BID WC    cloNIDine  0.2 mg Oral TID    clopidogrel  75 mg Oral Daily    hydrALAZINE  100 mg Oral 3 times per day    isosorbide mononitrate  60 mg Oral Daily    losartan  50 mg Oral BID    NIFEdipine  90 mg Oral Nightly    prazosin  4 mg Oral BID    QUEtiapine  100 mg Oral Nightly    sodium bicarbonate  1,300 mg Oral BID    sodium chloride flush  5-40 mL Intravenous 2 times per day    famotidine  20 mg Oral BID    enoxaparin  40 mg Subcutaneous Daily    bumetanide  2 mg Intravenous Once    ipratropium-albuterol  1 ampule Inhalation 4x daily     Continuous Infusions:    dextrose      sodium chloride       PRN Meds: nitroGLYCERIN, heparin (porcine), heparin (porcine), albuterol, glucose, dextrose, glucagon (rDNA), dextrose, sodium chloride flush, sodium chloride, promethazine **OR** ondansetron, nicotine, polyethylene glycol, acetaminophen **OR** acetaminophen    Data:     Past Medical History:   has a past medical history of Asthma, CAD in native artery, nonobstructive, Carotid stenosis, left, Carpal tunnel syndrome, Cerebrovascular disease, Chronic kidney disease, COPD (chronic obstructive pulmonary disease) ([facility] [address] Diabetes mellitus ([facility] History of colon polyps, History of weakness of extremity, HTN (hypertension), Hyperlipidemia, Lung, cysts, congenital, PTSD (post-traumatic stress disorder), PTSD (post-traumatic stress disorder), TIA (transient ischemic attack), and Type II or unspecified type diabetes mellitus without mention of complication, not stated as uncontrolled. Social History:   reports that he quit smoking about 6 years ago. His smoking use included cigarettes. He has a 17.50 pack-year smoking history. He has never used smokeless tobacco. He reports that he does not drink alcohol and does not use drugs. Family History:   Family History   Problem Relation Age of Onset    Cancer Mother     Heart Disease Father        Vitals:  BP (!) 148/49   Pulse 64   Temp 97.4 °F (36.3 °C) (Oral)   Resp 22   Wt 142 lb 12.8 oz (64.8 kg)   SpO2 93%   BMI 23.05 kg/m²   Temp (24hrs), Av.8 °F (36.6 °C), Min:97.4 °F (36.3 °C), Max:98.8 °F (37.1 °C)    Recent Labs     21  1918 21  2144 21  0459 21  0750   POCGLU 189* 173* 250* 285*       I/O (24Hr):     Intake/Output Summary (Last 24 hours) at 2021 1044  Last data filed at 2021 205  Gross per 24 hour   Intake 120 ml   Output 2970 ml   Net -2850 ml       Labs:  Hematology:  Recent Labs     21  0804 21  0925   WBC 13.8* 11.4*   RBC 3.04* 2.82*   HGB 9.7* 8.8*   HCT 30.8* 27.9*   .3 98.9   MCH 31.9 31.2   MCHC 31.5 31.5   RDW 13.2 12.8    352   MPV 10.3 10.0     Chemistry:  Recent Labs     21  1003 21  0804 21  0925   * 132* 129*   K 4.7 4.9 4.2   CL 95* 94* 93* CO2 23 23 25   GLUCOSE 386* 347* 348*   BUN 66* 95* 59*   CREATININE 3.44* 4.17* 3.04*   MG  --   --  1.5*   ANIONGAP 15 15 11   LABGLOM 18* 14* 21*   GFRAA 22* 17* 25*   CALCIUM 7.8* 8.0* 7.6*   PHOS  --   --  4.8*     Recent Labs     05/22/21  1125 05/22/21  1421 05/22/21  1918 05/22/21  2144 05/23/21  0459 05/23/21  0750 05/23/21  0925   LABALBU  --   --   --   --   --   --  2.6*   POCGLU 295* 277* 189* 173* 250* 285*  --      ABG:  Lab Results   Component Value Date    POCPH 7.39 06/17/2013    POCPCO2 46 06/17/2013    POCPO2 288 06/17/2013    POCHCO3 27.8 06/17/2013    NBEA NOT REPORTED 06/17/2013    PBEA 3 06/17/2013    HUS9SWO 29 06/17/2013    UTTC7RDV 100 06/17/2013    FIO2 NOT REPORTED 05/15/2021     Lab Results   Component Value Date/Time    SPECIAL 2ML L FR 12/03/2020 11:24 AM     Lab Results   Component Value Date/Time    CULTURE NO GROWTH 6 DAYS 12/03/2020 11:24 AM       Radiology:  XR CHEST PORTABLE    Result Date: 5/20/2021  Unremarkable single upright portable AP view of the chest.     XR CHEST PORTABLE    Result Date: 5/19/2021  Unremarkable single upright portable AP view of the chest.     XR CHEST PORTABLE    Result Date: 5/16/2021  No significant change in chest findings. XR CHEST PORTABLE    Result Date: 5/15/2021  Findings compatible with mild interstitial edema. Dialysis catheter terminates at the distal SVC.        Physical Examination:        General appearance:  alert, on BIPAP  Mental Status:  oriented to person, place and time and normal affect  Lungs:  Diffuse wheezing bilaterally   Heart:  regular rate and rhythm  Abdomen:  soft, nontender, nondistended, normal bowel sounds  Extremities:  no edema, redness, tenderness in the calves  Skin:  no gross lesions, rashes, induration    Assessment:        Hospital Problems         Last Modified POA    * (Principal) COPD exacerbation ([address] 5/19/2021 Yes    Carotid stenosis, left s/p Endarterectomy 5/19/2021 Yes    Essential hypertension 5/19/2021 Yes    ESRD (end stage renal disease) ([facility] [address] 5/19/2021 Yes    Chronic heart failure with preserved ejection fraction ([facility] [address] 5/19/2021 Yes    Uncontrolled type 2 diabetes mellitus with hyperglycemia ([facility] [address] 5/19/2021 Yes    Acute respiratory failure with hypoxia ([facility] [address] 5/21/2021 Yes          Plan:        Acute exacerbation COPD -to new prednisone and DuoNeb. Pulmonary following, added montelukast yesterday. Continue doxycycline, monitor for additional day and possible discharge tomorrow if oxygen requirements improved  Type 2 diabetes -patient still refusing to comply with our recommendations for glucose control  Essential hypertension  End-stage renal disease on hemodialysis-HD today per nephrology  Acute respiratory failure with hypoxia-patient not requiring oxygen at home. Wean as tolerated  Discharge Plan: Continue current plan, discharge tomorrow if breathing improved.   Wean oxygen as tolerated    Nat Lam DO  5/23/2021  10:44 AM

## 2021-05-23 NOTE — PROGRESS NOTES
Dialysis Post Treatment Note  Vitals:    05/22/21 2100   BP:    Pulse:    Resp: 21   Temp:    SpO2:      BP:  188/76  Pulse:  55  Resp: 21  Temp: 97.5 (36.4)    Pre-Weight = 67.9 kg  Post-weight = Weight: 143 lb 11.8 oz (65.2 kg)  Total Liters Processed = Total Liters Processed (l/min): 81 l/min  Rinseback Volume (mL) = Rinseback Volume (ml): 300 ml  Net Removal (mL) = NET Removed (ml): 2550 ml  Patient's dry weight=  Type of access used= Right perm cath  Length of treatment=210 mins    Pt tolerated tx well; recv'ed breathing tx towards the end of H tx with some relief obtained;  mid tx; right perm cath drsg changed via sterile tech; no acute distress noted pre, during or post tx; pt transported back to room 2012; rept given to nurse Tj Price.

## 2021-05-24 ENCOUNTER — APPOINTMENT (OUTPATIENT)
Dept: CT IMAGING | Age: 66
DRG: 280 | End: 2021-05-24
Payer: COMMERCIAL

## 2021-05-24 LAB
ALBUMIN SERPL-MCNC: 2.4 G/DL (ref 3.5–5.2)
ANION GAP SERPL CALCULATED.3IONS-SCNC: 11 MMOL/L (ref 9–17)
BUN BLDV-MCNC: 77 MG/DL (ref 8–23)
BUN/CREAT BLD: ABNORMAL (ref 9–20)
CALCIUM SERPL-MCNC: 7.5 MG/DL (ref 8.6–10.4)
CHLORIDE BLD-SCNC: 96 MMOL/L (ref 98–107)
CO2: 25 MMOL/L (ref 20–31)
CREAT SERPL-MCNC: 3.62 MG/DL (ref 0.7–1.2)
GFR AFRICAN AMERICAN: 21 ML/MIN
GFR NON-AFRICAN AMERICAN: 17 ML/MIN
GFR SERPL CREATININE-BSD FRML MDRD: ABNORMAL ML/MIN/{1.73_M2}
GFR SERPL CREATININE-BSD FRML MDRD: ABNORMAL ML/MIN/{1.73_M2}
GLUCOSE BLD-MCNC: 113 MG/DL (ref 75–110)
GLUCOSE BLD-MCNC: 158 MG/DL (ref 75–110)
GLUCOSE BLD-MCNC: 278 MG/DL (ref 75–110)
GLUCOSE BLD-MCNC: 292 MG/DL (ref 75–110)
GLUCOSE BLD-MCNC: 298 MG/DL (ref 75–110)
GLUCOSE BLD-MCNC: 370 MG/DL (ref 75–110)
GLUCOSE BLD-MCNC: 379 MG/DL (ref 75–110)
GLUCOSE BLD-MCNC: 412 MG/DL (ref 70–99)
HCT VFR BLD CALC: 27.2 % (ref 40.7–50.3)
HEMOGLOBIN: 8.7 G/DL (ref 13–17)
IGA: 245 MG/DL (ref 70–400)
IGE: 278 IU/ML
IGG: 404 MG/DL (ref 700–1600)
IGM: 89 MG/DL (ref 40–230)
MAGNESIUM: 1.6 MG/DL (ref 1.6–2.6)
MCH RBC QN AUTO: 31.6 PG (ref 25.2–33.5)
MCHC RBC AUTO-ENTMCNC: 32 G/DL (ref 28.4–34.8)
MCV RBC AUTO: 98.9 FL (ref 82.6–102.9)
NRBC AUTOMATED: 0 PER 100 WBC
PDW BLD-RTO: 12.9 % (ref 11.8–14.4)
PHOSPHORUS: 5.2 MG/DL (ref 2.5–4.5)
PLATELET # BLD: 333 K/UL (ref 138–453)
PMV BLD AUTO: 10.2 FL (ref 8.1–13.5)
POTASSIUM SERPL-SCNC: 3.9 MMOL/L (ref 3.7–5.3)
RBC # BLD: 2.75 M/UL (ref 4.21–5.77)
SODIUM BLD-SCNC: 132 MMOL/L (ref 135–144)
WBC # BLD: 10.1 K/UL (ref 3.5–11.3)

## 2021-05-24 PROCEDURE — 70486 CT MAXILLOFACIAL W/O DYE: CPT

## 2021-05-24 PROCEDURE — 76937 US GUIDE VASCULAR ACCESS: CPT

## 2021-05-24 PROCEDURE — 82785 ASSAY OF IGE: CPT

## 2021-05-24 PROCEDURE — 85027 COMPLETE CBC AUTOMATED: CPT

## 2021-05-24 PROCEDURE — 2060000000 HC ICU INTERMEDIATE R&B

## 2021-05-24 PROCEDURE — 36415 COLL VENOUS BLD VENIPUNCTURE: CPT

## 2021-05-24 PROCEDURE — 99232 SBSQ HOSP IP/OBS MODERATE 35: CPT | Performed by: INTERNAL MEDICINE

## 2021-05-24 PROCEDURE — 2700000000 HC OXYGEN THERAPY PER DAY

## 2021-05-24 PROCEDURE — 6370000000 HC RX 637 (ALT 250 FOR IP): Performed by: INTERNAL MEDICINE

## 2021-05-24 PROCEDURE — 6360000002 HC RX W HCPCS: Performed by: NURSE PRACTITIONER

## 2021-05-24 PROCEDURE — 94640 AIRWAY INHALATION TREATMENT: CPT

## 2021-05-24 PROCEDURE — 6360000002 HC RX W HCPCS: Performed by: STUDENT IN AN ORGANIZED HEALTH CARE EDUCATION/TRAINING PROGRAM

## 2021-05-24 PROCEDURE — 6370000000 HC RX 637 (ALT 250 FOR IP): Performed by: NURSE PRACTITIONER

## 2021-05-24 PROCEDURE — 83735 ASSAY OF MAGNESIUM: CPT

## 2021-05-24 PROCEDURE — 2580000003 HC RX 258: Performed by: NURSE PRACTITIONER

## 2021-05-24 PROCEDURE — 80069 RENAL FUNCTION PANEL: CPT

## 2021-05-24 PROCEDURE — 82784 ASSAY IGA/IGD/IGG/IGM EACH: CPT

## 2021-05-24 PROCEDURE — 82947 ASSAY GLUCOSE BLOOD QUANT: CPT

## 2021-05-24 RX ORDER — CARVEDILOL 25 MG/1
25 TABLET ORAL 2 TIMES DAILY WITH MEALS
Qty: 60 TABLET | Refills: 3 | Status: SHIPPED | OUTPATIENT
Start: 2021-05-24 | End: 2021-11-01

## 2021-05-24 RX ORDER — ATORVASTATIN CALCIUM 80 MG/1
80 TABLET, FILM COATED ORAL DAILY
Qty: 30 TABLET | Refills: 3 | Status: SHIPPED | OUTPATIENT
Start: 2021-05-24 | End: 2021-11-01

## 2021-05-24 RX ORDER — LORAZEPAM 0.5 MG/1
0.5 TABLET ORAL EVERY 4 HOURS PRN
Status: DISCONTINUED | OUTPATIENT
Start: 2021-05-24 | End: 2021-05-26 | Stop reason: HOSPADM

## 2021-05-24 RX ORDER — FLUTICASONE FUROATE, UMECLIDINIUM BROMIDE AND VILANTEROL TRIFENATATE 100; 62.5; 25 UG/1; UG/1; UG/1
POWDER RESPIRATORY (INHALATION)
Qty: 1 EACH | Refills: 3 | Status: SHIPPED
Start: 2021-05-24 | End: 2021-05-25 | Stop reason: HOSPADM

## 2021-05-24 RX ADMIN — LORAZEPAM 0.5 MG: 0.5 TABLET ORAL at 09:09

## 2021-05-24 RX ADMIN — IPRATROPIUM BROMIDE AND ALBUTEROL SULFATE 1 AMPULE: .5; 3 SOLUTION RESPIRATORY (INHALATION) at 16:01

## 2021-05-24 RX ADMIN — ISOSORBIDE MONONITRATE 60 MG: 60 TABLET ORAL at 09:09

## 2021-05-24 RX ADMIN — FAMOTIDINE 20 MG: 20 TABLET, FILM COATED ORAL at 09:08

## 2021-05-24 RX ADMIN — FAMOTIDINE 20 MG: 20 TABLET, FILM COATED ORAL at 22:45

## 2021-05-24 RX ADMIN — GUAIFENESIN 600 MG: 600 TABLET, EXTENDED RELEASE ORAL at 22:44

## 2021-05-24 RX ADMIN — INSULIN GLARGINE 20 UNITS: 100 INJECTION, SOLUTION SUBCUTANEOUS at 22:50

## 2021-05-24 RX ADMIN — IPRATROPIUM BROMIDE AND ALBUTEROL SULFATE 1 AMPULE: .5; 3 SOLUTION RESPIRATORY (INHALATION) at 20:54

## 2021-05-24 RX ADMIN — ALBUTEROL SULFATE 2.5 MG: 2.5 SOLUTION RESPIRATORY (INHALATION) at 03:30

## 2021-05-24 RX ADMIN — SODIUM CHLORIDE, PRESERVATIVE FREE 10 ML: 5 INJECTION INTRAVENOUS at 21:00

## 2021-05-24 RX ADMIN — FLUTICASONE PROPIONATE 1 SPRAY: 50 SPRAY, METERED NASAL at 09:27

## 2021-05-24 RX ADMIN — NIFEDIPINE 90 MG: 90 TABLET, EXTENDED RELEASE ORAL at 22:44

## 2021-05-24 RX ADMIN — QUETIAPINE FUMARATE 100 MG: 100 TABLET ORAL at 22:43

## 2021-05-24 RX ADMIN — LOSARTAN POTASSIUM 50 MG: 50 TABLET, FILM COATED ORAL at 09:09

## 2021-05-24 RX ADMIN — SODIUM BICARBONATE 1300 MG: 648 TABLET ORAL at 22:46

## 2021-05-24 RX ADMIN — CLONIDINE HYDROCHLORIDE 0.2 MG: 0.2 TABLET ORAL at 09:08

## 2021-05-24 RX ADMIN — PREDNISONE 30 MG: 20 TABLET ORAL at 09:09

## 2021-05-24 RX ADMIN — ATORVASTATIN CALCIUM 80 MG: 80 TABLET, FILM COATED ORAL at 22:45

## 2021-05-24 RX ADMIN — DOXYCYCLINE HYCLATE 100 MG: 100 TABLET, COATED ORAL at 22:45

## 2021-05-24 RX ADMIN — LORAZEPAM 0.5 MG: 0.5 TABLET ORAL at 22:46

## 2021-05-24 RX ADMIN — CARVEDILOL 25 MG: 25 TABLET, FILM COATED ORAL at 09:08

## 2021-05-24 RX ADMIN — CLOPIDOGREL 75 MG: 75 TABLET, FILM COATED ORAL at 09:08

## 2021-05-24 RX ADMIN — LOSARTAN POTASSIUM 50 MG: 50 TABLET, FILM COATED ORAL at 22:47

## 2021-05-24 RX ADMIN — DOXYCYCLINE HYCLATE 100 MG: 100 TABLET, COATED ORAL at 09:08

## 2021-05-24 RX ADMIN — SODIUM CHLORIDE, PRESERVATIVE FREE 10 ML: 5 INJECTION INTRAVENOUS at 09:26

## 2021-05-24 RX ADMIN — CARVEDILOL 25 MG: 25 TABLET, FILM COATED ORAL at 19:01

## 2021-05-24 RX ADMIN — PRAZOSIN HYDROCHLORIDE 4 MG: 1 CAPSULE ORAL at 22:45

## 2021-05-24 RX ADMIN — ENOXAPARIN SODIUM 40 MG: 40 INJECTION, SOLUTION INTRAVENOUS; SUBCUTANEOUS at 09:08

## 2021-05-24 RX ADMIN — INSULIN LISPRO 5 UNITS: 100 INJECTION, SOLUTION INTRAVENOUS; SUBCUTANEOUS at 09:10

## 2021-05-24 RX ADMIN — SODIUM BICARBONATE 1300 MG: 648 TABLET ORAL at 09:28

## 2021-05-24 RX ADMIN — GUAIFENESIN 600 MG: 600 TABLET, EXTENDED RELEASE ORAL at 09:09

## 2021-05-24 RX ADMIN — HYDRALAZINE HYDROCHLORIDE 100 MG: 50 TABLET, FILM COATED ORAL at 22:46

## 2021-05-24 RX ADMIN — IPRATROPIUM BROMIDE AND ALBUTEROL SULFATE 1 AMPULE: .5; 3 SOLUTION RESPIRATORY (INHALATION) at 08:45

## 2021-05-24 RX ADMIN — HYDRALAZINE HYDROCHLORIDE 100 MG: 50 TABLET, FILM COATED ORAL at 06:25

## 2021-05-24 RX ADMIN — PRAZOSIN HYDROCHLORIDE 4 MG: 1 CAPSULE ORAL at 09:09

## 2021-05-24 RX ADMIN — CLONIDINE HYDROCHLORIDE 0.2 MG: 0.2 TABLET ORAL at 22:45

## 2021-05-24 RX ADMIN — MONTELUKAST SODIUM 10 MG: 10 TABLET, FILM COATED ORAL at 22:44

## 2021-05-24 RX ADMIN — Medication 81 MG: at 09:08

## 2021-05-24 ASSESSMENT — ENCOUNTER SYMPTOMS
SHORTNESS OF BREATH: 1
SINUS PAIN: 0
SORE THROAT: 1
VOICE CHANGE: 0
ABDOMINAL PAIN: 0
VOMITING: 0
ALLERGIC/IMMUNOLOGIC NEGATIVE: 1
WHEEZING: 1
NAUSEA: 0
EYES NEGATIVE: 1
TROUBLE SWALLOWING: 0
BLOOD IN STOOL: 0
ABDOMINAL DISTENTION: 0
DIARRHEA: 0
COUGH: 1

## 2021-05-24 NOTE — PROGRESS NOTES
Orthopaedic Surgery - Consultation  Bessie East (80 y o  male)   : 1948   MRN: [id number]  Date: 2020   Encounter: [id number]   Unit/Bed#: E5 -01    Consulting Physician:  Rahul Singh PA-C  Requesting Physician: Smita Downs DO  Reason for Consult / Principal Problem:  Left ischial wound with exposed femoral head from chronic hip dislocation    Assessment / Plan  Left issue wound with exposed femoral head from chronic hip dislocation    · At this time we will have further discussion with medicine and general surgery to explore the options for treatment  This would include possible Girdlestone procedure to remove the femoral head and possibly allow for better wound healing versus continuing with wound care only  The patient was seen by Cardiology and recommended for temporary pacer if any extensive surgery was to be done, will discuss with Medicine if any other optimization will be necessary  · After risk versus benefits are further evaluated will discuss surgical intervention in more depth with the patient  · Continue with IV antibiotics for sepsis per ID  · Continue wound dressings as per wound care orders  · Ortho will follow  History of Present Illness  Bessie East is a 80 y o  male who was admitted to [facility] through the emergency room after wound care visit with Dr Sue Storey that showed exposed femoral head and his sacral/ischial wound  At the time the patient was also feeling sick for about 5 days with increasing general weakness, poor appetite, nausea  He does have a history of muscular dystrophy and has live-in caretakers  He is ambulatory by motorized chair  He has minimal sensation in his lower extremities  He has been following with wound care for years a multiple lower extremity wounds    After discussion with wound care, it was found that this sacral wound has been present for about 2 months and the femoral head has been exposed for at least 1 Renal Progress Note    Patient :  Grady Santos; 73 y.o. MRN# [id number]  Location:    Attending:  Austin Crocker DO  Admit Date:  2021   Hospital Day: 5      Subjective:     Patient was seen and examined. Patient only just dialysis as per TTS schedule. Still complains of some shortness of breath. Pulmonology on board due to his acute respiratory failure. Started on short course of steroids as well. Outpatient Medications:     Medications Prior to Admission: predniSONE (DELTASONE) 10 MG tablet, Please take 4 tablets daily X 3 days then 3 tablets daily X 3 days then 2 tablets daily X 3 days then 1 tablet daily X 3 days. [] doxycycline hyclate (VIBRA-TABS) 100 MG tablet, Take 1 tablet by mouth 2 times daily for 5 days  sodium bicarbonate 650 MG tablet, Take 2 tablets by mouth 2 times daily  insulin glargine (BASAGLAR KWIKPEN) 100 UNIT/ML injection pen, Inject 10 Units into the skin nightly  cloNIDine (CATAPRES) 0.2 MG tablet, Take 1 tablet by mouth 3 times daily  NIFEdipine (PROCARDIA XL) 90 MG extended release tablet, Take 1 tablet by mouth nightly  glucagon 1 MG injection, Inject 1 mg into the muscle See Admin Instructions Follow package directions for low blood sugar. fluticasone (FLONASE) 50 MCG/ACT nasal spray,   insulin aspart (NOVOLOG FLEXPEN) 100 UNIT/ML injection pen, Inject 5 Units into the skin 3 times daily (before meals) Sliding scale  nitroGLYCERIN (NITROSTAT) 0.4 MG SL tablet, USE 1 TABLET UNDER THE TONGUE UP TO MAXIMUM OF 3 TOTAL DOSES.  IF NO RELIEF AFTER 1 DOSE, CALL 911.  gabapentin (NEURONTIN) 100 MG capsule, TAKE 2 CAPSULES BY MOUTH 3 TIMES DAILY  QUEtiapine (SEROQUEL) 100 MG tablet, TAKE 1 TABLET BY MOUTH NIGHTLY  losartan (COZAAR) 50 MG tablet, TAKE 1 TABLET BY MOUTH 2 TIMES DAILY  GNP VITAMIN D MAXIMUM STRENGTH 50 MCG (2000) TABS, TAKE 1 TABLET BY MOUTH ONCE DAILY  [DISCONTINUED] TRELEGY ELLIPTA 100-62.5-25 MCG/INH AEPB, INHALE ONE PUFF INTO THE LUNGS DAILY  blood week   Patient cannot recall when he dislocated his left hip  Review of Systems  Negative for chest pain and shortness of breath    Medical, Surgical, Family, and Social History    Past Medical History:   Diagnosis Date    CHB (complete heart block) ([facility] 1/12/2018    Muscular dystrophy ([place] [address] )     Muscular dystrophy ([facility] ) 1/12/2018    Osteomyelitis ([place] [address] ) 1/12/2018    Syringomyelia ([place] [address] )        History reviewed  No pertinent surgical history  History reviewed  No pertinent family history      Social History     Occupational History    Not on file   Tobacco Use    Smoking status: Former Smoker    Smokeless tobacco: Never Used   Substance and Sexual Activity    Alcohol use: No     Frequency: Never     Binge frequency: Never     Comment: no alcohol consumption    Drug use: No    Sexual activity: Not on file       Allergies   Allergen Reactions    Penicillins     Shellfish-Derived Products Swelling    Sulfa Antibiotics        Current Facility-Administered Medications   Medication Dose Route Frequency    acetaminophen (TYLENOL) tablet 650 mg  650 mg Oral Q6H PRN    aspirin chewable tablet 81 mg  81 mg Oral Daily    bisacodyl (DULCOLAX) rectal suppository 10 mg  10 mg Rectal Daily    cefepime (MAXIPIME) 2,000 mg in dextrose 5 % 50 mL IVPB  2,000 mg Intravenous Q8H    docusate sodium (COLACE) capsule 100 mg  100 mg Oral BID    enoxaparin (LOVENOX) subcutaneous injection 40 mg  40 mg Subcutaneous Daily    hydrocortisone 1 % cream   Topical 4x Daily PRN    HYDROmorphone (DILAUDID) injection 0 5 mg  0 5 mg Intravenous Q4H PRN    iohexol (OMNIPAQUE) 240 MG/ML solution 50 mL  50 mL Oral Once in imaging    metroNIDAZOLE (FLAGYL) IVPB (premix) 500 mg 100 mL  500 mg Intravenous Q8H    ondansetron (ZOFRAN) injection 4 mg  4 mg Intravenous Q6H PRN    oxyCODONE (ROXICODONE) IR tablet 5 mg  5 mg Oral Q4H PRN    polyethylene glycol (MIRALAX) packet 17 g  17 g Oral Daily    senna (SENOKOT) tablet 8 6 mg  1 tablet Oral HS    sodium chloride 0 9 % infusion  100 mL/hr Intravenous Continuous    Sodium Hypochlorite 0 5 % SOLN 1 application  1 application Irrigation Daily     Medications Prior to Admission   Medication    aspirin 81 MG tablet       Vitals  Temp:  [98 °F (36 7 °C)-99 3 °F (37 4 °C)] 99 °F (37 2 °C)  HR:  [42-48] 44  Resp:  [17-18] 18  BP: ()/(47-58) 126/51  Body mass index is 14 95 kg/m²  I/O last 24 hours: In: 300 [P O :300]  Out: 36 [Urine:820]    Physical Exam  General:  Alert & oriented x3, no distress, appears stated age  [de-identified]:  EOMI, eyes clear, hearing intact, mucous membranes moist  Neck:  Supple, non-tender, trachea midline, no lymphadenopathy  Cardiovascular:  Regular rate, no discernable arrhythmia  Pulmonary:  Regular rate, respirations non-labored   Abdominal:  Soft, non-tender    Left Hip Exam  Alignment / Posture:  Left leg is flexed up fully at the hip with knee flexed to 130°  Inspection:  Patient with erythema around sacral wound with exposed femoral head just beneath the surface on exam  no active drainage noted though patient just had recent packing and wound care done  Palpation:  No tenderness  Neurovascular:  Sensation is diminished throughout the lower extremity around the hip and wound  Toes warm and perfused  Imaging  I have personally reviewed pertinent films in PACS  CT of Abdomen And pelvis - From 07/25/2020 shows dislocated left hip in contact with decubitus ulcer with gas bubble suggesting septic arthritis      Lab Results  (I have personally reviewed pertinent lab results )  Results from last 7 days   Lab Units 07/27/20  0641 07/26/20  0535 07/25/20  0515 07/24/20  1130   WBC Thousand/uL 35 41* 33 74* 25 41* 36 39*   HEMOGLOBIN g/dL 10 3* 10 6* 10 6* 11 9*   HEMATOCRIT % 32 1* 33 5* 33 2* 36 3*   PLATELETS Thousands/uL 610* 502* 490*  497* 573*   RBC Million/uL 3 67* 3 77* 3 82* 4 17   MCV fL 88 89 87 87   MCH pg 28 1 28 1 27 7 28 5   MCHC g/dL 32 1 31 6 31 9 32 8   RDW % 13 8 13 6 13 4 13 2   MPV fL 8 8* 8 4* 8 7*  8 7* 9 1   NRBC AUTO /100 WBCs  --   --   --  0         Results from last 7 days   Lab Units 07/27/20  0641 07/26/20  0535 07/25/20  0515 07/24/20  1130   POTASSIUM mmol/L 3 8 3 7 3 9 3 7   CHLORIDE mmol/L 102 99* 99* 92*   CO2 mmol/L 19* 19* 22 25   BUN mg/dL 13 9 11 13   CREATININE mg/dL 0 67 0 54* 0 59* 0 78   EGFR ml/min/1 73sq m 97 106 103 91   CALCIUM mg/dL 7 6* 7 9* 8 3 8 5   ALT U/L  --   --   --  7*   AST U/L  --   --   --  11     Results from last 7 days   Lab Units 07/25/20  0515   SED RATE mm/hour 45*   CRP mg/L 194 3*     Results from last 7 days   Lab Units 07/24/20  1310 07/24/20  1254 07/24/20  1130   BLOOD CULTURE  No Growth at 48 hrs  --  Escherichia coli*   URINE CULTURE   --  >100,000 cfu/ml   --    GRAM STAIN RESULT   --   --  Gram negative rods*         Code Status  Level 3 - DNAR and DNI    Counseling / Coordination of Care  Total floor / unit time spent today 20 minutes  Greater than 50% of total time was spent with the patient and / or family counseling and / or coordination of care      Jasen Gerber PA-C Bilateral vesicular breath sounds, no rales, +ve wheezes. Cardiac:  S1 S2 RR, no murmurs, gallops or rubs. Abdomen: Soft, non-tender, no masses or organomegaly, BS audible. :   No suprapubic or flank tenderness. Neuro:  AAO x 3, No FND. SKIN:  No rashes, good skin turgor. Extremities:  No edema. Labs:       Recent Labs     05/22/21  0804 05/23/21  0925 05/24/21  0405   WBC 13.8* 11.4* 10.1   RBC 3.04* 2.82* 2.75*   HGB 9.7* 8.8* 8.7*   HCT 30.8* 27.9* 27.2*   .3 98.9 98.9   MCH 31.9 31.2 31.6   MCHC 31.5 31.5 32.0   RDW 13.2 12.8 12.9    352 333   MPV 10.3 10.0 10.2      BMP:   Recent Labs     05/22/21  0804 05/23/21  0925 05/24/21  0405   * 129* 132*   K 4.9 4.2 3.9   CL 94* 93* 96*   CO2 23 25 25   BUN 95* 59* 77*   CREATININE 4.17* 3.04* 3.62*   GLUCOSE 347* 348* 412*   CALCIUM 8.0* 7.6* 7.5*      Phosphorus:     Recent Labs     05/23/21  0925 05/24/21  0405   PHOS 4.8* 5.2*     Magnesium:    Recent Labs     05/23/21  0925 05/24/21  0405   MG 1.5* 1.6     Albumin:    Recent Labs     05/23/21  0925 05/24/21  0405   LABALBU 2.6* 2.4*     BNP:      Lab Results   Component Value Date    BNP 84 11/27/2013     RIZWAN:      Lab Results   Component Value Date    RIZWAN NEGATIVE 12/04/2020     SPEP:  Lab Results   Component Value Date    PROT 4.7 05/04/2021    PROT 6.2 11/12/2011    ALBCAL 2.3 12/04/2020    ALBPCT 55 12/04/2020    LABALPH 0.1 12/04/2020    LABALPH 0.7 12/04/2020    A1PCT 3 12/04/2020    A2PCT 17 12/04/2020    LABBETA 0.5 12/04/2020    BETAPCT 13 12/04/2020    GAMGLOB 0.5 12/04/2020    GGPCT 12 12/04/2020    PATH ELECTRONICALLY SIGNED. Jhoana Carver M.D. 12/04/2020    PATH ELECTRONICALLY SIGNED. Jhoana Carver M.D. 12/04/2020     UPEP:     Lab Results   Component Value Date    LABPE  11/12/2011     ELEVATED PROTEIN CONCENTRATION. MOST SERUM PROTEINS ARE DETECTED IN THIS URINE.      C3:     Lab Results   Component Value Date    C3 89 12/04/2020     C4:     Lab Results Component Value Date    C4 17 12/04/2020     Hep BsAg:         Lab Results   Component Value Date    HEPBSAG NONREACTIVE 05/10/2021     Hep C AB:          Lab Results   Component Value Date    HEPCAB NONREACTIVE 05/10/2021       Urinalysis/Chemistries:      Lab Results   Component Value Date    NITRU NEGATIVE 05/03/2021    COLORU YELLOW 05/03/2021    PHUR 6.0 05/03/2021    WBCUA 2 TO 5 05/03/2021    RBCUA 0 TO 2 05/03/2021    MUCUS NOT REPORTED 05/03/2021    TRICHOMONAS NOT REPORTED 05/03/2021    YEAST NOT REPORTED 05/03/2021    BACTERIA NOT REPORTED 05/03/2021    CLARITYU Clear 09/12/2014    SPECGRAV 1.016 05/03/2021    LEUKOCYTESUR NEGATIVE 05/03/2021    UROBILINOGEN Normal 05/03/2021    BILIRUBINUR NEGATIVE 05/03/2021    BILIRUBINUR NEGATIVE 11/12/2011    BLOODU Negative 09/12/2014    GLUCOSEU 1+ 05/03/2021    GLUCOSEU 3+ 11/12/2011    KETUA NEGATIVE 05/03/2021    AMORPHOUS NOT REPORTED 05/03/2021     Urine Sodium:     Lab Results   Component Value Date    IGNACIO 33 05/05/2021     Urine Potassium:    Lab Results   Component Value Date    KUR 38.1 12/04/2020     Urine Chloride:    Lab Results   Component Value Date    CLUR 37 05/05/2021     Urine Osmolarity:   Lab Results   Component Value Date    OSMOU 463 12/04/2020      Urine Creatinine:     Lab Results   Component Value Date    LABCREA 51.5 05/05/2021     Radiology:     Reviewed. Assessment:     1.  Acute kidney injury stage IV currently dialysis dependent running on TTS schedule at  renal care [facility] unit under Dr. Maegan Corea tunnel catheter. 2.  Acute shortness of breath. 3.  COPD exacerbation. 4.  Diabetes type 2.  5.  Essential hypertension. 6.  Wheezes positive. 7.  History of COPD. 8.  Acute respiratory failure requiring BiPAP. Plan:   1. No acute need for dialysis today. Will get regular dialysis in a.m. as per TTS schedule. 2.  Follow up pulmonology plan. 3.  Okay to discharge from nephrology standpoint.   4.  BMP in AM.  5.  Will follow. Nutrition   Please ensure that patient is on a renal diet/TF. Avoid nephrotoxic drugs/contrast exposure. We will continue to follow along with you. Alexis Funez MD  [facility]     This note is created with the assistance of a speech-recognition program. While intending to generate a document that actually reflects the content of the visit, no guarantees can be provided that every mistake has been identified and corrected by editing.

## 2021-05-24 NOTE — PROGRESS NOTES
[facility]  Office: [address], DO, Angel Luis Pink, DO, Venus Kerri, DO, Fazal Rhoades Blood, DO, Cristina Thapa MD, Doug Trejo MD, Fadia White MD, Dot Gutierres MD, Bridget Arce MD, Toñito Rey MD, Roman Mathew MD, Roberto Conteh MD, Bro Verdin, DO, Krystin Bustos MD, Kia Hull, DO, Cordelia Barthel, MD,  Hilario Jin, DO, Paul Thakkar MD, Filippo Salgado MD, Shekhar Yeh MD, Nelli Curran MD, Willem Neri, CNP, Bud Gonzalez, CNP, Nuria Begum, CNS, Viviana Tinsley, CNP, Giancarlo Christy, CNP, Shira Armijo, CNP, Jayne Holcomb, CNP, Divya Pena, CNP, Elizabeth Solorio PA-C, Kurt Will, [facility], Lacho Burger, CNP, Sina Desouza, CNP, Anirudh Gomez, CNP, Mayela Ashford, CNP, Sam Haro, CNP, Rishabh Leon, [address] [place] [address]    Progress Note    5/24/2021    10:58 AM    Name:   Goldy Burgess  MRN:     [id number]     Acct:      [de-identified]   Room:   2012/2012-01   Day:  5  Admit Date:  5/19/2021  5:05 AM    PCP:   TERRANCE Levine CNP  Code Status:  Full Code    Subjective:     C/C:   Chief Complaint   Patient presents with    Respiratory Distress     Interval History Status: not changed. Still having sinus drainage and has a sensation of shortness of breath. During my examination patient was taken off BiPAP and breathing room air and maintain saturations of 95%. Patient still repeatedly stating that he does not feel safe going home. Brief History:     Goldy Burgess is a 73 y.o. M with history of ESRD on HD, COPD who presented with shortness of breath. States symptoms going on for the past 2 days, but has been having intermittent shortness of breath going on for the past 2 years. Having worsening dyspnea with exertion. Also having sputum production. Was recently admitted 2 days prior for COPD exacerbation, d/c on steroids and antibiotics at that time. flush, sodium chloride, promethazine **OR** ondansetron, nicotine, polyethylene glycol, acetaminophen **OR** acetaminophen    Data:     Past Medical History:   has a past medical history of Asthma, CAD in native artery, nonobstructive, Carotid stenosis, left, Carpal tunnel syndrome, Cerebrovascular disease, Chronic kidney disease, COPD (chronic obstructive pulmonary disease) ([facility] [address] Diabetes mellitus ([facility] History of colon polyps, History of weakness of extremity, HTN (hypertension), Hyperlipidemia, Lung, cysts, congenital, PTSD (post-traumatic stress disorder), PTSD (post-traumatic stress disorder), TIA (transient ischemic attack), and Type II or unspecified type diabetes mellitus without mention of complication, not stated as uncontrolled. Social History:   reports that he quit smoking about 6 years ago. His smoking use included cigarettes. He has a 17.50 pack-year smoking history. He has never used smokeless tobacco. He reports that he does not drink alcohol and does not use drugs. Family History:   Family History   Problem Relation Age of Onset    Cancer Mother     Heart Disease Father        Vitals:  BP (!) 151/58   Pulse 73   Temp 97.9 °F (36.6 °C) (Oral)   Resp 24   Wt 145 lb 3.2 oz (65.9 kg)   SpO2 100%   BMI 23.44 kg/m²   Temp (24hrs), Av.9 °F (36.6 °C), Min:97.5 °F (36.4 °C), Max:98.1 °F (36.7 °C)    Recent Labs     21  2004 21  2134 21  0508 21  0758   POCGLU 273* 324* 379* 298*       I/O (24Hr):     Intake/Output Summary (Last 24 hours) at 2021 1058  Last data filed at 2021 0526  Gross per 24 hour   Intake 410 ml   Output 1000 ml   Net -590 ml       Labs:  Hematology:  Recent Labs     21  0804 21  0925 21  0405   WBC 13.8* 11.4* 10.1   RBC 3.04* 2.82* 2.75*   HGB 9.7* 8.8* 8.7*   HCT 30.8* 27.9* 27.2*   .3 98.9 98.9   MCH 31.9 31.2 31.6   MCHC 31.5 31.5 32.0   RDW 13.2 12.8 12.9    352 333   MPV 10.3 10.0 10.2 Chemistry:  Recent Labs     05/22/21  0804 05/23/21  0925 05/24/21  0405   * 129* 132*   K 4.9 4.2 3.9   CL 94* 93* 96*   CO2 23 25 25   GLUCOSE 347* 348* 412*   BUN 95* 59* 77*   CREATININE 4.17* 3.04* 3.62*   MG  --  1.5* 1.6   ANIONGAP 15 11 11   LABGLOM 14* 21* 17*   GFRAA 17* 25* 21*   CALCIUM 8.0* 7.6* 7.5*   PHOS  --  4.8* 5.2*     Recent Labs     05/23/21  0925 05/23/21  1138 05/23/21  1641 05/23/21 2004 05/23/21  2134 05/24/21  0405 05/24/21  0508 05/24/21  0758   LABALBU 2.6*  --   --   --   --  2.4*  --   --    POCGLU  --  292* 207* 273* 324*  --  379* 298*     ABG:  Lab Results   Component Value Date    POCPH 7.39 06/17/2013    POCPCO2 46 06/17/2013    POCPO2 288 06/17/2013    POCHCO3 27.8 06/17/2013    NBEA NOT REPORTED 06/17/2013    PBEA 3 06/17/2013    ZHP6ONF 29 06/17/2013    RWDP2WRK 100 06/17/2013    FIO2 NOT REPORTED 05/15/2021     Lab Results   Component Value Date/Time    SPECIAL 2ML L FR 12/03/2020 11:24 AM     Lab Results   Component Value Date/Time    CULTURE NO GROWTH 6 DAYS 12/03/2020 11:24 AM       Radiology:  XR CHEST PORTABLE    Result Date: 5/20/2021  Unremarkable single upright portable AP view of the chest.     XR CHEST PORTABLE    Result Date: 5/19/2021  Unremarkable single upright portable AP view of the chest.     XR CHEST PORTABLE    Result Date: 5/16/2021  No significant change in chest findings. XR CHEST PORTABLE    Result Date: 5/15/2021  Findings compatible with mild interstitial edema. Dialysis catheter terminates at the distal SVC.        Physical Examination:        General appearance:  alert, on BIPAP  Mental Status:  oriented to person, place and time and normal affect  Lungs:  Diffuse wheezing bilaterally   Heart:  regular rate and rhythm  Abdomen:  soft, nontender, nondistended, normal bowel sounds  Extremities:  no edema, redness, tenderness in the calves  Skin:  no gross lesions, rashes, induration    Assessment:        Hospital Problems         Last Modified POA    * (Principal) COPD exacerbation ([address] 5/19/2021 Yes    Carotid stenosis, left s/p Endarterectomy 5/19/2021 Yes    Essential hypertension 5/19/2021 Yes    ESRD (end stage renal disease) ([address] 5/19/2021 Yes    Chronic heart failure with preserved ejection fraction ([address] 5/19/2021 Yes    Uncontrolled type 2 diabetes mellitus with hyperglycemia ([address] 5/19/2021 Yes    Acute respiratory failure with hypoxia ([address] 5/21/2021 Yes          Plan:        Acute exacerbation COPD -continue current therapy, pulmonary following. Check immunoglobulin levels and CT sinus  Type 2 diabetes -patient still refusing to comply with our recommendations for glucose control  Essential hypertension  End-stage renal disease on hemodialysis-HD today per nephrology  Acute respiratory failure with hypoxia-patient not requiring oxygen at home. Wean as tolerated  Discharge Plan: Patient pulmonary wise is stable for discharge but there is still a great deal of anxiety with discharge. Added Ativan 0.5 mg for anxiety when needed check immunoglobulin levels, check CT sinuses for sinusitis.     Daphne Shane DO  5/24/2021  10:58 AM

## 2021-05-24 NOTE — PLAN OF CARE
Call light within reach. Bed in lowest position. Blood sugar checked. Pain assessed. Vitals and assessments done.

## 2021-05-24 NOTE — PLAN OF CARE
Problem: RESPIRATORY  Intervention: Respiratory assessment  Note:   PROVIDE ADEQUATE OXYGENATION WITH ACCEPTABLE SP02/ABG'S    [x]  IDENTIFY APPROPRIATE OXYGEN THERAPY  [x]   MONITOR SP02/ABG'S AS NEEDED   [x]   PATIENT EDUCATION AS NEEDED        Problem: Impaired respiratory status  Intervention: Biphasic positive airway pressure (BIPAP)  Note: ATELECTASIS     []  PREVENT ATELECTASIS  [x]   ASSESS BREATH SOUNDS  []   IMPLEMENT HYPERINFLATION PROTOCOL  []  INCENTIVE SPIROMETRY INSTRUCTION  [x]   PATIENT EDUCATION AS NEEDED   PROVIDE ADEQUATE OXYGENATION WITH ACCEPTABLE SP02/ABG'S    [x]  IDENTIFY APPROPRIATE OXYGEN THERAPY  [x]   MONITOR SP02/ABG'S AS NEEDED   [x]   PATIENT EDUCATION AS NEEDED

## 2021-05-24 NOTE — TELEPHONE ENCOUNTER
Refill request for pended medications. If appropriate please send medication(s) to patients pharmacy. Next appt: 7/19/2021      Health Maintenance   Topic Date Due    Shingles Vaccine (2 of 3) 12/27/2017    Colon cancer screen colonoscopy  06/27/2018    Diabetic retinal exam  07/14/2021    A1C test (Diabetic or Prediabetic)  08/19/2021    Flu vaccine (Season Ended) 09/01/2021    Annual Wellness Visit (AWV)  10/10/2021    Diabetic foot exam  04/06/2022    Pneumococcal 65+ yrs at Risk Vaccine (2 of 2 - PPSV23) 04/06/2022    Lipid screen  05/04/2022    Potassium monitoring  05/24/2022    Creatinine monitoring  05/24/2022    DTaP/Tdap/Td vaccine (2 - Td) 12/14/2023    COVID-19 Vaccine  Completed    AAA screen  Completed    Hepatitis C screen  Completed    HIV screen  Completed    Hepatitis A vaccine  Aged Out    Hib vaccine  Aged Out    Meningococcal (ACWY) vaccine  Aged Out       Hemoglobin A1C (%)   Date Value   05/19/2021 9.5 (H)   05/15/2021 9.3 (H)   04/06/2021 9.8 (A)             ( goal A1C is < 7)   Microalb/Crt.  Ratio (mcg/mg creat)   Date Value   06/16/2020 2,663 (H)     LDL Cholesterol (mg/dL)   Date Value   05/04/2021 158 (H)       (goal LDL is <100)   AST (U/L)   Date Value   05/04/2021 12     ALT (U/L)   Date Value   05/04/2021 15     BUN (mg/dL)   Date Value   05/24/2021 77 (H)     BP Readings from Last 3 Encounters:   05/24/21 (!) 151/58   05/17/21 (!) 166/73   05/14/21 (!) 152/50          (goal 120/80)          Patient Active Problem List:     Cigarette nicotine dependence without complication     Carpal tunnel syndrome on right     Colon polyps     Carotid stenosis, left s/p Endarterectomy     Mixed simple and mucopurulent chronic bronchitis (HCC)     Pure hypercholesterolemia     CKD (chronic kidney disease) stage 4, GFR 15-29 ml/min (HCC)     CKD (chronic kidney disease) stage 3, GFR 30-59 ml/min (HCC)     Dyspnea and respiratory abnormalities     PTSD (post-traumatic stress disorder)     Transient cerebral ischemia     Essential hypertension     DM type 2, uncontrolled, with neuropathy ([address]     COPD with acute exacerbation ([address]     Cerebral vascular disease     Primary insomnia     Hypertensive crisis     Non-obstructive Coronary artery disease of native artery of native heart with stable angina pectoris (HCC)     Hyperparathyroidism ([address]     Hypovitaminosis D     Acute kidney injury superimposed on CKD (HCC)-currently dialysis patient     Coronary artery disease with exertional angina (HCC)     COPD exacerbation (HCC)     Leg swelling     Anemia     Hypoalbuminemia     DKA, type 2, not at goal [facility])     Inflammation of right sacroiliac joint ([address]     Malignant neoplasm of colon ([address]     ESRD (end stage renal disease) ([address]     Bilateral carotid artery occlusion     Seizure ([address]     Chronic heart failure with preserved ejection fraction (HCC)     Left ventricular hypertrophy     SOB (shortness of breath)     Uncontrolled type 2 diabetes mellitus with hyperglycemia ([address]     Acute respiratory failure with hypoxia ([address]

## 2021-05-24 NOTE — PROGRESS NOTES
Patient's morning labs showed blood glucose of 412. Checked patient's blood sugar and it was 379. Patient did not want any insulin ordered because his \"blood sugar will be lower by morning. \"  RAVI Borrego was notified. Continue to monitor.

## 2021-05-24 NOTE — PROGRESS NOTES
PULMONARY & CRITICAL CARE MEDICINE PROGRESS  NOTE     Patient:  Olivia Katz  MRN: [id number]  Admit date: 5/19/2021  Primary Care Physician: TERRANCE Echeverria - CNP  Consulting Physician: Warren Troy DO  CODE Status: Full Code  LOS: 5    SUBJECTIVE     I personally interviewed/examined the patient, reviewed interval history and interpreted all available radiographic, laboratory data at the time of service. Chief Compliant/Reason for Initial Consult:   Acute on likely chronic hypoxic respiratory failure  COPD likely severe    Brief Hospital Course: The patient is a 73 y.o. male admitted because of worsening shortness of breath. He was discharged 2 days prior to hospital admission and had been started on hemodialysis. According to patient he did go to his first dialysis session. He continues to feel worsening shortness of breath and sputum production without hemoptysis. He does not smoke but has secondhand smoke exposure. He is on Trelegy at home and on Combivent        Interval History:  05/24/21  Overnight events noted chart seen labs reviewed and medications seen. He is afebrile overnight hemodynamically stable and actually hypertensive T-max is 98.1 respiratory rate is in low to mid 20s no acute hypoxic events were reported. Patient had use BiPAP 20/10 with 40% FiO2 overnight. He is off BiPAP and using nasal cannula currently and maintaining saturation above 90%. According to patient he does have cough some sputum production denies chest pain hemoptysis. He is anxious get short of breath when trying to talk. According to patient he never had a sleep study done he is not on home oxygen but he had used his father-in-law oxygen in the past    Review of Systems:  Review of Systems   Constitutional: Positive for fatigue. Negative for appetite change and fever. HENT: Positive for postnasal drip and sore throat.  Negative for sinus pain, trouble swallowing and voice change. Eyes: Negative. Respiratory: Positive for cough, shortness of breath and wheezing. Cardiovascular: Negative for chest pain, palpitations and leg swelling. Gastrointestinal: Negative for abdominal distention, abdominal pain, blood in stool, diarrhea, nausea and vomiting. Endocrine: Negative. Genitourinary: Negative for dysuria, frequency and hematuria. Musculoskeletal: Negative. Allergic/Immunologic: Negative. Neurological: Negative for dizziness, seizures, speech difficulty, numbness and headaches. Hematological: Negative for adenopathy. Does not bruise/bleed easily. Psychiatric/Behavioral: The patient is nervous/anxious. OBJECTIVE     VITAL SIGNS:   LAST-  BP (!) 151/58   Pulse 73   Temp 97.9 °F (36.6 °C) (Oral)   Resp 24   Wt 145 lb 3.2 oz (65.9 kg)   SpO2 100%   BMI 23.44 kg/m²   8-24 HR RANGE-  TEMP Temp  Av.8 °F (36.6 °C)  Min: 97.5 °F (36.4 °C)  Max: 98.1 °F (27.4 °C)   BP Systolic (20MCE), TBJ:655 , Min:144 , BJQ:648      Diastolic (92AKI), YTO:57, Min:58, Max:64     PULSE Pulse  Av.8  Min: 50  Max: 73   RR Resp  Av  Min: 24  Max: 24   O2 SAT No data recorded   OXYGEN DELIVERY No data recorded     Systemic Examination:   Physical Exam  General appearance - looks comfortable, not in distress but mildly tachypneic  Mental status - alert, oriented to person, place, and time  Eyes - pupils equal and reactive, extraocular eye movements intact  Mouth - mucous membranes moist, pharynx normal without lesions  Neck - supple, no significant adenopathy, short thick neck  Chest -he does have use of extra muscles when he talk, he is mildly tachypneic not in distress chest was symmetrical with increased resonance on percussion, air entry is bilaterally reduced and distant with prolonged expiration mild expiratory wheeze and no rhonchi or crackles.     Heart - normal rate, regular rhythm, normal S1, S2, no murmurs, rubs, clicks or gallops  Abdomen - soft, nontender, nondistended, no masses or organomegaly  Neurological - alert, oriented, normal speech, no focal findings or movement disorder noted  Extremities - peripheral pulses normal, no pedal edema, no clubbing or cyanosis  Skin - normal coloration and turgor, no rashes, no suspicious skin lesions noted     DATA REVIEW     Medications:  Scheduled Meds:   fluticasone  1 spray Each Nostril Daily    albuterol  2.5 mg Nebulization BID    montelukast  10 mg Oral Nightly    predniSONE  30 mg Oral Daily    insulin lispro  0-18 Units Subcutaneous TID WC    insulin lispro  0-9 Units Subcutaneous Nightly    guaiFENesin  600 mg Oral BID    insulin lispro  3 Units Subcutaneous Q8H    insulin glargine  20 Units Subcutaneous Nightly    doxycycline hyclate  100 mg Oral BID    aspirin  81 mg Oral Daily    atorvastatin  80 mg Oral Daily    carvedilol  25 mg Oral BID WC    cloNIDine  0.2 mg Oral TID    clopidogrel  75 mg Oral Daily    hydrALAZINE  100 mg Oral 3 times per day    isosorbide mononitrate  60 mg Oral Daily    losartan  50 mg Oral BID    NIFEdipine  90 mg Oral Nightly    prazosin  4 mg Oral BID    QUEtiapine  100 mg Oral Nightly    sodium bicarbonate  1,300 mg Oral BID    sodium chloride flush  5-40 mL Intravenous 2 times per day    famotidine  20 mg Oral BID    enoxaparin  40 mg Subcutaneous Daily    bumetanide  2 mg Intravenous Once    ipratropium-albuterol  1 ampule Inhalation 4x daily     Continuous Infusions:   dextrose      sodium chloride       LABS:-  ABG:   No results for input(s): POCPH, POCPCO2, POCPO2, POCHCO3, YNYY2YJV in the last 72 hours.   CBC:   Recent Labs     05/22/21  0804 05/23/21  0925 05/24/21  0405   WBC 13.8* 11.4* 10.1   HGB 9.7* 8.8* 8.7*   HCT 30.8* 27.9* 27.2*   .3 98.9 98.9    352 333   RBC 3.04* 2.82* 2.75*   MCH 31.9 31.2 31.6   MCHC 31.5 31.5 32.0   RDW 13.2 12.8 12.9     BMP:   Recent Labs     05/22/21  0804 05/23/21  0925 05/24/21  0405   * 129* 132*   K 4.9 4.2 3.9   CL 94* 93* 96*   CO2 23 25 25   BUN 95* 59* 77*   CREATININE 4.17* 3.04* 3.62*   GLUCOSE 347* 348* 412*   PHOS  --  4.8* 5.2*     Liver Function Test:   Recent Labs     05/24/21  0405   LABALBU 2.4*     Amylase/Lipase:  No results for input(s): AMYLASE, LIPASE in the last 72 hours. Coagulation Profile:   No results for input(s): INR, PROTIME, APTT in the last 72 hours. Cardiac Enzymes:  No results for input(s): CKTOTAL, CKMB, CKMBINDEX, TROPONINI in the last 72 hours. Lactic Acid:  Lab Results   Component Value Date    LACTA NOT REPORTED 09/02/2018     BNP:   Lab Results   Component Value Date    BNP 84 11/27/2013     D-Dimer:  Lab Results   Component Value Date    DDIMER 2.06 05/15/2021     Others:   Lab Results   Component Value Date    TSH 2.91 05/15/2020     Lab Results   Component Value Date    RIZWAN NEGATIVE 12/04/2020    SEDRATE 9 06/10/2013    CRP 3.68 (H) 11/11/2011     No results found for: Magy Ariasos  Lab Results   Component Value Date    IRON 63 05/10/2021    TIBC 316 05/10/2021    FERRITIN 79 05/10/2021     No results found for: SPEP, UPEP  No results found for: PSA, CEA, , NF0150,     Input/Output:    Intake/Output Summary (Last 24 hours) at 5/24/2021 1305  Last data filed at 5/24/2021 0526  Gross per 24 hour   Intake 410 ml   Output 1000 ml   Net -590 ml       Microbiology:  No results for input(s): SPECDESC, SPECDESC, SPECIAL, CULTURE, CULTURE, STATUS, ORG, CDIFFTOXPCR, CAMPYLOBPCR, SALMONELLAPC, SHIGAPCR, SHIGELLAPCR, MPNEUG, MPNEUM, LACTOQL in the last 72 hours. Pathology:    Radiology reports:  CT SINUS WO CONTRAST   Preliminary Result   Complete opacification of the right maxillary sinus with mild mucoperiosteal   thickening of the ethmoid air cells and inferior frontal sinuses consistent   with sinus inflammation/sinusitis. Right ostiomeatal complex is occluded by   mucoperiosteal thickening.   Cerebral atrophy. Atherosclerotic disease as   above. XR CHEST PORTABLE   Final Result   No acute cardiopulmonary abnormality. XR CHEST PORTABLE   Final Result   Unremarkable single upright portable AP view of the chest.         XR CHEST PORTABLE   Final Result   Unremarkable single upright portable AP view of the chest.             Echocardiogram:   Results for orders placed during the hospital encounter of 05/03/21    ECHO Complete 2D W Doppler W Color    Narrative  Transthoracic Echocardiography Report (TTE)    Patient Name Gordon Monterroso      Date of Study               05/05/2021  Cam Downing    Date of      1955  Gender                      Male  Birth    Age          73 year(s)  Race                        Black    Room Number  2022        Height:                     66 inch, 167.64 cm    Corporate ID [id number]    Weight:                     140 pounds, 63.5 kg  #    Patient Acct [de-identified]   BSA:          1.72 m^2      BMI:      22.6 kg/m^2  #    MR #         [id number]     Sonographer                 Asa Infield    Accession #  [id number]  Interpreting Physician      Rena Win 62    Fellow                   Referring Nurse  Practitioner    Interpreting             Referring Physician         Jhonatan Garza  Fellow    Type of Study    TTE procedure:2D Echocardiogram, M-Mode, Doppler, Color Doppler. Procedure Date  Date: 05/05/2021 Start: 07:55 AM    Study Location: [facility]  Technical Quality: Good visualization    Indications:LV Function. History / Tech. Comments:  Procedure explained to patient. ESRD.  HTN. DM type 2. Patient Status: Inpatient    Height: 66 inches Weight: 140 pounds BSA: 1.72 m^2 BMI: 22.6 kg/m^2    HR: 52 bpm    Allergies  - Lisinopril. - Ace Inhibitors. - Penicillin. CONCLUSIONS    Summary  Normal LV size . Severe concentric LVH. Septal wall thickness 2.1 cm  No obvious wall motion abnormality seen. Normal LV systolic function with LVEF >65%.   Grade I diastolic dysfunction  Normal RV size and function. RV systolic pressure 38 mmHg  Moderately dilated LA and RA appears normal in size. No obvious significant structural valvular abnormality noted. No significant valvular stenosis or regurgitation noted. Normal aortic root dimension. No significant pericardial effusion noted. No obvious intra-cardiac mass or shunt noted. IVC normal diameter and inspiratory variation indicating normal RA filling  pressure. Consider Amyloidosis    Signature  ----------------------------------------------------------------------------  Electronically signed by Vega PikeInterpreting physician) on  05/05/2021 02:02 PM  ----------------------------------------------------------------------------    ----------------------------------------------------------------------------  Electronically signed by Alley Pereira on 05/05/2021 08:49  AM  ----------------------------------------------------------------------------  FINDINGS  Left Atrium  Left atrium is moderately dilated. Left Ventricle  Left ventricle is normal in size Global left ventricular systolic function  is normal Estimated ejection fraction is > 65% . Severe left ventricular hypertrophy. Right Atrium  Right atrium is normal in size. Right Ventricle  Normal right ventricular size and function. Mitral Valve  Myxomatous thickening of the mitral leaflets. No mitral regurgitation. Aortic Valve  Aortic valve is sclerotic but opens well. Aortic valve is trileaflet. No aortic insufficiency. Tricuspid Valve  No obvious valvular abnormality. Trivial tricuspid regurgitation. Mild pulmonary hypertension. Estimated right ventricular systolic pressure  is 28JHMT. Pulmonic Valve  The pulmonic valve is normal in structure. No pulmonic insufficiency. Pericardial Effusion  No significant pericardial effusion is seen. Miscellaneous  Normal aortic root dimension. E/E' average = 14.1.   IVC normal diameter & inspiratory collapse indicating normal RA filling  pressure . M-mode / 2D Measurements & Calculations:    LVIDd:3.4 cm(3.7 - 5.6 cm)       Diastolic XVNKQA:755 ml  LVIDs:2 cm(2.2 - 4.0 cm)         Systolic XKIULT:33 ml  HBIF:2.3 cm(0.6 - 1.1 cm)        Aortic Root:2.8 cm(2.0 - 3.7 cm)  LVPWd:2.1 cm(0.6 - 1.1 cm)       LA Dimension: 3.6 cm(1.9 - 4.0 cm)  Fractional Shortenin.18 %    LA volume/Index: 71.8 ml /42m^2  Calculated LVEF (%): 55.78 %  RVDd:2.9 cm    Mitral:                                  Aortic    Valve Area (P1/2-Time): 2.14 cm^2        Peak Velocity: 1.49 m/s  Peak E-Wave: 0.70 m/s                    Mean Velocity: 0.95 m/s  Peak A-Wave: 0.87 m/s                    Peak Gradient: 8.88 mmHg  E/A Ratio: 0.81                          Mean Gradient: 4 mmHg  Peak Gradient: 1.95 mmHg  Mean Gradient: 1 mmHg  Deceleration Time: 352 msec              AV VTI: 32.2 cm  P1/2t: 103 msec    Mean Velocity: 0.48 m/s    Tricuspid:                               Pulmonic:    Peak TR Velocity: 2.90 m/s               Peak Velocity: 1.55 m/s  Peak TR Gradient: 33.64 mmHg             Peak Gradient: 9.61 mmHg  Estimated RA Pressure: 5 mmHg    Estimated PASP: 38.64 mmHg    Diastology / Tissue Doppler  Septal Wall E' velocity:0.05 m/s  Septal Wall E/E':14.6  Lateral Wall E' velocity:0.05 m/s  Lateral Wall E/E':13.7      Cardiac Catheterization:   No results found for this or any previous visit. ASSESSMENT AND PLAN     Assessment:    //Acute hypoxic respiratory failure.  //Likely chronic hypoxic respiratory failure  //COPD likely severe.  //COPD exacerbation.  //Mild pulmonary hypertension.  //Probable obstructive sleep apnea.  //End-stage renal disease/CKD on hemodialysis now  //Diabetes mellitus.     Plan:    Patient is currently using BiPAP at night while he is in the hospital.  Continue nocturnal BiPAP and as needed during the daytime  Continue with supplemental O2 to keep saturation 90% and above.  Continue with DuoNeb aerosol. Short course of prednisone  Continue with Flonase nasal spray. CT scan of the sinuses ordered by primary service need follow-up. Continue with hemodialysis per nephrology  Continue to monitor I/O with a goal of even/negative fluid balance  Antimicrobials reviewed; on doxycycline  DVT prophylaxis  Physical/occupational/speech therapy; increase activity as tolerated  Home O2 evaluation on discharge. Discussed with nursing staff and respiratory therapist   Will need follow-up with Dr. Joy Cerda after discharge and outpatient pulmonary function test and likely outpatient sleep study    I updated the patient regarding the current clinical condition, provisional diagnosis and management plan. I addressed concerns and answered all questions to the best of my abilities. It was my pleasure to evaluate Joselin Knight today. We will continue to follow. I would like to thank you for allowing me to participate in the care of this patient. Please feel free to call with any further questions or concerns. Cathy Gtz MD, M.D. Pulmonary and Critical Care Medicine           5/24/2021, 1:05 PM    Please note that this chart was generated using voice recognition Dragon dictation software. Although every effort was made to ensure the accuracy of this automated transcription, some errors in transcription may have occurred.

## 2021-05-25 ENCOUNTER — APPOINTMENT (OUTPATIENT)
Dept: DIALYSIS | Age: 66
DRG: 280 | End: 2021-05-25
Payer: COMMERCIAL

## 2021-05-25 PROBLEM — J96.01 ACUTE RESPIRATORY FAILURE WITH HYPOXIA (HCC): Status: RESOLVED | Noted: 2021-05-19 | Resolved: 2021-05-25

## 2021-05-25 LAB
ALBUMIN SERPL-MCNC: 2.5 G/DL (ref 3.5–5.2)
ANION GAP SERPL CALCULATED.3IONS-SCNC: 10 MMOL/L (ref 9–17)
BUN BLDV-MCNC: 96 MG/DL (ref 8–23)
BUN/CREAT BLD: ABNORMAL (ref 9–20)
CALCIUM SERPL-MCNC: 7.9 MG/DL (ref 8.6–10.4)
CHLORIDE BLD-SCNC: 97 MMOL/L (ref 98–107)
CO2: 25 MMOL/L (ref 20–31)
CREAT SERPL-MCNC: 4.1 MG/DL (ref 0.7–1.2)
GFR AFRICAN AMERICAN: 18 ML/MIN
GFR NON-AFRICAN AMERICAN: 15 ML/MIN
GFR SERPL CREATININE-BSD FRML MDRD: ABNORMAL ML/MIN/{1.73_M2}
GFR SERPL CREATININE-BSD FRML MDRD: ABNORMAL ML/MIN/{1.73_M2}
GLUCOSE BLD-MCNC: 202 MG/DL (ref 75–110)
GLUCOSE BLD-MCNC: 218 MG/DL (ref 75–110)
GLUCOSE BLD-MCNC: 244 MG/DL (ref 75–110)
GLUCOSE BLD-MCNC: 274 MG/DL (ref 75–110)
GLUCOSE BLD-MCNC: 302 MG/DL (ref 70–99)
HCT VFR BLD CALC: 27.7 % (ref 40.7–50.3)
HEMOGLOBIN: 8.8 G/DL (ref 13–17)
MAGNESIUM: 1.8 MG/DL (ref 1.6–2.6)
MCH RBC QN AUTO: 31.1 PG (ref 25.2–33.5)
MCHC RBC AUTO-ENTMCNC: 31.8 G/DL (ref 28.4–34.8)
MCV RBC AUTO: 97.9 FL (ref 82.6–102.9)
NRBC AUTOMATED: 0 PER 100 WBC
PDW BLD-RTO: 12.5 % (ref 11.8–14.4)
PHOSPHORUS: 5.1 MG/DL (ref 2.5–4.5)
PLATELET # BLD: 341 K/UL (ref 138–453)
PMV BLD AUTO: 10.1 FL (ref 8.1–13.5)
POTASSIUM SERPL-SCNC: 4.3 MMOL/L (ref 3.7–5.3)
RBC # BLD: 2.83 M/UL (ref 4.21–5.77)
SODIUM BLD-SCNC: 132 MMOL/L (ref 135–144)
WBC # BLD: 9.3 K/UL (ref 3.5–11.3)

## 2021-05-25 PROCEDURE — 90935 HEMODIALYSIS ONE EVALUATION: CPT

## 2021-05-25 PROCEDURE — 6370000000 HC RX 637 (ALT 250 FOR IP): Performed by: INTERNAL MEDICINE

## 2021-05-25 PROCEDURE — 99232 SBSQ HOSP IP/OBS MODERATE 35: CPT | Performed by: INTERNAL MEDICINE

## 2021-05-25 PROCEDURE — 94640 AIRWAY INHALATION TREATMENT: CPT

## 2021-05-25 PROCEDURE — 83735 ASSAY OF MAGNESIUM: CPT

## 2021-05-25 PROCEDURE — 2580000003 HC RX 258: Performed by: NURSE PRACTITIONER

## 2021-05-25 PROCEDURE — APPSS45 APP SPLIT SHARED TIME 31-45 MINUTES: Performed by: NURSE PRACTITIONER

## 2021-05-25 PROCEDURE — 6360000002 HC RX W HCPCS: Performed by: NURSE PRACTITIONER

## 2021-05-25 PROCEDURE — 36415 COLL VENOUS BLD VENIPUNCTURE: CPT

## 2021-05-25 PROCEDURE — 90935 HEMODIALYSIS ONE EVALUATION: CPT | Performed by: INTERNAL MEDICINE

## 2021-05-25 PROCEDURE — 94761 N-INVAS EAR/PLS OXIMETRY MLT: CPT

## 2021-05-25 PROCEDURE — 82947 ASSAY GLUCOSE BLOOD QUANT: CPT

## 2021-05-25 PROCEDURE — 6360000002 HC RX W HCPCS: Performed by: INTERNAL MEDICINE

## 2021-05-25 PROCEDURE — 80069 RENAL FUNCTION PANEL: CPT

## 2021-05-25 PROCEDURE — 2700000000 HC OXYGEN THERAPY PER DAY

## 2021-05-25 PROCEDURE — 6370000000 HC RX 637 (ALT 250 FOR IP): Performed by: NURSE PRACTITIONER

## 2021-05-25 PROCEDURE — 2060000000 HC ICU INTERMEDIATE R&B

## 2021-05-25 PROCEDURE — 6360000002 HC RX W HCPCS: Performed by: STUDENT IN AN ORGANIZED HEALTH CARE EDUCATION/TRAINING PROGRAM

## 2021-05-25 PROCEDURE — 85027 COMPLETE CBC AUTOMATED: CPT

## 2021-05-25 RX ORDER — LEVOFLOXACIN 500 MG/1
500 TABLET, FILM COATED ORAL DAILY
Qty: 10 TABLET | Refills: 0 | Status: SHIPPED | OUTPATIENT
Start: 2021-05-25 | End: 2021-05-26 | Stop reason: HOSPADM

## 2021-05-25 RX ORDER — SODIUM CHLORIDE 9 MG/ML
INJECTION, SOLUTION INTRAVENOUS PRN
Status: DISCONTINUED | OUTPATIENT
Start: 2021-05-25 | End: 2021-05-26 | Stop reason: HOSPADM

## 2021-05-25 RX ORDER — GUAIFENESIN 600 MG/1
600 TABLET, EXTENDED RELEASE ORAL 2 TIMES DAILY
Qty: 20 TABLET | Refills: 0 | Status: SHIPPED | OUTPATIENT
Start: 2021-05-25 | End: 2021-06-04

## 2021-05-25 RX ORDER — LEVOFLOXACIN 250 MG/1
250 TABLET ORAL EVERY OTHER DAY
Status: DISCONTINUED | OUTPATIENT
Start: 2021-05-27 | End: 2021-05-26 | Stop reason: HOSPADM

## 2021-05-25 RX ORDER — LEVOFLOXACIN 500 MG/1
500 TABLET, FILM COATED ORAL ONCE
Status: COMPLETED | OUTPATIENT
Start: 2021-05-25 | End: 2021-05-25

## 2021-05-25 RX ORDER — MONTELUKAST SODIUM 10 MG/1
10 TABLET ORAL NIGHTLY
Qty: 30 TABLET | Refills: 0 | Status: SHIPPED | OUTPATIENT
Start: 2021-05-25 | End: 2021-06-11 | Stop reason: SDUPTHER

## 2021-05-25 RX ORDER — HEPARIN SODIUM 5000 [USP'U]/ML
5000 INJECTION, SOLUTION INTRAVENOUS; SUBCUTANEOUS EVERY 8 HOURS SCHEDULED
Status: DISCONTINUED | OUTPATIENT
Start: 2021-05-25 | End: 2021-05-26 | Stop reason: HOSPADM

## 2021-05-25 RX ADMIN — SODIUM BICARBONATE 1300 MG: 648 TABLET ORAL at 21:21

## 2021-05-25 RX ADMIN — HEPARIN SODIUM 1600 UNITS: 1000 INJECTION INTRAVENOUS; SUBCUTANEOUS at 12:39

## 2021-05-25 RX ADMIN — HEPARIN SODIUM 5000 UNITS: 5000 INJECTION INTRAVENOUS; SUBCUTANEOUS at 21:25

## 2021-05-25 RX ADMIN — Medication 81 MG: at 17:46

## 2021-05-25 RX ADMIN — IPRATROPIUM BROMIDE AND ALBUTEROL SULFATE 1 AMPULE: .5; 3 SOLUTION RESPIRATORY (INHALATION) at 17:44

## 2021-05-25 RX ADMIN — CARVEDILOL 25 MG: 25 TABLET, FILM COATED ORAL at 17:46

## 2021-05-25 RX ADMIN — IPRATROPIUM BROMIDE AND ALBUTEROL SULFATE 1 AMPULE: .5; 3 SOLUTION RESPIRATORY (INHALATION) at 19:37

## 2021-05-25 RX ADMIN — LOSARTAN POTASSIUM 50 MG: 50 TABLET, FILM COATED ORAL at 21:20

## 2021-05-25 RX ADMIN — PREDNISONE 30 MG: 20 TABLET ORAL at 17:46

## 2021-05-25 RX ADMIN — HYDRALAZINE HYDROCHLORIDE 100 MG: 50 TABLET, FILM COATED ORAL at 21:21

## 2021-05-25 RX ADMIN — GUAIFENESIN 600 MG: 600 TABLET, EXTENDED RELEASE ORAL at 21:20

## 2021-05-25 RX ADMIN — PRAZOSIN HYDROCHLORIDE 4 MG: 1 CAPSULE ORAL at 21:20

## 2021-05-25 RX ADMIN — LEVOFLOXACIN 500 MG: 500 TABLET, FILM COATED ORAL at 17:50

## 2021-05-25 RX ADMIN — HYDRALAZINE HYDROCHLORIDE 100 MG: 50 TABLET, FILM COATED ORAL at 06:45

## 2021-05-25 RX ADMIN — MONTELUKAST SODIUM 10 MG: 10 TABLET, FILM COATED ORAL at 21:20

## 2021-05-25 RX ADMIN — CLONIDINE HYDROCHLORIDE 0.2 MG: 0.2 TABLET ORAL at 21:21

## 2021-05-25 RX ADMIN — NIFEDIPINE 90 MG: 90 TABLET, EXTENDED RELEASE ORAL at 21:20

## 2021-05-25 RX ADMIN — SODIUM CHLORIDE, PRESERVATIVE FREE 10 ML: 5 INJECTION INTRAVENOUS at 21:19

## 2021-05-25 RX ADMIN — ISOSORBIDE MONONITRATE 60 MG: 60 TABLET ORAL at 17:46

## 2021-05-25 RX ADMIN — FAMOTIDINE 20 MG: 20 TABLET, FILM COATED ORAL at 21:21

## 2021-05-25 RX ADMIN — ATORVASTATIN CALCIUM 80 MG: 80 TABLET, FILM COATED ORAL at 21:22

## 2021-05-25 RX ADMIN — INSULIN GLARGINE 20 UNITS: 100 INJECTION, SOLUTION SUBCUTANEOUS at 21:25

## 2021-05-25 RX ADMIN — HEPARIN SODIUM 1600 UNITS: 1000 INJECTION INTRAVENOUS; SUBCUTANEOUS at 12:40

## 2021-05-25 RX ADMIN — SODIUM CHLORIDE, PRESERVATIVE FREE 10 ML: 5 INJECTION INTRAVENOUS at 10:00

## 2021-05-25 RX ADMIN — IPRATROPIUM BROMIDE AND ALBUTEROL SULFATE 1 AMPULE: .5; 3 SOLUTION RESPIRATORY (INHALATION) at 08:44

## 2021-05-25 RX ADMIN — ALBUTEROL SULFATE 2.5 MG: 2.5 SOLUTION RESPIRATORY (INHALATION) at 23:52

## 2021-05-25 RX ADMIN — CLOPIDOGREL 75 MG: 75 TABLET, FILM COATED ORAL at 17:46

## 2021-05-25 ASSESSMENT — ENCOUNTER SYMPTOMS
SHORTNESS OF BREATH: 1
ABDOMINAL PAIN: 0
TROUBLE SWALLOWING: 0
EYES NEGATIVE: 1
WHEEZING: 1
SINUS PAIN: 0
NAUSEA: 0
ALLERGIC/IMMUNOLOGIC NEGATIVE: 1
DIARRHEA: 0
COUGH: 1
SORE THROAT: 0
VOICE CHANGE: 0
BLOOD IN STOOL: 0
ABDOMINAL DISTENTION: 0
VOMITING: 0

## 2021-05-25 ASSESSMENT — PAIN SCALES - GENERAL
PAINLEVEL_OUTOF10: 0
PAINLEVEL_OUTOF10: 0

## 2021-05-25 NOTE — CONSULTS
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 Will need further workup as outpatient. Treat with oral antibiotics (Levaquin 500 qd X 10 days) and nasal steroid spray. Follow up with ENT as outpatient. If surgery is required he will need to come off anticoagulation. Please assess risk. CHIEF COMPLAINT:  Right nasal obstruction    HISTORY OF PRESENT ILLNESS:              Srinivasan Atwood  is a 73 y.o. male with right sided nasal obstruction identified on CT scan. He has been asymptomatic without overt signs of infection. Has a complicated medical history. No significant pollen allergy.     Past Medical History:        Diagnosis Date    Asthma     mod persistent    CAD in native artery, nonobstructive     Carotid stenosis, left 6/10/2013    Carpal tunnel syndrome     RIGHT    Cerebrovascular disease 4/23/2018    Chronic kidney disease 6/10/2013    COPD (chronic obstructive pulmonary disease) (HCC)     Diabetes mellitus (HCC)     insulin dependent    History of colon polyps     History of weakness of extremity     right sided    HTN (hypertension)     Hyperlipidemia     Lung, cysts, congenital     PTSD (post-traumatic stress disorder)     PTSD (post-traumatic stress disorder)     TIA (transient ischemic attack)     Type II or unspecified type diabetes mellitus without mention of complication, not stated as uncontrolled      Past Surgical History:        Procedure Laterality Date    CAROTID ENDARTERECTOMY Left 7-2013    COLONOSCOPY      HERNIA REPAIR      IR TUNNELED CATHETER PLACEMENT GREATER THAN 5 YEARS  5/11/2021    IR TUNNELED CATHETER PLACEMENT GREATER THAN 5 YEARS 5/11/2021 Flavio Mccauley MD [facility] SPECIAL PROCEDURES    [place] COLSC FLX W/RMVL OF TUMOR POLYP LESION SNARE TQ N/A 6/27/2017    COLONOSCOPY POLYPECTOMY SNARE/ hot performed by Shyam Alejo DO at [facility] VASCULAR SURGERY Left 2015    carotid stent,  tito     Current Medications:   Current Facility-Administered Medications: LORazepam (ATIVAN) tablet 0.5 mg, 0.5 mg, Oral, Q4H PRN  fluticasone (FLONASE) 50 MCG/ACT nasal spray 1 spray, 1 spray, Each Nostril, Daily  albuterol (PROVENTIL) nebulizer solution 2.5 mg, 2.5 mg, Nebulization, BID  montelukast (SINGULAIR) tablet 10 mg, 10 mg, Oral, Nightly  predniSONE (DELTASONE) tablet 30 mg, 30 mg, Oral, Daily  insulin lispro (HUMALOG) injection vial 0-18 Units, 0-18 Units, Subcutaneous, TID WC  insulin lispro (HUMALOG) injection vial 0-9 Units, 0-9 Units, Subcutaneous, Nightly  guaiFENesin (MUCINEX) extended release tablet 600 mg, 600 mg, Oral, BID  insulin lispro (HUMALOG) injection vial 3 Units, 3 Units, Subcutaneous, Q8H  nitroGLYCERIN (NITROSTAT) SL tablet 0.4 mg, 0.4 mg, Sublingual, Q5 Min PRN  insulin glargine (LANTUS) injection vial 20 Units, 20 Units, Subcutaneous, Nightly  heparin (porcine) injection 1,600 Units, 1,600 Units, Intracatheter, PRN  heparin (porcine) injection 1,600 Units, 1,600 Units, Intracatheter, PRN  doxycycline hyclate (VIBRA-TABS) tablet 100 mg, 100 mg, Oral, BID  albuterol (PROVENTIL) nebulizer solution 2.5 mg, 2.5 mg, Nebulization, Q4H PRN  aspirin EC tablet 81 mg, 81 mg, Oral, Daily  atorvastatin (LIPITOR) tablet 80 mg, 80 mg, Oral, Daily  carvedilol (COREG) tablet 25 mg, 25 mg, Oral, BID WC  cloNIDine (CATAPRES) tablet 0.2 mg, 0.2 mg, Oral, TID  clopidogrel (PLAVIX) tablet 75 mg, 75 mg, Oral, Daily  hydrALAZINE (APRESOLINE) tablet 100 mg, 100 mg, Oral, 3 times per day  isosorbide mononitrate (IMDUR) extended release tablet 60 mg, 60 mg, Oral, Daily  losartan (COZAAR) tablet 50 mg, 50 mg, Oral, BID  NIFEdipine (ADALAT CC) extended release tablet 90 mg, 90 mg, Oral, Nightly  prazosin (MINIPRESS) capsule 4 mg, 4 mg, Oral, BID  QUEtiapine (SEROQUEL) tablet 100 mg, 100 mg, Oral, Nightly  sodium bicarbonate tablet 1,300 mg, 1,300 mg, Oral, BID  glucose (GLUTOSE) 40 % oral gel 15 g, 15 g, Oral, PRN  dextrose 50 % IV solution, 12.5 g, Intravenous, PRN  glucagon (rDNA) injection 1 mg, 1 mg, Intramuscular, PRN  dextrose 5 % solution, 100 mL/hr, Intravenous, PRN  sodium chloride flush 0.9 % injection 5-40 mL, 5-40 mL, Intravenous, 2 times per day  sodium chloride flush 0.9 % injection 5-40 mL, 5-40 mL, Intravenous, PRN  0.9 % sodium chloride infusion, 25 mL, Intravenous, PRN  famotidine (PEPCID) tablet 20 mg, 20 mg, Oral, BID  promethazine (PHENERGAN) tablet 12.5 mg, 12.5 mg, Oral, Q6H PRN **OR** ondansetron (ZOFRAN) injection 4 mg, 4 mg, Intravenous, Q6H PRN  nicotine (NICODERM CQ) 21 MG/24HR 1 patch, 1 patch, Transdermal, Daily PRN  polyethylene glycol (GLYCOLAX) packet 17 g, 17 g, Oral, Daily PRN  enoxaparin (LOVENOX) injection 40 mg, 40 mg, Subcutaneous, Daily  acetaminophen (TYLENOL) tablet 650 mg, 650 mg, Oral, Q6H PRN **OR** acetaminophen (TYLENOL) suppository 650 mg, 650 mg, Rectal, Q6H PRN  bumetanide (BUMEX) injection 2 mg, 2 mg, Intravenous, Once  ipratropium-albuterol (DUONEB) nebulizer solution 1 ampule, 1 ampule, Inhalation, 4x daily  Allergies:  Lisinopril, Ace inhibitors, and Pcn [penicillins]    Social History:    Social History     Socioeconomic History    Marital status:      Spouse name: None    Number of children: None    Years of education: None    Highest education level: None   Occupational History     Employer: N/A   Tobacco Use    Smoking status: Former Smoker     Packs/day: 0.50     Years: 35.00     Pack years: 17.50     Types: Cigarettes     Quit date: 2014     Years since quittin.8    Smokeless tobacco: Never Used   Vaping Use    Vaping Use: Never used   Substance and Sexual Activity    Alcohol use: No     Alcohol/week: 0.0 standard drinks    Drug use: No    Sexual activity: Yes     Partners: Female   Other Topics Concern    None   Social History Narrative    None     Social Determinants of Health     Financial Resource Strain:     Difficulty of Paying Living Expenses:    Food Insecurity:     Worried About Running Out of Food in the Last Year:     [address] in the Last Year:    Transportation Needs:     Lack of Transportation (Medical):      Lack of Transportation (Non-Medical):    Physical Activity:     Days of Exercise per Week:     Minutes of Exercise per Session:    Stress:     Feeling of Stress :    Social Connections:     Frequency of Communication with Friends and Family:     Frequency of Social Gatherings with Friends and Family:     Attends [employer] Services:     Active Member of Clubs or Organizations:     Attends Club or Organization Meetings:     Marital Status:    Intimate Partner Violence:     Fear of Current or Ex-Partner:     Emotionally Abused:     Physically Abused:     Sexually Abused:        Family History:        Problem Relation Age of Onset    Cancer Mother     Heart Disease Father        REVIEW OF SYSTEMS:  As above and:  CONSTITUTIONAL:  negative  EYES:  negative   HEENT:  negative   RESPIRATORY:  negative   CARDIOVASCULAR:  negative    GASTROINTESTINAL:  negative   GENITOURINARY:  negative   INTEGUMENT/BREAST:  negative   HEMATOLOGIC/LYMPHATIC:  negative   ALLERGIC/IMMUNOLOGIC:  negative   ENDOCRINE:  negative   MUSCULOSKELETAL:  negative   NEUROLOGICAL:  negative   BEHAVIOR/PSYCH:  negative     PHYSICAL EXAM:    VITALS:  BP (!) 156/55   Pulse 68   Temp 98.4 °F (36.9 °C) (Oral)   Resp 20   Wt 145 lb 3.2 oz (65.9 kg)   SpO2 99%   BMI 23.44 kg/m²       CONSTITUTIONAL:  awake, alert, cooperative, no apparent distress, and appears stated age  EYES:  Lids and lashes normal, pupils equal, round and reactive to light, extra ocular muscles intact, sclera clear, conjunctiva normal  ENT:  Normocephalic, without obvious abnormality, atraumatic, sinuses nontender on palpation, external ears without lesions, oral pharynx with moist mucus membranes, tonsils without erythema or exudates, gums normal.  NECK:  Supple, symmetrical, trachea midline, no adenopathy, thyroid symmetric, not enlarged and no tenderness, skin normal  MUSCULOSKELETAL:  There is no redness, warmth, or swelling of the joints. Full range of motion noted. Motor strength is 5 out of 5 all extremities bilaterally. Tone is normal.  NEUROLOGIC:  Awake, alert, oriented to name, place and time. Cranial nerves II-XII are grossly intact. Motor is 5 out of 5 bilaterally. Sensory is intact.      DATA:    Labs and Radiology reports/films reviewed

## 2021-05-25 NOTE — PROGRESS NOTES
PULMONARY & CRITICAL CARE MEDICINE PROGRESS  NOTE     Patient:  Jaycee Christina  MRN: [id number]  Admit date: 5/19/2021  Primary Care Physician: TERRANCE More - CNP  Consulting Physician: Mariana Estrada DO  CODE Status: Full Code  LOS: 6    SUBJECTIVE     I personally interviewed/examined the patient, reviewed interval history and interpreted all available radiographic, laboratory data at the time of service. Chief Compliant/Reason for Initial Consult:   Acute on likely chronic hypoxic respiratory failure  COPD likely severe    Brief Hospital Course: The patient is a 73 y.o. male admitted because of worsening shortness of breath. He was discharged 2 days prior to hospital admission and had been started on hemodialysis. According to patient he did go to his first dialysis session. He continues to feel worsening shortness of breath and sputum production without hemoptysis. He does not smoke but has secondhand smoke exposure. He is on Trelegy at home and on Combivent        Interval History:  05/25/21  Overnight events noted chart seen labs reviewed and medications seen. Patient was seen in hemodialysis unit  He is hemodynamically stable afebrile T-max 33.8 systolic blood pressure is high between 160 and 170. He is on 4 L nasal cannula saturating 98 to 100%. According to patient he had used BiPAP overnight. According to nursing staff patient had not used BiPAP at night. Intermittently he had been refusing labs and takes off oxygen per nursing staff. He does have cough denies much change in cough denies much sputum production and denies chest pain and hemoptysis. Does complain of wheezing off and on  Shortness of breath is better does complain of shortness of breath on activity.   He had CT sinuses done on 05/24/2021 shows complete opacification of right maxillary sinus and mucoperiosteal thickening of ethmoid air cells and inferior frontal sinus consistent with sinusitis. Review of Systems:  Review of Systems   Constitutional: Negative for appetite change, fatigue and fever. HENT: Positive for postnasal drip. Negative for sinus pain, sore throat, trouble swallowing and voice change. Eyes: Negative. Respiratory: Positive for cough, shortness of breath and wheezing. Cardiovascular: Negative for chest pain, palpitations and leg swelling. Gastrointestinal: Negative for abdominal distention, abdominal pain, blood in stool, diarrhea, nausea and vomiting. Endocrine: Negative. Genitourinary: Negative for dysuria, frequency and hematuria. Musculoskeletal: Negative. Allergic/Immunologic: Negative. Neurological: Negative for dizziness, seizures, speech difficulty, numbness and headaches. Hematological: Negative for adenopathy. Does not bruise/bleed easily. Psychiatric/Behavioral: The patient is nervous/anxious.         OBJECTIVE     VITAL SIGNS:   LAST-  BP (!) 167/70   Pulse 67   Temp 97.6 °F (36.4 °C)   Resp 20   Ht 5' 6\" (1.676 m)   Wt 150 lb 2.1 oz (68.1 kg)   SpO2 100%   BMI 24.23 kg/m²   8-24 HR RANGE-  TEMP Temp  Av.1 °F (36.7 °C)  Min: 97.6 °F (36.4 °C)  Max: 98.5 °F (87.2 °C)   BP Systolic (84JRP), YYQ:785 , Min:130 , OBF:245      Diastolic (01WAV), RRH:43, Min:45, Max:78     PULSE Pulse  Av.9  Min: 58  Max: 90   RR Resp  Av.3  Min: 16  Max: 20   O2 SAT SpO2  Av.3 %  Min: 98 %  Max: 100 %   OXYGEN DELIVERY O2 Flow Rate (L/min)  Av L/min  Min: 4 L/min  Max: 4 L/min     Systemic Examination:   Physical Exam  General appearance - looks comfortable, not in distress but mildly tachypneic  Mental status - alert, oriented to person, place, and time  Eyes - pupils equal and reactive, extraocular eye movements intact  Mouth - mucous membranes moist, pharynx normal without lesions  Neck - supple, no significant adenopathy, short thick neck  Chest -he does have use of extra muscles when he talk, he is mildly tachypneic not in distress chest was symmetrical with increased resonance on percussion, air entry is bilaterally reduced and distant with prolonged expiration no expiratory wheeze and no rhonchi or crackles.   Heart - normal rate, regular rhythm, normal S1, S2, no murmurs, rubs, clicks or gallops  Abdomen - soft, nontender, nondistended, no masses or organomegaly  Neurological - alert, oriented, normal speech, no focal findings or movement disorder noted  Extremities - peripheral pulses normal, no pedal edema, no clubbing or cyanosis  Skin - normal coloration and turgor, no rashes, no suspicious skin lesions noted     DATA REVIEW     Medications:  Scheduled Meds:   heparin (porcine)  5,000 Units Subcutaneous 3 times per day    fluticasone  1 spray Each Nostril Daily    albuterol  2.5 mg Nebulization BID    montelukast  10 mg Oral Nightly    predniSONE  30 mg Oral Daily    insulin lispro  0-18 Units Subcutaneous TID WC    insulin lispro  0-9 Units Subcutaneous Nightly    guaiFENesin  600 mg Oral BID    insulin lispro  3 Units Subcutaneous Q8H    insulin glargine  20 Units Subcutaneous Nightly    aspirin  81 mg Oral Daily    atorvastatin  80 mg Oral Daily    carvedilol  25 mg Oral BID WC    cloNIDine  0.2 mg Oral TID    clopidogrel  75 mg Oral Daily    hydrALAZINE  100 mg Oral 3 times per day    isosorbide mononitrate  60 mg Oral Daily    losartan  50 mg Oral BID    NIFEdipine  90 mg Oral Nightly    prazosin  4 mg Oral BID    QUEtiapine  100 mg Oral Nightly    sodium bicarbonate  1,300 mg Oral BID    sodium chloride flush  5-40 mL Intravenous 2 times per day    famotidine  20 mg Oral BID    bumetanide  2 mg Intravenous Once    ipratropium-albuterol  1 ampule Inhalation 4x daily     Continuous Infusions:   sodium chloride      dextrose      sodium chloride       LABS:-  ABG:   No results for input(s): POCPH, POCPCO2, POCPO2, POCHCO3, DWPR1KIN in the last 72 hours.  CBC:   Recent Labs     05/23/21  0925 05/24/21  0405 05/25/21  0633   WBC 11.4* 10.1 9.3   HGB 8.8* 8.7* 8.8*   HCT 27.9* 27.2* 27.7*   MCV 98.9 98.9 97.9    333 341   RBC 2.82* 2.75* 2.83*   MCH 31.2 31.6 31.1   MCHC 31.5 32.0 31.8   RDW 12.8 12.9 12.5     BMP:   Recent Labs     05/23/21  0925 05/24/21  0405 05/25/21  0633   * 132* 132*   K 4.2 3.9 4.3   CL 93* 96* 97*   CO2 25 25 25   BUN 59* 77* 96*   CREATININE 3.04* 3.62* 4.10*   GLUCOSE 348* 412* 302*   PHOS 4.8* 5.2* 5.1*     Liver Function Test:   Recent Labs     05/25/21  0633   LABALBU 2.5*     Amylase/Lipase:  No results for input(s): AMYLASE, LIPASE in the last 72 hours. Coagulation Profile:   No results for input(s): INR, PROTIME, APTT in the last 72 hours. Cardiac Enzymes:  No results for input(s): CKTOTAL, CKMB, CKMBINDEX, TROPONINI in the last 72 hours. Lactic Acid:  Lab Results   Component Value Date    LACTA NOT REPORTED 09/02/2018     BNP:   Lab Results   Component Value Date    BNP 84 11/27/2013     D-Dimer:  Lab Results   Component Value Date    DDIMER 2.06 05/15/2021     Others:   Lab Results   Component Value Date    TSH 2.91 05/15/2020     Lab Results   Component Value Date    RIZWAN NEGATIVE 12/04/2020    SEDRATE 9 06/10/2013    CRP 3.68 (H) 11/11/2011     No results found for: Hollie Marques  Lab Results   Component Value Date    IRON 63 05/10/2021    TIBC 316 05/10/2021    FERRITIN 79 05/10/2021     No results found for: SPEP, UPEP  No results found for: PSA, CEA, , FQ6088,     Input/Output:    Intake/Output Summary (Last 24 hours) at 5/25/2021 1526  Last data filed at 5/25/2021 0617  Gross per 24 hour   Intake 1660 ml   Output --   Net 1660 ml       Microbiology:  No results for input(s): SPECDESC, SPECDESC, SPECIAL, CULTURE, CULTURE, STATUS, ORG, CDIFFTOXPCR, CAMPYLOBPCR, SALMONELLAPC, SHIGAPCR, SHIGELLAPCR, MPNEUG, MPNEUM, LACTOQL in the last 72 hours.     Pathology:    Radiology reports:  CT SINUS WO CONTRAST   Final Result   Complete opacification of the right maxillary sinus with mild mucoperiosteal   thickening of the ethmoid air cells and inferior frontal sinuses consistent   with sinus inflammation/sinusitis. Right ostiomeatal complex is occluded by   mucoperiosteal thickening. Cerebral atrophy. Atherosclerotic disease as   above. XR CHEST PORTABLE   Final Result   No acute cardiopulmonary abnormality. XR CHEST PORTABLE   Final Result   Unremarkable single upright portable AP view of the chest.         XR CHEST PORTABLE   Final Result   Unremarkable single upright portable AP view of the chest.             Echocardiogram:   Results for orders placed during the hospital encounter of 05/03/21    ECHO Complete 2D W Doppler W Color    Narrative  Transthoracic Echocardiography Report (TTE)    Patient Name Cleopatra Little      Date of Study               05/05/2021  nishun Medhat    Date of      1955  Gender                      Male  Birth    Age          73 year(s)  Race                        Black    Room Number  2022        Height:                     66 inch, 167.64 cm    Corporate ID [id number]    Weight:                     140 pounds, 63.5 kg  #    Patient Acct [de-identified]   BSA:          1.72 m^2      BMI:      22.6 kg/m^2  #    MR #         [id number]     Sonographer                 Indu Tai    Accession #  [id number]  Interpreting Physician      Rena Loco    Fellow                   Referring Nurse  Practitioner    Interpreting             Referring Physician         Brenden Causey  Fellow    Type of Study    TTE procedure:2D Echocardiogram, M-Mode, Doppler, Color Doppler. Procedure Date  Date: 05/05/2021 Start: 07:55 AM    Study Location: [facility]  Technical Quality: Good visualization    Indications:LV Function. History / Tech. Comments:  Procedure explained to patient. ESRD.  HTN. DM type 2.     Patient Status: Inpatient    Height: 66 inches Weight: 140 pounds BSA: 1.72 m^2 BMI: 22.6 kg/m^2    HR: 52 bpm    Allergies  - Lisinopril. - Ace Inhibitors. - Penicillin. CONCLUSIONS    Summary  Normal LV size . Severe concentric LVH. Septal wall thickness 2.1 cm  No obvious wall motion abnormality seen. Normal LV systolic function with LVEF >65%. Grade I diastolic dysfunction  Normal RV size and function. RV systolic pressure 38 mmHg  Moderately dilated LA and RA appears normal in size. No obvious significant structural valvular abnormality noted. No significant valvular stenosis or regurgitation noted. Normal aortic root dimension. No significant pericardial effusion noted. No obvious intra-cardiac mass or shunt noted. IVC normal diameter and inspiratory variation indicating normal RA filling  pressure. Consider Amyloidosis    Signature  ----------------------------------------------------------------------------  Electronically signed by Vega PikeInterpreting physician) on  05/05/2021 02:02 PM  ----------------------------------------------------------------------------    ----------------------------------------------------------------------------  Electronically signed by Kamilah Glynn on 05/05/2021 08:49  AM  ----------------------------------------------------------------------------  FINDINGS  Left Atrium  Left atrium is moderately dilated. Left Ventricle  Left ventricle is normal in size Global left ventricular systolic function  is normal Estimated ejection fraction is > 65% . Severe left ventricular hypertrophy. Right Atrium  Right atrium is normal in size. Right Ventricle  Normal right ventricular size and function. Mitral Valve  Myxomatous thickening of the mitral leaflets. No mitral regurgitation. Aortic Valve  Aortic valve is sclerotic but opens well. Aortic valve is trileaflet. No aortic insufficiency. Tricuspid Valve  No obvious valvular abnormality. Trivial tricuspid regurgitation. Mild pulmonary hypertension. sleep apnea.  //End-stage renal disease/CKD on hemodialysis now  //Diabetes mellitus.  //Chronic sinusitis  Plan:    Encourage nocturnal BiPAP and as needed during the daytime while in the hospital  Continue with supplemental O2 and wean O2 to keep saturation 90% and above. Continue with DuoNeb aerosol. Short course of prednisone  Continue with Flonase nasal spray. Saline nasal flushes nasal spray  CT scan of the sinuses results seen  Continue with hemodialysis per nephrology  Continue to monitor I/O with a goal of even/negative fluid balance  Antimicrobials reviewed; on doxycycline  DVT prophylaxis on heparin subcutaneous  Physical/occupational/speech therapy; increase activity as tolerated  Home O2 evaluation on discharge. Discussed with nursing staff   Will need follow-up with Dr. Camacho Saenz after discharge and outpatient pulmonary function test and likely outpatient sleep study    I updated the patient regarding the current clinical condition, provisional diagnosis and management plan. I addressed concerns and answered all questions to the best of my abilities. It was my pleasure to evaluate Feliciano Just today. We will continue to follow. I would like to thank you for allowing me to participate in the care of this patient. Please feel free to call with any further questions or concerns. Vincent Turpin MD, M.D. Pulmonary and Critical Care Medicine           5/25/2021, 3:26 PM    Please note that this chart was generated using voice recognition Dragon dictation software. Although every effort was made to ensure the accuracy of this automated transcription, some errors in transcription may have occurred.

## 2021-05-25 NOTE — PROGRESS NOTES
Transportation came to get the patient for HD and the pt refused. Pt stated \"If they feed me food down there then I will go, I didn't eat my breakfast because it was wrong\"  Writer called dietary as soon as the food tray was delivered and they said the order was correct. I heated up the patient's food tray and explained to the pt to eat what he could because dialysis called transportation to get him and patient was agreeable at the time.  Writer spoke to Leigh Estrada in HD and explained the pt's refusal and writer PS nephrology to update them

## 2021-05-25 NOTE — PROGRESS NOTES
Physical Therapy    DATE: 2021    NAME: Annita Saenz  MRN: [id number]   : 1955      Patient not seen this date for Physical Therapy due to:    Hemodialysis:      Electronically signed by Miracle James PT on 675 at 11:35 AM

## 2021-05-25 NOTE — PROGRESS NOTES
Occupational Therapy Not Seen Note    DATE: 2021  Name: Lani Velasquez  : 1955  MRN: [id number]    Patient not available for Occupational Therapy due to:    Hemodialysis:    Next Scheduled Treatment: check back 2021    Electronically signed by VANESA Leonardo on 2021 at 11:34 AM

## 2021-05-25 NOTE — PROGRESS NOTES
Home Oxygen Evaluation     Home Oxygen Evaluation completed. Patient is on 4  liters per minute via N/C. Resting SpO2 = 100%  Resting SpO2 on room air = 95%    SpO2 on room air with exercise = 96%  SpO2 on oxygen as above with exercise = 92%    Nocturnal Oximetry with patient on room air is recommended is SpO2 is between 89% and 95% (requires additional order). Jennie Fearing, RCP  5:48 PM         Patient gets very short of breath just doing ADL. Erasto Mosquera

## 2021-05-25 NOTE — PROGRESS NOTES
DAILY  GNP VITAMIN D MAXIMUM STRENGTH 50 MCG (2000 UT) TABS, TAKE 1 TABLET BY MOUTH ONCE DAILY  [DISCONTINUED] TRELEGY ELLIPTA 100-62.5-25 MCG/INH AEPB, INHALE ONE PUFF INTO THE LUNGS DAILY  blood glucose monitor strips, Test_4__times daily Diagnosis: 250.0   Diabetes Mellitus_x___Insulin Dependent____Non-Insulin Dependent  blood glucose monitor kit and supplies, Dispense sufficient amount for indicated testing frequency plus additional to accommodate PRN testing needs. Dispense all needed supplies to include: monitor, strips, lancing device, lancets, control solutions, alcohol swabs.   prazosin (MINIPRESS) 2 MG capsule, 2 CAPSULES BY MOUTH (4MG) TWICE DAILY  COMBIVENT RESPIMAT  MCG/ACT AERS inhaler, INHALE 1 PUFF INTO THE LUNGS EVERY 6 HOURS  hydrALAZINE (APRESOLINE) 100 MG tablet, Take 1 tablet by mouth every 8 hours  aspirin (ASPIR-LOW) 81 MG EC tablet, TAKE 1 TABLET BY MOUTH DAILY  clopidogrel (PLAVIX) 75 MG tablet, Take 1 tablet by mouth daily  [DISCONTINUED] carvedilol (COREG) 25 MG tablet, Take 1 tablet by mouth 2 times daily (with meals)  [DISCONTINUED] atorvastatin (LIPITOR) 80 MG tablet, Take 1 tablet by mouth daily  isosorbide mononitrate (IMDUR) 60 MG extended release tablet, Take 1 tablet by mouth daily  vitamin D (ERGOCALCIFEROL) 1.25 MG (21801 UT) CAPS capsule, Take 1 capsule by mouth once a week  Lancets MISC, Use_4__times daily Diagnisis:250.0  Diabetes Mellitus__x__Insulin Dependent___Non-Insulin Dependent  ipratropium-albuterol (DUONEB) 0.5-2.5 (3) MG/3ML SOLN nebulizer solution, Inhale 3 mLs into the lungs every 6 hours as needed for Shortness of Breath  Nutritional Supplements (GLUCERNA CARBSTEADY) LIQD, Take 1 Can by mouth 3 times daily  COMFORT EZ PEN NEEDLES 31G X 8 MM MISC, USE AS DIRECTED    Current Medications:     Scheduled Meds:    heparin (porcine)  5,000 Units Subcutaneous 3 times per day    fluticasone  1 spray Each Nostril Daily    albuterol  2.5 mg Nebulization BID    montelukast  10 mg Oral Nightly    predniSONE  30 mg Oral Daily    insulin lispro  0-18 Units Subcutaneous TID WC    insulin lispro  0-9 Units Subcutaneous Nightly    guaiFENesin  600 mg Oral BID    insulin lispro  3 Units Subcutaneous Q8H    insulin glargine  20 Units Subcutaneous Nightly    aspirin  81 mg Oral Daily    atorvastatin  80 mg Oral Daily    carvedilol  25 mg Oral BID WC    cloNIDine  0.2 mg Oral TID    clopidogrel  75 mg Oral Daily    hydrALAZINE  100 mg Oral 3 times per day    isosorbide mononitrate  60 mg Oral Daily    losartan  50 mg Oral BID    NIFEdipine  90 mg Oral Nightly    prazosin  4 mg Oral BID    QUEtiapine  100 mg Oral Nightly    sodium bicarbonate  1,300 mg Oral BID    sodium chloride flush  5-40 mL Intravenous 2 times per day    famotidine  20 mg Oral BID    bumetanide  2 mg Intravenous Once    ipratropium-albuterol  1 ampule Inhalation 4x daily     Continuous Infusions:    sodium chloride      dextrose      sodium chloride       PRN Meds:  sodium chloride, LORazepam, nitroGLYCERIN, heparin (porcine), heparin (porcine), albuterol, glucose, dextrose, glucagon (rDNA), dextrose, sodium chloride flush, sodium chloride, promethazine **OR** ondansetron, nicotine, polyethylene glycol, acetaminophen **OR** acetaminophen    Input/Output:       I/O last 3 completed shifts:   In: 1660 [P.O.:1660]  Out: - .      Patient Vitals for the past 96 hrs (Last 3 readings):   Weight   21 1152 150 lb 2.1 oz (68.1 kg)   21 0526 145 lb 3.2 oz (65.9 kg)   21 0454 142 lb 12.8 oz (64.8 kg)       Vital Signs:   Temperature:  Temp: 97.6 °F (36.4 °C)  TMax:   Temp (24hrs), Av.1 °F (36.7 °C), Min:97.6 °F (36.4 °C), Max:98.5 °F (36.9 °C)    Respirations:  Resp: 20  Pulse:   Pulse: 62  BP:    BP: (!) 174/65  BP Range: Systolic (30OYG), SXO:581 , Min:130 , WUK:414       Diastolic (93UUE), QRY:10, Min:45, Max:78      Physical Examination:     General:  AAO x 3, speaking in full sentences, no accessory muscle use. HEENT: Atraumatic, normocephalic, no throat congestion, moist mucosa. Eyes:   Pupils equal, round and reactive to light, EOMI. Neck:   Supple  Chest:   Bilateral vesicular breath sounds, no rales, Wheezes-seems to have improved. Cardiac:  S1 S2 RR, no murmurs, gallops or rubs. Abdomen: Soft, non-tender, no masses or organomegaly, BS audible. :   No suprapubic or flank tenderness. Neuro:  AAO x 3, No FND. SKIN:  No rashes, good skin turgor. Extremities:  No edema. Labs:       Recent Labs     05/23/21 0925 05/24/21 0405 05/25/21  0633   WBC 11.4* 10.1 9.3   RBC 2.82* 2.75* 2.83*   HGB 8.8* 8.7* 8.8*   HCT 27.9* 27.2* 27.7*   MCV 98.9 98.9 97.9   MCH 31.2 31.6 31.1   MCHC 31.5 32.0 31.8   RDW 12.8 12.9 12.5    333 341   MPV 10.0 10.2 10.1      BMP:   Recent Labs     05/23/21  0925 05/24/21 0405 05/25/21  0633   * 132* 132*   K 4.2 3.9 4.3   CL 93* 96* 97*   CO2 25 25 25   BUN 59* 77* 96*   CREATININE 3.04* 3.62* 4.10*   GLUCOSE 348* 412* 302*   CALCIUM 7.6* 7.5* 7.9*      Phosphorus:     Recent Labs     05/23/21  0925 05/24/21 0405 05/25/21  0633   PHOS 4.8* 5.2* 5.1*     Magnesium:    Recent Labs     05/23/21 0925 05/24/21 0405 05/25/21  0633   MG 1.5* 1.6 1.8     Albumin:    Recent Labs     05/23/21 0925 05/24/21 0405 05/25/21  0633   LABALBU 2.6* 2.4* 2.5*     BNP:      Lab Results   Component Value Date    BNP 84 11/27/2013     RIZWAN:      Lab Results   Component Value Date    RIZWAN NEGATIVE 12/04/2020     SPEP:  Lab Results   Component Value Date    PROT 4.7 05/04/2021    PROT 6.2 11/12/2011    ALBCAL 2.3 12/04/2020    ALBPCT 55 12/04/2020    LABALPH 0.1 12/04/2020    LABALPH 0.7 12/04/2020    A1PCT 3 12/04/2020    A2PCT 17 12/04/2020    LABBETA 0.5 12/04/2020    BETAPCT 13 12/04/2020    GAMGLOB 0.5 12/04/2020    GGPCT 12 12/04/2020    PATH ELECTRONICALLY SIGNED. Daniele Boo M.D. 12/04/2020    PATH ELECTRONICALLY SIGNED.  BRITTANIE MORFIN Nisha Black M.D. 12/04/2020     UPEP:     Lab Results   Component Value Date    LABPE  11/12/2011     ELEVATED PROTEIN CONCENTRATION. MOST SERUM PROTEINS ARE DETECTED IN THIS URINE. C3:     Lab Results   Component Value Date    C3 89 12/04/2020     C4:     Lab Results   Component Value Date    C4 17 12/04/2020     Hep BsAg:         Lab Results   Component Value Date    HEPBSAG NONREACTIVE 05/10/2021     Hep C AB:          Lab Results   Component Value Date    HEPCAB NONREACTIVE 05/10/2021       Urinalysis/Chemistries:      Lab Results   Component Value Date    NITRU NEGATIVE 05/03/2021    COLORU YELLOW 05/03/2021    PHUR 6.0 05/03/2021    WBCUA 2 TO 5 05/03/2021    RBCUA 0 TO 2 05/03/2021    MUCUS NOT REPORTED 05/03/2021    TRICHOMONAS NOT REPORTED 05/03/2021    YEAST NOT REPORTED 05/03/2021    BACTERIA NOT REPORTED 05/03/2021    CLARITYU Clear 09/12/2014    SPECGRAV 1.016 05/03/2021    LEUKOCYTESUR NEGATIVE 05/03/2021    UROBILINOGEN Normal 05/03/2021    BILIRUBINUR NEGATIVE 05/03/2021    BILIRUBINUR NEGATIVE 11/12/2011    BLOODU Negative 09/12/2014    GLUCOSEU 1+ 05/03/2021    GLUCOSEU 3+ 11/12/2011    KETUA NEGATIVE 05/03/2021    AMORPHOUS NOT REPORTED 05/03/2021     Urine Sodium:     Lab Results   Component Value Date    IGNACIO 33 05/05/2021     Urine Potassium:    Lab Results   Component Value Date    KUR 38.1 12/04/2020     Urine Chloride:    Lab Results   Component Value Date    CLUR 37 05/05/2021     Urine Osmolarity:   Lab Results   Component Value Date    OSMOU 463 12/04/2020      Urine Creatinine:     Lab Results   Component Value Date    LABCREA 51.5 05/05/2021     Radiology:     Reviewed. Assessment:     1.  Acute kidney injury stage IV currently dialysis dependent running on TTS schedule at  renal care [facility] unit under Dr. Ken Hernandez tunnel catheter. 2.  Acute shortness of breath. 3.  COPD exacerbation. 4.  Diabetes type 2.  5.  Essential hypertension. 6.  Wheezes positive.   7.  History of COPD.  8.  Acute respiratory failure requiring BiPAP. 9.  Chronic sinusitis versus sinusitis tumor. Will be following up with ENT as outpatient. Currently on Levaquin p.o. Plan:   1. Patient was seen and examined on HD at bedside. Orders were confirmed with the HD nurse. 2.  Antibiotics as per ENT. 3.  Okay to discharge from nephrology standpoint postdialysis today. Nutrition   Please ensure that patient is on a renal diet/TF. Avoid nephrotoxic drugs/contrast exposure. We will continue to follow along with you. Alexis Pa MD  [facility]     This note is created with the assistance of a speech-recognition program. While intending to generate a document that actually reflects the content of the visit, no guarantees can be provided that every mistake has been identified and corrected by editing.

## 2021-05-25 NOTE — PLAN OF CARE
Problem: Falls - Risk of:  Goal: Will remain free from falls  Description: Will remain free from falls  5/25/2021 0353 by Celena Wei RN  Outcome: Ongoing  5/24/2021 2114 by Lilian Burnham RN  Outcome: Ongoing  Goal: Absence of physical injury  Description: Absence of physical injury  5/25/2021 0353 by Celena Wei RN  Outcome: Ongoing  5/24/2021 2114 by Lilian Burnham RN  Outcome: Ongoing     Problem: Pain:  Goal: Pain level will decrease  Description: Pain level will decrease  5/25/2021 0353 by Celena Wei RN  Outcome: Ongoing  5/24/2021 2114 by Lilian Burnham RN  Outcome: Ongoing  Goal: Control of acute pain  Description: Control of acute pain  5/25/2021 0353 by Celena Wei RN  Outcome: Ongoing  5/24/2021 2114 by Lilian Burnham RN  Outcome: Ongoing  Goal: Control of chronic pain  Description: Control of chronic pain  5/25/2021 0353 by Celena Wei RN  Outcome: Ongoing  5/24/2021 2114 by Lilian Burnham RN  Outcome: Ongoing     Problem: Gas Exchange - Impaired:  Goal: Levels of oxygenation will improve  Description: Levels of oxygenation will improve  5/25/2021 0353 by Celena Wei RN  Outcome: Ongoing  5/24/2021 2114 by Liilan Burnham RN  Outcome: Ongoing

## 2021-05-25 NOTE — PROGRESS NOTES
CLINICAL PHARMACY NOTE: MEDS TO BEDS    Total # of Prescriptions Filled: 3   The following medications were delivered to the patient:  · Levofloxacin  · Mucus relief  · singulair    Additional Documentation:

## 2021-05-25 NOTE — PROGRESS NOTES
Writer spoke to fiance' [id number] and provided an update about the patient's status at this time and that the patient is in dialysis currently.  [id number] denies any further questions or concerns at this time

## 2021-05-25 NOTE — PLAN OF CARE
Problem: Gas Exchange - Impaired:  Goal: Levels of oxygenation will improve  Description: Levels of oxygenation will improve  5/25/2021 0830 by Kanwal Angelo RN  Outcome: Ongoing  5/25/2021 0353 by Leah Acosta RN  Outcome: Ongoing  5/24/2021 2114 by Tim Sanchez RN  Outcome: Ongoing     Will continue to monitor

## 2021-05-26 VITALS
OXYGEN SATURATION: 94 % | DIASTOLIC BLOOD PRESSURE: 47 MMHG | HEIGHT: 66 IN | RESPIRATION RATE: 18 BRPM | WEIGHT: 146.61 LBS | HEART RATE: 58 BPM | TEMPERATURE: 98.6 F | BODY MASS INDEX: 23.56 KG/M2 | SYSTOLIC BLOOD PRESSURE: 129 MMHG

## 2021-05-26 LAB
GLUCOSE BLD-MCNC: 244 MG/DL (ref 75–110)
GLUCOSE BLD-MCNC: 424 MG/DL (ref 75–110)

## 2021-05-26 PROCEDURE — 94640 AIRWAY INHALATION TREATMENT: CPT

## 2021-05-26 PROCEDURE — 97530 THERAPEUTIC ACTIVITIES: CPT

## 2021-05-26 PROCEDURE — 82947 ASSAY GLUCOSE BLOOD QUANT: CPT

## 2021-05-26 PROCEDURE — 2700000000 HC OXYGEN THERAPY PER DAY

## 2021-05-26 PROCEDURE — 94660 CPAP INITIATION&MGMT: CPT

## 2021-05-26 PROCEDURE — 6370000000 HC RX 637 (ALT 250 FOR IP): Performed by: INTERNAL MEDICINE

## 2021-05-26 PROCEDURE — 97162 PT EVAL MOD COMPLEX 30 MIN: CPT

## 2021-05-26 PROCEDURE — 97535 SELF CARE MNGMENT TRAINING: CPT

## 2021-05-26 PROCEDURE — 97166 OT EVAL MOD COMPLEX 45 MIN: CPT

## 2021-05-26 PROCEDURE — 6370000000 HC RX 637 (ALT 250 FOR IP): Performed by: NURSE PRACTITIONER

## 2021-05-26 PROCEDURE — 99232 SBSQ HOSP IP/OBS MODERATE 35: CPT | Performed by: INTERNAL MEDICINE

## 2021-05-26 PROCEDURE — 99239 HOSP IP/OBS DSCHRG MGMT >30: CPT | Performed by: NURSE PRACTITIONER

## 2021-05-26 PROCEDURE — 6360000002 HC RX W HCPCS: Performed by: NURSE PRACTITIONER

## 2021-05-26 PROCEDURE — 2580000003 HC RX 258: Performed by: NURSE PRACTITIONER

## 2021-05-26 RX ORDER — FLUTICASONE FUROATE, UMECLIDINIUM BROMIDE AND VILANTEROL TRIFENATATE 100; 62.5; 25 UG/1; UG/1; UG/1
POWDER RESPIRATORY (INHALATION)
Qty: 1 EACH | Refills: 0 | Status: SHIPPED | OUTPATIENT
Start: 2021-05-26 | End: 2021-11-17 | Stop reason: ALTCHOICE

## 2021-05-26 RX ORDER — GLUCOSAMINE HCL/CHONDROITIN SU 500-400 MG
CAPSULE ORAL
Qty: 500 STRIP | Refills: 3 | Status: SHIPPED | OUTPATIENT
Start: 2021-05-26

## 2021-05-26 RX ORDER — BUDESONIDE AND FORMOTEROL FUMARATE DIHYDRATE 160; 4.5 UG/1; UG/1
2 AEROSOL RESPIRATORY (INHALATION) 2 TIMES DAILY
Status: DISCONTINUED | OUTPATIENT
Start: 2021-05-26 | End: 2021-05-26 | Stop reason: HOSPADM

## 2021-05-26 RX ORDER — BUDESONIDE AND FORMOTEROL FUMARATE DIHYDRATE 160; 4.5 UG/1; UG/1
2 AEROSOL RESPIRATORY (INHALATION) 2 TIMES DAILY
Qty: 1 INHALER | Refills: 0 | Status: SHIPPED | OUTPATIENT
Start: 2021-05-26 | End: 2021-05-26 | Stop reason: HOSPADM

## 2021-05-26 RX ORDER — LEVOFLOXACIN 250 MG/1
250 TABLET ORAL EVERY OTHER DAY
Qty: 10 TABLET | Refills: 0 | Status: SHIPPED | OUTPATIENT
Start: 2021-05-27 | End: 2021-06-15

## 2021-05-26 RX ORDER — CETIRIZINE HYDROCHLORIDE 10 MG/1
10 TABLET ORAL DAILY
Qty: 30 TABLET | Refills: 0 | Status: SHIPPED | OUTPATIENT
Start: 2021-05-26 | End: 2021-06-25

## 2021-05-26 RX ADMIN — HYDRALAZINE HYDROCHLORIDE 100 MG: 50 TABLET, FILM COATED ORAL at 05:40

## 2021-05-26 RX ADMIN — LOSARTAN POTASSIUM 50 MG: 50 TABLET, FILM COATED ORAL at 08:45

## 2021-05-26 RX ADMIN — SODIUM CHLORIDE, PRESERVATIVE FREE 10 ML: 5 INJECTION INTRAVENOUS at 08:46

## 2021-05-26 RX ADMIN — SODIUM BICARBONATE 1300 MG: 648 TABLET ORAL at 08:44

## 2021-05-26 RX ADMIN — CLONIDINE HYDROCHLORIDE 0.2 MG: 0.2 TABLET ORAL at 08:44

## 2021-05-26 RX ADMIN — IPRATROPIUM BROMIDE AND ALBUTEROL SULFATE 1 AMPULE: .5; 3 SOLUTION RESPIRATORY (INHALATION) at 07:56

## 2021-05-26 RX ADMIN — LORAZEPAM 0.5 MG: 0.5 TABLET ORAL at 08:45

## 2021-05-26 RX ADMIN — LORAZEPAM 0.5 MG: 0.5 TABLET ORAL at 01:05

## 2021-05-26 RX ADMIN — CLOPIDOGREL 75 MG: 75 TABLET, FILM COATED ORAL at 08:49

## 2021-05-26 RX ADMIN — INSULIN LISPRO 6 UNITS: 100 INJECTION, SOLUTION INTRAVENOUS; SUBCUTANEOUS at 12:18

## 2021-05-26 RX ADMIN — HEPARIN SODIUM 5000 UNITS: 5000 INJECTION INTRAVENOUS; SUBCUTANEOUS at 05:40

## 2021-05-26 RX ADMIN — PRAZOSIN HYDROCHLORIDE 4 MG: 1 CAPSULE ORAL at 08:44

## 2021-05-26 RX ADMIN — ISOSORBIDE MONONITRATE 60 MG: 60 TABLET ORAL at 08:45

## 2021-05-26 RX ADMIN — GUAIFENESIN 600 MG: 600 TABLET, EXTENDED RELEASE ORAL at 08:44

## 2021-05-26 RX ADMIN — Medication 81 MG: at 08:44

## 2021-05-26 RX ADMIN — SALINE NASAL SPRAY 2 DROP: 1.5 SOLUTION NASAL at 12:38

## 2021-05-26 RX ADMIN — QUETIAPINE FUMARATE 100 MG: 100 TABLET ORAL at 01:05

## 2021-05-26 RX ADMIN — IPRATROPIUM BROMIDE AND ALBUTEROL SULFATE 1 AMPULE: .5; 3 SOLUTION RESPIRATORY (INHALATION) at 11:59

## 2021-05-26 RX ADMIN — FAMOTIDINE 20 MG: 20 TABLET, FILM COATED ORAL at 08:44

## 2021-05-26 ASSESSMENT — ENCOUNTER SYMPTOMS
ABDOMINAL PAIN: 0
EYES NEGATIVE: 1
BLOOD IN STOOL: 0
SHORTNESS OF BREATH: 1
VOMITING: 0
ALLERGIC/IMMUNOLOGIC NEGATIVE: 1
SORE THROAT: 0
VOICE CHANGE: 0
COUGH: 1
ABDOMINAL DISTENTION: 0
DIARRHEA: 0
NAUSEA: 0
SINUS PAIN: 0
WHEEZING: 1
TROUBLE SWALLOWING: 0

## 2021-05-26 ASSESSMENT — PAIN SCALES - GENERAL
PAINLEVEL_OUTOF10: 0
PAINLEVEL_OUTOF10: 0

## 2021-05-26 NOTE — PROGRESS NOTES
Occupational Therapy   Occupational Therapy Initial Assessment  Date: 2021   Patient Name: Sruthi Brooks  MRN: [id number]     : 1955    Date of Service: 2021  Chief Complaint   Patient presents with    Respiratory Distress       Discharge Recommendations: Further therapy recommended at discharge. The patient should be able to tolerate at least three hours of therapy per day over 5 days or 15 hours over 7 days. Patient would benefit from continued therapy after discharge       Assessment   Performance deficits / Impairments: Decreased functional mobility ; Decreased endurance;Decreased ADL status; Decreased high-level IADLs;Decreased balance  Prognosis: Good  Decision Making: Medium Complexity  OT Education: OT Role;Plan of Care;Energy Conservation  Patient Education: Good return from pt  REQUIRES OT FOLLOW UP: Yes  Activity Tolerance  Activity Tolerance: Patient limited by fatigue  Activity Tolerance: Pt with very poor activity tolerance noted entire session  Safety Devices  Safety Devices in place: Yes  Type of devices: All fall risk precautions in place;Call light within reach; Left in chair;Nurse notified;Gait belt  Restraints  Initially in place: No         Patient Diagnosis(es): The primary encounter diagnosis was Respiratory distress. Diagnoses of Mixed simple and mucopurulent chronic bronchitis (HCC) and Familial hypercholesterolemia were also pertinent to this visit.      has a past medical history of Asthma, CAD in native artery, nonobstructive, Carotid stenosis, left, Carpal tunnel syndrome, Cerebrovascular disease, Chronic kidney disease, COPD (chronic obstructive pulmonary disease) ([facility] [address] Diabetes mellitus ([facility] [address] History of colon polyps, History of weakness of extremity, HTN (hypertension), Hyperlipidemia, Lung, cysts, congenital, PTSD (post-traumatic stress disorder), PTSD (post-traumatic stress disorder), TIA (transient ischemic attack), and Type II or unspecified type diabetes mellitus without mention of complication, not stated as uncontrolled. has a past surgical history that includes hernia repair; Tonsillectomy; Colonoscopy; Carotid endarterectomy (Left, 7-2013); vascular surgery (Left, 2015); pr colsc flx w/rmvl of tumor polyp lesion snare tq (N/A, 6/27/2017); and IR TUNNELED CVC PLACE WO SQ PORT/PUMP > 5 YEARS (5/11/2021).          Restrictions  Restrictions/Precautions  Restrictions/Precautions: General Precautions, Fall Risk, Up as Tolerated  Required Braces or Orthoses?: No  Position Activity Restriction  Other position/activity restrictions: 2.5 L O2 entire sesison    Subjective   General  Patient assessed for rehabilitation services?: Yes  Family / Caregiver Present: No  Diagnosis: SOB, sinusitis, resp failure, COPD  Patient Currently in Pain: Denies  Pain Assessment  Pain Assessment: 0-10  Pain Level: 0  Patient Currently in Pain: Denies  Oxygen Therapy  SpO2: 98 %  O2 Flow Rate (L/min): 2 L/min  Social/Functional History  Social/Functional History  Lives With: Significant other, Daughter  Type of Home: Apartment  Home Layout: One level (first floor duplex)  Home Access: Stairs to enter with rails  Entrance Stairs - Number of Steps: 4  Entrance Stairs - Rails: Both  Bathroom Shower/Tub: Tub/Shower unit  Bathroom Toilet: Standard (sink on left)  Bathroom Equipment: Shower chair, Grab bars in shower (bar not installed and shower chair not used)  Bathroom Accessibility: Accessible  Home Equipment: Standard walker  ADL Assistance: Independent  Homemaking Assistance: Independent  Homemaking Responsibilities: Yes (Pt does all chores)  Ambulation Assistance: Independent  Transfer Assistance: Independent  Active : No  Patient's  Info: medical cab  Mode of Transportation: Cab  Occupation: On disability  Leisure & Hobbies: grandchildren  Additional Comments: Fiance has anxiety/seizures, dtr has paranoia schizophrenia, pt states they would not be able to fully care for pt Objective   Vision: Impaired  Vision Exceptions: Wears glasses at all times  Hearing: Within functional limits    Orientation  Overall Orientation Status: Within Functional Limits     Balance  Sitting Balance: Modified independent   Standing Balance: Supervision  Standing Balance  Time: 1 min  Activity: Pt stood x2 at bedside, short func mob to nearby chair  Functional Mobility  Functional - Mobility Device: No device  Activity: Other  Assist Level: Stand by assistance  Functional Mobility Comments: Short func mob to recliner, pt not limited by balance but by SOB/fatigue, pt required a rest break after simply standing up prior to func mob  ADL  Feeding: Setup; Increased time to complete;Modified independent   Grooming: Supervision;Setup; Increased time to complete  UE Bathing: Stand by assistance; Increased time to complete  LE Bathing: Stand by assistance; Increased time to complete  UE Dressing: Stand by assistance; Increased time to complete  LE Dressing: Contact guard assistance; Increased time to complete  Toileting: Minimal assistance; Increased time to complete  Additional Comments: Pt extremely SOB/fatigued with minimal movement, pt's SPO2 >90% entire session and 2.3L O2 on entire session, pt donned B/L socks sitting EOB with good figure-4 technique used, pt educated on and performed acapella and IS this date properly  Tone RUE  RUE Tone: Normotonic  Tone LUE  LUE Tone: Normotonic  Coordination  Movements Are Fluid And Coordinated: No  Coordination and Movement description: Decreased speed;Right UE;Left UE;Gross motor impairments  Quality of Movement Other  Comment: Poor shoulder strength noted, baseline deficits, shoulders painful from heavy breathing     Bed mobility  Supine to Sit: Modified independent  Sit to Supine: Modified independent  Scooting: Independent  Comment: Pt supine in bed upon arrival wearing personal jeans, pt retired sitting in recliner with O2 on via NC entire session, call light within Treatment Minutes: 15 Minutes       Shamar Peñaloza, OTR/L

## 2021-05-26 NOTE — PROGRESS NOTES
Comprehensive Nutrition Assessment    Type and Reason for Visit:  Initial (LOS Day 7)    Nutrition Recommendations/Plan:   -Recommend 5 CHO diabetic, renal diet per pt request   -Pt declined all nutritional supplements at this time   -Will monitor po intake and weights     Nutrition Assessment:  Pt admitted d/t respiratory distress 2/2 COPD exacerbation. Pt very well known to writer from previous admissions. Pt reports that he has had a very good appetite this week consuming % of his meals but is frustrated with not getting the meals that he has been ordering. Pt stated that he follows a 5 CHO diabetic diet at home and would like to be on a diabetic diet during his stay. Pt reported UBW of 160 lbs x 1 yr ago. Pt w/ hx of wt flux from 137-151 lbs over 11 mo per EMR. Pt requesting renal diet ed handouts - writer provided and reviewed handouts w/ pt. Pt declined all nutritional supplements at this time. Pt meets the criteria for moderate malnutrition. Will continue to monitor po intake and wt trends. Malnutrition Assessment:  Malnutrition Status: Moderate malnutrition    Context:  Chronic Illness     Findings of the 6 clinical characteristics of malnutrition:  Energy Intake:  No significant decrease in energy intake  Weight Loss:  No significant weight loss     Body Fat Loss:  1 - Mild body fat loss Orbital, Triceps   Muscle Mass Loss:  1 - Mild muscle mass loss Temples (temporalis), Clavicles (pectoralis & deltoids), Scapula (trapezius)  Fluid Accumulation:  1 - Mild Extremities, Generalized   Strength:  Not Performed    Estimated Daily Nutrient Needs:  Energy (kcal):  30-35 ~> 3787-6961 kcals/d; Weight Used for Energy Requirements:  Current     Protein (g):  1.3-1.5 gm/kg ~>  gms/d; Weight Used for Protein Requirements:  Current        Fluid (ml/day):  1500 mL FR;     Nutrition Related Findings:  BM 5/24;  Na 132, glucose 302, Phos 5.1; meds reviewed      Wounds:  None       Current Nutrition Therapies:    DIET RENAL; Daily Fluid Restriction: 1500 ml    Anthropometric Measures:  · Height: 5' 6\" (167.6 cm)  · Current Body Weight: 146 lb (66.2 kg)   · Admission Body Weight: 153 lb (69.4 kg)    · Usual Body Weight: 160 lb (72.6 kg) (per pt)     · Ideal Body Weight: 142 lbs; % Ideal Body Weight 102.8 %   · BMI: 23.6  · BMI Categories: Normal Weight (BMI 22.0 to 24.9) age over 72       Nutrition Diagnosis:   · Moderate malnutrition, In context of chronic illness related to catabolic illness, inadequate protein-energy intake as evidenced by weight loss, mild muscle loss, mild loss of subcutaneous fat      Nutrition Interventions:   Food and/or Nutrient Delivery:  Modify Current Diet  Nutrition Education/Counseling:  Education completed (renal diet)   Coordination of Nutrition Care:  Continue to monitor while inpatient    Goals: Set   Pt to meet % of est'd needs daily via PO       Nutrition Monitoring and Evaluation:   Food/Nutrient Intake Outcomes:  Food and Nutrient Intake, Diet Advancement/Tolerance  Physical Signs/Symptoms Outcomes:  Biochemical Data, Nutrition Focused Physical Findings, Skin, Weight, GI Status, Fluid Status or Edema     Discharge Planning:    Continue current diet     Electronically signed by Dylan Cruz RD, LD on 5/26/21 at 12:28 PM EDT    Contact: [phone]

## 2021-05-26 NOTE — PROGRESS NOTES
CLINICAL PHARMACY NOTE: MEDS TO BEDS    Total # of Prescriptions Filled: 2   The following medications were delivered to the patient:  · Levofloxacin 250mg tab  · ayr nasal drops    Additional Documentation:Medications delivered to the patient in his Room (2012 @ 12:40pm. We retriever previously delivered levofloxacin that was discontinued.

## 2021-05-26 NOTE — PROGRESS NOTES
[facility]  Office: [address], DO, Erica Marroquin, DO, Hilda Leung, DO, Trina Hampton Blood, DO, Ja Muñiz MD, Saleem Del Angel MD, Rimma Blunt MD, Rose Blandon MD, Hortensia Fernandez MD, Trenton Garcia MD, Nabor Freitas MD, Andrés Best MD, Carie Gaucher, DO, Dang Rodriguze MD, Warren Troy, DO, Sunshine Patricio MD,  Madelyn Grossman, DO, Brayden Salgado MD, Ronnell Montes MD, Seun Cid MD, Lulu Solomon MD, Leanor Eye, Rashmi Patino, CNP, Arabella Guerrero, CNP, Lawayne Klinefelter, CNS, Samra Gregory, CNP, Cj Mccabe, CNP, Josef Ashley, CNP, Wm Webber, CNP, Carter Perez, CNP, ANCA KumarC, Ken Whitney, [facility], Denise Morris, CNP, Jennifer Katz, CNP, Tonya Francis, CNP, Jude Linwood, CNP, Arden Gordillo, CNP, Chip Garcia, [address] [place] [address]    Progress Note    5/26/2021    12:04 PM    Name:   Olivia Katz  MRN:     [id number]     [place]:      [de-identified]   Room:   2012/2012-01  IP Day:  7  Admit Date:  5/19/2021  5:05 AM    PCP:   TERRANCE Echeverria CNP  Code Status:  Full Code    Subjective:     C/C:   Chief Complaint   Patient presents with    Respiratory Distress     Interval History Status: improved. 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 M with history of ESRD on HD, COPD who presented with shortness of breath. States symptoms going on for the past 2 days, but has been having intermittent shortness of breath going on for the past 2 years. Having worsening dyspnea with exertion. Also having sputum production.  Was recently admitted 2 days prior for COPD exacerbation, d/c on steroids and antibiotics at that time. States he went home and developed worsening shortness of breath causing him to come back to ER. Has significant secondhand smoke exposure at home    Review of Systems:     Constitutional:  negative for chills, fevers, sweats  Respiratory:  negative for cough, dyspnea on exertion, +shortness of breath, wheezing  Cardiovascular:  negative for chest pain, chest pressure/discomfort, lower extremity edema, palpitations  Gastrointestinal:  negative for abdominal pain, constipation, diarrhea, nausea, vomiting  Neurological:  negative for dizziness, headache    Medications: Allergies:     Allergies   Allergen Reactions    Lisinopril      cough    Ace Inhibitors      coughing    Pcn [Penicillins]        Current Meds:   Scheduled Meds:    sodium chloride  2 drop Each Nostril TID    heparin (porcine)  5,000 Units Subcutaneous 3 times per day    [START ON 5/27/2021] levoFLOXacin  250 mg Oral Every Other Day    fluticasone  1 spray Each Nostril Daily    albuterol  2.5 mg Nebulization BID    montelukast  10 mg Oral Nightly    insulin lispro  0-18 Units Subcutaneous TID WC    insulin lispro  0-9 Units Subcutaneous Nightly    guaiFENesin  600 mg Oral BID    insulin lispro  3 Units Subcutaneous Q8H    insulin glargine  20 Units Subcutaneous Nightly    aspirin  81 mg Oral Daily    atorvastatin  80 mg Oral Daily    carvedilol  25 mg Oral BID WC    cloNIDine  0.2 mg Oral TID    clopidogrel  75 mg Oral Daily    hydrALAZINE  100 mg Oral 3 times per day    isosorbide mononitrate  60 mg Oral Daily    losartan  50 mg Oral BID    NIFEdipine  90 mg Oral Nightly    prazosin  4 mg Oral BID    QUEtiapine  100 mg Oral Nightly    sodium bicarbonate  1,300 mg Oral BID    sodium chloride flush  5-40 mL Intravenous 2 times per day    famotidine  20 mg Oral BID    bumetanide  2 mg Intravenous Once    ipratropium-albuterol  1 ampule Inhalation 4x 05/24/21  0405 05/25/21  0633   WBC 10.1 9.3   RBC 2.75* 2.83*   HGB 8.7* 8.8*   HCT 27.2* 27.7*   MCV 98.9 97.9   MCH 31.6 31.1   MCHC 32.0 31.8   RDW 12.9 12.5    341   MPV 10.2 10.1     Chemistry:  Recent Labs     05/24/21  0405 05/25/21  0633   * 132*   K 3.9 4.3   CL 96* 97*   CO2 25 25   GLUCOSE 412* 302*   BUN 77* 96*   CREATININE 3.62* 4.10*   MG 1.6 1.8   ANIONGAP 11 10   LABGLOM 17* 15*   GFRAA 21* 18*   CALCIUM 7.5* 7.9*   PHOS 5.2* 5.1*     Recent Labs     05/24/21  0405 05/25/21  0633 05/25/21  0642 05/25/21  1119 05/25/21  1630 05/25/21  1955 05/26/21  0643 05/26/21  1054   LABALBU 2.4* 2.5*  --   --   --   --   --   --    POCGLU  --   --  274* 218* 202* 244* 244* 424*     ABG:  Lab Results   Component Value Date    POCPH 7.39 06/17/2013    POCPCO2 46 06/17/2013    POCPO2 288 06/17/2013    POCHCO3 27.8 06/17/2013    NBEA NOT REPORTED 06/17/2013    PBEA 3 06/17/2013    DAG2CTM 29 06/17/2013    OEHW0HDT 100 06/17/2013    FIO2 NOT REPORTED 05/15/2021     Lab Results   Component Value Date/Time    SPECIAL 2ML L FR 12/03/2020 11:24 AM     Lab Results   Component Value Date/Time    CULTURE NO GROWTH 6 DAYS 12/03/2020 11:24 AM       Radiology:  XR CHEST PORTABLE    Result Date: 5/20/2021  No acute cardiopulmonary abnormality. XR CHEST PORTABLE    Result Date: 5/20/2021  Unremarkable single upright portable AP view of the chest.     XR CHEST PORTABLE    Result Date: 5/19/2021  Unremarkable single upright portable AP view of the chest.     CT SINUS WO CONTRAST    Result Date: 5/24/2021  Complete opacification of the right maxillary sinus with mild mucoperiosteal thickening of the ethmoid air cells and inferior frontal sinuses consistent with sinus inflammation/sinusitis. Right ostiomeatal complex is occluded by mucoperiosteal thickening. Cerebral atrophy. Atherosclerotic disease as above.        Physical Examination:        General appearance:  alert, not cooperative and no distress  Mental Status:  oriented to person, place and time and normal affect  Lungs:  clear to auscultation bilaterally, normal effort, on 2 L low flow nasal cannula  Heart:  regular rate and rhythm, no murmur  Abdomen:  soft, nontender, nondistended, normal bowel sounds, no masses, hepatomegaly, splenomegaly  Extremities:  no edema, redness, tenderness in the calves  Skin:  no gross lesions, rashes, induration    Assessment:        Hospital Problems         Last Modified POA    * (Principal) COPD exacerbation ([address] 5/25/2021 Yes    Carotid stenosis, left s/p Endarterectomy 5/25/2021 Yes    Essential hypertension 5/25/2021 Yes    ESRD (end stage renal disease) ([address] 5/25/2021 Yes    Chronic heart failure with preserved ejection fraction ([address] 5/25/2021 Yes    Uncontrolled type 2 diabetes mellitus with hyperglycemia ([address] 5/25/2021 Yes          Plan:        AECOPD: Pulmonary following, currently stable. 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    -Continue Levaquin 500 mg for 10 days p.o.    -Follow-up with ENT as outpatient Dr. Lily Alston    Diabetes mellitus type 2: Noncompliant with medications.   -Continue current plan if patient will allow    End-stage renal disease on IHD: Patient is a Tuesday/Thursday/Saturday patient.     Acute respiratory failure: Resolved    Anxiety: Xanax as needed    Dispo: Home today    TERRANCE Galvin NP  5/26/2021  12:04 PM

## 2021-05-26 NOTE — PROGRESS NOTES
Physical Therapy    Facility/Department: [facility] CAR 2  Initial Assessment    NAME: Zina Harris  : 1955  MRN: [id number]    Chief Complaint   Patient presents with    Respiratory Distress       Date of Service: 2021    Discharge Recommendations:    Further therapy recommended at discharge. The patient should be able to tolerate at least three hours of therapy per day over 5 days or 15 hours over 7 days. Assessment   Body structures, Functions, Activity limitations: Decreased functional mobility ; Decreased strength;Decreased safe awareness;Decreased endurance;Decreased balance;Decreased ROM  Assessment: Pt amb 8' with CGA and no LOB with severly increased SOB requiring frequent sitting rest breaks. Pt would benefit from further PT services to address deficits. Prognosis: Good  Decision Making: Medium Complexity  PT Education: Goals;PT Role;Plan of Care;General Safety; Functional Mobility Training  REQUIRES PT FOLLOW UP: Yes  Activity Tolerance  Activity Tolerance: Patient Tolerated treatment well;Patient limited by fatigue;Patient limited by endurance  Activity Tolerance: Pt very SOB with all functional mobility, requiring seated rest breaks. Patient Diagnosis(es): The primary encounter diagnosis was Respiratory distress. Diagnoses of Mixed simple and mucopurulent chronic bronchitis (HCC) and Familial hypercholesterolemia were also pertinent to this visit.      has a past medical history of Asthma, CAD in native artery, nonobstructive, Carotid stenosis, left, Carpal tunnel syndrome, Cerebrovascular disease, Chronic kidney disease, COPD (chronic obstructive pulmonary disease) ([address] Diabetes mellitus ([address] History of colon polyps, History of weakness of extremity, HTN (hypertension), Hyperlipidemia, Lung, cysts, congenital, PTSD (post-traumatic stress disorder), PTSD (post-traumatic stress disorder), TIA (transient ischemic attack), and Type II or unspecified type diabetes mellitus without mention of complication, not stated as uncontrolled. has a past surgical history that includes hernia repair; Tonsillectomy; Colonoscopy; Carotid endarterectomy (Left, 7-2013); vascular surgery (Left, 2015); pr colsc flx w/rmvl of tumor polyp lesion snare tq (N/A, 6/27/2017); and IR TUNNELED CVC PLACE WO SQ PORT/PUMP > 5 YEARS (5/11/2021). Restrictions  Restrictions/Precautions  Restrictions/Precautions: General Precautions, Fall Risk, Up as Tolerated  Required Braces or Orthoses?: No  Position Activity Restriction  Other position/activity restrictions: 2.5 L O2 entire sesison  Vision/Hearing  Vision: Impaired  Vision Exceptions: Wears glasses at all times  Hearing: Within functional limits     Subjective  General  Chart Reviewed: Yes  Patient assessed for rehabilitation services?: Yes  Response To Previous Treatment: Not applicable  Family / Caregiver Present: No  Follows Commands: Within Functional Limits  General Comment  Comments: OT co-eval  Subjective  Subjective: RN and pt agreeable to PT.  Pt alert supine in bed upon arrival.  Pain Screening  Patient Currently in Pain: Denies  Vital Signs  Patient Currently in Pain: Denies  Pre Treatment Pain Screening  Intervention List: Patient able to continue with treatment    Orientation  Orientation  Overall Orientation Status: Within Functional Limits  Social/Functional History  Social/Functional History  Lives With: Significant other, Daughter  Type of Home: Apartment  Home Layout: One level (first floor duplex)  Home Access: Stairs to enter with rails  Entrance Stairs - Number of Steps: 4  Entrance Stairs - Rails: Both  Bathroom Shower/Tub: Tub/Shower unit  Bathroom Toilet: Standard (sink on left)  Bathroom Equipment: Shower chair, Grab bars in shower (bar not installed and shower chair not used)  Bathroom Accessibility: Accessible  Home Equipment: Standard walker  ADL Assistance: Independent  Homemaking Assistance: Independent  Homemaking Responsibilities: Yes (Pt does all chores)  Ambulation Assistance: Independent  Transfer Assistance: Independent  Active : No  Patient's  Info: medical cab  Mode of Transportation: Cab  Occupation: On disability  Leisure & Hobbies: grandchildren  Additional Comments: Filiane has anxiety/seizures, dtr has paranoia schizophrenia, pt states they would not be able to fully care for pt  Cognition   Cognition  Overall Cognitive Status: [facility]  Cognition Comment: Pt motivated for therapy, pt with slight decreased awareness of how SOB tasks may make him. Objective          AROM RLE (degrees)  RLE AROM: WFL  AROM LLE (degrees)  LLE AROM : WFL  AROM RUE (degrees)  RUE General AROM: WFL, except shldr flex to ~90 degrees  AROM LUE (degrees)  LUE General AROM: WFL, except shldr flex to ~90 degrees  Strength RLE  Comment: grossly 3+/5  Strength LLE  Comment: grossly 3+/5  Strength RUE  Comment: antigravity minimum, see OT eval  Strength LUE  Comment: antigravity minimum, see OT eval     Sensation  Overall Sensation Status: WFL (pt denies any n/t)  Bed mobility  Supine to Sit: Stand by assistance  Sit to Supine: Stand by assistance  Scooting: Stand by assistance  Comment: for safety only  Transfers  Sit to Stand: Contact guard assistance  Stand to sit: Contact guard assistance  Comment: performed sit to stand x2  Ambulation  Ambulation?: Yes  More Ambulation?: No  Ambulation 1  Surface: level tile  Device:  (pt declined use)  Assistance: Contact guard assistance  Quality of Gait: decreased rivera, wide MARCO, decreased step length and height  Distance: 8'  Comments: Pt very SOB after first stand, took a seated rest break and stood again. Pt slightly light headed with immediate standing that went away with time. Pt amb to chair steady with no LOB continuing to be SOB.   Stairs/Curb  Stairs?: No     Balance  Sitting - Static: Good  Sitting - Dynamic: Good  Standing - Static: Good;-  Standing - Dynamic: Fair;+  Comments: No AD used for balance        Plan Plan  Times per week: 6-7x/week  Current Treatment Recommendations: Strengthening, ROM, Balance Training, Functional Mobility Training, Transfer Training, Stair training, Gait Training, Endurance Training, Home Exercise Program, Safety Education & Training, Patient/Caregiver Education & Training, Equipment Evaluation, Education, & procurement  Safety Devices  Type of devices:  All fall risk precautions in place, Call light within reach, Gait belt, Patient at risk for falls, Left in chair, Nurse notified  Restraints  Initially in place: No    AM-PAC Score  AM-PAC Inpatient Mobility Raw Score : 18 (05/26/21 [facility])  AM-PAC Inpatient T-Scale Score : 43.63 (05/26/21 1233)  Mobility Inpatient CMS 0-100% Score: 46.58 (05/26/21 1233)  Mobility Inpatient CMS G-Code Modifier : CK (05/26/21 1233)          Goals  Short term goals  Time Frame for Short term goals: 14 visits  Short term goal 1: Pt will be Oralia with bed mobility  Short term goal 2: Pt will be Oralia with transfers  Short term goal 3: Pt will ambulate 200' with least restrictive device and Oralia  Short term goal 4: Pt will navigate 4 steps with bilateral rails Oralia       Therapy Time   Individual Concurrent Group Co-treatment   Time In 0918         Time Out 0940         Minutes 22         Timed Code Treatment Minutes: 8 Minutes       Héctor Patrick, PRITESH

## 2021-05-26 NOTE — DISCHARGE SUMMARY
[facility]  Office: [address], DO, Dena Arreola DO, Shanell Friedman DO, Eder Tere Mccray, DO, Connor Bartlett MD, Luiza Gramajo MD, Nick Fofana MD, Fili Cannon MD, Mary Martin MD, Ne Parsons MD, Dex Rees MD, May Blanchard MD, Vilma Carrillo DO, Deepthi Carranza MD, Austin Crocker DO, Blair Ryder MD,  Jaycee Daniel DO, Anna Buitrago MD, Silvino Tidwell MD, Florencio Saunders MD, Sky Alarcon MD, [facility], [facility], [facility], CNP, Daniel Linder, CNP, John Mcfarlane, CNS, [facility], CNP, Ara Schwartz, CNP, Arthur Paige, CNP, Jarred Meehan, CNP, Stefania Santana, CNP, Saleem Frye PA-C, Maury Bartholomew DNP, Yolanda Dempsey, CNP, Wendi Fermin, CNP, Kirt Mclaughlin, CNP, Cynthia Mimi, CNP, Dixon Sandy, [facility], [place], [address] [facility]. [facility]    Discharge Summary     Patient ID: Grady Santos  :  1955   MRN: [id number]     ACCOUNT:  [de-identified]   Patient's PCP: TERRANCE Topete CNP  Admit Date: 2021   Discharge Date: 2021     Length of Stay: 7  Code Status:  Full Code  Admitting Physician: Austin Crocker DO  Discharge Physician: TERRANCE Mtz NP     Active Discharge Diagnoses:     Hospital Problem Lists:  Principal Problem:    COPD exacerbation ([address]  Active Problems:    Carotid stenosis, left s/p Endarterectomy    Essential hypertension    Moderate malnutrition ([address]    ESRD (end stage renal disease) ([address]    Chronic heart failure with preserved ejection fraction ([address]    Uncontrolled type 2 diabetes mellitus with hyperglycemia ([address]  Resolved Problems:    Acute respiratory failure with hypoxia ([address]    Respiratory distress      Admission Condition:  good     Discharged Condition: good    Hospital Stay:     Hospital Course:  Grady Santos is a 73 y.o. male who was admitted for the management of   COPD exacerbation ([facility] , presented to ER with Respiratory Ruddy Sargent a 73 y.o. M with history of ESRD on HD, COPD who presented with shortness of breath. States symptoms going on for the past 2 days, but has been having intermittent shortness of breath going on for the past 2 years. Having worsening dyspnea with exertion. Also having sputum production. Was recently admitted 2 days prior for COPD exacerbation, d/c on steroids and antibiotics at that time. States he went home and developed worsening shortness of breath causing him to come back to ER. 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    -Continue Levaquin 250 mg for 10 days p.o. on iHD days  -Follow-up with ENT as outpatient Dr. Alf Smart     Diabetes mellitus type 2: Noncompliant with medications.   -Continue current plan if patient will allow     End-stage renal disease on IHD: Patient is a Tuesday/Thursday/Saturday patient.     Acute respiratory failure: Resolved     Anxiety: Xanax as needed     Dispo: Home today    Significant Diagnostic Studies:   Labs / Micro:  CBC:   Lab Results   Component Value Date    WBC 9.3 05/25/2021    RBC 2.83 05/25/2021    RBC 3.96 04/16/2012    HGB 8.8 05/25/2021    HCT 27.7 05/25/2021    MCV 97.9 05/25/2021    MCH 31.1 05/25/2021    MCHC 31.8 05/25/2021    RDW 12.5 05/25/2021     05/25/2021     04/16/2012     BMP:    Lab Results   Component Value Date    GLUCOSE 302 05/25/2021    GLUCOSE 172 04/16/2012  05/25/2021    K 4.3 05/25/2021    CL 97 05/25/2021    CO2 25 05/25/2021    ANIONGAP 10 05/25/2021    BUN 96 05/25/2021    CREATININE 4.10 05/25/2021    BUNCRER NOT REPORTED 05/25/2021    CALCIUM 7.9 05/25/2021    LABGLOM 15 05/25/2021    GFRAA 18 05/25/2021    GFR      05/25/2021    GFR NOT REPORTED 05/25/2021     HFP:    Lab Results   Component Value Date    PROT 4.7 05/04/2021    PROT 6.2 11/12/2011     CMP:    Lab Results   Component Value Date    GLUCOSE 302 05/25/2021    GLUCOSE 172 04/16/2012     05/25/2021    K 4.3 05/25/2021    CL 97 05/25/2021    CO2 25 05/25/2021    BUN 96 05/25/2021    CREATININE 4.10 05/25/2021    ANIONGAP 10 05/25/2021    ALKPHOS 83 05/04/2021    ALT 15 05/04/2021    AST 12 05/04/2021    BILITOT <0.10 05/04/2021    LABALBU 2.5 05/25/2021    ALBUMIN 1.0 05/04/2021    LABGLOM 15 05/25/2021    GFRAA 18 05/25/2021    GFR      05/25/2021    GFR NOT REPORTED 05/25/2021    PROT 4.7 05/04/2021    PROT 6.2 11/12/2011    CALCIUM 7.9 05/25/2021     PT/INR:    Lab Results   Component Value Date    PROTIME 9.6 05/03/2021    INR 0.9 05/03/2021     PTT:   Lab Results   Component Value Date    APTT 20.7 05/03/2021     FLP:    Lab Results   Component Value Date    CHOL 256 05/04/2021    TRIG 148 05/04/2021    HDL 68 05/04/2021     U/A:    Lab Results   Component Value Date    COLORU YELLOW 05/03/2021    TURBIDITY CLEAR 05/03/2021    SPECGRAV 1.016 05/03/2021    HGBUR NEGATIVE 05/03/2021    PHUR 6.0 05/03/2021    PROTEINU 4+ 05/03/2021    GLUCOSEU 1+ 05/03/2021    GLUCOSEU 3+ 11/12/2011    KETUA NEGATIVE 05/03/2021    BILIRUBINUR NEGATIVE 05/03/2021    BILIRUBINUR NEGATIVE 11/12/2011    UROBILINOGEN Normal 05/03/2021    NITRU NEGATIVE 05/03/2021    LEUKOCYTESUR NEGATIVE 05/03/2021     TSH:    Lab Results   Component Value Date    TSH 2.91 05/15/2020        Radiology:  XR CHEST PORTABLE    Result Date: 5/20/2021  No acute cardiopulmonary abnormality.      XR CHEST PORTABLE    Result Date: 5/20/2021  Unremarkable single upright portable AP view of the chest.     CT SINUS WO CONTRAST    Result Date: 5/24/2021  Complete opacification of the right maxillary sinus with mild mucoperiosteal thickening of the ethmoid air cells and inferior frontal sinuses consistent with sinus inflammation/sinusitis. Right ostiomeatal complex is occluded by mucoperiosteal thickening. Cerebral atrophy. Atherosclerotic disease as above. Consultations:    Consults:     Final Specialist Recommendations/Findings:   IP CONSULT TO HOSPITALIST  IP CONSULT TO NEPHROLOGY  IP CONSULT TO IV TEAM  IP CONSULT TO PULMONOLOGY  IP CONSULT TO OTOLARYNGOLOGY  IP CONSULT TO IV TEAM      The patient was seen and examined on day of discharge and this discharge summary is in conjunction with any daily progress note from day of discharge. Discharge plan:     Disposition: Home    Physician Follow Up:     TERRANCE Burger - [facility] [address] [address] [address] [phone]    In 1 week  for follow up after hospitalization    Esau Hinojosa MD  [address]  [address]  [address] [address]  [phone]    Schedule an appointment as soon as possible for a visit  for follow up after hospitalization    Callie Stef, MD  [place] [id number]  [address] [facility] [address] [place] [address]    Schedule an appointment as soon as possible for a visit in 1 week  For MARILIA work-up and for pulmonary function testing       Requiring Further Evaluation/Follow Up POST HOSPITALIZATION/Incidental Findings: Follow-up with ENT regarding outpatient work-up.   Schedule appointment with pulmonary for PFT and sleep study    Diet: regular diet and diabetic diet    Activity: As tolerated    Instructions to Patient: see attached     Discharge Medications:      Medication List      START taking these medications    cetirizine 10 MG tablet  Commonly known as: ZYRTEC  Take 1 tablet by mouth daily     guaiFENesin 600 MG extended release tablet  Commonly known as: known as: CATAPRES  Take 1 tablet by mouth 3 times daily     clopidogrel 75 MG tablet  Commonly known as: PLAVIX  Take 1 tablet by mouth daily     Comfort EZ Pen Needles 31G X 8 MM Misc  Generic drug: Insulin Pen Needle  USE AS DIRECTED     fluticasone 50 MCG/ACT nasal spray  Commonly known as: FLONASE     gabapentin 100 MG capsule  Commonly known as: NEURONTIN  TAKE 2 CAPSULES BY MOUTH 3 TIMES DAILY     glucagon 1 MG injection  Inject 1 mg into the muscle See Admin Instructions Follow package directions for low blood sugar. Glucerna Carbsteady Liqd  Take 1 Can by mouth 3 times daily     GNP Vitamin D Maximum Strength 50 MCG (2000 UT) Tabs  Generic drug: Cholecalciferol  TAKE 1 TABLET BY MOUTH ONCE DAILY     hydrALAZINE 100 MG tablet  Commonly known as: APRESOLINE  Take 1 tablet by mouth every 8 hours     isosorbide mononitrate 60 MG extended release tablet  Commonly known as: IMDUR  Take 1 tablet by mouth daily     Lancets Misc  Use_4__times daily Diagnisis:250.0  Diabetes Mellitus__x__Insulin Dependent___Non-Insulin Dependent     losartan 50 MG tablet  Commonly known as: COZAAR  TAKE 1 TABLET BY MOUTH 2 TIMES DAILY     NIFEdipine 90 MG extended release tablet  Commonly known as: PROCARDIA XL  Take 1 tablet by mouth nightly     nitroGLYCERIN 0.4 MG SL tablet  Commonly known as: NITROSTAT  USE 1 TABLET UNDER THE TONGUE UP TO MAXIMUM OF 3 TOTAL DOSES. IF NO RELIEF AFTER 1 DOSE, CALL 911.      NovoLOG FlexPen 100 UNIT/ML injection pen  Generic drug: insulin aspart  Inject 5 Units into the skin 3 times daily (before meals) Sliding scale     prazosin 2 MG capsule  Commonly known as: MINIPRESS  2 CAPSULES BY MOUTH (4MG) TWICE DAILY     QUEtiapine 100 MG tablet  Commonly known as: SEROQUEL  TAKE 1 TABLET BY MOUTH NIGHTLY     sodium bicarbonate 650 MG tablet  Take 2 tablets by mouth 2 times daily     vitamin D 1.25 MG (50000 UT) Caps capsule  Commonly known as: ERGOCALCIFEROL  Take 1 capsule by mouth once a week STOP taking these medications    doxycycline hyclate 100 MG tablet  Commonly known as: VIBRA-TABS     predniSONE 10 MG tablet  Commonly known as: Levonia Mercy           Where to Get Your Medications      These medications were sent to [address]  [address] [facility], [place] [address]    Phone: [phone]   atorvastatin 80 MG tablet  carvedilol 25 MG tablet     These medications were sent to [facility] [address] [facility], [address], [place] [address]    Phone: [phone]   blood glucose test strips  cetirizine 10 MG tablet  guaiFENesin 600 MG extended release tablet  levoFLOXacin 250 MG tablet  montelukast 10 MG tablet  sodium chloride 0.65 % (Soln) Soln nasal drops  Trelegy Ellipta 100-62.5-25 MCG/INH Aepb         No discharge procedures on file. Time Spent on discharge is  31 mins in patient examination, evaluation, counseling as well as medication reconciliation, prescriptions for required medications, discharge plan and follow up. Discussed with attending   Electronically signed by   TERRANCE Neely NP  5/26/2021  2:11 PM      Thank you TERRANCE Null - CNP for the opportunity to be involved in this patient's care.

## 2021-05-26 NOTE — PROGRESS NOTES
Medications delivered to patient's bedside. After visit summary explained and given to patient. Copy placed in chart. All questions answered. Patient discharged home with family at this time.

## 2021-05-26 NOTE — PROGRESS NOTES
TASIA Gongatient Assessment complete. Respiratory failure ([address] [J96.90] . Vitals:    05/26/21 0330   BP: (!) 121/55   Pulse: 58   Resp: 18   Temp: 98.7 °F (37.1 °C)   SpO2: 99%   . Patients home meds are   Prior to Admission medications    Medication Sig Start Date End Date Taking? Authorizing Provider   montelukast (SINGULAIR) 10 MG tablet Take 1 tablet by mouth nightly 5/25/21  Yes Trudi Soulier, APRN - NP   guaiFENesin [facility]) 600 MG extended release tablet Take 1 tablet by mouth 2 times daily for 10 days 5/25/21 6/4/21 Yes Trudi Soulier, APRN - NP   levoFLOXacin (LEVAQUIN) 500 MG tablet Take 1 tablet by mouth daily for 10 days 5/25/21 6/4/21 Yes Trudi Soulier, APRN - NP   carvedilol (COREG) 25 MG tablet TAKE 1 TABLET BY MOUTH 2 TIMES DAILY (WITH MEALS) 5/24/21   TERRANCE Casper CNP   atorvastatin (LIPITOR) 80 MG tablet TAKE 1 TABLET BY MOUTH DAILY 5/24/21   TERRANCE Casper CNP   sodium bicarbonate 650 MG tablet Take 2 tablets by mouth 2 times daily 5/13/21 6/12/21  Trudi Soulier, APRN - NP   insulin glargine [facility]) 100 UNIT/ML injection pen Inject 10 Units into the skin nightly 5/13/21   Trudi Soulier, APRN - NP   cloNIDine (CATAPRES) 0.2 MG tablet Take 1 tablet by mouth 3 times daily 5/13/21   Trudi Soulier, APRN - NP   NIFEdipine (PROCARDIA XL) 90 MG extended release tablet Take 1 tablet by mouth nightly 5/13/21   Trudi Soulier, APRN - NP   glucagon 1 MG injection Inject 1 mg into the muscle See Admin Instructions Follow package directions for low blood sugar. 4/6/21   TERRANCE Casper CNP   fluticasone [facility]) 50 MCG/ACT nasal spray  2/23/21   Historical Provider, MD   insulin aspart (NOVOLOG FLEXPEN) 100 UNIT/ML injection pen Inject 5 Units into the skin 3 times daily (before meals) Sliding scale 3/16/21   TERRANCE Casper CNP   nitroGLYCERIN (NITROSTAT) 0.4 MG SL tablet USE 1 TABLET UNDER THE TONGUE UP TO MAXIMUM OF 3 TOTAL DOSES. IF NO RELIEF AFTER 1 DOSE, CALL 911. 3/5/21   Delfin Spatz, APRN - CNP   gabapentin (NEURONTIN) 100 MG capsule TAKE 2 CAPSULES BY MOUTH 3 TIMES DAILY 2/23/21 5/24/21  Delfin Spatz, APRN - CNP   QUEtiapine (SEROQUEL) 100 MG tablet TAKE 1 TABLET BY MOUTH NIGHTLY 2/23/21   Delfin Spatz, APRN - CNP   losartan (COZAAR) 50 MG tablet TAKE 1 TABLET BY MOUTH 2 TIMES DAILY 2/23/21   Delfin Spatz, APRN - CNP   GNP VITAMIN D MAXIMUM STRENGTH 50 MCG (2000 UT) TABS TAKE 1 TABLET BY MOUTH ONCE DAILY 2/23/21   Delfin Spatz, APRN - CNP   blood glucose monitor kit and supplies Dispense sufficient amount for indicated testing frequency plus additional to accommodate PRN testing needs. Dispense all needed supplies to include: monitor, strips, lancing device, lancets, control solutions, alcohol swabs.  1/26/21   Delfin Spatz, APRN - CNP   prazosin (MINIPRESS) 2 MG capsule 2 CAPSULES BY MOUTH (4MG) TWICE DAILY 1/25/21   Delfin Spatz, APRN - CNP   COMBIVENT RESPIMAT  MCG/ACT AERS inhaler INHALE 1 PUFF INTO THE LUNGS EVERY 6 HOURS 1/25/21   Delfin Spatz, APRN - CNP   hydrALAZINE (APRESOLINE) 100 MG tablet Take 1 tablet by mouth every 8 hours 1/25/21   Delfin Spatz, APRN - CNP   aspirin (ASPIR-LOW) 81 MG EC tablet TAKE 1 TABLET BY MOUTH DAILY 1/25/21   Delfin Spatz, APRN - CNP   clopidogrel (PLAVIX) 75 MG tablet Take 1 tablet by mouth daily 1/25/21   Delfin Spatz, APRN - CNP   isosorbide mononitrate (IMDUR) 60 MG extended release tablet Take 1 tablet by mouth daily 1/5/21   Delfin Spatz, APRN - CNP   vitamin D (ERGOCALCIFEROL) 1.25 MG (53102 UT) CAPS capsule Take 1 capsule by mouth once a week 1/5/21   Delfin Spatz, APRN - CNP   Lancets MISC Use_4__times daily Diagnisis:250.0  Diabetes Mellitus__x__Insulin Dependent___Non-Insulin Dependent 12/6/20   Ubaldo Loyola MD   ipratropium-albuterol (DUONEB) 0.5-2.5 (3) MG/3ML SOLN nebulizer solution Inhale 3 mLs into the lungs every 6 hours as needed for Shortness of Breath 12/6/20   [facility] Kimo Anderson MD   Nutritional Supplements (GLUCERNA CARBSTEADY) LIQD Take 1 Can by mouth 3 times daily 6/16/20   TERRANCE Iraheta CNP   COMFORT EZ PEN NEEDLES 31G X 8 MM MISC USE AS DIRECTED 4/14/20   TERRANCE Iraheta CNP   . Assessment   Severe COPD  RR 24   Breath Sounds: Insp/Exp wheezes/ Diminished     Bronchodilator assessment at level  4  [x]    Bronchodilator Assessment  BRONCHODILATOR ASSESSMENT SCORE  Score 0 1 2 3 4 5   Breath Sounds   []  Patient Baseline []  No Wheeze good aeration []  Faint, scattered wheezing, good aeration []  Expiratory Wheezing and or moderately diminished [x]  Insp/Exp wheeze and/or very diminished []  Insp/Exp and/ or marked distress   Respiratory Rate   []  Patient Baseline []  Less than 20 []  Less than 20 [x]  20-25 []  Greater than 25 []  Greater than 25   Peak flow % of Pred or PB [x]  NA   []  Greater than 90%  []  81-90% []  71-80% []  Less than or equal to 70%  or unable to perform []  Unable due to Respiratory Distress   Dyspnea re []  Patient Baseline []  No SOB []  No SOB []  SOB on exertion [x]  SOB min activity []  At rest/acute   e FEV% Predicted       [x]  NA []  Above 69%  []  Unable []  Above 60-69%  []  Unable []  Above 50-59%  []  Unable []  Above 35-49%  []  Unable []  Less than 35%  []  Unable             []  Hyperinflation Assessment  Score 1 2 3   CXR and Breath Sounds   []  Clear []  No atelectasis  Basilar aeration []  Atelectasis or absent basilar breath sounds   Incentive Spirometry Volume  (Per IBW)   []  Greater than or equal to 15ml/Kg []  less than 15ml/Kg []  less than 15ml/Kg   Surgery within last 2 weeks []  None or general   []  Abdominal or thoracic surgery  []  Abdominal or thoracic   Chronic Pulmonary Historyre []  No []  Yes []  Yes     []  Secretion Management Assessment  Score 1 2 3   Bilateral Breath Sounds   []  Occasional Rhonchi []  Scattered Rhonchi []  Course Rhonchi and/or poor aeration Sputum    []  Small amount of thin secretions []  Moderate amount of viscous secretions []  Copius, Viscious Yellow/ Secretions   CXR as reported by physician []  clear  []  Unavailable []  Infiltrates and/or consolidation  []  Unavailable []  Mucus Plugging and or lobar consolidation  []  Unavailable   Cough []  Strong, productive cough []  Weak productive cough []  No cough or weak non-productive cough   Chapo Bevel, RCP  4:54 AM                            FEMALE                                  MALE                            FEV1 Predicted Normal Values                        FEV1 Predicted Normal Values          Age                                     Height in Feet and Inches       Age                                     Height in Feet and Inches       4' 11\" 5' 1\" 5' 3\" 5' 5\" 5' 7\" 5' 9\" 5' 11\" 6' 1\"  4' 11\" 5' 1\" 5' 3\" 5' 5\" 5' 7\" 5' 9\" 5' 11\" 6' 1\"   42 - 45 2.49 2.66 2.84 3.03 3.22 3.42 3.62 3.83 42 - 45 2.82 3.03 3.26 3.49 3.72 3.96 4.22 4.47   46 - 49 2.40 2.57 2.76 2.94 3.14 3.33 3.54 3.75 46 - 49 2.70 2.92 3.14 3.37 3.61 3.85 4.10 4.36   50 - 53 2.31 2.48 2.66 2.85 3.04 3.24 3.45 3.66 50 - 53 2.58 2.80 3.02 3.25 3.49 3.73 3.98 4.24   54 - 57 2.21 2.38 2.57 2.75 2.95 3.14 3.35 3.56 54 - 57 2.46 2.67 2.89 3.12 3.36 3.60 3.85 4.11   58 - 61 2.10 2.28 2.46 2.65 2.84 3.04 3.24 3.45 58 - 61 2.32 2.54 2.76 2.99 3.23 3.47 3.72 3.98   62 - 65 1.99 2.17 2.35 2.54 2.73 2.93 3.13 3.34 62 - 65 2.19 2.40 2.62 2.85 3.09 3.33 3.58 3.84   66 - 69 1.88 2.05 2.23 2.42 2.61 2.81 3.02 3.23 66 - 69 2.04 2.26 2.48 2.71 2.95 3.19 3.44 3.70   70+ 1.82 1.99 2.17 2.36 2.55 2.75 2.95 3.16 70+ 1.97 2.19 2.41 2.64 2.87 3.12 3.37 3.62         Predicted Peak Expiratory Flow Rate                                       Height (in)  Female       Height (in) Male           Age 64 62 64 63 57 71 78 74 Age            21 344 357 372 387 [phone]  60 62 64 66 68 70 72 74 76   25 337 352 366 381 396 [phone]

## 2021-05-26 NOTE — PROGRESS NOTES
PULMONARY & CRITICAL CARE MEDICINE PROGRESS  NOTE     Patient:  Grady Santos  MRN: [id number]  Admit date: 5/19/2021  Primary Care Physician: TERRANCE Topete - CNP  Consulting Physician: Martha Hilliard DO  CODE Status: Full Code  LOS: 7    SUBJECTIVE     I personally interviewed/examined the patient, reviewed interval history and interpreted all available radiographic, laboratory data at the time of service. Chief Compliant/Reason for Initial Consult:   Acute on likely chronic hypoxic respiratory failure  Severe COPD    Brief Hospital Course: The patient is a 73 y.o. male admitted because of worsening shortness of breath. He was discharged 2 days prior to hospital admission and had been started on hemodialysis. According to patient he did go to his first dialysis session. He continues to feel worsening shortness of breath and sputum production without hemoptysis. He does not smoke but has secondhand smoke exposure. He is on Trelegy at home and on Combivent      Interval History:  05/26/21  Overnight events noted chart seen labs reviewed and medications seen. He is afebrile overnight T-max is 99 he is hemodynamically stable blood pressure is better controlled respiratory rate is reported to be low twenties. He is on 4 L nasal cannula maintaining saturation 99 to 100% at rest.  According to patient he did use BiPAP overnight. According to nursing staff he keep BiPAP only for short time and take it off does not use it regularly does not keep the BiPAP on for long  He does complain of postnasal drip nasal congestion according to nursing staff he has been refusing Flonase nasal spray. He had hemodialysis done yesterday and apparently finished session.   He does have shortness of breath on ambulating intermittently he had refused physical therapy (per PT) he does have cough according to patient wheezing is better he does have a sputum but not able to bring it up. He had CT sinuses done on 2021 shows complete opacification of right maxillary sinus and mucoperiosteal thickening of ethmoid air cells and inferior frontal sinus consistent with sinusitis. Review of Systems:  Review of Systems   Constitutional: Negative for appetite change, fatigue and fever. HENT: Positive for postnasal drip. Negative for sinus pain, sore throat, trouble swallowing and voice change. Eyes: Negative. Respiratory: Positive for cough, shortness of breath and wheezing. Cardiovascular: Negative for chest pain, palpitations and leg swelling. Gastrointestinal: Negative for abdominal distention, abdominal pain, blood in stool, diarrhea, nausea and vomiting. Endocrine: Negative. Genitourinary: Negative for dysuria, frequency and hematuria. Musculoskeletal: Negative. Allergic/Immunologic: Negative. Neurological: Negative for dizziness, seizures, speech difficulty, numbness and headaches. Hematological: Negative for adenopathy. Does not bruise/bleed easily. Psychiatric/Behavioral: The patient is nervous/anxious.         OBJECTIVE     VITAL SIGNS:   LAST-  BP (!) 114/50   Pulse 61   Temp 98.1 °F (36.7 °C) (Oral)   Resp 18   Ht 5' 6\" (1.676 m)   Wt 146 lb 9.7 oz (66.5 kg)   SpO2 99%   BMI 23.66 kg/m²   8-24 HR RANGE-  TEMP Temp  Av.4 °F (36.9 °C)  Min: 97.6 °F (36.4 °C)  Max: 99 °F (95.5 °C)   BP Systolic (57STW), CNJ:802 , Min:113 , QAT:222      Diastolic (84KOD), ULZ:33, Min:50, Max:78     PULSE Pulse  Av.7  Min: 58  Max: 90   RR Resp  Av  Min: 18  Max: 18   O2 SAT SpO2  Av %  Min: 99 %  Max: 99 %   OXYGEN DELIVERY O2 Flow Rate (L/min)  Av L/min  Min: 4 L/min  Max: 4 L/min     Systemic Examination:   Physical Exam  General appearance - looks comfortable, not in distress but mildly tachypneic  Mental status - alert, oriented to person, place, and time  Eyes - pupils equal and reactive, extraocular eye movements intact  Mouth - mucous membranes moist, pharynx normal without lesions  Neck - supple, no significant adenopathy, short thick neck  Chest -He is mildly tachypneic not in distress chest was symmetrical with increased resonance on percussion, air entry is bilaterally reduced and distant with prolonged expiration no expiratory wheeze and no rhonchi or crackles.   Heart - normal rate, regular rhythm, normal S1, S2, no murmurs, rubs, clicks or gallops  Abdomen - soft, nontender, nondistended, no masses or organomegaly  Neurological - alert, oriented, normal speech, no focal findings or movement disorder noted  Extremities - peripheral pulses normal, no pedal edema, no clubbing or cyanosis  Skin - normal coloration and turgor, no rashes, no suspicious skin lesions noted     DATA REVIEW     Medications:  Scheduled Meds:   heparin (porcine)  5,000 Units Subcutaneous 3 times per day    [START ON 5/27/2021] levoFLOXacin  250 mg Oral Every Other Day    fluticasone  1 spray Each Nostril Daily    albuterol  2.5 mg Nebulization BID    montelukast  10 mg Oral Nightly    insulin lispro  0-18 Units Subcutaneous TID WC    insulin lispro  0-9 Units Subcutaneous Nightly    guaiFENesin  600 mg Oral BID    insulin lispro  3 Units Subcutaneous Q8H    insulin glargine  20 Units Subcutaneous Nightly    aspirin  81 mg Oral Daily    atorvastatin  80 mg Oral Daily    carvedilol  25 mg Oral BID WC    cloNIDine  0.2 mg Oral TID    clopidogrel  75 mg Oral Daily    hydrALAZINE  100 mg Oral 3 times per day    isosorbide mononitrate  60 mg Oral Daily    losartan  50 mg Oral BID    NIFEdipine  90 mg Oral Nightly    prazosin  4 mg Oral BID    QUEtiapine  100 mg Oral Nightly    sodium bicarbonate  1,300 mg Oral BID    sodium chloride flush  5-40 mL Intravenous 2 times per day    famotidine  20 mg Oral BID    bumetanide  2 mg Intravenous Once    ipratropium-albuterol  1 ampule Inhalation 4x daily     Continuous Infusions:   sodium chloride      dextrose      sodium chloride       LABS:-  ABG:   No results for input(s): POCPH, POCPCO2, POCPO2, POCHCO3, LXGQ3PAG in the last 72 hours. CBC:   Recent Labs     05/24/21  0405 05/25/21  0633   WBC 10.1 9.3   HGB 8.7* 8.8*   HCT 27.2* 27.7*   MCV 98.9 97.9    341   RBC 2.75* 2.83*   MCH 31.6 31.1   MCHC 32.0 31.8   RDW 12.9 12.5     BMP:   Recent Labs     05/24/21  0405 05/25/21  0633   * 132*   K 3.9 4.3   CL 96* 97*   CO2 25 25   BUN 77* 96*   CREATININE 3.62* 4.10*   GLUCOSE 412* 302*   PHOS 5.2* 5.1*     Liver Function Test:   Recent Labs     05/25/21  0633   LABALBU 2.5*     Amylase/Lipase:  No results for input(s): AMYLASE, LIPASE in the last 72 hours. Coagulation Profile:   No results for input(s): INR, PROTIME, APTT in the last 72 hours. Cardiac Enzymes:  No results for input(s): CKTOTAL, CKMB, CKMBINDEX, TROPONINI in the last 72 hours.   Lactic Acid:  Lab Results   Component Value Date    LACTA NOT REPORTED 09/02/2018     BNP:   Lab Results   Component Value Date    BNP 84 11/27/2013     D-Dimer:  Lab Results   Component Value Date    DDIMER 2.06 05/15/2021     Others:   Lab Results   Component Value Date    TSH 2.91 05/15/2020     Lab Results   Component Value Date    RIZWAN NEGATIVE 12/04/2020    SEDRATE 9 06/10/2013    CRP 3.68 (H) 11/11/2011     No results found for: Rafael Laos  Lab Results   Component Value Date    IRON 63 05/10/2021    TIBC 316 05/10/2021    FERRITIN 79 05/10/2021     No results found for: SPEP, UPEP  No results found for: PSA, CEA, , AR1487,     Input/Output:    Intake/Output Summary (Last 24 hours) at 5/26/2021 2859  Last data filed at 5/25/2021 2339  Gross per 24 hour   Intake 1140 ml   Output 3625 ml   Net -2485 ml       Microbiology:  No results for input(s): SPECDESC, SPECDESC, SPECIAL, CULTURE, CULTURE, STATUS, ORG, CDIFFTOXPCR, CAMPYLOBPCR, SALMONELLAPC, SHIGAPCR, SHIGELLAPCR, MPNEUG, MPNEUM, LACTOQL in the last 72 hours. Pathology:    Radiology reports:  CT SINUS WO CONTRAST   Final Result   Complete opacification of the right maxillary sinus with mild mucoperiosteal   thickening of the ethmoid air cells and inferior frontal sinuses consistent   with sinus inflammation/sinusitis. Right ostiomeatal complex is occluded by   mucoperiosteal thickening. Cerebral atrophy. Atherosclerotic disease as   above. XR CHEST PORTABLE   Final Result   No acute cardiopulmonary abnormality. XR CHEST PORTABLE   Final Result   Unremarkable single upright portable AP view of the chest.         XR CHEST PORTABLE   Final Result   Unremarkable single upright portable AP view of the chest.             Echocardiogram:   Results for orders placed during the hospital encounter of 05/03/21    ECHO Complete 2D W Doppler W Color    Narrative  Transthoracic Echocardiography Report (TTE)    Patient Name Talha Begum      Date of Study               05/05/2021  Weston Hilario    Date of      1955  Gender                      Male  Birth    Age          73 year(s)  Race                        Black    Room Number  2022        Height:                     66 inch, 167.64 cm    Corporate ID [id number]    Weight:                     140 pounds, 63.5 kg  #    Patient Acct [de-identified]   BSA:          1.72 m^2      BMI:      22.6 kg/m^2  #    MR #         [id number]     Sonographer                 Calvin Fox    Accession #  [id number]  Interpreting Physician      Rena Win 62    Fellow                   Referring Nurse  Practitioner    Interpreting             Referring Physician         Thomas Ramirez  Fellow    Type of Study    TTE procedure:2D Echocardiogram, M-Mode, Doppler, Color Doppler. Procedure Date  Date: 05/05/2021 Start: 07:55 AM    Study Location: [facility]  Technical Quality: Good visualization    Indications:LV Function. History / Tech. Comments:  Procedure explained to patient. ESRD.  HTN.   DM type 2.    Patient Status: Inpatient    Height: 66 inches Weight: 140 pounds BSA: 1.72 m^2 BMI: 22.6 kg/m^2    HR: 52 bpm    Allergies  - Lisinopril. - Ace Inhibitors. - Penicillin. CONCLUSIONS    Summary  Normal LV size . Severe concentric LVH. Septal wall thickness 2.1 cm  No obvious wall motion abnormality seen. Normal LV systolic function with LVEF >65%. Grade I diastolic dysfunction  Normal RV size and function. RV systolic pressure 38 mmHg  Moderately dilated LA and RA appears normal in size. No obvious significant structural valvular abnormality noted. No significant valvular stenosis or regurgitation noted. Normal aortic root dimension. No significant pericardial effusion noted. No obvious intra-cardiac mass or shunt noted. IVC normal diameter and inspiratory variation indicating normal RA filling  pressure. Consider Amyloidosis    Signature  ----------------------------------------------------------------------------  Electronically signed by Vega PikeInterpreting physician) on  05/05/2021 02:02 PM  ----------------------------------------------------------------------------    ----------------------------------------------------------------------------  Electronically signed by Elías Larson on 05/05/2021 08:49  AM  ----------------------------------------------------------------------------  FINDINGS  Left Atrium  Left atrium is moderately dilated. Left Ventricle  Left ventricle is normal in size Global left ventricular systolic function  is normal Estimated ejection fraction is > 65% . Severe left ventricular hypertrophy. Right Atrium  Right atrium is normal in size. Right Ventricle  Normal right ventricular size and function. Mitral Valve  Myxomatous thickening of the mitral leaflets. No mitral regurgitation. Aortic Valve  Aortic valve is sclerotic but opens well. Aortic valve is trileaflet. No aortic insufficiency.   Tricuspid Valve  No obvious valvular severe.  //COPD exacerbation.  //Mild pulmonary hypertension.  //Probable obstructive sleep apnea.  //End-stage renal disease/CKD on hemodialysis now  //Diabetes mellitus.  //Chronic sinusitis  Plan:    Encourage nocturnal BiPAP and as needed during the daytime while in the hospital  Continue with supplemental O2 and wean O2 to 2 L keep saturation 90% and above. Continue with DuoNeb aerosol and restart Symbicort. Finished short course of prednisone  Continue with Flonase nasal spray discussed with patient. Saline nasal flushes nasal spray 3 times a day  CT scan of the sinuses results seen  Continue with hemodialysis per nephrology  Continue to monitor I/O with a goal of even/negative fluid balance  Antimicrobials reviewed; on doxycycline consider Augmentin for longer course for chronic sinusitis  DVT prophylaxis on heparin subcutaneous  Physical/occupational/speech therapy; increase activity as tolerated  Home O2 evaluation on discharge. Discussed with nursing staff   Will need follow-up with Dr. Jennifer Dotson after discharge and outpatient pulmonary function test and likely outpatient sleep study    I updated the patient regarding the current clinical condition, provisional diagnosis and management plan. I addressed concerns and answered all questions to the best of my abilities. It was my pleasure to evaluate Esmer Morris today. We will continue to follow. I would like to thank you for allowing me to participate in the care of this patient. Please feel free to call with any further questions or concerns. Vikki Dominguez MD, M.D. Pulmonary and Critical Care Medicine           5/26/2021, 9:37 AM    Please note that this chart was generated using voice recognition Dragon dictation software. Although every effort was made to ensure the accuracy of this automated transcription, some errors in transcription may have occurred.

## 2021-05-26 NOTE — CARE COORDINATION
Transitional planning. Spoke with pt and he is looking over SNF choices versus PT bands at home and getting pulmonary rehab 3 times a week (instead of 2) Snf list given.

## 2021-05-26 NOTE — PLAN OF CARE
Problem: Falls - Risk of:  Goal: Will remain free from falls  Description: Will remain free from falls  Outcome: Ongoing  Goal: Absence of physical injury  Description: Absence of physical injury  Outcome: Ongoing     Problem: Pain:  Goal: Pain level will decrease  Description: Pain level will decrease  Outcome: Ongoing  Goal: Control of acute pain  Description: Control of acute pain  Outcome: Ongoing  Goal: Control of chronic pain  Description: Control of chronic pain  Outcome: Ongoing     Problem: Gas Exchange - Impaired:  Goal: Levels of oxygenation will improve  Description: Levels of oxygenation will improve  Outcome: Ongoing   Electronically signed by Shanita Mckinney RN on 5/26/2021 at 6:04 AM

## 2021-05-27 ENCOUNTER — CARE COORDINATION (OUTPATIENT)
Dept: CASE MANAGEMENT | Age: 66
End: 2021-05-27

## 2021-05-27 NOTE — CARE COORDINATION
Shravan 45 Transitions Initial Follow Up Call    Call within 2 business days of discharge: Yes    Patient: Jose Armstrong Patient : 1955   MRN: [id number]  Reason for Admission: Respiratory distress  Discharge Date: 21 RARS: Readmission Risk Score: 65      Last Discharge [address] [id number]       Complaint Diagnosis Description Type Department Provider    21 Respiratory Distress Respiratory distress . .. ED to Hosp-Admission (Discharged) (ADMITTED) [facility]  [facility], DO; Evelia Loja. ..           1st attempt to reach patient for Care Transitions. No answer and no voice mail. Will attempt at a later date/time. Facility[facility]    Care Transitions 24 Hour Call    Do you have all of your prescriptions and are they filled?: Yes  Post Acute Services:  Outpatient/Community Services (Comment: pulmonary rehab)  Patient Home Equipment: Nebulizer  Do you have support at home?: Partner/Spouse/SO  Are you an active caregiver in your home?: No  Care Transitions Interventions         Follow Up  Future Appointments   Date Time Provider Mahesh Gunter   2021 11:00 AM Erica Lee MD Resp Spec Dmitri David   2021  2:20 PM Doneta Blocker, APRN - CNP [facility] BERTHA Torres, YIN

## 2021-05-28 ENCOUNTER — TELEPHONE (OUTPATIENT)
Dept: PULMONOLOGY | Age: 66
End: 2021-05-28

## 2021-05-28 ENCOUNTER — CARE COORDINATION (OUTPATIENT)
Dept: CASE MANAGEMENT | Age: 66
End: 2021-05-28

## 2021-05-28 NOTE — CARE COORDINATION
[facility] Transitions Initial Follow Up Call    Call within 2 business days of discharge: Yes    Patient: Kenny Chand Patient : 1955   MRN: [id number]  Reason for Admission: Respiratory distress  Discharge Date: 21 RARS: Readmission Risk Score: 65      Last Discharge [facility]       Complaint Diagnosis Description Type Department Provider    21 Respiratory Distress Respiratory distress . .. ED to Hosp-Admission (Discharged) (ADMITTED) [facility]  [facility], DO; Shayan Cullen. .. Final attempt to reach patient for care transitions. No answer and no voice mail   Episode resolved at this time. Facility: [facility]    Care Transitions 24 Hour Call    Do you have all of your prescriptions and are they filled?: Yes  Post Acute Services:  Outpatient/Community Services (Comment: pulmonary rehab)  Patient Home Equipment: Nebulizer  Do you have support at home?: Partner/Spouse/SO  Are you an active caregiver in your home?: No  Care Transitions Interventions         Follow Up  Future Appointments   Date Time Provider Mahesh Gunter   2021 11:00 AM Andriy Sims MD Resp Spec Gloria Slater   2021  2:20 PM Stephanie Pfeiffer APRN - CNP [facility] BERTHA Mcneal RN

## 2021-05-28 NOTE — TELEPHONE ENCOUNTER
----- Message from Ariel Alvarez MD sent at 5/27/2021  6:38 PM EDT -----  Please make an appointment with Dr. Lopez Olmos in 2 weeks.   ----- Message -----  From: Yusuf Charles DO  Sent: 5/26/2021   5:22 PM EDT  To: Campos Ashford MD

## 2021-06-11 RX ORDER — CLONIDINE HYDROCHLORIDE 0.2 MG/1
0.2 TABLET ORAL 3 TIMES DAILY
Qty: 60 TABLET | Refills: 0 | Status: SHIPPED | OUTPATIENT
Start: 2021-06-11 | End: 2021-11-01

## 2021-06-11 RX ORDER — MONTELUKAST SODIUM 10 MG/1
10 TABLET ORAL NIGHTLY
Qty: 30 TABLET | Refills: 0 | Status: SHIPPED | OUTPATIENT
Start: 2021-06-11 | End: 2021-11-01

## 2021-06-11 RX ORDER — NIFEDIPINE 90 MG/1
90 TABLET, EXTENDED RELEASE ORAL NIGHTLY
Qty: 30 TABLET | Refills: 0 | Status: SHIPPED | OUTPATIENT
Start: 2021-06-11 | End: 2021-11-01

## 2021-07-02 ENCOUNTER — CARE COORDINATION (OUTPATIENT)
Dept: CARE COORDINATION | Age: 66
End: 2021-07-02

## 2021-07-02 NOTE — CARE COORDINATION
Attempted to reach patient for ACM enrollment. Left a message on his voicemail with call back number. Also left a message reminding him of f/u appt with new provider next week. Will attempt to reach him next week, if no return call.

## 2021-07-08 ENCOUNTER — CARE COORDINATION (OUTPATIENT)
Dept: CARE COORDINATION | Age: 66
End: 2021-07-08

## 2021-07-08 NOTE — CARE COORDINATION
Called patient for ACM enrollment. The patient is no longer following at [facility] clinic. He stated that he is following with [facility] provider. Has follow up with Arvilla Curling, CNP on 7/28/2021.

## 2021-09-09 ENCOUNTER — HOSPITAL ENCOUNTER (EMERGENCY)
Age: 66
Discharge: HOME | End: 2021-09-09
Attending: EMERGENCY MEDICINE
Payer: COMMERCIAL

## 2021-09-09 ENCOUNTER — APPOINTMENT (OUTPATIENT)
Dept: GENERAL RADIOLOGY | Age: 66
End: 2021-09-09
Payer: COMMERCIAL

## 2021-09-09 VITALS
SYSTOLIC BLOOD PRESSURE: 171 MMHG | RESPIRATION RATE: 16 BRPM | OXYGEN SATURATION: 100 % | DIASTOLIC BLOOD PRESSURE: 61 MMHG | WEIGHT: 130 LBS | BODY MASS INDEX: 20.89 KG/M2 | HEART RATE: 60 BPM | HEIGHT: 66 IN | TEMPERATURE: 97.1 F

## 2021-09-09 DIAGNOSIS — R06.02 SHORTNESS OF BREATH: Primary | ICD-10-CM

## 2021-09-09 LAB
ABSOLUTE EOS #: 0.31 K/UL (ref 0–0.44)
ABSOLUTE IMMATURE GRANULOCYTE: 0.04 K/UL (ref 0–0.3)
ABSOLUTE LYMPH #: 1.04 K/UL (ref 1.1–3.7)
ABSOLUTE MONO #: 0.44 K/UL (ref 0.1–1.2)
ALBUMIN SERPL-MCNC: 3.4 G/DL (ref 3.5–5.2)
ALBUMIN/GLOBULIN RATIO: 1.5 (ref 1–2.5)
ALLEN TEST: ABNORMAL
ALP BLD-CCNC: 90 U/L (ref 40–129)
ALT SERPL-CCNC: 33 U/L (ref 5–41)
ANION GAP SERPL CALCULATED.3IONS-SCNC: 15 MMOL/L (ref 9–17)
AST SERPL-CCNC: 27 U/L
BASOPHILS # BLD: 1 % (ref 0–2)
BASOPHILS ABSOLUTE: 0.03 K/UL (ref 0–0.2)
BILIRUB SERPL-MCNC: 0.28 MG/DL (ref 0.3–1.2)
BNP INTERPRETATION: ABNORMAL
BUN BLDV-MCNC: 36 MG/DL (ref 8–23)
BUN/CREAT BLD: ABNORMAL (ref 9–20)
CALCIUM SERPL-MCNC: 8.3 MG/DL (ref 8.6–10.4)
CARBOXYHEMOGLOBIN: 1 % (ref 0–5)
CHLORIDE BLD-SCNC: 98 MMOL/L (ref 98–107)
CO2: 19 MMOL/L (ref 20–31)
CREAT SERPL-MCNC: 2.08 MG/DL (ref 0.7–1.2)
DIFFERENTIAL TYPE: ABNORMAL
EOSINOPHILS RELATIVE PERCENT: 5 % (ref 1–4)
FIO2: ABNORMAL
GFR AFRICAN AMERICAN: 39 ML/MIN
GFR NON-AFRICAN AMERICAN: 32 ML/MIN
GFR SERPL CREATININE-BSD FRML MDRD: ABNORMAL ML/MIN/{1.73_M2}
GFR SERPL CREATININE-BSD FRML MDRD: ABNORMAL ML/MIN/{1.73_M2}
GLUCOSE BLD-MCNC: 434 MG/DL (ref 75–110)
GLUCOSE BLD-MCNC: 623 MG/DL (ref 70–99)
HCO3 VENOUS: 20.2 MMOL/L (ref 24–30)
HCT VFR BLD CALC: 34.6 % (ref 40.7–50.3)
HEMOGLOBIN: 10.9 G/DL (ref 13–17)
IMMATURE GRANULOCYTES: 1 %
LYMPHOCYTES # BLD: 17 % (ref 24–43)
MAGNESIUM: 1.8 MG/DL (ref 1.6–2.6)
MCH RBC QN AUTO: 30 PG (ref 25.2–33.5)
MCHC RBC AUTO-ENTMCNC: 31.5 G/DL (ref 28.4–34.8)
MCV RBC AUTO: 95.3 FL (ref 82.6–102.9)
METHEMOGLOBIN: ABNORMAL % (ref 0–1.5)
MODE: ABNORMAL
MONOCYTES # BLD: 7 % (ref 3–12)
NEGATIVE BASE EXCESS, VEN: 5.4 MMOL/L (ref 0–2)
NOTIFICATION TIME: ABNORMAL
NOTIFICATION: ABNORMAL
NRBC AUTOMATED: 0 PER 100 WBC
O2 DEVICE/FLOW/%: ABNORMAL
O2 SAT, VEN: 59.3 % (ref 60–85)
OXYHEMOGLOBIN: ABNORMAL % (ref 95–98)
PATIENT TEMP: 37
PCO2, VEN, TEMP ADJ: ABNORMAL MMHG (ref 39–55)
PCO2, VEN: 42.6 (ref 39–55)
PDW BLD-RTO: 12.8 % (ref 11.8–14.4)
PEEP/CPAP: ABNORMAL
PH VENOUS: 7.3 (ref 7.32–7.42)
PH, VEN, TEMP ADJ: ABNORMAL (ref 7.32–7.42)
PLATELET # BLD: 236 K/UL (ref 138–453)
PLATELET ESTIMATE: ABNORMAL
PMV BLD AUTO: 10.5 FL (ref 8.1–13.5)
PO2, VEN, TEMP ADJ: ABNORMAL MMHG (ref 30–50)
PO2, VEN: 32.8 (ref 30–50)
POSITIVE BASE EXCESS, VEN: ABNORMAL MMOL/L (ref 0–2)
POTASSIUM SERPL-SCNC: 4.8 MMOL/L (ref 3.7–5.3)
PRO-BNP: ABNORMAL PG/ML
PSV: ABNORMAL
PT. POSITION: ABNORMAL
RBC # BLD: 3.63 M/UL (ref 4.21–5.77)
RBC # BLD: ABNORMAL 10*6/UL
RESPIRATORY RATE: ABNORMAL
SAMPLE SITE: ABNORMAL
SARS-COV-2, RAPID: NOT DETECTED
SEG NEUTROPHILS: 69 % (ref 36–65)
SEGMENTED NEUTROPHILS ABSOLUTE COUNT: 4.18 K/UL (ref 1.5–8.1)
SET RATE: ABNORMAL
SODIUM BLD-SCNC: 132 MMOL/L (ref 135–144)
SPECIMEN DESCRIPTION: NORMAL
TEXT FOR RESPIRATORY: ABNORMAL
TOTAL HB: ABNORMAL G/DL (ref 12–16)
TOTAL PROTEIN: 5.7 G/DL (ref 6.4–8.3)
TOTAL RATE: ABNORMAL
TROPONIN INTERP: ABNORMAL
TROPONIN INTERP: ABNORMAL
TROPONIN T: ABNORMAL NG/ML
TROPONIN T: ABNORMAL NG/ML
TROPONIN, HIGH SENSITIVITY: 224 NG/L (ref 0–22)
TROPONIN, HIGH SENSITIVITY: 237 NG/L (ref 0–22)
VT: ABNORMAL
WBC # BLD: 6 K/UL (ref 3.5–11.3)
WBC # BLD: ABNORMAL 10*3/UL

## 2021-09-09 PROCEDURE — 93005 ELECTROCARDIOGRAM TRACING: CPT | Performed by: STUDENT IN AN ORGANIZED HEALTH CARE EDUCATION/TRAINING PROGRAM

## 2021-09-09 PROCEDURE — 96372 THER/PROPH/DIAG INJ SC/IM: CPT

## 2021-09-09 PROCEDURE — 6360000002 HC RX W HCPCS: Performed by: STUDENT IN AN ORGANIZED HEALTH CARE EDUCATION/TRAINING PROGRAM

## 2021-09-09 PROCEDURE — 85025 COMPLETE CBC W/AUTO DIFF WBC: CPT

## 2021-09-09 PROCEDURE — 36415 COLL VENOUS BLD VENIPUNCTURE: CPT

## 2021-09-09 PROCEDURE — 84484 ASSAY OF TROPONIN QUANT: CPT

## 2021-09-09 PROCEDURE — 82805 BLOOD GASES W/O2 SATURATION: CPT

## 2021-09-09 PROCEDURE — 99285 EMERGENCY DEPT VISIT HI MDM: CPT

## 2021-09-09 PROCEDURE — 82947 ASSAY GLUCOSE BLOOD QUANT: CPT

## 2021-09-09 PROCEDURE — 80053 COMPREHEN METABOLIC PANEL: CPT

## 2021-09-09 PROCEDURE — 96374 THER/PROPH/DIAG INJ IV PUSH: CPT

## 2021-09-09 PROCEDURE — 87635 SARS-COV-2 COVID-19 AMP PRB: CPT

## 2021-09-09 PROCEDURE — 2580000003 HC RX 258: Performed by: STUDENT IN AN ORGANIZED HEALTH CARE EDUCATION/TRAINING PROGRAM

## 2021-09-09 PROCEDURE — 83735 ASSAY OF MAGNESIUM: CPT

## 2021-09-09 PROCEDURE — 83880 ASSAY OF NATRIURETIC PEPTIDE: CPT

## 2021-09-09 PROCEDURE — 71045 X-RAY EXAM CHEST 1 VIEW: CPT

## 2021-09-09 PROCEDURE — 6370000000 HC RX 637 (ALT 250 FOR IP): Performed by: STUDENT IN AN ORGANIZED HEALTH CARE EDUCATION/TRAINING PROGRAM

## 2021-09-09 RX ORDER — SODIUM CHLORIDE, SODIUM LACTATE, POTASSIUM CHLORIDE, AND CALCIUM CHLORIDE .6; .31; .03; .02 G/100ML; G/100ML; G/100ML; G/100ML
1000 INJECTION, SOLUTION INTRAVENOUS ONCE
Status: DISCONTINUED | OUTPATIENT
Start: 2021-09-09 | End: 2021-09-09

## 2021-09-09 RX ORDER — PREDNISONE 10 MG/1
10 TABLET ORAL DAILY
Qty: 3 TABLET | Refills: 0 | Status: SHIPPED | OUTPATIENT
Start: 2021-09-09 | End: 2021-09-12

## 2021-09-09 RX ORDER — PREDNISONE 20 MG/1
40 TABLET ORAL DAILY
Qty: 6 TABLET | Refills: 0 | Status: SHIPPED | OUTPATIENT
Start: 2021-09-09 | End: 2021-09-12

## 2021-09-09 RX ORDER — PREDNISONE 20 MG/1
20 TABLET ORAL DAILY
Qty: 3 TABLET | Refills: 0 | Status: SHIPPED | OUTPATIENT
Start: 2021-09-09 | End: 2021-09-12

## 2021-09-09 RX ORDER — PREDNISONE 20 MG/1
30 TABLET ORAL DAILY
Qty: 5 TABLET | Refills: 0 | Status: SHIPPED | OUTPATIENT
Start: 2021-09-09 | End: 2021-09-12

## 2021-09-09 RX ORDER — DOXYCYCLINE 100 MG/1
100 TABLET ORAL 2 TIMES DAILY
Qty: 14 TABLET | Refills: 0 | Status: SHIPPED | OUTPATIENT
Start: 2021-09-09 | End: 2021-09-16

## 2021-09-09 RX ORDER — SODIUM CHLORIDE, SODIUM LACTATE, POTASSIUM CHLORIDE, AND CALCIUM CHLORIDE .6; .31; .03; .02 G/100ML; G/100ML; G/100ML; G/100ML
500 INJECTION, SOLUTION INTRAVENOUS ONCE
Status: DISCONTINUED | OUTPATIENT
Start: 2021-09-09 | End: 2021-09-09

## 2021-09-09 RX ORDER — PREDNISONE 10 MG/1
40 TABLET ORAL DAILY
Qty: 12 TABLET | Refills: 0 | Status: SHIPPED | OUTPATIENT
Start: 2021-09-09 | End: 2021-09-09

## 2021-09-09 RX ORDER — METHYLPREDNISOLONE SODIUM SUCCINATE 125 MG/2ML
INJECTION, POWDER, LYOPHILIZED, FOR SOLUTION INTRAMUSCULAR; INTRAVENOUS
Status: DISPENSED
Start: 2021-09-09 | End: 2021-09-10

## 2021-09-09 RX ORDER — PREDNISONE 1 MG/1
20 TABLET ORAL DAILY
Qty: 12 TABLET | Refills: 0 | Status: SHIPPED | OUTPATIENT
Start: 2021-09-09 | End: 2021-09-09

## 2021-09-09 RX ORDER — PREDNISONE 10 MG/1
30 TABLET ORAL DAILY
Qty: 9 TABLET | Refills: 0 | Status: SHIPPED | OUTPATIENT
Start: 2021-09-09 | End: 2021-09-09

## 2021-09-09 RX ORDER — IPRATROPIUM BROMIDE AND ALBUTEROL SULFATE 2.5; .5 MG/3ML; MG/3ML
1 SOLUTION RESPIRATORY (INHALATION)
Status: DISCONTINUED | OUTPATIENT
Start: 2021-09-09 | End: 2021-09-09 | Stop reason: HOSPADM

## 2021-09-09 RX ORDER — METHYLPREDNISOLONE SODIUM SUCCINATE 125 MG/2ML
125 INJECTION, POWDER, LYOPHILIZED, FOR SOLUTION INTRAMUSCULAR; INTRAVENOUS ONCE
Status: COMPLETED | OUTPATIENT
Start: 2021-09-09 | End: 2021-09-09

## 2021-09-09 RX ORDER — METHYLPREDNISOLONE SODIUM SUCCINATE 125 MG/2ML
40 INJECTION, POWDER, LYOPHILIZED, FOR SOLUTION INTRAMUSCULAR; INTRAVENOUS ONCE
Status: DISCONTINUED | OUTPATIENT
Start: 2021-09-09 | End: 2021-09-09

## 2021-09-09 RX ORDER — PREDNISONE 20 MG/1
50 TABLET ORAL DAILY
Qty: 8 TABLET | Refills: 0 | Status: SHIPPED | OUTPATIENT
Start: 2021-09-09 | End: 2021-09-09

## 2021-09-09 RX ORDER — SODIUM CHLORIDE, SODIUM LACTATE, POTASSIUM CHLORIDE, CALCIUM CHLORIDE 600; 310; 30; 20 MG/100ML; MG/100ML; MG/100ML; MG/100ML
INJECTION, SOLUTION INTRAVENOUS CONTINUOUS
Status: DISCONTINUED | OUTPATIENT
Start: 2021-09-09 | End: 2021-09-09 | Stop reason: HOSPADM

## 2021-09-09 RX ADMIN — INSULIN LISPRO 6 UNITS: 100 INJECTION, SOLUTION INTRAVENOUS; SUBCUTANEOUS at 20:12

## 2021-09-09 RX ADMIN — SODIUM CHLORIDE, POTASSIUM CHLORIDE, SODIUM LACTATE AND CALCIUM CHLORIDE: 600; 310; 30; 20 INJECTION, SOLUTION INTRAVENOUS at 20:12

## 2021-09-09 RX ADMIN — METHYLPREDNISOLONE SODIUM SUCCINATE 125 MG: 125 INJECTION, POWDER, FOR SOLUTION INTRAMUSCULAR; INTRAVENOUS at 18:43

## 2021-09-09 NOTE — ED PROVIDER NOTES
[facility] ED  Emergency Department Encounter  EmergencyMedicine Resident     Pt Cj Gayle  MRN: [id number]  [place] 1955  Date of evaluation: 9/9/21  PCP:  Nargis Frederick    This patient was evaluated in the Emergency Department for symptoms described in the history of present illness. The patient was evaluated in the context of the global COVID-19 pandemic, which necessitated consideration that the patient might be at risk for infection with the SARS-CoV-2 virus that causes COVID-19. Institutional protocols and algorithms that pertain to the evaluation of patients at risk for COVID-19 are in a state of rapid change based on information released by regulatory bodies including the CDC and federal and state organizations. These policies and algorithms were followed during the patient's care in the ED. CHIEF COMPLAINT       Chief Complaint   Patient presents with    Shortness of Breath     dialysis patient       HISTORY OF PRESENT ILLNESS  (Location/Symptom, Timing/Onset, Context/Setting, Quality, Duration, Modifying Factors, Severity.)      Ann Quiroz is a 73 y.o. male who presents with dyspnea. Patient has a history of COPD diabetes and ESRD. Patient states that his dyspnea started about 1 day ago. Patient states that he has these episodes every few months, they are usually diagnosed as COPD exacerbations, he is usually given a dose of Solu-Medrol in the ER and discharged with a tapered dose of prednisone. Patient states that he is not having any chest pain and he is otherwise doing well. Patient does not have any fevers chills nausea vomiting dizziness drowsiness or other constitutional symptoms. Patient is very well aware of his medical conditions, he states that he has been compliant with all of his medications. He is followed by an endocrinologist for his diabetes, he has an appointment with a pulmonologist this week.   Patient states that he had dialysis today (the day of his visit to the ER) and he has another appointment tomorrow for dialysis    PAST MEDICAL / SURGICAL / SOCIAL / FAMILY HISTORY      has a past medical history of Asthma, CAD in native artery, nonobstructive, Carotid stenosis, left, Carpal tunnel syndrome, Cerebrovascular disease, Chronic kidney disease, COPD (chronic obstructive pulmonary disease) ([facility] [address] Diabetes mellitus ([facility] [address] History of colon polyps, History of weakness of extremity, HTN (hypertension), Hyperlipidemia, Lung, cysts, congenital, PTSD (post-traumatic stress disorder), PTSD (post-traumatic stress disorder), TIA (transient ischemic attack), and Type II or unspecified type diabetes mellitus without mention of complication, not stated as uncontrolled. has a past surgical history that includes hernia repair; Tonsillectomy; Colonoscopy; Carotid endarterectomy (Left, -); vascular surgery (Left, ); pr colsc flx w/rmvl of tumor polyp lesion snare tq (N/A, 2017); and IR TUNNELED CVC PLACE WO SQ PORT/PUMP > 5 YEARS (2021).       Social History     Socioeconomic History    Marital status:      Spouse name: Not on file    Number of children: Not on file    Years of education: Not on file    Highest education level: Not on file   Occupational History     Employer: N/A   Tobacco Use    Smoking status: Former Smoker     Packs/day: 0.50     Years: 35.00     Pack years: 17.50     Types: Cigarettes     Quit date: 2014     Years since quittin.1    Smokeless tobacco: Never Used   Vaping Use    Vaping Use: Never used   Substance and Sexual Activity    Alcohol use: No     Alcohol/week: 0.0 standard drinks    Drug use: No    Sexual activity: Yes     Partners: Female   Other Topics Concern    Not on file   Social History Narrative    Not on file     Social Determinants of Health     Financial Resource Strain:     Difficulty of Paying Living Expenses:    Food Insecurity:     Worried About Running Out of Food in the Last Year:     Ran Out of Food in the Last Year:    Transportation Needs:     Lack of Transportation (Medical):  Lack of Transportation (Non-Medical):    Physical Activity:     Days of Exercise per Week:     Minutes of Exercise per Session:    Stress:     Feeling of Stress :    Social Connections:     Frequency of Communication with Friends and Family:     Frequency of Social Gatherings with Friends and Family:     Attends Yarsanism Services:     Active Member of Clubs or Organizations:     Attends Club or Organization Meetings:     Marital Status:    Intimate Partner Violence:     Fear of Current or Ex-Partner:     Emotionally Abused:     Physically Abused:     Sexually Abused:        Family History   Problem Relation Age of Onset    Cancer Mother     Heart Disease Father        Allergies:  Lisinopril, Ace inhibitors, and Pcn [penicillins]    Home Medications:  Prior to Admission medications    Medication Sig Start Date End Date Taking?  Authorizing Provider   predniSONE (DELTASONE) 20 MG tablet Take 2 tablets by mouth daily for 3 days 9/9/21 9/12/21 Yes Chava Morales DO   predniSONE (DELTASONE) 20 MG tablet Take 1.5 tablets by mouth daily for 3 days 9/9/21 9/12/21 Yes Chava Morales DO   predniSONE (DELTASONE) 20 MG tablet Take 1 tablet by mouth daily for 3 days 9/9/21 9/12/21 Yes Chava Morales DO   predniSONE (DELTASONE) 10 MG tablet Take 1 tablet by mouth daily for 3 days 9/9/21 9/12/21 Yes Chava Morales DO   doxycycline monohydrate (ADOXA) 100 MG tablet Take 1 tablet by mouth 2 times daily for 7 days 9/9/21 9/16/21 Yes Ata Morales DO   gabapentin (NEURONTIN) 100 MG capsule TAKE 2 CAPSULES BY MOUTH 3 TIMES DAILY 6/24/21 9/22/21  Israel Barclay MD   prazosin (MINIPRESS) 2 MG capsule 2 CAPSULES BY MOUTH (4MG) TWICE DAILY 6/24/21   Israel Barclay MD   clopidogrel (PLAVIX) 75 MG tablet Take 1 tablet by mouth daily 6/24/21   Israel Barclay MD   aspirin (ASPIRIN LOW DOSE) 81 MG EC tablet TAKE 1 TABLET BY MOUTH DAILY 6/23/21   Berto Ramirez MD   montelukast (SINGULAIR) 10 MG tablet Take 1 tablet by mouth nightly 6/11/21   Diogo Gresham MD   NIFEdipine (PROCARDIA XL) 90 MG extended release tablet Take 1 tablet by mouth nightly 6/11/21   Diogo Gresham MD   cloNIDine (CATAPRES) 0.2 MG tablet Take 1 tablet by mouth 3 times daily 6/11/21   Diogo Gresham MD   sodium chloride (AYR) 0.65 % (Soln) SOLN nasal drops 2 drops by Each Nostril route 3 times daily 5/26/21   TERRANCE Llanos NP   blood glucose monitor strips Test 4 times a day & as needed for symptoms of irregular blood glucose. Dispense sufficient amount for indicated testing frequency plus additional to accommodate PRN testing needs. 5/26/21   TERRANCE Llanos NP   fluticasone-umeclidin-vilant (Alka Pump) 699-79.8-24 MCG/INH AEPB One puff into the lungs daily 5/26/21   TERRANCE Llanos NP   carvedilol (COREG) 25 MG tablet TAKE 1 TABLET BY MOUTH 2 TIMES DAILY (WITH MEALS) 5/24/21   TERRANCE Roa CNP   atorvastatin (LIPITOR) 80 MG tablet TAKE 1 TABLET BY MOUTH DAILY 5/24/21   TERRANCE Roa CNP   insulin glargine [facility] KWIKPEN) 100 UNIT/ML injection pen Inject 10 Units into the skin nightly 5/13/21   TERRANCE Llanos NP   glucagon 1 MG injection Inject 1 mg into the muscle See Admin Instructions Follow package directions for low blood sugar. 4/6/21   TERRANCE Roa CNP   fluticasone [facility]) 50 MCG/ACT nasal spray  2/23/21   Historical MD Rohini   insulin aspart (NOVOLOG FLEXPEN) 100 UNIT/ML injection pen Inject 5 Units into the skin 3 times daily (before meals) Sliding scale 3/16/21   TERRANCE Roa CNP   nitroGLYCERIN (NITROSTAT) 0.4 MG SL tablet USE 1 TABLET UNDER THE TONGUE UP TO MAXIMUM OF 3 TOTAL DOSES.  IF NO RELIEF AFTER 1 DOSE, CALL 911. 3/5/21   TERRANCE Roa CNP   QUEtiapine (SEROQUEL) 100 MG tablet TAKE 1 TABLET BY MOUTH NIGHTLY 2/23/21   TERRANCE Jefferson CNP   losartan (COZAAR) 50 MG tablet TAKE 1 TABLET BY MOUTH 2 TIMES DAILY 2/23/21   TERRANCE Jefferson CNP   GNP VITAMIN D MAXIMUM STRENGTH 50 MCG (2000 UT) TABS TAKE 1 TABLET BY MOUTH ONCE DAILY 2/23/21   TERRANCE Jefferson CNP   blood glucose monitor kit and supplies Dispense sufficient amount for indicated testing frequency plus additional to accommodate PRN testing needs. Dispense all needed supplies to include: monitor, strips, lancing device, lancets, control solutions, alcohol swabs. 1/26/21   TERRNACE Jefferson CNP   hydrALAZINE (APRESOLINE) 100 MG tablet Take 1 tablet by mouth every 8 hours 1/25/21   TERRANCE Jefferson CNP   isosorbide mononitrate (IMDUR) 60 MG extended release tablet Take 1 tablet by mouth daily 1/5/21   TERRANCE Jefferson CNP   vitamin D (ERGOCALCIFEROL) 1.25 MG (90260 UT) CAPS capsule Take 1 capsule by mouth once a week 1/5/21   TERRANCE Jefferson CNP   Lancets MISC Use_4__times daily Diagnisis:250.0  Diabetes Mellitus__x__Insulin Dependent___Non-Insulin Dependent 12/6/20   Kel Torres MD   ipratropium-albuterol (DUONEB) 0.5-2.5 (3) MG/3ML SOLN nebulizer solution Inhale 3 mLs into the lungs every 6 hours as needed for Shortness of Breath 12/6/20   Elfego Fuentes MD   Nutritional Supplements (GLUCERNA CARBSTEADY) LIQD Take 1 Can by mouth 3 times daily 6/16/20   TERRANCE Jefferson CNP   COMFORT EZ PEN NEEDLES 31G X 8 MM MISC USE AS DIRECTED 4/14/20   TERRANCE Jefferson CNP       REVIEW OF SYSTEMS    (2-9 systems for level 4, 10 or more for level 5)      Review of Systems   Constitutional: Negative for chills, fatigue and fever. HENT: Negative for congestion and trouble swallowing. Eyes: Negative for visual disturbance. Respiratory: Positive for shortness of breath and wheezing. Negative for cough, chest tightness and stridor.     Gastrointestinal: Negative for abdominal distention, abdominal pain, blood in stool, diarrhea, nausea and vomiting. Genitourinary: Negative for dysuria, flank pain, hematuria and urgency. Musculoskeletal: Positive for arthralgias. Negative for back pain, joint swelling and myalgias. Skin: Negative for color change. Neurological: Negative for dizziness, syncope, weakness, light-headedness and headaches. PHYSICAL EXAM   (up to 7 for level 4, 8 or more for level 5)      INITIAL VITALS:   BP (!) 171/61   Pulse 60   Temp 97.1 °F (36.2 °C) (Tympanic)   Resp 16   Ht 5' 6\" (1.676 m)   Wt 130 lb (59 kg)   SpO2 100%   BMI 20.98 kg/m²     Physical Exam  Constitutional:       Appearance: He is well-developed. HENT:      Head: Normocephalic and atraumatic. Nose: Nose normal.      Mouth/Throat:      Mouth: Mucous membranes are moist.   Eyes:      Conjunctiva/sclera: Conjunctivae normal.   Cardiovascular:      Rate and Rhythm: Normal rate and regular rhythm. Heart sounds: Normal heart sounds. No murmur heard. No friction rub. Pulmonary:      Effort: Pulmonary effort is normal. No respiratory distress. Breath sounds: Examination of the right-upper field reveals wheezing. Examination of the left-upper field reveals wheezing. Examination of the right-middle field reveals wheezing. Examination of the left-middle field reveals wheezing. Wheezing present. No rales. Chest:      Chest wall: No tenderness. Abdominal:      General: Abdomen is flat. Palpations: Abdomen is soft. There is no hepatomegaly, splenomegaly or pulsatile mass. Tenderness: There is no abdominal tenderness. Hernia: No hernia is present. Skin:     General: Skin is warm. Capillary Refill: Capillary refill takes less than 2 seconds. Neurological:      General: No focal deficit present. Mental Status: He is alert. Motor: No weakness.          DIFFERENTIAL  DIAGNOSIS     PLAN (LABS / IMAGING / EKG):  Orders Placed This Encounter   Procedures    COVID-19, Rapid    XR CHEST PORTABLE    CBC Auto Differential    Comprehensive Metabolic Panel w/ Reflex to MG    Troponin    Brain Natriuretic Peptide    Blood Gas, Venous    MAGNESIUM    Troponin    POC Glucose Fingerstick    EKG 12 Lead       MEDICATIONS ORDERED:  Orders Placed This Encounter   Medications    ipratropium-albuterol (DUONEB) nebulizer solution 1 ampule     Order Specific Question:   Initiate RT Bronchodilator Protocol     Answer:    Yes    DISCONTD: methylPREDNISolone sodium (SOLU-MEDROL) injection 40 mg    methylPREDNISolone sodium (SOLU-MEDROL) injection 125 mg    DISCONTD: predniSONE (DELTASONE) 20 MG tablet     Sig: Take 2.5 tablets by mouth daily for 3 days     Dispense:  8 tablet     Refill:  0    DISCONTD: predniSONE (DELTASONE) 10 MG tablet     Sig: Take 4 tablets by mouth daily for 3 days     Dispense:  12 tablet     Refill:  0    DISCONTD: predniSONE (DELTASONE) 10 MG tablet     Sig: Take 3 tablets by mouth daily for 3 days     Dispense:  9 tablet     Refill:  0    DISCONTD: predniSONE (DELTASONE) 5 MG tablet     Sig: Take 4 tablets by mouth daily for 3 days     Dispense:  12 tablet     Refill:  0    DISCONTD: lactated ringers bolus    DISCONTD: lactated ringers bolus    insulin lispro (HUMALOG) injection vial 0-9 Units    lactated ringers infusion    predniSONE (DELTASONE) 20 MG tablet     Sig: Take 2 tablets by mouth daily for 3 days     Dispense:  6 tablet     Refill:  0    predniSONE (DELTASONE) 20 MG tablet     Sig: Take 1.5 tablets by mouth daily for 3 days     Dispense:  5 tablet     Refill:  0    predniSONE (DELTASONE) 20 MG tablet     Sig: Take 1 tablet by mouth daily for 3 days     Dispense:  3 tablet     Refill:  0    predniSONE (DELTASONE) 10 MG tablet     Sig: Take 1 tablet by mouth daily for 3 days     Dispense:  3 tablet     Refill:  0    doxycycline monohydrate (ADOXA) 100 MG tablet     Sig: Take 1 tablet by mouth 2 times daily for 7 days     Dispense:  14 tablet     Refill:  0       DDX: COPD exacerbation, COVID 19, anemia, ESRD, electrolyte abnormality    DIAGNOSTIC RESULTS / EMERGENCY DEPARTMENT COURSE / MDM   LAB RESULTS:  Results for orders placed or performed during the hospital encounter of 09/09/21   COVID-19, Rapid    Specimen: Nasopharyngeal Swab   Result Value Ref Range    Specimen Description . NASOPHARYNGEAL SWAB     SARS-CoV-2, Rapid Not Detected Not Detected   CBC Auto Differential   Result Value Ref Range    WBC 6.0 3.5 - 11.3 k/uL    RBC 3.63 (L) 4.21 - 5.77 m/uL    Hemoglobin 10.9 (L) 13.0 - 17.0 g/dL    Hematocrit 34.6 (L) 40.7 - 50.3 %    MCV 95.3 82.6 - 102.9 fL    MCH 30.0 25.2 - 33.5 pg    MCHC 31.5 28.4 - 34.8 g/dL    RDW 12.8 11.8 - 14.4 %    Platelets 442 394 - 528 k/uL    MPV 10.5 8.1 - 13.5 fL    NRBC Automated 0.0 0.0 per 100 WBC    Differential Type NOT REPORTED     Seg Neutrophils 69 (H) 36 - 65 %    Lymphocytes 17 (L) 24 - 43 %    Monocytes 7 3 - 12 %    Eosinophils % 5 (H) 1 - 4 %    Basophils 1 0 - 2 %    Immature Granulocytes 1 (H) 0 %    Segs Absolute 4.18 1.50 - 8.10 k/uL    Absolute Lymph # 1.04 (L) 1.10 - 3.70 k/uL    Absolute Mono # 0.44 0.10 - 1.20 k/uL    Absolute Eos # 0.31 0.00 - 0.44 k/uL    Basophils Absolute 0.03 0.00 - 0.20 k/uL    Absolute Immature Granulocyte 0.04 0.00 - 0.30 k/uL    WBC Morphology NOT REPORTED     RBC Morphology NOT REPORTED     Platelet Estimate NOT REPORTED    Comprehensive Metabolic Panel w/ Reflex to MG   Result Value Ref Range    Glucose 623 (HH) 70 - 99 mg/dL    BUN 36 (H) 8 - 23 mg/dL    CREATININE 2.08 (H) 0.70 - 1.20 mg/dL    Bun/Cre Ratio NOT REPORTED 9 - 20    Calcium 8.3 (L) 8.6 - 10.4 mg/dL    Sodium 132 (L) 135 - 144 mmol/L    Potassium 4.8 3.7 - 5.3 mmol/L    Chloride 98 98 - 107 mmol/L    CO2 19 (L) 20 - 31 mmol/L    Anion Gap 15 9 - 17 mmol/L    Alkaline Phosphatase 90 40 - 129 U/L    ALT 33 5 - 41 U/L    AST 27 <40 U/L    Total Bilirubin 0.28 (L) 0.3 - 1.2 mg/dL    Total Protein 5.7 (L) 6.4 - 8.3 g/dL    Albumin 3.4 (L) 3.5 - 5.2 g/dL    Albumin/Globulin Ratio 1.5 1.0 - 2.5    GFR Non- 32 (L) >60 mL/min    GFR  39 (L) >60 mL/min    GFR Comment          GFR Staging NOT REPORTED    Troponin   Result Value Ref Range    Troponin, High Sensitivity 237 (HH) 0 - 22 ng/L    Troponin T NOT REPORTED <0.03 ng/mL    Troponin Interp NOT REPORTED    Brain Natriuretic Peptide   Result Value Ref Range    Pro-BNP 15,200 (H) <300 pg/mL    BNP Interpretation Pro-BNP Reference Range:    Blood Gas, Venous   Result Value Ref Range    pH, Marlon 7.298 (L) 7.320 - 7.420    pCO2, Marlon 42.6 39 - 55    pO2, Marlon 32.8 30 - 50    HCO3, Venous 20.2 (L) 24 - 30 mmol/L    Positive Base Excess, Marlon NOT REPORTED 0.0 - 2.0 mmol/L    Negative Base Excess, Marlon 5.4 (H) 0.0 - 2.0 mmol/L    O2 Sat, Marlon 59.3 (L) 60.0 - 85.0 %    Total Hb NOT REPORTED 12.0 - 16.0 g/dl    Oxyhemoglobin NOT REPORTED 95.0 - 98.0 %    Carboxyhemoglobin 1.0 0 - 5 %    Methemoglobin NOT REPORTED 0.0 - 1.5 %    Pt Temp 37.0     pH, Marlon, Temp Adj NOT REPORTED 7.320 - 7.420    pCO2, Marlon, Temp Adj NOT REPORTED 39 - 55 mmHg    pO2, Marlon, Temp Adj NOT REPORTED 30 - 50 mmHg    O2 Device/Flow/% NOT REPORTED     Respiratory Rate NOT REPORTED     Rony Test NOT REPORTED     Sample Site NOT REPORTED     Pt.  Position NOT REPORTED     Mode NOT REPORTED     Set Rate NOT REPORTED     Total Rate NOT REPORTED     VT NOT REPORTED     FIO2 UNKNOWN     Peep/Cpap NOT REPORTED     PSV NOT REPORTED     Text for Respiratory NOT REPORTED     NOTIFICATION NOT REPORTED     NOTIFICATION TIME NOT REPORTED    MAGNESIUM   Result Value Ref Range    Magnesium 1.8 1.6 - 2.6 mg/dL   Troponin   Result Value Ref Range    Troponin, High Sensitivity 224 (HH) 0 - 22 ng/L    Troponin T NOT REPORTED <0.03 ng/mL    Troponin Interp NOT REPORTED    POC Glucose Fingerstick   Result Value Ref Range    POC Glucose 434 (HH) 75 - 110 mg/dL DISPOSITION Decision To Discharge 09/09/2021 08:55:39 PM  Informed discharge*     PATIENT REFERRED TO:  Zan Bee  [address] [place] [address]  [phone]    Schedule an appointment as soon as possible for a visit in 3 days  As needed    [facility]  [address] [facility] [address]  [phone]  Go to   As needed      [address]:  New Prescriptions    DOXYCYCLINE MONOHYDRATE (ADOXA) 100 MG TABLET    Take 1 tablet by mouth 2 times daily for 7 days    PREDNISONE (DELTASONE) 10 MG TABLET    Take 1 tablet by mouth daily for 3 days    PREDNISONE (DELTASONE) 20 MG TABLET    Take 2 tablets by mouth daily for 3 days    PREDNISONE (DELTASONE) 20 MG TABLET    Take 1.5 tablets by mouth daily for 3 days    PREDNISONE (DELTASONE) 20 MG TABLET    Take 1 tablet by mouth daily for 3 days       Ayla Andersen DO  Emergency Medicine Resident    (Please note that portions of thisnote were completed with a voice recognition program.  Efforts were made to edit the dictations but occasionally words are mis-transcribed.)        Shaun Krueger,   Resident  09/11/21 [address],   Resident  09/12/21 1121

## 2021-09-09 NOTE — ED PROVIDER NOTES
[facility]     Emergency Department     Faculty Attestation    I performed a history and physical examination of the patient and discussed management with the resident. I have reviewed and agree with the residents findings including all diagnostic interpretations, and treatment plans as written. Any areas of disagreement are noted on the chart. I was personally present for the key portions of any procedures. I have documented in the chart those procedures where I was not present during the key portions. I have reviewed the emergency nurses triage note. I agree with the chief complaint, past medical history, past surgical history, allergies, medications, social and family history as documented unless otherwise noted below. Documentation of the HPI, Physical Exam and Medical Decision Making performed by [email or web address] is based on my personal performance of the HPI, PE and MDM. For Physician Assistant/ Nurse Practitioner cases/documentation I have personally evaluated this patient and have completed at least one if not all key elements of the E/M (history, physical exam, and MDM). Additional findings are as noted. Patient reports worsening shortness of breath. He is end-stage renal dialysis, still makes some urine. Had his dialysis treatment states he did have some improvement in the shortness of breath after dialysis but a few hours later started having worsening shortness of breath. He does report starting a productive cough last night with clear sputum that is now green today. He reports that every 3 months he gets an upper respiratory tract infection requiring a steroid taper as well as antibiotics. And that this feels very typical for his previous episodes. He does not feel fluid overloaded he denies any lower extremity edema, no actual chest pain. He is not on any home oxygen. Patient on exam is well-appearing speaking in full sentences.   He is not showing any respiratory distress no accessory muscle usage he does have expiratory wheezing noted bilaterally and diffusely. No leg swelling bilaterally on exam, no calf tenderness to palpation bilaterally. Abdomen soft nondistended nontender to palpation in all quadrants no rebound or guarding noted    COPD exacerbation. We will plan on chest reimaging, check cardiac labs EKG troponin. Patient does report he is fully vaccinated for Covid however given recent surgery. We will plan on Covid swab.steroids,   Patient if feeling better after aerosols and back to baseline may be able to be discharged    EKG shows sinus bradycardia there is ST depressions noted in lateral and inferior leads as well as T wave inversions. Poor R wave progression with anterior infarct of age undetermined. Previous EKG compared May 19, 15th, and third, 2021  Do show ST depressions with T wave inversions noted in similar leads. Otherwise unchanged.     Gato Jose D.O, M.P.H  Attending Emergency Medicine Physician        Gato Jose,   09/09/21 0538

## 2021-09-09 NOTE — ED NOTES
Pt has COPD, was at dialysis today and nurse put him on O2 and told him to go to the ED. SOB started around 0300. Goes to dialysis around 0600.      William Hernandez  09/09/21 [id number]

## 2021-09-09 NOTE — ED NOTES
Pt presented to ED with complaints of shortness of breath. Pt states it feels like lung infection. Pt is a dialysis pt. Pt had full treatment today. Pt denies chest pain.  Pt denies n,v,d.      Lane Smith RN  09/09/21 [id number]

## 2021-09-10 LAB
EKG ATRIAL RATE: 52 BPM
EKG P AXIS: 71 DEGREES
EKG P-R INTERVAL: 140 MS
EKG Q-T INTERVAL: 438 MS
EKG QRS DURATION: 100 MS
EKG QTC CALCULATION (BAZETT): 407 MS
EKG R AXIS: 63 DEGREES
EKG T AXIS: 178 DEGREES
EKG VENTRICULAR RATE: 52 BPM

## 2021-09-10 NOTE — PROGRESS NOTES
RT to beside for aerosol tx. Pt states \"I told them I don't need that anymore. I'm not wheezing, come take a listen. \" Breath sounds clear/diminished bilaterally. Pt RR even and non labored. Will continue to monitor.

## 2021-09-11 ASSESSMENT — ENCOUNTER SYMPTOMS
STRIDOR: 0
BLOOD IN STOOL: 0
TROUBLE SWALLOWING: 0
CHEST TIGHTNESS: 0
ABDOMINAL PAIN: 0
BACK PAIN: 0
COLOR CHANGE: 0
VOMITING: 0
ABDOMINAL DISTENTION: 0
DIARRHEA: 0
NAUSEA: 0
COUGH: 0

## 2021-09-12 ASSESSMENT — ENCOUNTER SYMPTOMS
WHEEZING: 1
SHORTNESS OF BREATH: 1

## 2021-11-01 ENCOUNTER — HOSPITAL ENCOUNTER (INPATIENT)
Age: 66
LOS: 1 days | Discharge: HOME | DRG: 190 | End: 2021-11-02
Attending: EMERGENCY MEDICINE | Admitting: INTERNAL MEDICINE
Payer: COMMERCIAL

## 2021-11-01 ENCOUNTER — APPOINTMENT (OUTPATIENT)
Dept: GENERAL RADIOLOGY | Age: 66
DRG: 190 | End: 2021-11-01
Payer: COMMERCIAL

## 2021-11-01 DIAGNOSIS — J44.1 COPD EXACERBATION (HCC): Primary | ICD-10-CM

## 2021-11-01 PROBLEM — E10.9 BRITTLE DIABETES (HCC): Status: ACTIVE | Noted: 2021-11-01

## 2021-11-01 LAB
-: NORMAL
ABSOLUTE EOS #: 0.37 K/UL (ref 0–0.44)
ABSOLUTE IMMATURE GRANULOCYTE: 0.03 K/UL (ref 0–0.3)
ABSOLUTE LYMPH #: 1.13 K/UL (ref 1.1–3.7)
ABSOLUTE MONO #: 0.46 K/UL (ref 0.1–1.2)
ANION GAP SERPL CALCULATED.3IONS-SCNC: 10 MMOL/L (ref 9–17)
ANION GAP SERPL CALCULATED.3IONS-SCNC: 14 MMOL/L (ref 9–17)
BASOPHILS # BLD: 1 % (ref 0–2)
BASOPHILS ABSOLUTE: 0.06 K/UL (ref 0–0.2)
BUN BLDV-MCNC: 51 MG/DL (ref 8–23)
BUN/CREAT BLD: ABNORMAL (ref 9–20)
CALCIUM SERPL-MCNC: 8.4 MG/DL (ref 8.6–10.4)
CHLORIDE BLD-SCNC: 104 MMOL/L (ref 98–107)
CHLORIDE BLD-SCNC: 107 MMOL/L (ref 98–107)
CO2: 18 MMOL/L (ref 20–31)
CO2: 19 MMOL/L (ref 20–31)
CREAT SERPL-MCNC: 3.37 MG/DL (ref 0.7–1.2)
DIFFERENTIAL TYPE: ABNORMAL
EKG ATRIAL RATE: 91 BPM
EKG P AXIS: 77 DEGREES
EKG P-R INTERVAL: 128 MS
EKG Q-T INTERVAL: 362 MS
EKG QRS DURATION: 90 MS
EKG QTC CALCULATION (BAZETT): 445 MS
EKG R AXIS: -109 DEGREES
EKG T AXIS: 105 DEGREES
EKG VENTRICULAR RATE: 91 BPM
EOSINOPHILS RELATIVE PERCENT: 5 % (ref 1–4)
GFR AFRICAN AMERICAN: 22 ML/MIN
GFR NON-AFRICAN AMERICAN: 18 ML/MIN
GFR SERPL CREATININE-BSD FRML MDRD: ABNORMAL ML/MIN/{1.73_M2}
GFR SERPL CREATININE-BSD FRML MDRD: ABNORMAL ML/MIN/{1.73_M2}
GLUCOSE BLD-MCNC: 325 MG/DL (ref 70–99)
GLUCOSE BLD-MCNC: 385 MG/DL (ref 75–110)
GLUCOSE BLD-MCNC: 397 MG/DL (ref 75–110)
HCT VFR BLD CALC: 42.5 % (ref 40.7–50.3)
HEMOGLOBIN: 13.5 G/DL (ref 13–17)
IMMATURE GRANULOCYTES: 0 %
LYMPHOCYTES # BLD: 15 % (ref 24–43)
MCH RBC QN AUTO: 32.1 PG (ref 25.2–33.5)
MCHC RBC AUTO-ENTMCNC: 31.8 G/DL (ref 28.4–34.8)
MCV RBC AUTO: 101.2 FL (ref 82.6–102.9)
MONOCYTES # BLD: 6 % (ref 3–12)
NRBC AUTOMATED: 0 PER 100 WBC
PDW BLD-RTO: 12.9 % (ref 11.8–14.4)
PLATELET # BLD: ABNORMAL K/UL (ref 138–453)
PLATELET ESTIMATE: ABNORMAL
PLATELET, FLUORESCENCE: NORMAL K/UL (ref 138–453)
PMV BLD AUTO: ABNORMAL FL (ref 8.1–13.5)
POTASSIUM SERPL-SCNC: 5.6 MMOL/L (ref 3.7–5.3)
POTASSIUM SERPL-SCNC: 5.8 MMOL/L (ref 3.7–5.3)
RBC # BLD: 4.2 M/UL (ref 4.21–5.77)
RBC # BLD: ABNORMAL 10*6/UL
REASON FOR REJECTION: NORMAL
SARS-COV-2, RAPID: NOT DETECTED
SEG NEUTROPHILS: 73 % (ref 36–65)
SEGMENTED NEUTROPHILS ABSOLUTE COUNT: 5.65 K/UL (ref 1.5–8.1)
SODIUM BLD-SCNC: 136 MMOL/L (ref 135–144)
SODIUM BLD-SCNC: 136 MMOL/L (ref 135–144)
SPECIMEN DESCRIPTION: NORMAL
TROPONIN INTERP: ABNORMAL
TROPONIN INTERP: ABNORMAL
TROPONIN T: ABNORMAL NG/ML
TROPONIN T: ABNORMAL NG/ML
TROPONIN, HIGH SENSITIVITY: 124 NG/L (ref 0–22)
TROPONIN, HIGH SENSITIVITY: 130 NG/L (ref 0–22)
WBC # BLD: 7.7 K/UL (ref 3.5–11.3)
WBC # BLD: ABNORMAL 10*3/UL
ZZ NTE CLEAN UP: ORDERED TEST: NORMAL
ZZ NTE WITH NAME CLEAN UP: SPECIMEN SOURCE: NORMAL

## 2021-11-01 PROCEDURE — 84484 ASSAY OF TROPONIN QUANT: CPT

## 2021-11-01 PROCEDURE — 96372 THER/PROPH/DIAG INJ SC/IM: CPT

## 2021-11-01 PROCEDURE — 85025 COMPLETE CBC W/AUTO DIFF WBC: CPT

## 2021-11-01 PROCEDURE — G0378 HOSPITAL OBSERVATION PER HR: HCPCS

## 2021-11-01 PROCEDURE — 6370000000 HC RX 637 (ALT 250 FOR IP): Performed by: NURSE PRACTITIONER

## 2021-11-01 PROCEDURE — 6360000002 HC RX W HCPCS: Performed by: NURSE PRACTITIONER

## 2021-11-01 PROCEDURE — 96374 THER/PROPH/DIAG INJ IV PUSH: CPT

## 2021-11-01 PROCEDURE — 6360000002 HC RX W HCPCS: Performed by: STUDENT IN AN ORGANIZED HEALTH CARE EDUCATION/TRAINING PROGRAM

## 2021-11-01 PROCEDURE — 93005 ELECTROCARDIOGRAM TRACING: CPT | Performed by: STUDENT IN AN ORGANIZED HEALTH CARE EDUCATION/TRAINING PROGRAM

## 2021-11-01 PROCEDURE — 71045 X-RAY EXAM CHEST 1 VIEW: CPT

## 2021-11-01 PROCEDURE — 82947 ASSAY GLUCOSE BLOOD QUANT: CPT

## 2021-11-01 PROCEDURE — 6370000000 HC RX 637 (ALT 250 FOR IP): Performed by: STUDENT IN AN ORGANIZED HEALTH CARE EDUCATION/TRAINING PROGRAM

## 2021-11-01 PROCEDURE — 2700000000 HC OXYGEN THERAPY PER DAY

## 2021-11-01 PROCEDURE — 2060000000 HC ICU INTERMEDIATE R&B

## 2021-11-01 PROCEDURE — 99223 1ST HOSP IP/OBS HIGH 75: CPT | Performed by: INTERNAL MEDICINE

## 2021-11-01 PROCEDURE — 99284 EMERGENCY DEPT VISIT MOD MDM: CPT

## 2021-11-01 PROCEDURE — 90935 HEMODIALYSIS ONE EVALUATION: CPT

## 2021-11-01 PROCEDURE — 87635 SARS-COV-2 COVID-19 AMP PRB: CPT

## 2021-11-01 PROCEDURE — 2580000003 HC RX 258: Performed by: NURSE PRACTITIONER

## 2021-11-01 PROCEDURE — 96376 TX/PRO/DX INJ SAME DRUG ADON: CPT

## 2021-11-01 PROCEDURE — 6360000002 HC RX W HCPCS: Performed by: INTERNAL MEDICINE

## 2021-11-01 PROCEDURE — 5A1D70Z PERFORMANCE OF URINARY FILTRATION, INTERMITTENT, LESS THAN 6 HOURS PER DAY: ICD-10-PCS | Performed by: INTERNAL MEDICINE

## 2021-11-01 PROCEDURE — 6370000000 HC RX 637 (ALT 250 FOR IP): Performed by: INTERNAL MEDICINE

## 2021-11-01 PROCEDURE — 94660 CPAP INITIATION&MGMT: CPT

## 2021-11-01 PROCEDURE — 85055 RETICULATED PLATELET ASSAY: CPT

## 2021-11-01 PROCEDURE — 94640 AIRWAY INHALATION TREATMENT: CPT

## 2021-11-01 PROCEDURE — 94761 N-INVAS EAR/PLS OXIMETRY MLT: CPT

## 2021-11-01 PROCEDURE — 96375 TX/PRO/DX INJ NEW DRUG ADDON: CPT

## 2021-11-01 PROCEDURE — 80048 BASIC METABOLIC PNL TOTAL CA: CPT

## 2021-11-01 PROCEDURE — 93010 ELECTROCARDIOGRAM REPORT: CPT | Performed by: INTERNAL MEDICINE

## 2021-11-01 PROCEDURE — 80051 ELECTROLYTE PANEL: CPT

## 2021-11-01 PROCEDURE — 84132 ASSAY OF SERUM POTASSIUM: CPT

## 2021-11-01 RX ORDER — CARVEDILOL 12.5 MG/1
25 TABLET ORAL 2 TIMES DAILY WITH MEALS
Status: DISCONTINUED | OUTPATIENT
Start: 2021-11-01 | End: 2021-11-02 | Stop reason: HOSPADM

## 2021-11-01 RX ORDER — NIFEDIPINE 90 MG/1
90 TABLET, FILM COATED, EXTENDED RELEASE ORAL NIGHTLY
Status: DISCONTINUED | OUTPATIENT
Start: 2021-11-01 | End: 2021-11-02 | Stop reason: HOSPADM

## 2021-11-01 RX ORDER — ASPIRIN 81 MG/1
81 TABLET ORAL DAILY
Status: DISCONTINUED | OUTPATIENT
Start: 2021-11-01 | End: 2021-11-02 | Stop reason: HOSPADM

## 2021-11-01 RX ORDER — CLONIDINE HYDROCHLORIDE 0.1 MG/1
0.2 TABLET ORAL ONCE
Status: COMPLETED | OUTPATIENT
Start: 2021-11-01 | End: 2021-11-01

## 2021-11-01 RX ORDER — ALBUTEROL SULFATE 2.5 MG/3ML
2.5 SOLUTION RESPIRATORY (INHALATION)
Status: DISCONTINUED | OUTPATIENT
Start: 2021-11-01 | End: 2021-11-02 | Stop reason: HOSPADM

## 2021-11-01 RX ORDER — ONDANSETRON 4 MG/1
4 TABLET, ORALLY DISINTEGRATING ORAL EVERY 8 HOURS PRN
Status: DISCONTINUED | OUTPATIENT
Start: 2021-11-01 | End: 2021-11-02 | Stop reason: HOSPADM

## 2021-11-01 RX ORDER — GABAPENTIN 100 MG/1
200 CAPSULE ORAL 3 TIMES DAILY
COMMUNITY
Start: 2021-10-05 | End: 2022-10-11 | Stop reason: SDUPTHER

## 2021-11-01 RX ORDER — HEPARIN SODIUM 5000 [USP'U]/ML
5000 INJECTION, SOLUTION INTRAVENOUS; SUBCUTANEOUS EVERY 8 HOURS SCHEDULED
Status: DISCONTINUED | OUTPATIENT
Start: 2021-11-01 | End: 2021-11-02 | Stop reason: HOSPADM

## 2021-11-01 RX ORDER — HYDRALAZINE HYDROCHLORIDE 50 MG/1
100 TABLET, FILM COATED ORAL EVERY 8 HOURS SCHEDULED
Status: DISCONTINUED | OUTPATIENT
Start: 2021-11-01 | End: 2021-11-02 | Stop reason: HOSPADM

## 2021-11-01 RX ORDER — ATORVASTATIN CALCIUM 80 MG/1
80 TABLET, FILM COATED ORAL NIGHTLY
Status: DISCONTINUED | OUTPATIENT
Start: 2021-11-01 | End: 2021-11-02 | Stop reason: HOSPADM

## 2021-11-01 RX ORDER — CARVEDILOL 25 MG/1
1 TABLET ORAL 2 TIMES DAILY
COMMUNITY
Start: 2021-10-19 | End: 2022-10-11

## 2021-11-01 RX ORDER — ACETAMINOPHEN 650 MG/1
650 SUPPOSITORY RECTAL EVERY 6 HOURS PRN
Status: DISCONTINUED | OUTPATIENT
Start: 2021-11-01 | End: 2021-11-02 | Stop reason: HOSPADM

## 2021-11-01 RX ORDER — CLONIDINE HYDROCHLORIDE 0.1 MG/1
0.2 TABLET ORAL 3 TIMES DAILY
Status: DISCONTINUED | OUTPATIENT
Start: 2021-11-01 | End: 2021-11-01

## 2021-11-01 RX ORDER — ONDANSETRON 2 MG/ML
4 INJECTION INTRAMUSCULAR; INTRAVENOUS EVERY 6 HOURS PRN
Status: DISCONTINUED | OUTPATIENT
Start: 2021-11-01 | End: 2021-11-02 | Stop reason: HOSPADM

## 2021-11-01 RX ORDER — DEXTROSE MONOHYDRATE 50 MG/ML
100 INJECTION, SOLUTION INTRAVENOUS PRN
Status: DISCONTINUED | OUTPATIENT
Start: 2021-11-01 | End: 2021-11-02 | Stop reason: HOSPADM

## 2021-11-01 RX ORDER — GABAPENTIN 100 MG/1
200 CAPSULE ORAL 3 TIMES DAILY
Status: DISCONTINUED | OUTPATIENT
Start: 2021-11-01 | End: 2021-11-02 | Stop reason: HOSPADM

## 2021-11-01 RX ORDER — INSULIN GLARGINE 100 [IU]/ML
10 INJECTION, SOLUTION SUBCUTANEOUS NIGHTLY
Status: DISCONTINUED | OUTPATIENT
Start: 2021-11-01 | End: 2021-11-01

## 2021-11-01 RX ORDER — HEPARIN SODIUM 1000 [USP'U]/ML
1600 INJECTION, SOLUTION INTRAVENOUS; SUBCUTANEOUS PRN
Status: DISCONTINUED | OUTPATIENT
Start: 2021-11-01 | End: 2021-11-02 | Stop reason: HOSPADM

## 2021-11-01 RX ORDER — BUMETANIDE 1 MG/1
1 TABLET ORAL DAILY
COMMUNITY
Start: 2021-10-19 | End: 2022-10-11

## 2021-11-01 RX ORDER — PREDNISONE 20 MG/1
40 TABLET ORAL DAILY
Status: DISCONTINUED | OUTPATIENT
Start: 2021-11-03 | End: 2021-11-02 | Stop reason: HOSPADM

## 2021-11-01 RX ORDER — DEXTROSE MONOHYDRATE 25 G/50ML
12.5 INJECTION, SOLUTION INTRAVENOUS PRN
Status: DISCONTINUED | OUTPATIENT
Start: 2021-11-01 | End: 2021-11-02 | Stop reason: HOSPADM

## 2021-11-01 RX ORDER — BUDESONIDE AND FORMOTEROL FUMARATE DIHYDRATE 160; 4.5 UG/1; UG/1
2 AEROSOL RESPIRATORY (INHALATION) 2 TIMES DAILY
Status: DISCONTINUED | OUTPATIENT
Start: 2021-11-01 | End: 2021-11-02 | Stop reason: HOSPADM

## 2021-11-01 RX ORDER — QUETIAPINE FUMARATE 100 MG/1
100 TABLET, FILM COATED ORAL NIGHTLY
Status: DISCONTINUED | OUTPATIENT
Start: 2021-11-01 | End: 2021-11-02 | Stop reason: HOSPADM

## 2021-11-01 RX ORDER — LOSARTAN POTASSIUM 50 MG/1
1 TABLET ORAL 2 TIMES DAILY
COMMUNITY
Start: 2021-08-22

## 2021-11-01 RX ORDER — ISOSORBIDE MONONITRATE 60 MG/1
60 TABLET, EXTENDED RELEASE ORAL DAILY
Status: DISCONTINUED | OUTPATIENT
Start: 2021-11-01 | End: 2021-11-02 | Stop reason: HOSPADM

## 2021-11-01 RX ORDER — VITAMIN B COMPLEX
5000 TABLET ORAL DAILY
Status: DISCONTINUED | OUTPATIENT
Start: 2021-11-01 | End: 2021-11-02 | Stop reason: HOSPADM

## 2021-11-01 RX ORDER — MONTELUKAST SODIUM 10 MG/1
10 TABLET ORAL NIGHTLY
Status: DISCONTINUED | OUTPATIENT
Start: 2021-11-01 | End: 2021-11-02 | Stop reason: HOSPADM

## 2021-11-01 RX ORDER — SODIUM CHLORIDE 0.9 % (FLUSH) 0.9 %
5-40 SYRINGE (ML) INJECTION PRN
Status: DISCONTINUED | OUTPATIENT
Start: 2021-11-01 | End: 2021-11-02 | Stop reason: HOSPADM

## 2021-11-01 RX ORDER — CLOPIDOGREL BISULFATE 75 MG/1
1 TABLET ORAL DAILY
Status: ON HOLD | COMMUNITY
Start: 2021-08-22 | End: 2022-06-04

## 2021-11-01 RX ORDER — ATORVASTATIN CALCIUM 80 MG/1
1 TABLET, FILM COATED ORAL DAILY
COMMUNITY
Start: 2021-10-19 | End: 2022-10-11 | Stop reason: SDUPTHER

## 2021-11-01 RX ORDER — PRAZOSIN HYDROCHLORIDE 1 MG/1
4 CAPSULE ORAL 2 TIMES DAILY
Status: DISCONTINUED | OUTPATIENT
Start: 2021-11-01 | End: 2021-11-02

## 2021-11-01 RX ORDER — LOSARTAN POTASSIUM 50 MG/1
50 TABLET ORAL 2 TIMES DAILY
Status: DISCONTINUED | OUTPATIENT
Start: 2021-11-01 | End: 2021-11-02 | Stop reason: HOSPADM

## 2021-11-01 RX ORDER — IPRATROPIUM BROMIDE AND ALBUTEROL SULFATE 2.5; .5 MG/3ML; MG/3ML
1 SOLUTION RESPIRATORY (INHALATION)
Status: DISCONTINUED | OUTPATIENT
Start: 2021-11-01 | End: 2021-11-02 | Stop reason: HOSPADM

## 2021-11-01 RX ORDER — SODIUM CHLORIDE 9 MG/ML
25 INJECTION, SOLUTION INTRAVENOUS PRN
Status: DISCONTINUED | OUTPATIENT
Start: 2021-11-01 | End: 2021-11-02 | Stop reason: HOSPADM

## 2021-11-01 RX ORDER — ASPIRIN 81 MG/1
81 TABLET ORAL DAILY
COMMUNITY
Start: 2021-07-28

## 2021-11-01 RX ORDER — METHYLPREDNISOLONE SODIUM SUCCINATE 125 MG/2ML
40 INJECTION, POWDER, LYOPHILIZED, FOR SOLUTION INTRAMUSCULAR; INTRAVENOUS EVERY 6 HOURS
Status: DISCONTINUED | OUTPATIENT
Start: 2021-11-01 | End: 2021-11-02 | Stop reason: HOSPADM

## 2021-11-01 RX ORDER — MONTELUKAST SODIUM 10 MG/1
1 TABLET ORAL NIGHTLY
COMMUNITY
Start: 2021-08-22 | End: 2022-10-11 | Stop reason: SDUPTHER

## 2021-11-01 RX ORDER — CLOPIDOGREL BISULFATE 75 MG/1
75 TABLET ORAL DAILY
Status: DISCONTINUED | OUTPATIENT
Start: 2021-11-01 | End: 2021-11-02 | Stop reason: HOSPADM

## 2021-11-01 RX ORDER — SODIUM CHLORIDE 0.9 % (FLUSH) 0.9 %
5-40 SYRINGE (ML) INJECTION EVERY 12 HOURS SCHEDULED
Status: DISCONTINUED | OUTPATIENT
Start: 2021-11-01 | End: 2021-11-02 | Stop reason: HOSPADM

## 2021-11-01 RX ORDER — INSULIN GLARGINE 100 [IU]/ML
15 INJECTION, SOLUTION SUBCUTANEOUS NIGHTLY
Status: DISCONTINUED | OUTPATIENT
Start: 2021-11-01 | End: 2021-11-02

## 2021-11-01 RX ORDER — PRAZOSIN HYDROCHLORIDE 5 MG/1
5 CAPSULE ORAL 2 TIMES DAILY
COMMUNITY

## 2021-11-01 RX ORDER — METHYLPREDNISOLONE SODIUM SUCCINATE 125 MG/2ML
125 INJECTION, POWDER, LYOPHILIZED, FOR SOLUTION INTRAMUSCULAR; INTRAVENOUS ONCE
Status: COMPLETED | OUTPATIENT
Start: 2021-11-01 | End: 2021-11-01

## 2021-11-01 RX ORDER — NICOTINE POLACRILEX 4 MG
15 LOZENGE BUCCAL PRN
Status: DISCONTINUED | OUTPATIENT
Start: 2021-11-01 | End: 2021-11-02 | Stop reason: HOSPADM

## 2021-11-01 RX ORDER — HEPARIN SODIUM 1000 [USP'U]/ML
1000 INJECTION, SOLUTION INTRAVENOUS; SUBCUTANEOUS ONCE
Status: COMPLETED | OUTPATIENT
Start: 2021-11-01 | End: 2021-11-01

## 2021-11-01 RX ORDER — SODIUM CHLORIDE 9 MG/ML
INJECTION, SOLUTION INTRAVENOUS CONTINUOUS
Status: DISCONTINUED | OUTPATIENT
Start: 2021-11-01 | End: 2021-11-02 | Stop reason: HOSPADM

## 2021-11-01 RX ORDER — HYDRALAZINE HYDROCHLORIDE 100 MG/1
1 TABLET, FILM COATED ORAL 3 TIMES DAILY
COMMUNITY
Start: 2021-08-24

## 2021-11-01 RX ORDER — HYDRALAZINE HYDROCHLORIDE 50 MG/1
100 TABLET, FILM COATED ORAL ONCE
Status: COMPLETED | OUTPATIENT
Start: 2021-11-01 | End: 2021-11-01

## 2021-11-01 RX ORDER — POLYETHYLENE GLYCOL 3350 17 G/17G
17 POWDER, FOR SOLUTION ORAL DAILY PRN
Status: DISCONTINUED | OUTPATIENT
Start: 2021-11-01 | End: 2021-11-02 | Stop reason: HOSPADM

## 2021-11-01 RX ORDER — ACETAMINOPHEN 325 MG/1
650 TABLET ORAL EVERY 6 HOURS PRN
Status: DISCONTINUED | OUTPATIENT
Start: 2021-11-01 | End: 2021-11-02 | Stop reason: HOSPADM

## 2021-11-01 RX ORDER — NIFEDIPINE 90 MG/1
1 TABLET, FILM COATED, EXTENDED RELEASE ORAL DAILY
Status: ON HOLD | COMMUNITY
Start: 2021-10-19 | End: 2022-06-04

## 2021-11-01 RX ORDER — CLONIDINE HYDROCHLORIDE 0.1 MG/1
0.3 TABLET ORAL 3 TIMES DAILY
COMMUNITY
Start: 2021-08-22

## 2021-11-01 RX ADMIN — SODIUM CHLORIDE, PRESERVATIVE FREE 10 ML: 5 INJECTION INTRAVENOUS at 20:34

## 2021-11-01 RX ADMIN — INSULIN GLARGINE 15 UNITS: 100 INJECTION, SOLUTION SUBCUTANEOUS at 21:27

## 2021-11-01 RX ADMIN — ATORVASTATIN CALCIUM 80 MG: 80 TABLET, FILM COATED ORAL at 21:26

## 2021-11-01 RX ADMIN — BUDESONIDE AND FORMOTEROL FUMARATE DIHYDRATE 2 PUFF: 160; 4.5 AEROSOL RESPIRATORY (INHALATION) at 22:20

## 2021-11-01 RX ADMIN — CLOPIDOGREL 75 MG: 75 TABLET, FILM COATED ORAL at 10:49

## 2021-11-01 RX ADMIN — BUDESONIDE AND FORMOTEROL FUMARATE DIHYDRATE 2 PUFF: 160; 4.5 AEROSOL RESPIRATORY (INHALATION) at 11:59

## 2021-11-01 RX ADMIN — IPRATROPIUM BROMIDE 0.5 MG: 0.5 SOLUTION RESPIRATORY (INHALATION) at 01:23

## 2021-11-01 RX ADMIN — ALBUTEROL SULFATE 10 MG: 5 SOLUTION RESPIRATORY (INHALATION) at 01:23

## 2021-11-01 RX ADMIN — MONTELUKAST SODIUM 10 MG: 10 TABLET, FILM COATED ORAL at 21:25

## 2021-11-01 RX ADMIN — METHYLPREDNISOLONE SODIUM SUCCINATE 40 MG: 125 INJECTION, POWDER, FOR SOLUTION INTRAMUSCULAR; INTRAVENOUS at 20:28

## 2021-11-01 RX ADMIN — LOSARTAN POTASSIUM 50 MG: 50 TABLET, FILM COATED ORAL at 21:26

## 2021-11-01 RX ADMIN — HYDRALAZINE HYDROCHLORIDE 100 MG: 50 TABLET, FILM COATED ORAL at 05:24

## 2021-11-01 RX ADMIN — IPRATROPIUM BROMIDE 0.5 MG: 0.5 SOLUTION RESPIRATORY (INHALATION) at 08:49

## 2021-11-01 RX ADMIN — ALBUTEROL SULFATE 5 MG: 5 SOLUTION RESPIRATORY (INHALATION) at 08:49

## 2021-11-01 RX ADMIN — PRAZOSIN HYDROCHLORIDE 4 MG: 1 CAPSULE ORAL at 21:25

## 2021-11-01 RX ADMIN — Medication 81 MG: at 10:49

## 2021-11-01 RX ADMIN — IPRATROPIUM BROMIDE AND ALBUTEROL SULFATE 1 AMPULE: .5; 2.5 SOLUTION RESPIRATORY (INHALATION) at 22:20

## 2021-11-01 RX ADMIN — TIOTROPIUM BROMIDE INHALATION SPRAY 2 PUFF: 3.12 SPRAY, METERED RESPIRATORY (INHALATION) at 11:59

## 2021-11-01 RX ADMIN — INSULIN LISPRO 11 UNITS: 100 INJECTION, SOLUTION INTRAVENOUS; SUBCUTANEOUS at 13:35

## 2021-11-01 RX ADMIN — CARVEDILOL 25 MG: 12.5 TABLET, FILM COATED ORAL at 10:49

## 2021-11-01 RX ADMIN — IPRATROPIUM BROMIDE AND ALBUTEROL SULFATE 1 AMPULE: .5; 2.5 SOLUTION RESPIRATORY (INHALATION) at 11:58

## 2021-11-01 RX ADMIN — Medication 5000 UNITS: at 11:50

## 2021-11-01 RX ADMIN — METHYLPREDNISOLONE SODIUM SUCCINATE 125 MG: 125 INJECTION, POWDER, FOR SOLUTION INTRAMUSCULAR; INTRAVENOUS at 02:17

## 2021-11-01 RX ADMIN — INSULIN LISPRO 5 UNITS: 100 INJECTION, SOLUTION INTRAVENOUS; SUBCUTANEOUS at 09:39

## 2021-11-01 RX ADMIN — PRAZOSIN HYDROCHLORIDE 4 MG: 1 CAPSULE ORAL at 11:49

## 2021-11-01 RX ADMIN — METHYLPREDNISOLONE SODIUM SUCCINATE 40 MG: 125 INJECTION, POWDER, FOR SOLUTION INTRAMUSCULAR; INTRAVENOUS at 10:50

## 2021-11-01 RX ADMIN — HEPARIN SODIUM 1000 UNITS: 1000 INJECTION, SOLUTION INTRAVENOUS; SUBCUTANEOUS at 16:44

## 2021-11-01 RX ADMIN — INSULIN LISPRO 11 UNITS: 100 INJECTION, SOLUTION INTRAVENOUS; SUBCUTANEOUS at 20:27

## 2021-11-01 RX ADMIN — CLONIDINE HYDROCHLORIDE 0.2 MG: 0.1 TABLET ORAL at 17:43

## 2021-11-01 RX ADMIN — HEPARIN SODIUM 1600 UNITS: 1000 INJECTION, SOLUTION INTRAVENOUS; SUBCUTANEOUS at 17:47

## 2021-11-01 RX ADMIN — HEPARIN SODIUM 5000 UNITS: 5000 INJECTION INTRAVENOUS; SUBCUTANEOUS at 21:26

## 2021-11-01 RX ADMIN — CARVEDILOL 25 MG: 12.5 TABLET, FILM COATED ORAL at 21:25

## 2021-11-01 RX ADMIN — CLONIDINE HYDROCHLORIDE 0.2 MG: 0.1 TABLET ORAL at 05:24

## 2021-11-01 RX ADMIN — HEPARIN SODIUM 1600 UNITS: 1000 INJECTION, SOLUTION INTRAVENOUS; SUBCUTANEOUS at 17:48

## 2021-11-01 RX ADMIN — LOSARTAN POTASSIUM 50 MG: 50 TABLET, FILM COATED ORAL at 10:49

## 2021-11-01 RX ADMIN — MAGNESIUM GLUCONATE 500 MG ORAL TABLET 400 MG: 500 TABLET ORAL at 11:50

## 2021-11-01 ASSESSMENT — ENCOUNTER SYMPTOMS
VOMITING: 0
SHORTNESS OF BREATH: 1
COUGH: 0
ABDOMINAL PAIN: 0
NAUSEA: 0

## 2021-11-01 ASSESSMENT — PAIN SCALES - GENERAL: PAINLEVEL_OUTOF10: 0

## 2021-11-01 NOTE — CARE COORDINATION
Case Management Initial Discharge Plan  Wei Mena,             Met with:patient to discuss discharge plans. Information verified: address, contacts, phone number, , insurance Yes  Insurance Provider: Mg    Emergency Contact/Next of Kin name & number: [id number]   Who are involved in patient's support system? Kim    PCP: Kaya Post  Date of last visit: 6 months      Discharge Planning    Living Arrangements:        Home has 1 stories  4 stairs to climb to get into front door, 0stairs to climb to reach second floor  Location of bedroom/bathroom in home main    Patient able to perform ADL's:Independent    Current Services (outpatient & in home) none  DME equipment: none  DME provider: none    Is patient receiving oral anticoagulation therapy? Yes Plavix    If indicated:   Physician managing anticoagulation treatment: Kaya Post  Where does patient obtain lab work for ATC treatment? Potential Assistance Needed:       Patient agreeable to home care: No  [place] of choice provided:  n/a    Prior SNF/Rehab Placement and Facility: none  Agreeable to SNF/Rehab: No  [place] of choice provided: n/a     Evaluation: no    Expected Discharge date:       Patient expects to be discharged to: If home: is the family and/or caregiver wiling & able to provide support at home? yes  Who will be providing this support? Robson Alvarez    Follow Up Appointment: Best Day/ Time:      Transportation provider: cheri  Transportation arrangements needed for discharge: Yes    Readmission Risk              Risk of Unplanned Readmission:  48             Does patient have a readmission risk score greater than 14?: Yes  If yes, follow-up appointment must be made within 7 days of discharge.      Goals of Care: breathe easier      Educated pt on transitional options, provided freedom of choice and are agreeable with plan      Discharge Plan: Home independently with robson lizama established will need cab home          Electronically signed by Esau Perez RN on 11/1/21 at 11:29 AM EDT

## 2021-11-01 NOTE — ED NOTES
Dr. Auther Kussmaul at the bedside     [place], [place]  11/01/21 4[address] [facility]  Discharge Note  Short Stay    Admit Date: 1/29/2018    Discharge Date and Time: 01/29/2018 10:12 AM      Attending Physician: RAMA Tinoco MD     Discharge Provider: OMA Tinoco    Diagnoses:  Active Hospital Problems    Diagnosis  POA    *Staghorn calculus [N20.0]  Yes      Resolved Hospital Problems    Diagnosis Date Resolved POA   No resolved problems to display.       Discharged Condition: stable    Hospital Course: Patient was admitted for an outpatient procedure and tolerated the procedure well with no complications.    Final Diagnoses: Same as principal problem.    Disposition: Home or Self Care    Follow up/Patient Instructions:    Medications:  Reconciled Home Medications:   Current Discharge Medication List      START taking these medications    Details   hydrocodone-acetaminophen 5-325mg (NORCO) 5-325 mg per tablet Take 1 tablet by mouth every 6 (six) hours as needed for Pain.  Qty: 30 tablet, Refills: 0    Associated Diagnoses: Staghorn calculus         CONTINUE these medications which have NOT CHANGED    Details   amlodipine (NORVASC) 10 MG tablet Take 10 mg by mouth once daily.   Refills: 1      amoxicillin-clavulanate 500-125mg (AUGMENTIN) 500-125 mg Tab Take 1 tablet (500 mg total) by mouth 2 (two) times daily.  Qty: 14 tablet, Refills: 1    Associated Diagnoses: Kidney stones; Paraparesis; Neurogenic bladder; Staghorn calculus      baclofen (LIORESAL) 20 MG tablet 10 mg 4 (four) times daily.   Refills: 5      bumetanide (BUMEX) 1 MG tablet 1 mg once daily.       catheter 18 Fr Misc Change every 20 days  Qty: 10 each, Refills: 1    Associated Diagnoses: Neurogenic bladder      gabapentin (NEURONTIN) 300 MG capsule 600 mg 3 (three) times daily.       oxybutynin (DITROPAN-XL) 5 MG TR24 TAKE 1 TABLET BY MOUTH EVERY DAY  Qty: 90 tablet, Refills: 0    Associated Diagnoses: Neurogenic bladder      oxyCODONE-acetaminophen (PERCOCET)  mg per tablet Take 1 tablet by  mouth every 8 (eight) hours as needed for Pain.  Qty: 30 tablet, Refills: 0    Associated Diagnoses: Bladder stone; Staghorn calculus      potassium chloride (KLOR-CON) 10 MEQ TbSR       sertraline (ZOLOFT) 50 MG tablet TK 1 T PO QD  Refills: 5      trazodone (DESYREL) 100 MG tablet 100 mg.   Refills: 5      VENTOLIN HFA 90 mcg/actuation inhaler              Discharge Procedure Orders  Diet general     Activity as tolerated     Call MD for:   Order Comments: Significant Hematuria       Follow-up Information     W Carlos Tinoco MD. Schedule an appointment as soon as possible for a visit on 2/2/2018.    Specialty:  Urology  Why:  Nephrostomy tube removal  Contact information:  [address] [place], [place] [id number] [phone]                   Discharge Procedure Orders (must include Diet, Follow-up, Activity):    Discharge Procedure Orders (must include Diet, Follow-up, Activity)  Diet general     Activity as tolerated     Call MD for:   Order Comments: Significant Hematuria

## 2021-11-01 NOTE — PROGRESS NOTES
Dialysis Time Out  To be done by RN and tech or 2 RNs  Staff Names Moses Guajardo RN, Seda Mary RN    [x]  Identity of the patient using 2 patient identifiers  [x]  Consent for treatment  [x]  Equipment-proper machine and dialyzer  [x]  B-Hep B status  [x]  Orders- to include bath, blood flow, dialyzer, time and fluid removal  [x]  Access-Correct site and in working order  [x]  Time for patient to ask questions.

## 2021-11-01 NOTE — H&P
inhalers but symptom relief was incomplete. He denies any fevers or chills, nausea vomiting or acute complaints. Past Medical History:     Past Medical History:   Diagnosis Date    Asthma     mod persistent    CAD in native artery, nonobstructive     Carotid stenosis, left 6/10/2013    Carpal tunnel syndrome     RIGHT    Cerebrovascular disease 4/23/2018    Chronic kidney disease 6/10/2013    COPD (chronic obstructive pulmonary disease) (HCC)     Diabetes mellitus (HCC)     insulin dependent    History of colon polyps     History of weakness of extremity     right sided    HTN (hypertension)     Hyperlipidemia     Lung, cysts, congenital     PTSD (post-traumatic stress disorder)     PTSD (post-traumatic stress disorder)     TIA (transient ischemic attack)     Type II or unspecified type diabetes mellitus without mention of complication, not stated as uncontrolled         Past Surgical History:     Past Surgical History:   Procedure Laterality Date    CAROTID ENDARTERECTOMY Left 7-2013    COLONOSCOPY      HERNIA REPAIR      IR TUNNELED CATHETER PLACEMENT GREATER THAN 5 YEARS  5/11/2021    IR TUNNELED CATHETER PLACEMENT GREATER THAN 5 YEARS 5/11/2021 Joycelyn Damon MD [facility]    KS COLSC FLX W/RMVL OF TUMOR POLYP LESION SNARE TQ N/A 6/27/2017    COLONOSCOPY POLYPECTOMY SNARE/ hot performed by Renee Goodwin DO at [facility] V[facility]ULAR SURGERY Left 2015    carotid stent, dr Ladi Haro        Medications Prior to Admission:     Prior to Admission medications    Medication Sig Start Date End Date Taking?  Authorizing Provider   NIFEdipine (ADALAT CC) 90 MG extended release tablet Take 1 tablet by mouth daily 10/19/21  Yes Historical Provider, MD   aspirin 81 MG EC tablet Take 81 mg by mouth daily 7/28/21  Yes Historical Provider, MD   atorvastatin (LIPITOR) 80 MG tablet Take 1 tablet by mouth daily 10/19/21  Yes Historical Provider, MD   carvedilol (COREG) 25 MG tablet Take 1 tablet by mouth 2 times daily 10/19/21  Yes Historical Provider, MD   cloNIDine (CATAPRES) 0.1 MG tablet Take 1 tablet by mouth 2 times daily 8/22/21  Yes Historical Provider, MD   clopidogrel (PLAVIX) 75 MG tablet Take 1 tablet by mouth daily 8/22/21  Yes Historical Provider, MD   gabapentin (NEURONTIN) 100 MG capsule Take 200 mg by mouth 3 times daily. 10/5/21  Yes Historical Provider, MD   hydrALAZINE (APRESOLINE) 100 MG tablet Take 1 tablet by mouth 3 times daily 8/24/21  Yes Historical Provider, MD   albuterol-ipratropium (COMBIVENT RESPIMAT)  MCG/ACT AERS inhaler Inhale 1 puff into the lungs every 6 hours 8/22/21  Yes Historical Provider, MD   losartan (COZAAR) 50 MG tablet Take 1 tablet by mouth daily 8/22/21  Yes Historical Provider, MD   montelukast (SINGULAIR) 10 MG tablet Take 1 tablet by mouth nightly 8/22/21  Yes Historical Provider, MD   prazosin (MINIPRESS) 5 MG capsule Take 10 mg by mouth 2 times daily   Yes Historical Provider, MD   nitroGLYCERIN (NITROSTAT) 0.4 MG SL tablet USE 1 TABLET UNDER THE TONGUE UP TO MAXIMUM OF 3 TOTAL DOSES. IF NO RELIEF AFTER 1 DOSE, CALL 911. 3/5/21  Yes TERRANCE Riley CNP   vitamin D (ERGOCALCIFEROL) 1.25 MG (82248 UT) CAPS capsule Take 1 capsule by mouth once a week 1/5/21  Yes TERRANCE Riley CNP   bumetanide (BUMEX) 1 MG tablet Take 1 tablet by mouth daily 10/19/21   Historical Provider, MD   blood glucose monitor strips Test 4 times a day & as needed for symptoms of irregular blood glucose. Dispense sufficient amount for indicated testing frequency plus additional to accommodate PRN testing needs.  5/26/21   TERRANCE Silver NP   fluticasone-umeclidin-vilant [facility] ELLIPTA) 387-37.1-95 MCG/INH AEPB One puff into the lungs daily 5/26/21   TERRANCE Silver NP   insulin glargine [facility]) 100 UNIT/ML injection pen Inject 10 Units into the skin nightly 5/13/21   TERRANCE Silver NP   glucagon 1 MG negative for vision, hearing changes, runny nose, throat pain  RESPIRATORY: Positive for shortness of breath, cough, congestion, wheezing  CARDIOVASCULAR:  negative for chest pain, palpitations  GASTROINTESTINAL:  negative for nausea, vomiting, diarrhea, constipation, change in bowel habits, abdominal pain   GENITOURINARY:  negative for difficulty of urination, burning with urination, frequency   INTEGUMENT:  negative for rash, skin lesions, easy bruising   HEMATOLOGIC/LYMPHATIC:  negative for swelling/edema   ALLERGIC/IMMUNOLOGIC:  negative for urticaria , itching  ENDOCRINE:  negative increase in drinking, increase in urination, hot or cold intolerance  MUSCULOSKELETAL:  negative joint pains, muscle aches, swelling of joints  NEUROLOGICAL:  negative for headaches, dizziness, lightheadedness, numbness, pain, tingling extremities  BEHAVIOR/PSYCH:  negative for depression, anxiety    Physical Exam:   BP (!) 189/69   Pulse 64   Temp 97.8 °F (36.6 °C) (Oral)   Resp 24   SpO2 98%   Temp (24hrs), Av.8 °F (36.6 °C), Min:97.8 °F (36.6 °C), Max:97.8 °F (36.6 °C)    Recent Labs     21  0852 21  1156   POCGLU 385* 397*     No intake or output data in the 24 hours ending 21 1259    General Appearance: alert, acutely ill appearing, and in no acute distress  Mental status: oriented to person, place, and time  Head: normocephalic, atraumatic  Eye: no icterus, redness, pupils equal and reactive, extraocular eye movements intact, conjunctiva clear  Ear: normal external ear, no discharge, hearing intact  Nose: no drainage noted  Mouth: mucous membranes moist  Neck: supple, no carotid bruits, thyroid not palpable  Lungs: Bilateral equal air entry, scattered rhonchi, normal effort  Cardiovascular: normal rate, regular rhythm, no murmur, gallop, rub  Abdomen: Soft, nontender, nondistended, normal bowel sounds, no hepatomegaly or splenomegaly  Neurologic: There are no new focal motor or sensory deficits, normal muscle tone and bulk, no abnormal sensation, normal speech, cranial nerves II through XII grossly intact  Skin: No gross lesions, rashes, bruising or bleeding on exposed skin area  Extremities: peripheral pulses palpable, no pedal edema or calf pain with palpation  Psych: normal affect    Investigations:      Laboratory Testing:  Recent Results (from the past 24 hour(s))   EKG 12 Lead    Collection Time: 11/01/21  1:21 AM   Result Value Ref Range    Ventricular Rate 91 BPM    Atrial Rate 91 BPM    P-R Interval 128 ms    QRS Duration 90 ms    Q-T Interval 362 ms    QTc Calculation (Bazett) 445 ms    P Axis 77 degrees    R Axis -109 degrees    T Axis 105 degrees   CBC Auto Differential    Collection Time: 11/01/21  1:31 AM   Result Value Ref Range    WBC 7.7 3.5 - 11.3 k/uL    RBC 4.20 (L) 4.21 - 5.77 m/uL    Hemoglobin 13.5 13.0 - 17.0 g/dL    Hematocrit 42.5 40.7 - 50.3 %    .2 82.6 - 102.9 fL    MCH 32.1 25.2 - 33.5 pg    MCHC 31.8 28.4 - 34.8 g/dL    RDW 12.9 11.8 - 14.4 %    Platelets See Reflexed IPF Result 138 - 453 k/uL    MPV NOT REPORTED 8.1 - 13.5 fL    NRBC Automated 0.0 0.0 per 100 WBC    Differential Type NOT REPORTED     WBC Morphology NOT REPORTED     RBC Morphology NOT REPORTED     Platelet Estimate NOT REPORTED     Seg Neutrophils 73 (H) 36 - 65 %    Lymphocytes 15 (L) 24 - 43 %    Monocytes 6 3 - 12 %    Eosinophils % 5 (H) 1 - 4 %    Basophils 1 0 - 2 %    Immature Granulocytes 0 0 %    Segs Absolute 5.65 1.50 - 8.10 k/uL    Absolute Lymph # 1.13 1.10 - 3.70 k/uL    Absolute Mono # 0.46 0.10 - 1.20 k/uL    Absolute Eos # 0.37 0.00 - 0.44 k/uL    Basophils Absolute 0.06 0.00 - 0.20 k/uL    Absolute Immature Granulocyte 0.03 0.00 - 0.30 k/uL   SPECIMEN REJECTION    Collection Time: 11/01/21  1:31 AM   Result Value Ref Range    Specimen Source . BLOOD     Ordered Test BMPX TROPI     Reason for Rejection Unable to perform testing: Specimen hemolyzed.      - NOT REPORTED    Immature Platelet Fraction    Collection Time: 11/01/21  1:31 AM   Result Value Ref Range    Platelet, Fluorescence Platelet clumps present, count appears adequate. 138 - 453 k/uL   Basic Metabolic Panel w/ Reflex to MG    Collection Time: 11/01/21  2:02 AM   Result Value Ref Range    Glucose 325 (H) 70 - 99 mg/dL    BUN 51 (H) 8 - 23 mg/dL    CREATININE 3.37 (H) 0.70 - 1.20 mg/dL    Bun/Cre Ratio NOT REPORTED 9 - 20    Calcium 8.4 (L) 8.6 - 10.4 mg/dL    Sodium 136 135 - 144 mmol/L    Potassium 5.6 (H) 3.7 - 5.3 mmol/L    Chloride 107 98 - 107 mmol/L    CO2 19 (L) 20 - 31 mmol/L    Anion Gap 10 9 - 17 mmol/L    GFR Non-African American 18 (L) >60 mL/min    GFR  22 (L) >60 mL/min    GFR Comment          GFR Staging NOT REPORTED    Troponin    Collection Time: 11/01/21  2:02 AM   Result Value Ref Range    Troponin, High Sensitivity 130 (HH) 0 - 22 ng/L    Troponin T NOT REPORTED <0.03 ng/mL    Troponin Interp NOT REPORTED    Troponin    Collection Time: 11/01/21  2:22 AM   Result Value Ref Range    Troponin, High Sensitivity 124 (HH) 0 - 22 ng/L    Troponin T NOT REPORTED <0.03 ng/mL    Troponin Interp NOT REPORTED    COVID-19, Rapid    Collection Time: 11/01/21  3:23 AM    Specimen: Nasopharyngeal Swab   Result Value Ref Range    Specimen Description . NASOPHARYNGEAL SWAB     SARS-CoV-2, Rapid Not Detected Not Detected   POC Glucose Fingerstick    Collection Time: 11/01/21  8:52 AM   Result Value Ref Range    POC Glucose 385 (H) 75 - 110 mg/dL   POC Glucose Fingerstick    Collection Time: 11/01/21 11:56 AM   Result Value Ref Range    POC Glucose 397 (H) 75 - 110 mg/dL       Imaging/Diagnostics:  XR CHEST PORTABLE    Result Date: 11/1/2021  COPD/emphysema. No evidence of lung consolidation.        Assessment :      Hospital Problems         Last Modified POA    * (Principal) COPD exacerbation ([facility] [address] 11/1/2021 Yes    Pure hypercholesterolemia 11/1/2021 Yes    Essential hypertension 11/1/2021 Yes    Moderate malnutrition

## 2021-11-01 NOTE — ED NOTES
The following labs labeled with pt sticker and tubed to lab:     [] Blue     [] Lavender   [] on ice  [x] Green/yellow x2 tubes  [] Green/black [] on ice  [] Yellow  [] Red  [] Pink      [] COVID-19 swab    [] Rapid  [] PCR  [] Peds Viral Panel     [] Urine Sample  [] Pelvic Cultures  [] Blood Cultures              [facility]  11/01/21 5099

## 2021-11-01 NOTE — ED NOTES
Report from Max Norwood, 24[address] [facility] pt. Resting in cot RR even and non-labored, NAD noted. Pt.  To go to dialysis      Saeid Carrion RN  11/01/21 6208

## 2021-11-01 NOTE — ED PROVIDER NOTES
[facility]  ED  Emergency Department Encounter  Emergency Medicine Resident     Pt Name: Sandeep Ponce  OHB:[id number]  [place] 1955  Date of evaluation: 11/1/21  PCP:  Hair Wright       Chief Complaint   Patient presents with    Shortness of Breath     HISTORY OF PRESENT ILLNESS  (Location/Symptom, Timing/Onset, Context/Setting, Quality, Duration, ModifyingFactors, Severity.)      Sandeep Ponce is a 73 y.o. male with PMH of COPD, CAD, ESRD on dialysis, diabetes, carotid stenosis, hypertension, hyperlipidemia who presents for evaluation of chest pain shortness of breath since yesterday.  on EMS arrival, given nitro in route.  on arrival.     History of recurrent COPD exacerbation, states that this feels similar to previous episode. Patient states \"I have the same thing every 3 months, they give me Solu-Medrol 125 and discharged with prednisone\". No fever, chills, cough, abdominal pain, nausea, vomiting. No history of blood clots. No recent travel or surgery. No hormone use. Patient does have malignant neoplasm of colon listed in [address] but denies ever having colon cancer. Patient has been vaccinated against COVID-19. PAST MEDICAL / SURGICAL / SOCIAL /FAMILY HISTORY      has a past medical history of Asthma, CAD in native artery, nonobstructive, Carotid stenosis, left, Carpal tunnel syndrome, Cerebrovascular disease, Chronic kidney disease, COPD (chronic obstructive pulmonary disease) ([facility] [address] Diabetes mellitus ([facility] [address] History of colon polyps, History of weakness of extremity, HTN (hypertension), Hyperlipidemia, Lung, cysts, congenital, PTSD (post-traumatic stress disorder), PTSD (post-traumatic stress disorder), TIA (transient ischemic attack), and Type II or unspecified type diabetes mellitus without mention of complication, not stated as uncontrolled. No other pertinent PMH on review with patient/guardian.      has a past surgical history that includes hernia repair; Tonsillectomy; Colonoscopy; Carotid endarterectomy (Left, -); vascular surgery (Left, ); pr colsc flx w/rmvl of tumor polyp lesion snare tq (N/A, 2017); and IR TUNNELED CVC PLACE WO SQ PORT/PUMP > 5 YEARS (2021). No other pertinent PSH on review with patient/guardian. Social History     Socioeconomic History    Marital status:      Spouse name: Not on file    Number of children: Not on file    Years of education: Not on file    Highest education level: Not on file   Occupational History     Employer: N/A   Tobacco Use    Smoking status: Former Smoker     Packs/day: 0.50     Years: 35.00     Pack years: 17.50     Types: Cigarettes     Quit date: 2014     Years since quittin.3    Smokeless tobacco: Never Used   Vaping Use    Vaping Use: Never used   Substance and Sexual Activity    Alcohol use: No     Alcohol/week: 0.0 standard drinks    Drug use: No    Sexual activity: Yes     Partners: Female   Other Topics Concern    Not on file   Social History Narrative    Not on file     Social Determinants of Health     Financial Resource Strain:     Difficulty of Paying Living Expenses:    Food Insecurity:     Worried About Running Out of Food in the Last Year:     [address] in the Last Year:    Transportation Needs:     Lack of Transportation (Medical):  Lack of Transportation (Non-Medical):    Physical Activity:     Days of Exercise per Week:     Minutes of Exercise per Session:    Stress:     Feeling of Stress :    Social Connections:     Frequency of Communication with Friends and Family:     Frequency of Social Gatherings with Friends and Family:     Attends [employer] Services:     Active Member of Clubs or Organizations:     Attends Club or Organization Meetings:     Marital Status:    Intimate Partner Violence:     Fear of Current or Ex-Partner:     Emotionally Abused:     Physically Abused:     Sexually Abused:       I counseled the patient against using tobacco products. Family History   Problem Relation Age of Onset    Cancer Mother     Heart Disease Father      No other pertinent FamHx on review with patient/guardian. Allergies:  Lisinopril, Ace inhibitors, and Pcn [penicillins]    Home Medications:  Prior to Admission medications    Medication Sig Start Date End Date Taking? Authorizing Provider   gabapentin (NEURONTIN) 100 MG capsule TAKE 2 CAPSULES BY MOUTH 3 TIMES DAILY 6/24/21 9/22/21  Amadeo Stark MD   prazosin (MINIPRESS) 2 MG capsule 2 CAPSULES BY MOUTH (4MG) TWICE DAILY 6/24/21   Amadeo Stark MD   clopidogrel (PLAVIX) 75 MG tablet Take 1 tablet by mouth daily 6/24/21   Amadeo Stark MD   aspirin (ASPIRIN LOW DOSE) 81 MG EC tablet TAKE 1 TABLET BY MOUTH DAILY 6/23/21   Amadeo Stark MD   montelukast (SINGULAIR) 10 MG tablet Take 1 tablet by mouth nightly 6/11/21   Diogo Scales MD   NIFEdipine (PROCARDIA XL) 90 MG extended release tablet Take 1 tablet by mouth nightly 6/11/21   Diogo Scales MD   cloNIDine (CATAPRES) 0.2 MG tablet Take 1 tablet by mouth 3 times daily 6/11/21   Diogo Scales MD   sodium chloride (AYR) 0.65 % (Soln) SOLN nasal drops 2 drops by Each Nostril route 3 times daily 5/26/21   TERRANCE Thomas NP   blood glucose monitor strips Test 4 times a day & as needed for symptoms of irregular blood glucose. Dispense sufficient amount for indicated testing frequency plus additional to accommodate PRN testing needs.  5/26/21   TERRANCE Thomas NP   fluticasone-umeclidin-vilant (Vara Slate) 652-74.2-32 MCG/INH AEPB One puff into the lungs daily 5/26/21   TERRANCE Thomas NP   carvedilol (COREG) 25 MG tablet TAKE 1 TABLET BY MOUTH 2 TIMES DAILY (WITH MEALS) 5/24/21   TERRANCE Anderson CNP   atorvastatin (LIPITOR) 80 MG tablet TAKE 1 TABLET BY MOUTH DAILY 5/24/21   TERRANCE Anderson CNP   insulin glargine (BASAGLAR KWIKPEN) 100 UNIT/ML into the lungs every 6 hours as needed for Shortness of Breath 12/6/20   Elfego Cardenas MD   Nutritional Supplements (GLUCERNA CARBSTEADY) LIQD Take 1 Can by mouth 3 times daily 6/16/20   TERRANCE Nelson CNP   COMFORT EZ PEN NEEDLES 31G X 8 MM MISC USE AS DIRECTED 4/14/20   TERRANCE Nelson CNP       REVIEW OF SYSTEMS    (2-9 systems for level 4, 10 ormore for level 5)      Review of Systems   Constitutional: Negative for chills and fever. Eyes: Negative for visual disturbance. Respiratory: Positive for shortness of breath. Negative for cough. Cardiovascular: Positive for chest pain. Negative for palpitations. Gastrointestinal: Negative for abdominal pain, nausea and vomiting. Genitourinary: Negative for flank pain. Skin: Negative for rash. Allergic/Immunologic: Negative for immunocompromised state. Neurological: Negative for dizziness, weakness and headaches. Hematological: Does not bruise/bleed easily. PHYSICAL EXAM   (up to 7 for level 4, 8 or more for level 5)      INITIAL VITALS:   BP (!) 180/95   Pulse 93   Temp 97.8 °F (36.6 °C) (Oral)   Resp 25   SpO2 99%     Physical Exam  Constitutional:       General: He is not in acute distress. Appearance: Normal appearance. He is not ill-appearing, toxic-appearing or diaphoretic. HENT:      Head: Normocephalic and atraumatic. Right Ear: External ear normal.      Left Ear: External ear normal.   Eyes:      General:         Right eye: No discharge. Left eye: No discharge. Cardiovascular:      Rate and Rhythm: Normal rate and regular rhythm. Pulses: Normal pulses. Heart sounds: No murmur heard. Pulmonary:      Effort: Respiratory distress present. Breath sounds: Wheezing present. Comments: Accessory muscle use, intercostal retractions. Abdominal:      Palpations: Abdomen is soft. Tenderness: There is no abdominal tenderness.  There is no right CVA tenderness, left CVA tenderness, guarding or rebound. Skin:     Capillary Refill: Capillary refill takes less than 2 seconds. Neurological:      General: No focal deficit present. Mental Status: He is alert. DIFFERENTIAL  DIAGNOSIS     PLAN (LABS / IMAGING / EKG):  Orders Placed This Encounter   Procedures    COVID-19, Rapid    XR CHEST PORTABLE    CBC Auto Differential    Troponin    SPECIMEN REJECTION    Immature Platelet Fraction    Basic Metabolic Panel w/ Reflex to MG    PREVIOUS SPECIMEN    Troponin    POTASSIUM    Inpatient consult to Hospitalist    EKG 12 Lead       MEDICATIONS ORDERED:  Orders Placed This Encounter   Medications    methylPREDNISolone sodium (SOLU-MEDROL) injection 125 mg    albuterol (PROVENTIL) nebulizer solution 10 mg     Order Specific Question:   Initiate RT Bronchodilator Protocol     Answer:   No    ipratropium (ATROVENT) 0.02 % nebulizer solution 0.5 mg     Order Specific Question:   Initiate RT Bronchodilator Protocol     Answer:   No    cloNIDine (CATAPRES) tablet 0.2 mg    hydrALAZINE (APRESOLINE) tablet 100 mg     DIAGNOSTIC RESULTS / EMERGENCY DEPARTMENT COURSE / MDM     LABS:  Results for orders placed or performed during the hospital encounter of 11/01/21   COVID-19, Rapid    Specimen: Nasopharyngeal Swab   Result Value Ref Range    Specimen Description . NASOPHARYNGEAL SWAB     SARS-CoV-2, Rapid Not Detected Not Detected   CBC Auto Differential   Result Value Ref Range    WBC 7.7 3.5 - 11.3 k/uL    RBC 4.20 (L) 4.21 - 5.77 m/uL    Hemoglobin 13.5 13.0 - 17.0 g/dL    Hematocrit 42.5 40.7 - 50.3 %    .2 82.6 - 102.9 fL    MCH 32.1 25.2 - 33.5 pg    MCHC 31.8 28.4 - 34.8 g/dL    RDW 12.9 11.8 - 14.4 %    Platelets See Reflexed IPF Result 138 - 453 k/uL    MPV NOT REPORTED 8.1 - 13.5 fL    NRBC Automated 0.0 0.0 per 100 WBC    Differential Type NOT REPORTED     WBC Morphology NOT REPORTED     RBC Morphology NOT REPORTED     Platelet Estimate NOT REPORTED Seg Neutrophils 73 (H) 36 - 65 %    Lymphocytes 15 (L) 24 - 43 %    Monocytes 6 3 - 12 %    Eosinophils % 5 (H) 1 - 4 %    Basophils 1 0 - 2 %    Immature Granulocytes 0 0 %    Segs Absolute 5.65 1.50 - 8.10 k/uL    Absolute Lymph # 1.13 1.10 - 3.70 k/uL    Absolute Mono # 0.46 0.10 - 1.20 k/uL    Absolute Eos # 0.37 0.00 - 0.44 k/uL    Basophils Absolute 0.06 0.00 - 0.20 k/uL    Absolute Immature Granulocyte 0.03 0.00 - 0.30 k/uL   Troponin   Result Value Ref Range    Troponin, High Sensitivity 124 (HH) 0 - 22 ng/L    Troponin T NOT REPORTED <0.03 ng/mL    Troponin Interp NOT REPORTED    SPECIMEN REJECTION   Result Value Ref Range    Specimen Source . BLOOD     Ordered Test BMPX TROPI     Reason for Rejection Unable to perform testing: Specimen hemolyzed. - NOT REPORTED    Immature Platelet Fraction   Result Value Ref Range    Platelet, Fluorescence Platelet clumps present, count appears adequate. 138 - 453 k/uL   Basic Metabolic Panel w/ Reflex to MG   Result Value Ref Range    Glucose 325 (H) 70 - 99 mg/dL    BUN 51 (H) 8 - 23 mg/dL    CREATININE 3.37 (H) 0.70 - 1.20 mg/dL    Bun/Cre Ratio NOT REPORTED 9 - 20    Calcium 8.4 (L) 8.6 - 10.4 mg/dL    Sodium 136 135 - 144 mmol/L    Potassium 5.6 (H) 3.7 - 5.3 mmol/L    Chloride 107 98 - 107 mmol/L    CO2 19 (L) 20 - 31 mmol/L    Anion Gap 10 9 - 17 mmol/L    GFR Non-African American 18 (L) >60 mL/min    GFR  22 (L) >60 mL/min    GFR Comment          GFR Staging NOT REPORTED    Troponin   Result Value Ref Range    Troponin, High Sensitivity 130 (HH) 0 - 22 ng/L    Troponin T NOT REPORTED <0.03 ng/mL    Troponin Interp NOT REPORTED        IMPRESSION/MDM/ED COURSE:  73 y.o. male presented with shortness of breath and chest pain x1 day. Patient arrives in respiratory distress with accessory muscle use.  on EMS arrival, received nitro in route.  on arrival.  Patient afebrile with respiratory rate 25. SPO2 low 90s on room air. IMPRESSION      1. COPD exacerbation ([facility] [address]        DISPOSITION / PLAN     DISPOSITION Decision To Admit 11/01/2021 05:19:03 AM      PATIENT REFERREDTO:  No follow-up provider specified.     DISCHARGE MEDICATIONS:  New Prescriptions    No medications on file       Danuta De Los Santos DO  PGY 2  Resident Physician Emergency Medicine  11/01/21 5:50 AM    (Please note that portions of this note were completed with a voice recognition program.Efforts were made to edit the dictations but occasionally words are mis-transcribed.)       Kristen Kaminski DO  Resident  11/01/21 0759

## 2021-11-01 NOTE — ED PROVIDER NOTES
[address] ED  Emergency Department  Emergency Medicine Resident Sign-out     Care of Maine Malhotra was assumed from Dr. Fran Anderson and is being seen for Shortness of Breath  . The patient's initial evaluation and plan have been discussed with the prior provider who initially evaluated the patient.      EMERGENCY DEPARTMENT COURSE / MEDICAL DECISION MAKING:       MEDICATIONS GIVEN:  Orders Placed This Encounter   Medications    methylPREDNISolone sodium (SOLU-MEDROL) injection 125 mg    albuterol (PROVENTIL) nebulizer solution 10 mg     Order Specific Question:   Initiate RT Bronchodilator Protocol     Answer:   No    ipratropium (ATROVENT) 0.02 % nebulizer solution 0.5 mg     Order Specific Question:   Initiate RT Bronchodilator Protocol     Answer:   No    cloNIDine (CATAPRES) tablet 0.2 mg    hydrALAZINE (APRESOLINE) tablet 100 mg       LABS / RADIOLOGY:     Labs Reviewed   CBC WITH AUTO DIFFERENTIAL - Abnormal; Notable for the following components:       Result Value    RBC 4.20 (*)     Seg Neutrophils 73 (*)     Lymphocytes 15 (*)     Eosinophils % 5 (*)     All other components within normal limits   TROPONIN - Abnormal; Notable for the following components:    Troponin, High Sensitivity 124 (*)     All other components within normal limits   BASIC METABOLIC PANEL W/ REFLEX TO MG FOR LOW K - Abnormal; Notable for the following components:    Glucose 325 (*)     BUN 51 (*)     CREATININE 3.37 (*)     Calcium 8.4 (*)     Potassium 5.6 (*)     CO2 19 (*)     GFR Non- 18 (*)     GFR  22 (*)     All other components within normal limits   TROPONIN - Abnormal; Notable for the following components:    Troponin, High Sensitivity 130 (*)     All other components within normal limits   COVID-19, RAPID   SPECIMEN REJECTION   IMMATURE PLATELET FRACTION   PREVIOUS SPECIMEN   POTASSIUM       XR CHEST PORTABLE    Result Date: 11/1/2021  EXAMINATION: ONE XRAY VIEW OF THE CHEST 11/1/2021 1:46 am COMPARISON: Chest portable 09/09/2021 HISTORY: ORDERING SYSTEM PROVIDED HISTORY: Chest pain and shortness of breath TECHNOLOGIST PROVIDED HISTORY: Chest pain and shortness of breath Reason for Exam: port Upright FINDINGS: Right internal jugular approach central venous dual lumen catheter is noted with distal tip projecting in the region of the SVC, unchanged in position. The heart is normal in size and configuration. The mediastinal contours are within normal limits. Lungs appear hyperexpanded and hyperlucent. No evidence of lung consolidation is present. Suspected scattered mild parenchymal lung scarring is seen. .  The pleural surfaces are normal and no evidence of a pleural effusion is seen. Bones and soft tissues are unremarkable. COPD/emphysema. No evidence of lung consolidation. RECENT VITALS:     Temp: 97.8 °F (36.6 °C),  Pulse: 93, Resp: 25, BP: (!) 180/95, SpO2: 99 %    This patient is a 73 y.o. Male with chest pain, shortness of breath. Patient with COPD exacerbation today. Treated with Solu-Medrol, breathing treatments. Chest x-ray with no acute process, evidence underlying COPD. Patient admitted to [facility]. Initial potassium sample hemolyzed, awaiting repeat      OUTSTANDING TASKS / RECOMMENDATIONS:    1. Repeat potassium  2. Awaiting bed     FINAL IMPRESSION:     1. COPD exacerbation ([facility] [address]        DISPOSITION:         DISPOSITION:  []  Discharge   []  Transfer -    [x]  Admission -  [facility]   []  Against Medical Advice   []  Eloped   FOLLOW-UP: No follow-up provider specified.    DISCHARGE MEDICATIONS: New Prescriptions    No medications on file          Rigo Carrasco DO  Emergency Medicine Resident  [address] [place]  Resident  11/01/21 7305

## 2021-11-01 NOTE — ED NOTES
The following labs labeled with pt sticker and tubed to lab:     [] Blue     [x] Lavender   [] on ice  [x] Green/yellow  [x] Green/black [] on ice  [] Yellow  [] Red  [] Pink      [] COVID-19 swab    [] Rapid  [] PCR  [] Peds Viral Panel     [] Urine Sample  [] Pelvic Cultures  [] Blood Cultures      [address], [address] [place] [address]  11/01/21 1505

## 2021-11-01 NOTE — PROGRESS NOTES
Dialysis Post Treatment Note  Vitals:    11/01/21 1814   BP: (!) 203/75   Pulse: 75   Resp: 20   Temp: 98.4 °F (36.9 °C)   SpO2:      Pre-Weight = 56.6 kg  Post-weight = Weight: 121 lb 0.5 oz (54.9 kg)  Total Liters Processed = Total Liters Processed (l/min): 40.2 l/min  Rinseback Volume (mL) = Rinseback Volume (ml): 250 ml  Net Removal (mL) = NET Removed (ml): 2000 ml  Patient's dry weight= 55.5kg  Type of access used= Catheter  Length of treatment=120 min    HD completed. Catapres given. Pt tolerated well. no

## 2021-11-02 ENCOUNTER — APPOINTMENT (OUTPATIENT)
Dept: GENERAL RADIOLOGY | Age: 66
DRG: 190 | End: 2021-11-02
Payer: COMMERCIAL

## 2021-11-02 VITALS
OXYGEN SATURATION: 97 % | RESPIRATION RATE: 16 BRPM | BODY MASS INDEX: 20.1 KG/M2 | HEART RATE: 60 BPM | DIASTOLIC BLOOD PRESSURE: 75 MMHG | TEMPERATURE: 96.8 F | WEIGHT: 124.56 LBS | SYSTOLIC BLOOD PRESSURE: 169 MMHG

## 2021-11-02 LAB
ANION GAP SERPL CALCULATED.3IONS-SCNC: 12 MMOL/L (ref 9–17)
BUN BLDV-MCNC: 45 MG/DL (ref 8–23)
BUN/CREAT BLD: ABNORMAL (ref 9–20)
CALCIUM SERPL-MCNC: 8 MG/DL (ref 8.6–10.4)
CHLORIDE BLD-SCNC: 101 MMOL/L (ref 98–107)
CO2: 19 MMOL/L (ref 20–31)
CREAT SERPL-MCNC: 3.06 MG/DL (ref 0.7–1.2)
GFR AFRICAN AMERICAN: 25 ML/MIN
GFR NON-AFRICAN AMERICAN: 21 ML/MIN
GFR SERPL CREATININE-BSD FRML MDRD: ABNORMAL ML/MIN/{1.73_M2}
GFR SERPL CREATININE-BSD FRML MDRD: ABNORMAL ML/MIN/{1.73_M2}
GLUCOSE BLD-MCNC: 294 MG/DL (ref 75–110)
GLUCOSE BLD-MCNC: 327 MG/DL (ref 75–110)
GLUCOSE BLD-MCNC: 387 MG/DL (ref 75–110)
GLUCOSE BLD-MCNC: 394 MG/DL (ref 70–99)
GLUCOSE BLD-MCNC: 421 MG/DL (ref 75–110)
POTASSIUM SERPL-SCNC: 5.2 MMOL/L (ref 3.7–5.3)
POTASSIUM SERPL-SCNC: 5.6 MMOL/L (ref 3.7–5.3)
SODIUM BLD-SCNC: 132 MMOL/L (ref 135–144)

## 2021-11-02 PROCEDURE — 96376 TX/PRO/DX INJ SAME DRUG ADON: CPT

## 2021-11-02 PROCEDURE — 6360000002 HC RX W HCPCS: Performed by: INTERNAL MEDICINE

## 2021-11-02 PROCEDURE — 6370000000 HC RX 637 (ALT 250 FOR IP): Performed by: NURSE PRACTITIONER

## 2021-11-02 PROCEDURE — 71045 X-RAY EXAM CHEST 1 VIEW: CPT

## 2021-11-02 PROCEDURE — 94640 AIRWAY INHALATION TREATMENT: CPT

## 2021-11-02 PROCEDURE — 36415 COLL VENOUS BLD VENIPUNCTURE: CPT

## 2021-11-02 PROCEDURE — 6370000000 HC RX 637 (ALT 250 FOR IP): Performed by: INTERNAL MEDICINE

## 2021-11-02 PROCEDURE — 97161 PT EVAL LOW COMPLEX 20 MIN: CPT

## 2021-11-02 PROCEDURE — 97116 GAIT TRAINING THERAPY: CPT

## 2021-11-02 PROCEDURE — 94760 N-INVAS EAR/PLS OXIMETRY 1: CPT

## 2021-11-02 PROCEDURE — 2700000000 HC OXYGEN THERAPY PER DAY

## 2021-11-02 PROCEDURE — 2580000003 HC RX 258: Performed by: NURSE PRACTITIONER

## 2021-11-02 PROCEDURE — 6360000002 HC RX W HCPCS: Performed by: NURSE PRACTITIONER

## 2021-11-02 PROCEDURE — G0378 HOSPITAL OBSERVATION PER HR: HCPCS

## 2021-11-02 PROCEDURE — 96372 THER/PROPH/DIAG INJ SC/IM: CPT

## 2021-11-02 PROCEDURE — 80048 BASIC METABOLIC PNL TOTAL CA: CPT

## 2021-11-02 PROCEDURE — 99239 HOSP IP/OBS DSCHRG MGMT >30: CPT | Performed by: INTERNAL MEDICINE

## 2021-11-02 RX ORDER — PRAZOSIN HYDROCHLORIDE 5 MG/1
5 CAPSULE ORAL 2 TIMES DAILY
Status: DISCONTINUED | OUTPATIENT
Start: 2021-11-02 | End: 2021-11-02 | Stop reason: HOSPADM

## 2021-11-02 RX ORDER — PREDNISONE 20 MG/1
40 TABLET ORAL DAILY
Qty: 10 TABLET | Refills: 0 | Status: SHIPPED | OUTPATIENT
Start: 2021-11-03 | End: 2021-11-08

## 2021-11-02 RX ORDER — HYDRALAZINE HYDROCHLORIDE 20 MG/ML
10 INJECTION INTRAMUSCULAR; INTRAVENOUS EVERY 6 HOURS PRN
Status: DISCONTINUED | OUTPATIENT
Start: 2021-11-02 | End: 2021-11-02 | Stop reason: HOSPADM

## 2021-11-02 RX ORDER — INSULIN GLARGINE 100 [IU]/ML
15 INJECTION, SOLUTION SUBCUTANEOUS 2 TIMES DAILY
Status: DISCONTINUED | OUTPATIENT
Start: 2021-11-02 | End: 2021-11-02 | Stop reason: HOSPADM

## 2021-11-02 RX ADMIN — CLONIDINE HYDROCHLORIDE 0.3 MG: 0.1 TABLET ORAL at 00:00

## 2021-11-02 RX ADMIN — METHYLPREDNISOLONE SODIUM SUCCINATE 40 MG: 125 INJECTION, POWDER, FOR SOLUTION INTRAMUSCULAR; INTRAVENOUS at 02:24

## 2021-11-02 RX ADMIN — Medication 5000 UNITS: at 09:37

## 2021-11-02 RX ADMIN — MAGNESIUM GLUCONATE 500 MG ORAL TABLET 400 MG: 500 TABLET ORAL at 09:36

## 2021-11-02 RX ADMIN — NIFEDIPINE 90 MG: 90 TABLET, EXTENDED RELEASE ORAL at 00:00

## 2021-11-02 RX ADMIN — PRAZOSIN HYDROCHLORIDE 5 MG: 1 CAPSULE ORAL at 09:37

## 2021-11-02 RX ADMIN — HYDRALAZINE HYDROCHLORIDE 100 MG: 50 TABLET, FILM COATED ORAL at 06:47

## 2021-11-02 RX ADMIN — CLOPIDOGREL 75 MG: 75 TABLET, FILM COATED ORAL at 09:36

## 2021-11-02 RX ADMIN — LOSARTAN POTASSIUM 50 MG: 50 TABLET, FILM COATED ORAL at 09:36

## 2021-11-02 RX ADMIN — HEPARIN SODIUM 5000 UNITS: 5000 INJECTION INTRAVENOUS; SUBCUTANEOUS at 06:47

## 2021-11-02 RX ADMIN — INSULIN GLARGINE 15 UNITS: 100 INJECTION, SOLUTION SUBCUTANEOUS at 09:46

## 2021-11-02 RX ADMIN — IPRATROPIUM BROMIDE AND ALBUTEROL SULFATE 1 AMPULE: .5; 2.5 SOLUTION RESPIRATORY (INHALATION) at 09:56

## 2021-11-02 RX ADMIN — GABAPENTIN 200 MG: 100 CAPSULE ORAL at 09:37

## 2021-11-02 RX ADMIN — HYDRALAZINE HYDROCHLORIDE 100 MG: 50 TABLET, FILM COATED ORAL at 00:00

## 2021-11-02 RX ADMIN — CLONIDINE HYDROCHLORIDE 0.3 MG: 0.1 TABLET ORAL at 06:47

## 2021-11-02 RX ADMIN — SODIUM CHLORIDE, PRESERVATIVE FREE 10 ML: 5 INJECTION INTRAVENOUS at 09:38

## 2021-11-02 RX ADMIN — CARVEDILOL 25 MG: 12.5 TABLET, FILM COATED ORAL at 09:37

## 2021-11-02 RX ADMIN — METHYLPREDNISOLONE SODIUM SUCCINATE 40 MG: 125 INJECTION, POWDER, FOR SOLUTION INTRAMUSCULAR; INTRAVENOUS at 09:36

## 2021-11-02 RX ADMIN — INSULIN LISPRO 9 UNITS: 100 INJECTION, SOLUTION INTRAVENOUS; SUBCUTANEOUS at 08:00

## 2021-11-02 RX ADMIN — Medication 81 MG: at 09:36

## 2021-11-02 ASSESSMENT — PAIN SCALES - GENERAL
PAINLEVEL_OUTOF10: 0
PAINLEVEL_OUTOF10: 0

## 2021-11-02 NOTE — PROGRESS NOTES
Claudia Montoya 20    Progress Note    11/2/2021    10:06 AM    Name:   Lainey Delgado  MRN:     [id number]     Acct:      [de-identified]   Room:   [address] [place] [address] Day:  1  Admit Date:  11/1/2021  1:12 AM    PCP:   Boris Hilario  Code Status:  Full Code    Subjective:     C/C:   Chief Complaint   Patient presents with    Shortness of Breath     Interval History Status: improved. Patient asleep upon exam this late AM  On room air, 97% O2 saturation  No acute distress  No nausea, vomiting overnight per nurse   Dialysis scheduled today      Brief History: This is a 64-year-old Non- / non   male that presents with a complaint of increasing shortness of breath and chest congestion consistent with prior COPD exacerbations. He reports he gets an exacerbation about every 3 to 4 months and ends up hospitalized. He has had shortness of breath, wheezing and cough associate with the symptoms. Shortness of breath worsens with any activity. He has had some relief with his home inhalers but symptom relief was incomplete. He denies any fevers or chills, nausea vomiting or acute complaints. Review of Systems:     Constitutional:  negative for chills, fevers, sweats  Respiratory:  negative for cough, dyspnea on exertion, shortness of breath, wheezing  Cardiovascular:  negative for chest pain, chest pressure/discomfort, lower extremity edema, palpitations  Gastrointestinal:  negative for abdominal pain, constipation, diarrhea, nausea, vomiting  Neurological:  negative for dizziness, headache    Medications: Allergies:     Allergies   Allergen Reactions    Lisinopril      cough    Ace Inhibitors      coughing    Pcn [Penicillins]        Current Meds:   Scheduled Meds:    prazosin  5 mg Oral BID    insulin glargine  15 Units SubCUTAneous BID    insulin lispro  9 Units SubCUTAneous TID     aspirin  81 mg Oral Daily    atorvastatin  80 mg Oral Nightly    carvedilol  25 mg Oral BID WC    clopidogrel  75 mg Oral Daily    gabapentin  200 mg Oral TID    hydrALAZINE  100 mg Oral 3 times per day    isosorbide mononitrate  60 mg Oral Daily    losartan  50 mg Oral BID    montelukast  10 mg Oral Nightly    NIFEdipine  90 mg Oral Nightly    QUEtiapine  100 mg Oral Nightly    sodium chloride flush  5-40 mL IntraVENous 2 times per day    ipratropium-albuterol  1 ampule Inhalation Q4H WA    methylPREDNISolone  40 mg IntraVENous Q6H    Followed by   Dennis Garcia ON 11/3/2021] predniSONE  40 mg Oral Daily    magnesium oxide  400 mg Oral Daily    Vitamin D  5,000 Units Oral Daily    budesonide-formoterol  2 puff Inhalation BID    tiotropium  2 puff Inhalation Daily    insulin lispro  0-18 Units SubCUTAneous TID     insulin lispro  0-9 Units SubCUTAneous Nightly    heparin (porcine)  5,000 Units SubCUTAneous 3 times per day    cloNIDine  0.3 mg Oral TID     Continuous Infusions:    sodium chloride      sodium chloride      dextrose       PRN Meds: hydrALAZINE, sodium chloride flush, sodium chloride, ondansetron **OR** ondansetron, polyethylene glycol, acetaminophen **OR** acetaminophen, albuterol, glucose, dextrose, glucagon (rDNA), dextrose, heparin (porcine), heparin (porcine)    Data:     Past Medical History:   has a past medical history of Asthma, CAD in native artery, nonobstructive, Carotid stenosis, left, Carpal tunnel syndrome, Cerebrovascular disease, Chronic kidney disease, COPD (chronic obstructive pulmonary disease) ([facility] Diabetes mellitus ([facility] [address] History of colon polyps, History of weakness of extremity, HTN (hypertension), Hyperlipidemia, Lung, cysts, congenital, PTSD (post-traumatic stress disorder), PTSD (post-traumatic stress disorder), TIA (transient ischemic attack), and Type II or unspecified type diabetes mellitus without mention of complication, not stated as uncontrolled.     Social History:   reports that he quit smoking about 7 years ago. His smoking use included cigarettes. He has a 17.50 pack-year smoking history. He has never used smokeless tobacco. He reports that he does not drink alcohol and does not use drugs. Family History:   Family History   Problem Relation Age of Onset    Cancer Mother     Heart Disease Father        Vitals:  BP (!) 169/75   Pulse 53   Temp 96.8 °F (36 °C) (Temporal)   Resp 14   Wt 124 lb 9 oz (56.5 kg)   SpO2 91%   BMI 20.10 kg/m²   Temp (24hrs), Av.3 °F (36.8 °C), Min:96.8 °F (36 °C), Max:99.1 °F (37.3 °C)    Recent Labs     21  1156 21  1917 21  2358 21  0758   POCGLU 397* 421* 327* 294*       I/O (24Hr): Intake/Output Summary (Last 24 hours) at 2021 1006  Last data filed at 2021 0645  Gross per 24 hour   Intake 542 ml   Output 2650 ml   Net -2108 ml       Labs:  Hematology:  Recent Labs     21  0131   WBC 7.7   RBC 4.20*   HGB 13.5   HCT 42.5   .2   MCH 32.1   MCHC 31.8   RDW 12.9   PLT See Reflexed IPF Result   MPV NOT REPORTED     Chemistry:  Recent Labs     21  0202 21  0202 21  0222 21  1414 21  0903     --   --  136 132*   K 5.6*   < > 5.6* 5.8* 5.2     --   --  104 101   CO2 19*  --   --  18* 19*   GLUCOSE 325*  --   --   --  394*   BUN 51*  --   --   --  45*   CREATININE 3.37*  --   --   --  3.06*   ANIONGAP 10  --   --  14 12   LABGLOM 18*  --   --   --  21*   GFRAA 22*  --   --   --  25*   CALCIUM 8.4*  --   --   --  8.0*   TROPHS 130*  --  124*  --   --     < > = values in this interval not displayed.      Recent Labs     21  0852 21  1156 21  1917 21  2358 21  0758   POCGLU 385* 397* 421* 327* 294*     ABG:  Lab Results   Component Value Date    POCPH 7.39 2013    POCPCO2 46 2013    POCPO2 288 2013    POCHCO3 27.8 2013    NBEA NOT REPORTED 2013    PBEA 3 2013    VXN7EGG 29 2013    WRVU1KXB 100 2013    FIO2 UNKNOWN 09/09/2021     Lab Results   Component Value Date/Time    SPECIAL 2ML L FR 12/03/2020 11:24 AM     Lab Results   Component Value Date/Time    CULTURE NO GROWTH 6 DAYS 12/03/2020 11:24 AM       Radiology:  XR CHEST PORTABLE    Result Date: 11/2/2021  Unchanged appearance of the chest without acute airspace disease identified. XR CHEST PORTABLE    Result Date: 11/1/2021  COPD/emphysema. No evidence of lung consolidation.        Physical Examination:        General appearance:  alert, cooperative and no distress  Mental Status:  oriented to person, place and time and normal affect  Lungs:  clear to auscultation bilaterally, normal effort  Heart:  regular rate and rhythm, no murmur  Abdomen:  soft, nontender, nondistended, normal bowel sounds, no masses, hepatomegaly, splenomegaly  Extremities:  no edema, redness, tenderness in the calves  Skin:  no gross lesions, rashes, induration    Assessment:        Hospital Problems         Last Modified POA    * (Principal) COPD exacerbation ([address] 11/1/2021 Yes    Pure hypercholesterolemia 11/1/2021 Yes    Essential hypertension 11/1/2021 Yes    Moderate malnutrition ([address] 11/1/2021 Yes    ESRD (end stage renal disease) ([address] 11/1/2021 Yes    Chronic heart failure with preserved ejection fraction ([address] 11/1/2021 Yes    Brittle diabetes ([address] 11/1/2021 Yes          Plan:        * (Principal) COPD exacerbation ([address]  -continue Solu-Medrol  -saturating well on RA    Pure hypercholesterolemia  -continue atorvastatin  -trend lipid levels and labs    Essential hypertension  -monitor BP control   -continue BP meds  -DVT prophylaxis maintenance     Moderate malnutrition (HCC)  -continue nutrition supplements   -PT/OT consulted     ESRD (end stage renal disease) ([address]  -Nephrology following   -continue dialysis     Chronic heart failure with preserved ejection fraction (HCC)  -continue medications: aspirin, carvedilol, plavix, nitrate, losartan, nifedipine    Brittle diabetes ([address]  -Insulin scale in addition to Lantus and scheduled mealtime coverage      DC Planning today 11/2/21  Follow-up with PCP OP regarding DM medications       Campos Forrester  11/2/2021  10:06 AM       Attending Supervising Severiano Duncan, Peace Blankenship MD, have seen and examined Fabienne Regan and personally performed and participated in the key or critical portions of the evaluation and management including personally performing the exam and medical decision making. I verify the accuracy of the documentation by the medical student with the following additions/changes. Additional Comments: respiratory status improved. Dc planning on PO steroids.  Dc time > 30 min     Electronically signed by Peace Blankenship MD on 11/2/2021 at 2:32 PM

## 2021-11-02 NOTE — DISCHARGE SUMMARY
[facility]  Office: [address], DO, Diego Jay, DO, Tori Cordova, DO, Eamon Mccray, DO, Iliana Mcguire MD, Cindy Cuba MD, Janet Goyal MD, Obey Shane MD, Lowell Horta MD, Geovani Shea MD, Urvashi Hadley MD, Bryn Valle MD, Nikia Harrison, DO, Sara Lopez DO, J Carlos Aviles MD,  Anup Cordova DO, Nata Roque MD, Jaxon Munoz MD, Amy Rocha MD, Eusebia Martinez MD, Robert Kc MD, Bethel Howard MD, Fátima Somers, [facility], [facility], [facility], [place], [facility], Hi Wyman, CNS, Farhana Joshi, CNP, Mary Wellintgon, CNP, Edna Em, CNP, Elizabeth Painter, CNP, Inocente Landis, CNP, Onur Rosa, CNP, Deanne Brown PA-C, Darci Lee, [facility], Jared Carvajal, CNP, Piyush Bo, CNP, Jing Sanches, CNP, Desmond Jacobson, CNP, Melanie Harmon, CNP, Jose Guadalupe, [facility], [facility], [facility]    Discharge Summary     Patient ID: Ayo Roy  :  1955   MRN: [id number]     ACCOUNT:  [de-identified]   Patient's PCP: Carin Og  Admit Date: 2021   Discharge Date: 2021  Length of Stay: 1  Code Status:  Prior  Admitting Physician: Jessica Garcia DO  Discharge Physician: Urvashi Hadley MD     Active Discharge Diagnoses:     Hospital Problem Lists:  Principal Problem:    COPD exacerbation ([facility]  Active Problems:    Pure hypercholesterolemia    Essential hypertension    Moderate malnutrition ([facility] [address]    ESRD (end stage renal disease) ([facility] [address]    Chronic heart failure with preserved ejection fraction ([facility] [address]    Brittle diabetes ([facility] [address]  Resolved Problems:    * No resolved hospital problems. *      Admission Condition:  fair     Discharged Condition: stable    Hospital Stay:     Hospital Course:    This is a 56-year-old male that presents with a complaint of increasing shortness of breath and chest congestion consistent with prior COPD exacerbations.  He reports he gets an exacerbation about every 3 to 4 months and ends up hospitalized. Denise Scott has had shortness of breath, wheezing and cough associate with the symptoms.  Shortness of breath worsens with any activity.  He has had some relief with his home inhalers but symptom relief was incomplete.  He denies any fevers or chills, nausea vomiting or acute complaints. Treated with IV steroids with improvement in symptoms. Dc home.      Significant therapeutic interventions:   Principal) COPD exacerbation ([facility] [address]  -continue Solu-Medrol  -saturating well on RA     Pure hypercholesterolemia  -continue atorvastatin  -trend lipid levels and labs     Essential hypertension  -monitor BP control   -continue BP meds  -DVT prophylaxis maintenance      Moderate malnutrition (HCC)  -continue nutrition supplements   -PT/OT consulted      ESRD (end stage renal disease) ([facility] [address]  -Nephrology following   -continue dialysis      Chronic heart failure with preserved ejection fraction ([facility] [address]  -continue medications: aspirin, carvedilol, plavix, nitrate, losartan, nifedipine     Brittle diabetes ([facility] [address]  -Insulin scale in addition to Lantus and scheduled mealtime coverage       DC Planning today 11/2/21  Follow-up with PCP OP regarding DM medications     Significant Diagnostic Studies:   Labs / Micro:  CBC:   Lab Results   Component Value Date    WBC 7.7 11/01/2021    RBC 4.20 11/01/2021    RBC 3.96 04/16/2012    HGB 13.5 11/01/2021    HCT 42.5 11/01/2021    .2 11/01/2021    MCH 32.1 11/01/2021    MCHC 31.8 11/01/2021    RDW 12.9 11/01/2021    PLT See Reflexed IPF Result 11/01/2021     04/16/2012     BMP:    Lab Results   Component Value Date    GLUCOSE 394 11/02/2021    GLUCOSE 172 04/16/2012     11/02/2021    K 5.2 11/02/2021     11/02/2021    CO2 19 11/02/2021    ANIONGAP 12 11/02/2021    BUN 45 11/02/2021    CREATININE 3.06 11/02/2021    BUNCRER NOT REPORTED 11/02/2021    CALCIUM 8.0 11/02/2021    LABGLOM 21 11/02/2021    GFRAA 25 11/02/2021    GFR 11/02/2021    GFR NOT REPORTED 11/02/2021     HFP:    Lab Results   Component Value Date    PROT 5.7 09/09/2021    PROT 6.2 11/12/2011     CMP:    Lab Results   Component Value Date    GLUCOSE 394 11/02/2021    GLUCOSE 172 04/16/2012     11/02/2021    K 5.2 11/02/2021     11/02/2021    CO2 19 11/02/2021    BUN 45 11/02/2021    CREATININE 3.06 11/02/2021    ANIONGAP 12 11/02/2021    ALKPHOS 90 09/09/2021    ALT 33 09/09/2021    AST 27 09/09/2021    BILITOT 0.28 09/09/2021    LABALBU 3.4 09/09/2021    ALBUMIN 1.5 09/09/2021    LABGLOM 21 11/02/2021    GFRAA 25 11/02/2021    GFR      11/02/2021    GFR NOT REPORTED 11/02/2021    PROT 5.7 09/09/2021    PROT 6.2 11/12/2011    CALCIUM 8.0 11/02/2021     PT/INR:    Lab Results   Component Value Date    PROTIME 9.6 05/03/2021    INR 0.9 05/03/2021     PTT:   Lab Results   Component Value Date    APTT 20.7 05/03/2021     FLP:    Lab Results   Component Value Date    CHOL 256 05/04/2021    TRIG 148 05/04/2021    HDL 68 05/04/2021     U/A:    Lab Results   Component Value Date    COLORU YELLOW 05/03/2021    TURBIDITY CLEAR 05/03/2021    SPECGRAV 1.016 05/03/2021    HGBUR NEGATIVE 05/03/2021    PHUR 6.0 05/03/2021    PROTEINU 4+ 05/03/2021    GLUCOSEU 1+ 05/03/2021    GLUCOSEU 3+ 11/12/2011    KETUA NEGATIVE 05/03/2021    BILIRUBINUR NEGATIVE 05/03/2021    BILIRUBINUR NEGATIVE 11/12/2011    UROBILINOGEN Normal 05/03/2021    NITRU NEGATIVE 05/03/2021    LEUKOCYTESUR NEGATIVE 05/03/2021     TSH:    Lab Results   Component Value Date    TSH 2.91 05/15/2020        Radiology:  XR CHEST PORTABLE    Result Date: 11/2/2021  Unchanged appearance of the chest without acute airspace disease identified. XR CHEST PORTABLE    Result Date: 11/1/2021  COPD/emphysema. No evidence of lung consolidation.        Consultations:    Consults:     Final Specialist Recommendations/Findings:   IP CONSULT TO HOSPITALIST  IP CONSULT TO NEPHROLOGY      The patient was seen and examined on day of discharge and this discharge summary is in conjunction with any daily progress note from day of discharge. Discharge plan:     Disposition: Home    Physician Follow Up:     Ra Ambrocio  [address] [facility]  [phone]             Requiring Further Evaluation/Follow Up POST HOSPITALIZATION/Incidental Findings:     Diet: regular diet    Activity: As tolerated    Instructions to Patient: follow up with pcp     Discharge Medications:      Medication List      START taking these medications    predniSONE 20 MG tablet  Commonly known as: DELTASONE  Take 2 tablets by mouth daily for 5 days  Start taking on: November 3, 2021        CHANGE how you take these medications    aspirin 81 MG EC tablet  What changed: Another medication with the same name was removed. Continue taking this medication, and follow the directions you see here. atorvastatin 80 MG tablet  Commonly known as: LIPITOR  What changed: Another medication with the same name was removed. Continue taking this medication, and follow the directions you see here. carvedilol 25 MG tablet  Commonly known as: COREG  What changed: Another medication with the same name was removed. Continue taking this medication, and follow the directions you see here. cloNIDine 0.1 MG tablet  Commonly known as: CATAPRES  What changed: Another medication with the same name was removed. Continue taking this medication, and follow the directions you see here. clopidogrel 75 MG tablet  Commonly known as: PLAVIX  What changed: Another medication with the same name was removed. Continue taking this medication, and follow the directions you see here. Combivent Respimat  MCG/ACT Aers inhaler  Generic drug: albuterol-ipratropium  What changed: Another medication with the same name was removed.  Continue taking this medication, and follow the directions you see here.     gabapentin 100 MG capsule  Commonly known as: NEURONTIN  What changed: Another medication with the same name was removed. Continue taking this medication, and follow the directions you see here.     hydrALAZINE 100 MG tablet  Commonly known as: APRESOLINE  What changed: Another medication with the same name was removed. Continue taking this medication, and follow the directions you see here. losartan 50 MG tablet  Commonly known as: COZAAR  What changed: Another medication with the same name was removed. Continue taking this medication, and follow the directions you see here. montelukast 10 MG tablet  Commonly known as: SINGULAIR  What changed: Another medication with the same name was removed. Continue taking this medication, and follow the directions you see here. NIFEdipine 90 MG extended release tablet  Commonly known as: ADALAT CC  What changed: Another medication with the same name was removed. Continue taking this medication, and follow the directions you see here. CONTINUE taking these medications    Basaglar KwikPen 100 UNIT/ML injection pen  Generic drug: insulin glargine  Inject 10 Units into the skin nightly     blood glucose monitor kit and supplies  Dispense sufficient amount for indicated testing frequency plus additional to accommodate PRN testing needs. Dispense all needed supplies to include: monitor, strips, lancing device, lancets, control solutions, alcohol swabs. blood glucose test strips  Test 4 times a day & as needed for symptoms of irregular blood glucose. Dispense sufficient amount for indicated testing frequency plus additional to accommodate PRN testing needs. bumetanide 1 MG tablet  Commonly known as: BUMEX     Comfort EZ Pen Needles 31G X 8 MM Misc  Generic drug: Insulin Pen Needle  USE AS DIRECTED     fluticasone 50 MCG/ACT nasal spray  Commonly known as: FLONASE     glucagon 1 MG injection  Inject 1 mg into the muscle See Admin Instructions Follow package directions for low blood sugar.      Glucerna Carbsteady Liqd  Take 1 Can by

## 2021-11-02 NOTE — PLAN OF CARE
Problem: Metabolic:  Goal: Ability to maintain clinical measurements within normal limits will improve  Description: Ability to maintain clinical measurements within normal limits will improve  Outcome: Ongoing  Goal: Complications related to the disease process, condition or treatment will be avoided or minimized  Description: Complications related to the disease process, condition or treatment will be avoided or minimized  Outcome: Ongoing     Problem: Activity:  Goal: Fatigue will decrease  Description: Fatigue will decrease  Outcome: Ongoing  Goal: Risk for activity intolerance will decrease  Description: Risk for activity intolerance will decrease  Outcome: Ongoing     Problem: Coping:  Goal: Ability to cope will improve  Description: Ability to cope will improve  Outcome: Ongoing     Problem: Fluid Volume:  Goal: Will show no signs or symptoms of fluid imbalance  Description: Will show no signs or symptoms of fluid imbalance  Outcome: Ongoing     Problem:  Activity Intolerance:  Goal: Ability to tolerate increased activity will improve  Description: Ability to tolerate increased activity will improve  Outcome: Ongoing     Problem: Breathing Pattern - Ineffective:  Goal: Ability to achieve and maintain a regular respiratory rate will improve  Description: Ability to achieve and maintain a regular respiratory rate will improve  Outcome: Ongoing

## 2021-11-02 NOTE — PROGRESS NOTES
Physical Therapy    Facility/Department: [facility] 4B STEPDOWN  Initial Assessment    NAME: Sourav Gil  : 1955  MRN: [id number]    Date of Service: 2021  Chief Complaint   Patient presents with    Shortness of Breath      Discharge Recommendations:   (no additional therapy recommended. )   PT Equipment Recommendations  Equipment Needed: No    Assessment   Assessment: Pt performed well at therapy this date. Pt able to ambulate 300 feet without a device with supervision. Pt is at baseline with functional mobility and without additional acute care PT needs, pt is being discharged from PT at this time, PT should be reordered with a change in medical status. Pt is expected to be safe to return to prior living arrangements with assistance from supportive fiance as needed. Prognosis: Good  Decision Making: Low Complexity  PT Education: Goals;Transfer Training;PT Role;Functional Mobility Training;Gait Training  REQUIRES PT FOLLOW UP: No  Activity Tolerance  Activity Tolerance: Patient Tolerated treatment well  Activity Tolerance: Began today's session on 2 lpm O2 via NC, able to ambulate 300 feet without supplemental O2, O2 saturation at the conclusion of ambulation bout was 100%. Patient Diagnosis(es): The encounter diagnosis was COPD exacerbation ([facility] [address] has a past medical history of Asthma, CAD in native artery, nonobstructive, Carotid stenosis, left, Carpal tunnel syndrome, Cerebrovascular disease, Chronic kidney disease, COPD (chronic obstructive pulmonary disease) ([facility] [address] Diabetes mellitus ([facility] [address] History of colon polyps, History of weakness of extremity, HTN (hypertension), Hyperlipidemia, Lung, cysts, congenital, PTSD (post-traumatic stress disorder), PTSD (post-traumatic stress disorder), TIA (transient ischemic attack), and Type II or unspecified type diabetes mellitus without mention of complication, not stated as uncontrolled.    has a past surgical history that includes hernia repair; Tonsillectomy; Colonoscopy; Carotid endarterectomy (Left, 7-2013); vascular surgery (Left, 2015); pr colsc flx w/rmvl of tumor polyp lesion snare tq (N/A, 6/27/2017); and IR TUNNELED CVC PLACE WO SQ PORT/PUMP > 5 YEARS (5/11/2021). Restrictions  Restrictions/Precautions  Restrictions/Precautions: Up as Tolerated  Required Braces or Orthoses?: No  Position Activity Restriction  Other position/activity restrictions: up as tolerated  Vision/Hearing  Vision: Impaired  Vision Exceptions: Wears glasses for reading  Hearing: Within functional limits     Subjective  General  Patient assessed for rehabilitation services?: Yes  Response To Previous Treatment: Not applicable  Family / Caregiver Present: No  Follows Commands: Within Functional Limits  Subjective  Subjective: Pt supine in bed and agreeable to therapy, RN agreeable to therapy. Pt pleasant and cooperative throughout today's session.   Pain Screening  Patient Currently in Pain: Denies  Vital Signs  Patient Currently in Pain: Denies       Social/Functional History  Social/Functional History  Lives With: Significant other  Type of Home: House (duplex, pt lives on main level)  Home Layout: One level  Home Access: Stairs to enter with rails  Entrance Stairs - Number of Steps: 4  Entrance Stairs - Rails: Both  Bathroom Shower/Tub: Tub/Shower unit  Bathroom Toilet: Standard  Bathroom Equipment:  (reports none)  Home Equipment: Rolling walker (pt reports not using at a baseline)  ADL Assistance: [address]: Independent  Homemaking Responsibilities: Yes (pt reports performing majority)  Ambulation Assistance: Independent  Transfer Assistance: Independent  Active : No  Patient's  Info: medical cab  Occupation: On disability  Leisure & Hobbies: reading the [address]  Overall Cognitive Status: WFL    Objective     Observation/Palpation  Posture: Fair    AROM RLE (degrees)  RLE AROM: WFL  AROM LLE (degrees)  LLE AROM : Co-treatment   Time In 0849         Time Out 0905         Minutes 16         Timed Code Treatment Minutes: 8 Minutes       Amy Stern, [address]

## 2021-11-02 NOTE — PROGRESS NOTES
CLINICAL PHARMACY NOTE: MEDS TO BEDS    Total # of Prescriptions Filled: 0   The following medications were delivered to the patient:  · prednisone    Additional Documentation: RN Lafayette Landau discharged the patient without their prednisone to go home with, I went to deliver the medication to the patient's room at 12:20 and the patient was gone. I asked Cyn Davis if the patient was discharged without his [id number] to go home with and she replied with \"yup I guess so. \"  Rodolfo Chavez called the patient twice and there was no answer, she found another phone number in epic and called and let him know that we had a medication here for him and he was not aware that he had anything. He said that he would have to get a cab to come back up to get the medication possibly today if not tomorrow.

## 2021-11-03 ENCOUNTER — CARE COORDINATION (OUTPATIENT)
Dept: CASE MANAGEMENT | Age: 66
End: 2021-11-03

## 2021-11-03 NOTE — CARE COORDINATION
Shrav[facility] 45 Transitions Initial Follow Up Call    Call within 2 business days of discharge: Yes    Patient: Robin Adams Patient : 1955   MRN: [id number]  Reason for Admission: COPD  Discharge Date: 21 RARS: Readmission Risk Score: 20.8      Last Discharge [address] [address]       Complaint Diagnosis Description Type Department Provider    21 Shortness of Breath COPD exacerbation [facility]) ED to Hosp-Admission (Discharged) (ADMITTED) [facility] 4B Skip Morales MD; Srikanth Brooks. .. Spoke with: person that answered secondary number     Call to pt secondary number- writer is informed pt is in hospital- call disconnected. Writer finds pt at [facility] (per [address])  Episode closed     Care Transitions 24 Hour Call    Post Acute Services:  Outpatient/Community Services (Comment: pulmonary rehab)  Patient Home Equipment: Nebulizer  Do you have support at home?: Partner/Spouse/SO  Are you an active caregiver in your home?: No  Care Transitions Interventions         Follow Up  Future Appointments   Date Time Provider Mahesh Gunter   2021  9:00 AM Brock Jones MD Resp Spec Rashmi Springer, YIN

## 2021-11-04 NOTE — ED PROVIDER NOTES
[address] [facility]   Emergency Department  Faculty Attestation       I performed a history and physical examination of the patient and discussed management with the resident. I reviewed the residents note and agree with the documented findings including all diagnostic interpretations and plan of care. Any areas of disagreement are noted on the chart. I was personally present for the key portions of any procedures. I have documented in the chart those procedures where I was not present during the key portions. I have reviewed the emergency nurses triage note. I agree with the chief complaint, past medical history, past surgical history, allergies, medications, social and family history as documented unless otherwise noted below. Documentation of the HPI, Physical Exam and Medical Decision Making performed by scribadrienne is based on my personal performance of the HPI, PE and MDM. For Physician Assistant/ Nurse Practitioner cases/documentation I have personally evaluated this patient and have completed at least one if not all key elements of the E/M (history, physical exam, and MDM). Additional findings are as noted. Pertinent Comments     Primary Care Physician: Carlitos Haddad      ED Triage Vitals   BP Temp Temp Source Pulse Resp SpO2 Height Weight   11/01/21 0120 11/01/21 0116 11/01/21 0116 11/01/21 0116 11/01/21 0116 11/01/21 0116 -- 11/01/21 1553   (!) 180/95 97.8 °F (36.6 °C) Oral 93 20 97 %  124 lb 12.5 oz (56.6 kg)        History/Physical: This is a 73 y.o. male who presents to the Emergency Department with complaint of shortness of breath. Patient does have a history of COPD. Patient was brought in by EMS, and initially they noted that his systolic blood pressure was in the 230s. And they gave nitro in route. Patient states he is feeling his prior COPD exacerbations. Does not have any fevers. No productive cough. Currently vaccinated against Covid.     On exam, patient appears to be in acute respiratory distress. Normocephalic atraumatic. Heart sounds regular no murmurs or gallops. Lungs with decreased air entry bilaterally. And diffuse wheezing. He is intercostal retractions and is talking in 3-4 word sentences. Abdomen is soft nontender. No significant edema. He is alert and oriented x4. MDM/Plan:   COPD exacerbation with mild to moderate respiratory distress  Patient placed on BiPAP. Given Solu-Medrol and breathing treatments. Will do cardiac work-up and plan for admission    EKG Interpretation    Interpreted by emergency department physician      Clinical Impression: normal sinus with LAE and pulmonary disease pattern    Saleem Burrell MD        Critical Care: There was significant risk of life threatening deterioration of patient's condition requiring my direct management. Critical care time 15 minutes, excluding any documented procedures.      Saleem Burrell MD  Attending Emergency Physician        Saleem Burrell MD  11/04/21 3945

## 2021-11-08 NOTE — PROGRESS NOTES
Physician Progress Note      PATIENT:               Jasmina Quintanilla  CSN #:                  [id number]  :                       1955  ADMIT DATE:       2021 1:12 AM  DISCH DATE:        2021 12:14 PM  RESPONDING  PROVIDER #:        Jesus Mack MD          QUERY TEXT:    Pt admitted with COPD exacerbation. Pt noted to have respiratory distress. If   possible, please document in the progress notes and discharge summary if you   are evaluating and/or treating any of the following: The medical record reflects the following:  Risk Factors: COPD  Clinical Indicators: per ED notes \" Patient arrives in respiratory distress   with accessory muscle use. immediately placed on BiPAP\", CXR shows   COPD/emphysema, O2 sat 91% on 2.5L O2, pt not on home O2, no ABG/VBG's  Treatment: BIPAP, Solumedrol, nebs, inhalers, CXR, labs, continuous pulse ox   monitoring    Call if any questions. Thank you, Nilson Connell, [address] [place] [address]  Options provided:  -- Acute respiratory failure with hypoxia  -- Acute respiratory failure with hypercapnia  -- Acute respiratory distress  -- Other - I will add my own diagnosis  -- Disagree - Not applicable / Not valid  -- Disagree - Clinically unable to determine / Unknown  -- Refer to Clinical Documentation Reviewer    PROVIDER RESPONSE TEXT:    This patient is in acute respiratory failure with hypoxia.     Query created by: Marixa So on 2021 5:55 AM      Electronically signed by:  Jesus Mack MD 2021 1:40 PM

## 2021-11-17 ENCOUNTER — OFFICE VISIT (OUTPATIENT)
Dept: PULMONOLOGY | Age: 66
End: 2021-11-17
Payer: COMMERCIAL

## 2021-11-17 VITALS
HEART RATE: 52 BPM | WEIGHT: 124 LBS | SYSTOLIC BLOOD PRESSURE: 125 MMHG | TEMPERATURE: 98 F | BODY MASS INDEX: 19.93 KG/M2 | DIASTOLIC BLOOD PRESSURE: 61 MMHG | OXYGEN SATURATION: 99 % | RESPIRATION RATE: 18 BRPM | HEIGHT: 66 IN

## 2021-11-17 DIAGNOSIS — I50.32 CHRONIC HEART FAILURE WITH PRESERVED EJECTION FRACTION (HCC): ICD-10-CM

## 2021-11-17 DIAGNOSIS — Z87.891 SMOKING HISTORY: ICD-10-CM

## 2021-11-17 DIAGNOSIS — J98.4 BRONCHOGENIC CYST: ICD-10-CM

## 2021-11-17 DIAGNOSIS — J44.9 CHRONIC OBSTRUCTIVE PULMONARY DISEASE, UNSPECIFIED COPD TYPE (HCC): Primary | ICD-10-CM

## 2021-11-17 DIAGNOSIS — N18.6 ESRD (END STAGE RENAL DISEASE) (HCC): ICD-10-CM

## 2021-11-17 DIAGNOSIS — I27.20 PULMONARY HYPERTENSION (HCC): ICD-10-CM

## 2021-11-17 PROCEDURE — 99214 OFFICE O/P EST MOD 30 MIN: CPT | Performed by: INTERNAL MEDICINE

## 2021-11-17 RX ORDER — FLUTICASONE FUROATE 200 UG/1
1 POWDER RESPIRATORY (INHALATION) DAILY
Qty: 30 EACH | Refills: 5 | Status: SHIPPED | OUTPATIENT
Start: 2021-11-17 | End: 2021-12-17

## 2021-11-17 RX ORDER — IPRATROPIUM BROMIDE AND ALBUTEROL SULFATE 2.5; .5 MG/3ML; MG/3ML
1 SOLUTION RESPIRATORY (INHALATION) 4 TIMES DAILY
Qty: 360 ML | Refills: 5 | Status: ON HOLD | OUTPATIENT
Start: 2021-11-17 | End: 2022-06-04

## 2021-11-22 NOTE — PATIENT INSTRUCTIONS
Faxed referral demo and order to  [facility] pulmonary rehab waiting on dictation to be faxed RD

## 2021-11-24 NOTE — PROGRESS NOTES
PULMONARY OP  PROGRESS NOTE      Patient:  David Dover  YOB: 1955    MRN: [id number]     Acct:        Pt seen and Chart reviewed. Mr. David Dover is here in followup for   1. Chronic obstructive pulmonary disease, unspecified COPD type ([address]    2. Pulmonary hypertension ([address]    3. Chronic heart failure with preserved ejection fraction ([address]    4. ESRD (end stage renal disease) ([address]    5. Bronchogenic cyst    6. Smoking history          Patient Has been hospitalized since last visit  Has been using meds as recommended. Does not smoke anymore. Has improvement in his shortness of breath and wheezing. No cough or sputum production. No hemoptysis. No orthopnea or PND or increasing pedal edema.   No chest pain or pressure  Denied any fatigue and tired in the daytime  No fever or chills  Patient has been on hemodialysis  Does not use any oxygen  Patient has been using both Trelegy and DuoNeb  Recommended him not to use both combined as the effectiveness of Trelegy may be compromised because of using the DuoNeb  Patient prefers nebulizer medication    Subjective:  ROS    Review of Systems -   General ROS: Completed and except as mentioned above were negative   Psychological ROS:  Completed and except as mentioned above were negative  Ophthalmic ROS:  Completed and except as mentioned above were negative  ENT ROS:  Completed and except as mentioned above were negative  Allergy and Immunology ROS:  Completed and except as mentioned above were negative  Hematological and Lymphatic ROS:  Completed and except as mentioned above were negative  Endocrine ROS: Completed and except as mentioned above were negative  Respiratory ROS:  Completed and except as mentioned above were negative  Cardiovascular ROS:  Completed and except as mentioned above were negative  Gastrointestinal ROS: Completed and except as mentioned above were negative  Genito-Urinary ROS:  Completed and except as mentioned above were negative  Musculoskeletal ROS:  Completed and except as mentioned above were negative  Neurological ROS:  Completed and except as mentioned above were negative  Dermatological ROS:  Completed and except as mentioned above were negative            Allergies: Allergies   Allergen Reactions    Lisinopril      cough    Ace Inhibitors      coughing    Pcn [Penicillins]        Medications:    Current Outpatient Medications:     fluticasone (ARNUITY ELLIPTA) 200 MCG/ACT AEPB, Inhale 1 puff into the lungs daily, Disp: 30 each, Rfl: 5    ipratropium-albuterol (DUONEB) 0.5-2.5 (3) MG/3ML SOLN nebulizer solution, Inhale 3 mLs into the lungs 4 times daily, Disp: 360 mL, Rfl: 5    albuterol-ipratropium (COMBIVENT RESPIMAT)  MCG/ACT AERS inhaler, Inhale 1 puff into the lungs every 6 hours, Disp: 1 each, Rfl: 5    bumetanide (BUMEX) 1 MG tablet, Take 1 tablet by mouth daily, Disp: , Rfl:     NIFEdipine (ADALAT CC) 90 MG extended release tablet, Take 1 tablet by mouth daily, Disp: , Rfl:     aspirin 81 MG EC tablet, Take 81 mg by mouth daily, Disp: , Rfl:     atorvastatin (LIPITOR) 80 MG tablet, Take 1 tablet by mouth daily, Disp: , Rfl:     carvedilol (COREG) 25 MG tablet, Take 1 tablet by mouth 2 times daily, Disp: , Rfl:     cloNIDine (CATAPRES) 0.1 MG tablet, Take 0.3 mg by mouth three times daily , Disp: , Rfl:     clopidogrel (PLAVIX) 75 MG tablet, Take 1 tablet by mouth daily, Disp: , Rfl:     gabapentin (NEURONTIN) 100 MG capsule, Take 200 mg by mouth 3 times daily. , Disp: , Rfl:     hydrALAZINE (APRESOLINE) 100 MG tablet, Take 1 tablet by mouth 3 times daily, Disp: , Rfl:     losartan (COZAAR) 50 MG tablet, Take 1 tablet by mouth daily, Disp: , Rfl:     montelukast (SINGULAIR) 10 MG tablet, Take 1 tablet by mouth nightly, Disp: , Rfl:     prazosin (MINIPRESS) 5 MG capsule, Take 10 mg by mouth 2 times daily, Disp: , Rfl:    blood glucose monitor strips, Test 4 times a day & as needed for symptoms of irregular blood glucose. Dispense sufficient amount for indicated testing frequency plus additional to accommodate PRN testing needs. , Disp: 500 strip, Rfl: 3    insulin glargine (BASAGLAR KWIKPEN) 100 UNIT/ML injection pen, Inject 10 Units into the skin nightly, Disp: 15 mL, Rfl: 2    fluticasone (FLONASE) 50 MCG/ACT nasal spray, , Disp: , Rfl:     insulin aspart (NOVOLOG FLEXPEN) 100 UNIT/ML injection pen, Inject 5 Units into the skin 3 times daily (before meals) Sliding scale, Disp: 5 pen, Rfl: 3    nitroGLYCERIN (NITROSTAT) 0.4 MG SL tablet, USE 1 TABLET UNDER THE TONGUE UP TO MAXIMUM OF 3 TOTAL DOSES. IF NO RELIEF AFTER 1 DOSE, CALL 911., Disp: 25 tablet, Rfl: 2    QUEtiapine (SEROQUEL) 100 MG tablet, TAKE 1 TABLET BY MOUTH NIGHTLY, Disp: 30 tablet, Rfl: 4    GNP VITAMIN D MAXIMUM STRENGTH 50 MCG (2000 UT) TABS, TAKE 1 TABLET BY MOUTH ONCE DAILY, Disp: 30 tablet, Rfl: 3    blood glucose monitor kit and supplies, Dispense sufficient amount for indicated testing frequency plus additional to accommodate PRN testing needs. Dispense all needed supplies to include: monitor, strips, lancing device, lancets, control solutions, alcohol swabs., Disp: 1 kit, Rfl: 0    vitamin D (ERGOCALCIFEROL) 1.25 MG (43618 UT) CAPS capsule, Take 1 capsule by mouth once a week, Disp: 4 capsule, Rfl: 3    Lancets MISC, Use_4__times daily Diagnisis:250.0  Diabetes Mellitus__x__Insulin Dependent___Non-Insulin Dependent, Disp: 100 each, Rfl: 11    Nutritional Supplements (GLUCERNA CARBSTEADY) LIQD, Take 1 Can by mouth 3 times daily, Disp: 237 mL, Rfl: 5    COMFORT EZ PEN NEEDLES 31G X 8 MM MISC, USE AS DIRECTED, Disp: 100 each, Rfl: 5    glucagon 1 MG injection, Inject 1 mg into the muscle See Admin Instructions Follow package directions for low blood sugar.  (Patient not taking: Reported on 11/17/2021), Disp: 1 kit, Rfl: 3      Objective:    Physical turgor and pigmentation. No lesions or scars. Neurological/Psychiatric: The patient's general behavior, level of consciousness, thought content and emotional status is normal.       Labs:    Patient has not had pulmonary function test done yet. It was reordered  Report of the chest x-ray done on August 14, 2019 did not show any pneumonia but may have bibasilar atelectasis  CT Scans-had a CTA of the chest done on August 11, 2019 that showed that the bronchogenic cyst has been stable. It showed small bilateral pleural effusions with associated atelectasis    Chest x-ray on November 2 of 2021 without any acute airspace disease            Assessment:    1. Chronic obstructive pulmonary disease, unspecified COPD type ([facility] [address]    2. Pulmonary hypertension ([address]    3. Chronic heart failure with preserved ejection fraction ([address]    4. ESRD (end stage renal disease) ([facility] [address]    5. Bronchogenic cyst    6. Smoking history          PLAN:    Patient has not had the alpha-1 antitrypsin level  Reviewed chest x-ray with the patient  Ordered pulmonary rehabilitation and pulmonary function tests along with 6-minute walking oximetry  Refills were provided -Combivent Respimat, Arnuity, DuoNeb transfer oxygen  Educated and clarified the medication use. Recommend flu vaccination in the fall annually. Had flu vaccination for the season  Recommendations given regarding pneumococcal vaccinations. Patient had the COVID-19 vaccination  Patient is up-to-date with vaccinations from pulmonary perspective. Maintain an active lifestyle. Questions  Patient had were answered to his satisfaction. Home O2 evaluation to be done. Supplemental oxygen was not needed  Smoking cessation was to be continued.   Patient not a candidate for CT scan to screen for lung cancer as he has only 17.5-pack-year history of smoking  We'll see the patient back in 3 months or earlier if needed  Patient will call us if he is sick and need to be seen sooner  Thank you for having us involved in the care of your patient. Please call us if you have any questions or concerns.       Eliu Nieves MD, MD             11/23/2021, 10:06 PM

## 2021-11-29 ENCOUNTER — TELEPHONE (OUTPATIENT)
Dept: PULMONOLOGY | Age: 66
End: 2021-11-29

## 2021-11-29 NOTE — TELEPHONE ENCOUNTER
Saira, [address], called to consult with you regarding a mutual pt who is currently being seen at [facility], observation unit bed 8, for shortness of breath and COPD exacerbation. She requested a call back from you please. Thank you.

## 2021-11-29 NOTE — TELEPHONE ENCOUNTER
PS Dr. Dayna Negron  11/29/2021 11:52 AM  Please let them know that we do not go anywhere other than St.  Please have them consult Dr. Andrez tovar

## 2022-03-07 ENCOUNTER — HOSPITAL ENCOUNTER (INPATIENT)
Age: 67
LOS: 1 days | Discharge: HOME | DRG: 640 | End: 2022-03-08
Attending: EMERGENCY MEDICINE | Admitting: FAMILY MEDICINE
Payer: COMMERCIAL

## 2022-03-07 ENCOUNTER — APPOINTMENT (OUTPATIENT)
Dept: GENERAL RADIOLOGY | Age: 67
DRG: 640 | End: 2022-03-07
Payer: COMMERCIAL

## 2022-03-07 DIAGNOSIS — R09.02 HYPOXIA: ICD-10-CM

## 2022-03-07 DIAGNOSIS — I50.9 CONGESTIVE HEART FAILURE, UNSPECIFIED HF CHRONICITY, UNSPECIFIED HEART FAILURE TYPE (HCC): Primary | ICD-10-CM

## 2022-03-07 PROBLEM — E87.70 FLUID OVERLOAD: Status: ACTIVE | Noted: 2022-03-07

## 2022-03-07 LAB
ABSOLUTE EOS #: 0.25 K/UL (ref 0–0.4)
ABSOLUTE IMMATURE GRANULOCYTE: 0 K/UL (ref 0–0.3)
ABSOLUTE LYMPH #: 0.25 K/UL (ref 1–4.8)
ABSOLUTE MONO #: 0.17 K/UL (ref 0.1–0.8)
ANION GAP SERPL CALCULATED.3IONS-SCNC: 15 MMOL/L (ref 9–17)
ANION GAP: 15 MMOL/L (ref 7–16)
BASOPHILS # BLD: 0 % (ref 0–2)
BASOPHILS ABSOLUTE: 0 K/UL (ref 0–0.2)
BUN BLDV-MCNC: 71 MG/DL (ref 8–23)
CALCIUM SERPL-MCNC: 9.2 MG/DL (ref 8.6–10.4)
CHLORIDE BLD-SCNC: 96 MMOL/L (ref 98–107)
CO2: 21 MMOL/L (ref 20–31)
CREAT SERPL-MCNC: 4.08 MG/DL (ref 0.7–1.2)
EOSINOPHILS RELATIVE PERCENT: 3 % (ref 1–4)
GFR AFRICAN AMERICAN: 18 ML/MIN
GFR NON-AFRICAN AMERICAN: 12 ML/MIN
GFR NON-AFRICAN AMERICAN: 15 ML/MIN
GFR SERPL CREATININE-BSD FRML MDRD: 15 ML/MIN
GFR SERPL CREATININE-BSD FRML MDRD: ABNORMAL ML/MIN/{1.73_M2}
GFR SERPL CREATININE-BSD FRML MDRD: ABNORMAL ML/MIN/{1.73_M2}
GLUCOSE BLD-MCNC: 120 MG/DL (ref 70–99)
GLUCOSE BLD-MCNC: 121 MG/DL (ref 74–100)
HCO3 VENOUS: 21.5 MMOL/L (ref 22–29)
HCT VFR BLD CALC: 29.9 % (ref 40.7–50.3)
HEMOGLOBIN: 9.8 G/DL (ref 13–17)
IMMATURE GRANULOCYTES: 0 %
LYMPHOCYTES # BLD: 3 % (ref 24–44)
MCH RBC QN AUTO: 31.3 PG (ref 25.2–33.5)
MCHC RBC AUTO-ENTMCNC: 32.8 G/DL (ref 28.4–34.8)
MCV RBC AUTO: 95.5 FL (ref 82.6–102.9)
MONOCYTES # BLD: 2 % (ref 1–7)
MORPHOLOGY: ABNORMAL
NEGATIVE BASE EXCESS, VEN: 4 (ref 0–2)
NRBC AUTOMATED: 0 PER 100 WBC
O2 SAT, VEN: 94 % (ref 60–85)
PCO2, VEN: 38.2 MM HG (ref 41–51)
PDW BLD-RTO: 15.3 % (ref 11.8–14.4)
PH VENOUS: 7.36 (ref 7.32–7.43)
PLATELET # BLD: 234 K/UL (ref 138–453)
PMV BLD AUTO: 10.5 FL (ref 8.1–13.5)
PO2, VEN: 75.2 MM HG (ref 30–50)
POC BUN: 73 MG/DL (ref 8–26)
POC CHLORIDE: 96 MMOL/L (ref 98–107)
POC CREATININE: 4.83 MG/DL (ref 0.51–1.19)
POC HEMATOCRIT: 30 % (ref 41–53)
POC HEMOGLOBIN: 10.2 G/DL (ref 13.5–17.5)
POC IONIZED CALCIUM: 1.27 MMOL/L (ref 1.15–1.33)
POC LACTIC ACID: 1.68 MMOL/L (ref 0.56–1.39)
POC POTASSIUM: 4.3 MMOL/L (ref 3.5–4.5)
POC SODIUM: 132 MMOL/L (ref 138–146)
POC TCO2: 22 MMOL/L (ref 22–30)
POTASSIUM SERPL-SCNC: 4.4 MMOL/L (ref 3.7–5.3)
PRO-BNP: ABNORMAL PG/ML
RBC # BLD: 3.13 M/UL (ref 4.21–5.77)
SEG NEUTROPHILS: 92 % (ref 36–66)
SEGMENTED NEUTROPHILS ABSOLUTE COUNT: 7.73 K/UL (ref 1.8–7.7)
SODIUM BLD-SCNC: 132 MMOL/L (ref 135–144)
TROPONIN, HIGH SENSITIVITY: 137 NG/L (ref 0–22)
TROPONIN, HIGH SENSITIVITY: 147 NG/L (ref 0–22)
WBC # BLD: 8.4 K/UL (ref 3.5–11.3)

## 2022-03-07 PROCEDURE — 6360000002 HC RX W HCPCS: Performed by: STUDENT IN AN ORGANIZED HEALTH CARE EDUCATION/TRAINING PROGRAM

## 2022-03-07 PROCEDURE — 82803 BLOOD GASES ANY COMBINATION: CPT

## 2022-03-07 PROCEDURE — 82565 ASSAY OF CREATININE: CPT

## 2022-03-07 PROCEDURE — 82947 ASSAY GLUCOSE BLOOD QUANT: CPT

## 2022-03-07 PROCEDURE — 83605 ASSAY OF LACTIC ACID: CPT

## 2022-03-07 PROCEDURE — 6360000002 HC RX W HCPCS: Performed by: INTERNAL MEDICINE

## 2022-03-07 PROCEDURE — 94664 DEMO&/EVAL PT USE INHALER: CPT

## 2022-03-07 PROCEDURE — 83880 ASSAY OF NATRIURETIC PEPTIDE: CPT

## 2022-03-07 PROCEDURE — 84520 ASSAY OF UREA NITROGEN: CPT

## 2022-03-07 PROCEDURE — 80048 BASIC METABOLIC PNL TOTAL CA: CPT

## 2022-03-07 PROCEDURE — 71045 X-RAY EXAM CHEST 1 VIEW: CPT

## 2022-03-07 PROCEDURE — 2060000000 HC ICU INTERMEDIATE R&B

## 2022-03-07 PROCEDURE — 93005 ELECTROCARDIOGRAM TRACING: CPT | Performed by: STUDENT IN AN ORGANIZED HEALTH CARE EDUCATION/TRAINING PROGRAM

## 2022-03-07 PROCEDURE — 80051 ELECTROLYTE PANEL: CPT

## 2022-03-07 PROCEDURE — 96365 THER/PROPH/DIAG IV INF INIT: CPT

## 2022-03-07 PROCEDURE — 99284 EMERGENCY DEPT VISIT MOD MDM: CPT

## 2022-03-07 PROCEDURE — 96366 THER/PROPH/DIAG IV INF ADDON: CPT

## 2022-03-07 PROCEDURE — 94640 AIRWAY INHALATION TREATMENT: CPT

## 2022-03-07 PROCEDURE — 5A1D70Z PERFORMANCE OF URINARY FILTRATION, INTERMITTENT, LESS THAN 6 HOURS PER DAY: ICD-10-PCS | Performed by: INTERNAL MEDICINE

## 2022-03-07 PROCEDURE — 94761 N-INVAS EAR/PLS OXIMETRY MLT: CPT

## 2022-03-07 PROCEDURE — 90935 HEMODIALYSIS ONE EVALUATION: CPT

## 2022-03-07 PROCEDURE — 82330 ASSAY OF CALCIUM: CPT

## 2022-03-07 PROCEDURE — 2700000000 HC OXYGEN THERAPY PER DAY

## 2022-03-07 PROCEDURE — 85025 COMPLETE CBC W/AUTO DIFF WBC: CPT

## 2022-03-07 PROCEDURE — 2500000003 HC RX 250 WO HCPCS: Performed by: STUDENT IN AN ORGANIZED HEALTH CARE EDUCATION/TRAINING PROGRAM

## 2022-03-07 PROCEDURE — 85014 HEMATOCRIT: CPT

## 2022-03-07 PROCEDURE — 94660 CPAP INITIATION&MGMT: CPT

## 2022-03-07 PROCEDURE — 84484 ASSAY OF TROPONIN QUANT: CPT

## 2022-03-07 RX ORDER — NITROGLYCERIN 20 MG/100ML
5-200 INJECTION INTRAVENOUS CONTINUOUS
Status: DISCONTINUED | OUTPATIENT
Start: 2022-03-07 | End: 2022-03-08 | Stop reason: HOSPADM

## 2022-03-07 RX ORDER — HEPARIN SODIUM 1000 [USP'U]/ML
1800 INJECTION, SOLUTION INTRAVENOUS; SUBCUTANEOUS PRN
Status: DISCONTINUED | OUTPATIENT
Start: 2022-03-07 | End: 2022-03-08 | Stop reason: HOSPADM

## 2022-03-07 RX ORDER — HEPARIN SODIUM 1000 [USP'U]/ML
1700 INJECTION, SOLUTION INTRAVENOUS; SUBCUTANEOUS PRN
Status: DISCONTINUED | OUTPATIENT
Start: 2022-03-07 | End: 2022-03-08 | Stop reason: HOSPADM

## 2022-03-07 RX ADMIN — HEPARIN SODIUM 1800 UNITS: 1000 INJECTION INTRAVENOUS; SUBCUTANEOUS at 22:02

## 2022-03-07 RX ADMIN — ALBUTEROL SULFATE 10 MG: 5 SOLUTION RESPIRATORY (INHALATION) at 16:05

## 2022-03-07 RX ADMIN — IPRATROPIUM BROMIDE 0.5 MG: 0.5 SOLUTION RESPIRATORY (INHALATION) at 16:05

## 2022-03-07 RX ADMIN — NITROGLYCERIN 10 MCG/MIN: 20 INJECTION INTRAVENOUS at 23:46

## 2022-03-07 RX ADMIN — HEPARIN SODIUM 1700 UNITS: 1000 INJECTION INTRAVENOUS; SUBCUTANEOUS at 22:01

## 2022-03-07 RX ADMIN — NITROGLYCERIN 5 MCG/MIN: 20 INJECTION INTRAVENOUS at 16:20

## 2022-03-07 ASSESSMENT — PAIN SCALES - GENERAL: PAINLEVEL_OUTOF10: 6

## 2022-03-07 NOTE — ED NOTES
Patient arrived to ED via EMS with complaints of SOB x2 days. Patient arrived on CPAP 99% oxygen saturation. Per EMS they gave patient albuterol and combivent. Patient has diminished lung sounds to the right side   Patient states he is a dialysis patient, last dialysis was this past sturday.    Past medical history of hypertension      Zully Infante, RN  03/07/22 Arianne Watts 62., RN  03/07/22 9853

## 2022-03-07 NOTE — ED NOTES
The following labs labeled with pt sticker and tubed to lab:     [] Blue     [] Lavender   [] on ice  [x] Green/yellow  [] Green/black [] on ice  [] Yellow  [] Red  [] Pink      [] COVID-19 swab    [] Rapid  [] PCR  [] Flu swab  [] Peds Viral Panel     [] Urine Sample  [] Pelvic Cultures  [] Blood Cultures            Jose Smart RN  03/07/22 3417

## 2022-03-07 NOTE — ED PROVIDER NOTES
[address] [facility] [place]     Emergency Department     Faculty Attestation    I performed a history and physical examination of the patient and discussed management with the resident. I have reviewed and agree with the residents findings including all diagnostic interpretations, and treatment plans as written. Any areas of disagreement are noted on the chart. I was personally present for the key portions of any procedures. I have documented in the chart those procedures where I was not present during the key portions. I have reviewed the emergency nurses triage note. I agree with the chief complaint, past medical history, past surgical history, allergies, medications, social and family history as documented unless otherwise noted below. Documentation of the HPI, Physical Exam and Medical Decision Making performed by armida is based on my personal performance of the HPI, PE and MDM. For Physician Assistant/ Nurse Practitioner cases/documentation I have personally evaluated this patient and have completed at least one if not all key elements of the E/M (history, physical exam, and MDM). Additional findings are as noted. Primary Care Physician: Deb Salgado    Patient with shortness of breath worsening over the past few days he is end-stage renal dialysis, dialyzed typically Tuesday Thursday Saturday fluid overloaded and plan to do additional treatment today but patient in significant shortness of breath EMS brought him in. Patient with diminished breath sounds received Combivent treatment. Did report some improvement in his symptoms. On exam patient is in respiratory distress BiPAP in place, transition to CPAP. Diminished breath sounds bilaterally. Does not appear fluid overloaded but has some edema to his right arm. Which he says is he has had. He has a tunneled cath to his chest.  Is awake but speaking in short sentences with accessory muscle usage.     We will plan on chest x-ray, consideration for COPD exacerbation versus fluid overload given his history. Will check labs chest x-ray, rule out infectious etiology. Plan on continued CPAP, anticipate admission.     Lilian Friend D.O, M.P.H  Attending Emergency Medicine Physician         Lilian Friend DO  03/07/22 2704

## 2022-03-07 NOTE — ED NOTES
Xray at bedside      Neil Fay, [facility]  03/07/22 0873

## 2022-03-07 NOTE — ED NOTES
The following labs labeled with pt sticker and tubed to lab:     [] Blue     [x] Lavender   [] on ice  [x] Green/yellow  [] Green/black [] on ice  [] Yellow  [] Red  [] Pink      [] COVID-19 swab    [] Rapid  [] PCR  [] Flu swab  [] Peds Viral Panel     [] Urine Sample  [] Pelvic Cultures  [] Blood Cultures            Lauren Mo RN  03/07/22 4780

## 2022-03-07 NOTE — ED NOTES
Patient resting on cot, alert, oriented, no distress noted, rr even and non labored. Patient updated on plan of care and duration, pt denies needs, will continue to monitor, call light within reach.        Robert MannJames [facility]. [facility]  03/07/22 5037

## 2022-03-07 NOTE — ED PROVIDER NOTES
I did not see, evaluate, or staff this patient. Patient seen by Dr. Kleber Small.        Kia Gurrola MD  03/07/22 7934

## 2022-03-08 ENCOUNTER — APPOINTMENT (OUTPATIENT)
Dept: DIALYSIS | Age: 67
DRG: 640 | End: 2022-03-08
Payer: COMMERCIAL

## 2022-03-08 VITALS
WEIGHT: 107.36 LBS | DIASTOLIC BLOOD PRESSURE: 56 MMHG | BODY MASS INDEX: 17.33 KG/M2 | SYSTOLIC BLOOD PRESSURE: 147 MMHG | HEART RATE: 60 BPM | RESPIRATION RATE: 18 BRPM | OXYGEN SATURATION: 94 % | TEMPERATURE: 99.9 F

## 2022-03-08 PROBLEM — Z99.2 ESRD ON DIALYSIS (HCC): Status: ACTIVE | Noted: 2021-01-08

## 2022-03-08 PROBLEM — D63.8 ANEMIA OF CHRONIC DISEASE: Status: ACTIVE | Noted: 2020-05-17

## 2022-03-08 PROBLEM — J96.00 ACUTE RESPIRATORY FAILURE (HCC): Status: ACTIVE | Noted: 2021-05-19

## 2022-03-08 LAB
ANION GAP SERPL CALCULATED.3IONS-SCNC: 12 MMOL/L (ref 9–17)
BUN BLDV-MCNC: 43 MG/DL (ref 8–23)
CALCIUM SERPL-MCNC: 8.2 MG/DL (ref 8.6–10.4)
CHLORIDE BLD-SCNC: 97 MMOL/L (ref 98–107)
CHP ED QC CHECK: YES
CHP ED QC CHECK: YES
CO2: 24 MMOL/L (ref 20–31)
CREAT SERPL-MCNC: 3.04 MG/DL (ref 0.7–1.2)
EKG ATRIAL RATE: 47 BPM
EKG P AXIS: 79 DEGREES
EKG P-R INTERVAL: 122 MS
EKG Q-T INTERVAL: 446 MS
EKG QRS DURATION: 98 MS
EKG QTC CALCULATION (BAZETT): 394 MS
EKG R AXIS: 102 DEGREES
EKG T AXIS: 127 DEGREES
EKG VENTRICULAR RATE: 47 BPM
GFR AFRICAN AMERICAN: 25 ML/MIN
GFR NON-AFRICAN AMERICAN: 21 ML/MIN
GFR SERPL CREATININE-BSD FRML MDRD: ABNORMAL ML/MIN/{1.73_M2}
GLUCOSE BLD-MCNC: 320 MG/DL
GLUCOSE BLD-MCNC: 320 MG/DL (ref 75–110)
GLUCOSE BLD-MCNC: 340 MG/DL (ref 75–110)
GLUCOSE BLD-MCNC: 387 MG/DL (ref 70–99)
GLUCOSE BLD-MCNC: 418 MG/DL
GLUCOSE BLD-MCNC: 440 MG/DL (ref 75–110)
INR BLD: 0.9
POTASSIUM SERPL-SCNC: 3.8 MMOL/L (ref 3.7–5.3)
PROTHROMBIN TIME: 9.9 SEC (ref 9.1–12.3)
SODIUM BLD-SCNC: 133 MMOL/L (ref 135–144)

## 2022-03-08 PROCEDURE — 90935 HEMODIALYSIS ONE EVALUATION: CPT | Performed by: INTERNAL MEDICINE

## 2022-03-08 PROCEDURE — 82947 ASSAY GLUCOSE BLOOD QUANT: CPT

## 2022-03-08 PROCEDURE — 94761 N-INVAS EAR/PLS OXIMETRY MLT: CPT

## 2022-03-08 PROCEDURE — 94640 AIRWAY INHALATION TREATMENT: CPT

## 2022-03-08 PROCEDURE — G0378 HOSPITAL OBSERVATION PER HR: HCPCS

## 2022-03-08 PROCEDURE — 82948 REAGENT STRIP/BLOOD GLUCOSE: CPT

## 2022-03-08 PROCEDURE — 6360000002 HC RX W HCPCS: Performed by: INTERNAL MEDICINE

## 2022-03-08 PROCEDURE — 6360000002 HC RX W HCPCS: Performed by: STUDENT IN AN ORGANIZED HEALTH CARE EDUCATION/TRAINING PROGRAM

## 2022-03-08 PROCEDURE — 99222 1ST HOSP IP/OBS MODERATE 55: CPT | Performed by: INTERNAL MEDICINE

## 2022-03-08 PROCEDURE — 99222 1ST HOSP IP/OBS MODERATE 55: CPT | Performed by: FAMILY MEDICINE

## 2022-03-08 PROCEDURE — 6370000000 HC RX 637 (ALT 250 FOR IP): Performed by: NURSE PRACTITIONER

## 2022-03-08 PROCEDURE — 80048 BASIC METABOLIC PNL TOTAL CA: CPT

## 2022-03-08 PROCEDURE — 96366 THER/PROPH/DIAG IV INF ADDON: CPT

## 2022-03-08 PROCEDURE — 2580000003 HC RX 258: Performed by: NURSE PRACTITIONER

## 2022-03-08 PROCEDURE — 2700000000 HC OXYGEN THERAPY PER DAY

## 2022-03-08 PROCEDURE — 90935 HEMODIALYSIS ONE EVALUATION: CPT

## 2022-03-08 PROCEDURE — 85610 PROTHROMBIN TIME: CPT

## 2022-03-08 RX ORDER — ONDANSETRON 4 MG/1
4 TABLET, ORALLY DISINTEGRATING ORAL EVERY 8 HOURS PRN
Status: DISCONTINUED | OUTPATIENT
Start: 2022-03-08 | End: 2022-03-08 | Stop reason: HOSPADM

## 2022-03-08 RX ORDER — ONDANSETRON 2 MG/ML
4 INJECTION INTRAMUSCULAR; INTRAVENOUS EVERY 6 HOURS PRN
Status: DISCONTINUED | OUTPATIENT
Start: 2022-03-08 | End: 2022-03-08 | Stop reason: HOSPADM

## 2022-03-08 RX ORDER — ASPIRIN 81 MG/1
81 TABLET ORAL DAILY
Status: DISCONTINUED | OUTPATIENT
Start: 2022-03-08 | End: 2022-03-08 | Stop reason: HOSPADM

## 2022-03-08 RX ORDER — BUMETANIDE 1 MG/1
1 TABLET ORAL DAILY
Status: DISCONTINUED | OUTPATIENT
Start: 2022-03-08 | End: 2022-03-08 | Stop reason: HOSPADM

## 2022-03-08 RX ORDER — CLOPIDOGREL BISULFATE 75 MG/1
75 TABLET ORAL DAILY
Status: DISCONTINUED | OUTPATIENT
Start: 2022-03-08 | End: 2022-03-08 | Stop reason: HOSPADM

## 2022-03-08 RX ORDER — CLONIDINE HYDROCHLORIDE 0.1 MG/1
0.3 TABLET ORAL 3 TIMES DAILY
Status: DISCONTINUED | OUTPATIENT
Start: 2022-03-08 | End: 2022-03-08 | Stop reason: HOSPADM

## 2022-03-08 RX ORDER — QUETIAPINE FUMARATE 100 MG/1
100 TABLET, FILM COATED ORAL NIGHTLY
Status: DISCONTINUED | OUTPATIENT
Start: 2022-03-08 | End: 2022-03-08 | Stop reason: HOSPADM

## 2022-03-08 RX ORDER — DEXTROSE MONOHYDRATE 25 G/50ML
12.5 INJECTION, SOLUTION INTRAVENOUS PRN
Status: DISCONTINUED | OUTPATIENT
Start: 2022-03-08 | End: 2022-03-08 | Stop reason: HOSPADM

## 2022-03-08 RX ORDER — POLYETHYLENE GLYCOL 3350 17 G/17G
17 POWDER, FOR SOLUTION ORAL DAILY PRN
Status: DISCONTINUED | OUTPATIENT
Start: 2022-03-08 | End: 2022-03-08 | Stop reason: HOSPADM

## 2022-03-08 RX ORDER — SODIUM CHLORIDE 0.9 % (FLUSH) 0.9 %
10 SYRINGE (ML) INJECTION PRN
Status: DISCONTINUED | OUTPATIENT
Start: 2022-03-08 | End: 2022-03-08 | Stop reason: HOSPADM

## 2022-03-08 RX ORDER — NIFEDIPINE 60 MG/1
60 TABLET, FILM COATED, EXTENDED RELEASE ORAL DAILY
Status: DISCONTINUED | OUTPATIENT
Start: 2022-03-08 | End: 2022-03-08 | Stop reason: HOSPADM

## 2022-03-08 RX ORDER — MONTELUKAST SODIUM 10 MG/1
10 TABLET ORAL NIGHTLY
Status: DISCONTINUED | OUTPATIENT
Start: 2022-03-08 | End: 2022-03-08 | Stop reason: HOSPADM

## 2022-03-08 RX ORDER — LOSARTAN POTASSIUM 50 MG/1
50 TABLET ORAL DAILY
Status: DISCONTINUED | OUTPATIENT
Start: 2022-03-08 | End: 2022-03-08 | Stop reason: HOSPADM

## 2022-03-08 RX ORDER — GABAPENTIN 100 MG/1
200 CAPSULE ORAL 3 TIMES DAILY
Status: DISCONTINUED | OUTPATIENT
Start: 2022-03-08 | End: 2022-03-08 | Stop reason: HOSPADM

## 2022-03-08 RX ORDER — NITROGLYCERIN 20 MG/100ML
5-200 INJECTION INTRAVENOUS CONTINUOUS
Status: DISCONTINUED | OUTPATIENT
Start: 2022-03-08 | End: 2022-03-08

## 2022-03-08 RX ORDER — HEPARIN SODIUM 5000 [USP'U]/ML
5000 INJECTION, SOLUTION INTRAVENOUS; SUBCUTANEOUS EVERY 8 HOURS SCHEDULED
Status: DISCONTINUED | OUTPATIENT
Start: 2022-03-08 | End: 2022-03-08 | Stop reason: HOSPADM

## 2022-03-08 RX ORDER — SODIUM CHLORIDE 0.9 % (FLUSH) 0.9 %
5-40 SYRINGE (ML) INJECTION EVERY 12 HOURS SCHEDULED
Status: DISCONTINUED | OUTPATIENT
Start: 2022-03-08 | End: 2022-03-08 | Stop reason: HOSPADM

## 2022-03-08 RX ORDER — ACETAMINOPHEN 325 MG/1
650 TABLET ORAL EVERY 6 HOURS PRN
Status: DISCONTINUED | OUTPATIENT
Start: 2022-03-08 | End: 2022-03-08 | Stop reason: HOSPADM

## 2022-03-08 RX ORDER — PRAZOSIN HYDROCHLORIDE 5 MG/1
10 CAPSULE ORAL 2 TIMES DAILY
Status: DISCONTINUED | OUTPATIENT
Start: 2022-03-08 | End: 2022-03-08 | Stop reason: HOSPADM

## 2022-03-08 RX ORDER — INSULIN GLARGINE 100 [IU]/ML
10 INJECTION, SOLUTION SUBCUTANEOUS NIGHTLY
Status: DISCONTINUED | OUTPATIENT
Start: 2022-03-08 | End: 2022-03-08 | Stop reason: HOSPADM

## 2022-03-08 RX ORDER — ACETAMINOPHEN 650 MG/1
650 SUPPOSITORY RECTAL EVERY 6 HOURS PRN
Status: DISCONTINUED | OUTPATIENT
Start: 2022-03-08 | End: 2022-03-08 | Stop reason: HOSPADM

## 2022-03-08 RX ORDER — ATORVASTATIN CALCIUM 80 MG/1
80 TABLET, FILM COATED ORAL DAILY
Status: DISCONTINUED | OUTPATIENT
Start: 2022-03-08 | End: 2022-03-08 | Stop reason: HOSPADM

## 2022-03-08 RX ORDER — NICOTINE POLACRILEX 4 MG
15 LOZENGE BUCCAL PRN
Status: DISCONTINUED | OUTPATIENT
Start: 2022-03-08 | End: 2022-03-08 | Stop reason: HOSPADM

## 2022-03-08 RX ORDER — SODIUM CHLORIDE 9 MG/ML
25 INJECTION, SOLUTION INTRAVENOUS PRN
Status: DISCONTINUED | OUTPATIENT
Start: 2022-03-08 | End: 2022-03-08 | Stop reason: HOSPADM

## 2022-03-08 RX ORDER — CARVEDILOL 12.5 MG/1
25 TABLET ORAL 2 TIMES DAILY
Status: DISCONTINUED | OUTPATIENT
Start: 2022-03-08 | End: 2022-03-08 | Stop reason: HOSPADM

## 2022-03-08 RX ORDER — NIFEDIPINE 90 MG/1
90 TABLET, EXTENDED RELEASE ORAL DAILY
Status: DISCONTINUED | OUTPATIENT
Start: 2022-03-08 | End: 2022-03-08

## 2022-03-08 RX ORDER — HYDRALAZINE HYDROCHLORIDE 50 MG/1
100 TABLET, FILM COATED ORAL 3 TIMES DAILY
Status: DISCONTINUED | OUTPATIENT
Start: 2022-03-08 | End: 2022-03-08 | Stop reason: HOSPADM

## 2022-03-08 RX ORDER — DEXTROSE MONOHYDRATE 50 MG/ML
100 INJECTION, SOLUTION INTRAVENOUS PRN
Status: DISCONTINUED | OUTPATIENT
Start: 2022-03-08 | End: 2022-03-08 | Stop reason: HOSPADM

## 2022-03-08 RX ADMIN — HEPARIN SODIUM 1700 UNITS: 1000 INJECTION INTRAVENOUS; SUBCUTANEOUS at 13:00

## 2022-03-08 RX ADMIN — CLOPIDOGREL BISULFATE 75 MG: 75 TABLET ORAL at 14:23

## 2022-03-08 RX ADMIN — ALBUTEROL SULFATE 10 MG: 5 SOLUTION RESPIRATORY (INHALATION) at 03:50

## 2022-03-08 RX ADMIN — ASPIRIN 81 MG: 81 TABLET, COATED ORAL at 14:23

## 2022-03-08 RX ADMIN — LOSARTAN POTASSIUM 50 MG: 50 TABLET, FILM COATED ORAL at 13:08

## 2022-03-08 RX ADMIN — CLONIDINE HYDROCHLORIDE 0.3 MG: 0.1 TABLET ORAL at 14:23

## 2022-03-08 RX ADMIN — INSULIN LISPRO 3 UNITS: 100 INJECTION, SOLUTION INTRAVENOUS; SUBCUTANEOUS at 04:28

## 2022-03-08 RX ADMIN — GABAPENTIN 200 MG: 100 CAPSULE ORAL at 14:23

## 2022-03-08 RX ADMIN — BUMETANIDE 1 MG: 1 TABLET ORAL at 14:23

## 2022-03-08 RX ADMIN — IPRATROPIUM BROMIDE 0.5 MG: 0.5 SOLUTION RESPIRATORY (INHALATION) at 03:50

## 2022-03-08 RX ADMIN — HYDRALAZINE HYDROCHLORIDE 100 MG: 50 TABLET, FILM COATED ORAL at 11:00

## 2022-03-08 RX ADMIN — INSULIN LISPRO 6 UNITS: 100 INJECTION, SOLUTION INTRAVENOUS; SUBCUTANEOUS at 14:38

## 2022-03-08 RX ADMIN — INSULIN LISPRO 12 UNITS: 100 INJECTION, SOLUTION INTRAVENOUS; SUBCUTANEOUS at 16:00

## 2022-03-08 RX ADMIN — ATORVASTATIN CALCIUM 80 MG: 80 TABLET, FILM COATED ORAL at 14:23

## 2022-03-08 RX ADMIN — MAGNESIUM GLUCONATE 500 MG ORAL TABLET 400 MG: 500 TABLET ORAL at 14:23

## 2022-03-08 RX ADMIN — SODIUM CHLORIDE, PRESERVATIVE FREE 10 ML: 5 INJECTION INTRAVENOUS at 14:27

## 2022-03-08 RX ADMIN — HEPARIN SODIUM 1800 UNITS: 1000 INJECTION INTRAVENOUS; SUBCUTANEOUS at 13:01

## 2022-03-08 RX ADMIN — PRAZOSIN HYDROCHLORIDE 10 MG: 5 CAPSULE ORAL at 14:24

## 2022-03-08 RX ADMIN — CLONIDINE HYDROCHLORIDE 0.3 MG: 0.1 TABLET ORAL at 11:00

## 2022-03-08 RX ADMIN — CARVEDILOL 25 MG: 12.5 TABLET, FILM COATED ORAL at 13:08

## 2022-03-08 RX ADMIN — PRAZOSIN HYDROCHLORIDE 10 MG: 5 CAPSULE ORAL at 03:32

## 2022-03-08 RX ADMIN — CARVEDILOL 25 MG: 12.5 TABLET, FILM COATED ORAL at 03:32

## 2022-03-08 ASSESSMENT — ENCOUNTER SYMPTOMS
SORE THROAT: 0
SHORTNESS OF BREATH: 1
RHINORRHEA: 0
BACK PAIN: 0
CHOKING: 0
SINUS PRESSURE: 0
CHEST TIGHTNESS: 0
NAUSEA: 0
CONSTIPATION: 0
ABDOMINAL PAIN: 0
VOMITING: 0
COUGH: 0
DIARRHEA: 0
WHEEZING: 0
VOICE CHANGE: 0

## 2022-03-08 ASSESSMENT — PAIN SCALES - GENERAL
PAINLEVEL_OUTOF10: 0
PAINLEVEL_OUTOF10: 0

## 2022-03-08 NOTE — PROGRESS NOTES
10:00am To titrate to discontinue Nitroglycerin drip per Dr. Rausch Washington & to receive oral blood pressure medications instead. Will continue to monitor. Hemo Charge updated.

## 2022-03-08 NOTE — PROGRESS NOTES
Dialysis Post Treatment Note  Vitals:    03/08/22 1300   BP: (!) 180/60   Pulse: 65   Resp: 17   Temp: 99.1 °F (37.3 °C)   SpO2: 100%     Pre-Weight = 51.8 kg  Post-weight = Weight: 107 lb 5.8 oz (48.7 kg)  Total Liters Processed = Total Liters Processed (l/min): 80 l/min  Rinseback Volume (mL) = Rinseback Volume (ml): 300 ml  Net Removal (mL) = NET Removed (ml): 3000 ml  Patient's dry weight=   Type of access used= Perm Cath  Length of treatment= 3.5 hours    Pt tolerated HD tx, nitro gtt titrated then discontinued, bp meds given, post tx @ 13:15 bp 167/67, P69, pt denied pain

## 2022-03-08 NOTE — PROGRESS NOTES
Dialysis Time Out  To be done by RN and tech or 2 RNs  Staff Names Erika Calle RN and Earle Mcdaniel RN  [x]  Identity of the patient using 2 patient identifiers  [x]  Consent for treatment  [x]  Equipment-proper machine and dialyzer  [x]  B-Hep B status  [x]  Orders- to include bath, blood flow, dialyzer, time and fluid removal  [x]  Access-Correct site and in working order  [x]  Time for patient to ask questions.

## 2022-03-08 NOTE — CONSULTS
Renal Consult Note    Patient :  Jemima Bhandari; 78 y.o. MRN# [id number]  Location:  27/27  Attending:  Junior Hardwick MD  Admit Date:  3/7/2022   Hospital Day: 1    Reason for Consult:     Asked by Dr Junior Hardwick MD to see for BINU/Elevated Creatinine. History Obtained From:     patient, electronic medical record    HD Access:     previous [facility]    HD Unit:      [facility]    Nephrologist:     Dr. Catrachita Stover    Nephrologist:     54 kg    History of Present Illness:     Jemima Bhandari; 78 y.o. male with past medical history of ESRD on HD TTS followed by Dr. Catrachita Stover at [facility], HTN, T2DM, COPD, and CVA presented to the hospital with the chief complaint of shortness of breath over the past week, significantly worsening over the past 2 days. He had his dialysis catheter replaced at [facility] on 2/24/2022 due to it being fractured. This caused him to miss a couple of dialysis appointments. His last dialysis treatment was on Tuesday, 3/1/22. He went to receive dialysis on Saturday but reports that his blood glucose was too low so he was unable to receive dialysis. He was scheduled for a make-up dialysis appointment yesterday but ended up calling EMS due to the worsening shortness of breath. When EMS arrived, they placed him on BIPAP and transported him to [address] [place] [address]. Patient states that he started feeling improvement with the BIPAP. He also states that he had some chest pain, lower extremity edema, and has a chronic cough. The cough has not been worsening and he does not have increased sputum production. Denies fever. Initial labs in the ED showed Na 132, K 4.4, BUN 71, and Cr 4.08. Pro-BNP [id number]. Hgb 9.8. CXR showed mild pulmonary vascular congestion. He was noted to be hypertensive up to 172/71 and was started on a Nitro drip. He received dialysis last night with a run time of 120 mins and 3L removed. His creatinine this morning was 3.04.  Patient states that he is feeling Years: 35.00     Pack years: 17.50     Types: Cigarettes     Quit date: 2014     Years since quittin.6    Smokeless tobacco: Never Used   Vaping Use    Vaping Use: Never used   Substance and Sexual Activity    Alcohol use: No     Alcohol/week: 0.0 standard drinks    Drug use: No    Sexual activity: Yes     Partners: Female   Other Topics Concern    Not on file   Social History Narrative    Not on file     Social Determinants of Health     Financial Resource Strain:     Difficulty of Paying Living Expenses: Not on file   Food Insecurity:     Worried About Running Out of Food in the Last Year: Not on file    Warren of Food in the Last Year: Not on file   Transportation Needs:     Lack of Transportation (Medical): Not on file    Lack of Transportation (Non-Medical): Not on file   Physical Activity:     Days of Exercise per Week: Not on file    Minutes of Exercise per Session: Not on file   Stress:     Feeling of Stress : Not on file   Social Connections:     Frequency of Communication with Friends and Family: Not on file    Frequency of Social Gatherings with Friends and Family: Not on file    Attends Sabianist Services: Not on file    Active Member of [address] [place] [address] [employer] or Organizations: Not on file    Attends Club or Organization Meetings: Not on file    Marital Status: Not on file   Intimate Partner Violence:     Fear of Current or Ex-Partner: Not on file    Emotionally Abused: Not on file    Physically Abused: Not on file    Sexually Abused: Not on file   Housing Stability:     Unable to Pay for Housing in the Last Year: Not on file    Number of [place] in the Last Year: Not on file    Unstable Housing in the Last Year: Not on file       Family History:        Family History   Problem Relation Age of Onset    Cancer Mother     Heart Disease Father        Outpatient Medications:     Not in a hospital admission.     Current Medications:     Scheduled Meds:    aspirin  81 mg Oral Daily    atorvastatin  80 mg Oral Daily    bumetanide  1 mg Oral Daily    carvedilol  25 mg Oral BID    cloNIDine  0.3 mg Oral TID    clopidogrel  75 mg Oral Daily    gabapentin  200 mg Oral TID    hydrALAZINE  100 mg Oral TID    insulin glargine  10 Units SubCUTAneous Nightly    losartan  50 mg Oral Daily    magnesium oxide  400 mg Oral Daily    montelukast  10 mg Oral Nightly    NIFEdipine  90 mg Oral Daily    prazosin  10 mg Oral BID    QUEtiapine  100 mg Oral Nightly    sodium chloride flush  5-40 mL IntraVENous 2 times per day    heparin (porcine)  5,000 Units SubCUTAneous 3 times per day    insulin lispro  0-12 Units SubCUTAneous TID WC    insulin lispro  0-6 Units SubCUTAneous Nightly     Continuous Infusions:    sodium chloride      dextrose      nitroGLYCERIN 15 mcg/min (22 0340)     PRN Meds:  sodium chloride flush, sodium chloride, ondansetron **OR** ondansetron, polyethylene glycol, acetaminophen **OR** acetaminophen, glucose, dextrose, glucagon (rDNA), dextrose, albuterol, ipratropium, heparin (porcine), heparin (porcine)    Review of Systems:     Constitutional: No fever, no chills, no lethargy, no weakness. HEENT:  No headache, otalgia, itchy eyes, nasal discharge or sore throat. Cardiac:  + chest pain, + dyspnea. No orthopnea or PND. Chest:   No cough, phlegm or wheezing. Abdomen:  No abdominal pain, nausea or vomiting. Neuro:  No focal weakness, abnormal movements orseizure like activity. Skin:   No rashes, no itching. :   No hematuria, no pyuria, no dysuria, no flank pain. Extremities:  + lower extremity swelling. No joint pains. ROS was otherwise negative except as mentioned in the [address] Input/Output:     I/O last 3 completed shifts:   In: 300   Out: 3300     Vital Signs:   Temperature:  Temp: 98.2 °F (36.8 °C)  TMax:   Temp (24hrs), Av.2 °F (36.8 °C), Min:98.2 °F (36.8 °C), Max:98.3 °F (36.8 °C)    Respirations:  Resp: 17  Pulse:   Pulse: 61  BP: BP: (!) 172/60  BP Range: Systolic (29BTQ), MMP:583 , Min:133 , KPB:046       Diastolic (49LZX), QLH:38, Min:54, Max:157      Physical Examination:     General:  AAO x 3, speaking in full sentences, no accessory muscle use. HEENT: Atraumatic, normocephalic, no throat congestion, moist mucosa. Eyes:   Pupils equal, round and reactive to light, EOMI. Neck:   Supple  Chest:   Bilateral vesicular breath sounds, no rales or wheezes. Cardiac:  S1 S2 RR, no murmurs, gallops or rubs. Abdomen: Soft, non-tender, no masses or organomegaly, BS audible. :   No suprapubic or flank tenderness. Neuro:  AAO x 3, No FND. SKIN:  No rashes, good skin turgor. Extremities:  + bilateral pedal edema.     Labs:       Recent Labs     03/07/22  1615   WBC 8.4   RBC 3.13*   HGB 9.8*   HCT 29.9*   MCV 95.5   MCH 31.3   MCHC 32.8   RDW 15.3*      MPV 10.5      BMP:   Recent Labs     03/07/22  1614 03/07/22  1615 03/08/22  0339 03/08/22  0521   NA  --  132*  --  133*   K  --  4.4  --  3.8   CL  --  96*  --  97*   CO2  --  21  --  24   BUN  --  71*  --  43*   CREATININE 4.83* 4.08*  --  3.04*   GLUCOSE  --  120* 320 387*   CALCIUM  --  9.2  --  8.2*     BNP:      Lab Results   Component Value Date    BNP 84 11/27/2013     PTH:     Lab Results   Component Value Date    IPTH 225.4 05/10/2021       Urinalysis/Chemistries:      Lab Results   Component Value Date    NITRU NEGATIVE 05/03/2021    COLORU YELLOW 05/03/2021    PHUR 6.0 05/03/2021    WBCUA 2 TO 5 05/03/2021    RBCUA 0 TO 2 05/03/2021    MUCUS NOT REPORTED 05/03/2021    TRICHOMONAS NOT REPORTED 05/03/2021    YEAST NOT REPORTED 05/03/2021    BACTERIA NOT REPORTED 05/03/2021    CLARITYU Clear 09/12/2014    SPECGRAV 1.016 05/03/2021    LEUKOCYTESUR NEGATIVE 05/03/2021    UROBILINOGEN Normal 05/03/2021    BILIRUBINUR NEGATIVE 05/03/2021    BILIRUBINUR NEGATIVE 11/12/2011    BLOODU Negative 09/12/2014    GLUCOSEU 1+ 05/03/2021    GLUCOSEU 3+ 11/12/2011    KETUA NEGATIVE 05/03/2021    AMORPHOUS NOT REPORTED 05/03/2021       Radiology:     XR CHEST PORTABLE    Result Date: 3/7/2022  EXAMINATION: ONE XRAY VIEW OF THE CHEST 3/7/2022 1:49 pm COMPARISON: 11/02/2021 HISTORY: ORDERING SYSTEM PROVIDED HISTORY: SOB fluid overload TECHNOLOGIST PROVIDED HISTORY: SOB fluid overload FINDINGS: There is a rightcentral venous catheter with the tip in the right atrium. Cardiac size is borderline enlarged. No acute infiltrates are seen . The pulmonary vascularity is hazy and indistinct. No pneumothorax. No pleural effusions identified. No acute bony abnormalities. Mild pulmonary vascular congestion      Assessment:     1. ESRD on Hemodialysis. His regular HD days are TTS at [facility] using tunnel catheter under Dr. Cathy Scanlon. Dry weight 54 kg. Admitted with fluid overload secondary to missed dialysis appointments. 2. Acute respiratory failure requiring Bipap, improved. No longer requiring bipap. 3. Essential hypertension  4. Type 2 diabetes mellitus requiring insulin  5. History of COPD  6. Peripheral vascular disease  7. H/o ischemic left CVA  8. Missed dialysis. 9.  Fluid overload. 10.  Hyponatremia. 11.  Anemia of chronic disease. 12.  Hypertensive urgency. Plan:   1. Patient was seen and examined on HD at bedside. Orders were confirmed with the HD nurse. 2. Strict Input and Output, Daily weights and document in the chart. 3. Low Potassium, Low phosphorus and low salt diet. Fluids to be restricted to 1500ml/day. 4. Nitro drip management per primary team.  5.  Patient was counseled about being more regular with the dialysis treatment. 6.  Agree with restarting home blood pressure medications. Blood pressure should also improve some with fluid removal.  7. BMP in the AM.  8. Will follow. Nutrition   Please ensure that patient is on a renal diet/TF. Thank you for the consultation.  Please do not hesitate to contact us for any further questions/concerns. We will continue to follow along with you. Sharon Briggs, MS4    Attending Physician Statement  I have discussed the care of Leigh Ann Rapp, including pertinent history and exam findings, with the Resident/CNP/medical student. I also saw and examined the patient myself. I have reviewed all the key elements of all parts of the encounter and edited all that was required. Alexis Norris MD   Nephrology [address] [place] [address] Drive    This note is created with the assistance of a speech-recognition program. While intending to generate a document that actually reflects the content of the visit, no guarantees can be provided that every mistake has been identified and corrected by editing.

## 2022-03-08 NOTE — ED NOTES
gave verbal orders to re-start the nitro drip at 10mcg/min     [facility]  03/07/22 8042

## 2022-03-08 NOTE — CARE COORDINATION
Case Management Initial Discharge Plan  Mp Chin,             Met with:patient to discuss discharge plans. Information verified: address, contacts, phone number, , insurance Yes  Insurance Provider: [employer] [facility]    Emergency Contact/Next of Kin name & number: Leia Troncoso at [phone]  Who are involved in patient's support system? meek    PCP: Gardenia Boucher  Date of last visit: last week      Discharge Planning    Living Arrangements:  Spouse/Significant Other     Home has 2 stories  4 stairs to climb to get into front door, floor  Location of bedroom/bathroom in home main    Patient able to perform ADL's:Assisted    Current Services (outpatient & in home) HD w/UD Renal  DME equipment: Glucometer, nebulizer  DME provider: unknown    Is patient receiving oral anticoagulation therapy? Yes    If indicated:   Physician managing anticoagulation treatment: [place]  Where does patient obtain lab work for ATC treatment? US Renal      Potential Assistance Needed:  Other (Comment),N/A ([employer])    Patient agreeable to home care: No  [place] of choice provided:  no    Prior SNF/Rehab Placement and Facility: none  Agreeable to SNF/Rehab: No  [place] of choice provided: no     Evaluation: no    Expected Discharge date:  22    Patient expects to be discharged to: If home: is the family and/or caregiver wiling & able to provide support at home? yes  Who will be providing this support? Meek    Follow Up Appointment: Best Day/ Time:      Transportation provider: [employer], he will call: [phone]  Transportation arrangements needed for discharge: No    Readmission Risk              Risk of Unplanned Readmission:  54             Does patient have a readmission risk score greater than 14?: Yes  If yes, follow-up appointment must be made within 7 days of discharge.      Goals of Care:       Educated pt on transitional options, provided freedom of choice and are agreeable with plan      [facility] He has to call [employer] for transport. He has number. HD [facility] of [place] T/TH/Sat  Given IMM letter, voiced understanding and accepts discharge less than 4 hrs from time letter given.          Electronically signed by Allie Hardy RN on 3/8/22 at 4:22 PM EST

## 2022-03-08 NOTE — PROGRESS NOTES
Dialysis Post Treatment Note  Vitals:    03/07/22 2156   BP: (!) 171/157   Pulse: 61   Resp: 25   Temp: 98.2 °F (36.8 °C)   SpO2: 98%     Post-weight = Weight:  (on stretcher )  Total Liters Processed = Total Liters Processed (l/min): 46.3 l/min  Rinseback Volume (mL) = Rinseback Volume (ml): 300 ml  Net Removal (mL) = NET Removed (ml): 3000 ml  Patient's dry weight= unknown   Type of access used=R IJ  Length of treatment=120   Pt tolerated tx without problem. Felt as if hands might start to cramp post tx for brief time.

## 2022-03-08 NOTE — CARE COORDINATION
Met with pt after receiving a social work consult to assist with transportation. Pt states that he has [employer]  [employer] transport and they are often late. He states that the cab service is set up through [address]. Pt states that he will call and is told that there could be a 2 hour wait. Pt doesn't want to change to a new   Company as he knows the problem will likely be worse. Pt is working with the  at [address] [place] [address] [address] Renal also on transportation.

## 2022-03-08 NOTE — ED NOTES
Patient resting on cot, alert, oriented, no distress noted, rr even and non labored. Patient updated on plan of care and duration, pt denies needs, will continue to monitor, call light within reach.        [facility]  03/08/22 7485

## 2022-03-08 NOTE — ED PROVIDER NOTES
[facility] ED  Emergency Department Encounter  Emergency Medicine Resident     Pt Anurag Garcia  MRN: [id number]  Mahsagffan 1955  Date of evaluation: 3/8/22  PCP:  Liana Boothe 5       Chief Complaint   Patient presents with    Shortness of Breath       HISTORY OF PRESENT ILLNESS  (Location/Symptom, Timing/Onset, Context/Setting, Quality, Duration, Modifying Factors, Severity.)      Obdulia Vargas is a 78 y.o. male who presents with 1 week progressively worsening shortness of breath. Patient with a history of end-stage renal disease on dialysis and is having fluid overload. Patient was post to have an extra session of dialysis today, is typically Tuesday Thursday Saturday was supposed to go today on Monday but was unable to do so due to his symptoms. Called EMS for respiratory distress. Patient took 1 nitro with improvement in symptoms, EMS placed patient on BiPAP which also improved patient's symptoms. Patient without fevers chills headaches or vision changes, there was some chest pain with shortness of breath but now resolved now is getting more oxygen. No abdominal pain constipation diarrhea or dysuria. Only notes peripheral edema in addition to worsening shortness of breath. PAST MEDICAL / SURGICAL / SOCIAL / FAMILY HISTORY      has a past medical history of Asthma, CAD in native artery, nonobstructive, Carotid stenosis, left, Carpal tunnel syndrome, Cerebrovascular disease, Chronic kidney disease, COPD (chronic obstructive pulmonary disease) ([address] Diabetes mellitus ([facility] [address] History of colon polyps, History of weakness of extremity, HTN (hypertension), Hyperlipidemia, Lung, cysts, congenital, PTSD (post-traumatic stress disorder), PTSD (post-traumatic stress disorder), TIA (transient ischemic attack), and Type II or unspecified type diabetes mellitus without mention of complication, not stated as uncontrolled.      has a past surgical history that includes hernia repair; Tonsillectomy; Colonoscopy; Carotid endarterectomy (Left, -); vascular surgery (Left, ); pr colsc flx w/rmvl of tumor polyp lesion snare tq (N/A, 2017); and IR TUNNELED CVC PLACE WO SQ PORT/PUMP > 5 YEARS (2021). Social History     Socioeconomic History    Marital status:      Spouse name: Not on file    Number of children: Not on file    Years of education: Not on file    Highest education level: Not on file   Occupational History     Employer: N/A   Tobacco Use    Smoking status: Former Smoker     Packs/day: 0.50     Years: 35.00     Pack years: 17.50     Types: Cigarettes     Quit date: 2014     Years since quittin.6    Smokeless tobacco: Never Used   Vaping Use    Vaping Use: Never used   Substance and Sexual Activity    Alcohol use: No     Alcohol/week: 0.0 standard drinks    Drug use: No    Sexual activity: Yes     Partners: Female   Other Topics Concern    Not on file   Social History Narrative    Not on file     Social Determinants of Health     Financial Resource Strain:     Difficulty of Paying Living Expenses: Not on file   Food Insecurity:     Worried About Running Out of Food in the Last Year: Not on file    Warren of Food in the Last Year: Not on file   Transportation Needs:     Lack of Transportation (Medical): Not on file    Lack of Transportation (Non-Medical):  Not on file   Physical Activity:     Days of Exercise per Week: Not on file    Minutes of Exercise per Session: Not on file   Stress:     Feeling of Stress : Not on file   Social Connections:     Frequency of Communication with Friends and Family: Not on file    Frequency of Social Gatherings with Friends and Family: Not on file    Attends Latter-day Services: Not on file    Active Member of Clubs or Organizations: Not on file    Attends Club or Organization Meetings: Not on file    Marital Status: Not on file   Intimate Partner Violence:     Fear of Current or Ex-Partner: Not on file    Emotionally Abused: Not on file    Physically Abused: Not on file    Sexually Abused: Not on file   Housing Stability:     Unable to Pay for Housing in the Last Year: Not on file    Number of Places Lived in the Last Year: Not on file    Unstable Housing in the Last Year: Not on file       Family History   Problem Relation Age of Onset    Cancer Mother     Heart Disease Father        Allergies:  Lisinopril, Ace inhibitors, and Pcn [penicillins]    Home Medications:  Prior to Admission medications    Medication Sig Start Date End Date Taking? Authorizing Provider   magnesium oxide (MAG-OX) 400 MG tablet Take 400 mg by mouth daily    Historical Provider, MD   ipratropium-albuterol (DUONEB) 0.5-2.5 (3) MG/3ML SOLN nebulizer solution Inhale 3 mLs into the lungs 4 times daily 11/17/21 3/17/22  Brooklyn Goel MD   albuterol-ipratropium (COMBIVENT RESPIMAT)  MCG/ACT AERS inhaler Inhale 1 puff into the lungs every 6 hours 11/17/21 12/17/21  Brooklyn Goel MD   bumetanide (BUMEX) 1 MG tablet Take 1 tablet by mouth daily 10/19/21   Historical Provider, MD   NIFEdipine (ADALAT CC) 90 MG extended release tablet Take 1 tablet by mouth daily 10/19/21   Historical Provider, MD   aspirin 81 MG EC tablet Take 81 mg by mouth daily 7/28/21   Historical Provider, MD   atorvastatin (LIPITOR) 80 MG tablet Take 1 tablet by mouth daily 10/19/21   Historical Provider, MD   carvedilol (COREG) 25 MG tablet Take 1 tablet by mouth 2 times daily 10/19/21   Historical Provider, MD   cloNIDine (CATAPRES) 0.1 MG tablet Take 0.3 mg by mouth three times daily  8/22/21   Historical Provider, MD   clopidogrel (PLAVIX) 75 MG tablet Take 1 tablet by mouth daily 8/22/21   Historical Provider, MD   gabapentin (NEURONTIN) 100 MG capsule Take 200 mg by mouth 3 times daily.  10/5/21   Historical Provider, MD   hydrALAZINE (APRESOLINE) 100 MG tablet Take 1 tablet by mouth 3 times daily 8/24/21   Historical Provider, MD   losartan (COZAAR) 50 MG tablet Take 1 tablet by mouth daily 8/22/21   Historical Provider, MD   montelukast (SINGULAIR) 10 MG tablet Take 1 tablet by mouth nightly 8/22/21   Historical Provider, MD   prazosin (MINIPRESS) 5 MG capsule Take 10 mg by mouth 2 times daily    Historical Provider, MD   blood glucose monitor strips Test 4 times a day & as needed for symptoms of irregular blood glucose. Dispense sufficient amount for indicated testing frequency plus additional to accommodate PRN testing needs. 5/26/21   TERRANCE Ceballos NP   insulin glargine [facility]) 100 UNIT/ML injection pen Inject 10 Units into the skin nightly 5/13/21   TERRANCE Ceballos NP   glucagon 1 MG injection Inject 1 mg into the muscle See Admin Instructions Follow package directions for low blood sugar. Patient not taking: Reported on 11/17/2021 4/6/21   TERRANCE Powell CNP   fluticasone Shelba Solian) 50 MCG/ACT nasal spray  2/23/21   Historical Provider, MD   insulin aspart (NOVOLOG FLEXPEN) 100 UNIT/ML injection pen Inject 5 Units into the skin 3 times daily (before meals) Sliding scale 3/16/21   TERRANCE Powell CNP   nitroGLYCERIN (NITROSTAT) 0.4 MG SL tablet USE 1 TABLET UNDER THE TONGUE UP TO MAXIMUM OF 3 TOTAL DOSES. IF NO RELIEF AFTER 1 DOSE, CALL 911. 3/5/21   TERRANCE Powell CNP   QUEtiapine (SEROQUEL) 100 MG tablet TAKE 1 TABLET BY MOUTH NIGHTLY 2/23/21   TERRANCE Powell CNP   GNP VITAMIN D MAXIMUM STRENGTH 50 MCG (2000 UT) TABS TAKE 1 TABLET BY MOUTH ONCE DAILY 2/23/21   TERRANCE Powell CNP   blood glucose monitor kit and supplies Dispense sufficient amount for indicated testing frequency plus additional to accommodate PRN testing needs. Dispense all needed supplies to include: monitor, strips, lancing device, lancets, control solutions, alcohol swabs.  1/26/21   TERRANCE Powell CNP   vitamin D (ERGOCALCIFEROL) 1.25 MG (08680 UT) CAPS capsule Take 1 capsule by mouth once a week 1/5/21   TERRANCE Topete CNP   Lancets MISC Use_4__times daily Diagnisis:250.0  Diabetes Mellitus__x__Insulin Dependent___Non-Insulin Dependent 12/6/20   Li Welsh MD   Nutritional Supplements (GLUCERNA CARBSTEADY) LIQD Take 1 Can by mouth 3 times daily 6/16/20   TERRANCE Topete CNP   COMFORT EZ PEN NEEDLES 31G X 8 MM MISC USE AS DIRECTED 4/14/20   TERRANCE Topete CNP       REVIEW OF SYSTEMS    (2-9 systems for level 4, 10 or more for level 5)      Review of Systems   Constitutional: Negative for fever. HENT: Negative for sore throat. Eyes: Negative for visual disturbance. Respiratory: Positive for shortness of breath. Negative for cough. Cardiovascular: Positive for leg swelling. Negative for chest pain. Gastrointestinal: Negative for abdominal pain, constipation, diarrhea, nausea and vomiting. Genitourinary: Negative for dysuria. Musculoskeletal: Negative for arthralgias and myalgias. Skin: Negative for wound. Neurological: Negative for weakness, light-headedness and headaches. Psychiatric/Behavioral: Negative for confusion. PHYSICAL EXAM   (up to 7 for level 4, 8 or more for level 5)      INITIAL VITALS:   BP (!) 167/59   Pulse 64   Temp 98.2 °F (36.8 °C)   Resp 21   Wt 150 lb (68 kg)   SpO2 95%   BMI 24.21 kg/m²     Physical Exam  Vitals and nursing note reviewed. Constitutional:       Appearance: Normal appearance. He is well-developed. HENT:      Head: Normocephalic and atraumatic. Right Ear: External ear normal.      Left Ear: External ear normal.      Nose: Nose normal.   Neck:      Trachea: Trachea normal. No tracheal deviation. Cardiovascular:      Rate and Rhythm: Normal rate and regular rhythm. Heart sounds: Normal heart sounds. Pulmonary:      Effort: Tachypnea and respiratory distress present. Breath sounds: Decreased breath sounds present. Abdominal:      Palpations: Abdomen is soft. Tenderness: There is no abdominal tenderness. Musculoskeletal:         General: No deformity. Normal range of motion. Cervical back: Normal range of motion. No rigidity. Right lower leg: Edema present. Left lower leg: Edema present. Comments: Upper extremity pitting edema   Skin:     General: Skin is warm and dry. Capillary Refill: Capillary refill takes less than 2 seconds. Neurological:      General: No focal deficit present. Mental Status: He is alert and oriented to person, place, and time. Psychiatric:         Mood and Affect: Mood normal.         Behavior: Behavior normal.         DIFFERENTIAL  DIAGNOSIS     PLAN (LABS / IMAGING / EKG):  Orders Placed This Encounter   Procedures    XR CHEST PORTABLE    CBC with Auto Differential    Basic Metabolic Panel w/ Reflex to MG    Troponin    Brain Natriuretic Peptide    ELECTROLYTES PLUS    Hemoglobin and hematocrit, blood    CALCIUM, IONIC (POC)    Inpatient consult to Nephrology    Inpatient consult to Hospitalist    Initiate ED RT Aerosol protocol    POC Blood Gas and Chemistry    Venous Blood Gas, POC    Creatinine W/GFR Point of Care    POCT urea (BUN)    Lactic Acid, POC    POCT Glucose    EKG 12 Lead    Place CT Compatible peripheral IV    Hemodialysis inpatient    ADMIT TO INPATIENT       MEDICATIONS ORDERED:  Orders Placed This Encounter   Medications    nitroGLYCERIN 50 mg in dextrose 5% 250 mL infusion     Order Specific Question:   Titrate Infusion? Answer:   Yes     Order Specific Question:   Initial Infusion Dose: Answer:   5 mcg/min     Order Specific Question:   Goal of Therapy is: Answer:    Other     Order Specific Question:   Other Goal:     Answer:   Improved resp rate and O2 needs     Order Specific Question:   Contact Provider if:     Answer:   SBP less than 90 mmHg    albuterol (PROVENTIL) nebulizer solution 10 mg     Order Specific Question:   Initiate RT Bronchodilator Protocol     Answer: Yes    ipratropium (ATROVENT) 0.02 % nebulizer solution 0.5 mg     Order Specific Question:   Initiate RT Bronchodilator Protocol     Answer:    Yes    heparin (porcine) injection 1,700 Units    heparin (porcine) injection 1,800 Units       DDX:     DIAGNOSTIC RESULTS / EMERGENCY DEPARTMENT COURSE / MDM     LABS:  Results for orders placed or performed during the hospital encounter of 03/07/22   CBC with Auto Differential   Result Value Ref Range    WBC 8.4 3.5 - 11.3 k/uL    RBC 3.13 (L) 4.21 - 5.77 m/uL    Hemoglobin 9.8 (L) 13.0 - 17.0 g/dL    Hematocrit 29.9 (L) 40.7 - 50.3 %    MCV 95.5 82.6 - 102.9 fL    MCH 31.3 25.2 - 33.5 pg    MCHC 32.8 28.4 - 34.8 g/dL    RDW 15.3 (H) 11.8 - 14.4 %    Platelets 623 858 - 688 k/uL    MPV 10.5 8.1 - 13.5 fL    NRBC Automated 0.0 0.0 per 100 WBC    Immature Granulocytes 0 0 %    Seg Neutrophils 92 (H) 36 - 66 %    Lymphocytes 3 (L) 24 - 44 %    Monocytes 2 1 - 7 %    Eosinophils % 3 1 - 4 %    Basophils 0 0 - 2 %    Absolute Immature Granulocyte 0.00 0.00 - 0.30 k/uL    Segs Absolute 7.73 (H) 1.8 - 7.7 k/uL    Absolute Lymph # 0.25 (L) 1.0 - 4.8 k/uL    Absolute Mono # 0.17 0.1 - 0.8 k/uL    Absolute Eos # 0.25 0.0 - 0.4 k/uL    Basophils Absolute 0.00 0.0 - 0.2 k/uL    Morphology ANISOCYTOSIS PRESENT    Basic Metabolic Panel w/ Reflex to MG   Result Value Ref Range    Glucose 120 (H) 70 - 99 mg/dL    BUN 71 (H) 8 - 23 mg/dL    CREATININE 4.08 (H) 0.70 - 1.20 mg/dL    Calcium 9.2 8.6 - 10.4 mg/dL    Sodium 132 (L) 135 - 144 mmol/L    Potassium 4.4 3.7 - 5.3 mmol/L    Chloride 96 (L) 98 - 107 mmol/L    CO2 21 20 - 31 mmol/L    Anion Gap 15 9 - 17 mmol/L    GFR Non-African American 15 (L) >60 mL/min    GFR  18 (L) >60 mL/min    GFR Comment         Troponin   Result Value Ref Range    Troponin, High Sensitivity 147 (HH) 0 - 22 ng/L   Troponin   Result Value Ref Range    Troponin, High Sensitivity 137 (HH) 0 - 22 ng/L   Brain Natriuretic Peptide   Result Value Ref Range    Pro-BNP 46,683 (H) <300 pg/mL   ELECTROLYTES PLUS   Result Value Ref Range    POC Sodium 132 (L) 138 - 146 mmol/L    POC Potassium 4.3 3.5 - 4.5 mmol/L    POC Chloride 96 (L) 98 - 107 mmol/L    POC TCO2 22 22 - 30 mmol/L    Anion Gap 15 7 - 16 mmol/L   Hemoglobin and hematocrit, blood   Result Value Ref Range    POC Hemoglobin 10.2 (L) 13.5 - 17.5 g/dL    POC Hematocrit 30 (L) 41 - 53 %   CALCIUM, IONIC (POC)   Result Value Ref Range    POC Ionized Calcium 1.27 1.15 - 1.33 mmol/L   Venous Blood Gas, POC   Result Value Ref Range    pH, Marlon 7.358 7.320 - 7.430    pCO2, Marlon 38.2 (L) 41.0 - 51.0 mm Hg    pO2, Marlon 75.2 (H) 30.0 - 50.0 mm Hg    HCO3, Venous 21.5 (L) 22.0 - 29.0 mmol/L    Negative Base Excess, Marlon 4 (H) 0.0 - 2.0    O2 Sat, Marlon 94 (H) 60.0 - 85.0 %   Creatinine W/GFR Point of Care   Result Value Ref Range    POC Creatinine 4.83 (H) 0.51 - 1.19 mg/dL    GFR Comment 15 (L) >60 mL/min    GFR Non- 12 (L) >60 mL/min    GFR Comment         POCT urea (BUN)   Result Value Ref Range    POC BUN 73 (H) 8 - 26 mg/dL   Lactic Acid, POC   Result Value Ref Range    POC Lactic Acid 1.68 (H) 0.56 - 1.39 mmol/L   POCT Glucose   Result Value Ref Range    POC Glucose 121 (H) 74 - 100 mg/dL         RADIOLOGY:  XR CHEST PORTABLE    Result Date: 3/7/2022  EXAMINATION: ONE XRAY VIEW OF THE CHEST 3/7/2022 1:49 pm COMPARISON: 11/02/2021 HISTORY: ORDERING SYSTEM PROVIDED HISTORY: SOB fluid overload TECHNOLOGIST PROVIDED HISTORY: SOB fluid overload FINDINGS: There is a rightcentral venous catheter with the tip in the right atrium. Cardiac size is borderline enlarged. No acute infiltrates are seen . The pulmonary vascularity is hazy and indistinct. No pneumothorax. No pleural effusions identified. No acute bony abnormalities.      Mild pulmonary vascular congestion       EKG  EKG Interpretation    Interpreted by me    Rhythm: normal sinus   Rate: normal  Axis: Right  Ectopy: none  Conduction: normal  ST Segments: Nonspecific  T Waves: Nonspecific  Q Waves: none    Clinical Impression: Nonspecific EKG with right axis deviation and pulmonary disease pattern    All EKG's are interpreted by the Emergency Department Physician who either signs or Co-signs this chart in the absence of a cardiologist.    EMERGENCY DEPARTMENT COURSE:  Patient met in room on arrival with EMS, tachypneic, respiratory status improving on BiPAP. Patient transferred to our BiPAP and started on a nitro drip as his home nitro had assisted him previously. Laboratory work-up as expected for end-stage renal disease on dialysis, elevated BNP and troponin however downtrending. No specific findings on EKG and patient's chest pain improved. Do not suspect ACS at this time. Discussed case with nephrology who agreed to dialyze patient. Prolonged ED course as patient was taken off of BiPAP shortly after nitro drip was started, hoped patient could be discharged after dialysis. However after dialysis patient still requiring nitro drip and supplemental O2, does not use O2 at baseline. Patient is feeling significantly improved but given the new supplemental oxygen needs and being on a nitro drip discussed case with [facility] who agreed to admit patient. Admission plan discussed with patient who is in agreement. Educated on likely hospitalization course with all questions answered to patient satisfaction. PROCEDURES:  None    CONSULTS:  IP CONSULT TO NEPHROLOGY  IP CONSULT TO HOSPITALIST    CRITICAL CARE:  None    FINAL IMPRESSION      1. Congestive heart failure, unspecified HF chronicity, unspecified heart failure type ([facility] [address]    2. Hypoxia          DISPOSITION / PLAN     DISPOSITION Admitted 03/07/2022 11:52:49 PM      PATIENT REFERRED TO:  No follow-up provider specified.     DISCHARGE MEDICATIONS:  New Prescriptions    No medications on file       Eunice Casper MD  Emergency Medicine Resident    (Please note that portions of this note were completed with a voice recognition program.  Efforts were made to edit the dictations but occasionally words are mis-transcribed.)        Marni Vinson MD  Resident  03/08/22 1495

## 2022-03-08 NOTE — ED NOTES
Patient resting on cot, currently getting dialysis, alert, oriented, no distress noted, rr even and non labored. Patient updated on plan of care and duration, pt denies needs, will continue to monitor, call light within reach.        Danni Patino, [place]  03/07/22 2032

## 2022-03-08 NOTE — PROGRESS NOTES
Dialysis Time Out  To be done by RN and tech or 2 RNs  Staff Names Agutsin Vásquez, Calos RN / Daksha Thrasher RN    [x]  Identity of the patient using 2 patient identifiers  [x]  Consent for treatment  [x]  Equipment-proper machine and dialyzer  [x]  B-Hep B status (per out pt facility)  [x]  Orders- to include bath, blood flow, dialyzer, time and fluid removal  [x]  Access-Correct site and in working order  [x]  Time for patient to ask questions.

## 2022-03-08 NOTE — ED NOTES
Patient has finished dialysis. Resting comfortably. Will continue to monitor. Call light within reach.       Shanon Bettencourt, [address]  03/07/22 [id number]

## 2022-03-08 NOTE — ED NOTES
Dialysis notified about room change to a dialysis room.       [facility] [place]  03/07/22 [id number]

## 2022-03-09 ENCOUNTER — CARE COORDINATION (OUTPATIENT)
Dept: CASE MANAGEMENT | Age: 67
End: 2022-03-09

## 2022-03-09 LAB — GLUCOSE BLD-MCNC: 418 MG/DL (ref 75–110)

## 2022-03-09 NOTE — CARE COORDINATION
Shravan [facility] Transitions Initial Follow Up Call    Call within 2 business days of discharge: Yes    Patient: Alexander Smoker Patient : 1955   MRN: [id number]  Reason for Admission: CHF  Discharge Date: 3/8/22 RARS: Readmission Risk Score: 23.2 ( )      Last Discharge [facility]       Complaint Diagnosis Description Type Department Provider    3/7/22 Shortness of Breath Congestive heart failure, unspecified HF chronicity, unspecified heart failure type ([address] . .. ED to Hosp-Admission (Discharged) (ADMITTED) Marty Herrmann 3 Dayron Buckner MD; Shira Hess. .. Attempted initial 24 hour transitional call to patient. Left VM to return call directly to [phone]. 1st attempt. Facility: [facility]    Non-face-to-face services provided:  Obtained and reviewed discharge summary and/or continuity of care documents      Follow Up  No future appointments.     Wisam Silverio RN

## 2022-03-10 ENCOUNTER — CARE COORDINATION (OUTPATIENT)
Dept: CASE MANAGEMENT | Age: 67
End: 2022-03-10

## 2022-03-10 NOTE — CARE COORDINATION
Shravan 45 Transitions Initial Follow Up Call    Call within 2 business days of discharge: Yes    Patient: Jin Shock Patient : 1955   MRN: [id number]  Reason for Admission: Fluid overload, CHF  Discharge Date: 3/8/22 RARS: Readmission Risk Score: 23.2 ( )      Last Discharge [facility]       Complaint Diagnosis Description Type Department Provider    3/7/22 Shortness of Breath Congestive heart failure, unspecified HF chronicity, unspecified heart failure type ([address] . .. ED to Hosp-Admission (Discharged) (ADMITTED) [facility] Wale Burroughs MD; Naomi Hdz. .. Attempted initial 24 hour transitional call to patient. Left VM to return call directly to [phone]. Final attempt, left message on both mobile and cell phone numbers    Facility: [facility]    Non-face-to-face services provided:  Obtained and reviewed discharge summary and/or continuity of care documents      Follow Up  No future appointments.     Sage Frederick RN

## 2022-06-02 ENCOUNTER — HOSPITAL ENCOUNTER (INPATIENT)
Age: 67
LOS: 4 days | Discharge: LEFT BEFORE DISCHARGE | DRG: 637 | End: 2022-06-06
Attending: EMERGENCY MEDICINE
Payer: COMMERCIAL

## 2022-06-02 ENCOUNTER — APPOINTMENT (OUTPATIENT)
Dept: GENERAL RADIOLOGY | Age: 67
DRG: 637 | End: 2022-06-02
Payer: COMMERCIAL

## 2022-06-02 DIAGNOSIS — J81.1 CHRONIC PULMONARY EDEMA: ICD-10-CM

## 2022-06-02 DIAGNOSIS — E87.5 HYPERKALEMIA: Primary | ICD-10-CM

## 2022-06-02 LAB
ABSOLUTE EOS #: 0 K/UL (ref 0–0.4)
ABSOLUTE IMMATURE GRANULOCYTE: 0.13 K/UL (ref 0–0.3)
ABSOLUTE LYMPH #: 1.13 K/UL (ref 1–4.8)
ABSOLUTE MONO #: 0.63 K/UL (ref 0.1–0.8)
ALBUMIN SERPL-MCNC: 4.2 G/DL (ref 3.5–5.2)
ALBUMIN/GLOBULIN RATIO: 1.8 (ref 1–2.5)
ALP BLD-CCNC: 108 U/L (ref 40–129)
ALT SERPL-CCNC: 32 U/L (ref 5–41)
ANION GAP SERPL CALCULATED.3IONS-SCNC: 23 MMOL/L (ref 9–17)
ANION GAP SERPL CALCULATED.3IONS-SCNC: 42 MMOL/L (ref 9–17)
AST SERPL-CCNC: 30 U/L
BASOPHILS # BLD: 0 % (ref 0–2)
BASOPHILS ABSOLUTE: 0 K/UL (ref 0–0.2)
BETA-HYDROXYBUTYRATE: 10.93 MMOL/L (ref 0.02–0.27)
BILIRUB SERPL-MCNC: 0.17 MG/DL (ref 0.3–1.2)
BUN BLDV-MCNC: 29 MG/DL (ref 8–23)
BUN BLDV-MCNC: 94 MG/DL (ref 8–23)
CALCIUM SERPL-MCNC: 7.7 MG/DL (ref 8.6–10.4)
CALCIUM SERPL-MCNC: 9.1 MG/DL (ref 8.6–10.4)
CARBOXYHEMOGLOBIN: 3 % (ref 0–5)
CHLORIDE BLD-SCNC: 77 MMOL/L (ref 98–107)
CHLORIDE BLD-SCNC: 84 MMOL/L (ref 98–107)
CHP ED QC CHECK: YES
CO2: 20 MMOL/L (ref 20–31)
CO2: 6 MMOL/L (ref 20–31)
CREAT SERPL-MCNC: 2.67 MG/DL (ref 0.7–1.2)
CREAT SERPL-MCNC: 6.92 MG/DL (ref 0.7–1.2)
EOSINOPHILS RELATIVE PERCENT: 0 % (ref 1–4)
ESTIMATED AVERAGE GLUCOSE: 186 MG/DL
FIO2: ABNORMAL
GFR AFRICAN AMERICAN: 10 ML/MIN
GFR AFRICAN AMERICAN: 29 ML/MIN
GFR NON-AFRICAN AMERICAN: 24 ML/MIN
GFR NON-AFRICAN AMERICAN: 8 ML/MIN
GFR SERPL CREATININE-BSD FRML MDRD: ABNORMAL ML/MIN/{1.73_M2}
GFR SERPL CREATININE-BSD FRML MDRD: ABNORMAL ML/MIN/{1.73_M2}
GLUCOSE BLD-MCNC: 235 MG/DL
GLUCOSE BLD-MCNC: 235 MG/DL (ref 75–110)
GLUCOSE BLD-MCNC: 248 MG/DL
GLUCOSE BLD-MCNC: 248 MG/DL (ref 75–110)
GLUCOSE BLD-MCNC: 249 MG/DL (ref 75–110)
GLUCOSE BLD-MCNC: 279 MG/DL (ref 75–110)
GLUCOSE BLD-MCNC: 294 MG/DL (ref 70–99)
GLUCOSE BLD-MCNC: 297 MG/DL (ref 75–110)
GLUCOSE BLD-MCNC: 333 MG/DL (ref 75–110)
GLUCOSE BLD-MCNC: 340 MG/DL (ref 75–110)
GLUCOSE BLD-MCNC: 349 MG/DL (ref 75–110)
GLUCOSE BLD-MCNC: 367 MG/DL
GLUCOSE BLD-MCNC: 367 MG/DL (ref 75–110)
GLUCOSE BLD-MCNC: 499 MG/DL (ref 75–110)
GLUCOSE BLD-MCNC: 598 MG/DL (ref 70–99)
HBA1C MFR BLD: 8.1 % (ref 4–6)
HCO3 VENOUS: 5.4 MMOL/L (ref 24–30)
HCT VFR BLD CALC: 26.7 % (ref 40.7–50.3)
HCT VFR BLD CALC: 30.4 % (ref 40.7–50.3)
HEMOGLOBIN: 8.9 G/DL (ref 13–17)
HEMOGLOBIN: 9 G/DL (ref 13–17)
IMMATURE GRANULOCYTES: 1 %
INR BLD: 3.9
LACTIC ACID, WHOLE BLOOD: 2 MMOL/L (ref 0.7–2.1)
LIPASE: 16 U/L (ref 13–60)
LYMPHOCYTES # BLD: 9 % (ref 24–44)
MCH RBC QN AUTO: 31.5 PG (ref 25.2–33.5)
MCH RBC QN AUTO: 31.6 PG (ref 25.2–33.5)
MCHC RBC AUTO-ENTMCNC: 29.3 G/DL (ref 28.4–34.8)
MCHC RBC AUTO-ENTMCNC: 33.7 G/DL (ref 28.4–34.8)
MCV RBC AUTO: 107.8 FL (ref 82.6–102.9)
MCV RBC AUTO: 93.4 FL (ref 82.6–102.9)
MONOCYTES # BLD: 5 % (ref 1–7)
MORPHOLOGY: ABNORMAL
NEGATIVE BASE EXCESS, VEN: 23.8 MMOL/L (ref 0–2)
NRBC AUTOMATED: 0 PER 100 WBC
NRBC AUTOMATED: 0 PER 100 WBC
O2 SAT, VEN: 97.9 % (ref 60–85)
PARTIAL THROMBOPLASTIN TIME: 35.5 SEC (ref 20.5–30.5)
PATIENT TEMP: 37
PCO2, VEN: 21.1 (ref 39–55)
PDW BLD-RTO: 14.1 % (ref 11.8–14.4)
PDW BLD-RTO: 14.6 % (ref 11.8–14.4)
PH VENOUS: 7.04 (ref 7.32–7.42)
PLATELET # BLD: 311 K/UL (ref 138–453)
PLATELET # BLD: 367 K/UL (ref 138–453)
PMV BLD AUTO: 9.2 FL (ref 8.1–13.5)
PMV BLD AUTO: 9.5 FL (ref 8.1–13.5)
PO2, VEN: 178 (ref 30–50)
POTASSIUM SERPL-SCNC: 3.3 MMOL/L (ref 3.7–5.3)
POTASSIUM SERPL-SCNC: 7.5 MMOL/L (ref 3.7–5.3)
PRO-BNP: ABNORMAL PG/ML
PROTHROMBIN TIME: 36.9 SEC (ref 9.1–12.3)
RBC # BLD: 2.82 M/UL (ref 4.21–5.77)
RBC # BLD: 2.86 M/UL (ref 4.21–5.77)
SEG NEUTROPHILS: 85 % (ref 36–66)
SEGMENTED NEUTROPHILS ABSOLUTE COUNT: 10.71 K/UL (ref 1.8–7.7)
SODIUM BLD-SCNC: 125 MMOL/L (ref 135–144)
SODIUM BLD-SCNC: 127 MMOL/L (ref 135–144)
TOTAL PROTEIN: 6.6 G/DL (ref 6.4–8.3)
TROPONIN, HIGH SENSITIVITY: 1284 NG/L (ref 0–22)
TROPONIN, HIGH SENSITIVITY: 318 NG/L (ref 0–22)
TROPONIN, HIGH SENSITIVITY: 323 NG/L (ref 0–22)
TROPONIN, HIGH SENSITIVITY: 569 NG/L (ref 0–22)
WBC # BLD: 10.9 K/UL (ref 3.5–11.3)
WBC # BLD: 12.6 K/UL (ref 3.5–11.3)

## 2022-06-02 PROCEDURE — 85027 COMPLETE CBC AUTOMATED: CPT

## 2022-06-02 PROCEDURE — 96376 TX/PRO/DX INJ SAME DRUG ADON: CPT

## 2022-06-02 PROCEDURE — 94660 CPAP INITIATION&MGMT: CPT

## 2022-06-02 PROCEDURE — 6370000000 HC RX 637 (ALT 250 FOR IP): Performed by: STUDENT IN AN ORGANIZED HEALTH CARE EDUCATION/TRAINING PROGRAM

## 2022-06-02 PROCEDURE — 6360000002 HC RX W HCPCS: Performed by: INTERNAL MEDICINE

## 2022-06-02 PROCEDURE — 2580000003 HC RX 258: Performed by: STUDENT IN AN ORGANIZED HEALTH CARE EDUCATION/TRAINING PROGRAM

## 2022-06-02 PROCEDURE — 85730 THROMBOPLASTIN TIME PARTIAL: CPT

## 2022-06-02 PROCEDURE — 2700000000 HC OXYGEN THERAPY PER DAY

## 2022-06-02 PROCEDURE — 83605 ASSAY OF LACTIC ACID: CPT

## 2022-06-02 PROCEDURE — 6360000002 HC RX W HCPCS: Performed by: STUDENT IN AN ORGANIZED HEALTH CARE EDUCATION/TRAINING PROGRAM

## 2022-06-02 PROCEDURE — 2000000000 HC ICU R&B

## 2022-06-02 PROCEDURE — 93005 ELECTROCARDIOGRAM TRACING: CPT | Performed by: STUDENT IN AN ORGANIZED HEALTH CARE EDUCATION/TRAINING PROGRAM

## 2022-06-02 PROCEDURE — 90935 HEMODIALYSIS ONE EVALUATION: CPT

## 2022-06-02 PROCEDURE — 94761 N-INVAS EAR/PLS OXIMETRY MLT: CPT

## 2022-06-02 PROCEDURE — 99222 1ST HOSP IP/OBS MODERATE 55: CPT | Performed by: INTERNAL MEDICINE

## 2022-06-02 PROCEDURE — 82947 ASSAY GLUCOSE BLOOD QUANT: CPT

## 2022-06-02 PROCEDURE — 99285 EMERGENCY DEPT VISIT HI MDM: CPT

## 2022-06-02 PROCEDURE — 96375 TX/PRO/DX INJ NEW DRUG ADDON: CPT

## 2022-06-02 PROCEDURE — 85610 PROTHROMBIN TIME: CPT

## 2022-06-02 PROCEDURE — 5A1D70Z PERFORMANCE OF URINARY FILTRATION, INTERMITTENT, LESS THAN 6 HOURS PER DAY: ICD-10-PCS | Performed by: INTERNAL MEDICINE

## 2022-06-02 PROCEDURE — 2500000003 HC RX 250 WO HCPCS: Performed by: STUDENT IN AN ORGANIZED HEALTH CARE EDUCATION/TRAINING PROGRAM

## 2022-06-02 PROCEDURE — 80053 COMPREHEN METABOLIC PANEL: CPT

## 2022-06-02 PROCEDURE — 84484 ASSAY OF TROPONIN QUANT: CPT

## 2022-06-02 PROCEDURE — 83036 HEMOGLOBIN GLYCOSYLATED A1C: CPT

## 2022-06-02 PROCEDURE — 87040 BLOOD CULTURE FOR BACTERIA: CPT

## 2022-06-02 PROCEDURE — 85025 COMPLETE CBC W/AUTO DIFF WBC: CPT

## 2022-06-02 PROCEDURE — 36415 COLL VENOUS BLD VENIPUNCTURE: CPT

## 2022-06-02 PROCEDURE — 80048 BASIC METABOLIC PNL TOTAL CA: CPT

## 2022-06-02 PROCEDURE — 71045 X-RAY EXAM CHEST 1 VIEW: CPT

## 2022-06-02 PROCEDURE — 82010 KETONE BODYS QUAN: CPT

## 2022-06-02 PROCEDURE — 96365 THER/PROPH/DIAG IV INF INIT: CPT

## 2022-06-02 PROCEDURE — 83690 ASSAY OF LIPASE: CPT

## 2022-06-02 PROCEDURE — 94640 AIRWAY INHALATION TREATMENT: CPT

## 2022-06-02 PROCEDURE — 99291 CRITICAL CARE FIRST HOUR: CPT | Performed by: INTERNAL MEDICINE

## 2022-06-02 PROCEDURE — 87205 SMEAR GRAM STAIN: CPT

## 2022-06-02 PROCEDURE — 90935 HEMODIALYSIS ONE EVALUATION: CPT | Performed by: INTERNAL MEDICINE

## 2022-06-02 PROCEDURE — 82805 BLOOD GASES W/O2 SATURATION: CPT

## 2022-06-02 PROCEDURE — 83880 ASSAY OF NATRIURETIC PEPTIDE: CPT

## 2022-06-02 RX ORDER — CARVEDILOL 25 MG/1
25 TABLET ORAL 2 TIMES DAILY
Status: DISCONTINUED | OUTPATIENT
Start: 2022-06-02 | End: 2022-06-06 | Stop reason: HOSPADM

## 2022-06-02 RX ORDER — BUMETANIDE 1 MG/1
1 TABLET ORAL DAILY
Status: DISCONTINUED | OUTPATIENT
Start: 2022-06-02 | End: 2022-06-06 | Stop reason: HOSPADM

## 2022-06-02 RX ORDER — WARFARIN SODIUM 10 MG/1
TABLET ORAL
COMMUNITY
End: 2022-10-11

## 2022-06-02 RX ORDER — ASPIRIN 81 MG/1
324 TABLET, CHEWABLE ORAL ONCE
Status: COMPLETED | OUTPATIENT
Start: 2022-06-02 | End: 2022-06-02

## 2022-06-02 RX ORDER — 0.9 % SODIUM CHLORIDE 0.9 %
500 INTRAVENOUS SOLUTION INTRAVENOUS ONCE
Status: DISCONTINUED | OUTPATIENT
Start: 2022-06-02 | End: 2022-06-02

## 2022-06-02 RX ORDER — HEPARIN SODIUM 1000 [USP'U]/ML
1800 INJECTION, SOLUTION INTRAVENOUS; SUBCUTANEOUS ONCE
Status: COMPLETED | OUTPATIENT
Start: 2022-06-02 | End: 2022-06-02

## 2022-06-02 RX ORDER — METHYLPREDNISOLONE SODIUM SUCCINATE 125 MG/2ML
40 INJECTION, POWDER, LYOPHILIZED, FOR SOLUTION INTRAMUSCULAR; INTRAVENOUS ONCE
Status: DISCONTINUED | OUTPATIENT
Start: 2022-06-02 | End: 2022-06-02

## 2022-06-02 RX ORDER — ALBUTEROL SULFATE 2.5 MG/3ML
2.5 SOLUTION RESPIRATORY (INHALATION)
Status: DISCONTINUED | OUTPATIENT
Start: 2022-06-02 | End: 2022-06-02 | Stop reason: SDUPTHER

## 2022-06-02 RX ORDER — NIFEDIPINE 90 MG/1
90 TABLET, EXTENDED RELEASE ORAL DAILY
Status: DISCONTINUED | OUTPATIENT
Start: 2022-06-02 | End: 2022-06-06

## 2022-06-02 RX ORDER — CALCIUM GLUCONATE 20 MG/ML
2000 INJECTION, SOLUTION INTRAVENOUS ONCE
Status: COMPLETED | OUTPATIENT
Start: 2022-06-02 | End: 2022-06-02

## 2022-06-02 RX ORDER — ALBUTEROL SULFATE 2.5 MG/3ML
5 SOLUTION RESPIRATORY (INHALATION) EVERY 4 HOURS PRN
Status: DISCONTINUED | OUTPATIENT
Start: 2022-06-02 | End: 2022-06-06 | Stop reason: HOSPADM

## 2022-06-02 RX ORDER — HEPARIN SODIUM 5000 [USP'U]/ML
5000 INJECTION, SOLUTION INTRAVENOUS; SUBCUTANEOUS 3 TIMES DAILY
Status: DISCONTINUED | OUTPATIENT
Start: 2022-06-02 | End: 2022-06-02

## 2022-06-02 RX ORDER — POLYETHYLENE GLYCOL 3350 17 G/17G
17 POWDER, FOR SOLUTION ORAL DAILY PRN
Status: DISCONTINUED | OUTPATIENT
Start: 2022-06-02 | End: 2022-06-06 | Stop reason: HOSPADM

## 2022-06-02 RX ORDER — ONDANSETRON 2 MG/ML
4 INJECTION INTRAMUSCULAR; INTRAVENOUS ONCE
Status: COMPLETED | OUTPATIENT
Start: 2022-06-02 | End: 2022-06-02

## 2022-06-02 RX ORDER — ATORVASTATIN CALCIUM 80 MG/1
80 TABLET, FILM COATED ORAL NIGHTLY
Status: DISCONTINUED | OUTPATIENT
Start: 2022-06-02 | End: 2022-06-06 | Stop reason: HOSPADM

## 2022-06-02 RX ORDER — SODIUM CHLORIDE 0.9 % (FLUSH) 0.9 %
5-40 SYRINGE (ML) INJECTION EVERY 12 HOURS SCHEDULED
Status: DISCONTINUED | OUTPATIENT
Start: 2022-06-02 | End: 2022-06-06 | Stop reason: HOSPADM

## 2022-06-02 RX ORDER — ASPIRIN 81 MG/1
81 TABLET ORAL DAILY
Status: DISCONTINUED | OUTPATIENT
Start: 2022-06-02 | End: 2022-06-06 | Stop reason: HOSPADM

## 2022-06-02 RX ORDER — IPRATROPIUM BROMIDE AND ALBUTEROL SULFATE 2.5; .5 MG/3ML; MG/3ML
1 SOLUTION RESPIRATORY (INHALATION) EVERY 6 HOURS PRN
Status: DISCONTINUED | OUTPATIENT
Start: 2022-06-02 | End: 2022-06-06 | Stop reason: HOSPADM

## 2022-06-02 RX ORDER — HEPARIN SODIUM 1000 [USP'U]/ML
1700 INJECTION, SOLUTION INTRAVENOUS; SUBCUTANEOUS ONCE
Status: COMPLETED | OUTPATIENT
Start: 2022-06-02 | End: 2022-06-02

## 2022-06-02 RX ORDER — ACETAMINOPHEN 325 MG/1
650 TABLET ORAL EVERY 6 HOURS PRN
Status: DISCONTINUED | OUTPATIENT
Start: 2022-06-02 | End: 2022-06-06 | Stop reason: HOSPADM

## 2022-06-02 RX ORDER — DEXTROSE AND SODIUM CHLORIDE 5; .45 G/100ML; G/100ML
INJECTION, SOLUTION INTRAVENOUS CONTINUOUS
Status: DISCONTINUED | OUTPATIENT
Start: 2022-06-02 | End: 2022-06-03

## 2022-06-02 RX ORDER — ONDANSETRON 4 MG/1
4 TABLET, ORALLY DISINTEGRATING ORAL EVERY 8 HOURS PRN
Status: DISCONTINUED | OUTPATIENT
Start: 2022-06-02 | End: 2022-06-06 | Stop reason: HOSPADM

## 2022-06-02 RX ORDER — HYDRALAZINE HYDROCHLORIDE 50 MG/1
100 TABLET, FILM COATED ORAL 3 TIMES DAILY
Status: DISCONTINUED | OUTPATIENT
Start: 2022-06-02 | End: 2022-06-06 | Stop reason: HOSPADM

## 2022-06-02 RX ORDER — LOSARTAN POTASSIUM 50 MG/1
50 TABLET ORAL DAILY
Status: DISCONTINUED | OUTPATIENT
Start: 2022-06-02 | End: 2022-06-06 | Stop reason: HOSPADM

## 2022-06-02 RX ORDER — CLOPIDOGREL BISULFATE 75 MG/1
75 TABLET ORAL DAILY
Status: DISCONTINUED | OUTPATIENT
Start: 2022-06-02 | End: 2022-06-06 | Stop reason: HOSPADM

## 2022-06-02 RX ORDER — DEXTROSE MONOHYDRATE 50 MG/ML
100 INJECTION, SOLUTION INTRAVENOUS PRN
Status: DISCONTINUED | OUTPATIENT
Start: 2022-06-02 | End: 2022-06-06 | Stop reason: HOSPADM

## 2022-06-02 RX ORDER — SODIUM CHLORIDE 0.9 % (FLUSH) 0.9 %
5-40 SYRINGE (ML) INJECTION PRN
Status: DISCONTINUED | OUTPATIENT
Start: 2022-06-02 | End: 2022-06-06 | Stop reason: HOSPADM

## 2022-06-02 RX ORDER — WARFARIN SODIUM 5 MG/1
5 TABLET ORAL
Status: DISCONTINUED | OUTPATIENT
Start: 2022-06-02 | End: 2022-06-02

## 2022-06-02 RX ORDER — QUETIAPINE FUMARATE 100 MG/1
100 TABLET, FILM COATED ORAL NIGHTLY
Status: DISCONTINUED | OUTPATIENT
Start: 2022-06-02 | End: 2022-06-06 | Stop reason: HOSPADM

## 2022-06-02 RX ORDER — METHYLPREDNISOLONE SODIUM SUCCINATE 125 MG/2ML
125 INJECTION, POWDER, LYOPHILIZED, FOR SOLUTION INTRAMUSCULAR; INTRAVENOUS ONCE
Status: COMPLETED | OUTPATIENT
Start: 2022-06-02 | End: 2022-06-02

## 2022-06-02 RX ORDER — MAGNESIUM SULFATE IN WATER 40 MG/ML
2000 INJECTION, SOLUTION INTRAVENOUS ONCE
Status: COMPLETED | OUTPATIENT
Start: 2022-06-02 | End: 2022-06-02

## 2022-06-02 RX ORDER — HEPARIN SODIUM AND DEXTROSE 10000; 5 [USP'U]/100ML; G/100ML
5-30 INJECTION INTRAVENOUS CONTINUOUS
Status: DISCONTINUED | OUTPATIENT
Start: 2022-06-02 | End: 2022-06-03

## 2022-06-02 RX ORDER — HEPARIN SODIUM 1000 [USP'U]/ML
60 INJECTION, SOLUTION INTRAVENOUS; SUBCUTANEOUS PRN
Status: DISCONTINUED | OUTPATIENT
Start: 2022-06-02 | End: 2022-06-03

## 2022-06-02 RX ORDER — PRAZOSIN HYDROCHLORIDE 5 MG/1
10 CAPSULE ORAL 2 TIMES DAILY
Status: DISCONTINUED | OUTPATIENT
Start: 2022-06-02 | End: 2022-06-04

## 2022-06-02 RX ORDER — GABAPENTIN 100 MG/1
200 CAPSULE ORAL 3 TIMES DAILY
Status: DISCONTINUED | OUTPATIENT
Start: 2022-06-02 | End: 2022-06-03

## 2022-06-02 RX ORDER — SODIUM CHLORIDE 9 MG/ML
INJECTION, SOLUTION INTRAVENOUS PRN
Status: DISCONTINUED | OUTPATIENT
Start: 2022-06-02 | End: 2022-06-06 | Stop reason: HOSPADM

## 2022-06-02 RX ORDER — HEPARIN SODIUM 1000 [USP'U]/ML
30 INJECTION, SOLUTION INTRAVENOUS; SUBCUTANEOUS PRN
Status: DISCONTINUED | OUTPATIENT
Start: 2022-06-02 | End: 2022-06-03

## 2022-06-02 RX ORDER — HEPARIN SODIUM 1000 [USP'U]/ML
60 INJECTION, SOLUTION INTRAVENOUS; SUBCUTANEOUS ONCE
Status: COMPLETED | OUTPATIENT
Start: 2022-06-02 | End: 2022-06-02

## 2022-06-02 RX ORDER — ACETAMINOPHEN 650 MG/1
650 SUPPOSITORY RECTAL EVERY 6 HOURS PRN
Status: DISCONTINUED | OUTPATIENT
Start: 2022-06-02 | End: 2022-06-06 | Stop reason: HOSPADM

## 2022-06-02 RX ORDER — IPRATROPIUM BROMIDE AND ALBUTEROL SULFATE 2.5; .5 MG/3ML; MG/3ML
1 SOLUTION RESPIRATORY (INHALATION) 4 TIMES DAILY
Status: DISCONTINUED | OUTPATIENT
Start: 2022-06-02 | End: 2022-06-06 | Stop reason: HOSPADM

## 2022-06-02 RX ORDER — ASPIRIN 81 MG/1
324 TABLET, CHEWABLE ORAL ONCE
Status: DISCONTINUED | OUTPATIENT
Start: 2022-06-02 | End: 2022-06-02

## 2022-06-02 RX ORDER — ONDANSETRON 2 MG/ML
4 INJECTION INTRAMUSCULAR; INTRAVENOUS EVERY 6 HOURS PRN
Status: DISCONTINUED | OUTPATIENT
Start: 2022-06-02 | End: 2022-06-06 | Stop reason: HOSPADM

## 2022-06-02 RX ORDER — MONTELUKAST SODIUM 10 MG/1
10 TABLET ORAL NIGHTLY
Status: DISCONTINUED | OUTPATIENT
Start: 2022-06-02 | End: 2022-06-06 | Stop reason: HOSPADM

## 2022-06-02 RX ADMIN — IPRATROPIUM BROMIDE 0.5 MG: 0.5 SOLUTION RESPIRATORY (INHALATION) at 10:18

## 2022-06-02 RX ADMIN — HEPARIN SODIUM 1800 UNITS: 1000 INJECTION INTRAVENOUS; SUBCUTANEOUS at 15:57

## 2022-06-02 RX ADMIN — CALCIUM GLUCONATE 2000 MG: 20 INJECTION, SOLUTION INTRAVENOUS at 11:44

## 2022-06-02 RX ADMIN — GABAPENTIN 200 MG: 100 CAPSULE ORAL at 21:13

## 2022-06-02 RX ADMIN — HEPARIN SODIUM 12 UNITS/KG/HR: 10000 INJECTION, SOLUTION INTRAVENOUS at 21:20

## 2022-06-02 RX ADMIN — IPRATROPIUM BROMIDE AND ALBUTEROL SULFATE 3 ML: .5; 3 SOLUTION RESPIRATORY (INHALATION) at 22:36

## 2022-06-02 RX ADMIN — ASPIRIN 324 MG: 81 TABLET, CHEWABLE ORAL at 13:51

## 2022-06-02 RX ADMIN — CEFTRIAXONE SODIUM 1000 MG: 1 INJECTION, POWDER, FOR SOLUTION INTRAMUSCULAR; INTRAVENOUS at 11:47

## 2022-06-02 RX ADMIN — MAGNESIUM SULFATE 2000 MG: 2 INJECTION INTRAVENOUS at 10:15

## 2022-06-02 RX ADMIN — SODIUM CHLORIDE, PRESERVATIVE FREE 10 ML: 5 INJECTION INTRAVENOUS at 21:11

## 2022-06-02 RX ADMIN — HEPARIN SODIUM 3640 UNITS: 1000 INJECTION INTRAVENOUS; SUBCUTANEOUS at 21:14

## 2022-06-02 RX ADMIN — CLONIDINE HYDROCHLORIDE 0.3 MG: 0.1 TABLET ORAL at 21:14

## 2022-06-02 RX ADMIN — CLOPIDOGREL 75 MG: 75 TABLET, FILM COATED ORAL at 21:12

## 2022-06-02 RX ADMIN — METHYLPREDNISOLONE SODIUM SUCCINATE 125 MG: 125 INJECTION, POWDER, FOR SOLUTION INTRAMUSCULAR; INTRAVENOUS at 10:13

## 2022-06-02 RX ADMIN — QUETIAPINE FUMARATE 100 MG: 100 TABLET ORAL at 21:14

## 2022-06-02 RX ADMIN — ONDANSETRON 4 MG: 2 INJECTION INTRAMUSCULAR; INTRAVENOUS at 12:05

## 2022-06-02 RX ADMIN — SODIUM CHLORIDE 21.84 UNITS/HR: 9 INJECTION, SOLUTION INTRAVENOUS at 22:08

## 2022-06-02 RX ADMIN — SODIUM CHLORIDE 7 UNITS/HR: 9 INJECTION, SOLUTION INTRAVENOUS at 18:06

## 2022-06-02 RX ADMIN — CARVEDILOL 25 MG: 25 TABLET, FILM COATED ORAL at 21:12

## 2022-06-02 RX ADMIN — INSULIN HUMAN 10 UNITS: 100 INJECTION, SOLUTION PARENTERAL at 11:44

## 2022-06-02 RX ADMIN — BUMETANIDE 1 MG: 1 TABLET ORAL at 21:12

## 2022-06-02 RX ADMIN — LOSARTAN POTASSIUM 50 MG: 50 TABLET, FILM COATED ORAL at 21:13

## 2022-06-02 RX ADMIN — Medication 500 MG: at 12:31

## 2022-06-02 RX ADMIN — MONTELUKAST SODIUM 10 MG: 10 TABLET, FILM COATED ORAL at 21:13

## 2022-06-02 RX ADMIN — DEXTROSE AND SODIUM CHLORIDE: 5; 450 INJECTION, SOLUTION INTRAVENOUS at 16:07

## 2022-06-02 RX ADMIN — SODIUM ZIRCONIUM CYCLOSILICATE 10 G: 5 POWDER, FOR SUSPENSION ORAL at 12:05

## 2022-06-02 RX ADMIN — NIFEDIPINE 90 MG: 90 TABLET, FILM COATED, EXTENDED RELEASE ORAL at 21:12

## 2022-06-02 RX ADMIN — ATORVASTATIN CALCIUM 80 MG: 80 TABLET, FILM COATED ORAL at 21:12

## 2022-06-02 RX ADMIN — HYDRALAZINE HYDROCHLORIDE 100 MG: 50 TABLET, FILM COATED ORAL at 21:13

## 2022-06-02 RX ADMIN — ONDANSETRON 4 MG: 2 INJECTION INTRAMUSCULAR; INTRAVENOUS at 13:51

## 2022-06-02 RX ADMIN — PRAZOSIN HYDROCHLORIDE 10 MG: 5 CAPSULE ORAL at 21:13

## 2022-06-02 RX ADMIN — ALBUTEROL SULFATE 5 MG: 2.5 SOLUTION RESPIRATORY (INHALATION) at 10:18

## 2022-06-02 RX ADMIN — DEXTROSE AND SODIUM CHLORIDE: 5; 450 INJECTION, SOLUTION INTRAVENOUS at 20:01

## 2022-06-02 RX ADMIN — POTASSIUM BICARBONATE 40 MEQ: 782 TABLET, EFFERVESCENT ORAL at 21:14

## 2022-06-02 RX ADMIN — ONDANSETRON 4 MG: 2 INJECTION INTRAMUSCULAR; INTRAVENOUS at 10:37

## 2022-06-02 RX ADMIN — SODIUM CHLORIDE 0.1 UNITS/KG/HR: 9 INJECTION, SOLUTION INTRAVENOUS at 13:36

## 2022-06-02 RX ADMIN — Medication 81 MG: at 21:12

## 2022-06-02 RX ADMIN — HEPARIN SODIUM 1700 UNITS: 1000 INJECTION INTRAVENOUS; SUBCUTANEOUS at 16:05

## 2022-06-02 ASSESSMENT — PAIN DESCRIPTION - ONSET
ONSET: SUDDEN
ONSET: SUDDEN

## 2022-06-02 ASSESSMENT — ENCOUNTER SYMPTOMS
SHORTNESS OF BREATH: 1
NAUSEA: 0
COUGH: 1
ABDOMINAL PAIN: 0
VOMITING: 0
WHEEZING: 1
BACK PAIN: 0

## 2022-06-02 ASSESSMENT — PAIN DESCRIPTION - LOCATION
LOCATION: CHEST
LOCATION: CHEST

## 2022-06-02 ASSESSMENT — PAIN DESCRIPTION - DESCRIPTORS
DESCRIPTORS: ACHING
DESCRIPTORS: ACHING

## 2022-06-02 ASSESSMENT — PAIN DESCRIPTION - PAIN TYPE
TYPE: ACUTE PAIN
TYPE: ACUTE PAIN

## 2022-06-02 ASSESSMENT — PAIN SCALES - GENERAL
PAINLEVEL_OUTOF10: 0
PAINLEVEL_OUTOF10: 6
PAINLEVEL_OUTOF10: 6
PAINLEVEL_OUTOF10: 0

## 2022-06-02 ASSESSMENT — PAIN DESCRIPTION - ORIENTATION
ORIENTATION: MID
ORIENTATION: MID

## 2022-06-02 ASSESSMENT — PAIN DESCRIPTION - FREQUENCY
FREQUENCY: INTERMITTENT
FREQUENCY: INTERMITTENT

## 2022-06-02 ASSESSMENT — PAIN - FUNCTIONAL ASSESSMENT: PAIN_FUNCTIONAL_ASSESSMENT: NONE - DENIES PAIN

## 2022-06-02 NOTE — ED NOTES
Patient's sleeping on cot, writer unable to complete ACP.       Ailyn Hunt [facility]  06/02/22 6667

## 2022-06-02 NOTE — ED NOTES
Pt to ER for c/o shortness of breath via LS3. Per EMS pt received two albuterol treatments PTA. EMS stated that pt was refusing BiPap because he did not like the way it felt. Pt arrived on 6L of O2. Pt states the shortness of breath started yesterday. Pt states that he was not active when it occurred and it never went away once it started. Pt also complains of nausea that started with the shortness of breath. Pt placed on full monitor. Pt noted to be tachypneic with diaphragmatic breathing. Pt has hx of COPD. Dr. Bernardo Lanes and Dr. Madeline Cash at bedside to evaluate pt.           Emanuel Parra RN  06/02/22 9127

## 2022-06-02 NOTE — PROGRESS NOTES
Dialysis Time Out  To be done by RN and tech or 2 RNs  Staff Names RN Izabel Dalton and RN Corrina Mederos    [x]  Identity of the patient using 2 patient identifiers  [x]  Consent for treatment  [x]  Equipment-proper machine and dialyzer  [x]  B-Hep B status  [x]  Orders- to include bath, blood flow, dialyzer, time and fluid removal  [x]  Access-Correct site and in working order  [x]  Time for patient to ask questions.

## 2022-06-02 NOTE — ED NOTES
Pt taken off Bipap and reassessed by RT.  Pt currently on room air with a spo2 98%     Luciano Rodrigues RN  06/02/22 1695

## 2022-06-02 NOTE — PROGRESS NOTES
Dialysis Post Treatment Note  Patient tolerated treatment well. Denies complaints at time of discharge. Vitals:    06/02/22 1707   BP:    Pulse: 74   Resp: 18   Temp:    SpO2: 98%     Pre-Weight = 69.9kg (from ER notes, on ER bed)  Post-weight = Weight:  (unable to weigh, on ER bed)  Total Liters Processed = Total Liters Processed (l/min): 77.4 l/min  Rinseback Volume (mL) = Rinseback Volume (ml): 300 ml  Net Removal (mL) = 3000ml  Length of treatment=210 minutes   Access: Right IJ Catheter     Treatment completed, tolerated fairly. Upon taking over patient was on Nasal Cannula at 5LPM, in distress and complained of central chest pain and feeling nauseated. Patient was escalated to BIPAP mask,EKG done, PO Aspirin x4, and IV Zofran given as ordered. Moreover, patient is on DKA protocol. All events was noted by Floor Nurse and IM Doctor and Dr. Kasia Bower. Subsequently patient was able to wean down from BIPAP to RA after mid treatment of HD. Noted elevation of BP mid treatment, BFR adjusted accordingly. No issues with the catheter.

## 2022-06-02 NOTE — PROGRESS NOTES
Patient reevaluated with concerns for decreased mentation during dialysis, placed on BIPAP via EM team. Antoni Board saturating 100%, tolerating bipap with concern that patient was tiring from kussmaul respirations in the setting of DKA. Patient is not hypercarbic, bipap placed for respiratory support, not for hypercarbia. Plan for short intermittent bipap, patient to finish dialysis then be taken off bipap.  Will attempt to avoid intubation in patient undergoing compensatory tachypnea in the setting of gross metabolic acidosis    Jakob Escobar MD  PGY-3 Emergency Medicine  [facility]. [place]

## 2022-06-02 NOTE — ED PROVIDER NOTES
[facility] ED  Emergency Department Encounter  EmergencyMedicine Resident     Pt Dianne Hanson  MRN: [id number]  Mahsagffan 1955  Date of evaluation: 6/2/22  PCP:  Liana Boothe 5       Chief Complaint   Patient presents with    Shortness of Breath       HISTORY OF PRESENT ILLNESS  (Location/Symptom, Timing/Onset, Context/Setting, Quality, Duration, Modifying Factors, Severity.)      Francisco J Matamoros is a 78 y.o. male who presents with shortness of breath ongoing for past 2 days. Patient with history of end-stage renal disease currently on dialysis, states he has not missed any sessions, normally gets dialysis every Tuesday, Thursday, Saturday. Patient denying any chest pain at this time but reporting significant shortness of breath. Does report history of COPD and states he is using inhalers at home with minimal relief. Arrives by EMS with continuous albuterol nebulizer going. Patient tachypneic on arrival, stating that he is having some trouble finishing complete sentences due to shortness of breath. Normally does not wear oxygen at home. Denying any syncope, chest pain, abdominal pain, nausea, vomiting, lightheadedness, confusion or other complaints at this time. PAST MEDICAL / SURGICAL / SOCIAL / FAMILY HISTORY      has a past medical history of Asthma, CAD in native artery, nonobstructive, Carotid stenosis, left, Carpal tunnel syndrome, Cerebrovascular disease, Chronic kidney disease, COPD (chronic obstructive pulmonary disease) ([facility] [address] Diabetes mellitus ([facility] [address] History of colon polyps, History of weakness of extremity, HTN (hypertension), Hyperlipidemia, Lung, cysts, congenital, PTSD (post-traumatic stress disorder), PTSD (post-traumatic stress disorder), TIA (transient ischemic attack), and Type II or unspecified type diabetes mellitus without mention of complication, not stated as uncontrolled.        has a past surgical history that includes hernia repair; Tonsillectomy; Colonoscopy; Carotid endarterectomy (Left, -); vascular surgery (Left, ); pr colsc flx w/rmvl of tumor polyp lesion snare tq (N/A, 2017); and IR TUNNELED CVC PLACE WO SQ PORT/PUMP > 5 YEARS (2021). Social History     Socioeconomic History    Marital status:      Spouse name: Not on file    Number of children: Not on file    Years of education: Not on file    Highest education level: Not on file   Occupational History     Employer: N/A   Tobacco Use    Smoking status: Former Smoker     Packs/day: 0.50     Years: 35.00     Pack years: 17.50     Types: Cigarettes     Quit date: 2014     Years since quittin.9    Smokeless tobacco: Never Used   Vaping Use    Vaping Use: Never used   Substance and Sexual Activity    Alcohol use: No     Alcohol/week: 0.0 standard drinks    Drug use: No    Sexual activity: Yes     Partners: Female   Other Topics Concern    Not on file   Social History Narrative    Not on file     Social Determinants of Health     Financial Resource Strain:     Difficulty of Paying Living Expenses: Not on file   Food Insecurity:     Worried About Running Out of Food in the Last Year: Not on file    Warren of Food in the Last Year: Not on file   Transportation Needs:     Lack of Transportation (Medical): Not on file    Lack of Transportation (Non-Medical):  Not on file   Physical Activity:     Days of Exercise per Week: Not on file    Minutes of Exercise per Session: Not on file   Stress:     Feeling of Stress : Not on file   Social Connections:     Frequency of Communication with Friends and Family: Not on file    Frequency of Social Gatherings with Friends and Family: Not on file    Attends Voodoo Services: Not on file    Active Member of Clubs or Organizations: Not on file    Attends Club or Organization Meetings: Not on file    Marital Status: Not on file   Intimate Partner Violence:     Fear of Current or Ex-Partner: Not on file    Emotionally Abused: Not on file    Physically Abused: Not on file    Sexually Abused: Not on file   Housing Stability:     Unable to Pay for Housing in the Last Year: Not on file    Number of Places Lived in the Last Year: Not on file    Unstable Housing in the Last Year: Not on file       Family History   Problem Relation Age of Onset    Cancer Mother     Heart Disease Father        Allergies:  Lisinopril, Ace inhibitors, and Pcn [penicillins]    Home Medications:  Prior to Admission medications    Medication Sig Start Date End Date Taking? Authorizing Provider   warfarin (COUMADIN) 10 MG tablet Dose unknown per patient, unable to find dose from outside records   Yes Historical Provider, MD   magnesium oxide (MAG-OX) 400 MG tablet Take 400 mg by mouth daily    Historical Provider, MD   ipratropium-albuterol (Valarie Spray) 0.5-2.5 (3) MG/3ML SOLN nebulizer solution Inhale 3 mLs into the lungs 4 times daily 11/17/21 3/17/22  Kadi Bonilla MD   albuterol-ipratropium (COMBIVENT RESPIMAT)  MCG/ACT AERS inhaler Inhale 1 puff into the lungs every 6 hours 11/17/21 12/17/21  Kadi Bonilla MD   bumetanide (BUMEX) 1 MG tablet Take 1 tablet by mouth daily 10/19/21   Historical Provider, MD   NIFEdipine (ADALAT CC) 90 MG extended release tablet Take 1 tablet by mouth daily 10/19/21   Historical Provider, MD   aspirin 81 MG EC tablet Take 81 mg by mouth daily 7/28/21   Historical Provider, MD   atorvastatin (LIPITOR) 80 MG tablet Take 1 tablet by mouth daily 10/19/21   Historical Provider, MD   carvedilol (COREG) 25 MG tablet Take 1 tablet by mouth 2 times daily 10/19/21   Historical Provider, MD   cloNIDine (CATAPRES) 0.1 MG tablet Take 0.3 mg by mouth three times daily  8/22/21   Historical Provider, MD   clopidogrel (PLAVIX) 75 MG tablet Take 1 tablet by mouth daily 8/22/21   Historical Provider, MD   gabapentin (NEURONTIN) 100 MG capsule Take 200 mg by mouth 3 times daily.  10/5/21 Historical Provider, MD   hydrALAZINE (APRESOLINE) 100 MG tablet Take 1 tablet by mouth 3 times daily 8/24/21   Historical Provider, MD   losartan (COZAAR) 50 MG tablet Take 1 tablet by mouth daily 8/22/21   Historical Provider, MD   montelukast (SINGULAIR) 10 MG tablet Take 1 tablet by mouth nightly 8/22/21   Historical Provider, MD   prazosin (MINIPRESS) 5 MG capsule Take 10 mg by mouth 2 times daily    Historical Provider, MD   blood glucose monitor strips Test 4 times a day & as needed for symptoms of irregular blood glucose. Dispense sufficient amount for indicated testing frequency plus additional to accommodate PRN testing needs. 5/26/21   TERRANCE Bates NP   insulin glargine [facility]) 100 UNIT/ML injection pen Inject 10 Units into the skin nightly 5/13/21   TERRANCE Bates NP   glucagon 1 MG injection Inject 1 mg into the muscle See Admin Instructions Follow package directions for low blood sugar. Patient not taking: Reported on 11/17/2021 4/6/21   TERRANCE Benavides CNP   fluticasone [facility]) 50 MCG/ACT nasal spray  2/23/21   Historical Provider, MD   insulin aspart (NOVOLOG FLEXPEN) 100 UNIT/ML injection pen Inject 5 Units into the skin 3 times daily (before meals) Sliding scale 3/16/21   TERRANCE Benavides CNP   nitroGLYCERIN (NITROSTAT) 0.4 MG SL tablet USE 1 TABLET UNDER THE TONGUE UP TO MAXIMUM OF 3 TOTAL DOSES. IF NO RELIEF AFTER 1 DOSE, CALL 911. 3/5/21   TERRANCE Benavides CNP   QUEtiapine (SEROQUEL) 100 MG tablet TAKE 1 TABLET BY MOUTH NIGHTLY 2/23/21   TERRANCE Benavides CNP   GNP VITAMIN D MAXIMUM STRENGTH 50 MCG (2000 UT) TABS TAKE 1 TABLET BY MOUTH ONCE DAILY 2/23/21   TERRANCE Benavides CNP   blood glucose monitor kit and supplies Dispense sufficient amount for indicated testing frequency plus additional to accommodate PRN testing needs.  Dispense all needed supplies to include: monitor, strips, lancing device, lancets, control solutions, alcohol Examination of the right-middle field reveals wheezing. Examination of the left-middle field reveals wheezing. Examination of the right-lower field reveals rales. Examination of the left-lower field reveals rales. Wheezing and rales present. Musculoskeletal:      Right lower leg: No edema. Left lower leg: No edema. Skin:     Findings: No erythema or rash. Neurological:      Mental Status: He is alert.          DIFFERENTIAL  DIAGNOSIS     PLAN (LABS / IMAGING / EKG):  Orders Placed This Encounter   Procedures    Culture, Blood 1    Culture, Blood 1    XR CHEST PORTABLE    CBC with Auto Differential    Comprehensive Metabolic Panel w/ Reflex to MG    Troponin    Brain Natriuretic Peptide    Protime-INR    Beta-Hydroxybutyrate    BLOOD GAS, VENOUS    Lactic Acid    Urinalysis with Reflex to Culture    Troponin    Lipase    Hemoglobin A1C    HYPOGLYCEMIA TREATMENT: blood glucose LESS than 50 mg/dL and patient  ALERT and TOLERATING PO    HYPOGLYCEMIA TREATMENT: blood glucose LESS than 70 mg/dL and patient ALERT and TOLERATING PO    HYPOGLYCEMIA TREATMENT: blood glucose LESS than 70 mg/dL and patient NOT ALERT or NPO    Inpatient consult to Nephrology    Inpatient consult to Internal Medicine    Inpatient consult to Critical Care    Inpatient consult to Cardiology    Initiate ED RT Aerosol protocol    Adult NIV/Positive Airway Pressure    POCT Glucose    POCT Glucose    POC Glucose Fingerstick    POC Glucose Fingerstick    EKG 12 Lead    EKG 12 Lead    Hemodialysis inpatient    ADMIT TO INPATIENT       MEDICATIONS ORDERED:  Orders Placed This Encounter   Medications    DISCONTD: methylPREDNISolone sodium (SOLU-MEDROL) injection 40 mg    methylPREDNISolone sodium (SOLU-MEDROL) injection 125 mg    magnesium sulfate 2000 mg in 50 mL IVPB premix    ipratropium (ATROVENT) 0.02 % nebulizer solution 0.5 mg     Order Specific Question:   Initiate RT Bronchodilator Protocol     Answer: No    albuterol (PROVENTIL) nebulizer solution 5 mg     Order Specific Question:   Initiate RT Bronchodilator Protocol     Answer:   No    ondansetron (ZOFRAN) injection 4 mg    DISCONTD: 0.9 % sodium chloride bolus    calcium gluconate 1000 mg in sodium chloride 50 mL    insulin regular (HUMULIN R;NOVOLIN R) injection 10 Units    cefTRIAXone (ROCEPHIN) 1000 mg IVPB in NS 50ml minibag     Order Specific Question:   Antimicrobial Indications     Answer:   Pneumonia (CAP)    azithromycin (ZITHROMAX) 500 mg in dextrose 5% 250 mL IVPB     Order Specific Question:   Antimicrobial Indications     Answer:   Pneumonia (CAP)    sodium zirconium cyclosilicate (LOKELMA) oral suspension 10 g    glucose chewable tablet 16 g    OR Linked Order Group     dextrose bolus 10% 125 mL     dextrose bolus 10% 250 mL    glucagon (rDNA) injection 1 mg    dextrose 5 % solution    insulin regular (HUMULIN R;NOVOLIN R) 100 Units in sodium chloride 0.9 % 100 mL infusion    ondansetron (ZOFRAN) injection 4 mg    DISCONTD: warfarin (COUMADIN) tablet 5 mg     Order Specific Question:   Indication of Use     Answer: Other     Order Specific Question:   Other Warfarin Indication     Answer:   carotid occlusion     Order Specific Question:   What is the patient's goal INR? Answer:   2.5 - 3.5    DISCONTD: aspirin chewable tablet 324 mg    aspirin chewable tablet 324 mg    ondansetron (ZOFRAN) injection 4 mg    heparin (porcine) injection 1,800 Units    heparin (porcine) injection 1,700 Units       DDX: COPD, CHF, pneumonia, bronchitis, hyperkalemia, pulmonary edema, DKA, HHS, STEMI, NSTEMI    DIAGNOSTIC RESULTS / EMERGENCY DEPARTMENT COURSE / MDM   LAB RESULTS:  Results for orders placed or performed during the hospital encounter of 06/02/22   Culture, Blood 1    Specimen: Blood   Result Value Ref Range    Specimen Description . BLOOD     Special Requests R AC 10ML     Culture NO GROWTH <24 HRS    Culture, Blood 1 Specimen: Blood   Result Value Ref Range    Specimen Description . BLOOD     Special Requests L WRIST 2ML     Culture NO GROWTH <24 HRS    CBC with Auto Differential   Result Value Ref Range    WBC 12.6 (H) 3.5 - 11.3 k/uL    RBC 2.82 (L) 4.21 - 5.77 m/uL    Hemoglobin 8.9 (L) 13.0 - 17.0 g/dL    Hematocrit 30.4 (L) 40.7 - 50.3 %    .8 (H) 82.6 - 102.9 fL    MCH 31.6 25.2 - 33.5 pg    MCHC 29.3 28.4 - 34.8 g/dL    RDW 14.6 (H) 11.8 - 14.4 %    Platelets 076 028 - 195 k/uL    MPV 9.5 8.1 - 13.5 fL    NRBC Automated 0.0 0.0 per 100 WBC    Immature Granulocytes 1 (H) 0 %    Seg Neutrophils 85 (H) 36 - 66 %    Lymphocytes 9 (L) 24 - 44 %    Monocytes 5 1 - 7 %    Eosinophils % 0 (L) 1 - 4 %    Basophils 0 0 - 2 %    Absolute Immature Granulocyte 0.13 0.00 - 0.30 k/uL    Segs Absolute 10.71 (H) 1.8 - 7.7 k/uL    Absolute Lymph # 1.13 1.0 - 4.8 k/uL    Absolute Mono # 0.63 0.1 - 0.8 k/uL    Absolute Eos # 0.00 0.0 - 0.4 k/uL    Basophils Absolute 0.00 0.0 - 0.2 k/uL    Morphology ANISOCYTOSIS PRESENT     Morphology MACROCYTOSIS PRESENT     Morphology 1+ POLYCHROMASIA    Comprehensive Metabolic Panel w/ Reflex to MG   Result Value Ref Range    Glucose 598 (HH) 70 - 99 mg/dL    BUN 94 (HH) 8 - 23 mg/dL    CREATININE 6.92 (HH) 0.70 - 1.20 mg/dL    Calcium 9.1 8.6 - 10.4 mg/dL    Sodium 125 (L) 135 - 144 mmol/L    Potassium 7.5 (HH) 3.7 - 5.3 mmol/L    Chloride 77 (L) 98 - 107 mmol/L    CO2 6 (LL) 20 - 31 mmol/L    Anion Gap 42 (H) 9 - 17 mmol/L    Alkaline Phosphatase 108 40 - 129 U/L    ALT 32 5 - 41 U/L    AST 30 <40 U/L    Total Bilirubin 0.17 (L) 0.3 - 1.2 mg/dL    Total Protein 6.6 6.4 - 8.3 g/dL    Albumin 4.2 3.5 - 5.2 g/dL    Albumin/Globulin Ratio 1.8 1.0 - 2.5    GFR Non- 8 (L) >60 mL/min    GFR  10 (L) >60 mL/min    GFR Comment         Troponin   Result Value Ref Range    Troponin, High Sensitivity 318 (HH) 0 - 22 ng/L   Troponin   Result Value Ref Range    Troponin, High Sensitivity 323 (HH) 0 - 22 ng/L   Brain Natriuretic Peptide   Result Value Ref Range    Pro-BNP 15,829 (H) <300 pg/mL   Protime-INR   Result Value Ref Range    Protime 36.9 (H) 9.1 - 12.3 sec    INR 3.9    Beta-Hydroxybutyrate   Result Value Ref Range    Beta-Hydroxybutyrate 10.93 (H) 0.02 - 0.27 mmol/L   BLOOD GAS, VENOUS   Result Value Ref Range    pH, Marlon 7.039 (LL) 7.320 - 7.420    pCO2, Marlon 21.1 (L) 39 - 55    pO2, Marlon 178.0 (H) 30 - 50    HCO3, Venous 5.4 (L) 24 - 30 mmol/L    Negative Base Excess, Marlon 23.8 (H) 0.0 - 2.0 mmol/L    O2 Sat, Marlon 97.9 (H) 60.0 - 85.0 %    Carboxyhemoglobin 3.0 0 - 5 %    Pt Temp 37.0     FIO2 INFORMATION NOT PROVIDED    Lactic Acid   Result Value Ref Range    Lactic Acid, Whole Blood 2.0 0.7 - 2.1 mmol/L   POCT Glucose   Result Value Ref Range    Glucose 367 mg/dL    QC OK? yes    POC Glucose Fingerstick   Result Value Ref Range    POC Glucose 499 (HH) 75 - 110 mg/dL   POC Glucose Fingerstick   Result Value Ref Range    POC Glucose 367 (H) 75 - 110 mg/dL       IMPRESSION/ ED Course: Ill-appearing 23-year-old male arriving with shortness of breath, patient receiving nebulizer treatment given per EMS prior to arrival.  Initial exam with wheezes of upper aspects of lungs bilaterally, right lower quadrant crackles present. Vital signs within normal limits with exception of some tachypnea, mild hypoxia on room air, patient continued on 3 L nasal cannula with appropriate oxygen saturation. Initial EKG with acute T waves, concerning for hyperkalemia. Labs with many significant normalities including hyperglycemia, acidosis, hyperkalemia and elevated troponin compared to patient's baseline. Given EKG changes hyperkalemia patient was treated with IV calcium as well as insulin. Started on insulin drip as well. Patient requiring increasing O2 throughout emergency department course, appeared to be more somnolent throughout ED course with increasing respiratory effort.   Started on BiPAP. Nephrology was consulted, recommending 10 g Lokelma and emergent dialysis. Dialysis begun in emergency department. Patient had complaints of chest pain which started while in ED, repeat EKG with interval development of worsening ST depressions in leads II and III. Cardiology consulted, recommended repeat EKG after dialysis and heparin drip however patient is currently on warfarin with INR of 3.9, spoke with critical care resident who is in agreement to hold heparin at this time. Patient given 324 mg aspirin in ED. Admitted to medical ICU    RADIOLOGY:  XR CHEST PORTABLE    Result Date: 6/2/2022  EXAMINATION: ONE XRAY VIEW OF THE CHEST 6/2/2022 10:39 am COMPARISON: March 7, 2022 chest exam HISTORY: ORDERING SYSTEM PROVIDED HISTORY: Shortness of breath, wheezes, rales TECHNOLOGIST PROVIDED HISTORY: Shortness of breath, wheezes, rales Reason for Exam: Shortness of breath, wheezes, rales FINDINGS: Stable large bore right IJ catheter Normal cardiac silhouette There is mild diffuse prominence of interstitial markings No significant pleural process     Satisfactory line placement Mild diffuse prominence of interstitial markings may be related to edema or infection       EKG  Initial EKG    EKG Interpretation    Interpreted by me    Rhythm: normal sinus   Rate: normal  Axis: Right axis deviation  Ectopy: none  Conduction: normal  ST Segments: Elevation without significant discordance in anterior leads  T Waves: Peaked T waves anterior and lateral leads  Q Waves: none    Clinical Impression: Acute T waves consistent with hyperkalemia    Repeat EKG    EKG Interpretation  Repeat EKG similar to initial with slight increase of ST depressions in leads II and III.     All EKG's are interpreted by the Emergency Department Physician who either signs or Co-signs this chart in the absence of a cardiologist.      CONSULTS:  [address]

## 2022-06-02 NOTE — PROGRESS NOTES
Patient was seen and examined on HD at bedside. Orders were confirmed with the HD nurse. Tolerating the procedure well. Patient normally gets dialysis as per TTS schedule at [address] [facility] [place] under Dr. Talib Denny. Alexis English MD  [facility]     This note is created with the assistance of a speech-recognition program. While intending to generate a document that actually reflects the content of the visit, no guarantees can be provided that every mistake has been identified and corrected by editing.

## 2022-06-02 NOTE — FLOWSHEET NOTE
Pt admitted to room 3010 from the ED. Arrived on portable monitor with RNx2. Pt transferred to the bed with assistance and placed on bedside monitor. Vital signs taken and are stable. Pt on Room Air. Oriented to room, bed in locked low position and side rails up x 3.

## 2022-06-02 NOTE — H&P
Critical Care - History and Physical Examination    Patient's name:  Modesta Crawford  Medical Record Number: [id number]  Patient's account/billing number: [de-identified]  Patient's YOB: 1955  Age: 78 y.o. Date of Admission: 6/2/2022  9:54 AM  Reason of ICU admission:   Date of History and Physical Examination: 6/2/2022      Primary Care Physician: Donavon Campa  Attending Physician:    Code Status: Prior    Chief complaint: Fatigue, shortness of breath    Reason for ICU admission: Diabetic ketoacidosis, hyperkalemia      History Of Present Illness:   History was obtained from the patient. oMdesta Crawford is a 78 y.o. with a history of chronic kidney disease on dialysis Tuesday Thursday Saturday, diabetes mellitus for which he takes Humalog, hypertension, hyperlipidemia, previous TIA as well as previous coronary artery disease who comes in with concerns for worsening shortness of breath. He states that symptoms been occurring for the past 24 to 48 hours with patient last being dialyzed on Tuesday without any issue. Patient was seen and evaluated in the emergency department, initial lab values showed concerns for hyperglycemia with a glucose of 598, elevated anion gap of 42, bicarb at 6 with concerns for diabetic ketoacidosis. Patient also notably had a potassium of 7.5 with EKG changes including QTC prolongation, hyperacute T waves. Serial troponins showed a delta of 5, patient upon my initial evaluation adamantly denies chest pain, does endorse nausea as well as shortness of breath compared to baseline, does state that he still makes urine, endorses increased urination over the past 12 to 24 hours. Prior to medical history of evaluation nephrology consulted, patient to undergo urgent dialysis in the emergency department prior to admission to the ICU. Patient also has a history of COPD, has received multiple breathing treatments with only transient improvement in respiratory status.   Patient is currently saturating 98% on 5 L nasal cannula. Point-of-care blood gas shows concerns for acidosis with a pH of 7.039, PCO2 of 21.5, bicarb of 5.4, PO2 of 178 with concerns for metabolic acidosis with respiratory compensation. Past Medical History:        Diagnosis Date    Asthma     mod persistent    CAD in native artery, nonobstructive     Carotid stenosis, left 6/10/2013    Carpal tunnel syndrome     RIGHT    Cerebrovascular disease 4/23/2018    Chronic kidney disease 6/10/2013    COPD (chronic obstructive pulmonary disease) (HCC)     Diabetes mellitus (HCC)     insulin dependent    History of colon polyps     History of weakness of extremity     right sided    HTN (hypertension)     Hyperlipidemia     Lung, cysts, congenital     PTSD (post-traumatic stress disorder)     PTSD (post-traumatic stress disorder)     TIA (transient ischemic attack)     Type II or unspecified type diabetes mellitus without mention of complication, not stated as uncontrolled        Past Surgical History:        Procedure Laterality Date    CAROTID ENDARTERECTOMY Left 7-2013    COLONOSCOPY      HERNIA REPAIR      IR TUNNELED CATHETER PLACEMENT GREATER THAN 5 YEARS  5/11/2021    IR TUNNELED CATHETER PLACEMENT GREATER THAN 5 YEARS 5/11/2021 Lilli Gottron, MD [facility]    FL COLSC FLX W/RMVL OF TUMOR POLYP LESION SNARE TQ N/A 6/27/2017    COLONOSCOPY POLYPECTOMY SNARE/ hot performed by Maria Eugenia Riley DO at [facility] VASCULAR SURGERY Left 2015    carotid stent, dr Tanya Hoskins: Allergies   Allergen Reactions    Lisinopril      cough    Ace Inhibitors      coughing    Pcn [Penicillins]          Home Medications:   Prior to Admission medications    Medication Sig Start Date End Date Taking?  Authorizing Provider   magnesium oxide (MAG-OX) 400 MG tablet Take 400 mg by mouth daily    Historical Provider, MD   ipratropium-albuterol (DUONEB) 0.5-2.5 (3) MG/3ML SOLN nebulizer solution Inhale 3 mLs into the lungs 4 times daily 11/17/21 3/17/22  Angeles Wolf MD   albuterol-ipratropium (COMBIVENT RESPIMAT)  MCG/ACT AERS inhaler Inhale 1 puff into the lungs every 6 hours 11/17/21 12/17/21  Angeles Wolf MD   bumetanide (BUMEX) 1 MG tablet Take 1 tablet by mouth daily 10/19/21   Historical Provider, MD   NIFEdipine (ADALAT CC) 90 MG extended release tablet Take 1 tablet by mouth daily 10/19/21   Historical Provider, MD   aspirin 81 MG EC tablet Take 81 mg by mouth daily 7/28/21   Historical Provider, MD   atorvastatin (LIPITOR) 80 MG tablet Take 1 tablet by mouth daily 10/19/21   Historical Provider, MD   carvedilol (COREG) 25 MG tablet Take 1 tablet by mouth 2 times daily 10/19/21   Historical Provider, MD   cloNIDine (CATAPRES) 0.1 MG tablet Take 0.3 mg by mouth three times daily  8/22/21   Historical Provider, MD   clopidogrel (PLAVIX) 75 MG tablet Take 1 tablet by mouth daily 8/22/21   Historical Provider, MD   gabapentin (NEURONTIN) 100 MG capsule Take 200 mg by mouth 3 times daily. 10/5/21   Historical Provider, MD   hydrALAZINE (APRESOLINE) 100 MG tablet Take 1 tablet by mouth 3 times daily 8/24/21   Historical Provider, MD   losartan (COZAAR) 50 MG tablet Take 1 tablet by mouth daily 8/22/21   Historical Provider, MD   montelukast (SINGULAIR) 10 MG tablet Take 1 tablet by mouth nightly 8/22/21   Historical Provider, MD   prazosin (MINIPRESS) 5 MG capsule Take 10 mg by mouth 2 times daily    Historical Provider, MD   blood glucose monitor strips Test 4 times a day & as needed for symptoms of irregular blood glucose. Dispense sufficient amount for indicated testing frequency plus additional to accommodate PRN testing needs.  5/26/21   TERRANCE Grayson NP   insulin glargine [facility]) 100 UNIT/ML injection pen Inject 10 Units into the skin nightly 5/13/21   TERRANCE Grayson NP   glucagon 1 MG injection Inject 1 mg into the muscle See Admin Instructions Follow package directions for low blood sugar. Patient not taking: Reported on 11/17/2021 4/6/21   TERRANCE Knott CNP   fluticasone Niyah Mussel) 50 MCG/ACT nasal spray  2/23/21   Historical Provider, MD   insulin aspart (NOVOLOG FLEXPEN) 100 UNIT/ML injection pen Inject 5 Units into the skin 3 times daily (before meals) Sliding scale 3/16/21   TERRANCE Knott CNP   nitroGLYCERIN (NITROSTAT) 0.4 MG SL tablet USE 1 TABLET UNDER THE TONGUE UP TO MAXIMUM OF 3 TOTAL DOSES. IF NO RELIEF AFTER 1 DOSE, CALL 911. 3/5/21   TERRANCE Knott CNP   QUEtiapine (SEROQUEL) 100 MG tablet TAKE 1 TABLET BY MOUTH NIGHTLY 2/23/21   TERRANCE Knott CNP   GNP VITAMIN D MAXIMUM STRENGTH 50 MCG (2000 UT) TABS TAKE 1 TABLET BY MOUTH ONCE DAILY 2/23/21   TERRANCE Knott CNP   blood glucose monitor kit and supplies Dispense sufficient amount for indicated testing frequency plus additional to accommodate PRN testing needs. Dispense all needed supplies to include: monitor, strips, lancing device, lancets, control solutions, alcohol swabs. 1/26/21   TERRANCE Knott CNP   vitamin D (ERGOCALCIFEROL) 1.25 MG (15477 UT) CAPS capsule Take 1 capsule by mouth once a week 1/5/21   TERRANCE Knott CNP   Lancets MISC Use_4__times daily Diagnisis:250.0  Diabetes Mellitus__x__Insulin Dependent___Non-Insulin Dependent 12/6/20   Alex Lal MD   Nutritional Supplements ([address] [place] [address]) LIQD Take 1 Can by mouth 3 times daily 6/16/20   TERRANCE Knott CNP   COMFORT EZ PEN NEEDLES 31G X 8 MM MISC USE AS DIRECTED 4/14/20   TERRANCE Knott CNP       Social History:   TOBACCO:   reports that he quit smoking about 7 years ago. His smoking use included cigarettes. He has a 17.50 pack-year smoking history. He has never used smokeless tobacco.  ETOH:   reports no history of alcohol use. DRUGS:  reports no history of drug use.   OCCUPATION:      Family History: Problem Relation Age of Onset    Cancer Mother     Heart Disease Father            REVIEW OF SYSTEMS (ROS):    General ROS: Shortness of breath, fatigue compared to baseline  Psychological ROS: Awake alert and oriented, no suicidal homicidal ideation  Ophthalmic ROS: No change in vision, conjunctival injection, to baseline  ENT: No tinnitus, rhinitis, sore throat  Hematological and Lymphatic ROS: negative  Endocrine ROS: Polyuria, increased fatigue  Respiratory ROS: Shortness of breath with tachypnea, dry cough  Cardiovascular ROS: no chest pain or dyspnea on exertion  Gastrointestinal ROS: Nausea with nonbloody nonbilious vomiting  Genito-Urinary ROS: Polyuria  Musculoskeletal ROS: Generalized fatigue  Neurological ROS: No focal neurological deficit, ANO x4, cranial nerves II through XII gross intact  Dermatological ROS: S right-sided subclavian catheter well-appearing      Physical Exam:    Vitals: BP (!) 137/43   Pulse 69   Temp 98.4 °F (36.9 °C) (Axillary)   Resp 21   Ht 5' 6\" (1.676 m)   Wt 154 lb (69.9 kg)   SpO2 100%   BMI 24.86 kg/m²     Body weight:   Wt Readings from Last 3 Encounters:   06/02/22 154 lb (69.9 kg)   03/08/22 107 lb 5.8 oz (48.7 kg)   11/17/21 124 lb (56.2 kg)       Body Mass Index : Body mass index is 24.86 kg/m². PHYSICAL EXAMINATION :  Constitutional: Tachypnea, nontoxic appearing  EENT: PERRLA, EOMI, , oropharynx clear, no lesions, neck supple with midline trachea.   Neck: Supple, symmetrical, trachea midline, no adenopathy, thyroid symmetric, no jvd skin normal  Respiratory: No intercostal tenderness, equal breath sounds bilaterally, rales noted bilateral lower lung fields  Cardiovascular: regular rate and rhythm, normal S1, S2, no murmur noted and 2+ pulses throughout  Abdomen: soft, nontender, nondistended, no masses or organomegaly  Neurological: AOx4, no focal neurological deficit, cranial nerves II through XII grossly intact  Extremities:  peripheral pulses normal, no pedal edema, no clubbing or cyanosis      Laboratory findings:-    CBC:   Recent Labs     06/02/22  1013   WBC 12.6*   HGB 8.9*        BMP:    Recent Labs     06/02/22  1013   *   K 7.5*   CL 77*   CO2 6*   BUN 94*   CREATININE 6.92*   GLUCOSE 598*     S. Calcium:  Recent Labs     06/02/22  1013   CALCIUM 9.1     S. Ionized Calcium:No results for input(s): IONCA in the last 72 hours. S. Magnesium:No results for input(s): MG in the last 72 hours. S. Phosphorus:No results for input(s): PHOS in the last 72 hours. S. Glucose:No results for input(s): POCGLU in the last 72 hours. Glycosylated hemoglobin A1C: No results for input(s): LABA1C in the last 72 hours. INR:   Recent Labs     06/02/22  1013   INR 3.9     Hepatic functions:   Recent Labs     06/02/22  1013   ALKPHOS 108   ALT 32   AST 30   PROT 6.6   BILITOT 0.17*   LABALBU 4.2     Pancreatic functions:No results for input(s): LACTA, AMYLASE in the last 72 hours. S. Lactic Acid: No results for input(s): LACTA in the last 72 hours. Cardiac enzymes:No results for input(s): CKTOTAL, CKMB, CKMBINDEX, TROPONINI in the last 72 hours. BNP:No results for input(s): BNP in the last 72 hours. Lipid profile: No results for input(s): CHOL, TRIG, HDL, LDL, LDLCALC in the last 72 hours.   Blood Gases: No results found for: PH, PCO2, PO2, HCO3, O2SAT  Thyroid functions:   Lab Results   Component Value Date    TSH 2.91 05/15/2020        Urinalysis:     Microbiology:  Cultures during this admission:     Blood cultures:                 [x] None drawn      [] Negative             []  Positive (Details:  )  Urine Culture:                   [x] None drawn      [] Negative             []  Positive (Details:  )  Sputum Culture:               [x] None drawn       [] Negative             []  Positive (Details:  )   Endotracheal aspirate:     [] None drawn       [] Negative             []  Positive (Details:  ) -----------------------------------------------------------------  Radiological reports:    XR CHEST PORTABLE    Result Date: 6/2/2022  EXAMINATION: ONE XRAY VIEW OF THE CHEST 6/2/2022 10:39 am COMPARISON: March 7, 2022 chest exam HISTORY: ORDERING SYSTEM PROVIDED HISTORY: Shortness of breath, wheezes, rales TECHNOLOGIST PROVIDED HISTORY: Shortness of breath, wheezes, rales Reason for Exam: Shortness of breath, wheezes, rales FINDINGS: Stable large bore right IJ catheter Normal cardiac silhouette There is mild diffuse prominence of interstitial markings No significant pleural process     Satisfactory line placement Mild diffuse prominence of interstitial markings may be related to edema or infection          Assessment and Plan     Patient Active Problem List   Diagnosis    Cigarette nicotine dependence without complication    Carpal tunnel syndrome on right    Colon polyps    Carotid stenosis, left s/p Endarterectomy    Mixed simple and mucopurulent chronic bronchitis (HCC)    Pure hypercholesterolemia    Dyspnea and respiratory abnormalities    PTSD (post-traumatic stress disorder)    Transient cerebral ischemia    Hypertension, essential    DM type 2, uncontrolled, with neuropathy ([address]    COPD with acute exacerbation ([address]    Cerebral vascular disease    Primary insomnia    Hypertensive urgency    Non-obstructive Coronary artery disease of native artery of native heart with stable angina pectoris (HCC)    Hyperparathyroidism ([address]    Hypovitaminosis D    Acute kidney injury superimposed on CKD (HCC)-currently dialysis patient    Coronary artery disease with exertional angina (HCC)    Moderate malnutrition (HCC)    COPD exacerbation (HCC)    Leg swelling    Hyponatremia    Anemia of chronic disease    Hypoalbuminemia    DKA, type 2, not at goal [facility])    Inflammation of right sacroiliac joint ([address]    Malignant neoplasm of colon ([address]    ESRD on dialysis ([address]    Bilateral carotid artery occlusion    Seizure ([facility]    Chronic heart failure with preserved ejection fraction (HCC)    Left ventricular hypertrophy    SOB (shortness of breath)    Uncontrolled type 2 diabetes mellitus with hyperglycemia (HCC)    Acute respiratory failure (HCC)    Brittle diabetes (HCC)    Fluid overload    History of CVA (cerebrovascular accident)    Hyperkalemia         Additional assessment:  history of chronic kidney disease on dialysis Tuesday Thursday Saturday, diabetes mellitus for which he takes Humalog, hypertension, hyperlipidemia, previous TIA as well as previous coronary artery disease who comes in with concerns for worsening shortness of breath. He states that symptoms been occurring for the past 24 to 48 hours with patient last being dialyzed on Tuesday without any issue. Patient was seen and evaluated in the emergency department, initial lab values showed concerns for hyperglycemia with a glucose of 598, elevated anion gap of 42, bicarb at 6 with concerns for diabetic ketoacidosis. Patient also notably had a potassium of 7.5 with EKG changes including QTC prolongation, hyperacute T waves. Serial troponins showed a delta of 5, patient upon my initial evaluation adamantly denies chest pain, does endorse nausea as well as shortness of breath compared to baseline, does state that he still makes urine, endorses increased urination over the past 12 to 24 hours. Prior to medical history of evaluation nephrology consulted, patient to undergo urgent dialysis in the emergency department prior to admission to the ICU. Patient also has a history of COPD, has received multiple breathing treatments with only transient improvement in respiratory status. Patient is currently saturating 98% on 5 L nasal cannula.   Point-of-care blood gas shows concerns for acidosis with a pH of 7.039, PCO2 of 21.5, bicarb of 5.4, PO2 of 178 with concerns for metabolic acidosis with respiratory compensation. Plan:  Neuro:   Patient awake alert and oriented, not complaining of pain at this time   Continue to monitor neuro status with concerns for diabetic ketoacidosis, cerebral edema in the setting of fluid abnormality    Resp:   Rales noted on auscultation, patient saturating 98% on 5 L nasal cannula, patient undergo dialysis today, continue to monitor fluid, respiratory status   Chest x-ray on 6/2/2022 showed concerns for pulmonary edema versus infectious etiology, patient started on azithromycin while in the emergency department, patient does have a mildly elevated white count, will reevaluate patient's respiratory status, fever following dialysis. CV:   History of previous coronary artery disease, myocardial infarction, patient's most recent echo showed a ventricular max fraction greater than 65% done on 1/8/0851, grade 1 diastolic dysfunction   Patient to need on Plavix, aspirin   New onsetchest pain, cardiology recommends heparin, repeating EKG once potassium is normalized      GI/Nutrition:   General diet ordered, continue follow-up with    /Fluids/Electrolytes:   Hyperkalemia noted to be 7.2 100s evaluation   Nephrology consulted, urgent/emergent dialysis being done in the emergency department prior to patient arriving in the ICU.   Continue to monitor potassium, glucose with concerns for diabetic ketoacidosis, continue to monitor acid-base status with serial metabolic panels, continue to follow nephrology's recommendation  Heme:   On Coumadin most recent INR 3.9    ID:   Chest x-ray showed concerns for potential pneumonia, started on azithromycin, will reevaluate in the morning    Endo:    Hyperglycemia with concerns for diabetic ketoacidosis\  Continue to monitor anion gap status, continue on insulin    MSK:   No acute complaints    Skin:   Dialysis catheter site well-appearing    Prophylaxis:   DVT: Heparin IV   GI: General diet    Dispo:   ICU        Aisha Wren MD [place]   Emergency Medicine - PGY-3  Intensive Care Unit  6/2/2022, 12:26 PM

## 2022-06-02 NOTE — ED NOTES
Pt up to bathroom in room with one assist for a BM. Pt c/o chest pain shortly after, Dr. Lisa Ospina notified.  Pt remained on oxygen via [address], RN  06/02/22 1300

## 2022-06-02 NOTE — ED NOTES
Report given to [facility], [place] [address] Kelly Mejia RN  06/02/22 [id number]

## 2022-06-02 NOTE — CONSULTS
Renal Consult Note    Patient :  Bro Root; 78 y.o. MRN# [id number]  Location:  18/18  Attending:  Juanpablo Kidd MD  Admit Date:  6/2/2022   Hospital Day: 0    Reason for Consult:     Asked by Dr Juanpablo Kidd MD to see for BINU/Elevated Creatinine. History Obtained From:     Patient and chart    HD Access:     Hemodialysis Catheter Double Cuffed Right Subclavian    HD Unit:      Renal [facility]    Nephrologist:     Dr. Ruddy Angulo    Dry Weight:     54 kg    Admission Weight:     70 kg    History of Present Illness:     Bro Root; 78 y.o. male with past medical history of ESRD on HD TTS followed by Dr. Ruddy Angulo at [facility], HTN, T2DM, COPD, and CVA presented to the hospital with the chief complaint of shortness of breath for 1 day. Millicent Baird has been on insulin for ~30 years and on chronic HD. He had a recent admission to [facility] in 3/2022 for similar shortness of breath after missing several dialysis sessions. Has not missed dialysis recently, last HD on Tuesday 5/31 per patient. He was recently admitted to [facility] for COPD exacerbation. Reports being admitted for 18 days, hospital course was complicated by clot in dialysis catheter. He reports being switched off Plavix to warfarin for anticoagulation. During admission he states \"there was fluid around my lungs\" and that approximately 25 lbs of fluid were removed by dialysis. Reports being discharged \"about 2-3 days ago. \" He does report eating higher potassium content food recently. Presents to ED today after gradually worsening SOB that began yesterday 6/1. SOB began while at home sitting at rest, and has not improved. He has associated nausea and vomiting with the SOB. Emesis is bile-colored with no blood or coffee-ground appearance. SOB did not improve overnight, and this morning 6/2 he called EMS. He received 2 nebulized albuterol treatments prior to arrival with little improvement. Arrived on 6L NC saturating 100%. Denies fever, chills, abdominal pain, diarrhea. Denies chest pain. No associated cough with SOB. Relevant labs on arrival were:   Na 125, K 7.5, Cl 77, bicarb 6, BUN 94, Cr 6.92 (baseline 2-3), Glucose 598, eGFR 25. Patient did hyperkalemia management with insulin dextrose along with calcium gluconate in the ED. Jelly Perfect also ordered. WBC 12.6, Hgb 8.9    proBNP 52645, Troponin 323 (baseline 120s)    PT 36.9, INR 3.9    Venous pH 7.039    CXR on admission showed mild diffuse prominence of interstitial markings, maybe related to edema or infection. Initiated on azithromycin by primary team d/t possible infection. Undergoing emergent dialysis in ED prior to placement in ICU. Patient was seen and examined on HD. Tolerating the procedure well. Nephrology is consulted due to ESRD on HD. Past History/Allergies? Social History:     Past Medical History:   Diagnosis Date    Asthma     mod persistent    CAD in native artery, nonobstructive     Carotid stenosis, left 6/10/2013    Carpal tunnel syndrome     RIGHT    Cerebrovascular disease 4/23/2018    Chronic kidney disease 6/10/2013    COPD (chronic obstructive pulmonary disease) (HCC)     Diabetes mellitus (HCC)     insulin dependent    History of colon polyps     History of weakness of extremity     right sided    HTN (hypertension)     Hyperlipidemia     Lung, cysts, congenital     PTSD (post-traumatic stress disorder)     PTSD (post-traumatic stress disorder)     TIA (transient ischemic attack)     Type II or unspecified type diabetes mellitus without mention of complication, not stated as uncontrolled        Past Surgical History:   Procedure Laterality Date    CAROTID ENDARTERECTOMY Left 7-2013    COLONOSCOPY      HERNIA REPAIR      IR TUNNELED CATHETER PLACEMENT GREATER THAN 5 YEARS  5/11/2021    IR TUNNELED CATHETER PLACEMENT GREATER THAN 5 YEARS 5/11/2021 Diana Ross MD [facility] SPECIAL PROCEDURES    AL COLSC FLX W/RMVL OF TUMOR POLYP LESION SNARE TQ N/A 2017    COLONOSCOPY POLYPECTOMY SNARE/ hot performed by Jose Banda DO at [facility] Left 2015    carotid stent, dr Macdonald Rule       Allergies   Allergen Reactions    Lisinopril      cough    Ace Inhibitors      coughing    Pcn [Penicillins]        Social History     Socioeconomic History    Marital status:      Spouse name: Not on file    Number of children: Not on file    Years of education: Not on file    Highest education level: Not on file   Occupational History     Employer: N/A   Tobacco Use    Smoking status: Former Smoker     Packs/day: 0.50     Years: 35.00     Pack years: 17.50     Types: Cigarettes     Quit date: 2014     Years since quittin.9    Smokeless tobacco: Never Used   Vaping Use    Vaping Use: Never used   Substance and Sexual Activity    Alcohol use: No     Alcohol/week: 0.0 standard drinks    Drug use: No    Sexual activity: Yes     Partners: Female   Other Topics Concern    Not on file   Social History Narrative    Not on file     Social Determinants of Health     Financial Resource Strain:     Difficulty of Paying Living Expenses: Not on file   Food Insecurity:     Worried About Running Out of Food in the Last Year: Not on file    Warren of Food in the Last Year: Not on file   Transportation Needs:     Lack of Transportation (Medical): Not on file    Lack of Transportation (Non-Medical):  Not on file   Physical Activity:     Days of Exercise per Week: Not on file    Minutes of Exercise per Session: Not on file   Stress:     Feeling of Stress : Not on file   Social Connections:     Frequency of Communication with Friends and Family: Not on file    Frequency of Social Gatherings with Friends and Family: Not on file    Attends Worship Services: Not on file    Active Member of Clubs or Organizations: Not on file    Attends Club or Organization Meetings: Not on file    Marital Status: Not on file   Intimate Partner Violence:     Fear of Current or Ex-Partner: Not on file    Emotionally Abused: Not on file    Physically Abused: Not on file    Sexually Abused: Not on file   Housing Stability:     Unable to Pay for Housing in the Last Year: Not on file    Number of [place] in the Last Year: Not on file    Unstable Housing in the Last Year: Not on file       Family History:        Family History   Problem Relation Age of Onset    Cancer Mother     Heart Disease Father        Outpatient Medications:     Not in a hospital admission. Current Medications:     Scheduled Meds:    calcium gluconate-NaCl  2,000 mg IntraVENous Once    azithromycin  500 mg IntraVENous Once     Continuous Infusions:    dextrose      insulin       PRN Meds:  albuterol, glucose, dextrose bolus **OR** dextrose bolus, glucagon (rDNA), dextrose    Review of Systems:     Constitutional: No fever, no chills, positive fatigue  HEENT:  No headache, otalgia, itchy eyes, nasal discharge or sore throat. Cardiac:  No chest pain, positive dyspnea. Chest:   No cough, phlegm   Abdomen:  No abdominal pain, positive nausea and vomiting  Neuro:  No focal weakness, abnormal movements orseizure like activity. Skin:   No rashes, no itching. :   No hematuria, no pyuria, no dysuria, no flank pain. Extremities:  No swelling or joint pains. ROS was otherwise negative except as mentioned in the [address] Input/Output:     No intake/output data recorded. Vital Signs:   Temperature:  Temp: 98.4 °F (36.9 °C)  TMax:   Temp (24hrs), Av.4 °F (36.9 °C), Min:98.4 °F (36.9 °C), Max:98.4 °F (36.9 °C)    Respirations:  Resp: 21  Pulse:   Heart Rate: 69  BP:    BP: (!) 137/43  BP Range: Systolic (96RKI), IVX:278 , Min:137 , DJK:355       Diastolic (39OBC), UYT:12, Min:43, Max:47      Physical Examination:     General:  AAO x 3, speaking in full sentences, no accessory muscle use.   HEENT: Atraumatic, normocephalic, no throat congestion, moist mucosa. Eyes:   Pupils equal, round and reactive to light, EOMI, sclera injected  Neck:   Supple, no JVD  Chest:   Bilateral vesicular breath sounds, mild rales bilaterally  Cardiac:  S1 S2 RR   Abdomen: Soft, non-tender, no masses or organomegaly, BS audible. :   No suprapubic or flank tenderness. Neuro:  AAO x 3, No FND. SKIN:  No rashes, good skin turgor. Extremities:  No edema. Labs:       Recent Labs     06/02/22  1013   WBC 12.6*   RBC 2.82*   HGB 8.9*   HCT 30.4*   .8*   MCH 31.6   MCHC 29.3   RDW 14.6*      MPV 9.5      BMP:   Recent Labs     06/02/22  1013   *   K 7.5*   CL 77*   CO2 6*   BUN 94*   CREATININE 6.92*   GLUCOSE 598*   CALCIUM 9.1      Albumin:    Recent Labs     06/02/22  1013   LABALBU 4.2     BNP:      Lab Results   Component Value Date    BNP 84 11/27/2013     PTH:     Lab Results   Component Value Date    IPTH 225.4 05/10/2021     Urinalysis/Chemistries:      Lab Results   Component Value Date    NITRU NEGATIVE 05/03/2021    COLORU YELLOW 05/03/2021    PHUR 6.0 05/03/2021    WBCUA 2 TO 5 05/03/2021    RBCUA 0 TO 2 05/03/2021    MUCUS NOT REPORTED 05/03/2021    TRICHOMONAS NOT REPORTED 05/03/2021    YEAST NOT REPORTED 05/03/2021    BACTERIA NOT REPORTED 05/03/2021    CLARITYU Clear 09/12/2014    SPECGRAV 1.016 05/03/2021    LEUKOCYTESUR NEGATIVE 05/03/2021    UROBILINOGEN Normal 05/03/2021    BILIRUBINUR NEGATIVE 05/03/2021    BILIRUBINUR NEGATIVE 11/12/2011    BLOODU Negative 09/12/2014    GLUCOSEU 1+ 05/03/2021    GLUCOSEU 3+ 11/12/2011    KETUA NEGATIVE 05/03/2021    AMORPHOUS NOT REPORTED 05/03/2021       Radiology:     Reviewed. Assessment:       1. ESRD on Hemodialysis. His regular HD days are TTS at [facility] using tunnel catheter under Dr. Santana Renteria. 2. Acute respiratory failure-on 5L NC  3. Life-threatening hyperkalemia. 4. Metabolic acidosis. 5. Essential hypertension  6.  T2DM requiring insulin  7. Hx COPD  8. Peripheral vascular disease  9. Hx ischemic left CVA  10. ? Fluid overload  11. Hyponatremia  12. Anemia of chronic disease  13. Secondary hyperparathyroidism  14. Hypertension    Plan:     1. Patient was seen and examined on HD at bedside. Orders were confirmed with the HD nurse. 2. Strict Input and Output, Daily weigh and document in the chart. 3. Sodium and potassium bath adjusted with dialysis. 4. Low Potassium, Low phosphorus and low salt diet. Fluids to be restricted to 1500ml/day. 5. IV Aranesp/Epogen for anemia of chronic disease with HD weekly. 6. IV Zemplar per protocol for secondary hyperparathyroidism with HD thrice a week. 7. BMP in AM  8. Will follow. Nutrition   Please ensure that patient is on a renal diet/TF. Thank you for the consultation. Please do not hesitate to contact us for any further questions/concerns. Adrianne Raines, MS4    Attending Physician Statement  I have discussed the care of Marlena Mullins, including pertinent history and exam findings, with the Resident/CNP/medical student. I also saw and examined the patient myself. I have reviewed all the key elements of all parts of the encounter and edited all that was required. Alexis Infante MD   Nephrology [address] [place] [address] Drive    This note is created with the assistance of a speech-recognition program. While intending to generate a document that actually reflects the content of the visit, no guarantees can be provided that every mistake has been identified and corrected by editing.

## 2022-06-02 NOTE — ED PROVIDER NOTES
[facility] ED  eMERGENCY dEPARTMENT eNCOUnter   Attending Attestation     Pt Name: Lauren Peña  MRN: [id number]  [place] 1955  Date of evaluation: 6/2/22       Lauren Peña is a 78 y.o. male who presents with Shortness of Breath      History: Pt presents with shortness of breath and elevated blood glucose. Pt is ESRD on dialysis. Exam: HRRR, lungs diminished on the left with wheeze and crackles on the right. Abdomen soft and non tender. Hyperkalemia, Pulmonary edema, trop elevation, concern for dka. Will plan for admission to ICU. I performed a history and physical examination of the patient and discussed management with the resident. I reviewed the residents note and agree with the documented findings and plan of care. Any areas of disagreement are noted on the chart. I was personally present for the key portions of any procedures. I have documented in the chart those procedures where I was not present during the key portions. I have personally reviewed all images and agree with the resident's interpretation. I have reviewed the emergency nurses triage note. I agree with the chief complaint, past medical history, past surgical history, allergies, medications, social and family history as documented unless otherwise noted below. Documentation of the HPI, Physical Exam and Medical Decision Making performed by medical students or scribes is based on my personal performance of the HPI, PE and MDM. For Phys Assistant/ Nurse Practitioner cases/documentation I have had a face to face evaluation of this patient and have completed at least one if not all key elements of the E/M (history, physical exam, and MDM). Additional findings are as noted. For APC cases I have personally evaluated and examined the patient in conjunction with the APC and agree with the treatment plan and disposition of the patient as recorded by the APC.     Jaycee Kenny MD  Attending Emergency  Physician Sonido Palmer MD  06/07/22 [id number]

## 2022-06-03 LAB
ABO/RH: NORMAL
ABSOLUTE EOS #: 0 K/UL (ref 0–0.4)
ABSOLUTE IMMATURE GRANULOCYTE: 0.12 K/UL (ref 0–0.3)
ABSOLUTE LYMPH #: 0.82 K/UL (ref 1–4.8)
ABSOLUTE MONO #: 0.59 K/UL (ref 0.1–0.8)
ALBUMIN SERPL-MCNC: 3.5 G/DL (ref 3.5–5.2)
ALBUMIN/GLOBULIN RATIO: 1.8 (ref 1–2.5)
ALP BLD-CCNC: 95 U/L (ref 40–129)
ALT SERPL-CCNC: 35 U/L (ref 5–41)
ANION GAP SERPL CALCULATED.3IONS-SCNC: 14 MMOL/L (ref 9–17)
ANION GAP SERPL CALCULATED.3IONS-SCNC: 15 MMOL/L (ref 9–17)
ANTIBODY SCREEN: NEGATIVE
ARM BAND NUMBER: NORMAL
AST SERPL-CCNC: 112 U/L
BASOPHILS # BLD: 0 % (ref 0–2)
BASOPHILS ABSOLUTE: 0 K/UL (ref 0–0.2)
BILIRUB SERPL-MCNC: <0.1 MG/DL (ref 0.3–1.2)
BUN BLDV-MCNC: 34 MG/DL (ref 8–23)
BUN BLDV-MCNC: 35 MG/DL (ref 8–23)
CALCIUM SERPL-MCNC: 7.3 MG/DL (ref 8.6–10.4)
CALCIUM SERPL-MCNC: 7.4 MG/DL (ref 8.6–10.4)
CHLORIDE BLD-SCNC: 85 MMOL/L (ref 98–107)
CHLORIDE BLD-SCNC: 86 MMOL/L (ref 98–107)
CO2: 20 MMOL/L (ref 20–31)
CO2: 24 MMOL/L (ref 20–31)
CREAT SERPL-MCNC: 3.63 MG/DL (ref 0.7–1.2)
CREAT SERPL-MCNC: 4.27 MG/DL (ref 0.7–1.2)
CULTURE: ABNORMAL
EKG ATRIAL RATE: 61 BPM
EKG ATRIAL RATE: 70 BPM
EKG ATRIAL RATE: 81 BPM
EKG ATRIAL RATE: 84 BPM
EKG P AXIS: 61 DEGREES
EKG P AXIS: 74 DEGREES
EKG P AXIS: 85 DEGREES
EKG P AXIS: 86 DEGREES
EKG P-R INTERVAL: 142 MS
EKG P-R INTERVAL: 150 MS
EKG P-R INTERVAL: 192 MS
EKG P-R INTERVAL: 192 MS
EKG Q-T INTERVAL: 402 MS
EKG Q-T INTERVAL: 410 MS
EKG Q-T INTERVAL: 422 MS
EKG Q-T INTERVAL: 450 MS
EKG QRS DURATION: 104 MS
EKG QRS DURATION: 116 MS
EKG QRS DURATION: 126 MS
EKG QRS DURATION: 136 MS
EKG QTC CALCULATION (BAZETT): 453 MS
EKG QTC CALCULATION (BAZETT): 455 MS
EKG QTC CALCULATION (BAZETT): 475 MS
EKG QTC CALCULATION (BAZETT): 476 MS
EKG R AXIS: -2 DEGREES
EKG R AXIS: 100 DEGREES
EKG R AXIS: 46 DEGREES
EKG R AXIS: 87 DEGREES
EKG T AXIS: -71 DEGREES
EKG T AXIS: -84 DEGREES
EKG T AXIS: 136 DEGREES
EKG T AXIS: 139 DEGREES
EKG VENTRICULAR RATE: 61 BPM
EKG VENTRICULAR RATE: 70 BPM
EKG VENTRICULAR RATE: 81 BPM
EKG VENTRICULAR RATE: 84 BPM
EOSINOPHILS RELATIVE PERCENT: 0 % (ref 1–4)
EXPIRATION DATE: NORMAL
GFR AFRICAN AMERICAN: 17 ML/MIN
GFR AFRICAN AMERICAN: 20 ML/MIN
GFR NON-AFRICAN AMERICAN: 14 ML/MIN
GFR NON-AFRICAN AMERICAN: 17 ML/MIN
GFR SERPL CREATININE-BSD FRML MDRD: ABNORMAL ML/MIN/{1.73_M2}
GFR SERPL CREATININE-BSD FRML MDRD: ABNORMAL ML/MIN/{1.73_M2}
GLUCOSE BLD-MCNC: 107 MG/DL (ref 75–110)
GLUCOSE BLD-MCNC: 108 MG/DL (ref 75–110)
GLUCOSE BLD-MCNC: 112 MG/DL (ref 75–110)
GLUCOSE BLD-MCNC: 154 MG/DL (ref 75–110)
GLUCOSE BLD-MCNC: 200 MG/DL (ref 70–99)
GLUCOSE BLD-MCNC: 231 MG/DL (ref 75–110)
GLUCOSE BLD-MCNC: 240 MG/DL (ref 75–110)
GLUCOSE BLD-MCNC: 257 MG/DL (ref 70–99)
GLUCOSE BLD-MCNC: 311 MG/DL (ref 75–110)
GLUCOSE BLD-MCNC: 325 MG/DL (ref 75–110)
GLUCOSE BLD-MCNC: 342 MG/DL (ref 75–110)
GLUCOSE BLD-MCNC: 383 MG/DL (ref 75–110)
GLUCOSE BLD-MCNC: 431 MG/DL (ref 75–110)
GLUCOSE BLD-MCNC: 501 MG/DL (ref 75–110)
GLUCOSE BLD-MCNC: 61 MG/DL (ref 75–110)
GLUCOSE BLD-MCNC: 83 MG/DL (ref 75–110)
GLUCOSE BLD-MCNC: 85 MG/DL (ref 75–110)
GLUCOSE BLD-MCNC: 88 MG/DL (ref 75–110)
HCT VFR BLD CALC: 24.7 % (ref 40.7–50.3)
HEMOGLOBIN: 8.2 G/DL (ref 13–17)
IMMATURE GRANULOCYTES: 1 %
INR BLD: 10.4
INR BLD: 4.4
LYMPHOCYTES # BLD: 7 % (ref 24–44)
Lab: ABNORMAL
MAGNESIUM: 1.6 MG/DL (ref 1.6–2.6)
MCH RBC QN AUTO: 31.8 PG (ref 25.2–33.5)
MCHC RBC AUTO-ENTMCNC: 33.2 G/DL (ref 28.4–34.8)
MCV RBC AUTO: 95.7 FL (ref 82.6–102.9)
MONOCYTES # BLD: 5 % (ref 1–7)
MORPHOLOGY: NORMAL
NRBC AUTOMATED: 0 PER 100 WBC
PARTIAL THROMBOPLASTIN TIME: 55.2 SEC (ref 20.5–30.5)
PARTIAL THROMBOPLASTIN TIME: 84.6 SEC (ref 20.5–30.5)
PDW BLD-RTO: 14.1 % (ref 11.8–14.4)
PLATELET # BLD: 307 K/UL (ref 138–453)
PMV BLD AUTO: 9.4 FL (ref 8.1–13.5)
POTASSIUM SERPL-SCNC: 3.4 MMOL/L (ref 3.7–5.3)
POTASSIUM SERPL-SCNC: 4.3 MMOL/L (ref 3.7–5.3)
PROTHROMBIN TIME: 42 SEC (ref 9.1–12.3)
PROTHROMBIN TIME: 91.4 SEC (ref 9.1–12.3)
RBC # BLD: 2.58 M/UL (ref 4.21–5.77)
SEG NEUTROPHILS: 87 % (ref 36–66)
SEGMENTED NEUTROPHILS ABSOLUTE COUNT: 10.17 K/UL (ref 1.8–7.7)
SODIUM BLD-SCNC: 121 MMOL/L (ref 135–144)
SODIUM BLD-SCNC: 123 MMOL/L (ref 135–144)
SPECIMEN DESCRIPTION: ABNORMAL
TOTAL PROTEIN: 5.4 G/DL (ref 6.4–8.3)
TROPONIN, HIGH SENSITIVITY: 2864 NG/L (ref 0–22)
TROPONIN, HIGH SENSITIVITY: 3056 NG/L (ref 0–22)
WBC # BLD: 11.7 K/UL (ref 3.5–11.3)

## 2022-06-03 PROCEDURE — 99232 SBSQ HOSP IP/OBS MODERATE 35: CPT | Performed by: INTERNAL MEDICINE

## 2022-06-03 PROCEDURE — 94761 N-INVAS EAR/PLS OXIMETRY MLT: CPT

## 2022-06-03 PROCEDURE — 6370000000 HC RX 637 (ALT 250 FOR IP): Performed by: STUDENT IN AN ORGANIZED HEALTH CARE EDUCATION/TRAINING PROGRAM

## 2022-06-03 PROCEDURE — 82947 ASSAY GLUCOSE BLOOD QUANT: CPT

## 2022-06-03 PROCEDURE — 2700000000 HC OXYGEN THERAPY PER DAY

## 2022-06-03 PROCEDURE — 36556 INSERT NON-TUNNEL CV CATH: CPT

## 2022-06-03 PROCEDURE — 2580000003 HC RX 258: Performed by: STUDENT IN AN ORGANIZED HEALTH CARE EDUCATION/TRAINING PROGRAM

## 2022-06-03 PROCEDURE — 86850 RBC ANTIBODY SCREEN: CPT

## 2022-06-03 PROCEDURE — 93005 ELECTROCARDIOGRAM TRACING: CPT | Performed by: STUDENT IN AN ORGANIZED HEALTH CARE EDUCATION/TRAINING PROGRAM

## 2022-06-03 PROCEDURE — 84484 ASSAY OF TROPONIN QUANT: CPT

## 2022-06-03 PROCEDURE — 06HY33Z INSERTION OF INFUSION DEVICE INTO LOWER VEIN, PERCUTANEOUS APPROACH: ICD-10-PCS | Performed by: INTERNAL MEDICINE

## 2022-06-03 PROCEDURE — 36556 INSERT NON-TUNNEL CV CATH: CPT | Performed by: INTERNAL MEDICINE

## 2022-06-03 PROCEDURE — 86927 PLASMA FRESH FROZEN: CPT

## 2022-06-03 PROCEDURE — 83735 ASSAY OF MAGNESIUM: CPT

## 2022-06-03 PROCEDURE — 94640 AIRWAY INHALATION TREATMENT: CPT

## 2022-06-03 PROCEDURE — 36430 TRANSFUSION BLD/BLD COMPNT: CPT

## 2022-06-03 PROCEDURE — 76937 US GUIDE VASCULAR ACCESS: CPT | Performed by: INTERNAL MEDICINE

## 2022-06-03 PROCEDURE — 86901 BLOOD TYPING SEROLOGIC RH(D): CPT

## 2022-06-03 PROCEDURE — P9017 PLASMA 1 DONOR FRZ W/IN 8 HR: HCPCS

## 2022-06-03 PROCEDURE — 6370000000 HC RX 637 (ALT 250 FOR IP)

## 2022-06-03 PROCEDURE — 2000000000 HC ICU R&B

## 2022-06-03 PROCEDURE — 80053 COMPREHEN METABOLIC PANEL: CPT

## 2022-06-03 PROCEDURE — 85730 THROMBOPLASTIN TIME PARTIAL: CPT

## 2022-06-03 PROCEDURE — 80048 BASIC METABOLIC PNL TOTAL CA: CPT

## 2022-06-03 PROCEDURE — 85025 COMPLETE CBC W/AUTO DIFF WBC: CPT

## 2022-06-03 PROCEDURE — 85610 PROTHROMBIN TIME: CPT

## 2022-06-03 PROCEDURE — 99291 CRITICAL CARE FIRST HOUR: CPT | Performed by: INTERNAL MEDICINE

## 2022-06-03 PROCEDURE — 6360000002 HC RX W HCPCS: Performed by: STUDENT IN AN ORGANIZED HEALTH CARE EDUCATION/TRAINING PROGRAM

## 2022-06-03 PROCEDURE — 36415 COLL VENOUS BLD VENIPUNCTURE: CPT

## 2022-06-03 PROCEDURE — 86900 BLOOD TYPING SEROLOGIC ABO: CPT

## 2022-06-03 PROCEDURE — 2500000003 HC RX 250 WO HCPCS

## 2022-06-03 RX ORDER — PHYTONADIONE 5 MG/1
5 TABLET ORAL ONCE
Status: COMPLETED | OUTPATIENT
Start: 2022-06-03 | End: 2022-06-03

## 2022-06-03 RX ORDER — INSULIN GLARGINE 100 [IU]/ML
11 INJECTION, SOLUTION SUBCUTANEOUS 2 TIMES DAILY
Status: DISCONTINUED | OUTPATIENT
Start: 2022-06-03 | End: 2022-06-03

## 2022-06-03 RX ORDER — INSULIN LISPRO 100 [IU]/ML
0-6 INJECTION, SOLUTION INTRAVENOUS; SUBCUTANEOUS NIGHTLY
Status: DISCONTINUED | OUTPATIENT
Start: 2022-06-03 | End: 2022-06-06

## 2022-06-03 RX ORDER — INSULIN GLARGINE 100 [IU]/ML
11 INJECTION, SOLUTION SUBCUTANEOUS 2 TIMES DAILY
Status: DISCONTINUED | OUTPATIENT
Start: 2022-06-03 | End: 2022-06-04

## 2022-06-03 RX ORDER — PANTOPRAZOLE SODIUM 40 MG/1
40 TABLET, DELAYED RELEASE ORAL
Status: DISCONTINUED | OUTPATIENT
Start: 2022-06-03 | End: 2022-06-06 | Stop reason: HOSPADM

## 2022-06-03 RX ORDER — INSULIN GLARGINE 100 [IU]/ML
20 INJECTION, SOLUTION SUBCUTANEOUS 2 TIMES DAILY
Status: DISCONTINUED | OUTPATIENT
Start: 2022-06-03 | End: 2022-06-03

## 2022-06-03 RX ORDER — MIDODRINE HYDROCHLORIDE 5 MG/1
10 TABLET ORAL 3 TIMES DAILY
Status: DISCONTINUED | OUTPATIENT
Start: 2022-06-03 | End: 2022-06-06 | Stop reason: HOSPADM

## 2022-06-03 RX ORDER — INSULIN LISPRO 100 [IU]/ML
0-12 INJECTION, SOLUTION INTRAVENOUS; SUBCUTANEOUS
Status: DISCONTINUED | OUTPATIENT
Start: 2022-06-03 | End: 2022-06-06

## 2022-06-03 RX ORDER — LOSARTAN POTASSIUM 50 MG/1
50 TABLET ORAL DAILY
Status: CANCELLED | OUTPATIENT
Start: 2022-06-04

## 2022-06-03 RX ORDER — SODIUM CHLORIDE 9 MG/ML
INJECTION, SOLUTION INTRAVENOUS PRN
Status: DISCONTINUED | OUTPATIENT
Start: 2022-06-03 | End: 2022-06-06 | Stop reason: HOSPADM

## 2022-06-03 RX ORDER — GABAPENTIN 300 MG/1
300 CAPSULE ORAL NIGHTLY
Status: DISCONTINUED | OUTPATIENT
Start: 2022-06-04 | End: 2022-06-04

## 2022-06-03 RX ORDER — NOREPINEPHRINE BIT/0.9 % NACL 16MG/250ML
1-100 INFUSION BOTTLE (ML) INTRAVENOUS CONTINUOUS
Status: DISCONTINUED | OUTPATIENT
Start: 2022-06-03 | End: 2022-06-04

## 2022-06-03 RX ORDER — BUDESONIDE AND FORMOTEROL FUMARATE DIHYDRATE 160; 4.5 UG/1; UG/1
2 AEROSOL RESPIRATORY (INHALATION) 2 TIMES DAILY
Status: DISCONTINUED | OUTPATIENT
Start: 2022-06-03 | End: 2022-06-06 | Stop reason: HOSPADM

## 2022-06-03 RX ORDER — NOREPINEPHRINE BIT/0.9 % NACL 16MG/250ML
INFUSION BOTTLE (ML) INTRAVENOUS
Status: COMPLETED
Start: 2022-06-03 | End: 2022-06-03

## 2022-06-03 RX ADMIN — POTASSIUM BICARBONATE 40 MEQ: 782 TABLET, EFFERVESCENT ORAL at 09:36

## 2022-06-03 RX ADMIN — Medication 10 MCG/MIN: at 07:54

## 2022-06-03 RX ADMIN — SODIUM CHLORIDE 19.8 UNITS/HR: 9 INJECTION, SOLUTION INTRAVENOUS at 02:09

## 2022-06-03 RX ADMIN — Medication 81 MG: at 12:17

## 2022-06-03 RX ADMIN — IPRATROPIUM BROMIDE AND ALBUTEROL SULFATE 3 ML: .5; 3 SOLUTION RESPIRATORY (INHALATION) at 09:25

## 2022-06-03 RX ADMIN — SODIUM CHLORIDE, PRESERVATIVE FREE 10 ML: 5 INJECTION INTRAVENOUS at 20:34

## 2022-06-03 RX ADMIN — MONTELUKAST SODIUM 10 MG: 10 TABLET, FILM COATED ORAL at 20:31

## 2022-06-03 RX ADMIN — PANTOPRAZOLE SODIUM 40 MG: 40 TABLET, DELAYED RELEASE ORAL at 20:31

## 2022-06-03 RX ADMIN — ATORVASTATIN CALCIUM 80 MG: 80 TABLET, FILM COATED ORAL at 20:30

## 2022-06-03 RX ADMIN — PHYTONADIONE 5 MG: 5 TABLET ORAL at 14:24

## 2022-06-03 RX ADMIN — DEXTROSE MONOHYDRATE 125 ML: 100 INJECTION, SOLUTION INTRAVENOUS at 07:41

## 2022-06-03 RX ADMIN — IPRATROPIUM BROMIDE AND ALBUTEROL SULFATE 3 ML: .5; 3 SOLUTION RESPIRATORY (INHALATION) at 16:51

## 2022-06-03 RX ADMIN — DEXTROSE AND SODIUM CHLORIDE: 5; 450 INJECTION, SOLUTION INTRAVENOUS at 03:55

## 2022-06-03 RX ADMIN — BUMETANIDE 1 MG: 1 TABLET ORAL at 12:16

## 2022-06-03 RX ADMIN — DEXTROSE AND SODIUM CHLORIDE: 5; 450 INJECTION, SOLUTION INTRAVENOUS at 09:36

## 2022-06-03 RX ADMIN — IPRATROPIUM BROMIDE AND ALBUTEROL SULFATE 3 ML: .5; 3 SOLUTION RESPIRATORY (INHALATION) at 11:30

## 2022-06-03 RX ADMIN — INSULIN GLARGINE 11 UNITS: 100 INJECTION, SOLUTION SUBCUTANEOUS at 20:31

## 2022-06-03 RX ADMIN — Medication 1250 MG: at 06:48

## 2022-06-03 RX ADMIN — INSULIN LISPRO 7 UNITS: 100 INJECTION, SOLUTION INTRAVENOUS; SUBCUTANEOUS at 16:50

## 2022-06-03 RX ADMIN — MIDODRINE HYDROCHLORIDE 10 MG: 5 TABLET ORAL at 17:08

## 2022-06-03 RX ADMIN — INSULIN GLARGINE 11 UNITS: 100 INJECTION, SOLUTION SUBCUTANEOUS at 16:50

## 2022-06-03 RX ADMIN — BUDESONIDE AND FORMOTEROL FUMARATE DIHYDRATE 2 PUFF: 160; 4.5 AEROSOL RESPIRATORY (INHALATION) at 20:39

## 2022-06-03 RX ADMIN — INSULIN LISPRO 4 UNITS: 100 INJECTION, SOLUTION INTRAVENOUS; SUBCUTANEOUS at 20:32

## 2022-06-03 RX ADMIN — INSULIN LISPRO 4 UNITS: 100 INJECTION, SOLUTION INTRAVENOUS; SUBCUTANEOUS at 12:17

## 2022-06-03 RX ADMIN — IPRATROPIUM BROMIDE AND ALBUTEROL SULFATE 3 ML: .5; 3 SOLUTION RESPIRATORY (INHALATION) at 20:39

## 2022-06-03 RX ADMIN — MIDODRINE HYDROCHLORIDE 10 MG: 5 TABLET ORAL at 20:29

## 2022-06-03 ASSESSMENT — PAIN SCALES - GENERAL
PAINLEVEL_OUTOF10: 0
PAINLEVEL_OUTOF10: 0

## 2022-06-03 NOTE — FLOWSHEET NOTE
0745 Bp 57/32 Blood sugar 107 Critical care resident notified. 4760 Critical care resident at bedside. 0754 Levo started. 0755 Bp 66/37 HR 50. PT Lethargic.,   0800 blood sugar 83 orange juice given. 0830 blood sugar 85  0850 Line placement completed in right groin.    0900 Blood sugar 112

## 2022-06-03 NOTE — PROCEDURES
PROCEDURE NOTE - CENTRAL VENOUS LINE PLACEMENT    PATIENT NAME: Guanaco [address] RECORD NO. [id number]  DATE: 6/3/2022  ATTENDING PHYSICIAN: Zeferino Caballero    PREOPERATIVE DIAGNOSIS:  centrally administered medications  POSTOPERATIVE DIAGNOSIS:  Same  PROCEDURE PERFORMED:  Right Femoral Vein Central Line Insertion  PERFORMING PHYSICIAN: Carmencita Magaña DO  ANESTHESIA:  Local utilizing 1% lidocaine  ESTIMATED BLOOD LOSS:  Less than 25 ml  COMPLICATIONS:  None immediately appreciated. DISCUSSION:  Alice Garvey is a 78y.o.-year-old male who requires central IV access centrally administered medications. The history and physical examination were reviewed and confirmed. CONSENT: The patient provided verbal consent for this procedure. PROCEDURE:  A timeout was initiated by the bedside nurse and was confirmed by those present. The patient was placed in a supine position. The skin overlying the Right Femoral Vein was prepped with chlorhexadine and draped in sterile fashion. The skin was infiltrated with local anesthetic. The vessel and surrounding anatomy was visualized using ultrasound. Through the anesthetized region, the introducer needle was inserted into the femoral vein returning dark red non pulsatile blood. A guidewire was placed through the center of the needle with no resistance. Ultrasound confirmed presence of wire in the vein. A small incision made in the skin with a #11 scalpel blade. The dilator was inserted into the skin and vein over guidewire using Seldinger technique. The dilator was then removed and tcatheter was placed in the vein over the guidewire using Seldinger technique. The guidewire was then removed and all ports aspirated and flushed appropriately. The catheter then secured using silk suture and a temporary sterile dressing was applied. No immediate complication was evident. All sponge, instrument and needle counts were correct at the completion of the procedure.        Radha Peaks Richmond Ochoa  8:50 AM, 6/3/22

## 2022-06-03 NOTE — PLAN OF CARE
BRONCHOSPASM/BRONCHOCONSTRICTION     [x]         IMPROVE AERATION/BREATH SOUNDS  [x]   ADMINISTER BRONCHODILATOR THERAPY AS APPROPRIATE  [x]   ASSESS BREATH SOUNDS  []   IMPLEMENT AEROSOL/MDI PROTOCOL  [x]   PATIENT EDUCATION AS NEEDED     PROVIDE ADEQUATE OXYGENATION WITH ACCEPTABLE SP02/ABG'S    [x]  IDENTIFY APPROPRIATE OXYGEN THERAPY  [x]   MONITOR SP02/ABG'S AS NEEDED   [x]   PATIENT EDUCATION AS NEEDED    PATIENT REFUSES TO WEAR BIPAP     [x] Risks and benefits explained to patient   [x] Patient refuses to wear Bipap stating he does not want it   [x] Patient verbalizes understanding of information presented.

## 2022-06-03 NOTE — FLOWSHEET NOTE
Blood sugar 324. Per sliding scale pt should receive 8 units of insulin. Pt is refusing that amount. States that he would only give himself 4 units and that is what he will take. States that we \"always give him to much and drop his sugar\". Critical care resident aware.

## 2022-06-03 NOTE — PROGRESS NOTES
Pharmacy Note     Renal Dose Adjustment    Carine Garcia is a 78 y.o. male. Pharmacist assessment of renally cleared medications. Recent Labs     06/03/22  0202 06/03/22  0959   BUN 34* 35*       Recent Labs     06/03/22  0202 06/03/22  0959   CREATININE 3.63* 4.27*       Estimated Creatinine Clearance: 15 mL/min (A) (based on SCr of 4.27 mg/dL (H)).   ESRD on HD    Height:   Ht Readings from Last 1 Encounters:   06/02/22 5' 6\" (1.676 m)     Weight:  Wt Readings from Last 1 Encounters:   06/03/22 141 lb (64 kg)       The following medication dose has been adjusted based upon renal function per P&T Guidelines:             Gabapentin 200 mg TID changed to 300 mg nightly    Cherelle Gresham, PharmD, [facility]  6/3/2022  2:11 PM

## 2022-06-03 NOTE — FLOWSHEET NOTE
Blood sugar 501. Pt given his lantus and regular insulin. He states that he only will take 7 units of regular insulin. Critical care resident aware.

## 2022-06-03 NOTE — ACP (ADVANCE CARE PLANNING)
.Advance Care Planning     Advance Care Planning Activator (Inpatient)  Conversation Note      Date of ACP Conversation: 6/3/2022     Conversation Conducted with: Patient with Decision Making Capacity    ACP Activator: Maurice Beaulieu RN    {When Decision Maker makes decisions on behalf of the incapacitated patient: Decision Maker is asked to consider and make decisions based on patient values, known preferences, or best interests. Health Care Decision Maker:     Current Designated Health Care Decision Maker:     Click here to complete Healthcare Decision Makers including section of the Healthcare Decision Maker Relationship (ie \"Primary\")      Care Preferences    Ventilation: \"If you were in your present state of health and suddenly became very ill and were unable to breathe on your own, what would your preference be about the use of a ventilator (breathing machine) if it were available to you? \"      Would the patient desire the use of ventilator (breathing machine)?: yes    \"If your health worsens and it becomes clear that your chance of recovery is unlikely, what would your preference be about the use of a ventilator (breathing machine) if it were available to you? \"     Would the patient desire the use of ventilator (breathing machine)?: Yes      Resuscitation  \"CPR works best to restart the heart when there is a sudden event, like a heart attack, in someone who is otherwise healthy. Unfortunately, CPR does not typically restart the heart for people who have serious health conditions or who are very sick. \"    \"In the event your heart stopped as a result of an underlying serious health condition, would you want attempts to be made to restart your heart (answer \"yes\" for attempt to resuscitate) or would you prefer a natural death (answer \"no\" for do not attempt to resuscitate)? \" yes       [x] Yes   [] No   Educated Patient / Rebbecca Branch regarding differences between Advance Directives and portable DNR orders. Length of ACP Conversation in minutes:      Conversation Outcomes:  [x] ACP discussion completed  [] Existing advance directive reviewed with patient; no changes to patient's previously recorded wishes  [] New Advance Directive completed  [] Portable Do Not Rescitate prepared for Provider review and signature  [] POLST/POST/MOLST/MOST prepared for Provider review and signature      Follow-up plan:    [] Schedule follow-up conversation to continue planning  [] Referred individual to Provider for additional questions/concerns   [] Advised patient/agent/surrogate to review completed ACP document and update if needed with changes in condition, patient preferences or care setting    [] This note routed to one or more involved healthcare providers    {If the relationship to the patient does NOT follow your state's Next of Kin hierarchy, the patient must complete an ACP document to allow him/her to act on the patient's behalf. {If the patient has a valid advance directive AND verbally provides inconsistent care preference(s), advise the patient to either create a new advance directive or to orally revoke that prior directive.

## 2022-06-03 NOTE — CARE COORDINATION
Case Management Initial Discharge Plan  Juan Bustamante,             Met with:patient to discuss discharge plans. Information verified: address, contacts, phone number, , insurance Yes  Insurance Provider: Yoselin Cagle: No    Emergency Contact/Next of Kin name & number: sister Candance Pais unknown phone number  Who are involved in patient's support system? Sister, lives in [place]    PCP: Elysia Cunningham  Date of last visit: unsure      Discharge Planning    Living Arrangements:  Spouse/Significant Other     Home has 2 stories/duplex bottom floor  2 stairs to climb to get into front door, 1 flight stairs to climb to reach second floor  Location of bedroom/bathroom in home main level    Patient able to perform ADL's:Independent    Current Services (outpatient & in home) none  DME equipment: walker  DME provider: n/a    Is patient receiving oral anticoagulation therapy? Yes    If indicated: Coumadin  Physician managing anticoagulation treatment: Jobst  Where does patient obtain lab work for ATC treatment? [facility]     Does patient have any issues/concerns obtaining medications? No  If yes, what are patient's concerns? Is there a preferred Pharmacy after hours or on weekends? No    If yes, which pharmacy? Potential Assistance Needed: home care      Patient agreeable to home care: possibly  Freedom of choice provided:  yes    Prior SNF/Rehab Placement and Facility: no  Agreeable to SNF/Rehab: No  [place] of choice provided: n/a     Evaluation: n/a    Expected Discharge date: unknown      Patient expects to be discharged to: home      If home: is the family and/or caregiver wiling & able to provide support at home?  No family in area  Who will be providing this support? unknown*    Follow Up Appointment: Best Day/ Time:      Transportation provider: medical cab  Transportation arrangements needed for discharge: Yes    Readmission Risk              Risk of Unplanned Readmission: 48             Does patient have a readmission risk score greater than 14?: Yes  If yes, follow-up appointment must be made within 7 days of discharge.      Goals of Care: safety, improve breathing, get blood sugars stabilized      Educated patient on transitional options, provided freedom of choice and are agreeable with plan      Discharge Plan: home, possible need for home O2, will need medical cab, considering home care          Electronically signed by Sarah Hunt RN on 6/3/22 at 11:59 AM EDT

## 2022-06-03 NOTE — CONSULTS
Attestation signed by      Attending Physician Statement:    I have discussed the care of  Jhoana Luong , including pertinent history and exam findings, with the Cardiology fellow/resident. I have seen and examined the patient and the key elements of all parts of the encounter have been performed by me. I agree with the assessment, plan and orders as documented by the fellow/resident, after I modified exam findings and plan of treatments, and the final version is my approved version of the assessment. Additional Comments:   NSTEMI- rising trend concern for type 1  CP free  Severe hyperK  ESRD on HD  DKA  Bacteremia  INR severely elevated at 10  Non obs CAD on Cath last 8/2020  - heparin drip once INR < 2  - check 2d echo  - ECG post HyperK treatment with some lateral ST/T change - similar to ECG on 9/21  - once INR is < 2 and ID provides clearance, will proceed with cardiac cath  - trend trops  - call us immediately if an CP    Anthony Smith DO, FACROSA, DAIN, LINSEY, [facility]  [facility]  [phone]     [facility]   Consultation Note               Today's Date: 6/3/2022  Patient Name: Jhoana Luong  Date of admission: 6/2/2022  9:54 AM  Patient's age: 78 y.o., 1955  Admission Dx: Chronic pulmonary edema [J81.1]  Hyperkalemia [E87.5]    Reason for Consult:  Cardiac eval    Requesting Physician: Luigi Charlton MD    CHIEF COMPLAINT:    Chief Complaint   Patient presents with    Shortness of Breath       History Obtained From:  Patient/EMR    HISTORY OF PRESENT ILLNESS:      The patient is a 78 y.o. with hx of ESRD on HD, HTN, HL, COPD, non-obstructive CAD per cath in 2020, DM presented to ER with complaints of sob. He was noted to be in DKA along with K of 7.5 and had EKG changes related to hyperkalemia.   He underwent emergent HD yesterday with improvement of K to 3.4         Past Medical History:   has a past medical history of Asthma, CAD in native artery, nonobstructive, Carotid stenosis, left, Carpal tunnel syndrome, Cerebrovascular disease, Chronic kidney disease, COPD (chronic obstructive pulmonary disease) ([facility] [address] Diabetes mellitus ([facility] [address] History of colon polyps, History of weakness of extremity, HTN (hypertension), Hyperlipidemia, Lung, cysts, congenital, PTSD (post-traumatic stress disorder), PTSD (post-traumatic stress disorder), TIA (transient ischemic attack), and Type II or unspecified type diabetes mellitus without mention of complication, not stated as uncontrolled. Past Surgical History:   has a past surgical history that includes hernia repair; Tonsillectomy; Colonoscopy; Carotid endarterectomy (Left, 7-2013); vascular surgery (Left, 2015); pr colsc flx w/rmvl of tumor polyp lesion snare tq (N/A, 6/27/2017); IR TUNNELED CVC PLACE WO SQ PORT/PUMP > 5 YEARS (5/11/2021); and Cataract removal (Left). Home Medications:    Prior to Admission medications    Medication Sig Start Date End Date Taking?  Authorizing Provider   warfarin (COUMADIN) 10 MG tablet Dose unknown per patient, unable to find dose from outside records   Yes Historical Provider, MD   magnesium oxide (MAG-OX) 400 MG tablet Take 400 mg by mouth daily    Historical Provider, MD   ipratropium-albuterol (DUONEB) 0.5-2.5 (3) MG/3ML SOLN nebulizer solution Inhale 3 mLs into the lungs 4 times daily 11/17/21 3/17/22  Pieter Valadez MD   albuterol-ipratropium (COMBIVENT RESPIMAT)  MCG/ACT AERS inhaler Inhale 1 puff into the lungs every 6 hours 11/17/21 12/17/21  Pieter Valadez MD   bumetanide (BUMEX) 1 MG tablet Take 1 tablet by mouth daily 10/19/21   Historical Provider, MD   NIFEdipine (ADALAT CC) 90 MG extended release tablet Take 1 tablet by mouth daily 10/19/21   Historical Provider, MD   aspirin 81 MG EC tablet Take 81 mg by mouth daily 7/28/21   Historical Provider, MD   atorvastatin (LIPITOR) 80 MG tablet Take 1 tablet by mouth daily 10/19/21 Historical Provider, MD   carvedilol (COREG) 25 MG tablet Take 1 tablet by mouth 2 times daily 10/19/21   Historical Provider, MD   cloNIDine (CATAPRES) 0.1 MG tablet Take 0.3 mg by mouth three times daily  8/22/21   Historical Provider, MD   clopidogrel (PLAVIX) 75 MG tablet Take 1 tablet by mouth daily 8/22/21   Historical Provider, MD   gabapentin (NEURONTIN) 100 MG capsule Take 200 mg by mouth 3 times daily. 10/5/21   Historical Provider, MD   hydrALAZINE (APRESOLINE) 100 MG tablet Take 1 tablet by mouth 3 times daily 8/24/21   Historical Provider, MD   losartan (COZAAR) 50 MG tablet Take 1 tablet by mouth daily 8/22/21   Historical Provider, MD   montelukast (SINGULAIR) 10 MG tablet Take 1 tablet by mouth nightly 8/22/21   Historical Provider, MD   prazosin (MINIPRESS) 5 MG capsule Take 10 mg by mouth 2 times daily    Historical Provider, MD   blood glucose monitor strips Test 4 times a day & as needed for symptoms of irregular blood glucose. Dispense sufficient amount for indicated testing frequency plus additional to accommodate PRN testing needs. 5/26/21   TERRANCE Barksdale NP   insulin glargine [facility]) 100 UNIT/ML injection pen Inject 10 Units into the skin nightly 5/13/21   TERRANCE Barksdale NP   glucagon 1 MG injection Inject 1 mg into the muscle See Admin Instructions Follow package directions for low blood sugar. Patient not taking: Reported on 11/17/2021 4/6/21   Todd Street APRN - CNP   fluticasone [place]) 50 MCG/ACT nasal spray  2/23/21   Historical Provider, MD   insulin aspart (NOVOLOG FLEXPEN) 100 UNIT/ML injection pen Inject 5 Units into the skin 3 times daily (before meals) Sliding scale 3/16/21   Todd Street APRN - CNP   nitroGLYCERIN (NITROSTAT) 0.4 MG SL tablet USE 1 TABLET UNDER THE TONGUE UP TO MAXIMUM OF 3 TOTAL DOSES.  IF NO RELIEF AFTER 1 DOSE, CALL 911. 3/5/21   Todd Street APRN - CNP   QUEtiapine (SEROQUEL) 100 MG tablet TAKE 1 TABLET BY MOUTH NIGHTLY 2/23/21   TERRANCE Roa CNP   GNP VITAMIN D MAXIMUM STRENGTH 50 MCG (2000 UT) TABS TAKE 1 TABLET BY MOUTH ONCE DAILY 2/23/21   TERRANCE Roa CNP   blood glucose monitor kit and supplies Dispense sufficient amount for indicated testing frequency plus additional to accommodate PRN testing needs. Dispense all needed supplies to include: monitor, strips, lancing device, lancets, control solutions, alcohol swabs.  1/26/21   TERRANCE Roa CNP   vitamin D (ERGOCALCIFEROL) 1.25 MG (85132 UT) CAPS capsule Take 1 capsule by mouth once a week 1/5/21   TERRANCE Roa CNP   Lancets MISC Use_4__times daily Diagnisis:250.0  Diabetes Mellitus__x__Insulin Dependent___Non-Insulin Dependent 12/6/20   Ember Colin MD   Nutritional Supplements ([address] [place] [address]) LIQD Take 1 Can by mouth 3 times daily 6/16/20   TERRANCE Roa CNP   COMFORT EZ PEN NEEDLES 31G X 8 MM MISC USE AS DIRECTED 4/14/20   TERRANCE Roa CNP      Current Facility-Administered Medications: potassium bicarb-citric acid (EFFER-K) effervescent tablet 40 mEq, 40 mEq, Oral, Once  vancomycin (VANCOCIN) intermittent dosing (placeholder), , Other, RX Placeholder  norepinephrine (LEVOPHED) 16 mg in sodium chloride 0.9 % 250 mL infusion, 1-100 mcg/min, IntraVENous, Continuous  albuterol (PROVENTIL) nebulizer solution 5 mg, 5 mg, Nebulization, Q4H PRN  glucose chewable tablet 16 g, 4 tablet, Oral, PRN  dextrose bolus 10% 125 mL, 125 mL, IntraVENous, PRN **OR** dextrose bolus 10% 250 mL, 250 mL, IntraVENous, PRN  glucagon (rDNA) injection 1 mg, 1 mg, IntraMUSCular, PRN  dextrose 5 % solution, 100 mL/hr, IntraVENous, PRN  insulin regular (HUMULIN R;NOVOLIN R) 100 Units in sodium chloride 0.9 % 100 mL infusion, 0.1 Units/kg/hr, IntraVENous, Continuous  clopidogrel (PLAVIX) tablet 75 mg, 75 mg, Oral, Daily  aspirin EC tablet 81 mg, 81 mg, Oral, Daily  sodium chloride flush 0.9 % injection 5-40 mL, 5-40 mL, IntraVENous, 2 times per day  sodium chloride flush 0.9 % injection 5-40 mL, 5-40 mL, IntraVENous, PRN  0.9 % sodium chloride infusion, , IntraVENous, PRN  ondansetron (ZOFRAN-ODT) disintegrating tablet 4 mg, 4 mg, Oral, Q8H PRN **OR** ondansetron (ZOFRAN) injection 4 mg, 4 mg, IntraVENous, Q6H PRN  polyethylene glycol (GLYCOLAX) packet 17 g, 17 g, Oral, Daily PRN  acetaminophen (TYLENOL) tablet 650 mg, 650 mg, Oral, Q6H PRN **OR** acetaminophen (TYLENOL) suppository 650 mg, 650 mg, Rectal, Q6H PRN  atorvastatin (LIPITOR) tablet 80 mg, 80 mg, Oral, Nightly  bumetanide (BUMEX) tablet 1 mg, 1 mg, Oral, Daily  carvedilol (COREG) tablet 25 mg, 25 mg, Oral, BID  cloNIDine (CATAPRES) tablet 0.3 mg, 0.3 mg, Oral, TID  gabapentin (NEURONTIN) capsule 200 mg, 200 mg, Oral, TID  hydrALAZINE (APRESOLINE) tablet 100 mg, 100 mg, Oral, TID  ipratropium-albuterol (DUONEB) nebulizer solution 3 mL, 1 vial, Inhalation, 4x Daily  losartan (COZAAR) tablet 50 mg, 50 mg, Oral, Daily  montelukast (SINGULAIR) tablet 10 mg, 10 mg, Oral, Nightly  NIFEdipine (PROCARDIA XL) extended release tablet 90 mg, 90 mg, Oral, Daily  prazosin (MINIPRESS) capsule 10 mg, 10 mg, Oral, BID  QUEtiapine (SEROQUEL) tablet 100 mg, 100 mg, Oral, Nightly  dextrose 5 % and 0.45 % sodium chloride infusion, , IntraVENous, Continuous  ipratropium-albuterol (DUONEB) nebulizer solution 1 ampule, 1 ampule, Inhalation, Q6H PRN  [START ON 6/4/2022] darbepoetin marisela-polysorbate (ARANESP) injection 25 mcg, 25 mcg, IntraVENous, Q7 Days  heparin (porcine) injection 3,640 Units, 60 Units/kg, IntraVENous, PRN  heparin (porcine) injection 1,820 Units, 30 Units/kg, IntraVENous, PRN  heparin 25,000 units in dextrose 5 % 250 mL infusion (rate based), 5-30 Units/kg/hr, IntraVENous, Continuous      Allergies:  Lisinopril, Ace inhibitors, and Pcn [penicillins]      Social History:   reports that he quit smoking about 7 years ago. His smoking use included cigarettes.  He has a 17.50 pack-year smoking history. He has never used smokeless tobacco. He reports that he does not drink alcohol and does not use drugs. Family History: family history includes Cancer in his mother; Heart Disease in his father. No h/o sudden cardiac death. REVIEW OF SYSTEMS:    · Constitutional: there has been no unanticipated weight loss. · Eyes: No visual changes or diplopia. · ENT: No Headaches  · Cardiovascular: see above  · Respiratory: No previous pulmonary problems, No cough  · Gastrointestinal: No abdominal pain. No change in bowel or bladder habits. · Genitourinary: No dysuria, trouble voiding, or hematuria. · Musculoskeletal:  No gait disturbance, No weakness or joint complaints. · Integumentary: No rash or pruritis. · Neurological: No headache, diplopia      PHYSICAL EXAM:      BP (!) 78/42   Pulse 59   Temp 99 °F (37.2 °C) (Axillary)   Resp 15   Ht 5' 6\" (1.676 m)   Wt 141 lb (64 kg)   SpO2 96%   BMI 22.76 kg/m²    Constitutional and General Appearance: Lethargic  Respiratory:  · On nasal canula  Cardiovascular:  · Normal S1 and S2.   · Jugular venous pulsation Normal  Abdomen:   · Soft  · No tenderness  Extremities:  · No lower extremity edema  Neurologic:  · Alert and oriented. DATA:    Diagnostics:    Labs:     CBC:   Recent Labs     06/02/22 2007 06/03/22 0202   WBC 10.9 11.7*   HGB 9.0* 8.2*   HCT 26.7* 24.7*    307     BMP:   Recent Labs     06/02/22 1833 06/03/22 0202   * 123*   K 3.3* 3.4*   CO2 20 24   BUN 29* 34*   CREATININE 2.67* 3.63*   LABGLOM 24* 17*   GLUCOSE 294* 257*     BNP: No results for input(s): BNP in the last 72 hours.   PT/INR:   Recent Labs     06/02/22  1013   PROTIME 36.9*   INR 3.9     APTT:  Recent Labs     06/02/22 2007 06/03/22 0202   APTT 35.5* 84.6*     CARDIAC ENZYMES:  Recent Labs     06/02/22  1528 06/02/22 1833 06/03/22 0202   TROPHS 569* 1,284* 2,864*     No results for input(s): CKTOTAL, CKMB, CKMBINDEX, TROPONINI in the last 72 hours. Invalid input(s):  TROPONINT  No results for input(s): TROPONINT in the last 72 hours. FASTING LIPID PANEL:  Lab Results   Component Value Date    HDL 68 05/04/2021    TRIG 148 05/04/2021     LIVER PROFILE:  Recent Labs     06/02/22  1013 06/03/22  0202   AST 30 112*   ALT 32 35   LABALBU 4.2 3.5         EKG: Initial EKG showed Peaked T waves related to hyperkalemia and ST depressions. ECHO 5/5/2021  Severe concentric LVH. Septal wall thickness 2.1 cm  No obvious wall motion abnormality seen. Normal LV systolic function with LVEF >65%. Grade I diastolic dysfunction  Normal RV size and function. RV systolic pressure 38 mmHg  Moderately dilated LA and RA appears normal in size. No obvious significant structural valvular abnormality noted. No significant valvular stenosis or regurgitation noted. Normal aortic root dimension. No significant pericardial effusion noted. No obvious intra-cardiac mass or shunt noted. IVC normal diameter and inspiratory variation indicating normal RA filling  pressure.     Consider Amyloidosis    Cardiac cath 8/12/2020  Non-obstructive CAD    IMPRESSION:    1. DKA  2. ESRD  3. Hyperkalemia with EKG changes, requiring emergent HD  4. Elevated troponin 318 --> 2864. Cannot rule out NSTEMI type 1  5. Bacteremia with gram positive rods  6. Previous hx of cocaine use  7. INR 3.9, on coumadin  8. Shock      RECOMMENDATIONS:  1. Check INR. If < 2, start patient on heparin gtt  2. Will obtain TTE  3. Will need ID consult for clearance for cardiac cath given bacteremia  4. Will plan for possible cardiac cath today depending upon INR and ID recs. Keep patient NPO        Thank you for allowing us to participate in [address] [place] [address]. Will follow with you.       Electronically signed on 06/03/22 at 8:41 AM by:    Michelle Kendrick MD, MD   Fellow, [address]

## 2022-06-03 NOTE — PROGRESS NOTES
[address] [facility] Pharmacokinetic Monitoring Service - Vancomycin    Bailey Guzmán is a 78 y.o. male starting on vancomycin therapy for bacteremia. Pharmacy consulted by Dr. Nancy Maurer for monitoring and adjustment. Target Concentration: Pre-Dialysis Concentration 21-24 mg/L  Additional Antimicrobials: none    Pertinent Laboratory Values: Wt Readings from Last 1 Encounters:   06/02/22 133 lb 14.4 oz (60.7 kg)     Temp Readings from Last 1 Encounters:   06/03/22 99 °F (37.2 °C) (Axillary)     Recent Labs     06/02/22  1013 06/02/22  1833 06/02/22 2007 06/03/22  0202   CREATININE  --  2.67*  --  3.63*   WBC   < >  --  10.9 11.7*    < > = values in this interval not displayed. Pertinent Cultures:  Culture Date Source Results   6/3/22 Blood (L wrist) POSITIVE Blood Culture Abnormal - Gram Positive Rods   MRSA Nasal Swab: N/A. Non-respiratory infection. Plan:  Concentration-guided dosing due to renal impairment and intermittent hemodialysis   Start vancomycin 1250mg IV x 1 dose, will adjust dosing based off level prior to dialysis session.    Pharmacy will continue to monitor patient and adjust therapy as indicated    Thank you for the consult,  Yaima Lazcano, [facility]  6/3/2022 5:02 AM

## 2022-06-03 NOTE — PROGRESS NOTES
Renal Progress Note    Patient :  Samaria Phillips; 78 y.o. MRN# [id number]  Location:  3010/3010-01  Attending:  Tyson Kessler MD  Admit Date:  6/2/2022   Hospital Day: 1     HD Access:     Hemodialysis Catheter Double Cuffed Right Subclavian    HD Unit:      [facility]    Nephrologist:     Dr. Joshua Griggs Weight:     54 kg    Admission Weight:     70 kg    History of Present Illness:     Samaria Phillips; 78 y.o. male with past medical history of ESRD on HD TTS followed by Dr. Tomeka Franks at [facility], HTN, T2DM, COPD, and CVA presented to the hospital with the chief complaint of shortness of breath for 1 day. Nirmala Hancock has been on insulin for ~30 years and on chronic HD. Hemodialysis yesterday. Today, he is hypotensive and is on Levophed. He has had some hypoglycemia issues and has received intravenous dextrose and oral high carbohydrate liquids. Potassium is a bit low at 3.4 today. He is receiving a supplement. Next dialysis treatment will be tomorrow, 6/4/2022. He is awake and alert and relatively comfortable. No significant peripheral edema. Appetite is reasonable. No significant pain issues at this time. He had a recent admission to [facility] in 3/2022 for similar shortness of breath after missing several dialysis sessions. Has not missed dialysis recently, last HD on Tuesday 5/31 per patient. He was recently admitted to [facility] for COPD exacerbation. Reports being admitted for 18 days, hospital course was complicated by clot in dialysis catheter. He reports being switched off Plavix to warfarin for anticoagulation. During admission he states \"there was fluid around my lungs\" and that approximately 25 lbs of fluid were removed by dialysis. Reports being discharged \"about 2-3 days ago. \" He does report eating higher potassium content food recently. Presents to [facility] this admission with  gradually worsening SOB that began  6/1/2022.  SOB began while at home sitting at rest, and has not improved. He has associated nausea and vomiting with the SOB. Emesis is bile-colored with no blood or coffee-ground appearance. SOB did not improve overnight, and this morning 6/2 he called EMS. He received 2 nebulized albuterol treatments prior to arrival with little improvement. Arrived on 6L NC saturating 100%. Denies fever, chills, abdominal pain, diarrhea. Denies chest pain. No associated cough with SOB. Relevant labs on arrival were:   Na 125, K 7.5, Cl 77, bicarb 6, BUN 94, Cr 6.92 (baseline 2-3), Glucose 598, eGFR 25. Patient had hyperkalemia management with insulin dextrose along with calcium gluconate in the ED. [facility] also ordered. WBC 12.6, Hgb 8.9    proBNP 03525, Troponin 323 (baseline 120s)    PT 36.9, INR 3.9    Venous pH 7.039    CXR on admission showed mild diffuse prominence of interstitial markings, maybe related to edema or infection. Initiated on azithromycin by primary team d/t possible infection. Yesterday, 6/2/2022, the patient underwent emergent dialysis in ED prior to placement in ICU. Patient was seen and examined on HD. Past History/Allergies? Social History:     Past Medical History:   Diagnosis Date    Asthma     mod persistent    CAD in native artery, nonobstructive     Carotid stenosis, left 6/10/2013    Carpal tunnel syndrome     RIGHT    Cerebrovascular disease 4/23/2018    Chronic kidney disease 6/10/2013    COPD (chronic obstructive pulmonary disease) (HCC)     Diabetes mellitus (HCC)     insulin dependent    History of colon polyps     History of weakness of extremity     right sided    HTN (hypertension)     Hyperlipidemia     Lung, cysts, congenital     PTSD (post-traumatic stress disorder)     PTSD (post-traumatic stress disorder)     TIA (transient ischemic attack)     Type II or unspecified type diabetes mellitus without mention of complication, not stated as uncontrolled        Past Surgical History:   Procedure Laterality Date    CAROTID ENDARTERECTOMY Left     CATARACT REMOVAL Left     with poor vision afterward, wears reading glasses    COLONOSCOPY      HERNIA REPAIR      IR TUNNELED CATHETER PLACEMENT GREATER THAN 5 YEARS  2021    IR TUNNELED CATHETER PLACEMENT GREATER THAN 5 YEARS 2021 Cindy Harmon MD [facility]    NC COLSC FLX W/RMVL OF TUMOR POLYP LESION SNARE TQ N/A 2017    COLONOSCOPY POLYPECTOMY SNARE/ hot performed by Summer Cunningham DO at [facility] VASCULAR SURGERY Left     carotid stent, dr Burt Knight       Allergies   Allergen Reactions    Lisinopril      cough    Ace Inhibitors      coughing    Pcn [Penicillins]        Social History     Socioeconomic History    Marital status:      Spouse name: Not on file    Number of children: Not on file    Years of education: Not on file    Highest education level: Not on file   Occupational History     Employer: N/A   Tobacco Use    Smoking status: Former Smoker     Packs/day: 0.50     Years: 35.00     Pack years: 17.50     Types: Cigarettes     Quit date: 2014     Years since quittin.9    Smokeless tobacco: Never Used   Vaping Use    Vaping Use: Never used   Substance and Sexual Activity    Alcohol use: No     Alcohol/week: 0.0 standard drinks    Drug use: No    Sexual activity: Yes     Partners: Female   Other Topics Concern    Not on file   Social History Narrative    Not on file     Social Determinants of Health     Financial Resource Strain:     Difficulty of Paying Living Expenses: Not on file   Food Insecurity:     Worried About Running Out of Food in the Last Year: Not on file    Warren of Food in the Last Year: Not on file   Transportation Needs:     Lack of Transportation (Medical): Not on file    Lack of Transportation (Non-Medical):  Not on file   Physical Activity:     Days of Exercise per Week: Not on file    Minutes of Exercise per Session: Not on file   Stress:     Feeling of Stress : Not on file   Social Connections:     Frequency of Communication with Friends and Family: Not on file    Frequency of Social Gatherings with Friends and Family: Not on file    Attends Rastafarian Services: Not on file    Active Member of Clubs or Organizations: Not on file    Attends Club or Organization Meetings: Not on file    Marital Status: Not on file   Intimate Partner Violence:     Fear of Current or Ex-Partner: Not on file    Emotionally Abused: Not on file    Physically Abused: Not on file    Sexually Abused: Not on file   Housing Stability:     Unable to Pay for Housing in the Last Year: Not on file    Number of [place] in the Last Year: Not on file    Unstable Housing in the Last Year: Not on file       Family History:        Family History   Problem Relation Age of Onset    Cancer Mother     Heart Disease Father        Outpatient Medications:     Medications Prior to Admission: warfarin (COUMADIN) 10 MG tablet, Dose unknown per patient, unable to find dose from outside records  magnesium oxide (MAG-OX) 400 MG tablet, Take 400 mg by mouth daily  ipratropium-albuterol (DUONEB) 0.5-2.5 (3) MG/3ML SOLN nebulizer solution, Inhale 3 mLs into the lungs 4 times daily  albuterol-ipratropium (COMBIVENT RESPIMAT)  MCG/ACT AERS inhaler, Inhale 1 puff into the lungs every 6 hours  bumetanide (BUMEX) 1 MG tablet, Take 1 tablet by mouth daily  NIFEdipine (ADALAT CC) 90 MG extended release tablet, Take 1 tablet by mouth daily  aspirin 81 MG EC tablet, Take 81 mg by mouth daily  atorvastatin (LIPITOR) 80 MG tablet, Take 1 tablet by mouth daily  carvedilol (COREG) 25 MG tablet, Take 1 tablet by mouth 2 times daily  cloNIDine (CATAPRES) 0.1 MG tablet, Take 0.3 mg by mouth three times daily   clopidogrel (PLAVIX) 75 MG tablet, Take 1 tablet by mouth daily  gabapentin (NEURONTIN) 100 MG capsule, Take 200 mg by mouth 3 times daily.   hydrALAZINE (APRESOLINE) 100 MG tablet, Take 1 tablet by mouth 3 times daily  losartan (COZAAR) 50 MG tablet, Take 1 tablet by mouth daily  montelukast (SINGULAIR) 10 MG tablet, Take 1 tablet by mouth nightly  prazosin (MINIPRESS) 5 MG capsule, Take 10 mg by mouth 2 times daily  blood glucose monitor strips, Test 4 times a day & as needed for symptoms of irregular blood glucose. Dispense sufficient amount for indicated testing frequency plus additional to accommodate PRN testing needs. insulin glargine (BASAGLAR KWIKPEN) 100 UNIT/ML injection pen, Inject 10 Units into the skin nightly  glucagon 1 MG injection, Inject 1 mg into the muscle See Admin Instructions Follow package directions for low blood sugar. (Patient not taking: Reported on 11/17/2021)  fluticasone (FLONASE) 50 MCG/ACT nasal spray,   insulin aspart (NOVOLOG FLEXPEN) 100 UNIT/ML injection pen, Inject 5 Units into the skin 3 times daily (before meals) Sliding scale  nitroGLYCERIN (NITROSTAT) 0.4 MG SL tablet, USE 1 TABLET UNDER THE TONGUE UP TO MAXIMUM OF 3 TOTAL DOSES. IF NO RELIEF AFTER 1 DOSE, CALL 911. QUEtiapine (SEROQUEL) 100 MG tablet, TAKE 1 TABLET BY MOUTH NIGHTLY  GNP VITAMIN D MAXIMUM STRENGTH 50 MCG (2000 UT) TABS, TAKE 1 TABLET BY MOUTH ONCE DAILY  blood glucose monitor kit and supplies, Dispense sufficient amount for indicated testing frequency plus additional to accommodate PRN testing needs. Dispense all needed supplies to include: monitor, strips, lancing device, lancets, control solutions, alcohol swabs.   vitamin D (ERGOCALCIFEROL) 1.25 MG (87512 UT) CAPS capsule, Take 1 capsule by mouth once a week  Lancets MISC, Use_4__times daily Diagnisis:250.0  Diabetes Mellitus__x__Insulin Dependent___Non-Insulin Dependent  Nutritional Supplements (GLUCERNA CARBSTEADY) LIQD, Take 1 Can by mouth 3 times daily  COMFORT EZ PEN NEEDLES 31G X 8 MM MISC, USE AS DIRECTED    Current Medications:     Scheduled Meds:    potassium bicarb-citric acid  40 mEq Oral Once    vancomycin (VANCOCIN) intermittent dosing (placeholder)   Other RX Placeholder    clopidogrel  75 mg Oral Daily    aspirin  81 mg Oral Daily    sodium chloride flush  5-40 mL IntraVENous 2 times per day    atorvastatin  80 mg Oral Nightly    bumetanide  1 mg Oral Daily    carvedilol  25 mg Oral BID    cloNIDine  0.3 mg Oral TID    gabapentin  200 mg Oral TID    hydrALAZINE  100 mg Oral TID    ipratropium-albuterol  1 vial Inhalation 4x Daily    losartan  50 mg Oral Daily    montelukast  10 mg Oral Nightly    NIFEdipine  90 mg Oral Daily    prazosin  10 mg Oral BID    QUEtiapine  100 mg Oral Nightly    [START ON 6/4/2022] darbepoetin marisela-polysorbate  25 mcg IntraVENous Q7 Days     Continuous Infusions:    norepinephrine 15 mcg/min (06/03/22 0801)    dextrose      insulin Stopped (06/03/22 0734)    sodium chloride      dextrose 5 % and 0.45 % NaCl 250 mL/hr at 06/03/22 0621    heparin (PORCINE) Infusion 10 Units/kg/hr (06/03/22 0621)     PRN Meds:  albuterol, glucose, dextrose bolus **OR** dextrose bolus, glucagon (rDNA), dextrose, sodium chloride flush, sodium chloride, ondansetron **OR** ondansetron, polyethylene glycol, acetaminophen **OR** acetaminophen, ipratropium-albuterol, heparin (porcine), heparin (porcine)    Review of Systems:     Constitutional: No fever, no chills, positive fatigue  HEENT:  No headache, ear pain, itchy eyes, nasal discharge or sore throat. Cardiac:  No chest pain, no significant short of breath today   Chest:   No cough, phlegm   Abdomen:  No abdominal pain, positive nausea and vomiting  Neuro:  No focal weakness, abnormal movements orseizure like activity. Skin:   No rashes, no itching. :   No hematuria, no pyuria, no dysuria, no flank pain. Extremities:  No swelling or joint pains. ROS was otherwise negative except as mentioned in the [address] Input/Output:     I/O last 3 completed shifts:   In: 4448.1 [I.V.:3799.8; IV Piggyback:348.3]  Out: 3300 Vital Signs:   Temperature:  Temp: 99 °F (37.2 °C)  TMax:   Temp (24hrs), Av.1 °F (36.7 °C), Min:97.5 °F (36.4 °C), Max:99 °F (37.2 °C)    Respirations:  Resp: 15  Pulse:   Heart Rate: 59  BP:    BP: (!) 78/42  BP Range: Systolic (99NBE), ISV:703 , Min:77 , DRY:248       Diastolic (46OCA), TGJ:91, Min:42, Max:80      Physical Examination:     General:  AAO x 3, speaking in full sentences, no accessory muscle use. Hypotensive, currently on Levophed  HEENT: Atraumatic, normocephalic, no throat congestion, moist mucosa. Eyes:   Pupils equal, round and pale conjunctiva  Neck:   Supple, no JVD  Chest:   Bilateral air entry with equal breath sounds and no rhonchi appreciated Cardiac:  Regular rate and rhythm with positive S1 and S2 and no rubs  Abdomen: Soft, non-tender, no masses, active bowel sounds  :   No suprapubic or flank tenderness. Neuro:  AAO x 3   SKIN:  No rashes, good skin turgor. Extremities:  No edema. Labs:       Recent Labs     22  1013 22  2007 22  0202   WBC 12.6* 10.9 11.7*   RBC 2.82* 2.86* 2.58*   HGB 8.9* 9.0* 8.2*   HCT 30.4* 26.7* 24.7*   .8* 93.4 95.7   MCH 31.6 31.5 31.8   MCHC 29.3 33.7 33.2   RDW 14.6* 14.1 14.1    311 307   MPV 9.5 9.2 9.4      BMP:   Recent Labs     22  1013 22  1445 22  1701 22  1833 22  0202   *  --   --  127* 123*   K 7.5*  --   --  3.3* 3.4*   CL 77*  --   --  84* 85*   CO2 6*  --   --  20 24   BUN 94*  --   --  29* 34*   CREATININE 6.92*  --   --  2.67* 3.63*   GLUCOSE 598*   < > 235 294* 257*   CALCIUM 9.1  --   --  7.7* 7.4*    < > = values in this interval not displayed.       Albumin:    Recent Labs     22  1013 22  0202   LABALBU 4.2 3.5     BNP:      Lab Results   Component Value Date    BNP 84 2013     PTH:     Lab Results   Component Value Date    IPTH 225.4 05/10/2021     Urinalysis/Chemistries:      Lab Results   Component Value Date    NITRU NEGATIVE 05/03/2021    COLORU YELLOW 05/03/2021    PHUR 6.0 05/03/2021    WBCUA 2 TO 5 05/03/2021    RBCUA 0 TO 2 05/03/2021    MUCUS NOT REPORTED 05/03/2021    TRICHOMONAS NOT REPORTED 05/03/2021    YEAST NOT REPORTED 05/03/2021    BACTERIA NOT REPORTED 05/03/2021    CLARITYU Clear 09/12/2014    SPECGRAV 1.016 05/03/2021    LEUKOCYTESUR NEGATIVE 05/03/2021    UROBILINOGEN Normal 05/03/2021    BILIRUBINUR NEGATIVE 05/03/2021    BILIRUBINUR NEGATIVE 11/12/2011    BLOODU Negative 09/12/2014    GLUCOSEU 1+ 05/03/2021    GLUCOSEU 3+ 11/12/2011    [address] NEGATIVE 05/03/2021    AMORPHOUS NOT REPORTED 05/03/2021       Radiology:     Reviewed. Assessment:       1. ESRD on Hemodialysis. His regular HD days are TTS at [address] [place] [address] Hemodialysis [facility] using tunnel catheter under Dr. Anne Marie Acosta. 2. Acute respiratory failure-improving some postdialysis, continues on nasal cannula oxygen  3. Life-threatening hyperkalemia, improved with dialysis yesterday. 4. Metabolic acidosis. 5. Essential hypertension  6. T2DM requiring insulin  7. Hx COPD  8. Peripheral vascular disease  9. Hx ischemic left CVA  10. No significant fluid overload  11. Hyponatremia  12. Anemia of chronic disease  13. Secondary hyperparathyroidism  14. Hypotension/shock, on Levophed  15. Hypoglycemia    Plan:     1. No dialysis today with next hemodialysis tomorrow  2. Supplement potassium today orally for mild hypokalemia with potassium 3.4  3. Wean Levophed as tolerated  4. Follow labs as ordered     Nutrition   Please ensure that patient is on a renal diet/TF. Felicity Moreno MD   [facility]  6/3/2022    This note is created with the assistance of a speech-recognition program. While intending to generate a document that actually reflects the content of the visit, no guarantees can be provided that every mistake has been identified and corrected by editing.

## 2022-06-03 NOTE — PROGRESS NOTES
Critical Care Team - Daily Progress Note      Date and time: 6/3/2022 10:10 AM  Patient's name:  Mario Alberto Cote  Medical Record Number: [id number]  Patient's account/billing number: [de-identified]  Patient's YOB: 1955  Age: 78 y.o. Date of Admission: 2022  9:54 AM  Length of stay during current admission: 1      Primary Care Physician: Nehemiah Krueger  ICU Attending Physician: Dr. Soniya Steele Status: Full Code    Reason for ICU admission: Hyperkalemia, diabetic ketoacidosis    SUBJECTIVE:     OVERNIGHT EVENTS:       Mentation: Patient mentating appropriately, responding to questions however somewhat somnolent. Patient required pressure support, he had been on norepinephrine for MAP less than 65. Patient's glucose fell to the 60s insulin infusion was stopped, patient was given dextrose and p.o. juice. Persistently low pressures increased concern for possible infection noted on prior chest x-ray. Patient has remained afebrile. Patient's troponins have been gradually increasing as well, most recent drawn at 0200 on 6/3 was 2800. Fever:-Afebrile  Temp (24hrs), Av °F (36.7 °C), Min:97.5 °F (36.4 °C), Max:99 °F (37.2 °C)        Blood pressure: MAP< 65  Vasopressors: Norepinephrine 10  Systolic (12IJB), GND:169 , Min:71 , HDL:307     Diastolic (24SOI), ALN:30, Min:37, Max:74        Urine output in the last 24 hours:  In: 4448.1 [I.V.:3799.8]  Out: 3300   Net since admission:  Patient Vitals for the past 96 hrs (Last 3 readings):   Weight   22 0500 141 lb (64 kg)   22 1800 133 lb 14.4 oz (60.7 kg)   22 1310 154 lb 1.6 oz (69.9 kg)               AWAKE & FOLLOWING COMMANDS:  [] No   [x] Yes    CURRENT VENTILATION STATUS:     [] Ventilator  [] BIPAP  [x] Nasal Cannula [] Room Air        SECRETIONS Amount:  [] Small [] Moderate  [] Large  [x] None  Color:     [] White [] Colored  [] Bloody    SEDATION:  RAAS Score:  [] Propofol gtt  [] Versed gtt  [] Ativan gtt   [x] No Sedation    PARALYZED:  [x] No    [] Yes    DIARRHEA:                [x] No                [] Yes  (C. Difficile status: [] positive                                                                                                                       [] negative                                                                                                                     [] pending)    VASOPRESSORS:  [] No    [] Yes    If yes -   [x] Levophed       [] Dopamine     [] Vasopressin       [] Dobutamine  [] Phenylephrine         [] Epinephrine    CENTRAL LINES:     [] No   [x] Yes   (Date of Insertion:   )           If yes -     [] Right IJ     [] Left IJ [x] Right Femoral [] Left Femoral                   [] Right Subclavian [] Left Subclavian       CALLEJAS'S CATHETER:   [x] No   [] Yes  (Date of Insertion:   )     URINE OUTPUT:            [x] Good   [] Low              [] Anuric      OBJECTIVE:     VITAL SIGNS:  BP (!) 100/37   Pulse 62   Temp 99 °F (37.2 °C) (Axillary)   Resp 18   Ht 5' 6\" (1.676 m)   Wt 141 lb (64 kg)   SpO2 98%   BMI 22.76 kg/m²   Tmax over 24 hours:  Temp (24hrs), Av °F (36.7 °C), Min:97.5 °F (36.4 °C), Max:99 °F (37.2 °C)      Patient Vitals for the past 6 hrs:   BP Pulse Resp SpO2 Weight   22 0925 -- 62 18 98 % --   22 0900 (!) 100/37 63 20 -- --   22 0800 (!) 71/42 51 12 -- --   22 0700 (!) 78/42 59 15 96 % --   22 0600 (!) 77/42 62 13 93 % --   22 0500 (!) 120/50 67 16 99 % 141 lb (64 kg)         Intake/Output Summary (Last 24 hours) at 6/3/2022 1010  Last data filed at 6/3/2022 2459  Gross per 24 hour   Intake 4448.09 ml   Output 3300 ml   Net 1148.09 ml     Wt Readings from Last 2 Encounters:   22 141 lb (64 kg)   22 107 lb 5.8 oz (48.7 kg)     Body mass index is 22.76 kg/m².         PHYSICAL EXAMINATION:    General appearance - alert, well appearing, and in no distress  Mental status - alert, oriented to person, place, and time  Eyes - pupils equal and reactive, extraocular eye movements intact  Ears - hearing grossly normal bilaterally  Nose - clear rhinorrhea  Mouth - mucous membranes moist, pharynx normal without lesions  Neck - supple, no significant adenopathy  Chest - symmetric, dialysis port on the right upper chest  Heart - normal rate, regular rhythm, normal S1, S2, no murmurs, rubs, clicks or gallops, no JVD  Abdomen - soft, nontender, nondistended, no masses or organomegaly  Neurological - alert, oriented, normal speech, no focal findings or movement disorder noted}  Extremities - peripheral pulses normal, no pedal edema, no clubbing or cyanosis  Skin - normal coloration and turgor, no rashes, no suspicious skin lesions noted      Any additional physical findings:    MEDICATIONS:    Scheduled Meds:   vancomycin (VANCOCIN) intermittent dosing (placeholder)   Other RX Placeholder    budesonide-formoterol  2 puff Inhalation BID    clopidogrel  75 mg Oral Daily    aspirin  81 mg Oral Daily    sodium chloride flush  5-40 mL IntraVENous 2 times per day    atorvastatin  80 mg Oral Nightly    bumetanide  1 mg Oral Daily    carvedilol  25 mg Oral BID    cloNIDine  0.3 mg Oral TID    gabapentin  200 mg Oral TID    hydrALAZINE  100 mg Oral TID    ipratropium-albuterol  1 vial Inhalation 4x Daily    losartan  50 mg Oral Daily    montelukast  10 mg Oral Nightly    NIFEdipine  90 mg Oral Daily    prazosin  10 mg Oral BID    QUEtiapine  100 mg Oral Nightly    [START ON 6/4/2022] darbepoetin marisela-polysorbate  25 mcg IntraVENous Q7 Days     Continuous Infusions:   norepinephrine 15 mcg/min (06/03/22 0801)    dextrose      insulin Stopped (06/03/22 0734)    sodium chloride      dextrose 5 % and 0.45 % NaCl 250 mL/hr at 06/03/22 0936    heparin (PORCINE) Infusion 10 Units/kg/hr (06/03/22 0621)     PRN Meds:   albuterol, 5 mg, Q4H PRN  glucose, 4 tablet, PRN  dextrose bolus, 125 mL, PRN   Or  dextrose bolus, 250 mL, PRN  glucagon (rDNA), 1 mg, PRN  dextrose, 100 mL/hr, PRN  sodium chloride flush, 5-40 mL, PRN  sodium chloride, , PRN  ondansetron, 4 mg, Q8H PRN   Or  ondansetron, 4 mg, Q6H PRN  polyethylene glycol, 17 g, Daily PRN  acetaminophen, 650 mg, Q6H PRN   Or  acetaminophen, 650 mg, Q6H PRN  ipratropium-albuterol, 1 ampule, Q6H PRN  heparin (porcine), 60 Units/kg, PRN  heparin (porcine), 30 Units/kg, PRN          VENT SETTINGS (Comprehensive) (if applicable): Additional Respiratory Assessments  Heart Rate: 62  Resp: 18  SpO2: 98 %    ABGs:     Laboratory findings:    Complete Blood Count:   Recent Labs     06/02/22  1013 06/02/22  2007 06/03/22  0202   WBC 12.6* 10.9 11.7*   HGB 8.9* 9.0* 8.2*   HCT 30.4* 26.7* 24.7*    311 307        Last 3 Blood Glucose:   Recent Labs     06/02/22 1701 06/02/22 1833 06/03/22  0202   GLUCOSE 235 294* 257*        PT/INR:    Lab Results   Component Value Date    PROTIME 91.4 06/03/2022    INR 10.4 06/03/2022     PTT:    Lab Results   Component Value Date    APTT 55.2 06/03/2022       Comprehensive Metabolic Profile:   Recent Labs     06/02/22  1013 06/02/22  1013 06/02/22  1445 06/02/22 1701 06/02/22 1833 06/03/22  0202   *  --   --   --  127* 123*   K 7.5*  --   --   --  3.3* 3.4*   CL 77*  --   --   --  84* 85*   CO2 6*  --   --   --  20 24   BUN 94*  --   --   --  29* 34*   CREATININE 6.92*  --   --   --  2.67* 3.63*   GLUCOSE 598*  --    < > 235 294* 257*   CALCIUM 9.1  --   --   --  7.7* 7.4*   PROT 6.6   < >  --   --   --  5.4*   LABALBU 4.2   < >  --   --   --  3.5   BILITOT 0.17*   < >  --   --   --  <0.10*   ALKPHOS 108   < >  --   --   --  95   AST 30   < >  --   --   --  112*   ALT 32   < >  --   --   --  35    < > = values in this interval not displayed.       Magnesium:   Lab Results   Component Value Date    MG 1.6 06/03/2022     Phosphorus:   Lab Results   Component Value Date    PHOS 5.1 05/25/2021     Ionized Calcium:   Lab Results   Component Value Date    CAION 1.21 06/16/2020        Urinalysis:     Troponin: No results for input(s): TROPONINI in the last 72 hours. Microbiology:    Cultures during this admission:     Blood cultures:                 [] None drawn      [] Negative             [x]  Positive (Details: Positive blood cultures G+rods  )  Urine Culture:                   [x] None drawn      [] Negative             []  Positive (Details:  )  Sputum Culture:               [x] None drawn       [] Negative             []  Positive (Details:  )   Endotracheal aspirate:     [x] None drawn       [] Negative             []  Positive (Details:  )     Other pertinent Labs:       Radiology/Imaging:     Chest Xray (6/3/2022):    ASSESSMENT:     · Principal Problem:  ·   Hyperkalemia  · Active Problems:  ·   Metabolic acidosis  ·   Shortness of breath  · Resolved Problems:  ·   * No resolved hospital problems. *  ·   ·         PLAN:     WEAN PER PROTOCOL:  [] No   [] Yes  [] N/A    DISCONTINUE ANY LABS:   [] No   [] Yes    ICU PROPHYLAXIS:  Stress ulcer:  [] PPI Agent  [] [id number] [] Sucralfate  [] Other:  VTE:   [] Enoxaparin  [] Unfract. Heparin Subcut  [] EPC Cuffs    NUTRITION:  [] NPO [] Tube Feeding (Specify: ) [] TPN  [] PO (Diet: ADULT DIET; Regular; 4 carb choices (60 gm/meal))    HOME MEDICATIONS RECONCILED: [] No  [] Yes    INSULIN DRIP:   [] No   [x] Yes    CONSULTATION NEEDED:  [] No   [] Yes    FAMILY UPDATED:    [] No   [] Yes    TRANSFER OUT OF ICU:   [x] No   [] Yes    ADDITIONAL PLAN:    1. Neurologic:   · Neuro intact  · Neuro checks per protocol    2. Cardiovascular:  · Hemodynamically stable  · Troponins at 2800, patient not in active chest pain. · Cardiology plans to cath once INR less than 2  · INR currently greater than 10  ·   3. Pulmonary:  · Maintain oxygen sats >92%  NC 2L    4. GI/Nutrition  · daily documentation of bowel movement  · Ulcer Prophylaxis: not indicated  Diet: diabetic  5.  Renal/Fluid/Electrolyte  · IV Fluids: D5 half-normal saline 125 mL/Hr   · I/O: In: 4448.1 [I.V.:3799.8]  · Out: 3300   · Potassium initially greater than 7 improved to 3.5  · 6.92  · Monitor electrolytes, replace PRN   · Potassium improved after emergent dialysis in the ED, nephrology following    6. ID  WBC: 11  ·   · Tmax: Temp (24hrs), Av °F (36.7 °C), Min:97.5 °F (36.4 °C), Max:99 °F (37.2 °C)  ·   · Antimicrobials: vancomycin, ID consulted, possible PNA on CXR      7. Hematology:   - INR was greater than 10, discontinued heparin drip   -started plasma and vitamin K. Follow-up repeat coag factors   -repeat labs after treatments administered. 8. Endocrine:   glucose controlled - most recent BGL is    Recent Labs     22  1701 22  1833 22  0202   GLUCOSE 235 294* 257*   ·   9. DVT Prophylaxis  · Holding heparin for now            Madelyn Fermin DO, M.D.              Critical care resident,  Department of Internal Medicine/ Critical care  [facility] ([place]             6/3/2022, 10:10 AM

## 2022-06-04 ENCOUNTER — APPOINTMENT (OUTPATIENT)
Dept: DIALYSIS | Age: 67
DRG: 637 | End: 2022-06-04
Payer: COMMERCIAL

## 2022-06-04 PROBLEM — A41.9 SEPTIC SHOCK (HCC): Status: ACTIVE | Noted: 2022-06-04

## 2022-06-04 PROBLEM — R79.1 SUPRATHERAPEUTIC INR: Status: ACTIVE | Noted: 2022-06-04

## 2022-06-04 PROBLEM — J96.01 ACUTE RESPIRATORY FAILURE WITH HYPOXIA (HCC): Status: ACTIVE | Noted: 2021-05-15

## 2022-06-04 PROBLEM — E11.10 DIABETIC KETOACIDOSIS WITHOUT COMA ASSOCIATED WITH TYPE 2 DIABETES MELLITUS (HCC): Status: ACTIVE | Noted: 2022-06-04

## 2022-06-04 PROBLEM — R65.21 SEPTIC SHOCK (HCC): Status: ACTIVE | Noted: 2022-06-04

## 2022-06-04 LAB
ANION GAP SERPL CALCULATED.3IONS-SCNC: 14 MMOL/L (ref 9–17)
BLD PROD TYP BPU: NORMAL
BLOOD BANK BLOOD PRODUCT EXPIRATION DATE: NORMAL
BLOOD BANK ISBT PRODUCT BLOOD TYPE: 6200
BLOOD BANK PRODUCT CODE: NORMAL
BLOOD BANK UNIT TYPE AND RH: NORMAL
BPU ID: NORMAL
BUN BLDV-MCNC: 57 MG/DL (ref 8–23)
CALCIUM SERPL-MCNC: 7.6 MG/DL (ref 8.6–10.4)
CHLORIDE BLD-SCNC: 87 MMOL/L (ref 98–107)
CO2: 22 MMOL/L (ref 20–31)
CREAT SERPL-MCNC: 5.63 MG/DL (ref 0.7–1.2)
DISPENSE STATUS BLOOD BANK: NORMAL
GFR AFRICAN AMERICAN: 12 ML/MIN
GFR NON-AFRICAN AMERICAN: 10 ML/MIN
GFR SERPL CREATININE-BSD FRML MDRD: ABNORMAL ML/MIN/{1.73_M2}
GLUCOSE BLD-MCNC: 162 MG/DL (ref 75–110)
GLUCOSE BLD-MCNC: 235 MG/DL (ref 75–110)
GLUCOSE BLD-MCNC: 244 MG/DL (ref 75–110)
GLUCOSE BLD-MCNC: 252 MG/DL (ref 75–110)
GLUCOSE BLD-MCNC: 271 MG/DL (ref 75–110)
GLUCOSE BLD-MCNC: 274 MG/DL (ref 70–99)
GLUCOSE BLD-MCNC: 282 MG/DL (ref 75–110)
HCT VFR BLD CALC: 27 % (ref 40.7–50.3)
HCT VFR BLD CALC: 27.3 % (ref 40.7–50.3)
HEMOGLOBIN: 8.9 G/DL (ref 13–17)
HEMOGLOBIN: 9.4 G/DL (ref 13–17)
INR BLD: 1.7
MAGNESIUM: 1.6 MG/DL (ref 1.6–2.6)
MCH RBC QN AUTO: 31.1 PG (ref 25.2–33.5)
MCH RBC QN AUTO: 31.9 PG (ref 25.2–33.5)
MCHC RBC AUTO-ENTMCNC: 33 G/DL (ref 28.4–34.8)
MCHC RBC AUTO-ENTMCNC: 34.4 G/DL (ref 28.4–34.8)
MCV RBC AUTO: 92.5 FL (ref 82.6–102.9)
MCV RBC AUTO: 94.4 FL (ref 82.6–102.9)
NRBC AUTOMATED: 0 PER 100 WBC
NRBC AUTOMATED: 0 PER 100 WBC
PARTIAL THROMBOPLASTIN TIME: 26.2 SEC (ref 20.5–30.5)
PDW BLD-RTO: 13.9 % (ref 11.8–14.4)
PDW BLD-RTO: 14.6 % (ref 11.8–14.4)
PLATELET # BLD: 282 K/UL (ref 138–453)
PLATELET # BLD: 320 K/UL (ref 138–453)
PMV BLD AUTO: 9.1 FL (ref 8.1–13.5)
PMV BLD AUTO: 9.6 FL (ref 8.1–13.5)
POTASSIUM SERPL-SCNC: 4.9 MMOL/L (ref 3.7–5.3)
PROTHROMBIN TIME: 17.7 SEC (ref 9.1–12.3)
RBC # BLD: 2.86 M/UL (ref 4.21–5.77)
RBC # BLD: 2.95 M/UL (ref 4.21–5.77)
SODIUM BLD-SCNC: 123 MMOL/L (ref 135–144)
TRANSFUSION STATUS: NORMAL
UNIT DIVISION: 0
UNIT ISSUE DATE/TIME: NORMAL
WBC # BLD: 10 K/UL (ref 3.5–11.3)
WBC # BLD: 7.8 K/UL (ref 3.5–11.3)

## 2022-06-04 PROCEDURE — 80048 BASIC METABOLIC PNL TOTAL CA: CPT

## 2022-06-04 PROCEDURE — 99232 SBSQ HOSP IP/OBS MODERATE 35: CPT | Performed by: INTERNAL MEDICINE

## 2022-06-04 PROCEDURE — 83735 ASSAY OF MAGNESIUM: CPT

## 2022-06-04 PROCEDURE — 85027 COMPLETE CBC AUTOMATED: CPT

## 2022-06-04 PROCEDURE — 6360000002 HC RX W HCPCS: Performed by: INTERNAL MEDICINE

## 2022-06-04 PROCEDURE — 94640 AIRWAY INHALATION TREATMENT: CPT

## 2022-06-04 PROCEDURE — 1200000000 HC SEMI PRIVATE

## 2022-06-04 PROCEDURE — 6370000000 HC RX 637 (ALT 250 FOR IP): Performed by: STUDENT IN AN ORGANIZED HEALTH CARE EDUCATION/TRAINING PROGRAM

## 2022-06-04 PROCEDURE — 36415 COLL VENOUS BLD VENIPUNCTURE: CPT

## 2022-06-04 PROCEDURE — 6370000000 HC RX 637 (ALT 250 FOR IP): Performed by: INTERNAL MEDICINE

## 2022-06-04 PROCEDURE — 90935 HEMODIALYSIS ONE EVALUATION: CPT

## 2022-06-04 PROCEDURE — 85610 PROTHROMBIN TIME: CPT

## 2022-06-04 PROCEDURE — 85730 THROMBOPLASTIN TIME PARTIAL: CPT

## 2022-06-04 PROCEDURE — 6360000002 HC RX W HCPCS: Performed by: NURSE PRACTITIONER

## 2022-06-04 RX ORDER — HEPARIN SODIUM 1000 [USP'U]/ML
60 INJECTION, SOLUTION INTRAVENOUS; SUBCUTANEOUS PRN
Status: DISCONTINUED | OUTPATIENT
Start: 2022-06-04 | End: 2022-06-06 | Stop reason: HOSPADM

## 2022-06-04 RX ORDER — HEPARIN SODIUM 1000 [USP'U]/ML
500 INJECTION, SOLUTION INTRAVENOUS; SUBCUTANEOUS
Status: DISCONTINUED | OUTPATIENT
Start: 2022-06-04 | End: 2022-06-06 | Stop reason: HOSPADM

## 2022-06-04 RX ORDER — HEPARIN SODIUM 1000 [USP'U]/ML
500 INJECTION, SOLUTION INTRAVENOUS; SUBCUTANEOUS
Status: DISCONTINUED | OUTPATIENT
Start: 2022-06-07 | End: 2022-06-04

## 2022-06-04 RX ORDER — HEPARIN SODIUM 1000 [USP'U]/ML
60 INJECTION, SOLUTION INTRAVENOUS; SUBCUTANEOUS ONCE
Status: COMPLETED | OUTPATIENT
Start: 2022-06-04 | End: 2022-06-04

## 2022-06-04 RX ORDER — PRAZOSIN HYDROCHLORIDE 5 MG/1
5 CAPSULE ORAL 2 TIMES DAILY
Status: DISCONTINUED | OUTPATIENT
Start: 2022-06-05 | End: 2022-06-06 | Stop reason: HOSPADM

## 2022-06-04 RX ORDER — CHOLECALCIFEROL (VITAMIN D3) 10 MCG
1 TABLET ORAL DAILY
Status: DISCONTINUED | OUTPATIENT
Start: 2022-06-04 | End: 2022-06-06 | Stop reason: HOSPADM

## 2022-06-04 RX ORDER — HEPARIN SODIUM 1000 [USP'U]/ML
1800 INJECTION, SOLUTION INTRAVENOUS; SUBCUTANEOUS PRN
Status: DISCONTINUED | OUTPATIENT
Start: 2022-06-04 | End: 2022-06-06 | Stop reason: HOSPADM

## 2022-06-04 RX ORDER — HEPARIN SODIUM AND DEXTROSE 10000; 5 [USP'U]/100ML; G/100ML
5-30 INJECTION INTRAVENOUS CONTINUOUS
Status: DISCONTINUED | OUTPATIENT
Start: 2022-06-04 | End: 2022-06-06 | Stop reason: HOSPADM

## 2022-06-04 RX ORDER — LANOLIN ALCOHOL/MO/W.PET/CERES
400 CREAM (GRAM) TOPICAL DAILY
Status: DISCONTINUED | OUTPATIENT
Start: 2022-06-04 | End: 2022-06-06 | Stop reason: HOSPADM

## 2022-06-04 RX ORDER — NIFEDIPINE 60 MG/1
60 TABLET, FILM COATED, EXTENDED RELEASE ORAL 2 TIMES DAILY
COMMUNITY

## 2022-06-04 RX ORDER — GABAPENTIN 100 MG/1
100 CAPSULE ORAL 3 TIMES DAILY
Status: DISCONTINUED | OUTPATIENT
Start: 2022-06-05 | End: 2022-06-06 | Stop reason: HOSPADM

## 2022-06-04 RX ORDER — HEPARIN SODIUM 1000 [USP'U]/ML
1000 INJECTION, SOLUTION INTRAVENOUS; SUBCUTANEOUS
Status: DISCONTINUED | OUTPATIENT
Start: 2022-06-04 | End: 2022-06-06 | Stop reason: HOSPADM

## 2022-06-04 RX ORDER — HEPARIN SODIUM 1000 [USP'U]/ML
1700 INJECTION, SOLUTION INTRAVENOUS; SUBCUTANEOUS PRN
Status: DISCONTINUED | OUTPATIENT
Start: 2022-06-04 | End: 2022-06-06 | Stop reason: HOSPADM

## 2022-06-04 RX ORDER — HEPARIN SODIUM 1000 [USP'U]/ML
30 INJECTION, SOLUTION INTRAVENOUS; SUBCUTANEOUS PRN
Status: DISCONTINUED | OUTPATIENT
Start: 2022-06-04 | End: 2022-06-06 | Stop reason: HOSPADM

## 2022-06-04 RX ORDER — HEPARIN SODIUM 1000 [USP'U]/ML
1000 INJECTION, SOLUTION INTRAVENOUS; SUBCUTANEOUS
Status: DISCONTINUED | OUTPATIENT
Start: 2022-06-07 | End: 2022-06-04

## 2022-06-04 RX ORDER — VITAMIN B COMPLEX
5000 TABLET ORAL DAILY
Status: DISCONTINUED | OUTPATIENT
Start: 2022-06-04 | End: 2022-06-06 | Stop reason: HOSPADM

## 2022-06-04 RX ORDER — INSULIN GLARGINE 100 [IU]/ML
15 INJECTION, SOLUTION SUBCUTANEOUS 2 TIMES DAILY
Status: DISCONTINUED | OUTPATIENT
Start: 2022-06-04 | End: 2022-06-05

## 2022-06-04 RX ORDER — FOLIC ACID/VIT B COMPLEX AND C 0.8 MG
0.8 TABLET ORAL DAILY
COMMUNITY

## 2022-06-04 RX ADMIN — Medication 12 UNITS/KG/HR: at 19:55

## 2022-06-04 RX ADMIN — QUETIAPINE FUMARATE 100 MG: 100 TABLET ORAL at 21:01

## 2022-06-04 RX ADMIN — BUDESONIDE AND FORMOTEROL FUMARATE DIHYDRATE 2 PUFF: 160; 4.5 AEROSOL RESPIRATORY (INHALATION) at 21:23

## 2022-06-04 RX ADMIN — HEPARIN SODIUM 1000 UNITS: 1000 INJECTION INTRAVENOUS; SUBCUTANEOUS at 11:38

## 2022-06-04 RX ADMIN — HEPARIN SODIUM 500 UNITS: 1000 INJECTION INTRAVENOUS; SUBCUTANEOUS at 11:38

## 2022-06-04 RX ADMIN — CARVEDILOL 25 MG: 25 TABLET, FILM COATED ORAL at 14:44

## 2022-06-04 RX ADMIN — INSULIN GLARGINE 15 UNITS: 100 INJECTION, SOLUTION SUBCUTANEOUS at 21:01

## 2022-06-04 RX ADMIN — IPRATROPIUM BROMIDE AND ALBUTEROL SULFATE 3 ML: .5; 3 SOLUTION RESPIRATORY (INHALATION) at 16:18

## 2022-06-04 RX ADMIN — ATORVASTATIN CALCIUM 80 MG: 80 TABLET, FILM COATED ORAL at 21:01

## 2022-06-04 RX ADMIN — HEPARIN SODIUM 1800 UNITS: 1000 INJECTION INTRAVENOUS; SUBCUTANEOUS at 11:49

## 2022-06-04 RX ADMIN — EPOETIN ALFA-EPBX 3000 UNITS: 3000 INJECTION, SOLUTION INTRAVENOUS; SUBCUTANEOUS at 12:37

## 2022-06-04 RX ADMIN — HEPARIN SODIUM 3330 UNITS: 1000 INJECTION INTRAVENOUS; SUBCUTANEOUS at 19:55

## 2022-06-04 RX ADMIN — Medication 81 MG: at 14:40

## 2022-06-04 RX ADMIN — CARVEDILOL 25 MG: 25 TABLET, FILM COATED ORAL at 21:35

## 2022-06-04 RX ADMIN — PRAZOSIN HYDROCHLORIDE 10 MG: 5 CAPSULE ORAL at 21:01

## 2022-06-04 RX ADMIN — IPRATROPIUM BROMIDE AND ALBUTEROL SULFATE 3 ML: .5; 3 SOLUTION RESPIRATORY (INHALATION) at 21:23

## 2022-06-04 RX ADMIN — INSULIN GLARGINE 15 UNITS: 100 INJECTION, SOLUTION SUBCUTANEOUS at 08:31

## 2022-06-04 RX ADMIN — PANTOPRAZOLE SODIUM 40 MG: 40 TABLET, DELAYED RELEASE ORAL at 06:27

## 2022-06-04 RX ADMIN — INSULIN LISPRO 6 UNITS: 100 INJECTION, SOLUTION INTRAVENOUS; SUBCUTANEOUS at 08:31

## 2022-06-04 RX ADMIN — GABAPENTIN 300 MG: 300 CAPSULE ORAL at 21:00

## 2022-06-04 RX ADMIN — HEPARIN SODIUM 1700 UNITS: 1000 INJECTION INTRAVENOUS; SUBCUTANEOUS at 11:48

## 2022-06-04 RX ADMIN — MONTELUKAST SODIUM 10 MG: 10 TABLET, FILM COATED ORAL at 21:00

## 2022-06-04 ASSESSMENT — PAIN SCALES - GENERAL: PAINLEVEL_OUTOF10: 0

## 2022-06-04 NOTE — PROGRESS NOTES
[facility]  Office: [address], DO, Olive Oswaldap, DO, Feliciano List, DO, Rene Giang Blood, DO, Ollie Monte MD, Shira Mistry MD, Isabelle Washburn MD, Maame Oneil MD, Yana Jim MD, Elsy Santos MD, Shlomo Levine MD, Ricki Pa, DO, Regine Bro MD,  James Barnett, DO, Kacie Bettencourt MD, Raenette Felty, MD, Magalis Sanches MD, Angus Barreto DO, Rayna Sicard, MD, Ruth Fitzgerald MD, Francis Berry DO, Shaheed Larios MD, Gregoria Mon CNP, [facility], [facility], Fransico Ramirez, CNP, Edwin Guerra, CNP, Ubaldo Reddy, CNP, Treva Plunkett, CNP, Salvador Phan PABeverlyC, Nica Arreola, DNP, Bonnie Jack, CNP, Raheem Almeida, CNP, Gilma Garner, CNP, Susan Soto, CNS, Gissell Rosas, DNP, Katy Galloway, CNP, Fortino Morrison, CNP, Vinayak Aceves, CNP         [facility]   [address] [facility]    Progress Note    6/4/2022    2:50 PM    Name:   Marlena Mullins  MRN:     [id number]     Acct:      [de-identified]   Room:   [address] [place] [address] Day:  2  Admit Date:  6/2/2022  9:54 AM    PCP:   Arvilla Curling  Code Status:  Full Code    Subjective:     C/C:   Chief Complaint   Patient presents with    Shortness of Breath     Interval History Status: improved     Patient doing well today, no complaints, underwent dialysis this afternoon. Patient's blood pressure slowly been climbing, reinitiated Coreg today    Brief History:     79-year-old male with medical history of ESRD on dialysis, type 2 diabetes, hypertension, hyperlipidemia, carotid artery disease, CAD presents to the emergency department for shortness of breath. Patient was found to have a pH of 7.0, bicarb of 6 suspected to be DKA. Patient was transferred to the ICU for further care on BiPAP. Patient tolerated well, oxygen was eventually weaned and patient was treated with insulin infusion.   Patient was also found to have an elevated troponin, seen by cardiology suspecting non-ST ovation myocardial infarction. Patient was eventually transferred out of ICU on 6/3/2022, patient's blood pressure slowly improved, patient was eventually recommended cardiac catheterization before discharge    Review of Systems:     Constitutional:  negative for chills, fevers, sweats  Respiratory:  negative for cough, dyspnea on exertion, shortness of breath, wheezing  Cardiovascular:  negative for chest pain, chest pressure/discomfort, lower extremity edema, palpitations  Gastrointestinal:  negative for abdominal pain, constipation, diarrhea, nausea, vomiting  Neurological:  negative for dizziness, headache    Medications: Allergies:     Allergies   Allergen Reactions    Lisinopril      cough    Ace Inhibitors      coughing    Pcn [Penicillins]        Current Meds:   Scheduled Meds:    insulin glargine  15 Units SubCUTAneous BID    heparin (porcine)  1,000 Units IntraVENous Once per day on Tue Thu Sat    heparin (porcine)  500 Units IntraVENous Once per day on Tue Thu Sat    budesonide-formoterol  2 puff Inhalation BID    insulin lispro  0-12 Units SubCUTAneous TID WC    insulin lispro  0-6 Units SubCUTAneous Nightly    epoetin marisela-epbx  3,000 Units IntraVENous Once per day on Tue Thu Sat    gabapentin  300 mg Oral Nightly    midodrine  10 mg Oral TID    pantoprazole  40 mg Oral QAM AC    clopidogrel  75 mg Oral Daily    aspirin  81 mg Oral Daily    sodium chloride flush  5-40 mL IntraVENous 2 times per day    atorvastatin  80 mg Oral Nightly    bumetanide  1 mg Oral Daily    carvedilol  25 mg Oral BID    [Held by provider] cloNIDine  0.3 mg Oral TID    [Held by provider] hydrALAZINE  100 mg Oral TID    ipratropium-albuterol  1 vial Inhalation 4x Daily    [Held by provider] losartan  50 mg Oral Daily    montelukast  10 mg Oral Nightly    [Held by provider] NIFEdipine  90 mg Oral Daily    prazosin  10 mg Oral BID    QUEtiapine  100 mg Oral Nightly     Continuous Infusions:    sodium chloride      dextrose      sodium chloride       PRN Meds: heparin (porcine), heparin (porcine), sodium chloride, albuterol, glucose, dextrose bolus **OR** dextrose bolus, glucagon (rDNA), dextrose, sodium chloride flush, sodium chloride, ondansetron **OR** ondansetron, polyethylene glycol, acetaminophen **OR** acetaminophen, ipratropium-albuterol    Data:     Past Medical History:   has a past medical history of Asthma, CAD in native artery, nonobstructive, Carotid stenosis, left, Carpal tunnel syndrome, Cerebrovascular disease, Chronic kidney disease, COPD (chronic obstructive pulmonary disease) ([facility] [address] Diabetes mellitus ([facility] [address] History of colon polyps, History of weakness of extremity, HTN (hypertension), Hyperlipidemia, Lung, cysts, congenital, PTSD (post-traumatic stress disorder), PTSD (post-traumatic stress disorder), TIA (transient ischemic attack), and Type II or unspecified type diabetes mellitus without mention of complication, not stated as uncontrolled. Social History:   reports that he quit smoking about 7 years ago. His smoking use included cigarettes. He has a 17.50 pack-year smoking history. He has never used smokeless tobacco. He reports that he does not drink alcohol and does not use drugs. Family History:   Family History   Problem Relation Age of Onset    Cancer Mother     Heart Disease Father        Vitals:  BP (!) 180/74   Pulse 71   Temp 97.9 °F (36.6 °C)   Resp 18   Ht 5' 6\" (1.676 m)   Wt 122 lb 5.7 oz (55.5 kg)   SpO2 97%   BMI 19.75 kg/m²   Temp (24hrs), Av.3 °F (36.8 °C), Min:97.5 °F (36.4 °C), Max:99 °F (37.2 °C)    Recent Labs     22  2359 22  0324 22  0628 22  1315   POCGLU 235* 244* 282* 162*       I/O (24Hr):     Intake/Output Summary (Last 24 hours) at 2022 1450  Last data filed at 2022 1220  Gross per 24 hour   Intake 587 ml   Output 3300 ml   Net -2713 ml       Labs:  Hematology:  Recent Labs     22  1013 22 06/03/22  0202 06/03/22  0915 06/03/22  1614 06/04/22  0647   WBC  --  10.9 11.7*  --   --  10.0   RBC  --  2.86* 2.58*  --   --  2.86*   HGB  --  9.0* 8.2*  --   --  8.9*   HCT  --  26.7* 24.7*  --   --  27.0*   MCV  --  93.4 95.7  --   --  94.4   MCH  --  31.5 31.8  --   --  31.1   MCHC  --  33.7 33.2  --   --  33.0   RDW  --  14.1 14.1  --   --  14.6*   PLT  --  311 307  --   --  320   MPV  --  9.2 9.4  --   --  9.6   INR   < >  --   --  10.4* 4.4 1.7    < > = values in this interval not displayed. Chemistry:  Recent Labs     06/02/22  1013 06/02/22  1013 06/02/22  1107 06/02/22  1445 06/02/22  1833 06/02/22  1833 06/03/22  0202 06/03/22  0959 06/04/22  0647   *   < >  --   --  127*   < > 123* 121* 123*   K 7.5*   < >  --   --  3.3*   < > 3.4* 4.3 4.9   CL 77*   < >  --   --  84*   < > 85* 86* 87*   CO2 6*   < >  --   --  20   < > 24 20 22   GLUCOSE 598*  --   --    < > 294*   < > 257* 200* 274*   BUN 94*   < >  --   --  29*   < > 34* 35* 57*   CREATININE 6.92*   < >  --   --  2.67*   < > 3.63* 4.27* 5.63*   MG  --   --   --   --   --   --  1.6  --   --    ANIONGAP 42*   < >  --   --  23*   < > 14 15 14   LABGLOM 8*   < >  --   --  24*   < > 17* 14* 10*   GFRAA 10*   < >  --   --  29*   < > 20* 17* 12*   CALCIUM 9.1   < >  --   --  7.7*   < > 7.4* 7.3* 7.6*   PROBNP 15,829*  --   --   --   --   --   --   --   --    TROPHS 318*   < > 323*   < > 1,284*  --  2,864* 3,056*  --    LACTACIDWB  --   --  2.0  --   --   --   --   --   --     < > = values in this interval not displayed.      Recent Labs     06/02/22  1013 06/02/22  1107 06/02/22  1332 06/03/22  0202 06/03/22  0426 06/03/22 2006 06/03/22  2217 06/03/22  2359 06/04/22  0324 06/04/22  0628 06/04/22  1315   PROT 6.6  --   --  5.4*  --   --   --   --   --   --   --    LABALBU 4.2  --   --  3.5  --   --   --   --   --   --   --    LABA1C 8.1*  --   --   --   --   --   --   --   --   --   --    AST 30  --   --  112*  --   --   --   --   --   --   -- ALT 32  --   --  35  --   --   --   --   --   --   --    ALKPHOS 108  --   --  95  --   --   --   --   --   --   --    BILITOT 0.17*  --   --  <0.10*  --   --   --   --   --   --   --    LIPASE  --  16  --   --   --   --   --   --   --   --   --    POCGLU  --   --    < > 240*   < > 431* 383* 235* 244* 282* 162*    < > = values in this interval not displayed. ABG:  Lab Results   Component Value Date    POCPH 7.39 06/17/2013    POCPCO2 46 06/17/2013    POCPO2 288 06/17/2013    POCHCO3 27.8 06/17/2013    NBEA NOT REPORTED 06/17/2013    PBEA 3 06/17/2013    RPG6XWB 29 06/17/2013    [id number] 100 06/17/2013    FIO2 INFORMATION NOT PROVIDED 06/02/2022     Lab Results   Component Value Date/Time    SPECIAL L WRIST 2ML 06/02/2022 11:28 AM     Lab Results   Component Value Date/Time    CULTURE POSITIVE Blood Culture (A) 06/02/2022 11:28 AM    CULTURE DIRECT GRAM STAIN FROM BOTTLE: [address] [facility] 06/02/2022 11:28 AM    CULTURE (A) 06/02/2022 11:28 AM     BACILLUS SPECIES A single positive blood culture of coagulase negative Staphylocci, diphtheroids,micrococci, Cutibacterium, viridans Streptocci, Bacillus, or Lactobacillus species should be interpreted with caution and viewed as a likely skin contaminant.     CULTURE  06/02/2022 11:28 AM     (NOTE) Direct Gram Stain from bottle result called to and read back by:YIN Randall. 6/3/22 0100       Radiology:  XR CHEST PORTABLE    Result Date: 6/2/2022  Satisfactory line placement Mild diffuse prominence of interstitial markings may be related to edema or infection       Physical Examination:        General appearance:  alert, cooperative and no distress  Mental Status:  oriented to person, place and time and normal affect  Lungs:  clear to auscultation bilaterally, normal effort left chest wall tunneled catheter  Heart:  regular rate and rhythm, no murmur  Abdomen:  soft, nontender, nondistended, normal bowel sounds, no masses, hepatomegaly, splenomegaly  Extremities:  no edema, redness, tenderness in the calves  Skin:  no gross lesions, rashes, induration    Assessment:        Hospital Problems           Last Modified POA    * (Principal) NSTEMI (non-ST elevated myocardial infarction) ([facility] [address] 6/4/2022 Yes    Hyperkalemia 6/4/2022 Yes    Diabetic ketoacidosis without coma associated with type 2 diabetes mellitus ([address] 6/4/2022 Yes    Supratherapeutic INR 6/4/2022 Yes    Carotid stenosis, left s/p Endarterectomy 6/4/2022 Yes    Hypertension, essential 6/4/2022 Yes    DM type 2, uncontrolled, with neuropathy ([facility] [address] 6/4/2022 Yes    Acute respiratory failure with hypoxia ([facility] [address] 6/4/2022 Yes                Plan:        NSTEMI - cath Monday   S/p carotid stent - on warfarin outpatient.  INR 1.7 today will discuss with cardiology starting warfarin   Dialysis per nephrology   Restart bp meds today     Samy Puente DO  6/4/2022  2:50 PM

## 2022-06-04 NOTE — PROGRESS NOTES
[facility]  Progress Note                   Date:   6/4/2022  Patient name: Jignesh Marshall  Date of admission:  6/2/2022  9:54 AM  MRN:   [id number]  YOB: 1955  PCP: Cynthia Ellis    Reason for Admission: Chronic pulmonary edema [J81.1]  Hyperkalemia [E87.5]    Subjective:       Clinical Changes /Abnormalities: Patient seen and examined in room after discussion with RN. Denies chest pain or SOB. ESRD on HD.   SR on tele HR 79   Awaiting ECHO    Review of Systems    Medications:   Scheduled Meds:   insulin glargine  15 Units SubCUTAneous BID    heparin (porcine)  1,000 Units IntraVENous Once per day on Tue Thu Sat    heparin (porcine)  500 Units IntraVENous Once per day on Tue Thu Sat    budesonide-formoterol  2 puff Inhalation BID    insulin lispro  0-12 Units SubCUTAneous TID WC    insulin lispro  0-6 Units SubCUTAneous Nightly    epoetin marisela-epbx  3,000 Units IntraVENous Once per day on Tue Thu Sat    gabapentin  300 mg Oral Nightly    midodrine  10 mg Oral TID    pantoprazole  40 mg Oral QAM AC    clopidogrel  75 mg Oral Daily    aspirin  81 mg Oral Daily    sodium chloride flush  5-40 mL IntraVENous 2 times per day    atorvastatin  80 mg Oral Nightly    bumetanide  1 mg Oral Daily    carvedilol  25 mg Oral BID    [Held by provider] cloNIDine  0.3 mg Oral TID    [Held by provider] hydrALAZINE  100 mg Oral TID    ipratropium-albuterol  1 vial Inhalation 4x Daily    [Held by provider] losartan  50 mg Oral Daily    montelukast  10 mg Oral Nightly    [Held by provider] NIFEdipine  90 mg Oral Daily    prazosin  10 mg Oral BID    QUEtiapine  100 mg Oral Nightly     Continuous Infusions:   sodium chloride      dextrose      sodium chloride       CBC:   Recent Labs     06/02/22 2007 06/03/22 0202 06/04/22  0647   WBC 10.9 11.7* 10.0   HGB 9.0* 8.2* 8.9*    307 320     BMP:    Recent Labs     06/03/22 0202 06/03/22 0959 06/04/22  0647   * 121* 123*   K 3.4* 4.3 4.9   CL 85* 86* 87*   CO2 24 20 22   BUN 34* 35* 57*   CREATININE 3.63* 4.27* 5.63*   GLUCOSE 257* 200* 274*     Hepatic:  Recent Labs     06/02/22  1013 06/03/22  0202   AST 30 112*   ALT 32 35   BILITOT 0.17* <0.10*   ALKPHOS 108 95     Troponin:   Recent Labs     06/02/22  1833 06/03/22  0202 06/03/22  0959   TROPHS 1,284* 2,864* 3,056*     BNP: No results for input(s): BNP in the last 72 hours. Lipids: No results for input(s): CHOL, HDL in the last 72 hours. Invalid input(s): LDLCALCU  INR:   Recent Labs     06/03/22  0915 06/03/22  1614 06/04/22  0647   INR 10.4* 4.4 1.7     DIAGNOSTIC DATA       EKG: Initial EKG showed Peaked T waves related to hyperkalemia and ST depressions.     ECHO 5/5/2021  Severe concentric LVH. Septal wall thickness 2.1 cm  No obvious wall motion abnormality seen. Normal LV systolic function with LVEF >65%. Grade I diastolic dysfunction  Normal RV size and function. RV systolic pressure 38 mmHg  Moderately dilated LA and RA appears normal in size. No obvious significant structural valvular abnormality noted. No significant valvular stenosis or regurgitation noted. Normal aortic root dimension. No significant pericardial effusion noted. No obvious intra-cardiac mass or shunt noted. IVC normal diameter and inspiratory variation indicating normal RA filling  pressure.     Consider Amyloidosis     Cardiac cath 8/12/2020  Non-obstructive CAD    Objective:   Vitals: BP (!) 159/66   Pulse 71   Temp 97.9 °F (36.6 °C)   Resp 18   Ht 5' 6\" (1.676 m)   Wt 122 lb 5.7 oz (55.5 kg)   SpO2 97%   BMI 19.75 kg/m²   General appearance: alert and cooperative with exam  HEENT: Head: Normocephalic, no lesions, without obvious abnormality.   Neck:no JVD, trachea midline, no adenopathy  Lungs: Clear to auscultation  Heart: Regular rate and rhythm, s1/s2 auscultated, no murmurs  Abdomen: soft, non-tender, bowel sounds active  Extremities: no edema  Neurologic: not done        Assessment / Acute Cardiac Problems:   1. NSTEMI- Rising trend concerned for type 1  2. Hyperkalemia  3. ESRD  4. DKA  5. Baceremia with gram positive rods  6. Previous history of cocaine use. 7. INR    8.  Shock     Patient Active Problem List:     Cigarette nicotine dependence without complication     Carpal tunnel syndrome on right     Colon polyps     Carotid stenosis, left s/p Endarterectomy     Mixed simple and mucopurulent chronic bronchitis (HCC)     Pure hypercholesterolemia     Dyspnea and respiratory abnormalities     PTSD (post-traumatic stress disorder)     Transient cerebral ischemia     Hypertension, essential     DM type 2, uncontrolled, with neuropathy (HCC)     COPD with acute exacerbation (HCC)     Cerebral vascular disease     Primary insomnia     Hypertensive urgency     Non-obstructive Coronary artery disease of native artery of native heart with stable angina pectoris ([facility])     Hyperparathyroidism ([address]     Hypovitaminosis D     Acute kidney injury superimposed on CKD ([facility])-currently dialysis patient     Coronary artery disease with exertional angina ([facility])     Moderate malnutrition (HCC)     COPD exacerbation (HCC)     Leg swelling     Hyponatremia     Anemia of chronic disease     NSTEMI (non-ST elevated myocardial infarction) ([address]     Hypoalbuminemia     DKA, type 2, not at goal [facility])     Inflammation of right sacroiliac joint ([address]     Malignant neoplasm of colon ([address]     ESRD on dialysis ([address]     Bilateral carotid artery occlusion     Seizure ([address]     Chronic heart failure with preserved ejection fraction (HCC)     Left ventricular hypertrophy     Acute respiratory failure with hypoxia ([address]     Uncontrolled type 2 diabetes mellitus with hyperglycemia (HCC)     Acute respiratory failure (HCC)     Brittle diabetes (HCC)     Fluid overload     History of CVA (cerebrovascular accident)     Hyperkalemia     Diabetic ketoacidosis without coma associated with type 2 diabetes mellitus ([facility]     Supratherapeutic INR      Plan of Treatment:   1. Awaiting ECHO  2. Plan for CATH with clearance by nephrology, ID, and when INR < 2  INR 1.7 today. Will order heparin IV  3. Continue ASA, statin, BB, Plavix  4. ESRD on HD per nephrology  Fluid management per nephrology   5.  Recommend K > 4.0 and Mag > 2.0  K managed by nephrology   K 4.9   Will order [address] lab  Replace if Mag < 2.0    Electronically signed by TERRANCE Varela NP on 6/4/2022 at 5:07 PM  [address]  [phone]

## 2022-06-04 NOTE — FLOWSHEET NOTE
Belongings--tshirt, jeans, belt, and wallet sent with pt. Via bed and oxygen 2L to 332-1  Report given to [facility]. Meds and chart sent also.

## 2022-06-04 NOTE — PROGRESS NOTES
Dialysis Post Treatment Note  Vitals:    06/04/22 1220   BP: (!) 180/74   Pulse: 71   Resp: 18   Temp: 97.9 °F (36.6 °C)   SpO2:      Pre-Weight = 58.3 kg  Post-weight = Weight: 122 lb 5.7 oz (55.5 kg)  Total Liters Processed = Total Liters Processed (l/min): 74.3 l/min  Rinseback Volume (mL) = Rinseback Volume (ml): 300 ml  Net Removal (mL) =  2800  Patient's dry weight=remove 2-3 kg  Type of access used=right perm cath  Length of treatment=210    Post treatment all blood returned heparin instilled sterile end caps applied. Pt returning to room via w/c, pt tolerated treatment well and leaving at 55.5 kg bp 180/74.

## 2022-06-04 NOTE — PROGRESS NOTES
Renal Progress Note    Patient :  [facility]; 78 y.o. MRN# [id number]  Location:  4302/7645-20  Attending:  Tito Vaughn DO  Admit Date:  2022   Hospital Day: 2    Subjective   Patient was seen and examined. Hypoglycemic last night. Glucose found to the 60s insulin infusion stopped patient was given dextrose and p.o. juice. Last blood sugar 162. Vital signs stable   Due for HD today. HD last on 22 removed 3 L. We are right IJ. Had chest pain and nausea . BiPAP mask and EKG done given 4 81mg ASA. INR now 1.7 less than 2.0, will see when cardiology planned to cath when INR less than 2.0.     Objective     VS: BP (!) 179/75   Pulse 72   Temp 98.6 °F (37 °C)   Resp 18   Ht 5' 6\" (1.676 m)   Wt 128 lb 8.5 oz (58.3 kg)   SpO2 97%   BMI 20.74 kg/m²   MAXIMUM TEMPERATURE OVER 24HRS:  Temp (24hrs), Av.6 °F (37 °C), Min:98.1 °F (36.7 °C), Max:99 °F (37.2 °C)    24HR BLOOD PRESSURE RANGE:  Systolic (63DQH), RADHA:324 , Min:120 , LQS:260   ; Diastolic (28HPR), MILLER:57, Min:48, Max:75    24HR INTAKE/OUTPUT:    Intake/Output Summary (Last 24 hours) at 2022 1352  Last data filed at 6/3/2022 2000  Gross per 24 hour   Intake 287 ml   Output --   Net 287 ml     WEIGHT :  Patient Vitals for the past 96 hrs (Last 3 readings):   Weight   22 0915 128 lb 8.5 oz (58.3 kg)   22 0500 141 lb (64 kg)   22 1800 133 lb 14.4 oz (60.7 kg)       Current Medications:     Scheduled Meds:    insulin glargine  15 Units SubCUTAneous BID    heparin (porcine)  1,000 Units IntraVENous Once per day on  Sat    heparin (porcine)  500 Units IntraVENous Once per day on  Sat    budesonide-formoterol  2 puff Inhalation BID    insulin lispro  0-12 Units SubCUTAneous TID WC    insulin lispro  0-6 Units SubCUTAneous Nightly    epoetin marisela-epbx  3,000 Units IntraVENous Once per day on  Sat    gabapentin  300 mg Oral Nightly    midodrine  10 mg Oral TID    pantoprazole  40 mg Oral QAM AC  clopidogrel  75 mg Oral Daily    aspirin  81 mg Oral Daily    sodium chloride flush  5-40 mL IntraVENous 2 times per day    atorvastatin  80 mg Oral Nightly    bumetanide  1 mg Oral Daily    carvedilol  25 mg Oral BID    [Held by provider] cloNIDine  0.3 mg Oral TID    [Held by provider] hydrALAZINE  100 mg Oral TID    ipratropium-albuterol  1 vial Inhalation 4x Daily    [Held by provider] losartan  50 mg Oral Daily    montelukast  10 mg Oral Nightly    [Held by provider] NIFEdipine  90 mg Oral Daily    prazosin  10 mg Oral BID    QUEtiapine  100 mg Oral Nightly     Continuous Infusions:    sodium chloride      dextrose      sodium chloride         Physical Examination:     General:  AAO x 3, speaking in full sentences, no accessory muscle use. Chest:   Bilateral vesicular breath sounds, no rales or wheezes. Cardiac:  S1 S2 RR, no murmurs, gallops or rubs, JVP not raised. Abdomen: Soft, non-tender, non distended, BS audible. SKIN:  No rashes, good skin turgor. Extremities:  No edema, no clubbing, No cyanosis  Neuro:  AAO x 3, No FND.    Access: R IJ    Labs:       Recent Labs     06/02/22 2007 06/03/22  0202 06/04/22  0647   WBC 10.9 11.7* 10.0   RBC 2.86* 2.58* 2.86*   HGB 9.0* 8.2* 8.9*   HCT 26.7* 24.7* 27.0*   MCV 93.4 95.7 94.4   MCH 31.5 31.8 31.1   MCHC 33.7 33.2 33.0   RDW 14.1 14.1 14.6*    307 320   MPV 9.2 9.4 9.6      BMP:   Recent Labs     06/03/22  0202 06/03/22  0959 06/04/22  0647   * 121* 123*   K 3.4* 4.3 4.9   CL 85* 86* 87*   CO2 24 20 22   BUN 34* 35* 57*   CREATININE 3.63* 4.27* 5.63*   GLUCOSE 257* 200* 274*   CALCIUM 7.4* 7.3* 7.6*      Magnesium:    Recent Labs     06/03/22  0202   MG 1.6     Albumin:    Recent Labs     06/02/22  1013 06/03/22  0202   LABALBU 4.2 3.5     BNP:      Lab Results   Component Value Date    BNP 84 11/27/2013     PTH:     Lab Results   Component Value Date    IPTH 225.4 05/10/2021     Urinalysis/Chemistries:      Lab Results Component Value Date    NITRU NEGATIVE 05/03/2021    COLORU YELLOW 05/03/2021    PHUR 6.0 05/03/2021    WBCUA 2 TO 5 05/03/2021    RBCUA 0 TO 2 05/03/2021    MUCUS NOT REPORTED 05/03/2021    TRICHOMONAS NOT REPORTED 05/03/2021    YEAST NOT REPORTED 05/03/2021    BACTERIA NOT REPORTED 05/03/2021    CLARITYU Clear 09/12/2014    SPECGRAV 1.016 05/03/2021    LEUKOCYTESUR NEGATIVE 05/03/2021    UROBILINOGEN Normal 05/03/2021    BILIRUBINUR NEGATIVE 05/03/2021    BILIRUBINUR NEGATIVE 11/12/2011    BLOODU Negative 09/12/2014    GLUCOSEU 1+ 05/03/2021    GLUCOSEU 3+ 11/12/2011    [address] NEGATIVE 05/03/2021    AMORPHOUS NOT REPORTED 05/03/2021       Radiology:     CXR: Reviewed as available    Assessment:     1. ESRD on Hemodialysis. His regular HD days are TTS at [address] [place] [address] Hemodialysis facility using tunnel catheter under Dr. Modesta Jacobson. Due HD today. 2. Acute respiratory failure-improving some postdialysis, continues on nasal cannula oxygen  3. Life-threatening hyperkalemia, improved with dialysis 6/2/22. Now 4.9.   4. Metabolic acidosis - improved/resolved after HD 6/2/22. 5. Essential hypertension - uncontrolled. 6. T2DM requiring insulin  7. Hx COPD  8. Peripheral vascular disease  9. Hx ischemic left CVA  10. No significant fluid overload  11. Hyponatremia - 123 today. 12. Anemia of chronic disease - 8.9  13. Secondary hyperparathyroidism  14. Hypotension/shock - off pressors  15. Hypoglycemia - resolved     Plan:   1. HD today as routine scheduled. Orders were confirmed with the dialysis nurse. 2. Strict Input and Output, Daily weigh and document in the chart. 3. Low Potassium, Low phosphorus and low salt diet. Fluids to be restricted to 1500ml/day. 4. IV Aranesp/Epogen for anemia of chronic disease with HD weekly. 5. IV Zemplar per protocol for secondary hyperparathyroidism with HD thrice a week. 6. Plan to do cardiac catheter once INR less than 2.   7.  Will follow.      Nutrition   Renal Diet/TF    Electronically signed by León Mccauley CNP, APRN - CNP on 6/4/2022 at 1:52 PM   [facility]. Attending Physician Statement  I have discussed the care of this patient, including pertinent history and exam findings, with the Resident/CNP. I have reviewed and edited the key elements of all parts of the encounter with the Resident/CNP. I agree with the assessment, plan and orders as documented by the Resident/CNP. Alexis Gomez MD   Nephrology [address] [place] [address] Drive    This note is created with the assistance of a speech-recognition program. While intending to generate a document that actually reflects the content of the visit, no guarantees can be provided that every mistake has been identified and corrected by editing.

## 2022-06-04 NOTE — PROGRESS NOTES
Critical care team - Resident sign-out to medicine service      Date and time: 6/4/2022 3:29 AM  Patient's name:  Ann Quiroz  Medical Record Number: [id number]  Patient's account/billing number: [de-identified]  Patient's YOB: 1955  Age: 78 y.o. Date of Admission: 6/2/2022  9:54 AM  Length of stay during current admission: 2    Primary Care Physician: Nargis Frederick    Code Status: Full Code    Mode of physician to physician communication:        [x] Via telephone   [] In person     Date and time of sign-out: 6/4/2022 3:29 AM    Accepting Internal Medicine resident: Ke Galvan    Accepting Medicine team: Intermed    Accepting team's attending: Dr. Ronan Tinlsey    Patient's current ICU Bed:  [phone]    Patient's assigned bed on floor: 332        [x] Med-Surg Monitored [] Step-down       [] Psychiatry ICU       [] Psych floor     Reason for ICU admission:     DKA  Worsening respiratory status concerns of intubation    ICU course summary:   Ann Quiroz; 78 y.o. male with past medical history of ESRD on HD TTS followed by Dr. Glynn at  [facility], HTN, T2DM, COPD, and CVA presented to the hospital with the chief complaint of shortness of breath for 1 day. Shannan Rees has been on insulin for ~30 years and on chronic HD.      He was recently admitted to [facility] for COPD exacerbation. Reports being admitted for 18 days, hospital course was complicated by clot in dialysis catheter. He reports being switched off Plavix to warfarin for anticoagulation. During admission he states \"there was fluid around my lungs\" and that approximately 25 lbs of fluid were removed by dialysis. Reports being discharged \"about 2-3 days ago. \" He does report eating higher potassium content food recently.        Ann Quiroz is a 78 y.o. with a history of chronic kidney disease on dialysis Tuesday Thursday Saturday, diabetes mellitus for which he takes Humalog, hypertension, hyperlipidemia, previous TIA as well as previous coronary artery disease who comes in with concerns for worsening shortness of breath. He states that symptoms been occurring for the past 24 to 48 hours with patient last being dialyzed on Tuesday without any issue. Patient was seen and evaluated in the emergency department, initial lab values showed concerns for hyperglycemia with a glucose of 598, elevated anion gap of 42, bicarb at 6 with concerns for diabetic ketoacidosis. Patient also notably had a potassium of 7.5 with EKG changes including QTC prolongation, hyperacute T waves. Serial troponins showed a delta of 5, patient upon my initial evaluation adamantly denies chest pain, does endorse nausea as well as shortness of breath compared to baseline, does state that he still makes urine, endorses increased urination over the past 12 to 24 hours. Prior to medical history of evaluation nephrology consulted, patient to undergo urgent dialysis in the emergency department prior to admission to the ICU. Patient also has a history of COPD, has received multiple breathing treatments with only transient improvement in respiratory status. Patient is currently saturating 98% on 5 L nasal cannula. Point-of-care blood gas shows concerns for acidosis with a pH of 7.039, PCO2 of 21.5, bicarb of 5.4, PO2 of 178 with concerns for metabolic acidosis with respiratory compensation. Patient was managed according to DKA protocol 1 was placed this morning. Patient was found to have elevated troponins at about 3000, no EKG changes and no chest pain. Cardiology was consulted for elevated troponins and ischemic work-up. Patient was found to have an INR of 11.2 as patient was on warfarin. Vitamin K 5 mg was given to reverse the elevated INR. Cardiology planning possible cardiac cath once INR is less than 2. Currently on heparin drip.     Procedures during patient's ICU stay:     None    Current Vitals:     BP (!) 132/56   Pulse 62   Temp 98.8 °F (37.1 °C) (Oral)   Resp 15   Ht 5' 6\" (1.676 m)   Wt 141 lb (64 kg)   SpO2 93%   BMI 22.76 kg/m²       Cultures:     Blood cultures:                 [] None drawn      [] Negative             [x]  Positive (Details: Gram-positive rods likely contaminant)  Urine Culture:                   [] None drawn      [] Negative             []  Positive (Details:  )  Sputum Culture:               [] None drawn       [] Negative             []  Positive (Details:  )   Endotracheal aspirate:     [] None drawn       [] Negative             []  Positive (Details:  )       Consults:     1. Inpatient consult to cardiology  2. Inpatient consult to nephrology  3.   Patient counseled to infectious diseases    Assessment:     Patient Active Problem List    Diagnosis Date Noted    Transient cerebral ischemia     Hyperkalemia 06/02/2022    Pure hypercholesterolemia 06/10/2013    History of CVA (cerebrovascular accident)     Fluid overload 03/07/2022    Brittle diabetes ([address] 11/01/2021    Acute respiratory failure ([address] 05/19/2021    Shortness of breath 05/15/2021    Uncontrolled type 2 diabetes mellitus with hyperglycemia ([address] 06/63/2190    Metabolic acidosis 36/23/7929    Chronic heart failure with preserved ejection fraction ([address] 05/06/2021    Left ventricular hypertrophy 05/06/2021    Inflammation of right sacroiliac joint ([address] 01/08/2021    Malignant neoplasm of colon ([address] 01/08/2021    ESRD on dialysis ([address] 01/08/2021    Bilateral carotid artery occlusion 01/08/2021    Seizure ([address] 01/08/2021    DKA, type 2, not at goal [facility]) 12/03/2020    Hypoalbuminemia 06/18/2020    Hyponatremia 05/17/2020    Anemia of chronic disease 05/17/2020    Leg swelling 05/15/2020    COPD exacerbation ([address] 01/06/2020    Moderate malnutrition ([address] 08/21/2019    Hypertensive urgency 08/12/2019    Non-obstructive Coronary artery disease of native artery of native heart with stable angina pectoris ([address] 08/12/2019    Hyperparathyroidism ([facility] 08/12/2019    Hypovitaminosis D 08/12/2019    Acute kidney injury superimposed on CKD (HCC)-currently dialysis patient 08/12/2019    Coronary artery disease with exertional angina ([address] 08/12/2019    Primary insomnia 02/19/2019    Cerebral vascular disease 04/23/2018    COPD with acute exacerbation ([address] 01/26/2018    Dyspnea and respiratory abnormalities 10/10/2015    PTSD (post-traumatic stress disorder) 10/10/2015    Hypertension, essential     DM type 2, uncontrolled, with neuropathy ([address]     Carotid stenosis, left s/p Endarterectomy 06/10/2013    Mixed simple and mucopurulent chronic bronchitis ([address] 06/10/2013    Cigarette nicotine dependence without complication 38/81/1835    Carpal tunnel syndrome on right 11/18/2011    Colon polyps 11/18/2011         Recommended Follow-up:     1. ESRD on hemodialysis on TTS schedule. Nephrology following  2. Plan for cardiac cath by cardiology once INR is less than 2  3. Management of blood glucose  4. Follow infectious disease recommendations        Above mentioned assessment and plan was discussed by me with the admitting medicine resident. The medicine team assigned to the patient by medicine admitting resident will be following up the patient from now onwards on the floor. Sheri Horowitz MD  Internal Medicine Resident, PGY-2  [facility];  [place], [place]  6/4/2022,3:29 AM

## 2022-06-04 NOTE — PROGRESS NOTES
Dialysis Time Out  To be done by RN and tech or 2 RNs  Staff Names perry Magallon Darrin    [x]  Identity of the patient using 2 patient identifiers  [x]  Consent for treatment  [x]  Equipment-proper machine and dialyzer  [x]  B-Hep B status  [x]  Orders- to include bath, blood flow, dialyzer, time and fluid removal  [x]  Access-Correct site and in working order  [x]  Time for patient to ask questions.

## 2022-06-05 LAB
ANION GAP SERPL CALCULATED.3IONS-SCNC: 14 MMOL/L (ref 9–17)
BUN BLDV-MCNC: 34 MG/DL (ref 8–23)
CALCIUM SERPL-MCNC: 7.5 MG/DL (ref 8.6–10.4)
CHLORIDE BLD-SCNC: 87 MMOL/L (ref 98–107)
CO2: 27 MMOL/L (ref 20–31)
CREAT SERPL-MCNC: 3.6 MG/DL (ref 0.7–1.2)
GFR AFRICAN AMERICAN: 21 ML/MIN
GFR NON-AFRICAN AMERICAN: 17 ML/MIN
GFR SERPL CREATININE-BSD FRML MDRD: ABNORMAL ML/MIN/{1.73_M2}
GLUCOSE BLD-MCNC: 144 MG/DL (ref 75–110)
GLUCOSE BLD-MCNC: 172 MG/DL (ref 75–110)
GLUCOSE BLD-MCNC: 189 MG/DL (ref 70–99)
GLUCOSE BLD-MCNC: 209 MG/DL (ref 75–110)
GLUCOSE BLD-MCNC: 226 MG/DL (ref 75–110)
GLUCOSE BLD-MCNC: 385 MG/DL (ref 75–110)
MAGNESIUM: 1.3 MG/DL (ref 1.6–2.6)
PARTIAL THROMBOPLASTIN TIME: 26.6 SEC (ref 20.5–30.5)
PARTIAL THROMBOPLASTIN TIME: 32.5 SEC (ref 20.5–30.5)
POTASSIUM SERPL-SCNC: 3.8 MMOL/L (ref 3.7–5.3)
SODIUM BLD-SCNC: 128 MMOL/L (ref 135–144)

## 2022-06-05 PROCEDURE — 83735 ASSAY OF MAGNESIUM: CPT

## 2022-06-05 PROCEDURE — 1200000000 HC SEMI PRIVATE

## 2022-06-05 PROCEDURE — 82947 ASSAY GLUCOSE BLOOD QUANT: CPT

## 2022-06-05 PROCEDURE — 6370000000 HC RX 637 (ALT 250 FOR IP): Performed by: STUDENT IN AN ORGANIZED HEALTH CARE EDUCATION/TRAINING PROGRAM

## 2022-06-05 PROCEDURE — 80048 BASIC METABOLIC PNL TOTAL CA: CPT

## 2022-06-05 PROCEDURE — 99222 1ST HOSP IP/OBS MODERATE 55: CPT | Performed by: INTERNAL MEDICINE

## 2022-06-05 PROCEDURE — 36415 COLL VENOUS BLD VENIPUNCTURE: CPT

## 2022-06-05 PROCEDURE — 6360000002 HC RX W HCPCS: Performed by: NURSE PRACTITIONER

## 2022-06-05 PROCEDURE — 99232 SBSQ HOSP IP/OBS MODERATE 35: CPT | Performed by: INTERNAL MEDICINE

## 2022-06-05 PROCEDURE — 6370000000 HC RX 637 (ALT 250 FOR IP): Performed by: INTERNAL MEDICINE

## 2022-06-05 PROCEDURE — 99232 SBSQ HOSP IP/OBS MODERATE 35: CPT | Performed by: STUDENT IN AN ORGANIZED HEALTH CARE EDUCATION/TRAINING PROGRAM

## 2022-06-05 PROCEDURE — 94640 AIRWAY INHALATION TREATMENT: CPT

## 2022-06-05 PROCEDURE — 6370000000 HC RX 637 (ALT 250 FOR IP): Performed by: NURSE PRACTITIONER

## 2022-06-05 PROCEDURE — 2580000003 HC RX 258: Performed by: STUDENT IN AN ORGANIZED HEALTH CARE EDUCATION/TRAINING PROGRAM

## 2022-06-05 PROCEDURE — 85730 THROMBOPLASTIN TIME PARTIAL: CPT

## 2022-06-05 RX ORDER — INSULIN GLARGINE 100 [IU]/ML
20 INJECTION, SOLUTION SUBCUTANEOUS 2 TIMES DAILY
Status: DISCONTINUED | OUTPATIENT
Start: 2022-06-05 | End: 2022-06-05

## 2022-06-05 RX ORDER — LABETALOL HYDROCHLORIDE 5 MG/ML
20 INJECTION, SOLUTION INTRAVENOUS ONCE
Status: COMPLETED | OUTPATIENT
Start: 2022-06-06 | End: 2022-06-06

## 2022-06-05 RX ORDER — DOXYCYCLINE HYCLATE 100 MG
100 TABLET ORAL EVERY 12 HOURS SCHEDULED
Status: DISCONTINUED | OUTPATIENT
Start: 2022-06-05 | End: 2022-06-06 | Stop reason: HOSPADM

## 2022-06-05 RX ORDER — INSULIN GLARGINE 100 [IU]/ML
11 INJECTION, SOLUTION SUBCUTANEOUS 2 TIMES DAILY
Status: DISCONTINUED | OUTPATIENT
Start: 2022-06-05 | End: 2022-06-06

## 2022-06-05 RX ORDER — MAGNESIUM SULFATE IN WATER 40 MG/ML
2000 INJECTION, SOLUTION INTRAVENOUS ONCE
Status: COMPLETED | OUTPATIENT
Start: 2022-06-05 | End: 2022-06-05

## 2022-06-05 RX ADMIN — NEPHROCAP 1 MG: 1 CAP ORAL at 08:16

## 2022-06-05 RX ADMIN — BUMETANIDE 1 MG: 1 TABLET ORAL at 08:15

## 2022-06-05 RX ADMIN — PRAZOSIN HYDROCHLORIDE 5 MG: 5 CAPSULE ORAL at 20:45

## 2022-06-05 RX ADMIN — BUDESONIDE AND FORMOTEROL FUMARATE DIHYDRATE 2 PUFF: 160; 4.5 AEROSOL RESPIRATORY (INHALATION) at 08:09

## 2022-06-05 RX ADMIN — PANTOPRAZOLE SODIUM 40 MG: 40 TABLET, DELAYED RELEASE ORAL at 06:29

## 2022-06-05 RX ADMIN — HYDRALAZINE HYDROCHLORIDE 100 MG: 50 TABLET, FILM COATED ORAL at 14:21

## 2022-06-05 RX ADMIN — IPRATROPIUM BROMIDE AND ALBUTEROL SULFATE 3 ML: .5; 3 SOLUTION RESPIRATORY (INHALATION) at 12:50

## 2022-06-05 RX ADMIN — MAGNESIUM GLUCONATE 500 MG ORAL TABLET 400 MG: 500 TABLET ORAL at 08:15

## 2022-06-05 RX ADMIN — MONTELUKAST SODIUM 10 MG: 10 TABLET, FILM COATED ORAL at 22:45

## 2022-06-05 RX ADMIN — CARVEDILOL 25 MG: 25 TABLET, FILM COATED ORAL at 08:15

## 2022-06-05 RX ADMIN — GABAPENTIN 100 MG: 100 CAPSULE ORAL at 08:15

## 2022-06-05 RX ADMIN — CLONIDINE HYDROCHLORIDE 0.3 MG: 0.1 TABLET ORAL at 14:21

## 2022-06-05 RX ADMIN — BUDESONIDE AND FORMOTEROL FUMARATE DIHYDRATE 2 PUFF: 160; 4.5 AEROSOL RESPIRATORY (INHALATION) at 22:02

## 2022-06-05 RX ADMIN — INSULIN GLARGINE 11 UNITS: 100 INJECTION, SOLUTION SUBCUTANEOUS at 11:27

## 2022-06-05 RX ADMIN — Medication 81 MG: at 08:16

## 2022-06-05 RX ADMIN — HYDRALAZINE HYDROCHLORIDE 100 MG: 50 TABLET, FILM COATED ORAL at 22:45

## 2022-06-05 RX ADMIN — INSULIN LISPRO 5 UNITS: 100 INJECTION, SOLUTION INTRAVENOUS; SUBCUTANEOUS at 22:40

## 2022-06-05 RX ADMIN — IPRATROPIUM BROMIDE AND ALBUTEROL SULFATE 3 ML: .5; 3 SOLUTION RESPIRATORY (INHALATION) at 08:09

## 2022-06-05 RX ADMIN — HEPARIN SODIUM 1670 UNITS: 1000 INJECTION INTRAVENOUS; SUBCUTANEOUS at 10:53

## 2022-06-05 RX ADMIN — DOXYCYCLINE HYCLATE 100 MG: 100 TABLET, COATED ORAL at 20:45

## 2022-06-05 RX ADMIN — MAGNESIUM SULFATE 2000 MG: 2 INJECTION INTRAVENOUS at 10:48

## 2022-06-05 RX ADMIN — IPRATROPIUM BROMIDE AND ALBUTEROL SULFATE 3 ML: .5; 3 SOLUTION RESPIRATORY (INHALATION) at 21:56

## 2022-06-05 RX ADMIN — CARVEDILOL 25 MG: 25 TABLET, FILM COATED ORAL at 20:45

## 2022-06-05 RX ADMIN — ATORVASTATIN CALCIUM 80 MG: 80 TABLET, FILM COATED ORAL at 22:45

## 2022-06-05 RX ADMIN — PRAZOSIN HYDROCHLORIDE 5 MG: 5 CAPSULE ORAL at 08:15

## 2022-06-05 RX ADMIN — CLONIDINE HYDROCHLORIDE 0.3 MG: 0.1 TABLET ORAL at 22:45

## 2022-06-05 RX ADMIN — HEPARIN SODIUM 3330 UNITS: 1000 INJECTION INTRAVENOUS; SUBCUTANEOUS at 03:51

## 2022-06-05 RX ADMIN — GABAPENTIN 100 MG: 100 CAPSULE ORAL at 20:45

## 2022-06-05 RX ADMIN — SODIUM CHLORIDE, PRESERVATIVE FREE 10 ML: 5 INJECTION INTRAVENOUS at 08:16

## 2022-06-05 RX ADMIN — GABAPENTIN 100 MG: 100 CAPSULE ORAL at 14:53

## 2022-06-05 ASSESSMENT — ENCOUNTER SYMPTOMS
PHOTOPHOBIA: 0
CHEST TIGHTNESS: 0
DIARRHEA: 0
ABDOMINAL PAIN: 0

## 2022-06-05 ASSESSMENT — PAIN DESCRIPTION - LOCATION: LOCATION: GENERALIZED

## 2022-06-05 ASSESSMENT — PAIN DESCRIPTION - ONSET: ONSET: ON-GOING

## 2022-06-05 ASSESSMENT — PAIN SCALES - GENERAL
PAINLEVEL_OUTOF10: 0
PAINLEVEL_OUTOF10: 8
PAINLEVEL_OUTOF10: 0

## 2022-06-05 ASSESSMENT — PAIN DESCRIPTION - DESCRIPTORS: DESCRIPTORS: ACHING

## 2022-06-05 ASSESSMENT — PAIN DESCRIPTION - FREQUENCY: FREQUENCY: INTERMITTENT

## 2022-06-05 ASSESSMENT — PAIN DESCRIPTION - PAIN TYPE: TYPE: ACUTE PAIN

## 2022-06-05 NOTE — PROGRESS NOTES
Renal Progress Note    Patient :  Viviana Suárez; 78 y.o. MRN# [id number]  Location:  [facility]/1949-  Attending:  Johana Steen MD  Admit Date:  2022   Hospital Day: 3    Subjective   Patient was seen and examined. SOB continues with exertion. HD yesterday via right perm cath. Ran for 210 min removed 2.8 L. Post weight 55.5 kg. HTN uncontrolled 591-328 systolic readings. Coreg 25 mg po bid, clonidine 0.3 mg po tid, bumex 1 mg po bid. PROAMATINE HELD FOR LAST 24 HRS DUE TO UNCONTROLLED HTN. LOSARTAN ON HOLD SINCE ADMISSION. Was previously on 50 mg po daily. Procardia XL held at this time as well. Continues on heparin gtt. Coumadin held since 22. I/O since admission net + 37 mL. Labs reviewed. Na 128 today, K 3.8, glucose 144, last Hgb 9.4. Mg 1.3. Chart reviewed. Cardiac catheterization planned for tomorrow. NPO at midnight. Echo pending.      Objective     VS: BP (!) 186/78   Pulse 79   Temp 100 °F (37.8 °C) (Oral)   Resp 18   Ht 5' 6\" (1.676 m)   Wt 122 lb 5.7 oz (55.5 kg)   SpO2 98%   BMI 19.75 kg/m²   MAXIMUM TEMPERATURE OVER 24HRS:  Temp (24hrs), Av.3 °F (37.4 °C), Min:97.9 °F (36.6 °C), Max:100 °F (37.8 °C)    24HR BLOOD PRESSURE RANGE:  Systolic (44UES), :858 , Min:140 , AWB:185   ; Diastolic (28SUA), XRE:60, Min:66, Max:78    24HR INTAKE/OUTPUT:      Intake/Output Summary (Last 24 hours) at 2022 1154  Last data filed at 2022 1220  Gross per 24 hour   Intake 300 ml   Output 3300 ml   Net -3000 ml     WEIGHT :  Patient Vitals for the past 96 hrs (Last 3 readings):   Weight   22 1220 122 lb 5.7 oz (55.5 kg)   22 0915 128 lb 8.5 oz (58.3 kg)   22 0500 141 lb (64 kg)       Current Medications:     Scheduled Meds:    magnesium sulfate  2,000 mg IntraVENous Once    insulin glargine  11 Units SubCUTAneous BID    heparin (porcine)  1,000 Units IntraVENous Once per day on Tue Thu Sat    heparin (porcine)  500 Units IntraVENous Once per day on Tue Thu Sat  b complex-C-folic acid  1 mg Oral Daily    Vitamin D  5,000 Units Oral Daily    magnesium oxide  400 mg Oral Daily    gabapentin  100 mg Oral TID    prazosin  5 mg Oral BID    budesonide-formoterol  2 puff Inhalation BID    insulin lispro  0-12 Units SubCUTAneous TID WC    insulin lispro  0-6 Units SubCUTAneous Nightly    epoetin marisela-epbx  3,000 Units IntraVENous Once per day on Tue Thu Sat    midodrine  10 mg Oral TID    pantoprazole  40 mg Oral QAM AC    clopidogrel  75 mg Oral Daily    aspirin  81 mg Oral Daily    sodium chloride flush  5-40 mL IntraVENous 2 times per day    atorvastatin  80 mg Oral Nightly    bumetanide  1 mg Oral Daily    carvedilol  25 mg Oral BID    cloNIDine  0.3 mg Oral TID    hydrALAZINE  100 mg Oral TID    ipratropium-albuterol  1 vial Inhalation 4x Daily    [Held by provider] losartan  50 mg Oral Daily    montelukast  10 mg Oral Nightly    [Held by provider] NIFEdipine  90 mg Oral Daily    QUEtiapine  100 mg Oral Nightly     Continuous Infusions:    heparin (PORCINE) Infusion 18 Units/kg/hr (06/05/22 1048)    sodium chloride      dextrose      sodium chloride         Physical Examination:     General:  AAO x 3, speaking in full sentences, no accessory muscle use. Chest:   Bilateral vesicular breath sounds, no rales or wheezes. Cardiac:  S1 S2 RR, no murmurs, gallops or rubs, JVP not raised. Abdomen: Soft, non-tender, non distended, BS audible. SKIN:  No rashes, good skin turgor. Extremities:  No edema, no clubbing, No cyanosis  Neuro:  AAO x 3, No FND.    Access: R IJ    Labs:       Recent Labs     06/03/22  0202 06/04/22  0647 06/04/22  1805   WBC 11.7* 10.0 7.8   RBC 2.58* 2.86* 2.95*   HGB 8.2* 8.9* 9.4*   HCT 24.7* 27.0* 27.3*   MCV 95.7 94.4 92.5   MCH 31.8 31.1 31.9   MCHC 33.2 33.0 34.4   RDW 14.1 14.6* 13.9    320 282   MPV 9.4 9.6 9.1      BMP:   Recent Labs     06/03/22  0959 06/04/22  0647 06/05/22  0322   * 123* 128*   K 4.3 4.9 3.8   CL 86* 87* 87*   CO2 20 22 27   BUN 35* 57* 34*   CREATININE 4.27* 5.63* 3.60*   GLUCOSE 200* 274* 189*   CALCIUM 7.3* 7.6* 7.5*      Magnesium:    Recent Labs     06/03/22  0202 06/04/22  0647 06/05/22  0322   MG 1.6 1.6 1.3*     Albumin:    Recent Labs     06/03/22  0202   LABALBU 3.5     BNP:      Lab Results   Component Value Date    BNP 84 11/27/2013     PTH:     Lab Results   Component Value Date    IPTH 225.4 05/10/2021     Urinalysis/Chemistries:      Lab Results   Component Value Date    NITRU NEGATIVE 05/03/2021    COLORU YELLOW 05/03/2021    PHUR 6.0 05/03/2021    WBCUA 2 TO 5 05/03/2021    RBCUA 0 TO 2 05/03/2021    MUCUS NOT REPORTED 05/03/2021    TRICHOMONAS NOT REPORTED 05/03/2021    YEAST NOT REPORTED 05/03/2021    BACTERIA NOT REPORTED 05/03/2021    CLARITYU Clear 09/12/2014    SPECGRAV 1.016 05/03/2021    LEUKOCYTESUR NEGATIVE 05/03/2021    UROBILINOGEN Normal 05/03/2021    BILIRUBINUR NEGATIVE 05/03/2021    BILIRUBINUR NEGATIVE 11/12/2011    BLOODU Negative 09/12/2014    GLUCOSEU 1+ 05/03/2021    GLUCOSEU 3+ 11/12/2011    [place] NEGATIVE 05/03/2021    AMORPHOUS NOT REPORTED 05/03/2021       Radiology:     CXR: Reviewed as available    Assessment:     1. ESRD on Hemodialysis. His regular HD days are TTS at [address] [place] [address] Hemodialysis facility using tunnel catheter under Dr. Faith Stern. Due HD today. 2. Acute respiratory failure-improving some postdialysis, continues on nasal cannula oxygen  3. Life-threatening hyperkalemia, improved with dialysis 6/2/22. Now 3.8  4. Metabolic acidosis - improved/resolved after HD 6/2/22. 5. Essential hypertension - uncontrolled. 6. T2DM requiring insulin  7. Hx COPD  8. Peripheral vascular disease  9. Hx ischemic left CVA  10. No significant fluid overload  11. Hyponatremia - 128 today. 12. Anemia of chronic disease - 8.9  13. Secondary hyperparathyroidism  14. Hypotension/shock - off pressors  15. Hypoglycemia - resolved   16.  Hypomagnesemia - 1.3 cardiology ordered replacements    Plan:   1. No acute need for HD today. HD again on Tuesday per routine schedule. 2. Strict Input and Output, Daily weigh and document in the chart. 3. Low Potassium, Low phosphorus and low salt diet. Fluids to be restricted to 1500ml/day. 4. IV Aranesp/Epogen for anemia of chronic disease with HD weekly. 5. IV Zemplar per protocol for secondary hyperparathyroidism with HD thrice a week. 6.  Cardiac catheterization tomorrow per cardiology. 7.  Okay for magnesium replacement. 8.  BP meds restarted. 9   Labs in am  10. Following. Nutrition   Renal Diet/TF    Electronically signed by Lashon Neal CNP, TERRANCE - CNP on 6/5/2022 at 11:54 AM   [facility]. Attending Physician Statement  I have discussed the care of this patient, including pertinent history and exam findings, with the Resident/CNP. I have reviewed and edited the key elements of all parts of the encounter with the Resident/CNP. I agree with the assessment, plan and orders as documented by the Resident/CNP. Alexis Moore MD   Nephrology [address] [place] [address] Drive    This note is created with the assistance of a speech-recognition program. While intending to generate a document that actually reflects the content of the visit, no guarantees can be provided that every mistake has been identified and corrected by editing.

## 2022-06-05 NOTE — PROGRESS NOTES
[facility]  Office: [address], DO, Adelaidereny San, DO, Jonas Canseco, DO, Madelaine Mccray, DO, Valiant Sicard, MD, Lashanda Rodriguez MD, Tristan Galvez MD, Ladi Ortiz MD, Errol Thomas MD, Shena Abrams MD, Umair Strange MD, Marissa Coffey, DO, Annalee Hutchinson MD,  Danni Barajas DO, Laura Guzman MD, Oleksandr Mckeon MD, Pankaj Vyas MD, Alison Oneal DO, Rain Ochoa MD, David Hurd MD, Merly Nair DO, Dillon Rider MD, Toy Hdz, [facility], [facility], CNP, Saadia Ramirez, CNP, Mary Arita, CNP, Ramirez Tidwell, CNP, Farheen Retana, CNP, ANCA SilvaC, Danni Glass, DNP, Claudia Easley, CNP, Domingo Smallwood, CNP, Tyrone Hoyt, CNP, Isha Moncada, CNS, Joe Patel, DNP, Radha Ghotra, CNP, Carlos Gr, CNP, Esteban Brush, [facility]   [address] [facility]    Progress Note    6/5/2022    11:20 AM    Name:   Ann Quiroz  MRN:     [id number]     Acct:      [de-identified]   Room:   [address] [place] [address] Day:  3  Admit Date:  6/2/2022  9:54 AM    PCP:   Nargis Frederick  Code Status:  Full Code    Subjective:     C/C:   Chief Complaint   Patient presents with    Shortness of Breath     Interval History Status: improved     Patient was awoken from sleep at time of my visit. He does not agree with my insulin orders  Will try insulin orders per his recommendation and recheck BGM  Based on that we will adjust basal insulin  Can hold off bolus insulin for now    Brief History:     60-year-old male with medical history of ESRD on dialysis, type 2 diabetes, hypertension, hyperlipidemia, carotid artery disease, CAD presents to the emergency department for shortness of breath. Patient was found to have a pH of 7.0, bicarb of 6 suspected to be DKA. Patient was transferred to the ICU for further care on BiPAP. Patient tolerated well, oxygen was eventually weaned and patient was treated with insulin infusion.   Patient was also found to have an elevated troponin, seen by cardiology suspecting non-ST ovation myocardial infarction. Patient was eventually transferred out of ICU on 6/3/2022, patient's blood pressure slowly improved, patient was eventually recommended cardiac catheterization before discharge    Review of Systems:     Constitutional:  negative for chills, fevers, sweats  Respiratory:  negative for cough, dyspnea on exertion, shortness of breath, wheezing  Cardiovascular:  negative for chest pain, chest pressure/discomfort, lower extremity edema, palpitations  Gastrointestinal:  negative for abdominal pain, constipation, diarrhea, nausea, vomiting  Neurological:  negative for dizziness, headache    Medications: Allergies:     Allergies   Allergen Reactions    Lisinopril      cough    Ace Inhibitors      coughing    Pcn [Penicillins]        Current Meds:   Scheduled Meds:    magnesium sulfate  2,000 mg IntraVENous Once    insulin glargine  11 Units SubCUTAneous BID    heparin (porcine)  1,000 Units IntraVENous Once per day on Tue Thu Sat    heparin (porcine)  500 Units IntraVENous Once per day on Tue Thu Sat    b complex-C-folic acid  1 mg Oral Daily    Vitamin D  5,000 Units Oral Daily    magnesium oxide  400 mg Oral Daily    gabapentin  100 mg Oral TID    prazosin  5 mg Oral BID    budesonide-formoterol  2 puff Inhalation BID    insulin lispro  0-12 Units SubCUTAneous TID WC    insulin lispro  0-6 Units SubCUTAneous Nightly    epoetin marisela-epbx  3,000 Units IntraVENous Once per day on Tue Thu Sat    midodrine  10 mg Oral TID    pantoprazole  40 mg Oral QAM AC    clopidogrel  75 mg Oral Daily    aspirin  81 mg Oral Daily    sodium chloride flush  5-40 mL IntraVENous 2 times per day    atorvastatin  80 mg Oral Nightly    bumetanide  1 mg Oral Daily    carvedilol  25 mg Oral BID    cloNIDine  0.3 mg Oral TID    hydrALAZINE  100 mg Oral TID    ipratropium-albuterol  1 vial Inhalation 4x Daily  [Held by provider] losartan  50 mg Oral Daily    montelukast  10 mg Oral Nightly    [Held by provider] NIFEdipine  90 mg Oral Daily    QUEtiapine  100 mg Oral Nightly     Continuous Infusions:    heparin (PORCINE) Infusion 18 Units/kg/hr (22 1048)    sodium chloride      dextrose      sodium chloride       PRN Meds: heparin (porcine), heparin (porcine), heparin (porcine), heparin (porcine), sodium chloride, albuterol, glucose, dextrose bolus **OR** dextrose bolus, glucagon (rDNA), dextrose, sodium chloride flush, sodium chloride, ondansetron **OR** ondansetron, polyethylene glycol, acetaminophen **OR** acetaminophen, ipratropium-albuterol    Data:     Past Medical History:   has a past medical history of Asthma, CAD in native artery, nonobstructive, Carotid stenosis, left, Carpal tunnel syndrome, Cerebrovascular disease, Chronic kidney disease, COPD (chronic obstructive pulmonary disease) ([facility] [address] Diabetes mellitus ([facility] [address] History of colon polyps, History of weakness of extremity, HTN (hypertension), Hyperlipidemia, Lung, cysts, congenital, PTSD (post-traumatic stress disorder), PTSD (post-traumatic stress disorder), TIA (transient ischemic attack), and Type II or unspecified type diabetes mellitus without mention of complication, not stated as uncontrolled. Social History:   reports that he quit smoking about 7 years ago. His smoking use included cigarettes. He has a 17.50 pack-year smoking history. He has never used smokeless tobacco. He reports that he does not drink alcohol and does not use drugs.      Family History:   Family History   Problem Relation Age of Onset    Cancer Mother     Heart Disease Father        Vitals:  BP (!) 186/78   Pulse 79   Temp 100 °F (37.8 °C) (Oral)   Resp 18   Ht 5' 6\" (1.676 m)   Wt 122 lb 5.7 oz (55.5 kg)   SpO2 98%   BMI 19.75 kg/m²   Temp (24hrs), Av.9 °F (37.2 °C), Min:97.5 °F (36.4 °C), Max:100 °F (37.8 °C)    Recent Labs     22  1080 06/04/22 2011 06/05/22  0003 06/05/22  0801   POCGLU 252* 271* 209* 144*       I/O (24Hr): Intake/Output Summary (Last 24 hours) at 6/5/2022 1120  Last data filed at 6/4/2022 1220  Gross per 24 hour   Intake 300 ml   Output 3300 ml   Net -3000 ml       Labs:  Hematology:  Recent Labs     06/03/22  0202 06/03/22  0915 06/03/22  1614 06/04/22  0647 06/04/22  1805   WBC 11.7*  --   --  10.0 7.8   RBC 2.58*  --   --  2.86* 2.95*   HGB 8.2*  --   --  8.9* 9.4*   HCT 24.7*  --   --  27.0* 27.3*   MCV 95.7  --   --  94.4 92.5   MCH 31.8  --   --  31.1 31.9   MCHC 33.2  --   --  33.0 34.4   RDW 14.1  --   --  14.6* 13.9     --   --  320 282   MPV 9.4  --   --  9.6 9.1   INR  --  10.4* 4.4 1.7  --      Chemistry:  Recent Labs     06/02/22  1833 06/02/22  1833 06/03/22  0202 06/03/22  0202 06/03/22  0959 06/04/22  0647 06/05/22  0322   *   < > 123*   < > 121* 123* 128*   K 3.3*   < > 3.4*   < > 4.3 4.9 3.8   CL 84*   < > 85*   < > 86* 87* 87*   CO2 20   < > 24   < > 20 22 27   GLUCOSE 294*   < > 257*   < > 200* 274* 189*   BUN 29*   < > 34*   < > 35* 57* 34*   CREATININE 2.67*   < > 3.63*   < > 4.27* 5.63* 3.60*   MG  --   --  1.6  --   --  1.6 1.3*   ANIONGAP 23*   < > 14   < > 15 14 14   LABGLOM 24*   < > 17*   < > 14* 10* 17*   GFRAA 29*   < > 20*   < > 17* 12* 21*   CALCIUM 7.7*   < > 7.4*   < > 7.3* 7.6* 7.5*   TROPHS 1,284*  --  2,864*  --  3,056*  --   --     < > = values in this interval not displayed.      Recent Labs     06/03/22  0202 06/03/22  0426 06/04/22  0628 06/04/22  1315 06/04/22  1557 06/04/22 2011 06/05/22 0003 06/05/22  0801   PROT 5.4*  --   --   --   --   --   --   --    LABALBU 3.5  --   --   --   --   --   --   --    *  --   --   --   --   --   --   --    ALT 35  --   --   --   --   --   --   --    ALKPHOS 95  --   --   --   --   --   --   --    BILITOT <0.10*  --   --   --   --   --   --   --    POCGLU 240*   < > 282* 162* 252* 271* 209* 144*    < > = values in this interval not displayed. ABG:  Lab Results   Component Value Date    POCPH 7.39 06/17/2013    POCPCO2 46 06/17/2013    POCPO2 288 06/17/2013    POCHCO3 27.8 06/17/2013    NBEA NOT REPORTED 06/17/2013    PBEA 3 06/17/2013    FPN3AVS 29 06/17/2013    IJIK9ZKH 100 06/17/2013    FIO2 INFORMATION NOT PROVIDED 06/02/2022     Lab Results   Component Value Date/Time    SPECIAL L WRIST 2ML 06/02/2022 11:28 AM     Lab Results   Component Value Date/Time    CULTURE POSITIVE Blood Culture (A) 06/02/2022 11:28 AM    CULTURE DIRECT GRAM STAIN FROM BOTTLE: [address] 06/02/2022 11:28 AM    CULTURE (A) 06/02/2022 11:28 AM     BACILLUS SPECIES A single positive blood culture of coagulase negative Staphylocci, diphtheroids,micrococci, Cutibacterium, viridans Streptocci, Bacillus, or Lactobacillus species should be interpreted with caution and viewed as a likely skin contaminant.     CULTURE  06/02/2022 11:28 AM     (NOTE) Direct Gram Stain from bottle result called to and read back by:YIN Fonseca. 6/3/22 0100       Radiology:  XR CHEST PORTABLE    Result Date: 6/2/2022  Satisfactory line placement Mild diffuse prominence of interstitial markings may be related to edema or infection       Physical Examination:        General appearance:  Not cooperative  Mental Status:  oriented to person, place and time and irritated  Lungs:  clear to auscultation bilaterally, normal effort left chest wall tunneled catheter  Heart:  regular rate and rhythm, no murmur  Abdomen:  Soft nontender nondistended  Extremities:  no edema, redness, tenderness in the calves  Skin:  no gross lesions, rashes, induration    Assessment:        Hospital Problems           Last Modified POA    * (Principal) NSTEMI (non-ST elevated myocardial infarction) ([facility] [address] 6/4/2022 Yes    Hyperkalemia 6/4/2022 Yes    Diabetic ketoacidosis without coma associated with type 2 diabetes mellitus ([facility] [address] 6/4/2022 Yes    Supratherapeutic INR 8/9/3735 Yes    Metabolic acidosis 5/1/2241 Yes Carotid stenosis, left s/p Endarterectomy 6/4/2022 Yes    Shortness of breath 6/4/2022 Yes    Hypertension, essential 6/4/2022 Yes    DM type 2, uncontrolled, with neuropathy ([facility] [address] 6/4/2022 Yes    Acute respiratory failure with hypoxia ([facility] [address] 6/4/2022 Yes                Plan:        NSTEMI - cath Monday   S/p carotid stent - on warfarin outpatient.  INR 1.7 today will discuss with cardiology starting warfarin   Dialysis per nephrology   Restart bp meds today   BGM not at goal  Can resume patient home dose Lantus 11 units  He refuses to be given insulin based on our recommendations based on his rising and labile BG    Kim Espinoza MD  6/5/2022  11:20 AM

## 2022-06-05 NOTE — PLAN OF CARE
Problem: Discharge Planning  Goal: Discharge to home or other facility with appropriate resources  6/5/2022 0403 by Lorenza Cornejo RN  Outcome: Progressing  6/4/2022 1730 by Maricruz Johnson RN  Outcome: Progressing     Problem: Safety - Adult  Goal: Free from fall injury  6/5/2022 0403 by Lorenza Cornejo RN  Outcome: Progressing  6/4/2022 1730 by Maricruz Johnson RN  Outcome: Progressing     Problem: ABCDS Injury Assessment  Goal: Absence of physical injury  6/5/2022 0403 by Lorenza Cornejo RN  Outcome: Progressing  6/4/2022 1730 by Maricruz Johnson RN  Outcome: Progressing     Problem: Skin/Tissue Integrity  Goal: Absence of new skin breakdown  Description: 1. Monitor for areas of redness and/or skin breakdown  2. Assess vascular access sites hourly  3. Every 4-6 hours minimum:  Change oxygen saturation probe site  4. Every 4-6 hours:  If on nasal continuous positive airway pressure, respiratory therapy assess nares and determine need for appliance change or resting period.   6/5/2022 0403 by Lorenza Cornejo RN  Outcome: Progressing  6/4/2022 1730 by Maricruz Johnson RN  Outcome: Progressing     Problem: Chronic Conditions and Co-morbidities  Goal: Patient's chronic conditions and co-morbidity symptoms are monitored and maintained or improved  6/5/2022 0403 by Lorenza Cornejo RN  Outcome: Progressing  6/4/2022 1730 by Maricruz Johnson RN  Outcome: Progressing

## 2022-06-05 NOTE — CONSULTS
[facility] -   Infectious diseases evaluation  admission date 6/2/2022    reason for consultation:   Antibiotic clearance    Impression :   Current:  · CKD ESRD, on HD  · DKA  · Severe hyperkalemia  · NSTEMI type I  · Bacillus bacteremia, likely contamination    Other:  ·   Discussion / summary of stay / plan of care   ·   Recommendations   · Keep off antibiotics  · Blood cultures pending due to low-grade fever on 6/4  · I suspect the bacillus is a contamination, if the second blood culture remains negative on 6/6, okay for cardiac cath    Infection Control Recommendations   · Pandora Precautions  · Contact Isolation   · Airborne isolation  · Droplet Isolation  · Neville discontinuation  · Central line discontinuation    Antimicrobial Stewardship Recommendations   · Simplification of therapy  · Targeted therapy  · IV to oral conversion  · PK dosing  · Restricted antimicrobial use  · Discontinuation of therapy    History of Present Illness:   Initial history:  Attila Holt is a 78y.o.-year-old male's with chest pain, elevated troponin suggestive to be NSTEMI by cardiology, also has ESRD and is on dialysis, found to have severe hyperkalemia, DKA, and finally blood culture grew Bacillus 1 out of 2, cardiology considering cardiac cath would like to have infectious disease clearance    Patient has a history of having pulmonary edema, needed about 25 pounds of fluid removed in dialysis recently.     With shortness of breath at presentation and needed 6 L of nasal cannula saturating 100%    No fever through his stay, until 6/4 evening when he had a temperature of 100      Interval changes  6/4/2022   Patient Vitals for the past 8 hrs:   BP Temp Temp src Pulse Resp SpO2   06/04/22 2015 (!) 140/75 100 °F (37.8 °C) Oral 64 18 98 %   06/04/22 1651 (!) 159/66 -- -- -- -- --       Summary of relevant labs:  Labs:    Micro:    Imaging:      I have personally reviewed the past medical history, past surgical history, medications, social history, and family history, and I haveupdated the database accordingly.       Allergies:   Lisinopril, Ace inhibitors, and Pcn [penicillins]     Review of Systems:     Review of Systems    Physical Examination :       Physical Exam    Past Medical History:     Past Medical History:   Diagnosis Date    Asthma     mod persistent    CAD in native artery, nonobstructive     Carotid stenosis, left 6/10/2013    Carpal tunnel syndrome     RIGHT    Cerebrovascular disease 4/23/2018    Chronic kidney disease 6/10/2013    COPD (chronic obstructive pulmonary disease) (HCC)     Diabetes mellitus (HCC)     insulin dependent    History of colon polyps     History of weakness of extremity     right sided    HTN (hypertension)     Hyperlipidemia     Lung, cysts, congenital     PTSD (post-traumatic stress disorder)     PTSD (post-traumatic stress disorder)     TIA (transient ischemic attack)     Type II or unspecified type diabetes mellitus without mention of complication, not stated as uncontrolled        Past Surgical  History:     Past Surgical History:   Procedure Laterality Date    CAROTID ENDARTERECTOMY Left 7-2013    CATARACT REMOVAL Left     with poor vision afterward, wears reading glasses    COLONOSCOPY      HERNIA REPAIR      IR TUNNELED CATHETER PLACEMENT GREATER THAN 5 YEARS  5/11/2021    IR TUNNELED CATHETER PLACEMENT GREATER THAN 5 YEARS 5/11/2021 Rosalva Rome MD [facility]    [place] COLSC FLX W/RMVL OF TUMOR POLYP LESION SNARE TQ N/A 6/27/2017    COLONOSCOPY POLYPECTOMY SNARE/ hot performed by Bobbi Tabares DO at [facility] VASCULAR SURGERY Left 2015    carotid stent, dr Hi Gonzalez       Medications:      insulin glargine  15 Units SubCUTAneous BID    heparin (porcine)  1,000 Units IntraVENous Once per day on Tue Thu Sat    heparin (porcine)  500 Units IntraVENous Once per day on Tue Thu Sat  b complex-C-folic acid  1 mg Oral Daily    Vitamin D  5,000 Units Oral Daily    magnesium oxide  400 mg Oral Daily    [START ON 2022] gabapentin  100 mg Oral TID    [START ON 2022] prazosin  5 mg Oral BID    budesonide-formoterol  2 puff Inhalation BID    insulin lispro  0-12 Units SubCUTAneous TID WC    insulin lispro  0-6 Units SubCUTAneous Nightly    epoetin marisela-epbx  3,000 Units IntraVENous Once per day on Tue Thu Sat    midodrine  10 mg Oral TID    pantoprazole  40 mg Oral QAM AC    clopidogrel  75 mg Oral Daily    aspirin  81 mg Oral Daily    sodium chloride flush  5-40 mL IntraVENous 2 times per day    atorvastatin  80 mg Oral Nightly    bumetanide  1 mg Oral Daily    carvedilol  25 mg Oral BID    [Held by provider] cloNIDine  0.3 mg Oral TID    [Held by provider] hydrALAZINE  100 mg Oral TID    ipratropium-albuterol  1 vial Inhalation 4x Daily    [Held by provider] losartan  50 mg Oral Daily    montelukast  10 mg Oral Nightly    [Held by provider] NIFEdipine  90 mg Oral Daily    QUEtiapine  100 mg Oral Nightly       Social History:     Social History     Socioeconomic History    Marital status:      Spouse name: Not on file    Number of children: Not on file    Years of education: Not on file    Highest education level: Not on file   Occupational History     Employer: N/A   Tobacco Use    Smoking status: Former Smoker     Packs/day: 0.50     Years: 35.00     Pack years: 17.50     Types: Cigarettes     Quit date: 2014     Years since quittin.9    Smokeless tobacco: Never Used   Vaping Use    Vaping Use: Never used   Substance and Sexual Activity    Alcohol use: No     Alcohol/week: 0.0 standard drinks    Drug use: No    Sexual activity: Yes     Partners: Female   Other Topics Concern    Not on file   Social History Narrative    Not on file     Social Determinants of Health     Financial Resource Strain:     Difficulty of Paying Living Expenses: Not on file   Food Insecurity:     Worried About [facility] in the Last Year: Not on file    Warren of Food in the Last Year: Not on file   Transportation Needs:     Lack of Transportation (Medical): Not on file    Lack of Transportation (Non-Medical): Not on file   Physical Activity:     Days of Exercise per Week: Not on file    Minutes of Exercise per Session: Not on file   Stress:     Feeling of Stress : Not on file   Social Connections:     Frequency of Communication with Friends and Family: Not on file    Frequency of Social Gatherings with Friends and Family: Not on file    Attends Protestant Services: Not on file    Active Member of [address] [place] [address] [employer] or Organizations: Not on file    Attends Club or Organization Meetings: Not on file    Marital Status: Not on file   Intimate Partner Violence:     Fear of Current or Ex-Partner: Not on file    Emotionally Abused: Not on file    Physically Abused: Not on file    Sexually Abused: Not on file   Housing Stability:     Unable to Pay for Housing in the Last Year: Not on file    Number of [place] in the Last Year: Not on file    Unstable Housing in the Last Year: Not on file       Family History:     Family History   Problem Relation Age of Onset    Cancer Mother     Heart Disease Father       Medical Decision Making:   I have independently reviewed/ordered the following labs:    CBC with Differential:   Recent Labs     06/02/22  1013 06/02/22  2007 06/03/22  0202 06/03/22  0202 06/04/22  0647 06/04/22  1805   WBC 12.6*   < > 11.7*   < > 10.0 7.8   HGB 8.9*   < > 8.2*   < > 8.9* 9.4*   HCT 30.4*   < > 24.7*   < > 27.0* 27.3*      < > 307   < > 320 282   LYMPHOPCT 9*  --  7*  --   --   --    MONOPCT 5  --  5  --   --   --     < > = values in this interval not displayed.      BMP:  Recent Labs     06/03/22  0202 06/03/22  0202 06/03/22  0959 06/04/22  0647   *   < > 121* 123*   K 3.4*   < > 4.3 4.9   CL 85*   < > 86* 87*   CO2 24   < > 20 22   BUN 34*   < > 35* 57*   CREATININE 3.63*   < > 4.27* 5.63*   MG 1.6  --   --  1.6    < > = values in this interval not displayed. Hepatic Function Panel:   Recent Labs     06/02/22  1013 06/03/22  0202   PROT 6.6 5.4*   LABALBU 4.2 3.5   BILITOT 0.17* <0.10*   ALKPHOS 108 95   ALT 32 35   AST 30 112*     No results for input(s): RPR in the last 72 hours. No results for input(s): HIV in the last 72 hours. No results for input(s): BC in the last 72 hours. Lab Results   Component Value Date    CREATININE 5.63 06/04/2022    GLUCOSE 274 06/04/2022    GLUCOSE 172 04/16/2012       Detailed results: Thank you for allowing us to participate in the care of this patient. Please call with questions. This note is created with the assistance of a speech recognition program.  While intending to generate adocument that actually reflects the content of the visit, the document can still have some errors including those of syntax and sound a like substitutions which may escape proof reading. It such instances, actual meaningcan be extrapolated by contextual diversion.     Yusef Walter MD  Office: [phone]  Perfect serve / office [phone]

## 2022-06-05 NOTE — CONSULTS
[facility] -   Infectious diseases evaluation  admission date 6/2/2022    reason for consultation:   Concern for bacteremia     Impression :   Current:  · Bacillus single bacteriemia > likely contamination   · Fever from R iv site tenderness  · NSTEMI   · ESRD -HD  · DKA - resolved   · T2DM  · COPD    Other:  · Elevated INR   · Hx of cocaine abuse   · Hx of TIA  Discussion / summary of stay / plan of care   · Fever from R iv site tender but not cellulitic -  · Iv out  · Bacteremia is a colonization  · Ok for cath  Recommendations   · cleared for cardiac cath   · Doxy 100 mg po bid x 3 days gloria  Help the R iv old site pain recover  · Ischemic work up per cardiology when INR <2    Infection Control Recommendations   · [place] Precautions      Antimicrobial Stewardship Recommendations   · Simplification of therapy  · Targeted therapy      History of Present Illness:   Initial history:  Douglas Narvaez a 78 y.o. with a history of chronic kidney disease on dialysis Tuesday Thursday Saturday, diabetes mellitus for which he takes Humalog, hypertension, hyperlipidemia, previous TIA as well as previous coronary artery disease who comes in with concerns for worsening shortness of breath.  He states that symptoms been occurring for the past 24 to 48 hours with patient last being dialyzed on Tuesday without any issue.  Patient was seen and evaluated in the emergency department, initial lab values showed concerns for hyperglycemia with a glucose of 598, elevated anion gap of 42, bicarb at 6 with concerns for diabetic ketoacidosis.  Patient also notably had a potassium of 7.5 with EKG changes including QTC prolongation, hyperacute T waves.  Serial troponins showed a delta of 5, patient upon my initial evaluation adamantly denies chest pain, does endorse nausea as well as shortness of breath compared to baseline, does state that he still makes urine, endorses increased urination over the past 12 to 24 hours. Moses Madsen to medical history of evaluation nephrology consulted, patient to undergo urgent dialysis in the emergency department prior to admission to the ICU.  Patient also has a history of COPD, has received multiple breathing treatments with only transient improvement in respiratory status.  Patient is currently saturating 98% on 5 L nasal cannula.  Point-of-care blood gas shows concerns for acidosis with a pH of 7.039, PCO2 of 21.5, bicarb of 5.4, PO2 of 178 with concerns for metabolic acidosis with respiratory compensation.     Patient was managed according to DKA protocol 1 was placed this morning. Patient was found to have elevated troponins at about 3000, no EKG changes and no chest pain. Cardiology was consulted for elevated troponins and ischemic work-up. Patient was found to have an INR of 11.2 as patient was on warfarin. Vitamin K 5 mg was given to reverse the elevated INR. Cardiology planning possible cardiac cath once INR is less than 2. Currently on heparin drip. Interval changes  6/5/2022 6/5   No acute events overnight   Pt resting comfortably in bed on room air   Concerned about BP medications but has been hypotensive   Repeat BC > if neg > clear for cardiac cath   INR 1.7 this morning   Afebrile   VSS  R iv pulled- site tender but not indurated or red    Summary of relevant labs:  Labs:  Cr 3.6  Na 128   Glucose 209    Micro:  1/2 blood cultures growing bacillus     Imaging:  CXR on 6/2  Satisfactory line placement       Mild diffuse prominence of interstitial markings may be related to edema or   infection       I have personally reviewed the past medical history, past surgical history, medications, social history, and family history, and I haveupdated the database accordingly. Allergies:   Lisinopril, Ace inhibitors, and Pcn [penicillins]     Review of Systems:     Review of Systems   Constitutional: Negative for activity change and chills.    HENT: Negative for congestion. Eyes: Negative for photophobia. Respiratory: Negative for chest tightness. Cardiovascular: Negative for chest pain. Gastrointestinal: Negative for abdominal pain and diarrhea. Endocrine: Negative for cold intolerance and heat intolerance. Genitourinary: Negative for difficulty urinating and dysuria. Musculoskeletal: Negative for arthralgias. Skin: Negative for rash. Allergic/Immunologic: Negative for immunocompromised state. Neurological: Negative for dizziness and headaches. Hematological: Negative for adenopathy. Does not bruise/bleed easily. Psychiatric/Behavioral: Negative for agitation. Physical Examination :       Physical Exam  Vitals and nursing note reviewed. Constitutional:       Appearance: Normal appearance. HENT:      Head: Normocephalic and atraumatic. Nose: Nose normal.      Mouth/Throat:      Mouth: Mucous membranes are moist.      Pharynx: Oropharynx is clear. Eyes:      Extraocular Movements: Extraocular movements intact. Conjunctiva/sclera: Conjunctivae normal.      Pupils: Pupils are equal, round, and reactive to light. Cardiovascular:      Rate and Rhythm: Normal rate and regular rhythm. Pulses: Normal pulses. Heart sounds: Normal heart sounds. No murmur heard. No friction rub. No gallop. Pulmonary:      Effort: Pulmonary effort is normal. No respiratory distress. Breath sounds: Normal breath sounds. No stridor. No wheezing, rhonchi or rales. Chest:      Chest wall: No tenderness. Abdominal:      General: Abdomen is flat. Bowel sounds are normal. There is no distension. Palpations: Abdomen is soft. There is no mass. Tenderness: There is no abdominal tenderness. There is no right CVA tenderness, left CVA tenderness, guarding or rebound. Hernia: No hernia is present. Musculoskeletal:         General: Tenderness present. No swelling, deformity or signs of injury.  Normal range of motion. Cervical back: Normal range of motion. Right lower leg: No edema. Left lower leg: No edema. Skin:     General: Skin is warm. Findings: No erythema. Neurological:      General: No focal deficit present. Mental Status: He is alert and oriented to person, place, and time. Mental status is at baseline. Psychiatric:         Mood and Affect: Mood normal.         Behavior: Behavior normal.         Thought Content:  Thought content normal.         Judgment: Judgment normal.         Past Medical History:     Past Medical History:   Diagnosis Date    Asthma     mod persistent    CAD in native artery, nonobstructive     Carotid stenosis, left 6/10/2013    Carpal tunnel syndrome     RIGHT    Cerebrovascular disease 4/23/2018    Chronic kidney disease 6/10/2013    COPD (chronic obstructive pulmonary disease) (HCC)     Diabetes mellitus (HCC)     insulin dependent    History of colon polyps     History of weakness of extremity     right sided    HTN (hypertension)     Hyperlipidemia     Lung, cysts, congenital     PTSD (post-traumatic stress disorder)     PTSD (post-traumatic stress disorder)     TIA (transient ischemic attack)     Type II or unspecified type diabetes mellitus without mention of complication, not stated as uncontrolled        Past Surgical  History:     Past Surgical History:   Procedure Laterality Date    CAROTID ENDARTERECTOMY Left 7-2013    CATARACT REMOVAL Left     with poor vision afterward, wears reading glasses    COLONOSCOPY      HERNIA REPAIR      IR TUNNELED CATHETER PLACEMENT GREATER THAN 5 YEARS  5/11/2021    IR TUNNELED CATHETER PLACEMENT GREATER THAN 5 YEARS 5/11/2021 Yunior Young MD [facility]    [place] COLSC FLX W/RMVL OF TUMOR POLYP LESION SNARE TQ N/A 6/27/2017    COLONOSCOPY POLYPECTOMY SNARE/ hot performed by Wesley Peña DO at [facility] VASCULAR SURGERY Left 2015    carotid stent,  tito       Medications:      insulin glargine  15 Units SubCUTAneous BID    heparin (porcine)  1,000 Units IntraVENous Once per day on Tue Thu Sat    heparin (porcine)  500 Units IntraVENous Once per day on Tue Thu Sat    b complex-C-folic acid  1 mg Oral Daily    Vitamin D  5,000 Units Oral Daily    magnesium oxide  400 mg Oral Daily    gabapentin  100 mg Oral TID    prazosin  5 mg Oral BID    budesonide-formoterol  2 puff Inhalation BID    insulin lispro  0-12 Units SubCUTAneous TID     insulin lispro  0-6 Units SubCUTAneous Nightly    epoetin marisela-epbx  3,000 Units IntraVENous Once per day on  Sat    midodrine  10 mg Oral TID    pantoprazole  40 mg Oral QAM AC    clopidogrel  75 mg Oral Daily    aspirin  81 mg Oral Daily    sodium chloride flush  5-40 mL IntraVENous 2 times per day    atorvastatin  80 mg Oral Nightly    bumetanide  1 mg Oral Daily    carvedilol  25 mg Oral BID    [Held by provider] cloNIDine  0.3 mg Oral TID    [Held by provider] hydrALAZINE  100 mg Oral TID    ipratropium-albuterol  1 vial Inhalation 4x Daily    [Held by provider] losartan  50 mg Oral Daily    montelukast  10 mg Oral Nightly    [Held by provider] NIFEdipine  90 mg Oral Daily    QUEtiapine  100 mg Oral Nightly       Social History:     Social History     Socioeconomic History    Marital status:      Spouse name: Not on file    Number of children: Not on file    Years of education: Not on file    Highest education level: Not on file   Occupational History     Employer: N/A   Tobacco Use    Smoking status: Former Smoker     Packs/day: 0.50     Years: 35.00     Pack years: 17.50     Types: Cigarettes     Quit date: 2014     Years since quittin.9    Smokeless tobacco: Never Used   Vaping Use    Vaping Use: Never used   Substance and Sexual Activity    Alcohol use: No     Alcohol/week: 0.0 standard drinks    Drug use: No    Sexual activity: Yes     Partners: Female Other Topics Concern    Not on file   Social History Narrative    Not on file     Social Determinants of Health     Financial Resource Strain:     Difficulty of Paying Living Expenses: Not on file   Food Insecurity:     Worried About Running Out of Food in the Last Year: Not on file    Warren of Food in the Last Year: Not on file   Transportation Needs:     Lack of Transportation (Medical): Not on file    Lack of Transportation (Non-Medical):  Not on file   Physical Activity:     Days of Exercise per Week: Not on file    Minutes of Exercise per Session: Not on file   Stress:     Feeling of Stress : Not on file   Social Connections:     Frequency of Communication with Friends and Family: Not on file    Frequency of Social Gatherings with Friends and Family: Not on file    Attends Hindu Services: Not on file    Active Member of [address] [place] [address] [employer] or Organizations: Not on file    Attends Club or Organization Meetings: Not on file    Marital Status: Not on file   Intimate Partner Violence:     Fear of Current or Ex-Partner: Not on file    Emotionally Abused: Not on file    Physically Abused: Not on file    Sexually Abused: Not on file   Housing Stability:     Unable to Pay for Housing in the Last Year: Not on file    Number of [place] in the Last Year: Not on file    Unstable Housing in the Last Year: Not on file       Family History:     Family History   Problem Relation Age of Onset    Cancer Mother     Heart Disease Father       Medical Decision Making:   I have independently reviewed/ordered the following labs:    CBC with Differential:   Recent Labs     06/02/22  1013 06/02/22 2007 06/03/22  0202 06/03/22  0202 06/04/22  0647 06/04/22  1805   WBC 12.6*   < > 11.7*   < > 10.0 7.8   HGB 8.9*   < > 8.2*   < > 8.9* 9.4*   HCT 30.4*   < > 24.7*   < > 27.0* 27.3*      < > 307   < > 320 282   LYMPHOPCT 9*  --  7*  --   --   --    MONOPCT 5  --  5  --   --   --     < > = values in this interval not displayed. BMP:  Recent Labs     06/03/22  0202 06/03/22  0959 06/04/22  0647 06/05/22  0322   *   < > 123* 128*   K 3.4*   < > 4.9 3.8   CL 85*   < > 87* 87*   CO2 24   < > 22 27   BUN 34*   < > 57* 34*   CREATININE 3.63*   < > 5.63* 3.60*   MG 1.6  --  1.6  --     < > = values in this interval not displayed. Hepatic Function Panel:   Recent Labs     06/02/22  1013 06/03/22  0202   PROT 6.6 5.4*   LABALBU 4.2 3.5   BILITOT 0.17* <0.10*   ALKPHOS 108 95   ALT 32 35   AST 30 112*     No results for input(s): RPR in the last 72 hours. No results for input(s): HIV in the last 72 hours. No results for input(s): BC in the last 72 hours. Lab Results   Component Value Date    CREATININE 3.60 06/05/2022    GLUCOSE 189 06/05/2022    GLUCOSE 172 04/16/2012       Detailed results: Thank you for allowing us to participate in the care of this patient. Please call with questions. This note is created with the assistance of a speech recognition program.  While intending to generate adocument that actually reflects the content of the visit, the document can still have some errors including those of syntax and sound a like substitutions which may escape proof reading. It such instances, actual meaningcan be extrapolated by contextual diversion. Evelia Conner MD  Office: [phone]  Perfect serve / office [phone]      I have discussed the care of the patient, including pertinent history and exam findings,  with the resident. I have seen and examined the patient and the key elements of all parts of the encounter have been performed by me. I agree with the assessment, plan and orders as documented by the resident.     Nesha Shin, Infectious Diseases

## 2022-06-05 NOTE — PROGRESS NOTES
[facility]  Progress Note                   Date:   6/5/2022  Patient name: Margi Fonseca  Date of admission:  6/2/2022  9:54 AM  MRN:   [id number]  YOB: 1955  PCP: Asuncion Burgess    Reason for Admission: Chronic pulmonary edema [J81.1]  Hyperkalemia [E87.5]    Subjective:       Clinical Changes /Abnormalities: Patient seen and examined in room eating breakfast with RN at bedside. Denies chest pain. Reports ongoing SOB with exertion. CATH on Monday if cleared by ID and Nephrology. ESRD on HD Tues, Thurs, Saturday. SR on tele HR 66. Awaiting ECHO  NPO at midnight for possible CATH on Monday.      Review of Systems    Medications:   Scheduled Meds:   insulin glargine  15 Units SubCUTAneous BID    heparin (porcine)  1,000 Units IntraVENous Once per day on Tue Thu Sat    heparin (porcine)  500 Units IntraVENous Once per day on Tue Thu Sat    b complex-C-folic acid  1 mg Oral Daily    Vitamin D  5,000 Units Oral Daily    magnesium oxide  400 mg Oral Daily    gabapentin  100 mg Oral TID    prazosin  5 mg Oral BID    budesonide-formoterol  2 puff Inhalation BID    insulin lispro  0-12 Units SubCUTAneous TID WC    insulin lispro  0-6 Units SubCUTAneous Nightly    epoetin marisela-epbx  3,000 Units IntraVENous Once per day on Tue Thu Sat    midodrine  10 mg Oral TID    pantoprazole  40 mg Oral QAM AC    clopidogrel  75 mg Oral Daily    aspirin  81 mg Oral Daily    sodium chloride flush  5-40 mL IntraVENous 2 times per day    atorvastatin  80 mg Oral Nightly    bumetanide  1 mg Oral Daily    carvedilol  25 mg Oral BID    [Held by provider] cloNIDine  0.3 mg Oral TID    [Held by provider] hydrALAZINE  100 mg Oral TID    ipratropium-albuterol  1 vial Inhalation 4x Daily    [Held by provider] losartan  50 mg Oral Daily    montelukast  10 mg Oral Nightly    [Held by provider] NIFEdipine  90 mg Oral Daily    QUEtiapine  100 mg Oral Nightly     Continuous Infusions:   heparin (PORCINE) Infusion 16 Units/kg/hr (06/05/22 0352)    sodium chloride      dextrose      sodium chloride       CBC:   Recent Labs     06/03/22  0202 06/04/22  0647 06/04/22  1805   WBC 11.7* 10.0 7.8   HGB 8.2* 8.9* 9.4*    320 282     BMP:    Recent Labs     06/03/22  0959 06/04/22  0647 06/05/22  0322   * 123* 128*   K 4.3 4.9 3.8   CL 86* 87* 87*   CO2 20 22 27   BUN 35* 57* 34*   CREATININE 4.27* 5.63* 3.60*   GLUCOSE 200* 274* 189*     Hepatic:  Recent Labs     06/02/22  1013 06/03/22  0202   AST 30 112*   ALT 32 35   BILITOT 0.17* <0.10*   ALKPHOS 108 95     Troponin:   Recent Labs     06/02/22  1833 06/03/22  0202 06/03/22  0959   TROPHS 1,284* 2,864* 3,056*     BNP: No results for input(s): BNP in the last 72 hours. Lipids: No results for input(s): CHOL, HDL in the last 72 hours. Invalid input(s): LDLCALCU  INR:   Recent Labs     06/03/22  0915 06/03/22  1614 06/04/22  0647   INR 10.4* 4.4 1.7     DIAGNOSTIC DATA       EKG: Initial EKG showed Peaked T waves related to hyperkalemia and ST depressions. ECHO ordered/pending     ECHO 5/5/2021  Severe concentric LVH. Septal wall thickness 2.1 cm  No obvious wall motion abnormality seen. Normal LV systolic function with LVEF >65%. Grade I diastolic dysfunction  Normal RV size and function. RV systolic pressure 38 mmHg  Moderately dilated LA and RA appears normal in size. No obvious significant structural valvular abnormality noted. No significant valvular stenosis or regurgitation noted. Normal aortic root dimension. No significant pericardial effusion noted. No obvious intra-cardiac mass or shunt noted.   IVC normal diameter and inspiratory variation indicating normal RA filling  pressure.     Consider Amyloidosis     Cardiac cath 8/12/2020  Non-obstructive CAD    Objective:   Vitals: BP (!) 140/75   Pulse 64   Temp 100 °F (37.8 °C) (Oral)   Resp 18   Ht 5' 6\" (1.676 m)   Wt 122 lb 5.7 oz (55.5 kg)   SpO2 98%   BMI 19.75 kg/m²   General appearance: alert and cooperative with exam  HEENT: Head: Normocephalic, no lesions, without obvious abnormality. Neck:no JVD, trachea midline, no adenopathy  Lungs: Clear to auscultation  Heart: Regular rate and rhythm, s1/s2 auscultated, no murmurs  Abdomen: soft, non-tender, bowel sounds active  Extremities: no edema  Neurologic: not done        Assessment / Acute Cardiac Problems:   1. NSTEMI- Rising trend concerned for type 1  2. Hyperkalemia  3. ESRD  4. DKA  5. Baceremia with gram positive rods  6. Previous history of cocaine use. 7. INR    8.  Shock     Patient Active Problem List:     Cigarette nicotine dependence without complication     Carpal tunnel syndrome on right     Colon polyps     Carotid stenosis, left s/p Endarterectomy     Mixed simple and mucopurulent chronic bronchitis (HCC)     Pure hypercholesterolemia     Dyspnea and respiratory abnormalities     PTSD (post-traumatic stress disorder)     Transient cerebral ischemia     Hypertension, essential     DM type 2, uncontrolled, with neuropathy (HCC)     COPD with acute exacerbation (HCC)     Cerebral vascular disease     Primary insomnia     Hypertensive urgency     Non-obstructive Coronary artery disease of native artery of native heart with stable angina pectoris (HCC)     Hyperparathyroidism ([address]     Hypovitaminosis D     Acute kidney injury superimposed on CKD (HCC)-currently dialysis patient     Coronary artery disease with exertional angina (HCC)     Moderate malnutrition (HCC)     COPD exacerbation (HCC)     Leg swelling     Hyponatremia     Anemia of chronic disease     NSTEMI (non-ST elevated myocardial infarction) ([address]     Hypoalbuminemia     DKA, type 2, not at goal [facility])     Inflammation of right sacroiliac joint ([address]     Malignant neoplasm of colon ([address]     ESRD on dialysis ([address]     Bilateral carotid artery occlusion     Seizure ([address]     Chronic heart failure with preserved ejection fraction ([address]     Left ventricular hypertrophy     Acute respiratory failure with hypoxia (HCC)     Uncontrolled type 2 diabetes mellitus with hyperglycemia (HCC)     Acute respiratory failure (HCC)     Brittle diabetes (HCC)     Fluid overload     History of CVA (cerebrovascular accident)     Hyperkalemia     Diabetic ketoacidosis without coma associated with type 2 diabetes mellitus ([facility]     Supratherapeutic INR      Plan of Treatment:   1. Awaiting ECHO  2. Plan for CATH after clearance by nephrology and ID. 3. Continue ASA, statin, BB, Plavix  4. Continue IV heparin. Coumadin held since 6/2/2022. INR 1.7 yesterday  5. ESRD on HD per nephrology  Fluid management per nephrology   6. Recommend K > 4.0 and Mag > 2.0  K managed by nephrology   K 3.8 and Mag 1.3 today  Replace mag today. Discussed with RN.      Electronically signed by TERRANCE Soriano NP on 6/5/2022 at 7:51 AM  [address]  [phone]

## 2022-06-06 VITALS
HEART RATE: 64 BPM | TEMPERATURE: 98.6 F | HEIGHT: 66 IN | WEIGHT: 117.95 LBS | DIASTOLIC BLOOD PRESSURE: 70 MMHG | SYSTOLIC BLOOD PRESSURE: 172 MMHG | BODY MASS INDEX: 18.96 KG/M2 | OXYGEN SATURATION: 96 % | RESPIRATION RATE: 16 BRPM

## 2022-06-06 LAB
ANION GAP SERPL CALCULATED.3IONS-SCNC: 14 MMOL/L (ref 9–17)
BETA-HYDROXYBUTYRATE: 0.1 MMOL/L (ref 0.02–0.27)
BUN BLDV-MCNC: 70 MG/DL (ref 8–23)
CALCIUM SERPL-MCNC: 7.9 MG/DL (ref 8.6–10.4)
CHLORIDE BLD-SCNC: 94 MMOL/L (ref 98–107)
CO2: 22 MMOL/L (ref 20–31)
CREAT SERPL-MCNC: 4.93 MG/DL (ref 0.7–1.2)
GFR AFRICAN AMERICAN: 14 ML/MIN
GFR NON-AFRICAN AMERICAN: 12 ML/MIN
GFR SERPL CREATININE-BSD FRML MDRD: ABNORMAL ML/MIN/{1.73_M2}
GLUCOSE BLD-MCNC: 152 MG/DL (ref 75–110)
GLUCOSE BLD-MCNC: 187 MG/DL (ref 75–110)
GLUCOSE BLD-MCNC: 398 MG/DL (ref 75–110)
GLUCOSE BLD-MCNC: 476 MG/DL (ref 75–110)
GLUCOSE BLD-MCNC: 481 MG/DL (ref 75–110)
GLUCOSE BLD-MCNC: 512 MG/DL (ref 70–99)
HCT VFR BLD CALC: 26 % (ref 40.7–50.3)
HEMOGLOBIN: 8.6 G/DL (ref 13–17)
LV EF: 73 %
LVEF MODALITY: NORMAL
MCH RBC QN AUTO: 31.4 PG (ref 25.2–33.5)
MCHC RBC AUTO-ENTMCNC: 33.1 G/DL (ref 28.4–34.8)
MCV RBC AUTO: 94.9 FL (ref 82.6–102.9)
NRBC AUTOMATED: 0 PER 100 WBC
PARTIAL THROMBOPLASTIN TIME: 24.3 SEC (ref 20.5–30.5)
PARTIAL THROMBOPLASTIN TIME: 41.6 SEC (ref 20.5–30.5)
PARTIAL THROMBOPLASTIN TIME: 49.1 SEC (ref 20.5–30.5)
PARTIAL THROMBOPLASTIN TIME: 54.4 SEC (ref 20.5–30.5)
PDW BLD-RTO: 13.8 % (ref 11.8–14.4)
PLATELET # BLD: 294 K/UL (ref 138–453)
PMV BLD AUTO: 9.6 FL (ref 8.1–13.5)
POTASSIUM SERPL-SCNC: 5 MMOL/L (ref 3.7–5.3)
RBC # BLD: 2.74 M/UL (ref 4.21–5.77)
SODIUM BLD-SCNC: 130 MMOL/L (ref 135–144)
WBC # BLD: 5.4 K/UL (ref 3.5–11.3)

## 2022-06-06 PROCEDURE — 82947 ASSAY GLUCOSE BLOOD QUANT: CPT

## 2022-06-06 PROCEDURE — 85027 COMPLETE CBC AUTOMATED: CPT

## 2022-06-06 PROCEDURE — 90792 PSYCH DIAG EVAL W/MED SRVCS: CPT | Performed by: PSYCHIATRY & NEUROLOGY

## 2022-06-06 PROCEDURE — 6370000000 HC RX 637 (ALT 250 FOR IP): Performed by: STUDENT IN AN ORGANIZED HEALTH CARE EDUCATION/TRAINING PROGRAM

## 2022-06-06 PROCEDURE — 93306 TTE W/DOPPLER COMPLETE: CPT

## 2022-06-06 PROCEDURE — 6370000000 HC RX 637 (ALT 250 FOR IP): Performed by: NURSE PRACTITIONER

## 2022-06-06 PROCEDURE — 99232 SBSQ HOSP IP/OBS MODERATE 35: CPT | Performed by: STUDENT IN AN ORGANIZED HEALTH CARE EDUCATION/TRAINING PROGRAM

## 2022-06-06 PROCEDURE — 93356 MYOCRD STRAIN IMG SPCKL TRCK: CPT

## 2022-06-06 PROCEDURE — 82010 KETONE BODYS QUAN: CPT

## 2022-06-06 PROCEDURE — 6370000000 HC RX 637 (ALT 250 FOR IP): Performed by: INTERNAL MEDICINE

## 2022-06-06 PROCEDURE — 94640 AIRWAY INHALATION TREATMENT: CPT

## 2022-06-06 PROCEDURE — 80048 BASIC METABOLIC PNL TOTAL CA: CPT

## 2022-06-06 PROCEDURE — 2500000003 HC RX 250 WO HCPCS: Performed by: NURSE PRACTITIONER

## 2022-06-06 PROCEDURE — 36415 COLL VENOUS BLD VENIPUNCTURE: CPT

## 2022-06-06 PROCEDURE — 6360000002 HC RX W HCPCS: Performed by: NURSE PRACTITIONER

## 2022-06-06 PROCEDURE — 99232 SBSQ HOSP IP/OBS MODERATE 35: CPT | Performed by: INTERNAL MEDICINE

## 2022-06-06 PROCEDURE — G0108 DIAB MANAGE TRN  PER INDIV: HCPCS

## 2022-06-06 PROCEDURE — 85730 THROMBOPLASTIN TIME PARTIAL: CPT

## 2022-06-06 RX ORDER — INSULIN LISPRO 100 [IU]/ML
4 INJECTION, SOLUTION INTRAVENOUS; SUBCUTANEOUS
Status: DISCONTINUED | OUTPATIENT
Start: 2022-06-06 | End: 2022-06-06 | Stop reason: HOSPADM

## 2022-06-06 RX ORDER — INSULIN LISPRO 100 [IU]/ML
0-6 INJECTION, SOLUTION INTRAVENOUS; SUBCUTANEOUS
Status: DISCONTINUED | OUTPATIENT
Start: 2022-06-06 | End: 2022-06-06 | Stop reason: HOSPADM

## 2022-06-06 RX ORDER — NIFEDIPINE 60 MG/1
60 TABLET, FILM COATED, EXTENDED RELEASE ORAL DAILY
Status: DISCONTINUED | OUTPATIENT
Start: 2022-06-06 | End: 2022-06-06 | Stop reason: HOSPADM

## 2022-06-06 RX ORDER — INSULIN LISPRO 100 [IU]/ML
0-3 INJECTION, SOLUTION INTRAVENOUS; SUBCUTANEOUS NIGHTLY
Status: DISCONTINUED | OUTPATIENT
Start: 2022-06-06 | End: 2022-06-06 | Stop reason: HOSPADM

## 2022-06-06 RX ORDER — INSULIN GLARGINE 100 [IU]/ML
18 INJECTION, SOLUTION SUBCUTANEOUS NIGHTLY
Status: DISCONTINUED | OUTPATIENT
Start: 2022-06-06 | End: 2022-06-06 | Stop reason: HOSPADM

## 2022-06-06 RX ORDER — ISOSORBIDE MONONITRATE 30 MG/1
30 TABLET, EXTENDED RELEASE ORAL DAILY
Status: DISCONTINUED | OUTPATIENT
Start: 2022-06-06 | End: 2022-06-06 | Stop reason: HOSPADM

## 2022-06-06 RX ORDER — INSULIN LISPRO 100 [IU]/ML
6 INJECTION, SOLUTION INTRAVENOUS; SUBCUTANEOUS
Status: DISCONTINUED | OUTPATIENT
Start: 2022-06-06 | End: 2022-06-06

## 2022-06-06 RX ADMIN — Medication 20 MG: at 00:28

## 2022-06-06 RX ADMIN — CLOPIDOGREL 75 MG: 75 TABLET, FILM COATED ORAL at 10:09

## 2022-06-06 RX ADMIN — CARVEDILOL 25 MG: 25 TABLET, FILM COATED ORAL at 10:08

## 2022-06-06 RX ADMIN — BUDESONIDE AND FORMOTEROL FUMARATE DIHYDRATE 2 PUFF: 160; 4.5 AEROSOL RESPIRATORY (INHALATION) at 08:09

## 2022-06-06 RX ADMIN — HEPARIN SODIUM 3330 UNITS: 1000 INJECTION INTRAVENOUS; SUBCUTANEOUS at 00:38

## 2022-06-06 RX ADMIN — PRAZOSIN HYDROCHLORIDE 5 MG: 5 CAPSULE ORAL at 10:07

## 2022-06-06 RX ADMIN — GABAPENTIN 100 MG: 100 CAPSULE ORAL at 10:07

## 2022-06-06 RX ADMIN — IPRATROPIUM BROMIDE AND ALBUTEROL SULFATE 3 ML: .5; 3 SOLUTION RESPIRATORY (INHALATION) at 12:02

## 2022-06-06 RX ADMIN — Medication 22 UNITS/KG/HR: at 01:48

## 2022-06-06 RX ADMIN — DOXYCYCLINE HYCLATE 100 MG: 100 TABLET, COATED ORAL at 10:09

## 2022-06-06 RX ADMIN — INSULIN GLARGINE 11 UNITS: 100 INJECTION, SOLUTION SUBCUTANEOUS at 09:07

## 2022-06-06 RX ADMIN — HYDRALAZINE HYDROCHLORIDE 100 MG: 50 TABLET, FILM COATED ORAL at 10:08

## 2022-06-06 RX ADMIN — HEPARIN SODIUM 1670 UNITS: 1000 INJECTION INTRAVENOUS; SUBCUTANEOUS at 08:54

## 2022-06-06 RX ADMIN — NEPHROCAP 1 MG: 1 CAP ORAL at 10:08

## 2022-06-06 RX ADMIN — Medication 81 MG: at 10:08

## 2022-06-06 RX ADMIN — BUMETANIDE 1 MG: 1 TABLET ORAL at 10:08

## 2022-06-06 RX ADMIN — Medication 24 UNITS/KG/HR: at 08:59

## 2022-06-06 RX ADMIN — MAGNESIUM GLUCONATE 500 MG ORAL TABLET 400 MG: 500 TABLET ORAL at 10:07

## 2022-06-06 RX ADMIN — IPRATROPIUM BROMIDE AND ALBUTEROL SULFATE 3 ML: .5; 3 SOLUTION RESPIRATORY (INHALATION) at 15:32

## 2022-06-06 RX ADMIN — CLONIDINE HYDROCHLORIDE 0.3 MG: 0.1 TABLET ORAL at 10:07

## 2022-06-06 RX ADMIN — INSULIN LISPRO 6 UNITS: 100 INJECTION, SOLUTION INTRAVENOUS; SUBCUTANEOUS at 12:09

## 2022-06-06 RX ADMIN — IPRATROPIUM BROMIDE AND ALBUTEROL SULFATE 3 ML: .5; 3 SOLUTION RESPIRATORY (INHALATION) at 07:53

## 2022-06-06 ASSESSMENT — ENCOUNTER SYMPTOMS
DIARRHEA: 0
PHOTOPHOBIA: 0
CHEST TIGHTNESS: 0
ABDOMINAL PAIN: 0

## 2022-06-06 NOTE — PROGRESS NOTES
PATIENT REFUSES TO WEAR BIPAP     [x] Risks and benefits explained to patient   [x] Patient refuses to wear Bipap stating does not wear at home does not want to wear here  [x] Patient verbalizes understanding of information presented.

## 2022-06-06 NOTE — PROGRESS NOTES
Based on 2 earlier noted Healthcare provider (most recent 15 mins ago) interactions with patient and speaking with pt RN pt not appropriate to work with at this time. Will provide CGM info but needs to be ordered by provider managing his diabetes. Criteria for medicare is 4 injections/day. Please reconsult if pt situation changes. Thank you.

## 2022-06-06 NOTE — PROGRESS NOTES
[facility] -   Infectious diseases evaluation  admission date 6/2/2022    reason for consultation:   Concern for bacteremia     Impression :   Current:  · Bacillus single bacteriemia > likely contamination   · Fever from R iv site tenderness  · NSTEMI   · ESRD -HD  · DKA - resolved   · T2DM  · COPD    Other:  · Elevated INR   · Hx of cocaine abuse   · Hx of TIA  Discussion / summary of stay / plan of care   · Fever from R iv site tender but not cellulitic -  · Iv out  · Bacteremia is a colonization  · Ok for cath  Recommendations   · cleared for cardiac cath   · Doxy 100 mg po bid x 3 days gloria  Help the R iv old site pain recover  · Ischemic work up per cardiology when INR <2    Infection Control Recommendations   · Hubbard Precautions      Antimicrobial Stewardship Recommendations   · Simplification of therapy  · Targeted therapy      History of Present Illness:   Initial history:  Roger Blanca a 78 y.o. with a history of chronic kidney disease on dialysis Tuesday Thursday Saturday, diabetes mellitus for which he takes Humalog, hypertension, hyperlipidemia, previous TIA as well as previous coronary artery disease who comes in with concerns for worsening shortness of breath.  He states that symptoms been occurring for the past 24 to 48 hours with patient last being dialyzed on Tuesday without any issue.  Patient was seen and evaluated in the emergency department, initial lab values showed concerns for hyperglycemia with a glucose of 598, elevated anion gap of 42, bicarb at 6 with concerns for diabetic ketoacidosis.  Patient also notably had a potassium of 7.5 with EKG changes including QTC prolongation, hyperacute T waves.  Serial troponins showed a delta of 5, patient upon my initial evaluation adamantly denies chest pain, does endorse nausea as well as shortness of breath compared to baseline, does state that he still makes urine, endorses increased urination over the past 12 to 24 hours. Claire Walter to medical history of evaluation nephrology consulted, patient to undergo urgent dialysis in the emergency department prior to admission to the ICU.  Patient also has a history of COPD, has received multiple breathing treatments with only transient improvement in respiratory status.  Patient is currently saturating 98% on 5 L nasal cannula.  Point-of-care blood gas shows concerns for acidosis with a pH of 7.039, PCO2 of 21.5, bicarb of 5.4, PO2 of 178 with concerns for metabolic acidosis with respiratory compensation.     Patient was managed according to DKA protocol 1 was placed this morning. Patient was found to have elevated troponins at about 3000, no EKG changes and no chest pain. Cardiology was consulted for elevated troponins and ischemic work-up. Patient was found to have an INR of 11.2 as patient was on warfarin. Vitamin K 5 mg was given to reverse the elevated INR. Cardiology planning possible cardiac cath once INR is less than 2. Currently on heparin drip.     Interval changes  6/6/2022 6/5   No acute events overnight   Pt resting comfortably in bed on room air   Concerned about BP medications but has been hypotensive   Repeat BC > if neg > clear for cardiac cath   INR 1.7 this morning   Afebrile   VSS  R iv pulled- site tender but not indurated or red    6/6  Pt had a psych consult due to SI/HI due to aggressivity determined no need for BHI at this time   Still need heart cath done, deferred at this point by cardiology  Tenderness in the R arm , on doxycycline      Summary of relevant labs:  Labs:  Cr 3.6  Na 128   Glucose 209    Micro:  1/2 blood cultures growing bacillus     Imaging:  CXR on 6/2  Satisfactory line placement       Mild diffuse prominence of interstitial markings may be related to edema or   infection       I have personally reviewed the past medical history, past surgical history, medications, social history, and family history, and I haveupdated the database accordingly. Allergies:   Lisinopril, Ace inhibitors, and Pcn [penicillins]     Review of Systems:     Review of Systems   Constitutional: Negative for activity change and chills. HENT: Negative for congestion. Eyes: Negative for photophobia. Respiratory: Negative for chest tightness. Cardiovascular: Negative for chest pain. Gastrointestinal: Negative for abdominal pain and diarrhea. Endocrine: Negative for cold intolerance and heat intolerance. Genitourinary: Negative for difficulty urinating and dysuria. Musculoskeletal: Negative for arthralgias. Skin: Negative for rash. Allergic/Immunologic: Negative for immunocompromised state. Neurological: Negative for dizziness, seizures, numbness and headaches. Hematological: Negative for adenopathy. Does not bruise/bleed easily. Psychiatric/Behavioral: Negative for agitation. Physical Examination :       Physical Exam  Vitals and nursing note reviewed. Constitutional:       Appearance: Normal appearance. HENT:      Head: Normocephalic and atraumatic. Nose: Nose normal.      Mouth/Throat:      Mouth: Mucous membranes are moist.      Pharynx: Oropharynx is clear. Eyes:      Extraocular Movements: Extraocular movements intact. Conjunctiva/sclera: Conjunctivae normal.      Pupils: Pupils are equal, round, and reactive to light. Cardiovascular:      Rate and Rhythm: Normal rate and regular rhythm. Pulses: Normal pulses. Heart sounds: Normal heart sounds. No murmur heard. No friction rub. No gallop. Pulmonary:      Effort: Pulmonary effort is normal. No respiratory distress. Breath sounds: Normal breath sounds. No stridor. No wheezing, rhonchi or rales. Chest:      Chest wall: No tenderness. Abdominal:      General: Abdomen is flat. Bowel sounds are normal. There is no distension. Palpations: Abdomen is soft. There is no mass. Tenderness:  There is no abdominal tenderness. There is no right CVA tenderness, left CVA tenderness, guarding or rebound. Hernia: No hernia is present. Musculoskeletal:         General: Tenderness present. No swelling, deformity or signs of injury. Normal range of motion. Cervical back: Normal range of motion. No rigidity or tenderness. Right lower leg: No edema. Left lower leg: No edema. Skin:     General: Skin is warm. Findings: No erythema. Neurological:      General: No focal deficit present. Mental Status: He is alert and oriented to person, place, and time. Mental status is at baseline. Psychiatric:         Mood and Affect: Mood normal.         Behavior: Behavior normal.         Thought Content:  Thought content normal.         Judgment: Judgment normal.         Past Medical History:     Past Medical History:   Diagnosis Date    Asthma     mod persistent    CAD in native artery, nonobstructive     Carotid stenosis, left 6/10/2013    Carpal tunnel syndrome     RIGHT    Cerebrovascular disease 4/23/2018    Chronic kidney disease 6/10/2013    COPD (chronic obstructive pulmonary disease) (HCC)     Diabetes mellitus (HCC)     insulin dependent    History of colon polyps     History of weakness of extremity     right sided    HTN (hypertension)     Hyperlipidemia     Lung, cysts, congenital     PTSD (post-traumatic stress disorder)     PTSD (post-traumatic stress disorder)     TIA (transient ischemic attack)     Type II or unspecified type diabetes mellitus without mention of complication, not stated as uncontrolled        Past Surgical  History:     Past Surgical History:   Procedure Laterality Date    CAROTID ENDARTERECTOMY Left 7-2013    CATARACT REMOVAL Left     with poor vision afterward, wears reading glasses    COLONOSCOPY      HERNIA REPAIR      IR TUNNELED CATHETER PLACEMENT GREATER THAN 5 YEARS  5/11/2021    IR TUNNELED CATHETER PLACEMENT GREATER THAN 5 YEARS 5/11/2021 Reema Dallas Boogie Bee MD [facility] SPECIAL PROCEDURES    HI COLSC FLX W/RMVL OF TUMOR POLYP LESION SNARE TQ N/A 6/27/2017    COLONOSCOPY POLYPECTOMY SNARE/ hot performed by Rory Espinal DO at [facility] Endoscopy    TONSILLECTOMY      VASCULAR SURGERY Left 2015    carotid stent, dr Apolinar Corbett       Medications:      NIFEdipine  60 mg Oral Daily    isosorbide mononitrate  30 mg Oral Daily    insulin glargine  18 Units SubCUTAneous Nightly    insulin lispro  0-6 Units SubCUTAneous TID WC    insulin lispro  0-3 Units SubCUTAneous Nightly    insulin lispro  4 Units SubCUTAneous TID WC    doxycycline hyclate  100 mg Oral 2 times per day    heparin (porcine)  1,000 Units IntraVENous Once per day on Tue Thu Sat    heparin (porcine)  500 Units IntraVENous Once per day on Tue Thu Sat    b complex-C-folic acid  1 mg Oral Daily    Vitamin D  5,000 Units Oral Daily    magnesium oxide  400 mg Oral Daily    gabapentin  100 mg Oral TID    prazosin  5 mg Oral BID    budesonide-formoterol  2 puff Inhalation BID    epoetin marisela-epbx  3,000 Units IntraVENous Once per day on Tue Thu Sat    midodrine  10 mg Oral TID    pantoprazole  40 mg Oral QAM AC    clopidogrel  75 mg Oral Daily    aspirin  81 mg Oral Daily    sodium chloride flush  5-40 mL IntraVENous 2 times per day    atorvastatin  80 mg Oral Nightly    bumetanide  1 mg Oral Daily    carvedilol  25 mg Oral BID    cloNIDine  0.3 mg Oral TID    hydrALAZINE  100 mg Oral TID    ipratropium-albuterol  1 vial Inhalation 4x Daily    [Held by provider] losartan  50 mg Oral Daily    montelukast  10 mg Oral Nightly    QUEtiapine  100 mg Oral Nightly       Social History:     Social History     Socioeconomic History    Marital status:      Spouse name: Not on file    Number of children: Not on file    Years of education: Not on file    Highest education level: Not on file   Occupational History     Employer: N/A   Tobacco Use    Smoking status: Former Smoker Packs/day: 0.50     Years: 35.00     Pack years: 17.50     Types: Cigarettes     Quit date: 2014     Years since quittin.9    Smokeless tobacco: Never Used   Vaping Use    Vaping Use: Never used   Substance and Sexual Activity    Alcohol use: No     Alcohol/week: 0.0 standard drinks    Drug use: No    Sexual activity: Yes     Partners: Female   Other Topics Concern    Not on file   Social History Narrative    Not on file     Social Determinants of Health     Financial Resource Strain:     Difficulty of Paying Living Expenses: Not on file   Food Insecurity:     Worried About Running Out of Food in the Last Year: Not on file    Warren of Food in the Last Year: Not on file   Transportation Needs:     Lack of Transportation (Medical): Not on file    Lack of Transportation (Non-Medical):  Not on file   Physical Activity:     Days of Exercise per Week: Not on file    Minutes of Exercise per Session: Not on file   Stress:     Feeling of Stress : Not on file   Social Connections:     Frequency of Communication with Friends and Family: Not on file    Frequency of Social Gatherings with Friends and Family: Not on file    Attends Gnosticism Services: Not on file    Active Member of [address] [place] [address] [employer] or Organizations: Not on file    Attends Club or Organization Meetings: Not on file    Marital Status: Not on file   Intimate Partner Violence:     Fear of Current or Ex-Partner: Not on file    Emotionally Abused: Not on file    Physically Abused: Not on file    Sexually Abused: Not on file   Housing Stability:     Unable to Pay for Housing in the Last Year: Not on file    Number of [place] in the Last Year: Not on file    Unstable Housing in the Last Year: Not on file       Family History:     Family History   Problem Relation Age of Onset    Cancer Mother     Heart Disease Father       Medical Decision Making:   I have independently reviewed/ordered the following labs:    CBC with Differential: Recent Labs     06/04/22  1805 06/06/22  0427   WBC 7.8 5.4   HGB 9.4* 8.6*   HCT 27.3* 26.0*    294     BMP:  Recent Labs     06/04/22  0647 06/04/22  0647 06/05/22  0322 06/06/22  1221   *   < > 128* 130*   K 4.9   < > 3.8 5.0   CL 87*   < > 87* 94*   CO2 22   < > 27 22   BUN 57*   < > 34* 70*   CREATININE 5.63*   < > 3.60* 4.93*   MG 1.6  --  1.3*  --     < > = values in this interval not displayed. Hepatic Function Panel:   No results for input(s): PROT, LABALBU, BILIDIR, IBILI, BILITOT, ALKPHOS, ALT, AST in the last 72 hours. No results for input(s): RPR in the last 72 hours. No results for input(s): HIV in the last 72 hours. No results for input(s): BC in the last 72 hours. Lab Results   Component Value Date    CREATININE 4.93 06/06/2022    GLUCOSE 512 06/06/2022    GLUCOSE 172 04/16/2012       Detailed results: Thank you for allowing us to participate in the care of this patient. Please call with questions. This note is created with the assistance of a speech recognition program.  While intending to generate adocument that actually reflects the content of the visit, the document can still have some errors including those of syntax and sound a like substitutions which may escape proof reading. It such instances, actual meaningcan be extrapolated by contextual diversion. Stu Evans  Office: [phone]  Perfect serve / office [phone]      I have discussed the care of the patient, including pertinent history and exam findings,  with the resident. I have seen and examined the patient and the key elements of all parts of the encounter have been performed by me. I agree with the assessment, plan and orders as documented by the resident.     Nesha Shin, Infectious Diseases

## 2022-06-06 NOTE — PROGRESS NOTES
[facility]  Office: [address], DO, Chris Escobar, DO, Yanira Scott, DO, Elke Red Blood, DO, Bereket Sawant MD, Bella Sharma MD, Edwar Calabrese MD, Carmencita Orellana MD, Aldo Renae MD, Veronica Morrell MD, Roopa Alejandra MD, Jaskaran Cardona, DO, An Chowdhury MD,  Terrell Olsen, DO, Julio Cesar Lopez MD, Seema Lindsay MD, David Brumfield MD, Dejuan Castro DO, Cheri Cruz MD, Arya Iglesias MD, Xiomy Kern DO, Anila Lyons MD, [facility], [facility], CNP, Srinivasan Wheeler, CNP, Lissette Mcgee, CNP, Michele Kaplan, CNP, Campos Noyola, CNP, Jamal Funez PA-C, Rosa Hebert, [facility], Dangelo Carlson, [facility], Magdalena Dial, CNP, Aldo Castaneda, CNP, Mary Green, CNS, Frida Tierney, [facility], Wayne Huerta, CNP, Ronald Pringle, CNP, Denice Rivera, [address] [place] [address]    Progress Note    6/6/2022    10:05 AM    Name:   Lauren Peña  MRN:     [id number]     Acct:      [de-identified]   Room:   [address] [place] [address] Day:  4  Admit Date:  6/2/2022  9:54 AM    PCP:   Maribeth Rocha  Code Status:  Full Code    Subjective:     C/C:   Chief Complaint   Patient presents with    Shortness of Breath     Interval History Status: improved     Pt was very upset this morning  See my previous progress note about clinical event    Brief History:     52-year-old male with medical history of ESRD on dialysis, type 2 diabetes, hypertension, hyperlipidemia, carotid artery disease, CAD presents to the emergency department for shortness of breath. Patient was found to have a pH of 7.0, bicarb of 6 suspected to be DKA. Patient was transferred to the ICU for further care on BiPAP. Patient tolerated well, oxygen was eventually weaned and patient was treated with insulin infusion. Patient was also found to have an elevated troponin, seen by cardiology suspecting non-ST ovation myocardial infarction.   Patient was eventually transferred out of 100 mg Oral Nightly     Continuous Infusions:    heparin (PORCINE) Infusion 24 Units/kg/hr (22 0859)    sodium chloride      dextrose      sodium chloride       PRN Meds: heparin (porcine), heparin (porcine), heparin (porcine), heparin (porcine), sodium chloride, albuterol, glucose, dextrose bolus **OR** dextrose bolus, glucagon (rDNA), dextrose, sodium chloride flush, sodium chloride, ondansetron **OR** ondansetron, polyethylene glycol, acetaminophen **OR** acetaminophen, ipratropium-albuterol    Data:     Past Medical History:   has a past medical history of Asthma, CAD in native artery, nonobstructive, Carotid stenosis, left, Carpal tunnel syndrome, Cerebrovascular disease, Chronic kidney disease, COPD (chronic obstructive pulmonary disease) ([facility] Diabetes mellitus ([facility] History of colon polyps, History of weakness of extremity, HTN (hypertension), Hyperlipidemia, Lung, cysts, congenital, PTSD (post-traumatic stress disorder), PTSD (post-traumatic stress disorder), TIA (transient ischemic attack), and Type II or unspecified type diabetes mellitus without mention of complication, not stated as uncontrolled. Social History:   reports that he quit smoking about 7 years ago. His smoking use included cigarettes. He has a 17.50 pack-year smoking history. He has never used smokeless tobacco. He reports that he does not drink alcohol and does not use drugs. Family History:   Family History   Problem Relation Age of Onset    Cancer Mother     Heart Disease Father        Vitals:  BP (!) 172/70   Pulse 64   Temp 98.6 °F (37 °C) (Oral)   Resp 16   Ht 5' 6\" (1.676 m)   Wt 117 lb 15.1 oz (53.5 kg)   SpO2 96%   BMI 19.04 kg/m²   Temp (24hrs), Av.5 °F (36.9 °C), Min:98.2 °F (36.8 °C), Max:98.9 °F (37.2 °C)    Recent Labs     22  1204 22  1650 22  2239 22  0719   POCGLU 172* 226* 385* 481*       I/O (24Hr):     Intake/Output Summary (Last 24 hours) at 2022 1005  Last data filed at 6/6/2022 0628  Gross per 24 hour   Intake 575.17 ml   Output --   Net 575.17 ml       Labs:  Hematology:  Recent Labs     06/03/22  1614 06/04/22  0647 06/04/22  1805 06/06/22  0427   WBC  --  10.0 7.8 5.4   RBC  --  2.86* 2.95* 2.74*   HGB  --  8.9* 9.4* 8.6*   HCT  --  27.0* 27.3* 26.0*   MCV  --  94.4 92.5 94.9   MCH  --  31.1 31.9 31.4   MCHC  --  33.0 34.4 33.1   RDW  --  14.6* 13.9 13.8   PLT  --  320 282 294   MPV  --  9.6 9.1 9.6   INR 4.4 1.7  --   --      Chemistry:  Recent Labs     06/04/22  0647 06/05/22  0322   * 128*   K 4.9 3.8   CL 87* 87*   CO2 22 27   GLUCOSE 274* 189*   BUN 57* 34*   CREATININE 5.63* 3.60*   MG 1.6 1.3*   ANIONGAP 14 14   LABGLOM 10* 17*   GFRAA 12* 21*   CALCIUM 7.6* 7.5*     Recent Labs     06/05/22  0003 06/05/22  0801 06/05/22  1204 06/05/22  1650 06/05/22  2239 06/06/22  0719   POCGLU 209* 144* 172* 226* 385* 481*     ABG:  Lab Results   Component Value Date    POCPH 7.39 06/17/2013    POCPCO2 46 06/17/2013    POCPO2 288 06/17/2013    POCHCO3 27.8 06/17/2013    NBEA NOT REPORTED 06/17/2013    PBEA 3 06/17/2013    ILF9RRQ 29 06/17/2013    VSEB3OOV 100 06/17/2013    FIO2 INFORMATION NOT PROVIDED 06/02/2022     Lab Results   Component Value Date/Time    SPECIAL L WRIST 2ML 06/02/2022 11:28 AM     Lab Results   Component Value Date/Time    CULTURE POSITIVE Blood Culture (A) 06/02/2022 11:28 AM    CULTURE DIRECT GRAM STAIN FROM BOTTLE: GRAM POSITIVE RODS 06/02/2022 11:28 AM    CULTURE (A) 06/02/2022 11:28 AM     BACILLUS SPECIES A single positive blood culture of coagulase negative Staphylocci, diphtheroids,micrococci, Cutibacterium, viridans Streptocci, Bacillus, or Lactobacillus species should be interpreted with caution and viewed as a likely skin contaminant.     CULTURE  06/02/2022 11:28 AM     (NOTE) Direct Gram Stain from bottle result called to and read back by:YIN Nguyen. 6/3/22 0100       Radiology:  XR CHEST PORTABLE    Result Date: 6/2/2022  Satisfactory line placement Mild diffuse prominence of interstitial markings may be related to edema or infection       Physical Examination:        General appearance:  Not cooperative  Mental Status:  oriented to person, place and time and irritated  Lungs:  clear to auscultation bilaterally, normal effort left chest wall tunneled catheter  Heart:  regular rate and rhythm, no murmur  Abdomen:  Soft nontender nondistended  Extremities:  no edema, redness, tenderness in the calves  Skin:  no gross lesions, rashes, induration    Assessment:        Hospital Problems           Last Modified POA    * (Principal) NSTEMI (non-ST elevated myocardial infarction) ([address] 6/4/2022 Yes    Hyperkalemia 6/4/2022 Yes    Diabetic ketoacidosis without coma associated with type 2 diabetes mellitus ([address] 6/4/2022 Yes    Supratherapeutic INR 9/8/4820 Yes    Metabolic acidosis 9/0/5785 Yes    Pre-operative clearance 6/5/2022 Yes    Fever and chills 6/5/2022 Yes    Bacteremia 6/5/2022 Yes    Carotid stenosis, left s/p Endarterectomy 6/4/2022 Yes    Shortness of breath 6/4/2022 Yes    Hypertension, essential 6/4/2022 Yes    DM type 2, uncontrolled, with neuropathy ([address] 6/4/2022 Yes    Acute respiratory failure with hypoxia ([address] 6/4/2022 Yes                Plan:        NSTEMI - cath Monday   S/p carotid stent - on warfarin outpatient.  INR 1.7 today will discuss with cardiology starting warfarin   Dialysis per nephrology   Restart bp meds today   BGM not at goal  Can resume patient home dose Lantus 11 units  He refuses to be given insulin based on our recommendations based on his rising and labile BG    BGM elevated this morning  Threatened myself and nursing staff, damaged hospital equipment  Security called - will need telepysch eval    bp not at goal likely due to agitation as well  Will add extra bp meds nifedipine + imdur  Also needs more BG coverge  Will start on lantus 18 u + 4 tid bolus meal time + low correction coverage    Kathya Jordan MD  6/6/2022  10:05 AM

## 2022-06-06 NOTE — PROGRESS NOTES
On morning rounds patient   He did not want to have his insulin adjusted  He became agitated  Also damaged hospital property by throwing remote against table  Requested to leave AMA  Made threats against myself and nursing staff  Security was called at 9:15 AM  Patient then threatened to take his own life  Inpatient psych consult was placed  He will need to be evaluated before he can leave AMA

## 2022-06-06 NOTE — PROGRESS NOTES
Patient became verbally aggressive with RN (Gustav Bumpers) and asked to speak with  regarding his insulin (Humalog) order. Patient continued to cuss at nurse and doctor once Dr. Denice Dubin arrived to unit and explained rationale behind insulin protocol. Patient repeating \"fuck you I know by body\" ad nauseam. Patient refused diabetic education. Patient refused Humalog despite critically high blood sugar. Once the Dr. Bijan Calderónito that insulin is not randomly dosed but based on a sliding scale the patient attempted to throw the call light at the bedside table/doctor. Then after security was called for an assisted AMA discharge the patient proclaimed \"Im gonna fucking kill you and shoot myself in the head. \" multiple times, writer initiated suicide/homicide precautions and explained to the patient they he would have to stay for a psychiatric evaluation. Upon bagging the patient's belongings he threw a mostly empty urinal at Gustav Bumpers. Patient educated about not assaulting the nurses and doctors, particularly with bodily fluids. Patient refused education.

## 2022-06-06 NOTE — PROGRESS NOTES
[facility]  Progress Note                   Date:   6/6/2022  Patient name: Peter Mcclain  Date of admission:  6/2/2022  9:54 AM  MRN:   [id number]  YOB: 1955  PCP: Jorden Martin    Reason for Admission: Chronic pulmonary edema [J81.1]  Hyperkalemia [E87.5]    Subjective:       Clinical Changes /Abnormalities: Patient seen and examined in room.  Homicidal/ suicidal  threatening staff , stated he had signed AMA form , lost iv access but still agreeing on cardiac cath   Review of Systems    Medications:   Scheduled Meds:   insulin glargine  11 Units SubCUTAneous BID    doxycycline hyclate  100 mg Oral 2 times per day    heparin (porcine)  1,000 Units IntraVENous Once per day on Tue Thu Sat    heparin (porcine)  500 Units IntraVENous Once per day on Tue Thu Sat    b complex-C-folic acid  1 mg Oral Daily    Vitamin D  5,000 Units Oral Daily    magnesium oxide  400 mg Oral Daily    gabapentin  100 mg Oral TID    prazosin  5 mg Oral BID    budesonide-formoterol  2 puff Inhalation BID    insulin lispro  0-12 Units SubCUTAneous TID WC    insulin lispro  0-6 Units SubCUTAneous Nightly    epoetin marisela-epbx  3,000 Units IntraVENous Once per day on Tue Thu Sat    midodrine  10 mg Oral TID    pantoprazole  40 mg Oral QAM AC    clopidogrel  75 mg Oral Daily    aspirin  81 mg Oral Daily    sodium chloride flush  5-40 mL IntraVENous 2 times per day    atorvastatin  80 mg Oral Nightly    bumetanide  1 mg Oral Daily    carvedilol  25 mg Oral BID    cloNIDine  0.3 mg Oral TID    hydrALAZINE  100 mg Oral TID    ipratropium-albuterol  1 vial Inhalation 4x Daily    [Held by provider] losartan  50 mg Oral Daily    montelukast  10 mg Oral Nightly    [Held by provider] NIFEdipine  90 mg Oral Daily    QUEtiapine  100 mg Oral Nightly     Continuous Infusions:   heparin (PORCINE) Infusion 24 Units/kg/hr (06/06/22 3359)    sodium chloride      dextrose      sodium chloride CBC:   Recent Labs     06/04/22  0647 06/04/22  1805 06/06/22  0427   WBC 10.0 7.8 5.4   HGB 8.9* 9.4* 8.6*    282 294     BMP:    Recent Labs     06/04/22  0647 06/05/22  0322   * 128*   K 4.9 3.8   CL 87* 87*   CO2 22 27   BUN 57* 34*   CREATININE 5.63* 3.60*   GLUCOSE 274* 189*     Hepatic:  No results for input(s): AST, ALT, ALB, BILITOT, ALKPHOS in the last 72 hours. Troponin:   No results for input(s): TROPHS in the last 72 hours. BNP: No results for input(s): BNP in the last 72 hours. Lipids: No results for input(s): CHOL, HDL in the last 72 hours. Invalid input(s): LDLCALCU  INR:   Recent Labs     06/03/22  1614 06/04/22  0647   INR 4.4 1.7     DIAGNOSTIC DATA       EKG: Initial EKG showed Peaked T waves related to hyperkalemia and ST depressions. ECHO ordered/pending     ECHO 5/5/2021  Severe concentric LVH. Septal wall thickness 2.1 cm  No obvious wall motion abnormality seen. Normal LV systolic function with LVEF >65%. Grade I diastolic dysfunction  Normal RV size and function. RV systolic pressure 38 mmHg  Moderately dilated LA and RA appears normal in size. No obvious significant structural valvular abnormality noted. No significant valvular stenosis or regurgitation noted. Normal aortic root dimension. No significant pericardial effusion noted. No obvious intra-cardiac mass or shunt noted. IVC normal diameter and inspiratory variation indicating normal RA filling  pressure.     Consider Amyloidosis     Cardiac cath 8/12/2020  Non-obstructive CAD    Objective:   Vitals: BP (!) 172/70   Pulse 64   Temp 98.6 °F (37 °C) (Oral)   Resp 16   Ht 5' 6\" (1.676 m)   Wt 117 lb 15.1 oz (53.5 kg)   SpO2 96%   BMI 19.04 kg/m²   General appearance: alert and cooperative with exam  HEENT: Head: Normocephalic, no lesions, without obvious abnormality.   Neck:no JVD, trachea midline, no adenopathy  Lungs: Clear to auscultation  Heart: Regular rate and rhythm, s1/s2 auscultated, no murmurs  Abdomen: soft, non-tender, bowel sounds active  Extremities: no edema  Neurologic: not done        Assessment / Acute Cardiac Problems:   1. NSTEMI- Rising trend concerned for type 1  2. Hyperkalemia  3. ESRD  4. DKA  5. Baceremia with gram positive rods  6. Previous history of cocaine use. 7. INR    8.  Shock     Patient Active Problem List:     Cigarette nicotine dependence without complication     Carpal tunnel syndrome on right     Colon polyps     Carotid stenosis, left s/p Endarterectomy     Mixed simple and mucopurulent chronic bronchitis (HCC)     Pure hypercholesterolemia     Dyspnea and respiratory abnormalities     PTSD (post-traumatic stress disorder)     Transient cerebral ischemia     Hypertension, essential     DM type 2, uncontrolled, with neuropathy (HCC)     COPD with acute exacerbation ([facility])     Cerebral vascular disease     Primary insomnia     Hypertensive urgency     Non-obstructive Coronary artery disease of native artery of native heart with stable angina pectoris (HCC)     Hyperparathyroidism ([address]     Hypovitaminosis D     Acute kidney injury superimposed on CKD ([facility])-currently dialysis patient     Coronary artery disease with exertional angina ([facility])     Moderate malnutrition (HCC)     COPD exacerbation (HCC)     Leg swelling     Hyponatremia     Anemia of chronic disease     NSTEMI (non-ST elevated myocardial infarction) ([address]     Hypoalbuminemia     DKA, type 2, not at goal [facility])     Inflammation of right sacroiliac joint ([address]     Malignant neoplasm of colon ([address]     ESRD on dialysis ([address]     Bilateral carotid artery occlusion     Seizure ([address]     Chronic heart failure with preserved ejection fraction (HCC)     Left ventricular hypertrophy     Acute respiratory failure with hypoxia ([address]     Uncontrolled type 2 diabetes mellitus with hyperglycemia (HCC)     Acute respiratory failure (HCC)     Brittle diabetes (HCC)     Fluid overload     History of CVA (cerebrovascular accident)     Hyperkalemia     Diabetic ketoacidosis without coma associated with type 2 diabetes mellitus ([facility]     Supratherapeutic INR      Plan of Treatment:   1. Awaiting ECHO  2. Plan for CATH once mentally stable   3. Continue ASA, statin, BB, Plavix  4. ESRD on HD per nephrology  Fluid management per nephrology   5.  Recommend K > 4.0 and Mag > 2.0      Electronically signed by TERRANCE Krause NP on 6/6/2022 at 10:46 AM  [address]  [phone]

## 2022-06-06 NOTE — PROGRESS NOTES
Physical Therapy        Physical Therapy Cancel Note      DATE: 2022    NAME: Luz Contreras  MRN: [id number]   : 1955      Patient not seen this date for Physical Therapy due to: Other: pt currently independent with functional mobility. Cardiac cath pending. Will evaluate post cath. Ck as appropriate.       Electronically signed by Maude Chung PT on 2022 at 2:42 PM

## 2022-06-06 NOTE — PLAN OF CARE
Problem: Discharge Planning  Goal: Discharge to home or other facility with appropriate resources  Outcome: Progressing     Problem: Safety - Adult  Goal: Free from fall injury  Outcome: Progressing     Problem: ABCDS Injury Assessment  Goal: Absence of physical injury  Outcome: Progressing     Problem: Skin/Tissue Integrity  Goal: Absence of new skin breakdown  Description: 1. Monitor for areas of redness and/or skin breakdown  2. Assess vascular access sites hourly  3. Every 4-6 hours minimum:  Change oxygen saturation probe site  4. Every 4-6 hours:  If on nasal continuous positive airway pressure, respiratory therapy assess nares and determine need for appliance change or resting period.   Outcome: Progressing     Problem: Chronic Conditions and Co-morbidities  Goal: Patient's chronic conditions and co-morbidity symptoms are monitored and maintained or improved  Outcome: Progressing     Problem: Respiratory - Adult  Goal: Achieves optimal ventilation and oxygenation  Outcome: Progressing     Problem: Pain  Goal: Verbalizes/displays adequate comfort level or baseline comfort level  Outcome: Progressing

## 2022-06-06 NOTE — VIRTUAL HEALTH
Inpatient consult to Psychiatry  Consult performed by: Everett Franco MD  Consult ordered by: Bassem Chau MD  Reason for consult:  Suicidal and homicidal ideation  Assessment/Recommendations: Department of Psychiatry   Psychiatric Assessment      Thank you very much for allowing us to participate in the care of this patient. Reason for Consult:  Suicidal and homicidal ideation    HISTORY OF PRESENT ILLNESS:    Patient is a 56-year-old male with no significant psychiatric history admitted for possible DKA. Patient made both suicidal and homicidal statements this morning, hence a psych consult. Patient was pleasant during the conversation today. Mentions that he is having issues with one of the physicians on his primary team.  Mentions that he has requested his insulin to be given in a certain way and was not getting it that way. He did agree to making statements that he wants to gun to shoot himself however reports that he had no intent to hurt himself. Reports that these statements were made only out of frustration and anger at that time. Patient is agreeable to stay and get help however he is very particular about the way he wants his insulin. Could not elicit any manic or hypomanic symptoms. Could not elicit any psychotic symptoms today. PSYCHIATRIC HISTORY:  Denies any previous psychiatric history, suicide attempts or inpatient hospitalizations. Lifetime Psychiatric Review of Systems         Obsessions and Compulsions: Denies       Jeri or Hypomania: Denies     Hallucinations: Denies     Panic Attacks:  Denies     Delusions:  Denies     Phobias:  Denies     Trauma: Denies    Prior to Admission medications   Medication Sig Start Date End Date Taking?  Authorizing Provider  Cholecalciferol (VITAMIN D3) 125 MCG (5000 UT) TABS Take 5,000 Units by mouth daily   Yes Historical Provider, MD  B Complex-C-Folic Acid (DIALYVITE 226) 0.8 MG TABS Take 0.8 mg by mouth daily In AM   Yes Historical Provider, MD  NIFEdipine (ADALAT CC) 60 MG extended release tablet Take 60 mg by mouth 2 times daily   Yes Historical Provider, MD  warfarin (COUMADIN) 10 MG tablet Dose unknown per patient, unable to find dose from outside records   Yes Historical Provider, MD  magnesium oxide (MAG-OX) 400 MG tablet Take 400 mg by mouth daily    Historical Provider, MD  bumetanide (BUMEX) 1 MG tablet Take 1 tablet by mouth daily 10/19/21   Historical Provider, MD  aspirin 81 MG EC tablet Take 81 mg by mouth daily 7/28/21   Historical Provider, MD  atorvastatin (LIPITOR) 80 MG tablet Take 1 tablet by mouth daily 10/19/21   Historical Provider, MD  carvedilol (COREG) 25 MG tablet Take 1 tablet by mouth 2 times daily 10/19/21   Historical Provider, MD  cloNIDine (CATAPRES) 0.1 MG tablet Take 0.3 mg by mouth three times daily  8/22/21   Historical Provider, MD  gabapentin (NEURONTIN) 100 MG capsule Take 200 mg by mouth 3 times daily. 10/5/21   Historical Provider, MD  hydrALAZINE (APRESOLINE) 100 MG tablet Take 1 tablet by mouth 3 times daily 8/24/21   Historical Provider, MD  losartan (COZAAR) 50 MG tablet Take 1 tablet by mouth 2 times daily  8/22/21   Historical Provider, MD  montelukast (SINGULAIR) 10 MG tablet Take 1 tablet by mouth nightly 8/22/21   Historical Provider, MD  prazosin (MINIPRESS) 5 MG capsule Take 5 mg by mouth 2 times daily     Historical Provider, MD  blood glucose monitor strips Test 4 times a day & as needed for symptoms of irregular blood glucose. Dispense sufficient amount for indicated testing frequency plus additional to accommodate PRN testing needs.  5/26/21   TERRANCE Hobson NP  insulin glargine [facility]) 100 UNIT/ML injection pen Inject 10 Units into the skin nightly 5/13/21   TERRANCE Hobson NP  fluticasone [facility]) 50 MCG/ACT nasal spray  2/23/21   Historical Provider, MD  insulin aspart (NOVOLOG FLEXPEN) 100 UNIT/ML injection pen Inject 5 Units into the skin 3 times daily (before meals) Sliding scale 3/16/21   TERRANCE Mckeon CNP  nitroGLYCERIN (NITROSTAT) 0.4 MG SL tablet USE 1 TABLET UNDER THE TONGUE UP TO MAXIMUM OF 3 TOTAL DOSES. IF NO RELIEF AFTER 1 DOSE, CALL 911. 3/5/21   TERRANCE Mckeon CNP  QUEtiapine (SEROQUEL) 100 MG tablet TAKE 1 TABLET BY MOUTH NIGHTLY 2/23/21   TERRANCE Mckeon CNP  blood glucose monitor kit and supplies Dispense sufficient amount for indicated testing frequency plus additional to accommodate PRN testing needs. Dispense all needed supplies to include: monitor, strips, lancing device, lancets, control solutions, alcohol swabs.  1/26/21   TERRANCE Mckeon CNP  Lancets MISC Use_4__times daily Diagnisis:250.0  Diabetes Mellitus__x__Insulin Dependent___Non-Insulin Dependent 12/6/20   Maverick Garibay MD  Nutritional Supplements ([address] [place] [address]) LIQD Take 1 Can by mouth 3 times daily 6/16/20   TERRANCE Mckeon CNP  COMFORT EZ PEN NEEDLES 31G X 8 MM MISC USE AS DIRECTED 4/14/20   TERRANCE Mckeon CNP       Medications:    Current Facility-Administered Medications: insulin glargine (LANTUS) injection vial 11 Units, 11 Units, SubCUTAneous, BID  doxycycline hyclate (VIBRA-TABS) tablet 100 mg, 100 mg, Oral, 2 times per day  heparin (porcine) injection 1,700 Units, 1,700 Units, IntraCATHeter, PRN  heparin (porcine) injection 1,800 Units, 1,800 Units, IntraCATHeter, PRN  heparin (porcine) injection 1,000 Units, 1,000 Units, IntraVENous, Once per day on Tue Thu Sat  heparin (porcine) injection 500 Units, 500 Units, IntraVENous, Once per day on Tue Thu Sat  heparin (porcine) injection 3,330 Units, 60 Units/kg, IntraVENous, PRN  heparin (porcine) injection 1,670 Units, 30 Units/kg, IntraVENous, PRN  heparin 25,000 units in dextrose 5 % 250 mL infusion (rate based), 5-30 Units/kg/hr, IntraVENous, Continuous  b complex-C-folic acid (NEPHROCAPS) capsule 1 mg, 1 mg, Oral, Daily  Vitamin D (CHOLECALCIFEROL) tablet 5,000 Units, 5,000 Units, Oral, Daily  magnesium oxide (MAG-OX) tablet 400 mg, 400 mg, Oral, Daily  gabapentin (NEURONTIN) capsule 100 mg, 100 mg, Oral, TID  prazosin (MINIPRESS) capsule 5 mg, 5 mg, Oral, BID  budesonide-formoterol (SYMBICORT) 160-4.5 MCG/ACT inhaler 2 puff, 2 puff, Inhalation, BID  0.9 % sodium chloride infusion, , IntraVENous, PRN  insulin lispro (HUMALOG) injection vial 0-12 Units, 0-12 Units, SubCUTAneous, TID WC  insulin lispro (HUMALOG) injection vial 0-6 Units, 0-6 Units, SubCUTAneous, Nightly  epoetin marisela-epbx (RETACRIT) injection 3,000 Units, 3,000 Units, IntraVENous, Once per day on Tue Thu Sat  midodrine (PROAMATINE) tablet 10 mg, 10 mg, Oral, TID  pantoprazole (PROTONIX) tablet 40 mg, 40 mg, Oral, QAM AC  albuterol (PROVENTIL) nebulizer solution 5 mg, 5 mg, Nebulization, Q4H PRN  glucose chewable tablet 16 g, 4 tablet, Oral, PRN  dextrose bolus 10% 125 mL, 125 mL, IntraVENous, PRN **OR** dextrose bolus 10% 250 mL, 250 mL, IntraVENous, PRN  glucagon (rDNA) injection 1 mg, 1 mg, IntraMUSCular, PRN  dextrose 5 % solution, 100 mL/hr, IntraVENous, PRN  clopidogrel (PLAVIX) tablet 75 mg, 75 mg, Oral, Daily  aspirin EC tablet 81 mg, 81 mg, Oral, Daily  sodium chloride flush 0.9 % injection 5-40 mL, 5-40 mL, IntraVENous, 2 times per day  sodium chloride flush 0.9 % injection 5-40 mL, 5-40 mL, IntraVENous, PRN  0.9 % sodium chloride infusion, , IntraVENous, PRN  ondansetron (ZOFRAN-ODT) disintegrating tablet 4 mg, 4 mg, Oral, Q8H PRN **OR** ondansetron (ZOFRAN) injection 4 mg, 4 mg, IntraVENous, Q6H PRN  polyethylene glycol (GLYCOLAX) packet 17 g, 17 g, Oral, Daily PRN  acetaminophen (TYLENOL) tablet 650 mg, 650 mg, Oral, Q6H PRN **OR** acetaminophen (TYLENOL) suppository 650 mg, 650 mg, Rectal, Q6H PRN  atorvastatin (LIPITOR) tablet 80 mg, 80 mg, Oral, Nightly  bumetanide (BUMEX) tablet 1 mg, 1 mg, Oral, Daily  carvedilol (COREG) tablet 25 mg, 25 mg, Oral, BID  cloNIDine (CATAPRES) tablet 0.3 mg, 0.3 mg, Oral, TID  hydrALAZINE (APRESOLINE) tablet 100 mg, 100 mg, Oral, TID  ipratropium-albuterol (DUONEB) nebulizer solution 3 mL, 1 vial, Inhalation, 4x Daily  (Held by provider) losartan (COZAAR) tablet 50 mg, 50 mg, Oral, Daily  montelukast (SINGULAIR) tablet 10 mg, 10 mg, Oral, Nightly  (Held by provider) NIFEdipine (PROCARDIA XL) extended release tablet 90 mg, 90 mg, Oral, Daily  QUEtiapine (SEROQUEL) tablet 100 mg, 100 mg, Oral, Nightly  ipratropium-albuterol (DUONEB) nebulizer solution 1 ampule, 1 ampule, Inhalation, Q6H PRN     Past Medical History:       Diagnosis Date   Asthma    mod persistent   CAD in native artery, nonobstructive    Carotid stenosis, left 6/10/2013   Carpal tunnel syndrome    RIGHT   Cerebrovascular disease 4/23/2018   Chronic kidney disease 6/10/2013   COPD [facility] obstructive pulmonary disease) [facility] [address]    Diabetes mellitus (HCC)    insulin dependent   History of colon polyps    History of weakness of extremity    right sided   HTN (hypertension)    Hyperlipidemia    Lung, cysts, congenital    PTSD (post-traumatic stress disorder)    PTSD (post-traumatic stress disorder)    TIA (transient ischemic attack)    Type II or unspecified type diabetes mellitus without mention of complication, not stated as uncontrolled       Past Surgical History:       Procedure Laterality Date   CAROTID ENDARTERECTOMY Left 7-2013   CATARACT REMOVAL Left    with poor vision afterward, wears reading glasses   COLONOSCOPY     HERNIA REPAIR     IR TUNNELED CATHETER PLACEMENT GREATER THAN 5 YEARS  5/11/2021   IR TUNNELED CATHETER PLACEMENT GREATER THAN 5 YEARS 5/11/2021 Lilli Gottron, MD [facility] PROCEDURES   VT COLSC FLX W/RMVL OF TUMOR POLYP LESION SNARE TQ N/A 6/27/2017   COLONOSCOPY POLYPECTOMY SNARE/ hot performed by Maria Eugenia Riley DO at [facility] VASCULAR SURGERY Left 2015   carotid stent, dr Brandy Franco      Allergies: Lisinopril, Ace inhibitors, and Pcn (penicillins)      Social History:    Patient reports that he is from [facility]. Discussed this at length about the daughter who has been dealing with crack cocaine abuse. SUBSTANCE USE HISTORY: Denies any    Family Medical and Psychiatric History:        Problem Relation Age of Onset   Cancer Mother    Heart Disease Father       Physical  BP (!) 172/70   Pulse 64   Temp 98.6 °F (37 °C) (Oral)   Resp 16   Ht 5' 6\" (1.676 m)   Wt 117 lb 15.1 oz (53.5 kg)   SpO2 96%   BMI 19.04 kg/m²     Mental Status Examination:  Level of consciousness:  Within normal limits  Appearance: hospital attire, lying in bed, fair grooming  Behavior/Motor:  no abnormalities noted  Attitude toward examiner:  cooperative, attentive and good eye contact  Speech:  Spontaneous, normal rate and volume  Mood: Anxious  Affect: mood congruent  Thought processes:  Linear, goal directed, coherent  Thought content: Denies suicidal ideations   Denies homicidal ideations    Denies hallucinations   Denies delusions  Cognition:  Oriented to self, situation, location, date  Concentration clinically adequate  Memory age appropriate  Insight & Judgment:  fair    DSM-5 DIAGNOSIS:  Mood disorder unspecified    Stressors     Severity of stressors is mild  Source of stressors include: Other psychosocial and environmental stressors    PLAN:    Patient is currently denying any active suicidal or homicidal ideation plan or intent. Does not require admission to [facility]. Can leave AMA if he prefers to. If he continues to make any further threats against the physician, would recommend utilizing security services. We will sign off. Please call back with any questions. Thank you very much for allowing us to participate in the care of this patient.      Electronically signed by Jese Mathews MD on 6/6/22 at 12:55 PM EDT            Patient Location:  [address] Renal//Med Surg    Provider Location (City/State): [facility]    This virtual visit was conducted via interactive/real-time audio/video.

## 2022-06-07 ENCOUNTER — CARE COORDINATION (OUTPATIENT)
Dept: CASE MANAGEMENT | Age: 67
End: 2022-06-07

## 2022-06-07 LAB
CULTURE: NORMAL
Lab: NORMAL
SPECIMEN DESCRIPTION: NORMAL

## 2022-06-07 NOTE — CARE COORDINATION
Shravan 45 Transitions Initial Follow Up Call #1 -Attempted initial 24 hour transitional call to patient & fiance - hipaa. Left VM on all 3 contact numbers to return call directly to [place] [phone]  Patient was recently admitted to [facility] 5/10    Care Transitions Outreach Attempt - day #1    Call within 2 business days of discharge: Yes   Attempted to reach patient for transitions of care follow up. Unable to reach patient. Patient: Norah Guerrero   Patient : 1955   MRN: [id number]    Reason for Admission: DKA, NSTEMI  Discharge Date: 22   RARS: Readmission Risk Score: 24.8 ( )    Last Discharge [facility]       Complaint Diagnosis Description Type Department Provider    22 Shortness of Breath Hyperkalemia . .. ED to Hosp-Admission (Discharged) (ADMITTED) [facility] 3B Dolores Mercer MD; Ramiro Elder . .. Was this an external facility discharge?  No         Facility: [facility]    Non-face-to-face services provided:  Obtained and reviewed discharge summary and/or continuity of care documents        Artis Dye RN

## 2022-06-07 NOTE — PLAN OF CARE
Problem: Discharge Planning  Goal: Discharge to home or other facility with appropriate resources  Outcome: Completed     Problem: Safety - Adult  Goal: Free from fall injury  Outcome: Completed     Problem: ABCDS Injury Assessment  Goal: Absence of physical injury  Outcome: Completed     Problem: Skin/Tissue Integrity  Goal: Absence of new skin breakdown  Description: 1. Monitor for areas of redness and/or skin breakdown  2. Assess vascular access sites hourly  3. Every 4-6 hours minimum:  Change oxygen saturation probe site  4. Every 4-6 hours:  If on nasal continuous positive airway pressure, respiratory therapy assess nares and determine need for appliance change or resting period.   Outcome: Completed     Problem: Chronic Conditions and Co-morbidities  Goal: Patient's chronic conditions and co-morbidity symptoms are monitored and maintained or improved  Outcome: Completed     Problem: Respiratory - Adult  Goal: Achieves optimal ventilation and oxygenation  Outcome: Completed  Flowsheets (Taken 6/6/2022 7894)  Achieves optimal ventilation and oxygenation:   Assess for changes in respiratory status   Assess for changes in mentation and behavior     Problem: Pain  Goal: Verbalizes/displays adequate comfort level or baseline comfort level  Outcome: Completed

## 2022-06-08 ENCOUNTER — CARE COORDINATION (OUTPATIENT)
Dept: CASE MANAGEMENT | Age: 67
End: 2022-06-08

## 2022-06-08 NOTE — CARE COORDINATION
Shravan 45 Transitions Follow Up Call    2022    Patient: Darryl Lunsford  Patient : 1955   MRN: [id number]  Reason for Admission: Hyperkalemia, Pulmonary Edema, ESRD, DM  Discharge Date: 22 RARS: Readmission Risk Score: 24.8 ( )    Second attempt to contact regarding recent discharge. Message left in compliance with HIPPA. Stated who I was, representing the [facility], Care Transitions Team. Requested to place a call to their Physician with any immediate health needs/questions/concerns. Care Transitions Episode Closed. No future appointments.     Raul Cervantes RN

## 2022-06-14 NOTE — DISCHARGE SUMMARY
[facility]  Office: [address], DO, Thomas Villaltaf, DO, Arsh Courtney, DO, Conchita Sherman Blood, DO, Chris Sharp MD, Alex Presley MD, Yin Birch MD, Lorena Ospina MD, Quentin Medina MD, Sarina Mason MD, Shari Arechiga MD, Xavier Baker, DO, Neelam Rivas MD,  Tati Hernandez, DO, Swati Carias MD, Urvashi Cao MD, Amalia Ordaz MD, Mustapha Shetty DO, Aide Hubbard MD, Elsy Qiu MD, Roger Guevara, DO, Quentin Moses MD, Maura Brady [facility], [facility], CNP, Dillon Hadley, CNP, Gerilyn Cockayne, CNP, Lily Vallejo, CNP, Camillo Schaumann, CNP, Jhonatan Mak PA-C, Hina Sharp, [facility], Pricila Galaviz, CNP, Brendon Arita, CNP, Gilda De La Cruz, CNP, Joe Carey, CNS, Skyler Marques, [facility], Sheila Bai, CNP, Camille Soares, CNP, Heavenly Saldanas, [facility]    Discharge Summary     Patient ID: Israel Mtz  :  1955   MRN: [id number]     ACCOUNT:  [de-identified]   Patient's PCP: Kristina Veliz  Admit Date: 2022   Discharge Date:     Length of Stay: 4  Code Status:  Prior  Admitting Physician: No admitting provider for patient encounter. Discharge Physician: Elsy Qiu MD     Active Discharge Diagnoses:     Hospital Problem Lists:  Principal Problem:    NSTEMI (non-ST elevated myocardial infarction) [facility])  Active Problems:    Hyperkalemia    Diabetic ketoacidosis without coma associated with type 2 diabetes mellitus (HCC)    Supratherapeutic INR    Metabolic acidosis    Pre-operative clearance    Fever and chills    Bacteremia    Unspecified mood (affective) disorder (HCC)    Carotid stenosis, left s/p Endarterectomy    Shortness of breath    Hypertension, essential    DM type 2, uncontrolled, with neuropathy ([address]    Acute respiratory failure with hypoxia ([address]  Resolved Problems:    * No resolved hospital problems.  *      Admission Condition:  fair     Discharged Condition: fair    Hospital Stay:     Hospital Course:  Israel Mtz is a 78 y.o. male who was admitted for the management of   NSTEMI (non-ST elevated myocardial infarction) ([facility] [address] , presented to ER with Shortness of Breath    Patient left [address] he was not discharged    Initial admission was for DKA HHS he was treated according to protocol did not like the way we are dosing his insulin became aggressive combative slammed his call light/phone against desk, and further information can be found about this incident under the FYI risk for violence tab      Significant therapeutic interventions:     Significant Diagnostic Studies:   Labs / Micro:  BMP:    Lab Results   Component Value Date    GLUCOSE 512 06/06/2022    GLUCOSE 172 04/16/2012     06/06/2022    K 5.0 06/06/2022    CL 94 06/06/2022    CO2 22 06/06/2022    ANIONGAP 14 06/06/2022    BUN 70 06/06/2022    CREATININE 4.93 06/06/2022    BUNCRER NOT REPORTED 11/02/2021    CALCIUM 7.9 06/06/2022    LABGLOM 12 06/06/2022    GFRAA 14 06/06/2022    GFR      06/06/2022        Radiology:  No results found. Consultations:    Consults:     Final Specialist Recommendations/Findings:   IP CONSULT TO NEPHROLOGY  IP CONSULT TO INTERNAL MEDICINE  IP CONSULT TO CRITICAL CARE  IP CONSULT TO CARDIOLOGY  IP CONSULT TO DIABETES EDUCATOR  IP CONSULT TO INFECTIOUS DISEASES  IP CONSULT TO IV TEAM  IP CONSULT TO PSYCHIATRY      The patient was seen and examined on day of discharge and this discharge summary is in conjunction with any daily progress note from day of discharge. Discharge plan:     Disposition: AMA    Physician Follow Up:     No follow-up provider specified.      Requiring Further Evaluation/Follow Up POST HOSPITALIZATION/Incidental Findings:     Diet: renal diet    Activity: As tolerated    Instructions to Patient:     Discharge Medications:      Medication List      CONTINUE taking these medications    blood glucose monitor kit and supplies  Dispense sufficient amount for indicated testing frequency plus additional to accommodate PRN testing needs. Dispense all needed supplies to include: monitor, strips, lancing device, lancets, control solutions, alcohol swabs. Comfort EZ Pen Needles 31G X 8 MM Misc  Generic drug: Insulin Pen Needle  USE AS DIRECTED     Gluckodi Cashqd  Take 1 Can by mouth 3 times daily     Lancets Misc  Use_4__times daily Diagnisis:250.0  Diabetes Mellitus__x__Insulin Dependent___Non-Insulin Dependent        ASK your doctor about these medications    aspirin 81 MG EC tablet     atorvastatin 80 MG tablet  Commonly known as: LIPITOR     Basaglar KwikPen 100 UNIT/ML injection pen  Generic drug: insulin glargine  Inject 10 Units into the skin nightly     blood glucose test strips  Test 4 times a day & as needed for symptoms of irregular blood glucose. Dispense sufficient amount for indicated testing frequency plus additional to accommodate PRN testing needs. bumetanide 1 MG tablet  Commonly known as: BUMEX     carvedilol 25 MG tablet  Commonly known as: COREG     cloNIDine 0.1 MG tablet  Commonly known as: CATAPRES     Dialyvite 800 0.8 MG Tabs     fluticasone 50 MCG/ACT nasal spray  Commonly known as: FLONASE     gabapentin 100 MG capsule  Commonly known as: NEURONTIN     hydrALAZINE 100 MG tablet  Commonly known as: APRESOLINE     losartan 50 MG tablet  Commonly known as: COZAAR     magnesium oxide 400 MG tablet  Commonly known as: MAG-OX     montelukast 10 MG tablet  Commonly known as: SINGULAIR     NIFEdipine 60 MG extended release tablet  Commonly known as: ADALAT CC  Ask about: Which instructions should I use?     nitroGLYCERIN 0.4 MG SL tablet  Commonly known as: NITROSTAT  USE 1 TABLET UNDER THE TONGUE UP TO MAXIMUM OF 3 TOTAL DOSES. IF NO RELIEF AFTER 1 DOSE, CALL 911.      NovoLOG FlexPen 100 UNIT/ML injection pen  Generic drug: insulin aspart  Inject 5 Units into the skin 3 times daily (before meals) Sliding scale prazosin 5 MG capsule  Commonly known as: MINIPRESS     QUEtiapine 100 MG tablet  Commonly known as: SEROQUEL  TAKE 1 TABLET BY MOUTH NIGHTLY     Vitamin D3 125 MCG (5000 UT) Tabs  Ask about: Which instructions should I use?     warfarin 10 MG tablet  Commonly known as: COUMADIN            No discharge procedures on file. Time Spent on discharge is  31 mins in patient examination, evaluation, counseling as well as medication reconciliation, prescriptions for required medications, discharge plan and follow up. Electronically signed by   Hector Brito MD  6/14/2022  3:00 PM      Thank you Dr. Willy Vanessa for the opportunity to be involved in this patient's care.

## 2022-07-14 RX ORDER — SODIUM CHLORIDE 9 MG/ML
INJECTION, SOLUTION INTRAVENOUS CONTINUOUS
Status: CANCELLED | OUTPATIENT
Start: 2022-07-14

## 2022-07-14 RX ORDER — CLINDAMYCIN PHOSPHATE 600 MG/50ML
600 INJECTION INTRAVENOUS
Status: CANCELLED | OUTPATIENT
Start: 2022-07-14 | End: 2022-07-14

## 2022-07-15 ENCOUNTER — HOSPITAL ENCOUNTER (OUTPATIENT)
Dept: INTERVENTIONAL RADIOLOGY/VASCULAR | Age: 67
Discharge: HOME | End: 2022-07-17
Payer: COMMERCIAL

## 2022-07-15 VITALS
OXYGEN SATURATION: 99 % | HEIGHT: 66 IN | BODY MASS INDEX: 20.09 KG/M2 | RESPIRATION RATE: 17 BRPM | DIASTOLIC BLOOD PRESSURE: 66 MMHG | WEIGHT: 125 LBS | SYSTOLIC BLOOD PRESSURE: 149 MMHG | HEART RATE: 52 BPM | TEMPERATURE: 96.4 F

## 2022-07-15 VITALS
SYSTOLIC BLOOD PRESSURE: 157 MMHG | BODY MASS INDEX: 20.09 KG/M2 | RESPIRATION RATE: 20 BRPM | WEIGHT: 125 LBS | TEMPERATURE: 97.9 F | OXYGEN SATURATION: 98 % | DIASTOLIC BLOOD PRESSURE: 64 MMHG | HEART RATE: 50 BPM | HEIGHT: 66 IN

## 2022-07-15 DIAGNOSIS — M79.89 ARM SWELLING: ICD-10-CM

## 2022-07-15 DIAGNOSIS — N18.6 ESRD (END STAGE RENAL DISEASE) (HCC): ICD-10-CM

## 2022-07-15 LAB
GLUCOSE BLD-MCNC: 291 MG/DL (ref 75–110)
INR BLD: 1
PARTIAL THROMBOPLASTIN TIME: 23.4 SEC (ref 20.5–30.5)
PLATELET # BLD: 390 K/UL (ref 138–453)
PROTHROMBIN TIME: 10.4 SEC (ref 9.1–12.3)

## 2022-07-15 PROCEDURE — 2500000003 HC RX 250 WO HCPCS: Performed by: PHYSICIAN ASSISTANT

## 2022-07-15 PROCEDURE — 6360000004 HC RX CONTRAST MEDICATION: Performed by: INTERNAL MEDICINE

## 2022-07-15 PROCEDURE — 85049 AUTOMATED PLATELET COUNT: CPT

## 2022-07-15 PROCEDURE — 36005 INJECTION EXT VENOGRAPHY: CPT

## 2022-07-15 PROCEDURE — 76937 US GUIDE VASCULAR ACCESS: CPT

## 2022-07-15 PROCEDURE — 36415 COLL VENOUS BLD VENIPUNCTURE: CPT

## 2022-07-15 PROCEDURE — 7100000010 HC PHASE II RECOVERY - FIRST 15 MIN

## 2022-07-15 PROCEDURE — 82947 ASSAY GLUCOSE BLOOD QUANT: CPT

## 2022-07-15 PROCEDURE — 2580000003 HC RX 258: Performed by: PHYSICIAN ASSISTANT

## 2022-07-15 PROCEDURE — 2709999900 HC NON-CHARGEABLE SUPPLY

## 2022-07-15 PROCEDURE — C1894 INTRO/SHEATH, NON-LASER: HCPCS

## 2022-07-15 PROCEDURE — 85730 THROMBOPLASTIN TIME PARTIAL: CPT

## 2022-07-15 PROCEDURE — 7100000011 HC PHASE II RECOVERY - ADDTL 15 MIN

## 2022-07-15 PROCEDURE — 85610 PROTHROMBIN TIME: CPT

## 2022-07-15 PROCEDURE — 75820 VEIN X-RAY ARM/LEG: CPT

## 2022-07-15 RX ORDER — CLOPIDOGREL BISULFATE 75 MG/1
75 TABLET ORAL DAILY
COMMUNITY
End: 2022-10-11 | Stop reason: ALTCHOICE

## 2022-07-15 RX ORDER — CLINDAMYCIN PHOSPHATE 600 MG/50ML
600 INJECTION INTRAVENOUS
Status: COMPLETED | OUTPATIENT
Start: 2022-07-15 | End: 2022-07-15

## 2022-07-15 RX ORDER — SODIUM CHLORIDE 9 MG/ML
INJECTION, SOLUTION INTRAVENOUS CONTINUOUS
Status: DISCONTINUED | OUTPATIENT
Start: 2022-07-15 | End: 2022-07-18 | Stop reason: HOSPADM

## 2022-07-15 RX ADMIN — CLINDAMYCIN PHOSPHATE 600 MG: 600 INJECTION, SOLUTION INTRAVENOUS at 09:50

## 2022-07-15 RX ADMIN — IOPAMIDOL 35 ML: 755 INJECTION, SOLUTION INTRAVENOUS at 11:38

## 2022-07-15 RX ADMIN — SODIUM CHLORIDE: 9 INJECTION, SOLUTION INTRAVENOUS at 09:50

## 2022-07-15 ASSESSMENT — PAIN - FUNCTIONAL ASSESSMENT
PAIN_FUNCTIONAL_ASSESSMENT: 0-10
PAIN_FUNCTIONAL_ASSESSMENT: 0-10

## 2022-07-15 ASSESSMENT — PAIN SCALES - GENERAL: PAINLEVEL_OUTOF10: 0

## 2022-07-15 NOTE — BRIEF OP NOTE
Brief Postoperative Note    Gareth De Jesus  YOB: 1955  [id number]    Pre-operative Diagnosis: RUE swelling    Post-operative Diagnosis: Same    Procedure: venogram    Anesthesia: None    Surgeons/Assistants: Walt Kaiser MD    Estimated Blood Loss: less than 50     Complications: None    Specimens: Was Not Obtained    Findings: Successful RUE venogram    Electronically signed by TRUDI Beltran on 7/15/2022 at 11:25 AM

## 2022-07-15 NOTE — H&P
History and Physical    Pt Name: Nely Allan  MRN: [id number]  YOB: 1955  Date of evaluation: 7/15/2022  Primary Care Physician: Bettyjo Gottron    SUBJECTIVE:   History of Chief Complaint:    Nely Allan is a 78 y.o. male who is scheduled today for IR right arm venogram and remove right cath, replace on left He reports a 2 years history of being on hemodialysis, going on T, R, Sat and states he may possible have a clotted access He complains of right arm swelling. Allergies  is allergic to lisinopril, ace inhibitors, and pcn [penicillins]. Medications  Prior to Admission medications    Medication Sig Start Date End Date Taking? Authorizing Provider   clopidogrel (PLAVIX) 75 MG tablet Take 75 mg by mouth in the morning. Yes Historical Provider, MD   apixaban (ELIQUIS) 5 MG TABS tablet Take 5 mg by mouth in the morning and 5 mg before bedtime.    Yes Historical Provider, MD   Cholecalciferol (VITAMIN D3) 125 MCG (5000 UT) TABS Take 5,000 Units by mouth daily    Historical Provider, MD   B Complex-C-Folic Acid (DIALYVITE 398) 0.8 MG TABS Take 0.8 mg by mouth daily In AM    Historical Provider, MD   NIFEdipine (ADALAT CC) 60 MG extended release tablet Take 60 mg by mouth 2 times daily    Historical Provider, MD   warfarin (COUMADIN) 10 MG tablet Dose unknown per patient, unable to find dose from outside records  Patient not taking: Reported on 7/15/2022    Historical Provider, MD   magnesium oxide (MAG-OX) 400 MG tablet Take 400 mg by mouth daily    Historical Provider, MD   bumetanide (BUMEX) 1 MG tablet Take 1 tablet by mouth daily 10/19/21   Historical Provider, MD   aspirin 81 MG EC tablet Take 81 mg by mouth daily 7/28/21   Historical Provider, MD   atorvastatin (LIPITOR) 80 MG tablet Take 1 tablet by mouth daily 10/19/21   Historical Provider, MD   carvedilol (COREG) 25 MG tablet Take 1 tablet by mouth 2 times daily 10/19/21   Historical Provider, MD   cloNIDine (CATAPRES) 0.1 MG tablet Take 0.3 mg by mouth three times daily  8/22/21   Historical Provider, MD   gabapentin (NEURONTIN) 100 MG capsule Take 200 mg by mouth 3 times daily. 10/5/21   Historical Provider, MD   hydrALAZINE (APRESOLINE) 100 MG tablet Take 1 tablet by mouth 3 times daily 8/24/21   Historical Provider, MD   losartan (COZAAR) 50 MG tablet Take 1 tablet by mouth 2 times daily  8/22/21   Historical Provider, MD   montelukast (SINGULAIR) 10 MG tablet Take 1 tablet by mouth nightly 8/22/21   Historical Provider, MD   prazosin (MINIPRESS) 5 MG capsule Take 5 mg by mouth 2 times daily     Historical Provider, MD   blood glucose monitor strips Test 4 times a day & as needed for symptoms of irregular blood glucose. Dispense sufficient amount for indicated testing frequency plus additional to accommodate PRN testing needs. 5/26/21   TERRANCE Sanchez NP   insulin glargine [facility]) 100 UNIT/ML injection pen Inject 10 Units into the skin nightly 5/13/21   TERRANCE Sanchez NP   fluticasone [facility] Hernandez) 50 MCG/ACT nasal spray  2/23/21   Historical Provider, MD   insulin aspart (NOVOLOG FLEXPEN) 100 UNIT/ML injection pen Inject 5 Units into the skin 3 times daily (before meals) Sliding scale 3/16/21   Jesse TERRANCE Geronimo - CNP   nitroGLYCERIN (NITROSTAT) 0.4 MG SL tablet USE 1 TABLET UNDER THE TONGUE UP TO MAXIMUM OF 3 TOTAL DOSES. IF NO RELIEF AFTER 1 DOSE, CALL 911. 3/5/21   TERRANCE Loaiza - CNP   QUEtiapine (SEROQUEL) 100 MG tablet TAKE 1 TABLET BY MOUTH NIGHTLY 2/23/21   Jesse , APRN - CNP   blood glucose monitor kit and supplies Dispense sufficient amount for indicated testing frequency plus additional to accommodate PRN testing needs. Dispense all needed supplies to include: monitor, strips, lancing device, lancets, control solutions, alcohol swabs.  1/26/21   Jesse Risk APRN - CNP   Lancets MISC Use_4__times daily Diagnisis:250.0  Diabetes Mellitus__x__Insulin Dependent___Non-Insulin Dependent 12/6/20 Alek Schmitt MD   Nutritional Supplements ([address] [place] [address]) LIQD Take 1 Can by mouth 3 times daily 20   TERRANCE Holley CNP   COMFORT EZ PEN NEEDLES 31G X 8 MM MISC USE AS DIRECTED 20   TERRANCE Holley CNP     Past Medical History    has a past medical history of Asthma, CAD in native artery, nonobstructive, Carotid stenosis, left, Carpal tunnel syndrome, Cerebrovascular disease, Chronic kidney disease, COPD (chronic obstructive pulmonary disease) ([facility] COVID-19, Diabetes mellitus ([facility] [address] ESRD (end stage renal disease) on dialysis ([facility] History of colon polyps, History of weakness of extremity, HTN (hypertension), Hyperlipidemia, Lung, cysts, congenital, PTSD (post-traumatic stress disorder), PTSD (post-traumatic stress disorder), TIA (transient ischemic attack), and Type II or unspecified type diabetes mellitus without mention of complication, not stated as uncontrolled. Past Surgical History   has a past surgical history that includes hernia repair; Tonsillectomy; Colonoscopy; Carotid endarterectomy (Left, -); vascular surgery (Left, ); pr colsc flx w/rmvl of tumor polyp lesion snare tq (N/A, 2017); IR TUNNELED CVC PLACE WO SQ PORT/PUMP > 5 YEARS (2021); and Cataract removal (Left). Social History   reports that he quit smoking about 8 years ago. His smoking use included cigarettes. He has a 17.50 pack-year smoking history. He has never used smokeless tobacco.   reports no history of alcohol use. reports no history of drug use. Marital Status   Children   Occupation   Family History  Family Status   Relation Name Status    Mother      Father       family history includes Cancer in his mother; Heart Disease in his father.     Review of Systems:  CONSTITUTIONAL:   negative for fevers, chills, fatigue and malaise    EYES:   negative for double vision, blurred vision and photophobia    HEENT:   negative for tinnitus, epistaxis and sore throat     RESPIRATORY:   negative for cough, shortness of breath, wheezing     CARDIOVASCULAR:   negative for chest pain, palpitations, syncope   GASTROINTESTINAL:   negative for nausea, vomiting     GENITOURINARY:   negative for incontinence  + ESRD, on hemodialysis   MUSCULOSKELETAL:   negative for neck or back pain     NEUROLOGICAL:   Negative for weakness and tingling  negative for headaches and dizziness     PSYCHIATRIC:   negative for anxiety       OBJECTIVE:   VITALS:  height is 5' 6\" (1.676 m) and weight is 125 lb (56.7 kg). His temporal temperature is 96.4 °F (35.8 °C) (abnormal). His blood pressure is 188/67 (abnormal) and his pulse is 48 (abnormal). His respiration is 21 and oxygen saturation is 99%. CONSTITUTIONAL:alert & oriented x 3, no acute distress. Calm and pleasant. SKIN:  Warm and dry, no rashes on exposed areas of skin, right chest dialysis cath intact  HEAD:  Normocephalic, atraumatic   EYES: PERRL. EOMs intact. Discolored sclera  EARS:  Equal bilaterally, no edema or thickening, skin is intact without lumps or lesions. No discharge. Hearing grossly WNL. NOSE:  Nares patent. No rhinorrhea   MOUTH/THROAT:  Mucous membranes moist, tongue is pink, uvula midline  NECK:supple, full ROM  LUNGS: Respirations even and non-labored. Clear to auscultation bilaterally, no wheezes, rales, or rhonchi, but overall diminished   CARDIOVASCULAR: Regular rate and rhythm, no murmurs/rubs/gallops   ABDOMEN: soft, non-tender, non-distended, bowel sounds active x 4   EXTREMITIES: No edema bilateral lower extremities. No varicosities bilateral lower extremities. Right forearm slightly edematous   NEUROLOGIC: CN II-XII are grossly intact. Gait not assessed.   IMPRESSIONS:   ESRD  PLANS:    IR right arm venogram and remove right cath, replace on left     TERRANCE CUENCA CNP   Electronically signed 7/15/2022 at 10:08 AM

## 2022-08-23 RX ORDER — IPRATROPIUM/ALBUTEROL SULFATE 20-100 MCG
MIST INHALER (GRAM) INHALATION
Qty: 1 EACH | Refills: 2 | Status: SHIPPED | OUTPATIENT
Start: 2022-08-23 | End: 2022-10-11 | Stop reason: SDUPTHER

## 2022-08-23 NOTE — TELEPHONE ENCOUNTER
LAST VISIT: 11/17/21  No follow up scheduled. Our office cancelled last two appointments. Note sent to pharmacy asking that patient call for an appointment. Per last dictation patient is on this medication. Please sign for refill if ok. Thank you.

## 2022-10-10 NOTE — PROGRESS NOTES
Internal Medicine Clinic New Patient Note    Date of patient's visit: 10/11/2022  Name:  Moise Schilder  Primary Care Physician: Mercedes Lynn MD    Reason for visit: First Visit, establish care     HISTORY OF PRESENTING ILLNESS:    History was obtained from the patient. HPI     Patient here to establish care. Possibly seen at [address] [place] [address] but he is having difficulties reaching out to them and prefers to follow-up with us. ESRD on HD on TTS schedule for 2 years, US renal  nephrologist Dr Hiro Gunn. On TTS schedule. Dialysis via port at right subclavian. For 2 years. He is due for AV graft surgery. 1.6cm Lung nodule on CT May 2022, scheduled for PET scan ; following Heme-onc    Type 2 diabetes on Insulin lantus 12 bid and novolog SS with meals, recent admission for DKA. A1c 8.1 in June 2022. 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 3 exacerbations in last year. Former smoker age 17 to 49, 3 ppd     Hx substance abuse    Family Hx cancers; mom sister and brother  Anemia of chronic disease    Resistant HTN on Losartan, bumex, coreg, hydralazine, nifedipine, clonidine, prazosin, EF 73% June 2022. Blood pressures very labile but mostly on the higher side. Demand ischemia cardiac cath June 2022 on aspirin. Carotid endarterectomy 2013, carotid stent  on 2014 on lipitor 80    Anemia of chronic disease, hb 8.6 June 2022. Follows with heme-onc    Detached retina pending surgery. Follows with ophthalmology    PAD on gabapentin and Nitro    Prevention:   Colonoscopy:   Lung cancer: had CT chest in August 2022, follows with Heme-onc for lung nodule  Denies Hx prostate cancer. No urinary symptoms. PSA      Willing to get his COVID-vaccine was scheduled that for him . Has had 2 doses of the Moderna vaccine . , due for booster    Will get his Flu vaccine at dialysis    Diet: Initially on renal diet but now on regular diet per his nephrologist    Exercise limited by SOB      PAST MEDICAL HISTORY:          Diagnosis Date    Asthma     mod persistent    CAD in native artery, nonobstructive     Carotid stenosis, left 06/10/2013    Carpal tunnel syndrome     RIGHT    Cerebrovascular disease 04/23/2018    Chronic kidney disease 06/10/2013    COPD (chronic obstructive pulmonary disease) ([facility]     COVID-19     around march 2022    Diabetes mellitus ([facility]     insulin dependent    ESRD (end stage renal disease) on dialysis ([facility]     History of colon polyps     History of weakness of extremity     right sided    HTN (hypertension)     Hyperlipidemia     Lung, cysts, congenital     PTSD (post-traumatic stress disorder)     PTSD (post-traumatic stress disorder)     TIA (transient ischemic attack)     Type II or unspecified type diabetes mellitus without mention of complication, not stated as uncontrolled        PAST SURGICAL HISTORY:          Procedure Laterality Date    CAROTID ENDARTERECTOMY Left 7-2013    CATARACT REMOVAL Left     with poor vision afterward, wears reading glasses    COLONOSCOPY      HERNIA REPAIR      IR TUNNELED CATHETER PLACEMENT GREATER THAN 5 YEARS  5/11/2021    IR TUNNELED CATHETER PLACEMENT GREATER THAN 5 YEARS 5/11/2021 Kat Anaya MD [facility] PROCEDURES    KS COLSC FLX W/RMVL OF TUMOR POLYP LESION SNARE TQ N/A 6/27/2017    COLONOSCOPY POLYPECTOMY SNARE/ hot performed by Le Mckeon DO at [facility] Left 2015    carotid stent, dr Shelton Click:      Allergies   Allergen Reactions    Lisinopril      cough    Ace Inhibitors      coughing    Pcn [Penicillins]          HOME MEDICATION:      Current Outpatient Medications on File Prior to Visit   Medication Sig Dispense Refill    bumetanide (BUMEX) 2 MG tablet       carvedilol (COREG) 12.5 MG tablet COMBIVENT RESPIMAT  MCG/ACT AERS inhaler INHALE 1 PUFF INTO THE LUNGS EVERY 6 HOURS 1 each 2    apixaban (ELIQUIS) 5 MG TABS tablet Take 5 mg by mouth in the morning and 5 mg before bedtime. Cholecalciferol (VITAMIN D3) 125 MCG (5000 UT) TABS Take 5,000 Units by mouth daily      B Complex-C-Folic Acid (DIALYVITE 318) 0.8 MG TABS Take 0.8 mg by mouth daily In AM      NIFEdipine (ADALAT CC) 60 MG extended release tablet Take 60 mg by mouth 2 times daily      magnesium oxide (MAG-OX) 400 MG tablet Take 400 mg by mouth daily      aspirin 81 MG EC tablet Take 81 mg by mouth daily      atorvastatin (LIPITOR) 80 MG tablet Take 1 tablet by mouth daily      cloNIDine (CATAPRES) 0.1 MG tablet Take 0.3 mg by mouth three times daily       gabapentin (NEURONTIN) 100 MG capsule Take 200 mg by mouth 3 times daily. hydrALAZINE (APRESOLINE) 100 MG tablet Take 1 tablet by mouth 3 times daily      losartan (COZAAR) 50 MG tablet Take 1 tablet by mouth 2 times daily       montelukast (SINGULAIR) 10 MG tablet Take 1 tablet by mouth nightly      prazosin (MINIPRESS) 5 MG capsule Take 5 mg by mouth 2 times daily       blood glucose monitor strips Test 4 times a day & as needed for symptoms of irregular blood glucose. Dispense sufficient amount for indicated testing frequency plus additional to accommodate PRN testing needs. 500 strip 3    insulin glargine (BASAGLAR KWIKPEN) 100 UNIT/ML injection pen Inject 10 Units into the skin nightly 15 mL 2    fluticasone (FLONASE) 50 MCG/ACT nasal spray       insulin aspart (NOVOLOG FLEXPEN) 100 UNIT/ML injection pen Inject 5 Units into the skin 3 times daily (before meals) Sliding scale 5 pen 3    nitroGLYCERIN (NITROSTAT) 0.4 MG SL tablet USE 1 TABLET UNDER THE TONGUE UP TO MAXIMUM OF 3 TOTAL DOSES.  IF NO RELIEF AFTER 1 DOSE, CALL 911. 25 tablet 2    QUEtiapine (SEROQUEL) 100 MG tablet TAKE 1 TABLET BY MOUTH NIGHTLY 30 tablet 4    blood glucose monitor kit and supplies Dispense sufficient amount for indicated testing frequency plus additional to accommodate PRN testing needs. Dispense all needed supplies to include: monitor, strips, lancing device, lancets, control solutions, alcohol swabs. 1 kit 0    Lancets MISC Use_4__times daily Diagnisis:250.0  Diabetes Mellitus__x__Insulin Dependent___Non-Insulin Dependent 100 each 11    COMFORT EZ PEN NEEDLES 31G X 8 MM MISC USE AS DIRECTED 100 each 5     No current facility-administered medications on file prior to visit. SOCIAL HISTORY:    TOBACCO:   reports that he quit smoking about 8 years ago. His smoking use included cigarettes. He has a 17.50 pack-year smoking history. He has never used smokeless tobacco.  ETOH:   reports no history of alcohol use. DRUGS:  reports no history of drug use. OCCUPATION:      FAMILY HISTORY:          Problem Relation Age of Onset    Cancer Mother     Heart Disease Father        REVIEW OF SYSTEMS:    Review of Systems   Constitutional:  Positive for fatigue. Negative for activity change, appetite change and fever. HENT:  Negative for rhinorrhea and sore throat. Eyes:  Positive for visual disturbance. Negative for photophobia and pain. Respiratory:  Positive for cough and shortness of breath. Negative for choking and stridor. Cardiovascular:  Negative for chest pain and leg swelling. Gastrointestinal:  Negative for abdominal distention, abdominal pain and blood in stool. Endocrine: Negative for polyuria. Genitourinary:  Negative for difficulty urinating, dysuria and flank pain. Musculoskeletal:  Negative for arthralgias, back pain and neck pain. Neurological:  Negative for syncope, light-headedness and headaches. PHYSICAL EXAM:    Physical Exam  Constitutional:       Appearance: He is normal weight. HENT:      Nose: No congestion. Eyes:      Pupils: Pupils are equal, round, and reactive to light. Cardiovascular:      Rate and Rhythm: Normal rate.    Pulmonary:      Breath sounds: Normal breath sounds. No stridor. No wheezing or rhonchi. Abdominal:      General: Abdomen is flat. Palpations: Abdomen is soft. Musculoskeletal:         General: No swelling or tenderness. Skin:     Findings: No erythema. Neurological:      General: No focal deficit present. Mental Status: He is alert. Mental status is at baseline. Vitals:    10/11/22 1513   BP: 134/71   Pulse: 68   Temp:    SpO2:             LABORATORY FINDINGS:    CBC:   Lab Results   Component Value Date/Time    WBC 5.4 06/06/2022 04:27 AM    HGB 8.6 06/06/2022 04:27 AM     07/15/2022 09:42 AM     04/16/2012 12:54 AM     BMP:    Lab Results   Component Value Date/Time     06/06/2022 12:21 PM    K 5.0 06/06/2022 12:21 PM    CL 94 06/06/2022 12:21 PM    CO2 22 06/06/2022 12:21 PM    BUN 70 06/06/2022 12:21 PM    CREATININE 4.93 06/06/2022 12:21 PM    GLUCOSE 512 06/06/2022 12:21 PM    GLUCOSE 172 04/16/2012 12:54 AM     Hemoglobin A1C:   Lab Results   Component Value Date/Time    LABA1C 8.1 06/02/2022 10:13 AM     Lipid profile:   Lab Results   Component Value Date/Time    CHOL 256 05/04/2021 05:18 AM    TRIG 148 05/04/2021 05:18 AM    HDL 68 05/04/2021 05:18 AM     Thyroid functions:   Lab Results   Component Value Date/Time    TSH 2.91 05/15/2020 02:15 PM      Hepatic functions:   Lab Results   Component Value Date/Time    ALT 35 06/03/2022 02:02 AM     06/03/2022 02:02 AM    PROT 5.4 06/03/2022 02:02 AM    PROT 6.2 11/12/2011 03:00 PM    BILITOT <0.10 06/03/2022 02:02 AM    BILIDIR <0.08 08/10/2020 04:02 PM    LABALBU 3.5 06/03/2022 02:02 AM       Any additional findings:    Assessment and Plan:   ESRD  Lung nodule  COPD  Diabetes mellitus  Anemia of chronic disease  Resistant hypertension  Coronary artery disease  Thrombosis of of venous dialysis access.        Follow-up:       Dejuan Merino received counseling on the following healthy behaviors: nutrition, exercise, and medication adherence    Patient given educational materials on     Was a self-tracking handout given in paper form or via [employer]? No  If yes, see orders or list here. Discussed use, benefit, and side effects of prescribed medications. Barriers to medication compliance addressed. All patient questions answered. Pt voiced understanding. Gerardo Schwartz MD  PGY-2 Internal Medicine Resident  [place], [place]  10/11/2022 4:16 PM  Attending Physician Statement  I have discussed the care of Linh Estrada, including pertinent history and exam findings,  with the resident. I have reviewed the key elements of all parts of the encounter with the resident. I agree with the assessment, plan and orders as documented by the resident. Pt seen discussed and examined. (GC Modifier)  Pt with ESRD/hemodialysis, DM2, resistant hypertension, COPD, LVH, recent DKA admission.

## 2022-10-11 ENCOUNTER — OFFICE VISIT (OUTPATIENT)
Dept: INTERNAL MEDICINE | Age: 67
End: 2022-10-11
Payer: COMMERCIAL

## 2022-10-11 ENCOUNTER — HOSPITAL ENCOUNTER (OUTPATIENT)
Age: 67
Setting detail: SPECIMEN
Discharge: HOME | End: 2022-10-11

## 2022-10-11 VITALS
HEIGHT: 66 IN | DIASTOLIC BLOOD PRESSURE: 71 MMHG | HEART RATE: 68 BPM | OXYGEN SATURATION: 98 % | BODY MASS INDEX: 19.93 KG/M2 | TEMPERATURE: 97.7 F | WEIGHT: 124 LBS | SYSTOLIC BLOOD PRESSURE: 134 MMHG

## 2022-10-11 DIAGNOSIS — E11.40 TYPE 2 DIABETES MELLITUS WITH DIABETIC NEUROPATHY, WITH LONG-TERM CURRENT USE OF INSULIN (HCC): ICD-10-CM

## 2022-10-11 DIAGNOSIS — Z99.2 TYPE 2 DIABETES MELLITUS WITH CHRONIC KIDNEY DISEASE ON CHRONIC DIALYSIS, WITH LONG-TERM CURRENT USE OF INSULIN (HCC): ICD-10-CM

## 2022-10-11 DIAGNOSIS — E11.22 TYPE 2 DIABETES MELLITUS WITH CHRONIC KIDNEY DISEASE ON CHRONIC DIALYSIS, WITH LONG-TERM CURRENT USE OF INSULIN (HCC): ICD-10-CM

## 2022-10-11 DIAGNOSIS — I25.10 CAD IN NATIVE ARTERY: ICD-10-CM

## 2022-10-11 DIAGNOSIS — R91.1 LUNG NODULE: ICD-10-CM

## 2022-10-11 DIAGNOSIS — H33.20 RETINAL DETACHMENT, UNSPECIFIED LATERALITY: ICD-10-CM

## 2022-10-11 DIAGNOSIS — N18.6 END-STAGE RENAL DISEASE ON HEMODIALYSIS (HCC): ICD-10-CM

## 2022-10-11 DIAGNOSIS — E44.0 MODERATE MALNUTRITION (HCC): ICD-10-CM

## 2022-10-11 DIAGNOSIS — Z12.5 ENCOUNTER FOR PROSTATE CANCER SCREENING: ICD-10-CM

## 2022-10-11 DIAGNOSIS — I73.9 PERIPHERAL VASCULAR DISEASE, UNSPECIFIED (HCC): Primary | ICD-10-CM

## 2022-10-11 DIAGNOSIS — D63.8 ANEMIA OF CHRONIC DISEASE: ICD-10-CM

## 2022-10-11 DIAGNOSIS — Z99.2 END-STAGE RENAL DISEASE ON HEMODIALYSIS (HCC): ICD-10-CM

## 2022-10-11 DIAGNOSIS — Z79.4 TYPE 2 DIABETES MELLITUS WITH CHRONIC KIDNEY DISEASE ON CHRONIC DIALYSIS, WITH LONG-TERM CURRENT USE OF INSULIN (HCC): ICD-10-CM

## 2022-10-11 DIAGNOSIS — I74.9 ARTERIAL THROMBOSIS (HCC): ICD-10-CM

## 2022-10-11 DIAGNOSIS — J44.9 CHRONIC OBSTRUCTIVE PULMONARY DISEASE, UNSPECIFIED COPD TYPE (HCC): ICD-10-CM

## 2022-10-11 DIAGNOSIS — Z79.4 TYPE 2 DIABETES MELLITUS WITH DIABETIC NEUROPATHY, WITH LONG-TERM CURRENT USE OF INSULIN (HCC): ICD-10-CM

## 2022-10-11 DIAGNOSIS — I10 RESISTANT HYPERTENSION: ICD-10-CM

## 2022-10-11 DIAGNOSIS — N18.6 TYPE 2 DIABETES MELLITUS WITH CHRONIC KIDNEY DISEASE ON CHRONIC DIALYSIS, WITH LONG-TERM CURRENT USE OF INSULIN (HCC): ICD-10-CM

## 2022-10-11 PROBLEM — E11.65 TYPE 2 DIABETES MELLITUS WITH HYPERGLYCEMIA (HCC): Status: ACTIVE | Noted: 2022-10-11

## 2022-10-11 LAB — HBA1C MFR BLD: 7 %

## 2022-10-11 PROCEDURE — 1123F ACP DISCUSS/DSCN MKR DOCD: CPT

## 2022-10-11 PROCEDURE — 99211 OFF/OP EST MAY X REQ PHY/QHP: CPT | Performed by: INTERNAL MEDICINE

## 2022-10-11 PROCEDURE — 3052F HG A1C>EQUAL 8.0%<EQUAL 9.0%: CPT

## 2022-10-11 PROCEDURE — 83036 HEMOGLOBIN GLYCOSYLATED A1C: CPT

## 2022-10-11 PROCEDURE — 99214 OFFICE O/P EST MOD 30 MIN: CPT

## 2022-10-11 RX ORDER — CARVEDILOL 12.5 MG/1
TABLET ORAL
COMMUNITY
Start: 2022-07-25

## 2022-10-11 RX ORDER — NITROGLYCERIN 0.4 MG/1
TABLET SUBLINGUAL
Qty: 25 TABLET | Refills: 5 | Status: SHIPPED | OUTPATIENT
Start: 2022-10-11

## 2022-10-11 RX ORDER — NIFEDIPINE 60 MG/1
TABLET, EXTENDED RELEASE ORAL
COMMUNITY
Start: 2022-07-12 | End: 2022-10-11 | Stop reason: ALTCHOICE

## 2022-10-11 RX ORDER — BUMETANIDE 2 MG/1
TABLET ORAL
COMMUNITY
Start: 2022-07-12

## 2022-10-11 RX ORDER — ATORVASTATIN CALCIUM 80 MG/1
80 TABLET, FILM COATED ORAL DAILY
Qty: 30 TABLET | Refills: 5 | Status: SHIPPED | OUTPATIENT
Start: 2022-10-11

## 2022-10-11 RX ORDER — GABAPENTIN 100 MG/1
200 CAPSULE ORAL 3 TIMES DAILY
Qty: 90 CAPSULE | Refills: 5 | Status: SHIPPED | OUTPATIENT
Start: 2022-10-11 | End: 2022-11-10

## 2022-10-11 RX ORDER — MONTELUKAST SODIUM 10 MG/1
10 TABLET ORAL NIGHTLY
Qty: 30 TABLET | Refills: 3 | Status: SHIPPED | OUTPATIENT
Start: 2022-10-11 | End: 2022-11-10

## 2022-10-11 SDOH — ECONOMIC STABILITY: FOOD INSECURITY: WITHIN THE PAST 12 MONTHS, YOU WORRIED THAT YOUR FOOD WOULD RUN OUT BEFORE YOU GOT MONEY TO BUY MORE.: NEVER TRUE

## 2022-10-11 SDOH — ECONOMIC STABILITY: FOOD INSECURITY: WITHIN THE PAST 12 MONTHS, THE FOOD YOU BOUGHT JUST DIDN'T LAST AND YOU DIDN'T HAVE MONEY TO GET MORE.: NEVER TRUE

## 2022-10-11 ASSESSMENT — ENCOUNTER SYMPTOMS
CHOKING: 0
ABDOMINAL DISTENTION: 0
STRIDOR: 0
COUGH: 1
EYE PAIN: 0
ABDOMINAL PAIN: 0
BACK PAIN: 0
BLOOD IN STOOL: 0
PHOTOPHOBIA: 0
RHINORRHEA: 0
SHORTNESS OF BREATH: 1
SORE THROAT: 0

## 2022-10-11 ASSESSMENT — SOCIAL DETERMINANTS OF HEALTH (SDOH): HOW HARD IS IT FOR YOU TO PAY FOR THE VERY BASICS LIKE FOOD, HOUSING, MEDICAL CARE, AND HEATING?: NOT HARD AT ALL

## 2022-10-11 ASSESSMENT — PATIENT HEALTH QUESTIONNAIRE - PHQ9
SUM OF ALL RESPONSES TO PHQ QUESTIONS 1-9: 0
1. LITTLE INTEREST OR PLEASURE IN DOING THINGS: 0
SUM OF ALL RESPONSES TO PHQ QUESTIONS 1-9: 0
2. FEELING DOWN, DEPRESSED OR HOPELESS: 0
SUM OF ALL RESPONSES TO PHQ QUESTIONS 1-9: 0
SUM OF ALL RESPONSES TO PHQ9 QUESTIONS 1 & 2: 0
SUM OF ALL RESPONSES TO PHQ QUESTIONS 1-9: 0

## 2022-10-12 LAB — PROSTATE SPECIFIC ANTIGEN: 3.34 NG/ML

## 2022-11-08 ENCOUNTER — TELEPHONE (OUTPATIENT)
Dept: INTERNAL MEDICINE | Age: 67
End: 2022-11-08

## 2022-11-08 NOTE — LETTER
[address]  [address] [facility] [address]  Phone: [phone]  Fax: [phone]    Lorna Orellana MD        November 8, 2022     [address] [place] [address] [phone]      Dear Fern Ortiz: We missed seeing you for a scheduled appointment at [address] [place] [address] with Lorna Orellana MD on 11/8/2022. We're sorry you were unable to keep your appointment and hope that you are doing well. We ask that you please call 24 hours in advance if you are unable to make your appointment, so that we can give that time to another patient in need. We care about you and the management of your healthcare and want to make sure that you follow up as recommended. To provide quality care and timely appointments to all our patients, you may be dismissed from the practice if you do not show for three (3) scheduled appointments within a 12-month period. We would like to continue treating your healthcare needs. Please call the office to reschedule your appointment, if needed.      Sincerely,        Lorna Orellana MD

## 2022-11-09 DIAGNOSIS — F51.01 PRIMARY INSOMNIA: ICD-10-CM

## 2022-11-09 NOTE — TELEPHONE ENCOUNTER
Request for multiple medication refill      Next Visit Date:11/29/22  Future Appointments   Date Time Provider Mahesh Josei   11/29/2022  2:00 PM Joe Gamboa MD [phone] [facility]   Topic Date Due    Hepatitis B vaccine (1 of 3 - Risk Dialysis 4-dose series) Never done    Colorectal Cancer Screen  06/27/2018    COVID-19 Vaccine (3 - Booster for Gemma  series) 06/03/2021    Diabetic retinal exam  07/14/2021    Annual Wellness Visit (AWV)  03/08/2022    Diabetic foot exam  04/06/2022    Pneumococcal 65+ years Vaccine (3 - PPSV23 if available, else PCV20) 04/06/2022    Flu vaccine (1) 08/01/2022    Lipids  06/07/2023    A1C test (Diabetic or Prediabetic)  10/11/2023    Depression Screen  10/11/2023    DTaP/Tdap/Td vaccine (2 - Td or Tdap) 12/14/2023    Shingles vaccine  Completed    AAA screen  Completed    Hepatitis C screen  Completed    Hepatitis A vaccine  Aged Out    Hib vaccine  Aged Out    Meningococcal (ACWY) vaccine  Aged Out       Hemoglobin A1C (%)   Date Value   10/11/2022 7.0   06/02/2022 8.1 (H)   05/19/2021 9.5 (H)             ( goal A1C is < 7)   Microalb/Crt.  Ratio (mcg/mg creat)   Date Value   06/16/2020 2,663 (H)     LDL Cholesterol (mg/dL)   Date Value   05/04/2021 158 (H)       (goal LDL is <100)   AST (U/L)   Date Value   06/03/2022 112 (H)     ALT (U/L)   Date Value   06/03/2022 35     BUN (mg/dL)   Date Value   06/06/2022 70 (H)     BP Readings from Last 3 Encounters:   10/11/22 134/71   07/15/22 (!) 149/66   07/15/22 (!) 157/64          (goal 120/80)    All Future Testing planned in [facility]  Lab Frequency Next Occurrence   6 Minute Walk Test Once 11/17/2021   Hemoglobin and Hematocrit, Blood, Post Transfusion POST TRANSFUSION          Patient Active Problem List:     Cigarette nicotine dependence without complication     Carpal tunnel syndrome on right     Colon polyps     Carotid stenosis, left s/p Endarterectomy     Mixed simple and mucopurulent chronic bronchitis (HCC)     Pure hypercholesterolemia     Shortness of breath     PTSD (post-traumatic stress disorder)     Transient cerebral ischemia     Hypertension, essential     DM type 2, uncontrolled, with neuropathy     COPD with acute exacerbation (HCC)     Cerebral vascular disease     Primary insomnia     Hypertensive urgency     Non-obstructive Coronary artery disease of native artery of native heart with stable angina pectoris (HCC)     Hyperparathyroidism ([address]     Hypovitaminosis D     Acute kidney injury superimposed on CKD (HCC)-currently dialysis patient     Coronary artery disease with exertional angina (HCC)     Moderate malnutrition (HCC)     COPD exacerbation (HCC)     Leg swelling     Hyponatremia     Anemia of chronic disease     NSTEMI (non-ST elevated myocardial infarction) (HCC)     Hypoalbuminemia     DKA, type 2, not at goal [facility])     Inflammation of right sacroiliac joint ([address]     Malignant neoplasm of colon ([address]     ESRD on dialysis ([address]     Bilateral carotid artery occlusion     Seizure ([address]     Chronic heart failure with preserved ejection fraction (HCC)     Left ventricular hypertrophy     Acute respiratory failure with hypoxia ([address]     Uncontrolled type 2 diabetes mellitus with hyperglycemia (HCC)     Acute respiratory failure (HCC)     Brittle diabetes (HCC)     Fluid overload     History of CVA (cerebrovascular accident)     Hyperkalemia     Diabetic ketoacidosis without coma associated with type 2 diabetes mellitus (HCC)     Supratherapeutic INR     Metabolic acidosis     Fever and chills     Bacteremia     Unspecified mood (affective) disorder (HCC)     Peripheral vascular disease, unspecified ([address]     Type 2 diabetes mellitus with hyperglycemia     Type 2 diabetes mellitus with chronic kidney disease     Type 2 diabetes mellitus with diabetic neuropathy

## 2022-11-12 RX ORDER — CLONIDINE HYDROCHLORIDE 0.1 MG/1
0.3 TABLET ORAL 3 TIMES DAILY
Qty: 60 TABLET | Refills: 3 | Status: SHIPPED | OUTPATIENT
Start: 2022-11-12

## 2022-11-12 RX ORDER — HYDRALAZINE HYDROCHLORIDE 100 MG/1
100 TABLET, FILM COATED ORAL 3 TIMES DAILY
Qty: 60 TABLET | Refills: 3 | Status: SHIPPED | OUTPATIENT
Start: 2022-11-12

## 2022-11-12 RX ORDER — BUMETANIDE 2 MG/1
2 TABLET ORAL DAILY
Qty: 30 TABLET | Refills: 4 | Status: SHIPPED | OUTPATIENT
Start: 2022-11-12

## 2022-11-12 RX ORDER — LOSARTAN POTASSIUM 50 MG/1
50 TABLET ORAL 2 TIMES DAILY
Qty: 30 TABLET | Refills: 3 | Status: SHIPPED | OUTPATIENT
Start: 2022-11-12

## 2022-11-12 RX ORDER — CARVEDILOL 12.5 MG/1
12.5 TABLET ORAL 2 TIMES DAILY
Qty: 60 TABLET | Refills: 3 | Status: SHIPPED | OUTPATIENT
Start: 2022-11-12

## 2022-11-12 RX ORDER — QUETIAPINE FUMARATE 100 MG/1
100 TABLET, FILM COATED ORAL NIGHTLY
Qty: 30 TABLET | Refills: 4 | Status: SHIPPED | OUTPATIENT
Start: 2022-11-12

## 2022-11-12 RX ORDER — NIFEDIPINE 60 MG/1
60 TABLET, FILM COATED, EXTENDED RELEASE ORAL 2 TIMES DAILY
Qty: 30 TABLET | Refills: 3 | Status: SHIPPED | OUTPATIENT
Start: 2022-11-12

## 2022-11-25 ENCOUNTER — TELEPHONE (OUTPATIENT)
Dept: INTERNAL MEDICINE | Age: 67
End: 2022-11-25

## 2022-11-25 DIAGNOSIS — J44.9 CHRONIC OBSTRUCTIVE PULMONARY DISEASE, UNSPECIFIED COPD TYPE (HCC): Primary | ICD-10-CM

## 2022-11-29 RX ORDER — FLUTICASONE FUROATE, UMECLIDINIUM BROMIDE AND VILANTEROL TRIFENATATE 100; 62.5; 25 UG/1; UG/1; UG/1
1 POWDER RESPIRATORY (INHALATION) DAILY
Qty: 1 EACH | Refills: 3 | Status: SHIPPED | OUTPATIENT
Start: 2022-11-29

## 2023-01-01 NOTE — PROGRESS NOTES
Patient given discharge instructions, DME order for labs and nebulizer, and medications from pharmacy given to patient. Patient understands all further appointments and changes to medications. Patient IV removed and all belongings given to patient. Cab called and patient discharged to home with no questions at this time. Yes

## 2023-09-09 NOTE — ED PROVIDER NOTES
[facility]  ED  Emergency Department Encounter  Emergency Medicine Resident     Pt Name: Kenny Chand  MRN: [id number]  [place] 1955  Date of evaluation: 5/15/21  PCP:  TERRANCE Jefferson CNP    CHIEF COMPLAINT       Chief Complaint   Patient presents with    Shortness of Breath       HISTORY OFPRESENT ILLNESS  (Location/Symptom, Timing/Onset, Context/Setting, Quality, Duration, Modifying Brendalyn Retort.)      Kenny Chand is a 73 y.o. male who presents with sudden onset shortness of breath, patient has a newly diagnosed chronic kidney disease requiring dialysis, secondary to hypertension, patient was recently discharged from the hospital for what sounds like shortness of breath, hypertensive crisis, new onset kidney disease with dialysis 4 days in a row. Patient states that he went shopping today was walking up the stairs when he had sudden onset shortness of breath did 3 DuoNeb treatments back-to-back. Patient arrives to the hospital wearing CPAP, patient is a daily smoker, patient is working harder to breathe, he is tachypneic and using some accessory muscles. Patient is requesting a CPAP be removed, will do a trial off of CPAP. Patient denies any chest pain, fever, chills, recent upper respiratory illness, abdominal pain, back pain. Patient is a type II diabetic. Patient received prednisone and magnesium by EMS.   PAST MEDICAL / SURGICAL / SOCIAL / FAMILY HISTORY      has a past medical history of Asthma, CAD in native artery, nonobstructive, Carotid stenosis, left, Carpal tunnel syndrome, Cerebrovascular disease, Chronic kidney disease, COPD (chronic obstructive pulmonary disease) ([facility] [address] Diabetes mellitus ([facility] [address] History of colon polyps, History of weakness of extremity, HTN (hypertension), Hyperlipidemia, Lung, cysts, congenital, PTSD (post-traumatic stress disorder), PTSD (post-traumatic stress disorder), TIA (transient ischemic attack), and Type II or unspecified type diabetes mellitus without mention of complication, not stated as uncontrolled. has a past surgical history that includes hernia repair; Tonsillectomy; Colonoscopy; Carotid endarterectomy (Left, -); vascular surgery (Left, ); pr colsc flx w/rmvl of tumor polyp lesion snare tq (N/A, 2017); and IR TUNNELED CVC PLACE WO SQ PORT/PUMP > 5 YEARS (2021). Social History     Socioeconomic History    Marital status:      Spouse name: Not on file    Number of children: Not on file    Years of education: Not on file    Highest education level: Not on file   Occupational History     Employer: N/A   Tobacco Use    Smoking status: Former Smoker     Packs/day: 0.50     Years: 35.00     Pack years: 17.50     Types: Cigarettes     Quit date: 2014     Years since quittin.8    Smokeless tobacco: Never Used   Vaping Use    Vaping Use: Never used   Substance and Sexual Activity    Alcohol use: No     Alcohol/week: 0.0 standard drinks    Drug use: No    Sexual activity: Yes     Partners: Female   Other Topics Concern    Not on file   Social History Narrative    Not on file     Social Determinants of Health     Financial Resource Strain:     Difficulty of Paying Living Expenses:    Food Insecurity:     Worried About Running Out of Food in the Last Year:     [address] in the Last Year:    Transportation Needs:     Lack of Transportation (Medical):      Lack of Transportation (Non-Medical):    Physical Activity:     Days of Exercise per Week:     Minutes of Exercise per Session:    Stress:     Feeling of Stress :    Social Connections:     Frequency of Communication with Friends and Family:     Frequency of Social Gatherings with Friends and Family:     Attends Restoration[employer] Services:     Active Member of Clubs or Organizations:     Attends Club or Organization Meetings:     Marital Status:    Intimate Partner Violence:     Fear of Current or Ex-Partner:     Emotionally Abused:     Physically Abused:     Sexually Abused:        Family History   Problem Relation Age of Onset    Cancer Mother     Heart Disease Father         Allergies:  Lisinopril, Ace inhibitors, and Pcn [penicillins]    Home Medications:  Prior to Admission medications    Medication Sig Start Date End Date Taking? Authorizing Provider   sodium bicarbonate 650 MG tablet Take 2 tablets by mouth 2 times daily 5/13/21 6/12/21  TERRANCE Junior NP   insulin glargine [facility]) 100 UNIT/ML injection pen Inject 10 Units into the skin nightly 5/13/21   TERRANCE Junior NP   cloNIDine (CATAPRES) 0.2 MG tablet Take 1 tablet by mouth 3 times daily 5/13/21   TERRANCE Junior NP   NIFEdipine (PROCARDIA XL) 90 MG extended release tablet Take 1 tablet by mouth nightly 5/13/21   TERRANCE Junior NP   glucagon 1 MG injection Inject 1 mg into the muscle See Admin Instructions Follow package directions for low blood sugar. 4/6/21   TERRANCE Bach CNP   fluticasone [facility]) 50 MCG/ACT nasal spray  2/23/21   Historical Provider, MD   insulin aspart (NOVOLOG FLEXPEN) 100 UNIT/ML injection pen Inject 5 Units into the skin 3 times daily (before meals) Sliding scale 3/16/21   TERRANCE Bach CNP   nitroGLYCERIN (NITROSTAT) 0.4 MG SL tablet USE 1 TABLET UNDER THE TONGUE UP TO MAXIMUM OF 3 TOTAL DOSES.  IF NO RELIEF AFTER 1 DOSE, CALL 911. 3/5/21   TERRANCE Bach CNP   gabapentin (NEURONTIN) 100 MG capsule TAKE 2 CAPSULES BY MOUTH 3 TIMES DAILY 2/23/21 5/24/21  TERRANCE Bach CNP   QUEtiapine (SEROQUEL) 100 MG tablet TAKE 1 TABLET BY MOUTH NIGHTLY 2/23/21   TERRANCE Bach CNP   losartan (COZAAR) 50 MG tablet TAKE 1 TABLET BY MOUTH 2 TIMES DAILY 2/23/21   TERRANCE Bach CNP   GNP VITAMIN D MAXIMUM STRENGTH 50 MCG (2000 UT) TABS TAKE 1 TABLET BY MOUTH ONCE DAILY 2/23/21   TERRANCE Bach CNP   TRELEGY ELLIPTA 100-62.5-25 MCG/INH AEPB INHALE ONE PUFF INTO THE LUNGS DAILY 1/27/21   TERRANCE Mcgovern CNP   blood glucose monitor strips Test_4__times daily Diagnosis: 250.0   Diabetes Mellitus_x___Insulin Dependent____Non-Insulin Dependent 1/26/21   TERRANCE Mcgovern CNP   blood glucose monitor kit and supplies Dispense sufficient amount for indicated testing frequency plus additional to accommodate PRN testing needs. Dispense all needed supplies to include: monitor, strips, lancing device, lancets, control solutions, alcohol swabs.  1/26/21   TERRANCE Mcgovern CNP   prazosin (MINIPRESS) 2 MG capsule 2 CAPSULES BY MOUTH (4MG) TWICE DAILY 1/25/21   TERRANCE Mcgovern CNP   COMBIVENT RESPIMAT  MCG/ACT AERS inhaler INHALE 1 PUFF INTO THE LUNGS EVERY 6 HOURS 1/25/21   TERRANCE Mcgovern CNP   hydrALAZINE (APRESOLINE) 100 MG tablet Take 1 tablet by mouth every 8 hours 1/25/21   TERRANCE Mcgovern CNP   aspirin (ASPIR-LOW) 81 MG EC tablet TAKE 1 TABLET BY MOUTH DAILY 1/25/21   TERRANCE Mcgovern CNP   carvedilol (COREG) 25 MG tablet Take 1 tablet by mouth 2 times daily (with meals) 1/25/21   TERRANCE Mcgovern CNP   atorvastatin (LIPITOR) 80 MG tablet Take 1 tablet by mouth daily 1/25/21   TERRANCE Mcgovern CNP   clopidogrel (PLAVIX) 75 MG tablet Take 1 tablet by mouth daily 1/25/21   TERRANCE Mcgovern CNP   isosorbide mononitrate (IMDUR) 60 MG extended release tablet Take 1 tablet by mouth daily 1/5/21   TERRANCE Mcgovern CNP   vitamin D (ERGOCALCIFEROL) 1.25 MG (92646 UT) CAPS capsule Take 1 capsule by mouth once a week 1/5/21   TERRANCE Mcgovern CNP   Lancets MISC Use_4__times daily Diagnisis:250.0  Diabetes Mellitus__x__Insulin Dependent___Non-Insulin Dependent 12/6/20   Laverne Cervantes MD   ipratropium-albuterol (DUONEB) 0.5-2.5 (3) MG/3ML SOLN nebulizer solution Inhale 3 mLs into the lungs every 6 hours as needed for Shortness of Breath 12/6/20   Shy Watson MD Nutritional Supplements (GLUCERNA CARBSTEADY) LIQD Take 1 Can by mouth 3 times daily 6/16/20   TERRANCE Steve CNP   COMFORT EZ PEN NEEDLES 31G X 8 MM MISC USE AS DIRECTED 4/14/20   TERRANCE Steve CNP   TRUE METRIX BLOOD GLUCOSE TEST strip TEST BLOOD SUGAR 4 TO 5 TIMES DAILY  Patient not taking: Reported on 4/6/2021 12/12/19   TERRANCE Steve CNP       REVIEW OFSYSTEMS    (2-9 systems for level 4, 10 or more for level 5)      Constitutional ROS - No recent fevers, No recent chills  Neurological ROS - No Headache, No Syncope  Opthalmologic ROS- No eye pain, No vision changes   ENT ROS - No sore throat, No congestion  Respiratory ROS - No cough, +  shortness of breath  Cardiovascular ROS - No chest pain, No palpitations   Gastrointestinal ROS - No abdominal pain, No nausea, No vomiting  Genito-Urinary ROS - No dysuria, Nohematuria  Musculoskeletal ROS - No back pain, No neck pain  Dermatological ROS - No wound, No rash  PHYSICAL EXAM   (up to 7 for level 4, 8 or more forlevel 5)      INITIAL VITALS:   ED Triage Vitals   BP Temp Temp src Pulse Resp SpO2 Height Weight   -- -- -- -- -- -- -- --       Physical Exam  Constitutional:       Comments: Alert and oriented, uncomfortable appearing male,   HENT:      Head: Normocephalic and atraumatic. Eyes:      Pupils: Pupils are equal, round, and reactive to light. Cardiovascular:      Rate and Rhythm: Normal rate. Pulmonary:      Comments: Tachypneic, using accessory muscles in the anterior chest wall, diffuse wheezes throughout, decreased air movement  Abdominal:      General: Bowel sounds are normal.      Palpations: Abdomen is soft. Musculoskeletal:      Cervical back: Normal range of motion. Skin:     General: Skin is warm. Neurological:      General: No focal deficit present. Mental Status: He is alert.          DIFFERENTIAL  DIAGNOSIS     PLAN (LABS / IMAGING / EKG):  Orders Placed This Encounter   Procedures    XR CHEST PORTABLE    Basic Metabolic Panel    Brain Natriuretic Peptide    CBC Auto Differential    D-Dimer, Quantitative    ELECTROLYTES PLUS    Hemoglobin and hematocrit, blood    CALCIUM, IONIC (POC)    Troponin    Continuous Pulse Oximetry    Inpatient consult to Hospitalist    Respiratory care evaluation only    POC Blood Gas    Venous Blood Gas, POC    Creatinine W/GFR Point of Care    POCT urea (BUN)    Lactic Acid, POC    POCT Glucose    EKG 12 Lead    Insert peripheral IV       MEDICATIONS ORDERED:  Orders Placed This Encounter   Medications    nitroGLYCERIN 50 mg in dextrose 5% 250 mL infusion       DDX: COPD exacerbation, CHF exacerbation of acute coronary syndrome, upper respiratory illness, viral illness, viral syndrome, Covid, electrolyte abnormality, anemia, dehydration    Initial MDM/Plan: 73 y.o. male who presents with worsening shortness of breath, sudden onset nature as patient was walking up the stairs, patient had a recent stay with [facility] services for hypertensive urgency with chronic kidney disease with pulmonary edema requiring dialysis, had last known hemodialysis yesterday. Patient finished the treatment plan. Patient is alert and oriented answer questions appropriately, using accessory muscles for breathing, patient came in on a CPAP, patient is requesting to have a trial without it. Patient has oxygen saturations of 98% without it. Patient denies any recent upper respiratory illness. Patient is a daily smoker. Transthoracic echocardiogram was done that showed a persistent pericardial effusion with curly B-lines consistent with pulmonary edema. We will plan to start patient on nitroglycerin drip as patient is hypertensive. We will also do a cardiac work-up based on the sudden nature of this event. Patient is likely in acute pulmonary edema, likely patient will be coming into the hospital for further evaluation.   Differential also includes acute coronary syndrome, viral syndrome, pulmonary embolus, will order a D-dimer as this is not the most likely diagnosis, although patient may likely not be able to obtain a CT PE based on his kidney function and creatinine.     DIAGNOSTIC RESULTS / EMERGENCYDEPARTMENT COURSE / MDM     LABS:  Labs Reviewed   BASIC METABOLIC PANEL - Abnormal; Notable for the following components:       Result Value    Glucose 227 (*)     BUN 58 (*)     CREATININE 3.05 (*)     Calcium 8.1 (*)     Sodium 132 (*)     GFR Non- 21 (*)     GFR  25 (*)     All other components within normal limits   BRAIN NATRIURETIC PEPTIDE - Abnormal; Notable for the following components:    Pro-BNP 6,126 (*)     All other components within normal limits   CBC WITH AUTO DIFFERENTIAL - Abnormal; Notable for the following components:    RBC 2.45 (*)     Hemoglobin 7.8 (*)     Hematocrit 24.3 (*)     NRBC Automated 0.2 (*)     Seg Neutrophils 72 (*)     Lymphocytes 11 (*)     Immature Granulocytes 4 (*)     Absolute Lymph # 1.02 (*)     Absolute Immature Granulocyte 0.32 (*)     All other components within normal limits   TROPONIN - Abnormal; Notable for the following components:    Troponin, High Sensitivity 174 (*)     All other components within normal limits   ELECTROLYTES PLUS - Abnormal; Notable for the following components:    POC Sodium 134 (*)     POC Chloride 97 (*)     All other components within normal limits   HGB/HCT - Abnormal; Notable for the following components:    POC Hemoglobin 7.4 (*)     POC Hematocrit 22 (*)     All other components within normal limits   VENOUS BLOOD GAS, POINT OF CARE - Abnormal; Notable for the following components:    pO2, Marlon 55.0 (*)     O2 Sat, Marlon 87 (*)     All other components within normal limits   CREATININE W/GFR POINT OF CARE - Abnormal; Notable for the following components:    POC Creatinine 3.51 (*)     GFR Comment 21 (*)     GFR Non- 18 (*)     All other components within normal limits   UREA N (POC) - Abnormal; Notable for the following components:    POC BUN 58 (*)     All other components within normal limits   POCT GLUCOSE - Abnormal; Notable for the following components:    POC Glucose 226 (*)     All other components within normal limits   D-DIMER, QUANTITATIVE   CALCIUM, IONIC (POC)   TROPONIN   LACTIC ACID,POINT OF CARE   POC BLOOD GAS         RADIOLOGY:  XR CHEST PORTABLE    Result Date: 5/15/2021  EXAMINATION: ONE XRAY VIEW OF THE CHEST 5/15/2021 2:11 pm COMPARISON: 05/09/2021, 05/08/2021 HISTORY: ORDERING SYSTEM PROVIDED HISTORY: sob TECHNOLOGIST PROVIDED HISTORY: sob FINDINGS: Tunneled right internal jugular dialysis catheter terminates at the distal SVC. Vascular congestion with mild septal thickening noted. No focal consolidation. There is no effusion or pneumothorax. The cardiomediastinal silhouette is stable. The osseous structures are stable. Findings compatible with mild interstitial edema. Dialysis catheter terminates at the distal SVC. EKG  EKG Interpretation    Interpreted by me    Rhythm: normal sinus   Rate: normal  Axis: normal  Ectopy: none  Conduction: normal  ST Segments: no acute change  T Waves: Inversions 1, aVL, V5, V6, unchanged from prior study  Q Waves: none    Clinical Impression: no acute changes and normal EKG    All EKG's are interpreted by the Emergency Department Physicianwho either signs or Co-signs this chart in the absence of a cardiologist.    EMERGENCY DEPARTMENT COURSE:  ED Course as of May 15 1612   Sat May 15, 2021   1358 . [WK]   1400 Patient found to have pericardial effusion and pulm edema on transthoracic echocardiogram will start nitroglycerin for acute pulmonary edema.     [RENATE]   1521 Patient had elevated D-dimer 2.06, however patient has elevated creatinine, cute on chronic kidney injury, patient's troponin is elevated from baseline, patient has elevated BNP, this is consistent with prior studies, patient has clinical pulmonary edema, nitroglycerin was started, will admit patient to Intermed services for further evaluation. [RENATE]   6662 Patient admitted to Intermed services, signed out to oncoming resident. [RENATE]      ED Course User Index  [RENATE] Fred Carr DO  [WK] Yaya Balderas DO           PROCEDURES:  None    CONSULTS:  IP CONSULT TO HOSPITALIST    CRITICAL CARE:  Please see attending note    FINAL IMPRESSION      1. Dyspnea, unspecified type         DISPOSITION / PLAN     DISPOSITION Decision To Admit 05/15/2021 03:21:57 PM      PATIENT REFERRED TO:  No follow-up provider specified.     DISCHARGE MEDICATIONS:  New Prescriptions    No medications on file       Fred Carr DO  Emergency Medicine Resident    (Please note that portions of this note were completed with a voice recognition program.Efforts were made to edit the dictations but occasionally words are mis-transcribed.)        Fred Carr DO  Resident  05/15/21 1921 show

## 2024-04-04 NOTE — PROGRESS NOTES
fluticasone-umeclidin-vilant (TRELEGY ELLIPTA) 100-62.5-25 MCG/INH AEPB INHALE 1 PUFF INTO THE LINGS DAILY 10/21/19  Yes TERRANCE Aponte CNP   hydrALAZINE (APRESOLINE) 25 MG tablet Take 1 tablet by mouth 2 times daily  Patient taking differently: Take 50 mg by mouth 3 times daily  10/21/19  Yes TERRANCE Aponte CNP   QUEtiapine (SEROQUEL) 100 MG tablet TAKE 1 TABLET BY MOUTH NIGHTLY 8/28/19  Yes Zara Ely MD   TRUE METRIX BLOOD GLUCOSE TEST strip TEST BLOOD SUGAR 4 TO 5 TIMES DAILY 12/12/19   TERRANCE Aponte CNP   amLODIPine (NORVASC) 5 MG tablet Take 1 tablet by mouth 2 times daily 10/21/19   TERRANCE Aponte CNP   ergocalciferol (ERGOCALCIFEROL) 16434 units capsule Take 1 capsule by mouth once a week 10/21/19   TERRANCE Aponte CNP   zoster recombinant adjuvanted vaccine [facility]) 50 MCG/0.5ML SUSR injection Inject 0.5 mLs into the muscle See Admin Instructions 1 dose now and repeat in 2-6 months 10/21/19 4/18/20  TERRANCE Aponte CNP   EASY COMFORT LANCETS MISC DX: DM-2 USE 3-4 TIMES DAILY 3/25/19   Zara Ely MD   COMFORT EZ PEN NEEDLES 31G X 8 MM MISC USE AS DIRECTED 2/25/19   Zara Ely MD   .  Recent Surgical History: None = 0     Assessment patient states he takes treatments 4 times a day at home    RR 20  Breath Sounds: clear diminished occasional expiratory wheeze      · Bronchodilator assessment at level  3  · Hyperinflation assessment at level   · Secretion Management assessment at level    ·   · []    Bronchodilator Assessment  BRONCHODILATOR ASSESSMENT SCORE  Score 0 1 2 3 4 5   Breath Sounds   []  Patient Baseline []  No Wheeze good aeration []  Faint, scattered wheezing, good aeration [x]  Expiratory Wheezing and or moderately diminished []  Insp/Exp wheeze and/or very diminished []  Insp/Exp and/ or marked distress   Respiratory Rate   []  Patient Baseline []  Less than 20 []  Less than 20 [x]  20-25 []  Greater than 25 []  Greater than 25   Peak flow % of Pred or PB [x]  NA   []  Greater than 90%  []  81-90% []  71-80% []  Less than or equal to 70%  or unable to perform []  Unable due to Respiratory Distress   Dyspnea re []  Patient Baseline [x]  No SOB [x]  No SOB []  SOB on exertion []  SOB min activity []  At rest/acute   e FEV% Predicted       [x]  NA []  Above 69%  []  Unable []  Above 60-69%  []  Unable []  Above 50-59%  []  Unable []  Above 35-49%  []  Unable []  Less than 35%  []  Unable                 []  Hyperinflation Assessment  Score 1 2 3   CXR and Breath Sounds   []  Clear []  No atelectasis  Basilar aeration []  Atelectasis or absent basilar breath sounds   Incentive Spirometry Volume  (Per IBW)   []  Greater than or equal to 15ml/Kg []  less than 15ml/Kg []  less than 15ml/Kg   Surgery within last 2 weeks []  None or general   []  Abdominal or thoracic surgery  []  Abdominal or thoracic   Chronic Pulmonary Historyre []  No []  Yes []  Yes     []  Secretion Management Assessment  Score 1 2 3   Bilateral Breath Sounds   []  Occasional Rhonchi []  Scattered Rhonchi []  Course Rhonchi and/or poor aeration   Sputum    []  Small amount of thin secretions []  Moderate amount of viscous secretions []  Copius, Viscious Yellow/ Secretions   CXR as reported by physician []  clear  []  Unavailable []  Infiltrates and/or consolidation  []  Unavailable []  Mucus Plugging and or lobar consolidation  []  Unavailable   Cough []  Strong, productive cough []  Weak productive cough []  No cough or weak non-productive cough   Servando Snow  12:22 PM                            FEMALE                                  MALE                            FEV1 Predicted Normal Values                        FEV1 Predicted Normal Values          Age                                     Height in Feet and Inches       Age                                     Height in Feet and Inches       4' 11\" 5' 1\" 5' 3\" 5' 5\" 5' 7\" 5' 9\" 5' 11\" 6' 1\"  4' 11\" 5' 1\" 5' 3\" 5' 5\" 5' 7\" 5' 9\" 5' 11\" 6' 1\"   43 - 45 2.49 2.66 2.84 3.03 3.22 3.42 3.62 3.83 42 - 45 2.82 3.03 3.26 3.49 3.72 3.96 4.22 4.47   46 - 49 2.40 2.57 2.76 2.94 3.14 3.33 3.54 3.75 46 - 49 2.70 2.92 3.14 3.37 3.61 3.85 4.10 4.36   50 - 53 2.31 2.48 2.66 2.85 3.04 3.24 3.45 3.66 50 - 53 2.58 2.80 3.02 3.25 3.49 3.73 3.98 4.24   54 - 57 2.21 2.38 2.57 2.75 2.95 3.14 3.35 3.56 54 - 57 2.46 2.67 2.89 3.12 3.36 3.60 3.85 4.11   58 - 61 2.10 2.28 2.46 2.65 2.84 3.04 3.24 3.45 58 - 61 2.32 2.54 2.76 2.99 3.23 3.47 3.72 3.98   62 - 65 1.99 2.17 2.35 2.54 2.73 2.93 3.13 3.34 62 - 65 2.19 2.40 2.62 2.85 3.09 3.33 3.58 3.84   66 - 69 1.88 2.05 2.23 2.42 2.61 2.81 3.02 3.23 66 - 69 2.04 2.26 2.48 2.71 2.95 3.19 3.44 3.70   70+ 1.82 1.99 2.17 2.36 2.55 2.75 2.95 3.16 70+ 1.97 2.19 2.41 2.64 2.87 3.12 3.37 3.62             Predicted Peak Expiratory Flow Rate                                       Height (in)  Female       Height (in) Male           Age 64 62 64 63 57 71 78 74 Age            24 287 229 270 116 748 470 477 827  81 05 13 96 03 20 15 59 94   25 337 352 366 381 396 411 426 441 25 447 476 505 533 562 591 619 648 677   30 329 344 359 374 389 404 419 434 30 437 466 494 523 552 580 609 638 667   35 322 337 351 366 381 396 411 426 35 426 455 484 512 541 570 598 627 657   40 314 329 344 359 374 389 404 419 40 416 445 473 502 531 559 588 617 647   45 307 322 336 351 366 381 396 411 45 405 434 463 491 520 549 577 606 636   50 299 314 329 344 359 374 389 404 50 395 424 452 481 510 538 567 596 625   55 292 307 321 336 351 366 381 396 55 384 413 442 470 499 528 556 585 615   60 284 299 314 329 344 359 374 389 60 374 403 431 460 489 517 546 575 605   65 277 292 306 321 336 351 366 381 65 363 392 421 449 478 507 535 564 594   70 269 284 299 314 329 344 359 374 70 353 382 410 439 468 496 525 554 583   75 261 274 289 305 319 334 348 364 75 344 372 400 429 458 487 515 544 573   80 253 266 282 296 312 327 342 356 80 335 362 390 419 448 476 505 538 568 PAIN SCALE 10 OF 10.

## 2024-05-25 NOTE — H&P
[facility]  Office: [address], DO, Julita Ocampo, DO, Tana Bazan, DO, Miami Boxer Blood, DO, Ida Padilla MD, Pramod Serrato MD, Mukul Galloway MD, Barbara Merlos MD, Monet Claudio MD, Sheyla Paredes MD, Jesus Loja MD, Heavenly Linares, DO, Tasha Oliver, DO, Massiel Tucker MD,  Tony Reyes DO, Sol Mckeon MD, Tyler Oliveira MD, Adolph Jimenez MD, Agata Olivas MD, Daleen Lennox, MD, Faith Carcamo, [facility], [facility], [facility], May Leon, CNP, Frankie Waters, CNS, Karan Self, CNP, Prem Owen, CNP, Jesus Lynn, CNP, Rosa Goode, CNP, Hildy Shone, [facility], [facility], Tracey Keith, [facility], Rubén Oconnor, [facility], Peter Jackson, CNP, Kecia Valentino, CNP, Milena Salgado, CNP, Marquis Salas, CNP, Tanner Mcgregor, [facility], Jami Soto, [address] [place] [address]      HISTORY AND PHYSICAL EXAMINATION            Date:   3/8/2022  Patient name:  Arabella Fonseca  Date of admission:  3/7/2022  3:50 PM  MRN:   [id number]  Account:  [de-identified]  YOB: 1955  PCP:    Jordan Moses  Room:   27/27  Code Status:    Full Code    Chief Complaint:     Chief Complaint   Patient presents with    Shortness of Breath       History Obtained From:     patient, electronic medical record    History of Present Illness: The patient is a 78 y.o. Non- / non  male who presents with Shortness of Breath   and he is admitted to the hospital for the management of  Fluid overload. Patient presented to hospital with difficulty breathing for 4 days. He has underlying history of ESRD on hemodialysis for last 1 year. Patient is on TTS schedule. He reported that he missed is dialysis for last few sessions. Patient reportedly missed dialysis for 1 week before and then had his last dialysis last Tuesday. He was seen and dialysis unit 2 days ago but reported that his blood sugar was low and patient was sent HOME.   He has underlying history of insulin-dependent diabetes since age 29.  other comorbidities include COPD, diabetes, left carotid stenosis s/p CEA, hyperlipidemia. Patient reported that he has been admitted to the hospital multiple times with pulmonary edema. He lives with his wife at home. patient reported that his wife does not drive. Patient is homebound. He has multiple transportation issues. Initial evaluation showed elevated blood pressure 154/54, lab evaluation showed hyponatremia 132, BUN 71, creatinine 4.08, glucose 120, elevated proBNP 46,683, troponin I 37, WBC 8.4, hemoglobin 9.8. Chest x-ray showed mild pulmonary vascular congestion.   Past Medical History:     Past Medical History:   Diagnosis Date    Asthma     mod persistent    CAD in native artery, nonobstructive     Carotid stenosis, left 6/10/2013    Carpal tunnel syndrome     RIGHT    Cerebrovascular disease 4/23/2018    Chronic kidney disease 6/10/2013    COPD (chronic obstructive pulmonary disease) (HCC)     Diabetes mellitus (HCC)     insulin dependent    History of colon polyps     History of weakness of extremity     right sided    HTN (hypertension)     Hyperlipidemia     Lung, cysts, congenital     PTSD (post-traumatic stress disorder)     PTSD (post-traumatic stress disorder)     TIA (transient ischemic attack)     Type II or unspecified type diabetes mellitus without mention of complication, not stated as uncontrolled         Past Surgical History:     Past Surgical History:   Procedure Laterality Date    CAROTID ENDARTERECTOMY Left 7-2013    COLONOSCOPY      HERNIA REPAIR      IR TUNNELED CATHETER PLACEMENT GREATER THAN 5 YEARS  5/11/2021    IR TUNNELED CATHETER PLACEMENT GREATER THAN 5 YEARS 5/11/2021 Diana Urrutia MD [facility] SPECIAL PROCEDURES    IA COLSC FLX W/RMVL OF TUMOR POLYP LESION SNARE TQ N/A 6/27/2017    COLONOSCOPY POLYPECTOMY SNARE/ hot performed by Shy Bean DO at [facility] VASCULAR SURGERY Left 2015 carotid stent, dr Bree Tay        Medications Prior to Admission:     Prior to Admission medications    Medication Sig Start Date End Date Taking? Authorizing Provider   magnesium oxide (MAG-OX) 400 MG tablet Take 400 mg by mouth daily    Historical Provider, MD   ipratropium-albuterol (DUONEB) 0.5-2.5 (3) MG/3ML SOLN nebulizer solution Inhale 3 mLs into the lungs 4 times daily 11/17/21 3/17/22  Giselle Harry MD   albuterol-ipratropium (COMBIVENT RESPIMAT)  MCG/ACT AERS inhaler Inhale 1 puff into the lungs every 6 hours 11/17/21 12/17/21  Giselle Harry MD   bumetanide (BUMEX) 1 MG tablet Take 1 tablet by mouth daily 10/19/21   Historical Provider, MD   NIFEdipine (ADALAT CC) 90 MG extended release tablet Take 1 tablet by mouth daily 10/19/21   Historical Provider, MD   aspirin 81 MG EC tablet Take 81 mg by mouth daily 7/28/21   Historical Provider, MD   atorvastatin (LIPITOR) 80 MG tablet Take 1 tablet by mouth daily 10/19/21   Historical Provider, MD   carvedilol (COREG) 25 MG tablet Take 1 tablet by mouth 2 times daily 10/19/21   Historical Provider, MD   cloNIDine (CATAPRES) 0.1 MG tablet Take 0.3 mg by mouth three times daily  8/22/21   Historical Provider, MD   clopidogrel (PLAVIX) 75 MG tablet Take 1 tablet by mouth daily 8/22/21   Historical Provider, MD   gabapentin (NEURONTIN) 100 MG capsule Take 200 mg by mouth 3 times daily. 10/5/21   Historical Provider, MD   hydrALAZINE (APRESOLINE) 100 MG tablet Take 1 tablet by mouth 3 times daily 8/24/21   Historical Provider, MD   losartan (COZAAR) 50 MG tablet Take 1 tablet by mouth daily 8/22/21   Historical Provider, MD   montelukast (SINGULAIR) 10 MG tablet Take 1 tablet by mouth nightly 8/22/21   Historical Provider, MD   prazosin (MINIPRESS) 5 MG capsule Take 10 mg by mouth 2 times daily    Historical Provider, MD   blood glucose monitor strips Test 4 times a day & as needed for symptoms of irregular blood glucose.  Dispense sufficient amount for indicated testing frequency plus additional to accommodate PRN testing needs. 5/26/21   TERRANCE Caraballo NP   insulin glargine [facility]) 100 UNIT/ML injection pen Inject 10 Units into the skin nightly 5/13/21   TERRANCE Caraballo NP   glucagon 1 MG injection Inject 1 mg into the muscle See Admin Instructions Follow package directions for low blood sugar. Patient not taking: Reported on 11/17/2021 4/6/21   TERRANCE Gregory CNP   fluticasone [facility]) 50 MCG/ACT nasal spray  2/23/21   Historical Provider, MD   insulin aspart (NOVOLOG FLEXPEN) 100 UNIT/ML injection pen Inject 5 Units into the skin 3 times daily (before meals) Sliding scale 3/16/21   TERRANCE Gregory CNP   nitroGLYCERIN (NITROSTAT) 0.4 MG SL tablet USE 1 TABLET UNDER THE TONGUE UP TO MAXIMUM OF 3 TOTAL DOSES. IF NO RELIEF AFTER 1 DOSE, CALL 911. 3/5/21   TERRANCE Gregory CNP   QUEtiapine (SEROQUEL) 100 MG tablet TAKE 1 TABLET BY MOUTH NIGHTLY 2/23/21   TERRANCE Gregory CNP   GNP VITAMIN D MAXIMUM STRENGTH 50 MCG (2000 UT) TABS TAKE 1 TABLET BY MOUTH ONCE DAILY 2/23/21   TERRANCE Gregory CNP   blood glucose monitor kit and supplies Dispense sufficient amount for indicated testing frequency plus additional to accommodate PRN testing needs. Dispense all needed supplies to include: monitor, strips, lancing device, lancets, control solutions, alcohol swabs.  1/26/21   TERRANCE Gregory CNP   vitamin D (ERGOCALCIFEROL) 1.25 MG (45340 UT) CAPS capsule Take 1 capsule by mouth once a week 1/5/21   TERRANCE Gregory CNP   Lancets MISC Use_4__times daily Diagnisis:250.0  Diabetes Mellitus__x__Insulin Dependent___Non-Insulin Dependent 12/6/20   Gabbie Perry MD   Nutritional Supplements (GLUCERNA CARBSTEADY) LIQD Take 1 Can by mouth 3 times daily 6/16/20   [place] Lakia, APRN - CNP   COMFORT EZ PEN NEEDLES 31G X 8 MM MISC USE AS DIRECTED 4/14/20   TERRANCE Gregory - CNP Allergies:     Lisinopril, Ace inhibitors, and Pcn [penicillins]    Social History:     Tobacco:    reports that he quit smoking about 7 years ago. His smoking use included cigarettes. He has a 17.50 pack-year smoking history. He has never used smokeless tobacco.  Alcohol:      reports no history of alcohol use. Drug Use:  reports no history of drug use. Family History:     Family History   Problem Relation Age of Onset    Cancer Mother     Heart Disease Father        Review of Systems:     Positive and Negative as described in HPI. Review of Systems   Constitutional: Negative for activity change, appetite change, fatigue, fever and unexpected weight change. HENT: Negative for congestion, nosebleeds, rhinorrhea, sinus pressure, sneezing and voice change. Respiratory: Positive for shortness of breath. Negative for cough, choking, chest tightness and wheezing. Cardiovascular: Negative for chest pain, palpitations and leg swelling. Gastrointestinal: Negative for abdominal pain, constipation, diarrhea, nausea and vomiting. Genitourinary: Negative for difficulty urinating, dysuria, frequency, penile discharge and testicular pain. Musculoskeletal: Negative for back pain. Skin: Negative for rash. Neurological: Negative for dizziness, weakness, light-headedness and headaches. Hematological: Does not bruise/bleed easily. Psychiatric/Behavioral: Negative for agitation, behavioral problems, confusion, self-injury, sleep disturbance and suicidal ideas.        Physical Exam:   BP (!) 160/63   Pulse 61   Temp 98.2 °F (36.8 °C)   Resp 19   Wt 150 lb (68 kg)   SpO2 98%   BMI 24.21 kg/m²   Temp (24hrs), Av.2 °F (36.8 °C), Min:98.2 °F (36.8 °C), Max:98.3 °F (36.8 °C)    Recent Labs     22  1614 22  0336 22  0512   POCGLU 121* 320* 340*       Intake/Output Summary (Last 24 hours) at 3/8/2022 0810  Last data filed at 3/7/2022 2156  Gross per 24 hour   Intake 300 ml   Output 3300 ml   Net -3000 ml     Physical Exam  Vitals and nursing note reviewed. Constitutional:       General: He is not in acute distress. HENT:      Head: Normocephalic and atraumatic. Eyes:      Conjunctiva/sclera: Conjunctivae normal.      Pupils: Pupils are equal, round, and reactive to light. Cardiovascular:      Rate and Rhythm: Normal rate and regular rhythm. Heart sounds: No murmur heard. Pulmonary:      Effort: Pulmonary effort is normal. No accessory muscle usage or respiratory distress. Breath sounds: No stridor. Rales present. No decreased breath sounds, wheezing or rhonchi. Chest:      Comments: Right-sided tunneled catheter in place. Abdominal:      General: Bowel sounds are normal. There is no distension. Palpations: Abdomen is soft. Abdomen is not rigid. Tenderness: There is no abdominal tenderness. There is no guarding. Musculoskeletal:         General: No tenderness. Skin:     General: Skin is warm and dry. Findings: No erythema, lesion or rash. Neurological:      Mental Status: He is alert and oriented to person, place, and time. Cranial Nerves: No cranial nerve deficit. Motor: No seizure activity. Psychiatric:         Speech: Speech normal.         Behavior: Behavior normal. Behavior is cooperative.          Investigations:      Laboratory Testing:  Recent Results (from the past 24 hour(s))   Venous Blood Gas, POC    Collection Time: 03/07/22  4:14 PM   Result Value Ref Range    pH, Marlon 7.358 7.320 - 7.430    pCO2, Marlon 38.2 (L) 41.0 - 51.0 mm Hg    pO2, Marlon 75.2 (H) 30.0 - 50.0 mm Hg    HCO3, Venous 21.5 (L) 22.0 - 29.0 mmol/L    Negative Base Excess, Marlon 4 (H) 0.0 - 2.0    O2 Sat, Marlon 94 (H) 60.0 - 85.0 %   ELECTROLYTES PLUS    Collection Time: 03/07/22  4:14 PM   Result Value Ref Range    POC Sodium 132 (L) 138 - 146 mmol/L    POC Potassium 4.3 3.5 - 4.5 mmol/L    POC Chloride 96 (L) 98 - 107 mmol/L    POC TCO2 22 22 - 30 mmol/L    Anion Gap 15 7 - 16 mmol/L   Hemoglobin and hematocrit, blood    Collection Time: 03/07/22  4:14 PM   Result Value Ref Range    POC Hemoglobin 10.2 (L) 13.5 - 17.5 g/dL    POC Hematocrit 30 (L) 41 - 53 %   Creatinine W/GFR Point of Care    Collection Time: 03/07/22  4:14 PM   Result Value Ref Range    POC Creatinine 4.83 (H) 0.51 - 1.19 mg/dL    GFR Comment 15 (L) >60 mL/min    GFR Non- 12 (L) >60 mL/min    GFR Comment         CALCIUM, IONIC (POC)    Collection Time: 03/07/22  4:14 PM   Result Value Ref Range    POC Ionized Calcium 1.27 1.15 - 1.33 mmol/L   POCT urea (BUN)    Collection Time: 03/07/22  4:14 PM   Result Value Ref Range    POC BUN 73 (H) 8 - 26 mg/dL   Lactic Acid, POC    Collection Time: 03/07/22  4:14 PM   Result Value Ref Range    POC Lactic Acid 1.68 (H) 0.56 - 1.39 mmol/L   POCT Glucose    Collection Time: 03/07/22  4:14 PM   Result Value Ref Range    POC Glucose 121 (H) 74 - 100 mg/dL   CBC with Auto Differential    Collection Time: 03/07/22  4:15 PM   Result Value Ref Range    WBC 8.4 3.5 - 11.3 k/uL    RBC 3.13 (L) 4.21 - 5.77 m/uL    Hemoglobin 9.8 (L) 13.0 - 17.0 g/dL    Hematocrit 29.9 (L) 40.7 - 50.3 %    MCV 95.5 82.6 - 102.9 fL    MCH 31.3 25.2 - 33.5 pg    MCHC 32.8 28.4 - 34.8 g/dL    RDW 15.3 (H) 11.8 - 14.4 %    Platelets 340 574 - 518 k/uL    MPV 10.5 8.1 - 13.5 fL    NRBC Automated 0.0 0.0 per 100 WBC    Immature Granulocytes 0 0 %    Seg Neutrophils 92 (H) 36 - 66 %    Lymphocytes 3 (L) 24 - 44 %    Monocytes 2 1 - 7 %    Eosinophils % 3 1 - 4 %    Basophils 0 0 - 2 %    Absolute Immature Granulocyte 0.00 0.00 - 0.30 k/uL    Segs Absolute 7.73 (H) 1.8 - 7.7 k/uL    Absolute Lymph # 0.25 (L) 1.0 - 4.8 k/uL    Absolute Mono # 0.17 0.1 - 0.8 k/uL    Absolute Eos # 0.25 0.0 - 0.4 k/uL    Basophils Absolute 0.00 0.0 - 0.2 k/uL    Morphology ANISOCYTOSIS PRESENT    Basic Metabolic Panel w/ Reflex to MG    Collection Time: 03/07/22  4:15 PM   Result Value Ref Range    Glucose 120 (H) 70 - 99 mg/dL    BUN 71 (H) 8 - 23 mg/dL    CREATININE 4.08 (H) 0.70 - 1.20 mg/dL    Calcium 9.2 8.6 - 10.4 mg/dL    Sodium 132 (L) 135 - 144 mmol/L    Potassium 4.4 3.7 - 5.3 mmol/L    Chloride 96 (L) 98 - 107 mmol/L    CO2 21 20 - 31 mmol/L    Anion Gap 15 9 - 17 mmol/L    GFR Non-African American 15 (L) >60 mL/min    GFR  18 (L) >60 mL/min    GFR Comment         Troponin    Collection Time: 03/07/22  4:15 PM   Result Value Ref Range    Troponin, High Sensitivity 147 (HH) 0 - 22 ng/L   Brain Natriuretic Peptide    Collection Time: 03/07/22  4:15 PM   Result Value Ref Range    Pro-BNP 46,683 (H) <300 pg/mL   Troponin    Collection Time: 03/07/22  5:28 PM   Result Value Ref Range    Troponin, High Sensitivity 137 (HH) 0 - 22 ng/L   POC Glucose Fingerstick    Collection Time: 03/08/22  3:36 AM   Result Value Ref Range    POC Glucose 320 (H) 75 - 110 mg/dL   POCT Glucose    Collection Time: 03/08/22  3:39 AM   Result Value Ref Range    Glucose 320 mg/dL    QC OK?  yes    POC Glucose Fingerstick    Collection Time: 03/08/22  5:12 AM   Result Value Ref Range    POC Glucose 340 (H) 75 - 110 mg/dL   Basic Metabolic Panel w/ Reflex to MG    Collection Time: 03/08/22  5:21 AM   Result Value Ref Range    Glucose 387 (H) 70 - 99 mg/dL    BUN 43 (H) 8 - 23 mg/dL    CREATININE 3.04 (H) 0.70 - 1.20 mg/dL    Calcium 8.2 (L) 8.6 - 10.4 mg/dL    Sodium 133 (L) 135 - 144 mmol/L    Potassium 3.8 3.7 - 5.3 mmol/L    Chloride 97 (L) 98 - 107 mmol/L    CO2 24 20 - 31 mmol/L    Anion Gap 12 9 - 17 mmol/L    GFR Non-African American 21 (L) >60 mL/min    GFR  25 (L) >60 mL/min    GFR Comment         Protime-INR    Collection Time: 03/08/22  5:21 AM   Result Value Ref Range    Protime 9.9 9.1 - 12.3 sec    INR 0.9        Imaging/Diagonstics:    XR CHEST PORTABLE    Result Date: 3/7/2022  Mild pulmonary vascular congestion        Assessment :      Primary Problem  Fluid overload    Active Hospital Problems Diagnosis Date Noted    Fluid overload [E87.70] 03/07/2022       Plan:     Patient status Admit as inpatient in the  Progressive Unit/Step down    Acute pulmonary edema secondary to volume overload after missing hemodialysis. -Patient had emergent hemodialysis yesterday and is having scheduled hemodialysis today. He feels better. Nephrology following. ESRD on hemodialysis -on TTS schedule. Uncontrolled hypertension -continue clonidine, Coreg, Bumex, losartan, nifedipine. Wean off nitro infusion. Insulin-dependent diabetes mellitus -diet as tolerated. Continue Lantus 10 units nightly. Reevaluate after hemodialysis. Consultations:   IP CONSULT TO NEPHROLOGY  IP CONSULT TO HOSPITALIST    Patient is admitted as inpatient status because of co-morbidities listed above, severity of signs and symptoms as outlined, requirement for current medical therapies and most importantly because of direct risk to patient if care not provided in a hospital setting.     Cleopatra Pratt MD  3/8/2022    Copy sent to Dr. Broderick Mora    (Please note that portions of this note were completed with a voice recognition program. Efforts were made to edit the dictations but occasionally words are mis-transcribed.) no

## (undated) DEVICE — SNARE ENDOSCP M L240CM LOOP W27MM SHTH DIA2.4MM OVL FLX

## (undated) DEVICE — ELECTRODE PT RET AD L9FT HI MOIST COND ADH HYDRGEL CORDED